# Patient Record
Sex: MALE | Race: WHITE | NOT HISPANIC OR LATINO | Employment: FULL TIME | URBAN - METROPOLITAN AREA
[De-identification: names, ages, dates, MRNs, and addresses within clinical notes are randomized per-mention and may not be internally consistent; named-entity substitution may affect disease eponyms.]

---

## 2017-01-09 ENCOUNTER — HOSPITAL ENCOUNTER (EMERGENCY)
Facility: HOSPITAL | Age: 62
Discharge: HOME | End: 2017-01-09
Attending: EMERGENCY MEDICINE | Admitting: EMERGENCY MEDICINE

## 2017-01-09 VITALS
HEIGHT: 78 IN | RESPIRATION RATE: 16 BRPM | HEART RATE: 64 BPM | TEMPERATURE: 97.8 F | SYSTOLIC BLOOD PRESSURE: 136 MMHG | BODY MASS INDEX: 28.93 KG/M2 | OXYGEN SATURATION: 97 % | WEIGHT: 250 LBS | DIASTOLIC BLOOD PRESSURE: 68 MMHG

## 2017-01-09 DIAGNOSIS — K05.219 ABSCESS OF UPPER GUM: Primary | ICD-10-CM

## 2017-01-09 LAB
BASOPHILS # BLD AUTO: 0.04 10*3/MM3 (ref 0–0.2)
BASOPHILS NFR BLD AUTO: 0.6 % (ref 0–1.5)
DEPRECATED RDW RBC AUTO: 53 FL (ref 37–54)
EOSINOPHIL # BLD AUTO: 0.19 10*3/MM3 (ref 0–0.7)
EOSINOPHIL NFR BLD AUTO: 2.7 % (ref 0.3–6.2)
ERYTHROCYTE [DISTWIDTH] IN BLOOD BY AUTOMATED COUNT: 14.4 % (ref 11.5–14.5)
HCT VFR BLD AUTO: 38.4 % (ref 40.4–52.2)
HGB BLD-MCNC: 12.5 G/DL (ref 13.7–17.6)
IMM GRANULOCYTES # BLD: 0.02 10*3/MM3 (ref 0–0.03)
IMM GRANULOCYTES NFR BLD: 0.3 % (ref 0–0.5)
LYMPHOCYTES # BLD AUTO: 1.63 10*3/MM3 (ref 0.9–4.8)
LYMPHOCYTES NFR BLD AUTO: 23.3 % (ref 19.6–45.3)
MCH RBC QN AUTO: 32.6 PG (ref 27–32.7)
MCHC RBC AUTO-ENTMCNC: 32.6 G/DL (ref 32.6–36.4)
MCV RBC AUTO: 100.3 FL (ref 79.8–96.2)
MONOCYTES # BLD AUTO: 0.78 10*3/MM3 (ref 0.2–1.2)
MONOCYTES NFR BLD AUTO: 11.1 % (ref 5–12)
NEUTROPHILS # BLD AUTO: 4.34 10*3/MM3 (ref 1.9–8.1)
NEUTROPHILS NFR BLD AUTO: 62 % (ref 42.7–76)
PLATELET # BLD AUTO: 218 10*3/MM3 (ref 140–500)
PMV BLD AUTO: 9.8 FL (ref 6–12)
RBC # BLD AUTO: 3.83 10*6/MM3 (ref 4.6–6)
WBC NRBC COR # BLD: 7 10*3/MM3 (ref 4.5–10.7)

## 2017-01-09 PROCEDURE — 96365 THER/PROPH/DIAG IV INF INIT: CPT

## 2017-01-09 PROCEDURE — 99283 EMERGENCY DEPT VISIT LOW MDM: CPT

## 2017-01-09 PROCEDURE — 85025 COMPLETE CBC W/AUTO DIFF WBC: CPT | Performed by: EMERGENCY MEDICINE

## 2017-01-09 RX ORDER — CLINDAMYCIN HYDROCHLORIDE 150 MG/1
450 CAPSULE ORAL 3 TIMES DAILY
Qty: 63 CAPSULE | Refills: 0 | Status: SHIPPED | OUTPATIENT
Start: 2017-01-09 | End: 2017-01-16

## 2017-01-09 RX ORDER — CLINDAMYCIN PHOSPHATE 600 MG/50ML
600 INJECTION INTRAVENOUS ONCE
Status: COMPLETED | OUTPATIENT
Start: 2017-01-09 | End: 2017-01-09

## 2017-01-09 RX ADMIN — CLINDAMYCIN PHOSPHATE 600 MG: 12 INJECTION, SOLUTION INTRAMUSCULAR; INTRAVENOUS at 12:17

## 2017-01-09 NOTE — ED PROVIDER NOTES
EMERGENCY DEPARTMENT ENCOUNTER    CHIEF COMPLAINT  Chief Complaint: Oral Cyst  History given by: Patient  History limited by: Nothing  Room Number: 29/29  PMD: Mick Aquino MD      HPI:  Pt is a 61 y.o. male who presents complaining of tenderness and swelling to his upper lip for the past 5 days with no known cause. He states that the pain is worsened with palpation and relieved with nothing. The patient explains that he came to the ED for further evaluation per his PCP. He denies dental pain, trouble swallowing, SOA, fever or chills since onset and has no other complaints.     Duration:  5 days  Onset: Gradual  Timing: Constant  Location: Left upper gum  Radiation: None  Quality: Sharp  Intensity/Severity: Moderate  Progression: Worsening  Associated Symptoms: Oral swelling/tenderness  Aggravating Factors: Palpation  Alleviating Factors: Rest  Previous Episodes: None  Treatment before arrival: None mentioned    PAST MEDICAL HISTORY  Active Ambulatory Problems     Diagnosis Date Noted   • Arthritis 04/25/2016   • Hypertension 06/07/2016   • Tobacco abuse 06/07/2016     Resolved Ambulatory Problems     Diagnosis Date Noted   • No Resolved Ambulatory Problems     Past Medical History   Diagnosis Date   • Anxiety    • Bronchitis with chronic airway obstruction    • Hiatal hernia    • Low back pain    • Neck pain    • Sleep apnea    • Sleep apnea with use of continuous positive airway pressure (CPAP)        PAST SURGICAL HISTORY  Past Surgical History   Procedure Laterality Date   • Replacement total knee     • Joint replacement Right      Dr Morrow   • Tonsillectomy     • Total hip arthroplasty Right        FAMILY HISTORY  Family History   Problem Relation Age of Onset   • Cancer Mother    • Heart disease Father    • Alcohol abuse Father    • Cancer Brother        SOCIAL HISTORY  Social History     Social History   • Marital status:      Spouse name: N/A   • Number of children: N/A   • Years of education:  N/A     Occupational History   • network technition       Social History Main Topics   • Smoking status: Current Every Day Smoker     Packs/day: 1.50     Years: 30.00     Types: Cigarettes   • Smokeless tobacco: Never Used   • Alcohol use 2.4 oz/week     4 Cans of beer per week      Comment: daily   • Drug use: No   • Sexual activity: Defer     Other Topics Concern   • Not on file     Social History Narrative   • No narrative on file       ALLERGIES  Penicillins    REVIEW OF SYSTEMS  Review of Systems   Constitutional: Negative for chills and fatigue.   HENT: Positive for dental problem. Negative for congestion, rhinorrhea and sore throat.    Eyes: Negative for pain.   Respiratory: Negative for cough, shortness of breath and wheezing.    Cardiovascular: Negative for chest pain, palpitations and leg swelling.   Gastrointestinal: Negative for abdominal pain, diarrhea, nausea and vomiting.   Genitourinary: Negative for difficulty urinating, dysuria, flank pain and frequency.   Musculoskeletal: Negative for arthralgias, myalgias, neck pain and neck stiffness.   Skin: Negative for rash.   Neurological: Negative for dizziness, speech difficulty, weakness, light-headedness, numbness and headaches.   Psychiatric/Behavioral: Negative.    All other systems reviewed and are negative.      PHYSICAL EXAM  ED Triage Vitals   Temp Heart Rate Resp BP SpO2   01/09/17 1139 01/09/17 1139 01/09/17 1139 01/09/17 1144 01/09/17 1139   95.7 °F (35.4 °C) 118 18 175/86 95 %      Temp src Heart Rate Source Patient Position BP Location FiO2 (%)   01/09/17 1139 -- 01/09/17 1144 01/09/17 1144 --   Tympanic  Lying Left arm        Physical Exam   Constitutional: He is oriented to person, place, and time and well-developed, well-nourished, and in no distress.   HENT:   Head: Normocephalic and atraumatic.   Focal swelling of left upper gingiva with fluctuance. There is no focal dental tenderness.    Eyes: EOM are normal. Pupils are equal, round,  and reactive to light.   Neck: Normal range of motion. Neck supple.   Cardiovascular: Normal rate, regular rhythm and normal heart sounds.    Pulmonary/Chest: Effort normal and breath sounds normal. No respiratory distress.   Abdominal: Soft. There is no tenderness. There is no rebound and no guarding.   Musculoskeletal: Normal range of motion. He exhibits no edema.   Neurological: He is alert and oriented to person, place, and time. He has normal sensation and normal strength.   Skin: Skin is warm and dry.   Psychiatric: Mood and affect normal.   Nursing note and vitals reviewed.      LAB RESULTS  Lab Results (last 24 hours)     Procedure Component Value Units Date/Time    CBC & Differential [[id number]] Collected:  01/09/17 1212    Specimen:  Blood Updated:  01/09/17 1228    Narrative:       The following orders were created for panel order CBC & Differential.  Procedure                               Abnormality         Status                     ---------                               -----------         ------                     CBC Auto Differential[[id number]]         Abnormal            Final result                 Please view results for these tests on the individual orders.    CBC Auto Differential [[id number]]  (Abnormal) Collected:  01/09/17 1212    Specimen:  Blood Updated:  01/09/17 1228     WBC 7.00 10*3/mm3      RBC 3.83 (L) 10*6/mm3      Hemoglobin 12.5 (L) g/dL      Hematocrit 38.4 (L) %      .3 (H) fL      MCH 32.6 pg      MCHC 32.6 g/dL      RDW 14.4 %      RDW-SD 53.0 fl      MPV 9.8 fL      Platelets 218 10*3/mm3      Neutrophil % 62.0 %      Lymphocyte % 23.3 %      Monocyte % 11.1 %      Eosinophil % 2.7 %      Basophil % 0.6 %      Immature Grans % 0.3 %      Neutrophils, Absolute 4.34 10*3/mm3      Lymphocytes, Absolute 1.63 10*3/mm3      Monocytes, Absolute 0.78 10*3/mm3      Eosinophils, Absolute 0.19 10*3/mm3      Basophils, Absolute 0.04 10*3/mm3      Immature Grans, Absolute 0.02  10*3/mm3           I ordered the above labs and reviewed the results    PROCEDURES  Procedures      PROGRESS AND CONSULTS  ED Course     1209  Ordered Labs for further evaluation. Also ordered Clindamycin to treat the patient's oral infection.     1320  Jairon Marquis NP, drained the cyst which contained moderate amount of serous fluid.     1358  Rechecked patient and they are resting comfortably after the I&D. Discussed the patient's pertinent labs and imaging results, including his labs show nothing acute. Discussed plan to discharge the patient home with Clindamycin for the infection and recommended follow up with his Dentist. I also explained that he should return to the ED if the swelling or pain increases. Patient agrees with the plan and all questions were addressed.     Latest vital signs   BP- 175/86 HR- 118 Temp- 95.7 °F (35.4 °C) (Tympanic) O2 sat- 95%    MEDICAL DECISION MAKING  Results were reviewed/discussed with the patient and they were also made aware of online access. Pt also made aware that some labs, such as cultures, will not be resulted during ER visit and follow up with PMD is necessary.     MDM  Number of Diagnoses or Management Options  Abscess of upper gum:   Diagnosis management comments: Patient had an abscess on his upper gum.  There was no evidence of a dental infection.  The patient does wear partial dentures and I suspect this abscess could be due to irritation from his partial plate.  He was afebrile with a normal white blood cell count.  The abscess was drained by the NP.  Patient was given IV clindamycin in the ER and will be discharged with a prescription for clindamycin.  Patient was advised follow-up with his dentist.       Amount and/or Complexity of Data Reviewed  Clinical lab tests: ordered and reviewed  Decide to obtain previous medical records or to obtain history from someone other than the patient: yes  Review and summarize past medical records: yes (Last seen in ER April  2016 for neck pain and cervical facet arthropathy. )    Patient Progress  Patient progress: stable         DIAGNOSIS  Final diagnoses:   Abscess of upper gum       DISPOSITION  DISCHARGE    Patient discharged in stable condition.    Reviewed implications of results, diagnosis, meds, responsibility to follow up, warning signs and symptoms of possible worsening, potential complications and reasons to return to ER, including worsening inflammation.    Patient/Family voiced understanding of above instructions.    Discussed plan for discharge, as there is no emergent indication for admission.  Pt/family is agreeable and understands need for follow up and repeat testing.  Pt is aware that discharge does not mean that nothing is wrong but it indicates no emergency is present that requires admission and they must continue care with follow-up as given below or physician of their choice.     FOLLOW-UP  Mick Aquino MD  [address] [place], [place] [id number] [phone]    In 1 week           Medication List      New Prescriptions          clindamycin 150 MG capsule   Commonly known as:  CLEOCIN   Take 3 capsules by mouth 3 (Three) Times a Day for 7 days.         Stop          albuterol 108 (90 BASE) MCG/ACT inhaler   Commonly known as:  PROAIR RESPICLICK       cyclobenzaprine 10 MG tablet   Commonly known as:  FLEXERIL       Fluticasone Furoate-Vilanterol 100-25 MCG/INH aerosol powder    Commonly known as:  BREO ELLIPTA       HYDROcodone-acetaminophen 5-325 MG per tablet   Commonly known as:  NORCO       HYDROcodone-homatropine 5-1.5 MG/5ML syrup   Commonly known as:  HYCODAN       levoFLOXacin 500 MG tablet   Commonly known as:  LEVAQUIN       naproxen sodium 220 MG tablet   Commonly known as:  ALEVE       traMADol 50 MG tablet   Commonly known as:  ULTRAM        Latest Documented Vital Signs:  As of 1:59 PM  BP- 175/86 HR- 118 Temp- 95.7 °F (35.4 °C) (Tympanic) O2 sat- 95%  --  Documentation assistance provided by  ramirez Byrne for .  Information recorded by the scribe was done at my direction and has been verified and validated by me.         Osman Byrne  01/09/17 1400       Carmelo Macias MD  01/09/17 3565

## 2017-01-09 NOTE — DISCHARGE INSTRUCTIONS
Follow-up with your dentist.  Take antibiotics as prescribed.  Rinse your mouth with warm salt water several times daily.  Return to the emergency department for fever, increased swelling, or other concern.

## 2017-01-09 NOTE — ED PROCEDURE NOTE
Place of Service:[facility] EMERGENCY DEPARTMENT  Patient Name:Watson Lisa  :1955    Procedure  Incision & Drainage  Date/Time: 2017 1:37 PM  Performed by: ALYSSIA SON  Authorized by: MANNIE SHAH     Consent:     Consent obtained:  Verbal    Consent given by:  Patient  Location:     Type:  Abscess    Size:  5 mm visualized    Location:  Mouth    Mouth location: Upper left canine.  Anesthesia (see MAR for exact dosages):     Anesthesia method:  Local infiltration    Local anesthetic:  Lidocaine 1% WITH epi  Procedure details:     Complexity:  Simple    Needle aspiration: no      Incision types:  Stab incision    Scalpel blade:  11    Drainage:  Bloody    Drainage amount:  Moderate    Wound treatment:  Wound left open    Packing materials:  None  Post-procedure details:     Patient tolerance of procedure:  Tolerated well, no immediate complications              Pb Delgado  17 1341       ALISON George  17 1447

## 2017-01-12 ENCOUNTER — DOCUMENTATION (OUTPATIENT)
Dept: SOCIAL WORK | Facility: HOSPITAL | Age: 62
End: 2017-01-12

## 2017-01-20 RX ORDER — AMLODIPINE BESYLATE 10 MG/1
TABLET ORAL
Qty: 90 TABLET | Refills: 1 | Status: SHIPPED | OUTPATIENT
Start: 2017-01-20 | End: 2017-07-19 | Stop reason: SDUPTHER

## 2017-02-05 NOTE — ED PROVIDER NOTES
Subjective   History of Present Illness    Review of Systems    Past Medical History   Diagnosis Date   • Anxiety    • Arthritis    • Bronchitis with chronic airway obstruction      LAST FEB   • Hiatal hernia    • Hypertension    • Hypertension    • Low back pain    • Neck pain    • Sleep apnea    • Sleep apnea with use of continuous positive airway pressure (CPAP)      AT NIGHT       Allergies   Allergen Reactions   • Penicillins        Past Surgical History   Procedure Laterality Date   • Replacement total knee     • Joint replacement Right      Dr Morrow   • Tonsillectomy     • Total hip arthroplasty Right        Family History   Problem Relation Age of Onset   • Cancer Mother    • Heart disease Father    • Alcohol abuse Father    • Cancer Brother        Social History     Social History   • Marital status:      Spouse name: N/A   • Number of children: N/A   • Years of education: N/A     Occupational History   • network technition       Social History Main Topics   • Smoking status: Current Every Day Smoker     Packs/day: 1.50     Years: 30.00     Types: Cigarettes   • Smokeless tobacco: Never Used   • Alcohol use 2.4 oz/week     4 Cans of beer per week      Comment: daily   • Drug use: No   • Sexual activity: Defer     Other Topics Concern   • None     Social History Narrative           Objective   Physical Exam    Procedures         ED Course  ED Course                  MDM    Final diagnoses:   Abscess of upper gum            Santhosh Marquis, ALISON  02/05/17 0824       Santhosh Marquis, ALISON  02/25/17 1836       Santhosh Marquis, APRN  02/25/17 1836

## 2017-02-06 ENCOUNTER — OFFICE VISIT (OUTPATIENT)
Dept: FAMILY MEDICINE CLINIC | Facility: CLINIC | Age: 62
End: 2017-02-06

## 2017-02-06 VITALS
DIASTOLIC BLOOD PRESSURE: 82 MMHG | HEART RATE: 71 BPM | WEIGHT: 244 LBS | OXYGEN SATURATION: 99 % | BODY MASS INDEX: 28.23 KG/M2 | HEIGHT: 78 IN | SYSTOLIC BLOOD PRESSURE: 178 MMHG | TEMPERATURE: 98.1 F

## 2017-02-06 DIAGNOSIS — I10 ESSENTIAL HYPERTENSION: Primary | ICD-10-CM

## 2017-02-06 DIAGNOSIS — F41.9 ANXIETY: ICD-10-CM

## 2017-02-06 DIAGNOSIS — G47.00 INSOMNIA, UNSPECIFIED TYPE: ICD-10-CM

## 2017-02-06 DIAGNOSIS — R63.4 WEIGHT LOSS: ICD-10-CM

## 2017-02-06 DIAGNOSIS — M19.90 ARTHRITIS: ICD-10-CM

## 2017-02-06 DIAGNOSIS — Z72.0 TOBACCO ABUSE: ICD-10-CM

## 2017-02-06 LAB
ALBUMIN SERPL-MCNC: 4.9 G/DL (ref 3.5–5.2)
ALBUMIN/GLOB SERPL: 1.5 G/DL
ALP SERPL-CCNC: 81 U/L (ref 39–117)
ALT SERPL W P-5'-P-CCNC: 24 U/L (ref 1–41)
AMYLASE SERPL-CCNC: 52 U/L (ref 28–100)
ANION GAP SERPL CALCULATED.3IONS-SCNC: 14.3 MMOL/L
AST SERPL-CCNC: 25 U/L (ref 1–40)
BACTERIA UR QL AUTO: NORMAL /HPF
BILIRUB SERPL-MCNC: 0.7 MG/DL (ref 0.1–1.2)
BILIRUB UR QL STRIP: NEGATIVE
BUN BLD-MCNC: 10 MG/DL (ref 8–23)
BUN/CREAT SERPL: 13.7 (ref 7–25)
CALCIUM SPEC-SCNC: 10.1 MG/DL (ref 8.6–10.5)
CHLORIDE SERPL-SCNC: 94 MMOL/L (ref 98–107)
CLARITY UR: CLEAR
CO2 SERPL-SCNC: 25.7 MMOL/L (ref 22–29)
COLOR UR: YELLOW
CREAT BLD-MCNC: 0.73 MG/DL (ref 0.76–1.27)
ERYTHROCYTE [DISTWIDTH] IN BLOOD BY AUTOMATED COUNT: 13.6 % (ref 4.5–15)
GFR SERPL CREATININE-BSD FRML MDRD: 109 ML/MIN/1.73
GLOBULIN UR ELPH-MCNC: 3.2 GM/DL
GLUCOSE BLD-MCNC: 101 MG/DL (ref 65–99)
GLUCOSE UR STRIP-MCNC: NEGATIVE MG/DL
HCT VFR BLD AUTO: 41.2 % (ref 35–60)
HGB BLD-MCNC: 13.8 G/DL (ref 13.5–18)
HGB UR QL STRIP.AUTO: NEGATIVE
KETONES UR QL STRIP: ABNORMAL
LEUKOCYTE ESTERASE UR QL STRIP.AUTO: NEGATIVE
LIPASE SERPL-CCNC: 23 U/L (ref 13–60)
LYMPHOCYTES # BLD AUTO: 1.7 10*3/MM3 (ref 1.2–3.4)
LYMPHOCYTES NFR BLD AUTO: 22.3 % (ref 21–51)
MCH RBC QN AUTO: 32.4 PG (ref 26.1–33.1)
MCHC RBC AUTO-ENTMCNC: 33.6 G/DL (ref 33–37)
MCV RBC AUTO: 96.3 FL (ref 80–99)
MONOCYTES # BLD AUTO: 0.6 10*3/MM3 (ref 0.1–0.6)
MONOCYTES NFR BLD AUTO: 7.3 % (ref 2–9)
NEUTROPHILS # BLD AUTO: 5.4 10*3/MM3 (ref 1.4–6.5)
NEUTROPHILS NFR BLD AUTO: 70.4 % (ref 42–75)
NITRITE UR QL STRIP: NEGATIVE
PH UR STRIP.AUTO: 6.5 [PH] (ref 4.6–8)
PLATELET # BLD AUTO: 292 10*3/MM3 (ref 150–450)
PMV BLD AUTO: 6.7 FL (ref 7.1–10.5)
POTASSIUM BLD-SCNC: 4.5 MMOL/L (ref 3.5–5.2)
PROT SERPL-MCNC: 8.1 G/DL (ref 6–8.5)
PROT UR QL STRIP: NEGATIVE
PSA SERPL-MCNC: 1.59 NG/ML (ref 0–4)
RBC # BLD AUTO: 4.27 10*6/MM3 (ref 4–6)
RBC # UR: NORMAL /HPF
REF LAB TEST METHOD: NORMAL
SODIUM BLD-SCNC: 134 MMOL/L (ref 136–145)
SP GR UR STRIP: 1.01 (ref 1–1.03)
SQUAMOUS #/AREA URNS HPF: NORMAL /HPF
T-UPTAKE NFR SERPL: 1.01 TBI (ref 0.8–1.3)
T4 SERPL-MCNC: 7.22 MCG/DL (ref 4.5–11.7)
TSH SERPL DL<=0.05 MIU/L-ACNC: 0.94 MIU/ML (ref 0.27–4.2)
UROBILINOGEN UR QL STRIP: ABNORMAL
WBC NRBC COR # BLD: 7.7 10*3/MM3 (ref 4.5–10)
WBC UR QL AUTO: NORMAL /HPF

## 2017-02-06 PROCEDURE — 80053 COMPREHEN METABOLIC PANEL: CPT | Performed by: FAMILY MEDICINE

## 2017-02-06 PROCEDURE — 83690 ASSAY OF LIPASE: CPT | Performed by: FAMILY MEDICINE

## 2017-02-06 PROCEDURE — 84443 ASSAY THYROID STIM HORMONE: CPT | Performed by: FAMILY MEDICINE

## 2017-02-06 PROCEDURE — 82150 ASSAY OF AMYLASE: CPT | Performed by: FAMILY MEDICINE

## 2017-02-06 PROCEDURE — 84153 ASSAY OF PSA TOTAL: CPT | Performed by: FAMILY MEDICINE

## 2017-02-06 PROCEDURE — 99214 OFFICE O/P EST MOD 30 MIN: CPT | Performed by: FAMILY MEDICINE

## 2017-02-06 PROCEDURE — 84479 ASSAY OF THYROID (T3 OR T4): CPT | Performed by: FAMILY MEDICINE

## 2017-02-06 PROCEDURE — 84436 ASSAY OF TOTAL THYROXINE: CPT | Performed by: FAMILY MEDICINE

## 2017-02-06 PROCEDURE — 81001 URINALYSIS AUTO W/SCOPE: CPT | Performed by: FAMILY MEDICINE

## 2017-02-06 PROCEDURE — 85025 COMPLETE CBC W/AUTO DIFF WBC: CPT | Performed by: FAMILY MEDICINE

## 2017-02-06 RX ORDER — TRAZODONE HYDROCHLORIDE 50 MG/1
50 TABLET ORAL NIGHTLY
Qty: 30 TABLET | Refills: 3 | Status: SHIPPED | OUTPATIENT
Start: 2017-02-06 | End: 2017-07-05 | Stop reason: SDUPTHER

## 2017-02-06 NOTE — PROGRESS NOTES
SUBJECTIVE:  The patient is a 61-year-old white male comes in for several issues.  Center a lot of stress at work.  He's afraid his job may be outsourced.  He's lost some weight over the last month or so.  He denies change in bowel habits.  He had a normal colonoscopy within the last 2 years.  Does have a history history that his mother had colon cancer.  He has had no hematochezia or melena.  He does smoke cigarettes but has had a normal chest x-ray within the last year.  No cough or hemoptysis.  He is unable sleep very well.    PAST MEDICAL HISTORY:  Reviewed.    REVIEW OF SYSTEMS:  Please see above; 14 point ROS otherwise negative.      OBJECTIVE: Vitals signs are reviewed and are stable.    HEENT: PERRLA.   Normal pharynx clear  Neck:  Supple.    Lungs:  Clear.    Heart:  Regular rate and rhythm.    Abdomen:   Soft, nontender.   L sounds present  Extremities:  No cyanosis, clubbing or edema.      ASSESSMENT:     Weight loss  Anxiety  Insomnia  Tobacco abuse  Hypertension  Sleep apnea    PLAN:   CBC CMP TSH thyroid profile amylase lipase Hemoccult PSA are ordered.  Trazodone 50 mg daily at bedtime. will follow up on his labs.  He's advised to stop smoking.  He should continue his CPAP therapy from which he benefits.    Much of this encounter note is an electronic transcription/translation of spoken language to printed text.  The electronic translation of spoken language may permit erroneous, or at times, nonsensical words or phrases to be inadvertently transcribed.  Although I have reviewed the note for such errors, some may still exist.

## 2017-02-07 DIAGNOSIS — R63.4 WEIGHT LOSS: Primary | ICD-10-CM

## 2017-02-14 ENCOUNTER — HOSPITAL ENCOUNTER (OUTPATIENT)
Dept: CT IMAGING | Facility: HOSPITAL | Age: 62
Discharge: HOME | End: 2017-02-14
Admitting: FAMILY MEDICINE

## 2017-02-14 LAB — CREAT BLDA-MCNC: 0.8 MG/DL (ref 0.6–1.3)

## 2017-02-14 PROCEDURE — 74177 CT ABD & PELVIS W/CONTRAST: CPT

## 2017-02-14 PROCEDURE — 82565 ASSAY OF CREATININE: CPT

## 2017-02-14 PROCEDURE — 0 IOPAMIDOL 61 % SOLUTION: Performed by: FAMILY MEDICINE

## 2017-02-14 RX ADMIN — IOPAMIDOL 85 ML: 612 INJECTION, SOLUTION INTRAVENOUS at 08:01

## 2017-04-11 ENCOUNTER — OFFICE VISIT (OUTPATIENT)
Dept: FAMILY MEDICINE CLINIC | Facility: CLINIC | Age: 62
End: 2017-04-11

## 2017-04-11 VITALS
SYSTOLIC BLOOD PRESSURE: 128 MMHG | WEIGHT: 246 LBS | BODY MASS INDEX: 28.46 KG/M2 | HEART RATE: 92 BPM | DIASTOLIC BLOOD PRESSURE: 80 MMHG | HEIGHT: 78 IN | TEMPERATURE: 97.9 F | OXYGEN SATURATION: 95 %

## 2017-04-11 DIAGNOSIS — J20.9 ACUTE BRONCHITIS, UNSPECIFIED ORGANISM: Primary | ICD-10-CM

## 2017-04-11 DIAGNOSIS — Z72.0 TOBACCO ABUSE: ICD-10-CM

## 2017-04-11 PROCEDURE — 99213 OFFICE O/P EST LOW 20 MIN: CPT | Performed by: NURSE PRACTITIONER

## 2017-04-11 RX ORDER — CLOBETASOL PROPIONATE 0.05 G/100ML
SHAMPOO TOPICAL
Refills: 0 | COMMUNITY
Start: 2017-03-05 | End: 2017-09-13 | Stop reason: SDUPTHER

## 2017-04-11 RX ORDER — LEVOFLOXACIN 500 MG/1
500 TABLET, FILM COATED ORAL DAILY
Qty: 7 TABLET | Refills: 0 | Status: SHIPPED | OUTPATIENT
Start: 2017-04-11 | End: 2017-09-05

## 2017-04-11 NOTE — PATIENT INSTRUCTIONS
erx levaquin 500 mg x 1 wk, hydrocodone cough syrup, increase H20 and rest, gave note for work, call or RTC if sx persist or worsen, erx chantix and gave coupon, enc smoking cessation

## 2017-04-11 NOTE — PROGRESS NOTES
Subjective   Watson Lisa is a 61 y.o. male.     History of Present Illness   C/o chest congestion and prod cough x 3 days, no SOA wheezing, no fevers, no sore throat, no ear pain, no sinus tenderness or congestion, no OTC, no one sick at home or work, no flu shot this year, allergic PCN, last tx bronchitis 10/16 levaquin and hydromet cough syrup, prev tx bronchitis 06/16 with same, still has albuterol inhaler at home not currently using, Smoker apprpox 1.5 ppd x 30 years, states prev quit few month for a few times, prev used chantix and worked , Sa02 97    The following portions of the patient's history were reviewed and updated as appropriate: allergies, current medications, past family history, past medical history, past social history, past surgical history and problem list.    Review of Systems   Constitutional: Negative for fever.   HENT: Positive for congestion. Negative for ear discharge, ear pain, facial swelling, hearing loss, postnasal drip, rhinorrhea, sinus pressure, sore throat, trouble swallowing and voice change.    Respiratory: Positive for cough and chest tightness. Negative for shortness of breath and wheezing.    Cardiovascular: Negative for chest pain, palpitations and leg swelling.   Neurological: Negative for dizziness and headaches.   All other systems reviewed and are negative.      Objective   Physical Exam   Constitutional: He is oriented to person, place, and time. He appears well-developed and well-nourished.   HENT:   Head: Normocephalic and atraumatic.   Right Ear: Hearing and tympanic membrane normal.   Left Ear: Hearing and tympanic membrane normal.   Nose: Mucosal edema present. Right sinus exhibits no maxillary sinus tenderness and no frontal sinus tenderness. Left sinus exhibits no maxillary sinus tenderness and no frontal sinus tenderness.   Mouth/Throat: No oropharyngeal exudate, posterior oropharyngeal edema or posterior oropharyngeal erythema.   Eyes: Conjunctivae and EOM are  normal. Pupils are equal, round, and reactive to light.   Neck: Normal range of motion. Neck supple. No thyromegaly present.   Cardiovascular: Normal rate, regular rhythm and normal heart sounds.    Pulmonary/Chest: Effort normal and breath sounds normal.   diminished lung sound B lower lobes   Musculoskeletal: Normal range of motion.   Lymphadenopathy:     He has cervical adenopathy.   Neurological: He is alert and oriented to person, place, and time.   Skin: Skin is warm and dry.   Psychiatric: He has a normal mood and affect. His behavior is normal. Judgment and thought content normal.   Vitals reviewed.      Assessment/Plan   Watson was seen today for bronchitis.    Diagnoses and all orders for this visit:    Acute bronchitis, unspecified organism    Tobacco abuse    Other orders  -     varenicline (CHANTIX STARTING MONTH PAK) 0.5 MG X 11 & 1 MG X 42 tablet; Take 0.5 mg one daily on days 1-3 and 0.5 mg twice daily on days 4-7. Then 1 mg twice daily for a total of 12 weeks.  -     levoFLOXacin (LEVAQUIN) 500 MG tablet; Take 1 tablet by mouth Daily.  -     HYDROcodone-homatropine (HYCODAN) 5-1.5 MG/5ML syrup; Take 5 mL by mouth Every 6 (Six) Hours As Needed for Cough. 1 tsp Q 6 Hr prn    erx levaquin 500 mg x 1 wk, hydrocodone cough syrup, increase H20 and rest, gave note for work, call or RTC if sx persist or worsen, erx chantix and gave coupon, enc smoking cessation

## 2017-07-05 RX ORDER — TRAZODONE HYDROCHLORIDE 50 MG/1
TABLET ORAL
Qty: 30 TABLET | Refills: 3 | Status: SHIPPED | OUTPATIENT
Start: 2017-07-05 | End: 2017-09-13 | Stop reason: SDUPTHER

## 2017-07-14 RX ORDER — LOSARTAN POTASSIUM 100 MG/1
TABLET ORAL
Qty: 90 TABLET | Refills: 1 | Status: SHIPPED | OUTPATIENT
Start: 2017-07-14 | End: 2017-09-13 | Stop reason: SDUPTHER

## 2017-07-19 RX ORDER — AMLODIPINE BESYLATE 10 MG/1
TABLET ORAL
Qty: 90 TABLET | Refills: 1 | Status: SHIPPED | OUTPATIENT
Start: 2017-07-19 | End: 2017-09-13 | Stop reason: SDUPTHER

## 2017-09-05 ENCOUNTER — OFFICE VISIT (OUTPATIENT)
Dept: FAMILY MEDICINE CLINIC | Facility: CLINIC | Age: 62
End: 2017-09-05

## 2017-09-05 ENCOUNTER — TELEPHONE (OUTPATIENT)
Dept: FAMILY MEDICINE CLINIC | Facility: CLINIC | Age: 62
End: 2017-09-05

## 2017-09-05 VITALS
WEIGHT: 236 LBS | DIASTOLIC BLOOD PRESSURE: 70 MMHG | OXYGEN SATURATION: 95 % | HEART RATE: 87 BPM | TEMPERATURE: 98.4 F | HEIGHT: 78 IN | SYSTOLIC BLOOD PRESSURE: 140 MMHG | RESPIRATION RATE: 18 BRPM | BODY MASS INDEX: 27.31 KG/M2

## 2017-09-05 DIAGNOSIS — J20.9 ACUTE BRONCHITIS, UNSPECIFIED ORGANISM: Primary | ICD-10-CM

## 2017-09-05 PROCEDURE — 99213 OFFICE O/P EST LOW 20 MIN: CPT | Performed by: FAMILY MEDICINE

## 2017-09-05 RX ORDER — LEVOFLOXACIN 500 MG/1
500 TABLET, FILM COATED ORAL DAILY
Qty: 10 TABLET | Refills: 0 | Status: SHIPPED | OUTPATIENT
Start: 2017-09-05 | End: 2017-09-13 | Stop reason: SDUPTHER

## 2017-09-05 NOTE — PROGRESS NOTES
SUBJECTIVE:  The patient is a 62-year-old white male who comes in with a three-day history of productive cough.  He's had congestion drainage.  He still smokes cigarettes.  He has a history of bronchitis.     PAST MEDICAL HISTORY:  Reviewed.    REVIEW OF SYSTEMS:  Please see above; 14 point ROS otherwise negative.      OBJECTIVE: Vitals signs are reviewed and are stable.    HEENT: PERRLA.    Neck:  Supple.    Lungs:  Clear.    Heart:  Regular rate and rhythm.    Abdomen:   Soft, nontender.    Extremities:  No cyanosis, clubbing or edema.      ASSESSMENT:    Acute bronchitis  Tobacco abuse  Hypertension    PLAN:  Levaquin 500 mg daily.  Hydromet 1 teaspoon every 6 hours when necessary cough.  Advised to quit smoking.  Advised to come in for lab work in schedule complete physical.    Much of this encounter note is an electronic transcription/translation of spoken language to printed text.  The electronic translation of spoken language may permit erroneous, or at times, nonsensical words or phrases to be inadvertently transcribed.  Although I have reviewed the note for such errors, some may still exist.

## 2017-09-13 ENCOUNTER — APPOINTMENT (OUTPATIENT)
Dept: GENERAL RADIOLOGY | Facility: HOSPITAL | Age: 62
End: 2017-09-13

## 2017-09-13 ENCOUNTER — HOSPITAL ENCOUNTER (EMERGENCY)
Facility: HOSPITAL | Age: 62
Discharge: HOME | End: 2017-09-13
Attending: EMERGENCY MEDICINE | Admitting: EMERGENCY MEDICINE

## 2017-09-13 VITALS
DIASTOLIC BLOOD PRESSURE: 96 MMHG | BODY MASS INDEX: 28.35 KG/M2 | RESPIRATION RATE: 18 BRPM | SYSTOLIC BLOOD PRESSURE: 162 MMHG | TEMPERATURE: 98.2 F | OXYGEN SATURATION: 97 % | WEIGHT: 245 LBS | HEART RATE: 82 BPM | HEIGHT: 78 IN

## 2017-09-13 DIAGNOSIS — J44.1 COPD EXACERBATION (HCC): Primary | ICD-10-CM

## 2017-09-13 LAB
ALBUMIN SERPL-MCNC: 4.6 G/DL (ref 3.5–5.2)
ALBUMIN/GLOB SERPL: 1.4 G/DL
ALP SERPL-CCNC: 77 U/L (ref 39–117)
ALT SERPL W P-5'-P-CCNC: 31 U/L (ref 1–41)
ANION GAP SERPL CALCULATED.3IONS-SCNC: 16.9 MMOL/L
AST SERPL-CCNC: 31 U/L (ref 1–40)
BASOPHILS # BLD AUTO: 0.02 10*3/MM3 (ref 0–0.2)
BASOPHILS NFR BLD AUTO: 0.2 % (ref 0–1.5)
BILIRUB SERPL-MCNC: 0.8 MG/DL (ref 0.1–1.2)
BUN BLD-MCNC: 11 MG/DL (ref 8–23)
BUN/CREAT SERPL: 12 (ref 7–25)
CALCIUM SPEC-SCNC: 9.4 MG/DL (ref 8.6–10.5)
CHLORIDE SERPL-SCNC: 87 MMOL/L (ref 98–107)
CO2 SERPL-SCNC: 25.1 MMOL/L (ref 22–29)
CREAT BLD-MCNC: 0.92 MG/DL (ref 0.76–1.27)
DEPRECATED RDW RBC AUTO: 43.5 FL (ref 37–54)
EOSINOPHIL # BLD AUTO: 0 10*3/MM3 (ref 0–0.7)
EOSINOPHIL NFR BLD AUTO: 0 % (ref 0.3–6.2)
ERYTHROCYTE [DISTWIDTH] IN BLOOD BY AUTOMATED COUNT: 12.7 % (ref 11.5–14.5)
GFR SERPL CREATININE-BSD FRML MDRD: 83 ML/MIN/1.73
GLOBULIN UR ELPH-MCNC: 3.2 GM/DL
GLUCOSE BLD-MCNC: 115 MG/DL (ref 65–99)
HCT VFR BLD AUTO: 41.2 % (ref 40.4–52.2)
HGB BLD-MCNC: 14.7 G/DL (ref 13.7–17.6)
HOLD SPECIMEN: NORMAL
HOLD SPECIMEN: NORMAL
IMM GRANULOCYTES # BLD: 0.05 10*3/MM3 (ref 0–0.03)
IMM GRANULOCYTES NFR BLD: 0.4 % (ref 0–0.5)
LYMPHOCYTES # BLD AUTO: 0.97 10*3/MM3 (ref 0.9–4.8)
LYMPHOCYTES NFR BLD AUTO: 8 % (ref 19.6–45.3)
MCH RBC QN AUTO: 33.3 PG (ref 27–32.7)
MCHC RBC AUTO-ENTMCNC: 35.7 G/DL (ref 32.6–36.4)
MCV RBC AUTO: 93.2 FL (ref 79.8–96.2)
MONOCYTES # BLD AUTO: 0.95 10*3/MM3 (ref 0.2–1.2)
MONOCYTES NFR BLD AUTO: 7.9 % (ref 5–12)
NEUTROPHILS # BLD AUTO: 10.07 10*3/MM3 (ref 1.9–8.1)
NEUTROPHILS NFR BLD AUTO: 83.5 % (ref 42.7–76)
NT-PROBNP SERPL-MCNC: 169.3 PG/ML (ref 5–900)
PLATELET # BLD AUTO: 260 10*3/MM3 (ref 140–500)
PMV BLD AUTO: 9.2 FL (ref 6–12)
POTASSIUM BLD-SCNC: 4 MMOL/L (ref 3.5–5.2)
PROT SERPL-MCNC: 7.8 G/DL (ref 6–8.5)
RBC # BLD AUTO: 4.42 10*6/MM3 (ref 4.6–6)
SODIUM BLD-SCNC: 129 MMOL/L (ref 136–145)
TROPONIN T SERPL-MCNC: <0.01 NG/ML (ref 0–0.03)
WBC NRBC COR # BLD: 12.06 10*3/MM3 (ref 4.5–10.7)
WHOLE BLOOD HOLD SPECIMEN: NORMAL
WHOLE BLOOD HOLD SPECIMEN: NORMAL

## 2017-09-13 PROCEDURE — 84484 ASSAY OF TROPONIN QUANT: CPT | Performed by: EMERGENCY MEDICINE

## 2017-09-13 PROCEDURE — 85025 COMPLETE CBC W/AUTO DIFF WBC: CPT | Performed by: EMERGENCY MEDICINE

## 2017-09-13 PROCEDURE — 93005 ELECTROCARDIOGRAM TRACING: CPT

## 2017-09-13 PROCEDURE — 99284 EMERGENCY DEPT VISIT MOD MDM: CPT

## 2017-09-13 PROCEDURE — 36415 COLL VENOUS BLD VENIPUNCTURE: CPT | Performed by: EMERGENCY MEDICINE

## 2017-09-13 PROCEDURE — 80053 COMPREHEN METABOLIC PANEL: CPT | Performed by: EMERGENCY MEDICINE

## 2017-09-13 PROCEDURE — 71020 HC CHEST PA AND LATERAL: CPT

## 2017-09-13 PROCEDURE — 83880 ASSAY OF NATRIURETIC PEPTIDE: CPT | Performed by: PHYSICIAN ASSISTANT

## 2017-09-13 PROCEDURE — 93010 ELECTROCARDIOGRAM REPORT: CPT | Performed by: INTERNAL MEDICINE

## 2017-09-13 RX ORDER — ASPIRIN 325 MG
325 TABLET ORAL ONCE
Status: DISCONTINUED | OUTPATIENT
Start: 2017-09-13 | End: 2017-09-13 | Stop reason: HOSPADM

## 2017-09-13 RX ORDER — LOSARTAN POTASSIUM 100 MG/1
100 TABLET ORAL DAILY
COMMUNITY
End: 2018-01-10 | Stop reason: SDUPTHER

## 2017-09-13 RX ORDER — SODIUM CHLORIDE 0.9 % (FLUSH) 0.9 %
10 SYRINGE (ML) INJECTION AS NEEDED
Status: DISCONTINUED | OUTPATIENT
Start: 2017-09-13 | End: 2017-09-13 | Stop reason: HOSPADM

## 2017-09-13 RX ORDER — NAPROXEN SODIUM 220 MG
220 TABLET ORAL 2 TIMES DAILY PRN
COMMUNITY
End: 2017-11-14

## 2017-09-13 RX ORDER — AMLODIPINE BESYLATE 10 MG/1
10 TABLET ORAL DAILY
COMMUNITY
End: 2019-01-29 | Stop reason: SDUPTHER

## 2017-09-13 RX ORDER — LEVOFLOXACIN 500 MG/1
500 TABLET, FILM COATED ORAL DAILY
COMMUNITY
Start: 2017-09-05 | End: 2017-09-14

## 2017-09-13 RX ORDER — CLOBETASOL PROPIONATE 0.05 G/100ML
1 SHAMPOO TOPICAL 2 TIMES WEEKLY
COMMUNITY
End: 2020-07-02 | Stop reason: HOSPADM

## 2017-09-13 RX ORDER — TRAZODONE HYDROCHLORIDE 50 MG/1
50 TABLET ORAL NIGHTLY PRN
COMMUNITY
End: 2018-05-14 | Stop reason: SDUPTHER

## 2017-09-13 RX ORDER — PREDNISONE 50 MG/1
50 TABLET ORAL DAILY
Qty: 7 TABLET | Refills: 0 | Status: SHIPPED | OUTPATIENT
Start: 2017-09-13 | End: 2017-11-14

## 2017-09-13 RX ORDER — DICLOFENAC SODIUM 75 MG/1
75 TABLET, DELAYED RELEASE ORAL DAILY PRN
COMMUNITY
End: 2017-11-14

## 2017-09-13 NOTE — PROGRESS NOTES
Clinical Pharmacy Services: Medication History/Medication Reconciliation    Agree with medication history and documentation as performed by Chino ArmendarizD Candidate.    Miesha Sesay PharmD, [facility]  Clinical Pharmacy Specialist, Emergency Medicine  Work  Phone: [phone]  _______________________________________________________________________________________________________________________    Clinical Pharmacy Services: Medication History    Watson Lisa is a 62 y.o. male presenting to [facility] Emergency Department with chief complaint of: chest pain and shortness of breath.     Past Medical History:  Past Medical History:   Diagnosis Date   • Anxiety    • Arthritis    • Bronchitis with chronic airway obstruction     LAST FEB   • DVT (deep venous thrombosis)    • Hiatal hernia    • Hypertension    • Hypertension    • Low back pain    • Neck pain    • Pulmonary embolism    • Sleep apnea    • Sleep apnea with use of continuous positive airway pressure (CPAP)     AT NIGHT       Allergies   Allergen Reactions   • Penicillins      Pt does not recall the reaction.        Medication information was obtained from: [facility]: [phone]  Pharmacy and Phone Number: Patients reported medication list as well as patients pharmacy       Medication notes: Patient reports adherence to listed medications.     Current Outpatient Medications:    Prior to Admission medications    Medication Sig Start Date End Date Taking? Authorizing Provider   acetaminophen (TYLENOL) 500 MG tablet Take 500 mg by mouth daily. 4/20/15  Yes Historical Provider, MD   amLODIPine (NORVASC) 10 MG tablet Take 10 mg by mouth Daily.   Yes Historical Provider, MD   aspirin 81 MG tablet Take 81 mg by mouth Daily. Pt reports taking every 1-2 days. 4/20/15  Yes Historical Provider, MD   clobetasol propionate (CLOBEX) 0.05 % shampoo Apply 1 application topically 2 (Two) Times a Week.   Yes Historical Provider, MD   diclofenac (VOLTAREN) 75 MG  EC tablet Take 75 mg by mouth Daily As Needed.   Yes Historical Provider, MD   levoFLOXacin (LEVAQUIN) 500 MG tablet Take 500 mg by mouth Daily. 9/5/17 9/14/17 Yes Historical Provider, MD   losartan (COZAAR) 100 MG tablet Take 100 mg by mouth Daily.   Yes Historical Provider, MD   naproxen sodium (ALEVE) 220 MG tablet Take 220 mg by mouth 2 (Two) Times a Day As Needed.   Yes Historical Provider, MD   traZODone (DESYREL) 50 MG tablet Take 50 mg by mouth At Night As Needed for Sleep.   Yes Historical Provider, MD       This medication list is complete to the best of my knowledge as of 9/13/2017    Please call if questions.     Tanya Perez, Student Pharmacist

## 2017-09-13 NOTE — ED PROVIDER NOTES
EMERGENCY DEPARTMENT ENCOUNTER    CHIEF COMPLAINT  Chief Complaint: cough  History given by: patient  History limited by: N/A  Room Number: 08/08  PMD: Mick Aquino MD      HPI:  Pt c/o productive cough that started about 1.5 weeks ago. He has also had mild dyspnea and chest congestion. He denies chest pain, abd pain, N/V/D, fevers, chills, sore throat, and pain and difficulty with urination. He reports that he saw PMD on 9/5/17 for current sx, was diagnosed with bronchitis, and was prescribed levaquin. However, despite taking the levaquin, sx have not improved. He reports that he has hx of COPD and has inhaler at home. Pt has no other complaints at this time.     Location: respiratory  Radiation: N/A  Quality: N/A  Intensity/Severity: moderate  Duration: started about 1.5 weeks ago  Onset quality: gradual  Timing: intermittent  Progression: unchanged  Aggravating Factors: none  Alleviating Factors: none  Previous Episodes: none mentioned  Treatment before arrival: Pt reports that he saw PMD on 9/5/17 for current sx, was diagnosed with bronchitis, and was prescribed levaquin and cough suppressant. However, despite taking these medications, sx have not improved.  Associated Symptoms: mild dyspnea, chest congestion      PAST MEDICAL HISTORY  Active Ambulatory Problems     Diagnosis Date Noted   • Arthritis 04/25/2016   • Hypertension 06/07/2016   • Tobacco abuse 06/07/2016     Resolved Ambulatory Problems     Diagnosis Date Noted   • No Resolved Ambulatory Problems     Past Medical History:   Diagnosis Date   • Anxiety    • Arthritis    • Bronchitis with chronic airway obstruction    • DVT (deep venous thrombosis)    • Hiatal hernia    • Hypertension    • Hypertension    • Low back pain    • Neck pain    • Pulmonary embolism    • Sleep apnea    • Sleep apnea with use of continuous positive airway pressure (CPAP)          PAST SURGICAL HISTORY  Past Surgical History:   Procedure Laterality Date   • JOINT  REPLACEMENT Right     hip/knee   • REPLACEMENT TOTAL KNEE     • TONSILLECTOMY     • TOTAL HIP ARTHROPLASTY Right          FAMILY HISTORY  Family History   Problem Relation Age of Onset   • Cancer Mother    • Heart disease Father    • Alcohol abuse Father    • Cancer Brother          SOCIAL HISTORY  Social History     Social History   • Marital status:      Spouse name: N/A   • Number of children: N/A   • Years of education: N/A     Occupational History   • network technition       Social History Main Topics   • Smoking status: Current Every Day Smoker     Packs/day: 1.50     Years: 30.00     Types: Cigarettes   • Smokeless tobacco: Never Used   • Alcohol use 2.4 oz/week     4 Cans of beer per week      Comment: daily   • Drug use: No   • Sexual activity: Defer     Other Topics Concern   • Not on file     Social History Narrative   • No narrative on file         ALLERGIES  Penicillins        REVIEW OF SYSTEMS  Review of Systems   Constitutional: Negative for chills and fever.   HENT: Positive for congestion (chest congestion). Negative for rhinorrhea and sore throat.    Eyes: Negative for pain.   Respiratory: Positive for cough (productive cough) and shortness of breath (mild).    Cardiovascular: Negative for chest pain and palpitations.   Gastrointestinal: Negative for abdominal pain, diarrhea, nausea and vomiting.   Endocrine: Negative.    Genitourinary: Negative for difficulty urinating.   Musculoskeletal: Negative for myalgias.   Skin: Negative.    Neurological: Negative for speech difficulty, weakness, numbness and headaches.   Psychiatric/Behavioral: Negative.    All other systems reviewed and are negative.           PHYSICAL EXAM  ED Triage Vitals   Temp Heart Rate Resp BP SpO2   09/13/17 1221 09/13/17 1221 09/13/17 1221 09/13/17 1221 09/13/17 1221   98.2 °F (36.8 °C) 100 18 161/88 99 % WNL      Temp src Heart Rate Source Patient Position BP Location FiO2 (%)   09/13/17 1221 09/13/17 1221 09/13/17  1221 09/13/17 1221 --   Oral Monitor Lying Left arm        Physical Exam   Constitutional: He is oriented to person, place, and time. No distress.   HENT:   Head: Normocephalic.   Mouth/Throat: Mucous membranes are normal.   Eyes: EOM are normal. Pupils are equal, round, and reactive to light.   Neck: Normal range of motion. Neck supple.   Cardiovascular: Normal rate, regular rhythm and normal heart sounds.    Pulmonary/Chest: Effort normal. No respiratory distress. He has no decreased breath sounds. He has wheezes (mild expiratory wheezes). He has no rhonchi. He has no rales.   Abdominal: Soft. There is no tenderness. There is no rebound and no guarding.   Musculoskeletal: Normal range of motion. He exhibits no edema (no pedal edema).   Neurological: He is alert and oriented to person, place, and time. He has normal motor skills and normal sensation.   Skin: Skin is warm and dry.   Psychiatric: Mood and affect normal.   Nursing note and vitals reviewed.          LAB RESULTS  Recent Results (from the past 24 hour(s))   Comprehensive Metabolic Panel    Collection Time: 09/13/17 12:31 PM   Result Value Ref Range    Glucose 115 (H) 65 - 99 mg/dL    BUN 11 8 - 23 mg/dL    Creatinine 0.92 0.76 - 1.27 mg/dL    Sodium 129 (L) 136 - 145 mmol/L    Potassium 4.0 3.5 - 5.2 mmol/L    Chloride 87 (L) 98 - 107 mmol/L    CO2 25.1 22.0 - 29.0 mmol/L    Calcium 9.4 8.6 - 10.5 mg/dL    Total Protein 7.8 6.0 - 8.5 g/dL    Albumin 4.60 3.50 - 5.20 g/dL    ALT (SGPT) 31 1 - 41 U/L    AST (SGOT) 31 1 - 40 U/L    Alkaline Phosphatase 77 39 - 117 U/L    Total Bilirubin 0.8 0.1 - 1.2 mg/dL    eGFR Non African Amer 83 >60 mL/min/1.73    Globulin 3.2 gm/dL    A/G Ratio 1.4 g/dL    BUN/Creatinine Ratio 12.0 7.0 - 25.0    Anion Gap 16.9 mmol/L   Troponin    Collection Time: 09/13/17 12:31 PM   Result Value Ref Range    Troponin T <0.010 0.000 - 0.030 ng/mL   Light Blue Top    Collection Time: 09/13/17 12:31 PM   Result Value Ref Range    Extra  Tube hold for add-on    Green Top (Gel)    Collection Time: 09/13/17 12:31 PM   Result Value Ref Range    Extra Tube Hold for add-ons.    Lavender Top    Collection Time: 09/13/17 12:31 PM   Result Value Ref Range    Extra Tube hold for add-on    Gold Top - SST    Collection Time: 09/13/17 12:31 PM   Result Value Ref Range    Extra Tube Hold for add-ons.    CBC Auto Differential    Collection Time: 09/13/17 12:31 PM   Result Value Ref Range    WBC 12.06 (H) 4.50 - 10.70 10*3/mm3    RBC 4.42 (L) 4.60 - 6.00 10*6/mm3    Hemoglobin 14.7 13.7 - 17.6 g/dL    Hematocrit 41.2 40.4 - 52.2 %    MCV 93.2 79.8 - 96.2 fL    MCH 33.3 (H) 27.0 - 32.7 pg    MCHC 35.7 32.6 - 36.4 g/dL    RDW 12.7 11.5 - 14.5 %    RDW-SD 43.5 37.0 - 54.0 fl    MPV 9.2 6.0 - 12.0 fL    Platelets 260 140 - 500 10*3/mm3    Neutrophil % 83.5 (H) 42.7 - 76.0 %    Lymphocyte % 8.0 (L) 19.6 - 45.3 %    Monocyte % 7.9 5.0 - 12.0 %    Eosinophil % 0.0 (L) 0.3 - 6.2 %    Basophil % 0.2 0.0 - 1.5 %    Immature Grans % 0.4 0.0 - 0.5 %    Neutrophils, Absolute 10.07 (H) 1.90 - 8.10 10*3/mm3    Lymphocytes, Absolute 0.97 0.90 - 4.80 10*3/mm3    Monocytes, Absolute 0.95 0.20 - 1.20 10*3/mm3    Eosinophils, Absolute 0.00 0.00 - 0.70 10*3/mm3    Basophils, Absolute 0.02 0.00 - 0.20 10*3/mm3    Immature Grans, Absolute 0.05 (H) 0.00 - 0.03 10*3/mm3   BNP    Collection Time: 09/13/17 12:31 PM   Result Value Ref Range    proBNP 169.3 5.0 - 900.0 pg/mL       Ordered the above labs and reviewed the results.        RADIOLOGY  XR Chest 2 View   Final Result   No focal pulmonary consolidation. COPD change. Follow-up as   clinical indications persist.       This report was finalized on 9/13/2017 1:07 PM by Dr. Donte Hernandez MD.             CXR (independently viewed by me, interpreted by radiologist)- no focal pulmonary consolidation, COPD change      Ordered the above noted radiological studies. Reviewed by me in PACS.       PROCEDURES  Procedures    EKG- interpreted  by Dr Acevedo, see Dr Acevedo's note for EKG interpretation       PROGRESS AND CONSULTS  ED Course     2:16 PM- Discussed case with Dr Acevedo who agrees with the plan of care.     2:17 PM- Added BNP to orders for further evaluation.     3:08 PM- Rechecked pt. He is resting comfortably and is in no acute distress. Discussed with pt about all pertinent results including CXR findings (no focal pulmonary consolidation, COPD change), mildly elevated WBC count, and otherwise stable labs. Informed pt that sx could possibly be due to COPD exacerbation for which he will be prescribe steroid. Advised pt to use inhaler. Instructed to f/u with PMD for recheck. RTER warnings given. Pt understands and agrees with plan. Addressed all questions.          MEDICAL DECISION MAKING  Results were reviewed/discussed with the patient.     MDM  Number of Diagnoses or Management Options     Amount and/or Complexity of Data Reviewed  Clinical lab tests: reviewed and ordered (Troponin <0.010, WBC= 12.06, BNP= 169.3)  Tests in the radiology section of CPT®: reviewed and ordered (CXR- no focal pulmonary consolidation, COPD change)  Tests in the medicine section of CPT®: ordered and reviewed (EKG-  interpreted by Dr Acevedo, see Dr Acevedo's note for EKG interpretation )  Decide to obtain previous medical records or to obtain history from someone other than the patient: yes  Review and summarize past medical records: yes (Pt was seen by Dr Dunham on 9/5/17, was diagnosed with bronchitis, and was started on levaquin.)  Independent visualization of images, tracings, or specimens: yes    Patient Progress  Patient progress: stable             DIAGNOSIS  Final diagnoses:   COPD exacerbation         DISPOSITION  Discharged    DISCHARGE    Patient discharged in stable condition.    Reviewed implications of results, diagnosis, meds, responsibility to follow up, warning signs and symptoms of possible worsening, potential complications and reasons to  return to ER.    Patient voiced understanding of above instructions.    Discussed plan for discharge, as there is no emergent indication for admission.  Pt is agreeable and understands need for follow up and repeat testing.  Pt is aware that discharge does not mean that nothing is wrong but it indicates no emergency is present and they must continue care with follow-up as given below or physician of their choice.     FOLLOW-UP  Mick Aquino MD  [address] [id number] [phone]    In 1 week  if no improvement      DISCHARGE MEDICATIONS     Medication List      New Prescriptions          predniSONE 50 MG tablet   Commonly known as:  DELTASONE   Take 1 tablet by mouth Daily.               Latest Documented Vital Signs:  As of 3:11 PM  BP- 156/92 HR- 79 Temp- 98.2 °F (36.8 °C) (Oral) O2 sat- 96%      --  Documentation assistance provided by ramirez Ocampo for VISHNU Kamara.  Information recorded by the scribe was done at my direction and has been verified and validated by me.       Karla Ocampo  09/13/17 1521       VISHNU Goldman  09/13/17 1857

## 2017-09-13 NOTE — ED NOTES
"Pt discharged with discharge instructions and follow up appointment suggested. Pt alert and oriented x4, ambulatory, pt had no questions for this RN. Pt denied chest pain. Pt stated \"I am ready to go\".     Macarena Rodrigues RN  09/13/17 2400    "

## 2017-09-13 NOTE — ED PROVIDER NOTES
Pt presents to ED wanting to make sure his bronchitis had not caused development of pneumonia. This has happened in past. Pt states he saw his PCP approximately 1 week ago and was diagnosed with bronchitis. He has symptoms that are typical of his past bronchitis.  PT c/o productive cough with clear sputum, generalized weakness, and nasal drainage. Pt denies having relief from the cough medication or levaquin he was placed on by his PCP and denies any current chest pain. Pt denies having/ using nebulizer or inhalers. He has inhalers but doesn't want to use them. Hehas no swelling or wieght gain.   On exam, pt has mild rhonchi and mild expiratory wheezes with some mild cough, no respiratory distress, RRR, no fever.  Reviewed EKG, lab work,  and Chest XR. Plan to place pt on steroids and instructs pt to use at home inhalers. He agrees to use inhalers. He doesn't want or feels he needs a breathing treatment now.  Discussed plan not to give pt secondary antibiotic at this time. Informed patient he needs to try to stop smoking. Pt has COPD exacerbation, but no signs of pneumonia. Agree with Asad WASHINGTON's plan to discharge pt.     EKG           EKG time: 1218  Rhythm/Rate: rate 106  P waves and NC:normal  QRS, axis: narrow QRS, normal axis   ST and T waves: Nonspecific   No acute process     Interpreted Contemporaneously by me, independently viewed  Unchanged compared to prior 7/2016      I supervised care provided by the midlevel provider.    We have discussed this patient's history, physical exam, and treatment plan.   I have reviewed the note and personally saw and examined the patient and agree with the plan of care.    Documentation assistance provided by ramirez Miller for Dr. Acevedo.  Information recorded by the ramirez was done at my direction and has been verified and validated by me.       Kurt Miller  09/13/17 9782       Watson Acevedo MD  09/13/17 8946

## 2017-09-15 ENCOUNTER — TELEPHONE (OUTPATIENT)
Dept: FAMILY MEDICINE CLINIC | Facility: CLINIC | Age: 62
End: 2017-09-15

## 2017-09-15 NOTE — TELEPHONE ENCOUNTER
PT IS HAVING ALCOHOL WITH DRAWS AGAIN AND WANTS TO KNOW IF [facility] CAN SEND HIM TO SOME KIND OF REHAB OR CAN HE CALL THE [facility] HIMSELF    [phone]

## 2017-09-15 NOTE — TELEPHONE ENCOUNTER
09/15 Called pt, he called the [facility] himself, and they are going to get him in today around 2 pm.

## 2017-10-13 ENCOUNTER — HOSPITAL ENCOUNTER (EMERGENCY)
Facility: HOSPITAL | Age: 62
Discharge: HOME | End: 2017-10-13
Attending: EMERGENCY MEDICINE | Admitting: EMERGENCY MEDICINE

## 2017-10-13 ENCOUNTER — APPOINTMENT (OUTPATIENT)
Dept: GENERAL RADIOLOGY | Facility: HOSPITAL | Age: 62
End: 2017-10-13

## 2017-10-13 ENCOUNTER — APPOINTMENT (OUTPATIENT)
Dept: CT IMAGING | Facility: HOSPITAL | Age: 62
End: 2017-10-13

## 2017-10-13 VITALS
OXYGEN SATURATION: 100 % | SYSTOLIC BLOOD PRESSURE: 122 MMHG | TEMPERATURE: 96.8 F | WEIGHT: 245 LBS | DIASTOLIC BLOOD PRESSURE: 71 MMHG | RESPIRATION RATE: 16 BRPM | BODY MASS INDEX: 28.35 KG/M2 | HEIGHT: 78 IN | HEART RATE: 78 BPM

## 2017-10-13 DIAGNOSIS — S80.02XA CONTUSION OF LEFT KNEE, INITIAL ENCOUNTER: Primary | ICD-10-CM

## 2017-10-13 DIAGNOSIS — M25.062 KNEE HEMARTHROSIS, LEFT: ICD-10-CM

## 2017-10-13 PROCEDURE — 73700 CT LOWER EXTREMITY W/O DYE: CPT

## 2017-10-13 PROCEDURE — 99284 EMERGENCY DEPT VISIT MOD MDM: CPT

## 2017-10-13 PROCEDURE — 73562 X-RAY EXAM OF KNEE 3: CPT

## 2017-10-13 RX ORDER — OXYCODONE HYDROCHLORIDE AND ACETAMINOPHEN 5; 325 MG/1; MG/1
1 TABLET ORAL ONCE
Status: COMPLETED | OUTPATIENT
Start: 2017-10-13 | End: 2017-10-13

## 2017-10-13 RX ORDER — HYDROCODONE BITARTRATE AND ACETAMINOPHEN 10; 325 MG/1; MG/1
1 TABLET ORAL EVERY 4 HOURS PRN
Qty: 20 TABLET | Refills: 0 | Status: SHIPPED | OUTPATIENT
Start: 2017-10-13 | End: 2017-11-14

## 2017-10-13 RX ORDER — LIDOCAINE HYDROCHLORIDE AND EPINEPHRINE 10; 10 MG/ML; UG/ML
10 INJECTION, SOLUTION INFILTRATION; PERINEURAL ONCE
Status: COMPLETED | OUTPATIENT
Start: 2017-10-13 | End: 2017-10-13

## 2017-10-13 RX ORDER — OXYCODONE HYDROCHLORIDE AND ACETAMINOPHEN 5; 325 MG/1; MG/1
1 TABLET ORAL ONCE
Status: DISCONTINUED | OUTPATIENT
Start: 2017-10-13 | End: 2017-10-13

## 2017-10-13 RX ADMIN — LIDOCAINE HYDROCHLORIDE AND EPINEPHRINE 10 ML: 10; 10 INJECTION, SOLUTION INFILTRATION; PERINEURAL at 07:40

## 2017-10-13 RX ADMIN — OXYCODONE HYDROCHLORIDE AND ACETAMINOPHEN 1 TABLET: 5; 325 TABLET ORAL at 07:06

## 2017-10-13 NOTE — ED PROVIDER NOTES
EMERGENCY DEPARTMENT ENCOUNTER    CHIEF COMPLAINT  Chief Complaint: L knee pain  History given by: Pt  History limited by: Nothing  Room Number: 11/11  PMD: Mick Aquino MD      HPI:  Pt is a 62 y.o. male who presents complaining of L knee pain onset at 1700 yesterday. Pt states he tripped going up steps and landed directly on his L knee cap. He also c/o swelling around his L knee, but denies head injury or LOC. He states that he has a hx of arthritis in both knees and a knee replacement in his R knee. Pt denies being on any blood thinners.     Duration:  14 hours  Onset: sudden  Timing: constant  Location: L knee  Radiation: none  Quality: pain  Intensity/Severity: moderate  Progression: unchanged  Associated Symptoms: swelling around knee  Aggravating Factors: movement  Alleviating Factors: None stated  Previous Episodes: Pt states he has a hx of arthritis in both knees  Treatment before arrival: Pt did not state any treatment PTA.     PAST MEDICAL HISTORY  Active Ambulatory Problems     Diagnosis Date Noted   • Arthritis 04/25/2016   • Hypertension 06/07/2016   • Tobacco abuse 06/07/2016     Resolved Ambulatory Problems     Diagnosis Date Noted   • No Resolved Ambulatory Problems     Past Medical History:   Diagnosis Date   • Anxiety    • Arthritis    • Bronchitis with chronic airway obstruction    • DVT (deep venous thrombosis)    • Hiatal hernia    • Hypertension    • Hypertension    • Low back pain    • Neck pain    • Pulmonary embolism    • Sleep apnea    • Sleep apnea with use of continuous positive airway pressure (CPAP)        PAST SURGICAL HISTORY  Past Surgical History:   Procedure Laterality Date   • JOINT REPLACEMENT Right     hip/knee   • REPLACEMENT TOTAL KNEE     • TONSILLECTOMY     • TOTAL HIP ARTHROPLASTY Right        FAMILY HISTORY  Family History   Problem Relation Age of Onset   • Cancer Mother    • Heart disease Father    • Alcohol abuse Father    • Cancer Brother        SOCIAL  HISTORY  Social History     Social History   • Marital status:      Spouse name: N/A   • Number of children: N/A   • Years of education: N/A     Occupational History   • network technition       Social History Main Topics   • Smoking status: Current Every Day Smoker     Packs/day: 1.50     Years: 30.00     Types: Cigarettes   • Smokeless tobacco: Never Used   • Alcohol use 2.4 oz/week     4 Cans of beer per week      Comment: daily   • Drug use: No   • Sexual activity: Defer     Other Topics Concern   • Not on file     Social History Narrative       ALLERGIES  Penicillins    REVIEW OF SYSTEMS  Review of Systems   Constitutional: Negative for fever.   Respiratory: Negative for shortness of breath.    Cardiovascular: Negative for chest pain.   Musculoskeletal: Positive for arthralgias (L knee) and joint swelling (L knee).       PHYSICAL EXAM  ED Triage Vitals   Temp Heart Rate Resp BP SpO2   10/13/17 0628 10/13/17 0628 10/13/17 0628 10/13/17 0634 10/13/17 0628   96.8 °F (36 °C) 90 16 124/73 99 %      Temp src Heart Rate Source Patient Position BP Location FiO2 (%)   10/13/17 0628 10/13/17 0628 10/13/17 0634 10/13/17 0634 --   Tympanic Monitor Lying Right arm        Physical Exam   Constitutional: He appears distressed (mild).   HENT:   Head: Normocephalic and atraumatic.   Eyes: EOM are normal.   Neck: Normal range of motion.   Cardiovascular: Normal heart sounds.    Pulmonary/Chest: No respiratory distress.   Abdominal: There is no tenderness.   Musculoskeletal: He exhibits no edema.        Left knee: He exhibits effusion (large joint effusion in L knee). Tenderness (mild, patella) found.   Patellar and quadricept tendons intact.   Neurological: He is alert.   Neurovascularly intact in L foot   Skin: Skin is warm and dry.   No warmth or erythema around L knee   Nursing note and vitals reviewed.      RADIOLOGY  XR Knee 3 View Left   Final Result       Potential fracture involving the medial tibial plateau  with  sizable joint effusion     CT Lower Extremity Left Without Contrast     Severe arthritis and hemarthrosis, but no signs of acute fracture.            I ordered the above noted radiological studies. Interpreted by radiologist. Discussed with radiologist (Dr. Timmons). Reviewed by me in PACS.       PROCEDURES  Arthrocentesis  Date/Time: 10/13/2017 7:36 AM  Performed by: FUAD DEGROOT  Authorized by: FUAD DEGROOT   Consent: Verbal consent obtained.  Consent given by: patient  Patient identity confirmed: arm band  Indications: joint swelling and pain   Body area: knee  Joint: left knee  Local anesthesia used: yes  Anesthesia: local infiltration    Anesthesia:  Local anesthesia used: yes  Local Anesthetic: lidocaine 1% with epinephrine  Anesthetic total: 10 mL    Sedation:  Patient sedated: no  Preparation: Patient was prepped and draped in the usual sterile fashion.  Needle size: 18 G  Ultrasound guidance: no  Approach: lateral  Aspirate: bloody  Aspirate amount: 92 mL  Patient tolerance: Patient tolerated the procedure well with no immediate complications          PROGRESS AND CONSULTS  ED Course     0702 - Percocet ordered for pain.    0705 - XR knee L ordered.     0730 - Rechecked pt. Pt is resting comfortably. US the pt's L knee to examine/confirm presence of effusion.     0754 - CT LLE ordered for further evaluation. Rechecked pt. Pt is resting comfortably. Informed pt of his L knee XR results and plan to perform a CT of his knee for further evaluation.     0922 - Rechecked pt. Pt is resting comfortably. Informed pt of his imaging results which shows no signs of acute fracture. Informed pt of the plan to discharge home with a knee immobilizer and crutches and to follow up with his orthopedist with a possible MRI if his sx do not improve within the next week. Work note provided. Pt understands and agrees with plan. All questions answered.       MEDICAL DECISION MAKING  Results were reviewed/discussed  with the patient and they were also made aware of online access. Pt also made aware that some labs, such as cultures, will not be resulted during ER visit and follow up with PMD is necessary.     MDM  Number of Diagnoses or Management Options     Amount and/or Complexity of Data Reviewed  Tests in the radiology section of CPT®: ordered and reviewed (XR knee L - Potential fracture involving the medial tibial plateau with sizable joint effusion  CT knee L - severe arthritis and hemarthrosis, but no signs of acute fracture.    )  Discussion of test results with the performing providers: yes (Dr. Timmons)           DIAGNOSIS  Final diagnoses:   Contusion of left knee, initial encounter   Knee hemarthrosis, left       DISPOSITION  DISCHARGE    Patient discharged in stable condition.    Reviewed implications of results, diagnosis, meds, responsibility to follow up, warning signs and symptoms of possible worsening, potential complications and reasons to return to ER.    Patient/Family voiced understanding of above instructions.    Discussed plan for discharge, as there is no emergent indication for admission.  Pt/family is agreeable and understands need for follow up and repeat testing.  Pt is aware that discharge does not mean that nothing is wrong but it indicates no emergency is present that requires admission and they must continue care with follow-up as given below or physician of their choice.     FOLLOW-UP  Meño Tan MD  [address]  [facility] [address] [phone]    In 3 days           Medication List      New Prescriptions          HYDROcodone-acetaminophen  MG per tablet   Commonly known as:  NORCO   Take 1 tablet by mouth Every 4 (Four) Hours As Needed for Moderate Pain .         Stop          diclofenac 75 MG EC tablet   Commonly known as:  VOLTAREN       predniSONE 50 MG tablet   Commonly known as:  DELTASONE               Latest Documented Vital Signs:  As of 9:38  AM  BP- 124/73 HR- 79 Temp- 96.8 °F (36 °C) (Tympanic) O2 sat- 100%    --  Documentation assistance provided by ramirez Rubio for Dr. Cerna.  Information recorded by the scribe was done at my direction and has been verified and validated by me.       Soyn Rubio  10/13/17 2993       Leobardo Cerna MD  10/13/17 7437

## 2017-10-16 ENCOUNTER — TELEPHONE (OUTPATIENT)
Dept: SOCIAL WORK | Facility: HOSPITAL | Age: 62
End: 2017-10-16

## 2017-10-16 NOTE — TELEPHONE ENCOUNTER
ED f/u phone call: states pain is controlled w/ prescribed pain med, he is following d/c instructions, is feeling better and has upcoming f/u zelalem't w/ orthopedist. No questions/concerns

## 2017-11-14 ENCOUNTER — OFFICE VISIT (OUTPATIENT)
Dept: FAMILY MEDICINE CLINIC | Facility: CLINIC | Age: 62
End: 2017-11-14

## 2017-11-14 VITALS
WEIGHT: 243 LBS | OXYGEN SATURATION: 94 % | HEIGHT: 78 IN | SYSTOLIC BLOOD PRESSURE: 128 MMHG | BODY MASS INDEX: 28.11 KG/M2 | TEMPERATURE: 98.1 F | HEART RATE: 76 BPM | DIASTOLIC BLOOD PRESSURE: 72 MMHG

## 2017-11-14 DIAGNOSIS — J44.1 CHRONIC OBSTRUCTIVE PULMONARY DISEASE WITH ACUTE EXACERBATION (HCC): ICD-10-CM

## 2017-11-14 DIAGNOSIS — J20.9 ACUTE BRONCHITIS, UNSPECIFIED ORGANISM: Primary | ICD-10-CM

## 2017-11-14 DIAGNOSIS — Z72.0 TOBACCO ABUSE: ICD-10-CM

## 2017-11-14 PROCEDURE — 99213 OFFICE O/P EST LOW 20 MIN: CPT | Performed by: NURSE PRACTITIONER

## 2017-11-14 RX ORDER — MIRTAZAPINE 30 MG/1
TABLET, FILM COATED ORAL
Refills: 0 | COMMUNITY
Start: 2017-10-24 | End: 2019-11-19 | Stop reason: SDUPTHER

## 2017-11-14 RX ORDER — LEVOFLOXACIN 500 MG/1
500 TABLET, FILM COATED ORAL DAILY
Qty: 7 TABLET | Refills: 0 | Status: SHIPPED | OUTPATIENT
Start: 2017-11-14 | End: 2018-03-23 | Stop reason: SDUPTHER

## 2017-11-14 RX ORDER — ESCITALOPRAM OXALATE 20 MG/1
TABLET ORAL
Refills: 0 | COMMUNITY
Start: 2017-10-24 | End: 2019-11-19 | Stop reason: SDUPTHER

## 2017-11-14 NOTE — PROGRESS NOTES
Subjective   Watson Lisa is a 62 y.o. male.     History of Present Illness   C/o coughing, wheezing, SOA x 3 days, no sore throat, no sinus tenderness or congestion, no ear pain, no fevers, staying with GF she is not sick, no OTC  Was last seen 09/17 acute bronchitis Dr Dunham tx levaquin and hydrocodone cough syrup, then went to [facility] ER 1 wk later CXR showed COPD, no acute cardiac and added prednisone to abx, last tx 04/17 acute bronchitis by me levaquin and hydromet cough syrup, Still smoking < 1 ppd tried chantix 04/17 but didn't like the way it made him feel, 2 mo sober    The following portions of the patient's history were reviewed and updated as appropriate: allergies, current medications, past family history, past medical history, past social history, past surgical history and problem list.    Review of Systems   Constitutional: Negative for fatigue and fever.   HENT: Positive for congestion, postnasal drip and voice change. Negative for dental problem, drooling, ear discharge, ear pain, facial swelling, hearing loss, mouth sores, nosebleeds, rhinorrhea, sinus pain, sinus pressure, sneezing, sore throat, tinnitus and trouble swallowing.    Respiratory: Positive for cough, shortness of breath and wheezing.    Cardiovascular: Negative for chest pain, palpitations and leg swelling.   Neurological: Negative for dizziness and headaches.   All other systems reviewed and are negative.      Objective   Physical Exam   Constitutional: He is oriented to person, place, and time. He appears well-developed and well-nourished.   HENT:   Head: Normocephalic and atraumatic.   Eyes: Conjunctivae and EOM are normal. Pupils are equal, round, and reactive to light.   Neck: Normal range of motion. Neck supple. No thyromegaly present.   Cardiovascular: Normal rate, regular rhythm and normal heart sounds.    Pulmonary/Chest: Effort normal and breath sounds normal.   Musculoskeletal: Normal range of motion.   Lymphadenopathy:      He has no cervical adenopathy.   Neurological: He is alert and oriented to person, place, and time.   Skin: Skin is warm and dry.   Psychiatric: He has a normal mood and affect. His behavior is normal. Judgment and thought content normal.   Vitals reviewed.      Assessment/Plan   Watson was seen today for bronchitis.    Diagnoses and all orders for this visit:    Acute bronchitis, unspecified organism    Chronic obstructive pulmonary disease with acute exacerbation    Tobacco abuse    Other orders  -     levoFLOXacin (LEVAQUIN) 500 MG tablet; Take 1 tablet by mouth Daily. For Respiratory infection  -     HYDROcodone-homatropine (HYCODAN) 5-1.5 MG/5ML syrup; Take 5 mL by mouth Every 6 (Six) Hours As Needed for Cough. 1 tsp Q 6 Hr prn  -     Glycopyrrolate-Formoterol (BEVESPI AEROSPHERE) 9-4.8 MCG/ACT aerosol; Inhale 2 sprays 2 (Two) Times a Day.    erx levaquin 500 mg 1 PO daily x 1 wk, Rx hydromet cough syrup, enc smoking cessation, Increase H20 and rest, call or RTC if sx persist or worsen, enc smoking cessation, trial bevespi 2 puffs BID and monitor gave sample and coupon

## 2017-11-14 NOTE — PATIENT INSTRUCTIONS
erx levaquin 500 mg 1 PO daily x 1 wk, Rx hydromet cough syrup, enc smoking cessation, Increase H20 and rest, call or RTC if sx persist or worsen, enc smoking cessation, trial bevespi 2 puffs BID and monitor gave sample and coupon

## 2018-01-10 RX ORDER — LOSARTAN POTASSIUM 100 MG/1
TABLET ORAL
Qty: 90 TABLET | Refills: 1 | Status: SHIPPED | OUTPATIENT
Start: 2018-01-10 | End: 2018-07-09 | Stop reason: SDUPTHER

## 2018-01-18 RX ORDER — TRAZODONE HYDROCHLORIDE 50 MG/1
TABLET ORAL
Qty: 30 TABLET | Refills: 3 | Status: SHIPPED | OUTPATIENT
Start: 2018-01-18 | End: 2018-04-25 | Stop reason: SDUPTHER

## 2018-01-18 RX ORDER — AMLODIPINE BESYLATE 10 MG/1
TABLET ORAL
Qty: 90 TABLET | Refills: 1 | Status: SHIPPED | OUTPATIENT
Start: 2018-01-18 | End: 2018-03-23 | Stop reason: SDUPTHER

## 2018-03-23 ENCOUNTER — OFFICE VISIT (OUTPATIENT)
Dept: FAMILY MEDICINE CLINIC | Facility: CLINIC | Age: 63
End: 2018-03-23

## 2018-03-23 VITALS
WEIGHT: 253 LBS | SYSTOLIC BLOOD PRESSURE: 120 MMHG | RESPIRATION RATE: 20 BRPM | BODY MASS INDEX: 29.27 KG/M2 | TEMPERATURE: 98.3 F | OXYGEN SATURATION: 97 % | DIASTOLIC BLOOD PRESSURE: 70 MMHG | HEIGHT: 78 IN | HEART RATE: 74 BPM

## 2018-03-23 DIAGNOSIS — J20.9 ACUTE BRONCHITIS, UNSPECIFIED ORGANISM: Primary | ICD-10-CM

## 2018-03-23 DIAGNOSIS — J44.1 CHRONIC OBSTRUCTIVE PULMONARY DISEASE WITH ACUTE EXACERBATION (HCC): ICD-10-CM

## 2018-03-23 DIAGNOSIS — Z72.0 TOBACCO ABUSE: ICD-10-CM

## 2018-03-23 PROCEDURE — 99213 OFFICE O/P EST LOW 20 MIN: CPT | Performed by: NURSE PRACTITIONER

## 2018-03-23 RX ORDER — ALBUTEROL SULFATE 90 UG/1
2 AEROSOL, METERED RESPIRATORY (INHALATION) EVERY 6 HOURS PRN
Qty: 1 INHALER | Refills: 0 | Status: SHIPPED | OUTPATIENT
Start: 2018-03-23 | End: 2021-10-07

## 2018-03-23 RX ORDER — LEVOFLOXACIN 500 MG/1
500 TABLET, FILM COATED ORAL DAILY
Qty: 7 TABLET | Refills: 0 | Status: SHIPPED | OUTPATIENT
Start: 2018-03-23 | End: 2018-10-29 | Stop reason: SDUPTHER

## 2018-03-23 NOTE — PATIENT INSTRUCTIONS
erx levaquin 500 mg daily x 1 wk, increase H20 and rest, call or RTC if sx persist or worsen, Rx hydromet cough syrup may cause sedation, erx albuterol inhaler prn SOA or wheezing, refill bevespi and gave sample and coupon, enc smoking cessation

## 2018-03-23 NOTE — PROGRESS NOTES
Subjective   Watson Lisa is a 62 y.o. male.     History of Present Illness   C/o prod cough x 2 days, with PND and sinus congestion and tenderness, with SOA and wheezing, no fevers, no ear pain, no sore throat, no one sick at home, Was last tx bronchitis 11/17 levaquin and hydrocodone cough syrup and tolerated, allergic PCN, was prev smoking > 2 ppd now down to < 1 ppd and working on slowly cutting down and quitting, doesn't want prescription chantix, wellbutrin or patches, last CXR 09/17 COPD    The following portions of the patient's history were reviewed and updated as appropriate: allergies, current medications, past family history, past medical history, past social history, past surgical history and problem list.    Review of Systems   Constitutional: Negative for fever.   HENT: Positive for congestion, postnasal drip, rhinorrhea, sinus pain, sinus pressure and voice change. Negative for dental problem, drooling, ear discharge, ear pain, facial swelling, hearing loss, mouth sores, nosebleeds, sneezing, sore throat, tinnitus and trouble swallowing.    Respiratory: Positive for cough, chest tightness, shortness of breath and wheezing. Negative for apnea, choking and stridor.    Cardiovascular: Negative for chest pain, palpitations and leg swelling.   Neurological: Negative for dizziness and headaches.   All other systems reviewed and are negative.      Objective   Physical Exam   Constitutional: He is oriented to person, place, and time. He appears well-developed and well-nourished.   HENT:   Head: Normocephalic and atraumatic.   Right Ear: Hearing and tympanic membrane normal.   Left Ear: Hearing and tympanic membrane normal.   Nose: Mucosal edema present. Right sinus exhibits maxillary sinus tenderness and frontal sinus tenderness. Left sinus exhibits maxillary sinus tenderness and frontal sinus tenderness.   Mouth/Throat: No oropharyngeal exudate, posterior oropharyngeal edema or posterior oropharyngeal  erythema.   Eyes: Conjunctivae and EOM are normal. Pupils are equal, round, and reactive to light.   Neck: Normal range of motion. Neck supple. No thyromegaly present.   Cardiovascular: Normal rate, regular rhythm and normal heart sounds.    Pulmonary/Chest: Effort normal. He has no wheezes. He has rales (coarse right upper lobe).   Deep cough   Musculoskeletal: Normal range of motion.   Lymphadenopathy:     He has no cervical adenopathy.   Neurological: He is alert and oriented to person, place, and time.   Skin: Skin is warm and dry.   Psychiatric: He has a normal mood and affect. His behavior is normal. Judgment and thought content normal.   Vitals reviewed.      Assessment/Plan   Watson was seen today for cough and nasal congestion.    Diagnoses and all orders for this visit:    Acute bronchitis, unspecified organism    Tobacco abuse    Chronic obstructive pulmonary disease with acute exacerbation    Other orders  -     albuterol (PROVENTIL HFA;VENTOLIN HFA) 108 (90 Base) MCG/ACT inhaler; Inhale 2 puffs Every 6 (Six) Hours As Needed for Wheezing or Shortness of Air.  -     levoFLOXacin (LEVAQUIN) 500 MG tablet; Take 1 tablet by mouth Daily. For Respiratory infection  -     HYDROcodone-homatropine (HYCODAN) 5-1.5 MG/5ML syrup; Take 5 mL by mouth Every 6 (Six) Hours As Needed for Cough. 1 tsp Q 6 Hr prn  -     Glycopyrrolate-Formoterol (BEVESPI AEROSPHERE) 9-4.8 MCG/ACT aerosol; Inhale 2 sprays 2 (Two) Times a Day.    erx levaquin 500 mg daily x 1 wk, increase H20 and rest, call or RTC if sx persist or worsen, Rx hydromet cough syrup may cause sedation, erx albuterol inhaler prn SOA or wheezing, refill bevespi and gave sample and coupon, enc smoking cessation

## 2018-04-25 ENCOUNTER — TELEPHONE (OUTPATIENT)
Dept: FAMILY MEDICINE CLINIC | Facility: CLINIC | Age: 63
End: 2018-04-25

## 2018-04-25 ENCOUNTER — OFFICE VISIT (OUTPATIENT)
Dept: FAMILY MEDICINE CLINIC | Facility: CLINIC | Age: 63
End: 2018-04-25

## 2018-04-25 VITALS
TEMPERATURE: 98 F | WEIGHT: 255 LBS | SYSTOLIC BLOOD PRESSURE: 120 MMHG | DIASTOLIC BLOOD PRESSURE: 68 MMHG | BODY MASS INDEX: 29.5 KG/M2 | HEART RATE: 87 BPM | HEIGHT: 78 IN | OXYGEN SATURATION: 96 %

## 2018-04-25 DIAGNOSIS — J01.90 ACUTE SINUSITIS, RECURRENCE NOT SPECIFIED, UNSPECIFIED LOCATION: Primary | ICD-10-CM

## 2018-04-25 PROCEDURE — 99213 OFFICE O/P EST LOW 20 MIN: CPT | Performed by: FAMILY MEDICINE

## 2018-04-25 RX ORDER — LEVOFLOXACIN 500 MG/1
500 TABLET, FILM COATED ORAL DAILY
Qty: 10 TABLET | Refills: 0 | Status: SHIPPED | OUTPATIENT
Start: 2018-04-25 | End: 2018-10-29

## 2018-04-25 NOTE — PROGRESS NOTES
SUBJECTIVE:  The patient is a 63-year-old white male comes in with a week of not feeling well and developed a productive cough and sinus drainage.  Sputum is yellow.  No fever or chills.  He has a history of sinus infections.    PAST MEDICAL HISTORY:  Reviewed.    REVIEW OF SYSTEMS:  Please see above; 14 point ROS negative.      OBJECTIVE: Vitals signs are reviewed and are stable.    HEENT: PERRLA.  Throat and TMs clear  Neck:  Supple.   Lungs:  Rare rhonchi.  No rales or wheezes.   Heart:  Regular rate and rhythm.   Abdomen:   Soft, nontender.       ASSESSMENT:    Acute bronchitis/sinusitis    PLAN: Levaquin 500 milligrams daily.  Hydromet 1 teaspoon every 6 hours when necessary cough.  Follow-up in a couple days if no better.  Notify sooner if worse or any problems.    Dictated utilizing Dragon dictation.

## 2018-05-14 RX ORDER — TRAZODONE HYDROCHLORIDE 50 MG/1
50 TABLET ORAL NIGHTLY PRN
Qty: 90 TABLET | Refills: 3 | Status: SHIPPED | OUTPATIENT
Start: 2018-05-14 | End: 2019-01-11 | Stop reason: SDUPTHER

## 2018-05-14 RX ORDER — TRAZODONE HYDROCHLORIDE 50 MG/1
TABLET ORAL
Qty: 30 TABLET | Refills: 3 | Status: SHIPPED | OUTPATIENT
Start: 2018-05-14 | End: 2018-09-17 | Stop reason: SDUPTHER

## 2018-07-09 RX ORDER — LOSARTAN POTASSIUM 100 MG/1
TABLET ORAL
Qty: 90 TABLET | Refills: 1 | Status: SHIPPED | OUTPATIENT
Start: 2018-07-09 | End: 2019-01-05 | Stop reason: SDUPTHER

## 2018-07-30 RX ORDER — AMLODIPINE BESYLATE 10 MG/1
TABLET ORAL
Qty: 90 TABLET | Refills: 1 | Status: SHIPPED | OUTPATIENT
Start: 2018-07-30 | End: 2019-01-29 | Stop reason: SDUPTHER

## 2018-09-17 RX ORDER — TRAZODONE HYDROCHLORIDE 50 MG/1
TABLET ORAL
Qty: 30 TABLET | Refills: 3 | Status: SHIPPED | OUTPATIENT
Start: 2018-09-17 | End: 2019-01-29 | Stop reason: SDUPTHER

## 2018-10-29 ENCOUNTER — OFFICE VISIT (OUTPATIENT)
Dept: FAMILY MEDICINE CLINIC | Facility: CLINIC | Age: 63
End: 2018-10-29

## 2018-10-29 VITALS
BODY MASS INDEX: 29.62 KG/M2 | OXYGEN SATURATION: 98 % | HEIGHT: 78 IN | TEMPERATURE: 98.2 F | SYSTOLIC BLOOD PRESSURE: 128 MMHG | WEIGHT: 256 LBS | HEART RATE: 91 BPM | DIASTOLIC BLOOD PRESSURE: 82 MMHG

## 2018-10-29 DIAGNOSIS — J20.9 ACUTE BRONCHITIS, UNSPECIFIED ORGANISM: Primary | ICD-10-CM

## 2018-10-29 DIAGNOSIS — J44.1 CHRONIC OBSTRUCTIVE PULMONARY DISEASE WITH ACUTE EXACERBATION (HCC): ICD-10-CM

## 2018-10-29 DIAGNOSIS — Z72.0 TOBACCO ABUSE: ICD-10-CM

## 2018-10-29 DIAGNOSIS — J01.00 ACUTE MAXILLARY SINUSITIS, RECURRENCE NOT SPECIFIED: ICD-10-CM

## 2018-10-29 PROCEDURE — 99214 OFFICE O/P EST MOD 30 MIN: CPT | Performed by: NURSE PRACTITIONER

## 2018-10-29 RX ORDER — LEVOFLOXACIN 500 MG/1
500 TABLET, FILM COATED ORAL DAILY
Qty: 7 TABLET | Refills: 0 | Status: SHIPPED | OUTPATIENT
Start: 2018-10-29 | End: 2020-07-02 | Stop reason: HOSPADM

## 2018-10-29 NOTE — PROGRESS NOTES
Subjective   Watson Lisa is a 63 y.o. male.     History of Present Illness   C/o chest congestion, SOA, prod cough x 3 days, with ear congestion, sinus congestion, nasal congestion, HA, and PND, no fevers, sore throat, or wheezing, no one sick at home, no OTC, Last tx sinusitis/bronchitis 04/18 levaquin 500 mg and tolerated, allergic PCN, still smoking and states has tried chantix and wellbutrin and patches without help, doesn't want to try again, was prev seen 03/18 and has albuterol inhaler using prn increased usage since been ill, also had bevespi inhaler and thought helped request samples, last CXR 09/17 COPD    The following portions of the patient's history were reviewed and updated as appropriate: allergies, current medications, past family history, past medical history, past social history, past surgical history and problem list.    Review of Systems   Constitutional: Positive for fatigue. Negative for fever.   HENT: Positive for congestion, ear pain, facial swelling, postnasal drip, rhinorrhea, sinus pain and sinus pressure. Negative for dental problem, drooling, ear discharge, hearing loss, mouth sores, nosebleeds, sneezing, sore throat, tinnitus, trouble swallowing and voice change.    Respiratory: Positive for cough, chest tightness and shortness of breath. Negative for apnea, choking, wheezing and stridor.    Cardiovascular: Negative for chest pain, palpitations and leg swelling.   Neurological: Negative for dizziness and headaches.   All other systems reviewed and are negative.      Objective   Physical Exam   Constitutional: He is oriented to person, place, and time. He appears well-developed and well-nourished.   HENT:   Head: Normocephalic and atraumatic.   Right Ear: Hearing normal. A middle ear effusion is present.   Left Ear: Hearing normal. A middle ear effusion is present.   Nose: Mucosal edema present. Right sinus exhibits maxillary sinus tenderness and frontal sinus tenderness. Left sinus  exhibits maxillary sinus tenderness and frontal sinus tenderness.   Mouth/Throat: Posterior oropharyngeal edema present. No oropharyngeal exudate or posterior oropharyngeal erythema.   Eyes: Pupils are equal, round, and reactive to light. Conjunctivae and EOM are normal.   Neck: Normal range of motion. Neck supple. No thyromegaly present.   Cardiovascular: Normal rate, regular rhythm and normal heart sounds.    Pulmonary/Chest: Effort normal. He has wheezes (left lower lobe).   Musculoskeletal: Normal range of motion.   Lymphadenopathy:     He has cervical adenopathy.   Neurological: He is alert and oriented to person, place, and time.   Skin: Skin is warm and dry.   Psychiatric: He has a normal mood and affect. His behavior is normal. Judgment and thought content normal.   Vitals reviewed.      Assessment/Plan   Watson was seen today for uri.    Diagnoses and all orders for this visit:    Acute bronchitis, unspecified organism    Tobacco abuse    Chronic obstructive pulmonary disease with acute exacerbation (CMS/HCC)    Acute maxillary sinusitis, recurrence not specified    Other orders  -     HYDROcodone-homatropine (HYCODAN) 5-1.5 MG/5ML syrup; Take 5 mL by mouth Every 6 (Six) Hours As Needed for Cough. 1 tsp Q 6 Hr prn  -     levoFLOXacin (LEVAQUIN) 500 MG tablet; Take 1 tablet by mouth Daily. For Respiratory infection  -     Glycopyrrolate-Formoterol (BEVESPI AEROSPHERE) 9-4.8 MCG/ACT aerosol; Inhale 2 sprays 2 (Two) Times a Day.    erx levaquin 500 mg 1 PO daily x 1 wk, erx hydrocodone cough syrup may cause sedation, increase H20 and rest, gave sample bevespi and coupons, cont albuterol inhaler prn, enc smoking cessation, increase H20 and rest, call or RTC if sx persist or worsen, enc RTC overdue annual physical and labs

## 2018-10-29 NOTE — PATIENT INSTRUCTIONS
erx levaquin 500 mg 1 PO daily x 1 wk, erx hydrocodone cough syrup may cause sedation, increase H20 and rest, gave sample bevespi and coupons, cont albuterol inhaler prn, enc smoking cessation, increase H20 and rest, call or RTC if sx persist or worsen, enc RTC overdue annual physical and labs

## 2019-01-05 RX ORDER — LOSARTAN POTASSIUM 100 MG/1
TABLET ORAL
Qty: 90 TABLET | Refills: 1 | Status: SHIPPED | OUTPATIENT
Start: 2019-01-05 | End: 2019-07-04 | Stop reason: SDUPTHER

## 2019-01-11 RX ORDER — TRAZODONE HYDROCHLORIDE 50 MG/1
50 TABLET ORAL NIGHTLY PRN
Qty: 90 TABLET | Refills: 3 | Status: SHIPPED | OUTPATIENT
Start: 2019-01-11 | End: 2019-06-24 | Stop reason: SDUPTHER

## 2019-01-29 ENCOUNTER — OFFICE VISIT (OUTPATIENT)
Dept: FAMILY MEDICINE CLINIC | Facility: CLINIC | Age: 64
End: 2019-01-29

## 2019-01-29 ENCOUNTER — TELEPHONE (OUTPATIENT)
Dept: FAMILY MEDICINE CLINIC | Facility: CLINIC | Age: 64
End: 2019-01-29

## 2019-01-29 VITALS
DIASTOLIC BLOOD PRESSURE: 80 MMHG | BODY MASS INDEX: 29.5 KG/M2 | WEIGHT: 255 LBS | RESPIRATION RATE: 20 BRPM | HEART RATE: 75 BPM | SYSTOLIC BLOOD PRESSURE: 150 MMHG | TEMPERATURE: 98 F | HEIGHT: 78 IN | OXYGEN SATURATION: 98 %

## 2019-01-29 DIAGNOSIS — J01.10 ACUTE FRONTAL SINUSITIS, RECURRENCE NOT SPECIFIED: Primary | ICD-10-CM

## 2019-01-29 PROCEDURE — 99213 OFFICE O/P EST LOW 20 MIN: CPT | Performed by: FAMILY MEDICINE

## 2019-01-29 RX ORDER — AMLODIPINE BESYLATE 10 MG/1
10 TABLET ORAL DAILY
Qty: 90 TABLET | Refills: 2 | Status: SHIPPED | OUTPATIENT
Start: 2019-01-29 | End: 2019-06-24 | Stop reason: SDUPTHER

## 2019-01-29 RX ORDER — LEVOFLOXACIN 500 MG/1
500 TABLET, FILM COATED ORAL DAILY
Qty: 10 TABLET | Refills: 0 | Status: SHIPPED | OUTPATIENT
Start: 2019-01-29 | End: 2020-07-02 | Stop reason: HOSPADM

## 2019-01-29 NOTE — PROGRESS NOTES
SUBJECTIVE:  The patient is a 63-year-old white male comes in with a one-week history of sinus congestion and productive cough.  Unaware of fever.  He has a history of getting bronchitis.    PAST MEDICAL HISTORY:  Reviewed.    REVIEW OF SYSTEMS:  Please see above; 14 point ROS negative.      OBJECTIVE: Vitals signs are reviewed and are stable.    HEENT: PERRLA.  Throat and TMs clear   Neck:  Supple.   Lungs:  Clear.    Heart:  Regular rate and rhythm.   Abdomen:   Soft, nontender.   Extremities:  No cyanosis, clubbing or edema.     ASSESSMENT:    Acute bronchitis  PLAN: Levaquin 500 milligrams daily.  Hydromet 1 teaspoon every 6 when necessary cough.  Follow-up in a couple days if not better.  Call if problems.    Dictated utilizing Dragon dictation.

## 2019-01-29 NOTE — TELEPHONE ENCOUNTER
[facility] CANNOT FILL PT'S HYDROcodone-homatropine (HYCODAN) 5-1.5 MG/5ML syrup, PT ASKED IF [facility] CAN PLEASE SEND IT TO [facility] [place]

## 2019-04-03 ENCOUNTER — APPOINTMENT (OUTPATIENT)
Dept: GENERAL RADIOLOGY | Facility: HOSPITAL | Age: 64
End: 2019-04-03

## 2019-04-03 ENCOUNTER — HOSPITAL ENCOUNTER (EMERGENCY)
Facility: HOSPITAL | Age: 64
Discharge: HOME | End: 2019-04-03
Attending: EMERGENCY MEDICINE | Admitting: EMERGENCY MEDICINE

## 2019-04-03 VITALS
SYSTOLIC BLOOD PRESSURE: 145 MMHG | DIASTOLIC BLOOD PRESSURE: 77 MMHG | HEIGHT: 78 IN | WEIGHT: 262 LBS | TEMPERATURE: 97.5 F | BODY MASS INDEX: 30.31 KG/M2 | OXYGEN SATURATION: 98 % | RESPIRATION RATE: 14 BRPM | HEART RATE: 65 BPM

## 2019-04-03 DIAGNOSIS — S22.31XA CLOSED FRACTURE OF ONE RIB OF RIGHT SIDE, INITIAL ENCOUNTER: Primary | ICD-10-CM
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
## 2019-04-03 PROCEDURE — 99284 EMERGENCY DEPT VISIT MOD MDM: CPT

## 2019-04-03 PROCEDURE — 71101 X-RAY EXAM UNILAT RIBS/CHEST: CPT

## 2019-04-03 RX ORDER — HYDROCODONE BITARTRATE AND ACETAMINOPHEN 5; 325 MG/1; MG/1
1 TABLET ORAL EVERY 4 HOURS PRN
Qty: 12 TABLET | Refills: 0 | Status: SHIPPED | OUTPATIENT
Start: 2019-04-03 | End: 2020-07-02 | Stop reason: HOSPADM

## 2019-04-03 RX ORDER — LIDOCAINE 50 MG/G
1 PATCH TOPICAL EVERY 24 HOURS
Qty: 30 PATCH | Refills: 0 | Status: SHIPPED | OUTPATIENT
Start: 2019-04-03 | End: 2020-07-02 | Stop reason: HOSPADM

## 2019-04-03 RX ORDER — HYDROCODONE BITARTRATE AND ACETAMINOPHEN 7.5; 325 MG/1; MG/1
1 TABLET ORAL ONCE
Status: COMPLETED | OUTPATIENT
Start: 2019-04-03 | End: 2019-04-03

## 2019-04-03 RX ADMIN — HYDROCODONE BITARTRATE AND ACETAMINOPHEN 1 TABLET: 7.5; 325 TABLET ORAL at 08:18

## 2019-04-03 NOTE — ED NOTES
Pt reports fall on Sunday. Pt c/o right rib pain from fall.      Brigitte Henson, RN  04/03/19 0649

## 2019-04-03 NOTE — ED PROVIDER NOTES
Pt presents to the ED c/o fall that happened 3 days ago when he slipped while getting out of bed. Pt now c/o right-sided, lower chest wall pain. Pt has no other complaints at this time.     PHYSICAL EXAM  GENERAL: not distressed, well appearing  CV: regular rhythm, regular rate  RESPIRATORY: normal effort, breath sounds clear bilaterally  ABDOMEN: soft, non-tender  MUSCULOSKELETAL: R-sided, anterior, lateral chest wall pain in the area of the 8th rib  NEURO: alert, oriented X 3    Vital signs and nursing notes reviewed.    RADIOLOGY  I have reviewed the patient's imaging studies.    PROGRESS NOTES    2171 Spoke to midlevel provider ALISON Marin, about the pt. After performing my own physical exam, I agree with the plan of care.     Attestation:    The STEFANIE and I have discussed this patient's history, physical exam, and treatment plan.  I have reviewed the documentation and personally had a face to face interaction with the patient. I affirm the documentation and agree with the treatment and plan.  The attached note describes my personal findings.    Documentation assistance provided by ramirez Borja for Dr. Salas. Information recorded by the scribe was done at my direction and has been verified and validated by me.       Isma Borja  04/03/19 [id number]       Oleg Salas MD  04/03/19 [id number]

## 2019-04-03 NOTE — ED PROVIDER NOTES
EMERGENCY DEPARTMENT ENCOUNTER    CHIEF COMPLAINT  Chief Complaint: pain post fall  History given by: patient  History limited by: N/A  Time Seen: 0719  Room Number: 30/30  PMD: Checo Dunham MD      HPI:  Pt is a 63 y.o. male who presents s/p fall that occurred about 3 days ago. He states that as he was getting out of bed, he slipped, fell, and landed on a footboard. He notes that he did not have any prodrome before the fall (denies dizziness, syncope, chest pain, trouble breathing, abd pain, or any other sx). Since the incident, he has had right lower chest wall pain (at site of right ribs) that is exacerbated by movement, taking deep breaths, and coughing. It has not been significantly alleviated with ibuprofen. He denies blow to head, loss of consciousness, headache, neck pain, back pain, abd pain, dyspnea, acute change in chronic cough, fever, chills, and sustaining any other injury. He is on 81mg ASA and no other blood thinner. He is a smoker. Past Medical History of sleep apnea, HTN, arthritis, and pulmonary embolism.     Duration: fall occurred about 3 days ago  Timing: intermittent  Location: right lower chest wall (at site of right ribs)  Radiation: none  Quality: pain  Intensity/Severity: moderate  Progression: not improved  Associated Symptoms: right lower chest wall pain (at site of right ribs)  Aggravating Factors: movement, taking deep breaths, coughing  Alleviating Factors: remaining still  Previous Episodes: none mentioned  Treatment before arrival: Pt states that he has taken ibuprofen for pain without significant relief.     PAST MEDICAL HISTORY  Active Ambulatory Problems     Diagnosis Date Noted   • Arthritis 04/25/2016   • Hypertension 06/07/2016   • Tobacco abuse 06/07/2016     Resolved Ambulatory Problems     Diagnosis Date Noted   • No Resolved Ambulatory Problems     Past Medical History:   Diagnosis Date   • Anxiety    • Arthritis    • Bronchitis with chronic airway obstruction  (CMS/[facility], located within [facility])    • DVT (deep venous thrombosis) (CMS/[facility], located within [facility])    • Hiatal hernia    • Hypertension    • Hypertension    • Low back pain    • Neck pain    • Pulmonary embolism (CMS/[facility], located within [facility])    • Sleep apnea    • Sleep apnea with use of continuous positive airway pressure (CPAP)        PAST SURGICAL HISTORY  Past Surgical History:   Procedure Laterality Date   • COLONOSCOPY     • JOINT REPLACEMENT Right     hip/knee   • REPLACEMENT TOTAL KNEE     • TONSILLECTOMY     • TOTAL HIP ARTHROPLASTY Right        FAMILY HISTORY  Family History   Problem Relation Age of Onset   • Cancer Mother    • Heart disease Father    • Alcohol abuse Father    • Cancer Brother        SOCIAL HISTORY  Social History     Socioeconomic History   • Marital status:      Spouse name: Not on file   • Number of children: Not on file   • Years of education: Not on file   • Highest education level: Not on file   Occupational History   • Occupation: [employer]   Tobacco Use   • Smoking status: Current Every Day Smoker     Packs/day: 1.50     Years: 30.00     Pack years: 45.00     Types: Cigarettes   • Smokeless tobacco: Never Used   Substance and Sexual Activity   • Alcohol use: Yes     Alcohol/week: 2.4 oz     Types: 4 Cans of beer per week     Comment: daily   • Drug use: No   • Sexual activity: Defer         ALLERGIES  Penicillins    REVIEW OF SYSTEMS  Review of Systems   Constitutional: Negative for chills and fever.   HENT: Negative for sore throat.    Respiratory: Negative for shortness of breath. Cough: chronic, no acute change.    Cardiovascular: Positive for chest pain (right lower chest wall pain (at site of right ribs)).   Gastrointestinal: Negative for abdominal pain, diarrhea, nausea and vomiting.   Genitourinary: Negative for dysuria.   Musculoskeletal: Negative for back pain.   Skin: Negative for rash.   Neurological: Negative for dizziness.   Psychiatric/Behavioral: The patient is not nervous/anxious.        PHYSICAL EXAM  ED  Triage Vitals   Temp Heart Rate Resp BP SpO2   04/03/19 0649 04/03/19 0649 04/03/19 0649 04/03/19 0701 04/03/19 0649   97.5 °F (36.4 °C) 106 18 138/89 97 % WNL       Physical Exam   Constitutional: He is oriented to person, place, and time. No distress.   HENT:   Head: Normocephalic and atraumatic.   Eyes: EOM are normal.   Neck: Normal range of motion. Neck supple.   Cardiovascular: Normal rate, regular rhythm and normal heart sounds.   Pulmonary/Chest: Effort normal and breath sounds normal. No respiratory distress. He has no wheezes. He has no rhonchi. He has no rales. He exhibits tenderness (right anterior lower chest wall tenderness).   Abdominal: Soft. There is no tenderness. There is no rebound and no guarding.   Musculoskeletal: Normal range of motion.   Neurological: He is alert and oriented to person, place, and time. He has normal motor skills and normal sensation.   Skin: Skin is warm and dry.   Psychiatric: Affect normal.   Nursing note and vitals reviewed.        RADIOLOGY      XR Ribs Right With PA Chest (Final result)   Result time 04/03/19 08:55:25   Final result by Oswaldo Cook MD (04/03/19 08:55:25)                Narrative:    CHEST AND RIGHT RIB DETAIL X-RAYS     CLINICAL HISTORY: Patient fell. Right rib pain.     PA chest and multiple oblique views of the ribs of the right hemithorax  were obtained. There is an acute nondisplaced fracture of the inferior  tip of the right eighth rib. No other fractures are identified. Moderate  osteoarthritis is evident within the shoulder joint. The lungs are  well-expanded and clear. There is no pneumothorax.     This report was finalized on 4/3/2019 8:55 AM by Dr. Oswaldo Cook M.D.                   I ordered the above noted radiological studies and reviewed the images on the PACS system.        PROGRESS AND CONSULTS    0756- Ordered norco for pain and right ribs and chest xray for further evaluation.     0945- Reviewed pt's history and workup with  Dr. Salas.  At bedside evaluation, they agree with the plan of care.    1044- Rechecked pt. He is resting comfortably and is in no acute distress. Discussed with pt about all pertinent results including right ribs and chest xray findings (nondisplaced right 8th rib fracture, no pneumothorax). Informed pt about plan to prescribe lidoderm patches and short course of pain medication for sx and to provide incentive spirometry in order to help him take deep breaths to prevent development of pneumonia. Instructed pt to use incentive spirometer every 2 hours while awake. Provided discharge instructions for rib fracture.   Reviewed implications of results, diagnosis, meds, responsibility to follow up, warning signs and symptoms of possible worsening, potential complications and reasons to return to ER with patient.  Discussed all results and noted any abnormalities with patient.  Discussed absolute need to recheck abnormalities and condition closely with PMD.  Discussed plan for discharge, as there is no emergent indication for admission.  Pt is agreeable and understands need for follow up and repeat testing.  Pt is aware that discharge does not mean that nothing is wrong but it indicates no emergency is present.  Pt is discharged with instructions to follow up with primary care doctor to have their blood pressure rechecked.       DIAGNOSIS  Final diagnoses:   Closed fracture of one rib of right side (right 8th rib), initial encounter   Fall, initial encounter       FOLLOW UP   Checo Dunham MD  [address] [place], [place] [id number] [phone]    Schedule an appointment as soon as possible for a visit   If symptoms worsen      RX       Medication List      HYDROcodone-acetaminophen 5-325 MG per tablet  Commonly known as:  NORCO  Take 1 tablet by mouth Every 4 (Four) Hours As Needed for Moderate Pain .     lidocaine 5 %  Commonly known as:  LIDODERM  Place 1 patch on the skin as directed by provider Daily. Remove &  "Discard   patch within 12 hours or as directed by MD Cage report [id number] reviewed.  Risks, benefits, alternatives discussed with patient.  Pt consents to treatment and agrees to follow up with PMD tomorrow for further care and any other prescriptions.         COURSE & MEDICAL DECISION MAKING  Pertinent Imaging studies that were ordered and reviewed are noted above.  Results were reviewed/discussed with the patient and they were also made aware of online assess.   Pt also made aware that some labs, such as cultures, will not be resulted during ER visit and follow up with PMD is necessary.     MEDICATIONS GIVEN IN ER  Medications   HYDROcodone-acetaminophen (NORCO) 7.5-325 MG per tablet 1 tablet (1 tablet Oral Given 4/3/19 0818)       /69   Pulse 65   Temp 97.5 °F (36.4 °C) (Tympanic)   Resp 14   Ht 200.7 cm (79\")   Wt 119 kg (262 lb)   SpO2 99%   BMI 29.52 kg/m²       I personally reviewed the past medical history, past surgical history, social history, family history, current medications and allergies as they appear in this chart.  The scribe's note accurately reflects the work and decisions made by me.     Documentation assistance provided by ramirez Ocampo for INDIA Marin on 4/3/2019 at 10:46 AM. Information recorded by the scribe was done at my direction and has been verified and validated by me.       Karla Ocampo  04/03/19 8418       Kelsie Carter APRN  04/03/19 1438    "

## 2019-04-03 NOTE — DISCHARGE INSTRUCTIONS
Medications as ordered  Use Incentive Spirometer every 2 hours while awake  Follow up with PMD in 5-7 days for recheck if symptoms not improving  Return to er for fever, chills, chest pain, shortness of air, increased pain or any new or worsening symptoms

## 2019-06-24 ENCOUNTER — OFFICE VISIT (OUTPATIENT)
Dept: FAMILY MEDICINE CLINIC | Facility: CLINIC | Age: 64
End: 2019-06-24

## 2019-06-24 VITALS
OXYGEN SATURATION: 96 % | HEART RATE: 65 BPM | HEIGHT: 78 IN | RESPIRATION RATE: 20 BRPM | DIASTOLIC BLOOD PRESSURE: 70 MMHG | WEIGHT: 254 LBS | BODY MASS INDEX: 29.39 KG/M2 | TEMPERATURE: 97 F | SYSTOLIC BLOOD PRESSURE: 150 MMHG

## 2019-06-24 DIAGNOSIS — S62.607A CLOSED NONDISPLACED FRACTURE OF PHALANX OF LEFT LITTLE FINGER, UNSPECIFIED PHALANX, INITIAL ENCOUNTER: ICD-10-CM

## 2019-06-24 DIAGNOSIS — M79.641 RIGHT HAND PAIN: Primary | ICD-10-CM

## 2019-06-24 PROCEDURE — 73130 X-RAY EXAM OF HAND: CPT | Performed by: FAMILY MEDICINE

## 2019-06-24 PROCEDURE — 99213 OFFICE O/P EST LOW 20 MIN: CPT | Performed by: FAMILY MEDICINE

## 2019-06-24 RX ORDER — TRAZODONE HYDROCHLORIDE 50 MG/1
50 TABLET ORAL NIGHTLY PRN
Qty: 90 TABLET | Refills: 3 | Status: SHIPPED | OUTPATIENT
Start: 2019-06-24 | End: 2020-02-25

## 2019-06-24 RX ORDER — HYDROCODONE BITARTRATE AND ACETAMINOPHEN 5; 325 MG/1; MG/1
1 TABLET ORAL EVERY 8 HOURS PRN
Qty: 20 TABLET | Refills: 0 | Status: SHIPPED | OUTPATIENT
Start: 2019-06-24 | End: 2020-07-02 | Stop reason: HOSPADM

## 2019-06-24 RX ORDER — AMLODIPINE BESYLATE 10 MG/1
10 TABLET ORAL DAILY
Qty: 90 TABLET | Refills: 2 | Status: SHIPPED | OUTPATIENT
Start: 2019-06-24 | End: 2019-06-24 | Stop reason: SDUPTHER

## 2019-06-24 RX ORDER — AMLODIPINE BESYLATE 10 MG/1
10 TABLET ORAL DAILY
Qty: 90 TABLET | Refills: 2 | Status: SHIPPED | OUTPATIENT
Start: 2019-06-24 | End: 2020-08-27

## 2019-06-24 NOTE — PROGRESS NOTES
SUBJECTIVE:  The patient is a 64-year-old white male.  He fell 3 nights ago and injured his right hand.    PAST MEDICAL HISTORY:  Reviewed.    REVIEW OF SYSTEMS:  Please see above; all others reviewed and are negative.      OBJECTIVE:   Vitals signs are reviewed and are stable.    General:  Well-nourished.  Alert and oriented x3 in no acute distress.    Extremities: The right hand is swollen..  Tenderness is present over the dorsal surface in the fourth and fifth metatarsal MP region.  No warmth or erythema.  No open wounds.  Three-view x-ray of the right hand is done here and interpreted by me.  None for comparison.  Indication right hand pain from fall.  X-ray shows fracture of the base of the fifth proximal phalanx.  There is a possible fracture of the distal third metacarpal    ASSESSMENT:    Fracture right fifth proximal phalanx  PLAN: Splint.  Ice.  Elevate.  Call your orthopedist today for an appointment soon.  Lortab 5 mg every 6 hours as needed pain.    Dictated utilizing Dragon dictation.

## 2019-07-05 RX ORDER — LOSARTAN POTASSIUM 100 MG/1
TABLET ORAL
Qty: 90 TABLET | Refills: 1 | Status: SHIPPED | OUTPATIENT
Start: 2019-07-05 | End: 2019-12-31

## 2019-09-10 ENCOUNTER — TELEPHONE (OUTPATIENT)
Dept: FAMILY MEDICINE CLINIC | Facility: CLINIC | Age: 64
End: 2019-09-10

## 2019-09-10 NOTE — TELEPHONE ENCOUNTER
WANTS TO SPEAK WITH DR BARRERA ABOUT GETTING ADMITTED TO THE [facility].

## 2019-11-19 ENCOUNTER — TELEPHONE (OUTPATIENT)
Dept: FAMILY MEDICINE CLINIC | Facility: CLINIC | Age: 64
End: 2019-11-19

## 2019-11-19 RX ORDER — ESCITALOPRAM OXALATE 20 MG/1
20 TABLET ORAL DAILY
Qty: 90 TABLET | Refills: 1 | Status: SHIPPED | OUTPATIENT
Start: 2019-11-19 | End: 2020-08-04 | Stop reason: SDUPTHER

## 2019-11-19 RX ORDER — MIRTAZAPINE 30 MG/1
30 TABLET, FILM COATED ORAL
Qty: 30 TABLET | Refills: 0 | Status: SHIPPED | OUTPATIENT
Start: 2019-11-19 | End: 2020-07-02 | Stop reason: HOSPADM

## 2019-11-19 NOTE — TELEPHONE ENCOUNTER
He was d/c on Lexapro 10mg daily and Remeron 30mg hs as needed please refill, he will schedule a follow up apt.

## 2019-11-19 NOTE — TELEPHONE ENCOUNTER
Wants to know if he can be worked in before Thursday. He is out of lexapro, he was getting this from another .

## 2019-12-31 RX ORDER — LOSARTAN POTASSIUM 100 MG/1
TABLET ORAL
Qty: 90 TABLET | Refills: 4 | Status: SHIPPED | OUTPATIENT
Start: 2019-12-31 | End: 2021-01-19 | Stop reason: HOSPADM

## 2020-02-25 RX ORDER — TRAZODONE HYDROCHLORIDE 50 MG/1
TABLET ORAL
Qty: 90 TABLET | Refills: 4 | Status: SHIPPED | OUTPATIENT
Start: 2020-02-25 | End: 2020-07-02 | Stop reason: HOSPADM

## 2020-03-26 ENCOUNTER — APPOINTMENT (OUTPATIENT)
Dept: GENERAL RADIOLOGY | Facility: HOSPITAL | Age: 65
End: 2020-03-26

## 2020-03-26 ENCOUNTER — HOSPITAL ENCOUNTER (EMERGENCY)
Facility: HOSPITAL | Age: 65
Discharge: HOME | End: 2020-03-26
Attending: EMERGENCY MEDICINE | Admitting: EMERGENCY MEDICINE

## 2020-03-26 VITALS
OXYGEN SATURATION: 96 % | BODY MASS INDEX: 29.33 KG/M2 | DIASTOLIC BLOOD PRESSURE: 79 MMHG | HEART RATE: 111 BPM | WEIGHT: 253.53 LBS | TEMPERATURE: 98.6 F | SYSTOLIC BLOOD PRESSURE: 164 MMHG | RESPIRATION RATE: 16 BRPM | HEIGHT: 78 IN

## 2020-03-26 DIAGNOSIS — S41.131A PUNCTURE WOUND OF RIGHT UPPER ARM, INITIAL ENCOUNTER: Primary | ICD-10-CM

## 2020-03-26 PROCEDURE — 99282 EMERGENCY DEPT VISIT SF MDM: CPT

## 2020-03-26 PROCEDURE — 25010000002 TDAP 5-2.5-18.5 LF-MCG/0.5 SUSPENSION: Performed by: EMERGENCY MEDICINE

## 2020-03-26 PROCEDURE — 90715 TDAP VACCINE 7 YRS/> IM: CPT | Performed by: EMERGENCY MEDICINE

## 2020-03-26 PROCEDURE — 73060 X-RAY EXAM OF HUMERUS: CPT

## 2020-03-26 PROCEDURE — 90471 IMMUNIZATION ADMIN: CPT | Performed by: EMERGENCY MEDICINE

## 2020-03-26 RX ORDER — CEPHALEXIN 500 MG/1
500 CAPSULE ORAL 3 TIMES DAILY
Qty: 15 CAPSULE | Refills: 0 | Status: SHIPPED | OUTPATIENT
Start: 2020-03-26 | End: 2020-03-31

## 2020-03-26 RX ORDER — LIDOCAINE HYDROCHLORIDE AND EPINEPHRINE 10; 10 MG/ML; UG/ML
10 INJECTION, SOLUTION INFILTRATION; PERINEURAL ONCE
Status: COMPLETED | OUTPATIENT
Start: 2020-03-26 | End: 2020-03-26

## 2020-03-26 RX ORDER — HYDROCODONE BITARTRATE AND ACETAMINOPHEN 5; 325 MG/1; MG/1
1 TABLET ORAL EVERY 6 HOURS PRN
Qty: 8 TABLET | Refills: 0 | Status: ON HOLD | OUTPATIENT
Start: 2020-03-26 | End: 2020-07-02 | Stop reason: SDUPTHER

## 2020-03-26 RX ADMIN — TETANUS TOXOID, REDUCED DIPHTHERIA TOXOID AND ACELLULAR PERTUSSIS VACCINE, ADSORBED 0.5 ML: 5; 2.5; 8; 8; 2.5 SUSPENSION INTRAMUSCULAR at 11:42

## 2020-03-26 RX ADMIN — LIDOCAINE HYDROCHLORIDE,EPINEPHRINE BITARTRATE 10 ML: 10; .01 INJECTION, SOLUTION INFILTRATION; PERINEURAL at 12:45

## 2020-06-24 ENCOUNTER — APPOINTMENT (OUTPATIENT)
Dept: GENERAL RADIOLOGY | Facility: HOSPITAL | Age: 65
End: 2020-06-24

## 2020-06-24 ENCOUNTER — HOSPITAL ENCOUNTER (INPATIENT)
Facility: HOSPITAL | Age: 65
LOS: 8 days | Discharge: NURSING, REHAB OR LONG-TERM CARE | End: 2020-07-02
Attending: EMERGENCY MEDICINE | Admitting: HOSPITALIST

## 2020-06-24 ENCOUNTER — APPOINTMENT (OUTPATIENT)
Dept: CT IMAGING | Facility: HOSPITAL | Age: 65
End: 2020-06-24

## 2020-06-24 DIAGNOSIS — F10.930 ALCOHOL WITHDRAWAL SYNDROME WITHOUT COMPLICATION (HCC): Primary | ICD-10-CM

## 2020-06-24 DIAGNOSIS — S41.131A PUNCTURE WOUND OF RIGHT UPPER ARM, INITIAL ENCOUNTER: ICD-10-CM

## 2020-06-24 DIAGNOSIS — R07.9 CHEST PAIN IN ADULT: ICD-10-CM

## 2020-06-24 LAB
ALBUMIN SERPL-MCNC: 4.1 G/DL (ref 3.5–5.2)
ALBUMIN/GLOB SERPL: 1.2 G/DL
ALP SERPL-CCNC: 92 U/L (ref 39–117)
ALT SERPL W P-5'-P-CCNC: 42 U/L (ref 1–41)
ANION GAP SERPL CALCULATED.3IONS-SCNC: 15.3 MMOL/L (ref 5–15)
AST SERPL-CCNC: 58 U/L (ref 1–40)
BASOPHILS # BLD AUTO: 0.04 10*3/MM3 (ref 0–0.2)
BASOPHILS NFR BLD AUTO: 0.6 % (ref 0–1.5)
BILIRUB SERPL-MCNC: 0.5 MG/DL (ref 0.2–1.2)
BUN BLD-MCNC: 16 MG/DL (ref 8–23)
BUN/CREAT SERPL: 12.7 (ref 7–25)
CALCIUM SPEC-SCNC: 7.8 MG/DL (ref 8.6–10.5)
CHLORIDE SERPL-SCNC: 101 MMOL/L (ref 98–107)
CO2 SERPL-SCNC: 22.7 MMOL/L (ref 22–29)
CREAT BLD-MCNC: 1.26 MG/DL (ref 0.76–1.27)
D DIMER PPP FEU-MCNC: 7.36 MCGFEU/ML (ref 0–0.49)
DEPRECATED RDW RBC AUTO: 44.5 FL (ref 37–54)
EOSINOPHIL # BLD AUTO: 0.05 10*3/MM3 (ref 0–0.4)
EOSINOPHIL NFR BLD AUTO: 0.7 % (ref 0.3–6.2)
ERYTHROCYTE [DISTWIDTH] IN BLOOD BY AUTOMATED COUNT: 13 % (ref 12.3–15.4)
GFR SERPL CREATININE-BSD FRML MDRD: 57 ML/MIN/1.73
GLOBULIN UR ELPH-MCNC: 3.4 GM/DL
GLUCOSE BLD-MCNC: 94 MG/DL (ref 65–99)
HCT VFR BLD AUTO: 33.6 % (ref 37.5–51)
HGB BLD-MCNC: 11.7 G/DL (ref 13–17.7)
HOLD SPECIMEN: NORMAL
HOLD SPECIMEN: NORMAL
IMM GRANULOCYTES # BLD AUTO: 0.04 10*3/MM3 (ref 0–0.05)
IMM GRANULOCYTES NFR BLD AUTO: 0.6 % (ref 0–0.5)
INR PPP: 0.92 (ref 0.9–1.1)
LIPASE SERPL-CCNC: 84 U/L (ref 13–60)
LYMPHOCYTES # BLD AUTO: 1.07 10*3/MM3 (ref 0.7–3.1)
LYMPHOCYTES NFR BLD AUTO: 14.9 % (ref 19.6–45.3)
MAGNESIUM SERPL-MCNC: 1 MG/DL (ref 1.6–2.4)
MCH RBC QN AUTO: 32.7 PG (ref 26.6–33)
MCHC RBC AUTO-ENTMCNC: 34.8 G/DL (ref 31.5–35.7)
MCV RBC AUTO: 93.9 FL (ref 79–97)
MONOCYTES # BLD AUTO: 0.73 10*3/MM3 (ref 0.1–0.9)
MONOCYTES NFR BLD AUTO: 10.2 % (ref 5–12)
NEUTROPHILS # BLD AUTO: 5.23 10*3/MM3 (ref 1.7–7)
NEUTROPHILS NFR BLD AUTO: 73 % (ref 42.7–76)
NRBC BLD AUTO-RTO: 0 /100 WBC (ref 0–0.2)
PLATELET # BLD AUTO: 267 10*3/MM3 (ref 140–450)
PMV BLD AUTO: 9.3 FL (ref 6–12)
POTASSIUM BLD-SCNC: 3.9 MMOL/L (ref 3.5–5.2)
PROT SERPL-MCNC: 7.5 G/DL (ref 6–8.5)
PROTHROMBIN TIME: 12.1 SECONDS (ref 11.7–14.2)
RBC # BLD AUTO: 3.58 10*6/MM3 (ref 4.14–5.8)
SODIUM BLD-SCNC: 139 MMOL/L (ref 136–145)
TROPONIN T SERPL-MCNC: <0.01 NG/ML (ref 0–0.03)
TROPONIN T SERPL-MCNC: <0.01 NG/ML (ref 0–0.03)
WBC NRBC COR # BLD: 7.16 10*3/MM3 (ref 3.4–10.8)
WHOLE BLOOD HOLD SPECIMEN: NORMAL
WHOLE BLOOD HOLD SPECIMEN: NORMAL

## 2020-06-24 PROCEDURE — G0378 HOSPITAL OBSERVATION PER HR: HCPCS

## 2020-06-24 PROCEDURE — 93005 ELECTROCARDIOGRAM TRACING: CPT | Performed by: EMERGENCY MEDICINE

## 2020-06-24 PROCEDURE — 83735 ASSAY OF MAGNESIUM: CPT | Performed by: NURSE PRACTITIONER

## 2020-06-24 PROCEDURE — 25010000002 MAGNESIUM SULFATE 2 GM/50ML SOLUTION: Performed by: NURSE PRACTITIONER

## 2020-06-24 PROCEDURE — 71275 CT ANGIOGRAPHY CHEST: CPT

## 2020-06-24 PROCEDURE — HZ2ZZZZ DETOXIFICATION SERVICES FOR SUBSTANCE ABUSE TREATMENT: ICD-10-PCS | Performed by: HOSPITALIST

## 2020-06-24 PROCEDURE — 85610 PROTHROMBIN TIME: CPT | Performed by: NURSE PRACTITIONER

## 2020-06-24 PROCEDURE — 25010000002 THIAMINE PER 100 MG: Performed by: NURSE PRACTITIONER

## 2020-06-24 PROCEDURE — 71045 X-RAY EXAM CHEST 1 VIEW: CPT

## 2020-06-24 PROCEDURE — 99284 EMERGENCY DEPT VISIT MOD MDM: CPT

## 2020-06-24 PROCEDURE — 80053 COMPREHEN METABOLIC PANEL: CPT | Performed by: NURSE PRACTITIONER

## 2020-06-24 PROCEDURE — 83690 ASSAY OF LIPASE: CPT | Performed by: NURSE PRACTITIONER

## 2020-06-24 PROCEDURE — 25010000002 LORAZEPAM PER 2 MG: Performed by: INTERNAL MEDICINE

## 2020-06-24 PROCEDURE — 85025 COMPLETE CBC W/AUTO DIFF WBC: CPT | Performed by: NURSE PRACTITIONER

## 2020-06-24 PROCEDURE — 84484 ASSAY OF TROPONIN QUANT: CPT | Performed by: INTERNAL MEDICINE

## 2020-06-24 PROCEDURE — 0 IOPAMIDOL PER 1 ML: Performed by: EMERGENCY MEDICINE

## 2020-06-24 PROCEDURE — 25010000002 MAGNESIUM SULFATE PER 500 MG OF MAGNESIUM: Performed by: NURSE PRACTITIONER

## 2020-06-24 PROCEDURE — 93005 ELECTROCARDIOGRAM TRACING: CPT

## 2020-06-24 PROCEDURE — 25010000002 LORAZEPAM PER 2 MG: Performed by: NURSE PRACTITIONER

## 2020-06-24 PROCEDURE — 93010 ELECTROCARDIOGRAM REPORT: CPT | Performed by: INTERNAL MEDICINE

## 2020-06-24 PROCEDURE — 84484 ASSAY OF TROPONIN QUANT: CPT | Performed by: NURSE PRACTITIONER

## 2020-06-24 PROCEDURE — 85379 FIBRIN DEGRADATION QUANT: CPT | Performed by: NURSE PRACTITIONER

## 2020-06-24 RX ORDER — ALBUTEROL SULFATE 2.5 MG/3ML
2.5 SOLUTION RESPIRATORY (INHALATION) EVERY 6 HOURS PRN
Status: DISCONTINUED | OUTPATIENT
Start: 2020-06-24 | End: 2020-07-02 | Stop reason: HOSPADM

## 2020-06-24 RX ORDER — LORAZEPAM 2 MG/ML
0.5 INJECTION INTRAMUSCULAR
Status: DISCONTINUED | OUTPATIENT
Start: 2020-06-24 | End: 2020-06-26

## 2020-06-24 RX ORDER — ONDANSETRON 2 MG/ML
4 INJECTION INTRAMUSCULAR; INTRAVENOUS EVERY 6 HOURS PRN
Status: DISCONTINUED | OUTPATIENT
Start: 2020-06-24 | End: 2020-07-02 | Stop reason: HOSPADM

## 2020-06-24 RX ORDER — THIAMINE MONONITRATE (VIT B1) 100 MG
100 TABLET ORAL DAILY
Status: COMPLETED | OUTPATIENT
Start: 2020-06-25 | End: 2020-06-29

## 2020-06-24 RX ORDER — FOLIC ACID 1 MG/1
1 TABLET ORAL DAILY
Status: COMPLETED | OUTPATIENT
Start: 2020-06-25 | End: 2020-06-29

## 2020-06-24 RX ORDER — ESCITALOPRAM OXALATE 20 MG/1
20 TABLET ORAL DAILY
Status: DISCONTINUED | OUTPATIENT
Start: 2020-06-25 | End: 2020-07-02 | Stop reason: HOSPADM

## 2020-06-24 RX ORDER — FAMOTIDINE 10 MG/ML
20 INJECTION, SOLUTION INTRAVENOUS ONCE
Status: COMPLETED | OUTPATIENT
Start: 2020-06-24 | End: 2020-06-24

## 2020-06-24 RX ORDER — LORAZEPAM 2 MG/ML
1 INJECTION INTRAMUSCULAR
Status: DISCONTINUED | OUTPATIENT
Start: 2020-06-24 | End: 2020-06-26

## 2020-06-24 RX ORDER — LORAZEPAM 2 MG/ML
1 INJECTION INTRAMUSCULAR ONCE
Status: COMPLETED | OUTPATIENT
Start: 2020-06-24 | End: 2020-06-24

## 2020-06-24 RX ORDER — DIPHENOXYLATE HYDROCHLORIDE AND ATROPINE SULFATE 2.5; .025 MG/1; MG/1
1 TABLET ORAL DAILY
Status: COMPLETED | OUTPATIENT
Start: 2020-06-25 | End: 2020-06-29

## 2020-06-24 RX ORDER — ONDANSETRON 4 MG/1
4 TABLET, FILM COATED ORAL EVERY 6 HOURS PRN
Status: DISCONTINUED | OUTPATIENT
Start: 2020-06-24 | End: 2020-07-02 | Stop reason: HOSPADM

## 2020-06-24 RX ORDER — SODIUM CHLORIDE 9 MG/ML
100 INJECTION, SOLUTION INTRAVENOUS CONTINUOUS
Status: DISCONTINUED | OUTPATIENT
Start: 2020-06-24 | End: 2020-06-26

## 2020-06-24 RX ORDER — ASPIRIN 81 MG/1
81 TABLET ORAL DAILY
Status: DISCONTINUED | OUTPATIENT
Start: 2020-06-25 | End: 2020-07-02 | Stop reason: HOSPADM

## 2020-06-24 RX ORDER — MAGNESIUM SULFATE HEPTAHYDRATE 40 MG/ML
2 INJECTION, SOLUTION INTRAVENOUS ONCE
Status: COMPLETED | OUTPATIENT
Start: 2020-06-24 | End: 2020-06-24

## 2020-06-24 RX ORDER — SODIUM CHLORIDE 0.9 % (FLUSH) 0.9 %
10 SYRINGE (ML) INJECTION AS NEEDED
Status: DISCONTINUED | OUTPATIENT
Start: 2020-06-24 | End: 2020-07-02 | Stop reason: HOSPADM

## 2020-06-24 RX ORDER — CHLORDIAZEPOXIDE HYDROCHLORIDE 25 MG/1
50 CAPSULE, GELATIN COATED ORAL ONCE
Status: COMPLETED | OUTPATIENT
Start: 2020-06-24 | End: 2020-06-24

## 2020-06-24 RX ORDER — LORAZEPAM 1 MG/1
1 TABLET ORAL
Status: DISCONTINUED | OUTPATIENT
Start: 2020-06-24 | End: 2020-06-26

## 2020-06-24 RX ORDER — AMLODIPINE BESYLATE 10 MG/1
10 TABLET ORAL DAILY
Status: DISCONTINUED | OUTPATIENT
Start: 2020-06-25 | End: 2020-07-02 | Stop reason: HOSPADM

## 2020-06-24 RX ORDER — LORAZEPAM 0.5 MG/1
0.5 TABLET ORAL
Status: DISCONTINUED | OUTPATIENT
Start: 2020-06-24 | End: 2020-06-26

## 2020-06-24 RX ADMIN — SODIUM CHLORIDE 100 ML/HR: 9 INJECTION, SOLUTION INTRAVENOUS at 21:27

## 2020-06-24 RX ADMIN — THIAMINE HYDROCHLORIDE 1000 ML/HR: 100 INJECTION, SOLUTION INTRAMUSCULAR; INTRAVENOUS at 13:05

## 2020-06-24 RX ADMIN — IOPAMIDOL 95 ML: 755 INJECTION, SOLUTION INTRAVENOUS at 13:41

## 2020-06-24 RX ADMIN — LORAZEPAM 1 MG: 2 INJECTION INTRAMUSCULAR; INTRAVENOUS at 12:04

## 2020-06-24 RX ADMIN — MAGNESIUM SULFATE HEPTAHYDRATE 2 G: 40 INJECTION, SOLUTION INTRAVENOUS at 14:19

## 2020-06-24 RX ADMIN — LORAZEPAM 0.5 MG: 2 INJECTION INTRAMUSCULAR; INTRAVENOUS at 20:33

## 2020-06-24 RX ADMIN — CHLORDIAZEPOXIDE HYDROCHLORIDE 50 MG: 25 CAPSULE ORAL at 16:13

## 2020-06-24 RX ADMIN — FAMOTIDINE 20 MG: 10 INJECTION, SOLUTION INTRAVENOUS at 18:26

## 2020-06-24 RX ADMIN — LORAZEPAM 0.5 MG: 0.5 TABLET ORAL at 23:22

## 2020-06-25 ENCOUNTER — APPOINTMENT (OUTPATIENT)
Dept: CARDIOLOGY | Facility: HOSPITAL | Age: 65
End: 2020-06-25

## 2020-06-25 PROBLEM — F10.10 ALCOHOL ABUSE: Status: ACTIVE | Noted: 2020-06-25

## 2020-06-25 PROBLEM — K44.9 HIATAL HERNIA: Status: ACTIVE | Noted: 2020-06-25

## 2020-06-25 PROBLEM — G47.30 SLEEP APNEA: Status: ACTIVE | Noted: 2020-06-25

## 2020-06-25 PROBLEM — R07.9 CHEST PAIN IN ADULT: Status: ACTIVE | Noted: 2020-06-25

## 2020-06-25 LAB
ANION GAP SERPL CALCULATED.3IONS-SCNC: 15.7 MMOL/L (ref 5–15)
BH CV ECHO MEAS - ACS: 2.5 CM
BH CV ECHO MEAS - AO MAX PG (FULL): 5.1 MMHG
BH CV ECHO MEAS - AO MAX PG: 9.6 MMHG
BH CV ECHO MEAS - AO MEAN PG (FULL): 2 MMHG
BH CV ECHO MEAS - AO MEAN PG: 5 MMHG
BH CV ECHO MEAS - AO ROOT AREA (BSA CORRECTED): 1.1
BH CV ECHO MEAS - AO ROOT AREA: 5.3 CM^2
BH CV ECHO MEAS - AO ROOT DIAM: 2.6 CM
BH CV ECHO MEAS - AO V2 MAX: 155 CM/SEC
BH CV ECHO MEAS - AO V2 MEAN: 106 CM/SEC
BH CV ECHO MEAS - AO V2 VTI: 29.8 CM
BH CV ECHO MEAS - ASC AORTA: 3.1 CM
BH CV ECHO MEAS - AVA(I,A): 2.3 CM^2
BH CV ECHO MEAS - AVA(I,D): 2.3 CM^2
BH CV ECHO MEAS - AVA(V,A): 2.4 CM^2
BH CV ECHO MEAS - AVA(V,D): 2.4 CM^2
BH CV ECHO MEAS - BSA(HAYCOCK): 2.4 M^2
BH CV ECHO MEAS - BSA: 2.4 M^2
BH CV ECHO MEAS - BZI_BMI: 25.9 KILOGRAMS/M^2
BH CV ECHO MEAS - BZI_METRIC_HEIGHT: 200.7 CM
BH CV ECHO MEAS - BZI_METRIC_WEIGHT: 104.3 KG
BH CV ECHO MEAS - EDV(CUBED): 227 ML
BH CV ECHO MEAS - EDV(MOD-SP2): 173 ML
BH CV ECHO MEAS - EDV(MOD-SP4): 141 ML
BH CV ECHO MEAS - EDV(TEICH): 186.9 ML
BH CV ECHO MEAS - EF(CUBED): 75.8 %
BH CV ECHO MEAS - EF(MOD-BP): 55.2 %
BH CV ECHO MEAS - EF(MOD-SP2): 53.2 %
BH CV ECHO MEAS - EF(MOD-SP4): 60.3 %
BH CV ECHO MEAS - EF(TEICH): 66.9 %
BH CV ECHO MEAS - ESV(CUBED): 54.9 ML
BH CV ECHO MEAS - ESV(MOD-SP2): 81 ML
BH CV ECHO MEAS - ESV(MOD-SP4): 56 ML
BH CV ECHO MEAS - ESV(TEICH): 62 ML
BH CV ECHO MEAS - FS: 37.7 %
BH CV ECHO MEAS - IVS/LVPW: 0.92
BH CV ECHO MEAS - IVSD: 1.2 CM
BH CV ECHO MEAS - LAT PEAK E' VEL: 9.9 CM/SEC
BH CV ECHO MEAS - LV DIASTOLIC VOL/BSA (35-75): 58.3 ML/M^2
BH CV ECHO MEAS - LV MASS(C)D: 341 GRAMS
BH CV ECHO MEAS - LV MASS(C)DI: 141 GRAMS/M^2
BH CV ECHO MEAS - LV MAX PG: 4.5 MMHG
BH CV ECHO MEAS - LV MEAN PG: 3 MMHG
BH CV ECHO MEAS - LV SYSTOLIC VOL/BSA (12-30): 23.2 ML/M^2
BH CV ECHO MEAS - LV V1 MAX: 106 CM/SEC
BH CV ECHO MEAS - LV V1 MEAN: 77.4 CM/SEC
BH CV ECHO MEAS - LV V1 VTI: 20 CM
BH CV ECHO MEAS - LVIDD: 6.1 CM
BH CV ECHO MEAS - LVIDS: 3.8 CM
BH CV ECHO MEAS - LVLD AP2: 8.4 CM
BH CV ECHO MEAS - LVLD AP4: 8.4 CM
BH CV ECHO MEAS - LVLS AP2: 6.9 CM
BH CV ECHO MEAS - LVLS AP4: 7.8 CM
BH CV ECHO MEAS - LVOT AREA (M): 3.5 CM^2
BH CV ECHO MEAS - LVOT AREA: 3.5 CM^2
BH CV ECHO MEAS - LVOT DIAM: 2.1 CM
BH CV ECHO MEAS - LVPWD: 1.3 CM
BH CV ECHO MEAS - MED PEAK E' VEL: 8.7 CM/SEC
BH CV ECHO MEAS - MV A DUR: 0.13 SEC
BH CV ECHO MEAS - MV A MAX VEL: 82.9 CM/SEC
BH CV ECHO MEAS - MV DEC SLOPE: 295 CM/SEC^2
BH CV ECHO MEAS - MV DEC TIME: 176 SEC
BH CV ECHO MEAS - MV E MAX VEL: 66.9 CM/SEC
BH CV ECHO MEAS - MV E/A: 0.81
BH CV ECHO MEAS - MV MAX PG: 1.7 MMHG
BH CV ECHO MEAS - MV MEAN PG: 1 MMHG
BH CV ECHO MEAS - MV P1/2T MAX VEL: 67.9 CM/SEC
BH CV ECHO MEAS - MV P1/2T: 67.4 MSEC
BH CV ECHO MEAS - MV V2 MAX: 64.6 CM/SEC
BH CV ECHO MEAS - MV V2 MEAN: 42.5 CM/SEC
BH CV ECHO MEAS - MV V2 VTI: 20.9 CM
BH CV ECHO MEAS - MVA P1/2T LCG: 3.2 CM^2
BH CV ECHO MEAS - MVA(P1/2T): 3.3 CM^2
BH CV ECHO MEAS - MVA(VTI): 3.3 CM^2
BH CV ECHO MEAS - PA ACC TIME: 0.1 SEC
BH CV ECHO MEAS - PA MAX PG (FULL): 2.8 MMHG
BH CV ECHO MEAS - PA MAX PG: 6.2 MMHG
BH CV ECHO MEAS - PA PR(ACCEL): 32.7 MMHG
BH CV ECHO MEAS - PA V2 MAX: 124 CM/SEC
BH CV ECHO MEAS - RAP SYSTOLE: 3 MMHG
BH CV ECHO MEAS - RV BASE (<4.1) - OBSOLETE: 3.9 CM
BH CV ECHO MEAS - RV LENGTH (<8.5) - OBSOLETE: 7.4 CM
BH CV ECHO MEAS - RV MAX PG: 3.3 MMHG
BH CV ECHO MEAS - RV MEAN PG: 2 MMHG
BH CV ECHO MEAS - RV V1 MAX: 91.2 CM/SEC
BH CV ECHO MEAS - RV V1 MEAN: 56.8 CM/SEC
BH CV ECHO MEAS - RV V1 VTI: 15.7 CM
BH CV ECHO MEAS - SI(AO): 65.4 ML/M^2
BH CV ECHO MEAS - SI(CUBED): 71.2 ML/M^2
BH CV ECHO MEAS - SI(LVOT): 28.6 ML/M^2
BH CV ECHO MEAS - SI(MOD-SP2): 38 ML/M^2
BH CV ECHO MEAS - SI(MOD-SP4): 35.2 ML/M^2
BH CV ECHO MEAS - SI(TEICH): 51.7 ML/M^2
BH CV ECHO MEAS - SV(AO): 158.2 ML
BH CV ECHO MEAS - SV(CUBED): 172.1 ML
BH CV ECHO MEAS - SV(LVOT): 69.3 ML
BH CV ECHO MEAS - SV(MOD-SP2): 92 ML
BH CV ECHO MEAS - SV(MOD-SP4): 85 ML
BH CV ECHO MEAS - SV(TEICH): 125 ML
BH CV ECHO MEAS - TAPSE (>1.6): 3.1 CM
BH CV ECHO MEASUREMENTS AVERAGE E/E' RATIO: 7.19
BH CV LOWER VASCULAR LEFT COMMON FEMORAL AUGMENT: NORMAL
BH CV LOWER VASCULAR LEFT COMMON FEMORAL COMPETENT: NORMAL
BH CV LOWER VASCULAR LEFT COMMON FEMORAL COMPRESS: NORMAL
BH CV LOWER VASCULAR LEFT COMMON FEMORAL PHASIC: NORMAL
BH CV LOWER VASCULAR LEFT COMMON FEMORAL SPONT: NORMAL
BH CV LOWER VASCULAR LEFT DISTAL FEMORAL COMPRESS: NORMAL
BH CV LOWER VASCULAR LEFT GREATER SAPH AK COMPRESS: NORMAL
BH CV LOWER VASCULAR LEFT GREATER SAPH BK COMPRESS: NORMAL
BH CV LOWER VASCULAR LEFT MID FEMORAL AUGMENT: NORMAL
BH CV LOWER VASCULAR LEFT MID FEMORAL COMPETENT: NORMAL
BH CV LOWER VASCULAR LEFT MID FEMORAL COMPRESS: NORMAL
BH CV LOWER VASCULAR LEFT MID FEMORAL PHASIC: NORMAL
BH CV LOWER VASCULAR LEFT MID FEMORAL SPONT: NORMAL
BH CV LOWER VASCULAR LEFT PERONEAL COMPRESS: NORMAL
BH CV LOWER VASCULAR LEFT POPLITEAL AUGMENT: NORMAL
BH CV LOWER VASCULAR LEFT POPLITEAL COMPETENT: NORMAL
BH CV LOWER VASCULAR LEFT POPLITEAL COMPRESS: NORMAL
BH CV LOWER VASCULAR LEFT POPLITEAL PHASIC: NORMAL
BH CV LOWER VASCULAR LEFT POPLITEAL SPONT: NORMAL
BH CV LOWER VASCULAR LEFT POSTERIOR TIBIAL COMPRESS: NORMAL
BH CV LOWER VASCULAR LEFT PROFUNDA FEMORAL COMPRESS: NORMAL
BH CV LOWER VASCULAR LEFT PROXIMAL FEMORAL COMPRESS: NORMAL
BH CV LOWER VASCULAR LEFT SAPHENOFEMORAL JUNCTION COMPRESS: NORMAL
BH CV LOWER VASCULAR RIGHT COMMON FEMORAL AUGMENT: NORMAL
BH CV LOWER VASCULAR RIGHT COMMON FEMORAL COMPETENT: NORMAL
BH CV LOWER VASCULAR RIGHT COMMON FEMORAL COMPRESS: NORMAL
BH CV LOWER VASCULAR RIGHT COMMON FEMORAL PHASIC: NORMAL
BH CV LOWER VASCULAR RIGHT COMMON FEMORAL SPONT: NORMAL
BH CV LOWER VASCULAR RIGHT DISTAL FEMORAL COMPRESS: NORMAL
BH CV LOWER VASCULAR RIGHT GREATER SAPH AK COMPRESS: NORMAL
BH CV LOWER VASCULAR RIGHT GREATER SAPH BK COMPRESS: NORMAL
BH CV LOWER VASCULAR RIGHT MID FEMORAL AUGMENT: NORMAL
BH CV LOWER VASCULAR RIGHT MID FEMORAL COMPETENT: NORMAL
BH CV LOWER VASCULAR RIGHT MID FEMORAL COMPRESS: NORMAL
BH CV LOWER VASCULAR RIGHT MID FEMORAL PHASIC: NORMAL
BH CV LOWER VASCULAR RIGHT MID FEMORAL SPONT: NORMAL
BH CV LOWER VASCULAR RIGHT PERONEAL COMPRESS: NORMAL
BH CV LOWER VASCULAR RIGHT POPLITEAL AUGMENT: NORMAL
BH CV LOWER VASCULAR RIGHT POPLITEAL COMPETENT: NORMAL
BH CV LOWER VASCULAR RIGHT POPLITEAL COMPRESS: NORMAL
BH CV LOWER VASCULAR RIGHT POPLITEAL PHASIC: NORMAL
BH CV LOWER VASCULAR RIGHT POPLITEAL SPONT: NORMAL
BH CV LOWER VASCULAR RIGHT POSTERIOR TIBIAL COMPRESS: NORMAL
BH CV LOWER VASCULAR RIGHT PROFUNDA FEMORAL COMPRESS: NORMAL
BH CV LOWER VASCULAR RIGHT PROXIMAL FEMORAL COMPRESS: NORMAL
BH CV LOWER VASCULAR RIGHT SAPHENOFEMORAL JUNCTION COMPRESS: NORMAL
BH CV VAS BP RIGHT ARM: NORMAL MMHG
BH CV XLRA - RV BASE: 3.9 CM
BH CV XLRA - RV LENGTH: 7.4 CM
BH CV XLRA - RV MID: 3.1 CM
BH CV XLRA - TDI S': 18 CM/SEC
BUN BLD-MCNC: 10 MG/DL (ref 8–23)
BUN/CREAT SERPL: 11.8 (ref 7–25)
CALCIUM SPEC-SCNC: 7.5 MG/DL (ref 8.6–10.5)
CHLORIDE SERPL-SCNC: 98 MMOL/L (ref 98–107)
CO2 SERPL-SCNC: 21.3 MMOL/L (ref 22–29)
CREAT BLD-MCNC: 0.85 MG/DL (ref 0.76–1.27)
DEPRECATED RDW RBC AUTO: 43 FL (ref 37–54)
ERYTHROCYTE [DISTWIDTH] IN BLOOD BY AUTOMATED COUNT: 12.7 % (ref 12.3–15.4)
GFR SERPL CREATININE-BSD FRML MDRD: 90 ML/MIN/1.73
GLUCOSE BLD-MCNC: 85 MG/DL (ref 65–99)
HCT VFR BLD AUTO: 32.4 % (ref 37.5–51)
HGB BLD-MCNC: 11.3 G/DL (ref 13–17.7)
LEFT ATRIUM VOLUME INDEX: 33 ML/M2
MAXIMAL PREDICTED HEART RATE: 154 BPM
MCH RBC QN AUTO: 32.5 PG (ref 26.6–33)
MCHC RBC AUTO-ENTMCNC: 34.9 G/DL (ref 31.5–35.7)
MCV RBC AUTO: 93.1 FL (ref 79–97)
PHOSPHATE SERPL-MCNC: 2.6 MG/DL (ref 2.5–4.5)
PLATELET # BLD AUTO: 224 10*3/MM3 (ref 140–450)
PMV BLD AUTO: 9.6 FL (ref 6–12)
POTASSIUM BLD-SCNC: 3.6 MMOL/L (ref 3.5–5.2)
RBC # BLD AUTO: 3.48 10*6/MM3 (ref 4.14–5.8)
SODIUM BLD-SCNC: 135 MMOL/L (ref 136–145)
STRESS TARGET HR: 131 BPM
WBC NRBC COR # BLD: 7.49 10*3/MM3 (ref 3.4–10.8)

## 2020-06-25 PROCEDURE — 93306 TTE W/DOPPLER COMPLETE: CPT

## 2020-06-25 PROCEDURE — 93306 TTE W/DOPPLER COMPLETE: CPT | Performed by: INTERNAL MEDICINE

## 2020-06-25 PROCEDURE — 85027 COMPLETE CBC AUTOMATED: CPT | Performed by: INTERNAL MEDICINE

## 2020-06-25 PROCEDURE — 99254 IP/OBS CNSLTJ NEW/EST MOD 60: CPT | Performed by: INTERNAL MEDICINE

## 2020-06-25 PROCEDURE — 93970 EXTREMITY STUDY: CPT

## 2020-06-25 PROCEDURE — 84100 ASSAY OF PHOSPHORUS: CPT | Performed by: INTERNAL MEDICINE

## 2020-06-25 PROCEDURE — 80048 BASIC METABOLIC PNL TOTAL CA: CPT | Performed by: INTERNAL MEDICINE

## 2020-06-25 PROCEDURE — 90791 PSYCH DIAGNOSTIC EVALUATION: CPT | Performed by: SOCIAL WORKER

## 2020-06-25 RX ADMIN — ESCITALOPRAM 20 MG: 20 TABLET, FILM COATED ORAL at 08:34

## 2020-06-25 RX ADMIN — LORAZEPAM 0.5 MG: 0.5 TABLET ORAL at 08:48

## 2020-06-25 RX ADMIN — LORAZEPAM 0.5 MG: 0.5 TABLET ORAL at 18:57

## 2020-06-25 RX ADMIN — Medication 1 TABLET: at 08:34

## 2020-06-25 RX ADMIN — ASPIRIN 81 MG: 81 TABLET, COATED ORAL at 08:34

## 2020-06-25 RX ADMIN — LORAZEPAM 0.5 MG: 0.5 TABLET ORAL at 16:08

## 2020-06-25 RX ADMIN — LORAZEPAM 0.5 MG: 0.5 TABLET ORAL at 06:46

## 2020-06-25 RX ADMIN — FOLIC ACID 1 MG: 1 TABLET ORAL at 08:34

## 2020-06-25 RX ADMIN — LORAZEPAM 0.5 MG: 0.5 TABLET ORAL at 11:45

## 2020-06-25 RX ADMIN — AMLODIPINE BESYLATE 10 MG: 10 TABLET ORAL at 08:34

## 2020-06-25 RX ADMIN — LORAZEPAM 1 MG: 1 TABLET ORAL at 21:02

## 2020-06-25 RX ADMIN — SODIUM CHLORIDE 100 ML/HR: 9 INJECTION, SOLUTION INTRAVENOUS at 17:19

## 2020-06-25 RX ADMIN — Medication 100 MG: at 08:34

## 2020-06-25 RX ADMIN — LORAZEPAM 0.5 MG: 0.5 TABLET ORAL at 22:34

## 2020-06-26 ENCOUNTER — APPOINTMENT (OUTPATIENT)
Dept: GENERAL RADIOLOGY | Facility: HOSPITAL | Age: 65
End: 2020-06-26

## 2020-06-26 LAB
ALBUMIN SERPL-MCNC: 3.5 G/DL (ref 3.5–5.2)
ALBUMIN/GLOB SERPL: 1.2 G/DL
ALP SERPL-CCNC: 73 U/L (ref 39–117)
ALT SERPL W P-5'-P-CCNC: 25 U/L (ref 1–41)
ANION GAP SERPL CALCULATED.3IONS-SCNC: 15.2 MMOL/L (ref 5–15)
APPEARANCE FLD: ABNORMAL
AST SERPL-CCNC: 26 U/L (ref 1–40)
BASOPHILS # BLD AUTO: 0.02 10*3/MM3 (ref 0–0.2)
BASOPHILS NFR BLD AUTO: 0.2 % (ref 0–1.5)
BILIRUB SERPL-MCNC: 0.9 MG/DL (ref 0.2–1.2)
BUN BLD-MCNC: 10 MG/DL (ref 8–23)
BUN/CREAT SERPL: 11.6 (ref 7–25)
CALCIUM SPEC-SCNC: 7.1 MG/DL (ref 8.6–10.5)
CHLORIDE SERPL-SCNC: 95 MMOL/L (ref 98–107)
CO2 SERPL-SCNC: 19.8 MMOL/L (ref 22–29)
COLOR FLD: YELLOW
CREAT BLD-MCNC: 0.86 MG/DL (ref 0.76–1.27)
CRYSTALS FLD MICRO: NORMAL
DEPRECATED RDW RBC AUTO: 41.9 FL (ref 37–54)
EOSINOPHIL # BLD AUTO: 0.04 10*3/MM3 (ref 0–0.4)
EOSINOPHIL NFR BLD AUTO: 0.4 % (ref 0.3–6.2)
ERYTHROCYTE [DISTWIDTH] IN BLOOD BY AUTOMATED COUNT: 12.4 % (ref 12.3–15.4)
FERRITIN SERPL-MCNC: 611 NG/ML (ref 30–400)
FOLATE SERPL-MCNC: 10.8 NG/ML (ref 4.78–24.2)
GFR SERPL CREATININE-BSD FRML MDRD: 89 ML/MIN/1.73
GLOBULIN UR ELPH-MCNC: 3 GM/DL
GLUCOSE BLD-MCNC: 101 MG/DL (ref 65–99)
GLUCOSE BLDC GLUCOMTR-MCNC: 145 MG/DL (ref 70–130)
HCT VFR BLD AUTO: 27.2 % (ref 37.5–51)
HGB BLD-MCNC: 9.8 G/DL (ref 13–17.7)
IMM GRANULOCYTES # BLD AUTO: 0.06 10*3/MM3 (ref 0–0.05)
IMM GRANULOCYTES NFR BLD AUTO: 0.7 % (ref 0–0.5)
INR PPP: 1.02 (ref 0.9–1.1)
IRON 24H UR-MRATE: 21 MCG/DL (ref 59–158)
IRON SATN MFR SERPL: 10 % (ref 20–50)
LYMPHOCYTES # BLD AUTO: 0.94 10*3/MM3 (ref 0.7–3.1)
LYMPHOCYTES NFR BLD AUTO: 10.5 % (ref 19.6–45.3)
LYMPHOCYTES NFR FLD MANUAL: 19 %
MAGNESIUM SERPL-MCNC: 0.7 MG/DL (ref 1.6–2.4)
MCH RBC QN AUTO: 33.2 PG (ref 26.6–33)
MCHC RBC AUTO-ENTMCNC: 36 G/DL (ref 31.5–35.7)
MCV RBC AUTO: 92.2 FL (ref 79–97)
METHOD: ABNORMAL
MONOCYTES # BLD AUTO: 1.07 10*3/MM3 (ref 0.1–0.9)
MONOCYTES NFR BLD AUTO: 11.9 % (ref 5–12)
MONOCYTES NFR FLD: 10 %
NEUTROPHILS # BLD AUTO: 6.83 10*3/MM3 (ref 1.7–7)
NEUTROPHILS NFR BLD AUTO: 76.3 % (ref 42.7–76)
NEUTROPHILS NFR FLD MANUAL: 71 %
NRBC BLD AUTO-RTO: 0 /100 WBC (ref 0–0.2)
NUC CELL # FLD: 50 /MM3
PLATELET # BLD AUTO: 183 10*3/MM3 (ref 140–450)
PMV BLD AUTO: 9.9 FL (ref 6–12)
POTASSIUM BLD-SCNC: 3.4 MMOL/L (ref 3.5–5.2)
PROT SERPL-MCNC: 6.5 G/DL (ref 6–8.5)
PROTHROMBIN TIME: 13.1 SECONDS (ref 11.7–14.2)
RBC # BLD AUTO: 2.95 10*6/MM3 (ref 4.14–5.8)
RBC # FLD AUTO: 83 /MM3
SODIUM BLD-SCNC: 130 MMOL/L (ref 136–145)
TIBC SERPL-MCNC: 221 MCG/DL (ref 298–536)
TRANSFERRIN SERPL-MCNC: 148 MG/DL (ref 200–360)
TSH SERPL DL<=0.05 MIU/L-ACNC: 3.25 UIU/ML (ref 0.27–4.2)
VIT B12 BLD-MCNC: 447 PG/ML (ref 211–946)
WBC NRBC COR # BLD: 8.96 10*3/MM3 (ref 3.4–10.8)

## 2020-06-26 PROCEDURE — 87205 SMEAR GRAM STAIN: CPT | Performed by: ORTHOPAEDIC SURGERY

## 2020-06-26 PROCEDURE — 25010000002 MAGNESIUM SULFATE IN D5W 1G/100ML (PREMIX) 1-5 GM/100ML-% SOLUTION: Performed by: INTERNAL MEDICINE

## 2020-06-26 PROCEDURE — 82962 GLUCOSE BLOOD TEST: CPT

## 2020-06-26 PROCEDURE — 83540 ASSAY OF IRON: CPT | Performed by: HOSPITALIST

## 2020-06-26 PROCEDURE — 25010000003 LIDOCAINE 1 % SOLUTION

## 2020-06-26 PROCEDURE — 83735 ASSAY OF MAGNESIUM: CPT | Performed by: NURSE PRACTITIONER

## 2020-06-26 PROCEDURE — 89060 EXAM SYNOVIAL FLUID CRYSTALS: CPT | Performed by: ORTHOPAEDIC SURGERY

## 2020-06-26 PROCEDURE — 87040 BLOOD CULTURE FOR BACTERIA: CPT | Performed by: NURSE PRACTITIONER

## 2020-06-26 PROCEDURE — 82746 ASSAY OF FOLIC ACID SERUM: CPT | Performed by: HOSPITALIST

## 2020-06-26 PROCEDURE — 0S9D3ZZ DRAINAGE OF LEFT KNEE JOINT, PERCUTANEOUS APPROACH: ICD-10-PCS | Performed by: ORTHOPAEDIC SURGERY

## 2020-06-26 PROCEDURE — 82607 VITAMIN B-12: CPT | Performed by: HOSPITALIST

## 2020-06-26 PROCEDURE — 25010000002 LORAZEPAM PER 2 MG: Performed by: INTERNAL MEDICINE

## 2020-06-26 PROCEDURE — 89051 BODY FLUID CELL COUNT: CPT | Performed by: ORTHOPAEDIC SURGERY

## 2020-06-26 PROCEDURE — 82728 ASSAY OF FERRITIN: CPT | Performed by: HOSPITALIST

## 2020-06-26 PROCEDURE — 85025 COMPLETE CBC W/AUTO DIFF WBC: CPT | Performed by: HOSPITALIST

## 2020-06-26 PROCEDURE — 25810000003 SODIUM CHLORIDE 0.9 % WITH KCL 20 MEQ 20-0.9 MEQ/L-% SOLUTION: Performed by: NURSE PRACTITIONER

## 2020-06-26 PROCEDURE — 87070 CULTURE OTHR SPECIMN AEROBIC: CPT | Performed by: ORTHOPAEDIC SURGERY

## 2020-06-26 PROCEDURE — 84466 ASSAY OF TRANSFERRIN: CPT | Performed by: HOSPITALIST

## 2020-06-26 PROCEDURE — 80053 COMPREHEN METABOLIC PANEL: CPT | Performed by: HOSPITALIST

## 2020-06-26 PROCEDURE — 73560 X-RAY EXAM OF KNEE 1 OR 2: CPT

## 2020-06-26 PROCEDURE — 84443 ASSAY THYROID STIM HORMONE: CPT | Performed by: HOSPITALIST

## 2020-06-26 PROCEDURE — 87075 CULTR BACTERIA EXCEPT BLOOD: CPT | Performed by: ORTHOPAEDIC SURGERY

## 2020-06-26 PROCEDURE — 85610 PROTHROMBIN TIME: CPT | Performed by: HOSPITALIST

## 2020-06-26 RX ORDER — POTASSIUM CHLORIDE 7.45 MG/ML
10 INJECTION INTRAVENOUS
Status: DISCONTINUED | OUTPATIENT
Start: 2020-06-26 | End: 2020-07-02 | Stop reason: HOSPADM

## 2020-06-26 RX ORDER — LORAZEPAM 2 MG/ML
2 INJECTION INTRAMUSCULAR ONCE
Status: COMPLETED | OUTPATIENT
Start: 2020-06-26 | End: 2020-06-26

## 2020-06-26 RX ORDER — SODIUM CHLORIDE AND POTASSIUM CHLORIDE 150; 900 MG/100ML; MG/100ML
100 INJECTION, SOLUTION INTRAVENOUS CONTINUOUS
Status: DISCONTINUED | OUTPATIENT
Start: 2020-06-26 | End: 2020-06-30

## 2020-06-26 RX ORDER — LORAZEPAM 2 MG/ML
4 INJECTION INTRAMUSCULAR ONCE
Status: COMPLETED | OUTPATIENT
Start: 2020-06-26 | End: 2020-06-26

## 2020-06-26 RX ORDER — ACETAMINOPHEN 325 MG/1
650 TABLET ORAL EVERY 6 HOURS PRN
Status: DISCONTINUED | OUTPATIENT
Start: 2020-06-26 | End: 2020-07-02 | Stop reason: HOSPADM

## 2020-06-26 RX ORDER — LORAZEPAM 2 MG/ML
1 INJECTION INTRAMUSCULAR
Status: DISCONTINUED | OUTPATIENT
Start: 2020-06-26 | End: 2020-06-28

## 2020-06-26 RX ORDER — LORAZEPAM 1 MG/1
1 TABLET ORAL
Status: DISCONTINUED | OUTPATIENT
Start: 2020-06-26 | End: 2020-06-28

## 2020-06-26 RX ORDER — LIDOCAINE HYDROCHLORIDE 10 MG/ML
INJECTION, SOLUTION INFILTRATION; PERINEURAL
Status: COMPLETED
Start: 2020-06-26 | End: 2020-06-26

## 2020-06-26 RX ORDER — MAGNESIUM SULFATE 1 G/100ML
3 INJECTION INTRAVENOUS ONCE
Status: COMPLETED | OUTPATIENT
Start: 2020-06-26 | End: 2020-06-26

## 2020-06-26 RX ORDER — HYDRALAZINE HYDROCHLORIDE 20 MG/ML
10 INJECTION INTRAMUSCULAR; INTRAVENOUS EVERY 6 HOURS PRN
Status: DISCONTINUED | OUTPATIENT
Start: 2020-06-26 | End: 2020-07-02 | Stop reason: HOSPADM

## 2020-06-26 RX ORDER — LABETALOL HYDROCHLORIDE 5 MG/ML
10 INJECTION, SOLUTION INTRAVENOUS
Status: DISCONTINUED | OUTPATIENT
Start: 2020-06-26 | End: 2020-06-28

## 2020-06-26 RX ORDER — TRAMADOL HYDROCHLORIDE 50 MG/1
50 TABLET ORAL EVERY 6 HOURS PRN
Status: DISCONTINUED | OUTPATIENT
Start: 2020-06-26 | End: 2020-06-26

## 2020-06-26 RX ORDER — LORAZEPAM 1 MG/1
2 TABLET ORAL
Status: DISCONTINUED | OUTPATIENT
Start: 2020-06-26 | End: 2020-06-28

## 2020-06-26 RX ORDER — LORAZEPAM 2 MG/ML
2 INJECTION INTRAMUSCULAR
Status: DISCONTINUED | OUTPATIENT
Start: 2020-06-26 | End: 2020-06-28

## 2020-06-26 RX ORDER — LORAZEPAM 2 MG/ML
0.5 INJECTION INTRAMUSCULAR
Status: DISCONTINUED | OUTPATIENT
Start: 2020-06-26 | End: 2020-06-28

## 2020-06-26 RX ORDER — POTASSIUM CHLORIDE 1.5 G/1.77G
40 POWDER, FOR SOLUTION ORAL AS NEEDED
Status: DISCONTINUED | OUTPATIENT
Start: 2020-06-26 | End: 2020-07-02 | Stop reason: HOSPADM

## 2020-06-26 RX ORDER — ONDANSETRON 4 MG/1
4 TABLET, FILM COATED ORAL EVERY 6 HOURS PRN
Status: DISCONTINUED | OUTPATIENT
Start: 2020-06-26 | End: 2020-06-28

## 2020-06-26 RX ORDER — POTASSIUM CHLORIDE 750 MG/1
40 CAPSULE, EXTENDED RELEASE ORAL AS NEEDED
Status: DISCONTINUED | OUTPATIENT
Start: 2020-06-26 | End: 2020-07-02 | Stop reason: HOSPADM

## 2020-06-26 RX ORDER — LORAZEPAM 0.5 MG/1
0.5 TABLET ORAL
Status: DISCONTINUED | OUTPATIENT
Start: 2020-06-26 | End: 2020-06-28

## 2020-06-26 RX ORDER — ONDANSETRON 2 MG/ML
4 INJECTION INTRAMUSCULAR; INTRAVENOUS EVERY 6 HOURS PRN
Status: DISCONTINUED | OUTPATIENT
Start: 2020-06-26 | End: 2020-06-28

## 2020-06-26 RX ORDER — LORAZEPAM 2 MG/ML
4 INJECTION INTRAMUSCULAR
Status: DISCONTINUED | OUTPATIENT
Start: 2020-06-26 | End: 2020-06-29

## 2020-06-26 RX ORDER — LIDOCAINE HYDROCHLORIDE 10 MG/ML
20 INJECTION, SOLUTION INFILTRATION; PERINEURAL ONCE
Status: COMPLETED | OUTPATIENT
Start: 2020-06-26 | End: 2020-06-26

## 2020-06-26 RX ORDER — FERROUS SULFATE 325(65) MG
325 TABLET ORAL
Status: DISCONTINUED | OUTPATIENT
Start: 2020-06-26 | End: 2020-07-02 | Stop reason: HOSPADM

## 2020-06-26 RX ORDER — DEXMEDETOMIDINE HYDROCHLORIDE 4 UG/ML
.2-1.5 INJECTION INTRAVENOUS
Status: DISCONTINUED | OUTPATIENT
Start: 2020-06-26 | End: 2020-06-26

## 2020-06-26 RX ADMIN — LORAZEPAM 2 MG: 2 INJECTION INTRAMUSCULAR; INTRAVENOUS at 10:52

## 2020-06-26 RX ADMIN — ACETAMINOPHEN 650 MG: 325 TABLET, FILM COATED ORAL at 10:22

## 2020-06-26 RX ADMIN — AMLODIPINE BESYLATE 10 MG: 10 TABLET ORAL at 08:54

## 2020-06-26 RX ADMIN — LORAZEPAM 1 MG: 2 INJECTION INTRAMUSCULAR; INTRAVENOUS at 09:05

## 2020-06-26 RX ADMIN — LORAZEPAM 2 MG: 2 INJECTION INTRAMUSCULAR; INTRAVENOUS at 20:15

## 2020-06-26 RX ADMIN — LORAZEPAM 1 MG: 2 INJECTION INTRAMUSCULAR; INTRAVENOUS at 14:24

## 2020-06-26 RX ADMIN — MAGNESIUM SULFATE 3 G: 1 INJECTION INTRAVENOUS at 13:11

## 2020-06-26 RX ADMIN — LORAZEPAM 2 MG: 2 INJECTION INTRAMUSCULAR; INTRAVENOUS at 16:30

## 2020-06-26 RX ADMIN — SODIUM CHLORIDE 100 ML/HR: 9 INJECTION, SOLUTION INTRAVENOUS at 03:19

## 2020-06-26 RX ADMIN — LORAZEPAM 2 MG: 2 INJECTION INTRAMUSCULAR; INTRAVENOUS at 17:50

## 2020-06-26 RX ADMIN — LORAZEPAM 4 MG: 2 INJECTION INTRAMUSCULAR; INTRAVENOUS at 13:04

## 2020-06-26 RX ADMIN — DEXMEDETOMIDINE HYDROCHLORIDE 0.2 MCG/KG/HR: 4 INJECTION INTRAVENOUS at 13:51

## 2020-06-26 RX ADMIN — LORAZEPAM 4 MG: 2 INJECTION INTRAMUSCULAR; INTRAVENOUS at 23:08

## 2020-06-26 RX ADMIN — LORAZEPAM 1 MG: 2 INJECTION INTRAMUSCULAR; INTRAVENOUS at 14:06

## 2020-06-26 RX ADMIN — LORAZEPAM 1 MG: 2 INJECTION INTRAMUSCULAR; INTRAVENOUS at 13:40

## 2020-06-26 RX ADMIN — POTASSIUM CHLORIDE AND SODIUM CHLORIDE 100 ML/HR: 900; 150 INJECTION, SOLUTION INTRAVENOUS at 11:48

## 2020-06-26 RX ADMIN — TRAMADOL HYDROCHLORIDE 50 MG: 50 TABLET, FILM COATED ORAL at 07:44

## 2020-06-26 RX ADMIN — POTASSIUM CHLORIDE AND SODIUM CHLORIDE 100 ML/HR: 900; 150 INJECTION, SOLUTION INTRAVENOUS at 14:29

## 2020-06-26 RX ADMIN — ESCITALOPRAM 20 MG: 20 TABLET, FILM COATED ORAL at 08:54

## 2020-06-26 RX ADMIN — ASPIRIN 81 MG: 81 TABLET, COATED ORAL at 08:54

## 2020-06-26 RX ADMIN — LORAZEPAM 1 MG: 1 TABLET ORAL at 06:41

## 2020-06-26 RX ADMIN — LORAZEPAM 2 MG: 2 INJECTION INTRAMUSCULAR; INTRAVENOUS at 19:10

## 2020-06-26 RX ADMIN — Medication 100 MG: at 08:54

## 2020-06-26 RX ADMIN — LORAZEPAM 4 MG: 2 INJECTION INTRAMUSCULAR; INTRAVENOUS at 21:00

## 2020-06-26 RX ADMIN — LORAZEPAM 1 MG: 2 INJECTION INTRAMUSCULAR; INTRAVENOUS at 13:21

## 2020-06-26 RX ADMIN — LORAZEPAM 4 MG: 2 INJECTION INTRAMUSCULAR; INTRAVENOUS at 22:02

## 2020-06-26 RX ADMIN — LORAZEPAM 1 MG: 1 TABLET ORAL at 00:32

## 2020-06-26 RX ADMIN — LORAZEPAM 2 MG: 2 INJECTION INTRAMUSCULAR; INTRAVENOUS at 13:47

## 2020-06-26 RX ADMIN — LIDOCAINE HYDROCHLORIDE 20 ML: 10 INJECTION, SOLUTION INFILTRATION; PERINEURAL at 20:08

## 2020-06-26 RX ADMIN — LORAZEPAM 1 MG: 2 INJECTION INTRAMUSCULAR; INTRAVENOUS at 15:15

## 2020-06-26 RX ADMIN — LORAZEPAM 1 MG: 2 INJECTION INTRAMUSCULAR; INTRAVENOUS at 10:23

## 2020-06-26 RX ADMIN — LORAZEPAM 1 MG: 1 TABLET ORAL at 04:52

## 2020-06-26 RX ADMIN — LORAZEPAM 1 MG: 2 INJECTION INTRAMUSCULAR; INTRAVENOUS at 14:52

## 2020-06-26 RX ADMIN — LORAZEPAM 0.5 MG: 0.5 TABLET ORAL at 03:19

## 2020-06-26 RX ADMIN — FOLIC ACID 1 MG: 1 TABLET ORAL at 08:54

## 2020-06-26 RX ADMIN — POTASSIUM CHLORIDE 40 MEQ: 10 CAPSULE, COATED, EXTENDED RELEASE ORAL at 10:22

## 2020-06-26 RX ADMIN — Medication 1 TABLET: at 08:54

## 2020-06-26 RX ADMIN — LORAZEPAM 1 MG: 1 TABLET ORAL at 07:44

## 2020-06-26 RX ADMIN — LORAZEPAM 4 MG: 2 INJECTION INTRAMUSCULAR; INTRAVENOUS at 11:49

## 2020-06-27 LAB
ANION GAP SERPL CALCULATED.3IONS-SCNC: 18.9 MMOL/L (ref 5–15)
BASOPHILS # BLD AUTO: 0.02 10*3/MM3 (ref 0–0.2)
BASOPHILS NFR BLD AUTO: 0.2 % (ref 0–1.5)
BUN BLD-MCNC: 17 MG/DL (ref 8–23)
BUN/CREAT SERPL: 15.2 (ref 7–25)
CALCIUM SPEC-SCNC: 7.9 MG/DL (ref 8.6–10.5)
CHLORIDE SERPL-SCNC: 99 MMOL/L (ref 98–107)
CO2 SERPL-SCNC: 15.1 MMOL/L (ref 22–29)
CREAT BLD-MCNC: 1.12 MG/DL (ref 0.76–1.27)
D-LACTATE SERPL-SCNC: 0.7 MMOL/L (ref 0.5–2)
DEPRECATED RDW RBC AUTO: 43.9 FL (ref 37–54)
EOSINOPHIL # BLD AUTO: 0 10*3/MM3 (ref 0–0.4)
EOSINOPHIL NFR BLD AUTO: 0 % (ref 0.3–6.2)
ERYTHROCYTE [DISTWIDTH] IN BLOOD BY AUTOMATED COUNT: 12.7 % (ref 12.3–15.4)
GFR SERPL CREATININE-BSD FRML MDRD: 66 ML/MIN/1.73
GLUCOSE BLD-MCNC: 69 MG/DL (ref 65–99)
GLUCOSE BLDC GLUCOMTR-MCNC: 84 MG/DL (ref 70–130)
HCT VFR BLD AUTO: 28 % (ref 37.5–51)
HGB BLD-MCNC: 9.7 G/DL (ref 13–17.7)
IMM GRANULOCYTES # BLD AUTO: 0.07 10*3/MM3 (ref 0–0.05)
IMM GRANULOCYTES NFR BLD AUTO: 0.6 % (ref 0–0.5)
LYMPHOCYTES # BLD AUTO: 0.69 10*3/MM3 (ref 0.7–3.1)
LYMPHOCYTES NFR BLD AUTO: 5.7 % (ref 19.6–45.3)
MAGNESIUM SERPL-MCNC: 1.5 MG/DL (ref 1.6–2.4)
MCH RBC QN AUTO: 32.9 PG (ref 26.6–33)
MCHC RBC AUTO-ENTMCNC: 34.6 G/DL (ref 31.5–35.7)
MCV RBC AUTO: 94.9 FL (ref 79–97)
MONOCYTES # BLD AUTO: 1.58 10*3/MM3 (ref 0.1–0.9)
MONOCYTES NFR BLD AUTO: 13.1 % (ref 5–12)
NEUTROPHILS # BLD AUTO: 9.7 10*3/MM3 (ref 1.7–7)
NEUTROPHILS NFR BLD AUTO: 80.4 % (ref 42.7–76)
NRBC BLD AUTO-RTO: 0 /100 WBC (ref 0–0.2)
PHOSPHATE SERPL-MCNC: 3.3 MG/DL (ref 2.5–4.5)
PLATELET # BLD AUTO: 168 10*3/MM3 (ref 140–450)
PMV BLD AUTO: 10 FL (ref 6–12)
POTASSIUM BLD-SCNC: 3.7 MMOL/L (ref 3.5–5.2)
RBC # BLD AUTO: 2.95 10*6/MM3 (ref 4.14–5.8)
SODIUM BLD-SCNC: 133 MMOL/L (ref 136–145)
WBC NRBC COR # BLD: 12.06 10*3/MM3 (ref 3.4–10.8)

## 2020-06-27 PROCEDURE — 84100 ASSAY OF PHOSPHORUS: CPT | Performed by: INTERNAL MEDICINE

## 2020-06-27 PROCEDURE — 97110 THERAPEUTIC EXERCISES: CPT

## 2020-06-27 PROCEDURE — 25810000003 SODIUM CHLORIDE 0.9 % WITH KCL 20 MEQ 20-0.9 MEQ/L-% SOLUTION: Performed by: NURSE PRACTITIONER

## 2020-06-27 PROCEDURE — 25010000002 LORAZEPAM PER 2 MG: Performed by: INTERNAL MEDICINE

## 2020-06-27 PROCEDURE — 25010000002 ENOXAPARIN PER 10 MG: Performed by: INTERNAL MEDICINE

## 2020-06-27 PROCEDURE — 94660 CPAP INITIATION&MGMT: CPT

## 2020-06-27 PROCEDURE — 85025 COMPLETE CBC W/AUTO DIFF WBC: CPT | Performed by: NURSE PRACTITIONER

## 2020-06-27 PROCEDURE — 94799 UNLISTED PULMONARY SVC/PX: CPT

## 2020-06-27 PROCEDURE — 83735 ASSAY OF MAGNESIUM: CPT | Performed by: INTERNAL MEDICINE

## 2020-06-27 PROCEDURE — 83605 ASSAY OF LACTIC ACID: CPT | Performed by: INTERNAL MEDICINE

## 2020-06-27 PROCEDURE — 25010000002 MAGNESIUM SULFATE 2 GM/50ML SOLUTION: Performed by: INTERNAL MEDICINE

## 2020-06-27 PROCEDURE — 25010000002 HYDRALAZINE PER 20 MG: Performed by: NURSE PRACTITIONER

## 2020-06-27 PROCEDURE — 80048 BASIC METABOLIC PNL TOTAL CA: CPT | Performed by: NURSE PRACTITIONER

## 2020-06-27 PROCEDURE — 82962 GLUCOSE BLOOD TEST: CPT

## 2020-06-27 PROCEDURE — 97163 PT EVAL HIGH COMPLEX 45 MIN: CPT

## 2020-06-27 PROCEDURE — 94640 AIRWAY INHALATION TREATMENT: CPT

## 2020-06-27 RX ORDER — ARFORMOTEROL TARTRATE 15 UG/2ML
15 SOLUTION RESPIRATORY (INHALATION)
Status: DISCONTINUED | OUTPATIENT
Start: 2020-06-27 | End: 2020-07-02 | Stop reason: HOSPADM

## 2020-06-27 RX ORDER — LORAZEPAM 2 MG/ML
2 INJECTION INTRAMUSCULAR ONCE
Status: COMPLETED | OUTPATIENT
Start: 2020-06-27 | End: 2020-06-27

## 2020-06-27 RX ORDER — PANTOPRAZOLE SODIUM 40 MG/1
40 TABLET, DELAYED RELEASE ORAL
Status: DISCONTINUED | OUTPATIENT
Start: 2020-06-28 | End: 2020-07-02 | Stop reason: HOSPADM

## 2020-06-27 RX ORDER — MAGNESIUM SULFATE HEPTAHYDRATE 40 MG/ML
2 INJECTION, SOLUTION INTRAVENOUS AS NEEDED
Status: DISCONTINUED | OUTPATIENT
Start: 2020-06-27 | End: 2020-07-02 | Stop reason: HOSPADM

## 2020-06-27 RX ORDER — CHLORDIAZEPOXIDE HYDROCHLORIDE 25 MG/1
25 CAPSULE, GELATIN COATED ORAL EVERY 6 HOURS SCHEDULED
Status: DISCONTINUED | OUTPATIENT
Start: 2020-06-27 | End: 2020-06-29

## 2020-06-27 RX ORDER — MAGNESIUM SULFATE 1 G/100ML
1 INJECTION INTRAVENOUS AS NEEDED
Status: DISCONTINUED | OUTPATIENT
Start: 2020-06-27 | End: 2020-07-02 | Stop reason: HOSPADM

## 2020-06-27 RX ORDER — HYDROCODONE BITARTRATE AND ACETAMINOPHEN 5; 325 MG/1; MG/1
1 TABLET ORAL EVERY 4 HOURS PRN
Status: DISCONTINUED | OUTPATIENT
Start: 2020-06-27 | End: 2020-07-02 | Stop reason: HOSPADM

## 2020-06-27 RX ORDER — NICOTINE 21 MG/24HR
1 PATCH, TRANSDERMAL 24 HOURS TRANSDERMAL
Status: DISCONTINUED | OUTPATIENT
Start: 2020-06-27 | End: 2020-07-02 | Stop reason: HOSPADM

## 2020-06-27 RX ADMIN — FERROUS SULFATE TAB 325 MG (65 MG ELEMENTAL FE) 325 MG: 325 (65 FE) TAB at 11:41

## 2020-06-27 RX ADMIN — POTASSIUM CHLORIDE AND SODIUM CHLORIDE 100 ML/HR: 900; 150 INJECTION, SOLUTION INTRAVENOUS at 15:43

## 2020-06-27 RX ADMIN — AMLODIPINE BESYLATE 10 MG: 10 TABLET ORAL at 11:41

## 2020-06-27 RX ADMIN — ARFORMOTEROL TARTRATE 15 MCG: 15 SOLUTION RESPIRATORY (INHALATION) at 20:25

## 2020-06-27 RX ADMIN — POTASSIUM CHLORIDE AND SODIUM CHLORIDE 100 ML/HR: 900; 150 INJECTION, SOLUTION INTRAVENOUS at 02:54

## 2020-06-27 RX ADMIN — LORAZEPAM 2 MG: 2 INJECTION INTRAMUSCULAR; INTRAVENOUS at 06:13

## 2020-06-27 RX ADMIN — ASPIRIN 81 MG: 81 TABLET, COATED ORAL at 11:41

## 2020-06-27 RX ADMIN — CHLORDIAZEPOXIDE HYDROCHLORIDE 25 MG: 25 CAPSULE ORAL at 13:00

## 2020-06-27 RX ADMIN — CHLORDIAZEPOXIDE HYDROCHLORIDE 25 MG: 25 CAPSULE ORAL at 17:56

## 2020-06-27 RX ADMIN — LORAZEPAM 4 MG: 2 INJECTION INTRAMUSCULAR; INTRAVENOUS at 02:12

## 2020-06-27 RX ADMIN — LORAZEPAM 1 MG: 2 INJECTION INTRAMUSCULAR; INTRAVENOUS at 16:26

## 2020-06-27 RX ADMIN — LORAZEPAM 4 MG: 2 INJECTION INTRAMUSCULAR; INTRAVENOUS at 00:14

## 2020-06-27 RX ADMIN — HYDROCODONE BITARTRATE AND ACETAMINOPHEN 1 TABLET: 5; 325 TABLET ORAL at 19:59

## 2020-06-27 RX ADMIN — LORAZEPAM 0.5 MG: 2 INJECTION INTRAMUSCULAR; INTRAVENOUS at 19:59

## 2020-06-27 RX ADMIN — HYDRALAZINE HYDROCHLORIDE 10 MG: 20 INJECTION INTRAMUSCULAR; INTRAVENOUS at 02:12

## 2020-06-27 RX ADMIN — Medication 100 MG: at 11:41

## 2020-06-27 RX ADMIN — LORAZEPAM 2 MG: 2 INJECTION INTRAMUSCULAR; INTRAVENOUS at 02:12

## 2020-06-27 RX ADMIN — LORAZEPAM 2 MG: 2 INJECTION INTRAMUSCULAR; INTRAVENOUS at 08:10

## 2020-06-27 RX ADMIN — ENOXAPARIN SODIUM 40 MG: 40 INJECTION SUBCUTANEOUS at 20:00

## 2020-06-27 RX ADMIN — Medication 1 TABLET: at 11:40

## 2020-06-27 RX ADMIN — LORAZEPAM 4 MG: 2 INJECTION INTRAMUSCULAR; INTRAVENOUS at 01:18

## 2020-06-27 RX ADMIN — LORAZEPAM 1 MG: 2 INJECTION INTRAMUSCULAR; INTRAVENOUS at 13:07

## 2020-06-27 RX ADMIN — ACETAMINOPHEN 650 MG: 325 TABLET, FILM COATED ORAL at 16:21

## 2020-06-27 RX ADMIN — LORAZEPAM 1 MG: 2 INJECTION INTRAMUSCULAR; INTRAVENOUS at 04:43

## 2020-06-27 RX ADMIN — LORAZEPAM 4 MG: 2 INJECTION INTRAMUSCULAR; INTRAVENOUS at 03:19

## 2020-06-27 RX ADMIN — ESCITALOPRAM 20 MG: 20 TABLET, FILM COATED ORAL at 11:40

## 2020-06-27 RX ADMIN — FOLIC ACID 1 MG: 1 TABLET ORAL at 11:41

## 2020-06-27 RX ADMIN — NICOTINE 1 PATCH: 21 PATCH, EXTENDED RELEASE TRANSDERMAL at 14:53

## 2020-06-27 RX ADMIN — MAGNESIUM SULFATE HEPTAHYDRATE 2 G: 40 INJECTION, SOLUTION INTRAVENOUS at 12:54

## 2020-06-28 LAB
ALBUMIN SERPL-MCNC: 3.1 G/DL (ref 3.5–5.2)
ALBUMIN/GLOB SERPL: 0.9 G/DL
ALP SERPL-CCNC: 70 U/L (ref 39–117)
ALT SERPL W P-5'-P-CCNC: 39 U/L (ref 1–41)
ANION GAP SERPL CALCULATED.3IONS-SCNC: 15.2 MMOL/L (ref 5–15)
ARTERIAL PATENCY WRIST A: POSITIVE
AST SERPL-CCNC: 89 U/L (ref 1–40)
ATMOSPHERIC PRESS: 747.4 MMHG
BASE EXCESS BLDA CALC-SCNC: -3.8 MMOL/L (ref 0–2)
BDY SITE: ABNORMAL
BILIRUB SERPL-MCNC: 0.7 MG/DL (ref 0.2–1.2)
BUN BLD-MCNC: 18 MG/DL (ref 8–23)
BUN/CREAT SERPL: 22.5 (ref 7–25)
CALCIUM SPEC-SCNC: 8.6 MG/DL (ref 8.6–10.5)
CHLORIDE SERPL-SCNC: 106 MMOL/L (ref 98–107)
CO2 SERPL-SCNC: 16.8 MMOL/L (ref 22–29)
CREAT BLD-MCNC: 0.8 MG/DL (ref 0.76–1.27)
DEPRECATED RDW RBC AUTO: 43.8 FL (ref 37–54)
ERYTHROCYTE [DISTWIDTH] IN BLOOD BY AUTOMATED COUNT: 12.7 % (ref 12.3–15.4)
GFR SERPL CREATININE-BSD FRML MDRD: 97 ML/MIN/1.73
GLOBULIN UR ELPH-MCNC: 3.6 GM/DL
GLUCOSE BLD-MCNC: 88 MG/DL (ref 65–99)
GLUCOSE BLDC GLUCOMTR-MCNC: 118 MG/DL (ref 70–130)
HCO3 BLDA-SCNC: 20.1 MMOL/L (ref 22–28)
HCT VFR BLD AUTO: 27.1 % (ref 37.5–51)
HGB BLD-MCNC: 9.3 G/DL (ref 13–17.7)
MAGNESIUM SERPL-MCNC: 1.9 MG/DL (ref 1.6–2.4)
MCH RBC QN AUTO: 32.7 PG (ref 26.6–33)
MCHC RBC AUTO-ENTMCNC: 34.3 G/DL (ref 31.5–35.7)
MCV RBC AUTO: 95.4 FL (ref 79–97)
MODALITY: ABNORMAL
PCO2 BLDA: 31 MM HG (ref 35–45)
PH BLDA: 7.42 PH UNITS (ref 7.35–7.45)
PHOSPHATE SERPL-MCNC: 2.9 MG/DL (ref 2.5–4.5)
PLATELET # BLD AUTO: 197 10*3/MM3 (ref 140–450)
PMV BLD AUTO: 9.9 FL (ref 6–12)
PO2 BLDA: 89 MM HG (ref 80–100)
POTASSIUM BLD-SCNC: 3.8 MMOL/L (ref 3.5–5.2)
PROT SERPL-MCNC: 6.7 G/DL (ref 6–8.5)
RBC # BLD AUTO: 2.84 10*6/MM3 (ref 4.14–5.8)
SAO2 % BLDCOA: 97.2 % (ref 92–99)
SODIUM BLD-SCNC: 138 MMOL/L (ref 136–145)
TOTAL RATE: 20 BREATHS/MINUTE
WBC NRBC COR # BLD: 8.49 10*3/MM3 (ref 3.4–10.8)

## 2020-06-28 PROCEDURE — 80053 COMPREHEN METABOLIC PANEL: CPT | Performed by: INTERNAL MEDICINE

## 2020-06-28 PROCEDURE — 82962 GLUCOSE BLOOD TEST: CPT

## 2020-06-28 PROCEDURE — 25810000003 SODIUM CHLORIDE 0.9 % WITH KCL 20 MEQ 20-0.9 MEQ/L-% SOLUTION: Performed by: NURSE PRACTITIONER

## 2020-06-28 PROCEDURE — 82803 BLOOD GASES ANY COMBINATION: CPT

## 2020-06-28 PROCEDURE — 84100 ASSAY OF PHOSPHORUS: CPT | Performed by: INTERNAL MEDICINE

## 2020-06-28 PROCEDURE — 85027 COMPLETE CBC AUTOMATED: CPT | Performed by: INTERNAL MEDICINE

## 2020-06-28 PROCEDURE — 94799 UNLISTED PULMONARY SVC/PX: CPT

## 2020-06-28 PROCEDURE — 36600 WITHDRAWAL OF ARTERIAL BLOOD: CPT

## 2020-06-28 PROCEDURE — 25010000002 ENOXAPARIN PER 10 MG: Performed by: INTERNAL MEDICINE

## 2020-06-28 PROCEDURE — 83735 ASSAY OF MAGNESIUM: CPT | Performed by: INTERNAL MEDICINE

## 2020-06-28 PROCEDURE — 97110 THERAPEUTIC EXERCISES: CPT

## 2020-06-28 RX ADMIN — FOLIC ACID 1 MG: 1 TABLET ORAL at 08:06

## 2020-06-28 RX ADMIN — POTASSIUM CHLORIDE AND SODIUM CHLORIDE 100 ML/HR: 900; 150 INJECTION, SOLUTION INTRAVENOUS at 22:05

## 2020-06-28 RX ADMIN — CHLORDIAZEPOXIDE HYDROCHLORIDE 25 MG: 25 CAPSULE ORAL at 05:25

## 2020-06-28 RX ADMIN — PANTOPRAZOLE SODIUM 40 MG: 40 TABLET, DELAYED RELEASE ORAL at 05:25

## 2020-06-28 RX ADMIN — HYDROCODONE BITARTRATE AND ACETAMINOPHEN 1 TABLET: 5; 325 TABLET ORAL at 20:02

## 2020-06-28 RX ADMIN — CHLORDIAZEPOXIDE HYDROCHLORIDE 25 MG: 25 CAPSULE ORAL at 13:01

## 2020-06-28 RX ADMIN — CHLORDIAZEPOXIDE HYDROCHLORIDE 25 MG: 25 CAPSULE ORAL at 00:01

## 2020-06-28 RX ADMIN — AMLODIPINE BESYLATE 10 MG: 10 TABLET ORAL at 08:06

## 2020-06-28 RX ADMIN — Medication 100 MG: at 08:06

## 2020-06-28 RX ADMIN — POTASSIUM CHLORIDE AND SODIUM CHLORIDE 100 ML/HR: 900; 150 INJECTION, SOLUTION INTRAVENOUS at 01:20

## 2020-06-28 RX ADMIN — Medication 1 TABLET: at 08:06

## 2020-06-28 RX ADMIN — ARFORMOTEROL TARTRATE 15 MCG: 15 SOLUTION RESPIRATORY (INHALATION) at 20:23

## 2020-06-28 RX ADMIN — HYDROCODONE BITARTRATE AND ACETAMINOPHEN 1 TABLET: 5; 325 TABLET ORAL at 00:01

## 2020-06-28 RX ADMIN — ESCITALOPRAM 20 MG: 20 TABLET, FILM COATED ORAL at 08:06

## 2020-06-28 RX ADMIN — LORAZEPAM 1 MG: 1 TABLET ORAL at 00:01

## 2020-06-28 RX ADMIN — CHLORDIAZEPOXIDE HYDROCHLORIDE 25 MG: 25 CAPSULE ORAL at 18:57

## 2020-06-28 RX ADMIN — POTASSIUM CHLORIDE AND SODIUM CHLORIDE 100 ML/HR: 900; 150 INJECTION, SOLUTION INTRAVENOUS at 11:31

## 2020-06-28 RX ADMIN — HYDROCODONE BITARTRATE AND ACETAMINOPHEN 1 TABLET: 5; 325 TABLET ORAL at 04:05

## 2020-06-28 RX ADMIN — ASPIRIN 81 MG: 81 TABLET, COATED ORAL at 08:06

## 2020-06-28 RX ADMIN — FERROUS SULFATE TAB 325 MG (65 MG ELEMENTAL FE) 325 MG: 325 (65 FE) TAB at 08:06

## 2020-06-28 RX ADMIN — CHLORDIAZEPOXIDE HYDROCHLORIDE 25 MG: 25 CAPSULE ORAL at 23:58

## 2020-06-28 RX ADMIN — ARFORMOTEROL TARTRATE 15 MCG: 15 SOLUTION RESPIRATORY (INHALATION) at 08:29

## 2020-06-28 RX ADMIN — NICOTINE 1 PATCH: 21 PATCH, EXTENDED RELEASE TRANSDERMAL at 08:09

## 2020-06-28 RX ADMIN — ENOXAPARIN SODIUM 40 MG: 40 INJECTION SUBCUTANEOUS at 20:02

## 2020-06-29 PROBLEM — E87.6 HYPOKALEMIA: Status: ACTIVE | Noted: 2020-06-29

## 2020-06-29 PROBLEM — M17.12 OSTEOARTHRITIS OF LEFT KNEE: Chronic | Status: ACTIVE | Noted: 2020-06-29

## 2020-06-29 PROBLEM — E83.42 HYPOMAGNESEMIA: Status: ACTIVE | Noted: 2020-06-29

## 2020-06-29 PROBLEM — M25.462 EFFUSION OF LEFT KNEE: Status: ACTIVE | Noted: 2020-06-29

## 2020-06-29 LAB
ANION GAP SERPL CALCULATED.3IONS-SCNC: 9.1 MMOL/L (ref 5–15)
BUN BLD-MCNC: 10 MG/DL (ref 8–23)
BUN/CREAT SERPL: 17.9 (ref 7–25)
CALCIUM SPEC-SCNC: 8.5 MG/DL (ref 8.6–10.5)
CHLORIDE SERPL-SCNC: 103 MMOL/L (ref 98–107)
CO2 SERPL-SCNC: 20.9 MMOL/L (ref 22–29)
CREAT BLD-MCNC: 0.56 MG/DL (ref 0.76–1.27)
DEPRECATED RDW RBC AUTO: 43.2 FL (ref 37–54)
ERYTHROCYTE [DISTWIDTH] IN BLOOD BY AUTOMATED COUNT: 12.4 % (ref 12.3–15.4)
GFR SERPL CREATININE-BSD FRML MDRD: 146 ML/MIN/1.73
GLUCOSE BLD-MCNC: 103 MG/DL (ref 65–99)
HCT VFR BLD AUTO: 26.2 % (ref 37.5–51)
HGB BLD-MCNC: 9 G/DL (ref 13–17.7)
MAGNESIUM SERPL-MCNC: 1.5 MG/DL (ref 1.6–2.4)
MCH RBC QN AUTO: 32.8 PG (ref 26.6–33)
MCHC RBC AUTO-ENTMCNC: 34.4 G/DL (ref 31.5–35.7)
MCV RBC AUTO: 95.6 FL (ref 79–97)
PHOSPHATE SERPL-MCNC: 3.3 MG/DL (ref 2.5–4.5)
PLATELET # BLD AUTO: 202 10*3/MM3 (ref 140–450)
PMV BLD AUTO: 9.9 FL (ref 6–12)
POTASSIUM BLD-SCNC: 3.5 MMOL/L (ref 3.5–5.2)
RBC # BLD AUTO: 2.74 10*6/MM3 (ref 4.14–5.8)
SODIUM BLD-SCNC: 133 MMOL/L (ref 136–145)
WBC NRBC COR # BLD: 6.37 10*3/MM3 (ref 3.4–10.8)

## 2020-06-29 PROCEDURE — 94799 UNLISTED PULMONARY SVC/PX: CPT

## 2020-06-29 PROCEDURE — 80048 BASIC METABOLIC PNL TOTAL CA: CPT | Performed by: INTERNAL MEDICINE

## 2020-06-29 PROCEDURE — 25010000002 ENOXAPARIN PER 10 MG: Performed by: INTERNAL MEDICINE

## 2020-06-29 PROCEDURE — 83735 ASSAY OF MAGNESIUM: CPT | Performed by: INTERNAL MEDICINE

## 2020-06-29 PROCEDURE — 97110 THERAPEUTIC EXERCISES: CPT

## 2020-06-29 PROCEDURE — 85027 COMPLETE CBC AUTOMATED: CPT | Performed by: INTERNAL MEDICINE

## 2020-06-29 PROCEDURE — 25810000003 SODIUM CHLORIDE 0.9 % WITH KCL 20 MEQ 20-0.9 MEQ/L-% SOLUTION: Performed by: INTERNAL MEDICINE

## 2020-06-29 PROCEDURE — 84100 ASSAY OF PHOSPHORUS: CPT | Performed by: INTERNAL MEDICINE

## 2020-06-29 PROCEDURE — 25010000002 MAGNESIUM SULFATE 2 GM/50ML SOLUTION: Performed by: INTERNAL MEDICINE

## 2020-06-29 RX ORDER — CHLORDIAZEPOXIDE HYDROCHLORIDE 25 MG/1
25 CAPSULE, GELATIN COATED ORAL EVERY 8 HOURS SCHEDULED
Status: DISCONTINUED | OUTPATIENT
Start: 2020-06-29 | End: 2020-06-30

## 2020-06-29 RX ORDER — LORAZEPAM 2 MG/ML
1 INJECTION INTRAMUSCULAR
Status: DISCONTINUED | OUTPATIENT
Start: 2020-06-29 | End: 2020-07-02 | Stop reason: HOSPADM

## 2020-06-29 RX ADMIN — FOLIC ACID 1 MG: 1 TABLET ORAL at 08:08

## 2020-06-29 RX ADMIN — ESCITALOPRAM 20 MG: 20 TABLET, FILM COATED ORAL at 08:05

## 2020-06-29 RX ADMIN — HYDROCODONE BITARTRATE AND ACETAMINOPHEN 1 TABLET: 5; 325 TABLET ORAL at 00:08

## 2020-06-29 RX ADMIN — CHLORDIAZEPOXIDE HYDROCHLORIDE 25 MG: 25 CAPSULE ORAL at 06:49

## 2020-06-29 RX ADMIN — FERROUS SULFATE TAB 325 MG (65 MG ELEMENTAL FE) 325 MG: 325 (65 FE) TAB at 08:08

## 2020-06-29 RX ADMIN — PANTOPRAZOLE SODIUM 40 MG: 40 TABLET, DELAYED RELEASE ORAL at 06:49

## 2020-06-29 RX ADMIN — HYDROCODONE BITARTRATE AND ACETAMINOPHEN 1 TABLET: 5; 325 TABLET ORAL at 21:19

## 2020-06-29 RX ADMIN — CHLORDIAZEPOXIDE HYDROCHLORIDE 25 MG: 25 CAPSULE ORAL at 11:15

## 2020-06-29 RX ADMIN — AMLODIPINE BESYLATE 10 MG: 10 TABLET ORAL at 08:05

## 2020-06-29 RX ADMIN — Medication 100 MG: at 08:08

## 2020-06-29 RX ADMIN — POTASSIUM CHLORIDE AND SODIUM CHLORIDE 100 ML/HR: 900; 150 INJECTION, SOLUTION INTRAVENOUS at 19:43

## 2020-06-29 RX ADMIN — POTASSIUM CHLORIDE AND SODIUM CHLORIDE 100 ML/HR: 900; 150 INJECTION, SOLUTION INTRAVENOUS at 10:01

## 2020-06-29 RX ADMIN — ARFORMOTEROL TARTRATE 15 MCG: 15 SOLUTION RESPIRATORY (INHALATION) at 08:25

## 2020-06-29 RX ADMIN — HYDROCODONE BITARTRATE AND ACETAMINOPHEN 1 TABLET: 5; 325 TABLET ORAL at 14:50

## 2020-06-29 RX ADMIN — CHLORDIAZEPOXIDE HYDROCHLORIDE 25 MG: 25 CAPSULE ORAL at 21:19

## 2020-06-29 RX ADMIN — ASPIRIN 81 MG: 81 TABLET, COATED ORAL at 08:05

## 2020-06-29 RX ADMIN — Medication 1 TABLET: at 08:05

## 2020-06-29 RX ADMIN — NICOTINE 1 PATCH: 21 PATCH, EXTENDED RELEASE TRANSDERMAL at 08:09

## 2020-06-29 RX ADMIN — POTASSIUM CHLORIDE 40 MEQ: 10 CAPSULE, COATED, EXTENDED RELEASE ORAL at 15:45

## 2020-06-29 RX ADMIN — ENOXAPARIN SODIUM 40 MG: 40 INJECTION SUBCUTANEOUS at 21:18

## 2020-06-29 RX ADMIN — HYDROCODONE BITARTRATE AND ACETAMINOPHEN 1 TABLET: 5; 325 TABLET ORAL at 08:06

## 2020-06-29 RX ADMIN — MAGNESIUM SULFATE HEPTAHYDRATE 2 G: 40 INJECTION, SOLUTION INTRAVENOUS at 15:35

## 2020-06-30 PROBLEM — D64.9 ANEMIA: Status: ACTIVE | Noted: 2020-06-30

## 2020-06-30 LAB
ANION GAP SERPL CALCULATED.3IONS-SCNC: 7.6 MMOL/L (ref 5–15)
BUN SERPL-MCNC: 10 MG/DL (ref 8–23)
BUN/CREAT SERPL: 14.9 (ref 7–25)
CALCIUM SPEC-SCNC: 8.3 MG/DL (ref 8.6–10.5)
CHLORIDE SERPL-SCNC: 107 MMOL/L (ref 98–107)
CO2 SERPL-SCNC: 21.4 MMOL/L (ref 22–29)
CREAT SERPL-MCNC: 0.67 MG/DL (ref 0.76–1.27)
DEPRECATED RDW RBC AUTO: 42.7 FL (ref 37–54)
ERYTHROCYTE [DISTWIDTH] IN BLOOD BY AUTOMATED COUNT: 12.3 % (ref 12.3–15.4)
GFR SERPL CREATININE-BSD FRML MDRD: 119 ML/MIN/1.73
GLUCOSE SERPL-MCNC: 103 MG/DL (ref 65–99)
HCT VFR BLD AUTO: 25.5 % (ref 37.5–51)
HGB BLD-MCNC: 8.7 G/DL (ref 13–17.7)
MAGNESIUM SERPL-MCNC: 1.7 MG/DL (ref 1.6–2.4)
MCH RBC QN AUTO: 32.5 PG (ref 26.6–33)
MCHC RBC AUTO-ENTMCNC: 34.1 G/DL (ref 31.5–35.7)
MCV RBC AUTO: 95.1 FL (ref 79–97)
PHOSPHATE SERPL-MCNC: 3.1 MG/DL (ref 2.5–4.5)
PLATELET # BLD AUTO: 227 10*3/MM3 (ref 140–450)
PMV BLD AUTO: 9.8 FL (ref 6–12)
POTASSIUM SERPL-SCNC: 3.9 MMOL/L (ref 3.5–5.2)
RBC # BLD AUTO: 2.68 10*6/MM3 (ref 4.14–5.8)
SODIUM SERPL-SCNC: 136 MMOL/L (ref 136–145)
WBC # BLD AUTO: 4.81 10*3/MM3 (ref 3.4–10.8)

## 2020-06-30 PROCEDURE — 97110 THERAPEUTIC EXERCISES: CPT

## 2020-06-30 PROCEDURE — 80048 BASIC METABOLIC PNL TOTAL CA: CPT | Performed by: INTERNAL MEDICINE

## 2020-06-30 PROCEDURE — 94640 AIRWAY INHALATION TREATMENT: CPT

## 2020-06-30 PROCEDURE — 84100 ASSAY OF PHOSPHORUS: CPT | Performed by: INTERNAL MEDICINE

## 2020-06-30 PROCEDURE — 25810000003 SODIUM CHLORIDE 0.9 % WITH KCL 20 MEQ 20-0.9 MEQ/L-% SOLUTION: Performed by: INTERNAL MEDICINE

## 2020-06-30 PROCEDURE — 94799 UNLISTED PULMONARY SVC/PX: CPT

## 2020-06-30 PROCEDURE — 83735 ASSAY OF MAGNESIUM: CPT | Performed by: INTERNAL MEDICINE

## 2020-06-30 PROCEDURE — 85027 COMPLETE CBC AUTOMATED: CPT | Performed by: INTERNAL MEDICINE

## 2020-06-30 RX ORDER — CHLORDIAZEPOXIDE HYDROCHLORIDE 10 MG/1
10 CAPSULE, GELATIN COATED ORAL EVERY 8 HOURS SCHEDULED
Status: DISCONTINUED | OUTPATIENT
Start: 2020-06-30 | End: 2020-07-01

## 2020-06-30 RX ADMIN — ESCITALOPRAM 20 MG: 20 TABLET, FILM COATED ORAL at 14:01

## 2020-06-30 RX ADMIN — CHLORDIAZEPOXIDE HYDROCHLORIDE 25 MG: 25 CAPSULE ORAL at 14:06

## 2020-06-30 RX ADMIN — POTASSIUM CHLORIDE AND SODIUM CHLORIDE 100 ML/HR: 900; 150 INJECTION, SOLUTION INTRAVENOUS at 14:08

## 2020-06-30 RX ADMIN — PANTOPRAZOLE SODIUM 40 MG: 40 TABLET, DELAYED RELEASE ORAL at 05:47

## 2020-06-30 RX ADMIN — HYDROCODONE BITARTRATE AND ACETAMINOPHEN 1 TABLET: 5; 325 TABLET ORAL at 14:06

## 2020-06-30 RX ADMIN — CHLORDIAZEPOXIDE HYDROCHLORIDE 10 MG: 10 CAPSULE ORAL at 21:00

## 2020-06-30 RX ADMIN — FERROUS SULFATE TAB 325 MG (65 MG ELEMENTAL FE) 325 MG: 325 (65 FE) TAB at 14:01

## 2020-06-30 RX ADMIN — HYDROCODONE BITARTRATE AND ACETAMINOPHEN 1 TABLET: 5; 325 TABLET ORAL at 20:59

## 2020-06-30 RX ADMIN — AMLODIPINE BESYLATE 10 MG: 10 TABLET ORAL at 14:01

## 2020-06-30 RX ADMIN — HYDROCODONE BITARTRATE AND ACETAMINOPHEN 1 TABLET: 5; 325 TABLET ORAL at 05:50

## 2020-06-30 RX ADMIN — ARFORMOTEROL TARTRATE 15 MCG: 15 SOLUTION RESPIRATORY (INHALATION) at 08:44

## 2020-06-30 RX ADMIN — ARFORMOTEROL TARTRATE 15 MCG: 15 SOLUTION RESPIRATORY (INHALATION) at 20:54

## 2020-06-30 RX ADMIN — ASPIRIN 81 MG: 81 TABLET, COATED ORAL at 09:42

## 2020-06-30 RX ADMIN — CHLORDIAZEPOXIDE HYDROCHLORIDE 25 MG: 25 CAPSULE ORAL at 05:49

## 2020-07-01 PROBLEM — D63.8 ANEMIA, CHRONIC DISEASE: Status: ACTIVE | Noted: 2020-07-01

## 2020-07-01 PROBLEM — E87.1 HYPONATREMIA: Status: ACTIVE | Noted: 2020-07-01

## 2020-07-01 PROBLEM — E55.9 VITAMIN D DEFICIENCY: Status: ACTIVE | Noted: 2020-07-01

## 2020-07-01 LAB
25(OH)D3 SERPL-MCNC: 16 NG/ML (ref 30–100)
ALBUMIN SERPL-MCNC: 3.1 G/DL (ref 3.5–5.2)
ALBUMIN/GLOB SERPL: 1 G/DL
ALP SERPL-CCNC: 63 U/L (ref 39–117)
ALT SERPL W P-5'-P-CCNC: 39 U/L (ref 1–41)
ANION GAP SERPL CALCULATED.3IONS-SCNC: 4.2 MMOL/L (ref 5–15)
AST SERPL-CCNC: 54 U/L (ref 1–40)
BACTERIA FLD CULT: NORMAL
BACTERIA SPEC AEROBE CULT: NORMAL
BACTERIA SPEC AEROBE CULT: NORMAL
BACTERIA SPEC ANAEROBE CULT: NORMAL
BASOPHILS # BLD AUTO: 0.03 10*3/MM3 (ref 0–0.2)
BASOPHILS NFR BLD AUTO: 0.7 % (ref 0–1.5)
BILIRUB SERPL-MCNC: 0.2 MG/DL (ref 0.2–1.2)
BUN SERPL-MCNC: 8 MG/DL (ref 8–23)
BUN/CREAT SERPL: 12.3 (ref 7–25)
CALCIUM SPEC-SCNC: 8.4 MG/DL (ref 8.6–10.5)
CHLORIDE SERPL-SCNC: 102 MMOL/L (ref 98–107)
CO2 SERPL-SCNC: 25.8 MMOL/L (ref 22–29)
CREAT SERPL-MCNC: 0.65 MG/DL (ref 0.76–1.27)
DEPRECATED RDW RBC AUTO: 42.2 FL (ref 37–54)
EOSINOPHIL # BLD AUTO: 0.16 10*3/MM3 (ref 0–0.4)
EOSINOPHIL NFR BLD AUTO: 3.7 % (ref 0.3–6.2)
ERYTHROCYTE [DISTWIDTH] IN BLOOD BY AUTOMATED COUNT: 12.4 % (ref 12.3–15.4)
FERRITIN SERPL-MCNC: 467 NG/ML (ref 30–400)
FOLATE SERPL-MCNC: 7.43 NG/ML (ref 4.78–24.2)
GFR SERPL CREATININE-BSD FRML MDRD: 123 ML/MIN/1.73
GLOBULIN UR ELPH-MCNC: 3 GM/DL
GLUCOSE SERPL-MCNC: 108 MG/DL (ref 65–99)
GRAM STN SPEC: NORMAL
GRAM STN SPEC: NORMAL
HCT VFR BLD AUTO: 25.5 % (ref 37.5–51)
HEMOCCULT STL QL: NEGATIVE
HGB BLD-MCNC: 8.7 G/DL (ref 13–17.7)
IMM GRANULOCYTES # BLD AUTO: 0.05 10*3/MM3 (ref 0–0.05)
IMM GRANULOCYTES NFR BLD AUTO: 1.2 % (ref 0–0.5)
IRON 24H UR-MRATE: 36 MCG/DL (ref 59–158)
IRON SATN MFR SERPL: 17 % (ref 20–50)
LYMPHOCYTES # BLD AUTO: 0.92 10*3/MM3 (ref 0.7–3.1)
LYMPHOCYTES NFR BLD AUTO: 21.3 % (ref 19.6–45.3)
MAGNESIUM SERPL-MCNC: 1.5 MG/DL (ref 1.6–2.4)
MCH RBC QN AUTO: 32.3 PG (ref 26.6–33)
MCHC RBC AUTO-ENTMCNC: 34.1 G/DL (ref 31.5–35.7)
MCV RBC AUTO: 94.8 FL (ref 79–97)
MONOCYTES # BLD AUTO: 0.8 10*3/MM3 (ref 0.1–0.9)
MONOCYTES NFR BLD AUTO: 18.6 % (ref 5–12)
NEUTROPHILS NFR BLD AUTO: 2.35 10*3/MM3 (ref 1.7–7)
NEUTROPHILS NFR BLD AUTO: 54.5 % (ref 42.7–76)
NRBC BLD AUTO-RTO: 0 /100 WBC (ref 0–0.2)
PLATELET # BLD AUTO: 293 10*3/MM3 (ref 140–450)
PMV BLD AUTO: 9.4 FL (ref 6–12)
POTASSIUM SERPL-SCNC: 3.6 MMOL/L (ref 3.5–5.2)
PROT SERPL-MCNC: 6.1 G/DL (ref 6–8.5)
RBC # BLD AUTO: 2.69 10*6/MM3 (ref 4.14–5.8)
SODIUM SERPL-SCNC: 132 MMOL/L (ref 136–145)
TIBC SERPL-MCNC: 209 MCG/DL (ref 298–536)
TRANSFERRIN SERPL-MCNC: 140 MG/DL (ref 200–360)
VIT B12 BLD-MCNC: 721 PG/ML (ref 211–946)
WBC # BLD AUTO: 4.31 10*3/MM3 (ref 3.4–10.8)

## 2020-07-01 PROCEDURE — 80053 COMPREHEN METABOLIC PANEL: CPT | Performed by: HOSPITALIST

## 2020-07-01 PROCEDURE — 97110 THERAPEUTIC EXERCISES: CPT

## 2020-07-01 PROCEDURE — 94799 UNLISTED PULMONARY SVC/PX: CPT

## 2020-07-01 PROCEDURE — 82728 ASSAY OF FERRITIN: CPT | Performed by: HOSPITALIST

## 2020-07-01 PROCEDURE — 82306 VITAMIN D 25 HYDROXY: CPT | Performed by: HOSPITALIST

## 2020-07-01 PROCEDURE — 83540 ASSAY OF IRON: CPT | Performed by: HOSPITALIST

## 2020-07-01 PROCEDURE — 85025 COMPLETE CBC W/AUTO DIFF WBC: CPT | Performed by: HOSPITALIST

## 2020-07-01 PROCEDURE — 82607 VITAMIN B-12: CPT | Performed by: HOSPITALIST

## 2020-07-01 PROCEDURE — 82272 OCCULT BLD FECES 1-3 TESTS: CPT | Performed by: HOSPITALIST

## 2020-07-01 PROCEDURE — 82746 ASSAY OF FOLIC ACID SERUM: CPT | Performed by: HOSPITALIST

## 2020-07-01 PROCEDURE — 94660 CPAP INITIATION&MGMT: CPT

## 2020-07-01 PROCEDURE — 25010000002 MAGNESIUM SULFATE 2 GM/50ML SOLUTION: Performed by: INTERNAL MEDICINE

## 2020-07-01 PROCEDURE — 83735 ASSAY OF MAGNESIUM: CPT | Performed by: HOSPITALIST

## 2020-07-01 PROCEDURE — 84466 ASSAY OF TRANSFERRIN: CPT | Performed by: HOSPITALIST

## 2020-07-01 RX ORDER — CHLORDIAZEPOXIDE HYDROCHLORIDE 5 MG/1
5 CAPSULE, GELATIN COATED ORAL EVERY 8 HOURS SCHEDULED
Status: DISCONTINUED | OUTPATIENT
Start: 2020-07-01 | End: 2020-07-02

## 2020-07-01 RX ORDER — LOSARTAN POTASSIUM 100 MG/1
100 TABLET ORAL
Status: DISCONTINUED | OUTPATIENT
Start: 2020-07-01 | End: 2020-07-02 | Stop reason: HOSPADM

## 2020-07-01 RX ORDER — ERGOCALCIFEROL 1.25 MG/1
50000 CAPSULE ORAL
Status: DISCONTINUED | OUTPATIENT
Start: 2020-07-01 | End: 2020-07-02 | Stop reason: HOSPADM

## 2020-07-01 RX ADMIN — PANTOPRAZOLE SODIUM 40 MG: 40 TABLET, DELAYED RELEASE ORAL at 05:58

## 2020-07-01 RX ADMIN — FERROUS SULFATE TAB 325 MG (65 MG ELEMENTAL FE) 325 MG: 325 (65 FE) TAB at 08:11

## 2020-07-01 RX ADMIN — ASPIRIN 81 MG: 81 TABLET, COATED ORAL at 08:11

## 2020-07-01 RX ADMIN — ESCITALOPRAM 20 MG: 20 TABLET, FILM COATED ORAL at 08:11

## 2020-07-01 RX ADMIN — HYDROCODONE BITARTRATE AND ACETAMINOPHEN 1 TABLET: 5; 325 TABLET ORAL at 10:52

## 2020-07-01 RX ADMIN — CHLORDIAZEPOXIDE HYDROCHLORIDE 5 MG: 5 CAPSULE ORAL at 21:54

## 2020-07-01 RX ADMIN — CHLORDIAZEPOXIDE HYDROCHLORIDE 5 MG: 5 CAPSULE ORAL at 14:33

## 2020-07-01 RX ADMIN — HYDROCODONE BITARTRATE AND ACETAMINOPHEN 1 TABLET: 5; 325 TABLET ORAL at 05:58

## 2020-07-01 RX ADMIN — AMLODIPINE BESYLATE 10 MG: 10 TABLET ORAL at 08:11

## 2020-07-01 RX ADMIN — POLYETHYLENE GLYCOL 3350 17 G: 17 POWDER, FOR SOLUTION ORAL at 14:33

## 2020-07-01 RX ADMIN — ARFORMOTEROL TARTRATE 15 MCG: 15 SOLUTION RESPIRATORY (INHALATION) at 07:52

## 2020-07-01 RX ADMIN — LOSARTAN POTASSIUM 100 MG: 100 TABLET, FILM COATED ORAL at 14:33

## 2020-07-01 RX ADMIN — ERGOCALCIFEROL 50000 UNITS: 1.25 CAPSULE ORAL at 14:33

## 2020-07-01 RX ADMIN — ARFORMOTEROL TARTRATE 15 MCG: 15 SOLUTION RESPIRATORY (INHALATION) at 21:24

## 2020-07-01 RX ADMIN — HYDROCODONE BITARTRATE AND ACETAMINOPHEN 1 TABLET: 5; 325 TABLET ORAL at 15:22

## 2020-07-01 RX ADMIN — POTASSIUM CHLORIDE 40 MEQ: 10 CAPSULE, COATED, EXTENDED RELEASE ORAL at 10:52

## 2020-07-01 RX ADMIN — MAGNESIUM SULFATE HEPTAHYDRATE 2 G: 40 INJECTION, SOLUTION INTRAVENOUS at 10:59

## 2020-07-01 RX ADMIN — HYDROCODONE BITARTRATE AND ACETAMINOPHEN 1 TABLET: 5; 325 TABLET ORAL at 19:31

## 2020-07-01 RX ADMIN — CHLORDIAZEPOXIDE HYDROCHLORIDE 10 MG: 10 CAPSULE ORAL at 05:58

## 2020-07-01 NOTE — THERAPY TREATMENT NOTE
Acute Care - Physical Therapy Treatment Note  [facility]     Patient Name: Watson Lisa  : 1955  MRN: [id number]  Today's Date: 2020             Admit Date: 2020    Visit Dx:    ICD-10-CM ICD-9-CM   1. Alcohol withdrawal syndrome without complication (CMS/HCC) F10.230 291.81   2. Chest pain in adult R07.9 786.50     Patient Active Problem List   Diagnosis   • Arthritis   • Hypertension   • Tobacco abuse   • Alcohol withdrawal syndrome without complication (CMS/HCC)   • Chest pain in adult   • Sleep apnea   • Hiatal hernia   • Alcohol abuse   • Hypokalemia   • Hypomagnesemia   • Effusion of left knee   • Osteoarthritis of left knee   • Vitamin D deficiency   • Anemia, chronic disease   • Hyponatremia       Therapy Treatment    Rehabilitation Treatment Summary     Row Name 20             Treatment Time/Intention    Discipline  physical therapy assistant  -      Document Type  therapy note (daily note)  -      Subjective Information  complains of;weakness;fatigue;pain  -      Mode of Treatment  individual therapy;physical therapy  -      Care Plan Review  patient/other agree to care plan  -      Comment  very motivated today  -      Existing Precautions/Restrictions  fall  -      Recorded by [] Blanca Bojorquez PTA 20 172      Row Name 20             Bed Mobility Assessment/Treatment    Supine-Sit [place] (Bed Mobility)  contact guard  -      Sit-Supine [place] (Bed Mobility)  supervision;verbal cues  -      Assistive Device (Bed Mobility)  bed rails  -      Recorded by [] Blanca Bojorquez PTA 20 172      Row Name 20             Sit-Stand Transfer    Sit-Stand [place] (Transfers)  2 person assist;moderate assist (50% patient effort);verbal cues;nonverbal cues (demo/gesture)  -      Assistive Device (Sit-Stand Transfers)  walker, front-wheeled  -      Recorded by [] Blanca Bojorquez PTA 20 [id number]       Row Name 07/01/20 1717             Gait/Stairs Assessment/Training    [place] Level (Gait)  2 person assist;minimum assist (75% patient effort);verbal cues  -      Assistive Device (Gait)  walker, front-wheeled  -      Distance in Feet (Gait)  70  -JM      Bilateral Gait Deviations  forward flexed posture;weight shift ability decreased  -      Comment (Gait/Stairs)  slightly impulsive-educ on safety bryan on turning  -      Recorded by [] Blanca Bojorquez, [facility] 07/01/20 1720      Row Name 07/01/20 1717             Therapeutic Exercise    Lower Extremity (Therapeutic Exercise)  LAQ (long arc quad), bilateral;marching while seated;quad sets, bilateral  -      Lower Extremity Range of Motion (Therapeutic Exercise)  hip abduction/adduction, bilateral;ankle dorsiflexion/plantar flexion, bilateral  -      Sets/Reps (Therapeutic Exercise)  10 reps  -      Recorded by [] Blanca Bojorquez PTA 07/01/20 1720      Row Name 07/01/20 1717             Positioning and Restraints    Pre-Treatment Position  in bed  -      In Chair  reclined;call light within reach;encouraged to call for assist;exit alarm on;notified nsg  -      Recorded by [] Blanca Bojorquez PTA 07/01/20 1720      Row Name 07/01/20 1717             Pain Scale: Numbers Pre/Post-Treatment    Pain Scale: Numbers, Pretreatment  5/10  -      Pain Scale: Numbers, Post-Treatment  6/10  -      Pain Location - Side  Left  -      Pain Location  knee  -      Pre/Post Treatment Pain Comment  improved today-had pre-meds  -      Pain Intervention(s)  Medication (See MAR);Repositioned;Elevated;Ambulation/increased activity  -      Recorded by [] Blanca Bojorquez, [facility] 07/01/20 1720        User Key  (r) = Recorded By, (t) = Taken By, (c) = Cosigned By    Initials Name Effective Dates Discipline    Blanca Hill, [facility] 03/07/18 -  PT                   Physical Therapy Education                 Title: PT OT SLP Therapies (Done)     Topic:  Physical Therapy (Done)     Point: Mobility training (Done)     Description:   Instruct learner(s) on safety and technique for assisting patient out of bed, chair or wheelchair.  Instruct in the proper use of assistive devices, such as walker, crutches, cane or brace.              Patient Friendly Description:   It's important to get you on your feet again, but we need to do so in a way that is safe for you. Falling has serious consequences, and your personal safety is the most important thing of all.        When it's time to get out of bed, one of us or a family member will sit next to you on the bed to give you support.     If your doctor or nurse tells you to use a walker, crutches, a cane, or a brace, be sure you use it every time you get out of bed, even if you think you don't need it.    Learning Progress Summary           Patient Eager, E,TB,D, VU,NR by EUGENIA at 7/1/2020 1723    Eager, E,TB,D, VU,NR by EUGENIA at 6/30/2020 1744    Acceptance, E,D, NR by KIRT at 6/29/2020 0952    Acceptance, E,TB,D, VU,NR by EUGENIA at 6/28/2020 1819    Acceptance, E,TB, VU by ZACHARY at 6/27/2020 1636   Family Eager, E,TB,D, VU,NR by EUGENIA at 6/30/2020 1744                   Point: Home exercise program (Done)     Description:   Instruct learner(s) on appropriate technique for monitoring, assisting and/or progressing patient with therapeutic exercises and activities.              Learning Progress Summary           Patient Eager, E,TB,D, VU,NR by EUGENIA at 7/1/2020 1723    Eager, E,TB,D, VU,NR by EUGENIA at 6/30/2020 1744    Acceptance, E,D, NR by KIRT at 6/29/2020 0952    Acceptance, E,TB,D, VU,NR by EUGENIA at 6/28/2020 1819    Acceptance, E,TB, VU by  at 6/27/2020 1636   Family Eager, E,TB,D, VU,NR by EUGENIA at 6/30/2020 1744                   Point: Body mechanics (Done)     Description:   Instruct learner(s) on proper positioning and spine alignment for patient and/or caregiver during mobility tasks and/or exercises.              Learning Progress Summary            Patient Eager, E,TB,D, VU,NR by  at 7/1/2020 1723    Eager, E,TB,D, VU,NR by  at 6/30/2020 1744    Acceptance, E,D, NR by  at 6/29/2020 0952    Acceptance, E,TB,D, VU,NR by  at 6/28/2020 1819    Acceptance, E,TB, VU by  at 6/27/2020 1636   Family Eager, E,TB,D, VU,NR by  at 6/30/2020 1744                   Point: Precautions (Done)     Description:   Instruct learner(s) on prescribed precautions during mobility and gait tasks              Learning Progress Summary           Patient Eager, E,TB,D, VU,NR by  at 7/1/2020 1723    Eager, E,TB,D, VU,NR by  at 6/30/2020 1744    Acceptance, E,D, NR by  at 6/29/2020 0952    Acceptance, E,TB,D, VU,NR by  at 6/28/2020 1819    Acceptance, E,TB, VU by  at 6/27/2020 1636   Family Eager, E,TB,D, VU,NR by  at 6/30/2020 1744                               User Key     Initials Effective Dates Name Provider Type Discipline    PC 04/03/18 -  Domitila Renee, PT Physical Therapist PT     03/07/18 -  Blanca Bojorquez, MYNOR Physical Therapy Assistant PT     08/23/19 -  Beatrice Rashid, STANLEY Physical Therapist PT                PT Recommendation and Plan     Plan of Care Reviewed With: patient  Progress: improving  Outcome Summary: incr amb dist, still req assist of 2 for safety; feeling some better -req cues to slow pace for safety-L knee still pnful and weak-educ on pt being cautious w/ L knee possible buckle; plans SNU at [place]-awaits approval  Outcome Measures     Row Name 07/01/20 1700 06/30/20 1600          How much help from another person do you currently need...    Turning from your back to your side while in flat bed without using bedrails?  3  -JM  3  -JM     Moving from lying on back to sitting on the side of a flat bed without bedrails?  3  -JM  3  -JM     Moving to and from a bed to a chair (including a wheelchair)?  3  -JM  3  -JM     Standing up from a chair using your arms (e.g., wheelchair, bedside chair)?  2  -JM  2  -JM     Climbing 3-5 steps with  a railing?  1  -  1  -     To walk in hospital room?  3  -JM  3  -JM     AM-PAC 6 Clicks Score (PT)  15  -  15  -       User Key  (r) = Recorded By, (t) = Taken By, (c) = Cosigned By    Initials Name Provider Type    Blnaca Hill PTA Physical Therapy Assistant         Time Calculation:   PT Charges     Row Name 07/01/20 1219             Time Calculation    Start Time  1100  -      Stop Time  1125  -      Time Calculation (min)  25 min  -      PT Received On  07/01/20  -      PT - Next Appointment  07/02/20  -EUGENIA        User Key  (r) = Recorded By, (t) = Taken By, (c) = Cosigned By    Initials Name Provider Type    Blanca Hill PTA Physical Therapy Assistant        Therapy Charges for Today     Code Description Service Date Service Provider Modifiers Qty    07986567400 HC PT THER PROC EA 15 MIN 6/30/2020 Blanca Bojorquez, MYNOR GP 3    89033649159 HC PT THER SUPP EA 15 MIN 6/30/2020 Blanca Bojorquez, MYNOR GP 1    88804137279 HC PT THER PROC EA 15 MIN 7/1/2020 Blanca Bojorquez, MYNOR GP 2    09459601333 HC PT THER SUPP EA 15 MIN 7/1/2020 Blanca Bojorquez, MYNOR GP 2          PT G-Codes  Outcome Measure Options: AM-PAC 6 Clicks Basic Mobility (PT)  AM-PAC 6 Clicks Score (PT): 15    Blanca Bojorquez PTA  7/1/2020

## 2020-07-01 NOTE — PLAN OF CARE
Problem: Patient Care Overview  Goal: Plan of Care Review  Outcome: Ongoing (interventions implemented as appropriate)  Flowsheets (Taken 7/1/2020 1720)  Progress: improving  Plan of Care Reviewed With: patient  Outcome Summary: incr amb dist, still req assist of 2 for safety; feeling some better -req cues to slow pace for safety-L knee still pnful and weak-educ on pt being cautious w/ L knee possible buckle; plans SNU at [place]-awaits approval

## 2020-07-01 NOTE — PROGRESS NOTES
"   LOS: 6 days   Patient Care Team:  Checo Dunham MD as PCP - General (Family Medicine)  Checo Dunham MD as PCP - Family Medicine    Chief Complaint: left knee pain    Subjective     Feeling better today. Only c/o pain in left knee. Working with PT. No AH/VH. No tremor. No HA or visual changes. No BM since yesterday. He last had c-scope in 2013 or 2014. No famhx of colon CA.      Subjective:  Symptoms:  Improved.  He reports weakness.  No shortness of breath, malaise, cough, chest pain, headache, chest pressure, anorexia, diarrhea or anxiety.    Diet:  Adequate intake.  No nausea or vomiting.    Activity level: Impaired due to pain.    Pain:  He complains of pain that is mild.  He reports pain is improving.  Pain is well controlled and requiring pain medication.        History taken from: patient chart RN    Objective     Vital Signs  Temp:  [98 °F (36.7 °C)-98.6 °F (37 °C)] 98.6 °F (37 °C)  Heart Rate:  [60-70] 68  Resp:  [18] 18  BP: (150-178)/(82-85) 150/82    Objective:  General Appearance:  Comfortable and in no acute distress.    Vital signs: (most recent): Blood pressure 150/82, pulse 68, temperature 98.6 °F (37 °C), temperature source Oral, resp. rate 18, height 200.7 cm (79\"), weight 109 kg (240 lb 4.8 oz), SpO2 98 %.  Vital signs are normal.  No fever.    Output: Producing urine and no stool output.    HEENT: Normal HEENT exam.    Lungs:  Normal effort and normal respiratory rate.  Breath sounds clear to auscultation.  He is not in respiratory distress.    Heart: Normal rate.  Regular rhythm.    Abdomen: Abdomen is soft.  Bowel sounds are normal.   There is no abdominal tenderness.     Extremities: There is no dependent edema.    Pulses: Distal pulses are intact.    Neurological: Patient is alert and oriented to person, place and time.  Normal strength.  Patient has normal muscle tone.  (Very mild tremor in hands, no asterixis).    Pupils:  Pupils are equal, round, and reactive to light.    Skin:  Warm " and dry.  No rash.             Results Review:     I reviewed the patient's new clinical results.  I reviewed the patient's other test results and agree with the interpretation  I personally viewed and interpreted the patient's EKG/Telemetry data  Discussed with pt and CCP    Results from last 7 days   Lab Units 07/01/20  0552 06/30/20  0323 06/29/20  0652 06/28/20  0534 06/27/20  0547 06/26/20  0533 06/25/20  0629   WBC 10*3/mm3 4.31 4.81 6.37 8.49 12.06* 8.96 7.49   HEMOGLOBIN g/dL 8.7* 8.7* 9.0* 9.3* 9.7* 9.8* 11.3*   PLATELETS 10*3/mm3 293 227 202 197 168 183 224     Results from last 7 days   Lab Units 07/01/20  0552 06/30/20  0323 06/29/20  0652 06/28/20  0534 06/27/20  0547 06/26/20  1152 06/26/20  0533 06/25/20  0629   SODIUM mmol/L 132* 136 133* 138 133*  --  130* 135*   POTASSIUM mmol/L 3.6 3.9 3.5 3.8 3.7  --  3.4* 3.6   CHLORIDE mmol/L 102 107 103 106 99  --  95* 98   CO2 mmol/L 25.8 21.4* 20.9* 16.8* 15.1*  --  19.8* 21.3*   BUN mg/dL 8 10 10 18 17  --  10 10   CREATININE mg/dL 0.65* 0.67* 0.56* 0.80 1.12  --  0.86 0.85   CALCIUM mg/dL 8.4* 8.3* 8.5* 8.6 7.9*  --  7.1* 7.5*   MAGNESIUM mg/dL 1.5* 1.7 1.5* 1.9 1.5* 0.7*  --   --    PHOSPHORUS mg/dL  --  3.1 3.3 2.9 3.3  --   --  2.6   Estimated Creatinine Clearance: 140 mL/min (A) (by C-G formula based on SCr of 0.65 mg/dL (L)).    Medication Review: reviewed and adjusted    Assessment/Plan       Alcohol withdrawal syndrome without complication (CMS/HCC)    Arthritis    Hypertension    Tobacco abuse    Chest pain in adult    Sleep apnea    Hiatal hernia    Alcohol abuse    Hypokalemia    Hypomagnesemia    Effusion of left knee    Osteoarthritis of left knee    Vitamin D deficiency    Anemia, chronic disease    Hyponatremia          Plan:   (1. Delirium tremens/alcohol withdrawal: not requiring any Ativan per CIWA protocol, but he is also on scheduled p.o. Librium.  Doing much better when compared to yesterday. Out of ICU now. He is less restless and less  agitated.  Continue folate and thiamine supplements.  Seizure precautions in place. Decrease Librium dose today.  2. Hypertension: on amlodipine. Losartan on hold. BPs a little high, will restart Losartan.  3. Left knee effusion: underwent arthrocentesis, appreciate Ortho attention to pt, they feel due to OA, no infection suspected, cultures neg. Will recommend f/u as outpt. Knee feeling a little better today.  4. Suspected COPD: on Brovana. Currently not in any exacerbation.  5. Tobacco abuse: discussed smoking cessation.  6. GERD: on acid suppressive therapy. ?UGIB  7. Hypokalemia/hypomagnesemia: s/p replacement of both, Mg++ low again today, replacing IV  9. Hyponatremia: 132 today  10. Anemia NOS: iron studies c/w chronic disease, and hemoccult still pending, reported black stool 6/30. Hgb stable at 8.7.  11. Chest Pain: non-cardiac, appreciate Dr. Oleary's attention to pt, ?esophagitis or gastritis from EtOH?  12. ?Constipation: will start Miralax  13. Vitamin D Deficiency: start ergocalciferol weekly    Full code  Changed to SCDs for DVT ppx given potential GIB  Dispo: PT jack noted, CCP working on rehab placement   ).       Channing Gaming MD  07/01/20  13:04    Time: 30min

## 2020-07-02 VITALS
HEART RATE: 88 BPM | DIASTOLIC BLOOD PRESSURE: 88 MMHG | HEIGHT: 78 IN | OXYGEN SATURATION: 100 % | BODY MASS INDEX: 27.8 KG/M2 | WEIGHT: 240.3 LBS | SYSTOLIC BLOOD PRESSURE: 160 MMHG | TEMPERATURE: 97.9 F | RESPIRATION RATE: 20 BRPM

## 2020-07-02 LAB
ALBUMIN SERPL-MCNC: 3.4 G/DL (ref 3.5–5.2)
ALBUMIN/GLOB SERPL: 1.1 G/DL
ALP SERPL-CCNC: 65 U/L (ref 39–117)
ALT SERPL W P-5'-P-CCNC: 39 U/L (ref 1–41)
ANION GAP SERPL CALCULATED.3IONS-SCNC: 6.7 MMOL/L (ref 5–15)
AST SERPL-CCNC: 47 U/L (ref 1–40)
BASOPHILS # BLD AUTO: 0.04 10*3/MM3 (ref 0–0.2)
BASOPHILS NFR BLD AUTO: 0.8 % (ref 0–1.5)
BILIRUB SERPL-MCNC: 0.3 MG/DL (ref 0.2–1.2)
BUN SERPL-MCNC: 10 MG/DL (ref 8–23)
BUN/CREAT SERPL: 13.9 (ref 7–25)
CALCIUM SPEC-SCNC: 8.9 MG/DL (ref 8.6–10.5)
CHLORIDE SERPL-SCNC: 104 MMOL/L (ref 98–107)
CO2 SERPL-SCNC: 26.3 MMOL/L (ref 22–29)
CREAT SERPL-MCNC: 0.72 MG/DL (ref 0.76–1.27)
DEPRECATED RDW RBC AUTO: 41.9 FL (ref 37–54)
EOSINOPHIL # BLD AUTO: 0.17 10*3/MM3 (ref 0–0.4)
EOSINOPHIL NFR BLD AUTO: 3.2 % (ref 0.3–6.2)
ERYTHROCYTE [DISTWIDTH] IN BLOOD BY AUTOMATED COUNT: 12 % (ref 12.3–15.4)
GFR SERPL CREATININE-BSD FRML MDRD: 109 ML/MIN/1.73
GLOBULIN UR ELPH-MCNC: 3.2 GM/DL
GLUCOSE SERPL-MCNC: 105 MG/DL (ref 65–99)
HCT VFR BLD AUTO: 27.9 % (ref 37.5–51)
HGB BLD-MCNC: 9.7 G/DL (ref 13–17.7)
IMM GRANULOCYTES # BLD AUTO: 0.06 10*3/MM3 (ref 0–0.05)
IMM GRANULOCYTES NFR BLD AUTO: 1.1 % (ref 0–0.5)
LYMPHOCYTES # BLD AUTO: 1.07 10*3/MM3 (ref 0.7–3.1)
LYMPHOCYTES NFR BLD AUTO: 20.2 % (ref 19.6–45.3)
MAGNESIUM SERPL-MCNC: 1.8 MG/DL (ref 1.6–2.4)
MCH RBC QN AUTO: 32.9 PG (ref 26.6–33)
MCHC RBC AUTO-ENTMCNC: 34.8 G/DL (ref 31.5–35.7)
MCV RBC AUTO: 94.6 FL (ref 79–97)
MONOCYTES # BLD AUTO: 0.87 10*3/MM3 (ref 0.1–0.9)
MONOCYTES NFR BLD AUTO: 16.4 % (ref 5–12)
NEUTROPHILS NFR BLD AUTO: 3.09 10*3/MM3 (ref 1.7–7)
NEUTROPHILS NFR BLD AUTO: 58.3 % (ref 42.7–76)
NRBC BLD AUTO-RTO: 0 /100 WBC (ref 0–0.2)
PLATELET # BLD AUTO: 357 10*3/MM3 (ref 140–450)
PMV BLD AUTO: 9.2 FL (ref 6–12)
POTASSIUM SERPL-SCNC: 4.3 MMOL/L (ref 3.5–5.2)
PROT SERPL-MCNC: 6.6 G/DL (ref 6–8.5)
RBC # BLD AUTO: 2.95 10*6/MM3 (ref 4.14–5.8)
SARS-COV-2 N GENE NPH QL NAA+PROBE: NOT DETECTED
SODIUM SERPL-SCNC: 137 MMOL/L (ref 136–145)
WBC # BLD AUTO: 5.3 10*3/MM3 (ref 3.4–10.8)

## 2020-07-02 PROCEDURE — 97110 THERAPEUTIC EXERCISES: CPT

## 2020-07-02 PROCEDURE — 94799 UNLISTED PULMONARY SVC/PX: CPT

## 2020-07-02 PROCEDURE — 87635 SARS-COV-2 COVID-19 AMP PRB: CPT | Performed by: HOSPITALIST

## 2020-07-02 PROCEDURE — 97116 GAIT TRAINING THERAPY: CPT

## 2020-07-02 PROCEDURE — C9803 HOPD COVID-19 SPEC COLLECT: HCPCS | Performed by: HOSPITALIST

## 2020-07-02 PROCEDURE — 85025 COMPLETE CBC W/AUTO DIFF WBC: CPT | Performed by: HOSPITALIST

## 2020-07-02 PROCEDURE — 80053 COMPREHEN METABOLIC PANEL: CPT | Performed by: HOSPITALIST

## 2020-07-02 PROCEDURE — 83735 ASSAY OF MAGNESIUM: CPT | Performed by: HOSPITALIST

## 2020-07-02 RX ORDER — CHLORDIAZEPOXIDE HYDROCHLORIDE 5 MG/1
5 CAPSULE, GELATIN COATED ORAL 2 TIMES DAILY
Qty: 2 CAPSULE | Refills: 0 | Status: SHIPPED | OUTPATIENT
Start: 2020-07-02 | End: 2020-07-03

## 2020-07-02 RX ORDER — CHLORDIAZEPOXIDE HYDROCHLORIDE 5 MG/1
5 CAPSULE, GELATIN COATED ORAL 2 TIMES DAILY
Status: DISCONTINUED | OUTPATIENT
Start: 2020-07-02 | End: 2020-07-02 | Stop reason: HOSPADM

## 2020-07-02 RX ORDER — ERGOCALCIFEROL 1.25 MG/1
50000 CAPSULE ORAL
Qty: 5 CAPSULE
Start: 2020-07-08 | End: 2020-08-04 | Stop reason: SDUPTHER

## 2020-07-02 RX ORDER — POLYETHYLENE GLYCOL 3350 17 G/17G
17 POWDER, FOR SOLUTION ORAL DAILY
Start: 2020-07-03 | End: 2021-10-07

## 2020-07-02 RX ORDER — ACETAMINOPHEN 325 MG/1
650 TABLET ORAL EVERY 6 HOURS PRN
Start: 2020-07-02 | End: 2021-10-07

## 2020-07-02 RX ORDER — FERROUS SULFATE 325(65) MG
325 TABLET ORAL
Start: 2020-07-03 | End: 2020-08-04 | Stop reason: SDUPTHER

## 2020-07-02 RX ORDER — PANTOPRAZOLE SODIUM 40 MG/1
40 TABLET, DELAYED RELEASE ORAL DAILY
Start: 2020-07-02 | End: 2021-10-07

## 2020-07-02 RX ORDER — HYDROCODONE BITARTRATE AND ACETAMINOPHEN 5; 325 MG/1; MG/1
1 TABLET ORAL EVERY 6 HOURS PRN
Qty: 8 TABLET | Refills: 0 | Status: SHIPPED | OUTPATIENT
Start: 2020-07-02 | End: 2021-10-07

## 2020-07-02 RX ADMIN — ESCITALOPRAM 20 MG: 20 TABLET, FILM COATED ORAL at 09:00

## 2020-07-02 RX ADMIN — HYDROCODONE BITARTRATE AND ACETAMINOPHEN 1 TABLET: 5; 325 TABLET ORAL at 09:00

## 2020-07-02 RX ADMIN — PANTOPRAZOLE SODIUM 40 MG: 40 TABLET, DELAYED RELEASE ORAL at 05:17

## 2020-07-02 RX ADMIN — ASPIRIN 81 MG: 81 TABLET, COATED ORAL at 09:00

## 2020-07-02 RX ADMIN — CHLORDIAZEPOXIDE HYDROCHLORIDE 5 MG: 5 CAPSULE ORAL at 20:00

## 2020-07-02 RX ADMIN — HYDROCODONE BITARTRATE AND ACETAMINOPHEN 1 TABLET: 5; 325 TABLET ORAL at 13:59

## 2020-07-02 RX ADMIN — LOSARTAN POTASSIUM 100 MG: 100 TABLET, FILM COATED ORAL at 09:00

## 2020-07-02 RX ADMIN — CHLORDIAZEPOXIDE HYDROCHLORIDE 5 MG: 5 CAPSULE ORAL at 05:17

## 2020-07-02 RX ADMIN — NICOTINE 1 PATCH: 21 PATCH, EXTENDED RELEASE TRANSDERMAL at 09:00

## 2020-07-02 RX ADMIN — AMLODIPINE BESYLATE 10 MG: 10 TABLET ORAL at 09:00

## 2020-07-02 RX ADMIN — HYDROCODONE BITARTRATE AND ACETAMINOPHEN 1 TABLET: 5; 325 TABLET ORAL at 18:49

## 2020-07-02 RX ADMIN — FERROUS SULFATE TAB 325 MG (65 MG ELEMENTAL FE) 325 MG: 325 (65 FE) TAB at 09:00

## 2020-07-02 RX ADMIN — ARFORMOTEROL TARTRATE 15 MCG: 15 SOLUTION RESPIRATORY (INHALATION) at 10:22

## 2020-07-02 RX ADMIN — HYDROCODONE BITARTRATE AND ACETAMINOPHEN 1 TABLET: 5; 325 TABLET ORAL at 04:13

## 2020-07-02 NOTE — PROGRESS NOTES
Discharge Planning Assessment  [facility]     Patient Name: Watson Lisa  MRN: [id number]  Today's Date: 7/2/2020    Admit Date: 6/24/2020    Discharge Needs Assessment    No documentation.       Discharge Plan     Row Name 07/02/20 1213       Plan    Plan  [facility] [place] precert started today 7/2/2020    Plan Comments  Spoke with patient at bedside regarding discharge plans. Patient is agreeable with [facility] and stated his son will transport. [facility] stated patient does not need a Covid test.  30 day obtained from Dr Gaming and faxed to [facility] and placed in packet. Chela Daniels RN    Row Name 07/02/20 1123       Plan    Plan  [facility] pending [place] precert     Plan Comments  Call received from [facility] has accepted pending [place] precert started today. Patient will require a 30 day signed by Dr Gaming. Chela Daniels RN        Destination      Service Provider Request Status Selected Services Address Phone Number Fax Number    [facility] Accepted N/A [address] [facility] [id number] [phone]      Durable Medical Equipment      Coordination has not been started for this encounter.      Dialysis/Infusion      Coordination has not been started for this encounter.      Home Medical Care      Coordination has not been started for this encounter.      Therapy      Coordination has not been started for this encounter.      Community Resources      Coordination has not been started for this encounter.          Demographic Summary    No documentation.       Functional Status    No documentation.       Psychosocial    No documentation.       Abuse/Neglect    No documentation.       Legal    No documentation.       Substance Abuse    No documentation.       Patient Forms    No documentation.           Chela Daniels RN

## 2020-07-02 NOTE — THERAPY TREATMENT NOTE
Patient Name: Watson Lisa  : 1955    MRN: [id number]                              Today's Date: 2020       Admit Date: 2020    Visit Dx:     ICD-10-CM ICD-9-CM   1. Alcohol withdrawal syndrome without complication (CMS/HCC) F10.230 291.81   2. Chest pain in adult R07.9 786.50     Patient Active Problem List   Diagnosis   • Arthritis   • Hypertension   • Tobacco abuse   • Alcohol withdrawal syndrome without complication (CMS/HCC)   • Chest pain in adult   • Sleep apnea   • Hiatal hernia   • Alcohol abuse   • Hypokalemia   • Hypomagnesemia   • Effusion of left knee   • Osteoarthritis of left knee   • Vitamin D deficiency   • Anemia, chronic disease   • Hyponatremia     Past Medical History:   Diagnosis Date   • Alcohol abuse    • Anxiety    • Arthritis    • Bronchitis with chronic airway obstruction (CMS/HCC)     LAST FEB   • DVT (deep venous thrombosis) (CMS/HCC)    • Hiatal hernia    • Hypertension    • Hypertension    • Low back pain    • Neck pain    • Pulmonary embolism (CMS/HCC)    • Sleep apnea with use of continuous positive airway pressure (CPAP)     AT NIGHT     Past Surgical History:   Procedure Laterality Date   • COLONOSCOPY     • JOINT REPLACEMENT Right     hip/knee   • REPLACEMENT TOTAL KNEE     • TONSILLECTOMY     • TOTAL HIP ARTHROPLASTY Right      General Information     Row Name 20 1532          PT Evaluation Time/Intention    Document Type  therapy note (daily note)  -     Mode of Treatment  individual therapy;physical therapy  -     Row Name 20 1532          General Information    Patient Profile Reviewed?  yes  -     Existing Precautions/Restrictions  fall  -     Row Name 20 1532          Cognitive Assessment/Intervention- PT/OT    Orientation Status (Cognition)  oriented x 3  -     Cognitive Assessment/Intervention Comment  Pt pleasant and cooperative. States he is leaving for rehab tomorrow.  -       User Key  (r) = Recorded By, (t) = Taken By,  (c) = Cosigned By    Initials Name Provider Type     Beatrice Rashid, PT Physical Therapist        Mobility     Row Name 07/02/20 1532          Bed Mobility Assessment/Treatment    Bed Mobility Assessment/Treatment  supine-sit;sit-supine;scooting/bridging  -     Scooting/Bridging [place] (Bed Mobility)  supervision;verbal cues;nonverbal cues (demo/gesture)  -     Supine-Sit [place] (Bed Mobility)  supervision  -     Sit-Supine [place] (Bed Mobility)  supervision;verbal cues  -     Row Name 07/02/20 1532          Transfer Assessment/Treatment    Comment (Transfers)  Improved safety with transfers  -     Row Name 07/02/20 1532          Sit-Stand Transfer    Sit-Stand [place] (Transfers)  contact guard;2 person assist  -     Assistive Device (Sit-Stand Transfers)  walker, front-wheeled  -     Row Name 07/02/20 1532          Gait/Stairs Assessment/Training    Gait/Stairs Assessment/Training  gait/ambulation independence;gait/ambulation assistive device  -     [place] Level (Gait)  contact guard;2 person assist  -     Assistive Device (Gait)  walker, front-wheeled  -     Distance in Feet (Gait)  75' x 1, 20' x 1  -     Pattern (Gait)  step-through  -     Deviations/Abnormal Patterns (Gait)  gait speed decreased;stride length decreased;antalgic  -     Bilateral Gait Deviations  forward flexed posture;weight shift ability decreased  -     Left Sided Gait Deviations  weight shift ability decreased;heel strike decreased  -     Comment (Gait/Stairs)  first walk without knee brace, second walk with knee brace (improved per pt)  -       User Key  (r) = Recorded By, (t) = Taken By, (c) = Cosigned By    Initials Name Provider Type     Beatrice Rashid PT Physical Therapist        Obj/Interventions     Row Name 07/02/20 1534          Therapeutic Exercise    Lower Extremity (Therapeutic Exercise)  LAQ (long arc quad), bilateral;marching while seated;gluteal sets  -     Lower  Extremity Range of Motion (Therapeutic Exercise)  hip abduction/adduction, bilateral;ankle dorsiflexion/plantar flexion, bilateral  -     Position (Therapeutic Exercise)  seated  -     Sets/Reps (Therapeutic Exercise)  10 reps  -       User Key  (r) = Recorded By, (t) = Taken By, (c) = Cosigned By    Initials Name Provider Type    Beatrice Galvan, PT Physical Therapist        Goals/Plan    No documentation.       Clinical Impression     Row Name 07/02/20 1534          Pain Scale: Numbers Pre/Post-Treatment    Pain Scale: Numbers, Pretreatment  6/10  -LC     Pain Scale: Numbers, Post-Treatment  7/10  -LC     Pain Location - Side  Left  -LC     Pain Location  knee  -LC     Pain Intervention(s)  Ambulation/increased activity;Rest;Repositioned  -     Row Name 07/02/20 1534          Plan of Care Review    Plan of Care Reviewed With  patient  -LC     Progress  improving  -     Outcome Summary  Pt continues to improved with his functional mobility. He is still imited by L knee and ankle pain. He did better with his knee brace on, ambulating 75' and then 20' with the RWx after a seated rest break.  -     Row Name 07/02/20 1534          Physical Therapy Clinical Impression    Criteria for Skilled Interventions Met (PT Clinical Impression)  yes;treatment indicated  -     Rehab Potential (PT Clinical Summary)  good, to achieve stated therapy goals  -     Row Name 07/02/20 1534          Positioning and Restraints    Pre-Treatment Position  in bed  -     Post Treatment Position  bed  -LC     In Bed  notified nsg;supine;call light within reach;encouraged to call for assist  -       User Key  (r) = Recorded By, (t) = Taken By, (c) = Cosigned By    Initials Name Provider Type    Beatrice Galvan, STANLEY Physical Therapist        Outcome Measures     Row Name 07/02/20 1619          How much help from another person do you currently need...    Turning from your back to your side while in flat bed without using bedrails?   3  -LC     Moving from lying on back to sitting on the side of a flat bed without bedrails?  3  -LC     Moving to and from a bed to a chair (including a wheelchair)?  3  -LC     Standing up from a chair using your arms (e.g., wheelchair, bedside chair)?  2  -LC     Climbing 3-5 steps with a railing?  2  -LC     To walk in hospital room?  3  -LC     AM-PAC 6 Clicks Score (PT)  16  -LC       User Key  (r) = Recorded By, (t) = Taken By, (c) = Cosigned By    Initials Name Provider Type    Beatrice Galvan PT Physical Therapist        Physical Therapy Education                 Title: PT OT SLP Therapies (Done)     Topic: Physical Therapy (Done)     Point: Mobility training (Done)     Description:   Instruct learner(s) on safety and technique for assisting patient out of bed, chair or wheelchair.  Instruct in the proper use of assistive devices, such as walker, crutches, cane or brace.              Patient Friendly Description:   It's important to get you on your feet again, but we need to do so in a way that is safe for you. Falling has serious consequences, and your personal safety is the most important thing of all.        When it's time to get out of bed, one of us or a family member will sit next to you on the bed to give you support.     If your doctor or nurse tells you to use a walker, crutches, a cane, or a brace, be sure you use it every time you get out of bed, even if you think you don't need it.    Learning Progress Summary           Patient Acceptance, E,TB, VU by ZACHARY at 7/2/2020 1537    Eager, E,TB,D, VU,NR by EUGENIA at 7/1/2020 1723    Eager, E,TB,D, VU,NR by EUGENIA at 6/30/2020 1744    Acceptance, E,D, NR by KIRT at 6/29/2020 0952    Acceptance, E,TB,D, VU,NR by EUGENIA at 6/28/2020 1819    Acceptance, E,TB, VU by ZACHARY at 6/27/2020 1636   Family Eager, E,TB,D, VU,NR by EUGENIA at 6/30/2020 1744                   Point: Home exercise program (Done)     Description:   Instruct learner(s) on appropriate technique for monitoring,  assisting and/or progressing patient with therapeutic exercises and activities.              Learning Progress Summary           Patient Acceptance, E,TB, VU by ZACHARY at 7/2/2020 1537    Eager, E,TB,D, VU,NR by EUGENIA at 7/1/2020 1723    Eager, E,TB,D, VU,NR by  at 6/30/2020 1744    Acceptance, E,D, NR by  at 6/29/2020 0952    Acceptance, E,TB,D, VU,NR by EUGENIA at 6/28/2020 1819    Acceptance, E,TB, VU by  at 6/27/2020 1636   Family Eager, E,TB,D, VU,NR by EUGENIA at 6/30/2020 1744                   Point: Body mechanics (Done)     Description:   Instruct learner(s) on proper positioning and spine alignment for patient and/or caregiver during mobility tasks and/or exercises.              Learning Progress Summary           Patient Acceptance, E,TB, VU by ZACHARY at 7/2/2020 1537    Eager, E,TB,D, VU,NR by EUGENIA at 7/1/2020 1723    Eager, E,TB,D, VU,NR by EUGENIA at 6/30/2020 1744    Acceptance, E,D, NR by  at 6/29/2020 0952    Acceptance, E,TB,D, VU,NR by EUGENIA at 6/28/2020 1819    Acceptance, E,TB, VU by ZACHARY at 6/27/2020 1636   Family Eager, E,TB,D, VU,NR by EUGENIA at 6/30/2020 1744                   Point: Precautions (Done)     Description:   Instruct learner(s) on prescribed precautions during mobility and gait tasks              Learning Progress Summary           Patient Acceptance, E,TB, VU by ZACHARY at 7/2/2020 1537    Eager, E,TB,D, VU,NR by EUGENIA at 7/1/2020 1723    Eager, E,TB,D, VU,NR by EUGENIA at 6/30/2020 1744    Acceptance, E,D, NR by  at 6/29/2020 0952    Acceptance, E,TB,D, VU,NR by EUGENIA at 6/28/2020 1819    Acceptance, E,TB, VU by ZACHARY at 6/27/2020 1636   Family Eager, E,TB,D, VU,NR by EUGENIA at 6/30/2020 1744                               User Key     Initials Effective Dates Name Provider Type Discipline     04/03/18 -  Domitila Renee, PT Physical Therapist PT    JM 03/07/18 -  Blanca Bojorquez PTA Physical Therapy Assistant PT    LC 08/23/19 -  Beatrice Rashid PT Physical Therapist PT              PT Recommendation and Plan  Planned  Therapy Interventions (PT Eval): balance training, bed mobility training, gait training, home exercise program, strengthening, patient/family education, neuromuscular re-education, transfer training  Outcome Summary/Treatment Plan (PT)  Anticipated Equipment Needs at Discharge (PT): front wheeled walker  Anticipated Discharge Disposition (PT): skilled nursing facility  Plan of Care Reviewed With: patient  Progress: improving  Outcome Summary: Pt continues to improved with his functional mobility. He is still imited by L knee and ankle pain. He did better with his knee brace on, ambulating 75' and then 20' with the RWx after a seated rest break.     Time Calculation:   PT Charges     Row Name 07/02/20 1537             Time Calculation    Start Time  1340  -      Stop Time  1404  -      Time Calculation (min)  24 min  -      PT Received On  07/02/20  -      PT - Next Appointment  07/03/20  -        User Key  (r) = Recorded By, (t) = Taken By, (c) = Cosigned By    Initials Name Provider Type     Beatrice Rashid, PT Physical Therapist        Therapy Charges for Today     Code Description Service Date Service Provider Modifiers Qty    22746150074  PT THER PROC EA 15 MIN 7/2/2020 Beatrice Rashid, PT GP 1    41360919563 HC GAIT TRAINING EA 15 MIN 7/2/2020 Beatrice Rashid, PT GP 1    [id number]  PT THER SUPP EA 15 MIN 7/2/2020 Beatrice Rashid, PT GP 1          PT G-Codes  Outcome Measure Options: AM-PAC 6 Clicks Basic Mobility (PT)  AM-PAC 6 Clicks Score (PT): 16    Beatrice Rashid PT  7/2/2020

## 2020-07-02 NOTE — PROGRESS NOTES
Discharge Planning Assessment  [facility]     Patient Name: Watson Lisa  MRN: [id number]  Today's Date: 7/2/2020    Admit Date: 6/24/2020    Discharge Needs Assessment    No documentation.       Discharge Plan     Row Name 07/02/20 1123       Plan    Plan  [facility] pending [place] precert     Plan Comments  Call received from [facility] pending [place] precert started today. Patient will require a 30 day signed by Dr Gaming. Chela Daniels RN        Destination      Service Provider Request Status Selected Services Address Phone Number Fax Number    [facility] [address] [facility] [id number] [phone]      Durable Medical Equipment      Coordination has not been started for this encounter.      Dialysis/Infusion      Coordination has not been started for this encounter.      Home Medical Care      Coordination has not been started for this encounter.      Therapy      Coordination has not been started for this encounter.      Community Resources      Coordination has not been started for this encounter.          Demographic Summary    No documentation.       Functional Status    No documentation.       Psychosocial    No documentation.       Abuse/Neglect    No documentation.       Legal    No documentation.       Substance Abuse    No documentation.       Patient Forms    No documentation.           Chela Daniels, RN

## 2020-07-02 NOTE — PROGRESS NOTES
"   LOS: 7 days   Patient Care Team:  Checo Dunham MD as PCP - General (Family Medicine)  Checo Dunham MD as PCP - Family Medicine    Chief Complaint: pain in left knee    Subjective     Feeling better today. Only c/o pain in left knee. Working with PT. No AH/VH. No tremor. No HA or visual changes. He last had c-scope in 2013 or 2014. No famhx of colon CA.      Subjective:  Symptoms:  Improved.  He reports weakness.  No shortness of breath, malaise, cough, chest pain, headache, chest pressure, anorexia, diarrhea or anxiety.    Diet:  Adequate intake.  No nausea or vomiting.    Activity level: Impaired due to pain.    Pain:  He complains of pain that is mild.  He reports pain is improving.  Pain is well controlled and requiring pain medication.        History taken from: patient chart    Objective     Vital Signs  Temp:  [97.5 °F (36.4 °C)-98.6 °F (37 °C)] 98.3 °F (36.8 °C)  Heart Rate:  [59-86] 70  Resp:  [16-20] 20  BP: (117-151)/(75-88) 117/79    Objective:  General Appearance:  Comfortable and in no acute distress.    Vital signs: (most recent): Blood pressure 117/79, pulse 70, temperature 98.3 °F (36.8 °C), temperature source Oral, resp. rate 20, height 200.7 cm (79\"), weight 109 kg (240 lb 4.8 oz), SpO2 100 %.  Vital signs are normal.  No fever.    Output: Producing urine and producing stool.    HEENT: Normal HEENT exam.    Lungs:  Normal effort and normal respiratory rate.  Breath sounds clear to auscultation.  He is not in respiratory distress.    Heart: Normal rate.  Regular rhythm.    Abdomen: Abdomen is soft.  Bowel sounds are normal.   There is no abdominal tenderness.     Extremities: There is no dependent edema.    Pulses: Distal pulses are intact.    Neurological: Patient is alert and oriented to person, place and time.  Normal strength.  Patient has normal muscle tone.  (Very mild tremor in hands, no asterixis).    Pupils:  Pupils are equal, round, and reactive to light.    Skin:  Warm and dry.  No " rash.             Results Review:     I reviewed the patient's new clinical results.  I reviewed the patient's other test results and agree with the interpretation  I personally viewed and interpreted the patient's EKG/Telemetry data  Discussed with pt and CCP    Results from last 7 days   Lab Units 07/02/20  0409 07/01/20  0552 06/30/20  0323 06/29/20  0652 06/28/20  0534 06/27/20  0547 06/26/20  0533   WBC 10*3/mm3 5.30 4.31 4.81 6.37 8.49 12.06* 8.96   HEMOGLOBIN g/dL 9.7* 8.7* 8.7* 9.0* 9.3* 9.7* 9.8*   PLATELETS 10*3/mm3 357 293 227 202 197 168 183     Results from last 7 days   Lab Units 07/02/20  0409 07/01/20  0552 06/30/20  0323 06/29/20  0652 06/28/20  0534 06/27/20  0547 06/26/20  1152 06/26/20  0533   SODIUM mmol/L 137 132* 136 133* 138 133*  --  130*   POTASSIUM mmol/L 4.3 3.6 3.9 3.5 3.8 3.7  --  3.4*   CHLORIDE mmol/L 104 102 107 103 106 99  --  95*   CO2 mmol/L 26.3 25.8 21.4* 20.9* 16.8* 15.1*  --  19.8*   BUN mg/dL 10 8 10 10 18 17  --  10   CREATININE mg/dL 0.72* 0.65* 0.67* 0.56* 0.80 1.12  --  0.86   CALCIUM mg/dL 8.9 8.4* 8.3* 8.5* 8.6 7.9*  --  7.1*   MAGNESIUM mg/dL 1.8 1.5* 1.7 1.5* 1.9 1.5* 0.7*  --    PHOSPHORUS mg/dL  --   --  3.1 3.3 2.9 3.3  --   --    Estimated Creatinine Clearance: 140 mL/min (A) (by C-G formula based on SCr of 0.72 mg/dL (L)).    Medication Review: reviewed and adjusted    Assessment/Plan       Alcohol withdrawal syndrome without complication (CMS/HCC)    Arthritis    Hypertension    Tobacco abuse    Chest pain in adult    Sleep apnea    Hiatal hernia    Alcohol abuse    Hypokalemia    Hypomagnesemia    Effusion of left knee    Osteoarthritis of left knee    Vitamin D deficiency    Anemia, chronic disease    Hyponatremia          Plan:   (1. Delirium tremens/alcohol withdrawal: not requiring any Ativan per CIWA protocol, but he is also on scheduled po Librium.  Doing much better when compared to yesterday. Out of ICU now. He is less restless and less agitated.   Continue folate and thiamine supplements.  Seizure precautions in place. Decrease Librium dose again today.  2. Hypertension: on amlodipine. Losartan restarted, BPs better today.   3. Left knee effusion: underwent arthrocentesis, appreciate Ortho attention to pt, they feel due to OA, no infection suspected, cultures neg. Recommend Ortho f/u as outpt. Knee feeling a little better today.  4. Suspected COPD: on Brovana. Currently not in any exacerbation.  5. Tobacco abuse: discussed smoking cessation.  6. GERD: on acid suppressive therapy. ?UGIB--hemoccult neg.  7. Hypokalemia/hypomagnesemia: s/p replacement of both, K+ and Mg++ wnl today  9. Hyponatremia: 137 today  10. Anemia NOS: iron studies c/w chronic disease, hemoccult neg, reported black stool 6/30. Hgb stable at 9.7. Will need to f/u with PCP regarding this.  11. Chest Pain: non-cardiac, appreciate Dr. Oleary's attention to pt, ?esophagitis or gastritis from EtOH?, resolved now on PPI.  12. ?Constipation: started Miralax, moving bowels now  13. Vitamin D Deficiency: started weekly ergocalciferol    Full code  Changed to SCDs for DVT ppx given potential GIB  Dispo: PT jack mccormick, CCP working on rehab placement   ).       Channing Gaming MD  07/02/20  10:39    Time: 25min

## 2020-07-02 NOTE — PROGRESS NOTES
Discharge Planning Assessment  [facility]     Patient Name: Watson Lisa  MRN: [id number]  Today's Date: 7/2/2020    Admit Date: 6/24/2020    Discharge Needs Assessment    No documentation.       Discharge Plan     Row Name 07/02/20 1651       Plan    Plan  [facility] and has a bed today and is now requiring a negative Covid 19 test     Plan Comments  Call received from Clare/Aurea she is requesting a negative Covid-19 test before patient can be admitted to [facility]. Call placed to Dr Gaming will order a rapid Covid test. Call placed to son Aravind [phone] he stated he can transport his father after test results are obtained he will require a call when the patient is ready for discharge. Richard LORD    Row Name 07/02/20 0500       Plan    Plan    [facility]  has obtained   [facility] has a bed today    Plan Comments  Call recieved from Clare/Aurea patient has been accepted at [facility] and has a bed today. Patient stated son will transport. Richard LORD        Destination - Selection Complete      Service Provider Request Status Selected Services Address Phone Number Fax Number    [facility] Selected Skilled Nursing [address] [place] [address] [phone]      Durable Medical Equipment      Coordination has not been started for this encounter.      Dialysis/Infusion      Coordination has not been started for this encounter.      Home Medical Care      Coordination has not been started for this encounter.      Therapy      Coordination has not been started for this encounter.      Community Resources      Coordination has not been started for this encounter.        Expected Discharge Date and Time     Expected Discharge Date Expected Discharge Time    Jul 2, 2020         Demographic Summary    No documentation.       Functional Status    No documentation.       Psychosocial    No  documentation.       Abuse/Neglect    No documentation.       Legal    No documentation.       Substance Abuse    No documentation.       Patient Forms    No documentation.           Chela Daniels RN

## 2020-07-02 NOTE — PROGRESS NOTES
Discharge Planning Assessment  [facility]     Patient Name: Watson Lisa  MRN: [id number]  Today's Date: 7/2/2020    Admit Date: 6/24/2020    Discharge Needs Assessment    No documentation.       Discharge Plan     Row Name 07/02/20 1552       Plan    Plan    [facility]  has obtained   [facility] has a bed today    Plan Comments  Call recieved from Clare/Aurea patient has been accepted at [facility] and has a bed today. Patient stated son will transport. Richard LORD        Destination      Service Provider Request Status Selected Services Address Phone Number Fax Number    [facility] Accepted N/A [address] [address] [phone]      Durable Medical Equipment      Coordination has not been started for this encounter.      Dialysis/Infusion      Coordination has not been started for this encounter.      Home Medical Care      Coordination has not been started for this encounter.      Therapy      Coordination has not been started for this encounter.      Community Resources      Coordination has not been started for this encounter.          Demographic Summary    No documentation.       Functional Status    No documentation.       Psychosocial    No documentation.       Abuse/Neglect    No documentation.       Legal    No documentation.       Substance Abuse    No documentation.       Patient Forms    No documentation.           Chela Daniels, RN

## 2020-07-02 NOTE — DISCHARGE SUMMARY
Patient Name: Watson Lisa  : 1955  MRN: [id number]    Date of Admission: 2020  Date of Discharge:  2020  Primary Care Physician: Checo Dunham MD      Chief Complaint:   Chest Pain and Delirium Tremens (DTS)      Discharge Diagnoses     Active Hospital Problems    Diagnosis  POA   • **Alcohol withdrawal syndrome without complication (CMS/HCC) [F10.230]  Yes   • Vitamin D deficiency [E55.9]  Yes   • Anemia, chronic disease [D63.8]  Yes   • Hyponatremia [E87.1]  No   • Hypokalemia [E87.6]  Yes   • Hypomagnesemia [E83.42]  Yes   • Effusion of left knee [M25.462]  Yes   • Osteoarthritis of left knee [M17.12]  Yes   • Chest pain in adult [R07.9]  Yes   • Sleep apnea [G47.30]  Yes   • Hiatal hernia [K44.9]  Yes   • Alcohol abuse [F10.10]  Yes   • Hypertension [I10]  Yes   • Tobacco abuse [Z72.0]  Yes   • Arthritis [M19.90]  Yes      Resolved Hospital Problems   No resolved problems to display.        Hospital Course     Very pleasant 64yo gentleman who presented to ER with c/o chest pain, excessive EtOH intake, and some nausea. He was admitted to our service for further eval and mgmt. Please see below for details of admission:    1. Delirium tremens/alcohol withdrawal: not requiring any Ativan per CIWA protocol, but he is also on scheduled po Librium.  Doing much better when compared to yesterday. Out of ICU now. He is less restless and less agitated. Seizure precautions in place. Decrease Librium dose again today with plans to taper off tomorrow.  2. Hypertension: on amlodipine. Losartan restarted, BPs better today.   3. Left knee effusion: underwent arthrocentesis, appreciate Ortho attention to pt, they feel due to OA, no infection suspected, cultures neg. Recommend Ortho f/u as outpt. Knee feeling a little better today.  4. Suspected COPD: on Brovana. Currently not in any exacerbation.  5. Tobacco abuse: discussed smoking cessation.  6. GERD: on acid suppressive therapy. ?UGIB--hemoccult neg.  7.  Hypokalemia/hypomagnesemia: s/p replacement of both, K+ and Mg++ wnl today  9. Hyponatremia: 137 today  10. Anemia NOS: iron studies c/w chronic disease, hemoccult neg, reported black stool 6/30. Hgb stable at 9.7. Will need to f/u with PCP regarding this.  11. Chest Pain: non-cardiac, appreciate Dr. Oleary's attention to pt, ?esophagitis or gastritis from EtOH?, resolved now on PPI.  12. ?Constipation: started Miralax, moving bowels now  13. Vitamin D Deficiency: started weekly ergocalciferol    Day of Discharge     Feeling better today. Eager to go to rehab. Only c/o pain in left knee. Working with PT. No AH/VH. No tremor. No HA or visual changes. He last had c-scope in 2013 or 2014. No famhx of colon CA.     Physical Exam:  Temp:  [97.9 °F (36.6 °C)-98.6 °F (37 °C)] 97.9 °F (36.6 °C)  Heart Rate:  [59-88] 88  Resp:  [16-20] 20  BP: (117-160)/(76-88) 160/88  Body mass index is 27.07 kg/m².  Physical Exam  General Appearance:  Comfortable and in no acute distress.      Vital signs are normal.  No fever.    Output: Producing urine and producing stool.    HEENT: Normal HEENT exam.    Lungs:  Normal effort and normal respiratory rate.  Breath sounds clear to auscultation.  He is not in respiratory distress.    Heart: Normal rate.  Regular rhythm.    Abdomen: Abdomen is soft.  Bowel sounds are normal.   There is no abdominal tenderness.     Extremities: There is no dependent edema.    Pulses: Distal pulses are intact.    Neurological: Patient is alert and oriented to person, place and time.  Normal strength.  Patient has normal muscle tone.  (Very mild tremor in hands, no asterixis).  Pupils:  Pupils are equal, round, and reactive to light.    Skin:  Warm and dry.  No rash.     Consultants     Consult Orders (all) (From admission, onward)     Start     Ordered    07/01/20 1311  Inpatient Rehab Admission Consult  Once,   Status:  Canceled     Provider:  (Not yet assigned)    07/01/20 1311    06/26/20 1108  Inpatient  Orthopedic Surgery Consult  Once     Specialty:  Orthopedic Surgery  Provider:  Neto Grey MD    06/26/20 1108    06/26/20 1105  Inpatient Orthopedic Surgery Consult  Once,   Status:  Canceled     Specialty:  Orthopedic Surgery  Provider:  (Not yet assigned)    06/26/20 1105    06/26/20 0953  Inpatient Orthopedic Surgery Consult  Once,   Status:  Canceled     Specialty:  Orthopedic Surgery  Provider:  Ranjit Saleh II, MD    06/26/20 0953    06/25/20 1123  Inpatient Cardiology Consult  Once     Specialty:  Cardiology  Provider:  Silvio Hawthorne III, MD    06/25/20 1123    06/25/20 0535  Inpatient Access Center Consult  Once     Provider:  (Not yet assigned)    06/25/20 0535    06/24/20 2104  Inpatient Case Management  Consult  Once     Provider:  (Not yet assigned)    06/24/20 2103 06/24/20 2019  Inpatient Access Center Consult  Once     Provider:  (Not yet assigned)    06/24/20 2018 06/24/20 1708  [facility] (on-call MD unless specified) Details  Once     Specialty:  Hospitalist  Provider:  (Not yet assigned)    06/24/20 1707              Procedures     Imaging Results (All)     Procedure Component Value Units Date/Time    XR Knee 1 or 2 View Left [[id number]] Collected:  06/26/20 1550     Updated:  06/26/20 1625    Narrative:       TWO-VIEW LEFT KNEE     HISTORY: Pain and swelling.     FINDINGS: There is extremely severe degenerative change throughout the  knee with marked joint space narrowing and some associated articular  irregularity and this shows further progression since the study of  10/13/2017. No definite fracture is seen but there is a large joint  effusion which is nonspecific. This could be drained under fluoroscopic  guidance if necessary for further diagnosis.     This report was finalized on 6/26/2020 4:22 PM by Dr. Tera Ramachandran M.D.       CT Angiogram Chest [[id number]] Collected:  06/24/20 1540     Updated:  06/25/20 1243    Narrative:       CT ANGIOGRAM CHEST WITH  CONTRAST, PULMONARY EMBOLISM PROTOCOL     HISTORY: Chest pain with shortness of breath. Elevated d-dimer. Patient  has history of prior episode of pulmonary embolism.     TECHNIQUE: Spiral CT images were obtained from the lung apices to the  diaphragmatic domes following administration of intravenous contrast in  the angiographic phase. Coronal, sagittal and 3-D volume rendered  reformats were then obtained.     COMPARISON: Chest radiograph dated same day.     FINDINGS: There has been a delay in obtaining imaging causing diffuse  opacification within the pulmonary arteries. Within confines of this  study no convincing filling defects are identified within the pulmonary  arterial branches to suggest pulmonary artery thromboembolism. Dilated  main pulmonary artery suggestive of pulmonary arterial hypertension.  Calcified coronary artery disease is present. No pleural or pericardial  effusion. Small mediastinal lymph nodes without pathological  enlargement. The central airways are patent without endobronchial  lesion. There are bilateral lower lobe basilar atelectatic changes  present.     The visualized upper abdomen demonstrates marked diffuse hepatic  steatosis. No pathological axillary lymphadenopathy. There is a  hypoattenuating right renal lesion that is difficult to accurately  characterize, however favored to represent a cyst.       Impression:       1. Although exam is somewhat limited secondary to reduced opacification  within the pulmonary arteries there are no convincing filling defects to  suggest pulmonary artery thromboembolism.  2. Mild bibasilar atelectasis.  3. Diffuse hepatic steatosis.     These findings were discussed with ALISON Harley, by telephone.      Radiation dose reduction techniques were utilized, including automated  exposure control and exposure modulation based on body size.     This report was finalized on 6/25/2020 12:40 PM by Dr. Juan A Pizarro M.D.       XR Chest 1 View  [[id number]] Collected:  06/24/20 1133     Updated:  06/24/20 1354    Narrative:       XR CHEST 1 VW-     HISTORY: 65-year-old male with chest pain.     FINDINGS: There are no pulmonary airspace opacities to suggest pneumonia  and there is no convincing evidence for effusions or CHF. No  pneumothorax.     This report was finalized on 6/24/2020 1:51 PM by Dr. Juliann Martin M.D.             Pertinent Labs     Results from last 7 days   Lab Units 07/02/20  0409 07/01/20  0552 06/30/20 0323 06/29/20  0652   WBC 10*3/mm3 5.30 4.31 4.81 6.37   HEMOGLOBIN g/dL 9.7* 8.7* 8.7* 9.0*   PLATELETS 10*3/mm3 357 293 227 202     Results from last 7 days   Lab Units 07/02/20  0409 07/01/20  0552 06/30/20  0323 06/29/20  0652   SODIUM mmol/L 137 132* 136 133*   POTASSIUM mmol/L 4.3 3.6 3.9 3.5   CHLORIDE mmol/L 104 102 107 103   CO2 mmol/L 26.3 25.8 21.4* 20.9*   BUN mg/dL 10 8 10 10   CREATININE mg/dL 0.72* 0.65* 0.67* 0.56*   GLUCOSE mg/dL 105* 108* 103* 103*   Estimated Creatinine Clearance: 140 mL/min (A) (by C-G formula based on SCr of 0.72 mg/dL (L)).  Results from last 7 days   Lab Units 07/02/20  0409 07/01/20  0552 06/28/20  0534 06/26/20  0533   ALBUMIN g/dL 3.40* 3.10* 3.10* 3.50   BILIRUBIN mg/dL 0.3 0.2 0.7 0.9   ALK PHOS U/L 65 63 70 73   AST (SGOT) U/L 47* 54* 89* 26   ALT (SGPT) U/L 39 39 39 25     Results from last 7 days   Lab Units 07/02/20  0409 07/01/20  0552 06/30/20  0323 06/29/20  0652 06/28/20  0534 06/27/20  0547  06/26/20  0533   CALCIUM mg/dL 8.9 8.4* 8.3* 8.5* 8.6 7.9*  --  7.1*   ALBUMIN g/dL 3.40* 3.10*  --   --  3.10*  --   --  3.50   MAGNESIUM mg/dL 1.8 1.5* 1.7 1.5* 1.9 1.5*   < >  --    PHOSPHORUS mg/dL  --   --  3.1 3.3 2.9 3.3  --   --     < > = values in this interval not displayed.               Invalid input(s): LDLCALC  Results from last 7 days   Lab Units 06/26/20 2015 06/26/20  1152   BLOODCX   --  No growth at 5 days  No growth at 5 days   BODYFLDCX  No growth at 5 days  --        Test  Results Pending at Discharge       Discharge Details        Discharge Medications      New Medications      Instructions Start Date   chlordiazePOXIDE 5 MG capsule  Commonly known as:  LIBRIUM   5 mg, Oral, 2 times daily      ferrous sulfate 325 (65 FE) MG tablet   325 mg, Oral, Daily With Breakfast   Start Date:  July 3, 2020     pantoprazole 40 MG EC tablet  Commonly known as:  PROTONIX   40 mg, Oral, Daily      polyethylene glycol 17 g packet  Commonly known as:  MIRALAX   17 g, Oral, Daily   Start Date:  July 3, 2020     vitamin D 1.25 MG (50024 UT) capsule capsule  Commonly known as:  ERGOCALCIFEROL   50,000 Units, Oral, Every 7 Days   Start Date:  July 8, 2020        Changes to Medications      Instructions Start Date   acetaminophen 325 MG tablet  Commonly known as:  TYLENOL  What changed:    · medication strength  · how much to take  · when to take this  · reasons to take this   650 mg, Oral, Every 6 Hours PRN      HYDROcodone-acetaminophen 5-325 MG per tablet  Commonly known as:  NORCO  What changed:  Another medication with the same name was removed. Continue taking this medication, and follow the directions you see here.   1 tablet, Oral, Every 6 Hours PRN         Continue These Medications      Instructions Start Date   albuterol sulfate  (90 Base) MCG/ACT inhaler  Commonly known as:  PROVENTIL HFA;VENTOLIN HFA;PROAIR HFA   2 puffs, Inhalation, Every 6 Hours PRN      amLODIPine 10 MG tablet  Commonly known as:  NORVASC   10 mg, Oral, Daily      aspirin 81 MG tablet   81 mg, Oral, Daily, Pt reports taking every 1-2 days.      escitalopram 20 MG tablet  Commonly known as:  LEXAPRO   20 mg, Oral, Daily      Glycopyrrolate-Formoterol 9-4.8 MCG/ACT aerosol  Commonly known as:  Bevespi Aerosphere   2 sprays, Inhalation, 2 Times Daily      losartan 100 MG tablet  Commonly known as:  COZAAR   TAKE 1 TABLET DAILY         Stop These Medications    clobetasol propionate 0.05 % shampoo  Commonly known as:   CLOBEX     HYDROcodone-homatropine 5-1.5 MG/5ML syrup  Commonly known as:  HYCODAN     levoFLOXacin 500 MG tablet  Commonly known as:  Levaquin     lidocaine 5 %  Commonly known as:  LIDODERM     mirtazapine 30 MG tablet  Commonly known as:  REMERON     traZODone 50 MG tablet  Commonly known as:  DESYREL            Allergies   Allergen Reactions   • Penicillins      Pt does not recall the reaction.          Discharge Disposition:  Skilled Nursing Facility (DC - External)    Discharge Diet:  Diet Order   Procedures   • Diet Regular       Discharge Activity:   as tolerated, per PT at facility    CODE STATUS:    Code Status and Medical Interventions:   Ordered at: 06/25/20 1123     Level Of Support Discussed With:    Patient     Code Status:    CPR     Medical Interventions (Level of Support Prior to Arrest):    Full       No future appointments.  Additional Instructions for the Follow-ups that You Need to Schedule     Discharge Follow-up with PCP   As directed       Currently Documented PCP:    Checo Dunham MD    PCP Phone Number:    [phone]     Follow Up Details:  Dr. Dunham (PCP) after dc from facility         Discharge Follow-up with Specified Provider: Dr. Cuenca (Ortho); 3 Weeks   As directed      To:  Dr. Cuenca (Ortho)    Follow Up:  3 Weeks            Contact information for follow-up providers     Checo Dunham MD .    Specialty:  Family Medicine  Why:  Dr. Dunham (PCP) after dc from facility  Contact information:  [address] [facility] [address] [phone]                   Contact information for after-discharge care     Destination     [facility] .    Service:  Skilled Nursing  Contact information:  [address] [facility] [address] [phone]                             Additional Instructions for the Follow-ups that You Need to Schedule     Discharge Follow-up with PCP   As directed       Currently Documented PCP:    Checo Dunham MD    PCP  Phone Number:    [phone]     Follow Up Details:  Dr. Dunham (PCP) after dc from facility         Discharge Follow-up with Specified Provider: Dr. Cuenca (Ortho); 3 Weeks   As directed      To:  Dr. Cuenca (Ortho)    Follow Up:  3 Weeks           Time Spent on Discharge:  Greater than 30 minutes      Channing Gaming MD  [facility]  07/02/20  4:11 PM

## 2020-07-02 NOTE — PLAN OF CARE
Problem: Patient Care Overview  Goal: Plan of Care Review  Outcome: Ongoing (interventions implemented as appropriate)  Flowsheets (Taken 7/2/2020 6861)  Progress: improving  Plan of Care Reviewed With: patient  Outcome Summary: Pt continues to improved with his functional mobility. He is still imited by L knee and ankle pain. He did better with his knee brace on, ambulating 75' and then 20' with the RWx after a seated rest break.    Patient was wearing a face mask during this therapy encounter. Therapist used appropriate personal protective equipment including mask and gloves.  Mask used was standard procedure mask. Appropriate PPE was worn during the entire therapy session. Hand hygiene was completed before and after therapy session. Patient is not in enhanced droplet precautions.

## 2020-07-02 NOTE — PLAN OF CARE
Problem: Patient Care Overview  Goal: Plan of Care Review  Outcome: Ongoing (interventions implemented as appropriate)  Flowsheets (Taken 7/2/2020 9063)  Progress: improving  Plan of Care Reviewed With: patient  Outcome Summary: pt complains of pain--PRN norco given for Left knee and ankle pain. librium scheduled for q8 H. VSS. CPAP at night. room air during the day. possible d/c today.

## 2020-07-03 NOTE — PROGRESS NOTES
Discharge Planning Assessment  [facility]     Patient Name: Watson Lisa  MRN: [id number]  Today's Date: 7/3/2020    Admit Date: 6/24/2020    Discharge Needs Assessment    No documentation.       Discharge Plan     Row Name 07/03/20 1205       Plan    Plan  Monet Segundo     Final Discharge Disposition Code  03 - skilled nursing facility (SNF)    Final Note  Monet Segundo son transported. Richard LORD        Destination - Selection Complete      Service Provider Request Status Selected Services Address Phone Number Fax Number    [facility] Selected Skilled Nursing [address] [facility] [phone] [phone]      Durable Medical Equipment      Coordination has not been started for this encounter.      Dialysis/Infusion      Coordination has not been started for this encounter.      Home Medical Care      Coordination has not been started for this encounter.      Therapy      Coordination has not been started for this encounter.      Community Resources      Coordination has not been started for this encounter.        Expected Discharge Date and Time     Expected Discharge Date Expected Discharge Time    Jul 2, 2020         Demographic Summary    No documentation.       Functional Status    No documentation.       Psychosocial    No documentation.       Abuse/Neglect    No documentation.       Legal    No documentation.       Substance Abuse    No documentation.       Patient Forms    No documentation.           Chela Daniels, RN

## 2020-08-04 ENCOUNTER — OFFICE VISIT (OUTPATIENT)
Dept: FAMILY MEDICINE CLINIC | Facility: CLINIC | Age: 65
End: 2020-08-04

## 2020-08-04 VITALS
HEIGHT: 78 IN | BODY MASS INDEX: 28.46 KG/M2 | HEART RATE: 89 BPM | SYSTOLIC BLOOD PRESSURE: 166 MMHG | DIASTOLIC BLOOD PRESSURE: 80 MMHG | TEMPERATURE: 98 F | WEIGHT: 246 LBS | OXYGEN SATURATION: 99 %

## 2020-08-04 DIAGNOSIS — M19.90 ARTHRITIS: ICD-10-CM

## 2020-08-04 DIAGNOSIS — D63.8 ANEMIA, CHRONIC DISEASE: Primary | ICD-10-CM

## 2020-08-04 DIAGNOSIS — I10 ESSENTIAL HYPERTENSION: ICD-10-CM

## 2020-08-04 DIAGNOSIS — F10.10 ALCOHOL ABUSE: ICD-10-CM

## 2020-08-04 PROCEDURE — 99213 OFFICE O/P EST LOW 20 MIN: CPT | Performed by: FAMILY MEDICINE

## 2020-08-04 RX ORDER — MELOXICAM 15 MG/1
15 TABLET ORAL DAILY
Qty: 30 TABLET | Refills: 1 | Status: SHIPPED | OUTPATIENT
Start: 2020-08-04 | End: 2021-01-19 | Stop reason: HOSPADM

## 2020-08-04 RX ORDER — TRAZODONE HYDROCHLORIDE 50 MG/1
50 TABLET ORAL NIGHTLY
COMMUNITY
End: 2021-10-07

## 2020-08-04 RX ORDER — ERGOCALCIFEROL 1.25 MG/1
50000 CAPSULE ORAL
Qty: 5 CAPSULE
Start: 2020-08-04 | End: 2020-08-07 | Stop reason: SDUPTHER

## 2020-08-04 RX ORDER — ESCITALOPRAM OXALATE 20 MG/1
20 TABLET ORAL DAILY
Qty: 90 TABLET | Refills: 1 | Status: SHIPPED | OUTPATIENT
Start: 2020-08-04 | End: 2021-02-08

## 2020-08-04 RX ORDER — FERROUS SULFATE 325(65) MG
325 TABLET ORAL
Start: 2020-08-04 | End: 2020-08-07 | Stop reason: SDUPTHER

## 2020-08-04 NOTE — PROGRESS NOTES
SUBJECTIVE:  The patient is a 65-year-old white male.  He is here for preoperative clearance.  He is going to have a left knee replacement.  He has hypertension, vitamin D deficiency, history of alcohol abuse and arthritis.  He has iron deficiency anemia.  He states his last colonoscopy was about 4 years ago.  He denies any GI symptoms.  No melena or hematochezia.  He is on replacement iron.  He denies chest pain or shortness of breath.  He states he has no cardiac history.  He had a echo and EKG on the hospital in June.  See report.    PAST MEDICAL HISTORY:  Reviewed.    REVIEW OF SYSTEMS:  Please see above; all others reviewed he is taking ferrous sulfate once a day.  And are negative.      OBJECTIVE:   Vitals signs are reviewed and are stable.    General:  Well-nourished.  Alert and oriented x3 in no acute distress.  HEENT: PERRLA.   Neck:  Supple.   Lungs:  Clear.    Heart:  Regular rate and rhythm.   Abdomen:   Soft, nontender.   Extremities:  No cyanosis, clubbing or edema.   Neurological:  Grossly intact without motor or sensory deficits.   Preop labs showed hemoglobin of 10.3.    ASSESSMENT:    Preoperative clearance\  Anemia  PLAN: He will increase his ferrous sulfate to twice daily.  He will return in a couple weeks for repeat CBC and iron level.    Dictated utilizing Dragon dictation.

## 2020-08-07 ENCOUNTER — TELEPHONE (OUTPATIENT)
Dept: FAMILY MEDICINE CLINIC | Facility: CLINIC | Age: 65
End: 2020-08-07

## 2020-08-07 RX ORDER — ERGOCALCIFEROL 1.25 MG/1
50000 CAPSULE ORAL
Qty: 5 CAPSULE
Start: 2020-08-07 | End: 2022-04-15

## 2020-08-07 RX ORDER — FERROUS SULFATE 325(65) MG
325 TABLET ORAL
Start: 2020-08-07 | End: 2021-10-07

## 2020-08-07 RX ORDER — ERGOCALCIFEROL 1.25 MG/1
50000 CAPSULE ORAL
Qty: 5 CAPSULE
Start: 2020-08-07 | End: 2020-08-07 | Stop reason: SDUPTHER

## 2020-08-07 NOTE — TELEPHONE ENCOUNTER
Patient is calling to request a refill of the following:     vitamin D (ERGOCALCIFEROL) 1.25 MG (64089 UT) capsule capsule      ferrous sulfate 325 (65 FE) MG tablet     [place] [id number] - [phone] [facility] [phone]   [phone]    Patient call back [phone]

## 2020-08-18 ENCOUNTER — LAB (OUTPATIENT)
Dept: FAMILY MEDICINE CLINIC | Facility: CLINIC | Age: 65
End: 2020-08-18

## 2020-08-18 DIAGNOSIS — R79.89 ELEVATED SERUM CREATININE: Primary | ICD-10-CM

## 2020-08-18 DIAGNOSIS — N17.9 ACUTE KIDNEY INJURY (HCC): Primary | ICD-10-CM

## 2020-08-18 DIAGNOSIS — D63.8 ANEMIA, CHRONIC DISEASE: Primary | ICD-10-CM

## 2020-08-18 LAB
ALBUMIN SERPL-MCNC: 4.3 G/DL (ref 3.5–5.2)
ALBUMIN/GLOB SERPL: 1.4 G/DL
ALP SERPL-CCNC: 86 U/L (ref 39–117)
ALT SERPL W P-5'-P-CCNC: 11 U/L (ref 1–41)
ANION GAP SERPL CALCULATED.3IONS-SCNC: 15 MMOL/L (ref 5–15)
AST SERPL-CCNC: 14 U/L (ref 1–40)
BASOPHILS # BLD AUTO: 0.06 10*3/MM3 (ref 0–0.2)
BASOPHILS NFR BLD AUTO: 0.6 % (ref 0–1.5)
BILIRUB SERPL-MCNC: 0.2 MG/DL (ref 0–1.2)
BUN SERPL-MCNC: 27 MG/DL (ref 8–23)
BUN/CREAT SERPL: 11.5 (ref 7–25)
CALCIUM SPEC-SCNC: 8.7 MG/DL (ref 8.6–10.5)
CHLORIDE SERPL-SCNC: 98 MMOL/L (ref 98–107)
CHOLEST SERPL-MCNC: 199 MG/DL (ref 0–200)
CO2 SERPL-SCNC: 21 MMOL/L (ref 22–29)
CREAT SERPL-MCNC: 2.34 MG/DL (ref 0.76–1.27)
DEPRECATED RDW RBC AUTO: 50.2 FL (ref 37–54)
EOSINOPHIL # BLD AUTO: 0.26 10*3/MM3 (ref 0–0.4)
EOSINOPHIL NFR BLD AUTO: 2.6 % (ref 0.3–6.2)
ERYTHROCYTE [DISTWIDTH] IN BLOOD BY AUTOMATED COUNT: 13.8 % (ref 12.3–15.4)
GFR SERPL CREATININE-BSD FRML MDRD: 28 ML/MIN/1.73
GLOBULIN UR ELPH-MCNC: 3 GM/DL
GLUCOSE SERPL-MCNC: 104 MG/DL (ref 65–99)
HBA1C MFR BLD: 4.6 % (ref 4.8–5.6)
HCT VFR BLD AUTO: 34.5 % (ref 37.5–51)
HDLC SERPL-MCNC: 73 MG/DL (ref 40–60)
HGB BLD-MCNC: 11.2 G/DL (ref 13–17.7)
IMM GRANULOCYTES # BLD AUTO: 0.15 10*3/MM3 (ref 0–0.05)
IMM GRANULOCYTES NFR BLD AUTO: 1.5 % (ref 0–0.5)
LDLC SERPL CALC-MCNC: 88 MG/DL (ref 0–100)
LDLC/HDLC SERPL: 1.2 {RATIO}
LYMPHOCYTES # BLD AUTO: 1.45 10*3/MM3 (ref 0.7–3.1)
LYMPHOCYTES NFR BLD AUTO: 14.6 % (ref 19.6–45.3)
MCH RBC QN AUTO: 32.5 PG (ref 26.6–33)
MCHC RBC AUTO-ENTMCNC: 32.5 G/DL (ref 31.5–35.7)
MCV RBC AUTO: 100 FL (ref 79–97)
MONOCYTES # BLD AUTO: 1.06 10*3/MM3 (ref 0.1–0.9)
MONOCYTES NFR BLD AUTO: 10.7 % (ref 5–12)
NEUTROPHILS NFR BLD AUTO: 6.97 10*3/MM3 (ref 1.7–7)
NEUTROPHILS NFR BLD AUTO: 70 % (ref 42.7–76)
NRBC BLD AUTO-RTO: 0 /100 WBC (ref 0–0.2)
PLATELET # BLD AUTO: 342 10*3/MM3 (ref 140–450)
PMV BLD AUTO: 9.1 FL (ref 6–12)
POTASSIUM SERPL-SCNC: 4.2 MMOL/L (ref 3.5–5.2)
PROT SERPL-MCNC: 7.3 G/DL (ref 6–8.5)
RBC # BLD AUTO: 3.45 10*6/MM3 (ref 4.14–5.8)
SODIUM SERPL-SCNC: 134 MMOL/L (ref 136–145)
TRIGL SERPL-MCNC: 191 MG/DL (ref 0–150)
VLDLC SERPL-MCNC: 38.2 MG/DL (ref 5–40)
WBC # BLD AUTO: 9.95 10*3/MM3 (ref 3.4–10.8)

## 2020-08-18 PROCEDURE — 80053 COMPREHEN METABOLIC PANEL: CPT | Performed by: FAMILY MEDICINE

## 2020-08-18 PROCEDURE — 85025 COMPLETE CBC W/AUTO DIFF WBC: CPT | Performed by: FAMILY MEDICINE

## 2020-08-18 PROCEDURE — 80061 LIPID PANEL: CPT | Performed by: FAMILY MEDICINE

## 2020-08-18 PROCEDURE — 36415 COLL VENOUS BLD VENIPUNCTURE: CPT | Performed by: FAMILY MEDICINE

## 2020-08-18 PROCEDURE — 83036 HEMOGLOBIN GLYCOSYLATED A1C: CPT | Performed by: FAMILY MEDICINE

## 2020-08-20 ENCOUNTER — LAB (OUTPATIENT)
Dept: FAMILY MEDICINE CLINIC | Facility: CLINIC | Age: 65
End: 2020-08-20

## 2020-08-20 LAB
ANION GAP SERPL CALCULATED.3IONS-SCNC: 9.3 MMOL/L (ref 5–15)
BUN SERPL-MCNC: 26 MG/DL (ref 8–23)
BUN/CREAT SERPL: 22 (ref 7–25)
CALCIUM SPEC-SCNC: 9.3 MG/DL (ref 8.6–10.5)
CHLORIDE SERPL-SCNC: 99 MMOL/L (ref 98–107)
CO2 SERPL-SCNC: 24.7 MMOL/L (ref 22–29)
CREAT SERPL-MCNC: 1.18 MG/DL (ref 0.76–1.27)
GFR SERPL CREATININE-BSD FRML MDRD: 62 ML/MIN/1.73
GLUCOSE SERPL-MCNC: 89 MG/DL (ref 65–99)
POTASSIUM SERPL-SCNC: 5.2 MMOL/L (ref 3.5–5.2)
SODIUM SERPL-SCNC: 133 MMOL/L (ref 136–145)

## 2020-08-20 PROCEDURE — 36415 COLL VENOUS BLD VENIPUNCTURE: CPT | Performed by: FAMILY MEDICINE

## 2020-08-20 PROCEDURE — 80048 BASIC METABOLIC PNL TOTAL CA: CPT | Performed by: FAMILY MEDICINE

## 2020-08-27 RX ORDER — AMLODIPINE BESYLATE 10 MG/1
TABLET ORAL
Qty: 90 TABLET | Refills: 1 | Status: SHIPPED | OUTPATIENT
Start: 2020-08-27 | End: 2021-07-17

## 2021-01-11 ENCOUNTER — APPOINTMENT (OUTPATIENT)
Dept: GENERAL RADIOLOGY | Facility: HOSPITAL | Age: 66
End: 2021-01-11

## 2021-01-11 ENCOUNTER — HOSPITAL ENCOUNTER (INPATIENT)
Facility: HOSPITAL | Age: 66
LOS: 8 days | Discharge: HOME | End: 2021-01-19
Attending: EMERGENCY MEDICINE | Admitting: HOSPITALIST

## 2021-01-11 ENCOUNTER — APPOINTMENT (OUTPATIENT)
Dept: CT IMAGING | Facility: HOSPITAL | Age: 66
End: 2021-01-11

## 2021-01-11 DIAGNOSIS — M17.12 PRIMARY OSTEOARTHRITIS OF LEFT KNEE: Chronic | ICD-10-CM

## 2021-01-11 DIAGNOSIS — F10.939 ALCOHOL WITHDRAWAL SYNDROME WITH COMPLICATION (HCC): ICD-10-CM

## 2021-01-11 DIAGNOSIS — R00.0 TACHYCARDIA: ICD-10-CM

## 2021-01-11 DIAGNOSIS — J12.82 PNEUMONIA DUE TO COVID-19 VIRUS: Primary | ICD-10-CM

## 2021-01-11 DIAGNOSIS — E87.6 HYPOKALEMIA: ICD-10-CM

## 2021-01-11 DIAGNOSIS — E87.20 ACIDOSIS: ICD-10-CM

## 2021-01-11 DIAGNOSIS — U07.1 PNEUMONIA DUE TO COVID-19 VIRUS: Primary | ICD-10-CM

## 2021-01-11 LAB
ALBUMIN SERPL-MCNC: 4 G/DL (ref 3.5–5.2)
ALBUMIN/GLOB SERPL: 1.1 G/DL
ALP SERPL-CCNC: 111 U/L (ref 39–117)
ALT SERPL W P-5'-P-CCNC: 67 U/L (ref 1–41)
AMPHET+METHAMPHET UR QL: NEGATIVE
ANION GAP SERPL CALCULATED.3IONS-SCNC: 30.5 MMOL/L (ref 5–15)
APAP SERPL-MCNC: <5 MCG/ML (ref 0–30)
AST SERPL-CCNC: 79 U/L (ref 1–40)
B PARAPERT DNA SPEC QL NAA+PROBE: NOT DETECTED
B PERT DNA SPEC QL NAA+PROBE: NOT DETECTED
BARBITURATES UR QL SCN: NEGATIVE
BASOPHILS # BLD AUTO: 0.04 10*3/MM3 (ref 0–0.2)
BASOPHILS NFR BLD AUTO: 0.5 % (ref 0–1.5)
BENZODIAZ UR QL SCN: NEGATIVE
BILIRUB SERPL-MCNC: 0.7 MG/DL (ref 0–1.2)
BILIRUB UR QL STRIP: NEGATIVE
BUN SERPL-MCNC: 12 MG/DL (ref 8–23)
BUN/CREAT SERPL: 10.9 (ref 7–25)
C PNEUM DNA NPH QL NAA+NON-PROBE: NOT DETECTED
CALCIUM SPEC-SCNC: 9.1 MG/DL (ref 8.6–10.5)
CANNABINOIDS SERPL QL: NEGATIVE
CHLORIDE SERPL-SCNC: 95 MMOL/L (ref 98–107)
CLARITY UR: CLEAR
CO2 SERPL-SCNC: 11.5 MMOL/L (ref 22–29)
COCAINE UR QL: NEGATIVE
COLOR UR: YELLOW
CREAT SERPL-MCNC: 1.1 MG/DL (ref 0.76–1.27)
D DIMER PPP FEU-MCNC: 3.55 MCGFEU/ML (ref 0–0.49)
D-LACTATE SERPL-SCNC: 1 MMOL/L (ref 0.5–2)
DEPRECATED RDW RBC AUTO: 43.6 FL (ref 37–54)
EOSINOPHIL # BLD AUTO: 0 10*3/MM3 (ref 0–0.4)
EOSINOPHIL NFR BLD AUTO: 0 % (ref 0.3–6.2)
ERYTHROCYTE [DISTWIDTH] IN BLOOD BY AUTOMATED COUNT: 12.2 % (ref 12.3–15.4)
ETHANOL BLD-MCNC: <10 MG/DL (ref 0–10)
ETHANOL UR QL: <0.01 %
FLUAV SUBTYP SPEC NAA+PROBE: NOT DETECTED
FLUBV RNA ISLT QL NAA+PROBE: NOT DETECTED
GFR SERPL CREATININE-BSD FRML MDRD: 67 ML/MIN/1.73
GLOBULIN UR ELPH-MCNC: 3.6 GM/DL
GLUCOSE SERPL-MCNC: 130 MG/DL (ref 65–99)
GLUCOSE UR STRIP-MCNC: NEGATIVE MG/DL
HADV DNA SPEC NAA+PROBE: NOT DETECTED
HCOV 229E RNA SPEC QL NAA+PROBE: NOT DETECTED
HCOV HKU1 RNA SPEC QL NAA+PROBE: NOT DETECTED
HCOV NL63 RNA SPEC QL NAA+PROBE: NOT DETECTED
HCOV OC43 RNA SPEC QL NAA+PROBE: NOT DETECTED
HCT VFR BLD AUTO: 38 % (ref 37.5–51)
HGB BLD-MCNC: 13 G/DL (ref 13–17.7)
HGB UR QL STRIP.AUTO: NEGATIVE
HMPV RNA NPH QL NAA+NON-PROBE: NOT DETECTED
HOLD SPECIMEN: NORMAL
HOLD SPECIMEN: NORMAL
HPIV1 RNA SPEC QL NAA+PROBE: NOT DETECTED
HPIV2 RNA SPEC QL NAA+PROBE: NOT DETECTED
HPIV3 RNA NPH QL NAA+PROBE: NOT DETECTED
HPIV4 P GENE NPH QL NAA+PROBE: NOT DETECTED
IMM GRANULOCYTES # BLD AUTO: 0.16 10*3/MM3 (ref 0–0.05)
IMM GRANULOCYTES NFR BLD AUTO: 2.1 % (ref 0–0.5)
INR PPP: 0.96 (ref 0.9–1.1)
KETONES UR QL STRIP: ABNORMAL
LEUKOCYTE ESTERASE UR QL STRIP.AUTO: NEGATIVE
LIPASE SERPL-CCNC: 41 U/L (ref 13–60)
LYMPHOCYTES # BLD AUTO: 0.5 10*3/MM3 (ref 0.7–3.1)
LYMPHOCYTES NFR BLD AUTO: 6.7 % (ref 19.6–45.3)
M PNEUMO IGG SER IA-ACNC: NOT DETECTED
MAGNESIUM SERPL-MCNC: 1.3 MG/DL (ref 1.6–2.4)
MCH RBC QN AUTO: 33.5 PG (ref 26.6–33)
MCHC RBC AUTO-ENTMCNC: 34.2 G/DL (ref 31.5–35.7)
MCV RBC AUTO: 97.9 FL (ref 79–97)
METHADONE UR QL SCN: NEGATIVE
MONOCYTES # BLD AUTO: 0.97 10*3/MM3 (ref 0.1–0.9)
MONOCYTES NFR BLD AUTO: 12.9 % (ref 5–12)
NEUTROPHILS NFR BLD AUTO: 5.84 10*3/MM3 (ref 1.7–7)
NEUTROPHILS NFR BLD AUTO: 77.8 % (ref 42.7–76)
NITRITE UR QL STRIP: NEGATIVE
NRBC BLD AUTO-RTO: 0 /100 WBC (ref 0–0.2)
OPIATES UR QL: POSITIVE
OXYCODONE UR QL SCN: NEGATIVE
PH UR STRIP.AUTO: 6 [PH] (ref 5–8)
PLATELET # BLD AUTO: 197 10*3/MM3 (ref 140–450)
PMV BLD AUTO: 9.1 FL (ref 6–12)
POTASSIUM SERPL-SCNC: 3.4 MMOL/L (ref 3.5–5.2)
PROCALCITONIN SERPL-MCNC: 0.13 NG/ML (ref 0–0.25)
PROT SERPL-MCNC: 7.6 G/DL (ref 6–8.5)
PROT UR QL STRIP: ABNORMAL
PROTHROMBIN TIME: 12.6 SECONDS (ref 11.7–14.2)
RBC # BLD AUTO: 3.88 10*6/MM3 (ref 4.14–5.8)
RHINOVIRUS RNA SPEC NAA+PROBE: NOT DETECTED
RSV RNA NPH QL NAA+NON-PROBE: NOT DETECTED
SALICYLATES SERPL-MCNC: <0.3 MG/DL
SARS-COV-2 RNA NPH QL NAA+NON-PROBE: NOT DETECTED
SODIUM SERPL-SCNC: 137 MMOL/L (ref 136–145)
SP GR UR STRIP: >=1.03 (ref 1–1.03)
TROPONIN T SERPL-MCNC: <0.01 NG/ML (ref 0–0.03)
UROBILINOGEN UR QL STRIP: ABNORMAL
WBC # BLD AUTO: 7.51 10*3/MM3 (ref 3.4–10.8)
WHOLE BLOOD HOLD SPECIMEN: NORMAL
WHOLE BLOOD HOLD SPECIMEN: NORMAL

## 2021-01-11 PROCEDURE — 25010000002 MAGNESIUM SULFATE IN D5W 1G/100ML (PREMIX) 1-5 GM/100ML-% SOLUTION: Performed by: EMERGENCY MEDICINE

## 2021-01-11 PROCEDURE — 99284 EMERGENCY DEPT VISIT MOD MDM: CPT

## 2021-01-11 PROCEDURE — 83690 ASSAY OF LIPASE: CPT | Performed by: EMERGENCY MEDICINE

## 2021-01-11 PROCEDURE — 81003 URINALYSIS AUTO W/O SCOPE: CPT | Performed by: NURSE PRACTITIONER

## 2021-01-11 PROCEDURE — 93005 ELECTROCARDIOGRAM TRACING: CPT | Performed by: EMERGENCY MEDICINE

## 2021-01-11 PROCEDURE — 84145 PROCALCITONIN (PCT): CPT | Performed by: EMERGENCY MEDICINE

## 2021-01-11 PROCEDURE — 0202U NFCT DS 22 TRGT SARS-COV-2: CPT | Performed by: EMERGENCY MEDICINE

## 2021-01-11 PROCEDURE — 80143 DRUG ASSAY ACETAMINOPHEN: CPT | Performed by: EMERGENCY MEDICINE

## 2021-01-11 PROCEDURE — 25010000002 THIAMINE PER 100 MG: Performed by: NURSE PRACTITIONER

## 2021-01-11 PROCEDURE — 80307 DRUG TEST PRSMV CHEM ANLYZR: CPT | Performed by: EMERGENCY MEDICINE

## 2021-01-11 PROCEDURE — 80179 DRUG ASSAY SALICYLATE: CPT | Performed by: EMERGENCY MEDICINE

## 2021-01-11 PROCEDURE — 71275 CT ANGIOGRAPHY CHEST: CPT

## 2021-01-11 PROCEDURE — 80053 COMPREHEN METABOLIC PANEL: CPT | Performed by: NURSE PRACTITIONER

## 2021-01-11 PROCEDURE — 80307 DRUG TEST PRSMV CHEM ANLYZR: CPT | Performed by: NURSE PRACTITIONER

## 2021-01-11 PROCEDURE — 83605 ASSAY OF LACTIC ACID: CPT | Performed by: EMERGENCY MEDICINE

## 2021-01-11 PROCEDURE — 74177 CT ABD & PELVIS W/CONTRAST: CPT

## 2021-01-11 PROCEDURE — 71045 X-RAY EXAM CHEST 1 VIEW: CPT

## 2021-01-11 PROCEDURE — 83735 ASSAY OF MAGNESIUM: CPT | Performed by: NURSE PRACTITIONER

## 2021-01-11 PROCEDURE — 25010000002 LORAZEPAM PER 2 MG: Performed by: NURSE PRACTITIONER

## 2021-01-11 PROCEDURE — 82077 ASSAY SPEC XCP UR&BREATH IA: CPT | Performed by: NURSE PRACTITIONER

## 2021-01-11 PROCEDURE — 84484 ASSAY OF TROPONIN QUANT: CPT | Performed by: NURSE PRACTITIONER

## 2021-01-11 PROCEDURE — 85379 FIBRIN DEGRADATION QUANT: CPT | Performed by: EMERGENCY MEDICINE

## 2021-01-11 PROCEDURE — 25010000002 CEFTRIAXONE PER 250 MG: Performed by: EMERGENCY MEDICINE

## 2021-01-11 PROCEDURE — 93005 ELECTROCARDIOGRAM TRACING: CPT | Performed by: INTERNAL MEDICINE

## 2021-01-11 PROCEDURE — 85025 COMPLETE CBC W/AUTO DIFF WBC: CPT | Performed by: NURSE PRACTITIONER

## 2021-01-11 PROCEDURE — 0 IOPAMIDOL PER 1 ML: Performed by: EMERGENCY MEDICINE

## 2021-01-11 PROCEDURE — 87040 BLOOD CULTURE FOR BACTERIA: CPT | Performed by: EMERGENCY MEDICINE

## 2021-01-11 PROCEDURE — 25010000003 POTASSIUM CHLORIDE 10 MEQ/100ML SOLUTION: Performed by: EMERGENCY MEDICINE

## 2021-01-11 PROCEDURE — 85610 PROTHROMBIN TIME: CPT | Performed by: NURSE PRACTITIONER

## 2021-01-11 PROCEDURE — 93010 ELECTROCARDIOGRAM REPORT: CPT | Performed by: INTERNAL MEDICINE

## 2021-01-11 PROCEDURE — 25810000003 SODIUM CHLORIDE 0.9 % WITH KCL 20 MEQ 20-0.9 MEQ/L-% SOLUTION: Performed by: INTERNAL MEDICINE

## 2021-01-11 PROCEDURE — 25010000002 AZITHROMYCIN PER 500 MG: Performed by: INTERNAL MEDICINE

## 2021-01-11 RX ORDER — POTASSIUM CHLORIDE 7.45 MG/ML
10 INJECTION INTRAVENOUS ONCE
Status: COMPLETED | OUTPATIENT
Start: 2021-01-11 | End: 2021-01-11

## 2021-01-11 RX ORDER — AMLODIPINE BESYLATE 10 MG/1
10 TABLET ORAL DAILY
Status: DISCONTINUED | OUTPATIENT
Start: 2021-01-11 | End: 2021-01-19 | Stop reason: HOSPADM

## 2021-01-11 RX ORDER — CEFTRIAXONE SODIUM 1 G/50ML
1 INJECTION, SOLUTION INTRAVENOUS EVERY 24 HOURS
Status: DISCONTINUED | OUTPATIENT
Start: 2021-01-12 | End: 2021-01-13

## 2021-01-11 RX ORDER — LORAZEPAM 2 MG/ML
1 INJECTION INTRAMUSCULAR
Status: DISCONTINUED | OUTPATIENT
Start: 2021-01-11 | End: 2021-01-12

## 2021-01-11 RX ORDER — FERROUS SULFATE 325(65) MG
325 TABLET ORAL
Status: DISCONTINUED | OUTPATIENT
Start: 2021-01-12 | End: 2021-01-15

## 2021-01-11 RX ORDER — LORAZEPAM 2 MG/ML
1 INJECTION INTRAMUSCULAR
Status: DISCONTINUED | OUTPATIENT
Start: 2021-01-11 | End: 2021-01-11

## 2021-01-11 RX ORDER — SODIUM CHLORIDE 0.9 % (FLUSH) 0.9 %
10 SYRINGE (ML) INJECTION AS NEEDED
Status: DISCONTINUED | OUTPATIENT
Start: 2021-01-11 | End: 2021-01-19 | Stop reason: HOSPADM

## 2021-01-11 RX ORDER — CEFTRIAXONE SODIUM 1 G/50ML
1 INJECTION, SOLUTION INTRAVENOUS ONCE
Status: COMPLETED | OUTPATIENT
Start: 2021-01-11 | End: 2021-01-11

## 2021-01-11 RX ORDER — HYDROCODONE BITARTRATE AND ACETAMINOPHEN 5; 325 MG/1; MG/1
1 TABLET ORAL EVERY 6 HOURS PRN
Status: DISCONTINUED | OUTPATIENT
Start: 2021-01-11 | End: 2021-01-19 | Stop reason: HOSPADM

## 2021-01-11 RX ORDER — ACETAMINOPHEN 325 MG/1
650 TABLET ORAL EVERY 6 HOURS PRN
Status: DISCONTINUED | OUTPATIENT
Start: 2021-01-11 | End: 2021-01-19 | Stop reason: HOSPADM

## 2021-01-11 RX ORDER — PANTOPRAZOLE SODIUM 40 MG/1
40 TABLET, DELAYED RELEASE ORAL DAILY
Status: DISCONTINUED | OUTPATIENT
Start: 2021-01-11 | End: 2021-01-19 | Stop reason: HOSPADM

## 2021-01-11 RX ORDER — ASPIRIN 81 MG/1
81 TABLET ORAL DAILY
Status: DISCONTINUED | OUTPATIENT
Start: 2021-01-11 | End: 2021-01-19 | Stop reason: HOSPADM

## 2021-01-11 RX ORDER — LORAZEPAM 0.5 MG/1
0.5 TABLET ORAL EVERY 8 HOURS
Status: DISCONTINUED | OUTPATIENT
Start: 2021-01-12 | End: 2021-01-12

## 2021-01-11 RX ORDER — LORAZEPAM 0.5 MG/1
0.5 TABLET ORAL EVERY 6 HOURS
Status: DISCONTINUED | OUTPATIENT
Start: 2021-01-11 | End: 2021-01-12

## 2021-01-11 RX ORDER — BUDESONIDE AND FORMOTEROL FUMARATE DIHYDRATE 160; 4.5 UG/1; UG/1
2 AEROSOL RESPIRATORY (INHALATION)
Status: DISCONTINUED | OUTPATIENT
Start: 2021-01-11 | End: 2021-01-19 | Stop reason: HOSPADM

## 2021-01-11 RX ORDER — TRAZODONE HYDROCHLORIDE 50 MG/1
50 TABLET ORAL NIGHTLY
Status: DISCONTINUED | OUTPATIENT
Start: 2021-01-11 | End: 2021-01-19 | Stop reason: HOSPADM

## 2021-01-11 RX ORDER — MULTIPLE VITAMINS W/ MINERALS TAB 9MG-400MCG
1 TAB ORAL DAILY
Status: DISCONTINUED | OUTPATIENT
Start: 2021-01-11 | End: 2021-01-19 | Stop reason: HOSPADM

## 2021-01-11 RX ORDER — MULTIPLE VITAMINS W/ MINERALS TAB 9MG-400MCG
1 TAB ORAL DAILY
Status: DISCONTINUED | OUTPATIENT
Start: 2021-01-11 | End: 2021-01-11 | Stop reason: SDUPTHER

## 2021-01-11 RX ORDER — LORAZEPAM 1 MG/1
1 TABLET ORAL
Status: DISCONTINUED | OUTPATIENT
Start: 2021-01-11 | End: 2021-01-11

## 2021-01-11 RX ORDER — POLYETHYLENE GLYCOL 3350 17 G/17G
17 POWDER, FOR SOLUTION ORAL DAILY
Status: DISCONTINUED | OUTPATIENT
Start: 2021-01-11 | End: 2021-01-14

## 2021-01-11 RX ORDER — NITROGLYCERIN 0.4 MG/1
0.4 TABLET SUBLINGUAL
Status: DISCONTINUED | OUTPATIENT
Start: 2021-01-11 | End: 2021-01-19 | Stop reason: HOSPADM

## 2021-01-11 RX ORDER — DEXTROSE, SODIUM CHLORIDE, SODIUM LACTATE, POTASSIUM CHLORIDE, AND CALCIUM CHLORIDE 5; .6; .31; .03; .02 G/100ML; G/100ML; G/100ML; G/100ML; G/100ML
100 INJECTION, SOLUTION INTRAVENOUS CONTINUOUS
Status: DISCONTINUED | OUTPATIENT
Start: 2021-01-11 | End: 2021-01-11

## 2021-01-11 RX ORDER — DEXTROSE, SODIUM CHLORIDE, AND POTASSIUM CHLORIDE 5; .45; .15 G/100ML; G/100ML; G/100ML
150 INJECTION INTRAVENOUS CONTINUOUS
Status: DISCONTINUED | OUTPATIENT
Start: 2021-01-11 | End: 2021-01-12

## 2021-01-11 RX ORDER — LORAZEPAM 2 MG/ML
0.5 INJECTION INTRAMUSCULAR
Status: DISCONTINUED | OUTPATIENT
Start: 2021-01-11 | End: 2021-01-12

## 2021-01-11 RX ORDER — ALBUTEROL SULFATE 2.5 MG/3ML
2.5 SOLUTION RESPIRATORY (INHALATION) EVERY 6 HOURS PRN
Status: DISCONTINUED | OUTPATIENT
Start: 2021-01-11 | End: 2021-01-19 | Stop reason: HOSPADM

## 2021-01-11 RX ORDER — ONDANSETRON 2 MG/ML
4 INJECTION INTRAMUSCULAR; INTRAVENOUS EVERY 6 HOURS PRN
Status: DISCONTINUED | OUTPATIENT
Start: 2021-01-11 | End: 2021-01-19 | Stop reason: HOSPADM

## 2021-01-11 RX ORDER — SODIUM CHLORIDE AND POTASSIUM CHLORIDE 150; 900 MG/100ML; MG/100ML
150 INJECTION, SOLUTION INTRAVENOUS CONTINUOUS
Status: DISCONTINUED | OUTPATIENT
Start: 2021-01-11 | End: 2021-01-11

## 2021-01-11 RX ORDER — ESCITALOPRAM OXALATE 20 MG/1
20 TABLET ORAL DAILY
Status: DISCONTINUED | OUTPATIENT
Start: 2021-01-11 | End: 2021-01-11

## 2021-01-11 RX ORDER — LORAZEPAM 0.5 MG/1
0.5 TABLET ORAL
Status: DISCONTINUED | OUTPATIENT
Start: 2021-01-11 | End: 2021-01-12

## 2021-01-11 RX ORDER — SODIUM CHLORIDE 0.9 % (FLUSH) 0.9 %
10 SYRINGE (ML) INJECTION EVERY 12 HOURS SCHEDULED
Status: DISCONTINUED | OUTPATIENT
Start: 2021-01-11 | End: 2021-01-19 | Stop reason: HOSPADM

## 2021-01-11 RX ORDER — LORAZEPAM 2 MG/ML
2 INJECTION INTRAMUSCULAR ONCE
Status: COMPLETED | OUTPATIENT
Start: 2021-01-11 | End: 2021-01-11

## 2021-01-11 RX ORDER — ERGOCALCIFEROL 1.25 MG/1
50000 CAPSULE ORAL
Status: DISCONTINUED | OUTPATIENT
Start: 2021-01-12 | End: 2021-01-19 | Stop reason: HOSPADM

## 2021-01-11 RX ORDER — CLONIDINE HYDROCHLORIDE 0.1 MG/1
0.1 TABLET ORAL EVERY 12 HOURS SCHEDULED
Status: DISCONTINUED | OUTPATIENT
Start: 2021-01-11 | End: 2021-01-12

## 2021-01-11 RX ORDER — MAGNESIUM SULFATE 1 G/100ML
1 INJECTION INTRAVENOUS ONCE
Status: COMPLETED | OUTPATIENT
Start: 2021-01-11 | End: 2021-01-11

## 2021-01-11 RX ORDER — LOSARTAN POTASSIUM 100 MG/1
100 TABLET ORAL DAILY
Status: DISCONTINUED | OUTPATIENT
Start: 2021-01-11 | End: 2021-01-16

## 2021-01-11 RX ORDER — LORAZEPAM 1 MG/1
1 TABLET ORAL
Status: DISCONTINUED | OUTPATIENT
Start: 2021-01-11 | End: 2021-01-12

## 2021-01-11 RX ORDER — LORAZEPAM 0.5 MG/1
0.5 TABLET ORAL
Status: DISCONTINUED | OUTPATIENT
Start: 2021-01-11 | End: 2021-01-11

## 2021-01-11 RX ORDER — LORAZEPAM 2 MG/ML
0.5 INJECTION INTRAMUSCULAR
Status: DISCONTINUED | OUTPATIENT
Start: 2021-01-11 | End: 2021-01-11

## 2021-01-11 RX ADMIN — THIAMINE HYDROCHLORIDE 100 MG: 100 INJECTION, SOLUTION INTRAMUSCULAR; INTRAVENOUS at 14:29

## 2021-01-11 RX ADMIN — MAGNESIUM SULFATE HEPTAHYDRATE 1 G: 1 INJECTION, SOLUTION INTRAVENOUS at 16:20

## 2021-01-11 RX ADMIN — SODIUM CHLORIDE, SODIUM LACTATE, POTASSIUM CHLORIDE, CALCIUM CHLORIDE AND DEXTROSE MONOHYDRATE 100 ML/HR: 5; 600; 310; 30; 20 INJECTION, SOLUTION INTRAVENOUS at 17:40

## 2021-01-11 RX ADMIN — MULTIPLE VITAMINS W/ MINERALS TAB 1 TABLET: TAB at 15:05

## 2021-01-11 RX ADMIN — SODIUM CHLORIDE 1000 ML: 9 INJECTION, SOLUTION INTRAVENOUS at 13:57

## 2021-01-11 RX ADMIN — IOPAMIDOL 95 ML: 755 INJECTION, SOLUTION INTRAVENOUS at 15:19

## 2021-01-11 RX ADMIN — LORAZEPAM 1 MG: 1 TABLET ORAL at 19:08

## 2021-01-11 RX ADMIN — POTASSIUM CHLORIDE, DEXTROSE MONOHYDRATE AND SODIUM CHLORIDE 150 ML/HR: 150; 5; 450 INJECTION, SOLUTION INTRAVENOUS at 22:45

## 2021-01-11 RX ADMIN — SODIUM CHLORIDE, PRESERVATIVE FREE 10 ML: 5 INJECTION INTRAVENOUS at 20:54

## 2021-01-11 RX ADMIN — LORAZEPAM 1 MG: 1 TABLET ORAL at 22:45

## 2021-01-11 RX ADMIN — LORAZEPAM 1 MG: 2 INJECTION INTRAMUSCULAR; INTRAVENOUS at 15:08

## 2021-01-11 RX ADMIN — ASPIRIN 81 MG: 81 TABLET, COATED ORAL at 20:53

## 2021-01-11 RX ADMIN — TRAZODONE HYDROCHLORIDE 50 MG: 50 TABLET ORAL at 20:53

## 2021-01-11 RX ADMIN — LORAZEPAM 0.5 MG: 0.5 TABLET ORAL at 20:54

## 2021-01-11 RX ADMIN — POTASSIUM CHLORIDE 10 MEQ: 7.46 INJECTION, SOLUTION INTRAVENOUS at 17:03

## 2021-01-11 RX ADMIN — AZITHROMYCIN 500 MG: 500 INJECTION, POWDER, LYOPHILIZED, FOR SOLUTION INTRAVENOUS at 20:53

## 2021-01-11 RX ADMIN — LORAZEPAM 2 MG: 2 INJECTION INTRAMUSCULAR; INTRAVENOUS at 13:55

## 2021-01-11 RX ADMIN — PANTOPRAZOLE SODIUM 40 MG: 40 TABLET, DELAYED RELEASE ORAL at 20:53

## 2021-01-11 RX ADMIN — CEFTRIAXONE SODIUM 1 G: 1 INJECTION, SOLUTION INTRAVENOUS at 17:56

## 2021-01-11 RX ADMIN — CLONIDINE HYDROCHLORIDE 0.1 MG: 0.1 TABLET ORAL at 21:26

## 2021-01-11 RX ADMIN — LORAZEPAM 0.5 MG: 0.5 TABLET ORAL at 17:58

## 2021-01-11 RX ADMIN — LORAZEPAM 1 MG: 2 INJECTION INTRAMUSCULAR; INTRAVENOUS at 16:18

## 2021-01-11 RX ADMIN — SODIUM CHLORIDE 1000 ML: 9 INJECTION, SOLUTION INTRAVENOUS at 16:20

## 2021-01-11 RX ADMIN — POTASSIUM CHLORIDE AND SODIUM CHLORIDE 150 ML/HR: 900; 150 INJECTION, SOLUTION INTRAVENOUS at 20:53

## 2021-01-12 PROBLEM — F10.239 ALCOHOL DEPENDENCE WITH WITHDRAWAL: Status: ACTIVE | Noted: 2021-01-12

## 2021-01-12 PROBLEM — I47.10 SVT (SUPRAVENTRICULAR TACHYCARDIA): Status: ACTIVE | Noted: 2021-01-12

## 2021-01-12 PROBLEM — I47.1 SVT (SUPRAVENTRICULAR TACHYCARDIA) (HCC): Status: ACTIVE | Noted: 2021-01-12

## 2021-01-12 LAB
ALBUMIN SERPL-MCNC: 3.1 G/DL (ref 3.5–5.2)
ALBUMIN/GLOB SERPL: 1 G/DL
ALP SERPL-CCNC: 87 U/L (ref 39–117)
ALT SERPL W P-5'-P-CCNC: 46 U/L (ref 1–41)
ANION GAP SERPL CALCULATED.3IONS-SCNC: 15.7 MMOL/L (ref 5–15)
AST SERPL-CCNC: 43 U/L (ref 1–40)
BASOPHILS # BLD AUTO: 0.03 10*3/MM3 (ref 0–0.2)
BASOPHILS NFR BLD AUTO: 0.5 % (ref 0–1.5)
BILIRUB SERPL-MCNC: 0.4 MG/DL (ref 0–1.2)
BUN SERPL-MCNC: 12 MG/DL (ref 8–23)
BUN/CREAT SERPL: 13.5 (ref 7–25)
CALCIUM SPEC-SCNC: 8.4 MG/DL (ref 8.6–10.5)
CHLORIDE SERPL-SCNC: 101 MMOL/L (ref 98–107)
CO2 SERPL-SCNC: 20.3 MMOL/L (ref 22–29)
CREAT SERPL-MCNC: 0.89 MG/DL (ref 0.76–1.27)
DEPRECATED RDW RBC AUTO: 41.4 FL (ref 37–54)
EOSINOPHIL # BLD AUTO: 0.05 10*3/MM3 (ref 0–0.4)
EOSINOPHIL NFR BLD AUTO: 0.9 % (ref 0.3–6.2)
ERYTHROCYTE [DISTWIDTH] IN BLOOD BY AUTOMATED COUNT: 12 % (ref 12.3–15.4)
GFR SERPL CREATININE-BSD FRML MDRD: 86 ML/MIN/1.73
GLOBULIN UR ELPH-MCNC: 3.2 GM/DL
GLUCOSE BLDC GLUCOMTR-MCNC: 135 MG/DL (ref 70–130)
GLUCOSE BLDC GLUCOMTR-MCNC: 183 MG/DL (ref 70–130)
GLUCOSE SERPL-MCNC: 164 MG/DL (ref 65–99)
HCT VFR BLD AUTO: 31.2 % (ref 37.5–51)
HGB BLD-MCNC: 11 G/DL (ref 13–17.7)
IMM GRANULOCYTES # BLD AUTO: 0.11 10*3/MM3 (ref 0–0.05)
IMM GRANULOCYTES NFR BLD AUTO: 1.9 % (ref 0–0.5)
LYMPHOCYTES # BLD AUTO: 0.76 10*3/MM3 (ref 0.7–3.1)
LYMPHOCYTES NFR BLD AUTO: 13 % (ref 19.6–45.3)
MAGNESIUM SERPL-MCNC: 1.3 MG/DL (ref 1.6–2.4)
MAGNESIUM SERPL-MCNC: 2.2 MG/DL (ref 1.6–2.4)
MCH RBC QN AUTO: 33.8 PG (ref 26.6–33)
MCHC RBC AUTO-ENTMCNC: 35.3 G/DL (ref 31.5–35.7)
MCV RBC AUTO: 96 FL (ref 79–97)
MONOCYTES # BLD AUTO: 0.96 10*3/MM3 (ref 0.1–0.9)
MONOCYTES NFR BLD AUTO: 16.5 % (ref 5–12)
NEUTROPHILS NFR BLD AUTO: 3.92 10*3/MM3 (ref 1.7–7)
NEUTROPHILS NFR BLD AUTO: 67.2 % (ref 42.7–76)
NRBC BLD AUTO-RTO: 0 /100 WBC (ref 0–0.2)
PLATELET # BLD AUTO: 192 10*3/MM3 (ref 140–450)
PMV BLD AUTO: 9.4 FL (ref 6–12)
POTASSIUM SERPL-SCNC: 3.4 MMOL/L (ref 3.5–5.2)
POTASSIUM SERPL-SCNC: 3.9 MMOL/L (ref 3.5–5.2)
PROT SERPL-MCNC: 6.3 G/DL (ref 6–8.5)
QT INTERVAL: 310 MS
QT INTERVAL: 319 MS
RBC # BLD AUTO: 3.25 10*6/MM3 (ref 4.14–5.8)
SODIUM SERPL-SCNC: 137 MMOL/L (ref 136–145)
WBC # BLD AUTO: 5.83 10*3/MM3 (ref 3.4–10.8)

## 2021-01-12 PROCEDURE — 93010 ELECTROCARDIOGRAM REPORT: CPT | Performed by: INTERNAL MEDICINE

## 2021-01-12 PROCEDURE — 94640 AIRWAY INHALATION TREATMENT: CPT

## 2021-01-12 PROCEDURE — 25010000002 LORAZEPAM PER 2 MG: Performed by: INTERNAL MEDICINE

## 2021-01-12 PROCEDURE — 99254 IP/OBS CNSLTJ NEW/EST MOD 60: CPT | Performed by: INTERNAL MEDICINE

## 2021-01-12 PROCEDURE — 82962 GLUCOSE BLOOD TEST: CPT

## 2021-01-12 PROCEDURE — 93005 ELECTROCARDIOGRAM TRACING: CPT | Performed by: INTERNAL MEDICINE

## 2021-01-12 PROCEDURE — 83735 ASSAY OF MAGNESIUM: CPT | Performed by: INTERNAL MEDICINE

## 2021-01-12 PROCEDURE — U0004 COV-19 TEST NON-CDC HGH THRU: HCPCS | Performed by: INTERNAL MEDICINE

## 2021-01-12 PROCEDURE — 94799 UNLISTED PULMONARY SVC/PX: CPT

## 2021-01-12 PROCEDURE — 84132 ASSAY OF SERUM POTASSIUM: CPT | Performed by: INTERNAL MEDICINE

## 2021-01-12 PROCEDURE — 25010000002 MAGNESIUM SULFATE 2 GM/50ML SOLUTION: Performed by: INTERNAL MEDICINE

## 2021-01-12 PROCEDURE — 25010000002 THIAMINE PER 100 MG: Performed by: INTERNAL MEDICINE

## 2021-01-12 PROCEDURE — 25010000002 CEFTRIAXONE PER 250 MG: Performed by: INTERNAL MEDICINE

## 2021-01-12 PROCEDURE — 85025 COMPLETE CBC W/AUTO DIFF WBC: CPT | Performed by: INTERNAL MEDICINE

## 2021-01-12 PROCEDURE — 80053 COMPREHEN METABOLIC PANEL: CPT | Performed by: INTERNAL MEDICINE

## 2021-01-12 RX ORDER — LORAZEPAM 1 MG/1
1 TABLET ORAL
Status: DISCONTINUED | OUTPATIENT
Start: 2021-01-12 | End: 2021-01-15

## 2021-01-12 RX ORDER — LORAZEPAM 2 MG/ML
2 INJECTION INTRAMUSCULAR
Status: DISCONTINUED | OUTPATIENT
Start: 2021-01-12 | End: 2021-01-15

## 2021-01-12 RX ORDER — OLANZAPINE 10 MG/1
5 INJECTION, POWDER, LYOPHILIZED, FOR SOLUTION INTRAMUSCULAR ONCE
Status: COMPLETED | OUTPATIENT
Start: 2021-01-12 | End: 2021-01-12

## 2021-01-12 RX ORDER — LORAZEPAM 1 MG/1
2 TABLET ORAL
Status: DISCONTINUED | OUTPATIENT
Start: 2021-01-12 | End: 2021-01-15

## 2021-01-12 RX ORDER — LORAZEPAM 0.5 MG/1
0.5 TABLET ORAL
Status: DISCONTINUED | OUTPATIENT
Start: 2021-01-12 | End: 2021-01-12

## 2021-01-12 RX ORDER — LORAZEPAM 2 MG/ML
1 INJECTION INTRAMUSCULAR
Status: DISCONTINUED | OUTPATIENT
Start: 2021-01-12 | End: 2021-01-15

## 2021-01-12 RX ORDER — LORAZEPAM 1 MG/1
1 TABLET ORAL
Status: DISCONTINUED | OUTPATIENT
Start: 2021-01-12 | End: 2021-01-12

## 2021-01-12 RX ORDER — DIAZEPAM 5 MG/1
5 TABLET ORAL 3 TIMES DAILY
Status: DISCONTINUED | OUTPATIENT
Start: 2021-01-12 | End: 2021-01-12

## 2021-01-12 RX ORDER — DIAZEPAM 5 MG/1
10 TABLET ORAL ONCE
Status: COMPLETED | OUTPATIENT
Start: 2021-01-12 | End: 2021-01-12

## 2021-01-12 RX ORDER — LORAZEPAM 2 MG/ML
0.5 INJECTION INTRAMUSCULAR
Status: DISCONTINUED | OUTPATIENT
Start: 2021-01-12 | End: 2021-01-15

## 2021-01-12 RX ORDER — OLANZAPINE 5 MG/1
5 TABLET ORAL ONCE
Status: COMPLETED | OUTPATIENT
Start: 2021-01-12 | End: 2021-01-12

## 2021-01-12 RX ORDER — DIAZEPAM 5 MG/1
10 TABLET ORAL ONCE
Status: DISCONTINUED | OUTPATIENT
Start: 2021-01-12 | End: 2021-01-19 | Stop reason: HOSPADM

## 2021-01-12 RX ORDER — MAGNESIUM SULFATE HEPTAHYDRATE 40 MG/ML
4 INJECTION, SOLUTION INTRAVENOUS AS NEEDED
Status: DISCONTINUED | OUTPATIENT
Start: 2021-01-12 | End: 2021-01-19 | Stop reason: HOSPADM

## 2021-01-12 RX ORDER — LORAZEPAM 2 MG/ML
1 INJECTION INTRAMUSCULAR
Status: DISCONTINUED | OUTPATIENT
Start: 2021-01-12 | End: 2021-01-12

## 2021-01-12 RX ORDER — MAGNESIUM SULFATE HEPTAHYDRATE 40 MG/ML
2 INJECTION, SOLUTION INTRAVENOUS AS NEEDED
Status: DISCONTINUED | OUTPATIENT
Start: 2021-01-12 | End: 2021-01-19 | Stop reason: HOSPADM

## 2021-01-12 RX ORDER — LORAZEPAM 0.5 MG/1
0.5 TABLET ORAL
Status: DISCONTINUED | OUTPATIENT
Start: 2021-01-12 | End: 2021-01-15

## 2021-01-12 RX ORDER — LORAZEPAM 2 MG/ML
2 INJECTION INTRAMUSCULAR
Status: DISCONTINUED | OUTPATIENT
Start: 2021-01-12 | End: 2021-01-12

## 2021-01-12 RX ORDER — POTASSIUM CHLORIDE 7.45 MG/ML
10 INJECTION INTRAVENOUS
Status: DISCONTINUED | OUTPATIENT
Start: 2021-01-12 | End: 2021-01-19 | Stop reason: HOSPADM

## 2021-01-12 RX ORDER — DIAZEPAM 5 MG/1
10 TABLET ORAL 3 TIMES DAILY
Status: DISCONTINUED | OUTPATIENT
Start: 2021-01-12 | End: 2021-01-15

## 2021-01-12 RX ORDER — LORAZEPAM 2 MG/ML
0.5 INJECTION INTRAMUSCULAR
Status: DISCONTINUED | OUTPATIENT
Start: 2021-01-12 | End: 2021-01-12

## 2021-01-12 RX ORDER — POTASSIUM CHLORIDE 1.5 G/1.77G
40 POWDER, FOR SOLUTION ORAL AS NEEDED
Status: DISCONTINUED | OUTPATIENT
Start: 2021-01-12 | End: 2021-01-19 | Stop reason: HOSPADM

## 2021-01-12 RX ORDER — POTASSIUM CHLORIDE 750 MG/1
40 TABLET, FILM COATED, EXTENDED RELEASE ORAL AS NEEDED
Status: DISCONTINUED | OUTPATIENT
Start: 2021-01-12 | End: 2021-01-19 | Stop reason: HOSPADM

## 2021-01-12 RX ORDER — NICOTINE 21 MG/24HR
1 PATCH, TRANSDERMAL 24 HOURS TRANSDERMAL
Status: DISCONTINUED | OUTPATIENT
Start: 2021-01-12 | End: 2021-01-19 | Stop reason: HOSPADM

## 2021-01-12 RX ADMIN — METOROPROLOL TARTRATE 5 MG: 5 INJECTION, SOLUTION INTRAVENOUS at 10:44

## 2021-01-12 RX ADMIN — LORAZEPAM 1 MG: 1 TABLET ORAL at 04:33

## 2021-01-12 RX ADMIN — LORAZEPAM 2 MG: 2 INJECTION INTRAMUSCULAR; INTRAVENOUS at 20:41

## 2021-01-12 RX ADMIN — LORAZEPAM 2 MG: 2 INJECTION INTRAMUSCULAR; INTRAVENOUS at 20:56

## 2021-01-12 RX ADMIN — METOPROLOL TARTRATE 25 MG: 25 TABLET, FILM COATED ORAL at 14:55

## 2021-01-12 RX ADMIN — LORAZEPAM 1 MG: 1 TABLET ORAL at 02:28

## 2021-01-12 RX ADMIN — METOPROLOL TARTRATE 25 MG: 25 TABLET, FILM COATED ORAL at 20:19

## 2021-01-12 RX ADMIN — METOROPROLOL TARTRATE 5 MG: 5 INJECTION, SOLUTION INTRAVENOUS at 10:54

## 2021-01-12 RX ADMIN — DIAZEPAM 10 MG: 5 TABLET ORAL at 20:19

## 2021-01-12 RX ADMIN — POTASSIUM CHLORIDE, DEXTROSE MONOHYDRATE AND SODIUM CHLORIDE 150 ML/HR: 150; 5; 450 INJECTION, SOLUTION INTRAVENOUS at 04:34

## 2021-01-12 RX ADMIN — LORAZEPAM 2 MG: 2 INJECTION INTRAMUSCULAR; INTRAVENOUS at 20:25

## 2021-01-12 RX ADMIN — THIAMINE HYDROCHLORIDE 100 MG: 100 INJECTION, SOLUTION INTRAMUSCULAR; INTRAVENOUS at 14:22

## 2021-01-12 RX ADMIN — LORAZEPAM 1 MG: 2 INJECTION INTRAMUSCULAR; INTRAVENOUS at 14:25

## 2021-01-12 RX ADMIN — LORAZEPAM 2 MG: 2 INJECTION INTRAMUSCULAR; INTRAVENOUS at 22:50

## 2021-01-12 RX ADMIN — ERGOCALCIFEROL 50000 UNITS: 1.25 CAPSULE ORAL at 08:40

## 2021-01-12 RX ADMIN — MAGNESIUM SULFATE HEPTAHYDRATE 2 G: 40 INJECTION, SOLUTION INTRAVENOUS at 12:07

## 2021-01-12 RX ADMIN — LORAZEPAM 1 MG: 2 INJECTION INTRAMUSCULAR; INTRAVENOUS at 13:04

## 2021-01-12 RX ADMIN — POTASSIUM CHLORIDE 40 MEQ: 750 TABLET, EXTENDED RELEASE ORAL at 13:02

## 2021-01-12 RX ADMIN — ASPIRIN 81 MG: 81 TABLET, COATED ORAL at 08:40

## 2021-01-12 RX ADMIN — LORAZEPAM 2 MG: 2 INJECTION INTRAMUSCULAR; INTRAVENOUS at 21:50

## 2021-01-12 RX ADMIN — LORAZEPAM 1 MG: 2 INJECTION INTRAMUSCULAR; INTRAVENOUS at 17:45

## 2021-01-12 RX ADMIN — LORAZEPAM 2 MG: 2 INJECTION INTRAMUSCULAR; INTRAVENOUS at 18:54

## 2021-01-12 RX ADMIN — LORAZEPAM 1 MG: 1 TABLET ORAL at 11:06

## 2021-01-12 RX ADMIN — TRAZODONE HYDROCHLORIDE 50 MG: 50 TABLET ORAL at 20:19

## 2021-01-12 RX ADMIN — LOSARTAN POTASSIUM 100 MG: 100 TABLET, FILM COATED ORAL at 08:40

## 2021-01-12 RX ADMIN — LORAZEPAM 2 MG: 2 INJECTION INTRAMUSCULAR; INTRAVENOUS at 19:39

## 2021-01-12 RX ADMIN — MAGNESIUM SULFATE HEPTAHYDRATE 2 G: 40 INJECTION, SOLUTION INTRAVENOUS at 08:39

## 2021-01-12 RX ADMIN — PANTOPRAZOLE SODIUM 40 MG: 40 TABLET, DELAYED RELEASE ORAL at 08:40

## 2021-01-12 RX ADMIN — METOROPROLOL TARTRATE 5 MG: 5 INJECTION, SOLUTION INTRAVENOUS at 11:06

## 2021-01-12 RX ADMIN — BUDESONIDE AND FORMOTEROL FUMARATE DIHYDRATE 2 PUFF: 160; 4.5 AEROSOL RESPIRATORY (INHALATION) at 09:20

## 2021-01-12 RX ADMIN — LORAZEPAM 1 MG: 1 TABLET ORAL at 08:39

## 2021-01-12 RX ADMIN — METOROPROLOL TARTRATE 5 MG: 5 INJECTION, SOLUTION INTRAVENOUS at 20:08

## 2021-01-12 RX ADMIN — MULTIPLE VITAMINS W/ MINERALS TAB 1 TABLET: TAB at 08:40

## 2021-01-12 RX ADMIN — OLANZAPINE 5 MG: 10 INJECTION, POWDER, FOR SOLUTION INTRAMUSCULAR at 19:20

## 2021-01-12 RX ADMIN — DIAZEPAM 10 MG: 5 TABLET ORAL at 16:00

## 2021-01-12 RX ADMIN — CEFTRIAXONE SODIUM 1 G: 1 INJECTION, SOLUTION INTRAVENOUS at 17:50

## 2021-01-12 RX ADMIN — MAGNESIUM SULFATE HEPTAHYDRATE 2 G: 40 INJECTION, SOLUTION INTRAVENOUS at 10:05

## 2021-01-12 RX ADMIN — AMLODIPINE BESYLATE 10 MG: 10 TABLET ORAL at 08:40

## 2021-01-12 RX ADMIN — LORAZEPAM 1 MG: 1 TABLET ORAL at 00:22

## 2021-01-12 RX ADMIN — LORAZEPAM 2 MG: 2 INJECTION INTRAMUSCULAR; INTRAVENOUS at 21:30

## 2021-01-12 RX ADMIN — LORAZEPAM 2 MG: 2 INJECTION INTRAMUSCULAR; INTRAVENOUS at 19:55

## 2021-01-12 RX ADMIN — LORAZEPAM 2 MG: 2 INJECTION INTRAMUSCULAR; INTRAVENOUS at 20:10

## 2021-01-12 RX ADMIN — FERROUS SULFATE TAB 325 MG (65 MG ELEMENTAL FE) 325 MG: 325 (65 FE) TAB at 08:40

## 2021-01-12 RX ADMIN — LORAZEPAM 2 MG: 2 INJECTION INTRAMUSCULAR; INTRAVENOUS at 18:30

## 2021-01-12 RX ADMIN — Medication 1 PATCH: at 20:19

## 2021-01-12 RX ADMIN — OLANZAPINE 5 MG: 5 TABLET ORAL at 17:40

## 2021-01-12 RX ADMIN — LORAZEPAM 1 MG: 1 TABLET ORAL at 06:57

## 2021-01-12 RX ADMIN — SODIUM CHLORIDE, PRESERVATIVE FREE 10 ML: 5 INJECTION INTRAVENOUS at 21:50

## 2021-01-12 RX ADMIN — POTASSIUM CHLORIDE 40 MEQ: 750 TABLET, EXTENDED RELEASE ORAL at 08:40

## 2021-01-13 LAB
ANION GAP SERPL CALCULATED.3IONS-SCNC: 11 MMOL/L (ref 5–15)
BUN SERPL-MCNC: 8 MG/DL (ref 8–23)
BUN/CREAT SERPL: 9.1 (ref 7–25)
CALCIUM SPEC-SCNC: 8.9 MG/DL (ref 8.6–10.5)
CHLORIDE SERPL-SCNC: 109 MMOL/L (ref 98–107)
CO2 SERPL-SCNC: 22 MMOL/L (ref 22–29)
CREAT SERPL-MCNC: 0.88 MG/DL (ref 0.76–1.27)
DEPRECATED RDW RBC AUTO: 43.1 FL (ref 37–54)
ERYTHROCYTE [DISTWIDTH] IN BLOOD BY AUTOMATED COUNT: 12.1 % (ref 12.3–15.4)
GFR SERPL CREATININE-BSD FRML MDRD: 87 ML/MIN/1.73
GLUCOSE SERPL-MCNC: 123 MG/DL (ref 65–99)
HCT VFR BLD AUTO: 34.5 % (ref 37.5–51)
HGB BLD-MCNC: 12 G/DL (ref 13–17.7)
MAGNESIUM SERPL-MCNC: 2.1 MG/DL (ref 1.6–2.4)
MCH RBC QN AUTO: 33.9 PG (ref 26.6–33)
MCHC RBC AUTO-ENTMCNC: 34.8 G/DL (ref 31.5–35.7)
MCV RBC AUTO: 97.5 FL (ref 79–97)
PLATELET # BLD AUTO: 220 10*3/MM3 (ref 140–450)
PMV BLD AUTO: 9.3 FL (ref 6–12)
POTASSIUM SERPL-SCNC: 3.4 MMOL/L (ref 3.5–5.2)
RBC # BLD AUTO: 3.54 10*6/MM3 (ref 4.14–5.8)
SARS-COV-2 RNA RESP QL NAA+PROBE: NOT DETECTED
SODIUM SERPL-SCNC: 142 MMOL/L (ref 136–145)
WBC # BLD AUTO: 7.42 10*3/MM3 (ref 3.4–10.8)

## 2021-01-13 PROCEDURE — 80048 BASIC METABOLIC PNL TOTAL CA: CPT | Performed by: INTERNAL MEDICINE

## 2021-01-13 PROCEDURE — 25010000002 DIGOXIN PER 500 MCG: Performed by: INTERNAL MEDICINE

## 2021-01-13 PROCEDURE — 25010000002 LORAZEPAM PER 2 MG: Performed by: INTERNAL MEDICINE

## 2021-01-13 PROCEDURE — 25010000002 ENOXAPARIN PER 10 MG: Performed by: INTERNAL MEDICINE

## 2021-01-13 PROCEDURE — 85027 COMPLETE CBC AUTOMATED: CPT | Performed by: INTERNAL MEDICINE

## 2021-01-13 PROCEDURE — 25010000003 POTASSIUM CHLORIDE 10 MEQ/100ML SOLUTION: Performed by: INTERNAL MEDICINE

## 2021-01-13 PROCEDURE — 83735 ASSAY OF MAGNESIUM: CPT | Performed by: INTERNAL MEDICINE

## 2021-01-13 RX ORDER — DILTIAZEM HCL IN NACL,ISO-OSM 125 MG/125
5-15 PLASTIC BAG, INJECTION (ML) INTRAVENOUS
Status: DISCONTINUED | OUTPATIENT
Start: 2021-01-13 | End: 2021-01-14

## 2021-01-13 RX ORDER — DIGOXIN 0.25 MG/ML
250 INJECTION INTRAMUSCULAR; INTRAVENOUS EVERY 6 HOURS
Status: COMPLETED | OUTPATIENT
Start: 2021-01-13 | End: 2021-01-14

## 2021-01-13 RX ORDER — METOPROLOL TARTRATE 50 MG/1
50 TABLET, FILM COATED ORAL EVERY 8 HOURS
Status: DISCONTINUED | OUTPATIENT
Start: 2021-01-13 | End: 2021-01-19 | Stop reason: HOSPADM

## 2021-01-13 RX ORDER — FOLIC ACID 1 MG/1
1 TABLET ORAL DAILY
Status: DISCONTINUED | OUTPATIENT
Start: 2021-01-13 | End: 2021-01-19 | Stop reason: HOSPADM

## 2021-01-13 RX ORDER — DIGOXIN 0.25 MG/ML
500 INJECTION INTRAMUSCULAR; INTRAVENOUS ONCE
Status: COMPLETED | OUTPATIENT
Start: 2021-01-13 | End: 2021-01-13

## 2021-01-13 RX ADMIN — METOPROLOL TARTRATE 50 MG: 50 TABLET, FILM COATED ORAL at 16:13

## 2021-01-13 RX ADMIN — FOLIC ACID 1 MG: 1 TABLET ORAL at 13:25

## 2021-01-13 RX ADMIN — DIAZEPAM 10 MG: 5 TABLET ORAL at 16:13

## 2021-01-13 RX ADMIN — LORAZEPAM 2 MG: 2 INJECTION INTRAMUSCULAR; INTRAVENOUS at 03:28

## 2021-01-13 RX ADMIN — LORAZEPAM 2 MG: 2 INJECTION INTRAMUSCULAR; INTRAVENOUS at 16:20

## 2021-01-13 RX ADMIN — POTASSIUM CHLORIDE 10 MEQ: 7.46 INJECTION, SOLUTION INTRAVENOUS at 09:20

## 2021-01-13 RX ADMIN — SODIUM CHLORIDE, PRESERVATIVE FREE 10 ML: 5 INJECTION INTRAVENOUS at 12:34

## 2021-01-13 RX ADMIN — LOSARTAN POTASSIUM 100 MG: 100 TABLET, FILM COATED ORAL at 08:11

## 2021-01-13 RX ADMIN — METOPROLOL TARTRATE 25 MG: 25 TABLET, FILM COATED ORAL at 08:11

## 2021-01-13 RX ADMIN — DIGOXIN 250 MCG: 0.25 INJECTION INTRAMUSCULAR; INTRAVENOUS at 20:31

## 2021-01-13 RX ADMIN — MULTIPLE VITAMINS W/ MINERALS TAB 1 TABLET: TAB at 08:11

## 2021-01-13 RX ADMIN — LORAZEPAM 2 MG: 2 INJECTION INTRAMUSCULAR; INTRAVENOUS at 18:32

## 2021-01-13 RX ADMIN — ASPIRIN 81 MG: 81 TABLET, COATED ORAL at 08:11

## 2021-01-13 RX ADMIN — ENOXAPARIN SODIUM 40 MG: 40 INJECTION SUBCUTANEOUS at 13:25

## 2021-01-13 RX ADMIN — POTASSIUM CHLORIDE 40 MEQ: 750 TABLET, EXTENDED RELEASE ORAL at 13:25

## 2021-01-13 RX ADMIN — LORAZEPAM 2 MG: 2 INJECTION INTRAMUSCULAR; INTRAVENOUS at 05:49

## 2021-01-13 RX ADMIN — SODIUM CHLORIDE, PRESERVATIVE FREE 10 ML: 5 INJECTION INTRAVENOUS at 20:40

## 2021-01-13 RX ADMIN — HYDROCODONE BITARTRATE AND ACETAMINOPHEN 1 TABLET: 5; 325 TABLET ORAL at 01:26

## 2021-01-13 RX ADMIN — LORAZEPAM 2 MG: 2 INJECTION INTRAMUSCULAR; INTRAVENOUS at 23:43

## 2021-01-13 RX ADMIN — Medication 100 MG: at 17:22

## 2021-01-13 RX ADMIN — DIAZEPAM 10 MG: 5 TABLET ORAL at 08:11

## 2021-01-13 RX ADMIN — FERROUS SULFATE TAB 325 MG (65 MG ELEMENTAL FE) 325 MG: 325 (65 FE) TAB at 08:11

## 2021-01-13 RX ADMIN — LORAZEPAM 2 MG: 2 INJECTION INTRAMUSCULAR; INTRAVENOUS at 21:26

## 2021-01-13 RX ADMIN — LORAZEPAM 2 MG: 2 INJECTION INTRAMUSCULAR; INTRAVENOUS at 01:26

## 2021-01-13 RX ADMIN — LORAZEPAM 2 MG: 2 INJECTION INTRAMUSCULAR; INTRAVENOUS at 13:26

## 2021-01-13 RX ADMIN — DIGOXIN 500 MCG: 0.25 INJECTION INTRAMUSCULAR; INTRAVENOUS at 16:13

## 2021-01-13 RX ADMIN — LORAZEPAM 2 MG: 2 INJECTION INTRAMUSCULAR; INTRAVENOUS at 00:16

## 2021-01-13 RX ADMIN — DIAZEPAM 10 MG: 5 TABLET ORAL at 20:33

## 2021-01-13 RX ADMIN — PANTOPRAZOLE SODIUM 40 MG: 40 TABLET, DELAYED RELEASE ORAL at 08:11

## 2021-01-13 RX ADMIN — AMLODIPINE BESYLATE 10 MG: 10 TABLET ORAL at 08:11

## 2021-01-13 RX ADMIN — LORAZEPAM 2 MG: 2 INJECTION INTRAMUSCULAR; INTRAVENOUS at 09:19

## 2021-01-13 RX ADMIN — Medication 15 MG/HR: at 08:12

## 2021-01-13 RX ADMIN — LORAZEPAM 2 MG: 2 INJECTION INTRAMUSCULAR; INTRAVENOUS at 07:33

## 2021-01-13 RX ADMIN — TRAZODONE HYDROCHLORIDE 50 MG: 50 TABLET ORAL at 20:17

## 2021-01-13 RX ADMIN — Medication 5 MG/HR: at 00:49

## 2021-01-14 LAB
ANION GAP SERPL CALCULATED.3IONS-SCNC: 12.6 MMOL/L (ref 5–15)
BUN SERPL-MCNC: 8 MG/DL (ref 8–23)
BUN/CREAT SERPL: 6.8 (ref 7–25)
CALCIUM SPEC-SCNC: 9.6 MG/DL (ref 8.6–10.5)
CHLORIDE SERPL-SCNC: 105 MMOL/L (ref 98–107)
CO2 SERPL-SCNC: 24.4 MMOL/L (ref 22–29)
CREAT SERPL-MCNC: 1.18 MG/DL (ref 0.76–1.27)
DEPRECATED RDW RBC AUTO: 43.9 FL (ref 37–54)
ERYTHROCYTE [DISTWIDTH] IN BLOOD BY AUTOMATED COUNT: 12.1 % (ref 12.3–15.4)
GFR SERPL CREATININE-BSD FRML MDRD: 62 ML/MIN/1.73
GLUCOSE SERPL-MCNC: 110 MG/DL (ref 65–99)
HCT VFR BLD AUTO: 35.1 % (ref 37.5–51)
HGB BLD-MCNC: 12.1 G/DL (ref 13–17.7)
MAGNESIUM SERPL-MCNC: 1.6 MG/DL (ref 1.6–2.4)
MCH RBC QN AUTO: 33.9 PG (ref 26.6–33)
MCHC RBC AUTO-ENTMCNC: 34.5 G/DL (ref 31.5–35.7)
MCV RBC AUTO: 98.3 FL (ref 79–97)
PLATELET # BLD AUTO: 272 10*3/MM3 (ref 140–450)
PMV BLD AUTO: 9.4 FL (ref 6–12)
POTASSIUM SERPL-SCNC: 3.9 MMOL/L (ref 3.5–5.2)
RBC # BLD AUTO: 3.57 10*6/MM3 (ref 4.14–5.8)
SODIUM SERPL-SCNC: 142 MMOL/L (ref 136–145)
WBC # BLD AUTO: 7.03 10*3/MM3 (ref 3.4–10.8)

## 2021-01-14 PROCEDURE — 25010000003 MAGNESIUM SULFATE 4 GM/100ML SOLUTION: Performed by: INTERNAL MEDICINE

## 2021-01-14 PROCEDURE — 80048 BASIC METABOLIC PNL TOTAL CA: CPT | Performed by: INTERNAL MEDICINE

## 2021-01-14 PROCEDURE — 83735 ASSAY OF MAGNESIUM: CPT | Performed by: INTERNAL MEDICINE

## 2021-01-14 PROCEDURE — 25010000002 ENOXAPARIN PER 10 MG: Performed by: INTERNAL MEDICINE

## 2021-01-14 PROCEDURE — 25010000002 LORAZEPAM PER 2 MG: Performed by: INTERNAL MEDICINE

## 2021-01-14 PROCEDURE — 25010000002 DIGOXIN PER 500 MCG: Performed by: INTERNAL MEDICINE

## 2021-01-14 PROCEDURE — 85027 COMPLETE CBC AUTOMATED: CPT | Performed by: INTERNAL MEDICINE

## 2021-01-14 RX ORDER — POLYETHYLENE GLYCOL 3350 17 G/17G
17 POWDER, FOR SOLUTION ORAL DAILY PRN
Status: DISCONTINUED | OUTPATIENT
Start: 2021-01-14 | End: 2021-01-19 | Stop reason: HOSPADM

## 2021-01-14 RX ADMIN — LORAZEPAM 1 MG: 2 INJECTION INTRAMUSCULAR; INTRAVENOUS at 19:33

## 2021-01-14 RX ADMIN — LORAZEPAM 1 MG: 2 INJECTION INTRAMUSCULAR; INTRAVENOUS at 12:33

## 2021-01-14 RX ADMIN — DIAZEPAM 10 MG: 5 TABLET ORAL at 10:21

## 2021-01-14 RX ADMIN — Medication 100 MG: at 08:00

## 2021-01-14 RX ADMIN — LORAZEPAM 1 MG: 2 INJECTION INTRAMUSCULAR; INTRAVENOUS at 23:19

## 2021-01-14 RX ADMIN — LORAZEPAM 1 MG: 2 INJECTION INTRAMUSCULAR; INTRAVENOUS at 20:07

## 2021-01-14 RX ADMIN — POLYETHYLENE GLYCOL 3350 17 G: 17 POWDER, FOR SOLUTION ORAL at 10:34

## 2021-01-14 RX ADMIN — LORAZEPAM 1 MG: 2 INJECTION INTRAMUSCULAR; INTRAVENOUS at 05:37

## 2021-01-14 RX ADMIN — LORAZEPAM 1 MG: 2 INJECTION INTRAMUSCULAR; INTRAVENOUS at 21:16

## 2021-01-14 RX ADMIN — LOSARTAN POTASSIUM 100 MG: 100 TABLET, FILM COATED ORAL at 08:00

## 2021-01-14 RX ADMIN — AMLODIPINE BESYLATE 10 MG: 10 TABLET ORAL at 08:00

## 2021-01-14 RX ADMIN — LORAZEPAM 1 MG: 2 INJECTION INTRAMUSCULAR; INTRAVENOUS at 00:07

## 2021-01-14 RX ADMIN — METOPROLOL TARTRATE 50 MG: 50 TABLET, FILM COATED ORAL at 00:18

## 2021-01-14 RX ADMIN — HYDROCODONE BITARTRATE AND ACETAMINOPHEN 1 TABLET: 5; 325 TABLET ORAL at 20:07

## 2021-01-14 RX ADMIN — METOPROLOL TARTRATE 50 MG: 50 TABLET, FILM COATED ORAL at 23:20

## 2021-01-14 RX ADMIN — LORAZEPAM 2 MG: 2 INJECTION INTRAMUSCULAR; INTRAVENOUS at 04:04

## 2021-01-14 RX ADMIN — LORAZEPAM 2 MG: 2 INJECTION INTRAMUSCULAR; INTRAVENOUS at 23:49

## 2021-01-14 RX ADMIN — LORAZEPAM 2 MG: 2 INJECTION INTRAMUSCULAR; INTRAVENOUS at 06:47

## 2021-01-14 RX ADMIN — FOLIC ACID 1 MG: 1 TABLET ORAL at 08:00

## 2021-01-14 RX ADMIN — LORAZEPAM 1 MG: 2 INJECTION INTRAMUSCULAR; INTRAVENOUS at 12:11

## 2021-01-14 RX ADMIN — TRAZODONE HYDROCHLORIDE 50 MG: 50 TABLET ORAL at 20:07

## 2021-01-14 RX ADMIN — METOPROLOL TARTRATE 50 MG: 50 TABLET, FILM COATED ORAL at 08:00

## 2021-01-14 RX ADMIN — LORAZEPAM 1 MG: 2 INJECTION INTRAMUSCULAR; INTRAVENOUS at 14:37

## 2021-01-14 RX ADMIN — LORAZEPAM 1 MG: 2 INJECTION INTRAMUSCULAR; INTRAVENOUS at 19:15

## 2021-01-14 RX ADMIN — LORAZEPAM 1 MG: 2 INJECTION INTRAMUSCULAR; INTRAVENOUS at 17:27

## 2021-01-14 RX ADMIN — LORAZEPAM 1 MG: 2 INJECTION INTRAMUSCULAR; INTRAVENOUS at 02:19

## 2021-01-14 RX ADMIN — FERROUS SULFATE TAB 325 MG (65 MG ELEMENTAL FE) 325 MG: 325 (65 FE) TAB at 08:00

## 2021-01-14 RX ADMIN — PANTOPRAZOLE SODIUM 40 MG: 40 TABLET, DELAYED RELEASE ORAL at 10:21

## 2021-01-14 RX ADMIN — DIAZEPAM 10 MG: 5 TABLET ORAL at 20:07

## 2021-01-14 RX ADMIN — LORAZEPAM 1 MG: 2 INJECTION INTRAMUSCULAR; INTRAVENOUS at 22:52

## 2021-01-14 RX ADMIN — MAGNESIUM SULFATE 4 G: 4 INJECTION INTRAVENOUS at 05:38

## 2021-01-14 RX ADMIN — LORAZEPAM 0.5 MG: 0.5 TABLET ORAL at 15:37

## 2021-01-14 RX ADMIN — DIGOXIN 250 MCG: 0.25 INJECTION INTRAMUSCULAR; INTRAVENOUS at 02:17

## 2021-01-14 RX ADMIN — Medication 1 PATCH: at 08:00

## 2021-01-14 RX ADMIN — LORAZEPAM 1 MG: 2 INJECTION INTRAMUSCULAR; INTRAVENOUS at 23:38

## 2021-01-14 RX ADMIN — METOPROLOL TARTRATE 50 MG: 50 TABLET, FILM COATED ORAL at 15:36

## 2021-01-14 RX ADMIN — LORAZEPAM 1 MG: 2 INJECTION INTRAMUSCULAR; INTRAVENOUS at 21:01

## 2021-01-14 RX ADMIN — MULTIPLE VITAMINS W/ MINERALS TAB 1 TABLET: TAB at 08:00

## 2021-01-14 RX ADMIN — DEXMEDETOMIDINE HYDROCHLORIDE 1 MCG/KG/HR: 100 INJECTION, SOLUTION, CONCENTRATE INTRAVENOUS at 23:55

## 2021-01-14 RX ADMIN — ASPIRIN 81 MG: 81 TABLET, COATED ORAL at 08:00

## 2021-01-14 RX ADMIN — LORAZEPAM 1 MG: 2 INJECTION INTRAMUSCULAR; INTRAVENOUS at 08:00

## 2021-01-14 RX ADMIN — LORAZEPAM 1 MG: 2 INJECTION INTRAMUSCULAR; INTRAVENOUS at 22:36

## 2021-01-14 RX ADMIN — LORAZEPAM 1 MG: 2 INJECTION INTRAMUSCULAR; INTRAVENOUS at 03:24

## 2021-01-14 RX ADMIN — LORAZEPAM 1 MG: 2 INJECTION INTRAMUSCULAR; INTRAVENOUS at 00:53

## 2021-01-14 RX ADMIN — LORAZEPAM 2 MG: 2 INJECTION INTRAMUSCULAR; INTRAVENOUS at 01:50

## 2021-01-14 RX ADMIN — ENOXAPARIN SODIUM 40 MG: 40 INJECTION SUBCUTANEOUS at 11:52

## 2021-01-14 RX ADMIN — LORAZEPAM 0.5 MG: 2 INJECTION INTRAMUSCULAR; INTRAVENOUS at 10:23

## 2021-01-14 RX ADMIN — HYDROCODONE BITARTRATE AND ACETAMINOPHEN 1 TABLET: 5; 325 TABLET ORAL at 02:16

## 2021-01-14 RX ADMIN — LORAZEPAM 1 MG: 2 INJECTION INTRAMUSCULAR; INTRAVENOUS at 01:23

## 2021-01-14 RX ADMIN — LORAZEPAM 2 MG: 2 INJECTION INTRAMUSCULAR; INTRAVENOUS at 05:01

## 2021-01-14 RX ADMIN — SODIUM CHLORIDE, PRESERVATIVE FREE 10 ML: 5 INJECTION INTRAVENOUS at 10:24

## 2021-01-14 RX ADMIN — LORAZEPAM 1 MG: 2 INJECTION INTRAMUSCULAR; INTRAVENOUS at 08:24

## 2021-01-14 RX ADMIN — LORAZEPAM 1 MG: 2 INJECTION INTRAMUSCULAR; INTRAVENOUS at 20:47

## 2021-01-14 RX ADMIN — LORAZEPAM 1 MG: 2 INJECTION INTRAMUSCULAR; INTRAVENOUS at 18:32

## 2021-01-14 RX ADMIN — DIAZEPAM 10 MG: 5 TABLET ORAL at 15:36

## 2021-01-15 LAB
ANION GAP SERPL CALCULATED.3IONS-SCNC: 11.6 MMOL/L (ref 5–15)
BUN SERPL-MCNC: 13 MG/DL (ref 8–23)
BUN/CREAT SERPL: 9.9 (ref 7–25)
CALCIUM SPEC-SCNC: 9.1 MG/DL (ref 8.6–10.5)
CHLORIDE SERPL-SCNC: 107 MMOL/L (ref 98–107)
CO2 SERPL-SCNC: 26.4 MMOL/L (ref 22–29)
CREAT SERPL-MCNC: 1.31 MG/DL (ref 0.76–1.27)
DEPRECATED RDW RBC AUTO: 45.2 FL (ref 37–54)
ERYTHROCYTE [DISTWIDTH] IN BLOOD BY AUTOMATED COUNT: 12.3 % (ref 12.3–15.4)
GFR SERPL CREATININE-BSD FRML MDRD: 55 ML/MIN/1.73
GLUCOSE SERPL-MCNC: 115 MG/DL (ref 65–99)
HCT VFR BLD AUTO: 34.1 % (ref 37.5–51)
HGB BLD-MCNC: 11.7 G/DL (ref 13–17.7)
MAGNESIUM SERPL-MCNC: 2 MG/DL (ref 1.6–2.4)
MCH RBC QN AUTO: 34.5 PG (ref 26.6–33)
MCHC RBC AUTO-ENTMCNC: 34.3 G/DL (ref 31.5–35.7)
MCV RBC AUTO: 100.6 FL (ref 79–97)
PLATELET # BLD AUTO: 251 10*3/MM3 (ref 140–450)
PMV BLD AUTO: 9.4 FL (ref 6–12)
POTASSIUM SERPL-SCNC: 3.2 MMOL/L (ref 3.5–5.2)
RBC # BLD AUTO: 3.39 10*6/MM3 (ref 4.14–5.8)
SODIUM SERPL-SCNC: 145 MMOL/L (ref 136–145)
WBC # BLD AUTO: 6.54 10*3/MM3 (ref 3.4–10.8)

## 2021-01-15 PROCEDURE — 83735 ASSAY OF MAGNESIUM: CPT | Performed by: INTERNAL MEDICINE

## 2021-01-15 PROCEDURE — 94799 UNLISTED PULMONARY SVC/PX: CPT

## 2021-01-15 PROCEDURE — 85027 COMPLETE CBC AUTOMATED: CPT | Performed by: INTERNAL MEDICINE

## 2021-01-15 PROCEDURE — 25010000002 LORAZEPAM PER 2 MG: Performed by: INTERNAL MEDICINE

## 2021-01-15 PROCEDURE — 25010000002 ENOXAPARIN PER 10 MG: Performed by: INTERNAL MEDICINE

## 2021-01-15 PROCEDURE — 80048 BASIC METABOLIC PNL TOTAL CA: CPT | Performed by: INTERNAL MEDICINE

## 2021-01-15 RX ORDER — LORAZEPAM 2 MG/ML
2 INJECTION INTRAMUSCULAR
Status: DISCONTINUED | OUTPATIENT
Start: 2021-01-15 | End: 2021-01-19 | Stop reason: HOSPADM

## 2021-01-15 RX ORDER — LORAZEPAM 1 MG/1
1 TABLET ORAL
Status: DISCONTINUED | OUTPATIENT
Start: 2021-01-15 | End: 2021-01-19 | Stop reason: HOSPADM

## 2021-01-15 RX ORDER — LORAZEPAM 1 MG/1
4 TABLET ORAL
Status: DISCONTINUED | OUTPATIENT
Start: 2021-01-15 | End: 2021-01-19 | Stop reason: HOSPADM

## 2021-01-15 RX ORDER — DIAZEPAM 5 MG/1
10 TABLET ORAL EVERY 12 HOURS
Status: COMPLETED | OUTPATIENT
Start: 2021-01-15 | End: 2021-01-18

## 2021-01-15 RX ORDER — LORAZEPAM 2 MG/ML
0.5 INJECTION INTRAMUSCULAR
Status: DISCONTINUED | OUTPATIENT
Start: 2021-01-15 | End: 2021-01-19 | Stop reason: HOSPADM

## 2021-01-15 RX ORDER — LORAZEPAM 0.5 MG/1
0.5 TABLET ORAL
Status: DISCONTINUED | OUTPATIENT
Start: 2021-01-15 | End: 2021-01-19 | Stop reason: HOSPADM

## 2021-01-15 RX ORDER — LORAZEPAM 2 MG/ML
1 INJECTION INTRAMUSCULAR
Status: DISCONTINUED | OUTPATIENT
Start: 2021-01-15 | End: 2021-01-19 | Stop reason: HOSPADM

## 2021-01-15 RX ADMIN — LORAZEPAM 1 MG: 2 INJECTION INTRAMUSCULAR; INTRAVENOUS at 00:01

## 2021-01-15 RX ADMIN — PANTOPRAZOLE SODIUM 40 MG: 40 TABLET, DELAYED RELEASE ORAL at 13:24

## 2021-01-15 RX ADMIN — ENOXAPARIN SODIUM 40 MG: 40 INJECTION SUBCUTANEOUS at 13:23

## 2021-01-15 RX ADMIN — LORAZEPAM 1 MG: 2 INJECTION INTRAMUSCULAR; INTRAVENOUS at 20:29

## 2021-01-15 RX ADMIN — HYDROCODONE BITARTRATE AND ACETAMINOPHEN 1 TABLET: 5; 325 TABLET ORAL at 20:24

## 2021-01-15 RX ADMIN — SODIUM CHLORIDE 2000 ML: 9 INJECTION, SOLUTION INTRAVENOUS at 10:28

## 2021-01-15 RX ADMIN — MULTIPLE VITAMINS W/ MINERALS TAB 1 TABLET: TAB at 10:28

## 2021-01-15 RX ADMIN — LORAZEPAM 2 MG: 2 INJECTION INTRAMUSCULAR; INTRAVENOUS at 03:45

## 2021-01-15 RX ADMIN — SODIUM CHLORIDE, PRESERVATIVE FREE 10 ML: 5 INJECTION INTRAVENOUS at 13:03

## 2021-01-15 RX ADMIN — AMLODIPINE BESYLATE 10 MG: 10 TABLET ORAL at 08:55

## 2021-01-15 RX ADMIN — LORAZEPAM 2 MG: 2 INJECTION INTRAMUSCULAR; INTRAVENOUS at 01:17

## 2021-01-15 RX ADMIN — ASPIRIN 81 MG: 81 TABLET, COATED ORAL at 08:55

## 2021-01-15 RX ADMIN — POTASSIUM CHLORIDE 40 MEQ: 750 TABLET, EXTENDED RELEASE ORAL at 06:38

## 2021-01-15 RX ADMIN — METOPROLOL TARTRATE 50 MG: 50 TABLET, FILM COATED ORAL at 15:37

## 2021-01-15 RX ADMIN — HYDROCODONE BITARTRATE AND ACETAMINOPHEN 1 TABLET: 5; 325 TABLET ORAL at 08:54

## 2021-01-15 RX ADMIN — LORAZEPAM 2 MG: 2 INJECTION INTRAMUSCULAR; INTRAVENOUS at 13:24

## 2021-01-15 RX ADMIN — LORAZEPAM 2 MG: 2 INJECTION INTRAMUSCULAR; INTRAVENOUS at 15:38

## 2021-01-15 RX ADMIN — Medication 1 PATCH: at 09:00

## 2021-01-15 RX ADMIN — METOPROLOL TARTRATE 50 MG: 50 TABLET, FILM COATED ORAL at 08:54

## 2021-01-15 RX ADMIN — LORAZEPAM 2 MG: 2 INJECTION INTRAMUSCULAR; INTRAVENOUS at 00:21

## 2021-01-15 RX ADMIN — DIAZEPAM 10 MG: 5 TABLET ORAL at 08:54

## 2021-01-15 RX ADMIN — POTASSIUM CHLORIDE 40 MEQ: 750 TABLET, EXTENDED RELEASE ORAL at 13:40

## 2021-01-15 RX ADMIN — FOLIC ACID 1 MG: 1 TABLET ORAL at 08:55

## 2021-01-15 RX ADMIN — TRAZODONE HYDROCHLORIDE 50 MG: 50 TABLET ORAL at 20:22

## 2021-01-15 RX ADMIN — METOPROLOL TARTRATE 50 MG: 50 TABLET, FILM COATED ORAL at 23:15

## 2021-01-15 RX ADMIN — DIAZEPAM 10 MG: 5 TABLET ORAL at 20:30

## 2021-01-15 RX ADMIN — Medication 100 MG: at 08:54

## 2021-01-15 RX ADMIN — DEXMEDETOMIDINE HYDROCHLORIDE 0.2 MCG/KG/HR: 100 INJECTION, SOLUTION, CONCENTRATE INTRAVENOUS at 03:47

## 2021-01-15 RX ADMIN — SODIUM CHLORIDE, PRESERVATIVE FREE 10 ML: 5 INJECTION INTRAVENOUS at 23:29

## 2021-01-15 RX ADMIN — LORAZEPAM 1 MG: 2 INJECTION INTRAMUSCULAR; INTRAVENOUS at 06:38

## 2021-01-15 RX ADMIN — LOSARTAN POTASSIUM 100 MG: 100 TABLET, FILM COATED ORAL at 08:55

## 2021-01-15 RX ADMIN — LORAZEPAM 2 MG: 2 INJECTION INTRAMUSCULAR; INTRAVENOUS at 02:35

## 2021-01-16 LAB
ANION GAP SERPL CALCULATED.3IONS-SCNC: 10.6 MMOL/L (ref 5–15)
BACTERIA SPEC AEROBE CULT: NORMAL
BACTERIA SPEC AEROBE CULT: NORMAL
BUN SERPL-MCNC: 18 MG/DL (ref 8–23)
BUN/CREAT SERPL: 11.2 (ref 7–25)
CALCIUM SPEC-SCNC: 8.6 MG/DL (ref 8.6–10.5)
CHLORIDE SERPL-SCNC: 107 MMOL/L (ref 98–107)
CO2 SERPL-SCNC: 25.4 MMOL/L (ref 22–29)
CREAT SERPL-MCNC: 1.61 MG/DL (ref 0.76–1.27)
DEPRECATED RDW RBC AUTO: 42.2 FL (ref 37–54)
ERYTHROCYTE [DISTWIDTH] IN BLOOD BY AUTOMATED COUNT: 12 % (ref 12.3–15.4)
GFR SERPL CREATININE-BSD FRML MDRD: 43 ML/MIN/1.73
GLUCOSE SERPL-MCNC: 96 MG/DL (ref 65–99)
HCT VFR BLD AUTO: 30.9 % (ref 37.5–51)
HGB BLD-MCNC: 10.8 G/DL (ref 13–17.7)
MAGNESIUM SERPL-MCNC: 1.8 MG/DL (ref 1.6–2.4)
MCH RBC QN AUTO: 34 PG (ref 26.6–33)
MCHC RBC AUTO-ENTMCNC: 35 G/DL (ref 31.5–35.7)
MCV RBC AUTO: 97.2 FL (ref 79–97)
PLATELET # BLD AUTO: 284 10*3/MM3 (ref 140–450)
PMV BLD AUTO: 9.5 FL (ref 6–12)
POTASSIUM SERPL-SCNC: 3.7 MMOL/L (ref 3.5–5.2)
RBC # BLD AUTO: 3.18 10*6/MM3 (ref 4.14–5.8)
SODIUM SERPL-SCNC: 143 MMOL/L (ref 136–145)
WBC # BLD AUTO: 7.26 10*3/MM3 (ref 3.4–10.8)

## 2021-01-16 PROCEDURE — 83735 ASSAY OF MAGNESIUM: CPT | Performed by: INTERNAL MEDICINE

## 2021-01-16 PROCEDURE — 97116 GAIT TRAINING THERAPY: CPT | Performed by: PHYSICAL THERAPIST

## 2021-01-16 PROCEDURE — 90791 PSYCH DIAGNOSTIC EVALUATION: CPT

## 2021-01-16 PROCEDURE — 97162 PT EVAL MOD COMPLEX 30 MIN: CPT | Performed by: PHYSICAL THERAPIST

## 2021-01-16 PROCEDURE — 80048 BASIC METABOLIC PNL TOTAL CA: CPT | Performed by: INTERNAL MEDICINE

## 2021-01-16 PROCEDURE — 85027 COMPLETE CBC AUTOMATED: CPT | Performed by: INTERNAL MEDICINE

## 2021-01-16 PROCEDURE — 25010000002 ENOXAPARIN PER 10 MG: Performed by: INTERNAL MEDICINE

## 2021-01-16 RX ORDER — SODIUM CHLORIDE 9 MG/ML
75 INJECTION, SOLUTION INTRAVENOUS CONTINUOUS
Status: DISCONTINUED | OUTPATIENT
Start: 2021-01-16 | End: 2021-01-18

## 2021-01-16 RX ADMIN — FOLIC ACID 1 MG: 1 TABLET ORAL at 08:31

## 2021-01-16 RX ADMIN — MULTIPLE VITAMINS W/ MINERALS TAB 1 TABLET: TAB at 08:31

## 2021-01-16 RX ADMIN — SODIUM CHLORIDE, PRESERVATIVE FREE 10 ML: 5 INJECTION INTRAVENOUS at 20:39

## 2021-01-16 RX ADMIN — METOPROLOL TARTRATE 50 MG: 50 TABLET, FILM COATED ORAL at 08:31

## 2021-01-16 RX ADMIN — PANTOPRAZOLE SODIUM 40 MG: 40 TABLET, DELAYED RELEASE ORAL at 08:31

## 2021-01-16 RX ADMIN — LORAZEPAM 1 MG: 1 TABLET ORAL at 17:49

## 2021-01-16 RX ADMIN — HYDROCODONE BITARTRATE AND ACETAMINOPHEN 1 TABLET: 5; 325 TABLET ORAL at 03:34

## 2021-01-16 RX ADMIN — ENOXAPARIN SODIUM 40 MG: 40 INJECTION SUBCUTANEOUS at 14:02

## 2021-01-16 RX ADMIN — SODIUM CHLORIDE 75 ML/HR: 9 INJECTION, SOLUTION INTRAVENOUS at 17:49

## 2021-01-16 RX ADMIN — HYDROCODONE BITARTRATE AND ACETAMINOPHEN 1 TABLET: 5; 325 TABLET ORAL at 20:49

## 2021-01-16 RX ADMIN — DIAZEPAM 10 MG: 5 TABLET ORAL at 08:32

## 2021-01-16 RX ADMIN — ASPIRIN 81 MG: 81 TABLET, COATED ORAL at 08:31

## 2021-01-16 RX ADMIN — TRAZODONE HYDROCHLORIDE 50 MG: 50 TABLET ORAL at 20:40

## 2021-01-16 RX ADMIN — LORAZEPAM 1 MG: 1 TABLET ORAL at 23:07

## 2021-01-16 RX ADMIN — HYDROCODONE BITARTRATE AND ACETAMINOPHEN 1 TABLET: 5; 325 TABLET ORAL at 09:15

## 2021-01-16 RX ADMIN — Medication 100 MG: at 08:31

## 2021-01-16 RX ADMIN — LORAZEPAM 1 MG: 1 TABLET ORAL at 05:00

## 2021-01-16 RX ADMIN — DIAZEPAM 10 MG: 5 TABLET ORAL at 20:40

## 2021-01-16 RX ADMIN — AMLODIPINE BESYLATE 10 MG: 10 TABLET ORAL at 08:31

## 2021-01-16 RX ADMIN — LORAZEPAM 1 MG: 1 TABLET ORAL at 14:03

## 2021-01-16 RX ADMIN — LOSARTAN POTASSIUM 100 MG: 100 TABLET, FILM COATED ORAL at 08:31

## 2021-01-16 RX ADMIN — LORAZEPAM 1 MG: 1 TABLET ORAL at 01:56

## 2021-01-16 RX ADMIN — METOPROLOL TARTRATE 50 MG: 50 TABLET, FILM COATED ORAL at 17:48

## 2021-01-16 RX ADMIN — LORAZEPAM 1 MG: 1 TABLET ORAL at 11:28

## 2021-01-16 RX ADMIN — SODIUM CHLORIDE, PRESERVATIVE FREE 10 ML: 5 INJECTION INTRAVENOUS at 11:18

## 2021-01-16 RX ADMIN — LORAZEPAM 1 MG: 1 TABLET ORAL at 08:38

## 2021-01-17 LAB
ANION GAP SERPL CALCULATED.3IONS-SCNC: 10.8 MMOL/L (ref 5–15)
BUN SERPL-MCNC: 19 MG/DL (ref 8–23)
BUN/CREAT SERPL: 12.9 (ref 7–25)
CALCIUM SPEC-SCNC: 8.5 MG/DL (ref 8.6–10.5)
CHLORIDE SERPL-SCNC: 106 MMOL/L (ref 98–107)
CO2 SERPL-SCNC: 25.2 MMOL/L (ref 22–29)
CREAT SERPL-MCNC: 1.47 MG/DL (ref 0.76–1.27)
DEPRECATED RDW RBC AUTO: 44 FL (ref 37–54)
ERYTHROCYTE [DISTWIDTH] IN BLOOD BY AUTOMATED COUNT: 12.3 % (ref 12.3–15.4)
GFR SERPL CREATININE-BSD FRML MDRD: 48 ML/MIN/1.73
GLUCOSE SERPL-MCNC: 113 MG/DL (ref 65–99)
HCT VFR BLD AUTO: 31 % (ref 37.5–51)
HGB BLD-MCNC: 10.2 G/DL (ref 13–17.7)
MAGNESIUM SERPL-MCNC: 1.7 MG/DL (ref 1.6–2.4)
MCH RBC QN AUTO: 32.5 PG (ref 26.6–33)
MCHC RBC AUTO-ENTMCNC: 32.9 G/DL (ref 31.5–35.7)
MCV RBC AUTO: 98.7 FL (ref 79–97)
PLATELET # BLD AUTO: 314 10*3/MM3 (ref 140–450)
PMV BLD AUTO: 9.3 FL (ref 6–12)
POTASSIUM SERPL-SCNC: 3.8 MMOL/L (ref 3.5–5.2)
RBC # BLD AUTO: 3.14 10*6/MM3 (ref 4.14–5.8)
SODIUM SERPL-SCNC: 142 MMOL/L (ref 136–145)
WBC # BLD AUTO: 5.13 10*3/MM3 (ref 3.4–10.8)

## 2021-01-17 PROCEDURE — 85027 COMPLETE CBC AUTOMATED: CPT | Performed by: INTERNAL MEDICINE

## 2021-01-17 PROCEDURE — 83735 ASSAY OF MAGNESIUM: CPT | Performed by: INTERNAL MEDICINE

## 2021-01-17 PROCEDURE — 97530 THERAPEUTIC ACTIVITIES: CPT

## 2021-01-17 PROCEDURE — 25010000002 ENOXAPARIN PER 10 MG: Performed by: INTERNAL MEDICINE

## 2021-01-17 PROCEDURE — 80048 BASIC METABOLIC PNL TOTAL CA: CPT | Performed by: INTERNAL MEDICINE

## 2021-01-17 RX ORDER — MAGNESIUM SULFATE HEPTAHYDRATE 40 MG/ML
4 INJECTION, SOLUTION INTRAVENOUS AS NEEDED
Status: DISCONTINUED | OUTPATIENT
Start: 2021-01-17 | End: 2021-01-19 | Stop reason: HOSPADM

## 2021-01-17 RX ORDER — MAGNESIUM SULFATE HEPTAHYDRATE 40 MG/ML
2 INJECTION, SOLUTION INTRAVENOUS AS NEEDED
Status: DISCONTINUED | OUTPATIENT
Start: 2021-01-17 | End: 2021-01-19 | Stop reason: HOSPADM

## 2021-01-17 RX ORDER — POTASSIUM CHLORIDE 1.5 G/1.77G
40 POWDER, FOR SOLUTION ORAL AS NEEDED
Status: DISCONTINUED | OUTPATIENT
Start: 2021-01-17 | End: 2021-01-19 | Stop reason: HOSPADM

## 2021-01-17 RX ORDER — POTASSIUM CHLORIDE 750 MG/1
40 TABLET, FILM COATED, EXTENDED RELEASE ORAL AS NEEDED
Status: DISCONTINUED | OUTPATIENT
Start: 2021-01-17 | End: 2021-01-19 | Stop reason: HOSPADM

## 2021-01-17 RX ADMIN — HYDROCODONE BITARTRATE AND ACETAMINOPHEN 1 TABLET: 5; 325 TABLET ORAL at 18:24

## 2021-01-17 RX ADMIN — PANTOPRAZOLE SODIUM 40 MG: 40 TABLET, DELAYED RELEASE ORAL at 09:54

## 2021-01-17 RX ADMIN — ENOXAPARIN SODIUM 40 MG: 40 INJECTION SUBCUTANEOUS at 12:11

## 2021-01-17 RX ADMIN — Medication 1 PATCH: at 09:53

## 2021-01-17 RX ADMIN — TRAZODONE HYDROCHLORIDE 50 MG: 50 TABLET ORAL at 20:44

## 2021-01-17 RX ADMIN — MULTIPLE VITAMINS W/ MINERALS TAB 1 TABLET: TAB at 09:55

## 2021-01-17 RX ADMIN — METOPROLOL TARTRATE 50 MG: 50 TABLET, FILM COATED ORAL at 01:08

## 2021-01-17 RX ADMIN — METOPROLOL TARTRATE 50 MG: 50 TABLET, FILM COATED ORAL at 09:55

## 2021-01-17 RX ADMIN — LORAZEPAM 0.5 MG: 0.5 TABLET ORAL at 18:20

## 2021-01-17 RX ADMIN — HYDROCODONE BITARTRATE AND ACETAMINOPHEN 1 TABLET: 5; 325 TABLET ORAL at 04:10

## 2021-01-17 RX ADMIN — ASPIRIN 81 MG: 81 TABLET, COATED ORAL at 09:55

## 2021-01-17 RX ADMIN — DIAZEPAM 10 MG: 5 TABLET ORAL at 09:55

## 2021-01-17 RX ADMIN — SODIUM CHLORIDE, PRESERVATIVE FREE 10 ML: 5 INJECTION INTRAVENOUS at 20:45

## 2021-01-17 RX ADMIN — LORAZEPAM 0.5 MG: 0.5 TABLET ORAL at 14:05

## 2021-01-17 RX ADMIN — DIAZEPAM 10 MG: 5 TABLET ORAL at 20:44

## 2021-01-17 RX ADMIN — METOPROLOL TARTRATE 50 MG: 50 TABLET, FILM COATED ORAL at 18:20

## 2021-01-17 RX ADMIN — AMLODIPINE BESYLATE 10 MG: 10 TABLET ORAL at 09:55

## 2021-01-17 RX ADMIN — FOLIC ACID 1 MG: 1 TABLET ORAL at 09:55

## 2021-01-17 RX ADMIN — Medication 100 MG: at 09:55

## 2021-01-17 RX ADMIN — LORAZEPAM 1 MG: 1 TABLET ORAL at 06:22

## 2021-01-17 RX ADMIN — LORAZEPAM 0.5 MG: 0.5 TABLET ORAL at 12:11

## 2021-01-18 LAB
ANION GAP SERPL CALCULATED.3IONS-SCNC: 8.1 MMOL/L (ref 5–15)
BUN SERPL-MCNC: 16 MG/DL (ref 8–23)
BUN/CREAT SERPL: 12.9 (ref 7–25)
CALCIUM SPEC-SCNC: 8.3 MG/DL (ref 8.6–10.5)
CHLORIDE SERPL-SCNC: 107 MMOL/L (ref 98–107)
CO2 SERPL-SCNC: 25.9 MMOL/L (ref 22–29)
CREAT SERPL-MCNC: 1.24 MG/DL (ref 0.76–1.27)
DEPRECATED RDW RBC AUTO: 42.6 FL (ref 37–54)
ERYTHROCYTE [DISTWIDTH] IN BLOOD BY AUTOMATED COUNT: 11.9 % (ref 12.3–15.4)
GFR SERPL CREATININE-BSD FRML MDRD: 58 ML/MIN/1.73
GLUCOSE SERPL-MCNC: 98 MG/DL (ref 65–99)
HBA1C MFR BLD: 4.9 % (ref 4.8–5.6)
HCT VFR BLD AUTO: 30.3 % (ref 37.5–51)
HGB BLD-MCNC: 10.2 G/DL (ref 13–17.7)
MAGNESIUM SERPL-MCNC: 1.3 MG/DL (ref 1.6–2.4)
MCH RBC QN AUTO: 33.3 PG (ref 26.6–33)
MCHC RBC AUTO-ENTMCNC: 33.7 G/DL (ref 31.5–35.7)
MCV RBC AUTO: 99 FL (ref 79–97)
PLATELET # BLD AUTO: 360 10*3/MM3 (ref 140–450)
PMV BLD AUTO: 9.8 FL (ref 6–12)
POTASSIUM SERPL-SCNC: 3.9 MMOL/L (ref 3.5–5.2)
RBC # BLD AUTO: 3.06 10*6/MM3 (ref 4.14–5.8)
SODIUM SERPL-SCNC: 141 MMOL/L (ref 136–145)
WBC # BLD AUTO: 5.07 10*3/MM3 (ref 3.4–10.8)

## 2021-01-18 PROCEDURE — 83735 ASSAY OF MAGNESIUM: CPT | Performed by: INTERNAL MEDICINE

## 2021-01-18 PROCEDURE — 80048 BASIC METABOLIC PNL TOTAL CA: CPT | Performed by: INTERNAL MEDICINE

## 2021-01-18 PROCEDURE — 25010000002 LORAZEPAM PER 2 MG: Performed by: INTERNAL MEDICINE

## 2021-01-18 PROCEDURE — 97110 THERAPEUTIC EXERCISES: CPT

## 2021-01-18 PROCEDURE — 94640 AIRWAY INHALATION TREATMENT: CPT

## 2021-01-18 PROCEDURE — 25010000002 ENOXAPARIN PER 10 MG: Performed by: INTERNAL MEDICINE

## 2021-01-18 PROCEDURE — 94799 UNLISTED PULMONARY SVC/PX: CPT

## 2021-01-18 PROCEDURE — 83036 HEMOGLOBIN GLYCOSYLATED A1C: CPT | Performed by: INTERNAL MEDICINE

## 2021-01-18 PROCEDURE — 85027 COMPLETE CBC AUTOMATED: CPT | Performed by: INTERNAL MEDICINE

## 2021-01-18 RX ADMIN — SODIUM CHLORIDE, PRESERVATIVE FREE 10 ML: 5 INJECTION INTRAVENOUS at 20:29

## 2021-01-18 RX ADMIN — TRAZODONE HYDROCHLORIDE 50 MG: 50 TABLET ORAL at 20:29

## 2021-01-18 RX ADMIN — ENOXAPARIN SODIUM 40 MG: 40 INJECTION SUBCUTANEOUS at 12:55

## 2021-01-18 RX ADMIN — METOPROLOL TARTRATE 50 MG: 50 TABLET, FILM COATED ORAL at 00:09

## 2021-01-18 RX ADMIN — DIAZEPAM 10 MG: 5 TABLET ORAL at 08:56

## 2021-01-18 RX ADMIN — LORAZEPAM 0.5 MG: 0.5 TABLET ORAL at 12:55

## 2021-01-18 RX ADMIN — LORAZEPAM 0.5 MG: 0.5 TABLET ORAL at 08:56

## 2021-01-18 RX ADMIN — LORAZEPAM 0.5 MG: 0.5 TABLET ORAL at 22:18

## 2021-01-18 RX ADMIN — Medication 1 PATCH: at 08:55

## 2021-01-18 RX ADMIN — LORAZEPAM 0.5 MG: 2 INJECTION INTRAMUSCULAR; INTRAVENOUS at 04:00

## 2021-01-18 RX ADMIN — BUDESONIDE AND FORMOTEROL FUMARATE DIHYDRATE 2 PUFF: 160; 4.5 AEROSOL RESPIRATORY (INHALATION) at 19:36

## 2021-01-18 RX ADMIN — MULTIPLE VITAMINS W/ MINERALS TAB 1 TABLET: TAB at 08:56

## 2021-01-18 RX ADMIN — HYDROCODONE BITARTRATE AND ACETAMINOPHEN 1 TABLET: 5; 325 TABLET ORAL at 08:56

## 2021-01-18 RX ADMIN — PANTOPRAZOLE SODIUM 40 MG: 40 TABLET, DELAYED RELEASE ORAL at 08:56

## 2021-01-18 RX ADMIN — HYDROCODONE BITARTRATE AND ACETAMINOPHEN 1 TABLET: 5; 325 TABLET ORAL at 00:12

## 2021-01-18 RX ADMIN — LORAZEPAM 0.5 MG: 0.5 TABLET ORAL at 17:42

## 2021-01-18 RX ADMIN — ASPIRIN 81 MG: 81 TABLET, COATED ORAL at 08:56

## 2021-01-18 RX ADMIN — METOPROLOL TARTRATE 50 MG: 50 TABLET, FILM COATED ORAL at 08:56

## 2021-01-18 RX ADMIN — Medication 100 MG: at 08:56

## 2021-01-18 RX ADMIN — AMLODIPINE BESYLATE 10 MG: 10 TABLET ORAL at 08:56

## 2021-01-18 RX ADMIN — SODIUM CHLORIDE 75 ML/HR: 9 INJECTION, SOLUTION INTRAVENOUS at 02:11

## 2021-01-18 RX ADMIN — FOLIC ACID 1 MG: 1 TABLET ORAL at 08:56

## 2021-01-18 RX ADMIN — METOPROLOL TARTRATE 50 MG: 50 TABLET, FILM COATED ORAL at 17:42

## 2021-01-18 RX ADMIN — HYDROCODONE BITARTRATE AND ACETAMINOPHEN 1 TABLET: 5; 325 TABLET ORAL at 20:29

## 2021-01-19 ENCOUNTER — READMISSION MANAGEMENT (OUTPATIENT)
Dept: CALL CENTER | Facility: HOSPITAL | Age: 66
End: 2021-01-19

## 2021-01-19 VITALS
DIASTOLIC BLOOD PRESSURE: 82 MMHG | WEIGHT: 233.25 LBS | HEIGHT: 78 IN | HEART RATE: 59 BPM | TEMPERATURE: 97.7 F | BODY MASS INDEX: 26.99 KG/M2 | SYSTOLIC BLOOD PRESSURE: 135 MMHG | OXYGEN SATURATION: 98 % | RESPIRATION RATE: 18 BRPM

## 2021-01-19 PROBLEM — E87.6 HYPOKALEMIA: Status: RESOLVED | Noted: 2020-06-29 | Resolved: 2021-01-19

## 2021-01-19 PROBLEM — E83.42 HYPOMAGNESEMIA: Status: RESOLVED | Noted: 2020-06-29 | Resolved: 2021-01-19

## 2021-01-19 PROBLEM — I47.1 SVT (SUPRAVENTRICULAR TACHYCARDIA) (HCC): Status: RESOLVED | Noted: 2021-01-12 | Resolved: 2021-01-19

## 2021-01-19 PROBLEM — J12.82 PNEUMONIA DUE TO COVID-19 VIRUS: Status: RESOLVED | Noted: 2021-01-11 | Resolved: 2021-01-19

## 2021-01-19 PROBLEM — I47.10 SVT (SUPRAVENTRICULAR TACHYCARDIA): Status: RESOLVED | Noted: 2021-01-12 | Resolved: 2021-01-19

## 2021-01-19 PROBLEM — U07.1 PNEUMONIA DUE TO COVID-19 VIRUS: Status: RESOLVED | Noted: 2021-01-11 | Resolved: 2021-01-19

## 2021-01-19 LAB
ANION GAP SERPL CALCULATED.3IONS-SCNC: 11 MMOL/L (ref 5–15)
BUN SERPL-MCNC: 13 MG/DL (ref 8–23)
BUN/CREAT SERPL: 19.7 (ref 7–25)
CALCIUM SPEC-SCNC: 8 MG/DL (ref 8.6–10.5)
CHLORIDE SERPL-SCNC: 106 MMOL/L (ref 98–107)
CO2 SERPL-SCNC: 25 MMOL/L (ref 22–29)
CREAT SERPL-MCNC: 0.66 MG/DL (ref 0.76–1.27)
DEPRECATED RDW RBC AUTO: 41.8 FL (ref 37–54)
ERYTHROCYTE [DISTWIDTH] IN BLOOD BY AUTOMATED COUNT: 12 % (ref 12.3–15.4)
GFR SERPL CREATININE-BSD FRML MDRD: 121 ML/MIN/1.73
GLUCOSE SERPL-MCNC: 98 MG/DL (ref 65–99)
HCT VFR BLD AUTO: 30.2 % (ref 37.5–51)
HGB BLD-MCNC: 10.6 G/DL (ref 13–17.7)
MAGNESIUM SERPL-MCNC: 1.2 MG/DL (ref 1.6–2.4)
MCH RBC QN AUTO: 34.2 PG (ref 26.6–33)
MCHC RBC AUTO-ENTMCNC: 35.1 G/DL (ref 31.5–35.7)
MCV RBC AUTO: 97.4 FL (ref 79–97)
PLATELET # BLD AUTO: 410 10*3/MM3 (ref 140–450)
PMV BLD AUTO: 9.7 FL (ref 6–12)
POTASSIUM SERPL-SCNC: 3.7 MMOL/L (ref 3.5–5.2)
RBC # BLD AUTO: 3.1 10*6/MM3 (ref 4.14–5.8)
SODIUM SERPL-SCNC: 142 MMOL/L (ref 136–145)
WBC # BLD AUTO: 5.93 10*3/MM3 (ref 3.4–10.8)

## 2021-01-19 PROCEDURE — 80048 BASIC METABOLIC PNL TOTAL CA: CPT | Performed by: INTERNAL MEDICINE

## 2021-01-19 PROCEDURE — 94799 UNLISTED PULMONARY SVC/PX: CPT

## 2021-01-19 PROCEDURE — 83735 ASSAY OF MAGNESIUM: CPT | Performed by: INTERNAL MEDICINE

## 2021-01-19 PROCEDURE — 85027 COMPLETE CBC AUTOMATED: CPT | Performed by: INTERNAL MEDICINE

## 2021-01-19 RX ORDER — METOPROLOL TARTRATE 50 MG/1
50 TABLET, FILM COATED ORAL EVERY 8 HOURS
Qty: 90 TABLET | Refills: 0 | Status: SHIPPED | OUTPATIENT
Start: 2021-01-19 | End: 2022-04-15

## 2021-01-19 RX ORDER — FOLIC ACID 1 MG/1
1 TABLET ORAL DAILY
Qty: 30 TABLET | Refills: 0 | Status: SHIPPED | OUTPATIENT
Start: 2021-01-20 | End: 2021-10-07

## 2021-01-19 RX ORDER — MAGNESIUM OXIDE 400 MG/1
400 TABLET ORAL DAILY
Qty: 7 TABLET | Refills: 0 | Status: SHIPPED | OUTPATIENT
Start: 2021-01-19 | End: 2021-01-26

## 2021-01-19 RX ADMIN — PANTOPRAZOLE SODIUM 40 MG: 40 TABLET, DELAYED RELEASE ORAL at 09:05

## 2021-01-19 RX ADMIN — LORAZEPAM 0.5 MG: 0.5 TABLET ORAL at 09:05

## 2021-01-19 RX ADMIN — HYDROCODONE BITARTRATE AND ACETAMINOPHEN 1 TABLET: 5; 325 TABLET ORAL at 02:46

## 2021-01-19 RX ADMIN — ASPIRIN 81 MG: 81 TABLET, COATED ORAL at 09:01

## 2021-01-19 RX ADMIN — LORAZEPAM 0.5 MG: 0.5 TABLET ORAL at 06:06

## 2021-01-19 RX ADMIN — MULTIPLE VITAMINS W/ MINERALS TAB 1 TABLET: TAB at 09:01

## 2021-01-19 RX ADMIN — SODIUM CHLORIDE, PRESERVATIVE FREE 10 ML: 5 INJECTION INTRAVENOUS at 09:06

## 2021-01-19 RX ADMIN — METOPROLOL TARTRATE 50 MG: 50 TABLET, FILM COATED ORAL at 08:00

## 2021-01-19 RX ADMIN — FOLIC ACID 1 MG: 1 TABLET ORAL at 09:01

## 2021-01-19 RX ADMIN — METOPROLOL TARTRATE 50 MG: 50 TABLET, FILM COATED ORAL at 00:40

## 2021-01-19 RX ADMIN — ERGOCALCIFEROL 50000 UNITS: 1.25 CAPSULE ORAL at 09:01

## 2021-01-19 RX ADMIN — Medication 1 PATCH: at 09:05

## 2021-01-19 RX ADMIN — BUDESONIDE AND FORMOTEROL FUMARATE DIHYDRATE 2 PUFF: 160; 4.5 AEROSOL RESPIRATORY (INHALATION) at 09:30

## 2021-01-19 RX ADMIN — HYDROCODONE BITARTRATE AND ACETAMINOPHEN 1 TABLET: 5; 325 TABLET ORAL at 09:05

## 2021-01-19 RX ADMIN — Medication 100 MG: at 09:01

## 2021-01-19 RX ADMIN — AMLODIPINE BESYLATE 10 MG: 10 TABLET ORAL at 09:01

## 2021-01-19 NOTE — OUTREACH NOTE
Prep Survey      Responses   [facility] patient discharged from?  [place]   Is LACE score < 7 ?  No   Emergency Room discharge w/ pulse ox?  No   Eligibility  [facility]   Date of Admission  01/11/21   Date of Discharge  01/19/21   Discharge Disposition  Home or Self Care   Discharge diagnosis  Alcohol withdrawal syndrome without complication    Does the patient have one of the following disease processes/diagnoses(primary or secondary)?  Other   Does the patient have Home health ordered?  Yes   What is the Home health agency?   [facility]   Is there a DME ordered?  No   Prep survey completed?  Yes          Joyce Marie RN

## 2021-01-20 ENCOUNTER — TRANSITIONAL CARE MANAGEMENT TELEPHONE ENCOUNTER (OUTPATIENT)
Dept: CALL CENTER | Facility: HOSPITAL | Age: 66
End: 2021-01-20

## 2021-01-20 NOTE — OUTREACH NOTE
Call Center TCM Note      Responses   [facility] patient discharged from?  [place]   Does the patient have one of the following disease processes/diagnoses(primary or secondary)?  Other   TCM attempt successful?  No   Unsuccessful attempts  Attempt 2          Didi Pacheco LPN    1/20/2021, 16:48 EST

## 2021-01-20 NOTE — OUTREACH NOTE
Call Center TCM Note      Responses   [facility] patient discharged from?  [place]   Does the patient have one of the following disease processes/diagnoses(primary or secondary)?  Other   TCM attempt successful?  No   Unsuccessful attempts  Attempt 1          Didi Pacheco LPN    1/20/2021, 15:24 EST

## 2021-01-21 ENCOUNTER — TRANSITIONAL CARE MANAGEMENT TELEPHONE ENCOUNTER (OUTPATIENT)
Dept: CALL CENTER | Facility: HOSPITAL | Age: 66
End: 2021-01-21

## 2021-01-21 NOTE — OUTREACH NOTE
Call Center TCM Note      Responses   [facility] patient discharged from?  [place]   Does the patient have one of the following disease processes/diagnoses(primary or secondary)?  Other   TCM attempt successful?  No   Unsuccessful attempts  Attempt 3   Wrap up additional comments  Unable to reach pt x 3 attempts for TCM call. Pt is not yet sched for TCM FWP with PCP          Keturah Barahona MA    1/21/2021, 16:30 EST

## 2021-01-27 ENCOUNTER — READMISSION MANAGEMENT (OUTPATIENT)
Dept: CALL CENTER | Facility: HOSPITAL | Age: 66
End: 2021-01-27

## 2021-01-27 NOTE — OUTREACH NOTE
Medical Week 2 Survey      Responses   [facility] patient discharged from?  [place]   Does the patient have one of the following disease processes/diagnoses(primary or secondary)?  Other   Week 2 attempt successful?  No   Unsuccessful attempts  Attempt 1   Revoke  Decline to participate          Junie Ramirez LPN

## 2021-02-08 RX ORDER — ESCITALOPRAM OXALATE 20 MG/1
TABLET ORAL
Qty: 90 TABLET | Refills: 0 | Status: SHIPPED | OUTPATIENT
Start: 2021-02-08 | End: 2021-08-05

## 2021-03-22 ENCOUNTER — BULK ORDERING (OUTPATIENT)
Dept: CASE MANAGEMENT | Facility: OTHER | Age: 66
End: 2021-03-22

## 2021-03-22 DIAGNOSIS — Z23 IMMUNIZATION DUE: ICD-10-CM

## 2021-03-25 ENCOUNTER — HOSPITAL ENCOUNTER (INPATIENT)
Facility: HOSPITAL | Age: 66
LOS: 4 days | Discharge: HOME | End: 2021-03-30
Attending: EMERGENCY MEDICINE | Admitting: INTERNAL MEDICINE

## 2021-03-25 DIAGNOSIS — F10.932 ALCOHOL WITHDRAWAL SYNDROME WITH PERCEPTUAL DISTURBANCE (HCC): Primary | ICD-10-CM

## 2021-03-25 DIAGNOSIS — F10.929 ALCOHOLIC INTOXICATION WITH COMPLICATION (HCC): ICD-10-CM

## 2021-03-25 LAB
ALBUMIN SERPL-MCNC: 4 G/DL (ref 3.5–5.2)
ALBUMIN/GLOB SERPL: 1.3 G/DL
ALP SERPL-CCNC: 91 U/L (ref 39–117)
ALT SERPL W P-5'-P-CCNC: 67 U/L (ref 1–41)
ANION GAP SERPL CALCULATED.3IONS-SCNC: 19.5 MMOL/L (ref 5–15)
AST SERPL-CCNC: 129 U/L (ref 1–40)
BASOPHILS # BLD AUTO: 0.09 10*3/MM3 (ref 0–0.2)
BASOPHILS NFR BLD AUTO: 1.7 % (ref 0–1.5)
BILIRUB SERPL-MCNC: 0.4 MG/DL (ref 0–1.2)
BUN SERPL-MCNC: 16 MG/DL (ref 8–23)
BUN/CREAT SERPL: 11.3 (ref 7–25)
CALCIUM SPEC-SCNC: 8.3 MG/DL (ref 8.6–10.5)
CHLORIDE SERPL-SCNC: 104 MMOL/L (ref 98–107)
CO2 SERPL-SCNC: 20.5 MMOL/L (ref 22–29)
CREAT SERPL-MCNC: 1.42 MG/DL (ref 0.76–1.27)
DEPRECATED RDW RBC AUTO: 47.5 FL (ref 37–54)
EOSINOPHIL # BLD AUTO: 0.04 10*3/MM3 (ref 0–0.4)
EOSINOPHIL NFR BLD AUTO: 0.8 % (ref 0.3–6.2)
ERYTHROCYTE [DISTWIDTH] IN BLOOD BY AUTOMATED COUNT: 13.6 % (ref 12.3–15.4)
ETHANOL BLD-MCNC: 464 MG/DL (ref 0–10)
ETHANOL UR QL: 0.46 %
GFR SERPL CREATININE-BSD FRML MDRD: 50 ML/MIN/1.73
GLOBULIN UR ELPH-MCNC: 3.2 GM/DL
GLUCOSE SERPL-MCNC: 156 MG/DL (ref 65–99)
HCT VFR BLD AUTO: 40.5 % (ref 37.5–51)
HGB BLD-MCNC: 14.8 G/DL (ref 13–17.7)
HOLD SPECIMEN: NORMAL
HOLD SPECIMEN: NORMAL
IMM GRANULOCYTES # BLD AUTO: 0.01 10*3/MM3 (ref 0–0.05)
IMM GRANULOCYTES NFR BLD AUTO: 0.2 % (ref 0–0.5)
LIPASE SERPL-CCNC: 83 U/L (ref 13–60)
LYMPHOCYTES # BLD AUTO: 1.77 10*3/MM3 (ref 0.7–3.1)
LYMPHOCYTES NFR BLD AUTO: 34.4 % (ref 19.6–45.3)
MAGNESIUM SERPL-MCNC: 1 MG/DL (ref 1.6–2.4)
MCH RBC QN AUTO: 35.2 PG (ref 26.6–33)
MCHC RBC AUTO-ENTMCNC: 36.5 G/DL (ref 31.5–35.7)
MCV RBC AUTO: 96.2 FL (ref 79–97)
MONOCYTES # BLD AUTO: 0.41 10*3/MM3 (ref 0.1–0.9)
MONOCYTES NFR BLD AUTO: 8 % (ref 5–12)
NEUTROPHILS NFR BLD AUTO: 2.83 10*3/MM3 (ref 1.7–7)
NEUTROPHILS NFR BLD AUTO: 54.9 % (ref 42.7–76)
NRBC BLD AUTO-RTO: 0 /100 WBC (ref 0–0.2)
PLATELET # BLD AUTO: 297 10*3/MM3 (ref 140–450)
PMV BLD AUTO: 9.1 FL (ref 6–12)
POTASSIUM SERPL-SCNC: 3.7 MMOL/L (ref 3.5–5.2)
PROT SERPL-MCNC: 7.2 G/DL (ref 6–8.5)
RBC # BLD AUTO: 4.21 10*6/MM3 (ref 4.14–5.8)
SODIUM SERPL-SCNC: 144 MMOL/L (ref 136–145)
WBC # BLD AUTO: 5.15 10*3/MM3 (ref 3.4–10.8)
WHOLE BLOOD HOLD SPECIMEN: NORMAL
WHOLE BLOOD HOLD SPECIMEN: NORMAL

## 2021-03-25 PROCEDURE — 99284 EMERGENCY DEPT VISIT MOD MDM: CPT

## 2021-03-25 PROCEDURE — 25010000002 THIAMINE PER 100 MG: Performed by: PHYSICIAN ASSISTANT

## 2021-03-25 PROCEDURE — 83735 ASSAY OF MAGNESIUM: CPT | Performed by: PHYSICIAN ASSISTANT

## 2021-03-25 PROCEDURE — 25010000002 MAGNESIUM SULFATE 2 GM/50ML SOLUTION: Performed by: PHYSICIAN ASSISTANT

## 2021-03-25 PROCEDURE — 25010000002 LORAZEPAM PER 2 MG: Performed by: EMERGENCY MEDICINE

## 2021-03-25 PROCEDURE — 82105 ALPHA-FETOPROTEIN SERUM: CPT | Performed by: NURSE PRACTITIONER

## 2021-03-25 PROCEDURE — 82607 VITAMIN B-12: CPT | Performed by: NURSE PRACTITIONER

## 2021-03-25 PROCEDURE — 25010000002 ONDANSETRON PER 1 MG: Performed by: PHYSICIAN ASSISTANT

## 2021-03-25 PROCEDURE — 80053 COMPREHEN METABOLIC PANEL: CPT | Performed by: PHYSICIAN ASSISTANT

## 2021-03-25 PROCEDURE — 82077 ASSAY SPEC XCP UR&BREATH IA: CPT | Performed by: EMERGENCY MEDICINE

## 2021-03-25 PROCEDURE — 85025 COMPLETE CBC W/AUTO DIFF WBC: CPT | Performed by: PHYSICIAN ASSISTANT

## 2021-03-25 PROCEDURE — 83690 ASSAY OF LIPASE: CPT | Performed by: PHYSICIAN ASSISTANT

## 2021-03-25 PROCEDURE — U0004 COV-19 TEST NON-CDC HGH THRU: HCPCS | Performed by: PHYSICIAN ASSISTANT

## 2021-03-25 RX ORDER — ONDANSETRON 2 MG/ML
4 INJECTION INTRAMUSCULAR; INTRAVENOUS ONCE
Status: COMPLETED | OUTPATIENT
Start: 2021-03-25 | End: 2021-03-25

## 2021-03-25 RX ORDER — MAGNESIUM SULFATE HEPTAHYDRATE 40 MG/ML
2 INJECTION, SOLUTION INTRAVENOUS ONCE
Status: COMPLETED | OUTPATIENT
Start: 2021-03-25 | End: 2021-03-25

## 2021-03-25 RX ORDER — SODIUM CHLORIDE 0.9 % (FLUSH) 0.9 %
10 SYRINGE (ML) INJECTION AS NEEDED
Status: DISCONTINUED | OUTPATIENT
Start: 2021-03-25 | End: 2021-03-30 | Stop reason: HOSPADM

## 2021-03-25 RX ORDER — LORAZEPAM 2 MG/ML
1 INJECTION INTRAMUSCULAR ONCE
Status: COMPLETED | OUTPATIENT
Start: 2021-03-25 | End: 2021-03-25

## 2021-03-25 RX ADMIN — ONDANSETRON 4 MG: 2 INJECTION INTRAMUSCULAR; INTRAVENOUS at 21:31

## 2021-03-25 RX ADMIN — MAGNESIUM SULFATE HEPTAHYDRATE 2 G: 2 INJECTION, SOLUTION INTRAVENOUS at 21:34

## 2021-03-25 RX ADMIN — FOLIC ACID 1000 ML/HR: 5 INJECTION, SOLUTION INTRAMUSCULAR; INTRAVENOUS; SUBCUTANEOUS at 23:22

## 2021-03-25 RX ADMIN — LORAZEPAM 1 MG: 2 INJECTION INTRAMUSCULAR; INTRAVENOUS at 23:33

## 2021-03-26 ENCOUNTER — APPOINTMENT (OUTPATIENT)
Dept: ULTRASOUND IMAGING | Facility: HOSPITAL | Age: 66
End: 2021-03-26

## 2021-03-26 PROBLEM — Z72.0 TOBACCO ABUSE: Status: ACTIVE | Noted: 2021-03-26

## 2021-03-26 PROBLEM — D63.8 ANEMIA, CHRONIC DISEASE: Status: ACTIVE | Noted: 2021-03-26

## 2021-03-26 PROBLEM — E83.42 HYPOMAGNESEMIA: Status: ACTIVE | Noted: 2021-03-26

## 2021-03-26 PROBLEM — N17.9 AKI (ACUTE KIDNEY INJURY) (HCC): Status: ACTIVE | Noted: 2021-03-26

## 2021-03-26 PROBLEM — J44.9 COPD (CHRONIC OBSTRUCTIVE PULMONARY DISEASE) (HCC): Status: ACTIVE | Noted: 2021-03-26

## 2021-03-26 PROBLEM — F10.10 ETOH ABUSE: Status: ACTIVE | Noted: 2021-03-26

## 2021-03-26 PROBLEM — E66.9 OBESE: Status: ACTIVE | Noted: 2021-03-26

## 2021-03-26 PROBLEM — R45.851 SUICIDAL IDEATIONS: Status: ACTIVE | Noted: 2021-03-26

## 2021-03-26 PROBLEM — G47.33 OSA (OBSTRUCTIVE SLEEP APNEA): Status: ACTIVE | Noted: 2021-03-26

## 2021-03-26 PROBLEM — E87.29 METABOLIC ACIDOSIS, INCREASED ANION GAP: Status: ACTIVE | Noted: 2021-03-26

## 2021-03-26 PROBLEM — F10.932 ALCOHOL WITHDRAWAL SYNDROME WITH PERCEPTUAL DISTURBANCE (HCC): Status: ACTIVE | Noted: 2021-03-26

## 2021-03-26 PROBLEM — R79.89 ELEVATED LFTS: Status: ACTIVE | Noted: 2021-03-26

## 2021-03-26 LAB
ALBUMIN SERPL-MCNC: 3.6 G/DL (ref 3.5–5.2)
ALP SERPL-CCNC: 74 U/L (ref 39–117)
ALPHA-FETOPROTEIN: 4.54 NG/ML (ref 0–8.3)
ALT SERPL W P-5'-P-CCNC: 59 U/L (ref 1–41)
ANION GAP SERPL CALCULATED.3IONS-SCNC: 13.4 MMOL/L (ref 5–15)
AST SERPL-CCNC: 99 U/L (ref 1–40)
BASOPHILS # BLD AUTO: 0.03 10*3/MM3 (ref 0–0.2)
BASOPHILS NFR BLD AUTO: 1 % (ref 0–1.5)
BILIRUB CONJ SERPL-MCNC: <0.2 MG/DL (ref 0–0.3)
BILIRUB INDIRECT SERPL-MCNC: ABNORMAL MG/DL
BILIRUB SERPL-MCNC: 0.5 MG/DL (ref 0–1.2)
BUN SERPL-MCNC: 19 MG/DL (ref 8–23)
BUN/CREAT SERPL: 15.1 (ref 7–25)
CALCIUM SPEC-SCNC: 7.8 MG/DL (ref 8.6–10.5)
CHLORIDE SERPL-SCNC: 109 MMOL/L (ref 98–107)
CHOLEST SERPL-MCNC: 196 MG/DL (ref 0–200)
CO2 SERPL-SCNC: 23.6 MMOL/L (ref 22–29)
CREAT SERPL-MCNC: 1.26 MG/DL (ref 0.76–1.27)
DEPRECATED RDW RBC AUTO: 50.3 FL (ref 37–54)
EOSINOPHIL # BLD AUTO: 0.06 10*3/MM3 (ref 0–0.4)
EOSINOPHIL NFR BLD AUTO: 1.9 % (ref 0.3–6.2)
ERYTHROCYTE [DISTWIDTH] IN BLOOD BY AUTOMATED COUNT: 14 % (ref 12.3–15.4)
ETHANOL BLD-MCNC: 260 MG/DL (ref 0–10)
ETHANOL UR QL: 0.26 %
FERRITIN SERPL-MCNC: 365 NG/ML (ref 30–400)
GFR SERPL CREATININE-BSD FRML MDRD: 57 ML/MIN/1.73
GLUCOSE SERPL-MCNC: 78 MG/DL (ref 65–99)
HCT VFR BLD AUTO: 34.9 % (ref 37.5–51)
HDLC SERPL-MCNC: 115 MG/DL (ref 40–60)
HGB BLD-MCNC: 12.1 G/DL (ref 13–17.7)
IMM GRANULOCYTES # BLD AUTO: 0.02 10*3/MM3 (ref 0–0.05)
IMM GRANULOCYTES NFR BLD AUTO: 0.6 % (ref 0–0.5)
INR PPP: 0.93 (ref 0.9–1.1)
IRON 24H UR-MRATE: 115 MCG/DL (ref 59–158)
IRON SATN MFR SERPL: 48 % (ref 20–50)
LDLC SERPL CALC-MCNC: 69 MG/DL (ref 0–100)
LDLC/HDLC SERPL: 0.59 {RATIO}
LYMPHOCYTES # BLD AUTO: 0.98 10*3/MM3 (ref 0.7–3.1)
LYMPHOCYTES NFR BLD AUTO: 31.5 % (ref 19.6–45.3)
MAGNESIUM SERPL-MCNC: 1.3 MG/DL (ref 1.6–2.4)
MCH RBC QN AUTO: 34.4 PG (ref 26.6–33)
MCHC RBC AUTO-ENTMCNC: 34.7 G/DL (ref 31.5–35.7)
MCV RBC AUTO: 99.1 FL (ref 79–97)
MONOCYTES # BLD AUTO: 0.49 10*3/MM3 (ref 0.1–0.9)
MONOCYTES NFR BLD AUTO: 15.8 % (ref 5–12)
NEUTROPHILS NFR BLD AUTO: 1.53 10*3/MM3 (ref 1.7–7)
NEUTROPHILS NFR BLD AUTO: 49.2 % (ref 42.7–76)
NRBC BLD AUTO-RTO: 0 /100 WBC (ref 0–0.2)
PLATELET # BLD AUTO: 145 10*3/MM3 (ref 140–450)
PMV BLD AUTO: 8.9 FL (ref 6–12)
POTASSIUM SERPL-SCNC: 3.8 MMOL/L (ref 3.5–5.2)
PROT SERPL-MCNC: 6.1 G/DL (ref 6–8.5)
PROTHROMBIN TIME: 12.3 SECONDS (ref 11.7–14.2)
QT INTERVAL: 374 MS
RBC # BLD AUTO: 3.52 10*6/MM3 (ref 4.14–5.8)
SARS-COV-2 ORF1AB RESP QL NAA+PROBE: NOT DETECTED
SODIUM SERPL-SCNC: 146 MMOL/L (ref 136–145)
TIBC SERPL-MCNC: 238 MCG/DL (ref 298–536)
TRANSFERRIN SERPL-MCNC: 160 MG/DL (ref 200–360)
TRIGL SERPL-MCNC: 67 MG/DL (ref 0–150)
TSH SERPL DL<=0.05 MIU/L-ACNC: 2.1 UIU/ML (ref 0.27–4.2)
VIT B12 BLD-MCNC: 690 PG/ML (ref 211–946)
VLDLC SERPL-MCNC: 12 MG/DL (ref 5–40)
WBC # BLD AUTO: 3.11 10*3/MM3 (ref 3.4–10.8)

## 2021-03-26 PROCEDURE — 85610 PROTHROMBIN TIME: CPT | Performed by: NURSE PRACTITIONER

## 2021-03-26 PROCEDURE — 82077 ASSAY SPEC XCP UR&BREATH IA: CPT | Performed by: PHYSICIAN ASSISTANT

## 2021-03-26 PROCEDURE — 76705 ECHO EXAM OF ABDOMEN: CPT

## 2021-03-26 PROCEDURE — 80048 BASIC METABOLIC PNL TOTAL CA: CPT | Performed by: NURSE PRACTITIONER

## 2021-03-26 PROCEDURE — 93010 ELECTROCARDIOGRAM REPORT: CPT | Performed by: INTERNAL MEDICINE

## 2021-03-26 PROCEDURE — 36415 COLL VENOUS BLD VENIPUNCTURE: CPT | Performed by: NURSE PRACTITIONER

## 2021-03-26 PROCEDURE — 82728 ASSAY OF FERRITIN: CPT | Performed by: NURSE PRACTITIONER

## 2021-03-26 PROCEDURE — 25010000002 LORAZEPAM PER 2 MG: Performed by: NURSE PRACTITIONER

## 2021-03-26 PROCEDURE — 25010000002 MAGNESIUM SULFATE 2 GM/50ML SOLUTION: Performed by: NURSE PRACTITIONER

## 2021-03-26 PROCEDURE — 80061 LIPID PANEL: CPT | Performed by: NURSE PRACTITIONER

## 2021-03-26 PROCEDURE — 83735 ASSAY OF MAGNESIUM: CPT | Performed by: NURSE PRACTITIONER

## 2021-03-26 PROCEDURE — 25010000002 THIAMINE PER 100 MG: Performed by: NURSE PRACTITIONER

## 2021-03-26 PROCEDURE — 80076 HEPATIC FUNCTION PANEL: CPT | Performed by: NURSE PRACTITIONER

## 2021-03-26 PROCEDURE — 85025 COMPLETE CBC W/AUTO DIFF WBC: CPT | Performed by: NURSE PRACTITIONER

## 2021-03-26 PROCEDURE — 93005 ELECTROCARDIOGRAM TRACING: CPT | Performed by: NURSE PRACTITIONER

## 2021-03-26 PROCEDURE — 25010000002 LORAZEPAM PER 2 MG: Performed by: EMERGENCY MEDICINE

## 2021-03-26 PROCEDURE — 90791 PSYCH DIAGNOSTIC EVALUATION: CPT | Performed by: SOCIAL WORKER

## 2021-03-26 PROCEDURE — 84443 ASSAY THYROID STIM HORMONE: CPT | Performed by: NURSE PRACTITIONER

## 2021-03-26 PROCEDURE — 83540 ASSAY OF IRON: CPT | Performed by: NURSE PRACTITIONER

## 2021-03-26 PROCEDURE — 84466 ASSAY OF TRANSFERRIN: CPT | Performed by: NURSE PRACTITIONER

## 2021-03-26 RX ORDER — LORAZEPAM 0.5 MG/1
0.5 TABLET ORAL
Status: DISCONTINUED | OUTPATIENT
Start: 2021-03-26 | End: 2021-03-27

## 2021-03-26 RX ORDER — CHLORDIAZEPOXIDE HYDROCHLORIDE 25 MG/1
25 CAPSULE, GELATIN COATED ORAL EVERY 6 HOURS SCHEDULED
Status: DISCONTINUED | OUTPATIENT
Start: 2021-03-26 | End: 2021-03-30

## 2021-03-26 RX ORDER — ASPIRIN 81 MG/1
81 TABLET, CHEWABLE ORAL DAILY
COMMUNITY
End: 2022-04-15

## 2021-03-26 RX ORDER — LORAZEPAM 2 MG/ML
1 INJECTION INTRAMUSCULAR
Status: DISCONTINUED | OUTPATIENT
Start: 2021-03-26 | End: 2021-03-27

## 2021-03-26 RX ORDER — LORAZEPAM 1 MG/1
1 TABLET ORAL
Status: DISCONTINUED | OUTPATIENT
Start: 2021-03-26 | End: 2021-03-27

## 2021-03-26 RX ORDER — PANTOPRAZOLE SODIUM 40 MG/1
40 TABLET, DELAYED RELEASE ORAL DAILY
COMMUNITY
End: 2021-10-07

## 2021-03-26 RX ORDER — ACETAMINOPHEN 160 MG/5ML
650 SOLUTION ORAL EVERY 4 HOURS PRN
Status: DISCONTINUED | OUTPATIENT
Start: 2021-03-26 | End: 2021-03-30 | Stop reason: HOSPADM

## 2021-03-26 RX ORDER — FOLIC ACID 1 MG/1
1 TABLET ORAL DAILY
Status: ON HOLD | COMMUNITY
End: 2021-10-07

## 2021-03-26 RX ORDER — MAGNESIUM SULFATE HEPTAHYDRATE 40 MG/ML
4 INJECTION, SOLUTION INTRAVENOUS AS NEEDED
Status: DISCONTINUED | OUTPATIENT
Start: 2021-03-26 | End: 2021-03-30 | Stop reason: HOSPADM

## 2021-03-26 RX ORDER — FERROUS SULFATE 325(65) MG
325 TABLET ORAL
Status: DISCONTINUED | OUTPATIENT
Start: 2021-03-27 | End: 2021-03-30 | Stop reason: HOSPADM

## 2021-03-26 RX ORDER — PANTOPRAZOLE SODIUM 40 MG/10ML
40 INJECTION, POWDER, LYOPHILIZED, FOR SOLUTION INTRAVENOUS
Status: DISCONTINUED | OUTPATIENT
Start: 2021-03-26 | End: 2021-03-27

## 2021-03-26 RX ORDER — ESCITALOPRAM OXALATE 20 MG/1
20 TABLET ORAL DAILY
COMMUNITY
End: 2021-08-11

## 2021-03-26 RX ORDER — NICOTINE 21 MG/24HR
1 PATCH, TRANSDERMAL 24 HOURS TRANSDERMAL EVERY 24 HOURS
Status: DISCONTINUED | OUTPATIENT
Start: 2021-03-26 | End: 2021-03-30 | Stop reason: HOSPADM

## 2021-03-26 RX ORDER — SODIUM CHLORIDE 0.9 % (FLUSH) 0.9 %
10 SYRINGE (ML) INJECTION AS NEEDED
Status: DISCONTINUED | OUTPATIENT
Start: 2021-03-26 | End: 2021-03-30 | Stop reason: HOSPADM

## 2021-03-26 RX ORDER — ERGOCALCIFEROL 1.25 MG/1
50000 CAPSULE ORAL WEEKLY
COMMUNITY
End: 2021-10-07

## 2021-03-26 RX ORDER — ASPIRIN 81 MG/1
81 TABLET, CHEWABLE ORAL DAILY
Status: DISCONTINUED | OUTPATIENT
Start: 2021-03-26 | End: 2021-03-30 | Stop reason: HOSPADM

## 2021-03-26 RX ORDER — ACETAMINOPHEN 325 MG/1
650 TABLET ORAL EVERY 6 HOURS PRN
COMMUNITY
End: 2022-04-15

## 2021-03-26 RX ORDER — NITROGLYCERIN 0.4 MG/1
0.4 TABLET SUBLINGUAL
Status: DISCONTINUED | OUTPATIENT
Start: 2021-03-26 | End: 2021-03-30 | Stop reason: HOSPADM

## 2021-03-26 RX ORDER — LORAZEPAM 2 MG/ML
1 INJECTION INTRAMUSCULAR ONCE
Status: COMPLETED | OUTPATIENT
Start: 2021-03-26 | End: 2021-03-26

## 2021-03-26 RX ORDER — DIPHENOXYLATE HYDROCHLORIDE AND ATROPINE SULFATE 2.5; .025 MG/1; MG/1
1 TABLET ORAL DAILY
Status: DISCONTINUED | OUTPATIENT
Start: 2021-03-27 | End: 2021-03-28 | Stop reason: DRUGHIGH

## 2021-03-26 RX ORDER — LABETALOL HYDROCHLORIDE 5 MG/ML
20 INJECTION, SOLUTION INTRAVENOUS ONCE
Status: COMPLETED | OUTPATIENT
Start: 2021-03-26 | End: 2021-03-26

## 2021-03-26 RX ORDER — LORAZEPAM 2 MG/ML
2 INJECTION INTRAMUSCULAR ONCE
Status: COMPLETED | OUTPATIENT
Start: 2021-03-26 | End: 2021-03-26

## 2021-03-26 RX ORDER — CLONIDINE HYDROCHLORIDE 0.1 MG/1
0.1 TABLET ORAL EVERY 4 HOURS PRN
Status: DISCONTINUED | OUTPATIENT
Start: 2021-03-26 | End: 2021-03-30

## 2021-03-26 RX ORDER — ACETAMINOPHEN 325 MG/1
650 TABLET ORAL EVERY 4 HOURS PRN
Status: DISCONTINUED | OUTPATIENT
Start: 2021-03-26 | End: 2021-03-30 | Stop reason: HOSPADM

## 2021-03-26 RX ORDER — ESCITALOPRAM OXALATE 20 MG/1
20 TABLET ORAL DAILY
Status: DISCONTINUED | OUTPATIENT
Start: 2021-03-26 | End: 2021-03-30 | Stop reason: HOSPADM

## 2021-03-26 RX ORDER — TRAZODONE HYDROCHLORIDE 50 MG/1
50 TABLET ORAL NIGHTLY
COMMUNITY
End: 2021-10-07

## 2021-03-26 RX ORDER — ERGOCALCIFEROL 1.25 MG/1
50000 CAPSULE ORAL WEEKLY
Status: DISCONTINUED | OUTPATIENT
Start: 2021-03-26 | End: 2021-03-26

## 2021-03-26 RX ORDER — ONDANSETRON 2 MG/ML
4 INJECTION INTRAMUSCULAR; INTRAVENOUS EVERY 6 HOURS PRN
Status: DISCONTINUED | OUTPATIENT
Start: 2021-03-26 | End: 2021-03-30 | Stop reason: HOSPADM

## 2021-03-26 RX ORDER — HYDROCODONE BITARTRATE AND ACETAMINOPHEN 5; 325 MG/1; MG/1
1 TABLET ORAL EVERY 6 HOURS PRN
COMMUNITY
End: 2021-03-30 | Stop reason: HOSPADM

## 2021-03-26 RX ORDER — ACETAMINOPHEN 650 MG/1
650 SUPPOSITORY RECTAL EVERY 4 HOURS PRN
Status: DISCONTINUED | OUTPATIENT
Start: 2021-03-26 | End: 2021-03-30 | Stop reason: HOSPADM

## 2021-03-26 RX ORDER — IPRATROPIUM BROMIDE AND ALBUTEROL SULFATE 2.5; .5 MG/3ML; MG/3ML
3 SOLUTION RESPIRATORY (INHALATION) EVERY 4 HOURS PRN
Status: DISCONTINUED | OUTPATIENT
Start: 2021-03-26 | End: 2021-03-30 | Stop reason: HOSPADM

## 2021-03-26 RX ORDER — FERROUS SULFATE 325(65) MG
325 TABLET ORAL
Status: ON HOLD | COMMUNITY
End: 2021-10-07

## 2021-03-26 RX ORDER — LORAZEPAM 2 MG/ML
1 INJECTION INTRAMUSCULAR ONCE
Status: DISCONTINUED | OUTPATIENT
Start: 2021-03-26 | End: 2021-03-26

## 2021-03-26 RX ORDER — AMLODIPINE BESYLATE 10 MG/1
10 TABLET ORAL DAILY
COMMUNITY
End: 2021-10-20

## 2021-03-26 RX ORDER — FOLIC ACID 1 MG/1
1 TABLET ORAL DAILY
Status: DISCONTINUED | OUTPATIENT
Start: 2021-03-27 | End: 2021-03-26

## 2021-03-26 RX ORDER — MAGNESIUM SULFATE HEPTAHYDRATE 40 MG/ML
2 INJECTION, SOLUTION INTRAVENOUS AS NEEDED
Status: DISCONTINUED | OUTPATIENT
Start: 2021-03-26 | End: 2021-03-30 | Stop reason: HOSPADM

## 2021-03-26 RX ORDER — FOLIC ACID 1 MG/1
1 TABLET ORAL DAILY
Status: DISCONTINUED | OUTPATIENT
Start: 2021-03-26 | End: 2021-03-30 | Stop reason: HOSPADM

## 2021-03-26 RX ORDER — ACETAMINOPHEN 325 MG/1
650 TABLET ORAL EVERY 6 HOURS PRN
Status: DISCONTINUED | OUTPATIENT
Start: 2021-03-26 | End: 2021-03-28 | Stop reason: SDUPTHER

## 2021-03-26 RX ORDER — AMLODIPINE BESYLATE 10 MG/1
10 TABLET ORAL DAILY
Status: DISCONTINUED | OUTPATIENT
Start: 2021-03-26 | End: 2021-03-30 | Stop reason: HOSPADM

## 2021-03-26 RX ORDER — FOLIC ACID 1 MG/1
1 TABLET ORAL DAILY
Status: DISCONTINUED | OUTPATIENT
Start: 2021-03-26 | End: 2021-03-26

## 2021-03-26 RX ORDER — SODIUM CHLORIDE 0.9 % (FLUSH) 0.9 %
10 SYRINGE (ML) INJECTION EVERY 12 HOURS SCHEDULED
Status: DISCONTINUED | OUTPATIENT
Start: 2021-03-26 | End: 2021-03-30 | Stop reason: HOSPADM

## 2021-03-26 RX ORDER — MULTIPLE VITAMINS W/ MINERALS TAB 9MG-400MCG
1 TAB ORAL DAILY
COMMUNITY
End: 2021-10-07

## 2021-03-26 RX ORDER — ALBUTEROL SULFATE 90 UG/1
2 AEROSOL, METERED RESPIRATORY (INHALATION) EVERY 6 HOURS PRN
Status: ON HOLD | COMMUNITY
End: 2021-10-07

## 2021-03-26 RX ORDER — LOPERAMIDE HYDROCHLORIDE 2 MG/1
2 CAPSULE ORAL 4 TIMES DAILY PRN
Status: DISCONTINUED | OUTPATIENT
Start: 2021-03-26 | End: 2021-03-30 | Stop reason: HOSPADM

## 2021-03-26 RX ORDER — SODIUM CHLORIDE 9 MG/ML
75 INJECTION, SOLUTION INTRAVENOUS CONTINUOUS
Status: DISCONTINUED | OUTPATIENT
Start: 2021-03-26 | End: 2021-03-29

## 2021-03-26 RX ORDER — POLYETHYLENE GLYCOL 3350 17 G/17G
17 POWDER, FOR SOLUTION ORAL DAILY
COMMUNITY
End: 2021-10-07

## 2021-03-26 RX ORDER — TRAZODONE HYDROCHLORIDE 50 MG/1
50 TABLET ORAL NIGHTLY
Status: DISCONTINUED | OUTPATIENT
Start: 2021-03-26 | End: 2021-03-30 | Stop reason: HOSPADM

## 2021-03-26 RX ORDER — LORAZEPAM 2 MG/ML
0.5 INJECTION INTRAMUSCULAR
Status: DISCONTINUED | OUTPATIENT
Start: 2021-03-26 | End: 2021-03-27

## 2021-03-26 RX ORDER — METOPROLOL TARTRATE 50 MG/1
50 TABLET, FILM COATED ORAL EVERY 8 HOURS SCHEDULED
Status: DISCONTINUED | OUTPATIENT
Start: 2021-03-26 | End: 2021-03-30 | Stop reason: HOSPADM

## 2021-03-26 RX ORDER — METOPROLOL TARTRATE 50 MG/1
50 TABLET, FILM COATED ORAL EVERY 8 HOURS
Status: ON HOLD | COMMUNITY
End: 2021-10-07

## 2021-03-26 RX ADMIN — THIAMINE HYDROCHLORIDE 100 MG: 100 INJECTION, SOLUTION INTRAMUSCULAR; INTRAVENOUS at 12:51

## 2021-03-26 RX ADMIN — SODIUM CHLORIDE, PRESERVATIVE FREE 10 ML: 5 INJECTION INTRAVENOUS at 11:40

## 2021-03-26 RX ADMIN — METOPROLOL TARTRATE 50 MG: 50 TABLET, FILM COATED ORAL at 14:38

## 2021-03-26 RX ADMIN — TRAZODONE HYDROCHLORIDE 50 MG: 50 TABLET ORAL at 20:21

## 2021-03-26 RX ADMIN — AMLODIPINE BESYLATE 10 MG: 10 TABLET ORAL at 17:58

## 2021-03-26 RX ADMIN — LORAZEPAM 1 MG: 2 INJECTION INTRAMUSCULAR; INTRAVENOUS at 04:49

## 2021-03-26 RX ADMIN — LORAZEPAM 2 MG: 2 INJECTION INTRAMUSCULAR; INTRAVENOUS at 01:55

## 2021-03-26 RX ADMIN — LORAZEPAM 1 MG: 2 INJECTION INTRAMUSCULAR; INTRAVENOUS at 13:09

## 2021-03-26 RX ADMIN — MAGNESIUM SULFATE HEPTAHYDRATE 2 G: 2 INJECTION, SOLUTION INTRAVENOUS at 19:39

## 2021-03-26 RX ADMIN — SODIUM CHLORIDE 1000 ML: 9 INJECTION, SOLUTION INTRAVENOUS at 03:24

## 2021-03-26 RX ADMIN — LABETALOL HYDROCHLORIDE 20 MG: 5 INJECTION, SOLUTION INTRAVENOUS at 11:37

## 2021-03-26 RX ADMIN — LORAZEPAM 1 MG: 2 INJECTION INTRAMUSCULAR; INTRAVENOUS at 09:49

## 2021-03-26 RX ADMIN — METOPROLOL TARTRATE 50 MG: 50 TABLET, FILM COATED ORAL at 22:15

## 2021-03-26 RX ADMIN — LOPERAMIDE HYDROCHLORIDE 2 MG: 2 CAPSULE ORAL at 17:59

## 2021-03-26 RX ADMIN — ESCITALOPRAM 20 MG: 20 TABLET, FILM COATED ORAL at 17:58

## 2021-03-26 RX ADMIN — LOPERAMIDE HYDROCHLORIDE 2 MG: 2 CAPSULE ORAL at 12:51

## 2021-03-26 RX ADMIN — PANTOPRAZOLE SODIUM 40 MG: 40 INJECTION, POWDER, FOR SOLUTION INTRAVENOUS at 17:57

## 2021-03-26 RX ADMIN — Medication 1 PATCH: at 12:51

## 2021-03-26 RX ADMIN — CHLORDIAZEPOXIDE HYDROCHLORIDE 25 MG: 25 CAPSULE ORAL at 12:51

## 2021-03-26 RX ADMIN — LORAZEPAM 1 MG: 2 INJECTION INTRAMUSCULAR; INTRAVENOUS at 17:58

## 2021-03-26 RX ADMIN — SODIUM CHLORIDE 100 ML/HR: 9 INJECTION, SOLUTION INTRAVENOUS at 08:42

## 2021-03-26 RX ADMIN — LORAZEPAM 2 MG: 2 INJECTION INTRAMUSCULAR; INTRAVENOUS at 05:00

## 2021-03-26 RX ADMIN — FOLIC ACID 1 MG: 1 TABLET ORAL at 17:58

## 2021-03-26 RX ADMIN — SODIUM CHLORIDE, PRESERVATIVE FREE 10 ML: 5 INJECTION INTRAVENOUS at 20:21

## 2021-03-26 RX ADMIN — CHLORDIAZEPOXIDE HYDROCHLORIDE 25 MG: 25 CAPSULE ORAL at 17:58

## 2021-03-26 RX ADMIN — ACETAMINOPHEN 650 MG: 325 TABLET, FILM COATED ORAL at 20:31

## 2021-03-26 RX ADMIN — LORAZEPAM 1 MG: 2 INJECTION INTRAMUSCULAR; INTRAVENOUS at 15:50

## 2021-03-26 RX ADMIN — MAGNESIUM SULFATE HEPTAHYDRATE 2 G: 2 INJECTION, SOLUTION INTRAVENOUS at 15:50

## 2021-03-26 RX ADMIN — LORAZEPAM 0.5 MG: 2 INJECTION INTRAMUSCULAR; INTRAVENOUS at 20:31

## 2021-03-26 RX ADMIN — LORAZEPAM 1 MG: 2 INJECTION INTRAMUSCULAR; INTRAVENOUS at 22:19

## 2021-03-26 RX ADMIN — LORAZEPAM 0.5 MG: 2 INJECTION INTRAMUSCULAR; INTRAVENOUS at 23:30

## 2021-03-26 RX ADMIN — MAGNESIUM SULFATE HEPTAHYDRATE 2 G: 2 INJECTION, SOLUTION INTRAVENOUS at 17:30

## 2021-03-26 RX ADMIN — ASPIRIN 81 MG: 81 TABLET, CHEWABLE ORAL at 17:57

## 2021-03-26 RX ADMIN — LORAZEPAM 1 MG: 2 INJECTION INTRAMUSCULAR; INTRAVENOUS at 08:42

## 2021-03-26 RX ADMIN — LORAZEPAM 1 MG: 2 INJECTION INTRAMUSCULAR; INTRAVENOUS at 14:22

## 2021-03-27 LAB
ALBUMIN SERPL-MCNC: 3.5 G/DL (ref 3.5–5.2)
ALBUMIN/GLOB SERPL: 1.4 G/DL
ALP SERPL-CCNC: 69 U/L (ref 39–117)
ALT SERPL W P-5'-P-CCNC: 54 U/L (ref 1–41)
ANION GAP SERPL CALCULATED.3IONS-SCNC: 10.4 MMOL/L (ref 5–15)
AST SERPL-CCNC: 73 U/L (ref 1–40)
BILIRUB SERPL-MCNC: 0.8 MG/DL (ref 0–1.2)
BUN SERPL-MCNC: 13 MG/DL (ref 8–23)
BUN/CREAT SERPL: 14.1 (ref 7–25)
CALCIUM SPEC-SCNC: 7.7 MG/DL (ref 8.6–10.5)
CHLORIDE SERPL-SCNC: 106 MMOL/L (ref 98–107)
CO2 SERPL-SCNC: 23.6 MMOL/L (ref 22–29)
CREAT SERPL-MCNC: 0.92 MG/DL (ref 0.76–1.27)
DEPRECATED RDW RBC AUTO: 46.6 FL (ref 37–54)
ERYTHROCYTE [DISTWIDTH] IN BLOOD BY AUTOMATED COUNT: 13.2 % (ref 12.3–15.4)
FOLATE SERPL-MCNC: >20 NG/ML (ref 4.78–24.2)
GFR SERPL CREATININE-BSD FRML MDRD: 82 ML/MIN/1.73
GLOBULIN UR ELPH-MCNC: 2.5 GM/DL
GLUCOSE SERPL-MCNC: 103 MG/DL (ref 65–99)
HCT VFR BLD AUTO: 31 % (ref 37.5–51)
HGB BLD-MCNC: 11.1 G/DL (ref 13–17.7)
LIPASE SERPL-CCNC: 54 U/L (ref 13–60)
MAGNESIUM SERPL-MCNC: 1.5 MG/DL (ref 1.6–2.4)
MCH RBC QN AUTO: 34.6 PG (ref 26.6–33)
MCHC RBC AUTO-ENTMCNC: 35.8 G/DL (ref 31.5–35.7)
MCV RBC AUTO: 96.6 FL (ref 79–97)
PLATELET # BLD AUTO: 117 10*3/MM3 (ref 140–450)
PMV BLD AUTO: 9.8 FL (ref 6–12)
POTASSIUM SERPL-SCNC: 3.5 MMOL/L (ref 3.5–5.2)
PROT SERPL-MCNC: 6 G/DL (ref 6–8.5)
RBC # BLD AUTO: 3.21 10*6/MM3 (ref 4.14–5.8)
RETICS # AUTO: 0.02 10*6/MM3 (ref 0.02–0.13)
RETICS/RBC NFR AUTO: 0.67 % (ref 0.7–1.9)
SODIUM SERPL-SCNC: 140 MMOL/L (ref 136–145)
WBC # BLD AUTO: 3.15 10*3/MM3 (ref 3.4–10.8)

## 2021-03-27 PROCEDURE — 25010000002 LORAZEPAM PER 2 MG: Performed by: NURSE PRACTITIONER

## 2021-03-27 PROCEDURE — 83735 ASSAY OF MAGNESIUM: CPT | Performed by: INTERNAL MEDICINE

## 2021-03-27 PROCEDURE — 80053 COMPREHEN METABOLIC PANEL: CPT | Performed by: NURSE PRACTITIONER

## 2021-03-27 PROCEDURE — 82746 ASSAY OF FOLIC ACID SERUM: CPT | Performed by: NURSE PRACTITIONER

## 2021-03-27 PROCEDURE — 83690 ASSAY OF LIPASE: CPT | Performed by: INTERNAL MEDICINE

## 2021-03-27 PROCEDURE — 85027 COMPLETE CBC AUTOMATED: CPT | Performed by: NURSE PRACTITIONER

## 2021-03-27 PROCEDURE — 25010000002 MAGNESIUM SULFATE 2 GM/50ML SOLUTION: Performed by: NURSE PRACTITIONER

## 2021-03-27 PROCEDURE — 85045 AUTOMATED RETICULOCYTE COUNT: CPT | Performed by: INTERNAL MEDICINE

## 2021-03-27 RX ORDER — LORAZEPAM 2 MG/ML
1 INJECTION INTRAMUSCULAR
Status: DISCONTINUED | OUTPATIENT
Start: 2021-03-27 | End: 2021-03-30

## 2021-03-27 RX ORDER — LORAZEPAM 1 MG/1
2 TABLET ORAL
Status: DISCONTINUED | OUTPATIENT
Start: 2021-03-27 | End: 2021-03-30

## 2021-03-27 RX ORDER — PANTOPRAZOLE SODIUM 40 MG/10ML
40 INJECTION, POWDER, LYOPHILIZED, FOR SOLUTION INTRAVENOUS
Status: DISCONTINUED | OUTPATIENT
Start: 2021-03-28 | End: 2021-03-29

## 2021-03-27 RX ORDER — LORAZEPAM 2 MG/ML
0.5 INJECTION INTRAMUSCULAR
Status: DISCONTINUED | OUTPATIENT
Start: 2021-03-27 | End: 2021-03-30

## 2021-03-27 RX ORDER — LORAZEPAM 2 MG/ML
2 INJECTION INTRAMUSCULAR
Status: DISCONTINUED | OUTPATIENT
Start: 2021-03-27 | End: 2021-03-30

## 2021-03-27 RX ORDER — LORAZEPAM 0.5 MG/1
0.5 TABLET ORAL
Status: DISCONTINUED | OUTPATIENT
Start: 2021-03-27 | End: 2021-03-30

## 2021-03-27 RX ORDER — LORAZEPAM 1 MG/1
1 TABLET ORAL
Status: DISCONTINUED | OUTPATIENT
Start: 2021-03-27 | End: 2021-03-30

## 2021-03-27 RX ADMIN — FOLIC ACID 1 MG: 1 TABLET ORAL at 08:25

## 2021-03-27 RX ADMIN — CHLORDIAZEPOXIDE HYDROCHLORIDE 25 MG: 25 CAPSULE ORAL at 17:06

## 2021-03-27 RX ADMIN — LORAZEPAM 1 MG: 2 INJECTION INTRAMUSCULAR; INTRAVENOUS at 06:58

## 2021-03-27 RX ADMIN — ASPIRIN 81 MG: 81 TABLET, CHEWABLE ORAL at 08:25

## 2021-03-27 RX ADMIN — Medication 1 TABLET: at 08:24

## 2021-03-27 RX ADMIN — SODIUM CHLORIDE, PRESERVATIVE FREE 10 ML: 5 INJECTION INTRAVENOUS at 08:26

## 2021-03-27 RX ADMIN — CHLORDIAZEPOXIDE HYDROCHLORIDE 25 MG: 25 CAPSULE ORAL at 06:00

## 2021-03-27 RX ADMIN — ACETAMINOPHEN 650 MG: 325 TABLET, FILM COATED ORAL at 02:22

## 2021-03-27 RX ADMIN — CHLORDIAZEPOXIDE HYDROCHLORIDE 25 MG: 25 CAPSULE ORAL at 12:12

## 2021-03-27 RX ADMIN — LORAZEPAM 1 MG: 2 INJECTION INTRAMUSCULAR; INTRAVENOUS at 05:56

## 2021-03-27 RX ADMIN — LORAZEPAM 0.5 MG: 2 INJECTION INTRAMUSCULAR; INTRAVENOUS at 18:48

## 2021-03-27 RX ADMIN — FERROUS SULFATE TAB 325 MG (65 MG ELEMENTAL FE) 325 MG: 325 (65 FE) TAB at 08:24

## 2021-03-27 RX ADMIN — Medication 1 PATCH: at 12:14

## 2021-03-27 RX ADMIN — MAGNESIUM SULFATE HEPTAHYDRATE 2 G: 2 INJECTION, SOLUTION INTRAVENOUS at 07:00

## 2021-03-27 RX ADMIN — LORAZEPAM 0.5 MG: 2 INJECTION INTRAMUSCULAR; INTRAVENOUS at 14:46

## 2021-03-27 RX ADMIN — LORAZEPAM 0.5 MG: 2 INJECTION INTRAMUSCULAR; INTRAVENOUS at 21:24

## 2021-03-27 RX ADMIN — METOPROLOL TARTRATE 50 MG: 50 TABLET, FILM COATED ORAL at 22:28

## 2021-03-27 RX ADMIN — METOPROLOL TARTRATE 50 MG: 50 TABLET, FILM COATED ORAL at 14:18

## 2021-03-27 RX ADMIN — ESCITALOPRAM 20 MG: 20 TABLET, FILM COATED ORAL at 08:25

## 2021-03-27 RX ADMIN — CHLORDIAZEPOXIDE HYDROCHLORIDE 25 MG: 25 CAPSULE ORAL at 23:38

## 2021-03-27 RX ADMIN — SODIUM CHLORIDE, PRESERVATIVE FREE 10 ML: 5 INJECTION INTRAVENOUS at 20:07

## 2021-03-27 RX ADMIN — Medication 100 MG: at 08:24

## 2021-03-27 RX ADMIN — LORAZEPAM 1 MG: 2 INJECTION INTRAMUSCULAR; INTRAVENOUS at 01:20

## 2021-03-27 RX ADMIN — LORAZEPAM 0.5 MG: 2 INJECTION INTRAMUSCULAR; INTRAVENOUS at 02:23

## 2021-03-27 RX ADMIN — METOPROLOL TARTRATE 50 MG: 50 TABLET, FILM COATED ORAL at 05:57

## 2021-03-27 RX ADMIN — LORAZEPAM 1 MG: 2 INJECTION INTRAMUSCULAR; INTRAVENOUS at 04:04

## 2021-03-27 RX ADMIN — TRAZODONE HYDROCHLORIDE 50 MG: 50 TABLET ORAL at 20:06

## 2021-03-27 RX ADMIN — CHLORDIAZEPOXIDE HYDROCHLORIDE 25 MG: 25 CAPSULE ORAL at 01:21

## 2021-03-27 RX ADMIN — LORAZEPAM 1 MG: 2 INJECTION INTRAMUSCULAR; INTRAVENOUS at 10:58

## 2021-03-27 RX ADMIN — SODIUM CHLORIDE 75 ML/HR: 9 INJECTION, SOLUTION INTRAVENOUS at 14:21

## 2021-03-27 RX ADMIN — MAGNESIUM SULFATE HEPTAHYDRATE 2 G: 2 INJECTION, SOLUTION INTRAVENOUS at 14:21

## 2021-03-27 RX ADMIN — MAGNESIUM SULFATE HEPTAHYDRATE 2 G: 2 INJECTION, SOLUTION INTRAVENOUS at 10:59

## 2021-03-27 RX ADMIN — AMLODIPINE BESYLATE 10 MG: 10 TABLET ORAL at 08:24

## 2021-03-27 RX ADMIN — PANTOPRAZOLE SODIUM 40 MG: 40 INJECTION, POWDER, FOR SOLUTION INTRAVENOUS at 08:23

## 2021-03-28 LAB
ALBUMIN SERPL-MCNC: 3.4 G/DL (ref 3.5–5.2)
ALBUMIN/GLOB SERPL: 1.2 G/DL
ALP SERPL-CCNC: 72 U/L (ref 39–117)
ALT SERPL W P-5'-P-CCNC: 40 U/L (ref 1–41)
ANION GAP SERPL CALCULATED.3IONS-SCNC: 10.1 MMOL/L (ref 5–15)
AST SERPL-CCNC: 42 U/L (ref 1–40)
BASOPHILS # BLD MANUAL: 0.04 10*3/MM3 (ref 0–0.2)
BASOPHILS NFR BLD AUTO: 1.1 % (ref 0–1.5)
BILIRUB SERPL-MCNC: 0.7 MG/DL (ref 0–1.2)
BUN SERPL-MCNC: 8 MG/DL (ref 8–23)
BUN/CREAT SERPL: 9.5 (ref 7–25)
CALCIUM SPEC-SCNC: 7.9 MG/DL (ref 8.6–10.5)
CHLORIDE SERPL-SCNC: 103 MMOL/L (ref 98–107)
CO2 SERPL-SCNC: 25.9 MMOL/L (ref 22–29)
CREAT SERPL-MCNC: 0.84 MG/DL (ref 0.76–1.27)
DEPRECATED RDW RBC AUTO: 46.5 FL (ref 37–54)
EOSINOPHIL # BLD MANUAL: 0.13 10*3/MM3 (ref 0–0.4)
EOSINOPHIL NFR BLD MANUAL: 3.2 % (ref 0.3–6.2)
ERYTHROCYTE [DISTWIDTH] IN BLOOD BY AUTOMATED COUNT: 13.1 % (ref 12.3–15.4)
GFR SERPL CREATININE-BSD FRML MDRD: 91 ML/MIN/1.73
GLOBULIN UR ELPH-MCNC: 2.8 GM/DL
GLUCOSE SERPL-MCNC: 90 MG/DL (ref 65–99)
HCT VFR BLD AUTO: 30.5 % (ref 37.5–51)
HGB BLD-MCNC: 10.6 G/DL (ref 13–17.7)
LYMPHOCYTES # BLD MANUAL: 0.96 10*3/MM3 (ref 0.7–3.1)
LYMPHOCYTES NFR BLD MANUAL: 23.7 % (ref 19.6–45.3)
LYMPHOCYTES NFR BLD MANUAL: 5.4 % (ref 5–12)
MAGNESIUM SERPL-MCNC: 1.6 MG/DL (ref 1.6–2.4)
MCH RBC QN AUTO: 33.7 PG (ref 26.6–33)
MCHC RBC AUTO-ENTMCNC: 34.8 G/DL (ref 31.5–35.7)
MCV RBC AUTO: 96.8 FL (ref 79–97)
MONOCYTES # BLD AUTO: 0.22 10*3/MM3 (ref 0.1–0.9)
NEUTROPHILS # BLD AUTO: 2.69 10*3/MM3 (ref 1.7–7)
NEUTROPHILS NFR BLD MANUAL: 66.7 % (ref 42.7–76)
PLAT MORPH BLD: NORMAL
PLATELET # BLD AUTO: 123 10*3/MM3 (ref 140–450)
PMV BLD AUTO: 10.4 FL (ref 6–12)
POTASSIUM SERPL-SCNC: 3.7 MMOL/L (ref 3.5–5.2)
PROT SERPL-MCNC: 6.2 G/DL (ref 6–8.5)
RBC # BLD AUTO: 3.15 10*6/MM3 (ref 4.14–5.8)
RBC MORPH BLD: NORMAL
SODIUM SERPL-SCNC: 139 MMOL/L (ref 136–145)
WBC # BLD AUTO: 4.04 10*3/MM3 (ref 3.4–10.8)
WBC MORPH BLD: NORMAL

## 2021-03-28 PROCEDURE — 85025 COMPLETE CBC W/AUTO DIFF WBC: CPT | Performed by: INTERNAL MEDICINE

## 2021-03-28 PROCEDURE — 25010000002 LORAZEPAM PER 2 MG: Performed by: NURSE PRACTITIONER

## 2021-03-28 PROCEDURE — 25010000003 MAGNESIUM SULFATE 4 GM/100ML SOLUTION: Performed by: NURSE PRACTITIONER

## 2021-03-28 PROCEDURE — 85007 BL SMEAR W/DIFF WBC COUNT: CPT | Performed by: INTERNAL MEDICINE

## 2021-03-28 PROCEDURE — 83735 ASSAY OF MAGNESIUM: CPT | Performed by: INTERNAL MEDICINE

## 2021-03-28 PROCEDURE — 80053 COMPREHEN METABOLIC PANEL: CPT | Performed by: INTERNAL MEDICINE

## 2021-03-28 RX ORDER — MULTIPLE VITAMINS W/ MINERALS TAB 9MG-400MCG
1 TAB ORAL DAILY
Status: DISCONTINUED | OUTPATIENT
Start: 2021-03-28 | End: 2021-03-30 | Stop reason: HOSPADM

## 2021-03-28 RX ADMIN — ASPIRIN 81 MG: 81 TABLET, CHEWABLE ORAL at 08:54

## 2021-03-28 RX ADMIN — SODIUM CHLORIDE, PRESERVATIVE FREE 10 ML: 5 INJECTION INTRAVENOUS at 08:55

## 2021-03-28 RX ADMIN — ESCITALOPRAM 20 MG: 20 TABLET, FILM COATED ORAL at 08:54

## 2021-03-28 RX ADMIN — Medication 100 MG: at 16:09

## 2021-03-28 RX ADMIN — SODIUM CHLORIDE 75 ML/HR: 9 INJECTION, SOLUTION INTRAVENOUS at 04:09

## 2021-03-28 RX ADMIN — Medication 1 TABLET: at 08:54

## 2021-03-28 RX ADMIN — ACETAMINOPHEN 650 MG: 325 TABLET, FILM COATED ORAL at 04:09

## 2021-03-28 RX ADMIN — SODIUM CHLORIDE, PRESERVATIVE FREE 10 ML: 5 INJECTION INTRAVENOUS at 21:08

## 2021-03-28 RX ADMIN — ACETAMINOPHEN 650 MG: 325 TABLET, FILM COATED ORAL at 00:12

## 2021-03-28 RX ADMIN — METOPROLOL TARTRATE 50 MG: 50 TABLET, FILM COATED ORAL at 16:10

## 2021-03-28 RX ADMIN — Medication 1 TABLET: at 16:09

## 2021-03-28 RX ADMIN — LORAZEPAM 0.5 MG: 2 INJECTION INTRAMUSCULAR; INTRAVENOUS at 02:03

## 2021-03-28 RX ADMIN — Medication 1 PATCH: at 12:02

## 2021-03-28 RX ADMIN — AMLODIPINE BESYLATE 10 MG: 10 TABLET ORAL at 08:54

## 2021-03-28 RX ADMIN — FOLIC ACID 1 MG: 1 TABLET ORAL at 08:54

## 2021-03-28 RX ADMIN — FERROUS SULFATE TAB 325 MG (65 MG ELEMENTAL FE) 325 MG: 325 (65 FE) TAB at 08:54

## 2021-03-28 RX ADMIN — LORAZEPAM 1 MG: 2 INJECTION INTRAMUSCULAR; INTRAVENOUS at 00:12

## 2021-03-28 RX ADMIN — TRAZODONE HYDROCHLORIDE 50 MG: 50 TABLET ORAL at 21:08

## 2021-03-28 RX ADMIN — CHLORDIAZEPOXIDE HYDROCHLORIDE 25 MG: 25 CAPSULE ORAL at 17:44

## 2021-03-28 RX ADMIN — Medication 100 MG: at 08:54

## 2021-03-28 RX ADMIN — METOPROLOL TARTRATE 50 MG: 50 TABLET, FILM COATED ORAL at 21:07

## 2021-03-28 RX ADMIN — LORAZEPAM 0.5 MG: 0.5 TABLET ORAL at 04:13

## 2021-03-28 RX ADMIN — MAGNESIUM SULFATE HEPTAHYDRATE 4 G: 40 INJECTION, SOLUTION INTRAVENOUS at 08:54

## 2021-03-28 RX ADMIN — PANTOPRAZOLE SODIUM 40 MG: 40 INJECTION, POWDER, FOR SOLUTION INTRAVENOUS at 06:03

## 2021-03-29 LAB
ALBUMIN SERPL-MCNC: 3.1 G/DL (ref 3.5–5.2)
ALBUMIN/GLOB SERPL: 1.1 G/DL
ALP SERPL-CCNC: 68 U/L (ref 39–117)
ALT SERPL W P-5'-P-CCNC: 31 U/L (ref 1–41)
ANION GAP SERPL CALCULATED.3IONS-SCNC: 8.7 MMOL/L (ref 5–15)
AST SERPL-CCNC: 27 U/L (ref 1–40)
BASOPHILS # BLD AUTO: 0.03 10*3/MM3 (ref 0–0.2)
BASOPHILS NFR BLD AUTO: 0.7 % (ref 0–1.5)
BILIRUB SERPL-MCNC: 0.6 MG/DL (ref 0–1.2)
BUN SERPL-MCNC: 11 MG/DL (ref 8–23)
BUN/CREAT SERPL: 13.8 (ref 7–25)
CALCIUM SPEC-SCNC: 8.2 MG/DL (ref 8.6–10.5)
CHLORIDE SERPL-SCNC: 105 MMOL/L (ref 98–107)
CO2 SERPL-SCNC: 23.3 MMOL/L (ref 22–29)
CREAT SERPL-MCNC: 0.8 MG/DL (ref 0.76–1.27)
DEPRECATED RDW RBC AUTO: 47.6 FL (ref 37–54)
EOSINOPHIL # BLD AUTO: 0.21 10*3/MM3 (ref 0–0.4)
EOSINOPHIL NFR BLD AUTO: 5 % (ref 0.3–6.2)
ERYTHROCYTE [DISTWIDTH] IN BLOOD BY AUTOMATED COUNT: 13 % (ref 12.3–15.4)
GFR SERPL CREATININE-BSD FRML MDRD: 97 ML/MIN/1.73
GLOBULIN UR ELPH-MCNC: 2.7 GM/DL
GLUCOSE SERPL-MCNC: 88 MG/DL (ref 65–99)
HCT VFR BLD AUTO: 31.3 % (ref 37.5–51)
HGB BLD-MCNC: 10.8 G/DL (ref 13–17.7)
LYMPHOCYTES # BLD AUTO: 1.05 10*3/MM3 (ref 0.7–3.1)
LYMPHOCYTES NFR BLD AUTO: 24.9 % (ref 19.6–45.3)
MAGNESIUM SERPL-MCNC: 1.6 MG/DL (ref 1.6–2.4)
MCH RBC QN AUTO: 34.4 PG (ref 26.6–33)
MCHC RBC AUTO-ENTMCNC: 34.5 G/DL (ref 31.5–35.7)
MCV RBC AUTO: 99.7 FL (ref 79–97)
MONOCYTES # BLD AUTO: 0.45 10*3/MM3 (ref 0.1–0.9)
MONOCYTES NFR BLD AUTO: 10.7 % (ref 5–12)
NEUTROPHILS NFR BLD AUTO: 2.47 10*3/MM3 (ref 1.7–7)
NEUTROPHILS NFR BLD AUTO: 58.5 % (ref 42.7–76)
PLATELET # BLD AUTO: 119 10*3/MM3 (ref 140–450)
PMV BLD AUTO: 10.5 FL (ref 6–12)
POTASSIUM SERPL-SCNC: 4.1 MMOL/L (ref 3.5–5.2)
PROT SERPL-MCNC: 5.8 G/DL (ref 6–8.5)
RBC # BLD AUTO: 3.14 10*6/MM3 (ref 4.14–5.8)
SODIUM SERPL-SCNC: 137 MMOL/L (ref 136–145)
WBC # BLD AUTO: 4.22 10*3/MM3 (ref 3.4–10.8)

## 2021-03-29 PROCEDURE — 80053 COMPREHEN METABOLIC PANEL: CPT | Performed by: INTERNAL MEDICINE

## 2021-03-29 PROCEDURE — 83735 ASSAY OF MAGNESIUM: CPT | Performed by: INTERNAL MEDICINE

## 2021-03-29 PROCEDURE — 85025 COMPLETE CBC W/AUTO DIFF WBC: CPT | Performed by: INTERNAL MEDICINE

## 2021-03-29 RX ORDER — PANTOPRAZOLE SODIUM 40 MG/1
40 TABLET, DELAYED RELEASE ORAL
Status: DISCONTINUED | OUTPATIENT
Start: 2021-03-30 | End: 2021-03-30 | Stop reason: HOSPADM

## 2021-03-29 RX ADMIN — CHLORDIAZEPOXIDE HYDROCHLORIDE 25 MG: 25 CAPSULE ORAL at 18:21

## 2021-03-29 RX ADMIN — ACETAMINOPHEN 650 MG: 325 TABLET, FILM COATED ORAL at 08:34

## 2021-03-29 RX ADMIN — ASPIRIN 81 MG: 81 TABLET, CHEWABLE ORAL at 08:32

## 2021-03-29 RX ADMIN — CHLORDIAZEPOXIDE HYDROCHLORIDE 25 MG: 25 CAPSULE ORAL at 23:18

## 2021-03-29 RX ADMIN — ESCITALOPRAM 20 MG: 20 TABLET, FILM COATED ORAL at 08:32

## 2021-03-29 RX ADMIN — AMLODIPINE BESYLATE 10 MG: 10 TABLET ORAL at 08:32

## 2021-03-29 RX ADMIN — PANTOPRAZOLE SODIUM 40 MG: 40 INJECTION, POWDER, FOR SOLUTION INTRAVENOUS at 09:36

## 2021-03-29 RX ADMIN — SODIUM CHLORIDE, PRESERVATIVE FREE 10 ML: 5 INJECTION INTRAVENOUS at 08:32

## 2021-03-29 RX ADMIN — FOLIC ACID 1 MG: 1 TABLET ORAL at 08:32

## 2021-03-29 RX ADMIN — CHLORDIAZEPOXIDE HYDROCHLORIDE 25 MG: 25 CAPSULE ORAL at 11:43

## 2021-03-29 RX ADMIN — SODIUM CHLORIDE, PRESERVATIVE FREE 10 ML: 5 INJECTION INTRAVENOUS at 21:25

## 2021-03-29 RX ADMIN — Medication 1 TABLET: at 08:32

## 2021-03-29 RX ADMIN — Medication 100 MG: at 08:32

## 2021-03-29 RX ADMIN — CHLORDIAZEPOXIDE HYDROCHLORIDE 25 MG: 25 CAPSULE ORAL at 02:46

## 2021-03-29 RX ADMIN — METOPROLOL TARTRATE 50 MG: 50 TABLET, FILM COATED ORAL at 21:25

## 2021-03-29 RX ADMIN — FERROUS SULFATE TAB 325 MG (65 MG ELEMENTAL FE) 325 MG: 325 (65 FE) TAB at 08:32

## 2021-03-29 RX ADMIN — CHLORDIAZEPOXIDE HYDROCHLORIDE 25 MG: 25 CAPSULE ORAL at 06:07

## 2021-03-29 RX ADMIN — Medication 1 PATCH: at 11:44

## 2021-03-29 RX ADMIN — METOPROLOL TARTRATE 50 MG: 50 TABLET, FILM COATED ORAL at 14:08

## 2021-03-29 RX ADMIN — METOPROLOL TARTRATE 50 MG: 50 TABLET, FILM COATED ORAL at 06:07

## 2021-03-29 RX ADMIN — TRAZODONE HYDROCHLORIDE 50 MG: 50 TABLET ORAL at 21:25

## 2021-03-29 RX ADMIN — SODIUM CHLORIDE 75 ML/HR: 9 INJECTION, SOLUTION INTRAVENOUS at 14:09

## 2021-03-30 ENCOUNTER — READMISSION MANAGEMENT (OUTPATIENT)
Dept: CALL CENTER | Facility: HOSPITAL | Age: 66
End: 2021-03-30

## 2021-03-30 VITALS
HEIGHT: 78 IN | OXYGEN SATURATION: 98 % | WEIGHT: 250 LBS | HEART RATE: 62 BPM | BODY MASS INDEX: 28.93 KG/M2 | TEMPERATURE: 97.7 F | SYSTOLIC BLOOD PRESSURE: 135 MMHG | DIASTOLIC BLOOD PRESSURE: 66 MMHG | RESPIRATION RATE: 18 BRPM

## 2021-03-30 PROBLEM — R45.851 SUICIDAL IDEATIONS: Status: RESOLVED | Noted: 2021-03-26 | Resolved: 2021-03-30

## 2021-03-30 PROBLEM — R79.89 ELEVATED LFTS: Status: RESOLVED | Noted: 2021-03-26 | Resolved: 2021-03-30

## 2021-03-30 PROBLEM — N17.9 AKI (ACUTE KIDNEY INJURY) (HCC): Status: RESOLVED | Noted: 2021-03-26 | Resolved: 2021-03-30

## 2021-03-30 PROBLEM — E83.42 HYPOMAGNESEMIA: Status: RESOLVED | Noted: 2021-03-26 | Resolved: 2021-03-30

## 2021-03-30 PROBLEM — E87.29 METABOLIC ACIDOSIS, INCREASED ANION GAP: Status: RESOLVED | Noted: 2021-03-26 | Resolved: 2021-03-30

## 2021-03-30 PROBLEM — F10.932 ALCOHOL WITHDRAWAL SYNDROME WITH PERCEPTUAL DISTURBANCE (HCC): Status: RESOLVED | Noted: 2021-03-26 | Resolved: 2021-03-30

## 2021-03-30 PROCEDURE — 97162 PT EVAL MOD COMPLEX 30 MIN: CPT

## 2021-03-30 PROCEDURE — 97165 OT EVAL LOW COMPLEX 30 MIN: CPT

## 2021-03-30 PROCEDURE — 97110 THERAPEUTIC EXERCISES: CPT

## 2021-03-30 RX ORDER — CHLORDIAZEPOXIDE HYDROCHLORIDE 25 MG/1
25 CAPSULE, GELATIN COATED ORAL EVERY 8 HOURS SCHEDULED
Status: DISCONTINUED | OUTPATIENT
Start: 2021-03-30 | End: 2021-03-30 | Stop reason: HOSPADM

## 2021-03-30 RX ORDER — CHLORDIAZEPOXIDE HYDROCHLORIDE 25 MG/1
25 CAPSULE, GELATIN COATED ORAL EVERY 8 HOURS SCHEDULED
Qty: 6 CAPSULE | Refills: 0 | Status: SHIPPED | OUTPATIENT
Start: 2021-03-30 | End: 2021-04-01

## 2021-03-30 RX ADMIN — Medication 100 MG: at 08:36

## 2021-03-30 RX ADMIN — FERROUS SULFATE TAB 325 MG (65 MG ELEMENTAL FE) 325 MG: 325 (65 FE) TAB at 08:37

## 2021-03-30 RX ADMIN — METOPROLOL TARTRATE 50 MG: 50 TABLET, FILM COATED ORAL at 07:33

## 2021-03-30 RX ADMIN — AMLODIPINE BESYLATE 10 MG: 10 TABLET ORAL at 08:36

## 2021-03-30 RX ADMIN — PANTOPRAZOLE SODIUM 40 MG: 40 TABLET, DELAYED RELEASE ORAL at 06:19

## 2021-03-30 RX ADMIN — CHLORDIAZEPOXIDE HYDROCHLORIDE 25 MG: 25 CAPSULE ORAL at 13:42

## 2021-03-30 RX ADMIN — CHLORDIAZEPOXIDE HYDROCHLORIDE 25 MG: 25 CAPSULE ORAL at 06:19

## 2021-03-30 RX ADMIN — FOLIC ACID 1 MG: 1 TABLET ORAL at 08:37

## 2021-03-30 RX ADMIN — ESCITALOPRAM 20 MG: 20 TABLET, FILM COATED ORAL at 08:36

## 2021-03-30 RX ADMIN — ASPIRIN 81 MG: 81 TABLET, CHEWABLE ORAL at 08:36

## 2021-03-30 RX ADMIN — METOPROLOL TARTRATE 50 MG: 50 TABLET, FILM COATED ORAL at 13:42

## 2021-03-30 RX ADMIN — SODIUM CHLORIDE, PRESERVATIVE FREE 10 ML: 5 INJECTION INTRAVENOUS at 08:37

## 2021-03-30 RX ADMIN — Medication 1 TABLET: at 08:37

## 2021-03-30 RX ADMIN — Medication 1 PATCH: at 13:42

## 2021-03-30 NOTE — OUTREACH NOTE
Prep Survey      Responses   [facility] facility patient discharged from?  [place]   Is LACE score < 7 ?  No   Emergency Room discharge w/ pulse ox?  No   Eligibility  Not Eligible   What are the reasons patient is not eligible?  [facility]   Does the patient have one of the following disease processes/diagnoses(primary or secondary)?  Other   Prep survey completed?  Yes          Candace Soto RN

## 2021-05-21 RX ORDER — TRAZODONE HYDROCHLORIDE 50 MG/1
TABLET ORAL
Qty: 90 TABLET | Refills: 3 | Status: SHIPPED | OUTPATIENT
Start: 2021-05-21 | End: 2022-04-18 | Stop reason: HOSPADM

## 2021-07-17 RX ORDER — AMLODIPINE BESYLATE 10 MG/1
TABLET ORAL
Qty: 90 TABLET | Refills: 0 | Status: SHIPPED | OUTPATIENT
Start: 2021-07-17 | End: 2021-10-07

## 2021-08-05 RX ORDER — ESCITALOPRAM OXALATE 20 MG/1
TABLET ORAL
Qty: 90 TABLET | Refills: 0 | Status: SHIPPED | OUTPATIENT
Start: 2021-08-05 | End: 2021-10-07

## 2021-08-07 RX ORDER — LOSARTAN POTASSIUM 100 MG/1
TABLET ORAL
Qty: 90 TABLET | Refills: 3 | Status: SHIPPED | OUTPATIENT
Start: 2021-08-07 | End: 2021-08-10 | Stop reason: SDUPTHER

## 2021-08-10 RX ORDER — LOSARTAN POTASSIUM 100 MG/1
100 TABLET ORAL DAILY
Qty: 10 TABLET | Refills: 0 | Status: SHIPPED | OUTPATIENT
Start: 2021-08-10 | End: 2022-08-02

## 2021-08-11 ENCOUNTER — TELEPHONE (OUTPATIENT)
Dept: FAMILY MEDICINE CLINIC | Facility: CLINIC | Age: 66
End: 2021-08-11

## 2021-08-11 RX ORDER — BUPROPION HYDROCHLORIDE 150 MG/1
150 TABLET, EXTENDED RELEASE ORAL 2 TIMES DAILY
Qty: 180 TABLET | Refills: 3 | Status: SHIPPED | OUTPATIENT
Start: 2021-08-11 | End: 2023-01-05 | Stop reason: HOSPADM

## 2021-08-11 NOTE — TELEPHONE ENCOUNTER
----- Message from Checo Dunham MD sent at 8/11/2021  6:28 AM EDT -----  I sent Wellbutrin to his pharmacy  ----- Message -----  From: Usha Joseph MA  Sent: 8/10/2021   3:00 PM EDT  To: Checo Dunham MD    Patient informed, he said he definitely needs something to replace the Lexapro  ----- Message -----  From: Bren Pacheco MA  Sent: 8/10/2021   1:11 PM EDT  To: Usha Joseph MA    Do you know which medication?  ----- Message -----  From: Usha Joseph MA  Sent: 8/10/2021   1:02 PM EDT  To: Bren Pacheco MA    There is a possible drug interaction with his Lexapro. Dr Dunham wants him to wean off Lexapro by taking 10mg daily for 1 week then 10mg qod x 1 week then 10mg q 3rd day and call when weaned off Lexapro so we can start something else if needed

## 2021-10-07 ENCOUNTER — HOSPITAL ENCOUNTER (INPATIENT)
Facility: HOSPITAL | Age: 66
LOS: 12 days | Discharge: HOME HEALTH | End: 2021-10-19
Attending: EMERGENCY MEDICINE | Admitting: INTERNAL MEDICINE

## 2021-10-07 ENCOUNTER — APPOINTMENT (OUTPATIENT)
Dept: GENERAL RADIOLOGY | Facility: HOSPITAL | Age: 66
End: 2021-10-07

## 2021-10-07 DIAGNOSIS — F10.939 ALCOHOL WITHDRAWAL SYNDROME WITH COMPLICATION (HCC): Primary | ICD-10-CM

## 2021-10-07 DIAGNOSIS — R53.1 GENERALIZED WEAKNESS: ICD-10-CM

## 2021-10-07 DIAGNOSIS — I48.91 NEW ONSET A-FIB (HCC): ICD-10-CM

## 2021-10-07 DIAGNOSIS — I48.91 ATRIAL FIBRILLATION WITH RAPID VENTRICULAR RESPONSE (HCC): ICD-10-CM

## 2021-10-07 DIAGNOSIS — N28.9 ACUTE RENAL INSUFFICIENCY: ICD-10-CM

## 2021-10-07 DIAGNOSIS — E83.42 HYPOMAGNESEMIA: ICD-10-CM

## 2021-10-07 PROBLEM — D63.8 ANEMIA, CHRONIC DISEASE: Status: ACTIVE | Noted: 2021-10-07

## 2021-10-07 PROBLEM — K70.9 ALCOHOLIC LIVER DISEASE (HCC): Status: ACTIVE | Noted: 2021-10-07

## 2021-10-07 PROBLEM — K44.9 HIATAL HERNIA: Status: ACTIVE | Noted: 2021-10-07

## 2021-10-07 PROBLEM — F10.10 ALCOHOL ABUSE: Status: ACTIVE | Noted: 2021-10-07

## 2021-10-07 PROBLEM — J44.9 COPD (CHRONIC OBSTRUCTIVE PULMONARY DISEASE) (HCC): Status: ACTIVE | Noted: 2021-10-07

## 2021-10-07 PROBLEM — Z72.0 TOBACCO ABUSE: Status: ACTIVE | Noted: 2021-10-07

## 2021-10-07 LAB
ALBUMIN SERPL-MCNC: 4.4 G/DL (ref 3.5–5.2)
ALBUMIN/GLOB SERPL: 1.4 G/DL
ALP SERPL-CCNC: 89 U/L (ref 39–117)
ALT SERPL W P-5'-P-CCNC: 34 U/L (ref 1–41)
ANION GAP SERPL CALCULATED.3IONS-SCNC: 18.6 MMOL/L (ref 5–15)
APTT PPP: 43 SECONDS (ref 22.7–35.4)
AST SERPL-CCNC: 51 U/L (ref 1–40)
BACTERIA UR QL AUTO: NORMAL /HPF
BASOPHILS # BLD AUTO: 0.03 10*3/MM3 (ref 0–0.2)
BASOPHILS NFR BLD AUTO: 0.4 % (ref 0–1.5)
BILIRUB SERPL-MCNC: 0.9 MG/DL (ref 0–1.2)
BILIRUB UR QL STRIP: NEGATIVE
BUN SERPL-MCNC: 24 MG/DL (ref 8–23)
BUN/CREAT SERPL: 12.1 (ref 7–25)
CALCIUM SPEC-SCNC: 8.6 MG/DL (ref 8.6–10.5)
CHLORIDE SERPL-SCNC: 103 MMOL/L (ref 98–107)
CLARITY UR: CLEAR
CO2 SERPL-SCNC: 20.4 MMOL/L (ref 22–29)
COLOR UR: YELLOW
CREAT SERPL-MCNC: 1.99 MG/DL (ref 0.76–1.27)
DEPRECATED RDW RBC AUTO: 49.2 FL (ref 37–54)
EOSINOPHIL # BLD AUTO: 0.03 10*3/MM3 (ref 0–0.4)
EOSINOPHIL NFR BLD AUTO: 0.4 % (ref 0.3–6.2)
ERYTHROCYTE [DISTWIDTH] IN BLOOD BY AUTOMATED COUNT: 13.9 % (ref 12.3–15.4)
ETHANOL BLD-MCNC: 67 MG/DL (ref 0–10)
ETHANOL UR QL: 0.07 %
GFR SERPL CREATININE-BSD FRML MDRD: 34 ML/MIN/1.73
GLOBULIN UR ELPH-MCNC: 3.2 GM/DL
GLUCOSE SERPL-MCNC: 98 MG/DL (ref 65–99)
GLUCOSE UR STRIP-MCNC: NEGATIVE MG/DL
HCT VFR BLD AUTO: 38.1 % (ref 37.5–51)
HGB BLD-MCNC: 12.9 G/DL (ref 13–17.7)
HGB UR QL STRIP.AUTO: ABNORMAL
HOLD SPECIMEN: NORMAL
HOLD SPECIMEN: NORMAL
HYALINE CASTS UR QL AUTO: NORMAL /LPF
IMM GRANULOCYTES # BLD AUTO: 0.05 10*3/MM3 (ref 0–0.05)
IMM GRANULOCYTES NFR BLD AUTO: 0.7 % (ref 0–0.5)
INR PPP: 1.02 (ref 0.9–1.1)
KETONES UR QL STRIP: ABNORMAL
LEUKOCYTE ESTERASE UR QL STRIP.AUTO: NEGATIVE
LIPASE SERPL-CCNC: 25 U/L (ref 13–60)
LYMPHOCYTES # BLD AUTO: 0.68 10*3/MM3 (ref 0.7–3.1)
LYMPHOCYTES NFR BLD AUTO: 9.9 % (ref 19.6–45.3)
MAGNESIUM SERPL-MCNC: 1.5 MG/DL (ref 1.6–2.4)
MCH RBC QN AUTO: 32.8 PG (ref 26.6–33)
MCHC RBC AUTO-ENTMCNC: 33.9 G/DL (ref 31.5–35.7)
MCV RBC AUTO: 96.9 FL (ref 79–97)
MONOCYTES # BLD AUTO: 0.78 10*3/MM3 (ref 0.1–0.9)
MONOCYTES NFR BLD AUTO: 11.4 % (ref 5–12)
NEUTROPHILS NFR BLD AUTO: 5.29 10*3/MM3 (ref 1.7–7)
NEUTROPHILS NFR BLD AUTO: 77.2 % (ref 42.7–76)
NITRITE UR QL STRIP: NEGATIVE
NRBC BLD AUTO-RTO: 0 /100 WBC (ref 0–0.2)
NT-PROBNP SERPL-MCNC: 1106 PG/ML (ref 0–900)
OSMOLALITY SERPL: 302 MOSM/KG (ref 280–301)
PH UR STRIP.AUTO: 5.5 [PH] (ref 5–8)
PLATELET # BLD AUTO: 199 10*3/MM3 (ref 140–450)
PMV BLD AUTO: 9.1 FL (ref 6–12)
POTASSIUM SERPL-SCNC: 4.5 MMOL/L (ref 3.5–5.2)
PROT SERPL-MCNC: 7.6 G/DL (ref 6–8.5)
PROT UR QL STRIP: ABNORMAL
PROTHROMBIN TIME: 13.2 SECONDS (ref 11.7–14.2)
QT INTERVAL: 317 MS
RBC # BLD AUTO: 3.93 10*6/MM3 (ref 4.14–5.8)
RBC # UR: NORMAL /HPF
REF LAB TEST METHOD: NORMAL
SARS-COV-2 RNA PNL SPEC NAA+PROBE: NOT DETECTED
SODIUM SERPL-SCNC: 142 MMOL/L (ref 136–145)
SP GR UR STRIP: 1.01 (ref 1–1.03)
SQUAMOUS #/AREA URNS HPF: NORMAL /HPF
TROPONIN T SERPL-MCNC: 0.02 NG/ML (ref 0–0.03)
UROBILINOGEN UR QL STRIP: ABNORMAL
WBC # BLD AUTO: 6.86 10*3/MM3 (ref 3.4–10.8)
WBC UR QL AUTO: NORMAL /HPF
WHOLE BLOOD HOLD SPECIMEN: NORMAL
WHOLE BLOOD HOLD SPECIMEN: NORMAL

## 2021-10-07 PROCEDURE — 25010000002 ENOXAPARIN PER 10 MG: Performed by: INTERNAL MEDICINE

## 2021-10-07 PROCEDURE — 93010 ELECTROCARDIOGRAM REPORT: CPT | Performed by: INTERNAL MEDICINE

## 2021-10-07 PROCEDURE — 94799 UNLISTED PULMONARY SVC/PX: CPT

## 2021-10-07 PROCEDURE — 25010000002 ENOXAPARIN PER 10 MG: Performed by: PHYSICIAN ASSISTANT

## 2021-10-07 PROCEDURE — 25010000002 THIAMINE PER 100 MG: Performed by: EMERGENCY MEDICINE

## 2021-10-07 PROCEDURE — 90791 PSYCH DIAGNOSTIC EVALUATION: CPT

## 2021-10-07 PROCEDURE — 80053 COMPREHEN METABOLIC PANEL: CPT | Performed by: EMERGENCY MEDICINE

## 2021-10-07 PROCEDURE — 83880 ASSAY OF NATRIURETIC PEPTIDE: CPT | Performed by: PHYSICIAN ASSISTANT

## 2021-10-07 PROCEDURE — 25010000002 LORAZEPAM PER 2 MG: Performed by: NURSE PRACTITIONER

## 2021-10-07 PROCEDURE — 83690 ASSAY OF LIPASE: CPT | Performed by: EMERGENCY MEDICINE

## 2021-10-07 PROCEDURE — 83930 ASSAY OF BLOOD OSMOLALITY: CPT | Performed by: INTERNAL MEDICINE

## 2021-10-07 PROCEDURE — 94640 AIRWAY INHALATION TREATMENT: CPT

## 2021-10-07 PROCEDURE — 85730 THROMBOPLASTIN TIME PARTIAL: CPT | Performed by: INTERNAL MEDICINE

## 2021-10-07 PROCEDURE — 25010000002 LORAZEPAM PER 2 MG: Performed by: INTERNAL MEDICINE

## 2021-10-07 PROCEDURE — 83735 ASSAY OF MAGNESIUM: CPT | Performed by: PHYSICIAN ASSISTANT

## 2021-10-07 PROCEDURE — 93005 ELECTROCARDIOGRAM TRACING: CPT | Performed by: EMERGENCY MEDICINE

## 2021-10-07 PROCEDURE — 99284 EMERGENCY DEPT VISIT MOD MDM: CPT

## 2021-10-07 PROCEDURE — 81001 URINALYSIS AUTO W/SCOPE: CPT | Performed by: EMERGENCY MEDICINE

## 2021-10-07 PROCEDURE — 71045 X-RAY EXAM CHEST 1 VIEW: CPT

## 2021-10-07 PROCEDURE — 84484 ASSAY OF TROPONIN QUANT: CPT | Performed by: EMERGENCY MEDICINE

## 2021-10-07 PROCEDURE — 85025 COMPLETE CBC W/AUTO DIFF WBC: CPT | Performed by: EMERGENCY MEDICINE

## 2021-10-07 PROCEDURE — 87635 SARS-COV-2 COVID-19 AMP PRB: CPT | Performed by: EMERGENCY MEDICINE

## 2021-10-07 PROCEDURE — 85610 PROTHROMBIN TIME: CPT | Performed by: INTERNAL MEDICINE

## 2021-10-07 PROCEDURE — 25010000002 LORAZEPAM PER 2 MG: Performed by: EMERGENCY MEDICINE

## 2021-10-07 PROCEDURE — 82077 ASSAY SPEC XCP UR&BREATH IA: CPT | Performed by: EMERGENCY MEDICINE

## 2021-10-07 RX ORDER — LORAZEPAM 2 MG/ML
2 INJECTION INTRAMUSCULAR ONCE
Status: COMPLETED | OUTPATIENT
Start: 2021-10-07 | End: 2021-10-07

## 2021-10-07 RX ORDER — LORAZEPAM 2 MG/ML
1 INJECTION INTRAMUSCULAR
Status: ACTIVE | OUTPATIENT
Start: 2021-10-07 | End: 2021-10-14

## 2021-10-07 RX ORDER — MAGNESIUM SULFATE HEPTAHYDRATE 40 MG/ML
2 INJECTION, SOLUTION INTRAVENOUS AS NEEDED
Status: DISCONTINUED | OUTPATIENT
Start: 2021-10-07 | End: 2021-10-19 | Stop reason: HOSPADM

## 2021-10-07 RX ORDER — POTASSIUM CHLORIDE 750 MG/1
40 TABLET, FILM COATED, EXTENDED RELEASE ORAL AS NEEDED
Status: DISCONTINUED | OUTPATIENT
Start: 2021-10-07 | End: 2021-10-19 | Stop reason: HOSPADM

## 2021-10-07 RX ORDER — LOSARTAN POTASSIUM 100 MG/1
100 TABLET ORAL DAILY
Status: DISCONTINUED | OUTPATIENT
Start: 2021-10-07 | End: 2021-10-07

## 2021-10-07 RX ORDER — LORAZEPAM 0.5 MG/1
0.5 TABLET ORAL
Status: DISPENSED | OUTPATIENT
Start: 2021-10-07 | End: 2021-10-14

## 2021-10-07 RX ORDER — SODIUM CHLORIDE 0.9 % (FLUSH) 0.9 %
10 SYRINGE (ML) INJECTION AS NEEDED
Status: DISCONTINUED | OUTPATIENT
Start: 2021-10-07 | End: 2021-10-19 | Stop reason: HOSPADM

## 2021-10-07 RX ORDER — ACETAMINOPHEN 650 MG/1
650 SUPPOSITORY RECTAL EVERY 4 HOURS PRN
Status: DISCONTINUED | OUTPATIENT
Start: 2021-10-07 | End: 2021-10-19 | Stop reason: HOSPADM

## 2021-10-07 RX ORDER — ACETAMINOPHEN 160 MG/5ML
650 SOLUTION ORAL EVERY 4 HOURS PRN
Status: DISCONTINUED | OUTPATIENT
Start: 2021-10-07 | End: 2021-10-19 | Stop reason: HOSPADM

## 2021-10-07 RX ORDER — SODIUM CHLORIDE 0.9 % (FLUSH) 0.9 %
10 SYRINGE (ML) INJECTION EVERY 12 HOURS SCHEDULED
Status: DISCONTINUED | OUTPATIENT
Start: 2021-10-07 | End: 2021-10-19 | Stop reason: HOSPADM

## 2021-10-07 RX ORDER — LORAZEPAM 1 MG/1
1 TABLET ORAL
Status: DISPENSED | OUTPATIENT
Start: 2021-10-07 | End: 2021-10-14

## 2021-10-07 RX ORDER — ONDANSETRON 4 MG/1
4 TABLET, FILM COATED ORAL EVERY 6 HOURS PRN
Status: DISCONTINUED | OUTPATIENT
Start: 2021-10-07 | End: 2021-10-19 | Stop reason: HOSPADM

## 2021-10-07 RX ORDER — LORAZEPAM 2 MG/ML
1 INJECTION INTRAMUSCULAR ONCE
Status: COMPLETED | OUTPATIENT
Start: 2021-10-07 | End: 2021-10-07

## 2021-10-07 RX ORDER — ERGOCALCIFEROL 1.25 MG/1
50000 CAPSULE ORAL
Status: DISCONTINUED | OUTPATIENT
Start: 2021-10-07 | End: 2021-10-19 | Stop reason: HOSPADM

## 2021-10-07 RX ORDER — ACETAMINOPHEN 325 MG/1
650 TABLET ORAL EVERY 4 HOURS PRN
Status: DISCONTINUED | OUTPATIENT
Start: 2021-10-07 | End: 2021-10-19 | Stop reason: HOSPADM

## 2021-10-07 RX ORDER — BUDESONIDE 0.5 MG/2ML
0.5 INHALANT ORAL
Status: DISCONTINUED | OUTPATIENT
Start: 2021-10-07 | End: 2021-10-08

## 2021-10-07 RX ORDER — LORAZEPAM 2 MG/ML
0.5 INJECTION INTRAMUSCULAR
Status: ACTIVE | OUTPATIENT
Start: 2021-10-07 | End: 2021-10-14

## 2021-10-07 RX ORDER — LORAZEPAM 2 MG/ML
1 INJECTION INTRAMUSCULAR EVERY 4 HOURS PRN
Status: DISCONTINUED | OUTPATIENT
Start: 2021-10-07 | End: 2021-10-07

## 2021-10-07 RX ORDER — METOPROLOL TARTRATE 50 MG/1
50 TABLET, FILM COATED ORAL EVERY 8 HOURS
Status: DISCONTINUED | OUTPATIENT
Start: 2021-10-07 | End: 2021-10-07 | Stop reason: SDUPTHER

## 2021-10-07 RX ORDER — AMOXICILLIN 250 MG
2 CAPSULE ORAL 2 TIMES DAILY
Status: DISCONTINUED | OUTPATIENT
Start: 2021-10-07 | End: 2021-10-19 | Stop reason: HOSPADM

## 2021-10-07 RX ORDER — POTASSIUM CHLORIDE 1.5 G/1.77G
40 POWDER, FOR SOLUTION ORAL AS NEEDED
Status: DISCONTINUED | OUTPATIENT
Start: 2021-10-07 | End: 2021-10-19 | Stop reason: HOSPADM

## 2021-10-07 RX ORDER — METOPROLOL TARTRATE 50 MG/1
50 TABLET, FILM COATED ORAL EVERY 8 HOURS
Status: DISCONTINUED | OUTPATIENT
Start: 2021-10-07 | End: 2021-10-19 | Stop reason: HOSPADM

## 2021-10-07 RX ORDER — PANTOPRAZOLE SODIUM 40 MG/10ML
40 INJECTION, POWDER, LYOPHILIZED, FOR SOLUTION INTRAVENOUS
Status: DISCONTINUED | OUTPATIENT
Start: 2021-10-07 | End: 2021-10-12

## 2021-10-07 RX ORDER — HYDROCODONE BITARTRATE AND ACETAMINOPHEN 5; 325 MG/1; MG/1
1 TABLET ORAL EVERY 4 HOURS PRN
Status: DISPENSED | OUTPATIENT
Start: 2021-10-07 | End: 2021-10-14

## 2021-10-07 RX ORDER — NITROGLYCERIN 0.4 MG/1
0.4 TABLET SUBLINGUAL
Status: DISCONTINUED | OUTPATIENT
Start: 2021-10-07 | End: 2021-10-19 | Stop reason: HOSPADM

## 2021-10-07 RX ORDER — ONDANSETRON 2 MG/ML
4 INJECTION INTRAMUSCULAR; INTRAVENOUS EVERY 6 HOURS PRN
Status: DISCONTINUED | OUTPATIENT
Start: 2021-10-07 | End: 2021-10-19 | Stop reason: HOSPADM

## 2021-10-07 RX ORDER — AMLODIPINE BESYLATE 10 MG/1
10 TABLET ORAL
Status: DISCONTINUED | OUTPATIENT
Start: 2021-10-07 | End: 2021-10-08

## 2021-10-07 RX ORDER — MAGNESIUM SULFATE HEPTAHYDRATE 40 MG/ML
4 INJECTION, SOLUTION INTRAVENOUS AS NEEDED
Status: DISCONTINUED | OUTPATIENT
Start: 2021-10-07 | End: 2021-10-19 | Stop reason: HOSPADM

## 2021-10-07 RX ORDER — MULTIPLE VITAMINS W/ MINERALS TAB 9MG-400MCG
1 TAB ORAL DAILY
Status: DISCONTINUED | OUTPATIENT
Start: 2021-10-07 | End: 2021-10-07 | Stop reason: SDUPTHER

## 2021-10-07 RX ORDER — POLYETHYLENE GLYCOL 3350 17 G/17G
17 POWDER, FOR SOLUTION ORAL DAILY PRN
Status: DISCONTINUED | OUTPATIENT
Start: 2021-10-07 | End: 2021-10-19 | Stop reason: HOSPADM

## 2021-10-07 RX ORDER — MULTIPLE VITAMINS W/ MINERALS TAB 9MG-400MCG
1 TAB ORAL DAILY
Status: DISCONTINUED | OUTPATIENT
Start: 2021-10-07 | End: 2021-10-19 | Stop reason: HOSPADM

## 2021-10-07 RX ORDER — TRAZODONE HYDROCHLORIDE 50 MG/1
50 TABLET ORAL NIGHTLY PRN
Status: DISCONTINUED | OUTPATIENT
Start: 2021-10-07 | End: 2021-10-08

## 2021-10-07 RX ORDER — BISACODYL 5 MG/1
5 TABLET, DELAYED RELEASE ORAL DAILY PRN
Status: DISCONTINUED | OUTPATIENT
Start: 2021-10-07 | End: 2021-10-19 | Stop reason: HOSPADM

## 2021-10-07 RX ORDER — IPRATROPIUM BROMIDE AND ALBUTEROL SULFATE 2.5; .5 MG/3ML; MG/3ML
3 SOLUTION RESPIRATORY (INHALATION)
Status: DISCONTINUED | OUTPATIENT
Start: 2021-10-07 | End: 2021-10-08

## 2021-10-07 RX ORDER — LORAZEPAM 2 MG/ML
1 INJECTION INTRAMUSCULAR
Status: DISPENSED | OUTPATIENT
Start: 2021-10-07 | End: 2021-10-14

## 2021-10-07 RX ORDER — NICOTINE 21 MG/24HR
1 PATCH, TRANSDERMAL 24 HOURS TRANSDERMAL EVERY 24 HOURS
Status: DISCONTINUED | OUTPATIENT
Start: 2021-10-07 | End: 2021-10-19 | Stop reason: HOSPADM

## 2021-10-07 RX ORDER — SODIUM CHLORIDE 9 MG/ML
50 INJECTION, SOLUTION INTRAVENOUS CONTINUOUS
Status: DISCONTINUED | OUTPATIENT
Start: 2021-10-07 | End: 2021-10-11

## 2021-10-07 RX ORDER — MAGNESIUM SULFATE 1 G/100ML
1 INJECTION INTRAVENOUS AS NEEDED
Status: DISCONTINUED | OUTPATIENT
Start: 2021-10-07 | End: 2021-10-19 | Stop reason: HOSPADM

## 2021-10-07 RX ORDER — BISACODYL 10 MG
10 SUPPOSITORY, RECTAL RECTAL DAILY PRN
Status: DISCONTINUED | OUTPATIENT
Start: 2021-10-07 | End: 2021-10-19 | Stop reason: HOSPADM

## 2021-10-07 RX ORDER — AMLODIPINE BESYLATE 10 MG/1
10 TABLET ORAL DAILY
Status: DISCONTINUED | OUTPATIENT
Start: 2021-10-07 | End: 2021-10-07

## 2021-10-07 RX ORDER — CHOLECALCIFEROL (VITAMIN D3) 125 MCG
5 CAPSULE ORAL NIGHTLY PRN
Status: DISCONTINUED | OUTPATIENT
Start: 2021-10-07 | End: 2021-10-19 | Stop reason: HOSPADM

## 2021-10-07 RX ADMIN — METOPROLOL TARTRATE 5 MG: 5 INJECTION, SOLUTION INTRAVENOUS at 14:06

## 2021-10-07 RX ADMIN — METOPROLOL TARTRATE 5 MG: 5 INJECTION, SOLUTION INTRAVENOUS at 18:12

## 2021-10-07 RX ADMIN — SODIUM CHLORIDE 100 ML/HR: 9 INJECTION, SOLUTION INTRAVENOUS at 22:21

## 2021-10-07 RX ADMIN — METOPROLOL TARTRATE 5 MG: 1 INJECTION, SOLUTION INTRAVENOUS at 15:43

## 2021-10-07 RX ADMIN — LORAZEPAM 1 MG: 1 TABLET ORAL at 21:39

## 2021-10-07 RX ADMIN — METOPROLOL TARTRATE 50 MG: 50 TABLET, FILM COATED ORAL at 18:48

## 2021-10-07 RX ADMIN — LORAZEPAM 1 MG: 2 INJECTION INTRAMUSCULAR; INTRAVENOUS at 11:02

## 2021-10-07 RX ADMIN — AMLODIPINE BESYLATE 10 MG: 5 TABLET ORAL at 18:46

## 2021-10-07 RX ADMIN — LORAZEPAM 1 MG: 2 INJECTION INTRAMUSCULAR; INTRAVENOUS at 18:12

## 2021-10-07 RX ADMIN — SODIUM CHLORIDE, PRESERVATIVE FREE 10 ML: 5 INJECTION INTRAVENOUS at 22:26

## 2021-10-07 RX ADMIN — MULTIPLE VITAMINS W/ MINERALS TAB 1 TABLET: TAB at 18:49

## 2021-10-07 RX ADMIN — PANTOPRAZOLE SODIUM 40 MG: 40 INJECTION, POWDER, FOR SOLUTION INTRAVENOUS at 18:46

## 2021-10-07 RX ADMIN — ENOXAPARIN SODIUM 30 MG: 30 INJECTION SUBCUTANEOUS at 18:46

## 2021-10-07 RX ADMIN — LORAZEPAM 2 MG: 2 INJECTION INTRAMUSCULAR; INTRAVENOUS at 13:04

## 2021-10-07 RX ADMIN — ENOXAPARIN SODIUM 120 MG: 60 INJECTION, SOLUTION INTRAVENOUS; SUBCUTANEOUS at 14:09

## 2021-10-07 RX ADMIN — THIAMINE HYDROCHLORIDE 1000 ML/HR: 100 INJECTION, SOLUTION INTRAMUSCULAR; INTRAVENOUS at 13:36

## 2021-10-07 RX ADMIN — METOPROLOL TARTRATE 25 MG: 25 TABLET, FILM COATED ORAL at 14:05

## 2021-10-07 RX ADMIN — IPRATROPIUM BROMIDE AND ALBUTEROL SULFATE 3 ML: 2.5; .5 SOLUTION RESPIRATORY (INHALATION) at 19:33

## 2021-10-07 RX ADMIN — BUDESONIDE 0.5 MG: 0.5 INHALANT ORAL at 19:34

## 2021-10-07 RX ADMIN — LORAZEPAM 1 MG: 2 INJECTION INTRAMUSCULAR; INTRAVENOUS at 16:55

## 2021-10-07 RX ADMIN — LORAZEPAM 1 MG: 2 INJECTION INTRAMUSCULAR; INTRAVENOUS at 23:07

## 2021-10-07 RX ADMIN — Medication 100 MG: at 18:46

## 2021-10-07 NOTE — ED TRIAGE NOTES
Pt states that he normally drinks a pint a day and has not had a drink since yesterday. Pt complains of tremors and nausea with no vomiting. Pt states that he has gone through withdrawal before but has not had seizures with it. Patient masked at arrival and triage staff wore all appropriate PPE during entire encounter with patient.

## 2021-10-07 NOTE — ED PROVIDER NOTES
" EMERGENCY DEPARTMENT ENCOUNTER    Room Number:  07/07  Date seen:  10/7/2021  Time seen: 10:39 EDT  PCP: Checo Dunham MD  Historian: patient      HPI:  Chief Complaint: \"I think I'm in alcohol withdrawal\"    A complete HPI/ROS/PMH/PSH/SH/FH are unobtainable due to: none    Context: Watson Lisa is a 66 y.o. male who presents to the ED for evaluation of concerns he is in alcohol withdrawal.  He reports multiple symptoms that started 4 days ago including generalized feeling ill, nausea without vomiting, tremulousness.  He states it is constant moderate to severe and got worse in the last 24 hours.  He states his last drink of alcohol was yesterday.  He did not otherwise been intentionally decreasing amount of alcohol that he was consuming.  States he normally drinks 1 pint to 1/5 of bourbon daily.  Had an 8-month period of sobriety in 2018.  He states it feels like previous withdrawal which he has been admitted to the hospital for but has not ever had a seizure from.  He denies any chest pain shortness of breath abdominal pain urinary symptoms.  Has a history of hypertension and he is on losartan for as well as a history of COPD and smoking.  He has chronic bilateral lower extremity edema which she states is stable and unchanged.        PAST MEDICAL HISTORY  Active Ambulatory Problems     Diagnosis Date Noted   • Arthritis 04/25/2016   • Hypertension 06/07/2016   • Tobacco abuse 06/07/2016   • Alcohol withdrawal syndrome without complication (HCC) 06/24/2020   • Chest pain in adult 06/25/2020   • Sleep apnea 06/25/2020   • Hiatal hernia 06/25/2020   • Alcohol abuse 06/25/2020   • Effusion of left knee 06/29/2020   • Osteoarthritis of left knee 06/29/2020   • Vitamin D deficiency 07/01/2020   • Anemia, chronic disease 07/01/2020   • Hyponatremia 07/01/2020   • Alcohol dependence with withdrawal (HCC) 01/12/2021   • COPD (chronic obstructive pulmonary disease) (HCC) 03/26/2021   • LIVAN (obstructive sleep apnea) " 03/26/2021   • ETOH abuse 03/26/2021   • Tobacco abuse 03/26/2021   • Obese 03/26/2021   • Anemia, chronic disease 03/26/2021     Resolved Ambulatory Problems     Diagnosis Date Noted   • Hypokalemia 06/29/2020   • Hypomagnesemia 06/29/2020   • Pneumonia due to COVID-19 virus 01/11/2021   • Possible SVT (supraventricular tachycardia) (CMS/[facility]) 01/12/2021   • Alcohol withdrawal syndrome with perceptual disturbance ([facility]) 03/26/2021   • DEEP (acute kidney injury) ([facility]) 03/26/2021   • Elevated LFTs 03/26/2021   • Metabolic acidosis, increased anion gap 03/26/2021   • Hypomagnesemia 03/26/2021   • Suicidal ideations 03/26/2021     Past Medical History:   Diagnosis Date   • Anxiety    • Bronchitis with chronic airway obstruction ([facility])    • DVT (deep venous thrombosis) ([facility])    • Low back pain    • Neck pain    • Pulmonary embolism ([facility])    • Sleep apnea with use of continuous positive airway pressure (CPAP)          PAST SURGICAL HISTORY  Past Surgical History:   Procedure Laterality Date   • COLONOSCOPY     • JOINT REPLACEMENT Right     hip/knee   • REPLACEMENT TOTAL KNEE     • TONSILLECTOMY     • TOTAL HIP ARTHROPLASTY Right          FAMILY HISTORY  Family History   Problem Relation Age of Onset   • Cancer Mother    • Heart disease Father    • Alcohol abuse Father    • Cancer Brother          SOCIAL HISTORY  Social History     Socioeconomic History   • Marital status:      Spouse name: Not on file   • Number of children: Not on file   • Years of education: Not on file   • Highest education level: Not on file   Tobacco Use   • Smoking status: Current Every Day Smoker     Packs/day: 2.00     Years: 40.00     Pack years: 80.00     Types: Cigarettes   • Smokeless tobacco: Never Used   Vaping Use   • Vaping Use: Never used   Substance and Sexual Activity   • Alcohol use: Yes     Comment: Pt reports last drink was 1400 on 10/6/21 (2 pints every day, a fifth on weekends)   • Drug use: Not Currently     Types:  Hydrocodone   • Sexual activity: Defer         ALLERGIES  Penicillins and Penicillins        REVIEW OF SYSTEMS  Review of Systems     All systems reviewed and negative except for those discussed in HPI.       PHYSICAL EXAM  ED Triage Vitals [10/07/21 0910]   Temp Heart Rate Resp BP SpO2   96.8 °F (36 °C) (!) 147 18 (!) 185/120 97 %      Temp src Heart Rate Source Patient Position BP Location FiO2 (%)   Tympanic Monitor Standing -- --         GENERAL: Mild distress, anxious appearing, tremulous  HENT: atraumatic  EYES: no scleral icterus  CV: regular rhythm, tachycardic in the 140s  RESPIRATORY: normal effort, ctab  ABDOMEN: soft, nontender nondistended normal bowel sounds no guarding or rigidity  MUSCULOSKELETAL: no deformity  NEURO: alert, moves all extremities, follows commands  SKIN: warm, dry    Vital signs and nursing notes reviewed.          LAB RESULTS  Recent Results (from the past 24 hour(s))   Comprehensive Metabolic Panel    Collection Time: 10/07/21 10:48 AM    Specimen: Blood   Result Value Ref Range    Glucose 98 65 - 99 mg/dL    BUN 24 (H) 8 - 23 mg/dL    Creatinine 1.99 (H) 0.76 - 1.27 mg/dL    Sodium 142 136 - 145 mmol/L    Potassium 4.5 3.5 - 5.2 mmol/L    Chloride 103 98 - 107 mmol/L    CO2 20.4 (L) 22.0 - 29.0 mmol/L    Calcium 8.6 8.6 - 10.5 mg/dL    Total Protein 7.6 6.0 - 8.5 g/dL    Albumin 4.40 3.50 - 5.20 g/dL    ALT (SGPT) 34 1 - 41 U/L    AST (SGOT) 51 (H) 1 - 40 U/L    Alkaline Phosphatase 89 39 - 117 U/L    Total Bilirubin 0.9 0.0 - 1.2 mg/dL    eGFR Non African Amer 34 (L) >60 mL/min/1.73    Globulin 3.2 gm/dL    A/G Ratio 1.4 g/dL    BUN/Creatinine Ratio 12.1 7.0 - 25.0    Anion Gap 18.6 (H) 5.0 - 15.0 mmol/L   Lipase    Collection Time: 10/07/21 10:48 AM    Specimen: Blood   Result Value Ref Range    Lipase 25 13 - 60 U/L   Ethanol    Collection Time: 10/07/21 10:48 AM    Specimen: Blood   Result Value Ref Range    Ethanol 67 (H) 0 - 10 mg/dL    Ethanol % 0.067 %   Green Top (Gel)     Collection Time: 10/07/21 10:48 AM   Result Value Ref Range    Extra Tube Hold for add-ons.    Lavender Top    Collection Time: 10/07/21 10:48 AM   Result Value Ref Range    Extra Tube hold for add-on    Gold Top - SST    Collection Time: 10/07/21 10:48 AM   Result Value Ref Range    Extra Tube Hold for add-ons.    Light Blue Top    Collection Time: 10/07/21 10:48 AM   Result Value Ref Range    Extra Tube hold for add-on    CBC Auto Differential    Collection Time: 10/07/21 10:48 AM    Specimen: Blood   Result Value Ref Range    WBC 6.86 3.40 - 10.80 10*3/mm3    RBC 3.93 (L) 4.14 - 5.80 10*6/mm3    Hemoglobin 12.9 (L) 13.0 - 17.7 g/dL    Hematocrit 38.1 37.5 - 51.0 %    MCV 96.9 79.0 - 97.0 fL    MCH 32.8 26.6 - 33.0 pg    MCHC 33.9 31.5 - 35.7 g/dL    RDW 13.9 12.3 - 15.4 %    RDW-SD 49.2 37.0 - 54.0 fl    MPV 9.1 6.0 - 12.0 fL    Platelets 199 140 - 450 10*3/mm3    Neutrophil % 77.2 (H) 42.7 - 76.0 %    Lymphocyte % 9.9 (L) 19.6 - 45.3 %    Monocyte % 11.4 5.0 - 12.0 %    Eosinophil % 0.4 0.3 - 6.2 %    Basophil % 0.4 0.0 - 1.5 %    Immature Grans % 0.7 (H) 0.0 - 0.5 %    Neutrophils, Absolute 5.29 1.70 - 7.00 10*3/mm3    Lymphocytes, Absolute 0.68 (L) 0.70 - 3.10 10*3/mm3    Monocytes, Absolute 0.78 0.10 - 0.90 10*3/mm3    Eosinophils, Absolute 0.03 0.00 - 0.40 10*3/mm3    Basophils, Absolute 0.03 0.00 - 0.20 10*3/mm3    Immature Grans, Absolute 0.05 0.00 - 0.05 10*3/mm3    nRBC 0.0 0.0 - 0.2 /100 WBC   Troponin    Collection Time: 10/07/21 10:48 AM    Specimen: Blood   Result Value Ref Range    Troponin T 0.020 0.000 - 0.030 ng/mL   ECG 12 Lead    Collection Time: 10/07/21 10:59 AM   Result Value Ref Range    QT Interval 317 ms   BNP    Collection Time: 10/07/21 11:02 AM    Specimen: Blood   Result Value Ref Range    proBNP 1,106.0 (H) 0.0 - 900.0 pg/mL   Magnesium    Collection Time: 10/07/21 11:02 AM    Specimen: Blood   Result Value Ref Range    Magnesium 1.5 (L) 1.6 - 2.4 mg/dL   Urinalysis With Microscopic  If Indicated (No Culture) - Urine, Clean Catch    Collection Time: 10/07/21 12:45 PM    Specimen: Urine, Clean Catch   Result Value Ref Range    Color, UA Yellow Yellow, Straw    Appearance, UA Clear Clear    pH, UA 5.5 5.0 - 8.0    Specific Gravity, UA 1.011 1.005 - 1.030    Glucose, UA Negative Negative    Ketones, UA Trace (A) Negative    Bilirubin, UA Negative Negative    Blood, UA Trace (A) Negative    Protein, UA 30 mg/dL (1+) (A) Negative    Leuk Esterase, UA Negative Negative    Nitrite, UA Negative Negative    Urobilinogen, UA 0.2 E.U./dL 0.2 - 1.0 E.U./dL   Urinalysis, Microscopic Only - Urine, Clean Catch    Collection Time: 10/07/21 12:45 PM    Specimen: Urine, Clean Catch   Result Value Ref Range    RBC, UA 0-2 None Seen, 0-2 /HPF    WBC, UA 0-2 None Seen, 0-2 /HPF    Bacteria, UA None Seen None Seen /HPF    Squamous Epithelial Cells, UA 0-2 None Seen, 0-2 /HPF    Hyaline Casts, UA 3-6 None Seen /LPF    Methodology Automated Microscopy    COVID-19,[facility] IN-HOUSE CEPHEID/MAILE NP SWAB IN TRANSPORT MEDIA 8-12 HR TAT - Swab, Nasopharynx    Collection Time: 10/07/21  1:03 PM    Specimen: Nasopharynx; Swab   Result Value Ref Range    COVID19 Not Detected Not Detected - Ref. Range       Ordered the above labs and independently reviewed the results.        RADIOLOGY  XR Chest 1 View    Result Date: 10/7/2021  Narrative: ONE VIEW PORTABLE CHEST  HISTORY: Tachycardia. Shortness of breath.  FINDINGS: The lungs are well-expanded and clear. There is mild cardiomegaly unchanged from 01/11/2021. No acute abnormality is seen.  This report was finalized on 10/7/2021 12:31 PM by Dr. Tera Ramachandran M.D.        I ordered the above noted radiological studies. Reviewed by me and discussed with radiologist.  See dictation for official radiology interpretation.    PROCEDURES  Critical Care  Performed by: Anita Ray PA  Authorized by: Carmelo Alvarez MD     Critical care provider statement:     Critical care time  (minutes):  32    Critical care time was exclusive of:  Separately billable procedures and treating other patients and teaching time    Critical care was necessary to treat or prevent imminent or life-threatening deterioration of the following conditions:  Renal failure and cardiac failure    Critical care was time spent personally by me on the following activities:  Development of treatment plan with patient or surrogate, discussions with consultants, evaluation of patient's response to treatment, examination of patient, obtaining history from patient or surrogate, ordering and performing treatments and interventions, ordering and review of laboratory studies, ordering and review of radiographic studies, pulse oximetry, re-evaluation of patient's condition and review of old charts            MEDICATIONS GIVEN IN ER  Medications   sodium chloride 0.9 % flush 10 mL (has no administration in time range)   metoprolol tartrate (LOPRESSOR) injection 5 mg (has no administration in time range)   LORazepam (ATIVAN) injection 1 mg (1 mg Intravenous Given 10/7/21 1102)   thiamine (B-1) 100 mg, folic acid 1 mg in sodium chloride 0.9 % 1,000 mL infusion (1,000 mL/hr Intravenous New Bag 10/7/21 1336)   LORazepam (ATIVAN) injection 2 mg (2 mg Intravenous Given 10/7/21 1304)   enoxaparin (LOVENOX) syringe 120 mg (120 mg Subcutaneous Given 10/7/21 1409)   metoprolol tartrate (LOPRESSOR) tablet 25 mg (25 mg Oral Given 10/7/21 1405)   metoprolol tartrate (LOPRESSOR) injection 5 mg (5 mg Intravenous Given 10/7/21 1406)             PROGRESS AND CONSULTS    DDX includes but not limited to alcohol withdrawal complications, arrhythmia, electrolyte abnormality, dehydration    ED Course as of Oct 07 1523   Thu Oct 07, 2021   1250 Ethanol(!): 67 [KA]   1250 Acute DEEP, 0.8 six months ago   Creatinine(!): 1.99 [KA]   1345 COVID19: Not Detected [KA]   1357 Medical chart reviewed.  Patient admitted 3/25/2021 until 3/30/2021 with alcohol  withdrawal, DEEP, hypomagnesemia and metabolic acidosis, elevated LFTs and suicidal ideation.    [KA]   1357 Interpretation of the EKG performed at 10:59 AM is atrial fibrillation, rate 131, normal axis narrow QRS and normal QTC.  Multiple PVCs, no acute ST elevation.  Change from prior on 1/12/2021 which showed SVT with a rate of 158.    [KA]   1414 I discussed the patient with Dr. Lezama, hospitalist.  We discussed the patient's history presentation labs and imaging and ER interventions.  He agrees to admit the patient to telemetry for further evaluation and treatment.    [KA]   1458 Patient's heart rate remains elevated any 120s 140s.  Will give additional dose of IV metoprolol    [KA]      ED Course User Index  [KA] Anita Ray PA             Patient was placed in face mask in first look. Patient was wearing facemask each time I entered the room and throughout our encounter. I wore protective equipment throughout this patient encounter including a face mask, eye shield and gloves. Hand hygiene was performed before donning protective equipment and after removal when leaving the room.        DIAGNOSIS  Final diagnoses:   Alcohol withdrawal syndrome with complication (HCC)   Acute renal insufficiency   Hypomagnesemia   New onset a-fib (HCC)   Atrial fibrillation with rapid ventricular response (HCC)         Latest Documented Vital Signs:  As of 15:23 EDT  BP- (!) 164/117 HR- (!) 143 Temp- 96.8 °F (36 °C) (Tympanic) O2 sat- 94%       Anita Ray PA  10/07/21 9778

## 2021-10-07 NOTE — ED NOTES
Patient was wearing a face mask throughout our encounter.  I wore a CAPR throughout the encounter.  Hand hygiene was performed before and after patient encounter.        Tracey Campos RN  10/07/21 1111

## 2021-10-07 NOTE — H&P
Patient Name:  Watson Lisa  YOB: 1955  MRN:  [id number]  Admit Date:  10/7/2021  Patient Care Team:  Checo Dunham MD as PCP - General (Family Medicine)  Checo Dunham MD as PCP - Family Medicine  Checo Dunham MD (Family Medicine)        Chief Complaint   Patient presents with   • Alcohol Problem   • Tremors   Last 24 hours to 48 hours.  History Present Illness     A 66 years old white gentleman with a past history of alcohol abuse with withdrawal/tobacco abuse/COPD/PE/DVT/obesity/hypertension/alcoholic liver disease/hiatal hernia/anemia chronic disease/Covid infection who presents to the emergency department complaining of increased anxiety/tremors/agitation over the last 48 hours.  Patient usually drinks 1 pint of bourbon per day his last drink was 24 hours ago.  There was no seizures/double vision/loss of the vision/slurring of the speech/unilateral numbness or weakness/dizziness.  Positive questionable loss of consciousness.  Positive decreased p.o. intake and generalized weakness.  Upon presentation to the emergency department troponin was negative CBC was normal alcohol level elevated at 67.  Lipase is normal.  CMP normal except a BUN of 24, creatinine 1.99, GFR of 34, AST of 51, magnesium 1.5.  proBNP was elevated at 1/1/2006.  UA was negative.  Chest x-ray was negative except cardiomegaly.  Covid was negative.  Patient was noted to have tachycardia and elevated blood pressure with tremor and diagnosed with alcohol withdrawal and subsequently admitted.        Review of Systems   Cardiovascular/respiratory.  Positive increasing shortness of breath from the baseline.  Positive old cough productive of yellowish sputum.  Positive old wheeze.  Positive palpitation.  No chest pain.  No PND or orthopnea.  No ankle edema.  GI.  Positive occasional constipation and diarrhea.  No abdominal pain.  No heartburn.  No dysphagia or odynophagia.  No bleeding per rectum or  melena.  Constitutional.  No fever or chills.  .  No dysuria or hematuria.  No urine incontinence.    Personal History     Past Medical History:   Diagnosis Date   • Alcohol abuse    • Anxiety    • Arthritis    • Bronchitis with chronic airway obstruction (HCC)     LAST FEB   • DVT (deep venous thrombosis) (HCC)    • Hiatal hernia    • Hypertension    • Hypertension    • Low back pain    • Neck pain    • Pulmonary embolism (HCC)    • Sleep apnea with use of continuous positive airway pressure (CPAP)     AT NIGHT     Past Surgical History:   Procedure Laterality Date   • COLONOSCOPY     • JOINT REPLACEMENT Right     hip/knee   • REPLACEMENT TOTAL KNEE     • TONSILLECTOMY     • TOTAL HIP ARTHROPLASTY Right      Family History   Problem Relation Age of Onset   • Cancer Mother    • Heart disease Father    • Alcohol abuse Father    • Cancer Brother      Social History     Tobacco Use   • Smoking status: Current Every Day Smoker     Packs/day: 2.00     Years: 40.00     Pack years: 80.00     Types: Cigarettes   • Smokeless tobacco: Never Used   Vaping Use   • Vaping Use: Never used   Substance Use Topics   • Alcohol use: Yes     Comment: Pt reports last drink was 1400 on 10/6/21 (2 pints every day, a fifth on weekends)   • Drug use: Not Currently     Types: Hydrocodone     No current facility-administered medications on file prior to encounter.     Current Outpatient Medications on File Prior to Encounter   Medication Sig Dispense Refill   • acetaminophen (TYLENOL) 325 MG tablet Take 650 mg by mouth Every 6 (Six) Hours As Needed for Mild Pain .     • amLODIPine (NORVASC) 10 MG tablet Take 10 mg by mouth Daily.     • buPROPion SR (Wellbutrin SR) 150 MG 12 hr tablet Take 1 tablet by mouth 2 (Two) Times a Day. 180 tablet 3   • losartan (COZAAR) 100 MG tablet Take 1 tablet by mouth Daily. 10 tablet 0   • Multiple Vitamins-Minerals (MULTIVITAMIN ADULT PO) Take 1 tablet by mouth Daily.     • traZODone (DESYREL) 50 MG tablet  TAKE 1 TABLET AT NIGHT AS NEEDED FOR SLEEP 90 tablet 3   • vitamin D (ERGOCALCIFEROL) 1.25 MG (42465 UT) capsule capsule Take 1 capsule by mouth Every 7 (Seven) Days. 5 capsule    • albuterol sulfate  (90 Base) MCG/ACT inhaler Inhale 2 puffs Every 6 (Six) Hours As Needed for Wheezing or Shortness of Air.     • aspirin 81 MG chewable tablet Chew 81 mg Daily.     • ferrous sulfate 325 (65 FE) MG tablet Take 325 mg by mouth Daily With Breakfast.     • folic acid (FOLVITE) 1 MG tablet Take 1 mg by mouth Daily.     • metoprolol tartrate (LOPRESSOR) 50 MG tablet Take 1 tablet by mouth Every 8 (Eight) Hours. 90 tablet 0   • metoprolol tartrate (LOPRESSOR) 50 MG tablet Take 50 mg by mouth Every 8 (Eight) Hours.     • thiamine (VITAMIN B-1) 100 MG tablet  tablet Take 100 mg by mouth Daily.     • [DISCONTINUED] acetaminophen (TYLENOL) 325 MG tablet Take 2 tablets by mouth Every 6 (Six) Hours As Needed for Mild Pain .     • [DISCONTINUED] albuterol (PROVENTIL HFA;VENTOLIN HFA) 108 (90 Base) MCG/ACT inhaler Inhale 2 puffs Every 6 (Six) Hours As Needed for Wheezing or Shortness of Air. 1 inhaler 0   • [DISCONTINUED] amLODIPine (NORVASC) 10 MG tablet TAKE ONE TABLET BY MOUTH DAILY 90 tablet 0   • [DISCONTINUED] aspirin 81 MG tablet Take 81 mg by mouth Daily. Pt reports taking every 1-2 days.     • [DISCONTINUED] escitalopram (LEXAPRO) 20 MG tablet TAKE ONE TABLET BY MOUTH DAILY 90 tablet 0   • [DISCONTINUED] ferrous sulfate 325 (65 FE) MG tablet Take 1 tablet by mouth Daily With Breakfast.     • [DISCONTINUED] folic acid (FOLVITE) 1 MG tablet Take 1 tablet by mouth Daily. 30 tablet 0   • [DISCONTINUED] Glycopyrrolate-Formoterol (BEVESPI AEROSPHERE) 9-4.8 MCG/ACT aerosol Inhale 2 sprays 2 (Two) Times a Day. 1 inhaler 11   • [DISCONTINUED] Glycopyrrolate-Formoterol 9-4.8 MCG/ACT aerosol Inhale 2 puffs 2 (Two) Times a Day.     • [DISCONTINUED] HYDROcodone-acetaminophen (NORCO) 5-325 MG per tablet Take 1 tablet by mouth Every  6 (Six) Hours As Needed for Moderate Pain  for up to 8 doses. 8 tablet 0   • [DISCONTINUED] multivitamin with minerals (multivitamin with minerals) tablet tablet Take 1 tablet by mouth Daily.     • [DISCONTINUED] pantoprazole (PROTONIX) 40 MG EC tablet Take 1 tablet by mouth Daily.     • [DISCONTINUED] pantoprazole (PROTONIX) 40 MG EC tablet Take 40 mg by mouth Daily.     • [DISCONTINUED] polyethylene glycol (MIRALAX) 17 g packet Take 17 g by mouth Daily.     • [DISCONTINUED] polyethylene glycol (MIRALAX) 17 g packet Take 17 g by mouth Daily.     • [DISCONTINUED] thiamine (VITAMIN B-1) 100 MG tablet  tablet Take 1 tablet by mouth Daily. 30 tablet 0   • [DISCONTINUED] traZODone (DESYREL) 50 MG tablet Take 50 mg by mouth Every Night.     • [DISCONTINUED] traZODone (DESYREL) 50 MG tablet Take 50 mg by mouth Every Night.     • [DISCONTINUED] vitamin D (ERGOCALCIFEROL) 1.25 MG (30754 UT) capsule capsule Take 50,000 Units by mouth 1 (One) Time Per Week.       Allergies   Allergen Reactions   • Penicillins Unknown - Low Severity     Pt does not recall the reaction. Patient tolerated cephalexin 3/2020   • Penicillins Other (See Comments)     Unknown        Objective    Objective     Vital Signs  Temp:  [96.8 °F (36 °C)] 96.8 °F (36 °C)  Heart Rate:  [139-150] 139  Resp:  [18] 18  BP: (150-185)/(109-129) 150/114  SpO2:  [93 %-97 %] 94 %  on   ;   Device (Oxygen Therapy): room air  Body mass index is 30.05 kg/m².    Physical Exam  General.  Middle-aged gentleman.  He is anxious and tremulous.  Alert and oriented x3.  No apparent pain or respiratory distress.  Eyes.  Pupils equal round and reactive.  Intact extraocular musculature.  No pallor or jaundice.  Normal conjunctive and lids.  Oral cavity.  Moist mucous membranes with no lesions.    Neck.  Supple.  No JVD.  No lymphadenopathy or thyromegaly.  Cardiovascular.  Tachycardia.  Regular rate and rhythm.  Grade 2 systolic murmur.   Chest.  Poor but clear to auscultation  bilaterally with no added sounds.  Abdomen.  Obese.  Soft lax.  No tenderness.  No organomegaly.  No guarding or rebound.  No ascites.  Positive bowel sounds.  Extremities.  +1 bilateral lower extremity edema.  No clubbing or cyanosis.  No calf tenderness.  CNS.  Positive for tremors otherwise no focal neurological deficits.      Results Review:  I reviewed the patient's new clinical results.  I reviewed the patient's new imaging results and agree with the interpretation.  I reviewed the patient's other test results and agree with the interpretation  I personally viewed and interpreted the patient's EKG/Telemetry data  Discussed with ED provider.    Lab Results (last 24 hours)     Procedure Component Value Units Date/Time    CBC & Differential [[id number]]  (Abnormal) Collected: 10/07/21 1048    Specimen: Blood Updated: 10/07/21 1103    Narrative:      The following orders were created for panel order CBC & Differential.  Procedure                               Abnormality         Status                     ---------                               -----------         ------                     CBC Auto Differential[[id number]]        Abnormal            Final result                 Please view results for these tests on the individual orders.    Comprehensive Metabolic Panel [[id number]]  (Abnormal) Collected: 10/07/21 1048    Specimen: Blood Updated: 10/07/21 1117     Glucose 98 mg/dL      BUN 24 mg/dL      Creatinine 1.99 mg/dL      Sodium 142 mmol/L      Potassium 4.5 mmol/L      Chloride 103 mmol/L      CO2 20.4 mmol/L      Calcium 8.6 mg/dL      Total Protein 7.6 g/dL      Albumin 4.40 g/dL      ALT (SGPT) 34 U/L      AST (SGOT) 51 U/L      Alkaline Phosphatase 89 U/L      Total Bilirubin 0.9 mg/dL      eGFR Non African Amer 34 mL/min/1.73      Globulin 3.2 gm/dL      A/G Ratio 1.4 g/dL      BUN/Creatinine Ratio 12.1     Anion Gap 18.6 mmol/L     Narrative:      GFR Normal >60  Chronic Kidney Disease <60  Kidney  Failure <15      Lipase [[id number]]  (Normal) Collected: 10/07/21 1048    Specimen: Blood Updated: 10/07/21 1117     Lipase 25 U/L     Ethanol [[id number]]  (Abnormal) Collected: 10/07/21 1048    Specimen: Blood Updated: 10/07/21 1117     Ethanol 67 mg/dL      Ethanol % 0.067 %     CBC Auto Differential [[id number]]  (Abnormal) Collected: 10/07/21 1048    Specimen: Blood Updated: 10/07/21 1103     WBC 6.86 10*3/mm3      RBC 3.93 10*6/mm3      Hemoglobin 12.9 g/dL      Hematocrit 38.1 %      MCV 96.9 fL      MCH 32.8 pg      MCHC 33.9 g/dL      RDW 13.9 %      RDW-SD 49.2 fl      MPV 9.1 fL      Platelets 199 10*3/mm3      Neutrophil % 77.2 %      Lymphocyte % 9.9 %      Monocyte % 11.4 %      Eosinophil % 0.4 %      Basophil % 0.4 %      Immature Grans % 0.7 %      Neutrophils, Absolute 5.29 10*3/mm3      Lymphocytes, Absolute 0.68 10*3/mm3      Monocytes, Absolute 0.78 10*3/mm3      Eosinophils, Absolute 0.03 10*3/mm3      Basophils, Absolute 0.03 10*3/mm3      Immature Grans, Absolute 0.05 10*3/mm3      nRBC 0.0 /100 WBC     Troponin [[id number]]  (Normal) Collected: 10/07/21 1048    Specimen: Blood Updated: 10/07/21 1115     Troponin T 0.020 ng/mL     Narrative:      Troponin T Reference Range:  <= 0.03 ng/mL-   Negative for AMI  >0.03 ng/mL-     Abnormal for myocardial necrosis.  Clinicians would have to utilize clinical acumen, EKG, Troponin and serial changes to determine if it is an Acute Myocardial Infarction or myocardial injury due to an underlying chronic condition.       Results may be falsely decreased if patient taking Biotin.      BNP [[id number]]  (Abnormal) Collected: 10/07/21 1102    Specimen: Blood Updated: 10/07/21 1143     proBNP 1,106.0 pg/mL     Narrative:      Among patients with dyspnea, NT-proBNP is highly sensitive for the detection of acute congestive heart failure. In addition NT-proBNP of <300 pg/ml effectively rules out acute congestive heart failure with 99% negative predictive  value.    Results may be falsely decreased if patient taking Biotin.      Magnesium [[id number]]  (Abnormal) Collected: 10/07/21 1102    Specimen: Blood Updated: 10/07/21 1133     Magnesium 1.5 mg/dL     Urinalysis With Microscopic If Indicated (No Culture) - Urine, Clean Catch [[id number]]  (Abnormal) Collected: 10/07/21 1245    Specimen: Urine, Clean Catch Updated: 10/07/21 1307     Color, UA Yellow     Appearance, UA Clear     pH, UA 5.5     Specific Gravity, UA 1.011     Glucose, UA Negative     Ketones, UA Trace     Bilirubin, UA Negative     Blood, UA Trace     Protein, UA 30 mg/dL (1+)     Leuk Esterase, UA Negative     Nitrite, UA Negative     Urobilinogen, UA 0.2 E.U./dL    Urinalysis, Microscopic Only - Urine, Clean Catch [[id number]] Collected: 10/07/21 1245    Specimen: Urine, Clean Catch Updated: 10/07/21 1307     RBC, UA 0-2 /HPF      WBC, UA 0-2 /HPF      Bacteria, UA None Seen /HPF      Squamous Epithelial Cells, UA 0-2 /HPF      Hyaline Casts, UA 3-6 /LPF      Methodology Automated Microscopy    COVID PRE-OP / PRE-PROCEDURE SCREENING ORDER (NO ISOLATION) - Swab, Nasopharynx [[id number]]  (Normal) Collected: 10/07/21 1303    Specimen: Swab from Nasopharynx Updated: 10/07/21 1344    Narrative:      The following orders were created for panel order COVID PRE-OP / PRE-PROCEDURE SCREENING ORDER (NO ISOLATION) - Swab, Nasopharynx.  Procedure                               Abnormality         Status                     ---------                               -----------         ------                     COVID-19,[facility]...[[id number]]  Normal              Final result                 Please view results for these tests on the individual orders.    COVID-19,[facility] CEPHEID/MAILE NP SWAB IN TRANSPORT MEDIA 8-12 HR TAT - Swab, Nasopharynx [[id number]]  (Normal) Collected: 10/07/21 1303    Specimen: Swab from Nasopharynx Updated: 10/07/21 1344     COVID19 Not Detected    Narrative:      Fact sheet for  providers: https://www.fda.gov/media/265875/download    Fact sheet for patients: https://www.fda.gov/media/735184/download    Test performed by PCR.          Imaging Results (Last 24 Hours)     Procedure Component Value Units Date/Time    XR Chest 1 View [[id number]] Collected: 10/07/21 1208     Updated: 10/07/21 1234    Narrative:      ONE VIEW PORTABLE CHEST     HISTORY: Tachycardia. Shortness of breath.     FINDINGS: The lungs are well-expanded and clear. There is mild  cardiomegaly unchanged from 01/11/2021. No acute abnormality is seen.     This report was finalized on 10/7/2021 12:31 PM by Dr. Tera Ramachandran M.D.             Results for orders placed during the hospital encounter of 06/24/20    Adult Transthoracic Echo Complete W/ Cont if Necessary Per Protocol    Interpretation Summary  · The left ventricular cavity is moderately dilated.  · Estimated EF appears to be in the range of 56 - 60%.  · Left ventricular wall thickness is consistent with mild concentric hypertrophy.  · Left ventricular diastolic dysfunction is noted (grade I) consistent with impaired relaxation.  · Normal right ventricular systolic function noted. Right ventricular cavity is mildly dilated.  · Left atrial cavity size is mild-to-moderately dilated.  · There is no evidence of pericardial effusion.      ECG 12 Lead   Final Result   HEART RATE= 131  bpm   RR Interval= 452  ms   NM Interval= 128  ms   P Horizontal Axis= 165  deg   P Front Axis= -89  deg   QRSD Interval= 94  ms   QT Interval= 317  ms   QRS Axis= 47  deg   T Wave Axis= -23  deg   - ABNORMAL ECG -   Multiform ventricular premature complexes   Aberrant conduction of SV complex(es)   Probable anteroseptal infarct, old   Repol abnrm suggests ischemia, inferior leads   When compared with ECG of 12-Jan-2021 9:00:17,   Significant change in rhythm   Atrial flutter with rapid V-rate   Electronically Signed By: Gonzales Abernathy) 07-Oct-2021 11:52:15   Date and Time of Study:  2021-10-07 10:59:53               Assessment/Plan     Active Hospital Problems    Diagnosis  POA   • **Alcohol withdrawal syndrome with complication (HCC) [F10.239]  Yes   • Alcohol abuse [F10.10]  Yes   • Alcoholic liver disease (HCC) [K70.9]  Yes   • Tobacco abuse [Z72.0]  Yes   • Hiatal hernia [K44.9]  Yes   • COPD (chronic obstructive pulmonary disease) (HCC) [J44.9]  Yes   • Anemia, chronic disease [D63.8]  Yes   • Acute kidney failure (HCC) [N17.9]  Yes   • Hypomagnesemia [E83.42]  Yes   • Essential hypertension [I10]  Yes      Resolved Hospital Problems   No resolved problems to display.           · Alcohol withdrawal syndrome.  Plan IV fluids/CIWA protocol/detoxification with Ativan, folate, multivitamin, thiamine.  Fall and seizure precaution.  Obtain access center consultations.  · Acute kidney failure.  Most likely secondary to dehydration leading to prerenal azotemia.  Patient appears is clinically euvolemic.  We will stop losartan and start IV fluid.  Monitor renal function and input and output.  Check urine electrolytes T.  · Alcoholic liver disease/elevated liver function test/hiatal hernia.  Benign GI examination.  Stable elevation of the liver function test.  Will check PT and PTT.  Will initiate Protonix.  Will monitor liver function test.  · Hypomagnesemia.  Will substitute and monitor.  · Hypertension.  Suboptimal control secondary to alcohol withdrawal.  We will continue p.o. metoprolol.  We will stop losartan secondary to acute renal failure.  Will initiate Norvasc.  Will give as needed Lopressor.  There is no evidence of angina or congestive heart failure.  · Tobacco abuse.  Counseled.  Will initiate nicotine patch.  · History of obstructive sleep apnea and COPD.  Will depend on as needed oxygen at night if needed.  Will initiate DuoNeb's and Pulmicort mini nebs and hold the patient metered-dose inhalers.  · Anemia of chronic disease.  Hemoglobin stable.  Will monitor.  · VTE prophylaxis.   Lovenox renal dose.    · I discussed the patient's findings and my recommendations with patient/ER physician.      Code Status -code.       Britney Triplett MD  [facility]  10/07/21  17:23 EDT

## 2021-10-07 NOTE — ED PROVIDER NOTES
Pt presents to the ED c/o  symptoms related to alcohol withdrawal including generalized malaise, tremulousness and nausea.  Last drink was yesterday-normally drinks 1 pint to 1/5 of bourbon per day.  He is not aware of any prior history of atrial fibrillation.     On exam,   Awake, alert, no acute distress.  He is tremulous.  CV-irregularly irregular tachycardic  Lungs-clear to auscultation bilaterally  Extremities-mild pitting edema.     Plan: Rate control with metoprolol.  Magnesium ordered due to mild hypomagnesemia.  He will be admitted to a telemetry bed for further treatment and evaluation.      Appropriate PPE was worn by myself and the patient throughout our entire interaction.       Attestation:  The STEFANIE and I have discussed this patient's history, physical exam, and treatment plan.  I have reviewed the documentation and personally had a face to face interaction with the patient. I affirm the documentation and agree with the treatment and plan.  The attached note describes my personal findings.            Carmelo Alvarez MD  10/07/21 [id number]

## 2021-10-08 ENCOUNTER — APPOINTMENT (OUTPATIENT)
Dept: GENERAL RADIOLOGY | Facility: HOSPITAL | Age: 66
End: 2021-10-08

## 2021-10-08 PROBLEM — F10.931 DELIRIUM TREMENS (HCC): Status: ACTIVE | Noted: 2021-10-07

## 2021-10-08 PROBLEM — J96.02 ACUTE RESPIRATORY FAILURE WITH HYPERCAPNIA: Status: ACTIVE | Noted: 2021-10-08

## 2021-10-08 PROBLEM — J69.0 ASPIRATION PNEUMONIA (HCC): Status: ACTIVE | Noted: 2021-10-08

## 2021-10-08 LAB
ALBUMIN SERPL-MCNC: 4.3 G/DL (ref 3.5–5.2)
ALBUMIN/GLOB SERPL: 1.3 G/DL
ALP SERPL-CCNC: 85 U/L (ref 39–117)
ALT SERPL W P-5'-P-CCNC: 30 U/L (ref 1–41)
ANION GAP SERPL CALCULATED.3IONS-SCNC: 14.5 MMOL/L (ref 5–15)
ARTERIAL PATENCY WRIST A: POSITIVE
AST SERPL-CCNC: 43 U/L (ref 1–40)
ATMOSPHERIC PRESS: 750.6 MMHG
BASE EXCESS BLDA CALC-SCNC: -1.9 MMOL/L (ref 0–2)
BASOPHILS # BLD AUTO: 0.05 10*3/MM3 (ref 0–0.2)
BASOPHILS NFR BLD AUTO: 0.7 % (ref 0–1.5)
BDY SITE: ABNORMAL
BILIRUB SERPL-MCNC: 1.2 MG/DL (ref 0–1.2)
BUN SERPL-MCNC: 25 MG/DL (ref 8–23)
BUN/CREAT SERPL: 11.3 (ref 7–25)
CALCIUM SPEC-SCNC: 8.3 MG/DL (ref 8.6–10.5)
CHLORIDE SERPL-SCNC: 102 MMOL/L (ref 98–107)
CHLORIDE UR-SCNC: 42 MMOL/L
CO2 SERPL-SCNC: 22.5 MMOL/L (ref 22–29)
CREAT SERPL-MCNC: 2.22 MG/DL (ref 0.76–1.27)
CREAT UR-MCNC: 197.8 MG/DL
DEPRECATED RDW RBC AUTO: 47.4 FL (ref 37–54)
EOSINOPHIL # BLD AUTO: 0.04 10*3/MM3 (ref 0–0.4)
EOSINOPHIL NFR BLD AUTO: 0.6 % (ref 0.3–6.2)
ERYTHROCYTE [DISTWIDTH] IN BLOOD BY AUTOMATED COUNT: 13.6 % (ref 12.3–15.4)
FOLATE SERPL-MCNC: >20 NG/ML (ref 4.78–24.2)
GFR SERPL CREATININE-BSD FRML MDRD: 30 ML/MIN/1.73
GLOBULIN UR ELPH-MCNC: 3.4 GM/DL
GLUCOSE BLDC GLUCOMTR-MCNC: 150 MG/DL (ref 70–130)
GLUCOSE SERPL-MCNC: 184 MG/DL (ref 65–99)
HCO3 BLDA-SCNC: 25.8 MMOL/L (ref 22–28)
HCT VFR BLD AUTO: 36.2 % (ref 37.5–51)
HGB BLD-MCNC: 12.7 G/DL (ref 13–17.7)
IMM GRANULOCYTES # BLD AUTO: 0.04 10*3/MM3 (ref 0–0.05)
IMM GRANULOCYTES NFR BLD AUTO: 0.6 % (ref 0–0.5)
INHALED O2 CONCENTRATION: 100 %
LYMPHOCYTES # BLD AUTO: 0.88 10*3/MM3 (ref 0.7–3.1)
LYMPHOCYTES NFR BLD AUTO: 12.5 % (ref 19.6–45.3)
MAGNESIUM SERPL-MCNC: 1.4 MG/DL (ref 1.6–2.4)
MCH RBC QN AUTO: 33.7 PG (ref 26.6–33)
MCHC RBC AUTO-ENTMCNC: 35.1 G/DL (ref 31.5–35.7)
MCV RBC AUTO: 96 FL (ref 79–97)
MODALITY: ABNORMAL
MONOCYTES # BLD AUTO: 0.79 10*3/MM3 (ref 0.1–0.9)
MONOCYTES NFR BLD AUTO: 11.2 % (ref 5–12)
NEUTROPHILS NFR BLD AUTO: 5.23 10*3/MM3 (ref 1.7–7)
NEUTROPHILS NFR BLD AUTO: 74.4 % (ref 42.7–76)
NRBC BLD AUTO-RTO: 0 /100 WBC (ref 0–0.2)
O2 A-A PPRESDIFF RESPIRATORY: 0.4 MMHG
PCO2 BLDA: 56.1 MM HG (ref 35–45)
PEEP RESPIRATORY: 7.5 CM[H2O]
PH BLDA: 7.27 PH UNITS (ref 7.35–7.45)
PHOSPHATE SERPL-MCNC: 4.4 MG/DL (ref 2.5–4.5)
PLATELET # BLD AUTO: 240 10*3/MM3 (ref 140–450)
PMV BLD AUTO: 9.5 FL (ref 6–12)
PO2 BLDA: 295 MM HG (ref 80–100)
POTASSIUM SERPL-SCNC: 4.2 MMOL/L (ref 3.5–5.2)
PROT SERPL-MCNC: 7.7 G/DL (ref 6–8.5)
PROT UR-MCNC: 39 MG/DL
PROT/CREAT UR: 197.2 MG/G CREA (ref 0–200)
RBC # BLD AUTO: 3.77 10*6/MM3 (ref 4.14–5.8)
SAO2 % BLDCOA: 99.9 % (ref 92–99)
SET MECH RESP RATE: 24
SODIUM SERPL-SCNC: 139 MMOL/L (ref 136–145)
SODIUM UR-SCNC: 49 MMOL/L
TOTAL RATE: 25 BREATHS/MINUTE
VENTILATOR MODE: ABNORMAL
VT ON VENT VENT: 500 ML
WBC # BLD AUTO: 7.03 10*3/MM3 (ref 3.4–10.8)

## 2021-10-08 PROCEDURE — 94799 UNLISTED PULMONARY SVC/PX: CPT

## 2021-10-08 PROCEDURE — 85025 COMPLETE CBC W/AUTO DIFF WBC: CPT | Performed by: INTERNAL MEDICINE

## 2021-10-08 PROCEDURE — 25010000002 LORAZEPAM PER 2 MG: Performed by: NURSE PRACTITIONER

## 2021-10-08 PROCEDURE — 25010000002 LORAZEPAM PER 2 MG: Performed by: INTERNAL MEDICINE

## 2021-10-08 PROCEDURE — 82436 ASSAY OF URINE CHLORIDE: CPT | Performed by: INTERNAL MEDICINE

## 2021-10-08 PROCEDURE — 94002 VENT MGMT INPAT INIT DAY: CPT

## 2021-10-08 PROCEDURE — 25010000002 LEVOFLOXACIN PER 250 MG: Performed by: INTERNAL MEDICINE

## 2021-10-08 PROCEDURE — 80053 COMPREHEN METABOLIC PANEL: CPT | Performed by: INTERNAL MEDICINE

## 2021-10-08 PROCEDURE — 94003 VENT MGMT INPAT SUBQ DAY: CPT

## 2021-10-08 PROCEDURE — 25010000002 FENTANYL CITRATE (PF) 50 MCG/ML SOLUTION: Performed by: INTERNAL MEDICINE

## 2021-10-08 PROCEDURE — 25010000002 ENOXAPARIN PER 10 MG: Performed by: INTERNAL MEDICINE

## 2021-10-08 PROCEDURE — 5A1945Z RESPIRATORY VENTILATION, 24-96 CONSECUTIVE HOURS: ICD-10-PCS | Performed by: INTERNAL MEDICINE

## 2021-10-08 PROCEDURE — 71045 X-RAY EXAM CHEST 1 VIEW: CPT

## 2021-10-08 PROCEDURE — 0BH17EZ INSERTION OF ENDOTRACHEAL AIRWAY INTO TRACHEA, VIA NATURAL OR ARTIFICIAL OPENING: ICD-10-PCS | Performed by: INTERNAL MEDICINE

## 2021-10-08 PROCEDURE — 84156 ASSAY OF PROTEIN URINE: CPT | Performed by: INTERNAL MEDICINE

## 2021-10-08 PROCEDURE — 25010000002 PROPOFOL 10 MG/ML EMULSION: Performed by: INTERNAL MEDICINE

## 2021-10-08 PROCEDURE — 82570 ASSAY OF URINE CREATININE: CPT | Performed by: INTERNAL MEDICINE

## 2021-10-08 PROCEDURE — 25010000002 ONDANSETRON PER 1 MG: Performed by: INTERNAL MEDICINE

## 2021-10-08 PROCEDURE — 83735 ASSAY OF MAGNESIUM: CPT | Performed by: INTERNAL MEDICINE

## 2021-10-08 PROCEDURE — 25010000002 THIAMINE PER 100 MG: Performed by: INTERNAL MEDICINE

## 2021-10-08 PROCEDURE — 25010000002 MAGNESIUM SULFATE IN D5W 1G/100ML (PREMIX) 1-5 GM/100ML-% SOLUTION: Performed by: INTERNAL MEDICINE

## 2021-10-08 PROCEDURE — 84100 ASSAY OF PHOSPHORUS: CPT | Performed by: INTERNAL MEDICINE

## 2021-10-08 PROCEDURE — 36600 WITHDRAWAL OF ARTERIAL BLOOD: CPT

## 2021-10-08 PROCEDURE — 84300 ASSAY OF URINE SODIUM: CPT | Performed by: INTERNAL MEDICINE

## 2021-10-08 PROCEDURE — 82746 ASSAY OF FOLIC ACID SERUM: CPT | Performed by: INTERNAL MEDICINE

## 2021-10-08 PROCEDURE — 25010000002 PROPOFOL 10 MG/ML EMULSION

## 2021-10-08 PROCEDURE — 94640 AIRWAY INHALATION TREATMENT: CPT

## 2021-10-08 PROCEDURE — 82803 BLOOD GASES ANY COMBINATION: CPT

## 2021-10-08 PROCEDURE — 82962 GLUCOSE BLOOD TEST: CPT

## 2021-10-08 RX ORDER — LORAZEPAM 2 MG/ML
2 INJECTION INTRAMUSCULAR EVERY 6 HOURS
Status: DISCONTINUED | OUTPATIENT
Start: 2021-10-08 | End: 2021-10-12

## 2021-10-08 RX ORDER — LORAZEPAM 2 MG/ML
4 INJECTION INTRAMUSCULAR ONCE
Status: DISCONTINUED | OUTPATIENT
Start: 2021-10-08 | End: 2021-10-08

## 2021-10-08 RX ORDER — LORAZEPAM 1 MG/1
1 TABLET ORAL
Status: DISPENSED | OUTPATIENT
Start: 2021-10-08 | End: 2021-10-15

## 2021-10-08 RX ORDER — LEVOFLOXACIN 5 MG/ML
750 INJECTION, SOLUTION INTRAVENOUS
Status: DISCONTINUED | OUTPATIENT
Start: 2021-10-08 | End: 2021-10-09

## 2021-10-08 RX ORDER — PROPOFOL 10 MG/ML
VIAL (ML) INTRAVENOUS
Status: COMPLETED
Start: 2021-10-08 | End: 2021-10-08

## 2021-10-08 RX ORDER — LORAZEPAM 2 MG/ML
1 INJECTION INTRAMUSCULAR
Status: ACTIVE | OUTPATIENT
Start: 2021-10-08 | End: 2021-10-15

## 2021-10-08 RX ORDER — ALBUTEROL SULFATE 90 UG/1
6 AEROSOL, METERED RESPIRATORY (INHALATION)
Status: DISCONTINUED | OUTPATIENT
Start: 2021-10-08 | End: 2021-10-13

## 2021-10-08 RX ORDER — LORAZEPAM 0.5 MG/1
0.5 TABLET ORAL
Status: ACTIVE | OUTPATIENT
Start: 2021-10-08 | End: 2021-10-15

## 2021-10-08 RX ORDER — LORAZEPAM 2 MG/ML
4 INJECTION INTRAMUSCULAR ONCE
Status: COMPLETED | OUTPATIENT
Start: 2021-10-08 | End: 2021-10-08

## 2021-10-08 RX ORDER — LORAZEPAM 2 MG/ML
0.5 INJECTION INTRAMUSCULAR EVERY 8 HOURS
Status: DISCONTINUED | OUTPATIENT
Start: 2021-10-09 | End: 2021-10-08

## 2021-10-08 RX ORDER — LORAZEPAM 2 MG/ML
2 INJECTION INTRAMUSCULAR
Status: DISPENSED | OUTPATIENT
Start: 2021-10-08 | End: 2021-10-15

## 2021-10-08 RX ORDER — LORAZEPAM 1 MG/1
2 TABLET ORAL
Status: DISPENSED | OUTPATIENT
Start: 2021-10-08 | End: 2021-10-15

## 2021-10-08 RX ORDER — FENTANYL CITRATE 50 UG/ML
50 INJECTION, SOLUTION INTRAMUSCULAR; INTRAVENOUS
Status: DISCONTINUED | OUTPATIENT
Start: 2021-10-08 | End: 2021-10-14

## 2021-10-08 RX ORDER — PROPOFOL 10 MG/ML
100 VIAL (ML) INTRAVENOUS ONCE
Status: COMPLETED | OUTPATIENT
Start: 2021-10-08 | End: 2021-10-08

## 2021-10-08 RX ORDER — LORAZEPAM 2 MG/ML
0.5 INJECTION INTRAMUSCULAR EVERY 6 HOURS
Status: DISCONTINUED | OUTPATIENT
Start: 2021-10-08 | End: 2021-10-08

## 2021-10-08 RX ORDER — LORAZEPAM 2 MG/ML
0.5 INJECTION INTRAMUSCULAR
Status: ACTIVE | OUTPATIENT
Start: 2021-10-08 | End: 2021-10-15

## 2021-10-08 RX ADMIN — METOPROLOL TARTRATE 50 MG: 50 TABLET, FILM COATED ORAL at 17:47

## 2021-10-08 RX ADMIN — DEXMEDETOMIDINE HYDROCHLORIDE 0.6 MCG/KG/HR: 100 INJECTION, SOLUTION, CONCENTRATE INTRAVENOUS at 12:07

## 2021-10-08 RX ADMIN — LORAZEPAM 4 MG: 2 INJECTION INTRAMUSCULAR; INTRAVENOUS at 05:09

## 2021-10-08 RX ADMIN — DEXMEDETOMIDINE HYDROCHLORIDE 0.2 MCG/KG/HR: 100 INJECTION, SOLUTION, CONCENTRATE INTRAVENOUS at 06:41

## 2021-10-08 RX ADMIN — LORAZEPAM 1 MG: 2 INJECTION INTRAMUSCULAR; INTRAVENOUS at 03:44

## 2021-10-08 RX ADMIN — LORAZEPAM 1 MG: 2 INJECTION INTRAMUSCULAR; INTRAVENOUS at 02:48

## 2021-10-08 RX ADMIN — METOPROLOL TARTRATE 50 MG: 50 TABLET, FILM COATED ORAL at 10:32

## 2021-10-08 RX ADMIN — ALBUTEROL SULFATE 6 PUFF: 90 AEROSOL, METERED RESPIRATORY (INHALATION) at 11:04

## 2021-10-08 RX ADMIN — HYDROCODONE BITARTRATE AND ACETAMINOPHEN 1 TABLET: 5; 325 TABLET ORAL at 22:13

## 2021-10-08 RX ADMIN — SODIUM CHLORIDE 1000 ML: 9 INJECTION, SOLUTION INTRAVENOUS at 06:40

## 2021-10-08 RX ADMIN — PROPOFOL 20 MCG/KG/MIN: 10 INJECTION, EMULSION INTRAVENOUS at 15:43

## 2021-10-08 RX ADMIN — PROPOFOL 100 MG: 10 INJECTION, EMULSION INTRAVENOUS at 04:20

## 2021-10-08 RX ADMIN — LORAZEPAM 2 MG: 2 INJECTION INTRAMUSCULAR; INTRAVENOUS at 21:24

## 2021-10-08 RX ADMIN — ONDANSETRON 4 MG: 2 INJECTION INTRAMUSCULAR; INTRAVENOUS at 20:38

## 2021-10-08 RX ADMIN — PROPOFOL 20 MCG/KG/MIN: 10 INJECTION, EMULSION INTRAVENOUS at 22:44

## 2021-10-08 RX ADMIN — FENTANYL CITRATE 50 MCG: 50 INJECTION, SOLUTION INTRAMUSCULAR; INTRAVENOUS at 04:35

## 2021-10-08 RX ADMIN — LORAZEPAM 2 MG: 2 INJECTION INTRAMUSCULAR; INTRAVENOUS at 17:34

## 2021-10-08 RX ADMIN — LORAZEPAM 2 MG: 2 INJECTION INTRAMUSCULAR; INTRAVENOUS at 04:19

## 2021-10-08 RX ADMIN — LORAZEPAM 2 MG: 2 INJECTION INTRAMUSCULAR; INTRAVENOUS at 19:46

## 2021-10-08 RX ADMIN — PANTOPRAZOLE SODIUM 40 MG: 40 INJECTION, POWDER, FOR SOLUTION INTRAVENOUS at 05:09

## 2021-10-08 RX ADMIN — LORAZEPAM 2 MG: 2 INJECTION INTRAMUSCULAR; INTRAVENOUS at 23:43

## 2021-10-08 RX ADMIN — ALBUTEROL SULFATE 6 PUFF: 90 AEROSOL, METERED RESPIRATORY (INHALATION) at 15:35

## 2021-10-08 RX ADMIN — METRONIDAZOLE 500 MG: 500 INJECTION, SOLUTION INTRAVENOUS at 22:03

## 2021-10-08 RX ADMIN — PROPOFOL 40 MCG/KG/MIN: 10 INJECTION, EMULSION INTRAVENOUS at 06:40

## 2021-10-08 RX ADMIN — Medication 100 MG: at 04:20

## 2021-10-08 RX ADMIN — DEXMEDETOMIDINE HYDROCHLORIDE 1.2 MCG/KG/HR: 100 INJECTION, SOLUTION, CONCENTRATE INTRAVENOUS at 21:58

## 2021-10-08 RX ADMIN — ERGOCALCIFEROL 50000 UNITS: 1.25 CAPSULE ORAL at 00:29

## 2021-10-08 RX ADMIN — THIAMINE HYDROCHLORIDE 100 MG: 100 INJECTION, SOLUTION INTRAMUSCULAR; INTRAVENOUS at 12:33

## 2021-10-08 RX ADMIN — LORAZEPAM 2 MG: 2 INJECTION INTRAMUSCULAR; INTRAVENOUS at 04:42

## 2021-10-08 RX ADMIN — MAGNESIUM SULFATE HEPTAHYDRATE 1 G: 1 INJECTION, SOLUTION INTRAVENOUS at 09:36

## 2021-10-08 RX ADMIN — SODIUM CHLORIDE, PRESERVATIVE FREE 10 ML: 5 INJECTION INTRAVENOUS at 09:40

## 2021-10-08 RX ADMIN — DEXMEDETOMIDINE HYDROCHLORIDE 0.8 MCG/KG/HR: 100 INJECTION, SOLUTION, CONCENTRATE INTRAVENOUS at 18:21

## 2021-10-08 RX ADMIN — THIAMINE HYDROCHLORIDE 100 MG: 100 INJECTION, SOLUTION INTRAMUSCULAR; INTRAVENOUS at 20:16

## 2021-10-08 RX ADMIN — MAGNESIUM SULFATE HEPTAHYDRATE 1 G: 1 INJECTION, SOLUTION INTRAVENOUS at 15:02

## 2021-10-08 RX ADMIN — FENTANYL CITRATE 50 MCG: 50 INJECTION, SOLUTION INTRAMUSCULAR; INTRAVENOUS at 20:36

## 2021-10-08 RX ADMIN — SODIUM CHLORIDE, PRESERVATIVE FREE 10 ML: 5 INJECTION INTRAVENOUS at 20:19

## 2021-10-08 RX ADMIN — LORAZEPAM 1 MG: 2 INJECTION INTRAMUSCULAR; INTRAVENOUS at 00:27

## 2021-10-08 RX ADMIN — ENOXAPARIN SODIUM 40 MG: 40 INJECTION SUBCUTANEOUS at 20:16

## 2021-10-08 RX ADMIN — LORAZEPAM 2 MG: 2 INJECTION INTRAMUSCULAR; INTRAVENOUS at 12:08

## 2021-10-08 RX ADMIN — LORAZEPAM 1 MG: 2 INJECTION INTRAMUSCULAR; INTRAVENOUS at 03:12

## 2021-10-08 RX ADMIN — LORAZEPAM 1 MG: 2 INJECTION INTRAMUSCULAR; INTRAVENOUS at 06:41

## 2021-10-08 RX ADMIN — LORAZEPAM 1 MG: 2 INJECTION INTRAMUSCULAR; INTRAVENOUS at 00:53

## 2021-10-08 RX ADMIN — METOPROLOL TARTRATE 50 MG: 50 TABLET, FILM COATED ORAL at 01:49

## 2021-10-08 RX ADMIN — ALBUTEROL SULFATE 6 PUFF: 90 AEROSOL, METERED RESPIRATORY (INHALATION) at 19:49

## 2021-10-08 RX ADMIN — DOCUSATE SODIUM 50MG AND SENNOSIDES 8.6MG 2 TABLET: 8.6; 5 TABLET, FILM COATED ORAL at 20:18

## 2021-10-08 RX ADMIN — LORAZEPAM 2 MG: 1 TABLET ORAL at 22:39

## 2021-10-08 RX ADMIN — LORAZEPAM 0.5 MG: 2 INJECTION INTRAMUSCULAR; INTRAVENOUS at 04:07

## 2021-10-08 RX ADMIN — PROPOFOL 30 MCG/KG/MIN: 10 INJECTION, EMULSION INTRAVENOUS at 10:31

## 2021-10-08 RX ADMIN — SODIUM CHLORIDE 100 ML/HR: 9 INJECTION, SOLUTION INTRAVENOUS at 09:34

## 2021-10-08 RX ADMIN — LORAZEPAM 1 MG: 2 INJECTION INTRAMUSCULAR; INTRAVENOUS at 01:49

## 2021-10-08 RX ADMIN — NICOTINE 1 PATCH: 21 PATCH, EXTENDED RELEASE TRANSDERMAL at 19:45

## 2021-10-08 RX ADMIN — PROPOFOL 20 MCG/KG/MIN: 10 INJECTION, EMULSION INTRAVENOUS at 04:23

## 2021-10-08 RX ADMIN — LORAZEPAM 4 MG: 2 INJECTION INTRAMUSCULAR; INTRAVENOUS at 04:39

## 2021-10-08 RX ADMIN — MAGNESIUM SULFATE HEPTAHYDRATE 1 G: 1 INJECTION, SOLUTION INTRAVENOUS at 11:35

## 2021-10-08 RX ADMIN — LEVOFLOXACIN 750 MG: 750 INJECTION, SOLUTION INTRAVENOUS at 20:18

## 2021-10-08 RX ADMIN — LORAZEPAM 1 MG: 2 INJECTION INTRAMUSCULAR; INTRAVENOUS at 01:31

## 2021-10-08 RX ADMIN — LORAZEPAM 1 MG: 2 INJECTION INTRAMUSCULAR; INTRAVENOUS at 02:21

## 2021-10-08 NOTE — CONSULTS
"Adult Nutrition  Assessment/PES    Patient Name:  Watson Lisa  YOB: 1955  MRN: [id number]  Admit Date:  10/7/2021    Assessment Date:  10/8/2021    Comments:  Nutrition consult for TF assessment. Pt with acute respiratory failure on the vent. BMI 30. Propofol 24.8. Recommend Peptamen Intense with goal at 50ml/hr and water flush 30cc q 4hr. Will continue to follow.     Reason for Assessment     Row Name 10/08/21 0925          Reason for Assessment    Reason For Assessment  physician consult;TF/PN     Diagnosis  -- ETOH abuse, HTN, AKF, COPD, respiratory failure on vent           Anthropometrics     Row Name 10/08/21 0926          Anthropometrics    Height  198.1 cm (77.99\")     Weight  118 kg (260 lb) not weighed by RD        Ideal Body Weight (IBW)    Ideal Body Weight (IBW) (kg)  98.81     % Ideal Body Weight  119.36        Body Mass Index (BMI)    BMI (kg/m2)  30.11     BMI Assessment  BMI 30-34.9: obesity grade I         Labs/Tests/Procedures/Meds     Row Name 10/08/21 0927          Labs/Procedures/Meds    Lab Results Reviewed  reviewed     Lab Results Comments  glu, bun, cr, mg, H/H        Diagnostic Tests/Procedures    Diagnostic Test/Procedure Reviewed  reviewed        Medications    Pertinent Medications Reviewed  reviewed     Pertinent Medications Comments  lovenox, mvi, protonix, pericolace, B1, Vit D, PPF         Physical Findings     Row Name 10/08/21 0928          Physical Findings    Overall Physical Appearance  obese;on ventilator support     Gastrointestinal  feeding tube     Tubes  nasogastric tube     Skin  pressure injury perineum         Estimated/Assessed Needs     Row Name 10/08/21 0928 10/08/21 0926       Calculation Measurements    Weight Used For Calculations  118 kg (260 lb 2.3 oz)  --    Height  --  198.1 cm (77.99\")       Estimated/Assessed Needs    Additional Documentation  KCAL/KG (Group);Fluid Requirements (Group);Protein Requirements (Group)  --       KCAL/KG    " KCAL/KG  15 Kcal/Kg (kcal);20 Kcal/Kg (kcal)  --    15 Kcal/Kg (kcal)  1770  --    20 Kcal/Kg (kcal)  2360  --       Protein Requirements    Weight Used For Protein Calculations  98.8 kg (217 lb 13.4 oz)  --    Est Protein Requirement Amount (gms/kg)  1.0 gm protein  --    Estimated Protein Requirements (gms/day)  98.81  --       Fluid Requirements    Fluid Requirements (mL/day)  2360  --    RDA Method (mL)  2360  --        Nutrition Prescription Ordered     Row Name 10/08/21 0930          Nutrition Prescription PO    Current PO Diet  NPO        Nutrition Prescription EN    Enteral Route  NG     Product  Peptamen Intense VHP (Vital HP)     TF Delivery Method  Continuous     Continuous TF Goal Rate (mL/hr)  50 mL/hr     Water flush (mL)   30 mL     Water Flush Frequency  Every 4 hours        Propofol Considerations    Propofol (mL/hr)  24.8 mL/hr     Propofol (Kcal/day)  654 Kcal/day         Evaluation of Received Nutrient/Fluid Intake     Row Name 10/08/21 0932          Calories Evaluation    Enteral Calories (kcal)  1200     Other Calories (kcal)  654     Total Calories (kcal)  1854     % of Kcal Needs  100        Protein Evaluation    Enteral Protein (gm)  110     % of Protein Needs  100        Fluid Intake Evaluation    Enteral (Free Water) Fluid (mL)  1008     Free Water Flush Fluid (mL)  180     Total Free Water Intake (mL)  1188 mL        EN Evaluation    HOB  Greater than or equal to 30 degress               Problem/Interventions:  Problem 1     Row Name 10/08/21 0901          Nutrition Diagnoses Problem 1    Problem 1  Needs Alternate Route     Etiology (related to)  Medical Diagnosis     Pulmonary/Critical Care  Acute respiratory failure;Ventilator               Intervention Goal     Row Name 10/08/21 0910          Intervention Goal    General  Maintain nutrition;Nutrition support treatment;Improved nutrition related lab(s);Reduce/improve symptoms;Meet nutritional needs for age/condition;Disease  management/therapy     TF/PN  Inititiate TF/PN;Tolerate TF at goal     Weight  No significant weight loss         Nutrition Intervention     Row Name 10/08/21 0941          Nutrition Intervention    RD/Tech Action  Follow Tx progress;Care plan reviewd;Recommend/ordered     Recommended/Ordered  EN         Nutrition Prescription     Row Name 10/08/21 0949          Nutrition Prescription EN    Enteral Prescription  Enteral begin/change     Product  Peptamen Intense VHP     TF Delivery Method  Continuous     Continuous TF Goal Rate (mL/hr)  50 mL/hr     Water flush (mL)   30 mL     Water Flush Frequency  Every 4 hours         Education/Evaluation     Row Name 10/08/21 0941          Education    Education  Education not appropriate at this time     Please explain  Patient intubated        Monitor/Evaluation    Monitor  Per protocol           Electronically signed by:  Tiny Sands RD  10/08/21 09:50 EDT

## 2021-10-08 NOTE — CODE DOCUMENTATION
RRT called d/t increased O2 needs and work of breathing.  Upon arrival pt diaphoretic, agitated, grunting with audible wheeze.  Dr. Penaloza called and patient emergently transported to CCU with RRT RNs (Charmaine Dejesus, Bethanie Thornton, Basia Baptiste) and RT (Mini Sauceda).  Monitor on and O2 via non rebreather.  Pt transferred to bed in 336 and Dr. Penaloza came to bedside upon arrival.

## 2021-10-08 NOTE — CONSULTS
[facility]    Physician requesting consult: Dr. Triplett    Reason: respiratory distress, DT's, critical care management    HPI;  Mr. Lisa is a 65yo WM with a history of alcohol abuse.  His last drink was about 24 hours prior to admission.  He came to the hospital as he does desire to quit drinking and thought he would need medical detox.  He was having increased anxiety and tremors over the past 48 hours, getting worse, moderate in nature. He was getting ativan here in the hospital and was having some hallucincations and confusion earlier tonight.  Just recently he began having incrased respiratory rate, increased confusion and agitation and a rapid response was called.  I ordered him transferred to intensive care unit.  We proceeded with intubation, see separte progress note. Patient unable to provide history, this was obtained from the chart    Past Medical History:   Diagnosis Date   • Alcohol abuse    • Anxiety    • Arthritis    • Bronchitis with chronic airway obstruction (HCC)     LAST FEB   • DVT (deep venous thrombosis) (HCC)    • Hiatal hernia    • Hypertension    • Hypertension    • Low back pain    • Neck pain    • Pulmonary embolism (HCC)    • Sleep apnea with use of continuous positive airway pressure (CPAP)     AT NIGHT     Social History     Socioeconomic History   • Marital status:      Spouse name: Not on file   • Number of children: Not on file   • Years of education: Not on file   • Highest education level: Not on file   Tobacco Use   • Smoking status: Current Every Day Smoker     Packs/day: 2.00     Years: 40.00     Pack years: 80.00     Types: Cigarettes   • Smokeless tobacco: Never Used   Vaping Use   • Vaping Use: Never used   Substance and Sexual Activity   • Alcohol use: Yes     Comment: Pt reports last drink was 1400 on 10/6/21 (2 pints every day, a fifth on weekends)   • Drug use: Not Currently     Types: Hydrocodone   • Sexual activity: Defer     Family History    Problem Relation Age of Onset   • Cancer Mother    • Heart disease Father    • Alcohol abuse Father    • Cancer Brother      MEDS: reviewed as per chart  ALL: pcn  Ros: UTO    Vital Sign Min/Max for last 24 hours  Temp  Min: 96.8 °F (36 °C)  Max: 98.8 °F (37.1 °C)   BP  Min: 149/120  Max: 185/120   Pulse  Min: 65  Max: 150   Resp  Min: 17  Max: 30   SpO2  Min: 93 %  Max: 97 %   Flow (L/min)  Min: 2  Max: 2   Weight  Min: 118 kg (260 lb)  Max: 118 kg (260 lb)     GEN:   appears ill, obese, confused, agitated  HEENT: PERRL, EOMI, no icterus, mmm, no jvd, trachea midline, neck supple  CHEST: rapid shallow air entry bilat, no wheezes, no crackles  ,+ use of accessory muscles  CV: tachy, regular, no m/g/r  ABD: soft, nt, nd +bs, no hepatosplenomegaly  EXT: no c/c/e  SKIN: no rashes, no xanthomas, nl turgor, clamy, damp  LYMPH: no palpable cervical or supraclavicular lymphadenopathy  NEURO: CN 2-12 grossly intact, limited exam given cooperation  PSYCH: deferred  MSK: no kyphoscoliosis,     Labs: 10/7: reviewed:  probnp 1106  Mg 1.5  Bun 25  Cr 1.99  bciarb 20.4  Wbc 6.8  hgb 12.9  plts 199  CXR: 10/7: reviewed unremarkable    A/P:  1. Delerium tremens -- will go ahead with intubation, sedation, will continue scheduled ativan, continue thiamine  2. Respiratory failure - will check gas post intubation   3. Alcohol abuse with dependence and withdrawal  4. DEEP vs. CKD -- avoid nephrotoxins, support bp, iv hydration  5. Decreased magnesium -- replace  6. Tobacco abuse -- nicotine patch  7. LIVAN    CC 40 mins excluding billable procedures.

## 2021-10-08 NOTE — CASE MANAGEMENT/SOCIAL WORK
Discharge Planning Assessment  [facility]     Patient Name: Watson Lisa  MRN: [id number]  Today's Date: 10/8/2021    Admit Date: 10/7/2021    Discharge Needs Assessment     Row Name 10/08/21 1646       Living Environment    Lives With  alone    Current Living Arrangements  home/apartment/condo    Primary Care Provided by  self    Provides Primary Care For  no one    Family Caregiver if Needed  child(anneliese), adult    Quality of Family Relationships  supportive    Able to Return to Prior Arrangements  yes       Resource/Environmental Concerns    Resource/Environmental Concerns  none       Transition Planning    Patient/Family Anticipates Transition to  home    Patient/Family Anticipated Services at Transition  outpatient care    Transportation Anticipated  family or friend will provide;car, drives self       Discharge Needs Assessment    Readmission Within the Last 30 Days  no previous admission in last 30 days    Equipment Currently Used at Home  cane, straight;walker, rolling;commode;cpap    Concerns to be Addressed  substance/tobacco abuse/use    Outpatient/Agency/Support Group Needs  outpatient substance abuse treatment    Provided Post Acute Provider List?  Yes    Post Acute Provider List  Outpatient Therapy    Provided Post Acute Provider Quality & Resource List?  Yes    Delivered To  Support Person    Method of Delivery  Telephone    Current Discharge Risk  substance use/abuse        Discharge Plan     Row Name 10/08/21 4258       Plan    Plan  Plan at present is home upon DC.  He is intersted in going to outpatient ETOH rehab.    Plan Comments  S/W pt's son Aravind by phone ([phone]).  Facesheet and pharmacy info confirmed.  Pt lives alone in an apartment, is IADLs and can drive. Home DME includes a cane, walker, CPAP and raised toilet seat.  He has used HH in the past and has been to [facility] for PT rehab (his twin brother lives at [facility]).  Pt has been to [facility] and  [facility] for ETOH rehab.  Prior to hospitalization, pt had expressed interest in going to an outpatient ETOH rehab program because he works full time.  [facility] did give him a list of resources.  Aravind is not sure where that info is, so CCP did leave another copy of Outpatient treatment resources at patient's bedside.  Plan at present is for pt to return home upon DC.  CCP will continue to follow and assist as needed. .............sk        Continued Care and Services - Admitted Since 10/7/2021    Coordination has not been started for this encounter.         Demographic Summary     Row Name 10/08/21 1639       General Information    Admission Type  inpatient    Arrived From  home    Referral Source  admission list    Reason for Consult  discharge planning    Preferred Language  English        Functional Status     Row Name 10/08/21 1637       Functional Status    Usual Activity Tolerance  moderate    Current Activity Tolerance  moderate       Functional Status, IADL    Medications  independent    Meal Preparation  independent    Housekeeping  independent    Laundry  independent    Shopping  independent       Employment/    Employment Status  employed full-time                Hiral Padilla RN

## 2021-10-08 NOTE — PROGRESS NOTES
Name: Watson Lisa ADMIT: 10/7/2021   : 1955  PCP: Checo Dunham MD    MRN: [id number] LOS: 1 days   AGE/SEX: 66 y.o. male  ROOM: [facility]     Subjective   Subjective   Last night events of increased agitation with mentation changes and respiratory distress were noted.  Patient currently in the ICU intubated.  Nurses reports no seizures/no nausea or vomiting.     Objective   Objective   Vital Signs  Temp:  [98.4 °F (36.9 °C)-98.8 °F (37.1 °C)] 98.4 °F (36.9 °C)  Heart Rate:  [] 79  Resp:  [17-30] 23  BP: ()/() 115/86  FiO2 (%):  [30 %-100 %] 30 %  SpO2:  [94 %-100 %] 95 %  on  Flow (L/min):  [2] 2;   Device (Oxygen Therapy): ventilator    Intake/Output Summary (Last 24 hours) at 10/8/2021 2002  Last data filed at 10/8/2021 1736  Gross per 24 hour   Intake 1538 ml   Output 1225 ml   Net 313 ml     Body mass index is 30.05 kg/m².      10/07/21  1102 10/08/21  0926   Weight: 118 kg (260 lb) 118 kg (260 lb) (not weighed by RD)     Physical Exam  General.  Intubated and sedated.  Eyes.  Pupils equal round and reactive no pallor or jaundice.  Oral cavity.  Endotracheal tube in place and nasogastric tube in place.  Mildly dry mucous membrane.  Neck.  Supple.  No JVD.  Chest.  Poor but clear to auscultation bilaterally with no added sounds.  Cardiovascular.  Regular rate and rhythm with no gallops or murmurs.  Abdomen.  The mildly distended.  Soft lax.  No tenderness.  No organomegaly.  No guarding or rebound.  Extremities.  Trace bilateral lower extremity edema.    Results Review:      Results from last 7 days   Lab Units 10/08/21  0443 10/07/21  1048   SODIUM mmol/L 139 142   POTASSIUM mmol/L 4.2 4.5   CHLORIDE mmol/L 102 103   CO2 mmol/L 22.5 20.4*   BUN mg/dL 25* 24*   CREATININE mg/dL 2.22* 1.99*   GLUCOSE mg/dL 184* 98   CALCIUM mg/dL 8.3* 8.6   AST (SGOT) U/L 43* 51*   ALT (SGPT) U/L 30 34     Estimated Creatinine Clearance: 46.6 mL/min (A) (by C-G formula based on SCr of 2.22 mg/dL  (H)).      Results from last 7 days   Lab Units 10/08/21  0759   GLUCOSE mg/dL 150*     Results from last 7 days   Lab Units 10/07/21  1048   TROPONIN T ng/mL 0.020     Results from last 7 days   Lab Units 10/07/21  1102   PROBNP pg/mL 1,106.0*         Results from last 7 days   Lab Units 10/08/21  0443 10/07/21  1102   MAGNESIUM mg/dL 1.4* 1.5*           Invalid input(s): LDLCALC  Results from last 7 days   Lab Units 10/08/21  0443 10/07/21  1048 10/07/21  1048   WBC 10*3/mm3 7.03  --  6.86   HEMOGLOBIN g/dL 12.7*  --  12.9*   HEMATOCRIT % 36.2*  --  38.1   PLATELETS 10*3/mm3 240  --  199   MCV fL 96.0   < > 96.9   MCH pg 33.7*   < > 32.8   MCHC g/dL 35.1   < > 33.9   RDW % 13.6   < > 13.9   RDW-SD fl 47.4   < > 49.2   MPV fL 9.5   < > 9.1   NEUTROPHIL % % 74.4   < > 77.2*   LYMPHOCYTE % % 12.5*   < > 9.9*   MONOCYTES % % 11.2   < > 11.4   EOSINOPHIL % % 0.6   < > 0.4   BASOPHIL % % 0.7   < > 0.4   IMM GRAN % % 0.6*   < > 0.7*   NEUTROS ABS 10*3/mm3 5.23   < > 5.29   LYMPHS ABS 10*3/mm3 0.88   < > 0.68*   MONOS ABS 10*3/mm3 0.79   < > 0.78   EOS ABS 10*3/mm3 0.04   < > 0.03   BASOS ABS 10*3/mm3 0.05   < > 0.03   IMMATURE GRANS (ABS) 10*3/mm3 0.04   < > 0.05   NRBC /100 WBC 0.0   < > 0.0    < > = values in this interval not displayed.     Results from last 7 days   Lab Units 10/07/21  1836   INR  1.02   APTT seconds 43.0*     Results from last 7 days   Lab Units 10/08/21  0506   PH, ARTERIAL pH units 7.270*   PO2 ART mm Hg 295.0*   PCO2, ARTERIAL mm Hg 56.1*   HCO3 ART mmol/L 25.8             Results from last 7 days   Lab Units 10/07/21  1048   LIPASE U/L 25             Results from last 7 days   Lab Units 10/07/21  1245   NITRITE UA  Negative   WBC UA /HPF 0-2   BACTERIA UA /HPF None Seen   SQUAM EPITHEL UA /HPF 0-2     Results from last 7 days   Lab Units 10/08/21  1215   SODIUM UR mmol/L 49   CREATININE UR mg/dL 197.8   CHLORIDE UR mmol/L 42   PROTEIN TOTAL URINE mg/dL 39.0       Imaging:  Imaging Results (Last  24 Hours)     Procedure Component Value Units Date/Time    XR Chest 1 View [[id number]] Collected: 10/08/21 0442     Updated: 10/08/21 0449    Narrative:      SINGLE VIEW OF THE CHEST     HISTORY: Wheezing     COMPARISON: None available.     FINDINGS:  Since prior study, endotracheal tube has been placed. Nasogastric tube  is also present. I'm unable to see the distal tip on this examination.  Cardiomegaly is present. There is vascular congestion. Increasing  consolidation is seen at the lung bases bilaterally. There is also a  small left pleural effusion.       Impression:         1. Endotracheal tube terminates in satisfactory position.  2. The distal tip of the patient's nasogastric tube cannot be seen. It  can be followed at least as far as the GE junction.  3. Interval development of bibasilar infiltrates. There is also new  vascular congestion.     This report was finalized on 10/8/2021 4:46 AM by Dr. Sabrina Shanks M.D.                I reviewed the patient's new clinical results / labs / tests / procedures      Assessment/Plan     Active Hospital Problems    Diagnosis  POA   • **Delirium tremens (HCC) [F10.231]  Yes   • Acute respiratory failure with hypercapnia (HCC) [J96.02]  Yes   • Aspiration pneumonia (HCC) [J69.0]  No   • Alcohol abuse [F10.10]  Yes   • Alcoholic liver disease (HCC) [K70.9]  Yes   • Tobacco abuse [Z72.0]  Yes   • Hiatal hernia [K44.9]  Yes   • COPD (chronic obstructive pulmonary disease) (HCC) [J44.9]  Yes   • Anemia, chronic disease [D63.8]  Yes   • Acute kidney failure (HCC) [N17.9]  Yes   • Hypomagnesemia [E83.42]  Yes   • Essential hypertension [I10]  Yes      Resolved Hospital Problems   No resolved problems to display.       · Alcohol withdrawal syndrome/delirium remains.  Condition has worsened in the last 24 hours.  Requiring intubation because of acute respiratory failure and to protect airways secondary to heavy sedation.  Currently on dipper Van/Precedex/Ativan.  Continue  with detoxification.  · Acute kidney failure.  Most likely secondary to dehydration leading to prerenal azotemia.  Appears dehydrated today.  Losartan stopped.  Nephrology following and has stopped Norvasc.  Continue to Monitor renal function and input and output.   · Alcoholic liver disease/elevated liver function test/hiatal hernia.  Benign GI examination.  Stable elevation of the liver function test.    Stable liver function test.  Continue Protonix.   · Hypomagnesemia.    Continue substitution.    · Hypertension.  Was suboptimally controlled secondary to alcohol withdrawal.    Good controlled now on Lopressor .  Losartan DC'd secondary to the renal failure.  Norvasc DC'd secondary to low blood pressure. There is no evidence of angina or congestive heart failure.  · Tobacco abuse.  Counseled.  On nicotine patch.  · History of obstructive sleep apnea and COPD/aspiration pneumonia.  Would initiate Levaquin and Flagyl.  Currently intubated.  Continue albuterol.  Pulmonary and intensivist following.  · Anemia of chronic disease.  Hemoglobin stable.  Will monitor.  · VTE prophylaxis.  Lovenox renal dose.       · Discussed with ICU nurse.      Britney Triplett MD  [facility]  10/08/21  20:02 EDT

## 2021-10-08 NOTE — PLAN OF CARE
Goal Outcome Evaluation:  Plan of Care Reviewed With: son        Progress: improving   Iris vent today.  FIO2 decreased to 30%.  Weaning propofol with small increase in dosage of precedex.  No PRN doses of ativan needed did give Q 6 hour dose.  Arouses with stimuli and becomes restless

## 2021-10-08 NOTE — CONSULTS
Educated about my role here.     Reports that he keeps forgetting he is in hospital (after making brief reference to his work office). States he was seeing the walls breathing earlier this afternoon (but not currently). AOX4, provides date of 10/6/21.     Pt's HR on monitor ranging from 120's to 130's.     I left interview and called A provider, Krystyna Gotti, who informed me that she would look over his chart and consider more orders (currently only has Ativan 1 mg IV, q 4 hours, most recently had this at 1812, about 3 hours prior to time of writing, 21:20).     Pt denies hx of seizures.     Reports last drink was about 24 hours ago. Reports that he realizes that he needs a medical w/d, and came to hospital to seek this out. Reports last sobriety was in June for 2 or 3 days, while he drove to [place] to brother's wedding.     Denies any hx of self harm or suicide attempts.     Reports last suicidal thoughts were many years ago, in 2010, when he was going through at divorce.     Denies any recent thoughts of harming self or others.     Discussed treatment options with me, including with [facility] for non-higher power model. Agrees that he needs help quitting drinking.     Future oriented, reports he is unsure if he can do inpatient now, because he has had a number of recent hospitalizations, and unsure if his work can accommodate another hospitalization.     I provided SUP and OP resources and will clear from Access perspective.     Discusseed with POP Crawley.

## 2021-10-08 NOTE — PROCEDURES
Endotracheal Intubation Procedure Note    Indication for endotracheal intubation: respiratory failure.  Sedation: propofol.  Equipment: A video GlideScope was used and West 4 laryngoscope blade.  Number of attempts: 1.  ETT location confirmed by ETCO2 monitor.    Neto Penaloza MD  10/8/2021

## 2021-10-08 NOTE — PAYOR COMM NOTE
"Brittany Lisa (66 y.o. Male)                              ATTENTION;    INITIAL CLINICAL FOR REVIEW, AUTH PENDING [id number]                           REPLY TO [facility] DEPT, RODRIGUEZ STEPHENS LPN  [phone] OR CALL          Date of Birth Social Security Number Address Home Phone MRN    1955  [address] [phone] [id number]    Holiness Marital Status          Baptism        Admission Date Admission Type Admitting Provider Attending Provider Department, Room/Bed    10/7/21 Emergency Britney Triplett MD Hussein, Samer H, MD [facility] CORONARY CARE, N336/1    Discharge Date Discharge Disposition Discharge Destination                       Attending Provider: Britney Triplett MD    Allergies: Penicillins, Penicillins    Isolation: None   Infection: None   Code Status: CPR    Ht: 198.1 cm (77.99\")   Wt: 118 kg (260 lb)    Admission Cmt: None   Principal Problem: Alcohol withdrawal syndrome with complication (HCC) [F10.239]                 Active Insurance as of 10/7/2021     Primary Coverage     Payor Plan Insurance Group Employer/Plan Group    MEDICARE MEDICARE A ONLY      Payor Plan Address Payor Plan Phone Number Payor Plan Fax Number Effective Dates    [address] [phone]  9/1/2019 - None Entered    [facility] [id number]       Subscriber Name Subscriber Birth Date Member ID       BRITTANY LISA 1955 [id number]           Secondary Coverage     Payor Plan Insurance Group Employer/Plan Group    ANTHEM BLUE CROSS ANTHEM BLUE CROSS BLUE SHIELD PPO [id number]     Payor Plan Address Payor Plan Phone Number Payor Plan Fax Number Effective Dates    [address] [phone]  4/1/2019 - None Entered    [facility] [id number]       Subscriber Name Subscriber Birth Date Member ID       BRITTANY LISA 1955 [id number]                 Emergency Contacts      (Rel.) Home Phone Work Phone Mobile Phone    " lawrence villalobos (Son) -- -- [phone]    Rosalba Angeles (Sister) [phone] -- --    ROSALBA ANGELES (Sister) [phone] -- [phone]               History & Physical      Britney Triplett MD at 10/07/21 1723              Patient Name:  Watson Villalobos  YOB: 1955  MRN:  [id number]  Admit Date:  10/7/2021  Patient Care Team:  Checo Dunham MD as PCP - General (Family Medicine)  Checo Dunham MD as PCP - Family Medicine  Checo Dunham MD (Family Medicine)        Chief Complaint   Patient presents with   • Alcohol Problem   • Tremors   Last 24 hours to 48 hours.  History Present Illness     A 66 years old white gentleman with a past history of alcohol abuse with withdrawal/tobacco abuse/COPD/PE/DVT/obesity/hypertension/alcoholic liver disease/hiatal hernia/anemia chronic disease/Covid infection who presents to the emergency department complaining of increased anxiety/tremors/agitation over the last 48 hours.  Patient usually drinks 1 pint of bourbon per day his last drink was 24 hours ago.  There was no seizures/double vision/loss of the vision/slurring of the speech/unilateral numbness or weakness/dizziness.  Positive questionable loss of consciousness.  Positive decreased p.o. intake and generalized weakness.  Upon presentation to the emergency department troponin was negative CBC was normal alcohol level elevated at 67.  Lipase is normal.  CMP normal except a BUN of 24, creatinine 1.99, GFR of 34, AST of 51, magnesium 1.5.  proBNP was elevated at 1/1/2006.  UA was negative.  Chest x-ray was negative except cardiomegaly.  Covid was negative.  Patient was noted to have tachycardia and elevated blood pressure with tremor and diagnosed with alcohol withdrawal and subsequently admitted.        Review of Systems   Cardiovascular/respiratory.  Positive increasing shortness of breath from the baseline.  Positive old cough productive of yellowish sputum.  Positive old wheeze.  Positive  palpitation.  No chest pain.  No PND or orthopnea.  No ankle edema.  GI.  Positive occasional constipation and diarrhea.  No abdominal pain.  No heartburn.  No dysphagia or odynophagia.  No bleeding per rectum or melena.  Constitutional.  No fever or chills.  .  No dysuria or hematuria.  No urine incontinence.    Personal History     Past Medical History:   Diagnosis Date   • Alcohol abuse    • Anxiety    • Arthritis    • Bronchitis with chronic airway obstruction (HCC)     LAST FEB   • DVT (deep venous thrombosis) (HCC)    • Hiatal hernia    • Hypertension    • Hypertension    • Low back pain    • Neck pain    • Pulmonary embolism (HCC)    • Sleep apnea with use of continuous positive airway pressure (CPAP)     AT NIGHT     Past Surgical History:   Procedure Laterality Date   • COLONOSCOPY     • JOINT REPLACEMENT Right     hip/knee   • REPLACEMENT TOTAL KNEE     • TONSILLECTOMY     • TOTAL HIP ARTHROPLASTY Right      Family History   Problem Relation Age of Onset   • Cancer Mother    • Heart disease Father    • Alcohol abuse Father    • Cancer Brother      Social History     Tobacco Use   • Smoking status: Current Every Day Smoker     Packs/day: 2.00     Years: 40.00     Pack years: 80.00     Types: Cigarettes   • Smokeless tobacco: Never Used   Vaping Use   • Vaping Use: Never used   Substance Use Topics   • Alcohol use: Yes     Comment: Pt reports last drink was 1400 on 10/6/21 (2 pints every day, a fifth on weekends)   • Drug use: Not Currently     Types: Hydrocodone     No current facility-administered medications on file prior to encounter.     Current Outpatient Medications on File Prior to Encounter   Medication Sig Dispense Refill   • acetaminophen (TYLENOL) 325 MG tablet Take 650 mg by mouth Every 6 (Six) Hours As Needed for Mild Pain .     • amLODIPine (NORVASC) 10 MG tablet Take 10 mg by mouth Daily.     • buPROPion SR (Wellbutrin SR) 150 MG 12 hr tablet Take 1 tablet by mouth 2 (Two) Times a Day. 180  tablet 3   • losartan (COZAAR) 100 MG tablet Take 1 tablet by mouth Daily. 10 tablet 0   • Multiple Vitamins-Minerals (MULTIVITAMIN ADULT PO) Take 1 tablet by mouth Daily.     • traZODone (DESYREL) 50 MG tablet TAKE 1 TABLET AT NIGHT AS NEEDED FOR SLEEP 90 tablet 3   • vitamin D (ERGOCALCIFEROL) 1.25 MG (61384 UT) capsule capsule Take 1 capsule by mouth Every 7 (Seven) Days. 5 capsule    • albuterol sulfate  (90 Base) MCG/ACT inhaler Inhale 2 puffs Every 6 (Six) Hours As Needed for Wheezing or Shortness of Air.     • aspirin 81 MG chewable tablet Chew 81 mg Daily.     • ferrous sulfate 325 (65 FE) MG tablet Take 325 mg by mouth Daily With Breakfast.     • folic acid (FOLVITE) 1 MG tablet Take 1 mg by mouth Daily.     • metoprolol tartrate (LOPRESSOR) 50 MG tablet Take 1 tablet by mouth Every 8 (Eight) Hours. 90 tablet 0   • metoprolol tartrate (LOPRESSOR) 50 MG tablet Take 50 mg by mouth Every 8 (Eight) Hours.     • thiamine (VITAMIN B-1) 100 MG tablet  tablet Take 100 mg by mouth Daily.     • [DISCONTINUED] acetaminophen (TYLENOL) 325 MG tablet Take 2 tablets by mouth Every 6 (Six) Hours As Needed for Mild Pain .     • [DISCONTINUED] albuterol (PROVENTIL HFA;VENTOLIN HFA) 108 (90 Base) MCG/ACT inhaler Inhale 2 puffs Every 6 (Six) Hours As Needed for Wheezing or Shortness of Air. 1 inhaler 0   • [DISCONTINUED] amLODIPine (NORVASC) 10 MG tablet TAKE ONE TABLET BY MOUTH DAILY 90 tablet 0   • [DISCONTINUED] aspirin 81 MG tablet Take 81 mg by mouth Daily. Pt reports taking every 1-2 days.     • [DISCONTINUED] escitalopram (LEXAPRO) 20 MG tablet TAKE ONE TABLET BY MOUTH DAILY 90 tablet 0   • [DISCONTINUED] ferrous sulfate 325 (65 FE) MG tablet Take 1 tablet by mouth Daily With Breakfast.     • [DISCONTINUED] folic acid (FOLVITE) 1 MG tablet Take 1 tablet by mouth Daily. 30 tablet 0   • [DISCONTINUED] Glycopyrrolate-Formoterol (BEVESPI AEROSPHERE) 9-4.8 MCG/ACT aerosol Inhale 2 sprays 2 (Two) Times a Day. 1 inhaler  11   • [DISCONTINUED] Glycopyrrolate-Formoterol 9-4.8 MCG/ACT aerosol Inhale 2 puffs 2 (Two) Times a Day.     • [DISCONTINUED] HYDROcodone-acetaminophen (NORCO) 5-325 MG per tablet Take 1 tablet by mouth Every 6 (Six) Hours As Needed for Moderate Pain  for up to 8 doses. 8 tablet 0   • [DISCONTINUED] multivitamin with minerals (multivitamin with minerals) tablet tablet Take 1 tablet by mouth Daily.     • [DISCONTINUED] pantoprazole (PROTONIX) 40 MG EC tablet Take 1 tablet by mouth Daily.     • [DISCONTINUED] pantoprazole (PROTONIX) 40 MG EC tablet Take 40 mg by mouth Daily.     • [DISCONTINUED] polyethylene glycol (MIRALAX) 17 g packet Take 17 g by mouth Daily.     • [DISCONTINUED] polyethylene glycol (MIRALAX) 17 g packet Take 17 g by mouth Daily.     • [DISCONTINUED] thiamine (VITAMIN B-1) 100 MG tablet  tablet Take 1 tablet by mouth Daily. 30 tablet 0   • [DISCONTINUED] traZODone (DESYREL) 50 MG tablet Take 50 mg by mouth Every Night.     • [DISCONTINUED] traZODone (DESYREL) 50 MG tablet Take 50 mg by mouth Every Night.     • [DISCONTINUED] vitamin D (ERGOCALCIFEROL) 1.25 MG (91630 UT) capsule capsule Take 50,000 Units by mouth 1 (One) Time Per Week.       Allergies   Allergen Reactions   • Penicillins Unknown - Low Severity     Pt does not recall the reaction. Patient tolerated cephalexin 3/2020   • Penicillins Other (See Comments)     Unknown        Objective    Objective     Vital Signs  Temp:  [96.8 °F (36 °C)] 96.8 °F (36 °C)  Heart Rate:  [139-150] 139  Resp:  [18] 18  BP: (150-185)/(109-129) 150/114  SpO2:  [93 %-97 %] 94 %  on   ;   Device (Oxygen Therapy): room air  Body mass index is 30.05 kg/m².    Physical Exam  General.  Middle-aged gentleman.  He is anxious and tremulous.  Alert and oriented x3.  No apparent pain or respiratory distress.  Eyes.  Pupils equal round and reactive.  Intact extraocular musculature.  No pallor or jaundice.  Normal conjunctive and lids.  Oral cavity.  Moist mucous  membranes with no lesions.    Neck.  Supple.  No JVD.  No lymphadenopathy or thyromegaly.  Cardiovascular.  Tachycardia.  Regular rate and rhythm.  Grade 2 systolic murmur.   Chest.  Poor but clear to auscultation bilaterally with no added sounds.  Abdomen.  Obese.  Soft lax.  No tenderness.  No organomegaly.  No guarding or rebound.  No ascites.  Positive bowel sounds.  Extremities.  +1 bilateral lower extremity edema.  No clubbing or cyanosis.  No calf tenderness.  CNS.  Positive for tremors otherwise no focal neurological deficits.      Results Review:  I reviewed the patient's new clinical results.  I reviewed the patient's new imaging results and agree with the interpretation.  I reviewed the patient's other test results and agree with the interpretation  I personally viewed and interpreted the patient's EKG/Telemetry data  Discussed with ED provider.    Lab Results (last 24 hours)     Procedure Component Value Units Date/Time    CBC & Differential [[id number]]  (Abnormal) Collected: 10/07/21 1048    Specimen: Blood Updated: 10/07/21 1103    Narrative:      The following orders were created for panel order CBC & Differential.  Procedure                               Abnormality         Status                     ---------                               -----------         ------                     CBC Auto Differential[[id number]]        Abnormal            Final result                 Please view results for these tests on the individual orders.    Comprehensive Metabolic Panel [[id number]]  (Abnormal) Collected: 10/07/21 1048    Specimen: Blood Updated: 10/07/21 1117     Glucose 98 mg/dL      BUN 24 mg/dL      Creatinine 1.99 mg/dL      Sodium 142 mmol/L      Potassium 4.5 mmol/L      Chloride 103 mmol/L      CO2 20.4 mmol/L      Calcium 8.6 mg/dL      Total Protein 7.6 g/dL      Albumin 4.40 g/dL      ALT (SGPT) 34 U/L      AST (SGOT) 51 U/L      Alkaline Phosphatase 89 U/L      Total Bilirubin 0.9 mg/dL       eGFR Non African Amer 34 mL/min/1.73      Globulin 3.2 gm/dL      A/G Ratio 1.4 g/dL      BUN/Creatinine Ratio 12.1     Anion Gap 18.6 mmol/L     Narrative:      GFR Normal >60  Chronic Kidney Disease <60  Kidney Failure <15      Lipase [[id number]]  (Normal) Collected: 10/07/21 1048    Specimen: Blood Updated: 10/07/21 1117     Lipase 25 U/L     Ethanol [[id number]]  (Abnormal) Collected: 10/07/21 1048    Specimen: Blood Updated: 10/07/21 1117     Ethanol 67 mg/dL      Ethanol % 0.067 %     CBC Auto Differential [[id number]]  (Abnormal) Collected: 10/07/21 1048    Specimen: Blood Updated: 10/07/21 1103     WBC 6.86 10*3/mm3      RBC 3.93 10*6/mm3      Hemoglobin 12.9 g/dL      Hematocrit 38.1 %      MCV 96.9 fL      MCH 32.8 pg      MCHC 33.9 g/dL      RDW 13.9 %      RDW-SD 49.2 fl      MPV 9.1 fL      Platelets 199 10*3/mm3      Neutrophil % 77.2 %      Lymphocyte % 9.9 %      Monocyte % 11.4 %      Eosinophil % 0.4 %      Basophil % 0.4 %      Immature Grans % 0.7 %      Neutrophils, Absolute 5.29 10*3/mm3      Lymphocytes, Absolute 0.68 10*3/mm3      Monocytes, Absolute 0.78 10*3/mm3      Eosinophils, Absolute 0.03 10*3/mm3      Basophils, Absolute 0.03 10*3/mm3      Immature Grans, Absolute 0.05 10*3/mm3      nRBC 0.0 /100 WBC     Troponin [[id number]]  (Normal) Collected: 10/07/21 1048    Specimen: Blood Updated: 10/07/21 1115     Troponin T 0.020 ng/mL     Narrative:      Troponin T Reference Range:  <= 0.03 ng/mL-   Negative for AMI  >0.03 ng/mL-     Abnormal for myocardial necrosis.  Clinicians would have to utilize clinical acumen, EKG, Troponin and serial changes to determine if it is an Acute Myocardial Infarction or myocardial injury due to an underlying chronic condition.       Results may be falsely decreased if patient taking Biotin.      BNP [[id number]]  (Abnormal) Collected: 10/07/21 1102    Specimen: Blood Updated: 10/07/21 1143     proBNP 1,106.0 pg/mL     Narrative:      Among patients with  dyspnea, NT-proBNP is highly sensitive for the detection of acute congestive heart failure. In addition NT-proBNP of <300 pg/ml effectively rules out acute congestive heart failure with 99% negative predictive value.    Results may be falsely decreased if patient taking Biotin.      Magnesium [[id number]]  (Abnormal) Collected: 10/07/21 1102    Specimen: Blood Updated: 10/07/21 1133     Magnesium 1.5 mg/dL     Urinalysis With Microscopic If Indicated (No Culture) - Urine, Clean Catch [[id number]]  (Abnormal) Collected: 10/07/21 1245    Specimen: Urine, Clean Catch Updated: 10/07/21 1307     Color, UA Yellow     Appearance, UA Clear     pH, UA 5.5     Specific Gravity, UA 1.011     Glucose, UA Negative     Ketones, UA Trace     Bilirubin, UA Negative     Blood, UA Trace     Protein, UA 30 mg/dL (1+)     Leuk Esterase, UA Negative     Nitrite, UA Negative     Urobilinogen, UA 0.2 E.U./dL    Urinalysis, Microscopic Only - Urine, Clean Catch [[id number]] Collected: 10/07/21 1245    Specimen: Urine, Clean Catch Updated: 10/07/21 1307     RBC, UA 0-2 /HPF      WBC, UA 0-2 /HPF      Bacteria, UA None Seen /HPF      Squamous Epithelial Cells, UA 0-2 /HPF      Hyaline Casts, UA 3-6 /LPF      Methodology Automated Microscopy    COVID PRE-OP / PRE-PROCEDURE SCREENING ORDER (NO ISOLATION) - Swab, Nasopharynx [[id number]]  (Normal) Collected: 10/07/21 1303    Specimen: Swab from Nasopharynx Updated: 10/07/21 1344    Narrative:      The following orders were created for panel order COVID PRE-OP / PRE-PROCEDURE SCREENING ORDER (NO ISOLATION) - Swab, Nasopharynx.  Procedure                               Abnormality         Status                     ---------                               -----------         ------                     COVID-19JF IN-HOUSE...[[id number]]  Normal              Final result                 Please view results for these tests on the individual orders.    COVID-19[facility] IN-HOUSE CEPHEID/MAILE NP SWAB IN  TRANSPORT MEDIA 8-12 HR TAT - Swab, Nasopharynx [[id number]]  (Normal) Collected: 10/07/21 1303    Specimen: Swab from Nasopharynx Updated: 10/07/21 1344     COVID19 Not Detected    Narrative:      Fact sheet for providers: https://www.fda.gov/media/850463/download    Fact sheet for patients: https://www.fda.gov/media/464688/download    Test performed by PCR.          Imaging Results (Last 24 Hours)     Procedure Component Value Units Date/Time    XR Chest 1 View [[id number]] Collected: 10/07/21 1208     Updated: 10/07/21 1234    Narrative:      ONE VIEW PORTABLE CHEST     HISTORY: Tachycardia. Shortness of breath.     FINDINGS: The lungs are well-expanded and clear. There is mild  cardiomegaly unchanged from 01/11/2021. No acute abnormality is seen.     This report was finalized on 10/7/2021 12:31 PM by Dr. Tera Ramachandran M.D.             Results for orders placed during the hospital encounter of 06/24/20    Adult Transthoracic Echo Complete W/ Cont if Necessary Per Protocol    Interpretation Summary  · The left ventricular cavity is moderately dilated.  · Estimated EF appears to be in the range of 56 - 60%.  · Left ventricular wall thickness is consistent with mild concentric hypertrophy.  · Left ventricular diastolic dysfunction is noted (grade I) consistent with impaired relaxation.  · Normal right ventricular systolic function noted. Right ventricular cavity is mildly dilated.  · Left atrial cavity size is mild-to-moderately dilated.  · There is no evidence of pericardial effusion.      ECG 12 Lead   Final Result   HEART RATE= 131  bpm   RR Interval= 452  ms   NM Interval= 128  ms   P Horizontal Axis= 165  deg   P Front Axis= -89  deg   QRSD Interval= 94  ms   QT Interval= 317  ms   QRS Axis= 47  deg   T Wave Axis= -23  deg   - ABNORMAL ECG -   Multiform ventricular premature complexes   Aberrant conduction of SV complex(es)   Probable anteroseptal infarct, old   Repol abnrm suggests ischemia, inferior leads    When compared with ECG of 12-Jan-2021 9:00:17,   Significant change in rhythm   Atrial flutter with rapid V-rate   Electronically Signed By: Gonzales Abernathy ([facility]) 07-Oct-2021 11:52:15   Date and Time of Study: 2021-10-07 10:59:53               Assessment/Plan     Active Hospital Problems    Diagnosis  POA   • **Alcohol withdrawal syndrome with complication (HCC) [F10.239]  Yes   • Alcohol abuse [F10.10]  Yes   • Alcoholic liver disease (HCC) [K70.9]  Yes   • Tobacco abuse [Z72.0]  Yes   • Hiatal hernia [K44.9]  Yes   • COPD (chronic obstructive pulmonary disease) (HCC) [J44.9]  Yes   • Anemia, chronic disease [D63.8]  Yes   • Acute kidney failure (HCC) [N17.9]  Yes   • Hypomagnesemia [E83.42]  Yes   • Essential hypertension [I10]  Yes      Resolved Hospital Problems   No resolved problems to display.           · Alcohol withdrawal syndrome.  Plan IV fluids/CIWA protocol/detoxification with Ativan, folate, multivitamin, thiamine.  Fall and seizure precaution.  Obtain access center consultations.  · Acute kidney failure.  Most likely secondary to dehydration leading to prerenal azotemia.  Patient appears is clinically euvolemic.  We will stop losartan and start IV fluid.  Monitor renal function and input and output.  Check urine electrolytes T.  · Alcoholic liver disease/elevated liver function test/hiatal hernia.  Benign GI examination.  Stable elevation of the liver function test.  Will check PT and PTT.  Will initiate Protonix.  Will monitor liver function test.  · Hypomagnesemia.  Will substitute and monitor.  · Hypertension.  Suboptimal control secondary to alcohol withdrawal.  We will continue p.o. metoprolol.  We will stop losartan secondary to acute renal failure.  Will initiate Norvasc.  Will give as needed Lopressor.  There is no evidence of angina or congestive heart failure.  · Tobacco abuse.  Counseled.  Will initiate nicotine patch.  · History of obstructive sleep apnea and COPD.  Will depend on as  "needed oxygen at night if needed.  Will initiate DuoNeb's and Pulmicort mini nebs and hold the patient metered-dose inhalers.  · Anemia of chronic disease.  Hemoglobin stable.  Will monitor.  · VTE prophylaxis.  Lovenox renal dose.    · I discussed the patient's findings and my recommendations with patient/ER physician.      Code Status -code.       Britney Triplett MD  [facility]  10/07/21  17:23 EDT    Electronically signed by Britney Triplett MD at 10/07/21 1735          Emergency Department Notes      Geraldine Mercer, RN at 10/07/21 0911        Pt states that he normally drinks a pint a day and has not had a drink since yesterday. Pt complains of tremors and nausea with no vomiting. Pt states that he has gone through withdrawal before but has not had seizures with it. Patient masked at arrival and triage staff wore all appropriate PPE during entire encounter with patient.       Electronically signed by Geraldine Mercer RN at 10/07/21 0912     Anita Ray PA at 10/07/21 1039      Procedure Orders    1. Critical Care [[id number]] ordered by Anita Ray PA          Attestation signed by Carmelo Alvarez MD at 10/08/21 0903    I agree with the midlevel provider's findings and plan.  I reviewed the midlevel providers note.        For this patient encounter, I reviewed the NP or PA documentation, treatment plan, and medical decision making. Carmelo Alvarez MD 10/8/2021 09:02 EDT                   EMERGENCY DEPARTMENT ENCOUNTER    Room Number:  07/07  Date seen:  10/7/2021  Time seen: 10:39 EDT  PCP: Checo Dunham MD  Historian: patient      HPI:  Chief Complaint: \"I think I'm in alcohol withdrawal\"    A complete HPI/ROS/PMH/PSH/SH/FH are unobtainable due to: none    Context: Watson Lisa is a 66 y.o. male who presents to the ED for evaluation of concerns he is in alcohol withdrawal.  He reports multiple symptoms that started 4 days ago including generalized feeling ill, " nausea without vomiting, tremulousness.  He states it is constant moderate to severe and got worse in the last 24 hours.  He states his last drink of alcohol was yesterday.  He did not otherwise been intentionally decreasing amount of alcohol that he was consuming.  States he normally drinks 1 pint to 1/5 of bourbon daily.  Had an 8-month period of sobriety in 2018.  He states it feels like previous withdrawal which he has been admitted to the hospital for but has not ever had a seizure from.  He denies any chest pain shortness of breath abdominal pain urinary symptoms.  Has a history of hypertension and he is on losartan for as well as a history of COPD and smoking.  He has chronic bilateral lower extremity edema which she states is stable and unchanged.        PAST MEDICAL HISTORY  Active Ambulatory Problems     Diagnosis Date Noted   • Arthritis 04/25/2016   • Hypertension 06/07/2016   • Tobacco abuse 06/07/2016   • Alcohol withdrawal syndrome without complication ([facility]) 06/24/2020   • Chest pain in adult 06/25/2020   • Sleep apnea 06/25/2020   • Hiatal hernia 06/25/2020   • Alcohol abuse 06/25/2020   • Effusion of left knee 06/29/2020   • Osteoarthritis of left knee 06/29/2020   • Vitamin D deficiency 07/01/2020   • Anemia, chronic disease 07/01/2020   • Hyponatremia 07/01/2020   • Alcohol dependence with withdrawal ([facility]) 01/12/2021   • COPD (chronic obstructive pulmonary disease) ([facility]) 03/26/2021   • LIVAN (obstructive sleep apnea) 03/26/2021   • ETOH abuse 03/26/2021   • Tobacco abuse 03/26/2021   • Obese 03/26/2021   • Anemia, chronic disease 03/26/2021     Resolved Ambulatory Problems     Diagnosis Date Noted   • Hypokalemia 06/29/2020   • Hypomagnesemia 06/29/2020   • Pneumonia due to COVID-19 virus 01/11/2021   • Possible SVT (supraventricular tachycardia) ([facility]) 01/12/2021   • Alcohol withdrawal syndrome with perceptual disturbance ([facility]) 03/26/2021   • DEEP (acute kidney injury) ([facility]) 03/26/2021   •  Elevated LFTs 03/26/2021   • Metabolic acidosis, increased anion gap 03/26/2021   • Hypomagnesemia 03/26/2021   • Suicidal ideations 03/26/2021     Past Medical History:   Diagnosis Date   • Anxiety    • Bronchitis with chronic airway obstruction (HCC)    • DVT (deep venous thrombosis) (HCC)    • Low back pain    • Neck pain    • Pulmonary embolism (HCC)    • Sleep apnea with use of continuous positive airway pressure (CPAP)          PAST SURGICAL HISTORY  Past Surgical History:   Procedure Laterality Date   • COLONOSCOPY     • JOINT REPLACEMENT Right     hip/knee   • REPLACEMENT TOTAL KNEE     • TONSILLECTOMY     • TOTAL HIP ARTHROPLASTY Right          FAMILY HISTORY  Family History   Problem Relation Age of Onset   • Cancer Mother    • Heart disease Father    • Alcohol abuse Father    • Cancer Brother          SOCIAL HISTORY  Social History     Socioeconomic History   • Marital status:      Spouse name: Not on file   • Number of children: Not on file   • Years of education: Not on file   • Highest education level: Not on file   Tobacco Use   • Smoking status: Current Every Day Smoker     Packs/day: 2.00     Years: 40.00     Pack years: 80.00     Types: Cigarettes   • Smokeless tobacco: Never Used   Vaping Use   • Vaping Use: Never used   Substance and Sexual Activity   • Alcohol use: Yes     Comment: Pt reports last drink was 1400 on 10/6/21 (2 pints every day, a fifth on weekends)   • Drug use: Not Currently     Types: Hydrocodone   • Sexual activity: Defer         ALLERGIES  Penicillins and Penicillins        REVIEW OF SYSTEMS  Review of Systems     All systems reviewed and negative except for those discussed in HPI.       PHYSICAL EXAM  ED Triage Vitals [10/07/21 0910]   Temp Heart Rate Resp BP SpO2   96.8 °F (36 °C) (!) 147 18 (!) 185/120 97 %      Temp src Heart Rate Source Patient Position BP Location FiO2 (%)   Tympanic Monitor Standing -- --         GENERAL: Mild distress, anxious appearing,  tremulous  HENT: atraumatic  EYES: no scleral icterus  CV: regular rhythm, tachycardic in the 140s  RESPIRATORY: normal effort, ctab  ABDOMEN: soft, nontender nondistended normal bowel sounds no guarding or rigidity  MUSCULOSKELETAL: no deformity  NEURO: alert, moves all extremities, follows commands  SKIN: warm, dry    Vital signs and nursing notes reviewed.          LAB RESULTS  Recent Results (from the past 24 hour(s))   Comprehensive Metabolic Panel    Collection Time: 10/07/21 10:48 AM    Specimen: Blood   Result Value Ref Range    Glucose 98 65 - 99 mg/dL    BUN 24 (H) 8 - 23 mg/dL    Creatinine 1.99 (H) 0.76 - 1.27 mg/dL    Sodium 142 136 - 145 mmol/L    Potassium 4.5 3.5 - 5.2 mmol/L    Chloride 103 98 - 107 mmol/L    CO2 20.4 (L) 22.0 - 29.0 mmol/L    Calcium 8.6 8.6 - 10.5 mg/dL    Total Protein 7.6 6.0 - 8.5 g/dL    Albumin 4.40 3.50 - 5.20 g/dL    ALT (SGPT) 34 1 - 41 U/L    AST (SGOT) 51 (H) 1 - 40 U/L    Alkaline Phosphatase 89 39 - 117 U/L    Total Bilirubin 0.9 0.0 - 1.2 mg/dL    eGFR Non African Amer 34 (L) >60 mL/min/1.73    Globulin 3.2 gm/dL    A/G Ratio 1.4 g/dL    BUN/Creatinine Ratio 12.1 7.0 - 25.0    Anion Gap 18.6 (H) 5.0 - 15.0 mmol/L   Lipase    Collection Time: 10/07/21 10:48 AM    Specimen: Blood   Result Value Ref Range    Lipase 25 13 - 60 U/L   Ethanol    Collection Time: 10/07/21 10:48 AM    Specimen: Blood   Result Value Ref Range    Ethanol 67 (H) 0 - 10 mg/dL    Ethanol % 0.067 %   Green Top (Gel)    Collection Time: 10/07/21 10:48 AM   Result Value Ref Range    Extra Tube Hold for add-ons.    Lavender Top    Collection Time: 10/07/21 10:48 AM   Result Value Ref Range    Extra Tube hold for add-on    Gold Top - SST    Collection Time: 10/07/21 10:48 AM   Result Value Ref Range    Extra Tube Hold for add-ons.    Light Blue Top    Collection Time: 10/07/21 10:48 AM   Result Value Ref Range    Extra Tube hold for add-on    CBC Auto Differential    Collection Time: 10/07/21 10:48 AM     Specimen: Blood   Result Value Ref Range    WBC 6.86 3.40 - 10.80 10*3/mm3    RBC 3.93 (L) 4.14 - 5.80 10*6/mm3    Hemoglobin 12.9 (L) 13.0 - 17.7 g/dL    Hematocrit 38.1 37.5 - 51.0 %    MCV 96.9 79.0 - 97.0 fL    MCH 32.8 26.6 - 33.0 pg    MCHC 33.9 31.5 - 35.7 g/dL    RDW 13.9 12.3 - 15.4 %    RDW-SD 49.2 37.0 - 54.0 fl    MPV 9.1 6.0 - 12.0 fL    Platelets 199 140 - 450 10*3/mm3    Neutrophil % 77.2 (H) 42.7 - 76.0 %    Lymphocyte % 9.9 (L) 19.6 - 45.3 %    Monocyte % 11.4 5.0 - 12.0 %    Eosinophil % 0.4 0.3 - 6.2 %    Basophil % 0.4 0.0 - 1.5 %    Immature Grans % 0.7 (H) 0.0 - 0.5 %    Neutrophils, Absolute 5.29 1.70 - 7.00 10*3/mm3    Lymphocytes, Absolute 0.68 (L) 0.70 - 3.10 10*3/mm3    Monocytes, Absolute 0.78 0.10 - 0.90 10*3/mm3    Eosinophils, Absolute 0.03 0.00 - 0.40 10*3/mm3    Basophils, Absolute 0.03 0.00 - 0.20 10*3/mm3    Immature Grans, Absolute 0.05 0.00 - 0.05 10*3/mm3    nRBC 0.0 0.0 - 0.2 /100 WBC   Troponin    Collection Time: 10/07/21 10:48 AM    Specimen: Blood   Result Value Ref Range    Troponin T 0.020 0.000 - 0.030 ng/mL   ECG 12 Lead    Collection Time: 10/07/21 10:59 AM   Result Value Ref Range    QT Interval 317 ms   BNP    Collection Time: 10/07/21 11:02 AM    Specimen: Blood   Result Value Ref Range    proBNP 1,106.0 (H) 0.0 - 900.0 pg/mL   Magnesium    Collection Time: 10/07/21 11:02 AM    Specimen: Blood   Result Value Ref Range    Magnesium 1.5 (L) 1.6 - 2.4 mg/dL   Urinalysis With Microscopic If Indicated (No Culture) - Urine, Clean Catch    Collection Time: 10/07/21 12:45 PM    Specimen: Urine, Clean Catch   Result Value Ref Range    Color, UA Yellow Yellow, Straw    Appearance, UA Clear Clear    pH, UA 5.5 5.0 - 8.0    Specific Gravity, UA 1.011 1.005 - 1.030    Glucose, UA Negative Negative    Ketones, UA Trace (A) Negative    Bilirubin, UA Negative Negative    Blood, UA Trace (A) Negative    Protein, UA 30 mg/dL (1+) (A) Negative    Leuk Esterase, UA Negative Negative     Nitrite, UA Negative Negative    Urobilinogen, UA 0.2 E.U./dL 0.2 - 1.0 E.U./dL   Urinalysis, Microscopic Only - Urine, Clean Catch    Collection Time: 10/07/21 12:45 PM    Specimen: Urine, Clean Catch   Result Value Ref Range    RBC, UA 0-2 None Seen, 0-2 /HPF    WBC, UA 0-2 None Seen, 0-2 /HPF    Bacteria, UA None Seen None Seen /HPF    Squamous Epithelial Cells, UA 0-2 None Seen, 0-2 /HPF    Hyaline Casts, UA 3-6 None Seen /LPF    Methodology Automated Microscopy    COVID-19,[facility] IN-HOUSE CEPHEID/MAILE NP SWAB IN TRANSPORT MEDIA 8-12 HR TAT - Swab, Nasopharynx    Collection Time: 10/07/21  1:03 PM    Specimen: Nasopharynx; Swab   Result Value Ref Range    COVID19 Not Detected Not Detected - Ref. Range       Ordered the above labs and independently reviewed the results.        RADIOLOGY  XR Chest 1 View    Result Date: 10/7/2021  Narrative: ONE VIEW PORTABLE CHEST  HISTORY: Tachycardia. Shortness of breath.  FINDINGS: The lungs are well-expanded and clear. There is mild cardiomegaly unchanged from 01/11/2021. No acute abnormality is seen.  This report was finalized on 10/7/2021 12:31 PM by Dr. Tera Ramachandran M.D.        I ordered the above noted radiological studies. Reviewed by me and discussed with radiologist.  See dictation for official radiology interpretation.    PROCEDURES  Critical Care  Performed by: Anita Ray PA  Authorized by: Carmelo Alvarez MD     Critical care provider statement:     Critical care time (minutes):  32    Critical care time was exclusive of:  Separately billable procedures and treating other patients and teaching time    Critical care was necessary to treat or prevent imminent or life-threatening deterioration of the following conditions:  Renal failure and cardiac failure    Critical care was time spent personally by me on the following activities:  Development of treatment plan with patient or surrogate, discussions with consultants, evaluation of patient's response to  treatment, examination of patient, obtaining history from patient or surrogate, ordering and performing treatments and interventions, ordering and review of laboratory studies, ordering and review of radiographic studies, pulse oximetry, re-evaluation of patient's condition and review of old charts            MEDICATIONS GIVEN IN ER  Medications   sodium chloride 0.9 % flush 10 mL (has no administration in time range)   metoprolol tartrate (LOPRESSOR) injection 5 mg (has no administration in time range)   LORazepam (ATIVAN) injection 1 mg (1 mg Intravenous Given 10/7/21 1102)   thiamine (B-1) 100 mg, folic acid 1 mg in sodium chloride 0.9 % 1,000 mL infusion (1,000 mL/hr Intravenous New Bag 10/7/21 1336)   LORazepam (ATIVAN) injection 2 mg (2 mg Intravenous Given 10/7/21 1304)   enoxaparin (LOVENOX) syringe 120 mg (120 mg Subcutaneous Given 10/7/21 1409)   metoprolol tartrate (LOPRESSOR) tablet 25 mg (25 mg Oral Given 10/7/21 1405)   metoprolol tartrate (LOPRESSOR) injection 5 mg (5 mg Intravenous Given 10/7/21 1406)             PROGRESS AND CONSULTS    DDX includes but not limited to alcohol withdrawal complications, arrhythmia, electrolyte abnormality, dehydration    ED Course as of Oct 07 1523   Thu Oct 07, 2021   1250 Ethanol(!): 67 [KA]   1250 Acute DEEP, 0.8 six months ago   Creatinine(!): 1.99 [KA]   1345 COVID19: Not Detected [KA]   1357 Medical chart reviewed.  Patient admitted 3/25/2021 until 3/30/2021 with alcohol withdrawal, DEEP, hypomagnesemia and metabolic acidosis, elevated LFTs and suicidal ideation.    [KA]   1357 Interpretation of the EKG performed at 10:59 AM is atrial fibrillation, rate 131, normal axis narrow QRS and normal QTC.  Multiple PVCs, no acute ST elevation.  Change from prior on 1/12/2021 which showed SVT with a rate of 158.    [KA]   1414 I discussed the patient with Dr. Lezama, hospitalist.  We discussed the patient's history presentation labs and imaging and ER interventions.  He  agrees to admit the patient to telemetry for further evaluation and treatment.    [KA]   1458 Patient's heart rate remains elevated any 120s 140s.  Will give additional dose of IV metoprolol    [KA]      ED Course User Index  [KA] Anita Ray PA             Patient was placed in face mask in first look. Patient was wearing facemask each time I entered the room and throughout our encounter. I wore protective equipment throughout this patient encounter including a face mask, eye shield and gloves. Hand hygiene was performed before donning protective equipment and after removal when leaving the room.        DIAGNOSIS  Final diagnoses:   Alcohol withdrawal syndrome with complication (HCC)   Acute renal insufficiency   Hypomagnesemia   New onset a-fib (HCC)   Atrial fibrillation with rapid ventricular response (HCC)         Latest Documented Vital Signs:  As of 15:23 EDT  BP- (!) 164/117 HR- (!) 143 Temp- 96.8 °F (36 °C) (Tympanic) O2 sat- 94%       Anita Ray PA  10/07/21 1524      Electronically signed by Carmelo Alvarez MD at 10/08/21 0903     Tracey Campos, RN at 10/07/21 1111        Patient was wearing a face mask throughout our encounter.  I wore a CAPR throughout the encounter.  Hand hygiene was performed before and after patient encounter.        Tracey Campos RN  10/07/21 1111      Electronically signed by Tracey Campos, RN at 10/07/21 1111     Carmelo Alvarez MD at 10/07/21 1353        Pt presents to the ED c/o  symptoms related to alcohol withdrawal including generalized malaise, tremulousness and nausea.  Last drink was yesterday-normally drinks 1 pint to 1/5 of bourbon per day.  He is not aware of any prior history of atrial fibrillation.     On exam,   Awake, alert, no acute distress.  He is tremulous.  CV-irregularly irregular tachycardic  Lungs-clear to auscultation bilaterally  Extremities-mild pitting edema.     Plan: Rate control with metoprolol.  Magnesium  ordered due to mild hypomagnesemia.  He will be admitted to a telemetry bed for further treatment and evaluation.      Appropriate PPE was worn by myself and the patient throughout our entire interaction.       Attestation:  The STEFANIE and I have discussed this patient's history, physical exam, and treatment plan.  I have reviewed the documentation and personally had a face to face interaction with the patient. I affirm the documentation and agree with the treatment and plan.  The attached note describes my personal findings.            Carmelo Alvarez MD  10/07/21 1354      Electronically signed by Carmelo Alvarez MD at 10/07/21 1354       Vital Signs (last day)     Date/Time   Temp   Temp src   Pulse   Resp   BP   Patient Position   SpO2    10/08/21 1130   --   --   69   --   122/83   --   98    10/08/21 1104   --   --   68   21   --   --   98    10/08/21 1100   --   --   65   --   113/85   --   98    10/08/21 1030   --   --   71   --   123/80   --   98    10/08/21 1000   --   --   77   --   110/83   --   97    10/08/21 0930   --   --   77   --   108/82   --   96    10/08/21 0900   --   --   75   --   107/82   --   99    10/08/21 0830   --   --   77   --   94/65   --   99    10/08/21 0800   --   --   73   --   96/68   --   98    10/08/21 0730   --   --   93   --   104/75   --   96    10/08/21 0716   --   --   (!) 133   (!) 30   --   --   --    10/08/21 0700   --   --   (!) 134   --   99/76   --   99    10/08/21 0645   --   --   (!) 134   --   102/79   --   99    10/08/21 0630   --   --   (!) 134   --   104/80   --   100    10/08/21 0615   --   --   (!) 135   --   94/69   --   99    10/08/21 0600   --   --   (!) 137   --   (!) 80/64   --   98    10/08/21 0545   --   --   (!) 135   --   (!) 81/60   --   98    10/08/21 0530   --   --   (!) 137   --   (!) 73/58   --   98    10/08/21 0515   --   --   (!) 137   --   (!) 82/60   --   98    10/08/21 0500   --   --   (!) 135   --   (!) 79/61   --   95    10/08/21 0448    --   --   (!) 134   --   94/63   --   97    10/08/21 0430   --   --   (!) 134   --   --   --   94    10/08/21 0425   --   --   (!) 133   --   (!) 154/115   --   97    10/08/21 0412   --   --   (!) 133   --   --   --   94    10/08/21 0319   98.8 (37.1)   Oral   (!) 134   (!) 30   (!) 168/130   Lying   94    10/07/21 2347   98.5 (36.9)   Oral   65   17   (!) 149/120   Lying   --    10/07/21 1938   98.3 (36.8)   Oral   115   18   (!) 155/111   Lying   --    10/07/21 1933   --   --   101   18   --   --   94    10/07/21 1739   98.7 (37.1)   Oral   (!) 141   18   (!) 149/113   Lying   95    10/07/21 1631   --   --   (!) 139   18   (!) 150/114   --   94    10/07/21 1532   --   --   (!) 142   --   --   --   96    10/07/21 1531   --   --   (!) 142   18   (!) 162/129   --   96    10/07/21 1501   --   --   (!) 144   --   (!) 159/122   --   94    10/07/21 1455   --   --   (!) 143   --   --   --   94    10/07/21 1454   --   --   --   18   (!) 164/117   --   --    10/07/21 1401   --   --   (!) 150   --   (!) 156/112   --   95    10/07/21 1301   --   --   (!) 142   --   (!) 169/116   --   96    10/07/21 1231   --   --   (!) 142   18   (!) 172/111   --   95    10/07/21 1227   --   --   (!) 145   --   --   --   95    10/07/21 1131   --   --   (!) 140   --   (!) 173/109   --   93    10/07/21 1041   --   --   (!) 142   --   (!) 174/119   --   96    10/07/21 0910   96.8 (36)   Tympanic   (!) 147   18   (!) 185/120   Standing   97              Oxygen Therapy (last day)     Date/Time   SpO2   Device (Oxygen Therapy)   Flow (L/min)   Oxygen Concentration (%)   ETCO2 (mmHg)    10/08/21 1130   98   --   --   --   --    10/08/21 1104   98   ventilator   --   30   --    10/08/21 1100   98   --   --   --   --    10/08/21 1030   98   --   --   --   --    10/08/21 1000   97   --   --   --   --    10/08/21 0930   96   --   --   --   --    10/08/21 0900   99   --   --   --   --    10/08/21 0830   99   --   --   --   --    10/08/21 0800   98    ventilator   --   50   --    10/08/21 0730   96   --   --   --   --    10/08/21 0719   --   --   --   50   --    10/08/21 0716   --   ventilator   --   75   --    10/08/21 0700   99   --   --   --   --    10/08/21 0645   99   --   --   --   --    10/08/21 0630   100   --   --   --   --    10/08/21 0615   99   --   --   --   --    10/08/21 0600   98   --   --   --   --    10/08/21 0545   98   --   --   --   --    10/08/21 0530   98   --   --   --   --    10/08/21 0515   98   --   --   --   --    10/08/21 0500   95   --   --   --   --    10/08/21 0449   --   ventilator   --   100   --    10/08/21 0445   97   --   --   --   --    10/08/21 0430   94   --   --   --   --    10/08/21 0425   97   --   --   --   --    10/08/21 0412   94   --   --   --   --    10/08/21 0319   94   nasal cannula   2   --   --    10/08/21 0027   --   room air   --   --   --    10/07/21 2347   --   room air   --   --   --    10/07/21 2224   --   room air   --   --   --    10/07/21 1938   --   room air   --   --   --    10/07/21 1933   94   room air   --   --   --    10/07/21 1739   95   room air   --   --   --    10/07/21 1631   94   --   --   --   --    10/07/21 1532   96   --   --   --   --    10/07/21 1531   96   --   --   --   --    10/07/21 1501   94   --   --   --   --    10/07/21 1455   94   --   --   --   --    10/07/21 1401   95   --   --   --   --    10/07/21 1301   96   --   --   --   --    10/07/21 1231   95   --   --   --   --    10/07/21 1227   95   --   --   --   --    10/07/21 1131   93   --   --   --   --    10/07/21 1041   96   --   --   --   --    10/07/21 0910   97   room air   --   --   --              Lines, Drains & Airways    Active LDAs     Name:   Placement date:   Placement time:   Site:   Days:    Peripheral IV 10/08/21 0421 Anterior;Proximal;Right;Upper Arm   10/08/21    0421    Arm   less than 1    Peripheral IV 10/08/21 0422 Posterior;Right Hand   10/08/21    0422    Hand   less than 1    Peripheral IV 10/08/21  0444 Left;Posterior Hand   10/08/21    0444    Hand   less than 1    NG/OG Tube Nasogastric Left nostril   10/08/21    0445    Left nostril   less than 1    Urethral Catheter Non-latex 16 Fr.   10/08/21    1144     less than 1    ETT    10/08/21    0412     less than 1         Inactive LDAs     Name:   Placement date:   Placement time:   Removal date:   Removal time:   Site:   Days:    [REMOVED] Peripheral IV 01/18/21 0358 Left Forearm   01/18/21    0358    10/07/21 not present on arrival    --    Forearm   261    [REMOVED] Peripheral IV 03/29/21 0844 Right Forearm   03/29/21    0844    10/07/21 not present on arrival    --    Forearm   191    [REMOVED] Peripheral IV 10/07/21 1054 Left Antecubital   10/07/21    1054    10/08/21    0420    Antecubital   less than 1                CIWA (last 3 days)     Date/Time CIWA-Ar Score    10/08/21 0800  4    10/08/21 0641  11    10/08/21 0509  21    10/08/21 0444  21    10/08/21 0419  32    10/08/21 0240  18    10/08/21 0218  17    10/08/21 0144  17    10/08/21 0125  15    10/08/21 0047  17    10/08/21 0020  12    10/07/21 2335  12    10/07/21 2301  15    10/07/21 2224  12    10/07/21 2136  14    10/07/21 1830  10    10/07/21 13:42:14  6    10/07/21 12:43:26  8    10/07/21 11:11:20  11          Facility-Administered Medications as of 10/8/2021   Medication Dose Route Frequency Provider Last Rate Last Admin   • acetaminophen (TYLENOL) tablet 650 mg  650 mg Oral Q4H PRN Britney Triplett MD        Or   • acetaminophen (TYLENOL) 160 MG/5ML solution 650 mg  650 mg Oral Q4H PRN Britney Triplett MD        Or   • acetaminophen (TYLENOL) suppository 650 mg  650 mg Rectal Q4H PRN Britney Triplett MD       • albuterol sulfate HFA (PROVENTIL HFA;VENTOLIN HFA;PROAIR HFA) inhaler 6 puff  6 puff Inhalation 4x Daily - RT Neto Penaloza MD   6 puff at 10/08/21 1104   • sennosides-docusate (PERICOLACE) 8.6-50 MG per tablet 2 tablet  2 tablet Oral BID Britney Triplett MD        And   •  polyethylene glycol (MIRALAX) packet 17 g  17 g Oral Daily PRN Britney Triplett MD        And   • bisacodyl (DULCOLAX) EC tablet 5 mg  5 mg Oral Daily PRN Britney Triplett MD        And   • bisacodyl (DULCOLAX) suppository 10 mg  10 mg Rectal Daily PRN Britney Triplett MD       • dexmedetomidine (PRECEDEX) 400 mcg in 100 mL NS infusion  0.2-1.5 mcg/kg/hr Intravenous Titrated Neto Penaloza MD 17.7 mL/hr at 10/08/21 1207 0.6 mcg/kg/hr at 10/08/21 1207   • [COMPLETED] enoxaparin (LOVENOX) syringe 120 mg  1 mg/kg Subcutaneous Once Anita Ray PA   120 mg at 10/07/21 1409   • enoxaparin (LOVENOX) syringe 30 mg  30 mg Subcutaneous Q24H Britney Triplett MD   30 mg at 10/07/21 1846   • fentaNYL citrate (PF) (SUBLIMAZE) injection 50 mcg  50 mcg Intravenous Q1H PRN Neto Penaloza MD   50 mcg at 10/08/21 0435   • HYDROcodone-acetaminophen (NORCO) 5-325 MG per tablet 1 tablet  1 tablet Oral Q4H PRN Britney Triplett MD       • LORazepam (ATIVAN) tablet 0.5 mg  0.5 mg Oral Q2H PRN Miki Gotti APRN        Or   • LORazepam (ATIVAN) injection 0.5 mg  0.5 mg Intravenous Q2H PRN Miki Gotti APRN        Or   • LORazepam (ATIVAN) tablet 1 mg  1 mg Oral Q1H PRN Miki Gotti APRN   1 mg at 10/07/21 2139    Or   • LORazepam (ATIVAN) injection 1 mg  1 mg Intravenous Q1H PRN Miki Gotti APRN   1 mg at 10/08/21 0641    Or   • LORazepam (ATIVAN) injection 1 mg  1 mg Intravenous Q15 Min PRN Miki Gotti APRN   1 mg at 10/08/21 0344    Or   • LORazepam (ATIVAN) injection 1 mg  1 mg Intramuscular Q15 Min PRN Miki Gotti APRN       • LORazepam (ATIVAN) tablet 0.5 mg  0.5 mg Oral Q2H PRN Blanca Thompson APRN        Or   • LORazepam (ATIVAN) injection 0.5 mg  0.5 mg Intravenous Q2H PRN Blanca Thompson APRN        Or   • LORazepam (ATIVAN) tablet 1 mg  1 mg Oral Q1H PRN Blanca Thompson APRN        Or   • LORazepam (ATIVAN) injection 1 mg  1 mg Intravenous Q1H PRN  Blanca Thompson APRN        Or   • LORazepam (ATIVAN) injection 1 mg  1 mg Intravenous Q15 Min PRN Blanca Thompson APRN        Or   • LORazepam (ATIVAN) injection 1 mg  1 mg Intramuscular Q15 Min PRN Blanca Thompson APRN        Or   • LORazepam (ATIVAN) injection 2 mg  2 mg Intravenous Q1H PRN Blanca Thompson APRN   2 mg at 10/08/21 0419    Or   • LORazepam (ATIVAN) tablet 2 mg  2 mg Oral Q1H PRN Blanca Thompson APRN       • LORazepam (ATIVAN) injection 0.5 mg  0.5 mg Intravenous Q6H Blanca Thompson APRN   0.5 mg at 10/08/21 0407    Followed by   • [START ON 10/9/2021] LORazepam (ATIVAN) injection 0.5 mg  0.5 mg Intravenous Q8H Blanca Thompson APRN       • [COMPLETED] LORazepam (ATIVAN) injection 1 mg  1 mg Intravenous Once Carmelo Alvarez MD   1 mg at 10/07/21 1102   • [COMPLETED] LORazepam (ATIVAN) injection 1 mg  1 mg Intravenous Once Britney Triplett MD   1 mg at 10/07/21 1655   • [COMPLETED] LORazepam (ATIVAN) injection 2 mg  2 mg Intravenous Once Carmelo Alvarez MD   2 mg at 10/07/21 1304   • LORazepam (ATIVAN) injection 2 mg  2 mg Intravenous Q6H Neto Penaloza MD   2 mg at 10/08/21 1208   • [COMPLETED] LORazepam (ATIVAN) injection 4 mg  4 mg Intravenous Once Neto Penaloza MD   4 mg at 10/08/21 0439   • [COMPLETED] LORazepam (ATIVAN) injection 4 mg  4 mg Intravenous Once Neto Penaloza MD   4 mg at 10/08/21 0509   • magnesium sulfate 4 gram infusion - Mg less than or equal to 1mg/dL  4 g Intravenous PRN Britney Triplett MD        Or   • magnesium sulfate 3 gram infusion (1gm x 3) - Mg 1.1 - 1.5 mg/dL  1 g Intravenous PRN Britney Triplett  mL/hr at 10/08/21 1135 1 g at 10/08/21 1135    Or   • Magnesium Sulfate 2 gram infusion- Mg 1.6 - 1.9 mg/dL  2 g Intravenous PRN Britney Triplett MD       • melatonin tablet 5 mg  5 mg Oral Nightly PRN Britney Triplett MD       • [COMPLETED] metoprolol tartrate (LOPRESSOR) injection 5 mg  5 mg Intravenous Once  Anita Ray PA   5 mg at 10/07/21 1406   • [COMPLETED] metoprolol tartrate (LOPRESSOR) injection 5 mg  5 mg Intravenous Once Anita Ray PA   5 mg at 10/07/21 1543   • metoprolol tartrate (LOPRESSOR) injection 5 mg  5 mg Intravenous Q6H PRN Britney Triplett MD   5 mg at 10/07/21 1812   • [COMPLETED] metoprolol tartrate (LOPRESSOR) tablet 25 mg  25 mg Oral Once Anita Ray PA   25 mg at 10/07/21 1405   • metoprolol tartrate (LOPRESSOR) tablet 50 mg  50 mg Oral Q8H Britney Triplett MD   50 mg at 10/08/21 1032   • multivitamin with minerals 1 tablet  1 tablet Oral Daily Britney Triplett MD   1 tablet at 10/07/21 1849   • nicotine (NICODERM CQ) 21 MG/24HR patch 1 patch  1 patch Transdermal Q24H Britney Triplett MD       • nitroglycerin (NITROSTAT) SL tablet 0.4 mg  0.4 mg Sublingual Q5 Min PRN Britney Triplett MD       • ondansetron (ZOFRAN) tablet 4 mg  4 mg Oral Q6H PRN Britney Triplett MD        Or   • ondansetron (ZOFRAN) injection 4 mg  4 mg Intravenous Q6H PRN Britney Triplett MD       • pantoprazole (PROTONIX) injection 40 mg  40 mg Intravenous Q AM Britney Triplett MD   40 mg at 10/08/21 0509   • potassium chloride (K-DUR,KLOR-CON) ER tablet 40 mEq  40 mEq Oral PRN Britney Triplett MD       • potassium chloride (KLOR-CON) packet 40 mEq  40 mEq Oral PRN Britney Triplett MD       • propofol (DIPRIVAN) infusion 10 mg/mL 100 mL  5-50 mcg/kg/min Intravenous Titrated Neto Penaloza MD 17.7 mL/hr at 10/08/21 1209 25 mcg/kg/min at 10/08/21 1209   • [COMPLETED] Propofol (DIPRIVAN) injection 100 mg  100 mg Intravenous Once Neto Penaloza MD   100 mg at 10/08/21 0420   • [COMPLETED] sodium chloride 0.9 % bolus 1,000 mL  1,000 mL Intravenous Once Neto Penaloza MD 1,000 mL/hr at 10/08/21 0640 1,000 mL at 10/08/21 0640   • sodium chloride 0.9 % flush 10 mL  10 mL Intravenous PRN Britney Triplett MD       • sodium chloride 0.9 % flush 10 mL  10 mL Intravenous Q12H Britney Triplett MD   10 mL  at 10/08/21 0940   • sodium chloride 0.9 % flush 10 mL  10 mL Intravenous PRN Britney Triplett MD       • sodium chloride 0.9 % infusion  100 mL/hr Intravenous Continuous Britney Triplett  mL/hr at 10/08/21 0934 100 mL/hr at 10/08/21 0934   • thiamine (B-1) 100 mg in sodium chloride 0.9 % 100 mL IVPB  100 mg Intravenous Q8H Humberto Meeks  mL/hr at 10/08/21 1233 100 mg at 10/08/21 1233   • [COMPLETED] thiamine (B-1) 100 mg, folic acid 1 mg in sodium chloride 0.9 % 1,000 mL infusion  1,000 mL/hr Intravenous Once Carmelo Alvarez MD   Stopped at 10/07/21 1543   • vitamin D (ERGOCALCIFEROL) capsule 50,000 Units  50,000 Units Oral Q7 Days Britney Triplett MD   50,000 Units at 10/08/21 0029     Orders (last 24 hrs)      Start     Ordered    10/09/21 0445  LORazepam (ATIVAN) injection 0.5 mg  Every 8 Hours      10/08/21 0355    10/09/21 0430  Magnesium  Morning Draw      10/08/21 1146    10/09/21 0430  Uric Acid  Morning Draw      10/08/21 1146    10/09/21 0430  Phosphorus  Morning Draw      10/08/21 1146    10/09/21 0430  Comprehensive Metabolic Panel  Morning Draw      10/08/21 1146    10/09/21 0430  CBC & Differential  Morning Draw      10/08/21 1146    10/08/21 1215  thiamine (B-1) 100 mg in sodium chloride 0.9 % 100 mL IVPB  Every 8 Hours      10/08/21 1126    10/08/21 1147  Sodium, Urine, Random -  Once      10/08/21 1146    10/08/21 1147  Protein / Creatinine Ratio, Urine -  Once      10/08/21 1146    10/08/21 1147  Chloride, Urine, Random -  Once      10/08/21 1146    10/08/21 1146  Insert Indwelling Urinary Catheter  Once      10/08/21 1146    10/08/21 1146  Assess Need for Indwelling Urinary Catheter - Follow Removal Protocol  Continuous     Comments: Indwelling Urinary Catheter Removal Criteria  Discontinue Indwelling Urinary Catheter Unless One of the Following is Present:  Urinary Retention or Obstruction  Chronic Urinary Catheter Use  End of Life  Critical Illness with Strict  I/O   Tract or Abdominal Surgery  Stage 3/4 Sacral / Perineal Wound  Required Activity Restriction: Trauma  Required Activity Restriction: Spine Surgery  If Patient is Being Followed by Urology Contact Them PRIOR to Removal  Do Not Remove Indwelling Urinary Catheter Order is Present with a CLINICAL REASON to Maintain the Catheter. Provider is Required to Include a Clinical Reason to Maintain a Urinary Catheter    Patient Admitted With Indwelling Urinary Catheter (Not Placed at [facility])  Assess for Continued Need & Document Medical Necessity  If Infection is Suspected, Contact the Provider        10/08/21 1146    10/08/21 1146  Urinary Catheter Care  Every Shift      10/08/21 1146    10/08/21 1142  Insert Indwelling Urinary Catheter  Once,   Status:  Canceled      10/08/21 1143    10/08/21 1142  Assess Need for Indwelling Urinary Catheter - Follow Removal Protocol  Continuous,   Status:  Canceled     Comments: Indwelling Urinary Catheter Removal Criteria  Discontinue Indwelling Urinary Catheter Unless One of the Following is Present:  Urinary Retention or Obstruction  Chronic Urinary Catheter Use  End of Life  Critical Illness with Strict I/O   Tract or Abdominal Surgery  Stage 3/4 Sacral / Perineal Wound  Required Activity Restriction: Trauma  Required Activity Restriction: Spine Surgery  If Patient is Being Followed by Urology Contact Them PRIOR to Removal  Do Not Remove Indwelling Urinary Catheter Order is Present with a CLINICAL REASON to Maintain the Catheter. Provider is Required to Include a Clinical Reason to Maintain a Urinary Catheter    Patient Admitted With Indwelling Urinary Catheter (Not Placed at [facility])  Assess for Continued Need & Document Medical Necessity  If Infection is Suspected, Contact the Provider        10/08/21 1143    10/08/21 1142  Urinary Catheter Care  Every Shift,   Status:  Canceled      10/08/21 1143    10/08/21 0844  Inpatient Nephrology Consult  Once      Specialty:  Nephrology  Provider:  Sloan Catalan MD    10/08/21 0843    10/08/21 0830  albuterol sulfate HFA (PROVENTIL HFA;VENTOLIN HFA;PROAIR HFA) inhaler 6 puff  4 Times Daily - RT      10/08/21 0419    10/08/21 0811  Phosphorus  Once      10/08/21 0810    10/08/21 0801  POC Glucose Once  Once      10/08/21 0759    10/08/21 0645  sodium chloride 0.9 % bolus 1,000 mL  Once      10/08/21 0546    10/08/21 0645  dexmedetomidine (PRECEDEX) 400 mcg in 100 mL NS infusion  Titrated      10/08/21 0546    10/08/21 0600  CBC Auto Differential  Morning Draw      10/07/21 1757    10/08/21 0600  Comprehensive Metabolic Panel  Morning Draw      10/07/21 1757    10/08/21 0600  Magnesium  Morning Draw      10/07/21 1757    10/08/21 0600  Folate  Morning Draw      10/07/21 1757    10/08/21 0600  LORazepam (ATIVAN) injection 4 mg  Once      10/08/21 0500    10/08/21 0553  Ventilator - AC/VC; (30); (75); 88%; 7.5; 500  Continuous      10/08/21 0553    10/08/21 0530  LORazepam (ATIVAN) injection 4 mg  Once      10/08/21 0433    10/08/21 0520  Blood Gas, Arterial -  Once      10/08/21 0418    10/08/21 0515  Propofol (DIPRIVAN) injection 100 mg  Once      10/08/21 0415    10/08/21 0515  propofol (DIPRIVAN) infusion 10 mg/mL 100 mL  Titrated      10/08/21 0416    10/08/21 0515  LORazepam (ATIVAN) injection 2 mg  Every 6 Hours      10/08/21 0420    10/08/21 0512  Blood Gas, Arterial -  Once      10/08/21 0506    10/08/21 0505  Opioid Administration - Capnography (EtCO2) Monitoring  Per Order Details      10/08/21 0505    10/08/21 0505  Opioid Administration - Document EtCO2 Value With Each Set of Vitals & Any Change in Patient Status  Per Order Details      10/08/21 0505    10/08/21 0505  Opioid Administration - Notify Provider Capnography (EtCO2)  Until Discontinued      10/08/21 0505    10/08/21 0505  Opioid Administration - Continuous Pulse Oximetry (SpO2) Monitoring  Per Order Details,   Status:  Canceled      10/08/21  0505    10/08/21 0505  Opioid Administration - Document SpO2 Value With Each Set of Vitals & Any Change in Patient Status  Per Order Details      10/08/21 0505    10/08/21 0505  Opioid Administration - Notify Provider Pulse Oximetry (SpO2)  Until Discontinued      10/08/21 0505    10/08/21 0505  Opioid Administration - Capnography (EtCO2) Monitoring  Per Order Details      10/08/21 0505    10/08/21 0505  Opioid Administration - Document EtCO2 Value With Each Set of Vitals & Any Change in Patient Status  Per Order Details      10/08/21 0505    10/08/21 0505  Opioid Administration - Notify Provider Capnography (EtCO2)  Until Discontinued      10/08/21 0505    10/08/21 0505  Opioid Administration - Continuous Pulse Oximetry (SpO2) Monitoring  Per Order Details      10/08/21 0505    10/08/21 0505  Opioid Administration - Document SpO2 Value With Each Set of Vitals & Any Change in Patient Status  Per Order Details      10/08/21 0505    10/08/21 0505  Opioid Administration - Notify Provider Pulse Oximetry (SpO2)  Until Discontinued      10/08/21 0505    10/08/21 0500  LORazepam (ATIVAN) injection 4 mg  Once,   Status:  Discontinued      10/08/21 0411    10/08/21 0445  LORazepam (ATIVAN) injection 0.5 mg  Every 6 Hours      10/08/21 0355    10/08/21 0421  fentaNYL citrate (PF) (SUBLIMAZE) injection 50 mcg  Every 1 Hour PRN      10/08/21 0421    10/08/21 0419  Feeding Tube Insertion  Once      10/08/21 0418    10/08/21 0419  Inpatient Nutrition Consult  Once     Provider:  (Not yet assigned)    10/08/21 0418    10/08/21 0351  LORazepam (ATIVAN) tablet 0.5 mg  Every 2 Hours PRN      10/08/21 0351    10/08/21 0351  LORazepam (ATIVAN) injection 0.5 mg  Every 2 Hours PRN      10/08/21 0351    10/08/21 0351  LORazepam (ATIVAN) tablet 1 mg  Every 1 Hour PRN      10/08/21 0351    10/08/21 0351  LORazepam (ATIVAN) injection 1 mg  Every 1 Hour PRN      10/08/21 0351    10/08/21 0351  LORazepam (ATIVAN) injection 1 mg  Every 15  Minutes PRN      10/08/21 0351    10/08/21 0351  LORazepam (ATIVAN) injection 1 mg  Every 15 Minutes PRN      10/08/21 0351    10/08/21 0351  LORazepam (ATIVAN) injection 2 mg  Every 1 Hour PRN      10/08/21 0351    10/08/21 0351  LORazepam (ATIVAN) tablet 2 mg  Every 1 Hour PRN      10/08/21 0351    10/08/21 0340  XR Chest 1 View  1 Time Imaging      10/08/21 0340    10/08/21 0316  Restraints Non-Violent or Non-Self Destructive  Calendar Day      10/08/21 0316    10/07/21 2130  budesonide (PULMICORT) nebulizer solution 0.5 mg  2 Times Daily - RT,   Status:  Discontinued      10/07/21 1757    10/07/21 2118  Initiate Alcohol Withdrawal Protocol  Until Discontinued      10/07/21 2117    10/07/21 2118  Safety Precautions  Continuous      10/07/21 2117    10/07/21 2117  LORazepam (ATIVAN) tablet 0.5 mg  Every 2 Hours PRN      10/07/21 2117    10/07/21 2117  LORazepam (ATIVAN) injection 0.5 mg  Every 2 Hours PRN      10/07/21 2117    10/07/21 2117  LORazepam (ATIVAN) tablet 1 mg  Every 1 Hour PRN      10/07/21 2117    10/07/21 2117  LORazepam (ATIVAN) injection 1 mg  Every 1 Hour PRN      10/07/21 2117    10/07/21 2117  LORazepam (ATIVAN) injection 1 mg  Every 15 Minutes PRN      10/07/21 2117    10/07/21 2117  LORazepam (ATIVAN) injection 1 mg  Every 15 Minutes PRN      10/07/21 2117    10/07/21 2100  sodium chloride 0.9 % flush 10 mL  Every 12 Hours Scheduled      10/07/21 1757    10/07/21 2100  sennosides-docusate (PERICOLACE) 8.6-50 MG per tablet 2 tablet  2 Times Daily      10/07/21 1757    10/07/21 2030  ipratropium-albuterol (DUO-NEB) nebulizer solution 3 mL  4 Times Daily - RT,   Status:  Discontinued      10/07/21 1757    10/07/21 2000  Vital Signs  Every 4 Hours      10/07/21 1757    10/07/21 [address] [place] [address] Withdrawal Assessment  Every 4 Hours      10/07/21 1757    10/07/21 1845  metoprolol tartrate (LOPRESSOR) tablet 50 mg  Every 8 Hours,   Status:  Discontinued      10/07/21 1757    10/07/21 184   amLODIPine (NORVASC) tablet 10 mg  Daily,   Status:  Discontinued      10/07/21 1757    10/07/21 1845  losartan (COZAAR) tablet 100 mg  Daily,   Status:  Discontinued      10/07/21 1757    10/07/21 1845  metoprolol tartrate (LOPRESSOR) tablet 50 mg  Every 8 Hours      10/07/21 1757    10/07/21 1845  multivitamin with minerals 1 tablet  Daily,   Status:  Discontinued      10/07/21 1757    10/07/21 1845  vitamin D (ERGOCALCIFEROL) capsule 50,000 Units  Every 7 Days      10/07/21 1757    10/07/21 1845  enoxaparin (LOVENOX) syringe 30 mg  Every 24 Hours      10/07/21 1757    10/07/21 1845  sodium chloride 0.9 % infusion  Continuous      10/07/21 1757    10/07/21 1845  nicotine (NICODERM CQ) 21 MG/24HR patch 1 patch  Every 24 Hours      10/07/21 1757    10/07/21 1845  thiamine (VITAMIN B-1) tablet 100 mg  Daily,   Status:  Discontinued      10/07/21 1757    10/07/21 1845  multivitamin with minerals 1 tablet  Daily      10/07/21 1757    10/07/21 1845  pantoprazole (PROTONIX) injection 40 mg  Every Early Morning      10/07/21 1757    10/07/21 1758  Intake & Output  Every Shift      10/07/21 1757    10/07/21 1758  Weigh Patient  Once      10/07/21 1757    10/07/21 1758  Oxygen Therapy- Nasal Cannula; Titrate for SPO2: 90% - 95%  Continuous,   Status:  Canceled      10/07/21 1757    10/07/21 1758  Insert Peripheral IV  Once      10/07/21 1757    10/07/21 1758  Saline Lock & Maintain IV Access  Continuous,   Status:  Canceled      10/07/21 1757    10/07/21 1758  Telemetry - Maintain IV Access  Continuous      10/07/21 1757    10/07/21 1758  Continuous Cardiac Monitoring  Continuous      10/07/21 1757    10/07/21 1758  May Be Off Telemetry for Tests  Continuous      10/07/21 1757    10/07/21 1758  ACLS Protocol For Life Threatening Dysrhythmias (Unless Code Status Indicates Otherwise)  Continuous      10/07/21 1757    10/07/21 1758  Notify Provider if ACLS Protocol Activated  Until Discontinued      10/07/21 1757    10/07/21  1758  Activity - Ad Hailee  Until Discontinued      10/07/21 1757    10/07/21 1758  Fall Precautions  Continuous      10/07/21 1757    10/07/21 1758  Seizure Precautions  Continuous      10/07/21 1757    10/07/21 1758  Tobacco Cessation Education  Once      10/07/21 1757    10/07/21 1758  Diet Regular  Diet Effective Now      10/07/21 1757    10/07/21 1758  Inpatient Case Management  Consult  Once     Provider:  (Not yet assigned)    10/07/21 1757    10/07/21 1758  OT Consult: Eval & Treat  Once      10/07/21 1757    10/07/21 1758  PT Consult: Eval & Treat Functional Mobility Below Baseline  Once      10/07/21 1757    10/07/21 1758  XR Chest 1 View  1 Time Imaging,   Status:  Canceled      10/07/21 1757    10/07/21 1758  Tobacco Cessation Education  Prior to Discharge      10/07/21 1757    10/07/21 1758  Initiate Electrolyte Replacement Protocol  Until Discontinued      10/07/21 1757    10/07/21 1758  Patient Currently On Electrolyte Replacement Protocol - Please Refer to MAR for Protocol Details  Misc Nursing Order (Specify)  Daily     Comments: Patient Currently On Electrolyte Replacement Protocol - Please Refer to MAR for Protocol Details    10/07/21 1757    10/07/21 1758  Osmolality, Urine - Urine, Clean Catch  Once      10/07/21 1757    10/07/21 1758  Sodium, Urine, Random - Urine, Clean Catch  Once      10/07/21 1757    10/07/21 1758  Creatinine, Urine, Random - Urine, Clean Catch  Once      10/07/21 1757    10/07/21 1758  Osmolality, Serum  Once      10/07/21 1757    10/07/21 1758  aPTT  Once      10/07/21 1757    10/07/21 1758  Protime-INR  Once      10/07/21 1757    10/07/21 1758  Inpatient Access Center Consult  Once     Provider:  (Not yet assigned)    10/07/21 1757    10/07/21 1757  acetaminophen (TYLENOL) tablet 650 mg  Every 4 Hours PRN      10/07/21 1757    10/07/21 1757  acetaminophen (TYLENOL) 160 MG/5ML solution 650 mg  Every 4 Hours PRN      10/07/21 1757    10/07/21 1757   acetaminophen (TYLENOL) suppository 650 mg  Every 4 Hours PRN      10/07/21 1757    10/07/21 1757  polyethylene glycol (MIRALAX) packet 17 g  Daily PRN      10/07/21 1757    10/07/21 1757  bisacodyl (DULCOLAX) EC tablet 5 mg  Daily PRN      10/07/21 1757    10/07/21 1757  bisacodyl (DULCOLAX) suppository 10 mg  Daily PRN      10/07/21 1757    10/07/21 1757  ondansetron (ZOFRAN) tablet 4 mg  Every 6 Hours PRN      10/07/21 1757    10/07/21 1757  ondansetron (ZOFRAN) injection 4 mg  Every 6 Hours PRN      10/07/21 1757    10/07/21 1757  magnesium sulfate 4 gram infusion - Mg less than or equal to 1mg/dL  As Needed      10/07/21 1757    10/07/21 1757  magnesium sulfate 3 gram infusion (1gm x 3) - Mg 1.1 - 1.5 mg/dL  As Needed      10/07/21 1757    10/07/21 1757  Magnesium Sulfate 2 gram infusion- Mg 1.6 - 1.9 mg/dL  As Needed      10/07/21 1757    10/07/21 1757  traZODone (DESYREL) tablet 50 mg  Nightly PRN,   Status:  Discontinued      10/07/21 1757    10/07/21 1757  sodium chloride 0.9 % flush 10 mL  As Needed      10/07/21 1757    10/07/21 1757  Telemetry - Pulse Oximetry  Continuous PRN,   Status:  Canceled     Comments: If Patient Develops Unresponsiveness, Acute Dyspnea, Cyanosis or Suspected Hypoxemia Start Continuous Pulse Ox Monitoring, Apply Oxygen & Notify Provider    10/07/21 1757    10/07/21 1757  nitroglycerin (NITROSTAT) SL tablet 0.4 mg  Every 5 Minutes PRN      10/07/21 1757    10/07/21 1757  melatonin tablet 5 mg  Nightly PRN      10/07/21 1757    10/07/21 1757  potassium chloride (K-DUR,KLOR-CON) ER tablet 40 mEq  As Needed      10/07/21 1757    10/07/21 1757  potassium chloride (KLOR-CON) packet 40 mEq  As Needed      10/07/21 1757    10/07/21 1757  metoprolol tartrate (LOPRESSOR) injection 5 mg  Every 6 Hours PRN      10/07/21 1757    10/07/21 1757  HYDROcodone-acetaminophen (NORCO) 5-325 MG per tablet 1 tablet  Every 4 Hours PRN      10/07/21 1757    10/07/21 1757  LORazepam (ATIVAN) injection 1  mg  Every 4 Hours PRN,   Status:  Discontinued      10/07/21 1757    10/07/21 1739  DC losartan started in the ER  Nursing Communication  Once     Comments: DC losartan started in the ER    10/07/21 1738    10/07/21 1738  amLODIPine (NORVASC) tablet 10 mg  Every 24 Hours Scheduled,   Status:  Discontinued      10/07/21 1736    10/07/21 1708  Code Status and Medical Interventions:  Continuous      10/07/21 1723    10/07/21 1654  LORazepam (ATIVAN) injection 1 mg  Once      10/07/21 1652    10/07/21 1523  Critical Care  Once     Comments: This order was created via procedure documentation    10/07/21 1522    10/07/21 1501  metoprolol tartrate (LOPRESSOR) injection 5 mg  Once      10/07/21 1459    10/07/21 1416  Inpatient Admission  Once      10/07/21 1415    10/07/21 1354  [facility] (on-call MD unless specified) Details  Once     Specialty:  Hospitalist  Provider:  (Not yet assigned)    10/07/21 1353    10/07/21 1353  enoxaparin (LOVENOX) syringe 120 mg  Once      10/07/21 1351    10/07/21 1353  metoprolol tartrate (LOPRESSOR) tablet 25 mg  Once      10/07/21 1351    10/07/21 1353  metoprolol tartrate (LOPRESSOR) injection 5 mg  Once      10/07/21 1351    10/07/21 1306  Urinalysis, Microscopic Only - Urine, Clean Catch  Once      10/07/21 1305    10/07/21 1256  thiamine (B-1) 100 mg, folic acid 1 mg in sodium chloride 0.9 % 1,000 mL infusion  Once      10/07/21 1254    10/07/21 1256  LORazepam (ATIVAN) injection 2 mg  Once      10/07/21 1254    10/07/21 1254  COVID PRE-OP / PRE-PROCEDURE SCREENING ORDER (NO ISOLATION) - Swab, Nasopharynx  Once      10/07/21 1254    10/07/21 1254  COVID-19,[facility] IN-HOUSE CEPHEID/MAILE NP SWAB IN TRANSPORT MEDIA 8-12 HR TAT - Swab, Nasopharynx  PROCEDURE ONCE      10/07/21 1254    10/07/21 0938  sodium chloride 0.9 % flush 10 mL  As Needed      10/07/21 0683    Unscheduled  Oxygen Therapy- Nasal Cannula; Titrate for SPO2: 90% - 95%  Continuous PRN     Comments: If Patient Develops  Unresponsiveness, Acute Dyspnea, Cyanosis or Suspected Hypoxemia Start Continuous Pulse Ox Monitoring, Apply Oxygen & Notify Provider    10/07/21 1757    Unscheduled  ECG 12 Lead  As Needed     Comments: Nurse to Release if Patient Expericences Acute Chest Pain or Dysrhythmias    10/07/21 1757    Unscheduled  Potassium  As Needed     Comments: For Ventricular Arrhythmias      10/07/21 1757    Unscheduled  Magnesium  As Needed     Comments: For Ventricular Arrhythmias      10/07/21 1757    Unscheduled  Troponin  As Needed     Comments: For Chest Pain      10/07/21 1757    Unscheduled  Digoxin Level  As Needed     Comments: For Atrial Arrhythmias      10/07/21 1757    Unscheduled  Blood Gas, Arterial -  As Needed     Comments: Per O2 PolicyNotify Physician      10/07/21 1757    Unscheduled  Up With Assistance  As Needed      10/07/21 1757    Unscheduled  Follow Acute Urinary Retention Protocol  As Needed      10/07/21 1757    Unscheduled  Magnesium  As Needed      10/07/21 1757    Unscheduled  Potassium  As Needed      10/07/21 1757                   Physician Progress Notes       Humberto Meeks MD at 10/08/21 1244        Progress note    Patient seen and examined this AM.  Remains on the mechanical ventilator and is sedated heavily.  Does have some pedal edema on exam.  Some urine output noted.  Imaging reviewed personally.  Work-up done so far as well as recommendations from prior intensivist noted.  Patient had to be placed on mechanical ventilator a few hours back due to progressive encephalopathy and respiratory failure.  ABG reviewed and ventilator settings are adjusted appropriately.  Continue current sedation and plan discussed with the nurse.  Continue with thiamine and folate supplementation for alcohol abuse.  Patient with DEEP on CKD and will consult nephrology.  No evidence of cirrhosis on prior work-up.  Agree with gentle hydration.  Electrolyte abnormalities per them.  Discussed with   Fritz.  Guarded prognosis    Diagnosis  Acute toxic encephalopathy  Acute respiratory failure s/p mechanical ventilator 10/8  Acute alcohol withdrawal syndrome  Alcohol abuse  DEEP on CKD  Hypomagnesemia  Tobacco abuse  LIVAN  Medical noncompliance    CC-37 mins    Electronically signed by Humberto Meeks MD at 10/08/21 1247     Blanca Thompson APRN at 10/08/21 0346        Paged several times tonight regarding patient's worsening alcohol withdrawal symptoms.  RN reports patient's heart rate and blood pressure are increasing.  RN reported patient appeared short of breath and was continuously attempting to get out of bed.  Bilateral soft wrist restraints were ordered and a stat chest x-ray.  Paged again before chest x-ray was completed due to RN calling a rapid response on the patient for increased respiratory rate, wheezing, confusion, and agitation.  Patient is on CIWA protocol and rapid response team recommended increasing patient's PRN Ativan dose.  Will increase PRN Ativan dosing and initiate scheduled Ativan per CIWA protocol as Librium is not available.  Will deescalate restraints as patient calms down. Chest x-ray is pending.    ALISON Collins  [facility]    Electronically signed by Blanca Thompson APRN at 10/08/21 0410          Consult Notes (last 72 hours) (Notes from 10/05/21 1250 through 10/08/21 1250)      Kimo Hu MD at 10/08/21 1133      Consult Orders    1. Inpatient Nephrology Consult [[id number]] ordered by Humberto Meeks MD at 10/08/21 0843                 [facility] Consult Note      Patient Name: Watson Lisa  : 1955  MRN: [id number]  Primary Care Physician:  Checo Dunham MD  Referring Physician: Britney Triplett MD  Date of admission: 10/7/2021    Subjective     Reason for Consult: Acute kidney injury    HPI:   Watson Lisa is a 66 y.o. male the patient was admitted on 10/7/2021  with alcohol withdrawal he developed acute respiratory failure and was intubated  Apparently the patient has history of alcohol abuse tobacco abuse, COPD, history of DVT, hypertension, alcoholic liver disease, GERD, recent Covid infection  Currently the patient is on the ventilator and he is sedated  He was having tremor and the confusion ended up being intubated yesterday also he had significant drop in his blood pressure in the 70s systolic range currently blood pressure is better.    Review of Systems:   Not obtainable    Personal History     Past Medical History:   Diagnosis Date   • Alcohol abuse    • Anxiety    • Arthritis    • Bronchitis with chronic airway obstruction (HCC)     LAST FEB   • DVT (deep venous thrombosis) (HCC)    • Hiatal hernia    • Hypertension    • Hypertension    • Low back pain    • Neck pain    • Pulmonary embolism (HCC)    • Sleep apnea with use of continuous positive airway pressure (CPAP)     AT NIGHT       Past Surgical History:   Procedure Laterality Date   • COLONOSCOPY     • JOINT REPLACEMENT Right     hip/knee   • REPLACEMENT TOTAL KNEE     • TONSILLECTOMY     • TOTAL HIP ARTHROPLASTY Right        Family History: family history includes Alcohol abuse in his father; Cancer in his brother and mother; Heart disease in his father.    Social History:  reports that he has been smoking cigarettes. He has a 80.00 pack-year smoking history. He has never used smokeless tobacco. He reports current alcohol use. He reports previous drug use. Drug: Hydrocodone.    Home Medications:  Prior to Admission medications    Medication Sig Start Date End Date Taking? Authorizing Provider   acetaminophen (TYLENOL) 325 MG tablet Take 650 mg by mouth Every 6 (Six) Hours As Needed for Mild Pain .   Yes ProviderIraida MD   amLODIPine (NORVASC) 10 MG tablet Take 10 mg by mouth Daily.   Yes ProviderIraida MD   buPROPion SR (Wellbutrin SR) 150 MG 12 hr tablet Take 1 tablet by mouth 2 (Two) Times  a Day. 8/11/21  Yes Checo Dunham MD   losartan (COZAAR) 100 MG tablet Take 1 tablet by mouth Daily. 8/10/21  Yes Checo Dunham MD   metoprolol tartrate (LOPRESSOR) 50 MG tablet Take 1 tablet by mouth Every 8 (Eight) Hours. 1/19/21  Yes Vincent Barnes MD   Multiple Vitamins-Minerals (MULTIVITAMIN ADULT PO) Take 1 tablet by mouth Daily.   Yes ProviderIraida MD   thiamine (VITAMIN B-1) 100 MG tablet  tablet Take 100 mg by mouth Daily.   Yes ProviderIraida MD   traZODone (DESYREL) 50 MG tablet TAKE 1 TABLET AT NIGHT AS NEEDED FOR SLEEP 5/21/21  Yes Checo Dunham MD   vitamin D (ERGOCALCIFEROL) 1.25 MG (56956 UT) capsule capsule Take 1 capsule by mouth Every 7 (Seven) Days. 8/7/20  Yes Checo Dunham MD   aspirin 81 MG chewable tablet Chew 81 mg Daily.    ProviderIraida MD       Allergies:  Allergies   Allergen Reactions   • Penicillins Unknown - Low Severity     Pt does not recall the reaction. Patient tolerated cephalexin 3/2020   • Penicillins Other (See Comments)     Unknown        Objective     Vitals:   Temp:  [98.3 °F (36.8 °C)-98.8 °F (37.1 °C)] 98.8 °F (37.1 °C)  Heart Rate:  [] 68  Resp:  [17-30] 21  BP: ()/() 113/85  Flow (L/min):  [2] 2  FiO2 (%):  [30 %-100 %] 30 %    Intake/Output Summary (Last 24 hours) at 10/8/2021 1134  Last data filed at 10/8/2021 0449  Gross per 24 hour   Intake 1260 ml   Output 425 ml   Net 835 ml       Physical Exam:   Constitutional: On the ventilator, unresponsive, no acute distress.  HEENT: Sclera anicteric, no conjunctival injection, orally intubated  Neck: Supple, no thyromegaly, no lymphadenopathy, trachea at midline, no JVD  Respiratory: Bilateral rhonchi, nonlabored respiration  Cardiovascular: RRR, no murmurs, no rubs or gallops, no carotid bruit  Gastrointestinal: Positive bowel sounds, abdomen is soft, no guarding, distended   : No palpable bladder  Musculoskeletal: 1-2+ pedal edema, no clubbing or cyanosis  Psychiatric:  Unable to assess  Neurologic: Unable to assess  Skin: Warm and dry       Scheduled Meds:     albuterol sulfate HFA, 6 puff, Inhalation, 4x Daily - RT  amLODIPine, 10 mg, Oral, Q24H  enoxaparin, 30 mg, Subcutaneous, Q24H  LORazepam, 0.5 mg, Intravenous, Q6H   Followed by  [START ON 10/9/2021] LORazepam, 0.5 mg, Intravenous, Q8H  LORazepam, 2 mg, Intravenous, Q6H  metoprolol tartrate, 50 mg, Oral, Q8H  multivitamin with minerals, 1 tablet, Oral, Daily  nicotine, 1 patch, Transdermal, Q24H  pantoprazole, 40 mg, Intravenous, Q AM  senna-docusate sodium, 2 tablet, Oral, BID  sodium chloride, 10 mL, Intravenous, Q12H  thiamine (VITAMIN B1) IVPB, 100 mg, Intravenous, Q8H  vitamin D, 50,000 Units, Oral, Q7 Days      IV Meds:   dexmedetomidine, 0.2-1.5 mcg/kg/hr, Last Rate: 0.6 mcg/kg/hr (10/08/21 0912)  propofol, 5-50 mcg/kg/min, Last Rate: 30 mcg/kg/min (10/08/21 1031)  sodium chloride, 100 mL/hr, Last Rate: 100 mL/hr (10/08/21 0934)        Results Reviewed:   I have personally reviewed the results from the time of this admission to 10/8/2021 11:34 EDT     Lab Results   Component Value Date    GLUCOSE 184 (H) 10/08/2021    CALCIUM 8.3 (L) 10/08/2021     10/08/2021    K 4.2 10/08/2021    CO2 22.5 10/08/2021     10/08/2021    BUN 25 (H) 10/08/2021    CREATININE 2.22 (H) 10/08/2021    EGFRIFNONA 30 (L) 10/08/2021    BCR 11.3 10/08/2021    ANIONGAP 14.5 10/08/2021      Lab Results   Component Value Date    MG 1.4 (L) 10/08/2021    PHOS 4.4 10/08/2021    ALBUMIN 4.30 10/08/2021           Assessment / Plan     ASSESSMENT:  1.  Acute kidney injury associated with hypotension most likely ATN, creatinine on admission 1.992.22 this morning, his baseline creatinine around 1.8 as noted on 3/29/2021  2.  Acute respiratory failure on the ventilator  3.  Alcohol abuse with alcohol withdrawal  4.  Alcoholic liver disease  5.  Hypotension, resolved, but earlier in the admission he was severely hypertensive probably related to  his alcohol withdrawal    PLAN:  1.  Place Wood catheter to monitor his I's and O's since he has acute kidney injury  2.  Check random urine for sodium, chloride and protein to creatinine ratio  3.  I agree with the present treatment, except I will discontinue amlodipine for now  4.  Surveillance labs        Thank you for involving us in the care of Watson Lisa.  Please feel free to call with any questions.    Kimo Hu MD  10/08/21  11:34 EDT    [facility]  [phone]      Much of this encounter note is an electronic transcription/translation of spoken language to printed text. The electronic translation of spoken language may permit erroneous, or at times, nonsensical words or phrases to be inadvertently transcribed; Although I have reviewed the note for such errors, some may still exist.    Electronically signed by Kimo Hu MD at 10/08/21 1151     Neto Penaloza MD at 10/08/21 0356          [facility]    Physician requesting consult: Dr. Triplett    Reason: respiratory distress, DT's, critical care management    HPI;  Mr. Lisa is a 65yo WM with a history of alcohol abuse.  His last drink was about 24 hours prior to admission.  He came to the hospital as he does desire to quit drinking and thought he would need medical detox.  He was having increased anxiety and tremors over the past 48 hours, getting worse, moderate in nature. He was getting ativan here in the hospital and was having some hallucincations and confusion earlier tonight.  Just recently he began having incrased respiratory rate, increased confusion and agitation and a rapid response was called.  I ordered him transferred to intensive care unit.  We proceeded with intubation, see separte progress note. Patient unable to provide history, this was obtained from the chart    Past Medical History:   Diagnosis Date   • Alcohol abuse    • Anxiety    • Arthritis    • Bronchitis  with chronic airway obstruction (HCC)     LAST FEB   • DVT (deep venous thrombosis) (HCC)    • Hiatal hernia    • Hypertension    • Hypertension    • Low back pain    • Neck pain    • Pulmonary embolism (HCC)    • Sleep apnea with use of continuous positive airway pressure (CPAP)     AT NIGHT     Social History     Socioeconomic History   • Marital status:      Spouse name: Not on file   • Number of children: Not on file   • Years of education: Not on file   • Highest education level: Not on file   Tobacco Use   • Smoking status: Current Every Day Smoker     Packs/day: 2.00     Years: 40.00     Pack years: 80.00     Types: Cigarettes   • Smokeless tobacco: Never Used   Vaping Use   • Vaping Use: Never used   Substance and Sexual Activity   • Alcohol use: Yes     Comment: Pt reports last drink was 1400 on 10/6/21 (2 pints every day, a fifth on weekends)   • Drug use: Not Currently     Types: Hydrocodone   • Sexual activity: Defer     Family History   Problem Relation Age of Onset   • Cancer Mother    • Heart disease Father    • Alcohol abuse Father    • Cancer Brother      MEDS: reviewed as per chart  ALL: pcn  Ros: UTO    Vital Sign Min/Max for last 24 hours  Temp  Min: 96.8 °F (36 °C)  Max: 98.8 °F (37.1 °C)   BP  Min: 149/120  Max: 185/120   Pulse  Min: 65  Max: 150   Resp  Min: 17  Max: 30   SpO2  Min: 93 %  Max: 97 %   Flow (L/min)  Min: 2  Max: 2   Weight  Min: 118 kg (260 lb)  Max: 118 kg (260 lb)     GEN:   appears ill, obese, confused, agitated  HEENT: PERRL, EOMI, no icterus, mmm, no jvd, trachea midline, neck supple  CHEST: rapid shallow air entry bilat, no wheezes, no crackles  ,+ use of accessory muscles  CV: tachy, regular, no m/g/r  ABD: soft, nt, nd +bs, no hepatosplenomegaly  EXT: no c/c/e  SKIN: no rashes, no xanthomas, nl turgor, clamy, damp  LYMPH: no palpable cervical or supraclavicular lymphadenopathy  NEURO: CN 2-12 grossly intact, limited exam given cooperation  PSYCH: deferred  MSK:  no kyphoscoliosis,     Labs: 10/7: reviewed:  probnp 1106  Mg 1.5  Bun 25  Cr 1.99  bciarb 20.4  Wbc 6.8  hgb 12.9  plts 199  CXR: 10/7: reviewed unremarkable    A/P:  1. Delerium tremens -- will go ahead with intubation, sedation, will continue scheduled ativan, continue thiamine  2. Respiratory failure - will check gas post intubation   3. Alcohol abuse with dependence and withdrawal  4. DEEP vs. CKD -- avoid nephrotoxins, support bp, iv hydration  5. Decreased magnesium -- replace  6. Tobacco abuse -- nicotine patch  7. LIVAN    CC 40 mins excluding billable procedures.        Electronically signed by Neto Penaloza MD at 10/08/21 8309

## 2021-10-08 NOTE — NURSING NOTE
Pt consistently attempting to get out of bed, very unstable and unsteady on feet. RN following CIWA protocol and giving Ativan accordingly. Patient getting more restless and security called to help deescalate the situation and keep patient in bed. Pt placed in 2 point soft restraints. Pt became increasingly restless and agitated. Breathing worsening and respirations increased to 30 w/ wheezing. Stat CXRAY ordered per ALISON Collins. RRT called d/t worsening SOA and HR sustaining 170's. RRT transferred pt to CCU.

## 2021-10-08 NOTE — PLAN OF CARE
Goal Outcome Evaluation:  Plan of Care Reviewed With: patient, son        Progress: no change       Pt in CCU for CIWA. VSS with pt requiring intubation on arrival due to respiratory failure. Propofol given for sedation and propofol drip started. Dr. Penaloza notified due to Critical ABG, hypotension, and pt fighting vent. See new orders. Son updated on plan of care on phone. Continue to monitor.

## 2021-10-08 NOTE — PROGRESS NOTES
Paged several times tonight regarding patient's worsening alcohol withdrawal symptoms.  RN reports patient's heart rate and blood pressure are increasing.  RN reported patient appeared short of breath and was continuously attempting to get out of bed.  Bilateral soft wrist restraints were ordered and a stat chest x-ray.  Paged again before chest x-ray was completed due to RN calling a rapid response on the patient for increased respiratory rate, wheezing, confusion, and agitation.  Patient is on CIWA protocol and rapid response team recommended increasing patient's PRN Ativan dose.  Will increase PRN Ativan dosing and initiate scheduled Ativan per CIWA protocol as Librium is not available.  Will deescalate restraints as patient calms down. Chest x-ray is pending.    ALISON Collins  [facility]

## 2021-10-08 NOTE — CONSULTS
[facility] Consult Note      Patient Name: Watson Lisa  : 1955  MRN: [id number]  Primary Care Physician:  Checo Dunham MD  Referring Physician: Britney Triplett MD  Date of admission: 10/7/2021    Subjective     Reason for Consult: Acute kidney injury    HPI:   Watson Lisa is a 66 y.o. male the patient was admitted on 10/7/2021 with alcohol withdrawal he developed acute respiratory failure and was intubated  Apparently the patient has history of alcohol abuse tobacco abuse, COPD, history of DVT, hypertension, alcoholic liver disease, GERD, recent Covid infection  Currently the patient is on the ventilator and he is sedated  He was having tremor and the confusion ended up being intubated yesterday also he had significant drop in his blood pressure in the 70s systolic range currently blood pressure is better.    Review of Systems:   Not obtainable    Personal History     Past Medical History:   Diagnosis Date   • Alcohol abuse    • Anxiety    • Arthritis    • Bronchitis with chronic airway obstruction (HCC)     LAST FE   • DVT (deep venous thrombosis) (HCC)    • Hiatal hernia    • Hypertension    • Hypertension    • Low back pain    • Neck pain    • Pulmonary embolism (HCC)    • Sleep apnea with use of continuous positive airway pressure (CPAP)     AT NIGHT       Past Surgical History:   Procedure Laterality Date   • COLONOSCOPY     • JOINT REPLACEMENT Right     hip/knee   • REPLACEMENT TOTAL KNEE     • TONSILLECTOMY     • TOTAL HIP ARTHROPLASTY Right        Family History: family history includes Alcohol abuse in his father; Cancer in his brother and mother; Heart disease in his father.    Social History:  reports that he has been smoking cigarettes. He has a 80.00 pack-year smoking history. He has never used smokeless tobacco. He reports current alcohol use. He reports previous drug use. Drug: Hydrocodone.    Home Medications:  Prior to Admission medications     Medication Sig Start Date End Date Taking? Authorizing Provider   acetaminophen (TYLENOL) 325 MG tablet Take 650 mg by mouth Every 6 (Six) Hours As Needed for Mild Pain .   Yes Iraida Dougherty MD   amLODIPine (NORVASC) 10 MG tablet Take 10 mg by mouth Daily.   Yes Iraida Dougherty MD   buPROPion SR (Wellbutrin SR) 150 MG 12 hr tablet Take 1 tablet by mouth 2 (Two) Times a Day. 8/11/21  Yes Checo Dunham MD   losartan (COZAAR) 100 MG tablet Take 1 tablet by mouth Daily. 8/10/21  Yes Checo Dunham MD   metoprolol tartrate (LOPRESSOR) 50 MG tablet Take 1 tablet by mouth Every 8 (Eight) Hours. 1/19/21  Yes Vincent Barnes MD   Multiple Vitamins-Minerals (MULTIVITAMIN ADULT PO) Take 1 tablet by mouth Daily.   Yes Iraida Dougherty MD   thiamine (VITAMIN B-1) 100 MG tablet  tablet Take 100 mg by mouth Daily.   Yes Iraida Dougherty MD   traZODone (DESYREL) 50 MG tablet TAKE 1 TABLET AT NIGHT AS NEEDED FOR SLEEP 5/21/21  Yes Checo Dunham MD   vitamin D (ERGOCALCIFEROL) 1.25 MG (51845 UT) capsule capsule Take 1 capsule by mouth Every 7 (Seven) Days. 8/7/20  Yes Checo Dunham MD   aspirin 81 MG chewable tablet Chew 81 mg Daily.    ProviderIraida MD       Allergies:  Allergies   Allergen Reactions   • Penicillins Unknown - Low Severity     Pt does not recall the reaction. Patient tolerated cephalexin 3/2020   • Penicillins Other (See Comments)     Unknown        Objective     Vitals:   Temp:  [98.3 °F (36.8 °C)-98.8 °F (37.1 °C)] 98.8 °F (37.1 °C)  Heart Rate:  [] 68  Resp:  [17-30] 21  BP: ()/() 113/85  Flow (L/min):  [2] 2  FiO2 (%):  [30 %-100 %] 30 %    Intake/Output Summary (Last 24 hours) at 10/8/2021 1134  Last data filed at 10/8/2021 0449  Gross per 24 hour   Intake 1260 ml   Output 425 ml   Net 835 ml       Physical Exam:   Constitutional: On the ventilator, unresponsive, no acute distress.  HEENT: Sclera anicteric, no conjunctival injection, orally  intubated  Neck: Supple, no thyromegaly, no lymphadenopathy, trachea at midline, no JVD  Respiratory: Bilateral rhonchi, nonlabored respiration  Cardiovascular: RRR, no murmurs, no rubs or gallops, no carotid bruit  Gastrointestinal: Positive bowel sounds, abdomen is soft, no guarding, distended   : No palpable bladder  Musculoskeletal: 1-2+ pedal edema, no clubbing or cyanosis  Psychiatric: Unable to assess  Neurologic: Unable to assess  Skin: Warm and dry       Scheduled Meds:     albuterol sulfate HFA, 6 puff, Inhalation, 4x Daily - RT  amLODIPine, 10 mg, Oral, Q24H  enoxaparin, 30 mg, Subcutaneous, Q24H  LORazepam, 0.5 mg, Intravenous, Q6H   Followed by  [START ON 10/9/2021] LORazepam, 0.5 mg, Intravenous, Q8H  LORazepam, 2 mg, Intravenous, Q6H  metoprolol tartrate, 50 mg, Oral, Q8H  multivitamin with minerals, 1 tablet, Oral, Daily  nicotine, 1 patch, Transdermal, Q24H  pantoprazole, 40 mg, Intravenous, Q AM  senna-docusate sodium, 2 tablet, Oral, BID  sodium chloride, 10 mL, Intravenous, Q12H  thiamine (VITAMIN B1) IVPB, 100 mg, Intravenous, Q8H  vitamin D, 50,000 Units, Oral, Q7 Days      IV Meds:   dexmedetomidine, 0.2-1.5 mcg/kg/hr, Last Rate: 0.6 mcg/kg/hr (10/08/21 0912)  propofol, 5-50 mcg/kg/min, Last Rate: 30 mcg/kg/min (10/08/21 1031)  sodium chloride, 100 mL/hr, Last Rate: 100 mL/hr (10/08/21 0934)        Results Reviewed:   I have personally reviewed the results from the time of this admission to 10/8/2021 11:34 EDT     Lab Results   Component Value Date    GLUCOSE 184 (H) 10/08/2021    CALCIUM 8.3 (L) 10/08/2021     10/08/2021    K 4.2 10/08/2021    CO2 22.5 10/08/2021     10/08/2021    BUN 25 (H) 10/08/2021    CREATININE 2.22 (H) 10/08/2021    EGFRIFNONA 30 (L) 10/08/2021    BCR 11.3 10/08/2021    ANIONGAP 14.5 10/08/2021      Lab Results   Component Value Date    MG 1.4 (L) 10/08/2021    PHOS 4.4 10/08/2021    ALBUMIN 4.30 10/08/2021           Assessment / Plan     ASSESSMENT:  1.   Acute kidney injury associated with hypotension most likely ATN, creatinine on admission 1.992.22 this morning, his baseline creatinine around 1.8 as noted on 3/29/2021  2.  Acute respiratory failure on the ventilator  3.  Alcohol abuse with alcohol withdrawal  4.  Alcoholic liver disease  5.  Hypotension, resolved, but earlier in the admission he was severely hypertensive probably related to his alcohol withdrawal    PLAN:  1.  Place Wood catheter to monitor his I's and O's since he has acute kidney injury  2.  Check random urine for sodium, chloride and protein to creatinine ratio  3.  I agree with the present treatment, except I will discontinue amlodipine for now  4.  Surveillance labs        Thank you for involving us in the care of Watson Lisa.  Please feel free to call with any questions.    Kimo Hu MD  10/08/21  11:34 EDT    [facility]  [phone]      Much of this encounter note is an electronic transcription/translation of spoken language to printed text. The electronic translation of spoken language may permit erroneous, or at times, nonsensical words or phrases to be inadvertently transcribed; Although I have reviewed the note for such errors, some may still exist.

## 2021-10-08 NOTE — SIGNIFICANT NOTE
10/08/21 1439   OTHER   Discipline occupational therapist   Rehab Time/Intention   Session Not Performed other (see comments)  (Pt in CCU, currently on vent. Not appropriate for OT today, will f/u tomorrow or monday pending medical stability.)   Recommendation   OT - Next Appointment 10/09/21

## 2021-10-08 NOTE — PROGRESS NOTES
Progress note    Patient seen and examined this AM.  Remains on the mechanical ventilator and is sedated heavily.  Does have some pedal edema on exam.  Some urine output noted.  Imaging reviewed personally.  Work-up done so far as well as recommendations from prior intensivist noted.  Patient had to be placed on mechanical ventilator a few hours back due to progressive encephalopathy and respiratory failure.  ABG reviewed and ventilator settings are adjusted appropriately.  Continue current sedation and plan discussed with the nurse.  Continue with thiamine and folate supplementation for alcohol abuse.  Patient with DEEP on CKD and will consult nephrology.  No evidence of cirrhosis on prior work-up.  Agree with gentle hydration.  Electrolyte abnormalities per them.  Discussed with Dr. Hu.  Guarded prognosis    Diagnosis  Acute toxic encephalopathy  Acute respiratory failure s/p mechanical ventilator 10/8  Acute alcohol withdrawal syndrome  Alcohol abuse  DEEP on CKD  Hypomagnesemia  Tobacco abuse  LIVAN  Medical noncompliance    CC-37 mins

## 2021-10-09 ENCOUNTER — APPOINTMENT (OUTPATIENT)
Dept: GENERAL RADIOLOGY | Facility: HOSPITAL | Age: 66
End: 2021-10-09

## 2021-10-09 LAB
ALBUMIN SERPL-MCNC: 3.4 G/DL (ref 3.5–5.2)
ALBUMIN/GLOB SERPL: 1.1 G/DL
ALP SERPL-CCNC: 79 U/L (ref 39–117)
ALT SERPL W P-5'-P-CCNC: 27 U/L (ref 1–41)
ANION GAP SERPL CALCULATED.3IONS-SCNC: 9.2 MMOL/L (ref 5–15)
AST SERPL-CCNC: 33 U/L (ref 1–40)
BASOPHILS # BLD AUTO: 0.02 10*3/MM3 (ref 0–0.2)
BASOPHILS NFR BLD AUTO: 0.4 % (ref 0–1.5)
BILIRUB SERPL-MCNC: 0.6 MG/DL (ref 0–1.2)
BUN SERPL-MCNC: 21 MG/DL (ref 8–23)
BUN/CREAT SERPL: 15.1 (ref 7–25)
CALCIUM SPEC-SCNC: 8.1 MG/DL (ref 8.6–10.5)
CHLORIDE SERPL-SCNC: 107 MMOL/L (ref 98–107)
CO2 SERPL-SCNC: 23.8 MMOL/L (ref 22–29)
CREAT SERPL-MCNC: 1.39 MG/DL (ref 0.76–1.27)
DEPRECATED RDW RBC AUTO: 49.9 FL (ref 37–54)
EOSINOPHIL # BLD AUTO: 0.08 10*3/MM3 (ref 0–0.4)
EOSINOPHIL NFR BLD AUTO: 1.4 % (ref 0.3–6.2)
ERYTHROCYTE [DISTWIDTH] IN BLOOD BY AUTOMATED COUNT: 13.5 % (ref 12.3–15.4)
GFR SERPL CREATININE-BSD FRML MDRD: 51 ML/MIN/1.73
GLOBULIN UR ELPH-MCNC: 3 GM/DL
GLUCOSE SERPL-MCNC: 130 MG/DL (ref 65–99)
HCT VFR BLD AUTO: 34.9 % (ref 37.5–51)
HGB BLD-MCNC: 11.4 G/DL (ref 13–17.7)
IMM GRANULOCYTES # BLD AUTO: 0.03 10*3/MM3 (ref 0–0.05)
IMM GRANULOCYTES NFR BLD AUTO: 0.5 % (ref 0–0.5)
LYMPHOCYTES # BLD AUTO: 0.88 10*3/MM3 (ref 0.7–3.1)
LYMPHOCYTES NFR BLD AUTO: 15.6 % (ref 19.6–45.3)
MAGNESIUM SERPL-MCNC: 1.7 MG/DL (ref 1.6–2.4)
MCH RBC QN AUTO: 32.8 PG (ref 26.6–33)
MCHC RBC AUTO-ENTMCNC: 32.7 G/DL (ref 31.5–35.7)
MCV RBC AUTO: 100.3 FL (ref 79–97)
MONOCYTES # BLD AUTO: 0.77 10*3/MM3 (ref 0.1–0.9)
MONOCYTES NFR BLD AUTO: 13.7 % (ref 5–12)
NEUTROPHILS NFR BLD AUTO: 3.85 10*3/MM3 (ref 1.7–7)
NEUTROPHILS NFR BLD AUTO: 68.4 % (ref 42.7–76)
NRBC BLD AUTO-RTO: 0 /100 WBC (ref 0–0.2)
PHOSPHATE SERPL-MCNC: 3.2 MG/DL (ref 2.5–4.5)
PLATELET # BLD AUTO: 124 10*3/MM3 (ref 140–450)
PMV BLD AUTO: 10 FL (ref 6–12)
POTASSIUM SERPL-SCNC: 4.3 MMOL/L (ref 3.5–5.2)
PROT SERPL-MCNC: 6.4 G/DL (ref 6–8.5)
RBC # BLD AUTO: 3.48 10*6/MM3 (ref 4.14–5.8)
SODIUM SERPL-SCNC: 140 MMOL/L (ref 136–145)
URATE SERPL-MCNC: 6 MG/DL (ref 3.4–7)
WBC # BLD AUTO: 5.63 10*3/MM3 (ref 3.4–10.8)

## 2021-10-09 PROCEDURE — 25010000002 PROPOFOL 10 MG/ML EMULSION: Performed by: INTERNAL MEDICINE

## 2021-10-09 PROCEDURE — 74018 RADEX ABDOMEN 1 VIEW: CPT

## 2021-10-09 PROCEDURE — 87040 BLOOD CULTURE FOR BACTERIA: CPT | Performed by: INTERNAL MEDICINE

## 2021-10-09 PROCEDURE — 25010000002 FENTANYL CITRATE (PF) 50 MCG/ML SOLUTION: Performed by: INTERNAL MEDICINE

## 2021-10-09 PROCEDURE — 84550 ASSAY OF BLOOD/URIC ACID: CPT | Performed by: INTERNAL MEDICINE

## 2021-10-09 PROCEDURE — 25010000002 LORAZEPAM PER 2 MG: Performed by: INTERNAL MEDICINE

## 2021-10-09 PROCEDURE — 94799 UNLISTED PULMONARY SVC/PX: CPT

## 2021-10-09 PROCEDURE — 25010000002 LORAZEPAM PER 2 MG: Performed by: NURSE PRACTITIONER

## 2021-10-09 PROCEDURE — 25010000002 ENOXAPARIN PER 10 MG: Performed by: INTERNAL MEDICINE

## 2021-10-09 PROCEDURE — 85025 COMPLETE CBC W/AUTO DIFF WBC: CPT | Performed by: INTERNAL MEDICINE

## 2021-10-09 PROCEDURE — 25010000002 THIAMINE PER 100 MG: Performed by: INTERNAL MEDICINE

## 2021-10-09 PROCEDURE — 94003 VENT MGMT INPAT SUBQ DAY: CPT

## 2021-10-09 PROCEDURE — 25010000002 MAGNESIUM SULFATE 2 GM/50ML SOLUTION: Performed by: INTERNAL MEDICINE

## 2021-10-09 PROCEDURE — 80053 COMPREHEN METABOLIC PANEL: CPT | Performed by: INTERNAL MEDICINE

## 2021-10-09 PROCEDURE — 83735 ASSAY OF MAGNESIUM: CPT | Performed by: INTERNAL MEDICINE

## 2021-10-09 PROCEDURE — 84100 ASSAY OF PHOSPHORUS: CPT | Performed by: INTERNAL MEDICINE

## 2021-10-09 RX ORDER — AMLODIPINE BESYLATE 10 MG/1
10 TABLET ORAL DAILY
Status: DISCONTINUED | OUTPATIENT
Start: 2021-10-09 | End: 2021-10-19 | Stop reason: HOSPADM

## 2021-10-09 RX ORDER — HYDRALAZINE HYDROCHLORIDE 20 MG/ML
10 INJECTION INTRAMUSCULAR; INTRAVENOUS EVERY 4 HOURS PRN
Status: DISCONTINUED | OUTPATIENT
Start: 2021-10-09 | End: 2021-10-19 | Stop reason: HOSPADM

## 2021-10-09 RX ORDER — LABETALOL HYDROCHLORIDE 5 MG/ML
20 INJECTION, SOLUTION INTRAVENOUS
Status: DISCONTINUED | OUTPATIENT
Start: 2021-10-09 | End: 2021-10-19 | Stop reason: HOSPADM

## 2021-10-09 RX ADMIN — DEXMEDETOMIDINE HYDROCHLORIDE 1.5 MCG/KG/HR: 100 INJECTION, SOLUTION, CONCENTRATE INTRAVENOUS at 20:21

## 2021-10-09 RX ADMIN — THIAMINE HYDROCHLORIDE 100 MG: 100 INJECTION, SOLUTION INTRAMUSCULAR; INTRAVENOUS at 20:23

## 2021-10-09 RX ADMIN — MAGNESIUM SULFATE 2 G: 2 INJECTION INTRAVENOUS at 07:48

## 2021-10-09 RX ADMIN — DEXMEDETOMIDINE HYDROCHLORIDE 1.3 MCG/KG/HR: 100 INJECTION, SOLUTION, CONCENTRATE INTRAVENOUS at 04:17

## 2021-10-09 RX ADMIN — PROPOFOL 25 MCG/KG/MIN: 10 INJECTION, EMULSION INTRAVENOUS at 13:42

## 2021-10-09 RX ADMIN — SODIUM CHLORIDE, PRESERVATIVE FREE 10 ML: 5 INJECTION INTRAVENOUS at 20:22

## 2021-10-09 RX ADMIN — ALBUTEROL SULFATE 6 PUFF: 90 AEROSOL, METERED RESPIRATORY (INHALATION) at 07:34

## 2021-10-09 RX ADMIN — DEXMEDETOMIDINE HYDROCHLORIDE 1.5 MCG/KG/HR: 100 INJECTION, SOLUTION, CONCENTRATE INTRAVENOUS at 07:00

## 2021-10-09 RX ADMIN — NICOTINE 1 PATCH: 21 PATCH, EXTENDED RELEASE TRANSDERMAL at 18:09

## 2021-10-09 RX ADMIN — ALBUTEROL SULFATE 6 PUFF: 90 AEROSOL, METERED RESPIRATORY (INHALATION) at 11:32

## 2021-10-09 RX ADMIN — FENTANYL CITRATE 50 MCG: 50 INJECTION, SOLUTION INTRAMUSCULAR; INTRAVENOUS at 06:23

## 2021-10-09 RX ADMIN — LORAZEPAM 2 MG: 2 INJECTION INTRAMUSCULAR; INTRAVENOUS at 08:13

## 2021-10-09 RX ADMIN — MULTIPLE VITAMINS W/ MINERALS TAB 1 TABLET: TAB at 08:13

## 2021-10-09 RX ADMIN — LORAZEPAM 2 MG: 2 INJECTION INTRAMUSCULAR; INTRAVENOUS at 06:13

## 2021-10-09 RX ADMIN — LORAZEPAM 2 MG: 2 INJECTION INTRAMUSCULAR; INTRAVENOUS at 05:22

## 2021-10-09 RX ADMIN — PROPOFOL 25 MCG/KG/MIN: 10 INJECTION, EMULSION INTRAVENOUS at 08:29

## 2021-10-09 RX ADMIN — DEXMEDETOMIDINE HYDROCHLORIDE 1.3 MCG/KG/HR: 100 INJECTION, SOLUTION, CONCENTRATE INTRAVENOUS at 01:10

## 2021-10-09 RX ADMIN — METOPROLOL TARTRATE 50 MG: 50 TABLET, FILM COATED ORAL at 04:05

## 2021-10-09 RX ADMIN — FENTANYL CITRATE 50 MCG: 50 INJECTION, SOLUTION INTRAMUSCULAR; INTRAVENOUS at 01:15

## 2021-10-09 RX ADMIN — METRONIDAZOLE 500 MG: 500 INJECTION, SOLUTION INTRAVENOUS at 04:29

## 2021-10-09 RX ADMIN — METOPROLOL TARTRATE 50 MG: 50 TABLET, FILM COATED ORAL at 18:10

## 2021-10-09 RX ADMIN — LORAZEPAM 2 MG: 2 INJECTION INTRAMUSCULAR; INTRAVENOUS at 17:42

## 2021-10-09 RX ADMIN — DOCUSATE SODIUM 50MG AND SENNOSIDES 8.6MG 2 TABLET: 8.6; 5 TABLET, FILM COATED ORAL at 08:13

## 2021-10-09 RX ADMIN — ACETAMINOPHEN 650 MG: 325 TABLET, FILM COATED ORAL at 13:11

## 2021-10-09 RX ADMIN — DEXMEDETOMIDINE HYDROCHLORIDE 1.5 MCG/KG/HR: 100 INJECTION, SOLUTION, CONCENTRATE INTRAVENOUS at 12:12

## 2021-10-09 RX ADMIN — ALBUTEROL SULFATE 6 PUFF: 90 AEROSOL, METERED RESPIRATORY (INHALATION) at 19:17

## 2021-10-09 RX ADMIN — DEXMEDETOMIDINE HYDROCHLORIDE 1.5 MCG/KG/HR: 100 INJECTION, SOLUTION, CONCENTRATE INTRAVENOUS at 17:41

## 2021-10-09 RX ADMIN — THIAMINE HYDROCHLORIDE 100 MG: 100 INJECTION, SOLUTION INTRAMUSCULAR; INTRAVENOUS at 12:23

## 2021-10-09 RX ADMIN — PANTOPRAZOLE SODIUM 40 MG: 40 INJECTION, POWDER, FOR SOLUTION INTRAVENOUS at 06:18

## 2021-10-09 RX ADMIN — DEXMEDETOMIDINE HYDROCHLORIDE 1.5 MCG/KG/HR: 100 INJECTION, SOLUTION, CONCENTRATE INTRAVENOUS at 09:41

## 2021-10-09 RX ADMIN — FENTANYL CITRATE 50 MCG: 50 INJECTION, SOLUTION INTRAMUSCULAR; INTRAVENOUS at 02:42

## 2021-10-09 RX ADMIN — SODIUM CHLORIDE 100 ML/HR: 9 INJECTION, SOLUTION INTRAVENOUS at 12:56

## 2021-10-09 RX ADMIN — PROPOFOL 25 MCG/KG/MIN: 10 INJECTION, EMULSION INTRAVENOUS at 04:21

## 2021-10-09 RX ADMIN — PROPOFOL 25 MCG/KG/MIN: 10 INJECTION, EMULSION INTRAVENOUS at 19:36

## 2021-10-09 RX ADMIN — LORAZEPAM 2 MG: 2 INJECTION INTRAMUSCULAR; INTRAVENOUS at 22:55

## 2021-10-09 RX ADMIN — LORAZEPAM 2 MG: 2 INJECTION INTRAMUSCULAR; INTRAVENOUS at 01:35

## 2021-10-09 RX ADMIN — THIAMINE HYDROCHLORIDE 100 MG: 100 INJECTION, SOLUTION INTRAMUSCULAR; INTRAVENOUS at 04:25

## 2021-10-09 RX ADMIN — LORAZEPAM 2 MG: 2 INJECTION INTRAMUSCULAR; INTRAVENOUS at 10:54

## 2021-10-09 RX ADMIN — SODIUM CHLORIDE, PRESERVATIVE FREE 10 ML: 5 INJECTION INTRAVENOUS at 08:14

## 2021-10-09 RX ADMIN — DEXMEDETOMIDINE HYDROCHLORIDE 1.5 MCG/KG/HR: 100 INJECTION, SOLUTION, CONCENTRATE INTRAVENOUS at 14:50

## 2021-10-09 RX ADMIN — ACETAMINOPHEN ORAL SOLUTION 650 MG: 325 SOLUTION ORAL at 18:09

## 2021-10-09 RX ADMIN — METOPROLOL TARTRATE 50 MG: 50 TABLET, FILM COATED ORAL at 09:52

## 2021-10-09 RX ADMIN — AMLODIPINE BESYLATE 10 MG: 10 TABLET ORAL at 10:54

## 2021-10-09 RX ADMIN — FENTANYL CITRATE 50 MCG: 50 INJECTION, SOLUTION INTRAMUSCULAR; INTRAVENOUS at 23:07

## 2021-10-09 RX ADMIN — ALBUTEROL SULFATE 6 PUFF: 90 AEROSOL, METERED RESPIRATORY (INHALATION) at 15:10

## 2021-10-09 RX ADMIN — DEXMEDETOMIDINE HYDROCHLORIDE 1.5 MCG/KG/HR: 100 INJECTION, SOLUTION, CONCENTRATE INTRAVENOUS at 22:48

## 2021-10-09 RX ADMIN — ENOXAPARIN SODIUM 40 MG: 40 INJECTION SUBCUTANEOUS at 20:22

## 2021-10-09 NOTE — PROGRESS NOTES
Name: Watson Lisa ADMIT: 10/7/2021   : 1955  PCP: Checo Dunham MD    MRN: [id number] LOS: 2 days   AGE/SEX: 66 y.o. male  ROOM: [facility]     Subjective   Subjective   Patient continues to be intubated.  Positive fever.  No seizures.  No nausea or vomiting.  Good urine output.  On tube feeds   Objective   Objective   Vital Signs  Temp:  [98.4 °F (36.9 °C)-103 °F (39.4 °C)] 103 °F (39.4 °C)  Heart Rate:  [] 108  Resp:  [23-30] 30  BP: (108-150)/() 114/95  FiO2 (%):  [30 %] 30 %  SpO2:  [93 %-99 %] 98 %  on   ;   Device (Oxygen Therapy): ventilator    Intake/Output Summary (Last 24 hours) at 10/9/2021 1908  Last data filed at 10/9/2021 1755  Gross per 24 hour   Intake 4731.5 ml   Output 1030 ml   Net 3701.5 ml     Body mass index is 31.85 kg/m².      10/07/21  1102 10/08/21  0926 10/09/21  0352   Weight: 118 kg (260 lb) 118 kg (260 lb) (not weighed by RD) 125 kg (275 lb 9.2 oz)     Physical Exam    General.  Intubated and sedated.  Appears diaphoretic.  Eyes.  Pupils equal round and reactive no pallor or jaundice.  Oral cavity.  Endotracheal tube in place and nasogastric tube in place.  Mildly dry mucous membrane.  Neck.  Supple.  No JVD.  Chest.  Poor but clear to auscultation bilaterally with no added sounds.  Cardiovascular.  Regular rate and rhythm with no gallops or murmurs.  Mild tachycardia  Abdomen.  The mildly distended.  Soft lax.  No tenderness.  No organomegaly.  No guarding or rebound.  Extremities.  Trace bilateral lower extremity edema.    Results Review:      Results from last 7 days   Lab Units 10/09/21  0443 10/08/21  0443 10/07/21  1048   SODIUM mmol/L 140 139 142   POTASSIUM mmol/L 4.3 4.2 4.5   CHLORIDE mmol/L 107 102 103   CO2 mmol/L 23.8 22.5 20.4*   BUN mg/dL 21 25* 24*   CREATININE mg/dL 1.39* 2.22* 1.99*   GLUCOSE mg/dL 130* 184* 98   CALCIUM mg/dL 8.1* 8.3* 8.6   AST (SGOT) U/L 33 43* 51*   ALT (SGPT) U/L 27 30 34     Estimated Creatinine Clearance: 76.6 mL/min  (A) (by C-G formula based on SCr of 1.39 mg/dL (H)).      Results from last 7 days   Lab Units 10/08/21  0759   GLUCOSE mg/dL 150*     Results from last 7 days   Lab Units 10/07/21  1048   TROPONIN T ng/mL 0.020     Results from last 7 days   Lab Units 10/07/21  1102   PROBNP pg/mL 1,106.0*         Results from last 7 days   Lab Units 10/09/21  0443 10/08/21  0443 10/07/21  1102   MAGNESIUM mg/dL 1.7 1.4* 1.5*           Invalid input(s): LDLCALC  Results from last 7 days   Lab Units 10/09/21  0443 10/08/21  0443 10/08/21  0443 10/07/21  1048 10/07/21  1048   WBC 10*3/mm3 5.63  --  7.03  --  6.86   HEMOGLOBIN g/dL 11.4*  --  12.7*  --  12.9*   HEMATOCRIT % 34.9*  --  36.2*  --  38.1   PLATELETS 10*3/mm3 124*  --  240  --  199   MCV fL 100.3*   < > 96.0   < > 96.9   MCH pg 32.8   < > 33.7*   < > 32.8   MCHC g/dL 32.7   < > 35.1   < > 33.9   RDW % 13.5   < > 13.6   < > 13.9   RDW-SD fl 49.9   < > 47.4   < > 49.2   MPV fL 10.0   < > 9.5   < > 9.1   NEUTROPHIL % % 68.4   < > 74.4   < > 77.2*   LYMPHOCYTE % % 15.6*   < > 12.5*   < > 9.9*   MONOCYTES % % 13.7*   < > 11.2   < > 11.4   EOSINOPHIL % % 1.4   < > 0.6   < > 0.4   BASOPHIL % % 0.4   < > 0.7   < > 0.4   IMM GRAN % % 0.5   < > 0.6*   < > 0.7*   NEUTROS ABS 10*3/mm3 3.85   < > 5.23   < > 5.29   LYMPHS ABS 10*3/mm3 0.88   < > 0.88   < > 0.68*   MONOS ABS 10*3/mm3 0.77   < > 0.79   < > 0.78   EOS ABS 10*3/mm3 0.08   < > 0.04   < > 0.03   BASOS ABS 10*3/mm3 0.02   < > 0.05   < > 0.03   IMMATURE GRANS (ABS) 10*3/mm3 0.03   < > 0.04   < > 0.05   NRBC /100 WBC 0.0   < > 0.0   < > 0.0    < > = values in this interval not displayed.     Results from last 7 days   Lab Units 10/07/21  1836   INR  1.02   APTT seconds 43.0*     Results from last 7 days   Lab Units 10/08/21  0506   PH, ARTERIAL pH units 7.270*   PO2 ART mm Hg 295.0*   PCO2, ARTERIAL mm Hg 56.1*   HCO3 ART mmol/L 25.8             Results from last 7 days   Lab Units 10/07/21  1048   LIPASE U/L 25              Results from last 7 days   Lab Units 10/07/21  1245   NITRITE UA  Negative   WBC UA /HPF 0-2   BACTERIA UA /HPF None Seen   SQUAM EPITHEL UA /HPF 0-2     Results from last 7 days   Lab Units 10/09/21  0443 10/08/21  1215   SODIUM UR mmol/L  --  49   CREATININE UR mg/dL  --  197.8   CHLORIDE UR mmol/L  --  42   PROTEIN TOTAL URINE mg/dL  --  39.0   URIC ACID mg/dL 6.0  --        Imaging:  Imaging Results (Last 24 Hours)     ** No results found for the last 24 hours. **             I reviewed the patient's new clinical results / labs / tests / procedures      Assessment/Plan     Active Hospital Problems    Diagnosis  POA   • **Delirium tremens (HCC) [F10.231]  Yes   • Acute respiratory failure with hypercapnia (HCC) [J96.02]  Yes   • Aspiration pneumonia (HCC) [J69.0]  No   • Alcohol abuse [F10.10]  Yes   • Alcoholic liver disease (HCC) [K70.9]  Yes   • Tobacco abuse [Z72.0]  Yes   • Hiatal hernia [K44.9]  Yes   • COPD (chronic obstructive pulmonary disease) (HCC) [J44.9]  Yes   • Anemia, chronic disease [D63.8]  Yes   • Acute kidney failure (HCC) [N17.9]  Yes   • Hypomagnesemia [E83.42]  Yes   • Essential hypertension [I10]  Yes      Resolved Hospital Problems   No resolved problems to display.       · Alcohol withdrawal syndrome/delirium remains.  Requiring intubation because of acute respiratory failure and to protect airways secondary to heavy sedation.  Currently on diprovan//Precedex/Ativan.  Continue with detoxification.  Intensivist adjusting ventilator setting.  Pulmonologist note wean off tomorrow.  · Acute kidney failure.  Most likely secondary to dehydration leading to prerenal azotemia and acute tubular necrosis.  Appears euvolemic to mildly volume overloaded today.  Losartan stopped.  Currently on normal saline at 100.  Improving renal function with good urine output.  Nephrology is following.    · Alcoholic liver disease/elevated liver function test/hiatal hernia/tube feeds.  Benign GI examination.   Stable elevation of the liver function test.    Stable liver function test.  Continue Protonix.   · Episodes of hypertension.  Norvasc resumed.  Currently on Lopressor.  Good blood pressure control.  · Hypomagnesemia.    Continue substitution.    · Hypertension.  Was suboptimally controlled secondary to alcohol withdrawal.    Good controlled now on Lopressor .  Losartan DC'd secondary to the renal failure.  Norvasc DC'd secondary to low blood pressure. There is no evidence of angina or congestive heart failure.  · Tobacco abuse.  · History of obstructive sleep apnea and COPD/aspiration pneumonia.  Pulmonologist has stopped antibiotics since there is no leukocytosis and procalcitonin was normal and a negative chest x-ray by pulmonologist reading.  Positive fever today.  We will continue to monitor.  Will check blood cultures.    Currently intubated.  Continue albuterol.  Pulmonary and intensivist following.  · Anemia of chronic disease.  Hemoglobin stable.  Will monitor.  · VTE prophylaxis.  Lovenox renal dose.       · Disposition.  To be determined based on clinical course.      Britney Triplett MD  [facility]  10/09/21  19:08 EDT        Review of Systems

## 2021-10-09 NOTE — PROGRESS NOTES
"  PROGRESS NOTE  Patient Name: Watson Lisa  Age/Sex: 66 y.o. male  : 1955  MRN: [id number]    Date of Admission: 10/7/2021  Date of Encounter Visit: 10/09/21   LOS: 2 days   Patient Care Team:  Checo Dunham MD as PCP - General (Family Medicine)  Checo Dunham MD as PCP - Family Medicine  Checo Dunham MD (Family Medicine)    Chief Complaint: Delirium tremens, respiratory failure    Hospital course: Admitted with alcohol withdrawal, was intubated on 10/8/2021 and was started on antibiotic, currently on mechanical ventilator, on IV sedation, is hypertensive specially diastolic was tachycardia despite the scheduled blood pressure medications.  Patient is sedated, unable to give me any information the case was discussed with the nursing staff at the bedside and events and notes from yesterday were reviewed       REVIEW OF SYSTEMS:   CONSTITUTIONAL: no fever or chills  Otherwise limited system     Ventilator/Non-Invasive Ventilation Settings (From admission, onward)             Start     Ordered    10/08/21 0553  Ventilator - AC/VC; (30); (75); 88%; 7.5; 500  Continuous        Question Answer Comment   Vent Mode AC/VC    Breath rate  30   FiO2  75   FiO2 titrate for Sp02% =/> 88%    PEEP 7.5    Tidal Volume 500        10/08/21 0553                  Vital Signs  Temp:  [98.4 °F (36.9 °C)-99.6 °F (37.6 °C)] 99.6 °F (37.6 °C)  Heart Rate:  [] 107  Resp:  [23-29] 29  BP: (108-148)/() 137/94  FiO2 (%):  [30 %] 30 %  SpO2:  [93 %-99 %] 96 %  on    Device (Oxygen Therapy): ventilator    Intake/Output Summary (Last 24 hours) at 10/9/2021 1209  Last data filed at 10/9/2021 0800  Gross per 24 hour   Intake 3530.5 ml   Output 800 ml   Net 2730.5 ml     Flowsheet Rows      First Filed Value   Admission Height 198.1 cm (78\") Documented at 10/07/2021 1102   Admission Weight 118 kg (260 lb) Documented at 10/07/2021 1102        Body mass index is 31.85 kg/m².      10/07/21  1102 10/08/21  0926 " 10/09/21  0352   Weight: 118 kg (260 lb) 118 kg (260 lb) (not weighed by RD) 125 kg (275 lb 9.2 oz)       Physical Exam:  GEN: Sickly looking, sedated, orally intubated, on the ventilator  EYES:   Sclerae clear. No icterus. PERRL. Normal EOM  ENT:   External ears/nose normal, no oral lesions, no thrush, mucous membranes moist  NECK:  Supple, midline trachea, no JVD  LUNGS: Normal chest on inspection, positive rhonchi but no wheezes or crackles. Respirations regular, even and breathing over the ventilator with evidence of air hunger.   CV:  Regular rhythm and rapid heart rate. Normal S1/S2. No murmurs, gallops, or rubs noted.  ABD:  Soft, nontender and nondistended. Normal bowel sounds. No guarding, truncal obesity   EXT:  Moves all extremities well. No cyanosis. No redness. No edema.   Skin: Dry, intact, no bleeding    Results Review:        Results from last 7 days   Lab Units 10/09/21  0443 10/08/21  0443 10/07/21  1048   SODIUM mmol/L 140 139 142   POTASSIUM mmol/L 4.3 4.2 4.5   CHLORIDE mmol/L 107 102 103   CO2 mmol/L 23.8 22.5 20.4*   BUN mg/dL 21 25* 24*   CREATININE mg/dL 1.39* 2.22* 1.99*   CALCIUM mg/dL 8.1* 8.3* 8.6   AST (SGOT) U/L 33 43* 51*   ALT (SGPT) U/L 27 30 34   ANION GAP mmol/L 9.2 14.5 18.6*   ALBUMIN g/dL 3.40* 4.30 4.40     Results from last 7 days   Lab Units 10/07/21  1048   TROPONIN T ng/mL 0.020         Results from last 7 days   Lab Units 10/07/21  1102   PROBNP pg/mL 1,106.0*     Results from last 7 days   Lab Units 10/09/21  0443 10/08/21  0443 10/07/21  1048   WBC 10*3/mm3 5.63 7.03 6.86   HEMOGLOBIN g/dL 11.4* 12.7* 12.9*   HEMATOCRIT % 34.9* 36.2* 38.1   PLATELETS 10*3/mm3 124* 240 199   MCV fL 100.3* 96.0 96.9   NEUTROPHIL % % 68.4 74.4 77.2*   LYMPHOCYTE % % 15.6* 12.5* 9.9*   MONOCYTES % % 13.7* 11.2 11.4   EOSINOPHIL % % 1.4 0.6 0.4   BASOPHIL % % 0.4 0.7 0.4   IMM GRAN % % 0.5 0.6* 0.7*     Results from last 7 days   Lab Units 10/07/21  1836   INR  1.02   APTT seconds 43.0*      Results from last 7 days   Lab Units 10/09/21  0443 10/08/21  0443 10/07/21  1102   MAGNESIUM mg/dL 1.7 1.4* 1.5*           Invalid input(s): LDLCALC  Results from last 7 days   Lab Units 10/08/21  0506   PH, ARTERIAL pH units 7.270*   PCO2, ARTERIAL mm Hg 56.1*   PO2 ART mm Hg 295.0*   HCO3 ART mmol/L 25.8         Glucose   Date/Time Value Ref Range Status   10/08/2021 0759 150 (H) 70 - 130 mg/dL Final     Comment:     Meter: [id number] : [id number] Casper CUEVA RN             Results from last 7 days   Lab Units 10/07/21  1245   NITRITE UA  Negative   WBC UA /HPF 0-2   BACTERIA UA /HPF None Seen   SQUAM EPITHEL UA /HPF 0-2     Results from last 7 days   Lab Units 10/07/21  1303   COVID19  Not Detected     Results from last 7 days   Lab Units 10/09/21  0443 10/08/21  1215   SODIUM UR mmol/L  --  49   CREATININE UR mg/dL  --  197.8   CHLORIDE UR mmol/L  --  42   PROTEIN TOTAL URINE mg/dL  --  39.0   URIC ACID mg/dL 6.0  --            Imaging:   Imaging Results (All)     Procedure Component Value Units Date/Time     I reviewed the patient's new clinical results.  I personally viewed and interpreted the patient's imaging results: ET tube above the [place], some bibasilar atelectasis but no obvious focal pneumonia      Medication Review:   albuterol sulfate HFA, 6 puff, Inhalation, 4x Daily - RT  amLODIPine, 10 mg, Oral, Daily  enoxaparin, 40 mg, Subcutaneous, Q24H  levoFLOXacin, 750 mg, Intravenous, Q48H  LORazepam, 2 mg, Intravenous, Q6H  metoprolol tartrate, 50 mg, Oral, Q8H  metroNIDAZOLE, 500 mg, Intravenous, Q8H  multivitamin with minerals, 1 tablet, Oral, Daily  nicotine, 1 patch, Transdermal, Q24H  pantoprazole, 40 mg, Intravenous, Q AM  senna-docusate sodium, 2 tablet, Oral, BID  sodium chloride, 10 mL, Intravenous, Q12H  thiamine (VITAMIN B1) IVPB, 100 mg, Intravenous, Q8H  vitamin D, 50,000 Units, Oral, Q7 Days        dexmedetomidine, 0.2-1.5 mcg/kg/hr, Last Rate: 1.5 mcg/kg/hr (10/09/21  0941)  propofol, 5-50 mcg/kg/min, Last Rate: 25 mcg/kg/min (10/09/21 0829)  sodium chloride, 100 mL/hr, Last Rate: 100 mL/hr (10/08/21 0934)        ASSESSMENT:   1. Acute toxic encephalopathy  2. Acute respiratory failure s/p mechanical ventilator 10/8  3. Acute alcohol withdrawal syndrome with delirium tremens  4. Alcohol abuse   5. DEEP on CKD  6. Hypomagnesemia  7. Tobacco abuse  8. LIVAN  9. Medical noncompliance    PLAN:  No leukocytosis or fever with negative procalcitonin, I will go ahead and discontinue the antibiotic, will repeat the chest x-ray in the a.m. and will be addressed if it becomes necessary  Ventricular adjusted, patient is requiring a larger tidal volume  Continue with the current sedation  We will attempt weaning tomorrow  Patient is to have scheduled and as needed blood pressure medication, amlodipine was ordered this morning and we will have hydralazine as well as as needed labetalol specially if the tachycardia persist  Feeding as tolerated  Readdress the need for the antibiotic tomorrow depending on the overall profile, will order a chest x-ray since I do not see any pneumonia on the initial film  Discussed with the nursing staff at the bedside      Disposition: Depending on hospital course    Gene Lujan MD  10/09/21  12:09 EDT      Time: Critical care 34 min      Dictated utilizing Dragon dictation

## 2021-10-09 NOTE — PLAN OF CARE
Goal Outcome Evaluation:      Pt remains vented in restraints in increasing ETOH withdrawal AEB 12 mg iv ativan given as well as 2 mg po ativan 50 mcg of fent given times 4 as well as loratab times one unable to wean actually now maxed on precedex at 1.5 and propofol at 25 grisel. Am labs creatine improving mag 1.7  restraints needed to protect lines and ett WCM

## 2021-10-09 NOTE — PLAN OF CARE
Goal Outcome Evaluation:  Plan of Care Reviewed With: son        Progress: no change  Remains sedated on vent in CCU.  Temp max 103  Dr Lujan notified.  Tylenol given and blood cultures sent

## 2021-10-10 ENCOUNTER — APPOINTMENT (OUTPATIENT)
Dept: GENERAL RADIOLOGY | Facility: HOSPITAL | Age: 66
End: 2021-10-10

## 2021-10-10 LAB
ALBUMIN SERPL-MCNC: 3.1 G/DL (ref 3.5–5.2)
ALBUMIN/GLOB SERPL: 0.9 G/DL
ALP SERPL-CCNC: 80 U/L (ref 39–117)
ALT SERPL W P-5'-P-CCNC: 23 U/L (ref 1–41)
ANION GAP SERPL CALCULATED.3IONS-SCNC: 12.4 MMOL/L (ref 5–15)
AST SERPL-CCNC: 26 U/L (ref 1–40)
BASOPHILS # BLD AUTO: 0.03 10*3/MM3 (ref 0–0.2)
BASOPHILS NFR BLD AUTO: 0.3 % (ref 0–1.5)
BILIRUB SERPL-MCNC: 0.5 MG/DL (ref 0–1.2)
BUN SERPL-MCNC: 24 MG/DL (ref 8–23)
BUN/CREAT SERPL: 16.1 (ref 7–25)
CALCIUM SPEC-SCNC: 7.9 MG/DL (ref 8.6–10.5)
CHLORIDE SERPL-SCNC: 108 MMOL/L (ref 98–107)
CO2 SERPL-SCNC: 19.6 MMOL/L (ref 22–29)
CREAT SERPL-MCNC: 1.49 MG/DL (ref 0.76–1.27)
DEPRECATED RDW RBC AUTO: 46.5 FL (ref 37–54)
EOSINOPHIL # BLD AUTO: 0.01 10*3/MM3 (ref 0–0.4)
EOSINOPHIL NFR BLD AUTO: 0.1 % (ref 0.3–6.2)
ERYTHROCYTE [DISTWIDTH] IN BLOOD BY AUTOMATED COUNT: 13.1 % (ref 12.3–15.4)
GFR SERPL CREATININE-BSD FRML MDRD: 47 ML/MIN/1.73
GLOBULIN UR ELPH-MCNC: 3.3 GM/DL
GLUCOSE SERPL-MCNC: 127 MG/DL (ref 65–99)
HCT VFR BLD AUTO: 32.8 % (ref 37.5–51)
HGB BLD-MCNC: 11.3 G/DL (ref 13–17.7)
IMM GRANULOCYTES # BLD AUTO: 0.05 10*3/MM3 (ref 0–0.05)
IMM GRANULOCYTES NFR BLD AUTO: 0.5 % (ref 0–0.5)
LYMPHOCYTES # BLD AUTO: 1.04 10*3/MM3 (ref 0.7–3.1)
LYMPHOCYTES NFR BLD AUTO: 10.7 % (ref 19.6–45.3)
MCH RBC QN AUTO: 33.2 PG (ref 26.6–33)
MCHC RBC AUTO-ENTMCNC: 34.5 G/DL (ref 31.5–35.7)
MCV RBC AUTO: 96.5 FL (ref 79–97)
MONOCYTES # BLD AUTO: 1.07 10*3/MM3 (ref 0.1–0.9)
MONOCYTES NFR BLD AUTO: 11 % (ref 5–12)
NEUTROPHILS NFR BLD AUTO: 7.54 10*3/MM3 (ref 1.7–7)
NEUTROPHILS NFR BLD AUTO: 77.4 % (ref 42.7–76)
NRBC BLD AUTO-RTO: 0 /100 WBC (ref 0–0.2)
PLATELET # BLD AUTO: 104 10*3/MM3 (ref 140–450)
PMV BLD AUTO: 11.2 FL (ref 6–12)
POTASSIUM SERPL-SCNC: 3.9 MMOL/L (ref 3.5–5.2)
PROCALCITONIN SERPL-MCNC: 0.74 NG/ML (ref 0–0.25)
PROT SERPL-MCNC: 6.4 G/DL (ref 6–8.5)
RBC # BLD AUTO: 3.4 10*6/MM3 (ref 4.14–5.8)
SODIUM SERPL-SCNC: 140 MMOL/L (ref 136–145)
WBC # BLD AUTO: 9.74 10*3/MM3 (ref 3.4–10.8)

## 2021-10-10 PROCEDURE — 25010000002 ENOXAPARIN PER 10 MG: Performed by: INTERNAL MEDICINE

## 2021-10-10 PROCEDURE — 85025 COMPLETE CBC W/AUTO DIFF WBC: CPT | Performed by: INTERNAL MEDICINE

## 2021-10-10 PROCEDURE — 94799 UNLISTED PULMONARY SVC/PX: CPT

## 2021-10-10 PROCEDURE — 25010000002 LORAZEPAM PER 2 MG: Performed by: NURSE PRACTITIONER

## 2021-10-10 PROCEDURE — 80053 COMPREHEN METABOLIC PANEL: CPT | Performed by: INTERNAL MEDICINE

## 2021-10-10 PROCEDURE — 25010000002 THIAMINE PER 100 MG: Performed by: INTERNAL MEDICINE

## 2021-10-10 PROCEDURE — 25010000002 LORAZEPAM PER 2 MG: Performed by: INTERNAL MEDICINE

## 2021-10-10 PROCEDURE — 25010000002 FENTANYL CITRATE (PF) 50 MCG/ML SOLUTION: Performed by: INTERNAL MEDICINE

## 2021-10-10 PROCEDURE — 84145 PROCALCITONIN (PCT): CPT | Performed by: INTERNAL MEDICINE

## 2021-10-10 PROCEDURE — 25010000002 LEVOFLOXACIN PER 250 MG: Performed by: INTERNAL MEDICINE

## 2021-10-10 PROCEDURE — 94003 VENT MGMT INPAT SUBQ DAY: CPT

## 2021-10-10 PROCEDURE — 25010000002 PROPOFOL 10 MG/ML EMULSION: Performed by: INTERNAL MEDICINE

## 2021-10-10 PROCEDURE — 71045 X-RAY EXAM CHEST 1 VIEW: CPT

## 2021-10-10 RX ORDER — LEVOFLOXACIN 5 MG/ML
750 INJECTION, SOLUTION INTRAVENOUS EVERY 24 HOURS
Status: DISCONTINUED | OUTPATIENT
Start: 2021-10-10 | End: 2021-10-14

## 2021-10-10 RX ADMIN — LORAZEPAM 2 MG: 2 INJECTION INTRAMUSCULAR; INTRAVENOUS at 04:43

## 2021-10-10 RX ADMIN — PROPOFOL 50 MCG/KG/MIN: 10 INJECTION, EMULSION INTRAVENOUS at 09:30

## 2021-10-10 RX ADMIN — DEXMEDETOMIDINE HYDROCHLORIDE 1.5 MCG/KG/HR: 100 INJECTION, SOLUTION, CONCENTRATE INTRAVENOUS at 21:44

## 2021-10-10 RX ADMIN — PROPOFOL 30 MCG/KG/MIN: 10 INJECTION, EMULSION INTRAVENOUS at 23:35

## 2021-10-10 RX ADMIN — THIAMINE HYDROCHLORIDE 100 MG: 100 INJECTION, SOLUTION INTRAMUSCULAR; INTRAVENOUS at 13:10

## 2021-10-10 RX ADMIN — PROPOFOL 30 MCG/KG/MIN: 10 INJECTION, EMULSION INTRAVENOUS at 01:19

## 2021-10-10 RX ADMIN — LORAZEPAM 2 MG: 2 INJECTION INTRAMUSCULAR; INTRAVENOUS at 21:56

## 2021-10-10 RX ADMIN — FENTANYL CITRATE 50 MCG: 50 INJECTION, SOLUTION INTRAMUSCULAR; INTRAVENOUS at 18:05

## 2021-10-10 RX ADMIN — DEXMEDETOMIDINE HYDROCHLORIDE 1.5 MCG/KG/HR: 100 INJECTION, SOLUTION, CONCENTRATE INTRAVENOUS at 05:56

## 2021-10-10 RX ADMIN — DEXMEDETOMIDINE HYDROCHLORIDE 1.5 MCG/KG/HR: 100 INJECTION, SOLUTION, CONCENTRATE INTRAVENOUS at 10:13

## 2021-10-10 RX ADMIN — LORAZEPAM 2 MG: 2 INJECTION INTRAMUSCULAR; INTRAVENOUS at 19:23

## 2021-10-10 RX ADMIN — LORAZEPAM 2 MG: 2 INJECTION INTRAMUSCULAR; INTRAVENOUS at 18:00

## 2021-10-10 RX ADMIN — PROPOFOL 25 MCG/KG/MIN: 10 INJECTION, EMULSION INTRAVENOUS at 15:17

## 2021-10-10 RX ADMIN — LEVOFLOXACIN 750 MG: 750 INJECTION, SOLUTION INTRAVENOUS at 11:28

## 2021-10-10 RX ADMIN — FENTANYL CITRATE 50 MCG: 50 INJECTION, SOLUTION INTRAMUSCULAR; INTRAVENOUS at 04:30

## 2021-10-10 RX ADMIN — PROPOFOL 30 MCG/KG/MIN: 10 INJECTION, EMULSION INTRAVENOUS at 04:31

## 2021-10-10 RX ADMIN — ALBUTEROL SULFATE 6 PUFF: 90 AEROSOL, METERED RESPIRATORY (INHALATION) at 15:21

## 2021-10-10 RX ADMIN — METRONIDAZOLE 500 MG: 500 INJECTION, SOLUTION INTRAVENOUS at 19:14

## 2021-10-10 RX ADMIN — METOPROLOL TARTRATE 50 MG: 50 TABLET, FILM COATED ORAL at 11:09

## 2021-10-10 RX ADMIN — DEXMEDETOMIDINE HYDROCHLORIDE 1.5 MCG/KG/HR: 100 INJECTION, SOLUTION, CONCENTRATE INTRAVENOUS at 23:36

## 2021-10-10 RX ADMIN — DEXMEDETOMIDINE HYDROCHLORIDE 1.5 MCG/KG/HR: 100 INJECTION, SOLUTION, CONCENTRATE INTRAVENOUS at 15:47

## 2021-10-10 RX ADMIN — THIAMINE HYDROCHLORIDE 100 MG: 100 INJECTION, SOLUTION INTRAMUSCULAR; INTRAVENOUS at 04:39

## 2021-10-10 RX ADMIN — ACETAMINOPHEN 650 MG: 325 TABLET, FILM COATED ORAL at 04:49

## 2021-10-10 RX ADMIN — SODIUM CHLORIDE, PRESERVATIVE FREE 10 ML: 5 INJECTION INTRAVENOUS at 21:44

## 2021-10-10 RX ADMIN — FENTANYL CITRATE 50 MCG: 50 INJECTION, SOLUTION INTRAMUSCULAR; INTRAVENOUS at 19:35

## 2021-10-10 RX ADMIN — METRONIDAZOLE 500 MG: 500 INJECTION, SOLUTION INTRAVENOUS at 11:29

## 2021-10-10 RX ADMIN — DEXMEDETOMIDINE HYDROCHLORIDE 1.5 MCG/KG/HR: 100 INJECTION, SOLUTION, CONCENTRATE INTRAVENOUS at 00:53

## 2021-10-10 RX ADMIN — LORAZEPAM 1 MG: 1 TABLET ORAL at 02:15

## 2021-10-10 RX ADMIN — FENTANYL CITRATE 50 MCG: 50 INJECTION, SOLUTION INTRAMUSCULAR; INTRAVENOUS at 01:26

## 2021-10-10 RX ADMIN — DEXMEDETOMIDINE HYDROCHLORIDE 1.5 MCG/KG/HR: 100 INJECTION, SOLUTION, CONCENTRATE INTRAVENOUS at 03:33

## 2021-10-10 RX ADMIN — ALBUTEROL SULFATE 6 PUFF: 90 AEROSOL, METERED RESPIRATORY (INHALATION) at 11:21

## 2021-10-10 RX ADMIN — PROPOFOL 25 MCG/KG/MIN: 10 INJECTION, EMULSION INTRAVENOUS at 19:08

## 2021-10-10 RX ADMIN — PANTOPRAZOLE SODIUM 40 MG: 40 INJECTION, POWDER, FOR SOLUTION INTRAVENOUS at 05:44

## 2021-10-10 RX ADMIN — ALBUTEROL SULFATE 6 PUFF: 90 AEROSOL, METERED RESPIRATORY (INHALATION) at 20:48

## 2021-10-10 RX ADMIN — METOPROLOL TARTRATE 50 MG: 50 TABLET, FILM COATED ORAL at 18:00

## 2021-10-10 RX ADMIN — LORAZEPAM 1 MG: 1 TABLET ORAL at 02:13

## 2021-10-10 RX ADMIN — DEXMEDETOMIDINE HYDROCHLORIDE 1.5 MCG/KG/HR: 100 INJECTION, SOLUTION, CONCENTRATE INTRAVENOUS at 19:08

## 2021-10-10 RX ADMIN — NICOTINE 1 PATCH: 21 PATCH, EXTENDED RELEASE TRANSDERMAL at 19:04

## 2021-10-10 RX ADMIN — AMLODIPINE BESYLATE 10 MG: 10 TABLET ORAL at 08:59

## 2021-10-10 RX ADMIN — METOPROLOL TARTRATE 50 MG: 50 TABLET, FILM COATED ORAL at 02:14

## 2021-10-10 RX ADMIN — THIAMINE HYDROCHLORIDE 100 MG: 100 INJECTION, SOLUTION INTRAMUSCULAR; INTRAVENOUS at 19:38

## 2021-10-10 RX ADMIN — LORAZEPAM 2 MG: 2 INJECTION INTRAMUSCULAR; INTRAVENOUS at 11:09

## 2021-10-10 RX ADMIN — ENOXAPARIN SODIUM 40 MG: 40 INJECTION SUBCUTANEOUS at 21:43

## 2021-10-10 RX ADMIN — DEXMEDETOMIDINE HYDROCHLORIDE 1.5 MCG/KG/HR: 100 INJECTION, SOLUTION, CONCENTRATE INTRAVENOUS at 12:51

## 2021-10-10 RX ADMIN — ALBUTEROL SULFATE 6 PUFF: 90 AEROSOL, METERED RESPIRATORY (INHALATION) at 08:05

## 2021-10-10 RX ADMIN — MULTIPLE VITAMINS W/ MINERALS TAB 1 TABLET: TAB at 08:59

## 2021-10-10 RX ADMIN — SODIUM CHLORIDE, PRESERVATIVE FREE 10 ML: 5 INJECTION INTRAVENOUS at 08:59

## 2021-10-10 NOTE — PLAN OF CARE
Goal Outcome Evaluation:  Plan of Care Reviewed With: patient, son, sibling  Progress: no change    Patient remains in CCU intubated on propofol and precedex. Sedation vacation completed pt follows commands briefly, he becomes agitated very quickly. VSS. Low grade fever broken. PRN Fent given x1 for pain. Good U.O, BM x1 XL liquid.  No significant changes.

## 2021-10-10 NOTE — PLAN OF CARE
Goal Outcome Evaluation:         Pt has been running fevers. As high as 103.1  blood cultures collected on day shift yesterday results pending. Procal this am was 0.74 dr morton request be evaluated by am physician . Chest xray showed RLL infiltrates worse . Increase secretions noted via oral and ett tube white/yellow in color . NG repositioned now at 70 cm. Unable to wean sedation could not get pt to follow commands . Currently propofol at 25 grisel/kg/min  and precedex is at 1.5 mcg/kg/hr did have four large liquid brown stools. Abd less firm. Skin ulcer to buttock worse. Restraints needed to protect ett tube ativan scheduled gotten 4 mg and 1 mg po Ativan also fentanyl given twice 50 grisel each . WCM

## 2021-10-10 NOTE — PROGRESS NOTES
"  PROGRESS NOTE  Patient Name: Watson Lisa  Age/Sex: 66 y.o. male  : 1955  MRN: [id number]    Date of Admission: 10/7/2021  Date of Encounter Visit: 10/10/21   LOS: 3 days   Patient Care Team:  Checo Dunham MD as PCP - General (Family Medicine)  Checo Dunham MD as PCP - Family Medicine  Checo Dunham MD (Family Medicine)    Chief Complaint: Delirium tremens, respiratory failure    Hospital course: Admitted with alcohol withdrawal, was intubated on 10/8/2021 and was started on antibiotic, currently on mechanical ventilator, on IV sedation, patient was able to wake up after he was on sedation vacation but it took him more than an hour.  Antibiotic was discontinued on 10/9/2021, patient spiked more fever and even though his white count is still normal but his assessment today is consistent with aspiration pneumonia.  Improved blood pressure control on antihypertensive      REVIEW OF SYSTEMS:   CONSTITUTIONAL: Positive for fever T-max 103.1  Otherwise limited system     Ventilator/Non-Invasive Ventilation Settings (From admission, onward)             Start     Ordered    10/08/21 0553  Ventilator - AC/VC; (30); (75); 88%; 7.5; 500  Continuous        Question Answer Comment   Vent Mode AC/VC    Breath rate  30   FiO2  75   FiO2 titrate for Sp02% =/> 88%    PEEP 7.5    Tidal Volume 500        10/08/21 0553                  Vital Signs  Temp:  [99.8 °F (37.7 °C)-103.1 °F (39.5 °C)] 100.7 °F (38.2 °C)  Heart Rate:  [] 96  Resp:  [23-30] 26  BP: (114-150)/() 138/105  FiO2 (%):  [25 %-30 %] 25 %  SpO2:  [94 %-99 %] 95 %  on    Device (Oxygen Therapy): ventilator    Intake/Output Summary (Last 24 hours) at 10/10/2021 0950  Last data filed at 10/10/2021 0559  Gross per 24 hour   Intake 5339 ml   Output 1180 ml   Net 4159 ml     Flowsheet Rows      First Filed Value   Admission Height 198.1 cm (78\") Documented at 10/07/2021 1102   Admission Weight 118 kg (260 lb) Documented at 10/07/2021 1102    "     Body mass index is 31.85 kg/m².      10/08/21  0926 10/09/21  0352 10/10/21  0050   Weight: 118 kg (260 lb) (not weighed by RD) 125 kg (275 lb 9.2 oz) 125 kg (275 lb 9.2 oz)       Physical Exam:  GEN: Sickly looking, sedated, orally intubated, on the ventilator  EYES:   Sclerae clear. No icterus. PERRL. Normal EOM  ENT:   External ears/nose normal, no oral lesions, no thrush, mucous membranes moist  NECK:  Supple, midline trachea, no JVD  LUNGS: Normal chest on inspection,  clear to auscultation, breathing in sync with the ventilator. Respirations regular, even and  nonlabored, slightly tachypneic  CV:  Regular rhythm and  less tachycardia. Normal S1/S2. No murmurs, gallops, or rubs noted.  ABD:  Soft, nontender and nondistended. Normal bowel sounds. No guarding, truncal obesity   EXT:  Moves all extremities well. No cyanosis. No redness. No edema.   Skin: Dry, intact, no bleeding    Results Review:        Results from last 7 days   Lab Units 10/10/21  0425 10/09/21  0443 10/08/21  0443 10/07/21  1048   SODIUM mmol/L 140 140 139 142   POTASSIUM mmol/L 3.9 4.3 4.2 4.5   CHLORIDE mmol/L 108* 107 102 103   CO2 mmol/L 19.6* 23.8 22.5 20.4*   BUN mg/dL 24* 21 25* 24*   CREATININE mg/dL 1.49* 1.39* 2.22* 1.99*   CALCIUM mg/dL 7.9* 8.1* 8.3* 8.6   AST (SGOT) U/L 26 33 43* 51*   ALT (SGPT) U/L 23 27 30 34   ANION GAP mmol/L 12.4 9.2 14.5 18.6*   ALBUMIN g/dL 3.10* 3.40* 4.30 4.40     Results from last 7 days   Lab Units 10/07/21  1048   TROPONIN T ng/mL 0.020         Results from last 7 days   Lab Units 10/07/21  1102   PROBNP pg/mL 1,106.0*     Results from last 7 days   Lab Units 10/10/21  0425 10/09/21  0443 10/08/21  0443 10/07/21  1048   WBC 10*3/mm3 9.74 5.63 7.03 6.86   HEMOGLOBIN g/dL 11.3* 11.4* 12.7* 12.9*   HEMATOCRIT % 32.8* 34.9* 36.2* 38.1   PLATELETS 10*3/mm3 104* 124* 240 199   MCV fL 96.5 100.3* 96.0 96.9   NEUTROPHIL % % 77.4* 68.4 74.4 77.2*   LYMPHOCYTE % % 10.7* 15.6* 12.5* 9.9*   MONOCYTES % % 11.0  13.7* 11.2 11.4   EOSINOPHIL % % 0.1* 1.4 0.6 0.4   BASOPHIL % % 0.3 0.4 0.7 0.4   IMM GRAN % % 0.5 0.5 0.6* 0.7*     Results from last 7 days   Lab Units 10/07/21  1836   INR  1.02   APTT seconds 43.0*     Results from last 7 days   Lab Units 10/09/21  0443 10/08/21  0443 10/07/21  1102   MAGNESIUM mg/dL 1.7 1.4* 1.5*           Invalid input(s): LDLCALC  Results from last 7 days   Lab Units 10/08/21  0506   PH, ARTERIAL pH units 7.270*   PCO2, ARTERIAL mm Hg 56.1*   PO2 ART mm Hg 295.0*   HCO3 ART mmol/L 25.8         Glucose   Date/Time Value Ref Range Status   10/08/2021 0759 150 (H) 70 - 130 mg/dL Final     Comment:     Meter: [id number] : [id number] Casper CUEVA RN     Results from last 7 days   Lab Units 10/10/21  0425   PROCALCITONIN ng/mL 0.74*         Results from last 7 days   Lab Units 10/07/21  1245   NITRITE UA  Negative   WBC UA /HPF 0-2   BACTERIA UA /HPF None Seen   SQUAM EPITHEL UA /HPF 0-2     Results from last 7 days   Lab Units 10/07/21  1303   COVID19  Not Detected     Results from last 7 days   Lab Units 10/09/21  0443 10/08/21  1215   SODIUM UR mmol/L  --  49   CREATININE UR mg/dL  --  197.8   CHLORIDE UR mmol/L  --  42   PROTEIN TOTAL URINE mg/dL  --  39.0   URIC ACID mg/dL 6.0  --            Imaging:   Imaging Results (All)     Procedure Component Value Units Date/Time     I reviewed the patient's new clinical results.  I personally viewed and interpreted the patient's imaging results: ET tube above the [place], some bibasilar atelectasis but no obvious focal pneumonia      Medication Review:   albuterol sulfate HFA, 6 puff, Inhalation, 4x Daily - RT  amLODIPine, 10 mg, Oral, Daily  enoxaparin, 40 mg, Subcutaneous, Q24H  LORazepam, 2 mg, Intravenous, Q6H  metoprolol tartrate, 50 mg, Oral, Q8H  multivitamin with minerals, 1 tablet, Oral, Daily  nicotine, 1 patch, Transdermal, Q24H  pantoprazole, 40 mg, Intravenous, Q AM  senna-docusate sodium, 2 tablet, Oral, BID  sodium chloride, 10 mL,  Intravenous, Q12H  thiamine (VITAMIN B1) IVPB, 100 mg, Intravenous, Q8H  vitamin D, 50,000 Units, Oral, Q7 Days        dexmedetomidine, 0.2-1.5 mcg/kg/hr, Last Rate: 1.5 mcg/kg/hr (10/10/21 0909)  propofol, 5-50 mcg/kg/min, Last Rate: 50 mcg/kg/min (10/10/21 0930)  sodium chloride, 100 mL/hr, Last Rate: 100 mL/hr (10/09/21 1256)        ASSESSMENT:   1. Acute toxic encephalopathy  2. Right sided aspiration pneumonia, severe  3. Acute respiratory failure s/p mechanical ventilator 10/8  4. Acute alcohol withdrawal syndrome with delirium tremens  5. Alcohol abuse   6. DEEP on CKD  7. Hypomagnesemia  8. Tobacco abuse  9. LIVAN  10. Medical noncompliance    PLAN:  The patient has severe aspiration pneumonia on the right side that was not evident on the first x-ray but it is obvious on today's film, that goes along with the high-grade fever even though the patient has no significant leukocytosis or elevated procalcitonin on initial presentation.  Patient was on Levaquin/Flagyl initially which was discontinued, we will resume both of them today.  Despite the finding on the x-ray the FiO2 requirements are not that elevated, we will repeat the chest x-ray again in the a.m. and we will decide on weaning depending on his neurological status from the delirium tremens standpoint.  Patient was having no spontaneous movement this morning however he did eventually wake up after he was on sedation vacation for more than an hour.    Summary of recommendations:  · Resume antibiotic with Levaquin and Flagyl (patient is allergic to penicillin)  · Repeat chest x-ray in a.m.  · Weaning trial in a.m.  · Continue with the CIWA protocol  · Continue with the daily labs    Discussed with the nursing    Disposition: Depending on hospital course    Gene Lujan MD  10/10/21  09:50 EDT      Time: Critical care 32 min      Dictated utilizing Dragon dictation

## 2021-10-10 NOTE — PROGRESS NOTES
NEPHROLOGY PROGRESS NOTE    PATIENT IDENTIFICATION:   Name:  Watson Lisa      MRN:  [id number]     66 y.o.  male             Reason for visit: ARF/Resp Failure    SUBJECTIVE:   FU ARF and Resp Failure  Intubated/sedated  Having watery large BM's  Tube feeds on hold    OBJECTIVE:  Vitals:    10/10/21 0332 10/10/21 0447 10/10/21 0748 10/10/21 0805   BP:       Pulse: 85   96   Resp:    26   Temp:  (!) 101.9 °F (38.8 °C) (!) 100.7 °F (38.2 °C)    TempSrc:  Oral Oral    SpO2: 96%   95%   Weight:       Height:         FiO2 (%): 25 %     Body mass index is 31.85 kg/m².    Intake/Output Summary (Last 24 hours) at 10/10/2021 1049  Last data filed at 10/10/2021 0559  Gross per 24 hour   Intake 5339 ml   Output 1180 ml   Net 4159 ml     Wt Readings from Last 1 Encounters:   10/10/21 0050 125 kg (275 lb 9.2 oz)   10/09/21 0352 125 kg (275 lb 9.2 oz)   10/08/21 0926 118 kg (260 lb)   10/07/21 1102 118 kg (260 lb)     Wt Readings from Last 3 Encounters:   10/10/21 125 kg (275 lb 9.2 oz)   03/26/21 113 kg (250 lb)   01/15/21 106 kg (233 lb 4 oz)         Exam:  GEN:  Intubated, appears stated age  EYES:   Anicteric sclera  ENT:    External ears/nose normal, MM are   NECK:  No adenopathy, JVP   LUNGS: Normal chest on inspection; not labored  CV:  Normal S1S2, without murmur  ABD:  Non-tender, non-distended, no hepatosplenomegaly, +BS  EXT:  No edema; no cyanosis; clubbing    Scheduled meds:  albuterol sulfate HFA, 6 puff, Inhalation, 4x Daily - RT  amLODIPine, 10 mg, Oral, Daily  enoxaparin, 40 mg, Subcutaneous, Q24H  levoFLOXacin, 750 mg, Intravenous, Q24H  LORazepam, 2 mg, Intravenous, Q6H  metoprolol tartrate, 50 mg, Oral, Q8H  metroNIDAZOLE, 500 mg, Intravenous, Q8H  multivitamin with minerals, 1 tablet, Oral, Daily  nicotine, 1 patch, Transdermal, Q24H  pantoprazole, 40 mg, Intravenous, Q AM  senna-docusate sodium, 2 tablet, Oral, BID  sodium chloride, 10 mL, Intravenous, Q12H  thiamine (VITAMIN B1) IVPB, 100 mg,  Intravenous, Q8H  vitamin D, 50,000 Units, Oral, Q7 Days      IV meds:                      dexmedetomidine, 0.2-1.5 mcg/kg/hr, Last Rate: 1.5 mcg/kg/hr (10/10/21 1013)  propofol, 5-50 mcg/kg/min, Last Rate: 50 mcg/kg/min (10/10/21 0930)  sodium chloride, 50 mL/hr, Last Rate: 100 mL/hr (10/09/21 1256)        Data Review:    Results from last 7 days   Lab Units 10/10/21  0425 10/09/21  0443 10/08/21  0443   SODIUM mmol/L 140 140 139   POTASSIUM mmol/L 3.9 4.3 4.2   CHLORIDE mmol/L 108* 107 102   CO2 mmol/L 19.6* 23.8 22.5   BUN mg/dL 24* 21 25*   CREATININE mg/dL 1.49* 1.39* 2.22*   CALCIUM mg/dL 7.9* 8.1* 8.3*   BILIRUBIN mg/dL 0.5 0.6 1.2   ALK PHOS U/L 80 79 85   ALT (SGPT) U/L 23 27 30   AST (SGOT) U/L 26 33 43*   GLUCOSE mg/dL 127* 130* 184*     Estimated Creatinine Clearance: 71.4 mL/min (A) (by C-G formula based on SCr of 1.49 mg/dL (H)).  Results from last 7 days   Lab Units 10/09/21  0443 10/08/21  1215   SODIUM UR mmol/L  --  49   CREATININE UR mg/dL  --  197.8   CHLORIDE UR mmol/L  --  42   PROTEIN TOTAL URINE mg/dL  --  39.0   URIC ACID mg/dL 6.0  --    PROT/CREAT RATIO UR mg/G Crea  --  197.2     Results from last 7 days   Lab Units 10/09/21  0443 10/08/21  0443 10/07/21  1102   MAGNESIUM mg/dL 1.7 1.4* 1.5*   PHOSPHORUS mg/dL 3.2 4.4  --        Results from last 7 days   Lab Units 10/10/21  0425 10/09/21  0443 10/08/21  0443 10/07/21  1048   WBC 10*3/mm3 9.74 5.63 7.03 6.86   HEMOGLOBIN g/dL 11.3* 11.4* 12.7* 12.9*   PLATELETS 10*3/mm3 104* 124* 240 199       Results from last 7 days   Lab Units 10/07/21  1836   INR  1.02             ASSESSMENT:     Delirium tremens (HCC)    Essential hypertension    Acute kidney failure (HCC)    Hypomagnesemia    Alcohol abuse    Alcoholic liver disease (HCC)    Tobacco abuse    Hiatal hernia    COPD (chronic obstructive pulmonary disease) (HCC)    Anemia, chronic disease    Acute respiratory failure with hypercapnia (HCC)    Aspiration pneumonia (HCC)    ARF  Resp  Failure  ETOH Withdrawl    PLAN:  -Renal function stable-> pre-renal azotemia in recovery  -Continue IVF w/ NS drip-> to keep up w/ diarrhea  -Tube feeds for nutrition as tolerated  -IV antibiotics  -Monitor renal recovery    Attila Archibald MD  10/10/2021    10:49 EDT

## 2021-10-11 ENCOUNTER — APPOINTMENT (OUTPATIENT)
Dept: GENERAL RADIOLOGY | Facility: HOSPITAL | Age: 66
End: 2021-10-11

## 2021-10-11 LAB
ANION GAP SERPL CALCULATED.3IONS-SCNC: 11 MMOL/L (ref 5–15)
BUN SERPL-MCNC: 18 MG/DL (ref 8–23)
BUN/CREAT SERPL: 17.1 (ref 7–25)
CALCIUM SPEC-SCNC: 8.1 MG/DL (ref 8.6–10.5)
CHLORIDE SERPL-SCNC: 109 MMOL/L (ref 98–107)
CO2 SERPL-SCNC: 20 MMOL/L (ref 22–29)
CREAT SERPL-MCNC: 1.05 MG/DL (ref 0.76–1.27)
DEPRECATED RDW RBC AUTO: 45.8 FL (ref 37–54)
ERYTHROCYTE [DISTWIDTH] IN BLOOD BY AUTOMATED COUNT: 13.1 % (ref 12.3–15.4)
GFR SERPL CREATININE-BSD FRML MDRD: 70 ML/MIN/1.73
GLUCOSE SERPL-MCNC: 130 MG/DL (ref 65–99)
HCT VFR BLD AUTO: 30.3 % (ref 37.5–51)
HGB BLD-MCNC: 10.5 G/DL (ref 13–17.7)
MCH RBC QN AUTO: 33.3 PG (ref 26.6–33)
MCHC RBC AUTO-ENTMCNC: 34.7 G/DL (ref 31.5–35.7)
MCV RBC AUTO: 96.2 FL (ref 79–97)
PLATELET # BLD AUTO: 124 10*3/MM3 (ref 140–450)
PMV BLD AUTO: 11.5 FL (ref 6–12)
POTASSIUM SERPL-SCNC: 3.5 MMOL/L (ref 3.5–5.2)
QT INTERVAL: 311 MS
RBC # BLD AUTO: 3.15 10*6/MM3 (ref 4.14–5.8)
SODIUM SERPL-SCNC: 140 MMOL/L (ref 136–145)
WBC # BLD AUTO: 9.64 10*3/MM3 (ref 3.4–10.8)

## 2021-10-11 PROCEDURE — 25010000002 LORAZEPAM PER 2 MG: Performed by: NURSE PRACTITIONER

## 2021-10-11 PROCEDURE — 94003 VENT MGMT INPAT SUBQ DAY: CPT

## 2021-10-11 PROCEDURE — 93010 ELECTROCARDIOGRAM REPORT: CPT | Performed by: INTERNAL MEDICINE

## 2021-10-11 PROCEDURE — 93005 ELECTROCARDIOGRAM TRACING: CPT | Performed by: INTERNAL MEDICINE

## 2021-10-11 PROCEDURE — 94799 UNLISTED PULMONARY SVC/PX: CPT

## 2021-10-11 PROCEDURE — 80048 BASIC METABOLIC PNL TOTAL CA: CPT | Performed by: INTERNAL MEDICINE

## 2021-10-11 PROCEDURE — 25010000002 FENTANYL CITRATE (PF) 50 MCG/ML SOLUTION: Performed by: INTERNAL MEDICINE

## 2021-10-11 PROCEDURE — 25010000002 PROPOFOL 10 MG/ML EMULSION: Performed by: INTERNAL MEDICINE

## 2021-10-11 PROCEDURE — 25010000002 HYDRALAZINE PER 20 MG: Performed by: INTERNAL MEDICINE

## 2021-10-11 PROCEDURE — 85027 COMPLETE CBC AUTOMATED: CPT | Performed by: INTERNAL MEDICINE

## 2021-10-11 PROCEDURE — 71045 X-RAY EXAM CHEST 1 VIEW: CPT

## 2021-10-11 PROCEDURE — 25010000002 ENOXAPARIN PER 10 MG: Performed by: INTERNAL MEDICINE

## 2021-10-11 PROCEDURE — 25010000002 LEVOFLOXACIN PER 250 MG: Performed by: INTERNAL MEDICINE

## 2021-10-11 PROCEDURE — 25010000002 LORAZEPAM PER 2 MG: Performed by: INTERNAL MEDICINE

## 2021-10-11 PROCEDURE — 25010000002 THIAMINE PER 100 MG: Performed by: INTERNAL MEDICINE

## 2021-10-11 RX ORDER — BUMETANIDE 0.25 MG/ML
2 INJECTION INTRAMUSCULAR; INTRAVENOUS ONCE
Status: COMPLETED | OUTPATIENT
Start: 2021-10-11 | End: 2021-10-11

## 2021-10-11 RX ORDER — DIAZEPAM 5 MG/1
10 TABLET ORAL EVERY 8 HOURS PRN
Status: DISCONTINUED | OUTPATIENT
Start: 2021-10-11 | End: 2021-10-18

## 2021-10-11 RX ADMIN — METOPROLOL TARTRATE 50 MG: 50 TABLET, FILM COATED ORAL at 18:49

## 2021-10-11 RX ADMIN — MULTIPLE VITAMINS W/ MINERALS TAB 1 TABLET: TAB at 08:02

## 2021-10-11 RX ADMIN — LORAZEPAM 2 MG: 2 INJECTION INTRAMUSCULAR; INTRAVENOUS at 22:37

## 2021-10-11 RX ADMIN — BUMETANIDE 2 MG: 0.25 INJECTION, SOLUTION INTRAMUSCULAR; INTRAVENOUS at 10:20

## 2021-10-11 RX ADMIN — METOPROLOL TARTRATE 50 MG: 50 TABLET, FILM COATED ORAL at 09:51

## 2021-10-11 RX ADMIN — DIAZEPAM 10 MG: 5 TABLET ORAL at 22:37

## 2021-10-11 RX ADMIN — ENOXAPARIN SODIUM 40 MG: 40 INJECTION SUBCUTANEOUS at 20:05

## 2021-10-11 RX ADMIN — LORAZEPAM 2 MG: 2 INJECTION INTRAMUSCULAR; INTRAVENOUS at 23:56

## 2021-10-11 RX ADMIN — DEXMEDETOMIDINE HYDROCHLORIDE 1.2 MCG/KG/HR: 100 INJECTION, SOLUTION, CONCENTRATE INTRAVENOUS at 12:41

## 2021-10-11 RX ADMIN — DOCUSATE SODIUM 50MG AND SENNOSIDES 8.6MG 2 TABLET: 8.6; 5 TABLET, FILM COATED ORAL at 20:06

## 2021-10-11 RX ADMIN — DEXMEDETOMIDINE HYDROCHLORIDE 1.5 MCG/KG/HR: 100 INJECTION, SOLUTION, CONCENTRATE INTRAVENOUS at 04:50

## 2021-10-11 RX ADMIN — LABETALOL HYDROCHLORIDE 20 MG: 5 INJECTION, SOLUTION INTRAVENOUS at 05:23

## 2021-10-11 RX ADMIN — DEXMEDETOMIDINE HYDROCHLORIDE 1.3 MCG/KG/HR: 100 INJECTION, SOLUTION, CONCENTRATE INTRAVENOUS at 15:08

## 2021-10-11 RX ADMIN — LORAZEPAM 1 MG: 1 TABLET ORAL at 02:26

## 2021-10-11 RX ADMIN — HYDRALAZINE HYDROCHLORIDE 10 MG: 20 INJECTION INTRAMUSCULAR; INTRAVENOUS at 01:10

## 2021-10-11 RX ADMIN — LORAZEPAM 2 MG: 2 INJECTION INTRAMUSCULAR; INTRAVENOUS at 16:39

## 2021-10-11 RX ADMIN — PROPOFOL 30 MCG/KG/MIN: 10 INJECTION, EMULSION INTRAVENOUS at 04:10

## 2021-10-11 RX ADMIN — PROPOFOL 30 MCG/KG/MIN: 10 INJECTION, EMULSION INTRAVENOUS at 19:25

## 2021-10-11 RX ADMIN — AMLODIPINE BESYLATE 10 MG: 10 TABLET ORAL at 08:02

## 2021-10-11 RX ADMIN — THIAMINE HYDROCHLORIDE 100 MG: 100 INJECTION, SOLUTION INTRAMUSCULAR; INTRAVENOUS at 04:42

## 2021-10-11 RX ADMIN — DIAZEPAM 10 MG: 5 TABLET ORAL at 14:34

## 2021-10-11 RX ADMIN — LORAZEPAM 2 MG: 2 INJECTION INTRAMUSCULAR; INTRAVENOUS at 04:53

## 2021-10-11 RX ADMIN — DEXMEDETOMIDINE HYDROCHLORIDE 1.4 MCG/KG/HR: 100 INJECTION, SOLUTION, CONCENTRATE INTRAVENOUS at 18:36

## 2021-10-11 RX ADMIN — HYDROCODONE BITARTRATE AND ACETAMINOPHEN 1 TABLET: 5; 325 TABLET ORAL at 02:26

## 2021-10-11 RX ADMIN — DEXMEDETOMIDINE HYDROCHLORIDE 1.5 MCG/KG/HR: 100 INJECTION, SOLUTION, CONCENTRATE INTRAVENOUS at 02:25

## 2021-10-11 RX ADMIN — PROPOFOL 25 MCG/KG/MIN: 10 INJECTION, EMULSION INTRAVENOUS at 08:02

## 2021-10-11 RX ADMIN — METRONIDAZOLE 500 MG: 500 INJECTION, SOLUTION INTRAVENOUS at 18:37

## 2021-10-11 RX ADMIN — SODIUM CHLORIDE, PRESERVATIVE FREE 10 ML: 5 INJECTION INTRAVENOUS at 08:08

## 2021-10-11 RX ADMIN — HYDRALAZINE HYDROCHLORIDE 10 MG: 20 INJECTION INTRAMUSCULAR; INTRAVENOUS at 06:30

## 2021-10-11 RX ADMIN — LORAZEPAM 2 MG: 2 INJECTION INTRAMUSCULAR; INTRAVENOUS at 03:00

## 2021-10-11 RX ADMIN — LORAZEPAM 2 MG: 2 INJECTION INTRAMUSCULAR; INTRAVENOUS at 13:35

## 2021-10-11 RX ADMIN — HYDRALAZINE HYDROCHLORIDE 10 MG: 20 INJECTION INTRAMUSCULAR; INTRAVENOUS at 21:42

## 2021-10-11 RX ADMIN — SODIUM CHLORIDE, PRESERVATIVE FREE 10 ML: 5 INJECTION INTRAVENOUS at 20:05

## 2021-10-11 RX ADMIN — METOPROLOL TARTRATE 50 MG: 50 TABLET, FILM COATED ORAL at 02:26

## 2021-10-11 RX ADMIN — LEVOFLOXACIN 750 MG: 750 INJECTION, SOLUTION INTRAVENOUS at 10:25

## 2021-10-11 RX ADMIN — ALBUTEROL SULFATE 6 PUFF: 90 AEROSOL, METERED RESPIRATORY (INHALATION) at 07:38

## 2021-10-11 RX ADMIN — FENTANYL CITRATE 50 MCG: 50 INJECTION, SOLUTION INTRAMUSCULAR; INTRAVENOUS at 03:02

## 2021-10-11 RX ADMIN — DOCUSATE SODIUM 50MG AND SENNOSIDES 8.6MG 2 TABLET: 8.6; 5 TABLET, FILM COATED ORAL at 08:08

## 2021-10-11 RX ADMIN — THIAMINE HYDROCHLORIDE 100 MG: 100 INJECTION, SOLUTION INTRAMUSCULAR; INTRAVENOUS at 20:09

## 2021-10-11 RX ADMIN — NICOTINE 1 PATCH: 21 PATCH, EXTENDED RELEASE TRANSDERMAL at 18:36

## 2021-10-11 RX ADMIN — PROPOFOL 25 MCG/KG/MIN: 10 INJECTION, EMULSION INTRAVENOUS at 15:07

## 2021-10-11 RX ADMIN — DEXMEDETOMIDINE HYDROCHLORIDE 1.4 MCG/KG/HR: 100 INJECTION, SOLUTION, CONCENTRATE INTRAVENOUS at 21:41

## 2021-10-11 RX ADMIN — DEXMEDETOMIDINE HYDROCHLORIDE 1.5 MCG/KG/HR: 100 INJECTION, SOLUTION, CONCENTRATE INTRAVENOUS at 07:20

## 2021-10-11 RX ADMIN — LORAZEPAM 2 MG: 2 INJECTION INTRAMUSCULAR; INTRAVENOUS at 15:04

## 2021-10-11 RX ADMIN — PANTOPRAZOLE SODIUM 40 MG: 40 INJECTION, POWDER, FOR SOLUTION INTRAVENOUS at 05:17

## 2021-10-11 RX ADMIN — THIAMINE HYDROCHLORIDE 100 MG: 100 INJECTION, SOLUTION INTRAMUSCULAR; INTRAVENOUS at 11:30

## 2021-10-11 RX ADMIN — LORAZEPAM 2 MG: 2 INJECTION INTRAMUSCULAR; INTRAVENOUS at 10:25

## 2021-10-11 RX ADMIN — LORAZEPAM 2 MG: 2 INJECTION INTRAMUSCULAR; INTRAVENOUS at 11:49

## 2021-10-11 RX ADMIN — LORAZEPAM 2 MG: 2 INJECTION INTRAMUSCULAR; INTRAVENOUS at 00:41

## 2021-10-11 RX ADMIN — ALBUTEROL SULFATE 6 PUFF: 90 AEROSOL, METERED RESPIRATORY (INHALATION) at 19:20

## 2021-10-11 RX ADMIN — PROPOFOL 30 MCG/KG/MIN: 10 INJECTION, EMULSION INTRAVENOUS at 23:14

## 2021-10-11 RX ADMIN — DEXMEDETOMIDINE HYDROCHLORIDE 1.3 MCG/KG/HR: 100 INJECTION, SOLUTION, CONCENTRATE INTRAVENOUS at 09:51

## 2021-10-11 RX ADMIN — METRONIDAZOLE 500 MG: 500 INJECTION, SOLUTION INTRAVENOUS at 02:48

## 2021-10-11 RX ADMIN — LABETALOL HYDROCHLORIDE 20 MG: 5 INJECTION, SOLUTION INTRAVENOUS at 23:14

## 2021-10-11 RX ADMIN — METRONIDAZOLE 500 MG: 500 INJECTION, SOLUTION INTRAVENOUS at 10:25

## 2021-10-11 RX ADMIN — DEXMEDETOMIDINE HYDROCHLORIDE 1.4 MCG/KG/HR: 100 INJECTION, SOLUTION, CONCENTRATE INTRAVENOUS at 23:51

## 2021-10-11 RX ADMIN — POTASSIUM CHLORIDE 40 MEQ: 1.5 POWDER, FOR SOLUTION ORAL at 08:27

## 2021-10-11 NOTE — PROGRESS NOTES
[facility] Progress Note      Patient Name: Watson Lisa  : 1955  MRN: [id number]  Primary Care Physician:  Checo Dunham MD  Date of admission: 10/7/2021    Subjective     Interval History:   Follow-up acute kidney injury  Patient is on the ventilator, not responsive    Review of Systems:   Not obtainable    Objective     Vitals:   Temp:  [97.8 °F (36.6 °C)-99.1 °F (37.3 °C)] 97.8 °F (36.6 °C)  Heart Rate:  [68-97] 85  Resp:  [21-29] 21  BP: (132-173)/() 136/93  FiO2 (%):  [25 %] 25 %    Intake/Output Summary (Last 24 hours) at 10/11/2021 0934  Last data filed at 10/11/2021 0800  Gross per 24 hour   Intake 3150.53 ml   Output 2080 ml   Net 1070.53 ml       Physical Exam:    General Appearance: On the ventilator, sedated, not responsive  Skin: warm and dry  HEENT: Orally intubated  Neck: No JVD  Lungs: Bilateral rhonchi crackles, breathing effort not labored  Heart: RRR, normal S1 and S2, no S3, no rub  Abdomen: soft, no guarding, distended, normoactive bowels  : no palpable bladder, Wood catheter anchored in place  Extremities: 2+ pedal edema in the upper extremity edema, no cyanosis or clubbing  Neuro: Unable to assess    Scheduled Meds:     albuterol sulfate HFA, 6 puff, Inhalation, 4x Daily - RT  amLODIPine, 10 mg, Oral, Daily  enoxaparin, 40 mg, Subcutaneous, Q24H  levoFLOXacin, 750 mg, Intravenous, Q24H  LORazepam, 2 mg, Intravenous, Q6H  metoprolol tartrate, 50 mg, Oral, Q8H  metroNIDAZOLE, 500 mg, Intravenous, Q8H  multivitamin with minerals, 1 tablet, Oral, Daily  nicotine, 1 patch, Transdermal, Q24H  pantoprazole, 40 mg, Intravenous, Q AM  senna-docusate sodium, 2 tablet, Oral, BID  sodium chloride, 10 mL, Intravenous, Q12H  thiamine (VITAMIN B1) IVPB, 100 mg, Intravenous, Q8H  vitamin D, 50,000 Units, Oral, Q7 Days      IV Meds:   dexmedetomidine, 0.2-1.5 mcg/kg/hr, Last Rate: 1.5 mcg/kg/hr (10/11/21 6120)  propofol, 5-50 mcg/kg/min, Last Rate: 25  mcg/kg/min (10/11/21 0802)  sodium chloride, 50 mL/hr, Last Rate: 50 mL/hr (10/10/21 1015)        Results Reviewed:   I have personally reviewed the results from the time of this admission to 10/11/2021 09:34 EDT     Results from last 7 days   Lab Units 10/11/21  0459 10/10/21  0425 10/09/21  0443 10/08/21  0443 10/08/21  0443   SODIUM mmol/L 140 140 140   < > 139   POTASSIUM mmol/L 3.5 3.9 4.3   < > 4.2   CHLORIDE mmol/L 109* 108* 107   < > 102   CO2 mmol/L 20.0* 19.6* 23.8   < > 22.5   BUN mg/dL 18 24* 21   < > 25*   CREATININE mg/dL 1.05 1.49* 1.39*   < > 2.22*   CALCIUM mg/dL 8.1* 7.9* 8.1*   < > 8.3*   BILIRUBIN mg/dL  --  0.5 0.6  --  1.2   ALK PHOS U/L  --  80 79  --  85   ALT (SGPT) U/L  --  23 27  --  30   AST (SGOT) U/L  --  26 33  --  43*   GLUCOSE mg/dL 130* 127* 130*   < > 184*    < > = values in this interval not displayed.       Estimated Creatinine Clearance: 101.4 mL/min (by C-G formula based on SCr of 1.05 mg/dL).    Results from last 7 days   Lab Units 10/09/21  0443 10/08/21  0443 10/07/21  1102   MAGNESIUM mg/dL 1.7 1.4* 1.5*   PHOSPHORUS mg/dL 3.2 4.4  --        Results from last 7 days   Lab Units 10/09/21  0443   URIC ACID mg/dL 6.0       Results from last 7 days   Lab Units 10/11/21  0459 10/10/21  0425 10/09/21  0443 10/08/21  0443 10/07/21  1048   WBC 10*3/mm3 9.64 9.74 5.63 7.03 6.86   HEMOGLOBIN g/dL 10.5* 11.3* 11.4* 12.7* 12.9*   PLATELETS 10*3/mm3 124* 104* 124* 240 199       Results from last 7 days   Lab Units 10/07/21  1836   INR  1.02       Assessment / Plan     ASSESSMENT:  1.  Acute kidney injury associated with hypotension most likely ATN, creatinine on admission 1.99, this morning down to 1.05, potassium 3.5  2.  Acute respiratory failure on the ventilator  3.  Alcohol abuse with alcohol withdrawal  4.  Alcoholic liver disease  5.  Hypotension, resolved, blood pressure slightly on the high side    PLAN:  1.  Potassium replacement per protocol  2.  Discontinue IV fluid and give  1 dose of IV Bumex  3.  Surveillance labs    Discussed the case with the patient's nurse    Thank you for involving us in the care of Watson Lisa.  Please feel free to call with any questions.    Kimo Hu MD  10/11/21  09:34 EDT    [facility]  [phone]      Much of this encounter note is an electronic transcription/translation of spoken language to printed text. The electronic translation of spoken language may permit erroneous, or at times, nonsensical words or phrases to be inadvertently transcribed; Although I have reviewed the note for such errors, some may still exist.

## 2021-10-11 NOTE — PLAN OF CARE
Goal Outcome Evaluation:  Plan of Care Reviewed With: son        Progress: no change  Remains sedated on vent in CCU.  Attempted to wean sedation for weaning trial but pt becomes to agitated. Dr Lujan notified.  Valium ordered PRN.

## 2021-10-11 NOTE — PLAN OF CARE
Goal Outcome Evaluation:      BP elevated given  hydralazine  10 mg twice and labetalol once . As well as scheduled    Lopressor . Last bp 140/100 afib aflutter last ekg showed 3:1 AV block ? Secondary to precedex drip  at 1.5mcg/kg/hr  propofol at 30 mcg/kg/min at start of shift pt had ciwa of 22 total of 10 mg iv ativan and 2 mg po ativan fentanyl given twice as well as lortab once. Able to follow commands ie squeeze hands at mn and 0400  would not wiggle toes. Fevers much improved T max 98.9 this shift. Small bm at start of shift liquid brown . Residual 200 cc on tube feeds at midnight. 140 cc residual at 2000. Continues to have thick yellow/creamy secretions . Restraints needed to protect ett . Am labs noted creatine improving 1.05 WCM

## 2021-10-11 NOTE — SIGNIFICANT NOTE
10/11/21 1401   OTHER   Discipline physical therapist   Rehab Time/Intention   Session Not Performed other (see comments)  (OT orders recieved 10/8, remains in CCU on vent, per MD attempted weaning but unsuccessful. Spoke with RN, DHRUV Sweeney for therapy to sign off at this time. Please reconsult OT when pt is appropriate.)   Recommendation   OT - Next Appointment 10/11/21

## 2021-10-11 NOTE — PROGRESS NOTES
PROGRESS NOTE  Patient Name: Watson Lisa  Age/Sex: 66 y.o. male  : 1955  MRN: [id number]    Date of Admission: 10/7/2021  Date of Encounter Visit: 10/11/21   LOS: 4 days   Patient Care Team:  Checo Dunham MD as PCP - General (Family Medicine)  Checo Dunham MD as PCP - Family Medicine  Checo Dunham MD (Family Medicine)    Chief Complaint: Delirium tremens, respiratory failure    Hospital course: Admitted with alcohol withdrawal, was intubated on 10/8/2021 and was started on antibiotic, currently on mechanical ventilator, on IV sedation, patient is still been very hard to wean off the sedation because he goes into significant agitation and is at a risk of inflicting self injury of self extubation along with significant respiratory distress when off the sedation.  Antibiotic was discontinued on 10/9/2021, patient spiked more fever and even though his white count is still normal but his assessment today is consistent with aspiration pneumonia and antibiotic were resumed on 10/10/2021 with aspiration pneumonia coverage.  Improved blood pressure control on antihypertensive      REVIEW OF SYSTEMS:   CONSTITUTIONAL: No more fever  Otherwise limited system, positive for agitation and respiratory distress while on light sedation    Ventilator/Non-Invasive Ventilation Settings (From admission, onward)             Start     Ordered    10/08/21 0553  Ventilator - AC/VC; (30); (75); 88%; 7.5; 500  Continuous        Question Answer Comment   Vent Mode AC/VC    Breath rate  30   FiO2  75   FiO2 titrate for Sp02% =/> 88%    PEEP 7.5    Tidal Volume 500        10/08/21 0553                  Vital Signs  Temp:  [97.8 °F (36.6 °C)-99.1 °F (37.3 °C)] 97.8 °F (36.6 °C)  Heart Rate:  [] 128  Resp:  [21-38] 38  BP: (126-173)/() 126/92  FiO2 (%):  [25 %] 25 %  SpO2:  [93 %-97 %] 96 %  on    Device (Oxygen Therapy): ventilator    Intake/Output Summary (Last 24 hours) at 10/11/2021 1309  Last data filed at  "10/11/2021 1241  Gross per 24 hour   Intake 3210.53 ml   Output 3680 ml   Net -469.47 ml     Flowsheet Rows      First Filed Value   Admission Height 198.1 cm (78\") Documented at 10/07/2021 1102   Admission Weight 118 kg (260 lb) Documented at 10/07/2021 1102        Body mass index is 31.85 kg/m².      10/09/21  0352 10/10/21  0050 10/10/21  2000   Weight: 125 kg (275 lb 9.2 oz) 125 kg (275 lb 9.2 oz) (bed screen locked up  will not weigh pt)       Physical Exam:  GEN: Sickly looking, sedated, orally intubated, on the ventilator, patient was very agitated when on lighter sedation  EYES:   Sclerae clear. No icterus. PERRL. Normal EOM  ENT:   External ears/nose normal, no oral lesions, no thrush, mucous membranes moist  NECK:  Supple, midline trachea, no JVD  LUNGS: Normal chest on inspection,  clear to auscultation, breathing in sync with the ventilator. Respirations regular, even and  nonlabored, slightly tachypneic  CV:  Irregular rhythm and  mild tachycardia. Normal S1/S2. No murmurs, gallops, or rubs noted.  ABD:  Soft, nontender and nondistended. Normal bowel sounds. No guarding, truncal obesity   EXT:  Moves all extremities well. No cyanosis. No redness. No edema.   Skin: Dry, intact, no bleeding    Results Review:        Results from last 7 days   Lab Units 10/11/21  0459 10/10/21  0425 10/09/21  0443 10/08/21  0443 10/07/21  1048   SODIUM mmol/L 140 140 140 139 142   POTASSIUM mmol/L 3.5 3.9 4.3 4.2 4.5   CHLORIDE mmol/L 109* 108* 107 102 103   CO2 mmol/L 20.0* 19.6* 23.8 22.5 20.4*   BUN mg/dL 18 24* 21 25* 24*   CREATININE mg/dL 1.05 1.49* 1.39* 2.22* 1.99*   CALCIUM mg/dL 8.1* 7.9* 8.1* 8.3* 8.6   AST (SGOT) U/L  --  26 33 43* 51*   ALT (SGPT) U/L  --  23 27 30 34   ANION GAP mmol/L 11.0 12.4 9.2 14.5 18.6*   ALBUMIN g/dL  --  3.10* 3.40* 4.30 4.40     Results from last 7 days   Lab Units 10/07/21  1048   TROPONIN T ng/mL 0.020         Results from last 7 days   Lab Units 10/07/21  1102   PROBNP pg/mL " 1,106.0*     Results from last 7 days   Lab Units 10/11/21  0459 10/10/21  0425 10/09/21  0443 10/08/21  0443 10/07/21  1048   WBC 10*3/mm3 9.64 9.74 5.63 7.03 6.86   HEMOGLOBIN g/dL 10.5* 11.3* 11.4* 12.7* 12.9*   HEMATOCRIT % 30.3* 32.8* 34.9* 36.2* 38.1   PLATELETS 10*3/mm3 124* 104* 124* 240 199   MCV fL 96.2 96.5 100.3* 96.0 96.9   NEUTROPHIL % %  --  77.4* 68.4 74.4 77.2*   LYMPHOCYTE % %  --  10.7* 15.6* 12.5* 9.9*   MONOCYTES % %  --  11.0 13.7* 11.2 11.4   EOSINOPHIL % %  --  0.1* 1.4 0.6 0.4   BASOPHIL % %  --  0.3 0.4 0.7 0.4   IMM GRAN % %  --  0.5 0.5 0.6* 0.7*     Results from last 7 days   Lab Units 10/07/21  1836   INR  1.02   APTT seconds 43.0*     Results from last 7 days   Lab Units 10/09/21  0443 10/08/21  0443 10/07/21  1102   MAGNESIUM mg/dL 1.7 1.4* 1.5*           Invalid input(s): LDLCALC  Results from last 7 days   Lab Units 10/08/21  0506   PH, ARTERIAL pH units 7.270*   PCO2, ARTERIAL mm Hg 56.1*   PO2 ART mm Hg 295.0*   HCO3 ART mmol/L 25.8         No results found for: POCGLU  Results from last 7 days   Lab Units 10/10/21  0425   PROCALCITONIN ng/mL 0.74*     Results from last 7 days   Lab Units 10/09/21  1526   BLOODCX  No growth at 24 hours     Results from last 7 days   Lab Units 10/07/21  1245   NITRITE UA  Negative   WBC UA /HPF 0-2   BACTERIA UA /HPF None Seen   SQUAM EPITHEL UA /HPF 0-2     Results from last 7 days   Lab Units 10/07/21  1303   COVID19  Not Detected     Results from last 7 days   Lab Units 10/09/21  0443 10/08/21  1215   SODIUM UR mmol/L  --  49   CREATININE UR mg/dL  --  197.8   CHLORIDE UR mmol/L  --  42   PROTEIN TOTAL URINE mg/dL  --  39.0   URIC ACID mg/dL 6.0  --            Imaging:   Imaging Results (All)     Procedure Component Value Units Date/Time       I reviewed the patient's new clinical results.  I personally viewed and interpreted the patient's imaging results: ET tube above the [place], of the infiltrate in the right upper lobe with more infiltrate   in the right lower lobe with possible bilateral pleural effusion   Medication Review:   albuterol sulfate HFA, 6 puff, Inhalation, 4x Daily - RT  amLODIPine, 10 mg, Oral, Daily  enoxaparin, 40 mg, Subcutaneous, Q24H  levoFLOXacin, 750 mg, Intravenous, Q24H  LORazepam, 2 mg, Intravenous, Q6H  metoprolol tartrate, 50 mg, Oral, Q8H  metroNIDAZOLE, 500 mg, Intravenous, Q8H  multivitamin with minerals, 1 tablet, Oral, Daily  nicotine, 1 patch, Transdermal, Q24H  pantoprazole, 40 mg, Intravenous, Q AM  senna-docusate sodium, 2 tablet, Oral, BID  sodium chloride, 10 mL, Intravenous, Q12H  thiamine (VITAMIN B1) IVPB, 100 mg, Intravenous, Q8H  vitamin D, 50,000 Units, Oral, Q7 Days        dexmedetomidine, 0.2-1.5 mcg/kg/hr, Last Rate: 1.2 mcg/kg/hr (10/11/21 1241)  propofol, 5-50 mcg/kg/min, Last Rate: 15 mcg/kg/min (10/11/21 1145)        ASSESSMENT:   1. Acute toxic encephalopathy  2. Right sided aspiration pneumonia, severe  3. Acute respiratory failure s/p mechanical ventilator 10/8  4. Acute alcohol withdrawal syndrome with delirium tremens  5. Alcohol abuse   6. DEEP on CKD  7. Hypomagnesemia  8. Tobacco abuse  9. LIVAN  10. Medical noncompliance    PLAN:  The patient did develop aspiration pneumonia and is back on the antibiotic with good clinical response  Currently on not much FiO2 however to come off the ventilator yet given his respiratory effort and level of agitation when on spontaneous breathing trial  Still not ready to come off the ventilator  Patient still having issue with agitation and we will consider further adjustment on his sedation schedule  Diuretics per nephrology, that should help given the pleural effusion  Electrolyte management per nephrology  We will continue with the Levaquin and the Flagyl given his penicillin allergy  Is already on stress ulcer and on DVT prophylaxis which will be continued      Summary of recommendations:  · Continue Levaquin and Flagyl (patient is allergic to  penicillin)  · Not ready to come off the ventilator  · Start benzodiazepine scheduled p.o. in addition to the Ativan and to the CIWA protocol  · Repeat chest x-ray in a couple of days  · Follow-up on his surveillance lab    Discussed with the CCU rounding team    Disposition: Depending on hospital course    Gene Lujan MD  10/11/21  13:09 EDT      Time: Critical care 35 min      Dictated utilizing Dragon dictation

## 2021-10-12 LAB
ALBUMIN SERPL-MCNC: 2.9 G/DL (ref 3.5–5.2)
ALBUMIN/GLOB SERPL: 0.9 G/DL
ALP SERPL-CCNC: 70 U/L (ref 39–117)
ALT SERPL W P-5'-P-CCNC: 16 U/L (ref 1–41)
ANION GAP SERPL CALCULATED.3IONS-SCNC: 12.4 MMOL/L (ref 5–15)
AST SERPL-CCNC: 11 U/L (ref 1–40)
BASOPHILS # BLD AUTO: 0.02 10*3/MM3 (ref 0–0.2)
BASOPHILS NFR BLD AUTO: 0.2 % (ref 0–1.5)
BILIRUB SERPL-MCNC: 0.4 MG/DL (ref 0–1.2)
BUN SERPL-MCNC: 20 MG/DL (ref 8–23)
BUN/CREAT SERPL: 22 (ref 7–25)
CALCIUM SPEC-SCNC: 8.7 MG/DL (ref 8.6–10.5)
CHLORIDE SERPL-SCNC: 106 MMOL/L (ref 98–107)
CO2 SERPL-SCNC: 20.6 MMOL/L (ref 22–29)
CREAT SERPL-MCNC: 0.91 MG/DL (ref 0.76–1.27)
DEPRECATED RDW RBC AUTO: 52.7 FL (ref 37–54)
EOSINOPHIL # BLD AUTO: 0.23 10*3/MM3 (ref 0–0.4)
EOSINOPHIL NFR BLD AUTO: 2.5 % (ref 0.3–6.2)
ERYTHROCYTE [DISTWIDTH] IN BLOOD BY AUTOMATED COUNT: 13.6 % (ref 12.3–15.4)
GFR SERPL CREATININE-BSD FRML MDRD: 83 ML/MIN/1.73
GLOBULIN UR ELPH-MCNC: 3.3 GM/DL
GLUCOSE SERPL-MCNC: 122 MG/DL (ref 65–99)
HCT VFR BLD AUTO: 31.6 % (ref 37.5–51)
HGB BLD-MCNC: 9.9 G/DL (ref 13–17.7)
IMM GRANULOCYTES # BLD AUTO: 0.05 10*3/MM3 (ref 0–0.05)
IMM GRANULOCYTES NFR BLD AUTO: 0.6 % (ref 0–0.5)
LYMPHOCYTES # BLD AUTO: 0.75 10*3/MM3 (ref 0.7–3.1)
LYMPHOCYTES NFR BLD AUTO: 8.3 % (ref 19.6–45.3)
MCH RBC QN AUTO: 32.5 PG (ref 26.6–33)
MCHC RBC AUTO-ENTMCNC: 31.3 G/DL (ref 31.5–35.7)
MCV RBC AUTO: 103.6 FL (ref 79–97)
MONOCYTES # BLD AUTO: 0.83 10*3/MM3 (ref 0.1–0.9)
MONOCYTES NFR BLD AUTO: 9.2 % (ref 5–12)
NEUTROPHILS NFR BLD AUTO: 7.15 10*3/MM3 (ref 1.7–7)
NEUTROPHILS NFR BLD AUTO: 79.2 % (ref 42.7–76)
NRBC BLD AUTO-RTO: 0 /100 WBC (ref 0–0.2)
PHOSPHATE SERPL-MCNC: 3.5 MG/DL (ref 2.5–4.5)
PLATELET # BLD AUTO: 136 10*3/MM3 (ref 140–450)
PMV BLD AUTO: 11.3 FL (ref 6–12)
POTASSIUM SERPL-SCNC: 3.5 MMOL/L (ref 3.5–5.2)
PROT SERPL-MCNC: 6.2 G/DL (ref 6–8.5)
RBC # BLD AUTO: 3.05 10*6/MM3 (ref 4.14–5.8)
SODIUM SERPL-SCNC: 139 MMOL/L (ref 136–145)
TRIGL SERPL-MCNC: 161 MG/DL (ref 0–150)
URATE SERPL-MCNC: 3.3 MG/DL (ref 3.4–7)
WBC # BLD AUTO: 9.03 10*3/MM3 (ref 3.4–10.8)

## 2021-10-12 PROCEDURE — 25010000002 LEVOFLOXACIN PER 250 MG: Performed by: INTERNAL MEDICINE

## 2021-10-12 PROCEDURE — 94003 VENT MGMT INPAT SUBQ DAY: CPT

## 2021-10-12 PROCEDURE — 25010000002 THIAMINE PER 100 MG: Performed by: INTERNAL MEDICINE

## 2021-10-12 PROCEDURE — 94799 UNLISTED PULMONARY SVC/PX: CPT

## 2021-10-12 PROCEDURE — 85025 COMPLETE CBC W/AUTO DIFF WBC: CPT | Performed by: INTERNAL MEDICINE

## 2021-10-12 PROCEDURE — 25010000002 HYDRALAZINE PER 20 MG: Performed by: INTERNAL MEDICINE

## 2021-10-12 PROCEDURE — 84478 ASSAY OF TRIGLYCERIDES: CPT | Performed by: INTERNAL MEDICINE

## 2021-10-12 PROCEDURE — 25010000002 FENTANYL CITRATE (PF) 50 MCG/ML SOLUTION: Performed by: INTERNAL MEDICINE

## 2021-10-12 PROCEDURE — 25010000002 ENOXAPARIN PER 10 MG: Performed by: INTERNAL MEDICINE

## 2021-10-12 PROCEDURE — 25010000002 PROPOFOL 10 MG/ML EMULSION: Performed by: INTERNAL MEDICINE

## 2021-10-12 PROCEDURE — 84550 ASSAY OF BLOOD/URIC ACID: CPT | Performed by: INTERNAL MEDICINE

## 2021-10-12 PROCEDURE — 84100 ASSAY OF PHOSPHORUS: CPT | Performed by: INTERNAL MEDICINE

## 2021-10-12 PROCEDURE — 25010000002 LORAZEPAM PER 2 MG: Performed by: INTERNAL MEDICINE

## 2021-10-12 PROCEDURE — 25010000002 LORAZEPAM PER 2 MG: Performed by: NURSE PRACTITIONER

## 2021-10-12 PROCEDURE — 80053 COMPREHEN METABOLIC PANEL: CPT | Performed by: INTERNAL MEDICINE

## 2021-10-12 RX ORDER — POTASSIUM CHLORIDE 1.5 G/1.77G
40 POWDER, FOR SOLUTION ORAL ONCE
Status: COMPLETED | OUTPATIENT
Start: 2021-10-12 | End: 2021-10-12

## 2021-10-12 RX ORDER — LANSOPRAZOLE
30 KIT EVERY MORNING
Status: DISCONTINUED | OUTPATIENT
Start: 2021-10-13 | End: 2021-10-19 | Stop reason: HOSPADM

## 2021-10-12 RX ORDER — BUMETANIDE 0.25 MG/ML
2 INJECTION INTRAMUSCULAR; INTRAVENOUS EVERY 12 HOURS
Status: COMPLETED | OUTPATIENT
Start: 2021-10-12 | End: 2021-10-12

## 2021-10-12 RX ADMIN — LORAZEPAM 2 MG: 2 INJECTION INTRAMUSCULAR; INTRAVENOUS at 22:09

## 2021-10-12 RX ADMIN — NICOTINE 1 PATCH: 21 PATCH, EXTENDED RELEASE TRANSDERMAL at 17:56

## 2021-10-12 RX ADMIN — DIAZEPAM 10 MG: 5 TABLET ORAL at 07:54

## 2021-10-12 RX ADMIN — FENTANYL CITRATE 50 MCG: 50 INJECTION, SOLUTION INTRAMUSCULAR; INTRAVENOUS at 06:16

## 2021-10-12 RX ADMIN — METOPROLOL TARTRATE 50 MG: 50 TABLET, FILM COATED ORAL at 09:46

## 2021-10-12 RX ADMIN — FENTANYL CITRATE 50 MCG: 50 INJECTION, SOLUTION INTRAMUSCULAR; INTRAVENOUS at 10:32

## 2021-10-12 RX ADMIN — LABETALOL HYDROCHLORIDE 20 MG: 5 INJECTION, SOLUTION INTRAVENOUS at 03:34

## 2021-10-12 RX ADMIN — POTASSIUM CHLORIDE 40 MEQ: 1.5 POWDER, FOR SOLUTION ORAL at 10:32

## 2021-10-12 RX ADMIN — AMLODIPINE BESYLATE 10 MG: 10 TABLET ORAL at 08:06

## 2021-10-12 RX ADMIN — PROPOFOL 35 MCG/KG/MIN: 10 INJECTION, EMULSION INTRAVENOUS at 02:00

## 2021-10-12 RX ADMIN — LORAZEPAM 2 MG: 2 INJECTION INTRAMUSCULAR; INTRAVENOUS at 04:34

## 2021-10-12 RX ADMIN — SODIUM CHLORIDE, PRESERVATIVE FREE 10 ML: 5 INJECTION INTRAVENOUS at 08:07

## 2021-10-12 RX ADMIN — DEXMEDETOMIDINE HYDROCHLORIDE 1.2 MCG/KG/HR: 100 INJECTION, SOLUTION, CONCENTRATE INTRAVENOUS at 23:59

## 2021-10-12 RX ADMIN — METRONIDAZOLE 500 MG: 500 INJECTION, SOLUTION INTRAVENOUS at 02:01

## 2021-10-12 RX ADMIN — DOCUSATE SODIUM 50MG AND SENNOSIDES 8.6MG 2 TABLET: 8.6; 5 TABLET, FILM COATED ORAL at 08:06

## 2021-10-12 RX ADMIN — FENTANYL CITRATE 50 MCG: 50 INJECTION, SOLUTION INTRAMUSCULAR; INTRAVENOUS at 12:36

## 2021-10-12 RX ADMIN — DIAZEPAM 10 MG: 5 TABLET ORAL at 17:55

## 2021-10-12 RX ADMIN — LEVOFLOXACIN 750 MG: 750 INJECTION, SOLUTION INTRAVENOUS at 10:38

## 2021-10-12 RX ADMIN — PANTOPRAZOLE SODIUM 40 MG: 40 INJECTION, POWDER, FOR SOLUTION INTRAVENOUS at 06:08

## 2021-10-12 RX ADMIN — FENTANYL CITRATE 50 MCG: 50 INJECTION, SOLUTION INTRAMUSCULAR; INTRAVENOUS at 02:46

## 2021-10-12 RX ADMIN — LABETALOL HYDROCHLORIDE 20 MG: 5 INJECTION, SOLUTION INTRAVENOUS at 01:14

## 2021-10-12 RX ADMIN — MULTIPLE VITAMINS W/ MINERALS TAB 1 TABLET: TAB at 08:07

## 2021-10-12 RX ADMIN — METOPROLOL TARTRATE 50 MG: 50 TABLET, FILM COATED ORAL at 18:03

## 2021-10-12 RX ADMIN — Medication 100 MG: at 15:05

## 2021-10-12 RX ADMIN — BUMETANIDE 2 MG: 0.25 INJECTION, SOLUTION INTRAMUSCULAR; INTRAVENOUS at 10:32

## 2021-10-12 RX ADMIN — FENTANYL CITRATE 50 MCG: 50 INJECTION, SOLUTION INTRAMUSCULAR; INTRAVENOUS at 00:41

## 2021-10-12 RX ADMIN — BUMETANIDE 2 MG: 0.25 INJECTION, SOLUTION INTRAMUSCULAR; INTRAVENOUS at 22:09

## 2021-10-12 RX ADMIN — PROPOFOL 35 MCG/KG/MIN: 10 INJECTION, EMULSION INTRAVENOUS at 04:30

## 2021-10-12 RX ADMIN — DEXMEDETOMIDINE HYDROCHLORIDE 1 MCG/KG/HR: 100 INJECTION, SOLUTION, CONCENTRATE INTRAVENOUS at 15:57

## 2021-10-12 RX ADMIN — SODIUM CHLORIDE, PRESERVATIVE FREE 10 ML: 5 INJECTION INTRAVENOUS at 22:13

## 2021-10-12 RX ADMIN — METRONIDAZOLE 500 MG: 500 INJECTION, SOLUTION INTRAVENOUS at 18:03

## 2021-10-12 RX ADMIN — HYDROCODONE BITARTRATE AND ACETAMINOPHEN 1 TABLET: 5; 325 TABLET ORAL at 15:02

## 2021-10-12 RX ADMIN — LORAZEPAM 2 MG: 2 INJECTION INTRAMUSCULAR; INTRAVENOUS at 23:59

## 2021-10-12 RX ADMIN — LORAZEPAM 2 MG: 2 INJECTION INTRAMUSCULAR; INTRAVENOUS at 02:46

## 2021-10-12 RX ADMIN — POTASSIUM CHLORIDE 40 MEQ: 1.5 POWDER, FOR SOLUTION ORAL at 07:59

## 2021-10-12 RX ADMIN — HYDRALAZINE HYDROCHLORIDE 10 MG: 20 INJECTION INTRAMUSCULAR; INTRAVENOUS at 04:30

## 2021-10-12 RX ADMIN — THIAMINE HYDROCHLORIDE 100 MG: 100 INJECTION, SOLUTION INTRAMUSCULAR; INTRAVENOUS at 03:34

## 2021-10-12 RX ADMIN — DEXMEDETOMIDINE HYDROCHLORIDE 1.4 MCG/KG/HR: 100 INJECTION, SOLUTION, CONCENTRATE INTRAVENOUS at 10:08

## 2021-10-12 RX ADMIN — ALBUTEROL SULFATE 6 PUFF: 90 AEROSOL, METERED RESPIRATORY (INHALATION) at 08:08

## 2021-10-12 RX ADMIN — Medication 5 MG: at 20:18

## 2021-10-12 RX ADMIN — LORAZEPAM 2 MG: 2 INJECTION INTRAMUSCULAR; INTRAVENOUS at 17:55

## 2021-10-12 RX ADMIN — METRONIDAZOLE 500 MG: 500 INJECTION, SOLUTION INTRAVENOUS at 10:38

## 2021-10-12 RX ADMIN — DEXMEDETOMIDINE HYDROCHLORIDE 1.4 MCG/KG/HR: 100 INJECTION, SOLUTION, CONCENTRATE INTRAVENOUS at 02:16

## 2021-10-12 RX ADMIN — DEXMEDETOMIDINE HYDROCHLORIDE 1.4 MCG/KG/HR: 100 INJECTION, SOLUTION, CONCENTRATE INTRAVENOUS at 06:36

## 2021-10-12 RX ADMIN — FENTANYL CITRATE 50 MCG: 50 INJECTION, SOLUTION INTRAMUSCULAR; INTRAVENOUS at 16:17

## 2021-10-12 RX ADMIN — DEXMEDETOMIDINE HYDROCHLORIDE 0.8 MCG/KG/HR: 100 INJECTION, SOLUTION, CONCENTRATE INTRAVENOUS at 20:27

## 2021-10-12 RX ADMIN — DEXMEDETOMIDINE HYDROCHLORIDE 1.5 MCG/KG/HR: 100 INJECTION, SOLUTION, CONCENTRATE INTRAVENOUS at 04:29

## 2021-10-12 RX ADMIN — ENOXAPARIN SODIUM 40 MG: 40 INJECTION SUBCUTANEOUS at 20:24

## 2021-10-12 RX ADMIN — METOPROLOL TARTRATE 50 MG: 50 TABLET, FILM COATED ORAL at 02:01

## 2021-10-12 RX ADMIN — DEXMEDETOMIDINE HYDROCHLORIDE 1.4 MCG/KG/HR: 100 INJECTION, SOLUTION, CONCENTRATE INTRAVENOUS at 12:37

## 2021-10-12 RX ADMIN — POLYETHYLENE GLYCOL 3350 17 G: 17 POWDER, FOR SOLUTION ORAL at 08:06

## 2021-10-12 RX ADMIN — PROPOFOL 35 MCG/KG/MIN: 10 INJECTION, EMULSION INTRAVENOUS at 06:36

## 2021-10-12 NOTE — PROGRESS NOTES
Adult Nutrition  Assessment/PES    Patient Name:  Watson Lisa  YOB: 1955  MRN: [id number]  Admit Date:  10/7/2021    Assessment Date:  10/12/2021    Comments:  TF follow up. Pt now off propofol and extubated. TF's to continue. Will change formula to Diabetisouce at 60ml/hr and water [id number]. Last BM 10/10. Will continue to follow for po diet when appropriate.     Reason for Assessment     Row Name 10/12/21 1437          Reason for Assessment    Reason For Assessment follow-up protocol; TF/PN     Diagnosis --  extubated                Nutrition/Diet History     Row Name 10/12/21 1430          Nutrition/Diet History    Typical Food/Fluid Intake continue TF's, change formula                  Labs/Tests/Procedures/Meds     Row Name 10/12/21 1432          Labs/Procedures/Meds    Lab Results Reviewed reviewed     Lab Results Comments glu,cr, trig, alb            Diagnostic Tests/Procedures    Diagnostic Test/Procedure Reviewed reviewed            Medications    Pertinent Medications Reviewed reviewed     Pertinent Medications Comments bumex, lovenox, lansoprazole, levaquin, percolace, ppf                Physical Findings     Row Name 10/12/21 1436          Physical Findings    Overall Physical Appearance obese; on oxygen therapy     Gastrointestinal feeding tube     Tubes nasogastric tube     Skin pressure injury  perineum                  Nutrition Prescription Ordered     Row Name 10/12/21 7941          Nutrition Prescription PO    Current PO Diet NPO            Nutrition Prescription EN    Enteral Route NG     Product Peptamen Intense VHP (Vital HP)     TF Delivery Method Continuous     Continuous TF Goal Rate (mL/hr) 50 mL/hr     Continuous TF Current Rate (mL/hr) 50 mL/hr     Water flush (mL)  30 mL     Water Flush Frequency Every 4 hours            Propofol Considerations    Propofol (mL/hr) 0 mL/hr                Evaluation of Received Nutrient/Fluid Intake     Row Name 10/12/21 1434           Calories Evaluation    Enteral Calories (kcal) 1200     Total Calories (kcal) 1200     % of Kcal Needs 67            Protein Evaluation    Enteral Protein (gm) 110     % of Protein Needs 100            Fluid Intake Evaluation    Enteral (Free Water) Fluid (mL) 1008     Free Water Flush Fluid (mL) 180     Total Free Water Intake (mL) 1188 mL                     Problem/Interventions:           Intervention Goal     Row Name 10/12/21 1436          Intervention Goal    General Maintain nutrition; Nutrition support treatment; Improved nutrition related lab(s); Reduce/improve symptoms; Meet nutritional needs for age/condition; Disease management/therapy     TF/PN Tolerate TF at goal; Adjust TF/PN     Weight No significant weight loss                Nutrition Intervention     Row Name 10/12/21 1436          Nutrition Intervention    RD/Tech Action Follow Tx progress; Care plan reviewd                Nutrition Prescription     Row Name 10/12/21 1436          Nutrition Prescription EN    Product Diabetisource     TF Delivery Method Continuous     Continuous TF Goal Rate (mL/hr) 60 mL/hr     Water flush (mL)  30 mL     Water Flush Frequency Every 4 hours                Education/Evaluation     Row Name 10/12/21 1436          Monitor/Evaluation    Monitor Per protocol; I&O; TF delivery/tolerance; Pertinent labs; Weight; Skin status                 Electronically signed by:  Tiny Sands RD  10/12/21 14:36 EDT

## 2021-10-12 NOTE — PROGRESS NOTES
[facility] Progress Note      Patient Name: Watson Lisa  : 1955  MRN: [id number]  Primary Care Physician:  Checo Dunham MD  Date of admission: 10/7/2021    Subjective     Interval History:   Follow-up acute kidney injury  Patient is on the ventilator, sedated, received the Bumex yesterday he had 4380 cc urine output but he remains with significant edema    Review of Systems:   Not obtainable    Objective     Vitals:   Temp:  [98 °F (36.7 °C)-98.4 °F (36.9 °C)] 98 °F (36.7 °C)  Heart Rate:  [] 80  Resp:  [29-38] 29  BP: (125-178)/(62-95) 138/92  FiO2 (%):  [25 %-99 %] 99 %    Intake/Output Summary (Last 24 hours) at 10/12/2021 0934  Last data filed at 10/12/2021 0754  Gross per 24 hour   Intake 4092.28 ml   Output 4380 ml   Net -287.72 ml       Physical Exam:    General Appearance: On the ventilator, sedated, not responsive  Skin: warm and dry  HEENT: Orally intubated  Neck: No JVD  Lungs: Bilateral rhonchi crackles, breathing effort not labored  Heart: RRR, normal S1 and S2, no S3, no rub  Abdomen: soft, no guarding, distended, normoactive bowels  : no palpable bladder, Wood catheter anchored in place  Extremities: 3-4+ lower and upper extremity edema, no cyanosis or clubbing  Neuro: Unable to assess    Scheduled Meds:     albuterol sulfate HFA, 6 puff, Inhalation, 4x Daily - RT  amLODIPine, 10 mg, Oral, Daily  enoxaparin, 40 mg, Subcutaneous, Q24H  levoFLOXacin, 750 mg, Intravenous, Q24H  LORazepam, 2 mg, Intravenous, Q6H  metoprolol tartrate, 50 mg, Oral, Q8H  metroNIDAZOLE, 500 mg, Intravenous, Q8H  multivitamin with minerals, 1 tablet, Oral, Daily  nicotine, 1 patch, Transdermal, Q24H  pantoprazole, 40 mg, Intravenous, Q AM  senna-docusate sodium, 2 tablet, Oral, BID  sodium chloride, 10 mL, Intravenous, Q12H  thiamine (VITAMIN B1) IVPB, 100 mg, Intravenous, Q8H  vitamin D, 50,000 Units, Oral, Q7 Days      IV Meds:   dexmedetomidine, 0.2-1.5 mcg/kg/hr, Last  Rate: 1.4 mcg/kg/hr (10/12/21 0636)  propofol, 5-50 mcg/kg/min, Last Rate: 25 mcg/kg/min (10/12/21 0852)        Results Reviewed:   I have personally reviewed the results from the time of this admission to 10/12/2021 09:34 EDT     Results from last 7 days   Lab Units 10/12/21  0350 10/11/21  0459 10/10/21  0425 10/09/21  0443 10/09/21  0443   SODIUM mmol/L 139 140 140   < > 140   POTASSIUM mmol/L 3.5 3.5 3.9   < > 4.3   CHLORIDE mmol/L 106 109* 108*   < > 107   CO2 mmol/L 20.6* 20.0* 19.6*   < > 23.8   BUN mg/dL 20 18 24*   < > 21   CREATININE mg/dL 0.91 1.05 1.49*   < > 1.39*   CALCIUM mg/dL 8.7 8.1* 7.9*   < > 8.1*   BILIRUBIN mg/dL 0.4  --  0.5  --  0.6   ALK PHOS U/L 70  --  80  --  79   ALT (SGPT) U/L 16  --  23  --  27   AST (SGOT) U/L 11  --  26  --  33   GLUCOSE mg/dL 122* 130* 127*   < > 130*    < > = values in this interval not displayed.       Estimated Creatinine Clearance: 117 mL/min (by C-G formula based on SCr of 0.91 mg/dL).    Results from last 7 days   Lab Units 10/12/21  0350 10/09/21  0443 10/08/21  0443 10/07/21  1102   MAGNESIUM mg/dL  --  1.7 1.4* 1.5*   PHOSPHORUS mg/dL 3.5 3.2 4.4  --        Results from last 7 days   Lab Units 10/12/21  0350 10/09/21  0443   URIC ACID mg/dL 3.3* 6.0       Results from last 7 days   Lab Units 10/12/21  0350 10/11/21  0459 10/10/21  0425 10/09/21  0443 10/08/21  0443   WBC 10*3/mm3 9.03 9.64 9.74 5.63 7.03   HEMOGLOBIN g/dL 9.9* 10.5* 11.3* 11.4* 12.7*   PLATELETS 10*3/mm3 136* 124* 104* 124* 240       Results from last 7 days   Lab Units 10/07/21  1836   INR  1.02       Assessment / Plan     ASSESSMENT:  1.  Acute kidney injury associated with hypotension most likely ATN, creatinine on admission 1.99, this morning down to 0.91 potassium 3.5  2.  Acute respiratory failure on the ventilator  3.  Alcohol abuse with alcohol withdrawal  4.  Alcoholic liver disease  5.  Hypotension, resolved, blood pressure now very stable    PLAN:  1.  Diurese with IV Bumex, and  add 1 dose of potassium  2.  Surveillance labs    Discussed the case with the patient's nurse    Thank you for involving us in the care of Watson Lisa.  Please feel free to call with any questions.    Kimo Hu MD  10/12/21  09:34 EDT    [facility]  [phone]      Much of this encounter note is an electronic transcription/translation of spoken language to printed text. The electronic translation of spoken language may permit erroneous, or at times, nonsensical words or phrases to be inadvertently transcribed; Although I have reviewed the note for such errors, some may still exist.

## 2021-10-12 NOTE — PROGRESS NOTES
Name: Watson Lisa ADMIT: 10/7/2021   : 1955  PCP: Checo Dunham MD    MRN: [id number] LOS: 5 days   AGE/SEX: 66 y.o. male  ROOM: [facility]     Subjective   Subjective   CC: alcohol withdrawal  No acute events. Patient extubated today. Only complaint is dry mouth. No fevers. Tolerating tube feeds.    Objective   Objective   Vital Signs  Temp:  [98 °F (36.7 °C)-98.4 °F (36.9 °C)] 98 °F (36.7 °C)  Heart Rate:  [] 79  Resp:  [29-33] 29  BP: (125-178)/(62-92) 153/73  FiO2 (%):  [25 %-99 %] 99 %  SpO2:  [92 %-99 %] 94 %  on  Flow (L/min):  [2] 2;   Device (Oxygen Therapy): nasal cannula  Body mass index is 31.85 kg/m².  Physical Exam  Vitals and nursing note reviewed.   Constitutional:       General: He is not in acute distress.     Appearance: He is not toxic-appearing or diaphoretic.   HENT:      Head: Normocephalic and atraumatic.      Nose: Nose normal.      Comments: dobhoff tube in place     Mouth/Throat:      Mouth: Mucous membranes are moist.      Pharynx: Oropharynx is clear.   Eyes:      Extraocular Movements: Extraocular movements intact.      Conjunctiva/sclera: Conjunctivae normal.      Pupils: Pupils are equal, round, and reactive to light.   Cardiovascular:      Rate and Rhythm: Normal rate. Rhythm irregular.      Pulses: Normal pulses.   Pulmonary:      Effort: Pulmonary effort is normal.      Breath sounds: Examination of the right-lower field reveals decreased breath sounds. Examination of the left-lower field reveals decreased breath sounds. Decreased breath sounds present.   Abdominal:      General: Bowel sounds are normal.      Palpations: Abdomen is soft.   Genitourinary:     Comments: Wood catheter in place draining clear yellow  Musculoskeletal:         General: Swelling (2-3+ BLE) present. No tenderness.      Cervical back: Normal range of motion and neck supple.   Skin:     General: Skin is warm and dry.      Capillary Refill: Capillary refill takes less than 2 seconds.    Neurological:      General: No focal deficit present.      Mental Status: He is alert.      Motor: No tremor.   Psychiatric:         Mood and Affect: Mood normal.         Behavior: Behavior normal.     Results Review     I reviewed the patient's new clinical results.  I reviewed the patient's telemetry  Results from last 7 days   Lab Units 10/12/21  0350 10/11/21  0459 10/10/21  0425 10/09/21  0443   WBC 10*3/mm3 9.03 9.64 9.74 5.63   HEMOGLOBIN g/dL 9.9* 10.5* 11.3* 11.4*   PLATELETS 10*3/mm3 136* 124* 104* 124*     Results from last 7 days   Lab Units 10/12/21  0350 10/11/21  0459 10/10/21  0425 10/09/21  0443   SODIUM mmol/L 139 140 140 140   POTASSIUM mmol/L 3.5 3.5 3.9 4.3   CHLORIDE mmol/L 106 109* 108* 107   CO2 mmol/L 20.6* 20.0* 19.6* 23.8   BUN mg/dL 20 18 24* 21   CREATININE mg/dL 0.91 1.05 1.49* 1.39*   GLUCOSE mg/dL 122* 130* 127* 130*   Estimated Creatinine Clearance: 117 mL/min (by C-G formula based on SCr of 0.91 mg/dL).  Results from last 7 days   Lab Units 10/12/21  0350 10/10/21  0425 10/09/21  0443 10/08/21  0443   ALBUMIN g/dL 2.90* 3.10* 3.40* 4.30   BILIRUBIN mg/dL 0.4 0.5 0.6 1.2   ALK PHOS U/L 70 80 79 85   AST (SGOT) U/L 11 26 33 43*   ALT (SGPT) U/L 16 23 27 30     Results from last 7 days   Lab Units 10/12/21  0350 10/11/21  0459 10/10/21  0425 10/09/21  0443 10/08/21  0443 10/08/21  0443 10/07/21  1102 10/07/21  1048   CALCIUM mg/dL 8.7 8.1* 7.9* 8.1*   < > 8.3*  --    < >   ALBUMIN g/dL 2.90*  --  3.10* 3.40*  --  4.30  --    < >   MAGNESIUM mg/dL  --   --   --  1.7  --  1.4* 1.5*  --    PHOSPHORUS mg/dL 3.5  --   --  3.2  --  4.4  --   --     < > = values in this interval not displayed.     Results from last 7 days   Lab Units 10/10/21  0425   PROCALCITONIN ng/mL 0.74*     COVID19   Date Value Ref Range Status   10/07/2021 Not Detected Not Detected - Ref. Range Final   03/25/2021 Not Detected Not Detected - Ref. Range Final     No results found for: HGBA1C, POCGLU    XR Abdomen  KUB  KUB     HISTORY: Nasogastric tube repositioning     COMPARISON: 10/09/2021 at 1959 hours.     FINDINGS: A single view of the upper abdomen demonstrates a nasogastric  tube in place. The nasogastric tube has been advanced. The tip is within  the proximal body of the stomach.     Air is identified within loops of small bowel which are mildly  prominent, similar to the prior examination. No obvious pneumatosis or  pneumoperitoneum is identified. Evaluation could be performed with a CT  examination of the abdomen and pelvis as indicated.     The above information was called to the patient's nurse at the time of  the dictation. The patient's nurse is to relay the information to the  clinical service.     This report was finalized on 10/11/2021 7:20 AM by Dr. Vincent Beck M.D.     XR Chest 1 View  Narrative: Patient: BRITTANY ZULETA  Time Out: 07:22  Exam(s): FILM CXR 1 VIEW     EXAM:    XR Chest, 1 View    CLINICAL HISTORY:     Reason for exam: Right-sided aspiration pneumonia.    TECHNIQUE:    Frontal view of the chest.    COMPARISON:    10 10 2021    FINDINGS:    Lungs: Hazy bibasilar opacities, unchanged from prior study.    Pleural space:  Unremarkable.  No pneumothorax.    Heart: Unchanged cardiomegaly.    Mediastinum: Endotracheal tube terminates 5.6 cm above the level of the   [place].  Nasogastric tube terminates below field-of-view in the left   upper quadrant..    Bones joints:  Unremarkable.    IMPRESSION:       Unchanged hazy bibasilar opacities which may represent consolidations,   and or layering bilateral pleural effusions.  Impression: Electronically signed by Christiano Escobar M.D. on 10-11-21 at 0722    Scheduled Medications  albuterol sulfate HFA, 6 puff, Inhalation, 4x Daily - RT  amLODIPine, 10 mg, Oral, Daily  bumetanide, 2 mg, Intravenous, Q12H  enoxaparin, 40 mg, Subcutaneous, Q24H  [START ON 10/13/2021] lansoprazole, 30 mg, Nasogastric, QAM  levoFLOXacin, 750 mg, Intravenous, Q24H  metoprolol  tartrate, 50 mg, Oral, Q8H  metroNIDAZOLE, 500 mg, Intravenous, Q8H  multivitamin with minerals, 1 tablet, Oral, Daily  nicotine, 1 patch, Transdermal, Q24H  senna-docusate sodium, 2 tablet, Oral, BID  sodium chloride, 10 mL, Intravenous, Q12H  thiamine, 100 mg, Nasogastric, TID  vitamin D, 50,000 Units, Oral, Q7 Days    Infusions  dexmedetomidine, 0.2-1.5 mcg/kg/hr, Last Rate: 1.1 mcg/kg/hr (10/12/21 1510)  propofol, 5-50 mcg/kg/min, Last Rate: Stopped (10/12/21 0955)    Diet  Diet Regular       Assessment/Plan     Active Hospital Problems    Diagnosis  POA   • **Delirium tremens (HCC) [F10.231]  Yes   • Acute respiratory failure with hypercapnia (HCC) [J96.02]  Yes   • Aspiration pneumonia (HCC) [J69.0]  No   • Alcohol abuse [F10.10]  Yes   • Alcoholic liver disease (HCC) [K70.9]  Yes   • Tobacco abuse [Z72.0]  Yes   • Hiatal hernia [K44.9]  Yes   • COPD (chronic obstructive pulmonary disease) (HCC) [J44.9]  Yes   • Anemia, chronic disease [D63.8]  Yes   • Acute kidney failure (HCC) [N17.9]  Yes   • Hypomagnesemia [E83.42]  Yes   • Essential hypertension [I10]  Yes      Resolved Hospital Problems   No resolved problems to display.   Alcohol Dependence in Withdrawal with Delirium Tremens  - required intubation to protect airway, extubated 10/12/21  - continue prn ativan and vitamin replacement  - appreciate critical care management from [facility]    Aspiration Pneumonia  - continue levofloxacin and flagyl  - encourage pulmonary toilet and wean oxygen as tolerated    COPD/LIVAN  - no exacerbation  - O2 PRN    DEEP  - probably from hypotension with progression to ATN  - much improved with IVF, now appears volume overloaded and is getting diuresis per nephrology, appreciate recs    Anemia/TCP  - stable, will monitor    Lovenox 40 mg SC daily for DVT prophylaxis.  Full code.  Discussed with patient and nursing staff.  Anticipate discharge TBD timing yet to be determined.      Meño Liriano MD  [facility]  10/12/21  15:12 EDT

## 2021-10-12 NOTE — PLAN OF CARE
Goal Outcome Evaluation:    Pt remains in the CCU tonight on the vent. Pt still get very agitated/anxious when stimulated, but was able to get to follow commands at 0000 and 0400 assessments. PRN fent and ativan given per protocol for agitation and vent synchronous. Remains on prop and precedex. PRN hydralazine and labetalol given several times tonight to keep BP <170 systolic. BP remains in the 160s-170s. Good UOP. Awaiting AM lab results. Will continue to monitor.

## 2021-10-13 LAB — GLUCOSE BLDC GLUCOMTR-MCNC: 122 MG/DL (ref 70–130)

## 2021-10-13 PROCEDURE — 25010000002 LEVOFLOXACIN PER 250 MG: Performed by: INTERNAL MEDICINE

## 2021-10-13 PROCEDURE — 94799 UNLISTED PULMONARY SVC/PX: CPT

## 2021-10-13 PROCEDURE — 82962 GLUCOSE BLOOD TEST: CPT

## 2021-10-13 PROCEDURE — 25010000002 ENOXAPARIN PER 10 MG: Performed by: INTERNAL MEDICINE

## 2021-10-13 PROCEDURE — 25010000002 FENTANYL CITRATE (PF) 50 MCG/ML SOLUTION: Performed by: INTERNAL MEDICINE

## 2021-10-13 PROCEDURE — 25010000002 HYDRALAZINE PER 20 MG: Performed by: INTERNAL MEDICINE

## 2021-10-13 PROCEDURE — 25010000002 LORAZEPAM PER 2 MG: Performed by: NURSE PRACTITIONER

## 2021-10-13 RX ORDER — BUMETANIDE 0.25 MG/ML
2 INJECTION INTRAMUSCULAR; INTRAVENOUS EVERY 12 HOURS
Status: COMPLETED | OUTPATIENT
Start: 2021-10-13 | End: 2021-10-13

## 2021-10-13 RX ORDER — ALBUTEROL SULFATE 90 UG/1
6 AEROSOL, METERED RESPIRATORY (INHALATION) EVERY 4 HOURS PRN
Status: DISCONTINUED | OUTPATIENT
Start: 2021-10-13 | End: 2021-10-15

## 2021-10-13 RX ORDER — CLONAZEPAM 1 MG/1
2 TABLET ORAL EVERY 8 HOURS SCHEDULED
Status: DISCONTINUED | OUTPATIENT
Start: 2021-10-13 | End: 2021-10-17

## 2021-10-13 RX ADMIN — METRONIDAZOLE 500 MG: 500 INJECTION, SOLUTION INTRAVENOUS at 03:17

## 2021-10-13 RX ADMIN — DEXMEDETOMIDINE HYDROCHLORIDE 0.8 MCG/KG/HR: 100 INJECTION, SOLUTION, CONCENTRATE INTRAVENOUS at 18:04

## 2021-10-13 RX ADMIN — DEXMEDETOMIDINE HYDROCHLORIDE 1.4 MCG/KG/HR: 100 INJECTION, SOLUTION, CONCENTRATE INTRAVENOUS at 07:44

## 2021-10-13 RX ADMIN — Medication 100 MG: at 20:57

## 2021-10-13 RX ADMIN — DOCUSATE SODIUM 50MG AND SENNOSIDES 8.6MG 2 TABLET: 8.6; 5 TABLET, FILM COATED ORAL at 10:00

## 2021-10-13 RX ADMIN — METRONIDAZOLE 500 MG: 500 INJECTION, SOLUTION INTRAVENOUS at 11:55

## 2021-10-13 RX ADMIN — CLONAZEPAM 2 MG: 1 TABLET ORAL at 22:05

## 2021-10-13 RX ADMIN — LANSOPRAZOLE 30 MG: KIT at 05:50

## 2021-10-13 RX ADMIN — DEXMEDETOMIDINE HYDROCHLORIDE 1.4 MCG/KG/HR: 100 INJECTION, SOLUTION, CONCENTRATE INTRAVENOUS at 05:30

## 2021-10-13 RX ADMIN — FENTANYL CITRATE 50 MCG: 50 INJECTION, SOLUTION INTRAMUSCULAR; INTRAVENOUS at 01:03

## 2021-10-13 RX ADMIN — DOCUSATE SODIUM 50MG AND SENNOSIDES 8.6MG 2 TABLET: 8.6; 5 TABLET, FILM COATED ORAL at 20:57

## 2021-10-13 RX ADMIN — LORAZEPAM 2 MG: 2 INJECTION INTRAMUSCULAR; INTRAVENOUS at 02:04

## 2021-10-13 RX ADMIN — HYDRALAZINE HYDROCHLORIDE 10 MG: 20 INJECTION INTRAMUSCULAR; INTRAVENOUS at 00:00

## 2021-10-13 RX ADMIN — METOPROLOL TARTRATE 50 MG: 50 TABLET, FILM COATED ORAL at 17:51

## 2021-10-13 RX ADMIN — LEVOFLOXACIN 750 MG: 750 INJECTION, SOLUTION INTRAVENOUS at 11:55

## 2021-10-13 RX ADMIN — FENTANYL CITRATE 50 MCG: 50 INJECTION, SOLUTION INTRAMUSCULAR; INTRAVENOUS at 23:57

## 2021-10-13 RX ADMIN — DIAZEPAM 10 MG: 5 TABLET ORAL at 01:03

## 2021-10-13 RX ADMIN — DEXMEDETOMIDINE HYDROCHLORIDE 1.4 MCG/KG/HR: 100 INJECTION, SOLUTION, CONCENTRATE INTRAVENOUS at 02:45

## 2021-10-13 RX ADMIN — NICOTINE 1 PATCH: 21 PATCH, EXTENDED RELEASE TRANSDERMAL at 17:52

## 2021-10-13 RX ADMIN — HYDRALAZINE HYDROCHLORIDE 10 MG: 20 INJECTION INTRAMUSCULAR; INTRAVENOUS at 06:52

## 2021-10-13 RX ADMIN — BUMETANIDE 2 MG: 0.25 INJECTION, SOLUTION INTRAMUSCULAR; INTRAVENOUS at 22:05

## 2021-10-13 RX ADMIN — DEXMEDETOMIDINE HYDROCHLORIDE 1.4 MCG/KG/HR: 100 INJECTION, SOLUTION, CONCENTRATE INTRAVENOUS at 14:29

## 2021-10-13 RX ADMIN — Medication 100 MG: at 10:01

## 2021-10-13 RX ADMIN — LORAZEPAM 2 MG: 2 INJECTION INTRAMUSCULAR; INTRAVENOUS at 05:50

## 2021-10-13 RX ADMIN — DEXMEDETOMIDINE HYDROCHLORIDE 1.4 MCG/KG/HR: 100 INJECTION, SOLUTION, CONCENTRATE INTRAVENOUS at 11:38

## 2021-10-13 RX ADMIN — SODIUM CHLORIDE, PRESERVATIVE FREE 10 ML: 5 INJECTION INTRAVENOUS at 09:00

## 2021-10-13 RX ADMIN — MULTIPLE VITAMINS W/ MINERALS TAB 1 TABLET: TAB at 10:01

## 2021-10-13 RX ADMIN — CLONAZEPAM 2 MG: 1 TABLET ORAL at 16:37

## 2021-10-13 RX ADMIN — HYDROCODONE BITARTRATE AND ACETAMINOPHEN 1 TABLET: 5; 325 TABLET ORAL at 06:51

## 2021-10-13 RX ADMIN — HYDROCODONE BITARTRATE AND ACETAMINOPHEN 1 TABLET: 5; 325 TABLET ORAL at 20:59

## 2021-10-13 RX ADMIN — METOPROLOL TARTRATE 5 MG: 5 INJECTION, SOLUTION INTRAVENOUS at 21:15

## 2021-10-13 RX ADMIN — SODIUM CHLORIDE, PRESERVATIVE FREE 10 ML: 5 INJECTION INTRAVENOUS at 21:16

## 2021-10-13 RX ADMIN — LORAZEPAM 0.5 MG: 0.5 TABLET ORAL at 20:58

## 2021-10-13 RX ADMIN — Medication 5 MG: at 21:15

## 2021-10-13 RX ADMIN — BUMETANIDE 2 MG: 0.25 INJECTION, SOLUTION INTRAMUSCULAR; INTRAVENOUS at 10:00

## 2021-10-13 RX ADMIN — METRONIDAZOLE 500 MG: 500 INJECTION, SOLUTION INTRAVENOUS at 19:51

## 2021-10-13 RX ADMIN — METOPROLOL TARTRATE 50 MG: 50 TABLET, FILM COATED ORAL at 10:00

## 2021-10-13 RX ADMIN — FENTANYL CITRATE 50 MCG: 50 INJECTION, SOLUTION INTRAMUSCULAR; INTRAVENOUS at 07:54

## 2021-10-13 RX ADMIN — Medication 100 MG: at 16:37

## 2021-10-13 RX ADMIN — ENOXAPARIN SODIUM 40 MG: 40 INJECTION SUBCUTANEOUS at 20:58

## 2021-10-13 RX ADMIN — Medication 100 MG: at 20:58

## 2021-10-13 RX ADMIN — DEXMEDETOMIDINE HYDROCHLORIDE 0.8 MCG/KG/HR: 100 INJECTION, SOLUTION, CONCENTRATE INTRAVENOUS at 22:05

## 2021-10-13 RX ADMIN — METOPROLOL TARTRATE 50 MG: 50 TABLET, FILM COATED ORAL at 02:04

## 2021-10-13 RX ADMIN — AMLODIPINE BESYLATE 10 MG: 10 TABLET ORAL at 10:01

## 2021-10-13 RX ADMIN — LORAZEPAM 2 MG: 2 INJECTION INTRAMUSCULAR; INTRAVENOUS at 23:57

## 2021-10-13 NOTE — PROGRESS NOTES
[facility] Progress Note      Patient Name: Watson Lisa  : 1955  MRN: [id number]  Primary Care Physician:  Checo Dunham MD  Date of admission: 10/7/2021    Subjective     Interval History:   Follow-up acute kidney injury  Patient was extubated, he was diuresed yesterday with a 6100 cc of urine output.  Continue to have significant edema he is awake but confused    Review of Systems:   Not obtainable    Objective     Vitals:   Temp:  [97.6 °F (36.4 °C)-98.6 °F (37 °C)] 98.3 °F (36.8 °C)  Heart Rate:  [74-84] 75  Resp:  [16-22] 18  BP: (143-172)/(73-94) 143/82  Flow (L/min):  [2] 2    Intake/Output Summary (Last 24 hours) at 10/13/2021 0902  Last data filed at 10/13/2021 0600  Gross per 24 hour   Intake 60 ml   Output 6100 ml   Net -6040 ml       Physical Exam:    General Appearance: Awake but confused, chronically ill, not responsive  Skin: warm and dry  HEENT: Orally intubated  Neck: No JVD  Lungs: Bilateral rhonchi crackles, breathing effort not labored  Heart: RRR, normal S1 and S2, no S3, no rub  Abdomen: soft, no guarding, distended, normoactive bowels  : no palpable bladder, Wood catheter anchored in place  Extremities: 3+ lower and upper extremity edema, no cyanosis or clubbing  Neuro: Unable to assess    Scheduled Meds:     amLODIPine, 10 mg, Oral, Daily  enoxaparin, 40 mg, Subcutaneous, Q24H  lansoprazole, 30 mg, Nasogastric, QAM  levoFLOXacin, 750 mg, Intravenous, Q24H  metoprolol tartrate, 50 mg, Oral, Q8H  metroNIDAZOLE, 500 mg, Intravenous, Q8H  multivitamin with minerals, 1 tablet, Oral, Daily  nicotine, 1 patch, Transdermal, Q24H  senna-docusate sodium, 2 tablet, Oral, BID  sodium chloride, 10 mL, Intravenous, Q12H  thiamine, 100 mg, Nasogastric, TID  vitamin D, 50,000 Units, Oral, Q7 Days      IV Meds:   dexmedetomidine, 0.2-1.5 mcg/kg/hr, Last Rate: 1.4 mcg/kg/hr (10/13/21 8038)  propofol, 5-50 mcg/kg/min, Last Rate: Stopped (10/12/21  0955)        Results Reviewed:   I have personally reviewed the results from the time of this admission to 10/13/2021 09:02 EDT     Results from last 7 days   Lab Units 10/12/21  0350 10/11/21  0459 10/10/21  0425 10/09/21  0443 10/09/21  0443   SODIUM mmol/L 139 140 140   < > 140   POTASSIUM mmol/L 3.5 3.5 3.9   < > 4.3   CHLORIDE mmol/L 106 109* 108*   < > 107   CO2 mmol/L 20.6* 20.0* 19.6*   < > 23.8   BUN mg/dL 20 18 24*   < > 21   CREATININE mg/dL 0.91 1.05 1.49*   < > 1.39*   CALCIUM mg/dL 8.7 8.1* 7.9*   < > 8.1*   BILIRUBIN mg/dL 0.4  --  0.5  --  0.6   ALK PHOS U/L 70  --  80  --  79   ALT (SGPT) U/L 16  --  23  --  27   AST (SGOT) U/L 11  --  26  --  33   GLUCOSE mg/dL 122* 130* 127*   < > 130*    < > = values in this interval not displayed.       Estimated Creatinine Clearance: 117 mL/min (by C-G formula based on SCr of 0.91 mg/dL).    Results from last 7 days   Lab Units 10/12/21  0350 10/09/21  0443 10/08/21  0443 10/07/21  1102   MAGNESIUM mg/dL  --  1.7 1.4* 1.5*   PHOSPHORUS mg/dL 3.5 3.2 4.4  --        Results from last 7 days   Lab Units 10/12/21  0350 10/09/21  0443   URIC ACID mg/dL 3.3* 6.0       Results from last 7 days   Lab Units 10/12/21  0350 10/11/21  0459 10/10/21  0425 10/09/21  0443 10/08/21  0443   WBC 10*3/mm3 9.03 9.64 9.74 5.63 7.03   HEMOGLOBIN g/dL 9.9* 10.5* 11.3* 11.4* 12.7*   PLATELETS 10*3/mm3 136* 124* 104* 124* 240       Results from last 7 days   Lab Units 10/07/21  1836   INR  1.02       Assessment / Plan     ASSESSMENT:  1.  Acute kidney injury associated with hypotension most likely ATN, creatinine on admission 1.99, his creatinine yesterday was 0.91 and potassium 3.5 today's results still not available  2.  Acute respiratory failure, resolved  3.  Alcohol abuse with alcohol withdrawal  4.  Alcoholic liver disease  5.  Hypotension, resolved, blood pressure now very stable  6.  Confusion    PLAN:  1.  Review labs and adjust treatment as needed  2.  Diurese again with IV  Bumex  3.  Surveillance labs    Discussed the case with the patient's nurse and with Dr. Lujan    Thank you for involving us in the care of Watson Lisa.  Please feel free to call with any questions.    Kimo Hu MD  10/13/21  09:02 EDT    [facility]  [phone]      Much of this encounter note is an electronic transcription/translation of spoken language to printed text. The electronic translation of spoken language may permit erroneous, or at times, nonsensical words or phrases to be inadvertently transcribed; Although I have reviewed the note for such errors, some may still exist.

## 2021-10-13 NOTE — PLAN OF CARE
Goal Outcome Evaluation:      Pt. Is anxious, restless all night, precedex drip on max range, ativan IV administered more . Often, frequent hallucinated from midnight. Need frequent reorientation.

## 2021-10-13 NOTE — PROGRESS NOTES
"  PROGRESS NOTE  Patient Name: Watson Lisa  Age/Sex: 66 y.o. male  : 1955  MRN: [id number]    Date of Admission: 10/7/2021  Date of Encounter Visit: 10/13/21   LOS: 6 days   Patient Care Team:  Checo Dunham MD as PCP - General (Family Medicine)  Checo Dunham MD as PCP - Family Medicine  Checo Dunham MD (Family Medicine)    Chief Complaint: Delirium tremens, respiratory failure    Hospital course: Admitted with alcohol withdrawal, was intubated on 10/8/2021 and was started on antibiotic, patient was successfully extubated on 10/12/2021 and has been doing well off the ventilator, he is still requiring Precedex however because of agitation due to alcohol withdrawal.  He is afebrile, he is denying any shortness of breath.  He had significant third spacing and has been on diuretic with very good diuretic response with improvement in the edema.  No fever or chills, no suspected ongoing infection, he is on antibiotic for aspiration pneumonia coverage with Levaquin and Flagyl.      REVIEW OF SYSTEMS:   CONSTITUTIONAL: No fever or chills  Denies any abdominal pain, no nausea or vomiting  Denies any palpitation or chest pain, denies any shortness of breath,   patient is on Precedex because of alcohol withdrawal symptoms  Ventilator/Non-Invasive Ventilation Settings (From admission, onward)            On room air         Vital Signs  Temp:  [97.6 °F (36.4 °C)-98.6 °F (37 °C)] 98.3 °F (36.8 °C)  Heart Rate:  [70-84] 71  Resp:  [16-22] 18  BP: (121-172)/(62-94) 155/80  SpO2:  [93 %-96 %] 95 %  on    Device (Oxygen Therapy): room air    Intake/Output Summary (Last 24 hours) at 10/13/2021 1511  Last data filed at 10/13/2021 1155  Gross per 24 hour   Intake 310 ml   Output 6400 ml   Net -6090 ml     Flowsheet Rows      First Filed Value   Admission Height 198.1 cm (78\") Documented at 10/07/2021 1102   Admission Weight 118 kg (260 lb) Documented at 10/07/2021 1102        Body mass index is 31.85 kg/m².      " 10/09/21  0352 10/10/21  0050 10/10/21  2000   Weight: 125 kg (275 lb 9.2 oz) 125 kg (275 lb 9.2 oz) (bed screen locked up  will not weigh pt)       Physical Exam:  GEN: Patient is looking better, still on Precedex but is awake and responsive, able to follow commands and carry on with the communication, on room air  EYES:   Sclerae clear. No icterus. PERRL. Normal EOM  ENT:   External ears/nose normal, no oral lesions, no thrush, mucous membranes moist  NECK:  Supple, midline trachea, no JVD  LUNGS: Normal chest on inspection,   clear to auscultation with no wheezes or crackles on room air. Respirations regular, even and  nonlabored,  CV:   the rhythm is more regular and normal heart rate.  Normal S1/S2. No murmurs, gallops, or rubs noted.  ABD:  Soft, nontender and nondistended. Normal bowel sounds. No guarding, truncal obesity   EXT:  Moves all extremities well. No cyanosis. No redness. No edema.   Skin: Dry, intact, no bleeding    Results Review:    Results From Last 14 Days   Lab Units 10/12/21  0350   TRIGLYCERIDES mg/dL 161*     Results from last 7 days   Lab Units 10/12/21  0350 10/11/21  0459 10/10/21  0425 10/09/21  0443 10/08/21  0443 10/07/21  1048   SODIUM mmol/L 139 140 140 140 139 142   POTASSIUM mmol/L 3.5 3.5 3.9 4.3 4.2 4.5   CHLORIDE mmol/L 106 109* 108* 107 102 103   CO2 mmol/L 20.6* 20.0* 19.6* 23.8 22.5 20.4*   BUN mg/dL 20 18 24* 21 25* 24*   CREATININE mg/dL 0.91 1.05 1.49* 1.39* 2.22* 1.99*   CALCIUM mg/dL 8.7 8.1* 7.9* 8.1* 8.3* 8.6   AST (SGOT) U/L 11  --  26 33 43* 51*   ALT (SGPT) U/L 16  --  23 27 30 34   ANION GAP mmol/L 12.4 11.0 12.4 9.2 14.5 18.6*   ALBUMIN g/dL 2.90*  --  3.10* 3.40* 4.30 4.40     Results from last 7 days   Lab Units 10/07/21  1048   TROPONIN T ng/mL 0.020         Results from last 7 days   Lab Units 10/07/21  1102   PROBNP pg/mL 1,106.0*     Results from last 7 days   Lab Units 10/12/21  0350 10/11/21  0459 10/10/21  0425 10/09/21  0443 10/08/21  0443  10/07/21  1048   WBC 10*3/mm3 9.03 9.64 9.74 5.63 7.03 6.86   HEMOGLOBIN g/dL 9.9* 10.5* 11.3* 11.4* 12.7* 12.9*   HEMATOCRIT % 31.6* 30.3* 32.8* 34.9* 36.2* 38.1   PLATELETS 10*3/mm3 136* 124* 104* 124* 240 199   MCV fL 103.6* 96.2 96.5 100.3* 96.0 96.9   NEUTROPHIL % % 79.2*  --  77.4* 68.4 74.4 77.2*   LYMPHOCYTE % % 8.3*  --  10.7* 15.6* 12.5* 9.9*   MONOCYTES % % 9.2  --  11.0 13.7* 11.2 11.4   EOSINOPHIL % % 2.5  --  0.1* 1.4 0.6 0.4   BASOPHIL % % 0.2  --  0.3 0.4 0.7 0.4   IMM GRAN % % 0.6*  --  0.5 0.5 0.6* 0.7*     Results from last 7 days   Lab Units 10/07/21  1836   INR  1.02   APTT seconds 43.0*     Results from last 7 days   Lab Units 10/09/21  0443 10/08/21  0443 10/07/21  1102   MAGNESIUM mg/dL 1.7 1.4* 1.5*     Results from last 7 days   Lab Units 10/12/21  0350   TRIGLYCERIDES mg/dL 161*     Results from last 7 days   Lab Units 10/08/21  0506   PH, ARTERIAL pH units 7.270*   PCO2, ARTERIAL mm Hg 56.1*   PO2 ART mm Hg 295.0*   HCO3 ART mmol/L 25.8         Glucose   Date/Time Value Ref Range Status   10/13/2021 0642 122 70 - 130 mg/dL Final     Comment:     Meter: [id number] : SAM Swan RN     Results from last 7 days   Lab Units 10/10/21  0425   PROCALCITONIN ng/mL 0.74*     Results from last 7 days   Lab Units 10/09/21  1526   BLOODCX  No growth at 3 days     Results from last 7 days   Lab Units 10/07/21  1245   NITRITE UA  Negative   WBC UA /HPF 0-2   BACTERIA UA /HPF None Seen   SQUAM EPITHEL UA /HPF 0-2     Results from last 7 days   Lab Units 10/07/21  1303   COVID19  Not Detected     Results from last 7 days   Lab Units 10/12/21  0350 10/09/21  0443 10/08/21  1215   SODIUM UR mmol/L  --   --  49   CREATININE UR mg/dL  --   --  197.8   CHLORIDE UR mmol/L  --   --  42   PROTEIN TOTAL URINE mg/dL  --   --  39.0   URIC ACID mg/dL 3.3*   < >  --     < > = values in this interval not displayed.           Imaging:   Imaging Results (All)     Procedure Component Value Units Date/Time        I reviewed the patient's new clinical results.  I personally viewed and interpreted the patient's imaging results: ET tube above the [place], of the infiltrate in the right upper lobe with more infiltrate  in the right lower lobe with possible bilateral pleural effusion   Medication Review:   amLODIPine, 10 mg, Oral, Daily  bumetanide, 2 mg, Intravenous, Q12H  enoxaparin, 40 mg, Subcutaneous, Q24H  lansoprazole, 30 mg, Nasogastric, QAM  levoFLOXacin, 750 mg, Intravenous, Q24H  metoprolol tartrate, 50 mg, Oral, Q8H  metroNIDAZOLE, 500 mg, Intravenous, Q8H  multivitamin with minerals, 1 tablet, Oral, Daily  nicotine, 1 patch, Transdermal, Q24H  senna-docusate sodium, 2 tablet, Oral, BID  sodium chloride, 10 mL, Intravenous, Q12H  thiamine, 100 mg, Nasogastric, TID  vitamin D, 50,000 Units, Oral, Q7 Days        dexmedetomidine, 0.2-1.5 mcg/kg/hr, Last Rate: 1.4 mcg/kg/hr (10/13/21 2309)  propofol, 5-50 mcg/kg/min, Last Rate: Stopped (10/12/21 9473)        ASSESSMENT:   1. Acute toxic encephalopathy  2. Right sided aspiration pneumonia, severe  3. Acute respiratory failure s/p mechanical ventilator 10/8, liberated on 10/12/2021  4. Acute alcohol withdrawal syndrome with delirium tremens  5. Alcohol abuse   6. DEEP on CKD  7. Hypomagnesemia  8. Tobacco abuse  9. LIVAN  10. Medical noncompliance    PLAN:  The patient is on room air with normal respiratory issues  Continue with the course of the antibiotic for 2 more days for aspiration pneumonia  He is still having withdrawal symptoms and is requiring maximum dose of Precedex to stay in bed, no Librium on the formulary due to national shortage, we will start the patient on clonazepam 2 mg 3 times a day and we will adjust the dose accordingly with the plan to start tapering the Librium.  Diuretics per nephrology, we will try to discontinue the Wood catheter    Summary of recommendation:  · Start clonazepam, no Librium on formulary  · Wean Precedex  · Finish the course  of antibiotics  · Discontinue Wood catheter      Discussed with the nursing staff at the bedside, will be discussing with the CCU rounding team, discussed with nephrology  Disposition: Depending on hospital course    Gene Lujan MD  10/13/21  15:11 EDT        Dictated utilizing Dragon dictation

## 2021-10-13 NOTE — PLAN OF CARE
Goal Outcome Evaluation:              Outcome Summary: Discussed POC with RN. RN reports passed swallow screen, patient tolerating PO, denies need for clinical swallow eval at this time. SLP to s/o, please re-consult as warranted.   Yes

## 2021-10-13 NOTE — CASE MANAGEMENT/SOCIAL WORK
Continued Stay Note  [facility]     Patient Name: Watson Lisa  MRN: [id number]  Today's Date: 10/13/2021    Admit Date: 10/7/2021     Discharge Plan     Row Name 10/13/21 1411       Plan    Plan Follow for P.T. and O.T. eval and recommendations.    Plan Comments Noted that the patient was extubated but confused and is on tube feeds. CCP will follow to obtain P.T. and O.T. evals and will discuss recommendations with the patient and his family (son Aravind Lisa [phone] and sister Jorge A Nielson [phone]).  HILDA Estrada               Discharge Codes    No documentation.               Expected Discharge Date and Time     Expected Discharge Date Expected Discharge Time    Oct 15, 2021             HILDA Maldonado

## 2021-10-13 NOTE — PROGRESS NOTES
Name: Watson Lisa ADMIT: 10/7/2021   : 1955  PCP: Checo Dunham MD    MRN: [id number] LOS: 6 days   AGE/SEX: 66 y.o. male  ROOM: [facility]     Subjective   Subjective   CC: alcohol withdrawal  No acute events. No fevers. Still agitated periodically.    Objective   Objective   Vital Signs  Temp:  [97.6 °F (36.4 °C)-98.6 °F (37 °C)] 98.3 °F (36.8 °C)  Heart Rate:  [70-84] 71  Resp:  [16-22] 18  BP: (121-172)/(62-94) 155/80  SpO2:  [93 %-96 %] 95 %  on   ;   Device (Oxygen Therapy): room air  Body mass index is 31.85 kg/m².  Physical Exam  Vitals and nursing note reviewed.   Constitutional:       General: He is not in acute distress.     Appearance: He is not toxic-appearing or diaphoretic.   HENT:      Head: Normocephalic and atraumatic.      Nose: Nose normal.      Mouth/Throat:      Mouth: Mucous membranes are moist.      Pharynx: Oropharynx is clear.   Eyes:      Extraocular Movements: Extraocular movements intact.      Conjunctiva/sclera: Conjunctivae normal.      Pupils: Pupils are equal, round, and reactive to light.   Cardiovascular:      Rate and Rhythm: Normal rate. Rhythm irregular.      Pulses: Normal pulses.   Pulmonary:      Effort: Pulmonary effort is normal.      Breath sounds: Examination of the right-lower field reveals decreased breath sounds. Examination of the left-lower field reveals decreased breath sounds. Decreased breath sounds present.   Abdominal:      General: Bowel sounds are normal.      Palpations: Abdomen is soft.   Musculoskeletal:         General: Swelling (2-3+ BLE) present. No tenderness.      Cervical back: Normal range of motion and neck supple.   Skin:     General: Skin is warm and dry.      Capillary Refill: Capillary refill takes less than 2 seconds.   Neurological:      General: No focal deficit present.      Mental Status: He is alert.      Motor: No tremor.   Psychiatric:         Mood and Affect: Mood normal.         Behavior: Behavior normal.     Results  Review     I reviewed the patient's new clinical results.  I reviewed the patient's telemetry  Results from last 7 days   Lab Units 10/12/21  0350 10/11/21  0459 10/10/21  0425 10/09/21  0443   WBC 10*3/mm3 9.03 9.64 9.74 5.63   HEMOGLOBIN g/dL 9.9* 10.5* 11.3* 11.4*   PLATELETS 10*3/mm3 136* 124* 104* 124*     Results from last 7 days   Lab Units 10/12/21  0350 10/11/21  0459 10/10/21  0425 10/09/21  0443   SODIUM mmol/L 139 140 140 140   POTASSIUM mmol/L 3.5 3.5 3.9 4.3   CHLORIDE mmol/L 106 109* 108* 107   CO2 mmol/L 20.6* 20.0* 19.6* 23.8   BUN mg/dL 20 18 24* 21   CREATININE mg/dL 0.91 1.05 1.49* 1.39*   GLUCOSE mg/dL 122* 130* 127* 130*   Estimated Creatinine Clearance: 117 mL/min (by C-G formula based on SCr of 0.91 mg/dL).  Results from last 7 days   Lab Units 10/12/21  0350 10/10/21  0425 10/09/21  0443 10/08/21  0443   ALBUMIN g/dL 2.90* 3.10* 3.40* 4.30   BILIRUBIN mg/dL 0.4 0.5 0.6 1.2   ALK PHOS U/L 70 80 79 85   AST (SGOT) U/L 11 26 33 43*   ALT (SGPT) U/L 16 23 27 30     Results from last 7 days   Lab Units 10/12/21  0350 10/11/21  0459 10/10/21  0425 10/09/21  0443 10/08/21  0443 10/08/21  0443 10/07/21  1102 10/07/21  1048   CALCIUM mg/dL 8.7 8.1* 7.9* 8.1*   < > 8.3*  --    < >   ALBUMIN g/dL 2.90*  --  3.10* 3.40*  --  4.30  --    < >   MAGNESIUM mg/dL  --   --   --  1.7  --  1.4* 1.5*  --    PHOSPHORUS mg/dL 3.5  --   --  3.2  --  4.4  --   --     < > = values in this interval not displayed.     Results from last 7 days   Lab Units 10/10/21  0425   PROCALCITONIN ng/mL 0.74*     COVID19   Date Value Ref Range Status   10/07/2021 Not Detected Not Detected - Ref. Range Final   03/25/2021 Not Detected Not Detected - Ref. Range Final     Glucose   Date/Time Value Ref Range Status   10/13/2021 0642 122 70 - 130 mg/dL Final     Comment:     Meter: [id number] : SAM Swan RN       XR Abdomen KUB  KUB     HISTORY: Nasogastric tube repositioning     COMPARISON: 10/09/2021 at 1959 hours.      FINDINGS: A single view of the upper abdomen demonstrates a nasogastric  tube in place. The nasogastric tube has been advanced. The tip is within  the proximal body of the stomach.     Air is identified within loops of small bowel which are mildly  prominent, similar to the prior examination. No obvious pneumatosis or  pneumoperitoneum is identified. Evaluation could be performed with a CT  examination of the abdomen and pelvis as indicated.     The above information was called to the patient's nurse at the time of  the dictation. The patient's nurse is to relay the information to the  clinical service.     This report was finalized on 10/11/2021 7:20 AM by Dr. Vincent Beck M.D.     XR Chest 1 View  Narrative: Patient: BRITTANY ZULETA  Time Out: 07:22  Exam(s): FILM CXR 1 VIEW     EXAM:    XR Chest, 1 View    CLINICAL HISTORY:     Reason for exam: Right-sided aspiration pneumonia.    TECHNIQUE:    Frontal view of the chest.    COMPARISON:    10 10 2021    FINDINGS:    Lungs: Hazy bibasilar opacities, unchanged from prior study.    Pleural space:  Unremarkable.  No pneumothorax.    Heart: Unchanged cardiomegaly.    Mediastinum: Endotracheal tube terminates 5.6 cm above the level of the   orladno.  Nasogastric tube terminates below field-of-view in the left   upper quadrant..    Bones joints:  Unremarkable.    IMPRESSION:       Unchanged hazy bibasilar opacities which may represent consolidations,   and or layering bilateral pleural effusions.  Impression: Electronically signed by Christiano Escobar M.D. on 10-11-21 at 0722    Scheduled Medications  amLODIPine, 10 mg, Oral, Daily  bumetanide, 2 mg, Intravenous, Q12H  clonazePAM, 2 mg, Oral, Q8H  enoxaparin, 40 mg, Subcutaneous, Q24H  lansoprazole, 30 mg, Nasogastric, QAM  levoFLOXacin, 750 mg, Intravenous, Q24H  metoprolol tartrate, 50 mg, Oral, Q8H  metroNIDAZOLE, 500 mg, Intravenous, Q8H  multivitamin with minerals, 1 tablet, Oral, Daily  nicotine, 1 patch,  Transdermal, Q24H  senna-docusate sodium, 2 tablet, Oral, BID  sodium chloride, 10 mL, Intravenous, Q12H  thiamine, 100 mg, Nasogastric, TID  vitamin D, 50,000 Units, Oral, Q7 Days    Infusions  dexmedetomidine, 0.2-1.5 mcg/kg/hr, Last Rate: 1.4 mcg/kg/hr (10/13/21 1429)  propofol, 5-50 mcg/kg/min, Last Rate: Stopped (10/12/21 0955)    Diet  Diet Regular; Cardiac       Assessment/Plan     Active Hospital Problems    Diagnosis  POA   • **Delirium tremens ([facility]) [F10.231]  Yes   • Acute respiratory failure with hypercapnia (HCC) [J96.02]  Yes   • Aspiration pneumonia (HCC) [J69.0]  No   • Alcohol abuse [F10.10]  Yes   • Alcoholic liver disease (HCC) [K70.9]  Yes   • Tobacco abuse [Z72.0]  Yes   • Hiatal hernia [K44.9]  Yes   • COPD (chronic obstructive pulmonary disease) (HCC) [J44.9]  Yes   • Anemia, chronic disease [D63.8]  Yes   • Acute kidney failure (HCC) [N17.9]  Yes   • Hypomagnesemia [E83.42]  Yes   • Essential hypertension [I10]  Yes      Resolved Hospital Problems   No resolved problems to display.   Alcohol Dependence in Withdrawal with Delirium Tremens  - required intubation to protect airway, extubated 10/12/21  - continue prn ativan and vitamin replacement  - on precedex drip  - clonazepam stated-there is shortage of librium  - appreciate critical care management from LPC    Aspiration Pneumonia  - continue levofloxacin and flagyl  - encourage pulmonary toilet and wean oxygen as tolerated    COPD/LIVAN  - no exacerbation  - O2 PRN    DEEP  - probably from hypotension with progression to ATN  - much improved with IVF, now appears volume overloaded and is getting diuresis per nephrology, appreciate recs    Anemia/TCP  - stable, will monitor    Lovenox 40 mg SC daily for DVT prophylaxis.  Full code.  Discussed with patient and nursing staff.  Anticipate discharge TBD timing yet to be determined.      Meño Liriano MD  [facility]  10/13/21  16:54 EDT

## 2021-10-14 LAB
ALBUMIN SERPL-MCNC: 3.6 G/DL (ref 3.5–5.2)
ANION GAP SERPL CALCULATED.3IONS-SCNC: 13.4 MMOL/L (ref 5–15)
BACTERIA SPEC AEROBE CULT: NORMAL
BUN SERPL-MCNC: 19 MG/DL (ref 8–23)
BUN/CREAT SERPL: 19.2 (ref 7–25)
CALCIUM SPEC-SCNC: 8.4 MG/DL (ref 8.6–10.5)
CHLORIDE SERPL-SCNC: 99 MMOL/L (ref 98–107)
CO2 SERPL-SCNC: 27.6 MMOL/L (ref 22–29)
CREAT SERPL-MCNC: 0.99 MG/DL (ref 0.76–1.27)
GFR SERPL CREATININE-BSD FRML MDRD: 75 ML/MIN/1.73
GLUCOSE SERPL-MCNC: 97 MG/DL (ref 65–99)
MAGNESIUM SERPL-MCNC: 1.3 MG/DL (ref 1.6–2.4)
PHOSPHATE SERPL-MCNC: 4 MG/DL (ref 2.5–4.5)
POTASSIUM SERPL-SCNC: 3.1 MMOL/L (ref 3.5–5.2)
SODIUM SERPL-SCNC: 140 MMOL/L (ref 136–145)
URATE SERPL-MCNC: 5.7 MG/DL (ref 3.4–7)

## 2021-10-14 PROCEDURE — 25010000002 FENTANYL CITRATE (PF) 50 MCG/ML SOLUTION: Performed by: INTERNAL MEDICINE

## 2021-10-14 PROCEDURE — 25010000002 MAGNESIUM SULFATE IN D5W 1G/100ML (PREMIX) 1-5 GM/100ML-% SOLUTION: Performed by: INTERNAL MEDICINE

## 2021-10-14 PROCEDURE — 80069 RENAL FUNCTION PANEL: CPT | Performed by: INTERNAL MEDICINE

## 2021-10-14 PROCEDURE — 25010000002 ENOXAPARIN PER 10 MG: Performed by: INTERNAL MEDICINE

## 2021-10-14 PROCEDURE — 83735 ASSAY OF MAGNESIUM: CPT | Performed by: INTERNAL MEDICINE

## 2021-10-14 PROCEDURE — 25010000002 LORAZEPAM PER 2 MG: Performed by: NURSE PRACTITIONER

## 2021-10-14 PROCEDURE — 84132 ASSAY OF SERUM POTASSIUM: CPT | Performed by: INTERNAL MEDICINE

## 2021-10-14 PROCEDURE — 25010000002 HYDRALAZINE PER 20 MG: Performed by: INTERNAL MEDICINE

## 2021-10-14 PROCEDURE — 84550 ASSAY OF BLOOD/URIC ACID: CPT | Performed by: INTERNAL MEDICINE

## 2021-10-14 RX ORDER — CLONIDINE HYDROCHLORIDE 0.1 MG/1
0.1 TABLET ORAL EVERY 8 HOURS SCHEDULED
Status: DISCONTINUED | OUTPATIENT
Start: 2021-10-14 | End: 2021-10-15

## 2021-10-14 RX ADMIN — HYDROCODONE BITARTRATE AND ACETAMINOPHEN 1 TABLET: 5; 325 TABLET ORAL at 05:12

## 2021-10-14 RX ADMIN — LANSOPRAZOLE 30 MG: KIT at 06:27

## 2021-10-14 RX ADMIN — HYDRALAZINE HYDROCHLORIDE 10 MG: 20 INJECTION INTRAMUSCULAR; INTRAVENOUS at 03:44

## 2021-10-14 RX ADMIN — AMLODIPINE BESYLATE 10 MG: 10 TABLET ORAL at 08:52

## 2021-10-14 RX ADMIN — MULTIPLE VITAMINS W/ MINERALS TAB 1 TABLET: TAB at 08:51

## 2021-10-14 RX ADMIN — METOPROLOL TARTRATE 50 MG: 50 TABLET, FILM COATED ORAL at 18:39

## 2021-10-14 RX ADMIN — DOCUSATE SODIUM 50MG AND SENNOSIDES 8.6MG 2 TABLET: 8.6; 5 TABLET, FILM COATED ORAL at 08:52

## 2021-10-14 RX ADMIN — LORAZEPAM 2 MG: 2 INJECTION INTRAMUSCULAR; INTRAVENOUS at 02:22

## 2021-10-14 RX ADMIN — ERGOCALCIFEROL 50000 UNITS: 1.25 CAPSULE ORAL at 18:39

## 2021-10-14 RX ADMIN — CLONAZEPAM 2 MG: 1 TABLET ORAL at 05:12

## 2021-10-14 RX ADMIN — DEXMEDETOMIDINE HYDROCHLORIDE 1 MCG/KG/HR: 100 INJECTION, SOLUTION, CONCENTRATE INTRAVENOUS at 10:13

## 2021-10-14 RX ADMIN — DOCUSATE SODIUM 50MG AND SENNOSIDES 8.6MG 2 TABLET: 8.6; 5 TABLET, FILM COATED ORAL at 21:34

## 2021-10-14 RX ADMIN — Medication 100 MG: at 21:34

## 2021-10-14 RX ADMIN — METOPROLOL TARTRATE 50 MG: 50 TABLET, FILM COATED ORAL at 10:12

## 2021-10-14 RX ADMIN — POTASSIUM CHLORIDE 40 MEQ: 750 TABLET, EXTENDED RELEASE ORAL at 16:59

## 2021-10-14 RX ADMIN — POTASSIUM CHLORIDE 40 MEQ: 750 TABLET, EXTENDED RELEASE ORAL at 13:03

## 2021-10-14 RX ADMIN — MAGNESIUM SULFATE HEPTAHYDRATE 1 G: 1 INJECTION, SOLUTION INTRAVENOUS at 09:55

## 2021-10-14 RX ADMIN — POTASSIUM CHLORIDE 40 MEQ: 750 TABLET, EXTENDED RELEASE ORAL at 08:51

## 2021-10-14 RX ADMIN — SODIUM CHLORIDE, PRESERVATIVE FREE 10 ML: 5 INJECTION INTRAVENOUS at 21:27

## 2021-10-14 RX ADMIN — MAGNESIUM SULFATE HEPTAHYDRATE 1 G: 1 INJECTION, SOLUTION INTRAVENOUS at 08:52

## 2021-10-14 RX ADMIN — CLONAZEPAM 2 MG: 1 TABLET ORAL at 14:24

## 2021-10-14 RX ADMIN — ENOXAPARIN SODIUM 40 MG: 40 INJECTION SUBCUTANEOUS at 21:34

## 2021-10-14 RX ADMIN — HYDROCODONE BITARTRATE AND ACETAMINOPHEN 1 TABLET: 5; 325 TABLET ORAL at 17:09

## 2021-10-14 RX ADMIN — DEXMEDETOMIDINE HYDROCHLORIDE 0.4 MCG/KG/HR: 100 INJECTION, SOLUTION, CONCENTRATE INTRAVENOUS at 21:33

## 2021-10-14 RX ADMIN — DEXMEDETOMIDINE HYDROCHLORIDE 1.2 MCG/KG/HR: 100 INJECTION, SOLUTION, CONCENTRATE INTRAVENOUS at 05:44

## 2021-10-14 RX ADMIN — METRONIDAZOLE 500 MG: 500 INJECTION, SOLUTION INTRAVENOUS at 02:46

## 2021-10-14 RX ADMIN — NICOTINE 1 PATCH: 21 PATCH, EXTENDED RELEASE TRANSDERMAL at 18:39

## 2021-10-14 RX ADMIN — MAGNESIUM SULFATE HEPTAHYDRATE 1 G: 1 INJECTION, SOLUTION INTRAVENOUS at 11:25

## 2021-10-14 RX ADMIN — CLONIDINE HYDROCHLORIDE 0.1 MG: 0.1 TABLET ORAL at 14:24

## 2021-10-14 RX ADMIN — HYDROCODONE BITARTRATE AND ACETAMINOPHEN 1 TABLET: 5; 325 TABLET ORAL at 13:03

## 2021-10-14 RX ADMIN — DIAZEPAM 10 MG: 5 TABLET ORAL at 18:41

## 2021-10-14 RX ADMIN — SODIUM CHLORIDE, PRESERVATIVE FREE 10 ML: 5 INJECTION INTRAVENOUS at 08:53

## 2021-10-14 RX ADMIN — LANSOPRAZOLE 30 MG: KIT at 05:13

## 2021-10-14 RX ADMIN — Medication 100 MG: at 16:59

## 2021-10-14 RX ADMIN — METOPROLOL TARTRATE 50 MG: 50 TABLET, FILM COATED ORAL at 02:45

## 2021-10-14 RX ADMIN — CLONIDINE HYDROCHLORIDE 0.1 MG: 0.1 TABLET ORAL at 21:34

## 2021-10-14 RX ADMIN — LORAZEPAM 1 MG: 1 TABLET ORAL at 03:43

## 2021-10-14 RX ADMIN — Medication 100 MG: at 08:52

## 2021-10-14 RX ADMIN — CLONAZEPAM 2 MG: 1 TABLET ORAL at 21:34

## 2021-10-14 RX ADMIN — FENTANYL CITRATE 50 MCG: 50 INJECTION, SOLUTION INTRAMUSCULAR; INTRAVENOUS at 02:23

## 2021-10-14 RX ADMIN — DEXMEDETOMIDINE HYDROCHLORIDE 0.6 MCG/KG/HR: 100 INJECTION, SOLUTION, CONCENTRATE INTRAVENOUS at 15:05

## 2021-10-14 RX ADMIN — DEXMEDETOMIDINE HYDROCHLORIDE 1 MCG/KG/HR: 100 INJECTION, SOLUTION, CONCENTRATE INTRAVENOUS at 02:38

## 2021-10-14 NOTE — PROGRESS NOTES
"  PROGRESS NOTE  Patient Name: Watson Lisa  Age/Sex: 66 y.o. male  : 1955  MRN: [id number]    Date of Admission: 10/7/2021  Date of Encounter Visit: 10/14/21   LOS: 7 days   Patient Care Team:  Checo Dunham MD as PCP - General (Family Medicine)  Checo Dunham MD as PCP - Family Medicine  Checo Dunham MD (Family Medicine)    Chief Complaint: Delirium tremens, respiratory failure    Hospital course: Admitted with alcohol withdrawal, was intubated on 10/8/2021 and was started on antibiotic, patient was successfully extubated on 10/12/2021 and has been doing well off the ventilator, he is still requiring Precedex and we are weaning    No fever or chills, , he is on antibiotic for aspiration pneumonia coverage with Levaquin and Flagyl.  He did diurese very well and most of his volume overload has been taking care of.  He was on 1.2 mcg/kg/min of Precedex this morning and we are gradually weaning it down.  His blood pressure is doing better and may be able to tolerate some clonidine      REVIEW OF SYSTEMS:   CONSTITUTIONAL: No fever or chills  Denies any abdominal pain, no nausea or vomiting  Denies any palpitation or chest pain, denies any shortness of breath,   patient is on Precedex because of alcohol withdrawal symptoms  Ventilator/Non-Invasive Ventilation Settings (From admission, onward)            On room air         Vital Signs  Temp:  [97.7 °F (36.5 °C)-98.5 °F (36.9 °C)] 98.1 °F (36.7 °C)  Heart Rate:  [62-93] 92  Resp:  [15-24] 20  BP: (127-179)/(64-96) 165/95  SpO2:  [91 %-96 %] 95 %  on    Device (Oxygen Therapy): room air    Intake/Output Summary (Last 24 hours) at 10/14/2021 1024  Last data filed at 10/14/2021 0500  Gross per 24 hour   Intake 1084.3 ml   Output 5650 ml   Net -4565.7 ml     Flowsheet Rows      First Filed Value   Admission Height 198.1 cm (78\") Documented at 10/07/2021 1102   Admission Weight 118 kg (260 lb) Documented at 10/07/2021 1102        Body mass index is 31.85 " kg/m².      10/09/21  0352 10/10/21  0050 10/10/21  2000   Weight: 125 kg (275 lb 9.2 oz) 125 kg (275 lb 9.2 oz) (bed screen locked up  will not weigh pt)       Physical Exam:  GEN: Patient is looking better, still calm awake and alert, able to follow commands and carry on with the communication, on room air  EYES:   Sclerae clear. No icterus. PERRL. Normal EOM  ENT:   External ears/nose normal, no oral lesions, no thrush, mucous membranes moist  NECK:  Supple, midline trachea, no JVD  LUNGS: Normal chest on inspection,   clear to auscultation with no wheezes or crackles on room air. Respirations regular, even and  nonlabored,  CV:   the rhythm is more regular and normal heart rate.  Normal S1/S2. No murmurs, gallops, or rubs noted.  ABD:  Soft, nontender and nondistended. Normal bowel sounds. No guarding, truncal obesity   EXT:  Moves all extremities well. No cyanosis. No redness. No edema.   Skin: Dry, intact, no bleeding    Results Review:    Results From Last 14 Days   Lab Units 10/12/21  0350   TRIGLYCERIDES mg/dL 161*     Results from last 7 days   Lab Units 10/14/21  0530 10/12/21  0350 10/11/21  0459 10/10/21  0425 10/09/21  0443 10/08/21  0443 10/07/21  1048   SODIUM mmol/L 140 139 140 140 140 139 142   POTASSIUM mmol/L 3.1* 3.5 3.5 3.9 4.3 4.2 4.5   CHLORIDE mmol/L 99 106 109* 108* 107 102 103   CO2 mmol/L 27.6 20.6* 20.0* 19.6* 23.8 22.5 20.4*   BUN mg/dL 19 20 18 24* 21 25* 24*   CREATININE mg/dL 0.99 0.91 1.05 1.49* 1.39* 2.22* 1.99*   CALCIUM mg/dL 8.4* 8.7 8.1* 7.9* 8.1* 8.3* 8.6   AST (SGOT) U/L  --  11  --  26 33 43* 51*   ALT (SGPT) U/L  --  16  --  23 27 30 34   ANION GAP mmol/L 13.4 12.4 11.0 12.4 9.2 14.5 18.6*   ALBUMIN g/dL 3.60 2.90*  --  3.10* 3.40* 4.30 4.40     Results from last 7 days   Lab Units 10/07/21  1048   TROPONIN T ng/mL 0.020         Results from last 7 days   Lab Units 10/07/21  1102   PROBNP pg/mL 1,106.0*     Results from last 7 days   Lab Units 10/12/21  4170 10/11/21  0462  10/10/21  0425 10/09/21  0443 10/08/21  0443 10/07/21  1048   WBC 10*3/mm3 9.03 9.64 9.74 5.63 7.03 6.86   HEMOGLOBIN g/dL 9.9* 10.5* 11.3* 11.4* 12.7* 12.9*   HEMATOCRIT % 31.6* 30.3* 32.8* 34.9* 36.2* 38.1   PLATELETS 10*3/mm3 136* 124* 104* 124* 240 199   MCV fL 103.6* 96.2 96.5 100.3* 96.0 96.9   NEUTROPHIL % % 79.2*  --  77.4* 68.4 74.4 77.2*   LYMPHOCYTE % % 8.3*  --  10.7* 15.6* 12.5* 9.9*   MONOCYTES % % 9.2  --  11.0 13.7* 11.2 11.4   EOSINOPHIL % % 2.5  --  0.1* 1.4 0.6 0.4   BASOPHIL % % 0.2  --  0.3 0.4 0.7 0.4   IMM GRAN % % 0.6*  --  0.5 0.5 0.6* 0.7*     Results from last 7 days   Lab Units 10/07/21  1836   INR  1.02   APTT seconds 43.0*     Results from last 7 days   Lab Units 10/14/21  0530 10/09/21  0443 10/08/21  0443 10/07/21  1102   MAGNESIUM mg/dL 1.3* 1.7 1.4* 1.5*     Results from last 7 days   Lab Units 10/12/21  0350   TRIGLYCERIDES mg/dL 161*     Results from last 7 days   Lab Units 10/08/21  0506   PH, ARTERIAL pH units 7.270*   PCO2, ARTERIAL mm Hg 56.1*   PO2 ART mm Hg 295.0*   HCO3 ART mmol/L 25.8         Glucose   Date/Time Value Ref Range Status   10/13/2021 0642 122 70 - 130 mg/dL Final     Comment:     Meter: [id number] : SAM Swan RN     Results from last 7 days   Lab Units 10/10/21  0425   PROCALCITONIN ng/mL 0.74*     Results from last 7 days   Lab Units 10/09/21  1526   BLOODCX  No growth at 4 days     Results from last 7 days   Lab Units 10/07/21  1245   NITRITE UA  Negative   WBC UA /HPF 0-2   BACTERIA UA /HPF None Seen   SQUAM EPITHEL UA /HPF 0-2     Results from last 7 days   Lab Units 10/07/21  1303   COVID19  Not Detected     Results from last 7 days   Lab Units 10/14/21  0530 10/09/21  0443 10/08/21  1215   SODIUM UR mmol/L  --   --  49   CREATININE UR mg/dL  --   --  197.8   CHLORIDE UR mmol/L  --   --  42   PROTEIN TOTAL URINE mg/dL  --   --  39.0   URIC ACID mg/dL 5.7   < >  --     < > = values in this interval not displayed.           Imaging:    Imaging Results (All)     Procedure Component Value Units Date/Time       I reviewed the patient's new clinical results.  I personally viewed and interpreted the patient's imaging results: ET tube above the orlando, of the infiltrate in the right upper lobe with more infiltrate  in the right lower lobe with possible bilateral pleural effusion   Medication Review:   amLODIPine, 10 mg, Oral, Daily  clonazePAM, 2 mg, Oral, Q8H  enoxaparin, 40 mg, Subcutaneous, Q24H  lansoprazole, 30 mg, Nasogastric, QAM  levoFLOXacin, 750 mg, Intravenous, Q24H  metoprolol tartrate, 50 mg, Oral, Q8H  metroNIDAZOLE, 500 mg, Intravenous, Q8H  multivitamin with minerals, 1 tablet, Oral, Daily  nicotine, 1 patch, Transdermal, Q24H  senna-docusate sodium, 2 tablet, Oral, BID  sodium chloride, 10 mL, Intravenous, Q12H  thiamine, 100 mg, Nasogastric, TID  vitamin D, 50,000 Units, Oral, Q7 Days        dexmedetomidine, 0.2-1.5 mcg/kg/hr, Last Rate: 1 mcg/kg/hr (10/14/21 1013)  propofol, 5-50 mcg/kg/min, Last Rate: Stopped (10/12/21 0921)        ASSESSMENT:   1. Acute toxic encephalopathy  2. Right sided aspiration pneumonia, severe  3. Acute respiratory failure s/p mechanical ventilator 10/8, liberated on 10/12/2021  4. Acute alcohol withdrawal syndrome with delirium tremens  5. Alcohol abuse   6. DEEP on CKD  7. Hypomagnesemia  8. Tobacco abuse  9. LIVAN  10. Medical noncompliance    PLAN:  The patient is on room air with normal respiratory issues  Electrolyte replacement already being addressed by nephrology  The only reason he is still in the unit is because he is requiring Precedex.  We will discontinue the antibiotic, continue with the clonazepam for the alcohol withdrawal, the pharmacy is out of Librium  We will start clonidine now that his blood pressure is able to tolerate and see if we can continue to wean off the Precedex      · Discussed with the patient, discussed with nephrology,      Disposition: Depending on hospital  course    Gene Lujan MD  10/14/21  10:24 EDT        Dictated utilizing Dragon dictation

## 2021-10-14 NOTE — PROGRESS NOTES
Name: Watson Lisa ADMIT: 10/7/2021   : 1955  PCP: Checo Dunham MD    MRN: [id number] LOS: 7 days   AGE/SEX: 66 y.o. male  ROOM: [facility]     Subjective   Subjective   CC: alcohol withdrawal  No acute events. Patient has no new complaints. He is still becoming agitated. Taking PO. No CP/dyspnea/f/c/n/v/d.    Objective   Objective   Vital Signs  Temp:  [98.1 °F (36.7 °C)-98.5 °F (36.9 °C)] 98.1 °F (36.7 °C)  Heart Rate:  [] 77  Resp:  [15-24] 20  BP: (123-179)/(64-96) 123/69  SpO2:  [91 %-96 %] 95 %  on   ;   Device (Oxygen Therapy): room air  Body mass index is 31.85 kg/m².  Physical Exam  Vitals and nursing note reviewed.   Constitutional:       General: He is not in acute distress.     Appearance: He is not toxic-appearing or diaphoretic.   HENT:      Head: Normocephalic and atraumatic.      Nose: Nose normal.      Mouth/Throat:      Mouth: Mucous membranes are moist.      Pharynx: Oropharynx is clear.   Eyes:      Extraocular Movements: Extraocular movements intact.      Conjunctiva/sclera: Conjunctivae normal.      Pupils: Pupils are equal, round, and reactive to light.   Cardiovascular:      Rate and Rhythm: Normal rate. Rhythm irregular.      Pulses: Normal pulses.   Pulmonary:      Effort: Pulmonary effort is normal.      Breath sounds: Examination of the right-lower field reveals decreased breath sounds. Examination of the left-lower field reveals decreased breath sounds. Decreased breath sounds present.   Abdominal:      General: Bowel sounds are normal.      Palpations: Abdomen is soft.   Musculoskeletal:         General: Swelling (1+ BLE) present. No tenderness.      Cervical back: Normal range of motion and neck supple.   Skin:     General: Skin is warm and dry.      Capillary Refill: Capillary refill takes less than 2 seconds.   Neurological:      General: No focal deficit present.      Mental Status: He is alert.      Motor: No tremor.   Psychiatric:         Mood and Affect:  Mood normal.         Behavior: Behavior normal.     Results Review     I reviewed the patient's new clinical results.  I reviewed the patient's telemetry  Results from last 7 days   Lab Units 10/12/21  0350 10/11/21  0459 10/10/21  0425 10/09/21  0443   WBC 10*3/mm3 9.03 9.64 9.74 5.63   HEMOGLOBIN g/dL 9.9* 10.5* 11.3* 11.4*   PLATELETS 10*3/mm3 136* 124* 104* 124*     Results from last 7 days   Lab Units 10/14/21  0530 10/12/21  0350 10/11/21  0459 10/10/21  0425   SODIUM mmol/L 140 139 140 140   POTASSIUM mmol/L 3.1* 3.5 3.5 3.9   CHLORIDE mmol/L 99 106 109* 108*   CO2 mmol/L 27.6 20.6* 20.0* 19.6*   BUN mg/dL 19 20 18 24*   CREATININE mg/dL 0.99 0.91 1.05 1.49*   GLUCOSE mg/dL 97 122* 130* 127*   Estimated Creatinine Clearance: 107.5 mL/min (by C-G formula based on SCr of 0.99 mg/dL).  Results from last 7 days   Lab Units 10/14/21  0530 10/12/21  0350 10/10/21  0425 10/09/21  0443 10/08/21  0443 10/08/21  0443   ALBUMIN g/dL 3.60 2.90* 3.10* 3.40*   < > 4.30   BILIRUBIN mg/dL  --  0.4 0.5 0.6  --  1.2   ALK PHOS U/L  --  70 80 79  --  85   AST (SGOT) U/L  --  11 26 33  --  43*   ALT (SGPT) U/L  --  16 23 27  --  30    < > = values in this interval not displayed.     Results from last 7 days   Lab Units 10/14/21  0530 10/12/21  0350 10/11/21  0459 10/10/21  0425 10/09/21  0443 10/09/21  0443 10/08/21  0443 10/08/21  0443   CALCIUM mg/dL 8.4* 8.7 8.1* 7.9*   < > 8.1*   < > 8.3*   ALBUMIN g/dL 3.60 2.90*  --  3.10*  --  3.40*   < > 4.30   MAGNESIUM mg/dL 1.3*  --   --   --   --  1.7  --  1.4*   PHOSPHORUS mg/dL 4.0 3.5  --   --   --  3.2  --  4.4    < > = values in this interval not displayed.     Results from last 7 days   Lab Units 10/10/21  0425   PROCALCITONIN ng/mL 0.74*     COVID19   Date Value Ref Range Status   10/07/2021 Not Detected Not Detected - Ref. Range Final   03/25/2021 Not Detected Not Detected - Ref. Range Final     Glucose   Date/Time Value Ref Range Status   10/13/2021 0642 122 70 - 130 mg/dL  Final     Comment:     Meter: [id number] : SAM Swan RN       XR Abdomen KUB  KUB     HISTORY: Nasogastric tube repositioning     COMPARISON: 10/09/2021 at 1959 hours.     FINDINGS: A single view of the upper abdomen demonstrates a nasogastric  tube in place. The nasogastric tube has been advanced. The tip is within  the proximal body of the stomach.     Air is identified within loops of small bowel which are mildly  prominent, similar to the prior examination. No obvious pneumatosis or  pneumoperitoneum is identified. Evaluation could be performed with a CT  examination of the abdomen and pelvis as indicated.     The above information was called to the patient's nurse at the time of  the dictation. The patient's nurse is to relay the information to the  clinical service.     This report was finalized on 10/11/2021 7:20 AM by Dr. Vincent Beck M.D.     XR Chest 1 View  Narrative: Patient: BRITTANY ZULETA  Time Out: 07:22  Exam(s): FILM CXR 1 VIEW     EXAM:    XR Chest, 1 View    CLINICAL HISTORY:     Reason for exam: Right-sided aspiration pneumonia.    TECHNIQUE:    Frontal view of the chest.    COMPARISON:    10 10 2021    FINDINGS:    Lungs: Hazy bibasilar opacities, unchanged from prior study.    Pleural space:  Unremarkable.  No pneumothorax.    Heart: Unchanged cardiomegaly.    Mediastinum: Endotracheal tube terminates 5.6 cm above the level of the   orlando.  Nasogastric tube terminates below field-of-view in the left   upper quadrant..    Bones joints:  Unremarkable.    IMPRESSION:       Unchanged hazy bibasilar opacities which may represent consolidations,   and or layering bilateral pleural effusions.  Impression: Electronically signed by Christiano Escobar M.D. on 10-11-21 at 0722    Scheduled Medications  amLODIPine, 10 mg, Oral, Daily  clonazePAM, 2 mg, Oral, Q8H  cloNIDine, 0.1 mg, Oral, Q8H  enoxaparin, 40 mg, Subcutaneous, Q24H  lansoprazole, 30 mg, Nasogastric, QAM  metoprolol  tartrate, 50 mg, Oral, Q8H  multivitamin with minerals, 1 tablet, Oral, Daily  nicotine, 1 patch, Transdermal, Q24H  senna-docusate sodium, 2 tablet, Oral, BID  sodium chloride, 10 mL, Intravenous, Q12H  thiamine, 100 mg, Nasogastric, TID  vitamin D, 50,000 Units, Oral, Q7 Days    Infusions  dexmedetomidine, 0.2-1.5 mcg/kg/hr, Last Rate: 0.6 mcg/kg/hr (10/14/21 1505)    Diet  Diet Regular; Cardiac       Assessment/Plan     Active Hospital Problems    Diagnosis  POA   • **Delirium tremens (HCC) [F10.231]  Yes   • Acute respiratory failure with hypercapnia (HCC) [J96.02]  Yes   • Aspiration pneumonia (HCC) [J69.0]  No   • Alcohol abuse [F10.10]  Yes   • Alcoholic liver disease (HCC) [K70.9]  Yes   • Tobacco abuse [Z72.0]  Yes   • Hiatal hernia [K44.9]  Yes   • COPD (chronic obstructive pulmonary disease) (HCC) [J44.9]  Yes   • Anemia, chronic disease [D63.8]  Yes   • Acute kidney failure (HCC) [N17.9]  Yes   • Hypomagnesemia [E83.42]  Yes   • Essential hypertension [I10]  Yes      Resolved Hospital Problems   No resolved problems to display.   Alcohol Dependence in Withdrawal with Delirium Tremens  - required intubation to protect airway, extubated 10/12/21  - continue prn ativan and vitamin replacement  - on precedex drip  - clonazepam stated-there is shortage of librium  - clonidine started  - appreciate critical care management from [facility]    Aspiration Pneumonia  - finished levofloxacin and flagyl  - encourage pulmonary toilet and wean oxygen as tolerated    COPD/LIVAN  - no exacerbation  - O2 PRN    DEEP  - probably from hypotension with progression to ATN  - much improved with IVF, now appears volume overloaded and has been diuresed per nephrology, appreciate recs    Anemia/TCP  - stable, will monitor    Hypomagnesemia/Hypokalemia  - replace    Lovenox 40 mg SC daily for DVT prophylaxis.  Full code.  Discussed with patient and nursing staff.  Anticipate discharge TBD timing yet to be determined.      Meño Liriano,  MD [facility]  10/14/21  17:52 EDT

## 2021-10-14 NOTE — PROGRESS NOTES
[facility] Progress Note      Patient Name: Watson Lisa  : 1955  MRN: [id number]  Primary Care Physician:  Checo Dunham MD  Date of admission: 10/7/2021    Subjective     Interval History:   Follow-up acute kidney injury  The patient stated that he is feeling better, denies any chest pain or shortness of air, no orthopnea or PND, no nausea or vomiting, no abdominal pain.  Urine output in the past 24 hours was 5950 cc    Review of Systems:   As noted above    Objective     Vitals:   Temp:  [97.7 °F (36.5 °C)-98.5 °F (36.9 °C)] 98.1 °F (36.7 °C)  Heart Rate:  [66-93] 70  Resp:  [15-24] 20  BP: (127-179)/(64-96) 130/64    Intake/Output Summary (Last 24 hours) at 10/14/2021 0800  Last data filed at 10/14/2021 0500  Gross per 24 hour   Intake 1084.3 ml   Output 5950 ml   Net -4865.7 ml       Physical Exam:    General Appearance: Awake, alert, chronically ill, no acute distress  Skin: warm and dry  HEENT: Orally intubated  Neck: No JVD  Lungs: Bilateral rhonchi crackles, breathing effort not labored  Heart: RRR, normal S1 and S2, no S3, no rub  Abdomen: soft, no guarding, distended, normoactive bowels  : no palpable bladder, Wood catheter anchored in place  Extremities: Trace lower and upper extremity edema, no cyanosis or clubbing  Neuro: Unable to assess    Scheduled Meds:     amLODIPine, 10 mg, Oral, Daily  clonazePAM, 2 mg, Oral, Q8H  enoxaparin, 40 mg, Subcutaneous, Q24H  lansoprazole, 30 mg, Nasogastric, QAM  levoFLOXacin, 750 mg, Intravenous, Q24H  metoprolol tartrate, 50 mg, Oral, Q8H  metroNIDAZOLE, 500 mg, Intravenous, Q8H  multivitamin with minerals, 1 tablet, Oral, Daily  nicotine, 1 patch, Transdermal, Q24H  senna-docusate sodium, 2 tablet, Oral, BID  sodium chloride, 10 mL, Intravenous, Q12H  thiamine, 100 mg, Nasogastric, TID  vitamin D, 50,000 Units, Oral, Q7 Days      IV Meds:   dexmedetomidine, 0.2-1.5 mcg/kg/hr, Last Rate: 1.2 mcg/kg/hr (10/14/21  0544)  propofol, 5-50 mcg/kg/min, Last Rate: Stopped (10/12/21 0955)        Results Reviewed:   I have personally reviewed the results from the time of this admission to 10/14/2021 08:00 EDT     Results from last 7 days   Lab Units 10/14/21  0530 10/12/21  0350 10/11/21  0459 10/10/21  0425 10/10/21  0425 10/09/21  0443 10/09/21  0443   SODIUM mmol/L 140 139 140   < > 140   < > 140   POTASSIUM mmol/L 3.1* 3.5 3.5   < > 3.9   < > 4.3   CHLORIDE mmol/L 99 106 109*   < > 108*   < > 107   CO2 mmol/L 27.6 20.6* 20.0*   < > 19.6*   < > 23.8   BUN mg/dL 19 20 18   < > 24*   < > 21   CREATININE mg/dL 0.99 0.91 1.05   < > 1.49*   < > 1.39*   CALCIUM mg/dL 8.4* 8.7 8.1*   < > 7.9*   < > 8.1*   BILIRUBIN mg/dL  --  0.4  --   --  0.5  --  0.6   ALK PHOS U/L  --  70  --   --  80  --  79   ALT (SGPT) U/L  --  16  --   --  23  --  27   AST (SGOT) U/L  --  11  --   --  26  --  33   GLUCOSE mg/dL 97 122* 130*   < > 127*   < > 130*    < > = values in this interval not displayed.       Estimated Creatinine Clearance: 107.5 mL/min (by C-G formula based on SCr of 0.99 mg/dL).    Results from last 7 days   Lab Units 10/14/21  0530 10/12/21  0350 10/09/21  0443 10/08/21  0443 10/08/21  0443   MAGNESIUM mg/dL 1.3*  --  1.7  --  1.4*   PHOSPHORUS mg/dL 4.0 3.5 3.2   < > 4.4    < > = values in this interval not displayed.       Results from last 7 days   Lab Units 10/14/21  0530 10/12/21  0350 10/09/21  0443   URIC ACID mg/dL 5.7 3.3* 6.0       Results from last 7 days   Lab Units 10/12/21  0350 10/11/21  0459 10/10/21  0425 10/09/21  0443 10/08/21  0443   WBC 10*3/mm3 9.03 9.64 9.74 5.63 7.03   HEMOGLOBIN g/dL 9.9* 10.5* 11.3* 11.4* 12.7*   PLATELETS 10*3/mm3 136* 124* 104* 124* 240       Results from last 7 days   Lab Units 10/07/21  1836   INR  1.02       Assessment / Plan     ASSESSMENT:  1.  Acute kidney injury associated with hypotension most likely ATN, creatinine on admission 1.99, creatinine today 0.99, uric acid 5.7, albumin 3.6,  phosphorus 4, magnesium 1.3 and potassium 3.1  2.  Acute respiratory failure, resolved  3.  Alcohol abuse with alcohol withdrawal  4.  Alcoholic liver disease  5.  Hypotension, resolved, blood pressure now very stable  6.  Hypokalemia and hypomagnesemia    PLAN:  1.  No diuretics today  2.  Replete potassium and magnesium per protocol  3.  Surveillance labs    Discussed the case  with Dr. Lujan    Thank you for involving us in the care of Watson Lisa.  Please feel free to call with any questions.    Kimo Hu MD  10/14/21  08:00 EDT    [facility]  [phone]      Much of this encounter note is an electronic transcription/translation of spoken language to printed text. The electronic translation of spoken language may permit erroneous, or at times, nonsensical words or phrases to be inadvertently transcribed; Although I have reviewed the note for such errors, some may still exist.

## 2021-10-15 LAB
ALBUMIN SERPL-MCNC: 3.3 G/DL (ref 3.5–5.2)
ANION GAP SERPL CALCULATED.3IONS-SCNC: 13.2 MMOL/L (ref 5–15)
BUN SERPL-MCNC: 20 MG/DL (ref 8–23)
BUN/CREAT SERPL: 21.7 (ref 7–25)
CALCIUM SPEC-SCNC: 9 MG/DL (ref 8.6–10.5)
CHLORIDE SERPL-SCNC: 100 MMOL/L (ref 98–107)
CO2 SERPL-SCNC: 23.8 MMOL/L (ref 22–29)
CREAT SERPL-MCNC: 0.92 MG/DL (ref 0.76–1.27)
DEPRECATED RDW RBC AUTO: 47.4 FL (ref 37–54)
ERYTHROCYTE [DISTWIDTH] IN BLOOD BY AUTOMATED COUNT: 13.2 % (ref 12.3–15.4)
GFR SERPL CREATININE-BSD FRML MDRD: 82 ML/MIN/1.73
GLUCOSE SERPL-MCNC: 99 MG/DL (ref 65–99)
HCT VFR BLD AUTO: 33.3 % (ref 37.5–51)
HGB BLD-MCNC: 11 G/DL (ref 13–17.7)
MAGNESIUM SERPL-MCNC: 1.7 MG/DL (ref 1.6–2.4)
MCH RBC QN AUTO: 32.5 PG (ref 26.6–33)
MCHC RBC AUTO-ENTMCNC: 33 G/DL (ref 31.5–35.7)
MCV RBC AUTO: 98.5 FL (ref 79–97)
PHOSPHATE SERPL-MCNC: 3.1 MG/DL (ref 2.5–4.5)
PLATELET # BLD AUTO: 327 10*3/MM3 (ref 140–450)
PMV BLD AUTO: 10.4 FL (ref 6–12)
POTASSIUM SERPL-SCNC: 3.7 MMOL/L (ref 3.5–5.2)
POTASSIUM SERPL-SCNC: 3.8 MMOL/L (ref 3.5–5.2)
RBC # BLD AUTO: 3.38 10*6/MM3 (ref 4.14–5.8)
SODIUM SERPL-SCNC: 137 MMOL/L (ref 136–145)
URATE SERPL-MCNC: 5.8 MG/DL (ref 3.4–7)
WBC # BLD AUTO: 8.65 10*3/MM3 (ref 3.4–10.8)

## 2021-10-15 PROCEDURE — 97110 THERAPEUTIC EXERCISES: CPT

## 2021-10-15 PROCEDURE — 94799 UNLISTED PULMONARY SVC/PX: CPT

## 2021-10-15 PROCEDURE — 25010000002 ENOXAPARIN PER 10 MG: Performed by: INTERNAL MEDICINE

## 2021-10-15 PROCEDURE — 97162 PT EVAL MOD COMPLEX 30 MIN: CPT

## 2021-10-15 PROCEDURE — 83735 ASSAY OF MAGNESIUM: CPT | Performed by: INTERNAL MEDICINE

## 2021-10-15 PROCEDURE — 85027 COMPLETE CBC AUTOMATED: CPT | Performed by: INTERNAL MEDICINE

## 2021-10-15 PROCEDURE — 84550 ASSAY OF BLOOD/URIC ACID: CPT | Performed by: INTERNAL MEDICINE

## 2021-10-15 PROCEDURE — 25010000002 MAGNESIUM SULFATE 2 GM/50ML SOLUTION: Performed by: INTERNAL MEDICINE

## 2021-10-15 PROCEDURE — 80069 RENAL FUNCTION PANEL: CPT | Performed by: INTERNAL MEDICINE

## 2021-10-15 RX ORDER — ALBUTEROL SULFATE 90 UG/1
2 AEROSOL, METERED RESPIRATORY (INHALATION) EVERY 4 HOURS PRN
Status: DISCONTINUED | OUTPATIENT
Start: 2021-10-15 | End: 2021-10-19 | Stop reason: HOSPADM

## 2021-10-15 RX ORDER — CLONIDINE HYDROCHLORIDE 0.1 MG/1
0.2 TABLET ORAL EVERY 8 HOURS SCHEDULED
Status: DISCONTINUED | OUTPATIENT
Start: 2021-10-15 | End: 2021-10-16

## 2021-10-15 RX ADMIN — DIAZEPAM 10 MG: 5 TABLET ORAL at 11:14

## 2021-10-15 RX ADMIN — LANSOPRAZOLE 30 MG: KIT at 06:20

## 2021-10-15 RX ADMIN — DOCUSATE SODIUM 50MG AND SENNOSIDES 8.6MG 2 TABLET: 8.6; 5 TABLET, FILM COATED ORAL at 08:52

## 2021-10-15 RX ADMIN — METOPROLOL TARTRATE 50 MG: 50 TABLET, FILM COATED ORAL at 10:59

## 2021-10-15 RX ADMIN — DIAZEPAM 10 MG: 5 TABLET ORAL at 04:04

## 2021-10-15 RX ADMIN — CLONAZEPAM 2 MG: 1 TABLET ORAL at 21:35

## 2021-10-15 RX ADMIN — SODIUM CHLORIDE, PRESERVATIVE FREE 10 ML: 5 INJECTION INTRAVENOUS at 09:39

## 2021-10-15 RX ADMIN — METOPROLOL TARTRATE 50 MG: 50 TABLET, FILM COATED ORAL at 02:52

## 2021-10-15 RX ADMIN — DEXMEDETOMIDINE HYDROCHLORIDE 0.6 MCG/KG/HR: 100 INJECTION, SOLUTION, CONCENTRATE INTRAVENOUS at 04:00

## 2021-10-15 RX ADMIN — SODIUM CHLORIDE, PRESERVATIVE FREE 10 ML: 5 INJECTION INTRAVENOUS at 21:38

## 2021-10-15 RX ADMIN — CLONIDINE HYDROCHLORIDE 0.2 MG: 0.1 TABLET ORAL at 15:20

## 2021-10-15 RX ADMIN — Medication 100 MG: at 15:22

## 2021-10-15 RX ADMIN — Medication 100 MG: at 21:35

## 2021-10-15 RX ADMIN — CLONAZEPAM 2 MG: 1 TABLET ORAL at 06:20

## 2021-10-15 RX ADMIN — DIAZEPAM 10 MG: 5 TABLET ORAL at 21:35

## 2021-10-15 RX ADMIN — Medication 5 MG: at 21:35

## 2021-10-15 RX ADMIN — Medication 100 MG: at 08:52

## 2021-10-15 RX ADMIN — AMLODIPINE BESYLATE 10 MG: 10 TABLET ORAL at 08:52

## 2021-10-15 RX ADMIN — ENOXAPARIN SODIUM 40 MG: 40 INJECTION SUBCUTANEOUS at 21:35

## 2021-10-15 RX ADMIN — METOPROLOL TARTRATE 50 MG: 50 TABLET, FILM COATED ORAL at 18:01

## 2021-10-15 RX ADMIN — ACETAMINOPHEN 650 MG: 325 TABLET, FILM COATED ORAL at 21:34

## 2021-10-15 RX ADMIN — CLONIDINE HYDROCHLORIDE 0.2 MG: 0.1 TABLET ORAL at 21:35

## 2021-10-15 RX ADMIN — DOCUSATE SODIUM 50MG AND SENNOSIDES 8.6MG 2 TABLET: 8.6; 5 TABLET, FILM COATED ORAL at 21:35

## 2021-10-15 RX ADMIN — CLONAZEPAM 2 MG: 1 TABLET ORAL at 14:17

## 2021-10-15 RX ADMIN — NICOTINE 1 PATCH: 21 PATCH, EXTENDED RELEASE TRANSDERMAL at 18:02

## 2021-10-15 RX ADMIN — CLONIDINE HYDROCHLORIDE 0.1 MG: 0.1 TABLET ORAL at 06:20

## 2021-10-15 RX ADMIN — MAGNESIUM SULFATE 2 G: 2 INJECTION INTRAVENOUS at 08:53

## 2021-10-15 RX ADMIN — MULTIPLE VITAMINS W/ MINERALS TAB 1 TABLET: TAB at 08:52

## 2021-10-15 NOTE — PROGRESS NOTES
[facility] Progress Note      Patient Name: Watson Lisa  : 1955  MRN: [id number]  Primary Care Physician:  Checo Dunham MD  Date of admission: 10/7/2021    Subjective     Interval History:   Follow-up acute kidney injury  The patient is confused and disoriented, he denies chest pain or shortness of air, no orthopnea or PND, no nausea or vomiting, urine output not quantitated only 850 in the first shift yesterday    Review of Systems:   As noted above    Objective     Vitals:   Temp:  [98.1 °F (36.7 °C)-98.5 °F (36.9 °C)] 98.5 °F (36.9 °C)  Heart Rate:  [] 75  Resp:  [16-24] 18  BP: (120-165)/(60-95) 125/63    Intake/Output Summary (Last 24 hours) at 10/15/2021 0629  Last data filed at 10/14/2021 1700  Gross per 24 hour   Intake 308.03 ml   Output 850 ml   Net -541.97 ml       Physical Exam:    General Appearance: Awake, alert, chronically ill, no acute distress  Skin: warm and dry  HEENT: Orally intubated  Neck: No JVD  Lungs: Bilateral rhonchi crackles, breathing effort not labored  Heart: RRR, normal S1 and S2, no S3, no rub  Abdomen: soft, no guarding, distended, normoactive bowels  : no palpable bladder, Wood catheter anchored in place  Extremities: Trace lower and upper extremity edema, no cyanosis or clubbing  Neuro: Unable to assess    Scheduled Meds:     amLODIPine, 10 mg, Oral, Daily  clonazePAM, 2 mg, Oral, Q8H  cloNIDine, 0.1 mg, Oral, Q8H  enoxaparin, 40 mg, Subcutaneous, Q24H  lansoprazole, 30 mg, Nasogastric, QAM  metoprolol tartrate, 50 mg, Oral, Q8H  multivitamin with minerals, 1 tablet, Oral, Daily  nicotine, 1 patch, Transdermal, Q24H  senna-docusate sodium, 2 tablet, Oral, BID  sodium chloride, 10 mL, Intravenous, Q12H  thiamine, 100 mg, Nasogastric, TID  vitamin D, 50,000 Units, Oral, Q7 Days      IV Meds:   dexmedetomidine, 0.2-1.5 mcg/kg/hr, Last Rate: 0.6 mcg/kg/hr (10/15/21 2316)        Results Reviewed:   I have personally reviewed the  results from the time of this admission to 10/15/2021 06:29 EDT     Results from last 7 days   Lab Units 10/15/21  0408 10/14/21  2215 10/14/21  0530 10/12/21  0350 10/12/21  0350 10/11/21  0459 10/10/21  0425 10/09/21  0443 10/09/21  0443   SODIUM mmol/L 137  --  140  --  139   < > 140   < > 140   POTASSIUM mmol/L 3.8 3.7 3.1*   < > 3.5   < > 3.9   < > 4.3   CHLORIDE mmol/L 100  --  99  --  106   < > 108*   < > 107   CO2 mmol/L 23.8  --  27.6  --  20.6*   < > 19.6*   < > 23.8   BUN mg/dL 20  --  19  --  20   < > 24*   < > 21   CREATININE mg/dL 0.92  --  0.99  --  0.91   < > 1.49*   < > 1.39*   CALCIUM mg/dL 9.0  --  8.4*  --  8.7   < > 7.9*   < > 8.1*   BILIRUBIN mg/dL  --   --   --   --  0.4  --  0.5  --  0.6   ALK PHOS U/L  --   --   --   --  70  --  80  --  79   ALT (SGPT) U/L  --   --   --   --  16  --  23  --  27   AST (SGOT) U/L  --   --   --   --  11  --  26  --  33   GLUCOSE mg/dL 99  --  97  --  122*   < > 127*   < > 130*    < > = values in this interval not displayed.       Estimated Creatinine Clearance: 115.7 mL/min (by C-G formula based on SCr of 0.92 mg/dL).    Results from last 7 days   Lab Units 10/15/21  0408 10/14/21  0530 10/12/21  0350 10/09/21  0443 10/09/21  0443   MAGNESIUM mg/dL 1.7 1.3*  --   --  1.7   PHOSPHORUS mg/dL 3.1 4.0 3.5   < > 3.2    < > = values in this interval not displayed.       Results from last 7 days   Lab Units 10/15/21  0408 10/14/21  0530 10/12/21  0350 10/09/21  0443   URIC ACID mg/dL 5.8 5.7 3.3* 6.0       Results from last 7 days   Lab Units 10/15/21  0408 10/12/21  0350 10/11/21  0459 10/10/21  0425 10/09/21  0443   WBC 10*3/mm3 8.65 9.03 9.64 9.74 5.63   HEMOGLOBIN g/dL 11.0* 9.9* 10.5* 11.3* 11.4*   PLATELETS 10*3/mm3 327 136* 124* 104* 124*             Assessment / Plan     ASSESSMENT:  1.  Acute kidney injury associated with hypotension most likely ATN, creatinine on admission 1.99, creatinine today 0.92, uric acid 5.8, albumin 3.3, phosphorus 3.1, magnesium 1.7  and potassium up to 3.8  2.  Acute respiratory failure, resolved  3.  Alcohol abuse with alcohol withdrawal  4.  Alcoholic liver disease  5.  Hypotension, resolved, blood pressure now very stable  6.  Hypokalemia and hypomagnesemia, treated and improved    PLAN:  There is nothing else to add to the current treatment from the renal standpoint I will sign off and I will be happy to see him again if needed.        Thank you for involving us in the care of Watson Lisa.  Please feel free to call with any questions.    Kimo Hu MD  10/15/21  06:29 EDT    [facility]  [phone]      Much of this encounter note is an electronic transcription/translation of spoken language to printed text. The electronic translation of spoken language may permit erroneous, or at times, nonsensical words or phrases to be inadvertently transcribed; Although I have reviewed the note for such errors, some may still exist.

## 2021-10-15 NOTE — PROGRESS NOTES
Continued Stay Note  [facility]     Patient Name: Watson Lisa  MRN: [id number]  Today's Date: 10/15/2021    Admit Date: 10/7/2021     Discharge Plan     Row Name 10/15/21 1411       Plan    Plan Pending    Patient/Family in Agreement with Plan yes    Plan Comments From home, lives alone. Pt extubated 10/12. PT eval pending. CCP to follow for recommendations to assist with post hospital arrangements. Christina Pierre LCSW               Discharge Codes    No documentation.               Expected Discharge Date and Time     Expected Discharge Date Expected Discharge Time    Oct 15, 2021             Charmaine Pierre LCSW

## 2021-10-15 NOTE — PROGRESS NOTES
Adult Nutrition  Assessment/PES    Patient Name:  Watson Lisa  YOB: 1955  MRN: [id number]  Admit Date:  10/7/2021    Assessment Date:  10/15/2021    Comments:  Follow up note. TF's d/lakshmi and diet advanced to Regular Cardiac diet po 50%.  Will continue positive trend.     Reason for Assessment     Row Name 10/15/21 1300          Reason for Assessment    Reason For Assessment follow-up protocol                Nutrition/Diet History     Row Name 10/15/21 1300          Nutrition/Diet History    Typical Food/Fluid Intake diet adv and pt tolerating 50%                  Labs/Tests/Procedures/Meds     Row Name 10/15/21 1300          Labs/Procedures/Meds    Lab Results Reviewed reviewed     Lab Results Comments alb            Diagnostic Tests/Procedures    Diagnostic Test/Procedure Reviewed reviewed            Medications    Pertinent Medications Reviewed reviewed     Pertinent Medications Comments lovenox, mvi, percolace, B1, vit D                Physical Findings     Row Name 10/15/21 1301          Physical Findings    Overall Physical Appearance obese     Skin pressure injury  perineum                  Nutrition Prescription Ordered     Row Name 10/15/21 1301          Nutrition Prescription PO    Current PO Diet Regular     Common Modifiers Cardiac                       Problem/Interventions:   Problem 1     Row Name 10/15/21 1302          Nutrition Diagnoses Problem 1    Resolved? Yes                      Intervention Goal     Row Name 10/15/21 1302          Intervention Goal    General Maintain nutrition     PO Tolerate PO; PO intake (%)     PO Intake % 75 %     Weight No significant weight loss                Nutrition Intervention     Row Name 10/15/21 1302          Nutrition Intervention    RD/Tech Action Follow Tx progress; Care plan reviewd                  Education/Evaluation     Row Name 10/15/21 1302          Monitor/Evaluation    Monitor Per protocol; PO intake                  Electronically signed by:  Tiny Sands RD  10/15/21 13:03 EDT

## 2021-10-15 NOTE — PROGRESS NOTES
[facility]     Mar/chart reviewed  Follow up delerium tremens, respiratory failure,   No chest pain, no shortness of breath    Vital Sign Min/Max for last 24 hours  Temp  Min: 98 °F (36.7 °C)  Max: 98.5 °F (36.9 °C)   BP  Min: 120/68  Max: 153/80   Pulse  Min: 70  Max: 85   Resp  Min: 16  Max: 24   SpO2  Min: 90 %  Max: 96 %   No data recorded   No data recorded     Appears ill, axox3,   perrl, eomi, normal sclera  mmm, no jvd, trachea midline, neck supple,  chest cta bilaterally, no crackles, no wheezes,   rrr,   soft, nt, nd +bs,  no c/c/ e  Skin warm, dry no rashes    Labs: 10/15: reviewed:  Glucose 99  Bun 20  Cr 0.92  Na 137  Bicarb 23.8  Wbc 8.6  hgb 11  plts 327    ASSESSMENT:   1. Acute toxic encephalopathy  2. Right sided aspiration pneumonia, severe  3. Acute respiratory failure s/p mechanical ventilator 10/8, liberated on 10/12/2021  4. Acute alcohol withdrawal syndrome with delirium tremens  5. Alcohol abuse   6. DEEP on CKD  7. Hypomagnesemia  8. Tobacco abuse  9. LIVAN  10. Medical noncompliance     PLAN:  The patient is on room air with normal respiratory issues  Electrolyte replacement already being addressed by nephrology  Will be off precedex later today  Can move out of ICU when precedex is off,   Continue other care as before,   Will see prn out of unit.    Patient is new to me today

## 2021-10-15 NOTE — PROGRESS NOTES
Name: Watson Lisa ADMIT: 10/7/2021   : 1955  PCP: Checo Dunham MD    MRN: [id number] LOS: 8 days   AGE/SEX: 66 y.o. male  ROOM: [facility]     Subjective   Subjective   CC: alcohol withdrawal  No acute events. Patient has no new complaints. Doing okay off of precedex thus far.Taking PO. No CP/dyspnea/f/c/n/v/d.    Objective   Objective   Vital Signs  Temp:  [98 °F (36.7 °C)-98.5 °F (36.9 °C)] 98 °F (36.7 °C)  Heart Rate:  [70-80] 70  Resp:  [16-24] 16  BP: (120-139)/(60-85) 135/75  SpO2:  [90 %-96 %] 93 %  on   ;   Device (Oxygen Therapy): room air  Body mass index is 31.85 kg/m².  Physical Exam  Vitals and nursing note reviewed.   Constitutional:       General: He is not in acute distress.     Appearance: He is not toxic-appearing or diaphoretic.   HENT:      Head: Normocephalic and atraumatic.      Nose: Nose normal.      Mouth/Throat:      Mouth: Mucous membranes are moist.      Pharynx: Oropharynx is clear.   Eyes:      Extraocular Movements: Extraocular movements intact.      Conjunctiva/sclera: Conjunctivae normal.      Pupils: Pupils are equal, round, and reactive to light.   Cardiovascular:      Rate and Rhythm: Normal rate and regular rhythm.      Pulses: Normal pulses.   Pulmonary:      Effort: Pulmonary effort is normal.      Breath sounds: Examination of the right-lower field reveals decreased breath sounds. Examination of the left-lower field reveals decreased breath sounds. Decreased breath sounds present.   Abdominal:      General: Bowel sounds are normal.      Palpations: Abdomen is soft.   Musculoskeletal:         General: Swelling (1+ BLE) present. No tenderness.      Cervical back: Normal range of motion and neck supple.   Skin:     General: Skin is warm and dry.      Capillary Refill: Capillary refill takes less than 2 seconds.   Neurological:      General: No focal deficit present.      Mental Status: He is alert.      Motor: No tremor.   Psychiatric:         Mood and Affect:  Mood normal.         Behavior: Behavior normal.     Results Review     I reviewed the patient's new clinical results.  I reviewed the patient's telemetry  Results from last 7 days   Lab Units 10/15/21  0408 10/12/21  0350 10/11/21  0459 10/10/21  0425   WBC 10*3/mm3 8.65 9.03 9.64 9.74   HEMOGLOBIN g/dL 11.0* 9.9* 10.5* 11.3*   PLATELETS 10*3/mm3 327 136* 124* 104*     Results from last 7 days   Lab Units 10/15/21  0408 10/14/21  2215 10/14/21  0530 10/12/21  0350 10/11/21  0459 10/11/21  0459   SODIUM mmol/L 137  --  140 139  --  140   POTASSIUM mmol/L 3.8 3.7 3.1* 3.5   < > 3.5   CHLORIDE mmol/L 100  --  99 106  --  109*   CO2 mmol/L 23.8  --  27.6 20.6*  --  20.0*   BUN mg/dL 20  --  19 20  --  18   CREATININE mg/dL 0.92  --  0.99 0.91  --  1.05   GLUCOSE mg/dL 99  --  97 122*  --  130*    < > = values in this interval not displayed.   Estimated Creatinine Clearance: 115.7 mL/min (by C-G formula based on SCr of 0.92 mg/dL).  Results from last 7 days   Lab Units 10/15/21  0408 10/14/21  0530 10/12/21  0350 10/10/21  0425 10/09/21  0443 10/09/21  0443   ALBUMIN g/dL 3.30* 3.60 2.90* 3.10*   < > 3.40*   BILIRUBIN mg/dL  --   --  0.4 0.5  --  0.6   ALK PHOS U/L  --   --  70 80  --  79   AST (SGOT) U/L  --   --  11 26  --  33   ALT (SGPT) U/L  --   --  16 23  --  27    < > = values in this interval not displayed.     Results from last 7 days   Lab Units 10/15/21  0408 10/14/21  0530 10/12/21  0350 10/11/21  0459 10/10/21  0425 10/10/21  0425 10/09/21  0443 10/09/21  0443   CALCIUM mg/dL 9.0 8.4* 8.7 8.1*   < > 7.9*   < > 8.1*   ALBUMIN g/dL 3.30* 3.60 2.90*  --   --  3.10*   < > 3.40*   MAGNESIUM mg/dL 1.7 1.3*  --   --   --   --   --  1.7   PHOSPHORUS mg/dL 3.1 4.0 3.5  --   --   --   --  3.2    < > = values in this interval not displayed.     Results from last 7 days   Lab Units 10/10/21  0425   PROCALCITONIN ng/mL 0.74*     COVID19   Date Value Ref Range Status   10/07/2021 Not Detected Not Detected - Ref. Range  Final   03/25/2021 Not Detected Not Detected - Ref. Range Final     Glucose   Date/Time Value Ref Range Status   10/13/2021 0642 122 70 - 130 mg/dL Final     Comment:     Meter: [id number] : SAM Swan RN       XR Abdomen KUB  KUB     HISTORY: Nasogastric tube repositioning     COMPARISON: 10/09/2021 at 1959 hours.     FINDINGS: A single view of the upper abdomen demonstrates a nasogastric  tube in place. The nasogastric tube has been advanced. The tip is within  the proximal body of the stomach.     Air is identified within loops of small bowel which are mildly  prominent, similar to the prior examination. No obvious pneumatosis or  pneumoperitoneum is identified. Evaluation could be performed with a CT  examination of the abdomen and pelvis as indicated.     The above information was called to the patient's nurse at the time of  the dictation. The patient's nurse is to relay the information to the  clinical service.     This report was finalized on 10/11/2021 7:20 AM by Dr. Vincent Beck M.D.     XR Chest 1 View  Narrative: Patient: BRITTANY ZULETA  Time Out: 07:22  Exam(s): FILM CXR 1 VIEW     EXAM:    XR Chest, 1 View    CLINICAL HISTORY:     Reason for exam: Right-sided aspiration pneumonia.    TECHNIQUE:    Frontal view of the chest.    COMPARISON:    10 10 2021    FINDINGS:    Lungs: Hazy bibasilar opacities, unchanged from prior study.    Pleural space:  Unremarkable.  No pneumothorax.    Heart: Unchanged cardiomegaly.    Mediastinum: Endotracheal tube terminates 5.6 cm above the level of the   [place].  Nasogastric tube terminates below field-of-view in the left   upper quadrant..    Bones joints:  Unremarkable.    IMPRESSION:       Unchanged hazy bibasilar opacities which may represent consolidations,   and or layering bilateral pleural effusions.  Impression: Electronically signed by Christiano Escobar M.D. on 10-11-21 at 0722    Scheduled Medications  amLODIPine, 10 mg, Oral,  Daily  clonazePAM, 2 mg, Oral, Q8H  cloNIDine, 0.2 mg, Oral, Q8H  enoxaparin, 40 mg, Subcutaneous, Q24H  lansoprazole, 30 mg, Nasogastric, QAM  metoprolol tartrate, 50 mg, Oral, Q8H  multivitamin with minerals, 1 tablet, Oral, Daily  nicotine, 1 patch, Transdermal, Q24H  senna-docusate sodium, 2 tablet, Oral, BID  sodium chloride, 10 mL, Intravenous, Q12H  thiamine, 100 mg, Nasogastric, TID  vitamin D, 50,000 Units, Oral, Q7 Days    Infusions  dexmedetomidine, 0.2-1.5 mcg/kg/hr, Last Rate: 0.3 mcg/kg/hr (10/15/21 1101)    Diet  Diet Regular; Cardiac       Assessment/Plan     Active Hospital Problems    Diagnosis  POA   • **Delirium tremens (HCC) [F10.231]  Yes   • Acute respiratory failure with hypercapnia (HCC) [J96.02]  Yes   • Aspiration pneumonia (HCC) [J69.0]  No   • Alcohol abuse [F10.10]  Yes   • Alcoholic liver disease (HCC) [K70.9]  Yes   • Tobacco abuse [Z72.0]  Yes   • Hiatal hernia [K44.9]  Yes   • COPD (chronic obstructive pulmonary disease) (HCC) [J44.9]  Yes   • Anemia, chronic disease [D63.8]  Yes   • Acute kidney failure (HCC) [N17.9]  Yes   • Hypomagnesemia [E83.42]  Yes   • Essential hypertension [I10]  Yes      Resolved Hospital Problems   No resolved problems to display.   Alcohol Dependence in Withdrawal with Delirium Tremens  - required intubation to protect airway, extubated 10/12/21  - continue prn ativan and vitamin replacement  - clonazepam stated-there is shortage of librium  - clonidine started  - precedex drip off this afternoon will see how he does  - appreciate critical care management from LPC    Aspiration Pneumonia  - finished levofloxacin and flagyl  - encourage pulmonary toilet  - weaned to room air    COPD/LIVAN  - no exacerbation  - O2 PRN    DEEP  - probably from hypotension with progression to ATN  - much improved with IVF, then became volume overloaded and has been diuresed per nephrology with much improvement , appreciate recs    Anemia/TCP  - improved, will  monitor    Hypomagnesemia/Hypokalemia  - levels improved  - monitor and replace as needed    Lovenox 40 mg SC daily for DVT prophylaxis.  Full code.  Discussed with patient and nursing staff.  Anticipate discharge TBD timing yet to be determined.      Meño Liriano MD  [facility]  10/15/21  16:34 EDT

## 2021-10-15 NOTE — THERAPY EVALUATION
Patient Name: Watson Lisa  : 1955    MRN: [id number]                              Today's Date: 10/15/2021       Admit Date: 10/7/2021    Visit Dx:     ICD-10-CM ICD-9-CM   1. Alcohol withdrawal syndrome with complication (HCC)  F10.239 291.81   2. Acute renal insufficiency  N28.9 593.9   3. Hypomagnesemia  E83.42 275.2   4. New onset a-fib (HCC)  I48.91 427.31   5. Atrial fibrillation with rapid ventricular response (HCC)  I48.91 427.31     Patient Active Problem List   Diagnosis   • Arthritis   • Essential hypertension   • Tobacco abuse   • Alcohol withdrawal syndrome without complication (HCC)   • Chest pain in adult   • Sleep apnea   • Hiatal hernia   • Alcohol abuse   • Effusion of left knee   • Osteoarthritis of left knee   • Vitamin D deficiency   • Anemia, chronic disease   • Hyponatremia   • Alcohol dependence with withdrawal (HCC)   • Acute kidney failure (HCC)   • COPD (chronic obstructive pulmonary disease) (HCC)   • LIVAN (obstructive sleep apnea)   • ETOH abuse   • Tobacco abuse   • Obese   • Anemia, chronic disease   • Hypomagnesemia   • Delirium tremens (HCC)   • Alcohol abuse   • Alcoholic liver disease (HCC)   • Tobacco abuse   • Hiatal hernia   • COPD (chronic obstructive pulmonary disease) (HCC)   • Anemia, chronic disease   • Acute respiratory failure with hypercapnia (HCC)   • Aspiration pneumonia (HCC)     Past Medical History:   Diagnosis Date   • Alcohol abuse    • Anxiety    • Arthritis    • Bronchitis with chronic airway obstruction (HCC)     LAST FEB   • DVT (deep venous thrombosis) (HCC)    • Hiatal hernia    • Hypertension    • Hypertension    • Low back pain    • Neck pain    • Pulmonary embolism (HCC)    • Sleep apnea with use of continuous positive airway pressure (CPAP)     AT NIGHT     Past Surgical History:   Procedure Laterality Date   • COLONOSCOPY     • JOINT REPLACEMENT Right     hip/knee   • REPLACEMENT TOTAL KNEE     • TONSILLECTOMY     • TOTAL HIP ARTHROPLASTY  Right       General Information     Row Name 10/15/21 1506          Physical Therapy Time and Intention    Document Type evaluation  Pt. admitted with Tremors, nausea, Acute kidney injury; Pt. also with ETOH withdrawal  -MS     Mode of Treatment physical therapy; individual therapy  -MS     Row Name 10/15/21 1506          General Information    Patient Profile Reviewed yes  -MS     Prior Level of Function independent:  Use of cane per pt. report  -MS     Existing Precautions/Restrictions fall   Exit alarm  -MS     Barriers to Rehab cognitive status  -MS     Row Name 10/15/21 1506          Cognition    Orientation Status (Cognition) oriented to; person  -MS           User Key  (r) = Recorded By, (t) = Taken By, (c) = Cosigned By    Initials Name Provider Type    Meño Souza, PT Physical Therapist               Mobility     Row Name 10/15/21 1507          Bed Mobility    Comment (Bed Mobility) Pt. adamantly refusing to get OOB this date despite encouragement and education. Pt. only agreeable to bed ther. ex. program.  -MS           User Key  (r) = Recorded By, (t) = Taken By, (c) = Cosigned By    Initials Name Provider Type    Meño Souza, PT Physical Therapist               Obj/Interventions     Row Name 10/15/21 1508          Range of Motion Comprehensive    Comment, General Range of Motion BUE/LE (Imp. 25%)  -MS     Row Name 10/15/21 1508          Strength Comprehensive (MMT)    Comment, General Manual Muscle Testing (MMT) Assessment BUE/LE (3-/5)  -MS     Row Name 10/15/21 1508          Motor Skills    Therapeutic Exercise --  BUE/LE ther. ex. program x 10 reps completed (AAROM):   Ankle pumps, Heel Slides, Hip Abduction, Shld Flexion, Elbow flex/ext.  -MS           User Key  (r) = Recorded By, (t) = Taken By, (c) = Cosigned By    Initials Name Provider Type    Meño Souza, PT Physical Therapist               Goals/Plan     Row Name 10/15/21 1509          Bed Mobility Goal 1 (PT)     "Activity/Assistive Device (Bed Mobility Goal 1, PT) bed mobility activities, all  -MS     [place] Level/Cues Needed (Bed Mobility Goal 1, PT) contact guard assist  -MS     Time Frame (Bed Mobility Goal 1, PT) long term goal (LTG); 1 week  -MS     Row Name 10/15/21 1505          Transfer Goal 1 (PT)    Activity/Assistive Device (Transfer Goal 1, PT) transfers, all  -MS     [place] Level/Cues Needed (Transfer Goal 1, PT) contact guard assist  -MS     Time Frame (Transfer Goal 1, PT) long term goal (LTG); 1 week  -MS     Row Name 10/15/21 1508          Gait Training Goal 1 (PT)    Activity/Assistive Device (Gait Training Goal 1, PT) gait (walking locomotion)  With AAD  -MS     [place] Level (Gait Training Goal 1, PT) contact guard assist  -MS     Distance (Gait Training Goal 1, PT) 50 feet  -MS     Time Frame (Gait Training Goal 1, PT) long term goal (LTG); 1 week  -MS           User Key  (r) = Recorded By, (t) = Taken By, (c) = Cosigned By    Initials Name Provider Type    MS Meño Gil, PT Physical Therapist               Clinical Impression     Row Name 10/15/21 1509          Pain    Additional Documentation Pain Scale: Numbers Pre/Post-Treatment (Group)  -MS     Row Name 10/15/21 1501          Pain Scale: Numbers Pre/Post-Treatment    Pretreatment Pain Rating 3/10  -MS     Posttreatment Pain Rating 3/10  -MS     Pain Location - Orientation generalized  -MS     Pre/Posttreatment Pain Comment Pt. reports pain/soreness \"all over\" his body.  -MS     Row Name 10/15/21 1502          Plan of Care Review    Plan of Care Reviewed With patient  -MS     Row Name 10/15/21 1503          Therapy Assessment/Plan (PT)    Rehab Potential (PT) fair, will monitor progress closely  -MS     Criteria for Skilled Interventions Met (PT) skilled treatment is necessary  -MS     Row Name 10/15/21 9447          Positioning and Restraints    Pre-Treatment Position in bed  -MS     Post Treatment Position bed  -MS     In " Bed notified nsg; supine; call light within reach; encouraged to call for assist; exit alarm on  All lines intact.  -MS           User Key  (r) = Recorded By, (t) = Taken By, (c) = Cosigned By    Initials Name Provider Type    MS Meño Gil, PT Physical Therapist               Outcome Measures     Row Name 10/15/21 1510          How much help from another person do you currently need...    Turning from your back to your side while in flat bed without using bedrails? 2  -MS     Moving from lying on back to sitting on the side of a flat bed without bedrails? 2  -MS     Moving to and from a bed to a chair (including a wheelchair)? 2  -MS     Standing up from a chair using your arms (e.g., wheelchair, bedside chair)? 2  -MS     Climbing 3-5 steps with a railing? 2  -MS     To walk in hospital room? 2  -MS     AM-PAC 6 Clicks Score (PT) 12  -MS     Row Name 10/15/21 1510          Functional Assessment    Outcome Measure Options AM-PAC 6 Clicks Basic Mobility (PT)  -MS           User Key  (r) = Recorded By, (t) = Taken By, (c) = Cosigned By    Initials Name Provider Type    MS Meño Gil PT Physical Therapist                             Physical Therapy Education                 Title: PT OT SLP Therapies (In Progress)     Topic: Physical Therapy (In Progress)     Point: Mobility training (Not Started)     Learner Progress:  Not documented in this visit.          Point: Home exercise program (In Progress)     Learning Progress Summary           Patient Acceptance, E,D, NR by MS at 10/15/2021 1510                   Point: Body mechanics (Not Started)     Learner Progress:  Not documented in this visit.          Point: Precautions (Not Started)     Learner Progress:  Not documented in this visit.                      User Key     Initials Effective Dates Name Provider Type Discipline    MS 06/16/21 -  Meño Gil PT Physical Therapist PT              PT Recommendation and Plan  Planned Therapy  Interventions (PT): balance training, bed mobility training, gait training, home exercise program, patient/family education, postural re-education, transfer training, strengthening  Plan of Care Reviewed With: patient  Outcome Summary: Pt. is a 66 year old Male admitted to the hospital with an Acute kidney injury and ETOH withdrawal.  Pt. reports that he was independent with functional mobility just prior to admission and that he used a cane during ambulation. Pt. currently presents with decreased strength, decreased balance, and decreased tolerance to functional activity. Pt. will benefit from skilled inpt. P.T. to address his functional deficits and to assist pt. in regaining his maximum level of independence with functional mobility.     Time Calculation:    PT Charges     Row Name 10/15/21 1513             Time Calculation    Start Time 1445  -MS      Stop Time 1457  -MS      Time Calculation (min) 12 min  -MS      PT Received On 10/15/21  -MS      PT - Next Appointment 10/16/21  -MS      PT Goal Re-Cert Due Date 10/22/21  -MS              Time Calculation- PT    Total Timed Code Minutes- PT 11 minute(s)  -MS            User Key  (r) = Recorded By, (t) = Taken By, (c) = Cosigned By    Initials Name Provider Type    Meño Souza, PT Physical Therapist              Therapy Charges for Today     Code Description Service Date Service Provider Modifiers Qty    65900560225  PT EVAL MOD COMPLEXITY 1 10/15/2021 Meño Gil, PT GP 1    [id number]  PT THER PROC EA 15 MIN 10/15/2021 Meño Gil, PT GP 1          PT G-Codes  Outcome Measure Options: AM-PAC 6 Clicks Basic Mobility (PT)  AM-PAC 6 Clicks Score (PT): 12    Meño Gil PT  10/15/2021

## 2021-10-15 NOTE — PLAN OF CARE
Goal Outcome Evaluation:  Plan of Care Reviewed With: son        Progress: no change   Remains in CCU.  Precedex weaned off.  Became confused and climbing out of bed.  Moved to room closer to unit.  Continued to climb out of bed.  Order received for posey

## 2021-10-16 LAB
ANION GAP SERPL CALCULATED.3IONS-SCNC: 13.4 MMOL/L (ref 5–15)
BUN SERPL-MCNC: 18 MG/DL (ref 8–23)
BUN/CREAT SERPL: 20.2 (ref 7–25)
CALCIUM SPEC-SCNC: 9 MG/DL (ref 8.6–10.5)
CHLORIDE SERPL-SCNC: 102 MMOL/L (ref 98–107)
CO2 SERPL-SCNC: 22.6 MMOL/L (ref 22–29)
CREAT SERPL-MCNC: 0.89 MG/DL (ref 0.76–1.27)
DEPRECATED RDW RBC AUTO: 44.4 FL (ref 37–54)
ERYTHROCYTE [DISTWIDTH] IN BLOOD BY AUTOMATED COUNT: 12.9 % (ref 12.3–15.4)
GFR SERPL CREATININE-BSD FRML MDRD: 85 ML/MIN/1.73
GLUCOSE SERPL-MCNC: 113 MG/DL (ref 65–99)
HCT VFR BLD AUTO: 30.7 % (ref 37.5–51)
HGB BLD-MCNC: 10.7 G/DL (ref 13–17.7)
MCH RBC QN AUTO: 33.2 PG (ref 26.6–33)
MCHC RBC AUTO-ENTMCNC: 34.9 G/DL (ref 31.5–35.7)
MCV RBC AUTO: 95.3 FL (ref 79–97)
PLATELET # BLD AUTO: 422 10*3/MM3 (ref 140–450)
PMV BLD AUTO: 10 FL (ref 6–12)
POTASSIUM SERPL-SCNC: 3.4 MMOL/L (ref 3.5–5.2)
RBC # BLD AUTO: 3.22 10*6/MM3 (ref 4.14–5.8)
SODIUM SERPL-SCNC: 138 MMOL/L (ref 136–145)
WBC # BLD AUTO: 8.4 10*3/MM3 (ref 3.4–10.8)

## 2021-10-16 PROCEDURE — 85027 COMPLETE CBC AUTOMATED: CPT | Performed by: INTERNAL MEDICINE

## 2021-10-16 PROCEDURE — 25010000002 ENOXAPARIN PER 10 MG: Performed by: INTERNAL MEDICINE

## 2021-10-16 PROCEDURE — 80048 BASIC METABOLIC PNL TOTAL CA: CPT | Performed by: INTERNAL MEDICINE

## 2021-10-16 PROCEDURE — 92610 EVALUATE SWALLOWING FUNCTION: CPT

## 2021-10-16 RX ORDER — QUETIAPINE FUMARATE 25 MG/1
25 TABLET, FILM COATED ORAL NIGHTLY
Status: DISCONTINUED | OUTPATIENT
Start: 2021-10-16 | End: 2021-10-19 | Stop reason: HOSPADM

## 2021-10-16 RX ORDER — CLONIDINE HYDROCHLORIDE 0.1 MG/1
0.3 TABLET ORAL EVERY 8 HOURS SCHEDULED
Status: DISCONTINUED | OUTPATIENT
Start: 2021-10-16 | End: 2021-10-17

## 2021-10-16 RX ADMIN — DEXMEDETOMIDINE HYDROCHLORIDE 1 MCG/KG/HR: 100 INJECTION, SOLUTION, CONCENTRATE INTRAVENOUS at 04:38

## 2021-10-16 RX ADMIN — METOPROLOL TARTRATE 50 MG: 50 TABLET, FILM COATED ORAL at 18:38

## 2021-10-16 RX ADMIN — CLONAZEPAM 2 MG: 1 TABLET ORAL at 14:29

## 2021-10-16 RX ADMIN — LANSOPRAZOLE 30 MG: KIT at 06:56

## 2021-10-16 RX ADMIN — Medication 100 MG: at 21:09

## 2021-10-16 RX ADMIN — CLONIDINE HYDROCHLORIDE 0.3 MG: 0.1 TABLET ORAL at 21:09

## 2021-10-16 RX ADMIN — Medication 100 MG: at 16:00

## 2021-10-16 RX ADMIN — NICOTINE 1 PATCH: 21 PATCH, EXTENDED RELEASE TRANSDERMAL at 18:38

## 2021-10-16 RX ADMIN — ENOXAPARIN SODIUM 40 MG: 40 INJECTION SUBCUTANEOUS at 21:09

## 2021-10-16 RX ADMIN — CLONIDINE HYDROCHLORIDE 0.2 MG: 0.1 TABLET ORAL at 14:29

## 2021-10-16 RX ADMIN — DEXMEDETOMIDINE HYDROCHLORIDE 0.2 MCG/KG/HR: 100 INJECTION, SOLUTION, CONCENTRATE INTRAVENOUS at 02:15

## 2021-10-16 RX ADMIN — DEXMEDETOMIDINE HYDROCHLORIDE 0.9 MCG/KG/HR: 100 INJECTION, SOLUTION, CONCENTRATE INTRAVENOUS at 09:26

## 2021-10-16 RX ADMIN — CLONAZEPAM 2 MG: 1 TABLET ORAL at 06:56

## 2021-10-16 RX ADMIN — POTASSIUM CHLORIDE 40 MEQ: 750 TABLET, EXTENDED RELEASE ORAL at 14:28

## 2021-10-16 RX ADMIN — CLONAZEPAM 2 MG: 1 TABLET ORAL at 21:09

## 2021-10-16 RX ADMIN — Medication 100 MG: at 09:45

## 2021-10-16 RX ADMIN — SODIUM CHLORIDE, PRESERVATIVE FREE 10 ML: 5 INJECTION INTRAVENOUS at 21:10

## 2021-10-16 RX ADMIN — MULTIPLE VITAMINS W/ MINERALS TAB 1 TABLET: TAB at 09:45

## 2021-10-16 RX ADMIN — CLONIDINE HYDROCHLORIDE 0.2 MG: 0.1 TABLET ORAL at 06:56

## 2021-10-16 RX ADMIN — AMLODIPINE BESYLATE 10 MG: 10 TABLET ORAL at 09:45

## 2021-10-16 RX ADMIN — QUETIAPINE FUMARATE 25 MG: 25 TABLET ORAL at 21:09

## 2021-10-16 RX ADMIN — POTASSIUM CHLORIDE 40 MEQ: 750 TABLET, EXTENDED RELEASE ORAL at 09:45

## 2021-10-16 RX ADMIN — SODIUM CHLORIDE, PRESERVATIVE FREE 10 ML: 5 INJECTION INTRAVENOUS at 09:45

## 2021-10-16 NOTE — PLAN OF CARE
Goal Outcome Evaluation:      Patient remains in CCU r/t agitation overnight and requiring precedex gtt to be restarted. Precedex weaned off this afternoon and patient tolerating. Continues to have hallucinations but impulsive behavior improving. Seroquel to be started tonight. Speech evaluation completed d/t persistent coughing (unclear association with eating/drinking?). Speech cleared for PO diet. VSS. Probably overflow tomorrow.

## 2021-10-16 NOTE — PROGRESS NOTES
[facility] INPATIENT PROGRESS NOTE         [facility] CORONARY CARE    10/16/2021      PATIENT IDENTIFICATION:  Name: Watson Lisa ADMIT: 10/7/2021   : 1955  PCP: Checo Dunham MD    MRN: [id number] LOS: 9 days   AGE/SEX: 66 y.o. male  ROOM: [facility]                     LOS 9    Reason for visit: Alcohol withdrawal with DTs      SUBJECTIVE:      Still a bit confused but cooperative.  On Precedex drip.  Can transfer out of intensive care when off Precedex.  I am seeing the patient for the first time today.  All patient problems are new to me.      Objective   OBJECTIVE:    Vital Sign Min/Max for last 24 hours  Temp  Min: 97.5 °F (36.4 °C)  Max: 98.5 °F (36.9 °C)   BP  Min: 116/74  Max: 151/81   Pulse  Min: 56  Max: 101   Resp  Min: 16  Max: 22   SpO2  Min: 94 %  Max: 100 %   No data recorded   No data recorded                   FiO2 (%): 99 %     Body mass index is 31.85 kg/m².    Intake/Output Summary (Last 24 hours) at 10/16/2021 1106  Last data filed at 10/15/2021 1755  Gross per 24 hour   Intake 830 ml   Output 260 ml   Net 570 ml         Exam:  GEN:  No distress, appears stated age  EYES:   PERRL, anicteric sclerae  ENT:    External ears/nose normal, OP clear  NECK:  No adenopathy, midline trachea  LUNGS: Normal chest on inspection, palpation and auscultation  CV:  Normal S1S2, without murmur  ABD:  Nontender, nondistended, no hepatosplenomegaly, +BS  EXT:  No edema.  No cyanosis or clubbing.  No mottling and normal cap refill.    Assessment     Scheduled meds:  amLODIPine, 10 mg, Oral, Daily  clonazePAM, 2 mg, Oral, Q8H  cloNIDine, 0.2 mg, Oral, Q8H  enoxaparin, 40 mg, Subcutaneous, Q24H  lansoprazole, 30 mg, Nasogastric, QAM  metoprolol tartrate, 50 mg, Oral, Q8H  multivitamin with minerals, 1 tablet, Oral, Daily  nicotine, 1 patch, Transdermal, Q24H  senna-docusate sodium, 2 tablet, Oral, BID  sodium chloride, 10 mL, Intravenous, Q12H  thiamine, 100 mg, Nasogastric, TID  vitamin D,  50,000 Units, Oral, Q7 Days      IV meds:                      dexmedetomidine, 0.2-1.5 mcg/kg/hr, Last Rate: 0.6 mcg/kg/hr (10/16/21 1056)      Data Review:  Results from last 7 days   Lab Units 10/16/21  0443 10/15/21  0408 10/15/21  0408 10/14/21  2215 10/14/21  0530 10/14/21  0530 10/12/21  0350 10/12/21  0350 10/11/21  0459 10/11/21  0459   SODIUM mmol/L 138  --  137  --   --  140  --  139  --  140   POTASSIUM mmol/L 3.4*  --  3.8 3.7  --  3.1*  --  3.5   < > 3.5   CHLORIDE mmol/L 102  --  100  --   --  99  --  106  --  109*   CO2 mmol/L 22.6  --  23.8  --   --  27.6  --  20.6*  --  20.0*   BUN mg/dL 18  --  20  --   --  19  --  20  --  18   CREATININE mg/dL 0.89  --  0.92  --   --  0.99  --  0.91  --  1.05   GLUCOSE mg/dL 113*   < > 99  --    < > 97   < > 122*   < > 130*   CALCIUM mg/dL 9.0  --  9.0  --   --  8.4*  --  8.7  --  8.1*    < > = values in this interval not displayed.         Estimated Creatinine Clearance: 119.6 mL/min (by C-G formula based on SCr of 0.89 mg/dL).  Results from last 7 days   Lab Units 10/16/21  0443 10/15/21  0408 10/12/21  0350 10/11/21  0459 10/10/21  0425   WBC 10*3/mm3 8.40 8.65 9.03 9.64 9.74   HEMOGLOBIN g/dL 10.7* 11.0* 9.9* 10.5* 11.3*   PLATELETS 10*3/mm3 422 327 136* 124* 104*         Results from last 7 days   Lab Units 10/12/21  0350 10/10/21  0425   ALT (SGPT) U/L 16 23   AST (SGOT) U/L 11 26         Results from last 7 days   Lab Units 10/10/21  0425   PROCALCITONIN ng/mL 0.74*         No results found for: HGBA1C, POCGLU    Chest x-ray 10/11 reviewed      Microbiology reviewed              Active Hospital Problems    Diagnosis  POA   • **Delirium tremens (HCC) [F10.231]  Yes   • Acute respiratory failure with hypercapnia (HCC) [J96.02]  Yes   • Aspiration pneumonia (HCC) [J69.0]  No   • Alcohol abuse [F10.10]  Yes   • Alcoholic liver disease (HCC) [K70.9]  Yes   • Tobacco abuse [Z72.0]  Yes   • Hiatal hernia [K44.9]  Yes   • COPD (chronic obstructive pulmonary  disease) (HCC) [J44.9]  Yes   • Anemia, chronic disease [D63.8]  Yes   • Acute kidney failure (HCC) [N17.9]  Yes   • Hypomagnesemia [E83.42]  Yes   • Essential hypertension [I10]  Yes      Resolved Hospital Problems   No resolved problems to display.         ASSESSMENT:    1. Acute toxic encephalopathy  2. Right sided aspiration pneumonia, severe  3. Acute respiratory failure s/p mechanical ventilator 10/8, liberated on 10/12/2021  4. Acute alcohol withdrawal syndrome with delirium tremens  5. Alcohol abuse   6. DEEP on CKD  7. Hypomagnesemia  8. Tobacco abuse  9. LIVAN  10. Medical noncompliance          PLAN:    Once able to wean off Precedex drip can transfer out of intensive care.  Control blood pressure.  Control glucose.  DVT prophylaxis.    Discussed with nursing staff.        Watson Zuleta MD  Pulmonary and Critical Care Medicine  [facility], [facility]  10/16/2021    11:06 EDT

## 2021-10-16 NOTE — THERAPY EVALUATION
Acute Care - Speech Language Pathology   Swallow Initial Evaluation [facility]     Patient Name: Watson Lisa  : 1955  MRN: [id number]  Today's Date: 10/16/2021               Admit Date: 10/7/2021    Visit Dx:     ICD-10-CM ICD-9-CM   1. Alcohol withdrawal syndrome with complication (HCC)  F10.239 291.81   2. Acute renal insufficiency  N28.9 593.9   3. Hypomagnesemia  E83.42 275.2   4. New onset a-fib (HCC)  I48.91 427.31   5. Atrial fibrillation with rapid ventricular response (HCC)  I48.91 427.31     Patient Active Problem List   Diagnosis   • Arthritis   • Essential hypertension   • Tobacco abuse   • Alcohol withdrawal syndrome without complication (HCC)   • Chest pain in adult   • Sleep apnea   • Hiatal hernia   • Alcohol abuse   • Effusion of left knee   • Osteoarthritis of left knee   • Vitamin D deficiency   • Anemia, chronic disease   • Hyponatremia   • Alcohol dependence with withdrawal (HCC)   • Acute kidney failure (HCC)   • COPD (chronic obstructive pulmonary disease) (HCC)   • LIVAN (obstructive sleep apnea)   • ETOH abuse   • Tobacco abuse   • Obese   • Anemia, chronic disease   • Hypomagnesemia   • Delirium tremens (HCC)   • Alcohol abuse   • Alcoholic liver disease (HCC)   • Tobacco abuse   • Hiatal hernia   • COPD (chronic obstructive pulmonary disease) (HCC)   • Anemia, chronic disease   • Acute respiratory failure with hypercapnia (HCC)   • Aspiration pneumonia (HCC)     Past Medical History:   Diagnosis Date   • Alcohol abuse    • Anxiety    • Arthritis    • Bronchitis with chronic airway obstruction (HCC)     LAST FEB   • DVT (deep venous thrombosis) (HCC)    • Hiatal hernia    • Hypertension    • Hypertension    • Low back pain    • Neck pain    • Pulmonary embolism (HCC)    • Sleep apnea with use of continuous positive airway pressure (CPAP)     AT NIGHT     Past Surgical History:   Procedure Laterality Date   • COLONOSCOPY     • JOINT REPLACEMENT Right     hip/knee   •  REPLACEMENT TOTAL KNEE     • TONSILLECTOMY     • TOTAL HIP ARTHROPLASTY Right        SLP Recommendation and Plan  SLP Swallowing Diagnosis: functional pharyngeal phase (10/16/21 1500)  SLP Diet Recommendation: regular textures, thin liquids (10/16/21 1500)  Recommended Precautions and Strategies: upright posture during/after eating, small bites of food and sips of liquid (10/16/21 1500)  SLP Rec. for Method of Medication Administration: meds whole, with thin liquids, with pudding or applesauce, as tolerated (10/16/21 1500)     Monitor for Signs of Aspiration: yes, notify SLP if any concerns (10/16/21 1500)     Swallow Criteria for Skilled Therapeutic Interventions Met: demonstrates skilled criteria (10/16/21 1500)     Rehab Potential/Prognosis, Swallowing: good, to achieve stated therapy goals (10/16/21 1500)  Therapy Frequency (Swallow): PRN (10/16/21 1500)  Predicted Duration Therapy Intervention (Days): until discharge (10/16/21 1500)                         Plan of Care Reviewed With: patient  Outcome Summary: bedside swallow eval completed. pt w/baseline cough noted before, during and after eval-did not seem associated with any particular PO consistency. trials of thin, NTL, puree, mech soft and regular solids given. REC: continue current diet, meds with thin or puree as tolerated, up at 90', small bites/drinks.      SWALLOW EVALUATION (last 72 hours)     SLP Adult Swallow Evaluation     Row Name 10/16/21 1500                   Rehab Evaluation    Document Type evaluation  -LT        Subjective Information no complaints  -LT        Patient Observations alert; cooperative  -LT        Care Plan Review evaluation/treatment results reviewed; care plan/treatment goals reviewed; risks/benefits reviewed; current/potential barriers reviewed; patient/other agree to care plan  -LT        Patient Effort good  -LT        Symptoms Noted During/After Treatment none  -LT                  General Information    Patient Profile  Reviewed yes  -LT        Pertinent History Of Current Problem 67 yo M w/DEEP, alcohol abuse w/DT.  -LT        Current Method of Nutrition regular textures; thin liquids  -LT        Prior Level of Function-Swallowing no diet consistency restrictions  -LT        Plans/Goals Discussed with patient; agreed upon  -LT        Barriers to Rehab none identified  -LT                  Oral Motor Structure and Function    Dentition Assessment natural, present and adequate; teeth are in poor condition  -LT        Volitional Swallow WFL  -LT        Volitional Cough WFL  -LT                  Oral Musculature and Cranial Nerve Assessment    Oral Motor General Assessment WFL  -LT                  Clinical Swallow Eval    Clinical Swallow Evaluation Summary bedside swallow eval completed. pt w/baseline cough noted before, during and after eval-did not seem associated with any particular PO consistency. trials of thin, NTL, puree, mech soft and regular solids given. REC: continue current diet, meds with thin or puree as tolerated, up at 90', small bites/drinks.  -LT                  Clinical Impression    SLP Swallowing Diagnosis functional pharyngeal phase  -LT        Functional Impact risk of aspiration/pneumonia  -LT        Rehab Potential/Prognosis, Swallowing good, to achieve stated therapy goals  -LT        Swallow Criteria for Skilled Therapeutic Interventions Met demonstrates skilled criteria  -LT                  Recommendations    Therapy Frequency (Swallow) PRN  -LT        Predicted Duration Therapy Intervention (Days) until discharge  -LT        SLP Diet Recommendation regular textures; thin liquids  -LT        Recommended Precautions and Strategies upright posture during/after eating; small bites of food and sips of liquid  -LT        Oral Care Recommendations Oral Care BID/PRN  -LT        SLP Rec. for Method of Medication Administration meds whole; with thin liquids; with pudding or applesauce; as tolerated  -LT         Monitor for Signs of Aspiration yes; notify SLP if any concerns  -LT                  Swallow Goals (SLP)    Oral Nutrition/Hydration Goal Selection (SLP) oral nutrition/hydration, SLP goal 1  -LT                  Oral Nutrition/Hydration Goal 1 (SLP)    Oral Nutrition/Hydration Goal 1, SLP tolerate least restrictive diet  -LT        Time Frame (Oral Nutrition/Hydration Goal 1, SLP) by discharge  -LT              User Key  (r) = Recorded By, (t) = Taken By, (c) = Cosigned By    Initials Name Effective Dates    Sarah Briscoe MS CCC-SLP 06/16/21 -                 EDUCATION  The patient has been educated in the following areas:   Dysphagia (Swallowing Impairment).        SLP GOALS     Row Name 10/16/21 1500             Oral Nutrition/Hydration Goal 1 (SLP)    Oral Nutrition/Hydration Goal 1, SLP tolerate least restrictive diet  -LT      Time Frame (Oral Nutrition/Hydration Goal 1, SLP) by discharge  -LT            User Key  (r) = Recorded By, (t) = Taken By, (c) = Cosigned By    Initials Name Provider Type    Sarah Briscoe MS CCC-SLP Speech and Language Pathologist              SLP Outcome Measures (last 72 hours)     SLP Outcome Measures     Row Name 10/16/21 1500             SLP Outcome Measures    Outcome Measure Used? Adult NOMS  -LT              Adult FCM Scores    FCM Chosen Swallowing  -LT      Swallowing FCM Score 7  -LT            User Key  (r) = Recorded By, (t) = Taken By, (c) = Cosigned By    Initials Name Effective Dates    Sarah Briscoe MS CCC-SLP 06/16/21 -                  Time Calculation:    Time Calculation- SLP     Row Name 10/16/21 1510             Time Calculation- SLP    SLP Received On 10/16/21  -LT            User Key  (r) = Recorded By, (t) = Taken By, (c) = Cosigned By    Initials Name Provider Type    Sarah Briscoe MS CCC-SLP Speech and Language Pathologist                Therapy Charges for Today     Code Description Service Date Service Provider Modifiers Qty     [id number] [facility] ORAL PHARYNG SWALLOW 3 10/16/2021 Sarah Foley, MS CCC-SLP GN 1               Sarah Foley MS CCC-SLP  10/16/2021

## 2021-10-16 NOTE — PLAN OF CARE
Goal Outcome Evaluation:  Plan of Care Reviewed With: patient           Outcome Summary: bedside swallow eval completed. pt w/baseline cough noted before, during and after eval-did not seem associated with any particular PO consistency. trials of thin, NTL, puree, mech soft and regular solids given. REC: continue current diet, meds with thin or puree as tolerated, up at 90', small bites/drinks.

## 2021-10-16 NOTE — PROGRESS NOTES
Name: Watson Lisa ADMIT: 10/7/2021   : 1955  PCP: Checo Dunham MD    MRN: [id number] LOS: 9 days   AGE/SEX: 66 y.o. male  ROOM: [facility]     Subjective   Subjective   CC: alcohol withdrawal  No acute events. Patient has no new complaints. Went back on precedex now off of this.Taking PO. No CP/dyspnea/f/c/n/v/d.    Objective   Objective   Vital Signs  Temp:  [97.5 °F (36.4 °C)-98.5 °F (36.9 °C)] 97.7 °F (36.5 °C)  Heart Rate:  [] 57  Resp:  [16-22] 22  BP: (117-151)/(58-98) 145/78  SpO2:  [94 %-100 %] 97 %  on  Flow (L/min):  [2] 2;   Device (Oxygen Therapy): room air  Body mass index is 31.85 kg/m².  Physical Exam  Vitals and nursing note reviewed.   Constitutional:       General: He is not in acute distress.     Appearance: He is not toxic-appearing or diaphoretic.   HENT:      Head: Normocephalic and atraumatic.      Nose: Nose normal.      Mouth/Throat:      Mouth: Mucous membranes are moist.      Pharynx: Oropharynx is clear.   Eyes:      Extraocular Movements: Extraocular movements intact.      Conjunctiva/sclera: Conjunctivae normal.      Pupils: Pupils are equal, round, and reactive to light.   Cardiovascular:      Rate and Rhythm: Normal rate and regular rhythm.      Pulses: Normal pulses.   Pulmonary:      Effort: Pulmonary effort is normal.      Breath sounds: Examination of the right-lower field reveals decreased breath sounds. Examination of the left-lower field reveals decreased breath sounds. Decreased breath sounds present.   Abdominal:      General: Bowel sounds are normal.      Palpations: Abdomen is soft.   Musculoskeletal:         General: Swelling (1+ BLE) present. No tenderness.      Cervical back: Normal range of motion and neck supple.   Skin:     General: Skin is warm and dry.      Capillary Refill: Capillary refill takes less than 2 seconds.   Neurological:      General: No focal deficit present.      Mental Status: He is alert.      Motor: No tremor.   Psychiatric:          Mood and Affect: Mood normal.         Behavior: Behavior normal.     Results Review     I reviewed the patient's new clinical results.  I reviewed the patient's telemetry  Results from last 7 days   Lab Units 10/16/21  0443 10/15/21  0408 10/12/21  0350 10/11/21  0459   WBC 10*3/mm3 8.40 8.65 9.03 9.64   HEMOGLOBIN g/dL 10.7* 11.0* 9.9* 10.5*   PLATELETS 10*3/mm3 422 327 136* 124*     Results from last 7 days   Lab Units 10/16/21  0443 10/15/21  0408 10/14/21  2215 10/14/21  0530 10/12/21  0350 10/12/21  0350   SODIUM mmol/L 138 137  --  140  --  139   POTASSIUM mmol/L 3.4* 3.8 3.7 3.1*   < > 3.5   CHLORIDE mmol/L 102 100  --  99  --  106   CO2 mmol/L 22.6 23.8  --  27.6  --  20.6*   BUN mg/dL 18 20  --  19  --  20   CREATININE mg/dL 0.89 0.92  --  0.99  --  0.91   GLUCOSE mg/dL 113* 99  --  97  --  122*    < > = values in this interval not displayed.   Estimated Creatinine Clearance: 119.6 mL/min (by C-G formula based on SCr of 0.89 mg/dL).  Results from last 7 days   Lab Units 10/15/21  0408 10/14/21  0530 10/12/21  0350 10/10/21  0425   ALBUMIN g/dL 3.30* 3.60 2.90* 3.10*   BILIRUBIN mg/dL  --   --  0.4 0.5   ALK PHOS U/L  --   --  70 80   AST (SGOT) U/L  --   --  11 26   ALT (SGPT) U/L  --   --  16 23     Results from last 7 days   Lab Units 10/16/21  0443 10/15/21  0408 10/14/21  0530 10/12/21  0350 10/11/21  0459 10/10/21  0425   CALCIUM mg/dL 9.0 9.0 8.4* 8.7   < > 7.9*   ALBUMIN g/dL  --  3.30* 3.60 2.90*  --  3.10*   MAGNESIUM mg/dL  --  1.7 1.3*  --   --   --    PHOSPHORUS mg/dL  --  3.1 4.0 3.5  --   --     < > = values in this interval not displayed.     Results from last 7 days   Lab Units 10/10/21  0425   PROCALCITONIN ng/mL 0.74*     COVID19   Date Value Ref Range Status   10/07/2021 Not Detected Not Detected - Ref. Range Final   03/25/2021 Not Detected Not Detected - Ref. Range Final     No results found for: HGBA1C, POCGLU    XR Abdomen KUB  KUB     HISTORY: Nasogastric tube repositioning      COMPARISON: 10/09/2021 at 1959 hours.     FINDINGS: A single view of the upper abdomen demonstrates a nasogastric  tube in place. The nasogastric tube has been advanced. The tip is within  the proximal body of the stomach.     Air is identified within loops of small bowel which are mildly  prominent, similar to the prior examination. No obvious pneumatosis or  pneumoperitoneum is identified. Evaluation could be performed with a CT  examination of the abdomen and pelvis as indicated.     The above information was called to the patient's nurse at the time of  the dictation. The patient's nurse is to relay the information to the  clinical service.     This report was finalized on 10/11/2021 7:20 AM by Dr. Vincent Beck M.D.     XR Chest 1 View  Narrative: Patient: BRITTANY ZULETA  Time Out: 07:22  Exam(s): FILM CXR 1 VIEW     EXAM:    XR Chest, 1 View    CLINICAL HISTORY:     Reason for exam: Right-sided aspiration pneumonia.    TECHNIQUE:    Frontal view of the chest.    COMPARISON:    10 10 2021    FINDINGS:    Lungs: Hazy bibasilar opacities, unchanged from prior study.    Pleural space:  Unremarkable.  No pneumothorax.    Heart: Unchanged cardiomegaly.    Mediastinum: Endotracheal tube terminates 5.6 cm above the level of the   [place].  Nasogastric tube terminates below field-of-view in the left   upper quadrant..    Bones joints:  Unremarkable.    IMPRESSION:       Unchanged hazy bibasilar opacities which may represent consolidations,   and or layering bilateral pleural effusions.  Impression: Electronically signed by Christiano Escobar M.D. on 10-11-21 at 0722    Scheduled Medications  amLODIPine, 10 mg, Oral, Daily  clonazePAM, 2 mg, Oral, Q8H  cloNIDine, 0.3 mg, Oral, Q8H  enoxaparin, 40 mg, Subcutaneous, Q24H  lansoprazole, 30 mg, Nasogastric, QAM  metoprolol tartrate, 50 mg, Oral, Q8H  multivitamin with minerals, 1 tablet, Oral, Daily  nicotine, 1 patch, Transdermal, Q24H  QUEtiapine, 25 mg, Oral,  Nightly  senna-docusate sodium, 2 tablet, Oral, BID  sodium chloride, 10 mL, Intravenous, Q12H  thiamine, 100 mg, Nasogastric, TID  vitamin D, 50,000 Units, Oral, Q7 Days    Infusions   Diet  Diet Regular; Cardiac       Assessment/Plan     Active Hospital Problems    Diagnosis  POA   • **Delirium tremens (HCC) [F10.231]  Yes   • Acute respiratory failure with hypercapnia (HCC) [J96.02]  Yes   • Aspiration pneumonia (HCC) [J69.0]  No   • Alcohol abuse [F10.10]  Yes   • Alcoholic liver disease (HCC) [K70.9]  Yes   • Tobacco abuse [Z72.0]  Yes   • Hiatal hernia [K44.9]  Yes   • COPD (chronic obstructive pulmonary disease) (HCC) [J44.9]  Yes   • Anemia, chronic disease [D63.8]  Yes   • Acute kidney failure (HCC) [N17.9]  Yes   • Hypomagnesemia [E83.42]  Yes   • Essential hypertension [I10]  Yes      Resolved Hospital Problems   No resolved problems to display.   Alcohol Dependence in Withdrawal with Delirium Tremens  - required intubation to protect airway, extubated 10/12/21  - continue prn ativan and vitamin replacement  - clonazepam stated-there is shortage of librium  - clonidine started  - precedex drip off again-starting to get out of the window for alcohol withdrawal probably more ICU delirium at this point  - appreciate critical care management from LPC    Aspiration Pneumonia  - finished levofloxacin and flagyl  - encourage pulmonary toilet  - weaned to room air    COPD/LIVAN  - no exacerbation  - O2 PRN    DEEP  - probably from hypotension with progression to ATN  - much improved with IVF, then became volume overloaded and has been diuresed per nephrology with much improvement , appreciate recs    Anemia/TCP  - improved, will monitor    Hypomagnesemia/Hypokalemia  - replace as needed    Lovenox 40 mg SC daily for DVT prophylaxis.  Full code.  Discussed with patient and nursing staff.  Anticipate discharge TBD timing yet to be determined.      Meño Liriano MD  [facility]  10/16/21  16:38 EDT

## 2021-10-17 PROCEDURE — 97530 THERAPEUTIC ACTIVITIES: CPT

## 2021-10-17 PROCEDURE — 25010000002 ENOXAPARIN PER 10 MG: Performed by: INTERNAL MEDICINE

## 2021-10-17 RX ORDER — HALOPERIDOL 5 MG/ML
2 INJECTION INTRAMUSCULAR EVERY 6 HOURS PRN
Status: DISCONTINUED | OUTPATIENT
Start: 2021-10-17 | End: 2021-10-19 | Stop reason: HOSPADM

## 2021-10-17 RX ORDER — CLONAZEPAM 1 MG/1
2 TABLET ORAL EVERY 12 HOURS SCHEDULED
Status: DISCONTINUED | OUTPATIENT
Start: 2021-10-17 | End: 2021-10-18

## 2021-10-17 RX ADMIN — QUETIAPINE FUMARATE 25 MG: 25 TABLET ORAL at 20:31

## 2021-10-17 RX ADMIN — LANSOPRAZOLE 30 MG: KIT at 06:21

## 2021-10-17 RX ADMIN — METOPROLOL TARTRATE 50 MG: 50 TABLET, FILM COATED ORAL at 17:58

## 2021-10-17 RX ADMIN — SODIUM CHLORIDE, PRESERVATIVE FREE 10 ML: 5 INJECTION INTRAVENOUS at 20:31

## 2021-10-17 RX ADMIN — Medication 100 MG: at 17:54

## 2021-10-17 RX ADMIN — METOPROLOL TARTRATE 50 MG: 50 TABLET, FILM COATED ORAL at 03:19

## 2021-10-17 RX ADMIN — Medication 100 MG: at 20:31

## 2021-10-17 RX ADMIN — CLONAZEPAM 2 MG: 1 TABLET ORAL at 20:31

## 2021-10-17 RX ADMIN — METOPROLOL TARTRATE 50 MG: 50 TABLET, FILM COATED ORAL at 10:39

## 2021-10-17 RX ADMIN — CLONIDINE HYDROCHLORIDE 0.3 MG: 0.1 TABLET ORAL at 06:20

## 2021-10-17 RX ADMIN — CLONAZEPAM 2 MG: 1 TABLET ORAL at 06:20

## 2021-10-17 RX ADMIN — MULTIPLE VITAMINS W/ MINERALS TAB 1 TABLET: TAB at 10:32

## 2021-10-17 RX ADMIN — POTASSIUM CHLORIDE 40 MEQ: 750 TABLET, EXTENDED RELEASE ORAL at 20:31

## 2021-10-17 RX ADMIN — AMLODIPINE BESYLATE 10 MG: 10 TABLET ORAL at 10:40

## 2021-10-17 RX ADMIN — SODIUM CHLORIDE, PRESERVATIVE FREE 10 ML: 5 INJECTION INTRAVENOUS at 10:41

## 2021-10-17 RX ADMIN — ENOXAPARIN SODIUM 40 MG: 40 INJECTION SUBCUTANEOUS at 20:31

## 2021-10-17 RX ADMIN — DIAZEPAM 10 MG: 5 TABLET ORAL at 13:57

## 2021-10-17 RX ADMIN — NICOTINE 1 PATCH: 21 PATCH, EXTENDED RELEASE TRANSDERMAL at 17:54

## 2021-10-17 RX ADMIN — DOCUSATE SODIUM 50MG AND SENNOSIDES 8.6MG 2 TABLET: 8.6; 5 TABLET, FILM COATED ORAL at 20:31

## 2021-10-17 RX ADMIN — Medication 100 MG: at 10:32

## 2021-10-17 NOTE — PROGRESS NOTES
Name: Watson Lisa ADMIT: 10/7/2021   : 1955  PCP: Checo Dunahm MD    MRN: [id number] LOS: 10 days   AGE/SEX: 66 y.o. male  ROOM: [facility]     Subjective   Subjective   CC: alcohol withdrawal  No acute events. Patient has no new complaints. Has remained off of precedex and is out of ICU. Taking PO. No CP/dyspnea/f/c/n/v/d.    Objective   Objective   Vital Signs  Temp:  [96.9 °F (36.1 °C)-98 °F (36.7 °C)] 98 °F (36.7 °C)  Heart Rate:  [57-89] 62  Resp:  [18-24] 18  BP: (126-154)/(63-99) 133/73  SpO2:  [95 %-100 %] 97 %  on   ;   Device (Oxygen Therapy): room air  Body mass index is 28.69 kg/m².  Physical Exam  Vitals and nursing note reviewed.   Constitutional:       General: He is not in acute distress.     Appearance: He is not toxic-appearing or diaphoretic.   HENT:      Head: Normocephalic and atraumatic.      Nose: Nose normal.      Mouth/Throat:      Mouth: Mucous membranes are moist.      Pharynx: Oropharynx is clear.   Eyes:      Extraocular Movements: Extraocular movements intact.      Conjunctiva/sclera: Conjunctivae normal.      Pupils: Pupils are equal, round, and reactive to light.   Cardiovascular:      Rate and Rhythm: Normal rate and regular rhythm.      Pulses: Normal pulses.   Pulmonary:      Effort: Pulmonary effort is normal.      Breath sounds: Examination of the right-lower field reveals decreased breath sounds. Examination of the left-lower field reveals decreased breath sounds. Decreased breath sounds present.   Abdominal:      General: Bowel sounds are normal.      Palpations: Abdomen is soft.   Musculoskeletal:         General: Swelling (trace BLE) present. No tenderness.      Cervical back: Normal range of motion and neck supple.   Skin:     General: Skin is warm and dry.      Capillary Refill: Capillary refill takes less than 2 seconds.   Neurological:      General: No focal deficit present.      Mental Status: He is alert.      Motor: No tremor.   Psychiatric:          Mood and Affect: Mood normal.         Behavior: Behavior normal.     Results Review     I reviewed the patient's new clinical results.  I reviewed the patient's telemetry  Results from last 7 days   Lab Units 10/16/21  0443 10/15/21  0408 10/12/21  0350 10/11/21  0459   WBC 10*3/mm3 8.40 8.65 9.03 9.64   HEMOGLOBIN g/dL 10.7* 11.0* 9.9* 10.5*   PLATELETS 10*3/mm3 422 327 136* 124*     Results from last 7 days   Lab Units 10/16/21  0443 10/15/21  0408 10/14/21  2215 10/14/21  0530 10/12/21  0350 10/12/21  0350   SODIUM mmol/L 138 137  --  140  --  139   POTASSIUM mmol/L 3.4* 3.8 3.7 3.1*   < > 3.5   CHLORIDE mmol/L 102 100  --  99  --  106   CO2 mmol/L 22.6 23.8  --  27.6  --  20.6*   BUN mg/dL 18 20  --  19  --  20   CREATININE mg/dL 0.89 0.92  --  0.99  --  0.91   GLUCOSE mg/dL 113* 99  --  97  --  122*    < > = values in this interval not displayed.   Estimated Creatinine Clearance: 117.3 mL/min (by C-G formula based on SCr of 0.89 mg/dL).  Results from last 7 days   Lab Units 10/15/21  0408 10/14/21  0530 10/12/21  0350   ALBUMIN g/dL 3.30* 3.60 2.90*   BILIRUBIN mg/dL  --   --  0.4   ALK PHOS U/L  --   --  70   AST (SGOT) U/L  --   --  11   ALT (SGPT) U/L  --   --  16     Results from last 7 days   Lab Units 10/16/21  0443 10/15/21  0408 10/14/21  0530 10/12/21  0350   CALCIUM mg/dL 9.0 9.0 8.4* 8.7   ALBUMIN g/dL  --  3.30* 3.60 2.90*   MAGNESIUM mg/dL  --  1.7 1.3*  --    PHOSPHORUS mg/dL  --  3.1 4.0 3.5         COVID19   Date Value Ref Range Status   10/07/2021 Not Detected Not Detected - Ref. Range Final   03/25/2021 Not Detected Not Detected - Ref. Range Final     No results found for: HGBA1C, POCGLU    XR Abdomen KUB  KUB     HISTORY: Nasogastric tube repositioning     COMPARISON: 10/09/2021 at 1959 hours.     FINDINGS: A single view of the upper abdomen demonstrates a nasogastric  tube in place. The nasogastric tube has been advanced. The tip is within  the proximal body of the stomach.     Air is  identified within loops of small bowel which are mildly  prominent, similar to the prior examination. No obvious pneumatosis or  pneumoperitoneum is identified. Evaluation could be performed with a CT  examination of the abdomen and pelvis as indicated.     The above information was called to the patient's nurse at the time of  the dictation. The patient's nurse is to relay the information to the  clinical service.     This report was finalized on 10/11/2021 7:20 AM by Dr. Vincent Beck M.D.     XR Chest 1 View  Narrative: Patient: BRITTANY ZULETA  Time Out: 07:22  Exam(s): FILM CXR 1 VIEW     EXAM:    XR Chest, 1 View    CLINICAL HISTORY:     Reason for exam: Right-sided aspiration pneumonia.    TECHNIQUE:    Frontal view of the chest.    COMPARISON:    10 10 2021    FINDINGS:    Lungs: Hazy bibasilar opacities, unchanged from prior study.    Pleural space:  Unremarkable.  No pneumothorax.    Heart: Unchanged cardiomegaly.    Mediastinum: Endotracheal tube terminates 5.6 cm above the level of the   [place].  Nasogastric tube terminates below field-of-view in the left   upper quadrant..    Bones joints:  Unremarkable.    IMPRESSION:       Unchanged hazy bibasilar opacities which may represent consolidations,   and or layering bilateral pleural effusions.  Impression: Electronically signed by Christiano Escobar M.D. on 10-11-21 at 0722    Scheduled Medications  amLODIPine, 10 mg, Oral, Daily  clonazePAM, 2 mg, Oral, Q8H  cloNIDine, 0.3 mg, Oral, Q8H  enoxaparin, 40 mg, Subcutaneous, Q24H  lansoprazole, 30 mg, Nasogastric, QAM  metoprolol tartrate, 50 mg, Oral, Q8H  multivitamin with minerals, 1 tablet, Oral, Daily  nicotine, 1 patch, Transdermal, Q24H  QUEtiapine, 25 mg, Oral, Nightly  senna-docusate sodium, 2 tablet, Oral, BID  sodium chloride, 10 mL, Intravenous, Q12H  thiamine, 100 mg, Nasogastric, TID  vitamin D, 50,000 Units, Oral, Q7 Days    Infusions   Diet  Diet Regular; Cardiac       Assessment/Plan     Active  Hospital Problems    Diagnosis  POA   • **Delirium tremens (HCC) [F10.231]  Yes   • Acute respiratory failure with hypercapnia (HCC) [J96.02]  Yes   • Aspiration pneumonia (HCC) [J69.0]  No   • Alcohol abuse [F10.10]  Yes   • Alcoholic liver disease (HCC) [K70.9]  Yes   • Tobacco abuse [Z72.0]  Yes   • Hiatal hernia [K44.9]  Yes   • COPD (chronic obstructive pulmonary disease) (HCC) [J44.9]  Yes   • Anemia, chronic disease [D63.8]  Yes   • Acute kidney failure (HCC) [N17.9]  Yes   • Hypomagnesemia [E83.42]  Yes   • Essential hypertension [I10]  Yes      Resolved Hospital Problems   No resolved problems to display.   Alcohol Dependence in Withdrawal with Delirium Tremens  - required intubation to protect airway, extubated 10/12/21  - appears to be out of window for EtOH withdrawal, will start tapering benzos and clonidine  - current issues with agitation are mor consistent with hospital delirium-continue seroquel and add PRN haldol  - appreciate critical care management from LPC    Aspiration Pneumonia  - finished levofloxacin and flagyl  - encourage pulmonary toilet  - weaned to room air    COPD/LIVAN  - no exacerbation  - O2 PRN    DEEP  - probably from hypotension with progression to ATN  - much improved with IVF, then became volume overloaded and has been diuresed per nephrology with much improvement , appreciate recs    Hypomagnesemia/Hypokalemia  - replace as needed    Lovenox 40 mg SC daily for DVT prophylaxis.  Full code.  Discussed with patient and nursing staff.  Anticipate discharge home with HH vs SNU facility in 1-2 days.      Meño Liriano MD  [facility]  10/17/21  12:58 EDT

## 2021-10-17 NOTE — THERAPY TREATMENT NOTE
Patient Name: Watson Lisa  : 1955    MRN: [id number]                              Today's Date: 10/17/2021       Admit Date: 10/7/2021    Visit Dx:     ICD-10-CM ICD-9-CM   1. Alcohol withdrawal syndrome with complication (HCC)  F10.239 291.81   2. Acute renal insufficiency  N28.9 593.9   3. Hypomagnesemia  E83.42 275.2   4. New onset a-fib (HCC)  I48.91 427.31   5. Atrial fibrillation with rapid ventricular response (HCC)  I48.91 427.31     Patient Active Problem List   Diagnosis   • Arthritis   • Essential hypertension   • Tobacco abuse   • Alcohol withdrawal syndrome without complication (HCC)   • Chest pain in adult   • Sleep apnea   • Hiatal hernia   • Alcohol abuse   • Effusion of left knee   • Osteoarthritis of left knee   • Vitamin D deficiency   • Anemia, chronic disease   • Hyponatremia   • Alcohol dependence with withdrawal (HCC)   • Acute kidney failure (HCC)   • COPD (chronic obstructive pulmonary disease) (HCC)   • LIVAN (obstructive sleep apnea)   • ETOH abuse   • Tobacco abuse   • Obese   • Anemia, chronic disease   • Hypomagnesemia   • Delirium tremens (HCC)   • Alcohol abuse   • Alcoholic liver disease (HCC)   • Tobacco abuse   • Hiatal hernia   • COPD (chronic obstructive pulmonary disease) (HCC)   • Anemia, chronic disease   • Acute respiratory failure with hypercapnia (HCC)   • Aspiration pneumonia (HCC)     Past Medical History:   Diagnosis Date   • Alcohol abuse    • Anxiety    • Arthritis    • Bronchitis with chronic airway obstruction (HCC)     LAST FEB   • DVT (deep venous thrombosis) (HCC)    • Hiatal hernia    • Hypertension    • Hypertension    • Low back pain    • Neck pain    • Pulmonary embolism (HCC)    • Sleep apnea with use of continuous positive airway pressure (CPAP)     AT NIGHT     Past Surgical History:   Procedure Laterality Date   • COLONOSCOPY     • JOINT REPLACEMENT Right     hip/knee   • REPLACEMENT TOTAL KNEE     • TONSILLECTOMY     • TOTAL HIP ARTHROPLASTY  Right       General Information     Row Name 10/17/21 1607          Physical Therapy Time and Intention    Document Type therapy note (daily note)  -CB     Mode of Treatment physical therapy; individual therapy  -CB     Row Name 10/17/21 1607          General Information    Patient Profile Reviewed yes  -CB     Existing Precautions/Restrictions fall  -CB     Row Name 10/17/21 1607          Cognition    Orientation Status (Cognition) oriented to; person  -CB     Row Name 10/17/21 1607          Safety Issues, Functional Mobility    Impairments Affecting Function (Mobility) balance; endurance/activity tolerance  -CB     Comment, Safety Issues/Impairments (Mobility) gait belt and non skid socks  -CB           User Key  (r) = Recorded By, (t) = Taken By, (c) = Cosigned By    Initials Name Provider Type    Sanjana Mckeon Physical Therapist               Mobility     Row Name 10/17/21 1607          Bed Mobility    Bed Mobility supine-sit  -CB     Supine-Sit [place] (Bed Mobility) minimum assist (75% patient effort)  -CB     Row Name 10/17/21 1607          Sit-Stand Transfer    Sit-Stand [place] (Transfers) minimum assist (75% patient effort); moderate assist (50% patient effort); 2 person assist; verbal cues  -CB     Row Name 10/17/21 1607          Gait/Stairs (Locomotion)    [place] Level (Gait) minimum assist (75% patient effort); 2 person assist; verbal cues  -CB     Distance in Feet (Gait) 6ft  -CB     Deviations/Abnormal Patterns (Gait) gait speed decreased  -CB     Comment (Gait/Stairs) decreased safety awareness and assist with moving rwx.  -CB           User Key  (r) = Recorded By, (t) = Taken By, (c) = Cosigned By    Initials Name Provider Type    Sanjana Mckeon Physical Therapist               Obj/Interventions     Row Name 10/17/21 1608          Balance    Balance Assessment sitting static balance; standing static balance; standing dynamic balance; sitting dynamic balance  -CB     Static  Sitting Balance WFL; unsupported; sitting, edge of bed  -CB     Dynamic Sitting Balance unsupported; sitting, edge of bed  -CB     Static Standing Balance mild impairment; standing  -CB     Dynamic Standing Balance mild impairment; moderate impairment; supported  -CB           User Key  (r) = Recorded By, (t) = Taken By, (c) = Cosigned By    Initials Name Provider Type    Sanjana Mckeon Physical Therapist               Goals/Plan    No documentation.                Clinical Impression     Row Name 10/17/21 1605          Pain Scale: Numbers Pre/Post-Treatment    Pretreatment Pain Rating 0/10 - no pain  -CB     Posttreatment Pain Rating 0/10 - no pain  -CB     Row Name 10/17/21 1603          Plan of Care Review    Progress improving  -CB     Outcome Summary Patient is a agreeable to PT this PM. Pt with improved participation and overall mobility. The patient completed supine to sitting EOB requiring Betsy and STS to rwx requiring min-modAx2. Pt was able to ambulate 6ft using rwx requiring minAx2. He demonstrated decreased safety awarness using rwx and required assist for moving rwx. Pt will continue to benefit from skilled PT to increase level of independence. Continue to anticipate SNF at discharge.  -CB     Row Name 10/17/21 1608          Positioning and Restraints    Post Treatment Position bathroom  -CB     Bathroom call light within reach; notified nsg; sitting; encouraged to call for assist; with nsg  -CB           User Key  (r) = Recorded By, (t) = Taken By, (c) = Cosigned By    Initials Name Provider Type    Sanjana Mckeon Physical Therapist               Outcome Measures     Row Name 10/17/21 8129          How much help from another person do you currently need...    Turning from your back to your side while in flat bed without using bedrails? 3  -CB     Moving from lying on back to sitting on the side of a flat bed without bedrails? 3  -CB     Moving to and from a bed to a chair (including a  wheelchair)? 3  -CB     Standing up from a chair using your arms (e.g., wheelchair, bedside chair)? 2  -CB     Climbing 3-5 steps with a railing? 2  -CB     To walk in hospital room? 3  -CB     AM-PAC 6 Clicks Score (PT) 16  -CB     Row Name 10/17/21 1614          Functional Assessment    Outcome Measure Options AM-PAC 6 Clicks Basic Mobility (PT)  -CB           User Key  (r) = Recorded By, (t) = Taken By, (c) = Cosigned By    Initials Name Provider Type    CB Sanjana Lozano Physical Therapist                             Physical Therapy Education                 Title: PT OT SLP Therapies (In Progress)     Topic: Physical Therapy (In Progress)     Point: Mobility training (Done)     Learning Progress Summary           Patient Acceptance, E,TB, VU by  at 10/17/2021 1615                   Point: Home exercise program (In Progress)     Learning Progress Summary           Patient Acceptance, E,D, NR by MS at 10/15/2021 1510                   Point: Body mechanics (Not Started)     Learner Progress:  Not documented in this visit.          Point: Precautions (Done)     Learning Progress Summary           Patient Acceptance, E,TB, VU by  at 10/17/2021 1615                               User Key     Initials Effective Dates Name Provider Type Discipline    MS 06/16/21 -  Meño Gil, PT Physical Therapist PT     12/30/20 -  Sanjana Lozano Physical Therapist PT              PT Recommendation and Plan     Progress: improving  Outcome Summary: Patient is a agreeable to PT this PM. Pt with improved participation and overall mobility. The patient completed supine to sitting EOB requiring Betsy and STS to rwx requiring min-modAx2. Pt was able to ambulate 6ft using rwx requiring minAx2. He demonstrated decreased safety awarness using rwx and required assist for moving rwx. Pt will continue to benefit from skilled PT to increase level of independence. Continue to anticipate SNF at discharge.     Time Calculation:    PT  Charges     Row Name 10/17/21 1615             Time Calculation    Start Time 1554  -CB      Stop Time 1603  -CB      Time Calculation (min) 9 min  -CB              Time Calculation- PT    Total Timed Code Minutes- PT 9 minute(s)  -CB              Timed Charges    18047 - PT Therapeutic Activity Minutes 9  -CB              Total Minutes    Timed Charges Total Minutes 9  -CB       Total Minutes 9  -CB            User Key  (r) = Recorded By, (t) = Taken By, (c) = Cosigned By    Initials Name Provider Type    CB Sanjana Lozano Physical Therapist              Therapy Charges for Today     Code Description Service Date Service Provider Modifiers Qty    12663411464 HC PT THERAPEUTIC ACT EA 15 MIN 10/17/2021 Sanjana Lozano GP 1    14230757596 HC PT THER SUPP EA 15 MIN 10/17/2021 Sanjana Lozano GP 1          PT G-Codes  Outcome Measure Options: AM-PAC 6 Clicks Basic Mobility (PT)  AM-PAC 6 Clicks Score (PT): 16    Sanjana Lozano  10/17/2021

## 2021-10-17 NOTE — PLAN OF CARE
Goal Outcome Evaluation:           Progress: improving  Outcome Summary: Patient is a agreeable to PT this PM. Pt with improved participation and overall mobility. The patient completed supine to sitting EOB requiring Betsy and STS to rwx requiring min-modAx2. Pt was able to ambulate 6ft using rwx requiring minAx2. He demonstrated decreased safety awarness using rwx and required assist for moving rwx. Pt will continue to benefit from skilled PT to increase level of independence. Continue to anticipate SNF at discharge.    Patient was not wearing a face mask during this therapy encounter. Therapist used appropriate personal protective equipment including eye protection, mask, and gloves.  Mask used was standard procedure mask. Appropriate PPE was worn during the entire therapy session. Hand hygiene was completed before and after therapy session. Patient is not in enhanced droplet precautions.  Thomas campbell.

## 2021-10-17 NOTE — NURSING NOTE
Received patient from ICU without restraints in place. Wrist restraints were discontinued in the charting on 10/16 but there is no documentation that posey vest was discontinued and restraints are charted on in the 0800 assessment this morning. In report, I was told that patient has tolerated being out of restraints. So far, he has tolerated it, but has attempted to get up to the bathroom once. Will continue to monitor tolerance of being out of restraints.

## 2021-10-17 NOTE — NURSING NOTE
Transfer from CCU. Kassi porter with acute resp failure. Previously intubated on precedex, had been restrained when conscious. Out of wrists since 10/16 and out of [place] since this AM. Tolerating well. AOx4, VSS. Assist x 1 with walker. Hopefully home with HH soon.

## 2021-10-17 NOTE — PROGRESS NOTES
[facility] INPATIENT PROGRESS NOTE         [address]    10/17/2021      PATIENT IDENTIFICATION:  Name: Watson Lisa ADMIT: 10/7/2021   : 1955  PCP: Checo Dunham MD    MRN: [id number] LOS: 10 days   AGE/SEX: 66 y.o. male  ROOM: [facility]                     LOS 10    Reason for visit: Alcohol withdrawal with DTs      SUBJECTIVE:      Off Precedex drip.  Will transfer out of intensive care.      Objective   OBJECTIVE:    Vital Sign Min/Max for last 24 hours  Temp  Min: 96.9 °F (36.1 °C)  Max: 98 °F (36.7 °C)   BP  Min: 126/63  Max: 154/77   Pulse  Min: 57  Max: 89   Resp  Min: 18  Max: 24   SpO2  Min: 95 %  Max: 100 %   No data recorded   Weight  Min: 115 kg (254 lb 10.1 oz)  Max: 115 kg (254 lb 10.1 oz)                   FiO2 (%): 99 %     Body mass index is 28.69 kg/m².    Intake/Output Summary (Last 24 hours) at 10/17/2021 1203  Last data filed at 10/16/2021 1907  Gross per 24 hour   Intake 591.3 ml   Output 425 ml   Net 166.3 ml         Exam:  GEN:  No distress, appears stated age  EYES:   PERRL, anicteric sclerae  ENT:    External ears/nose normal, OP clear  NECK:  No adenopathy, midline trachea  LUNGS: Normal chest on inspection, palpation and auscultation  CV:  Normal S1S2, without murmur  ABD:  Nontender, nondistended, no hepatosplenomegaly, +BS  EXT:  No edema.  No cyanosis or clubbing.  No mottling and normal cap refill.    Assessment     Scheduled meds:  amLODIPine, 10 mg, Oral, Daily  clonazePAM, 2 mg, Oral, Q8H  cloNIDine, 0.3 mg, Oral, Q8H  enoxaparin, 40 mg, Subcutaneous, Q24H  lansoprazole, 30 mg, Nasogastric, QAM  metoprolol tartrate, 50 mg, Oral, Q8H  multivitamin with minerals, 1 tablet, Oral, Daily  nicotine, 1 patch, Transdermal, Q24H  QUEtiapine, 25 mg, Oral, Nightly  senna-docusate sodium, 2 tablet, Oral, BID  sodium chloride, 10 mL, Intravenous, Q12H  thiamine, 100 mg, Nasogastric, TID  vitamin D, 50,000 Units, Oral, Q7 Days      IV meds:                          Data Review:  Results from last 7 days   Lab Units 10/16/21  0443 10/15/21  0408 10/15/21  0408 10/14/21  2215 10/14/21  0530 10/14/21  0530 10/12/21  0350 10/12/21  0350 10/11/21  0459 10/11/21  0459   SODIUM mmol/L 138  --  137  --   --  140  --  139  --  140   POTASSIUM mmol/L 3.4*  --  3.8 3.7  --  3.1*  --  3.5   < > 3.5   CHLORIDE mmol/L 102  --  100  --   --  99  --  106  --  109*   CO2 mmol/L 22.6  --  23.8  --   --  27.6  --  20.6*  --  20.0*   BUN mg/dL 18  --  20  --   --  19  --  20  --  18   CREATININE mg/dL 0.89  --  0.92  --   --  0.99  --  0.91  --  1.05   GLUCOSE mg/dL 113*   < > 99  --    < > 97   < > 122*   < > 130*   CALCIUM mg/dL 9.0  --  9.0  --   --  8.4*  --  8.7  --  8.1*    < > = values in this interval not displayed.         Estimated Creatinine Clearance: 117.3 mL/min (by C-G formula based on SCr of 0.89 mg/dL).  Results from last 7 days   Lab Units 10/16/21  0443 10/15/21  0408 10/12/21  0350 10/11/21  0459   WBC 10*3/mm3 8.40 8.65 9.03 9.64   HEMOGLOBIN g/dL 10.7* 11.0* 9.9* 10.5*   PLATELETS 10*3/mm3 422 327 136* 124*         Results from last 7 days   Lab Units 10/12/21  0350   ALT (SGPT) U/L 16   AST (SGOT) U/L 11                 No results found for: HGBA1C, POCGLU    Chest x-ray 10/11 reviewed      Microbiology reviewed              Active Hospital Problems    Diagnosis  POA   • **Delirium tremens (HCC) [F10.231]  Yes   • Acute respiratory failure with hypercapnia ([facility]) [J96.02]  Yes   • Aspiration pneumonia (HCC) [J69.0]  No   • Alcohol abuse [F10.10]  Yes   • Alcoholic liver disease (HCC) [K70.9]  Yes   • Tobacco abuse [Z72.0]  Yes   • Hiatal hernia [K44.9]  Yes   • COPD (chronic obstructive pulmonary disease) (HCC) [J44.9]  Yes   • Anemia, chronic disease [D63.8]  Yes   • Acute kidney failure (HCC) [N17.9]  Yes   • Hypomagnesemia [E83.42]  Yes   • Essential hypertension [I10]  Yes      Resolved Hospital Problems   No resolved problems to display.          ASSESSMENT:    1. Acute toxic encephalopathy  2. Right sided aspiration pneumonia, severe  3. Acute respiratory failure s/p mechanical ventilator 10/8, liberated on 10/12/2021  4. Acute alcohol withdrawal syndrome with delirium tremens  5. Alcohol abuse   6. DEEP on CKD  7. Hypomagnesemia  8. Tobacco abuse  9. LIVAN  10. Medical noncompliance          PLAN:    Transfer outside of ICU.  Control blood pressure.  Control glucose.  DVT prophylaxis.            Watson Zuleta MD  Pulmonary and Critical Care Medicine  [facility], [facility]  10/17/2021    12:03 EDT

## 2021-10-18 PROCEDURE — 25010000002 ENOXAPARIN PER 10 MG: Performed by: INTERNAL MEDICINE

## 2021-10-18 RX ADMIN — SODIUM CHLORIDE, PRESERVATIVE FREE 10 ML: 5 INJECTION INTRAVENOUS at 09:37

## 2021-10-18 RX ADMIN — Medication 100 MG: at 21:31

## 2021-10-18 RX ADMIN — METOPROLOL TARTRATE 50 MG: 50 TABLET, FILM COATED ORAL at 17:28

## 2021-10-18 RX ADMIN — NICOTINE 1 PATCH: 21 PATCH, EXTENDED RELEASE TRANSDERMAL at 17:28

## 2021-10-18 RX ADMIN — ENOXAPARIN SODIUM 40 MG: 40 INJECTION SUBCUTANEOUS at 21:29

## 2021-10-18 RX ADMIN — MULTIPLE VITAMINS W/ MINERALS TAB 1 TABLET: TAB at 09:32

## 2021-10-18 RX ADMIN — Medication 100 MG: at 17:27

## 2021-10-18 RX ADMIN — AMLODIPINE BESYLATE 10 MG: 10 TABLET ORAL at 09:32

## 2021-10-18 RX ADMIN — CLONAZEPAM 2 MG: 1 TABLET ORAL at 09:32

## 2021-10-18 RX ADMIN — LANSOPRAZOLE 30 MG: KIT at 06:10

## 2021-10-18 RX ADMIN — METOPROLOL TARTRATE 50 MG: 50 TABLET, FILM COATED ORAL at 02:59

## 2021-10-18 RX ADMIN — Medication 5 MG: at 21:29

## 2021-10-18 RX ADMIN — Medication 5 MG: at 00:54

## 2021-10-18 RX ADMIN — Medication 100 MG: at 09:37

## 2021-10-18 RX ADMIN — SODIUM CHLORIDE, PRESERVATIVE FREE 10 ML: 5 INJECTION INTRAVENOUS at 21:31

## 2021-10-18 RX ADMIN — POTASSIUM CHLORIDE 40 MEQ: 750 TABLET, EXTENDED RELEASE ORAL at 02:59

## 2021-10-18 RX ADMIN — QUETIAPINE FUMARATE 25 MG: 25 TABLET ORAL at 21:29

## 2021-10-18 RX ADMIN — METOPROLOL TARTRATE 50 MG: 50 TABLET, FILM COATED ORAL at 09:57

## 2021-10-18 RX ADMIN — DOCUSATE SODIUM 50MG AND SENNOSIDES 8.6MG 2 TABLET: 8.6; 5 TABLET, FILM COATED ORAL at 21:29

## 2021-10-18 RX ADMIN — DOCUSATE SODIUM 50MG AND SENNOSIDES 8.6MG 2 TABLET: 8.6; 5 TABLET, FILM COATED ORAL at 09:32

## 2021-10-18 NOTE — THERAPY TREATMENT NOTE
Patient Name: Watson Lisa  : 1955    MRN: [id number]                              Today's Date: 10/18/2021       Admit Date: 10/7/2021    Visit Dx:     ICD-10-CM ICD-9-CM   1. Alcohol withdrawal syndrome with complication (HCC)  F10.239 291.81   2. Acute renal insufficiency  N28.9 593.9   3. Hypomagnesemia  E83.42 275.2   4. New onset a-fib (HCC)  I48.91 427.31   5. Atrial fibrillation with rapid ventricular response (HCC)  I48.91 427.31     Patient Active Problem List   Diagnosis   • Arthritis   • Essential hypertension   • Tobacco abuse   • Alcohol withdrawal syndrome without complication (HCC)   • Chest pain in adult   • Sleep apnea   • Hiatal hernia   • Alcohol abuse   • Effusion of left knee   • Osteoarthritis of left knee   • Vitamin D deficiency   • Anemia, chronic disease   • Hyponatremia   • Alcohol dependence with withdrawal (HCC)   • Acute kidney failure (HCC)   • COPD (chronic obstructive pulmonary disease) (HCC)   • LIVAN (obstructive sleep apnea)   • ETOH abuse   • Tobacco abuse   • Obese   • Anemia, chronic disease   • Hypomagnesemia   • Delirium tremens (HCC)   • Alcohol abuse   • Alcoholic liver disease (HCC)   • Tobacco abuse   • Hiatal hernia   • COPD (chronic obstructive pulmonary disease) (HCC)   • Anemia, chronic disease   • Acute respiratory failure with hypercapnia (HCC)   • Aspiration pneumonia (HCC)     Past Medical History:   Diagnosis Date   • Alcohol abuse    • Anxiety    • Arthritis    • Bronchitis with chronic airway obstruction (HCC)     LAST FEB   • DVT (deep venous thrombosis) (HCC)    • Hiatal hernia    • Hypertension    • Hypertension    • Low back pain    • Neck pain    • Pulmonary embolism (HCC)    • Sleep apnea with use of continuous positive airway pressure (CPAP)     AT NIGHT     Past Surgical History:   Procedure Laterality Date   • COLONOSCOPY     • JOINT REPLACEMENT Right     hip/knee   • REPLACEMENT TOTAL KNEE     • TONSILLECTOMY     • TOTAL HIP ARTHROPLASTY  Right       General Information     Row Name 10/18/21 0920          Physical Therapy Time and Intention    Document Type therapy note (daily note)  -JAEL     Mode of Treatment physical therapy; individual therapy  -JAEL           User Key  (r) = Recorded By, (t) = Taken By, (c) = Cosigned By    Initials Name Provider Type    Ivet Abreu, STANLEY Physical Therapist               Mobility     Row Name 10/18/21 0920          Bed Mobility    Bed Mobility supine-sit; sit-supine  -KH     Supine-Sit [place] (Bed Mobility) supervision  -KH     Sit-Supine [place] (Bed Mobility) supervision  -     Assistive Device (Bed Mobility) bed rails  -     Row Name 10/18/21 0920          Sit-Stand Transfer    Sit-Stand [place] (Transfers) minimum assist (75% patient effort); moderate assist (50% patient effort); 2 person assist; verbal cues  -     Assistive Device (Sit-Stand Transfers) walker, front-wheeled  -JAEL     Row Name 10/18/21 0920          Gait/Stairs (Locomotion)    [place] Level (Gait) minimum assist (75% patient effort)  -JAEL     Assistive Device (Gait) walker, front-wheeled  -JAEL     Distance in Feet (Gait) 150  -KH     Deviations/Abnormal Patterns (Gait) gait speed decreased  -JAEL     Bilateral Gait Deviations forward flexed posture  -JAEL     Comment (Gait/Stairs) unsteady, very tremulous  -JAEL           User Key  (r) = Recorded By, (t) = Taken By, (c) = Cosigned By    Initials Name Provider Type    Ivet Abreu, STANLEY Physical Therapist               Obj/Interventions     Row Name 10/18/21 0923          Balance    Static Sitting Balance WFL  -JAEL     Dynamic Sitting Balance WFL  -KH     Static Standing Balance mild impairment  -JAEL           User Key  (r) = Recorded By, (t) = Taken By, (c) = Cosigned By    Initials Name Provider Type    Ivet Abreu, STANLEY Physical Therapist               Goals/Plan    No documentation.                Clinical Impression     Row Name 10/18/21  0924          Pain    Additional Documentation Pain Scale: Numbers Pre/Post-Treatment (Group)  -JAEL     Row Name 10/18/21 0924          Pain Scale: Numbers Pre/Post-Treatment    Pretreatment Pain Rating 0/10 - no pain  -     Posttreatment Pain Rating 0/10 - no pain  -JAEL     Row Name 10/18/21 0924          Plan of Care Review    Plan of Care Reviewed With patient  -     Outcome Summary Pt was agreeable to therapy with encouragement. He ambulated 150 ft with min assist using Rwx. Pt is impulsive and very tremulous while walking. He frequently veers to the R and requires frequent verbal cueing to avoid obstacles.  -     Row Name 10/18/21 0924          Therapy Assessment/Plan (PT)    Patient/Family Therapy Goals Statement (PT) wants to go home  -     Row Name 10/18/21 0924          Positioning and Restraints    Pre-Treatment Position in bed  -     Post Treatment Position bed  -KH     In Bed supine; call light within reach; encouraged to call for assist; exit alarm on  -           User Key  (r) = Recorded By, (t) = Taken By, (c) = Cosigned By    Initials Name Provider Type    Ivet Abreu, PT Physical Therapist               Outcome Measures     Row Name 10/18/21 0927          How much help from another person do you currently need...    Turning from your back to your side while in flat bed without using bedrails? 4  -KH     Moving from lying on back to sitting on the side of a flat bed without bedrails? 4  -KH     Moving to and from a bed to a chair (including a wheelchair)? 3  -KH     Standing up from a chair using your arms (e.g., wheelchair, bedside chair)? 3  -KH     Climbing 3-5 steps with a railing? 2  -KH     To walk in hospital room? 3  -KH     AM-PAC 6 Clicks Score (PT) 19  -     Row Name 10/18/21 0927          Functional Assessment    Outcome Measure Options AM-PAC 6 Clicks Basic Mobility (PT)  -           User Key  (r) = Recorded By, (t) = Taken By, (c) = Cosigned By    Initials  Name Provider Type    JAEL Ivet Cohen, PT Physical Therapist                             Physical Therapy Education                 Title: PT OT SLP Therapies (Done)     Topic: Physical Therapy (Done)     Point: Mobility training (Done)     Learning Progress Summary           Patient Acceptance, E, VU,NR by  at 10/18/2021 0927    Acceptance, E,TB, VU by CB at 10/17/2021 1615                   Point: Home exercise program (Done)     Learning Progress Summary           Patient Acceptance, E, VU,NR by  at 10/18/2021 0927    Acceptance, E,D, NR by MS at 10/15/2021 1510                   Point: Body mechanics (Done)     Learning Progress Summary           Patient Acceptance, E, VU,NR by  at 10/18/2021 0927                   Point: Precautions (Done)     Learning Progress Summary           Patient Acceptance, E, VU,NR by  at 10/18/2021 0927    Acceptance, E,TB, VU by CB at 10/17/2021 1615                               User Key     Initials Effective Dates Name Provider Type Discipline     06/16/21 -  Ivet Cohen, PT Physical Therapist PT    MS 06/16/21 -  Meño Gil PT Physical Therapist PT    CB 12/30/20 -  Sanjana Lozano Physical Therapist PT              PT Recommendation and Plan     Plan of Care Reviewed With: patient  Outcome Summary: Pt was agreeable to therapy with encouragement. He ambulated 150 ft with min assist using Rwx. Pt is impulsive and very tremulous while walking. He frequently veers to the R and requires frequent verbal cueing to avoid obstacles.     Time Calculation:    PT Charges     Row Name 10/18/21 0929             Time Calculation    Start Time 0850  -      Stop Time 0903  -      Time Calculation (min) 13 min  -      PT Received On 10/18/21  -      PT - Next Appointment 10/19/21  -              Time Calculation- PT    Total Timed Code Minutes- PT 13 minute(s)  -            User Key  (r) = Recorded By, (t) = Taken By, (c) = Cosigned By     Initials Name Provider Type    Ivet Abreu, PT Physical Therapist                  PT G-Codes  Outcome Measure Options: AM-PAC 6 Clicks Basic Mobility (PT)  AM-PAC 6 Clicks Score (PT): 19    Ivet Cohen, PT  10/18/2021

## 2021-10-18 NOTE — PAYOR COMM NOTE
"Brittany Villalobos (66 y.o. Male)     PLEASE SEE ATTACHED FOR CONTINUED STAY REVIEW.     REF#[id number]      PLEASE CALL  OR  [phone]    THANK YOU    ELZA ARCE LPN CCP              Date of Birth Social Security Number Address Home Phone MRN    1955  [address] [phone] [id number]    Confucianist Marital Status             Lutheran        Admission Date Admission Type Admitting Provider Attending Provider Department, Room/Bed    10/7/21 Emergency Britney Triplett MD Lykins, Matthew D, MD [address], [address]    Discharge Date Discharge Disposition Discharge Destination                         Attending Provider: Meño Liriano MD    Allergies: Penicillins, Penicillins    Isolation: None   Infection: None   Code Status: CPR   Advance Care Planning Activity    Ht: 200.7 cm (79\")   Wt: 115 kg (254 lb 10.1 oz)    Admission Cmt: None   Principal Problem: Delirium tremens (HCC) [F10.231]                 Active Insurance as of 10/7/2021     Primary Coverage     Payor Plan Insurance Group Employer/Plan Group    ANTHEM BLUE CROSS ANTHEM BLUE CROSS BLUE SHIELD PPO [id number]     Payor Plan Address Payor Plan Phone Number Payor Plan Fax Number Effective Dates    [address] [phone]  4/1/2019 - None Entered    [facility] [id number]       Subscriber Name Subscriber Birth Date Member ID       BRITTANY VILLALOBOS 1955 [id number]           Secondary Coverage     Payor Plan Insurance Group Employer/Plan Group    MEDICARE MEDICARE A ONLY      Payor Plan Address Payor Plan Phone Number Payor Plan Fax Number Effective Dates    [address] [phone]  9/1/2019 - None Entered    [facility] [id number]       Subscriber Name Subscriber Birth Date Member ID       BRITTANY VILLALOBOS 1955 [id number]                 Emergency Contacts      (Rel.) Home Phone Work Phone Mobile Phone    lawrence villalobos " (Son) -- -- [phone]    Rosalba Nielson (Sister) [phone] -- --    ROSALBA NIELSON (Sister) [phone] -- [phone]            Oxygen Therapy (last day)     Date/Time SpO2 Device (Oxygen Therapy) Flow (L/min) Oxygen Concentration (%) ETCO2 (mmHg)    10/18/21 0042 -- room air -- -- --    10/17/21 2320 93 room air -- -- --    10/17/21 2029 -- room air -- -- --    10/17/21 1920 93 room air -- -- --    10/17/21 1401 97 room air -- -- --    10/17/21 1200 -- room air -- -- --    10/17/21 1150 -- room air -- -- --    10/17/21 1000 97 -- -- -- --    10/17/21 0800 96 -- -- -- --    10/17/21 0700 96 room air -- -- --    10/17/21 0600 98 room air -- -- --    10/17/21 0500 98 room air -- -- --    10/17/21 0400 100 room air -- -- --    10/17/21 0345 96 -- -- -- --    10/17/21 0300 97 -- -- -- --    10/17/21 0200 97 -- -- -- --    10/17/21 0100 95 room air -- -- --          Intake & Output (last day)       10/17 0701  10/18 0700 10/18 0701  10/19 0700    P.O. 480     I.V. (mL/kg)      Total Intake(mL/kg) 480 (4.2)     Urine (mL/kg/hr) 550 (0.2)     Stool 0     Total Output 550     Net -70           Urine Unmeasured Occurrence 3 x     Stool Unmeasured Occurrence 1 x         Lines, Drains & Airways     Active LDAs     Name Placement date Placement time Site Days    Peripheral IV 10/11/21 0327 Left; Upper Arm 10/11/21  0327  Arm  7    Peripheral IV 10/16/21 0215 Anterior; Right Forearm 10/16/21  0215  Forearm  2                CIWA (last day)     Date/Time CIWA-Ar Score    10/18/21 0042 1    10/17/21 2029 1    10/17/21 1200 3          Medication Administration Report for Watson Lisa as of 10/18/21 1114   Legend:    Given Hold Not Given Due Canceled Entry Other Actions    Time Time (Time) Time  Time-Action       Discontinued     Completed     Future     MAR Hold     Linked           Medications 10/16/21 10/17/21 10/18/21    acetaminophen (TYLENOL) tablet 650 mg  Dose: 650 mg  Freq: Every 4 Hours PRN Route: PO  PRN  Reason: Mild Pain   Start: 10/07/21 1757   Admin Instructions:   Or temp greater than 101.5  Do not exceed 4 grams of acetaminophen in a 24 hr period. Max dose of 2gm for AST/ALT greater than 120 units/L      If given for pain, use the following pain scale:   Mild Pain = Pain Score of 1-3, CPOT 1-2  Moderate Pain = Pain Score of 4-6, CPOT 3-4  Severe Pain = Pain Score of 7-10, CPOT 5-8         Or  acetaminophen (TYLENOL) 160 MG/5ML solution 650 mg  Dose: 650 mg  Freq: Every 4 Hours PRN Route: PO  PRN Reason: Mild Pain   Start: 10/07/21 1757   Admin Instructions:   Do not exceed 4 grams of acetaminophen in a 24 hr period. Max dose of 2gm for AST/ALT greater than 120 units/L      If given for pain, use the following pain scale:   Mild Pain = Pain Score of 1-3, CPOT 1-2  Moderate Pain = Pain Score of 4-6, CPOT 3-4  Severe Pain = Pain Score of 7-10, CPOT 5-8         Or  acetaminophen (TYLENOL) suppository 650 mg  Dose: 650 mg  Freq: Every 4 Hours PRN Route: RE  PRN Reason: Mild Pain   Start: 10/07/21 1757   Admin Instructions:   Do not exceed 4 grams of acetaminophen in a 24 hr period. Max dose of 2gm for AST/ALT greater than 120 units/L      If given for pain, use the following pain scale:   Mild Pain = Pain Score of 1-3, CPOT 1-2  Moderate Pain = Pain Score of 4-6, CPOT 3-4  Severe Pain = Pain Score of 7-10, CPOT 5-8          albuterol sulfate HFA (PROVENTIL HFA;VENTOLIN HFA;PROAIR HFA) inhaler 2 puff  Dose: 2 puff  Freq: Every 4 Hours PRN Route: IN  PRN Reason: Wheezing  Start: 10/15/21 1210   Admin Instructions:    Shake well.          amLODIPine (NORVASC) tablet 10 mg  Dose: 10 mg  Freq: Daily Route: PO  Start: 10/09/21 1030   Admin Instructions:   Caution: Look alike/sound alike drug alert. Avoid grapefruit juice.       0945-Given             1040-Given             0932-Given             sennosides-docusate (PERICOLACE) 8.6-50 MG per tablet 2 tablet  Dose: 2 tablet  Freq: 2 Times Daily Route: PO  Start: 10/07/21  2100   Admin Instructions:   HOLD MEDICATION IF PATIENT HAS HAD BOWEL MOVEMENT. Start bowel management regimen if patient has not had a bowel movement after 12 hours.       (0921)-Not Given     (2022)-Not Given [C]            (1041)-Not Given     2031-Given            0932-Given     2100           And  polyethylene glycol (MIRALAX) packet 17 g  Dose: 17 g  Freq: Daily PRN Route: PO  PRN Reason: Constipation  PRN Comment: Use if senna-docusate is ineffective  Start: 10/07/21 1757   Admin Instructions:   Use if no bowel movement after 12 hours. Mix in 6-8 ounces of water.  Use 4-8 ounces of water, tea, or juice for each 17 gram dose.         And  bisacodyl (DULCOLAX) EC tablet 5 mg  Dose: 5 mg  Freq: Daily PRN Route: PO  PRN Reason: Constipation  PRN Comment: Use if polyethylene glycol is ineffective  Start: 10/07/21 1757   Admin Instructions:   Use if no bowel movement after 12 hours.  Swallow whole. Do not crush, split, or chew tablet.         And  bisacodyl (DULCOLAX) suppository 10 mg  Dose: 10 mg  Freq: Daily PRN Route: RE  PRN Reason: Constipation  PRN Comment: Use if bisacodyl oral is ineffective  Start: 10/07/21 1757   Admin Instructions:   Use if no bowel movement after 12 hours.          clonazePAM (KlonoPIN) tablet 2 mg  Dose: 2 mg  Freq: Every 12 Hours Scheduled Route: PO  Start: 10/17/21 2100   Admin Instructions:   Caution: Look alike/sound alike drug alert. Please read the label.   Caution: Look alike/sound alike drug alert.        2031-Given             0932-Given     2100            diazePAM (VALIUM) tablet 10 mg  Dose: 10 mg  Freq: Every 8 Hours PRN Route: PO  PRN Reason: Anxiety  Start: 10/11/21 1314   End: 10/18/21 1313   Admin Instructions:    Caution: Look alike/sound alike drug alert.  Avoid use with Carloz's Wort.  Avoid grapefruit juice.        1357-Given              enoxaparin (LOVENOX) syringe 40 mg  Dose: 40 mg  Freq: Every 24 Hours Route: SC  Indications of Use: PROPHYLAXIS OF VENOUS  THROMBOEMBOLISM  Start: 10/08/21 2100   Admin Instructions:   Give subcutaneous in abdomen only. Do not massage site after injection.       2109-Given             2031-Given             2100             haloperidol lactate (HALDOL) injection 2 mg  Dose: 2 mg  Freq: Every 6 Hours PRN Route: IV  PRN Reason: Agitation  Start: 10/17/21 1259   Admin Instructions:   For IV Push, administer no faster than 5 mg / minute.          hydrALAZINE (APRESOLINE) injection 10 mg  Dose: 10 mg  Freq: Every 4 Hours PRN Route: IV  PRN Reason: High Blood Pressure  Start: 10/09/21 1222   Admin Instructions:   As needed for SBP greater than 170  Caution: Look alike/sound alike drug alert          labetalol (NORMODYNE,TRANDATE) injection 20 mg  Dose: 20 mg  Freq: Every 2 Hours PRN Route: IV  PRN Reason: High Blood Pressure  Start: 10/09/21 1221   Admin Instructions:   As needed for SBP greater than 170, hold if heart rate less than 70  Give by slow IV Push each 20mg (or less) over 2 minutes          lansoprazole (FIRST) oral suspension 30 mg  Dose: 30 mg  Freq: Every Morning Route: NG  Start: 10/13/21 0700   Admin Instructions:   Shake well. Refrigerate.       0656-Given             0621-Given             0610-Given             magnesium sulfate 4 gram infusion - Mg less than or equal to 1mg/dL  Dose: 4 g  Freq: As Needed Route: IV  PRN Comment: Mg less than or equal to 1mg/dL  Start: 10/07/21 1757   Admin Instructions:   Recheck Mg level in the AM.         Or  magnesium sulfate 3 gram infusion (1gm x 3) - Mg 1.1 - 1.5 mg/dL  Dose: 1 g  Freq: As Needed Route: IV  PRN Comment: Mg 1.1 - 1.5 mg/dL  Start: 10/07/21 1757   Admin Instructions:   Infuse 1 gram over 1 hour for 3 doses (total Mg dose of 3 grams). Recheck Mg level in the AM.         Or  Magnesium Sulfate 2 gram infusion- Mg 1.6 - 1.9 mg/dL  Dose: 2 g  Freq: As Needed Route: IV  PRN Comment: Mg 1.6 - 1.9 mg/dL  Start: 10/07/21 1757   Admin Instructions:   Recheck Mg level in the AM.           melatonin tablet 5 mg  Dose: 5 mg  Freq: Nightly PRN Route: PO  PRN Reason: Sleep  Start: 10/07/21 1757         0054-Given             metoprolol tartrate (LOPRESSOR) injection 5 mg  Dose: 5 mg  Freq: Every 6 Hours PRN Route: IV  PRN Reason: High Blood Pressure  PRN Comment: Systolic blood pressure more than 160 and diastolic more than 90  Start: 10/07/21 1757          metoprolol tartrate (LOPRESSOR) tablet 50 mg  Dose: 50 mg  Freq: Every 8 Hours Route: PO  Start: 10/07/21 1845       (0325)-Not Given     (1040)-Not Given [C]     1838-Given           0319-Given     1039-Given     1758-Given           0259-Given     0957-Given     1845           multivitamin with minerals 1 tablet  Dose: 1 tablet  Freq: Daily Route: PO  Start: 10/07/21 1845   Admin Instructions:          0945-Given             1032-Given [C]             0932-Given             nicotine (NICODERM CQ) 21 MG/24HR patch 1 patch  Dose: 1 patch  Freq: Every 24 Hours Route: TD  Start: 10/07/21 1845   Admin Instructions:   Apply Patch to Clean, Dry, Hairless Area Daily - Rotating Sites.  REMOVE Old Patch Prior to Applying New Patch.  May Remove Patch at Bedtime If Needed to Prevent Insomnia.  Do Not Use Other Nicotine Products.  At Discharge, Follow Package Instructions.   Acutely Hazardous. Waste BOTH Residual Medication and/or Empty Package.       1730-Medication Removed     1838-Medication Applied            1754-Medication Applied     1838-Medication Removed            1754 (Medication Removed)     1845            nitroglycerin (NITROSTAT) SL tablet 0.4 mg  Dose: 0.4 mg  Freq: Every 5 Minutes PRN Route: SL  PRN Reason: Chest Pain  PRN Comment: Only if SBP Greater Than 100  Start: 10/07/21 1757   Admin Instructions:   If Pain Unrelieved After 3 Doses Notify MD          ondansetron (ZOFRAN) tablet 4 mg  Dose: 4 mg  Freq: Every 6 Hours PRN Route: PO  PRN Reasons: Nausea,Vomiting  Start: 10/07/21 1757   Admin Instructions:   If BOTH ondansetron  (ZOFRAN) and promethazine (PHENERGAN) are ordered use ondansetron first and THEN promethazine IF ondansetron is ineffective.         Or  ondansetron (ZOFRAN) injection 4 mg  Dose: 4 mg  Freq: Every 6 Hours PRN Route: IV  PRN Reasons: Nausea,Vomiting  Start: 10/07/21 1757   Admin Instructions:   If BOTH ondansetron (ZOFRAN) and promethazine (PHENERGAN) are ordered use ondansetron first and THEN promethazine IF ondansetron is ineffective.          potassium chloride (K-DUR,KLOR-CON) ER tablet 40 mEq  Dose: 40 mEq  Freq: As Needed Route: PO  PRN Comment: Potassium Replacement.  See Admin Instructions  Start: 10/07/21 1757   Admin Instructions:   Potassium 3.1 or Less Give KCl 40 mEq q4h x3 Doses   Potassium 3.2 - 3.6 Give KCl 40 mEq q4h x2 Doses     Check Potassium 4 Hours After Last Dose Given   Check Magnesium if Potassium Level Remains Low After Replacement   DO NOT GIVE if CrCl is Less Than 30 mL/minute or Urine Output Less Than 30 mL/hr  Swallow whole; do not crush, split, or chew.       0945-Given     1428-Given            2031-Given             0259-Given             potassium chloride (KLOR-CON) packet 40 mEq  Dose: 40 mEq  Freq: As Needed Route: PO  PRN Comment: Potassium Replacement, See Admin Instructions  Start: 10/07/21 1757   Admin Instructions:   Potassium 3.1 or Less Give KCl 40 mEq q4h x3 Doses   Potassium 3.2 - 3.6 Give KCl 40 mEq q4h x2 Doses     Check Potassium 4 Hours After Last Dose Given   Check Magnesium if Potassium Level Remains Low After Replacement   DO NOT GIVE if CrCl is Less Than 30 mL/minute or Urine Output Less Than 30 mL/hr          QUEtiapine (SEROquel) tablet 25 mg  Dose: 25 mg  Freq: Nightly Route: PO  Start: 10/16/21 2100   Admin Instructions:   Caution: Look alike/sound alike drug alert       2109-Given             2031-Given 2100             sodium chloride 0.9 % flush 10 mL  Dose: 10 mL  Freq: As Needed Route: IV  PRN Reason: Line Care  Start: 10/07/21 1757           sodium chloride 0.9 % flush 10 mL  Dose: 10 mL  Freq: Every 12 Hours Scheduled Route: IV  Start: 10/07/21 2100       0945-Given     2110-Given            1041-Given [C]     2031-Given            0937-Given     2100            sodium chloride 0.9 % flush 10 mL  Dose: 10 mL  Freq: As Needed Route: IV  PRN Reason: Line Care  Start: 10/07/21 0938          thiamine (VITAMIN B-1) tablet 100 mg  Dose: 100 mg  Freq: 3 Times Daily Route: NG  Start: 10/12/21 1600       0945-Given     1600-Given     2109-Given           1032-Given [C]     1754-Given     1900-Canceled Entry       2031-Given             0937-Given     1600     2100           vitamin D (ERGOCALCIFEROL) capsule 50,000 Units  Dose: 50,000 Units  Freq: Every 7 Days Route: PO  Start: 10/07/21 1845         Completed Medications  Medications 10/16/21 10/17/21 10/18/21       bumetanide (BUMEX) injection 2 mg  Dose: 2 mg  Freq: Every 12 Hours Route: IV  Start: 10/13/21 1000   End: 10/13/21 2205   Admin Instructions:   Give slow IV push over 1-2 minutes.          bumetanide (BUMEX) injection 2 mg  Dose: 2 mg  Freq: Every 12 Hours Route: IV  Start: 10/12/21 1030   End: 10/12/21 2209   Admin Instructions:   Give slow IV push over 1-2 minutes.          bumetanide (BUMEX) injection 2 mg  Dose: 2 mg  Freq: Once Route: IV  Start: 10/11/21 1030   End: 10/11/21 1020   Admin Instructions:   Give slow IV push over 1-2 minutes.          enoxaparin (LOVENOX) syringe 120 mg  Dose: 1 mg/kg  Weight Dosing Info: 118 kg  Freq: Once Route: SC  Indications of Use: ATRIAL FIBRILLATION  Start: 10/07/21 1353   End: 10/07/21 1409   Admin Instructions:   Give subcutaneous in abdomen only. Do not massage site after injection.          LORazepam (ATIVAN) injection 1 mg  Dose: 1 mg  Freq: Once Route: IV  Start: 10/07/21 1654   End: 10/07/21 1655   Admin Instructions:    Caution: Look alike/sound alike drug alert. Dilute 1:1 with normal saline.          LORazepam (ATIVAN) injection 1 mg  Dose: 1  mg  Freq: Once Route: IV  Start: 10/07/21 1043   End: 10/07/21 1102   Admin Instructions:    Caution: Look alike/sound alike drug alert. Dilute 1:1 with normal saline.          LORazepam (ATIVAN) injection 2 mg  Dose: 2 mg  Freq: Once Route: IV  Start: 10/07/21 1256   End: 10/07/21 1304   Admin Instructions:    Caution: Look alike/sound alike drug alert. Dilute 1:1 with normal saline.          LORazepam (ATIVAN) injection 4 mg  Dose: 4 mg  Freq: Once Route: IV  Start: 10/08/21 0600   End: 10/08/21 0509   Admin Instructions:    Caution: Look alike/sound alike drug alert. Dilute 1:1 with normal saline.          LORazepam (ATIVAN) injection 4 mg  Dose: 4 mg  Freq: Once Route: IV  Start: 10/08/21 0530   End: 10/08/21 0439   Admin Instructions:    Caution: Look alike/sound alike drug alert. Dilute 1:1 with normal saline.          metoprolol tartrate (LOPRESSOR) injection 5 mg  Dose: 5 mg  Freq: Once Route: IV  Start: 10/07/21 1501   End: 10/07/21 1543          metoprolol tartrate (LOPRESSOR) injection 5 mg  Dose: 5 mg  Freq: Once Route: IV  Start: 10/07/21 1353   End: 10/07/21 1406          metoprolol tartrate (LOPRESSOR) tablet 25 mg  Dose: 25 mg  Freq: Once Route: PO  Start: 10/07/21 1353   End: 10/07/21 1405          potassium chloride (KLOR-CON) packet 40 mEq  Dose: 40 mEq  Freq: Once Route: NG  Start: 10/12/21 1030   End: 10/12/21 1032   Admin Instructions:   For use with a feeding tube. Mix in at least 4 oz. of liquid.          Propofol (DIPRIVAN) injection 100 mg  Dose: 100 mg  Freq: Once Route: IV  Start: 10/08/21 0515   End: 10/08/21 0420          sodium chloride 0.9 % bolus 1,000 mL  Dose: 1,000 mL  Freq: Once Route: IV  Start: 10/08/21 0645   End: 10/08/21 0740          thiamine (B-1) 100 mg, folic acid 1 mg in sodium chloride 0.9 % 1,000 mL infusion  Dose: 1,000 mL/hr  Freq: Once Route: IV  Start: 10/07/21 1256   End: 10/07/21 1543         Discontinued Medications  Medications 10/16/21 10/17/21 10/18/21        albuterol sulfate HFA (PROVENTIL HFA;VENTOLIN HFA;PROAIR HFA) inhaler 6 puff  Dose: 6 puff  Freq: Every 4 Hours PRN Route: IN  PRN Reason: Wheezing  Start: 10/13/21 0845   End: 10/15/21 1210   Admin Instructions:    Shake well.          albuterol sulfate HFA (PROVENTIL HFA;VENTOLIN HFA;PROAIR HFA) inhaler 6 puff  Dose: 6 puff  Freq: 4 Times Daily - RT Route: IN  Start: 10/08/21 0830   End: 10/13/21 0837   Admin Instructions:    Shake well.          amLODIPine (NORVASC) tablet 10 mg  Dose: 10 mg  Freq: Every 24 Hours Scheduled Route: PO  Start: 10/07/21 1738   End: 10/08/21 1146   Admin Instructions:   Caution: Look alike/sound alike drug alert. Avoid grapefruit juice.          amLODIPine (NORVASC) tablet 10 mg  Dose: 10 mg  Freq: Daily Route: PO  Start: 10/07/21 1845   End: 10/07/21 1800   Admin Instructions:   Caution: Look alike/sound alike drug alert. Avoid grapefruit juice.          budesonide (PULMICORT) nebulizer solution 0.5 mg  Dose: 0.5 mg  Freq: 2 Times Daily - RT Route: NEBULIZATION  Start: 10/07/21 2130   End: 10/08/21 0419   Admin Instructions:   Do not shake.  Protect from light.          clonazePAM (KlonoPIN) tablet 2 mg  Dose: 2 mg  Freq: Every 8 Hours Scheduled Route: PO  Start: 10/13/21 1600   End: 10/17/21 1259   Admin Instructions:   Caution: Look alike/sound alike drug alert. Please read the label.   Caution: Look alike/sound alike drug alert.       0656-Given     1429-Given     2109-Given           0620-Given              cloNIDine (CATAPRES) tablet 0.1 mg  Dose: 0.1 mg  Freq: Every 8 Hours Scheduled Route: PO  Start: 10/14/21 1400   End: 10/15/21 1359   Admin Instructions:   Caution: Look alike/sound alike drug alert. Please read the label.  Caution: Look alike/sound alike drug alert.          cloNIDine (CATAPRES) tablet 0.2 mg  Dose: 0.2 mg  Freq: Every 8 Hours Scheduled Route: PO  Start: 10/15/21 1445   End: 10/16/21 1425   Admin Instructions:   Caution: Look alike/sound alike drug alert.  Please read the label.  Caution: Look alike/sound alike drug alert.       0656-Given     1429-Given              cloNIDine (CATAPRES) tablet 0.3 mg  Dose: 0.3 mg  Freq: Every 8 Hours Scheduled Route: PO  Start: 10/16/21 2200   End: 10/17/21 1259   Admin Instructions:   Caution: Look alike/sound alike drug alert. Please read the label.  Caution: Look alike/sound alike drug alert.       2109-Given             0620-Given              dexmedetomidine (PRECEDEX) 400 mcg in 100 mL NS infusion  Rate: 5.9-44.3 mL/hr Dose: 0.2-1.5 mcg/kg/hr  Weight Dosing Info: 118 kg  Freq: Titrated Route: IV  Start: 10/08/21 0645   End: 10/16/21 1425   Admin Instructions:   Begin infusion at 0.2 mcg/kg/hr and titrate by 0.1 mcg/kg/hr every 15 minutes to maintain RASS goal.  Maximum rate 1.5 mcg/kg/hr.  Notify MD if not at RASS goal and requiring 1.5 mcg/kg/hr or heart rate is less than 50 beats per minute or MAP is less than 60 mmHg.   Order specific questions:   RASS Goal: 0 TO -1         0215-New Bag     0230-Rate/Dose Change     0245-Rate/Dose Change       0300-Rate/Dose Change     0315-Rate/Dose Change     0438-New Bag       0926-New Bag     0953-Rate/Dose Change     1012-Rate/Dose Change       1056-Rate/Dose Change     1147-Rate/Dose Change     1310-Rate/Dose Change       1330-Rate/Dose Change     1431-Stopped              enoxaparin (LOVENOX) syringe 30 mg  Dose: 30 mg  Freq: Every 24 Hours Route: SC  Indications of Use: PROPHYLAXIS OF VENOUS THROMBOEMBOLISM  Start: 10/07/21 1845   End: 10/08/21 1503   Admin Instructions:   Give subcutaneous in abdomen only. Do not massage site after injection.          fentaNYL citrate (PF) (SUBLIMAZE) injection 50 mcg  Dose: 50 mcg  Freq: Every 1 Hour PRN Route: IV  PRN Reason: Severe Pain   Start: 10/08/21 0421   End: 10/14/21 1031   Admin Instructions:   Use filter needle to withdraw dose from ampule.  If given for pain, use the following pain scale:  Mild Pain = Pain Score of 1-3, CPOT  1-2  Moderate Pain = Pain Score of 4-6, CPOT 3-4  Severe Pain = Pain Score of 7-10, CPOT 5-8          HYDROcodone-acetaminophen (NORCO) 5-325 MG per tablet 1 tablet  Dose: 1 tablet  Freq: Every 4 Hours PRN Route: PO  PRN Reason: Moderate Pain   Start: 10/07/21 1757   End: 10/14/21 1756   Admin Instructions:   [SHAJI]    Do not exceed 4 grams of acetaminophen in a 24 hr period. Max dose of 2gm for AST/ALT greater than 120 units/L        If given for pain, use the following pain scale:   Mild Pain = Pain Score of 1-3, CPOT 1-2  Moderate Pain = Pain Score of 4-6, CPOT 3-4  Severe Pain = Pain Score of 7-10, CPOT 5-8          ipratropium-albuterol (DUO-NEB) nebulizer solution 3 mL  Dose: 3 mL  Freq: 4 Times Daily - RT Route: NEBULIZATION  Start: 10/07/21 2030   End: 10/08/21 0419          levoFLOXacin (LEVAQUIN) 750 mg/150 mL D5W (premix) (LEVAQUIN) 750 mg  Dose: 750 mg  Freq: Every 24 Hours Route: IV  Indications of Use: PNEUMONIA  Start: 10/10/21 1100   End: 10/14/21 1031   Admin Instructions:   Caution: Look alike/sound alike drug alert. Protect from light. Do NOT refrigerate.          levoFLOXacin (LEVAQUIN) 750 mg/150 mL D5W (premix) (LEVAQUIN) 750 mg  Dose: 750 mg  Freq: Every 48 Hours Route: IV  Indications of Use: PNEUMONIA  Start: 10/08/21 2100   End: 10/09/21 1222   Admin Instructions:   **Renal Adjustment based on CrCl 20-49 mL/min per Medical Executive and P&T committees approval**  Caution: Look alike/sound alike drug alert. Protect from light. Do NOT refrigerate.          LORazepam (ATIVAN) injection 0.5 mg  Dose: 0.5 mg  Freq: Every 6 Hours Route: IV  Start: 10/08/21 0445   End: 10/08/21 1500   Admin Instructions:    Caution: Look alike/sound alike drug alert. Dilute 1:1 with normal saline.         Followed by  LORazepam (ATIVAN) injection 0.5 mg  Dose: 0.5 mg  Freq: Every 8 Hours Route: IV  Start: 10/09/21 0445   End: 10/08/21 1500   Admin Instructions:    Caution: Look alike/sound alike drug alert. Dilute  1:1 with normal saline.          LORazepam (ATIVAN) tablet 0.5 mg  Dose: 0.5 mg  Freq: Every 2 Hours PRN Route: PO  PRN Reason: Withdrawal  PRN Comment: For CIWA-Ar 8-10  Start: 10/08/21 0351   End: 10/15/21 0350   Admin Instructions:   Reassess 2 Hours After Administration   Caution: Look alike/sound alike drug alert         Or  LORazepam (ATIVAN) injection 0.5 mg  Dose: 0.5 mg  Freq: Every 2 Hours PRN Route: IV  PRN Reason: Withdrawal  PRN Comment: For CIWA-Ar 8-10  Start: 10/08/21 0351   End: 10/15/21 0350   Admin Instructions:   Reassess 2 Hours After Administration   Caution: Look alike/sound alike drug alert. Dilute 1:1 with normal saline.         Or  LORazepam (ATIVAN) tablet 1 mg  Dose: 1 mg  Freq: Every 1 Hour PRN Route: PO  PRN Reason: Withdrawal  PRN Comment: For CIWA-Ar 11-15  Start: 10/08/21 0351   End: 10/15/21 0350   Admin Instructions:   Reassess 1 Hour After Administration   Caution: Look alike/sound alike drug alert         Or  LORazepam (ATIVAN) injection 1 mg  Dose: 1 mg  Freq: Every 1 Hour PRN Route: IV  PRN Reason: Withdrawal  PRN Comment: For CIWA-Ar 11-15  Start: 10/08/21 0351   End: 10/15/21 0350   Admin Instructions:   Reassess 1 Hour After Administration   Caution: Look alike/sound alike drug alert. Dilute 1:1 with normal saline.         Or  LORazepam (ATIVAN) injection 1 mg  Dose: 1 mg  Freq: Every 15 Minutes PRN Route: IV  PRN Reason: Withdrawal  PRN Comment: For CIWA-Ar Greater Than 15.  Repeat Dose in 15 Minutes if CIWA-Ar Does Not Decrease  Start: 10/08/21 0351   End: 10/15/21 0350   Admin Instructions:   Reassess 15 Minutes After Each Administration.  If CIWA-Ar Remains Greater Than 15 1 Hour After Administration of 2 IV Doses, Administer LORazepam (ATIVAN) 2 mg oral or IV & Reassess Every Hour   Caution: Look alike/sound alike drug alert. Dilute 1:1 with normal saline.         Or  LORazepam (ATIVAN) injection 1 mg  Dose: 1 mg  Freq: Every 15 Minutes PRN Route: IM  PRN Reason:  Withdrawal  PRN Comment: If Unable to Administer IV - For CIWA-Ar Greater Than 15.  Repeat Dose in 15 Minutes if CIWA-Ar Does Not Decrease  Start: 10/08/21 0351   End: 10/15/21 0350   Admin Instructions:   Reassess 15 Minutes After Each Administration.  If CIWA-Ar Remains Greater Than 15 1 Hour After Administration of 2 IM Doses, Administer LORazepam (ATIVAN) 2 mg oral or IV & Reassess Every Hour   Caution: Look alike/sound alike drug alert. Dilute 1:1 with normal saline.         Or  LORazepam (ATIVAN) injection 2 mg  Dose: 2 mg  Freq: Every 1 Hour PRN Route: IV  PRN Reason: Withdrawal  PRN Comment: For CIWA-Ar Greater Than 15 After 2 Doses of 1 mg IV/IM.  Repeat Dose in 1 Hour if CIWA-Ar Does Not Decrease  Start: 10/08/21 0351   End: 10/15/21 0350   Admin Instructions:   Reassess 1 Hour After Administration   Caution: Look alike/sound alike drug alert. Dilute 1:1 with normal saline.         Or  LORazepam (ATIVAN) tablet 2 mg  Dose: 2 mg  Freq: Every 1 Hour PRN Route: PO  PRN Reason: Withdrawal  PRN Comment: If Unable to Administer IV - For CIWA-Ar Greater Than 15 After 2 Doses of 1 mg IV/IM.  Repeat Dose in 1 Hour if CIWA-Ar Does Not Decrease  Start: 10/08/21 0351   End: 10/15/21 0350   Admin Instructions:   Reassess 1 Hour After Administration   Caution: Look alike/sound alike drug alert          LORazepam (ATIVAN) tablet 0.5 mg  Dose: 0.5 mg  Freq: Every 2 Hours PRN Route: PO  PRN Reason: Withdrawal  PRN Comment: For CIWA-Ar 8-10  Start: 10/07/21 2117   End: 10/14/21 2116   Admin Instructions:   Reassess 2 Hours After Administration  Caution: Look alike/sound alike drug alert         Or  LORazepam (ATIVAN) injection 0.5 mg  Dose: 0.5 mg  Freq: Every 2 Hours PRN Route: IV  PRN Reason: Withdrawal  PRN Comment: For CIWA-Ar 8-10  Start: 10/07/21 2117   End: 10/14/21 2116   Admin Instructions:   Reassess 2 Hours After Administration   Caution: Look alike/sound alike drug alert. Dilute 1:1 with normal saline.          Or  LORazepam (ATIVAN) tablet 1 mg  Dose: 1 mg  Freq: Every 1 Hour PRN Route: PO  PRN Reason: Withdrawal  PRN Comment: For CIWA-Ar 11-15  Start: 10/07/21 2117   End: 10/14/21 2116   Admin Instructions:   Reassess 1 Hour After Administration   Caution: Look alike/sound alike drug alert         Or  LORazepam (ATIVAN) injection 1 mg  Dose: 1 mg  Freq: Every 1 Hour PRN Route: IV  PRN Reason: Withdrawal  PRN Comment: For CIWA-Ar 11-15  Start: 10/07/21 2117   End: 10/14/21 2116   Admin Instructions:   Reassess 1 Hour After Administration   Caution: Look alike/sound alike drug alert. Dilute 1:1 with normal saline.         Or  LORazepam (ATIVAN) injection 1 mg  Dose: 1 mg  Freq: Every 15 Minutes PRN Route: IV  PRN Reason: Anxiety  PRN Comment: For CIWA-Ar Greater Than 15.  Repeat Dose in 15 Minutes if CIWA-Ar Does Not Decrease  Start: 10/07/21 2117   End: 10/14/21 2116   Admin Instructions:   Reassess 15 Minutes After Each Administration.  If CIWA-Ar Does Not Decrease Contact Provider To Discuss Transfer to Higher Level of Care.   Caution: Look alike/sound alike drug alert. Dilute 1:1 with normal saline.         Or  LORazepam (ATIVAN) injection 1 mg  Dose: 1 mg  Freq: Every 15 Minutes PRN Route: IM  PRN Reason: Withdrawal  PRN Comment: If Unable to Administer IV - For CIWA-Ar Greater Than 15.  Repeat Dose in 15 Minutes if CIWA-Ar Does Not Decrease  Start: 10/07/21 2117   End: 10/14/21 2116   Admin Instructions:   Reassess 15 Minutes After Each Administration.  If CIWA-Ar Does Not Decrease Contact Provider To Discuss Transfer to Higher Level of Care.   Caution: Look alike/sound alike drug alert. Dilute 1:1 with normal saline.          LORazepam (ATIVAN) injection 1 mg  Dose: 1 mg  Freq: Every 4 Hours PRN Route: IV  PRN Reasons: Anxiety,Agitation,Withdrawal  Start: 10/07/21 1757   End: 10/07/21 2117   Admin Instructions:    Caution: Look alike/sound alike drug alert. Dilute 1:1 with normal saline.          LORazepam  (ATIVAN) injection 2 mg  Dose: 2 mg  Freq: Every 6 Hours Route: IV  Start: 10/08/21 0515   End: 10/12/21 1110   Admin Instructions:    Caution: Look alike/sound alike drug alert. Dilute 1:1 with normal saline.          LORazepam (ATIVAN) injection 4 mg  Dose: 4 mg  Freq: Once Route: IV  Start: 10/08/21 0500   End: 10/08/21 0420   Admin Instructions:    Caution: Look alike/sound alike drug alert. Dilute 1:1 with normal saline.          losartan (COZAAR) tablet 100 mg  Dose: 100 mg  Freq: Daily Route: PO  Start: 10/07/21 1845   End: 10/07/21 1800          metoprolol tartrate (LOPRESSOR) tablet 50 mg  Dose: 50 mg  Freq: Every 8 Hours Route: PO  Start: 10/07/21 1845   End: 10/07/21 1801          metroNIDAZOLE (FLAGYL) 500 mg/100mL IVPB  Dose: 500 mg  Freq: Every 8 Hours Route: IV  Indications of Use: PNEUMONIA  Start: 10/10/21 1100   End: 10/14/21 1031   Admin Instructions:   Caution: Look alike/sound alike drug alert.  Do not refrigerate.          metroNIDAZOLE (FLAGYL) 500 mg/100mL IVPB  Dose: 500 mg  Freq: Every 8 Hours Route: IV  Indications of Use: PNEUMONIA  Start: 10/08/21 2100   End: 10/09/21 1222   Admin Instructions:   Caution: Look alike/sound alike drug alert.  Do not refrigerate.          multivitamin with minerals 1 tablet  Dose: 1 tablet  Freq: Daily Route: PO  Start: 10/07/21 1845   End: 10/07/21 1805   Admin Instructions:             pantoprazole (PROTONIX) injection 40 mg  Dose: 40 mg  Freq: Every Early Morning Route: IV  Indications of Use: STRESS ULCER PROPHYLAXIS  Start: 10/07/21 1845   End: 10/12/21 0946   Admin Instructions:   Dilute with 10 mL of 0.9% NaCl and give IV push over 2 minutes.          propofol (DIPRIVAN) infusion 10 mg/mL 100 mL  Rate: 3.54-35.4 mL/hr Dose: 5-50 mcg/kg/min  Weight Dosing Info: 118 kg  Freq: Titrated Route: IV  Start: 10/08/21 0515   End: 10/14/21 1032   Admin Instructions:   Do not administer through the same IV catheter with blood or plasma.  Tubing and any unused  portions of propofol vials should be discarded after 12 hours.  Begin infusion at 5 mcg/kg/min and titrate by by 5 mcg/kg/min every 5 minutes to maintain RASS goal. Maximum rate 50 mcg/kg/min.  Notify MD if not at goal and requiring more than 50 mcg/kg/min. Infuse into dedicated line, change vial and tube every 12 hours. Patient must be intubated.   Order specific questions:   RASS Goal: 0 TO -1            sodium chloride 0.9 % infusion  Rate: 50 mL/hr Dose: 50 mL/hr  Freq: Continuous Route: IV  Start: 10/07/21 1845   End: 10/11/21 0938          thiamine (B-1) 100 mg in sodium chloride 0.9 % 100 mL IVPB  Dose: 100 mg  Freq: Every 8 Hours Route: IV  Start: 10/08/21 1215   End: 10/12/21 0949   Admin Instructions:   Protect from light.          thiamine (VITAMIN B-1) tablet 100 mg  Dose: 100 mg  Freq: Daily Route: PO  Start: 10/07/21 1845   End: 10/08/21 112          traZODone (DESYREL) tablet 50 mg  Dose: 50 mg  Freq: Nightly PRN Route: PO  PRN Reason: Sleep  Start: 10/07/21 1757   End: 10/08/21 042   Admin Instructions:   Take with food.  Caution: Look alike/sound alike drug alert                      Physician Progress Notes (last 24 hours)      Meño Liriano MD at 10/17/21 1258              Name: Watson Lisa ADMIT: 10/7/2021   : 1955  PCP: Checo Dunham MD    MRN: [id number] LOS: 10 days   AGE/SEX: 66 y.o. male  ROOM: [facility]     Subjective   Subjective   CC: alcohol withdrawal  No acute events. Patient has no new complaints. Has remained off of precedex and is out of ICU. Taking PO. No CP/dyspnea/f/c/n/v/d.    Objective   Objective   Vital Signs  Temp:  [96.9 °F (36.1 °C)-98 °F (36.7 °C)] 98 °F (36.7 °C)  Heart Rate:  [57-89] 62  Resp:  [18-24] 18  BP: (126-154)/(63-99) 133/73  SpO2:  [95 %-100 %] 97 %  on   ;   Device (Oxygen Therapy): room air  Body mass index is 28.69 kg/m².  Physical Exam  Vitals and nursing note reviewed.   Constitutional:       General: He is not in acute distress.      Appearance: He is not toxic-appearing or diaphoretic.   HENT:      Head: Normocephalic and atraumatic.      Nose: Nose normal.      Mouth/Throat:      Mouth: Mucous membranes are moist.      Pharynx: Oropharynx is clear.   Eyes:      Extraocular Movements: Extraocular movements intact.      Conjunctiva/sclera: Conjunctivae normal.      Pupils: Pupils are equal, round, and reactive to light.   Cardiovascular:      Rate and Rhythm: Normal rate and regular rhythm.      Pulses: Normal pulses.   Pulmonary:      Effort: Pulmonary effort is normal.      Breath sounds: Examination of the right-lower field reveals decreased breath sounds. Examination of the left-lower field reveals decreased breath sounds. Decreased breath sounds present.   Abdominal:      General: Bowel sounds are normal.      Palpations: Abdomen is soft.   Musculoskeletal:         General: Swelling (trace BLE) present. No tenderness.      Cervical back: Normal range of motion and neck supple.   Skin:     General: Skin is warm and dry.      Capillary Refill: Capillary refill takes less than 2 seconds.   Neurological:      General: No focal deficit present.      Mental Status: He is alert.      Motor: No tremor.   Psychiatric:         Mood and Affect: Mood normal.         Behavior: Behavior normal.     Results Review     I reviewed the patient's new clinical results.  I reviewed the patient's telemetry  Results from last 7 days   Lab Units 10/16/21  0443 10/15/21  0408 10/12/21  0350 10/11/21  0459   WBC 10*3/mm3 8.40 8.65 9.03 9.64   HEMOGLOBIN g/dL 10.7* 11.0* 9.9* 10.5*   PLATELETS 10*3/mm3 422 327 136* 124*     Results from last 7 days   Lab Units 10/16/21  0443 10/15/21  0408 10/14/21  2215 10/14/21  0530 10/12/21  0350 10/12/21  0350   SODIUM mmol/L 138 137  --  140  --  139   POTASSIUM mmol/L 3.4* 3.8 3.7 3.1*   < > 3.5   CHLORIDE mmol/L 102 100  --  99  --  106   CO2 mmol/L 22.6 23.8  --  27.6  --  20.6*   BUN mg/dL 18 20  --  19  --  20   CREATININE  mg/dL 0.89 0.92  --  0.99  --  0.91   GLUCOSE mg/dL 113* 99  --  97  --  122*    < > = values in this interval not displayed.   Estimated Creatinine Clearance: 117.3 mL/min (by C-G formula based on SCr of 0.89 mg/dL).  Results from last 7 days   Lab Units 10/15/21  0408 10/14/21  0530 10/12/21  0350   ALBUMIN g/dL 3.30* 3.60 2.90*   BILIRUBIN mg/dL  --   --  0.4   ALK PHOS U/L  --   --  70   AST (SGOT) U/L  --   --  11   ALT (SGPT) U/L  --   --  16     Results from last 7 days   Lab Units 10/16/21  0443 10/15/21  0408 10/14/21  0530 10/12/21  0350   CALCIUM mg/dL 9.0 9.0 8.4* 8.7   ALBUMIN g/dL  --  3.30* 3.60 2.90*   MAGNESIUM mg/dL  --  1.7 1.3*  --    PHOSPHORUS mg/dL  --  3.1 4.0 3.5         COVID19   Date Value Ref Range Status   10/07/2021 Not Detected Not Detected - Ref. Range Final   03/25/2021 Not Detected Not Detected - Ref. Range Final     No results found for: HGBA1C, POCGLU    XR Abdomen KUB  KUB     HISTORY: Nasogastric tube repositioning     COMPARISON: 10/09/2021 at 1959 hours.     FINDINGS: A single view of the upper abdomen demonstrates a nasogastric  tube in place. The nasogastric tube has been advanced. The tip is within  the proximal body of the stomach.     Air is identified within loops of small bowel which are mildly  prominent, similar to the prior examination. No obvious pneumatosis or  pneumoperitoneum is identified. Evaluation could be performed with a CT  examination of the abdomen and pelvis as indicated.     The above information was called to the patient's nurse at the time of  the dictation. The patient's nurse is to relay the information to the  clinical service.     This report was finalized on 10/11/2021 7:20 AM by Dr. Vincent Beck M.D.     XR Chest 1 View  Narrative: Patient: BRITTANY ZULETA  Time Out: 07:22  Exam(s): FILM CXR 1 VIEW     EXAM:    XR Chest, 1 View    CLINICAL HISTORY:     Reason for exam: Right-sided aspiration pneumonia.    TECHNIQUE:    Frontal view of the  chest.    COMPARISON:    10 10 2021    FINDINGS:    Lungs: Hazy bibasilar opacities, unchanged from prior study.    Pleural space:  Unremarkable.  No pneumothorax.    Heart: Unchanged cardiomegaly.    Mediastinum: Endotracheal tube terminates 5.6 cm above the level of the   [place].  Nasogastric tube terminates below field-of-view in the left   upper quadrant..    Bones joints:  Unremarkable.    IMPRESSION:       Unchanged hazy bibasilar opacities which may represent consolidations,   and or layering bilateral pleural effusions.  Impression: Electronically signed by Christiano Escobar M.D. on 10-11-21 at 0722    Scheduled Medications  amLODIPine, 10 mg, Oral, Daily  clonazePAM, 2 mg, Oral, Q8H  cloNIDine, 0.3 mg, Oral, Q8H  enoxaparin, 40 mg, Subcutaneous, Q24H  lansoprazole, 30 mg, Nasogastric, QAM  metoprolol tartrate, 50 mg, Oral, Q8H  multivitamin with minerals, 1 tablet, Oral, Daily  nicotine, 1 patch, Transdermal, Q24H  QUEtiapine, 25 mg, Oral, Nightly  senna-docusate sodium, 2 tablet, Oral, BID  sodium chloride, 10 mL, Intravenous, Q12H  thiamine, 100 mg, Nasogastric, TID  vitamin D, 50,000 Units, Oral, Q7 Days    Infusions   Diet  Diet Regular; Cardiac      Assessment/Plan     Active Hospital Problems    Diagnosis  POA   • **Delirium tremens (HCC) [F10.231]  Yes   • Acute respiratory failure with hypercapnia (HCC) [J96.02]  Yes   • Aspiration pneumonia (HCC) [J69.0]  No   • Alcohol abuse [F10.10]  Yes   • Alcoholic liver disease (HCC) [K70.9]  Yes   • Tobacco abuse [Z72.0]  Yes   • Hiatal hernia [K44.9]  Yes   • COPD (chronic obstructive pulmonary disease) (HCC) [J44.9]  Yes   • Anemia, chronic disease [D63.8]  Yes   • Acute kidney failure (HCC) [N17.9]  Yes   • Hypomagnesemia [E83.42]  Yes   • Essential hypertension [I10]  Yes      Resolved Hospital Problems   No resolved problems to display.   Alcohol Dependence in Withdrawal with Delirium Tremens  - required intubation to protect airway, extubated 10/12/21  -  appears to be out of window for EtOH withdrawal, will start tapering benzos and clonidine  - current issues with agitation are mor consistent with hospital delirium-continue seroquel and add PRN haldol  - appreciate critical care management from LPC    Aspiration Pneumonia  - finished levofloxacin and flagyl  - encourage pulmonary toilet  - weaned to room air    COPD/LIVAN  - no exacerbation  - O2 PRN    DEEP  - probably from hypotension with progression to ATN  - much improved with IVF, then became volume overloaded and has been diuresed per nephrology with much improvement , appreciate recs    Hypomagnesemia/Hypokalemia  - replace as needed    Lovenox 40 mg SC daily for DVT prophylaxis.  Full code.  Discussed with patient and nursing staff.  Anticipate discharge home with HH vs SNU facility in 1-2 days.      Meño Liriano MD  [facility]  10/17/21  12:58 EDT      Electronically signed by Meño Liriaon MD at 10/17/21 1302       Consult Notes (last 24 hours)  Notes from 10/17/21 1114 through 10/18/21 1114   No notes of this type exist for this encounter.

## 2021-10-18 NOTE — CONSULTS
*Duplicate order.    Pt seen for full consult by [facility] RN, Oswaldo Irwin on 10/7/2021. See note dated the same. Pt given JANELL tx resources at that time.

## 2021-10-18 NOTE — DISCHARGE PLACEMENT REQUEST
"Brittany Villalobos (66 y.o. Male)             Date of Birth Social Security Number Address Home Phone MRN    1955  [address] [address] [phone] [id number]    Zoroastrianism Marital Status             Mandaeism        Admission Date Admission Type Admitting Provider Attending Provider Department, Room/Bed    10/7/21 Emergency Britney Triplett MD Lykins, Matthew D, MD [address], [address]    Discharge Date Discharge Disposition Discharge Destination                         Attending Provider: Meño Liriano MD    Allergies: Penicillins, Penicillins    Isolation: None   Infection: None   Code Status: CPR   Advance Care Planning Activity    Ht: 200.7 cm (79\")   Wt: 115 kg (254 lb 10.1 oz)    Admission Cmt: None   Principal Problem: Delirium tremens (HCC) [F10.231]                 Active Insurance as of 10/7/2021     Primary Coverage     Payor Plan Insurance Group Employer/Plan Group    [facility] [employer] [facility] [employer] BL[employer] [facility] [id number]     Payor Plan Address Payor Plan Phone Number Payor Plan Fax Number Effective Dates    [address] [phone]  4/1/2019 - None Entered    [facility] [id number]       Subscriber Name Subscriber Birth Date Member ID       BRITTANY VILLALOBOS 1955 [id number]           Secondary Coverage     Payor Plan Insurance Group Employer/Plan Group    MEDICARE MEDICARE A ONLY      Payor Plan Address Payor Plan Phone Number Payor Plan Fax Number Effective Dates    [address] [phone]  9/1/2019 - None Entered    [facility] [id number]       Subscriber Name Subscriber Birth Date Member ID       BRITTANY VILLALOBOS 1955 [id number]                 Emergency Contacts      (Rel.) Home Phone Work Phone Mobile Phone    lawrence villalobos (Son) -- -- [phone]    Rosalba Angeles (Sister) [phone] -- --    ROSALBA ANGELES (Sister) [phone] -- [phone]              "

## 2021-10-18 NOTE — PROGRESS NOTES
Name: Watson Lisa ADMIT: 10/7/2021   : 1955  PCP: Checo Dunham MD    MRN: [id number] LOS: 11 days   AGE/SEX: 66 y.o. male  ROOM: [facility]     Subjective   Subjective   CC: alcohol withdrawal  No acute events. Patient has no new complaints. Worked with PT. Taking PO. No CP/dyspnea/f/c/n/v/d.    Objective   Objective   Vital Signs  Temp:  [97.7 °F (36.5 °C)-98.3 °F (36.8 °C)] 98 °F (36.7 °C)  Heart Rate:  [] 110  Resp:  [18] 18  BP: (137-164)/(81-94) 144/82  SpO2:  [93 %] 93 %  on   ;   Device (Oxygen Therapy): room air  Body mass index is 28.69 kg/m².  Physical Exam  Vitals and nursing note reviewed.   Constitutional:       General: He is not in acute distress.     Appearance: He is not toxic-appearing or diaphoretic.   HENT:      Head: Normocephalic and atraumatic.      Nose: Nose normal.      Mouth/Throat:      Mouth: Mucous membranes are moist.      Pharynx: Oropharynx is clear.   Eyes:      Extraocular Movements: Extraocular movements intact.      Conjunctiva/sclera: Conjunctivae normal.      Pupils: Pupils are equal, round, and reactive to light.   Cardiovascular:      Rate and Rhythm: Normal rate and regular rhythm.      Pulses: Normal pulses.   Pulmonary:      Effort: Pulmonary effort is normal.      Breath sounds: Examination of the right-lower field reveals decreased breath sounds. Examination of the left-lower field reveals decreased breath sounds. Decreased breath sounds present.   Abdominal:      General: Bowel sounds are normal.      Palpations: Abdomen is soft.   Musculoskeletal:         General: Swelling (trace BLE) present. No tenderness.      Cervical back: Normal range of motion and neck supple.   Skin:     General: Skin is warm and dry.      Capillary Refill: Capillary refill takes less than 2 seconds.   Neurological:      General: No focal deficit present.      Mental Status: He is alert.      Motor: No tremor.   Psychiatric:         Mood and Affect: Mood normal.          Behavior: Behavior normal.     Results Review     I reviewed the patient's new clinical results.  I reviewed the patient's telemetry  Results from last 7 days   Lab Units 10/16/21  0443 10/15/21  0408 10/12/21  0350   WBC 10*3/mm3 8.40 8.65 9.03   HEMOGLOBIN g/dL 10.7* 11.0* 9.9*   PLATELETS 10*3/mm3 422 327 136*     Results from last 7 days   Lab Units 10/16/21  0443 10/15/21  0408 10/14/21  2215 10/14/21  0530 10/12/21  0350 10/12/21  0350   SODIUM mmol/L 138 137  --  140  --  139   POTASSIUM mmol/L 3.4* 3.8 3.7 3.1*   < > 3.5   CHLORIDE mmol/L 102 100  --  99  --  106   CO2 mmol/L 22.6 23.8  --  27.6  --  20.6*   BUN mg/dL 18 20  --  19  --  20   CREATININE mg/dL 0.89 0.92  --  0.99  --  0.91   GLUCOSE mg/dL 113* 99  --  97  --  122*    < > = values in this interval not displayed.   Estimated Creatinine Clearance: 117.3 mL/min (by C-G formula based on SCr of 0.89 mg/dL).  Results from last 7 days   Lab Units 10/15/21  0408 10/14/21  0530 10/12/21  0350   ALBUMIN g/dL 3.30* 3.60 2.90*   BILIRUBIN mg/dL  --   --  0.4   ALK PHOS U/L  --   --  70   AST (SGOT) U/L  --   --  11   ALT (SGPT) U/L  --   --  16     Results from last 7 days   Lab Units 10/16/21  0443 10/15/21  0408 10/14/21  0530 10/12/21  0350   CALCIUM mg/dL 9.0 9.0 8.4* 8.7   ALBUMIN g/dL  --  3.30* 3.60 2.90*   MAGNESIUM mg/dL  --  1.7 1.3*  --    PHOSPHORUS mg/dL  --  3.1 4.0 3.5         COVID19   Date Value Ref Range Status   10/07/2021 Not Detected Not Detected - Ref. Range Final   03/25/2021 Not Detected Not Detected - Ref. Range Final     No results found for: HGBA1C, POCGLU    XR Abdomen KUB  KUB     HISTORY: Nasogastric tube repositioning     COMPARISON: 10/09/2021 at 1959 hours.     FINDINGS: A single view of the upper abdomen demonstrates a nasogastric  tube in place. The nasogastric tube has been advanced. The tip is within  the proximal body of the stomach.     Air is identified within loops of small bowel which are mildly  prominent, similar  to the prior examination. No obvious pneumatosis or  pneumoperitoneum is identified. Evaluation could be performed with a CT  examination of the abdomen and pelvis as indicated.     The above information was called to the patient's nurse at the time of  the dictation. The patient's nurse is to relay the information to the  clinical service.     This report was finalized on 10/11/2021 7:20 AM by Dr. Vincent Beck M.D.     XR Chest 1 View  Narrative: Patient: BRITTANY ZULETA  Time Out: 07:22  Exam(s): FILM CXR 1 VIEW     EXAM:    XR Chest, 1 View    CLINICAL HISTORY:     Reason for exam: Right-sided aspiration pneumonia.    TECHNIQUE:    Frontal view of the chest.    COMPARISON:    10 10 2021    FINDINGS:    Lungs: Hazy bibasilar opacities, unchanged from prior study.    Pleural space:  Unremarkable.  No pneumothorax.    Heart: Unchanged cardiomegaly.    Mediastinum: Endotracheal tube terminates 5.6 cm above the level of the   [place].  Nasogastric tube terminates below field-of-view in the left   upper quadrant..    Bones joints:  Unremarkable.    IMPRESSION:       Unchanged hazy bibasilar opacities which may represent consolidations,   and or layering bilateral pleural effusions.  Impression: Electronically signed by Christiano Escobar M.D. on 10-11-21 at 0722    Scheduled Medications  amLODIPine, 10 mg, Oral, Daily  clonazePAM, 2 mg, Oral, Q12H  enoxaparin, 40 mg, Subcutaneous, Q24H  lansoprazole, 30 mg, Nasogastric, QAM  metoprolol tartrate, 50 mg, Oral, Q8H  multivitamin with minerals, 1 tablet, Oral, Daily  nicotine, 1 patch, Transdermal, Q24H  QUEtiapine, 25 mg, Oral, Nightly  senna-docusate sodium, 2 tablet, Oral, BID  sodium chloride, 10 mL, Intravenous, Q12H  thiamine, 100 mg, Nasogastric, TID  vitamin D, 50,000 Units, Oral, Q7 Days    Infusions   Diet  Diet Regular; Cardiac       Assessment/Plan     Active Hospital Problems    Diagnosis  POA   • **Delirium tremens (HCC) [F10.231]  Yes   • Acute respiratory  failure with hypercapnia (HCC) [J96.02]  Yes   • Aspiration pneumonia (HCC) [J69.0]  No   • Alcohol abuse [F10.10]  Yes   • Alcoholic liver disease (HCC) [K70.9]  Yes   • Tobacco abuse [Z72.0]  Yes   • Hiatal hernia [K44.9]  Yes   • COPD (chronic obstructive pulmonary disease) (HCC) [J44.9]  Yes   • Anemia, chronic disease [D63.8]  Yes   • Acute kidney failure (HCC) [N17.9]  Yes   • Hypomagnesemia [E83.42]  Yes   • Essential hypertension [I10]  Yes      Resolved Hospital Problems   No resolved problems to display.   Alcohol Dependence in Withdrawal with Delirium Tremens  - required intubation to protect airway, extubated 10/12/21  - appears to be out of window for EtOH withdrawal, will start tapering benzos and clonidine  - continue seroquel and add PRN haldol for hospital delirium which is resolving  - appreciate critical care management from [facility]    Aspiration Pneumonia  - finished levofloxacin and flagyl  - encourage pulmonary toilet  - weaned to room air    COPD/LIVAN  - no exacerbation  - O2 PRN    DEEP  - probably from hypotension with progression to ATN  - much improved with IVF, then became volume overloaded and has been diuresed per nephrology with much improvement , appreciate recs    Hypomagnesemia/Hypokalemia  - replace as needed    Lovenox 40 mg SC daily for DVT prophylaxis.  Full code.  Discussed with patient and nursing staff.  Anticipate discharge to SNU facility tomorrow.    I d/w patient regarding his unsteadiness and he is agreeable to SNF for rehab if he qualifies.    Meño Liriano MD  [facility]  10/18/21  16:49 EDT

## 2021-10-18 NOTE — PLAN OF CARE
Goal Outcome Evaluation:  Plan of Care Reviewed With: patient           Outcome Summary: Pt was agreeable to therapy with encouragement. He ambulated 150 ft with min assist using Rwx. Pt is impulsive and very tremulous while walking. He frequently veers to the R and requires frequent verbal cueing to avoid obstacles.

## 2021-10-18 NOTE — PLAN OF CARE
Goal Outcome Evaluation:  Plan of Care Reviewed With: patient        Progress: improving  Outcome Summary: Patient alert and oriented x 4.  Up to the bathroom with assistance and walker.  VSS.  Will CTM.

## 2021-10-18 NOTE — NURSING NOTE
Uneventful day. AOx4. To SNF if able unless significantly improved tomorrow, in which case he will dc home with HH. VSS.

## 2021-10-19 ENCOUNTER — READMISSION MANAGEMENT (OUTPATIENT)
Dept: CALL CENTER | Facility: HOSPITAL | Age: 66
End: 2021-10-19

## 2021-10-19 ENCOUNTER — TELEPHONE (OUTPATIENT)
Dept: FAMILY MEDICINE CLINIC | Facility: CLINIC | Age: 66
End: 2021-10-19

## 2021-10-19 ENCOUNTER — TRANSCRIBE ORDERS (OUTPATIENT)
Dept: HOME HEALTH SERVICES | Facility: HOME HEALTHCARE | Age: 66
End: 2021-10-19

## 2021-10-19 ENCOUNTER — HOME HEALTH ADMISSION (OUTPATIENT)
Dept: HOME HEALTH SERVICES | Facility: HOME HEALTHCARE | Age: 66
End: 2021-10-19

## 2021-10-19 VITALS
SYSTOLIC BLOOD PRESSURE: 145 MMHG | BODY MASS INDEX: 29.46 KG/M2 | OXYGEN SATURATION: 96 % | HEIGHT: 78 IN | RESPIRATION RATE: 18 BRPM | WEIGHT: 254.63 LBS | TEMPERATURE: 97.4 F | DIASTOLIC BLOOD PRESSURE: 83 MMHG | HEART RATE: 87 BPM

## 2021-10-19 DIAGNOSIS — J69.0 ASPIRATION PNEUMONIA, UNSPECIFIED ASPIRATION PNEUMONIA TYPE, UNSPECIFIED LATERALITY, UNSPECIFIED PART OF LUNG (HCC): ICD-10-CM

## 2021-10-19 DIAGNOSIS — F10.231 ALCOHOL WITHDRAWAL DELIRIUM, ACUTE, MIXED LEVEL OF ACTIVITY (HCC): Primary | ICD-10-CM

## 2021-10-19 PROBLEM — N17.9 ACUTE KIDNEY FAILURE (HCC): Status: RESOLVED | Noted: 2021-03-26 | Resolved: 2021-10-19

## 2021-10-19 PROBLEM — E83.42 HYPOMAGNESEMIA: Status: RESOLVED | Noted: 2021-03-26 | Resolved: 2021-10-19

## 2021-10-19 PROBLEM — J96.02 ACUTE RESPIRATORY FAILURE WITH HYPERCAPNIA (HCC): Status: RESOLVED | Noted: 2021-10-08 | Resolved: 2021-10-19

## 2021-10-19 PROBLEM — F10.931 DELIRIUM TREMENS (HCC): Status: RESOLVED | Noted: 2021-10-07 | Resolved: 2021-10-19

## 2021-10-19 PROBLEM — F10.10 ALCOHOL ABUSE: Status: RESOLVED | Noted: 2021-10-07 | Resolved: 2021-10-19

## 2021-10-19 PROCEDURE — 92526 ORAL FUNCTION THERAPY: CPT

## 2021-10-19 PROCEDURE — 97116 GAIT TRAINING THERAPY: CPT

## 2021-10-19 RX ADMIN — METOPROLOL TARTRATE 50 MG: 50 TABLET, FILM COATED ORAL at 03:26

## 2021-10-19 RX ADMIN — DOCUSATE SODIUM 50MG AND SENNOSIDES 8.6MG 2 TABLET: 8.6; 5 TABLET, FILM COATED ORAL at 08:40

## 2021-10-19 RX ADMIN — METOPROLOL TARTRATE 50 MG: 50 TABLET, FILM COATED ORAL at 08:39

## 2021-10-19 RX ADMIN — MULTIPLE VITAMINS W/ MINERALS TAB 1 TABLET: TAB at 08:40

## 2021-10-19 RX ADMIN — ACETAMINOPHEN 650 MG: 325 TABLET, FILM COATED ORAL at 08:40

## 2021-10-19 RX ADMIN — LANSOPRAZOLE 30 MG: KIT at 08:38

## 2021-10-19 RX ADMIN — Medication 100 MG: at 08:40

## 2021-10-19 RX ADMIN — AMLODIPINE BESYLATE 10 MG: 10 TABLET ORAL at 08:40

## 2021-10-19 NOTE — TELEPHONE ENCOUNTER
Caller: EVELYNE    Relationship: Home Health    Best call back number: [phone]    What orders are you requesting (i.e. lab or imaging): VERBAL ORDERS TO EVALUATE FOR PHYSICAL THERAPY    In what timeframe would the patient need to come in: ASAP    Where will you receive your lab/imaging services: IN HOME    Additional notes:

## 2021-10-19 NOTE — OUTREACH NOTE
Prep Survey      Responses   [facility] patient discharged from? [facility]   Is LACE score < 7 ? No   Emergency Room discharge w/ pulse ox? No   Eligibility [facility]   Date of Admission 10/07/21   Date of Discharge 10/19/21   Discharge Disposition Home or Self Care   Discharge diagnosis Alcohol abuse,   Delirium tremens,   Aspiration pneumonia    Does the patient have one of the following disease processes/diagnoses(primary or secondary)? COPD/Pneumonia   Does the patient have Home health ordered? Yes   What is the Home health agency?  [facility]    Is there a DME ordered? No   Prep survey completed? Yes          Joyce Marie RN

## 2021-10-19 NOTE — THERAPY TREATMENT NOTE
Patient Name: Watson Lisa  : 1955    MRN: [id number]                              Today's Date: 10/19/2021       Admit Date: 10/7/2021    Visit Dx:     ICD-10-CM ICD-9-CM   1. Alcohol withdrawal syndrome with complication (HCC)  F10.239 291.81   2. Acute renal insufficiency  N28.9 593.9   3. Hypomagnesemia  E83.42 275.2   4. New onset a-fib (HCC)  I48.91 427.31   5. Atrial fibrillation with rapid ventricular response (HCC)  I48.91 427.31   6. Generalized weakness  R53.1 780.79     Patient Active Problem List   Diagnosis   • Arthritis   • Essential hypertension   • Tobacco abuse   • Alcohol withdrawal syndrome without complication (HCC)   • Chest pain in adult   • Sleep apnea   • Hiatal hernia   • Alcohol abuse   • Effusion of left knee   • Osteoarthritis of left knee   • Vitamin D deficiency   • Anemia, chronic disease   • Hyponatremia   • Alcohol dependence with withdrawal (HCC)   • COPD (chronic obstructive pulmonary disease) (HCC)   • LIVAN (obstructive sleep apnea)   • ETOH abuse   • Tobacco abuse   • Obese   • Anemia, chronic disease   • Alcoholic liver disease (HCC)   • Tobacco abuse   • Hiatal hernia   • COPD (chronic obstructive pulmonary disease) (HCC)   • Anemia, chronic disease     Past Medical History:   Diagnosis Date   • Alcohol abuse    • Anxiety    • Arthritis    • Bronchitis with chronic airway obstruction (HCC)     LAST FE   • DVT (deep venous thrombosis) (HCC)    • Hiatal hernia    • Hypertension    • Hypertension    • Low back pain    • Neck pain    • Pulmonary embolism (HCC)    • Sleep apnea with use of continuous positive airway pressure (CPAP)     AT NIGHT     Past Surgical History:   Procedure Laterality Date   • COLONOSCOPY     • JOINT REPLACEMENT Right     hip/knee   • REPLACEMENT TOTAL KNEE     • TONSILLECTOMY     • TOTAL HIP ARTHROPLASTY Right       General Information     Row Name 10/19/21 1627          Physical Therapy Time and Intention    Document Type therapy note  (daily note)  -EB     Mode of Treatment physical therapy; individual therapy  -EB     Row Name 10/19/21 1627          General Information    Existing Precautions/Restrictions fall  -EB     Row Name 10/19/21 1627          Cognition    Orientation Status (Cognition) oriented x 3  -EB     Row Name 10/19/21 1627          Safety Issues, Functional Mobility    Impairments Affecting Function (Mobility) strength  -EB           User Key  (r) = Recorded By, (t) = Taken By, (c) = Cosigned By    Initials Name Provider Type    Shayy Pierre PTA Physical Therapy Assistant               Mobility     Row Name 10/19/21 1627          Bed Mobility    Supine-Sit Knott (Bed Mobility) modified independence  -EB     Sit-Supine Knott (Bed Mobility) modified independence  -EB     Assistive Device (Bed Mobility) bed rails; head of bed elevated  -EB     Row Name 10/19/21 1627          Sit-Stand Transfer    Sit-Stand Knott (Transfers) standby assist  -EB     Assistive Device (Sit-Stand Transfers) walker, front-wheeled  -EB     Row Name 10/19/21 1627          Gait/Stairs (Locomotion)    Knott Level (Gait) standby assist  -EB     Assistive Device (Gait) walker, front-wheeled  -EB     Distance in Feet (Gait) 200ft  -EB     Deviations/Abnormal Patterns (Gait) gait speed decreased  -EB     Bilateral Gait Deviations forward flexed posture  -EB           User Key  (r) = Recorded By, (t) = Taken By, (c) = Cosigned By    Initials Name Provider Type    Shayy Pierre PTA Physical Therapy Assistant               Obj/Interventions    No documentation.                Goals/Plan    No documentation.                Clinical Impression     Row Name 10/19/21 1628          Plan of Care Review    Plan of Care Reviewed With patient  -EB     Progress improving  -EB     Outcome Summary Pt tolerated treatment with no complaints. Pt is progressing well with mobility. Pt is modif. indep. with bed mobility and SBA with sit<->stand  transfers. Pt ambulated 200ft with rwx, SBA. No unsteadiness or LOB noted. Will continue to progress pt as tolerated.  -EB     Row Name 10/19/21 1628          Positioning and Restraints    Pre-Treatment Position in bed  no bed alarm upon entry  -EB     Post Treatment Position bed  -EB     In Bed supine; call light within reach; encouraged to call for assist  -EB           User Key  (r) = Recorded By, (t) = Taken By, (c) = Cosigned By    Initials Name Provider Type    Shayy Pierre PTA Physical Therapy Assistant               Outcome Measures     Row Name 10/19/21 1629          How much help from another person do you currently need...    Turning from your back to your side while in flat bed without using bedrails? 4  -EB     Moving from lying on back to sitting on the side of a flat bed without bedrails? 4  -EB     Moving to and from a bed to a chair (including a wheelchair)? 3  -EB     Standing up from a chair using your arms (e.g., wheelchair, bedside chair)? 4  -EB     Climbing 3-5 steps with a railing? 3  -EB     To walk in hospital room? 3  -EB     AM-PAC 6 Clicks Score (PT) 21  -EB           User Key  (r) = Recorded By, (t) = Taken By, (c) = Cosigned By    Initials Name Provider Type    Shayy Pierre PTA Physical Therapy Assistant                             Physical Therapy Education                 Title: PT OT SLP Therapies (Done)     Topic: Physical Therapy (Done)     Point: Mobility training (Done)     Learning Progress Summary           Patient Acceptance, E, VU by EB at 10/19/2021 1629    Acceptance, E, VU,NR by JAEL at 10/18/2021 0927    Acceptance, E,TB, VU by FELISHA at 10/17/2021 1615                   Point: Home exercise program (Done)     Learning Progress Summary           Patient Acceptance, E, VU by EB at 10/19/2021 1629    Acceptance, E, VU,NR by JAEL at 10/18/2021 0927    Acceptance, E,D, NR by MS at 10/15/2021 1510                   Point: Body mechanics (Done)     Learning Progress Summary            Patient Acceptance, E, VU by EB at 10/19/2021 1629    Acceptance, E, VU,NR by  at 10/18/2021 0927                   Point: Precautions (Done)     Learning Progress Summary           Patient Acceptance, E, VU by EB at 10/19/2021 1629    Acceptance, E, VU,NR by KH at 10/18/2021 0927    Acceptance, E,TB, VU by CB at 10/17/2021 1615                               User Key     Initials Effective Dates Name Provider Type Discipline     06/16/21 -  Ivet Cohen, PT Physical Therapist PT    MS 06/16/21 -  Meño iGl, PT Physical Therapist PT    EB 06/16/21 -  Shayy Aldridge PTA Physical Therapy Assistant PT    CB 12/30/20 -  Sanjana Lozano Physical Therapist PT              PT Recommendation and Plan     Plan of Care Reviewed With: patient  Progress: improving  Outcome Summary: Pt tolerated treatment with no complaints. Pt is progressing well with mobility. Pt is modif. indep. with bed mobility and SBA with sit<->stand transfers. Pt ambulated 200ft with rwx, SBA. No unsteadiness or LOB noted. Will continue to progress pt as tolerated.     Time Calculation:    PT Charges     Row Name 10/19/21 1627             Time Calculation    Start Time 1120  -EB      Stop Time 1130  -EB      Time Calculation (min) 10 min  -EB      PT Received On 10/19/21  -EB      PT - Next Appointment 10/20/21  -              Time Calculation- PT    Total Timed Code Minutes- PT 10 minute(s)  -EB            User Key  (r) = Recorded By, (t) = Taken By, (c) = Cosigned By    Initials Name Provider Type     Shayy Aldridge PTA Physical Therapy Assistant              Therapy Charges for Today     Code Description Service Date Service Provider Modifiers Qty    65802600729 HC GAIT TRAINING EA 15 MIN 10/19/2021 Shayy Aldridge PTA GP 1          PT G-Codes  Outcome Measure Options: AM-PAC 6 Clicks Basic Mobility (PT)  AM-PAC 6 Clicks Score (PT): 21    Shayy Aldridge PTA  10/19/2021

## 2021-10-19 NOTE — THERAPY TREATMENT NOTE
Acute Care - Speech Language Pathology   Swallow Treatment Note [facility]     Patient Name: Watson Lisa  : 1955  MRN: [id number]  Today's Date: 10/19/2021               Admit Date: 10/7/2021    Visit Dx:     ICD-10-CM ICD-9-CM   1. Alcohol withdrawal syndrome with complication (HCC)  F10.239 291.81   2. Acute renal insufficiency  N28.9 593.9   3. Hypomagnesemia  E83.42 275.2   4. New onset a-fib (HCC)  I48.91 427.31   5. Atrial fibrillation with rapid ventricular response (HCC)  I48.91 427.31     Patient Active Problem List   Diagnosis   • Arthritis   • Essential hypertension   • Tobacco abuse   • Alcohol withdrawal syndrome without complication (HCC)   • Chest pain in adult   • Sleep apnea   • Hiatal hernia   • Alcohol abuse   • Effusion of left knee   • Osteoarthritis of left knee   • Vitamin D deficiency   • Anemia, chronic disease   • Hyponatremia   • Alcohol dependence with withdrawal (HCC)   • Acute kidney failure (HCC)   • COPD (chronic obstructive pulmonary disease) (HCC)   • LIVAN (obstructive sleep apnea)   • ETOH abuse   • Tobacco abuse   • Obese   • Anemia, chronic disease   • Hypomagnesemia   • Delirium tremens (HCC)   • Alcohol abuse   • Alcoholic liver disease (HCC)   • Tobacco abuse   • Hiatal hernia   • COPD (chronic obstructive pulmonary disease) (HCC)   • Anemia, chronic disease   • Acute respiratory failure with hypercapnia (HCC)   • Aspiration pneumonia (HCC)     Past Medical History:   Diagnosis Date   • Alcohol abuse    • Anxiety    • Arthritis    • Bronchitis with chronic airway obstruction (HCC)     LAST FEB   • DVT (deep venous thrombosis) (HCC)    • Hiatal hernia    • Hypertension    • Hypertension    • Low back pain    • Neck pain    • Pulmonary embolism (HCC)    • Sleep apnea with use of continuous positive airway pressure (CPAP)     AT NIGHT     Past Surgical History:   Procedure Laterality Date   • COLONOSCOPY     • JOINT REPLACEMENT Right     hip/knee   • REPLACEMENT  "TOTAL KNEE     • TONSILLECTOMY     • TOTAL HIP ARTHROPLASTY Right        SLP Recommendation and Plan                                                             Plan of Care Reviewed With: patient  Outcome Summary: Progress: Pt seen for re-evaluation of swallowing/diet tolerance during trials of thin liquids by cup, nectar thick liquids by cup, honey thick liquids by cup, puree, mechanical soft/mixed, and regular solids. Pt initially exhibiting no coughing prior to p.o. trials or during trials of thin by cup, puree, mechanical soft/mixed, or regular solids. Pt then noted to throat clear/cough after trial of consecutive drinks of thin via cup and nectar thick liquids. Pt exhibited no overt s/s of penetration/aspiration during trials of honey thick liquids by cup or single drinks of thin by cup. Pt reports cough is his \"smokers cough\", however, appeared more related to swallowing function this date. Recommend pt to continue with regular solid diet and thin liquids via cup only, 1 single small drink at a time, slow rate and fully upright posture. Per RN, pt to discharge today. Completed education with pt regarding risk and s/s of penetration/aspiration and use of swallowing strategies. Pt expressed understanding and agreement. Recommended to pt to follow up with SLP as outpatient if discharging today.      SWALLOW EVALUATION (last 72 hours)     SLP Adult Swallow Evaluation     Row Name 10/19/21 1000 10/16/21 1500          Document Type therapy note (daily note)  -ML evaluation  -LT    Subjective Information no complaints  -ML no complaints  -LT    Patient Observations alert; cooperative  -ML alert; cooperative  -LT    Care Plan Review -- evaluation/treatment results reviewed; care plan/treatment goals reviewed; risks/benefits reviewed; current/potential barriers reviewed; patient/other agree to care plan  -LT    Patient Effort good  -ML good  -LT    Symptoms Noted During/After Treatment none  -ML none  -LT             "    Patient Profile Reviewed yes  -ML yes  -LT    Pertinent History Of Current Problem -- 67 yo M w/DEEP, alcohol abuse w/DT.  -LT    Current Method of Nutrition -- regular textures; thin liquids  -LT    Prior Level of Function-Swallowing -- no diet consistency restrictions  -LT    Plans/Goals Discussed with -- patient; agreed upon  -LT    Barriers to Rehab -- none identified  -LT                Dentition Assessment -- natural, present and adequate; teeth are in poor condition  -LT    Volitional Swallow -- WFL  -LT    Volitional Cough -- WFL  -LT                Oral Motor General Assessment -- WFL  -LT                Clinical Swallow Evaluation Summary -- bedside swallow eval completed. pt w/baseline cough noted before, during and after eval-did not seem associated with any particular PO consistency. trials of thin, NTL, puree, mech soft and regular solids given. REC: continue current diet, meds with thin or puree as tolerated, up at 90', small bites/drinks.  -LT                SLP Swallowing Diagnosis -- functional pharyngeal phase  -LT    Functional Impact -- risk of aspiration/pneumonia  -LT    Rehab Potential/Prognosis, Swallowing -- good, to achieve stated therapy goals  -LT    Swallow Criteria for Skilled Therapeutic Interventions Met -- demonstrates skilled criteria  -LT                Therapy Frequency (Swallow) -- PRN  -LT    Predicted Duration Therapy Intervention (Days) -- until discharge  -LT    SLP Diet Recommendation -- regular textures; thin liquids  -LT    Recommended Precautions and Strategies -- upright posture during/after eating; small bites of food and sips of liquid  -LT    Oral Care Recommendations -- Oral Care BID/PRN  -LT    SLP Rec. for Method of Medication Administration -- meds whole; with thin liquids; with pudding or applesauce; as tolerated  -LT    Monitor for Signs of Aspiration -- yes; notify SLP if any concerns  -LT                Oral Nutrition/Hydration Goal Selection (SLP) oral  "nutrition/hydration, SLP goal 1  -ML oral nutrition/hydration, SLP goal 1  -LT                Oral Nutrition/Hydration Goal 1, SLP tolerate least restrictive diet  -ML tolerate least restrictive diet  -LT    Time Frame (Oral Nutrition/Hydration Goal 1, SLP) by discharge  -ML by discharge  -LT    Barriers (Oral Nutrition/Hydration Goal 1, SLP) Progress: Pt seen for re-evaluation of swallowing/diet tolerance during trials of thin liquids by cup, nectar thick liquids by cup, honey thick liquids by cup, puree, mechanical soft/mixed, and regular solids. Pt initially exhibiting no coughing prior to p.o. trials or during trials of thin by cup, puree, mechanical soft/mixed, or regular solids. Pt then noted to throat clear/cough after trial of consecutive drinks of thin via cup and nectar thick liquids. Pt exhibited no overt s/s of penetration/aspiration during trials of honey thick liquids by cup or single drinks of thin by cup. Pt reports cough is his \"smokers cough\", however, appeared more related to swallowing function this date. Recommend pt to continue with regular solid diet and thin liquids via cup only, 1 single small drink at a time, slow rate and fully upright posture. Per RN, pt to discharge today. Completed education with pt regarding risk and s/s of penetration/aspiration and use of swallowing strategies. Pt expressed understanding and agreement. Recommended to pt to follow up with SLP as outpatient if discharging today.  -ML --    Progress/Outcomes (Oral Nutrition/Hydration Goal 1, SLP) goal ongoing  -ML --          User Key  (r) = Recorded By, (t) = Taken By, (c) = Cosigned By    Initials Name Effective Dates    LT Sarah Foley MS CCC-SLP 06/16/21 -     ML Maeve Bermudez MS CCC-SLP 06/16/21 -                 EDUCATION  The patient has been educated in the following areas:   Dysphagia (Swallowing Impairment).        SLP GOALS     Row Name 10/19/21 1000 10/16/21 1500          Oral Nutrition/Hydration " "Goal 1 (SLP)    Oral Nutrition/Hydration Goal 1, SLP tolerate least restrictive diet  -ML tolerate least restrictive diet  -LT     Time Frame (Oral Nutrition/Hydration Goal 1, SLP) by discharge  -ML by discharge  -LT     Barriers (Oral Nutrition/Hydration Goal 1, SLP) Progress: Pt seen for re-evaluation of swallowing/diet tolerance during trials of thin liquids by cup, nectar thick liquids by cup, honey thick liquids by cup, puree, mechanical soft/mixed, and regular solids. Pt initially exhibiting no coughing prior to p.o. trials or during trials of thin by cup, puree, mechanical soft/mixed, or regular solids. Pt then noted to throat clear/cough after trial of consecutive drinks of thin via cup and nectar thick liquids. Pt exhibited no overt s/s of penetration/aspiration during trials of honey thick liquids by cup or single drinks of thin by cup. Pt reports cough is his \"smokers cough\", however, appeared more related to swallowing function this date. Recommend pt to continue with regular solid diet and thin liquids via cup only, 1 single small drink at a time, slow rate and fully upright posture. Per RN, pt to discharge today. Completed education with pt regarding risk and s/s of penetration/aspiration and use of swallowing strategies. Pt expressed understanding and agreement. Recommended to pt to follow up with SLP as outpatient if discharging today.  -ML --     Progress/Outcomes (Oral Nutrition/Hydration Goal 1, SLP) goal ongoing  -ML --           User Key  (r) = Recorded By, (t) = Taken By, (c) = Cosigned By    Initials Name Provider Type    LT Sarah Foley MS CCC-SLP Speech and Language Pathologist    Maeve Rojo MS CCC-SLP Speech and Language Pathologist              SLP Outcome Measures (last 72 hours)     SLP Outcome Measures     Row Name 10/19/21 1100 10/16/21 1500          SLP Outcome Measures    Outcome Measure Used? Adult NOMS  -ML Adult NOMS  -LT            Adult FCM Scores    FCM Chosen " Swallowing  -ML Swallowing  -LT     Swallowing FCM Score 6  -ML 7  -LT           User Key  (r) = Recorded By, (t) = Taken By, (c) = Cosigned By    Initials Name Effective Dates    LT AlainaSarahMS MANZANARES-SLP 06/16/21 -     ML Maeve Bermudez MS CCC-SLP 06/16/21 -                  Time Calculation:    Time Calculation- SLP     Row Name 10/19/21 1103             Time Calculation- SLP    SLP Start Time 0950  -ML      SLP Received On 10/19/21  -ML              Untimed Charges    51718-[id number] Treatment Swallow Minutes 60  -ML              Total Minutes    Untimed Charges Total Minutes 60  -ML       Total Minutes 60  -ML            User Key  (r) = Recorded By, (t) = Taken By, (c) = Cosigned By    Initials Name Provider Type    Maeve Rojo MS CCC-SLP Speech and Language Pathologist                Therapy Charges for Today     Code Description Service Date Service Provider Modifiers Qty    98232824359 HC ST TREATMENT SWALLOW 4 10/19/2021 Maeve Bermudez MS CCC-SLP GN 1               Maeve Bermudez MS CCC-BRYANT  10/19/2021

## 2021-10-19 NOTE — CASE MANAGEMENT/SOCIAL WORK
Continued Stay Note  [facility]     Patient Name: Watson Lisa  MRN: [id number]  Today's Date: 10/19/2021    Admit Date: 10/7/2021     Discharge Plan     Row Name 10/19/21 1139       Plan    Plan Home with PT- son to transport home    Patient/Family in Agreement with Plan yes    Plan Comments CCP followed up with pt at bedside, introduced self and explained CCP role. Discussed dc planning and pt states plan is home. Offered referral to SNF with [facility], at [facility]. Pt declines wants to go home but agreeable for home PT. Pt states he has a walker and cane both at home. He feels safe to go home and his son Deni will drive him. Pt denies HH preference. CCP explained would make HH referrals to determine availablity and insurance acceptance. Referrals made to [facility], [facility] and [facility]. Pt states once he is stronger he plans to do outpt [facility]. Updated RN Sepideh Ratliff RN/CCP               Discharge Codes    No documentation.               Expected Discharge Date and Time     Expected Discharge Date Expected Discharge Time    Oct 21, 2021             Ivet Abbasi RN

## 2021-10-19 NOTE — PLAN OF CARE
"Goal Outcome Evaluation:  Plan of Care Reviewed With: patient           Outcome Summary: Progress: Pt seen for re-evaluation of swallowing/diet tolerance during trials of thin liquids by cup, nectar thick liquids by cup, honey thick liquids by cup, puree, mechanical soft/mixed, and regular solids. Pt initially exhibiting no coughing prior to p.o. trials or during trials of thin by cup, puree, mechanical soft/mixed, or regular solids. Pt then noted to throat clear/cough after trial of consecutive drinks of thin via cup and nectar thick liquids. Pt exhibited no overt s/s of penetration/aspiration during trials of honey thick liquids by cup or single drinks of thin by cup. Pt reports cough is his \"smokers cough\", however, appeared more related to swallowing function this date. Recommend pt to continue with regular solid diet and thin liquids via cup only, 1 single small drink at a time, slow rate and fully upright posture. Per RN, pt to discharge today. Completed education with pt regarding risk and s/s of penetration/aspiration and use of swallowing strategies. Pt expressed understanding and agreement. Recommended to pt to follow up with SLP if discharging today.  "

## 2021-10-19 NOTE — PAYOR COMM NOTE
"Brittany Villalobos (66 y.o. Male)     PLEASE SEE ATTACHED CONTINUED STAY REVIEW.     REF#[id number]    PLEASE CALL  OR  [phone]    THANK YOU    ELZA ARCE LPN CCP            Date of Birth Social Security Number Address Home Phone MRN    1955  [address] [phone] [id number]    Anabaptist Marital Status             Sikh        Admission Date Admission Type Admitting Provider Attending Provider Department, Room/Bed    10/7/21 Emergency Britney Triplett MD Lykins, Matthew D, MD [address], [address]/1    Discharge Date Discharge Disposition Discharge Destination                         Attending Provider: Meño Liriano MD    Allergies: Penicillins, Penicillins    Isolation: None   Infection: None   Code Status: CPR   Advance Care Planning Activity    Ht: 200.7 cm (79\")   Wt: 115 kg (254 lb 10.1 oz)    Admission Cmt: None   Principal Problem: Delirium tremens (HCC) [F10.231]                 Active Insurance as of 10/7/2021     Primary Coverage     Payor Plan Insurance Group Employer/Plan Group    ANTHEM BLUE CROSS ANTHEM BLUE CROSS BLUE SHIELD PPO [id number]     Payor Plan Address Payor Plan Phone Number Payor Plan Fax Number Effective Dates    [address] [phone]  4/1/2019 - None Entered    [facility] [id number]       Subscriber Name Subscriber Birth Date Member ID       BRITTANY VILLALOBOS 1955 [id number]           Secondary Coverage     Payor Plan Insurance Group Employer/Plan Group    MEDICARE MEDICARE A ONLY      Payor Plan Address Payor Plan Phone Number Payor Plan Fax Number Effective Dates    [address] [phone]  9/1/2019 - None Entered    [facility] [id number]       Subscriber Name Subscriber Birth Date Member ID       BRITTANY VILLALOBOS 1955 [id number]                 Emergency Contacts      (Rel.) Home Phone Work Phone Mobile Phone    lawrence villalobos (Son) -- -- " [phone]    Rosalba Angeles (Sister) [phone] -- --    ROSALBA ANGELES (Sister) [phone] -- [phone]            Oxygen Therapy (last day)     Date/Time SpO2 Device (Oxygen Therapy) Flow (L/min) Oxygen Concentration (%) ETCO2 (mmHg)    10/19/21 0200 -- room air -- -- --    10/18/21 2356 96 room air -- -- --    10/18/21 2000 -- room air -- -- --    10/18/21 193 96 room air -- -- --    10/18/21 0800 -- room air -- -- --    10/18/21 0042 -- room air -- -- --          Intake & Output (last day)       10/18 0701  10/19 0700 10/19 0701  10/20 0700    P.O.      Total Intake(mL/kg)      Urine (mL/kg/hr) 360 (0.1)     Stool 0     Total Output 360     Net -360           Urine Unmeasured Occurrence 2 x     Stool Unmeasured Occurrence 1 x         Lines, Drains & Airways     Active LDAs     Name Placement date Placement time Site Days    Peripheral IV 10/11/21 032 Left; Upper Arm 10/11/21  0327  Arm  8    Peripheral IV 10/16/21 0215 Anterior; Right Forearm 10/16/21  0215  Forearm  3                  Operative/Procedure Notes (last 24 hours)  Notes from 10/18/21 1002 through 10/19/21 1002   No notes of this type exist for this encounter.            Physician Progress Notes (last 24 hours)      Meño Liriano MD at 10/18/21 9945              Name: Watson Lisa ADMIT: 10/7/2021   : 1955  PCP: Checo Dunham MD    MRN: [id number] LOS: 11 days   AGE/SEX: 66 y.o. male  ROOM[facility]     Subjective   Subjective   CC: alcohol withdrawal  No acute events. Patient has no new complaints. Worked with PT. Taking PO. No CP/dyspnea/f/c/n/v/d.    Objective   Objective   Vital Signs  Temp:  [97.7 °F (36.5 °C)-98.3 °F (36.8 °C)] 98 °F (36.7 °C)  Heart Rate:  [] 110  Resp:  [18] 18  BP: (137-164)/(81-94) 144/82  SpO2:  [93 %] 93 %  on   ;   Device (Oxygen Therapy): room air  Body mass index is 28.69 kg/m².  Physical Exam  Vitals and nursing note reviewed.   Constitutional:       General: He is not in acute  distress.     Appearance: He is not toxic-appearing or diaphoretic.   HENT:      Head: Normocephalic and atraumatic.      Nose: Nose normal.      Mouth/Throat:      Mouth: Mucous membranes are moist.      Pharynx: Oropharynx is clear.   Eyes:      Extraocular Movements: Extraocular movements intact.      Conjunctiva/sclera: Conjunctivae normal.      Pupils: Pupils are equal, round, and reactive to light.   Cardiovascular:      Rate and Rhythm: Normal rate and regular rhythm.      Pulses: Normal pulses.   Pulmonary:      Effort: Pulmonary effort is normal.      Breath sounds: Examination of the right-lower field reveals decreased breath sounds. Examination of the left-lower field reveals decreased breath sounds. Decreased breath sounds present.   Abdominal:      General: Bowel sounds are normal.      Palpations: Abdomen is soft.   Musculoskeletal:         General: Swelling (trace BLE) present. No tenderness.      Cervical back: Normal range of motion and neck supple.   Skin:     General: Skin is warm and dry.      Capillary Refill: Capillary refill takes less than 2 seconds.   Neurological:      General: No focal deficit present.      Mental Status: He is alert.      Motor: No tremor.   Psychiatric:         Mood and Affect: Mood normal.         Behavior: Behavior normal.     Results Review     I reviewed the patient's new clinical results.  I reviewed the patient's telemetry  Results from last 7 days   Lab Units 10/16/21  0443 10/15/21  0408 10/12/21  0350   WBC 10*3/mm3 8.40 8.65 9.03   HEMOGLOBIN g/dL 10.7* 11.0* 9.9*   PLATELETS 10*3/mm3 422 327 136*     Results from last 7 days   Lab Units 10/16/21  0443 10/15/21  0408 10/14/21  2215 10/14/21  0530 10/12/21  0350 10/12/21  0350   SODIUM mmol/L 138 137  --  140  --  139   POTASSIUM mmol/L 3.4* 3.8 3.7 3.1*   < > 3.5   CHLORIDE mmol/L 102 100  --  99  --  106   CO2 mmol/L 22.6 23.8  --  27.6  --  20.6*   BUN mg/dL 18 20  --  19  --  20   CREATININE mg/dL 0.89 0.92   --  0.99  --  0.91   GLUCOSE mg/dL 113* 99  --  97  --  122*    < > = values in this interval not displayed.   Estimated Creatinine Clearance: 117.3 mL/min (by C-G formula based on SCr of 0.89 mg/dL).  Results from last 7 days   Lab Units 10/15/21  0408 10/14/21  0530 10/12/21  0350   ALBUMIN g/dL 3.30* 3.60 2.90*   BILIRUBIN mg/dL  --   --  0.4   ALK PHOS U/L  --   --  70   AST (SGOT) U/L  --   --  11   ALT (SGPT) U/L  --   --  16     Results from last 7 days   Lab Units 10/16/21  0443 10/15/21  0408 10/14/21  0530 10/12/21  0350   CALCIUM mg/dL 9.0 9.0 8.4* 8.7   ALBUMIN g/dL  --  3.30* 3.60 2.90*   MAGNESIUM mg/dL  --  1.7 1.3*  --    PHOSPHORUS mg/dL  --  3.1 4.0 3.5         COVID19   Date Value Ref Range Status   10/07/2021 Not Detected Not Detected - Ref. Range Final   03/25/2021 Not Detected Not Detected - Ref. Range Final     No results found for: HGBA1C, POCGLU    XR Abdomen KUB  KUB     HISTORY: Nasogastric tube repositioning     COMPARISON: 10/09/2021 at 1959 hours.     FINDINGS: A single view of the upper abdomen demonstrates a nasogastric  tube in place. The nasogastric tube has been advanced. The tip is within  the proximal body of the stomach.     Air is identified within loops of small bowel which are mildly  prominent, similar to the prior examination. No obvious pneumatosis or  pneumoperitoneum is identified. Evaluation could be performed with a CT  examination of the abdomen and pelvis as indicated.     The above information was called to the patient's nurse at the time of  the dictation. The patient's nurse is to relay the information to the  clinical service.     This report was finalized on 10/11/2021 7:20 AM by Dr. Vincent Beck M.D.     XR Chest 1 View  Narrative: Patient: BRITTANY ZULETA  Time Out: 07:22  Exam(s): FILM CXR 1 VIEW     EXAM:    XR Chest, 1 View    CLINICAL HISTORY:     Reason for exam: Right-sided aspiration pneumonia.    TECHNIQUE:    Frontal view of the  chest.    COMPARISON:    10 10 2021    FINDINGS:    Lungs: Hazy bibasilar opacities, unchanged from prior study.    Pleural space:  Unremarkable.  No pneumothorax.    Heart: Unchanged cardiomegaly.    Mediastinum: Endotracheal tube terminates 5.6 cm above the level of the   [place].  Nasogastric tube terminates below field-of-view in the left   upper quadrant..    Bones joints:  Unremarkable.    IMPRESSION:       Unchanged hazy bibasilar opacities which may represent consolidations,   and or layering bilateral pleural effusions.  Impression: Electronically signed by Christiano Escobar M.D. on 10-11-21 at 0722    Scheduled Medications  amLODIPine, 10 mg, Oral, Daily  clonazePAM, 2 mg, Oral, Q12H  enoxaparin, 40 mg, Subcutaneous, Q24H  lansoprazole, 30 mg, Nasogastric, QAM  metoprolol tartrate, 50 mg, Oral, Q8H  multivitamin with minerals, 1 tablet, Oral, Daily  nicotine, 1 patch, Transdermal, Q24H  QUEtiapine, 25 mg, Oral, Nightly  senna-docusate sodium, 2 tablet, Oral, BID  sodium chloride, 10 mL, Intravenous, Q12H  thiamine, 100 mg, Nasogastric, TID  vitamin D, 50,000 Units, Oral, Q7 Days    Infusions   Diet  Diet Regular; Cardiac      Assessment/Plan     Active Hospital Problems    Diagnosis  POA   • **Delirium tremens (HCC) [F10.231]  Yes   • Acute respiratory failure with hypercapnia (HCC) [J96.02]  Yes   • Aspiration pneumonia (HCC) [J69.0]  No   • Alcohol abuse [F10.10]  Yes   • Alcoholic liver disease (HCC) [K70.9]  Yes   • Tobacco abuse [Z72.0]  Yes   • Hiatal hernia [K44.9]  Yes   • COPD (chronic obstructive pulmonary disease) (HCC) [J44.9]  Yes   • Anemia, chronic disease [D63.8]  Yes   • Acute kidney failure (HCC) [N17.9]  Yes   • Hypomagnesemia [E83.42]  Yes   • Essential hypertension [I10]  Yes      Resolved Hospital Problems   No resolved problems to display.   Alcohol Dependence in Withdrawal with Delirium Tremens  - required intubation to protect airway, extubated 10/12/21  - appears to be out of window  for EtOH withdrawal, will start tapering benzos and clonidine  - continue seroquel and add PRN haldol for hospital delirium which is resolving  - appreciate critical care management from LPC    Aspiration Pneumonia  - finished levofloxacin and flagyl  - encourage pulmonary toilet  - weaned to room air    COPD/LIVAN  - no exacerbation  - O2 PRN    DEEP  - probably from hypotension with progression to ATN  - much improved with IVF, then became volume overloaded and has been diuresed per nephrology with much improvement , appreciate recs    Hypomagnesemia/Hypokalemia  - replace as needed    Lovenox 40 mg SC daily for DVT prophylaxis.  Full code.  Discussed with patient and nursing staff.  Anticipate discharge to SNU facility tomorrow.    I d/w patient regarding his unsteadiness and he is agreeable to SNF for rehab if he qualifies.    Meño Liriano MD  [facility]  10/18/21  16:49 EDT      Electronically signed by Meño Liriano MD at 10/18/21 1651          Consult Notes (last 24 hours)      Candace Rider LMFT at 10/18/21 1758      Consult Orders    1. Inpatient Access Center Consult [[id number]] ordered by Meño Liriano MD at 10/18/21 1142             *Duplicate order.    Pt seen for full consult by [facility] RN, Oswaldo Irwin on 10/7/2021. See note dated the same. Pt given [facility] tx resources at that time.      Electronically signed by Candace Rider LMFT at 10/18/21 3941

## 2021-10-19 NOTE — DISCHARGE SUMMARY
Date of Admission: 10/7/2021  Date of Discharge:  10/19/2021  Primary Care Physician: Checo Dunham MD     Discharge Diagnosis:  Active Hospital Problems    Diagnosis  POA   • Alcoholic liver disease (HCC) [K70.9]  Yes   • Tobacco abuse [Z72.0]  Yes   • Hiatal hernia [K44.9]  Yes   • COPD (chronic obstructive pulmonary disease) (HCC) [J44.9]  Yes   • Anemia, chronic disease [D63.8]  Yes   • Essential hypertension [I10]  Yes      Resolved Hospital Problems    Diagnosis Date Resolved POA   • **Delirium tremens (HCC) [F10.231] 10/19/2021 Yes   • Acute respiratory failure with hypercapnia (HCC) [J96.02] 10/19/2021 Yes   • Aspiration pneumonia (HCC) [J69.0] 10/19/2021 No   • Alcohol abuse [F10.10] 10/19/2021 Yes   • Acute kidney failure (HCC) [N17.9] 10/19/2021 Yes   • Hypomagnesemia [E83.42] 10/19/2021 Yes       Presenting Problem/History of Present Illness:  Hypomagnesemia [E83.42]  Acute renal insufficiency [N28.9]  New onset a-fib (HCC) [I48.91]  Atrial fibrillation with rapid ventricular response (HCC) [I48.91]  Alcohol withdrawal syndrome with complication (HCC) [F10.239]     Hospital Course:  The patient is a 66 y.o. male with a history of HTN, COPD, LIVAN, DVT/PE, PAF not on anticoagulation, and severe alcohol abuse who presented with symptoms of alcohol withdrawal about 24 hours after his last drink. Please see admission for further details. He was started on PRN ativan per CIWA protocol and had some electrolyte replacement. Unfortunately his withdrawal symptoms rapidly progressed to delirium tremens and he was transferred to the intensive care unit for a precedex.  His course was complicated by aspiration pneumonia for which he completed a course of antibiotics and the need for intubation for airway protection given the severity of his withdrawal. He was extubated on 10/12/21 and was weaned off of precedex and benodiazepines without further issues. He did have some delirium treated with seroquel but this  "resolved once he was out of the ICU.   He was given IV fluids for his DEEP with development of volume overload for which he was later diuresed. His renal function and volume status did normalize.   He has some ongoing generalized weakness but he has been doing better with physical therapy. He was offered SNF evaluation but he has elected to go home with his son instead and participate in outpatient physical therapy. He should keep follow up with his PCP and I advised him of the vital importance of avoiding further alcohol use.    Exam Today:  Blood pressure 145/83, pulse 87, temperature 97.4 °F (36.3 °C), temperature source Oral, resp. rate 18, height 200.7 cm (79\"), weight 115 kg (254 lb 10.1 oz), SpO2 96 %.  Vitals and nursing note reviewed.   Constitutional:       General: He is not in acute distress.     Appearance: He is not toxic-appearing or diaphoretic.   HENT:      Head: Normocephalic and atraumatic.      Nose: Nose normal.      Mouth/Throat:      Mouth: Mucous membranes are moist.      Pharynx: Oropharynx is clear.   Eyes:      Extraocular Movements: Extraocular movements intact.      Conjunctiva/sclera: Conjunctivae normal.      Pupils: Pupils are equal, round, and reactive to light.   Cardiovascular:      Rate and Rhythm: Normal rate and regular rhythm.      Pulses: Normal pulses.   Pulmonary:      Effort: Pulmonary effort is normal.      Breath sounds: Breath sounds clear to ascultation  Abdominal:      General: Bowel sounds are normal.      Palpations: Abdomen is soft.   Musculoskeletal:         General:No edema present. No tenderness.      Cervical back: Normal range of motion and neck supple.   Skin:     General: Skin is warm and dry.      Capillary Refill: Capillary refill takes less than 2 seconds.   Neurological:      General: No focal deficit present.      Mental Status: He is alert.      Motor: No tremor.   Psychiatric:         Mood and Affect: Mood normal.         Behavior: Behavior normal. "     Consults:   Consults     Date and Time Order Name Status Description    10/8/2021  8:43 AM Inpatient Nephrology Consult Completed     10/7/2021  1:53 PM [facility] (on-call MD unless specified) Details Completed            Discharge Disposition:  Home or Self Care    Discharge Medications:     Discharge Medications      Continue These Medications      Instructions Start Date   acetaminophen 325 MG tablet  Commonly known as: TYLENOL   650 mg, Oral, Every 6 Hours PRN      amLODIPine 10 MG tablet  Commonly known as: NORVASC   10 mg, Oral, Daily      aspirin 81 MG chewable tablet   81 mg, Oral, Daily      buPROPion  MG 12 hr tablet  Commonly known as: Wellbutrin SR   150 mg, Oral, 2 Times Daily      losartan 100 MG tablet  Commonly known as: COZAAR   100 mg, Oral, Daily      metoprolol tartrate 50 MG tablet  Commonly known as: LOPRESSOR   50 mg, Oral, Every 8 Hours      multivitamin with minerals tablet tablet   1 tablet, Oral, Daily      thiamine 100 MG tablet  tablet  Commonly known as: VITAMIN B-1   100 mg, Oral, Daily      traZODone 50 MG tablet  Commonly known as: DESYREL   TAKE 1 TABLET AT NIGHT AS NEEDED FOR SLEEP      vitamin D 1.25 MG (45006 UT) capsule capsule  Commonly known as: ERGOCALCIFEROL   50,000 Units, Oral, Every 7 Days             Discharge Diet:   Diet Instructions     Diet: Regular, Cardiac; Thin      Discharge Diet:  Regular  Cardiac       Fluid Consistency: Thin          Activity at Discharge:   Activity Instructions     Activity as Tolerated            Follow-up Appointments:  No future appointments.  Additional Instructions for the Follow-ups that You Need to Schedule     Ambulatory Referral to Physical Therapy Evaluate and treat; Strengthening; Full weight bearing   As directed      Specialty needed: Evaluate and treat    Exercises: Strengthening    Weight Bearing Status: Full weight bearing         Discharge Follow-up with PCP   As directed       Currently Documented PCP:    Checo Dunham,  MD    PCP Phone Number:    [phone]     Follow Up Details: 1 week               Meño Liriano MD  10/19/21  12:21 EDT    Time Spent on Discharge Activities: Greater than 30 minutes.

## 2021-10-19 NOTE — PLAN OF CARE
Goal Outcome Evaluation:  Plan of Care Reviewed With: patient        Progress: improving  Outcome Summary: No change on my shift. PT to eval today for poss placememt. CTM

## 2021-10-19 NOTE — PLAN OF CARE
Goal Outcome Evaluation:  Plan of Care Reviewed With: patient        Progress: improving  Outcome Summary: Pt tolerated treatment with no complaints. Pt is progressing well with mobility. Pt is modif. indep. with bed mobility and SBA with sit<->stand transfers. Pt ambulated 200ft with rwx, SBA. No unsteadiness or LOB noted. Will continue to progress pt as tolerated.    ..Patient was wearing a face mask during this therapy encounter. Therapist used appropriate personal protective equipment including eye protection, mask, and gloves.  Mask used was standard procedure mask. Appropriate PPE was worn during the entire therapy session. Hand hygiene was completed before and after therapy session. Patient is not in enhanced droplet precautions.

## 2021-10-20 ENCOUNTER — TRANSITIONAL CARE MANAGEMENT TELEPHONE ENCOUNTER (OUTPATIENT)
Dept: CALL CENTER | Facility: HOSPITAL | Age: 66
End: 2021-10-20

## 2021-10-20 RX ORDER — AMLODIPINE BESYLATE 10 MG/1
TABLET ORAL
Qty: 90 TABLET | Refills: 1 | Status: SHIPPED | OUTPATIENT
Start: 2021-10-20 | End: 2022-04-21

## 2021-10-20 NOTE — CASE MANAGEMENT/SOCIAL WORK
Case Management Discharge Note      Final Note: home with [facility] stephan londono rn/ccp    Provided Post Acute Provider List?: Yes  Post Acute Provider List: Outpatient Therapy  Provided Post Acute Provider Quality & Resource List?: Yes  Delivered To: Support Person  Method of Delivery: Telephone    Selected Continued Care - Discharged on 10/19/2021 Admission date: 10/7/2021 - Discharge disposition: Home or Self Care    Destination    No services have been selected for the patient.              Durable Medical Equipment    No services have been selected for the patient.              Dialysis/Infusion    No services have been selected for the patient.              Home Medical Care     Service Provider Selected Services Address Phone Fax Patient Preferred    [facility]  Home Health Services [address] [address] [phone] --          Therapy    No services have been selected for the patient.              Community Resources    No services have been selected for the patient.              Community & DME    No services have been selected for the patient.                  Transportation Services  Private: Car    Final Discharge Disposition Code: 06 - home with home health care

## 2021-10-20 NOTE — OUTREACH NOTE
Call Center TCM Note      Responses   [facility] patient discharged from? [facility]   Does the patient have one of the following disease processes/diagnoses(primary or secondary)? COPD/Pneumonia   Was the primary reason for admission: Pneumonia   TCM attempt successful? Yes   Call start time 1448   Call end time 1450   Discharge diagnosis Alcohol abuse,   Delirium tremens,   Aspiration pneumonia    Meds reviewed with patient/caregiver? Yes   Is the patient having any side effects they believe may be caused by any medication additions or changes? No   Does the patient have all medications ordered at discharge? Yes   Is the patient taking all medications as directed (includes completed medication regime)? Yes   Does the patient have a primary care provider?  Yes   Does the patient have an appointment with their PCP or specialist within 7 days of discharge? No   Comments regarding PCP needs f/u appt with PCP within 1 week but no available appts noted   Nursing Interventions Advised patient to make appointment   Has the patient kept scheduled appointments due by today? N/A   What is the Home health agency?  [facility]    Has home health visited the patient within 72 hours of discharge? Call prior to 72 hours   Psychosocial issues? No   Did the patient receive a copy of their discharge instructions? Yes   Nursing interventions Reviewed instructions with patient   What is the patient's perception of their health status since discharge? Improving   Nursing Interventions Nurse provided patient education   Is the patient/caregiver able to teach back the hierarchy of who to call/visit for symptoms/problems? PCP, Specialist, Home health nurse, Urgent Care, ED, 911 Yes   Is the patient/caregiver able to teach back signs and symptoms of worsening condition: Fever/chills,  Shortness of breath,  Chest pain   Is the patient/caregiver able to teach back importance of completing antibiotic course of treatment? Yes   TCM  call completed? Yes   Wrap up additional comments Says he is doing well, no questions or concerns at this time, says home health will be coming tomorrow.          Hanh Garcia RN    10/20/2021, 14:50 EDT

## 2021-10-21 ENCOUNTER — HOME CARE VISIT (OUTPATIENT)
Dept: HOME HEALTH SERVICES | Facility: HOME HEALTHCARE | Age: 66
End: 2021-10-21

## 2021-10-21 VITALS
TEMPERATURE: 96 F | DIASTOLIC BLOOD PRESSURE: 70 MMHG | OXYGEN SATURATION: 96 % | HEART RATE: 90 BPM | SYSTOLIC BLOOD PRESSURE: 126 MMHG | RESPIRATION RATE: 18 BRPM

## 2021-10-21 PROCEDURE — G0151 HHCP-SERV OF PT,EA 15 MIN: HCPCS

## 2021-10-21 NOTE — HOME HEALTH
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

## 2021-10-22 ENCOUNTER — HOME CARE VISIT (OUTPATIENT)
Dept: HOME HEALTH SERVICES | Facility: HOME HEALTHCARE | Age: 66
End: 2021-10-22

## 2021-10-22 NOTE — CASE COMMUNICATION
Home PT start of care visit completed [id number]. Notification of medication issues at the home: please notify patient if should be taking or not as list from hospital these are listed but he reported he is not takin) 81 mg aspirin 1x/day; 2) metoprolol tartrate 50mg, 3x/day- new at hospital but said he was not given any script when he left hospital; 3) Thiamine 100mg 1x/day.     Also has on hospital list Vit D3 53629 units 1x/wk, but ned maravilla has been taking 5000 unit (1.25 mg) capsule daily instead.

## 2021-10-23 NOTE — CASE COMMUNICATION
SOC huddle note: patient is 65 y/o male living by hinself in first floor apartment with dx of hospitalization with alcohol abuse withdrawal and recovering from acute respirtory failure with intubation due to apsiration pneumonia. Patient is weak and having balance issues needing rolling walker at this time so only needs PT at this time for gait training progrssion to cane (prior level), HEP training for strength and balance activities w ith upgrades as tolerated, and patient education for pain edemacontrol, home safety, and psychosocial monitoring. 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

## 2021-10-26 ENCOUNTER — HOME CARE VISIT (OUTPATIENT)
Dept: HOME HEALTH SERVICES | Facility: HOME HEALTHCARE | Age: 66
End: 2021-10-26

## 2021-10-26 VITALS — HEART RATE: 66 BPM | DIASTOLIC BLOOD PRESSURE: 80 MMHG | SYSTOLIC BLOOD PRESSURE: 148 MMHG | OXYGEN SATURATION: 98 %

## 2021-10-26 PROCEDURE — G0151 HHCP-SERV OF PT,EA 15 MIN: HCPCS

## 2021-10-26 NOTE — HOME HEALTH
"Subjective: \"I went back to work    Falls reported: none    Medication changes: none      Plan for next visit:"

## 2021-10-27 ENCOUNTER — READMISSION MANAGEMENT (OUTPATIENT)
Dept: CALL CENTER | Facility: HOSPITAL | Age: 66
End: 2021-10-27

## 2021-10-27 NOTE — OUTREACH NOTE
COPD/PN Week 2 Survey      Responses   [facility] [place] [facility] from? [facility]   Does the patient have one of the following disease processes/diagnoses(primary or secondary)? COPD/Pneumonia   Was the primary reason for admission: Pneumonia   Week 2 attempt successful? No   Unsuccessful attempts Attempt 1          Keturah Gomez RN

## 2021-11-01 ENCOUNTER — READMISSION MANAGEMENT (OUTPATIENT)
Dept: CALL CENTER | Facility: HOSPITAL | Age: 66
End: 2021-11-01

## 2021-11-01 NOTE — OUTREACH NOTE
COPD/PN Week 2 Survey      Responses   [facility] patient discharged from? [place]   Does the patient have one of the following disease processes/diagnoses(primary or secondary)? COPD/Pneumonia   Was the primary reason for admission: Pneumonia   Week 2 attempt successful? No   Unsuccessful attempts Attempt 2          Didi Pacheco LPN

## 2021-11-03 PROCEDURE — G0180 MD CERTIFICATION HHA PATIENT: HCPCS | Performed by: FAMILY MEDICINE

## 2021-11-09 ENCOUNTER — READMISSION MANAGEMENT (OUTPATIENT)
Dept: CALL CENTER | Facility: HOSPITAL | Age: 66
End: 2021-11-09

## 2021-11-09 NOTE — OUTREACH NOTE
COPD/PN Week 3 Survey      Responses   [facility] patient discharged from? [place]   Does the patient have one of the following disease processes/diagnoses(primary or secondary)? COPD/Pneumonia   Was the primary reason for admission: Pneumonia   Week 3 attempt successful? No   Unsuccessful attempts Attempt 1   Discharge diagnosis Alcohol abuse,   Delirium tremens,   Aspiration pneumonia           Sherine Abraham RN

## 2021-11-11 ENCOUNTER — READMISSION MANAGEMENT (OUTPATIENT)
Dept: CALL CENTER | Facility: HOSPITAL | Age: 66
End: 2021-11-11

## 2021-11-11 NOTE — OUTREACH NOTE
COPD/PN Week 3 Survey      Responses   [facility] patient discharged from? [place]   Does the patient have one of the following disease processes/diagnoses(primary or secondary)? COPD/Pneumonia   Was the primary reason for admission: Pneumonia   Week 3 attempt successful? No   Unsuccessful attempts Attempt 2          Eliza Guillaume RN

## 2022-04-15 ENCOUNTER — HOSPITAL ENCOUNTER (INPATIENT)
Facility: HOSPITAL | Age: 67
LOS: 3 days | Discharge: HOME | End: 2022-04-18
Attending: EMERGENCY MEDICINE | Admitting: INTERNAL MEDICINE

## 2022-04-15 DIAGNOSIS — F10.930 ALCOHOL WITHDRAWAL SYNDROME WITHOUT COMPLICATION: Primary | ICD-10-CM

## 2022-04-15 LAB
ALBUMIN SERPL-MCNC: 4.2 G/DL (ref 3.5–5.2)
ALBUMIN/GLOB SERPL: 1.5 G/DL
ALP SERPL-CCNC: 98 U/L (ref 39–117)
ALT SERPL W P-5'-P-CCNC: 13 U/L (ref 1–41)
AMPHET+METHAMPHET UR QL: NEGATIVE
ANION GAP SERPL CALCULATED.3IONS-SCNC: 11 MMOL/L (ref 5–15)
AST SERPL-CCNC: 15 U/L (ref 1–40)
BARBITURATES UR QL SCN: NEGATIVE
BASOPHILS # BLD AUTO: 0.04 10*3/MM3 (ref 0–0.2)
BASOPHILS NFR BLD AUTO: 0.4 % (ref 0–1.5)
BENZODIAZ UR QL SCN: NEGATIVE
BILIRUB SERPL-MCNC: 0.4 MG/DL (ref 0–1.2)
BUN SERPL-MCNC: 14 MG/DL (ref 8–23)
BUN/CREAT SERPL: 14.6 (ref 7–25)
CALCIUM SPEC-SCNC: 8.6 MG/DL (ref 8.6–10.5)
CANNABINOIDS SERPL QL: NEGATIVE
CHLORIDE SERPL-SCNC: 100 MMOL/L (ref 98–107)
CO2 SERPL-SCNC: 24 MMOL/L (ref 22–29)
COCAINE UR QL: NEGATIVE
CREAT SERPL-MCNC: 0.96 MG/DL (ref 0.76–1.27)
DEPRECATED RDW RBC AUTO: 44.2 FL (ref 37–54)
EGFRCR SERPLBLD CKD-EPI 2021: 86.6 ML/MIN/1.73
EOSINOPHIL # BLD AUTO: 0.31 10*3/MM3 (ref 0–0.4)
EOSINOPHIL NFR BLD AUTO: 3.4 % (ref 0.3–6.2)
ERYTHROCYTE [DISTWIDTH] IN BLOOD BY AUTOMATED COUNT: 12.6 % (ref 12.3–15.4)
ETHANOL BLD-MCNC: <10 MG/DL (ref 0–10)
ETHANOL UR QL: <0.01 %
GLOBULIN UR ELPH-MCNC: 2.8 GM/DL
GLUCOSE SERPL-MCNC: 97 MG/DL (ref 65–99)
HCT VFR BLD AUTO: 36.6 % (ref 37.5–51)
HGB BLD-MCNC: 12.7 G/DL (ref 13–17.7)
IMM GRANULOCYTES # BLD AUTO: 0.05 10*3/MM3 (ref 0–0.05)
IMM GRANULOCYTES NFR BLD AUTO: 0.6 % (ref 0–0.5)
LYMPHOCYTES # BLD AUTO: 1.22 10*3/MM3 (ref 0.7–3.1)
LYMPHOCYTES NFR BLD AUTO: 13.5 % (ref 19.6–45.3)
MCH RBC QN AUTO: 33.4 PG (ref 26.6–33)
MCHC RBC AUTO-ENTMCNC: 34.7 G/DL (ref 31.5–35.7)
MCV RBC AUTO: 96.3 FL (ref 79–97)
METHADONE UR QL SCN: NEGATIVE
MONOCYTES # BLD AUTO: 0.8 10*3/MM3 (ref 0.1–0.9)
MONOCYTES NFR BLD AUTO: 8.9 % (ref 5–12)
NEUTROPHILS NFR BLD AUTO: 6.6 10*3/MM3 (ref 1.7–7)
NEUTROPHILS NFR BLD AUTO: 73.2 % (ref 42.7–76)
NRBC BLD AUTO-RTO: 0 /100 WBC (ref 0–0.2)
OPIATES UR QL: POSITIVE
OXYCODONE UR QL SCN: NEGATIVE
PLATELET # BLD AUTO: 256 10*3/MM3 (ref 140–450)
PMV BLD AUTO: 9.7 FL (ref 6–12)
POTASSIUM SERPL-SCNC: 3.6 MMOL/L (ref 3.5–5.2)
PROT SERPL-MCNC: 7 G/DL (ref 6–8.5)
RBC # BLD AUTO: 3.8 10*6/MM3 (ref 4.14–5.8)
SARS-COV-2 RNA RESP QL NAA+PROBE: NOT DETECTED
SODIUM SERPL-SCNC: 135 MMOL/L (ref 136–145)
WBC NRBC COR # BLD: 9.02 10*3/MM3 (ref 3.4–10.8)

## 2022-04-15 PROCEDURE — 80307 DRUG TEST PRSMV CHEM ANLYZR: CPT | Performed by: EMERGENCY MEDICINE

## 2022-04-15 PROCEDURE — 85025 COMPLETE CBC W/AUTO DIFF WBC: CPT | Performed by: EMERGENCY MEDICINE

## 2022-04-15 PROCEDURE — 80053 COMPREHEN METABOLIC PANEL: CPT | Performed by: EMERGENCY MEDICINE

## 2022-04-15 PROCEDURE — 93010 ELECTROCARDIOGRAM REPORT: CPT | Performed by: INTERNAL MEDICINE

## 2022-04-15 PROCEDURE — 25010000002 LORAZEPAM PER 2 MG: Performed by: EMERGENCY MEDICINE

## 2022-04-15 PROCEDURE — 25010000002 LORAZEPAM PER 2 MG: Performed by: INTERNAL MEDICINE

## 2022-04-15 PROCEDURE — 99284 EMERGENCY DEPT VISIT MOD MDM: CPT

## 2022-04-15 PROCEDURE — 94761 N-INVAS EAR/PLS OXIMETRY MLT: CPT

## 2022-04-15 PROCEDURE — 94660 CPAP INITIATION&MGMT: CPT

## 2022-04-15 PROCEDURE — 82077 ASSAY SPEC XCP UR&BREATH IA: CPT | Performed by: EMERGENCY MEDICINE

## 2022-04-15 PROCEDURE — U0003 INFECTIOUS AGENT DETECTION BY NUCLEIC ACID (DNA OR RNA); SEVERE ACUTE RESPIRATORY SYNDROME CORONAVIRUS 2 (SARS-COV-2) (CORONAVIRUS DISEASE [COVID-19]), AMPLIFIED PROBE TECHNIQUE, MAKING USE OF HIGH THROUGHPUT TECHNOLOGIES AS DESCRIBED BY CMS-2020-01-R: HCPCS | Performed by: EMERGENCY MEDICINE

## 2022-04-15 PROCEDURE — 93005 ELECTROCARDIOGRAM TRACING: CPT | Performed by: EMERGENCY MEDICINE

## 2022-04-15 PROCEDURE — 94799 UNLISTED PULMONARY SVC/PX: CPT

## 2022-04-15 RX ORDER — LORAZEPAM 2 MG/ML
1 INJECTION INTRAMUSCULAR ONCE
Status: COMPLETED | OUTPATIENT
Start: 2022-04-15 | End: 2022-04-15

## 2022-04-15 RX ORDER — LORAZEPAM 1 MG/1
1 TABLET ORAL
Status: DISCONTINUED | OUTPATIENT
Start: 2022-04-15 | End: 2022-04-18 | Stop reason: HOSPADM

## 2022-04-15 RX ORDER — FOLIC ACID 1 MG/1
1 TABLET ORAL DAILY
Status: COMPLETED | OUTPATIENT
Start: 2022-04-16 | End: 2022-04-18

## 2022-04-15 RX ORDER — ONDANSETRON 2 MG/ML
4 INJECTION INTRAMUSCULAR; INTRAVENOUS EVERY 6 HOURS PRN
Status: DISCONTINUED | OUTPATIENT
Start: 2022-04-15 | End: 2022-04-18 | Stop reason: HOSPADM

## 2022-04-15 RX ORDER — LORAZEPAM 2 MG/ML
0.5 INJECTION INTRAMUSCULAR
Status: DISCONTINUED | OUTPATIENT
Start: 2022-04-15 | End: 2022-04-18 | Stop reason: HOSPADM

## 2022-04-15 RX ORDER — UREA 10 %
3 LOTION (ML) TOPICAL NIGHTLY PRN
Status: DISCONTINUED | OUTPATIENT
Start: 2022-04-15 | End: 2022-04-17

## 2022-04-15 RX ORDER — LORAZEPAM 2 MG/ML
1 INJECTION INTRAMUSCULAR
Status: DISCONTINUED | OUTPATIENT
Start: 2022-04-15 | End: 2022-04-18 | Stop reason: HOSPADM

## 2022-04-15 RX ORDER — BUPROPION HYDROCHLORIDE 150 MG/1
150 TABLET, EXTENDED RELEASE ORAL 2 TIMES DAILY
Status: DISCONTINUED | OUTPATIENT
Start: 2022-04-15 | End: 2022-04-18 | Stop reason: HOSPADM

## 2022-04-15 RX ORDER — MULTIPLE VITAMINS W/ MINERALS TAB 9MG-400MCG
1 TAB ORAL DAILY
Status: DISCONTINUED | OUTPATIENT
Start: 2022-04-16 | End: 2022-04-18 | Stop reason: HOSPADM

## 2022-04-15 RX ORDER — NITROGLYCERIN 0.4 MG/1
0.4 TABLET SUBLINGUAL
Status: DISCONTINUED | OUTPATIENT
Start: 2022-04-15 | End: 2022-04-18 | Stop reason: HOSPADM

## 2022-04-15 RX ORDER — AMLODIPINE BESYLATE 10 MG/1
10 TABLET ORAL DAILY
Status: DISCONTINUED | OUTPATIENT
Start: 2022-04-16 | End: 2022-04-18 | Stop reason: HOSPADM

## 2022-04-15 RX ORDER — CLONIDINE HYDROCHLORIDE 0.1 MG/1
0.1 TABLET ORAL EVERY 4 HOURS PRN
Status: DISCONTINUED | OUTPATIENT
Start: 2022-04-15 | End: 2022-04-18 | Stop reason: HOSPADM

## 2022-04-15 RX ORDER — TRAZODONE HYDROCHLORIDE 50 MG/1
50 TABLET ORAL NIGHTLY PRN
Status: DISCONTINUED | OUTPATIENT
Start: 2022-04-15 | End: 2022-04-17

## 2022-04-15 RX ORDER — ONDANSETRON 4 MG/1
4 TABLET, FILM COATED ORAL EVERY 6 HOURS PRN
Status: DISCONTINUED | OUTPATIENT
Start: 2022-04-15 | End: 2022-04-18 | Stop reason: HOSPADM

## 2022-04-15 RX ORDER — ACETAMINOPHEN 325 MG/1
650 TABLET ORAL EVERY 4 HOURS PRN
Status: DISCONTINUED | OUTPATIENT
Start: 2022-04-15 | End: 2022-04-18 | Stop reason: HOSPADM

## 2022-04-15 RX ORDER — LORAZEPAM 0.5 MG/1
0.5 TABLET ORAL
Status: DISCONTINUED | OUTPATIENT
Start: 2022-04-15 | End: 2022-04-18 | Stop reason: HOSPADM

## 2022-04-15 RX ORDER — DIPHENOXYLATE HYDROCHLORIDE AND ATROPINE SULFATE 2.5; .025 MG/1; MG/1
1 TABLET ORAL DAILY
Status: COMPLETED | OUTPATIENT
Start: 2022-04-16 | End: 2022-04-18

## 2022-04-15 RX ORDER — LOSARTAN POTASSIUM 100 MG/1
100 TABLET ORAL DAILY
Status: DISCONTINUED | OUTPATIENT
Start: 2022-04-16 | End: 2022-04-18 | Stop reason: HOSPADM

## 2022-04-15 RX ADMIN — LORAZEPAM 1 MG: 2 INJECTION INTRAMUSCULAR; INTRAVENOUS at 16:04

## 2022-04-15 RX ADMIN — LORAZEPAM 1 MG: 2 INJECTION INTRAMUSCULAR; INTRAVENOUS at 19:25

## 2022-04-15 RX ADMIN — SODIUM CHLORIDE 1000 ML: 9 INJECTION, SOLUTION INTRAVENOUS at 13:42

## 2022-04-15 RX ADMIN — LORAZEPAM 1 MG: 2 INJECTION INTRAMUSCULAR; INTRAVENOUS at 13:39

## 2022-04-15 RX ADMIN — Medication 100 MG: at 20:23

## 2022-04-15 RX ADMIN — LORAZEPAM 1 MG: 2 INJECTION INTRAMUSCULAR; INTRAVENOUS at 20:29

## 2022-04-15 RX ADMIN — LORAZEPAM 0.5 MG: 2 INJECTION INTRAMUSCULAR; INTRAVENOUS at 22:03

## 2022-04-15 NOTE — ED NOTES
Pt presents to ED with complaints of alcohol withdraw. Pt reports his last drink was last night at 1800. Pt reports he typically drinks a pint a day, and has been doing that since February. Pt wants help and detox. PT denies seizures with withdrawal. Pt is A&OX4, able to ambulate with cane, and in a mask at this time.     Patient was placed in face mask during first look triage.  Patient was wearing a face mask throughout encounter.  I wore personal protective equipment throughout the encounter.  Hand hygiene was performed before and after patient encounter.

## 2022-04-15 NOTE — ED PROVIDER NOTES
EMERGENCY DEPARTMENT ENCOUNTER    Room Number:  14/14  PCP: Checo Dunham MD  Historian: Patient  History Limited By: Nothing      HPI  Chief Complaint: Alcohol withdrawal  Context: Watson Lisa is a 66 y.o. male who presents to the ED c/o alcohol withdrawal.  Patient states he has a history of alcohol abuse.  Last admission here was for alcohol withdrawal with DTs.  Patient drinks a pint a day.  Patient last drink was last night.  Denies other drug use.  Has had no prior seizures.  States he feels tremulous and nauseated.  Feels slightly short of breath.  No vomiting or diarrhea.  No abdominal pain.      Location: Tremulous  Radiation: None  Character: Diffuse  Duration: A few hours  Severity: Moderate  Progression: Worsening  Aggravating Factors: Nothing  Alleviating Factors: Nothing        MEDICAL RECORD REVIEW    Patient was last here in October 2021 with DTs          PAST MEDICAL HISTORY  Active Ambulatory Problems     Diagnosis Date Noted   • Arthritis 04/25/2016   • Essential hypertension 06/07/2016   • Tobacco abuse 06/07/2016   • Alcohol withdrawal syndrome without complication (HCC) 06/24/2020   • Chest pain in adult 06/25/2020   • Sleep apnea 06/25/2020   • Hiatal hernia 06/25/2020   • Alcohol abuse 06/25/2020   • Effusion of left knee 06/29/2020   • Osteoarthritis of left knee 06/29/2020   • Vitamin D deficiency 07/01/2020   • Anemia, chronic disease 07/01/2020   • Hyponatremia 07/01/2020   • Alcohol dependence with withdrawal (HCC) 01/12/2021   • COPD (chronic obstructive pulmonary disease) ([facility]) 03/26/2021   • LIVAN (obstructive sleep apnea) 03/26/2021   • ETOH abuse 03/26/2021   • Tobacco abuse 03/26/2021   • Obese 03/26/2021   • Anemia, chronic disease 03/26/2021   • Alcoholic liver disease (HCC) 10/07/2021   • Tobacco abuse 10/07/2021   • Hiatal hernia 10/07/2021   • COPD (chronic obstructive pulmonary disease) (HCC) 10/07/2021   • Anemia, chronic disease 10/07/2021     Resolved Ambulatory  Problems     Diagnosis Date Noted   • Hypokalemia 06/29/2020   • Hypomagnesemia 06/29/2020   • Pneumonia due to COVID-19 virus 01/11/2021   • Possible SVT (supraventricular tachycardia) ([facility]) 01/12/2021   • Alcohol withdrawal syndrome with perceptual disturbance ([facility]) 03/26/2021   • Acute kidney failure ([facility]) 03/26/2021   • Elevated LFTs 03/26/2021   • Metabolic acidosis, increased anion gap 03/26/2021   • Hypomagnesemia 03/26/2021   • Suicidal ideations 03/26/2021   • Delirium tremens ([facility]) 10/07/2021   • Alcohol abuse 10/07/2021   • Acute respiratory failure with hypercapnia ([facility]) 10/08/2021   • Aspiration pneumonia ([facility]) 10/08/2021     Past Medical History:   Diagnosis Date   • Anxiety    • Bronchitis with chronic airway obstruction ([facility])    • DVT (deep venous thrombosis) ([facility])    • Hypertension    • Hypertension    • Low back pain    • Neck pain    • Pulmonary embolism ([facility])    • Sleep apnea with use of continuous positive airway pressure (CPAP)          PAST SURGICAL HISTORY  Past Surgical History:   Procedure Laterality Date   • COLONOSCOPY     • JOINT REPLACEMENT Right     hip/knee   • REPLACEMENT TOTAL KNEE     • TONSILLECTOMY     • TOTAL HIP ARTHROPLASTY Right          FAMILY HISTORY  Family History   Problem Relation Age of Onset   • Cancer Mother    • Heart disease Father    • Alcohol abuse Father    • Cancer Brother          SOCIAL HISTORY  Social History     Socioeconomic History   • Marital status:    Tobacco Use   • Smoking status: Current Every Day Smoker     Packs/day: 2.00     Years: 40.00     Pack years: 80.00     Types: Cigarettes   • Smokeless tobacco: Never Used   Vaping Use   • Vaping Use: Never used   Substance and Sexual Activity   • Alcohol use: Yes     Comment: Pt reports last drink was 1400 on 10/6/21 (2 pints every day, a fifth on weekends)   • Drug use: Not Currently     Types: Hydrocodone   • Sexual activity: Defer         ALLERGIES  Penicillins and  Penicillins        REVIEW OF SYSTEMS  Review of Systems   Constitutional: Negative for activity change, appetite change and fever.   HENT: Negative for congestion and sore throat.    Eyes: Negative.    Respiratory: Negative for cough and shortness of breath.    Cardiovascular: Negative for chest pain and leg swelling.   Gastrointestinal: Positive for nausea. Negative for abdominal pain, diarrhea and vomiting.   Endocrine: Negative.    Genitourinary: Negative for decreased urine volume and dysuria.   Musculoskeletal: Negative for neck pain.   Skin: Negative for rash and wound.   Allergic/Immunologic: Negative.    Neurological: Positive for tremors. Negative for weakness, numbness and headaches.   Hematological: Negative.    Psychiatric/Behavioral: Negative.    All other systems reviewed and are negative.           PHYSICAL EXAM  ED Triage Vitals [04/15/22 1308]   Temp Heart Rate Resp BP SpO2   97.2 °F (36.2 °C) 105 20 -- 97 %      Temp src Heart Rate Source Patient Position BP Location FiO2 (%)   Tympanic Monitor -- -- --       Physical Exam  Vitals and nursing note reviewed.   Constitutional:       General: He is not in acute distress.  HENT:      Head: Normocephalic and atraumatic.   Eyes:      Pupils: Pupils are equal, round, and reactive to light.   Cardiovascular:      Rate and Rhythm: Regular rhythm. Tachycardia present.      Heart sounds: Normal heart sounds.   Pulmonary:      Effort: Pulmonary effort is normal. No respiratory distress.      Breath sounds: Normal breath sounds.   Abdominal:      Palpations: Abdomen is soft.      Tenderness: There is no abdominal tenderness. There is no guarding or rebound.   Musculoskeletal:         General: Normal range of motion.      Cervical back: Normal range of motion and neck supple.   Skin:     General: Skin is warm and dry.   Neurological:      Mental Status: He is alert and oriented to person, place, and time.      Sensory: Sensation is intact. No sensory deficit.       Motor: No weakness.      Comments: Patient is tremulous   Psychiatric:         Mood and Affect: Mood and affect normal.       Patient was wearing a face mask when I entered the room and they continued to wear a mask throughout their stay in the ED.  I wore PPE, including  gloves, face mask with shield or face mask with goggles whenever I was in the room with patient.       LAB RESULTS  Recent Results (from the past 24 hour(s))   ECG 12 Lead    Collection Time: 04/15/22  1:34 PM   Result Value Ref Range    QT Interval 385 ms   Comprehensive Metabolic Panel    Collection Time: 04/15/22  1:37 PM    Specimen: Blood   Result Value Ref Range    Glucose 97 65 - 99 mg/dL    BUN 14 8 - 23 mg/dL    Creatinine 0.96 0.76 - 1.27 mg/dL    Sodium 135 (L) 136 - 145 mmol/L    Potassium 3.6 3.5 - 5.2 mmol/L    Chloride 100 98 - 107 mmol/L    CO2 24.0 22.0 - 29.0 mmol/L    Calcium 8.6 8.6 - 10.5 mg/dL    Total Protein 7.0 6.0 - 8.5 g/dL    Albumin 4.20 3.50 - 5.20 g/dL    ALT (SGPT) 13 1 - 41 U/L    AST (SGOT) 15 1 - 40 U/L    Alkaline Phosphatase 98 39 - 117 U/L    Total Bilirubin 0.4 0.0 - 1.2 mg/dL    Globulin 2.8 gm/dL    A/G Ratio 1.5 g/dL    BUN/Creatinine Ratio 14.6 7.0 - 25.0    Anion Gap 11.0 5.0 - 15.0 mmol/L    eGFR 86.6 >60.0 mL/min/1.73   Ethanol    Collection Time: 04/15/22  1:37 PM    Specimen: Blood   Result Value Ref Range    Ethanol <10 0 - 10 mg/dL    Ethanol % <0.010 %   CBC Auto Differential    Collection Time: 04/15/22  1:37 PM    Specimen: Blood   Result Value Ref Range    WBC 9.02 3.40 - 10.80 10*3/mm3    RBC 3.80 (L) 4.14 - 5.80 10*6/mm3    Hemoglobin 12.7 (L) 13.0 - 17.7 g/dL    Hematocrit 36.6 (L) 37.5 - 51.0 %    MCV 96.3 79.0 - 97.0 fL    MCH 33.4 (H) 26.6 - 33.0 pg    MCHC 34.7 31.5 - 35.7 g/dL    RDW 12.6 12.3 - 15.4 %    RDW-SD 44.2 37.0 - 54.0 fl    MPV 9.7 6.0 - 12.0 fL    Platelets 256 140 - 450 10*3/mm3    Neutrophil % 73.2 42.7 - 76.0 %    Lymphocyte % 13.5 (L) 19.6 - 45.3 %    Monocyte % 8.9 5.0  - 12.0 %    Eosinophil % 3.4 0.3 - 6.2 %    Basophil % 0.4 0.0 - 1.5 %    Immature Grans % 0.6 (H) 0.0 - 0.5 %    Neutrophils, Absolute 6.60 1.70 - 7.00 10*3/mm3    Lymphocytes, Absolute 1.22 0.70 - 3.10 10*3/mm3    Monocytes, Absolute 0.80 0.10 - 0.90 10*3/mm3    Eosinophils, Absolute 0.31 0.00 - 0.40 10*3/mm3    Basophils, Absolute 0.04 0.00 - 0.20 10*3/mm3    Immature Grans, Absolute 0.05 0.00 - 0.05 10*3/mm3    nRBC 0.0 0.0 - 0.2 /100 WBC       Ordered the above labs and reviewed the results.        RADIOLOGY  No orders to display              PROCEDURES  Procedures      EKG:          EKG time: 1334  Rhythm/Rate: Normal sinus rhythm 84  P waves and NE: First-degree AV block  QRS, axis: Normal QRS  ST and T waves: Normal ST-T wave    Interpreted Contemporaneously by me, independently viewed  Unchanged compared to prior 10/11/2021        MEDICATIONS GIVEN IN ER  Medications   sodium chloride 0.9 % bolus 1,000 mL (1,000 mL Intravenous New Bag 4/15/22 1342)   LORazepam (ATIVAN) injection 1 mg (1 mg Intravenous Given 4/15/22 1339)             PROGRESS AND CONSULTS  ED Course as of 04/15/22 1501   Fri Apr 15, 2022   1445 14:46 EDT  Patient presents for alcohol withdrawal.  Patient was tremulous and tachycardic has been given Ativan here with improvement.  Patient has been discussed with Dr. Russell who will admit. [SL]      ED Course User Index  [SL] Srinivasan Gillespie MD           MEDICAL DECISION MAKING      MDM  Number of Diagnoses or Management Options     Amount and/or Complexity of Data Reviewed  Clinical lab tests: reviewed and ordered (Alcohol level 0)  Discuss the patient with other providers: yes (Discussed with Dr. Russell who will admit)               DIAGNOSIS  Final diagnoses:   Alcohol withdrawal syndrome without complication (HCC)           DISPOSITION  admit        Latest Documented Vital Signs:  As of 15:01 EDT  BP- 141/86 HR- 64 Temp- 97.2 °F (36.2 °C) (Tympanic) O2 sat- 97%                        Srinivasan Gillespie MD  04/15/22 8786

## 2022-04-15 NOTE — PROGRESS NOTES
Clinical Pharmacy Services: Medication History    Watson Lisa is a 66 y.o. male presenting to [facility] for   Chief Complaint   Patient presents with   • Withdrawal       He  has a past medical history of Alcohol abuse, Anxiety, Arthritis, Bronchitis with chronic airway obstruction (HCC), DVT (deep venous thrombosis) (HCC), Hiatal hernia, Hypertension, Hypertension, Low back pain, Neck pain, Pulmonary embolism (HCC), and Sleep apnea with use of continuous positive airway pressure (CPAP).    Allergies as of 04/15/2022 - Reviewed 04/15/2022   Allergen Reaction Noted   • Penicillins Unknown - Low Severity 04/12/2016   • Penicillins Other (See Comments) 03/25/2021       Medication information was obtained from: Patient   Pharmacy and Phone Number:    Prior to Admission Medications     Prescriptions Last Dose Informant Patient Reported? Taking?    amLODIPine (NORVASC) 10 MG tablet 4/15/2022 Self No Yes    TAKE ONE TABLET BY MOUTH DAILY    Patient taking differently:  Take 10 mg by mouth Daily.    buPROPion SR (Wellbutrin SR) 150 MG 12 hr tablet 4/15/2022 Self No Yes    Take 1 tablet by mouth 2 (Two) Times a Day.    losartan (COZAAR) 100 MG tablet 4/15/2022 Self No Yes    Take 1 tablet by mouth Daily.    Multiple Vitamins-Minerals (MULTIVITAMIN ADULT PO) 4/15/2022 Self Yes Yes    Take 1 tablet by mouth Daily.    traZODone (DESYREL) 50 MG tablet Past Week Self No Yes    TAKE 1 TABLET AT NIGHT AS NEEDED FOR SLEEP    Patient taking differently:  Take 50 mg by mouth At Night As Needed.            Medication notes:     This medication list is complete to the best of my knowledge as of 4/15/2022    Please call if questions.    Ryan Brizuela  Medication History Technician  [phone]    4/15/2022 15:40 EDT

## 2022-04-15 NOTE — ED NOTES
Nursing report ED to floor  Watson Lisa  66 y.o.  male    HPI :   Chief Complaint   Patient presents with   • Withdrawal       Admitting doctor:   Keily Russell MD    Admitting diagnosis:   The encounter diagnosis was Alcohol withdrawal syndrome without complication (HCC).    Code status:   Current Code Status     Date Active Code Status Order ID Comments User Context       Prior    Advance Care Planning Activity          Allergies:   Penicillins and Penicillins    Intake and Output    Intake/Output Summary (Last 24 hours) at 4/15/2022 1628  Last data filed at 4/15/2022 1628  Gross per 24 hour   Intake 1000 ml   Output --   Net 1000 ml       Weight:   There were no vitals filed for this visit.    Most recent vitals:   Vitals:    04/15/22 1308 04/15/22 1345 04/15/22 1431 04/15/22 1433   BP:  135/77 141/86    Pulse: 105   64   Resp: 20      Temp: 97.2 °F (36.2 °C)      TempSrc: Tympanic      SpO2: 97%  98% 97%       Active LDAs/IV Access:   Lines, Drains & Airways     Active LDAs     Name Placement date Placement time Site Days    Peripheral IV 10/11/21 0327 Left; Upper Arm 10/11/21  0327  Arm  186    Peripheral IV 10/16/21 0215 Anterior; Right Forearm 10/16/21  0215  Forearm  181    Peripheral IV 04/15/22 1339 Anterior;Distal;Right;Upper Arm 04/15/22  1339  Arm  less than 1                Labs (abnormal labs have a star):   Labs Reviewed   COMPREHENSIVE METABOLIC PANEL - Abnormal; Notable for the following components:       Result Value    Sodium 135 (*)     All other components within normal limits    Narrative:     GFR Normal >60  Chronic Kidney Disease <60  Kidney Failure <15     URINE DRUG SCREEN - Abnormal; Notable for the following components:    Opiate Screen Positive (*)     All other components within normal limits    Narrative:     Negative Thresholds Per Drugs Screened:    Amphetamines                 500 ng/ml  Barbiturates                 200 ng/ml  Benzodiazepines              100  ng/ml  Cocaine                      300 ng/ml  Methadone                    300 ng/ml  Opiates                      300 ng/ml  Oxycodone                    100 ng/ml  THC                           50 ng/ml    The Normal Value for all drugs tested is negative. This report includes final unconfirmed screening results to be used for medical treatment purposes only. Unconfirmed results must not be used for non-medical purposes such as employment or legal testing. Clinical consideration should be applied to any drug of abuse test, particularly when unconfirmed results are used.           CBC WITH AUTO DIFFERENTIAL - Abnormal; Notable for the following components:    RBC 3.80 (*)     Hemoglobin 12.7 (*)     Hematocrit 36.6 (*)     MCH 33.4 (*)     Lymphocyte % 13.5 (*)     Immature Grans % 0.6 (*)     All other components within normal limits   COVID-19,[facility] IN-HOUSE CEPHEID/MAILE, NP SWAB IN TRANSPORT MEDIA 8-12 HR TAT - Normal    Narrative:     Fact sheet for providers: https://www.fda.gov/media/114121/download     Fact sheet for patients: https://www.fda.gov/media/021067/download   COVID PRE-OP / PRE-PROCEDURE SCREENING ORDER (NO ISOLATION)    Narrative:     The following orders were created for panel order COVID PRE-OP / PRE-PROCEDURE SCREENING ORDER (NO ISOLATION) - Swab, Nasopharynx.  Procedure                               Abnormality         Status                     ---------                               -----------         ------                     COVID-19,[facility] IN-HOUSE...[[id number]]  Normal              Final result                 Please view results for these tests on the individual orders.   ETHANOL   CBC AND DIFFERENTIAL    Narrative:     The following orders were created for panel order CBC & Differential.  Procedure                               Abnormality         Status                     ---------                               -----------         ------                     CBC Auto  Differential[[id number]]        Abnormal            Final result                 Please view results for these tests on the individual orders.       EKG:   ECG 12 Lead   Preliminary Result   HEART RATE= 84  bpm   RR Interval= 716  ms   MT Interval= 218  ms   P Horizontal Axis= -33  deg   P Front Axis= 71  deg   QRSD Interval= 96  ms   QT Interval= 385  ms   QRS Axis= 3  deg   T Wave Axis= 29  deg   - ABNORMAL ECG -   Sinus rhythm   Borderline prolonged MT interval   Anteroseptal infarct, old   Electronically Signed By:    Date and Time of Study: 2022-04-15 13:34:15          Meds given in ED:   Medications   sodium chloride 0.9 % bolus 1,000 mL (0 mL Intravenous Stopped 4/15/22 1628)   LORazepam (ATIVAN) injection 1 mg (1 mg Intravenous Given 4/15/22 1339)   LORazepam (ATIVAN) injection 1 mg (1 mg Intravenous Given 4/15/22 1604)       Imaging results:  No radiology results for the last day    Ambulatory status:   - ad chanel      Social issues:   Social History     Socioeconomic History   • Marital status:    Tobacco Use   • Smoking status: Current Every Day Smoker     Packs/day: 2.00     Years: 40.00     Pack years: 80.00     Types: Cigarettes   • Smokeless tobacco: Never Used   Vaping Use   • Vaping Use: Never used   Substance and Sexual Activity   • Alcohol use: Yes     Comment: Pt reports last drink was 1400 on 10/6/21 (2 pints every day, a fifth on weekends)   • Drug use: Not Currently     Types: Hydrocodone   • Sexual activity: Defer       NIH Stroke Scale:        Nursing report ED to floor:

## 2022-04-16 LAB
ANION GAP SERPL CALCULATED.3IONS-SCNC: 11.7 MMOL/L (ref 5–15)
BUN SERPL-MCNC: 8 MG/DL (ref 8–23)
BUN/CREAT SERPL: 11.8 (ref 7–25)
CALCIUM SPEC-SCNC: 9.1 MG/DL (ref 8.6–10.5)
CHLORIDE SERPL-SCNC: 104 MMOL/L (ref 98–107)
CO2 SERPL-SCNC: 23.3 MMOL/L (ref 22–29)
CREAT SERPL-MCNC: 0.68 MG/DL (ref 0.76–1.27)
DEPRECATED RDW RBC AUTO: 44.4 FL (ref 37–54)
EGFRCR SERPLBLD CKD-EPI 2021: 101.9 ML/MIN/1.73
ERYTHROCYTE [DISTWIDTH] IN BLOOD BY AUTOMATED COUNT: 12.6 % (ref 12.3–15.4)
GLUCOSE SERPL-MCNC: 100 MG/DL (ref 65–99)
HCT VFR BLD AUTO: 37.9 % (ref 37.5–51)
HGB BLD-MCNC: 13 G/DL (ref 13–17.7)
MCH RBC QN AUTO: 32.7 PG (ref 26.6–33)
MCHC RBC AUTO-ENTMCNC: 34.3 G/DL (ref 31.5–35.7)
MCV RBC AUTO: 95.5 FL (ref 79–97)
PLATELET # BLD AUTO: 253 10*3/MM3 (ref 140–450)
PMV BLD AUTO: 9.7 FL (ref 6–12)
POTASSIUM SERPL-SCNC: 3.6 MMOL/L (ref 3.5–5.2)
RBC # BLD AUTO: 3.97 10*6/MM3 (ref 4.14–5.8)
SODIUM SERPL-SCNC: 139 MMOL/L (ref 136–145)
WBC NRBC COR # BLD: 5.78 10*3/MM3 (ref 3.4–10.8)

## 2022-04-16 PROCEDURE — 85027 COMPLETE CBC AUTOMATED: CPT | Performed by: INTERNAL MEDICINE

## 2022-04-16 PROCEDURE — 90791 PSYCH DIAGNOSTIC EVALUATION: CPT

## 2022-04-16 PROCEDURE — 25010000002 LORAZEPAM PER 2 MG: Performed by: INTERNAL MEDICINE

## 2022-04-16 PROCEDURE — 36415 COLL VENOUS BLD VENIPUNCTURE: CPT | Performed by: INTERNAL MEDICINE

## 2022-04-16 PROCEDURE — 80048 BASIC METABOLIC PNL TOTAL CA: CPT | Performed by: INTERNAL MEDICINE

## 2022-04-16 RX ORDER — POTASSIUM CHLORIDE 750 MG/1
40 TABLET, FILM COATED, EXTENDED RELEASE ORAL ONCE
Status: COMPLETED | OUTPATIENT
Start: 2022-04-16 | End: 2022-04-16

## 2022-04-16 RX ADMIN — Medication 1 TABLET: at 08:40

## 2022-04-16 RX ADMIN — ACETAMINOPHEN 650 MG: 325 TABLET ORAL at 08:52

## 2022-04-16 RX ADMIN — BUPROPION HYDROCHLORIDE 150 MG: 150 TABLET, EXTENDED RELEASE ORAL at 20:18

## 2022-04-16 RX ADMIN — LOSARTAN POTASSIUM 100 MG: 100 TABLET, FILM COATED ORAL at 08:40

## 2022-04-16 RX ADMIN — AMLODIPINE BESYLATE 10 MG: 10 TABLET ORAL at 08:40

## 2022-04-16 RX ADMIN — FOLIC ACID 1 MG: 1 TABLET ORAL at 08:40

## 2022-04-16 RX ADMIN — LORAZEPAM 1 MG: 2 INJECTION INTRAMUSCULAR; INTRAVENOUS at 20:18

## 2022-04-16 RX ADMIN — Medication 100 MG: at 08:40

## 2022-04-16 RX ADMIN — BUPROPION HYDROCHLORIDE 150 MG: 150 TABLET, EXTENDED RELEASE ORAL at 08:40

## 2022-04-16 RX ADMIN — LORAZEPAM 0.5 MG: 2 INJECTION INTRAMUSCULAR; INTRAVENOUS at 04:23

## 2022-04-16 RX ADMIN — LORAZEPAM 1 MG: 2 INJECTION INTRAMUSCULAR; INTRAVENOUS at 23:53

## 2022-04-16 RX ADMIN — LORAZEPAM 0.5 MG: 2 INJECTION INTRAMUSCULAR; INTRAVENOUS at 02:03

## 2022-04-16 RX ADMIN — MULTIPLE VITAMINS W/ MINERALS TAB 1 TABLET: TAB at 08:40

## 2022-04-16 RX ADMIN — LORAZEPAM 1 MG: 2 INJECTION INTRAMUSCULAR; INTRAVENOUS at 17:55

## 2022-04-16 RX ADMIN — LORAZEPAM 1 MG: 2 INJECTION INTRAMUSCULAR; INTRAVENOUS at 14:19

## 2022-04-16 RX ADMIN — POTASSIUM CHLORIDE 40 MEQ: 10 TABLET, EXTENDED RELEASE ORAL at 17:56

## 2022-04-16 RX ADMIN — LORAZEPAM 1 MG: 2 INJECTION INTRAMUSCULAR; INTRAVENOUS at 08:52

## 2022-04-16 NOTE — PROGRESS NOTES
Name: Watson Lisa ADMIT: 4/15/2022   : 1955  PCP: Checo Dunham MD    MRN: [id number] LOS: 1 days   AGE/SEX: 66 y.o. male  ROOM: [facility]     Subjective   Subjective   CC: generalized weakness, tremors, EtOH detox  No acute events. Patient states he feels a bit better today. Still having some tremors but has this to some degree at baseline. Taking PO. No CP/dyspnea/f/c/n/v/d.    Objective   Objective   Vital Signs  Temp:  [97.5 °F (36.4 °C)-98.2 °F (36.8 °C)] 98 °F (36.7 °C)  Heart Rate:  [64-95] 79  Resp:  [16-20] 20  BP: (141-162)/() 156/86  SpO2:  [93 %-98 %] 97 %  on   ;   Device (Oxygen Therapy): room air  Body mass index is 30.3 kg/m².  Physical Exam  Vitals and nursing note reviewed.   Constitutional:       General: He is not in acute distress.     Appearance: He is not toxic-appearing or diaphoretic.   HENT:      Head: Normocephalic and atraumatic.      Nose: Nose normal.      Mouth/Throat:      Mouth: Mucous membranes are moist.      Pharynx: Oropharynx is clear.   Eyes:      Extraocular Movements: Extraocular movements intact.      Conjunctiva/sclera: Conjunctivae normal.      Pupils: Pupils are equal, round, and reactive to light.   Cardiovascular:      Rate and Rhythm: Normal rate and regular rhythm.      Pulses: Normal pulses.   Pulmonary:      Effort: Pulmonary effort is normal.      Breath sounds: Normal breath sounds.   Abdominal:      General: Bowel sounds are normal.      Palpations: Abdomen is soft.      Tenderness: There is no abdominal tenderness.   Musculoskeletal:         General: No swelling or tenderness.      Cervical back: Normal range of motion and neck supple.   Skin:     General: Skin is warm and dry.      Capillary Refill: Capillary refill takes less than 2 seconds.   Neurological:      General: No focal deficit present.      Mental Status: He is alert and oriented to person, place, and time.      Motor: Tremor (mild) present.   Psychiatric:         Mood and  Affect: Mood normal.         Behavior: Behavior normal.       Results Review     I reviewed the patient's new clinical results.  I reviewed the patient's telemetry.  Results from last 7 days   Lab Units 04/16/22  0528 04/15/22  1337   WBC 10*3/mm3 5.78 9.02   HEMOGLOBIN g/dL 13.0 12.7*   PLATELETS 10*3/mm3 253 256     Results from last 7 days   Lab Units 04/16/22  0528 04/15/22  1337   SODIUM mmol/L 139 135*   POTASSIUM mmol/L 3.6 3.6   CHLORIDE mmol/L 104 100   CO2 mmol/L 23.3 24.0   BUN mg/dL 8 14   CREATININE mg/dL 0.68* 0.96   GLUCOSE mg/dL 100* 97   EGFR mL/min/1.73 101.9 86.6     Results from last 7 days   Lab Units 04/15/22  1337   ALBUMIN g/dL 4.20   BILIRUBIN mg/dL 0.4   ALK PHOS U/L 98   AST (SGOT) U/L 15   ALT (SGPT) U/L 13     Results from last 7 days   Lab Units 04/16/22  0528 04/15/22  1337   CALCIUM mg/dL 9.1 8.6   ALBUMIN g/dL  --  4.20       No results found for: HGBA1C, POCGLU    No radiology results for the last day  Scheduled Medications  amLODIPine, 10 mg, Oral, Daily  buPROPion SR, 150 mg, Oral, BID  thiamine, 100 mg, Oral, Daily   And  multivitamin, 1 tablet, Oral, Daily   And  folic acid, 1 mg, Oral, Daily  losartan, 100 mg, Oral, Daily  multivitamin with minerals, 1 tablet, Oral, Daily    Infusions   Diet  Diet Regular; Cardiac       Assessment/Plan     Active Hospital Problems    Diagnosis  POA   • **Alcohol withdrawal syndrome without complication (HCC) [F10.230]  Yes   • LIVAN (obstructive sleep apnea) [G47.33]  Yes   • COPD (chronic obstructive pulmonary disease) (HCC) [J44.9]  Yes   • Essential hypertension [I10]  Yes      Resolved Hospital Problems   No resolved problems to display.   EtOH Dependence in Withdrawal  - continue PRN ativan per [facility] protocol  - continue vitamin replacement  - access center following  - monitor labs and replace electrolytes as needed-I am going to give him a dose of KCl today    LIVAN  - has CPAP    COPD  - no exacerbation, not on maintenance medications  - PRN  duo-nebs ordered    HTN  - BP is acceptable  - continue on norvasc and losartan    Depression/Anxiety  - continue on wellbutrin    SCDs for DVT prophylaxis.  Full code.  Discussed with patient and nursing staff.  Anticipate discharge home in 1-2 days.      Meño Liriano MD  [facility]  04/16/22  14:23 EDT

## 2022-04-16 NOTE — PLAN OF CARE
Goal Outcome Evaluation:  Plan of Care Reviewed With: patient        Progress: no change  Outcome Evaluation: VSS. No new complaints this shift. CIWA scores between 8-10 - tx'd with PRN ativan. Home CPAP worn while sleeping. Bed extender ordered. Seizure precautions maintained. No other changes this shift. WCM
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
## 2022-04-16 NOTE — CONSULTS
"Adult Nutrition  Assessment/PES    Patient Name:  Watson Lisa  YOB: 1955  MRN: [id number]  Admit Date:  4/15/2022    Assessment Date:  4/16/2022    Comments:  Diet education. Pt admit with alcohol withdrawal syndrome without complication, hx: anemia due to chronic illness, chronic airway obstruction, diaphragmatic hernia, tobacco abuse, alcoholic liver damage, ETOH abuse, sleep apnea, high bp. BMI: 30.3. Pt on regular; cardiac diet. No documented meal intake yet in EMR. Pt sleeping and on CPAP when RD attempted to consult. Diet education not appropriate at this time. Pt is currently receiving folic acid, thiamine, and multivitamin with minerals. If pt meal intake is poor recommend adding Boost Breeze daily for pt to try. Continue to follow per protocol.     Reason for Assessment     Row Name 04/16/22 0924          Reason for Assessment    Reason For Assessment physician consult     Diagnosis other (see comments)  Pt admit with alcohol withdrawal syndrome without complication, hx: anemia due to chronic illness, chronic airway obstruction, diaphragmatic hernia, tobacco abuse, alcoholic liver damage, ETOH abuse, sleep apnea, high bp     Identified At Risk by Screening Criteria need for education                Nutrition/Diet History     Row Name 04/16/22 0926          Nutrition/Diet History    Typical Intake (Food/Fluid/EN/PN) Pt on regular; cardiac diet.                Anthropometrics     Row Name 04/16/22 0929          Anthropometrics    Height for Calculation 2.007 m (6' 7.02\")     Weight for Calculation 122 kg (268 lb 15.4 oz)     Additional Documentation --  BMI: 30.3                Labs/Tests/Procedures/Meds     Row Name 04/16/22 0983          Labs/Procedures/Meds    Lab Results Reviewed reviewed     Lab Results Comments Glucose (high), creatinine (low)            Diagnostic Tests/Procedures    Diagnostic Test/Procedure Reviewed reviewed            Medications    Pertinent Medications " "Reviewed reviewed     Pertinent Medications Comments norvasc, wellbutrin, folic acid, cozaar, multivitamin, multivitamin with minerals, thiamine, prn: ativan                Physical Findings     Row Name 04/16/22 0928          Physical Findings    Overall Physical Appearance on CPAP                Estimated/Assessed Needs - Anthropometrics     Row Name 04/16/22 0929          Anthropometrics    Height for Calculation 2.007 m (6' 7.02\")     Weight for Calculation 122 kg (268 lb 15.4 oz)     Additional Documentation --  BMI: 30.3                Nutrition Prescription Ordered     Row Name 04/16/22 0930          Nutrition Prescription PO    Current PO Diet Regular     Common Modifiers Cardiac                       Problem/Interventions:   Problem 1     Row Name 04/16/22 0930          Nutrition Diagnoses Problem 1    Problem 1 Predicted Suboptimal Intake     Etiology (related to) Medical Diagnosis     Neurological Other (comment)  alcohol withdrawal syndrome without complication     Signs/Symptoms (evidenced by) Other (comment)  may cause decreased intake                      Intervention Goal     Row Name 04/16/22 0930          Intervention Goal    General Maintain nutrition;Improved nutrition related lab(s);Meet nutritional needs for age/condition;Disease management/therapy     PO Tolerate PO;Meet estimated needs;Establish PO     Weight Maintain weight                Nutrition Intervention     Row Name 04/16/22 0931          Nutrition Intervention    RD/Tech Action Care plan reviewd;Follow Tx progress     Recommended/Ordered --                Nutrition Prescription     Row Name 04/16/22 0931          Nutrition Prescription PO    PO Prescription --     Supplement --     Supplement Frequency --     New PO Prescription Ordered? --                Education/Evaluation     Row Name 04/16/22 0932          Education    Education Will Instruct as appropriate;Education not appropriate at this time            Monitor/Evaluation    " Monitor Per protocol;I&O;PO intake;Pertinent labs;Weight;Skin status;Symptoms                 Electronically signed by:  Katerin Majano RD  04/16/22 09:32 EDT

## 2022-04-16 NOTE — H&P
HISTORY AND PHYSICAL   [facility]        Date of Admission: 4/15/2022  Patient Identification:  Name: Watson Lisa  Age: 66 y.o.  Sex: male  :  1955  MRN: [id number]                     Primary Care Physician: Checo Dunham MD    Chief Complaint:  66 year old gentleman who presented to the emergency room because he is concerned about alcohol withdrawal; he is requesting help with alcohol dependence; he has been in the icu in the past with delirium trements; he last drank alcohol last night; he has been nauseated and feels shaky;     History of Present Illness:   As above    Past Medical History:  Past Medical History:   Diagnosis Date   • Alcohol abuse    • Anxiety    • Arthritis    • Bronchitis with chronic airway obstruction (HCC)     LAST FEB   • DVT (deep venous thrombosis) (HCC)    • Hiatal hernia    • Hypertension    • Hypertension    • Low back pain    • Neck pain    • Pulmonary embolism (HCC)    • Sleep apnea with use of continuous positive airway pressure (CPAP)     AT NIGHT     Past Surgical History:  Past Surgical History:   Procedure Laterality Date   • COLONOSCOPY     • JOINT REPLACEMENT Right     hip/knee   • REPLACEMENT TOTAL KNEE     • TONSILLECTOMY     • TOTAL HIP ARTHROPLASTY Right       Home Meds:  Medications Prior to Admission   Medication Sig Dispense Refill Last Dose   • amLODIPine (NORVASC) 10 MG tablet TAKE ONE TABLET BY MOUTH DAILY (Patient taking differently: Take 10 mg by mouth Daily.) 90 tablet 1 4/15/2022 at Unknown time   • buPROPion SR (Wellbutrin SR) 150 MG 12 hr tablet Take 1 tablet by mouth 2 (Two) Times a Day. 180 tablet 3 4/15/2022 at Unknown time   • losartan (COZAAR) 100 MG tablet Take 1 tablet by mouth Daily. 10 tablet 0 4/15/2022 at Unknown time   • Multiple Vitamins-Minerals (MULTIVITAMIN ADULT PO) Take 1 tablet by mouth Daily.   4/15/2022 at Unknown time   • traZODone (DESYREL) 50 MG tablet TAKE 1 TABLET AT NIGHT AS NEEDED FOR SLEEP (Patient  taking differently: Take 50 mg by mouth At Night As Needed.) 90 tablet 3 Past Week at Unknown time       Allergies:  Allergies   Allergen Reactions   • Penicillins Unknown - Low Severity     Pt does not recall the reaction. Patient tolerated cephalexin 3/2020   • Penicillins Other (See Comments)     Unknown      Immunizations:  Immunization History   Administered Date(s) Administered   • COVID-19 (PFIZER) PURPLE CAP 03/17/2021, 04/07/2021, 11/03/2021   • Tdap 03/26/2020     Social History:   Social History     Social History Narrative    ** Merged History Encounter **          Social History     Socioeconomic History   • Marital status:    Tobacco Use   • Smoking status: Current Every Day Smoker     Packs/day: 2.00     Years: 40.00     Pack years: 80.00     Types: Cigarettes   • Smokeless tobacco: Never Used   Vaping Use   • Vaping Use: Never used   Substance and Sexual Activity   • Alcohol use: Yes     Alcohol/week: 10.0 standard drinks     Types: 10 Shots of liquor per week     Comment: 1 pint vodka/ day   • Drug use: Not Currently     Types: Hydrocodone   • Sexual activity: Defer       Family History:  Family History   Problem Relation Age of Onset   • Cancer Mother    • Heart disease Father    • Alcohol abuse Father    • Cancer Brother         Review of Systems  See history of present illness and past medical history.  Patient denies headache, dizziness, syncope, falls, trauma, change in vision, change in hearing, change in taste, changes in weight, changes in appetite, focal weakness, numbness, or paresthesia.  Patient denies chest pain, palpitations, dyspnea, orthopnea, PND, cough, sinus pressure, rhinorrhea, epistaxis, hemoptysis, nausea, vomiting,hematemesis, diarrhea, constipation or hematchezia.  Denies cold or heat intolerance, polydipsia, polyuria, polyphagia. Denies hematuria, pyuria, dysuria, hesitancy, frequency or urgency. Denies consumption of raw and under cooked meats foods or change in  "water source.  Denies fever, chills, sweats, night sweats.  Denies missing any routine medications. Remainder of ROS is negative.    Objective:  T Max 24 hrs: Temp (24hrs), Av.4 °F (36.3 °C), Min:97.2 °F (36.2 °C), Max:97.5 °F (36.4 °C)    Vitals Ranges:   Temp:  [97.2 °F (36.2 °C)-97.5 °F (36.4 °C)] 97.5 °F (36.4 °C)  Heart Rate:  [] 82  Resp:  [16-20] 16  BP: (135-160)/(77-89) 158/83      Exam:  /83 (BP Location: Left arm, Patient Position: Lying)   Pulse 82   Temp 97.5 °F (36.4 °C) (Oral)   Resp 16   Ht 200.7 cm (79\")   Wt 122 kg (268 lb 15.4 oz)   SpO2 93%   BMI 30.30 kg/m²     General Appearance:    Alert, cooperative, no distress, appears stated age   Head:    Normocephalic, without obvious abnormality, atraumatic   Eyes:    PERRL, conjunctivae/corneas clear, EOM's intact, both eyes   Ears:    Normal external ear canals, both ears   Nose:   Nares normal, septum midline, mucosa normal, no drainage    or sinus tenderness   Throat:   Lips, mucosa, and tongue normal   Neck:   Supple, symmetrical, trachea midline, no adenopathy;     thyroid:  no enlargement/tenderness/nodules; no carotid    bruit or JVD   Back:     Symmetric, no curvature, ROM normal, no CVA tenderness   Lungs:     Clear to auscultation bilaterally, respirations unlabored   Chest Wall:    No tenderness or deformity    Heart:    Regular rate and rhythm, S1 and S2 normal, no murmur, rub   or gallop   Abdomen:     Soft, nontender, bowel sounds active all four quadrants,     no masses, no hepatomegaly, no splenomegaly   Extremities:   Extremities normal, atraumatic, no cyanosis or edema   Pulses:   2+ and symmetric all extremities   Skin:   Skin color, texture, turgor normal, no rashes or lesions   Lymph nodes:   Cervical, supraclavicular, and axillary nodes normal   Neurologic:   CNII-XII intact, normal strength, sensation intact throughout      .    Data Review:  Labs in chart were reviewed.  WBC   Date Value Ref Range Status "   04/15/2022 9.02 3.40 - 10.80 10*3/mm3 Final     Hemoglobin   Date Value Ref Range Status   04/15/2022 12.7 (L) 13.0 - 17.7 g/dL Final     Hematocrit   Date Value Ref Range Status   04/15/2022 36.6 (L) 37.5 - 51.0 % Final     Platelets   Date Value Ref Range Status   04/15/2022 256 140 - 450 10*3/mm3 Final     Sodium   Date Value Ref Range Status   04/15/2022 135 (L) 136 - 145 mmol/L Final     Potassium   Date Value Ref Range Status   04/15/2022 3.6 3.5 - 5.2 mmol/L Final     Chloride   Date Value Ref Range Status   04/15/2022 100 98 - 107 mmol/L Final     CO2   Date Value Ref Range Status   04/15/2022 24.0 22.0 - 29.0 mmol/L Final     BUN   Date Value Ref Range Status   04/15/2022 14 8 - 23 mg/dL Final     Creatinine   Date Value Ref Range Status   04/15/2022 0.96 0.76 - 1.27 mg/dL Final     Glucose   Date Value Ref Range Status   04/15/2022 97 65 - 99 mg/dL Final     Calcium   Date Value Ref Range Status   04/15/2022 8.6 8.6 - 10.5 mg/dL Final     AST (SGOT)   Date Value Ref Range Status   04/15/2022 15 1 - 40 U/L Final     ALT (SGPT)   Date Value Ref Range Status   04/15/2022 13 1 - 41 U/L Final     Alkaline Phosphatase   Date Value Ref Range Status   04/15/2022 98 39 - 117 U/L Final                Imaging Results (All)     None            Assessment:  Active Hospital Problems    Diagnosis  POA   • Alcohol withdrawal syndrome without complication (HCC) [F10.230]  Yes      Resolved Hospital Problems   No resolved problems to display.   alcohol dependence  Hypertension  Copd  Obesity  Anemia  hyponatremia    Plan:  Monitor on telemetry  ciwa protocol  Access to see  Copd is at baseline and without exacerbation  Monitor hgb  D.w patient and ED Provider    Keily Russell MD  4/15/2022  21:08 EDT

## 2022-04-17 PROBLEM — F17.210 CIGARETTE NICOTINE DEPENDENCE WITHOUT COMPLICATION: Status: ACTIVE | Noted: 2022-04-17

## 2022-04-17 LAB
ANION GAP SERPL CALCULATED.3IONS-SCNC: 12 MMOL/L (ref 5–15)
BASOPHILS # BLD AUTO: 0.03 10*3/MM3 (ref 0–0.2)
BASOPHILS NFR BLD AUTO: 0.4 % (ref 0–1.5)
BUN SERPL-MCNC: 8 MG/DL (ref 8–23)
BUN/CREAT SERPL: 10.5 (ref 7–25)
CALCIUM SPEC-SCNC: 8.8 MG/DL (ref 8.6–10.5)
CHLORIDE SERPL-SCNC: 103 MMOL/L (ref 98–107)
CO2 SERPL-SCNC: 23 MMOL/L (ref 22–29)
CREAT SERPL-MCNC: 0.76 MG/DL (ref 0.76–1.27)
DEPRECATED RDW RBC AUTO: 42.2 FL (ref 37–54)
EGFRCR SERPLBLD CKD-EPI 2021: 98.5 ML/MIN/1.73
EOSINOPHIL # BLD AUTO: 0.15 10*3/MM3 (ref 0–0.4)
EOSINOPHIL NFR BLD AUTO: 1.9 % (ref 0.3–6.2)
ERYTHROCYTE [DISTWIDTH] IN BLOOD BY AUTOMATED COUNT: 12.2 % (ref 12.3–15.4)
GLUCOSE SERPL-MCNC: 115 MG/DL (ref 65–99)
HCT VFR BLD AUTO: 38.2 % (ref 37.5–51)
HGB BLD-MCNC: 13.1 G/DL (ref 13–17.7)
IMM GRANULOCYTES # BLD AUTO: 0.04 10*3/MM3 (ref 0–0.05)
IMM GRANULOCYTES NFR BLD AUTO: 0.5 % (ref 0–0.5)
LYMPHOCYTES # BLD AUTO: 1.27 10*3/MM3 (ref 0.7–3.1)
LYMPHOCYTES NFR BLD AUTO: 15.7 % (ref 19.6–45.3)
MAGNESIUM SERPL-MCNC: 2 MG/DL (ref 1.6–2.4)
MCH RBC QN AUTO: 32.7 PG (ref 26.6–33)
MCHC RBC AUTO-ENTMCNC: 34.3 G/DL (ref 31.5–35.7)
MCV RBC AUTO: 95.3 FL (ref 79–97)
MONOCYTES # BLD AUTO: 0.64 10*3/MM3 (ref 0.1–0.9)
MONOCYTES NFR BLD AUTO: 7.9 % (ref 5–12)
NEUTROPHILS NFR BLD AUTO: 5.94 10*3/MM3 (ref 1.7–7)
NEUTROPHILS NFR BLD AUTO: 73.6 % (ref 42.7–76)
NRBC BLD AUTO-RTO: 0 /100 WBC (ref 0–0.2)
PLATELET # BLD AUTO: 272 10*3/MM3 (ref 140–450)
PMV BLD AUTO: 10 FL (ref 6–12)
POTASSIUM SERPL-SCNC: 3.3 MMOL/L (ref 3.5–5.2)
POTASSIUM SERPL-SCNC: 4.1 MMOL/L (ref 3.5–5.2)
QT INTERVAL: 385 MS
RBC # BLD AUTO: 4.01 10*6/MM3 (ref 4.14–5.8)
SODIUM SERPL-SCNC: 138 MMOL/L (ref 136–145)
WBC NRBC COR # BLD: 8.07 10*3/MM3 (ref 3.4–10.8)

## 2022-04-17 PROCEDURE — 25010000002 LORAZEPAM PER 2 MG: Performed by: INTERNAL MEDICINE

## 2022-04-17 PROCEDURE — 85025 COMPLETE CBC W/AUTO DIFF WBC: CPT | Performed by: INTERNAL MEDICINE

## 2022-04-17 PROCEDURE — 84132 ASSAY OF SERUM POTASSIUM: CPT | Performed by: INTERNAL MEDICINE

## 2022-04-17 PROCEDURE — 80048 BASIC METABOLIC PNL TOTAL CA: CPT | Performed by: INTERNAL MEDICINE

## 2022-04-17 PROCEDURE — 83735 ASSAY OF MAGNESIUM: CPT | Performed by: INTERNAL MEDICINE

## 2022-04-17 PROCEDURE — 63710000001 DIPHENHYDRAMINE PER 50 MG: Performed by: INTERNAL MEDICINE

## 2022-04-17 RX ORDER — POTASSIUM CHLORIDE 750 MG/1
40 TABLET, FILM COATED, EXTENDED RELEASE ORAL AS NEEDED
Status: DISCONTINUED | OUTPATIENT
Start: 2022-04-17 | End: 2022-04-18 | Stop reason: HOSPADM

## 2022-04-17 RX ORDER — NICOTINE 21 MG/24HR
1 PATCH, TRANSDERMAL 24 HOURS TRANSDERMAL
Status: DISCONTINUED | OUTPATIENT
Start: 2022-04-17 | End: 2022-04-18 | Stop reason: HOSPADM

## 2022-04-17 RX ORDER — CHOLECALCIFEROL (VITAMIN D3) 125 MCG
10 CAPSULE ORAL NIGHTLY
Status: DISCONTINUED | OUTPATIENT
Start: 2022-04-17 | End: 2022-04-18 | Stop reason: HOSPADM

## 2022-04-17 RX ORDER — POTASSIUM CHLORIDE 1.5 G/1.77G
40 POWDER, FOR SOLUTION ORAL AS NEEDED
Status: DISCONTINUED | OUTPATIENT
Start: 2022-04-17 | End: 2022-04-18 | Stop reason: HOSPADM

## 2022-04-17 RX ORDER — TRAZODONE HYDROCHLORIDE 100 MG/1
100 TABLET ORAL NIGHTLY
Status: DISCONTINUED | OUTPATIENT
Start: 2022-04-17 | End: 2022-04-18 | Stop reason: HOSPADM

## 2022-04-17 RX ORDER — DIPHENHYDRAMINE HCL 25 MG
25 CAPSULE ORAL NIGHTLY PRN
Status: DISCONTINUED | OUTPATIENT
Start: 2022-04-17 | End: 2022-04-18 | Stop reason: HOSPADM

## 2022-04-17 RX ORDER — POTASSIUM CHLORIDE 7.45 MG/ML
10 INJECTION INTRAVENOUS
Status: DISCONTINUED | OUTPATIENT
Start: 2022-04-17 | End: 2022-04-18 | Stop reason: HOSPADM

## 2022-04-17 RX ADMIN — LORAZEPAM 1 MG: 2 INJECTION INTRAMUSCULAR; INTRAVENOUS at 09:14

## 2022-04-17 RX ADMIN — TRAZODONE HYDROCHLORIDE 100 MG: 100 TABLET ORAL at 20:42

## 2022-04-17 RX ADMIN — BUPROPION HYDROCHLORIDE 150 MG: 150 TABLET, EXTENDED RELEASE ORAL at 09:09

## 2022-04-17 RX ADMIN — POTASSIUM CHLORIDE 40 MEQ: 10 TABLET, EXTENDED RELEASE ORAL at 15:15

## 2022-04-17 RX ADMIN — MULTIPLE VITAMINS W/ MINERALS TAB 1 TABLET: TAB at 09:08

## 2022-04-17 RX ADMIN — LORAZEPAM 1 MG: 2 INJECTION INTRAMUSCULAR; INTRAVENOUS at 23:00

## 2022-04-17 RX ADMIN — Medication 100 MG: at 09:09

## 2022-04-17 RX ADMIN — TRAZODONE HYDROCHLORIDE 50 MG: 50 TABLET ORAL at 00:51

## 2022-04-17 RX ADMIN — FOLIC ACID 1 MG: 1 TABLET ORAL at 09:08

## 2022-04-17 RX ADMIN — NICOTINE 1 PATCH: 21 PATCH, EXTENDED RELEASE TRANSDERMAL at 14:29

## 2022-04-17 RX ADMIN — POTASSIUM CHLORIDE 40 MEQ: 10 TABLET, EXTENDED RELEASE ORAL at 11:00

## 2022-04-17 RX ADMIN — AMLODIPINE BESYLATE 10 MG: 10 TABLET ORAL at 09:09

## 2022-04-17 RX ADMIN — LORAZEPAM 1 MG: 2 INJECTION INTRAMUSCULAR; INTRAVENOUS at 04:12

## 2022-04-17 RX ADMIN — LOSARTAN POTASSIUM 100 MG: 100 TABLET, FILM COATED ORAL at 09:09

## 2022-04-17 RX ADMIN — LORAZEPAM 1 MG: 2 INJECTION INTRAMUSCULAR; INTRAVENOUS at 20:42

## 2022-04-17 RX ADMIN — Medication 10 MG: at 20:41

## 2022-04-17 RX ADMIN — DIPHENHYDRAMINE HYDROCHLORIDE 25 MG: 25 CAPSULE ORAL at 20:42

## 2022-04-17 RX ADMIN — Medication 1 TABLET: at 09:09

## 2022-04-17 RX ADMIN — BUPROPION HYDROCHLORIDE 150 MG: 150 TABLET, EXTENDED RELEASE ORAL at 20:41

## 2022-04-17 NOTE — PROGRESS NOTES
Name: Watson Lisa ADMIT: 4/15/2022   : 1955  PCP: Checo Dunham MD    MRN: [id number] LOS: 2 days   AGE/SEX: 66 y.o. male  ROOM: [facility]     Subjective   Subjective   CC: generalized weakness, tremors, EtOH detox  No acute events. Patient feels about the same. Had CIWA scores in 8-13 range overnight and has received 2 doses of ativan this AM. Taking PO. No CP/dyspnea/f/c/n/v/d.    Objective   Objective   Vital Signs  Temp:  [97.7 °F (36.5 °C)-98 °F (36.7 °C)] 97.7 °F (36.5 °C)  Heart Rate:  [71-85] 85  Resp:  [16-20] 20  BP: (149-168)/(86-98) 168/93  SpO2:  [95 %-97 %] 95 %  on   ;   Device (Oxygen Therapy): CPAP  Body mass index is 30.3 kg/m².  Physical Exam  Vitals and nursing note reviewed.   Constitutional:       General: He is not in acute distress.     Appearance: He is not toxic-appearing or diaphoretic.   HENT:      Head: Normocephalic and atraumatic.      Nose: Nose normal.      Mouth/Throat:      Mouth: Mucous membranes are moist.      Pharynx: Oropharynx is clear.   Eyes:      Extraocular Movements: Extraocular movements intact.      Conjunctiva/sclera: Conjunctivae normal.      Pupils: Pupils are equal, round, and reactive to light.   Cardiovascular:      Rate and Rhythm: Normal rate and regular rhythm.      Pulses: Normal pulses.   Pulmonary:      Effort: Pulmonary effort is normal.      Breath sounds: Normal breath sounds.   Abdominal:      General: Bowel sounds are normal.      Palpations: Abdomen is soft.      Tenderness: There is no abdominal tenderness.   Musculoskeletal:         General: No swelling or tenderness.      Cervical back: Normal range of motion and neck supple.   Skin:     General: Skin is warm and dry.      Capillary Refill: Capillary refill takes less than 2 seconds.   Neurological:      General: No focal deficit present.      Mental Status: He is alert and oriented to person, place, and time.      Motor: Tremor (mild) present.   Psychiatric:         Mood and  Affect: Mood normal.         Behavior: Behavior normal.       Results Review     I reviewed the patient's new clinical results.  I reviewed the patient's telemetry.  Results from last 7 days   Lab Units 04/17/22  0445 04/16/22  0528 04/15/22  1337   WBC 10*3/mm3 8.07 5.78 9.02   HEMOGLOBIN g/dL 13.1 13.0 12.7*   PLATELETS 10*3/mm3 272 253 256     Results from last 7 days   Lab Units 04/17/22 0445 04/16/22  0528 04/15/22  1337   SODIUM mmol/L 138 139 135*   POTASSIUM mmol/L 3.3* 3.6 3.6   CHLORIDE mmol/L 103 104 100   CO2 mmol/L 23.0 23.3 24.0   BUN mg/dL 8 8 14   CREATININE mg/dL 0.76 0.68* 0.96   GLUCOSE mg/dL 115* 100* 97   EGFR mL/min/1.73 98.5 101.9 86.6     Results from last 7 days   Lab Units 04/15/22  1337   ALBUMIN g/dL 4.20   BILIRUBIN mg/dL 0.4   ALK PHOS U/L 98   AST (SGOT) U/L 15   ALT (SGPT) U/L 13     Results from last 7 days   Lab Units 04/17/22  0445 04/16/22  0528 04/15/22  1337   CALCIUM mg/dL 8.8 9.1 8.6   ALBUMIN g/dL  --   --  4.20   MAGNESIUM mg/dL 2.0  --   --        No results found for: HGBA1C, POCGLU    No radiology results for the last day  Scheduled Medications  amLODIPine, 10 mg, Oral, Daily  buPROPion SR, 150 mg, Oral, BID  thiamine, 100 mg, Oral, Daily   And  multivitamin, 1 tablet, Oral, Daily   And  folic acid, 1 mg, Oral, Daily  losartan, 100 mg, Oral, Daily  multivitamin with minerals, 1 tablet, Oral, Daily  nicotine, 1 patch, Transdermal, Q24H    Infusions   Diet  Diet Regular; Cardiac       Assessment/Plan     Active Hospital Problems    Diagnosis  POA   • **Alcohol withdrawal syndrome without complication (HCC) [F10.230]  Yes   • Cigarette nicotine dependence without complication [F17.210]  Yes   • LIVAN (obstructive sleep apnea) [G47.33]  Yes   • COPD (chronic obstructive pulmonary disease) (HCC) [J44.9]  Yes   • Hypokalemia [E87.6]  Yes   • Essential hypertension [I10]  Yes      Resolved Hospital Problems   No resolved problems to display.   EtOH Dependence in Withdrawal  -  last drink was on 4/14  - continue PRN ativan per CIWA protocol  - continue vitamin replacement  - access center following  - monitor labs and replace electrolytes as needed-protocol ordered for potassium  - he is complaining of insomnia and I explained that this is to be expected probably for the next month or so-I will increase his nightly trazodone and schedule melatonin as well, add PRN benadryl    Nicotine Dependence   - nicotine patch ordered    LIVAN  - has CPAP    COPD  - no exacerbation, not on maintenance medications  - PRN duo-nebs ordered    HTN  - BP is acceptable  - continue on norvasc and losartan    Depression/Anxiety  - continue on wellbutrin    SCDs for DVT prophylaxis.  Full code.  Discussed with patient and nursing staff.  Anticipate discharge home tomorrow. Today is day 3 since last drink and if he does okay today he should be able to go home tomorrow.      Meño Liriano MD  [facility]  04/17/22  13:39 EDT

## 2022-04-17 NOTE — PLAN OF CARE
Goal Outcome Evaluation:  Plan of Care Reviewed With: patient        Progress: no change  Outcome Evaluation: VSS. CIWA scores between 8 and 13 this shift - PRN ativan given per protocol. Seizure precautions in place. Pt up to bathroom with cane and using urinal at bedside. CPAP worn at night. Pt complaining of being unable to sleep this shift - PRN trazadone given with no relief. No other changes. Will continue to monitor

## 2022-04-17 NOTE — PLAN OF CARE
Goal Outcome Evaluation:           Progress: improving  Outcome Evaluation: VSS, no c/o pain, CIWA improved - PRN required x1 today, seizure precautions in place, + ambulation in room, PRN sleeping meds adjusted per MD, will continue to monitor

## 2022-04-18 ENCOUNTER — READMISSION MANAGEMENT (OUTPATIENT)
Dept: CALL CENTER | Facility: HOSPITAL | Age: 67
End: 2022-04-18

## 2022-04-18 VITALS
RESPIRATION RATE: 16 BRPM | BODY MASS INDEX: 29.88 KG/M2 | WEIGHT: 258.3 LBS | HEIGHT: 78 IN | DIASTOLIC BLOOD PRESSURE: 90 MMHG | SYSTOLIC BLOOD PRESSURE: 146 MMHG | HEART RATE: 72 BPM | OXYGEN SATURATION: 99 % | TEMPERATURE: 97.6 F

## 2022-04-18 LAB
ANION GAP SERPL CALCULATED.3IONS-SCNC: 11.9 MMOL/L (ref 5–15)
BASOPHILS # BLD AUTO: 0.03 10*3/MM3 (ref 0–0.2)
BASOPHILS NFR BLD AUTO: 0.4 % (ref 0–1.5)
BUN SERPL-MCNC: 8 MG/DL (ref 8–23)
BUN/CREAT SERPL: 9.5 (ref 7–25)
CALCIUM SPEC-SCNC: 9.2 MG/DL (ref 8.6–10.5)
CHLORIDE SERPL-SCNC: 104 MMOL/L (ref 98–107)
CO2 SERPL-SCNC: 21.1 MMOL/L (ref 22–29)
CREAT SERPL-MCNC: 0.84 MG/DL (ref 0.76–1.27)
DEPRECATED RDW RBC AUTO: 45.3 FL (ref 37–54)
EGFRCR SERPLBLD CKD-EPI 2021: 95.6 ML/MIN/1.73
EOSINOPHIL # BLD AUTO: 0.18 10*3/MM3 (ref 0–0.4)
EOSINOPHIL NFR BLD AUTO: 2.2 % (ref 0.3–6.2)
ERYTHROCYTE [DISTWIDTH] IN BLOOD BY AUTOMATED COUNT: 12.7 % (ref 12.3–15.4)
GLUCOSE SERPL-MCNC: 105 MG/DL (ref 65–99)
HCT VFR BLD AUTO: 41 % (ref 37.5–51)
HGB BLD-MCNC: 13.7 G/DL (ref 13–17.7)
IMM GRANULOCYTES # BLD AUTO: 0.05 10*3/MM3 (ref 0–0.05)
IMM GRANULOCYTES NFR BLD AUTO: 0.6 % (ref 0–0.5)
LYMPHOCYTES # BLD AUTO: 1.49 10*3/MM3 (ref 0.7–3.1)
LYMPHOCYTES NFR BLD AUTO: 17.8 % (ref 19.6–45.3)
MCH RBC QN AUTO: 32.4 PG (ref 26.6–33)
MCHC RBC AUTO-ENTMCNC: 33.4 G/DL (ref 31.5–35.7)
MCV RBC AUTO: 96.9 FL (ref 79–97)
MONOCYTES # BLD AUTO: 0.67 10*3/MM3 (ref 0.1–0.9)
MONOCYTES NFR BLD AUTO: 8 % (ref 5–12)
NEUTROPHILS NFR BLD AUTO: 5.94 10*3/MM3 (ref 1.7–7)
NEUTROPHILS NFR BLD AUTO: 71 % (ref 42.7–76)
NRBC BLD AUTO-RTO: 0 /100 WBC (ref 0–0.2)
PLATELET # BLD AUTO: 279 10*3/MM3 (ref 140–450)
PMV BLD AUTO: 9.7 FL (ref 6–12)
POTASSIUM SERPL-SCNC: 4 MMOL/L (ref 3.5–5.2)
RBC # BLD AUTO: 4.23 10*6/MM3 (ref 4.14–5.8)
SODIUM SERPL-SCNC: 137 MMOL/L (ref 136–145)
WBC NRBC COR # BLD: 8.36 10*3/MM3 (ref 3.4–10.8)

## 2022-04-18 PROCEDURE — 80048 BASIC METABOLIC PNL TOTAL CA: CPT | Performed by: INTERNAL MEDICINE

## 2022-04-18 PROCEDURE — 85025 COMPLETE CBC W/AUTO DIFF WBC: CPT | Performed by: INTERNAL MEDICINE

## 2022-04-18 RX ORDER — TRAZODONE HYDROCHLORIDE 100 MG/1
100 TABLET ORAL NIGHTLY
Qty: 30 TABLET | Refills: 0 | Status: SHIPPED | OUTPATIENT
Start: 2022-04-18 | End: 2023-01-05 | Stop reason: HOSPADM

## 2022-04-18 RX ADMIN — LOSARTAN POTASSIUM 100 MG: 100 TABLET, FILM COATED ORAL at 08:45

## 2022-04-18 RX ADMIN — AMLODIPINE BESYLATE 10 MG: 10 TABLET ORAL at 08:45

## 2022-04-18 RX ADMIN — Medication 100 MG: at 08:45

## 2022-04-18 RX ADMIN — FOLIC ACID 1 MG: 1 TABLET ORAL at 08:45

## 2022-04-18 RX ADMIN — MULTIPLE VITAMINS W/ MINERALS TAB 1 TABLET: TAB at 08:45

## 2022-04-18 RX ADMIN — Medication 1 TABLET: at 08:45

## 2022-04-18 RX ADMIN — NICOTINE 1 PATCH: 21 PATCH, EXTENDED RELEASE TRANSDERMAL at 08:48

## 2022-04-18 RX ADMIN — BUPROPION HYDROCHLORIDE 150 MG: 150 TABLET, EXTENDED RELEASE ORAL at 08:45

## 2022-04-18 NOTE — PLAN OF CARE
Problem: Adult Inpatient Plan of Care  Goal: Plan of Care Review  Outcome: Met  Flowsheets (Taken 4/18/2022 1107)  Progress: improving  Plan of Care Reviewed With: patient  Outcome Evaluation: Vitals stable. patient discharged to home with Rx, instructions, and follow ups.

## 2022-04-18 NOTE — DISCHARGE SUMMARY
[facility]  [facility]      Name:  Watson Lisa   Age:  66 y.o.  Sex:  male  :  1955  MRN:  [id number]   Visit Number:  [id number]  Primary Care Physician:  Checo Dunham MD  Date of Discharge:  2022  Admission Date:  4/15/2022      Discharge Diagnosis:     1.  Alcohol dependence with withdrawal.  2.  LIVAN on CPAP  3.  COPD not in exacerbation  4.  Essential hypertension controlled  5.  Depression/anxiety  6.  Nicotine dependence    Active Hospital Problems    Diagnosis  POA   • **Alcohol withdrawal syndrome without complication (HCC) [F10.230]  Yes   • Cigarette nicotine dependence without complication [F17.210]  Yes   • LIVAN (obstructive sleep apnea) [G47.33]  Yes   • COPD (chronic obstructive pulmonary disease) (HCC) [J44.9]  Yes   • Hypokalemia [E87.6]  Yes   • Essential hypertension [I10]  Yes      Resolved Hospital Problems   No resolved problems to display.         Presenting Problem/History of Present Illness:    Alcohol withdrawal syndrome without complication (HCC) [F10.230]         Hospital Course:    66-year-old male who came in requesting help with alcohol dependence.  He has been in the ICU in the past with delirium tremens.  He last drank alcohol on 2022.  He was admitted and placed on CIWA protocol with vitamin replacement and fluids.  He has done well during the course of the hospital stay.  He has not required Ativan today.  His PMH includes LIVAN/COPD/hypertension.    He currently denies any chest pressure, shortness of breath, nausea, vomiting, pain.  He will be discharged, follow-up with PCP.  He was given resources for substance abuse and encouraged to follow-up.  He has been told to abstain from alcohol.  He will be discharged home today.    Procedures Performed:           Consults:     Consults     Date and Time Order Name Status Description    4/15/2022  2:15 PM [facility] (on-call MD unless specified) Details            Pertinent Test  Results:         Results from last 7 days   Lab Units 04/18/22  0444 04/17/22  1757 04/17/22  0445 04/16/22  0528 04/15/22  1337   WBC 10*3/mm3 8.36  --  8.07 5.78 9.02   HEMOGLOBIN g/dL 13.7  --  13.1 13.0 12.7*   HEMATOCRIT % 41.0  --  38.2 37.9 36.6*   PLATELETS 10*3/mm3 279  --  272 253 256   MCV fL 96.9  --  95.3 95.5 96.3   SODIUM mmol/L 137  --  138 139 135*   POTASSIUM mmol/L 4.0 4.1 3.3* 3.6 3.6   CHLORIDE mmol/L 104  --  103 104 100   CO2 mmol/L 21.1*  --  23.0 23.3 24.0   BUN mg/dL 8  --  8 8 14   CREATININE mg/dL 0.84  --  0.76 0.68* 0.96   GLUCOSE mg/dL 105*  --  115* 100* 97   CALCIUM mg/dL 9.2  --  8.8 9.1 8.6          Results from last 7 days   Lab Units 04/18/22  0444 04/17/22  0445 04/16/22  0528 04/15/22  1337   WBC 10*3/mm3 8.36 8.07 5.78 9.02     Results from last 7 days   Lab Units 04/15/22  1337   BILIRUBIN mg/dL 0.4   ALK PHOS U/L 98   ALT (SGPT) U/L 13   AST (SGOT) U/L 15           Invalid input(s): TG, LDLCALC, LDLREALC      Brief Urine Lab Results  (Last result in the past 365 days)      Color   Clarity   Blood   Leuk Est   Nitrite   Protein   CREAT   Urine HCG        10/08/21 1215             197.8                 Results for orders placed during the hospital encounter of 06/24/20    Duplex Venous Lower Extremity - Bilateral CAR    Interpretation Summary  · Normal bilateral lower extremity venous duplex scan.  · Fluid collection seen above the left knee measuring about 5 x 2 x 8 cm. Follow-up with additional images and clinically indicated.      Results for orders placed during the hospital encounter of 06/24/20    Adult Transthoracic Echo Complete W/ Cont if Necessary Per Protocol    Interpretation Summary  · The left ventricular cavity is moderately dilated.  · Estimated EF appears to be in the range of 56 - 60%.  · Left ventricular wall thickness is consistent with mild concentric hypertrophy.  · Left ventricular diastolic dysfunction is noted (grade I) consistent with impaired  "relaxation.  · Normal right ventricular systolic function noted. Right ventricular cavity is mildly dilated.  · Left atrial cavity size is mild-to-moderately dilated.  · There is no evidence of pericardial effusion.        Condition on Discharge:      Stable    Vital Signs:    /90 (BP Location: Left arm, Patient Position: Lying)   Pulse 72   Temp 97.6 °F (36.4 °C) (Oral)   Resp 16   Ht 200.7 cm (79\")   Wt 117 kg (258 lb 4.8 oz)   SpO2 99%   BMI 29.10 kg/m²     Physical Exam:    General: Alert and oriented x4, well-developed well-nourished.  Heart: Regular rate and rhythm without murmur rub or thrill.  Lungs: Clear to auscultation bilaterally without use of accessory muscles or respiration.  Abdomen: Soft/nontender/nondistended.  No HSM noted.  Skin: 5/5 strength in upper/lower extremities bilaterally.    Discharge Disposition:    Home    Discharge Medication:       Discharge Medications      Changes to Medications      Instructions Start Date   traZODone 100 MG tablet  Commonly known as: DESYREL  What changed:   · medication strength  · how much to take  · when to take this  · reasons to take this   100 mg, Oral, Nightly         Continue These Medications      Instructions Start Date   amLODIPine 10 MG tablet  Commonly known as: NORVASC   TAKE ONE TABLET BY MOUTH DAILY      buPROPion  MG 12 hr tablet  Commonly known as: Wellbutrin SR   150 mg, Oral, 2 Times Daily      losartan 100 MG tablet  Commonly known as: COZAAR   100 mg, Oral, Daily      multivitamin with minerals tablet tablet   1 tablet, Oral, Daily             Discharge Diet:     Diet Instructions     Diet: Regular, Cardiac      Discharge Diet:  Regular  Cardiac             Activity at Discharge:     Activity Instructions     Activity as Tolerated            Follow-up Appointments:    No future appointments.  Additional Instructions for the Follow-ups that You Need to Schedule     Discharge Follow-up with PCP   As directed       Currently " Documented PCP:    Checo Dunham MD    PCP Phone Number:    [phone]     Follow Up Details: 1 week               Test Results Pending at Discharge:    Discharge took 35 minutes including review of chart, charting, medication reconciliation, discussion with patient, examination of patient, arrangement of follow-up, discussion with case management and nursing, with greater than 50% of time spent in coordination of care.       Marques Santiago DO  04/18/22  08:02 EDT

## 2022-04-18 NOTE — CASE MANAGEMENT/SOCIAL WORK
Discharge Planning Assessment  [facility]     Patient Name: Watson Lisa  MRN: [id number]  Today's Date: 4/18/2022    Admit Date: 4/15/2022     Discharge Needs Assessment     Row Name 04/18/22 1112       Living Environment    People in Home alone    Current Living Arrangements home    Primary Care Provided by self    Provides Primary Care For no one    Family Caregiver if Needed child(anneliese), adult    Family Caregiver Names Son Aravind Lisa is emergency contact [phone]    Quality of Family Relationships helpful    Able to Return to Prior Arrangements yes       Resource/Environmental Concerns    Resource/Environmental Concerns none       Transition Planning    Patient/Family Anticipates Transition to home    Patient/Family Anticipated Services at Transition none    Transportation Anticipated family or friend will provide       Discharge Needs Assessment    Readmission Within the Last 30 Days no previous admission in last 30 days    Equipment Currently Used at Home cane, straight;cpap    Concerns to be Addressed denies needs/concerns at this time    Anticipated Changes Related to Illness none    Equipment Needed After Discharge none               Discharge Plan     Row Name 04/18/22 0079       Plan    Plan Return home    Patient/Family in Agreement with Plan yes    Plan Comments Spoke with patient at bedside.  He lives alone, is IADL, uses a cane and a Cpap from [facility], has used HH in the past and has been to [facility] for rehab.  PCP is Dr. Checo Dunham and pharmacy is [facility] [address]  Emergency contact is i\his son Aravind Lisa [phone].  Access is also following and patient may consider substance abuse treatment.  He plans to return home at [place].  CCP will follow.  BHumeniuk RN              Continued Care and Services - Admitted Since 4/15/2022    Coordination has not been started for this encounter.       Expected Discharge Date and Time     Expected Discharge Date Expected  Discharge Time    Apr 18, 2022          Demographic Summary     Row Name 04/18/22 1111       General Information    Admission Type inpatient    Arrived From home    Referral Source admission list    Reason for Consult discharge planning    Preferred Language English               Functional Status     Row Name 04/18/22 1111       Functional Status    Usual Activity Tolerance good    Current Activity Tolerance moderate       Functional Status, IADL    Medications independent    Meal Preparation independent    Housekeeping independent    Laundry independent    Shopping independent       Mental Status    General Appearance WDL WDL       Mental Status Summary    Recent Changes in Mental Status/Cognitive Functioning no changes                             Becky S. Humeniuk, RN

## 2022-04-18 NOTE — PLAN OF CARE
Goal Outcome Evaluation:  Plan of Care Reviewed With: patient        Progress: improving  Outcome Evaluation: VSS. No new complaints of pain. CIWA scores ranging between 3-13 this shift. PRN ativan given x1 this shift. Scheduled trazadone, melatonin, and PRN benadryl given to aid in sleep. Pt has appeard to sleep majority of the night tonight. CPAP worn while sleeping. Potassium recheck = 4.1. No other changes this shift. Possible D/C home today. Will continue to monitor

## 2022-04-18 NOTE — PAYOR COMM NOTE
"Brittany Lisa (66 y.o. Male)       Inpatient request for [id number]    Contact sandra Stovall  P# [phone]  F# [phone]              Date of Birth   1955    Social Security Number       Address   [address] [place] [address]    Home Phone   [phone]    MRN   [id number]       Oriental orthodox   Pentecostal    Marital Status                               Admission Date   4/15/22    Admission Type   Emergency    Admitting Provider   Keily Russell MD    Attending Provider   Marques Santiago DO    Department, Room/Bed   [address], E456/1       Discharge Date       Discharge Disposition       Discharge Destination                               Attending Provider: Marques Santiago DO    Allergies: Penicillins, Penicillins    Isolation: None   Infection: None   Code Status: CPR   Advance Care Planning Activity    Ht: 200.7 cm (79\")   Wt: 117 kg (258 lb 4.8 oz)    Admission Cmt: None   Principal Problem: Alcohol withdrawal syndrome without complication (HCC) [F10.230]                 Active Insurance as of 4/15/2022     Primary Coverage     Payor Plan Insurance Group Employer/Plan Group    ANTHEM BLUE CROSS ANTHEM BLUE CROSS BLUE SHIELD PPO [id number]     Payor Plan Address Payor Plan Phone Number Payor Plan Fax Number Effective Dates    [address] [phone]  1/1/2022 - None Entered    [facility] [id number]       Subscriber Name Subscriber Birth Date Member ID       BRITTANY LISA 1955 [id number]           Secondary Coverage     Payor Plan Insurance Group Employer/Plan Group    MEDICARE MEDICARE A ONLY      Payor Plan Address Payor Plan Phone Number Payor Plan Fax Number Effective Dates    [address] [phone]  9/1/2019 - None Entered    [facility] [id number]       Subscriber Name Subscriber Birth Date Member ID       BRITTANY LISA 1955 [id number]                 Emergency Contacts      (Rel.) Home Phone " Work Phone Mobile Phone    lawrence villalobos (Son) -- -- [phone]    Jorge A Nielson (Sister) [phone] -- --               History & Physical      StinglKeily MD at 04/15/22 2108          HISTORY AND PHYSICAL   [facility]        Date of Admission: 4/15/2022  Patient Identification:  Name: Watson Villalobos  Age: 66 y.o.  Sex: male  :  1955  MRN: [id number]                     Primary Care Physician: Checo Dunham MD    Chief Complaint:  66 year old gentleman who presented to the emergency room because he is concerned about alcohol withdrawal; he is requesting help with alcohol dependence; he has been in the icu in the past with delirium trements; he last drank alcohol last night; he has been nauseated and feels shaky;     History of Present Illness:   As above    Past Medical History:  Past Medical History:   Diagnosis Date   • Alcohol abuse    • Anxiety    • Arthritis    • Bronchitis with chronic airway obstruction (HCC)     LAST FEB   • DVT (deep venous thrombosis) (HCC)    • Hiatal hernia    • Hypertension    • Hypertension    • Low back pain    • Neck pain    • Pulmonary embolism (HCC)    • Sleep apnea with use of continuous positive airway pressure (CPAP)     AT NIGHT     Past Surgical History:  Past Surgical History:   Procedure Laterality Date   • COLONOSCOPY     • JOINT REPLACEMENT Right     hip/knee   • REPLACEMENT TOTAL KNEE     • TONSILLECTOMY     • TOTAL HIP ARTHROPLASTY Right       Home Meds:  Medications Prior to Admission   Medication Sig Dispense Refill Last Dose   • amLODIPine (NORVASC) 10 MG tablet TAKE ONE TABLET BY MOUTH DAILY (Patient taking differently: Take 10 mg by mouth Daily.) 90 tablet 1 4/15/2022 at Unknown time   • buPROPion SR (Wellbutrin SR) 150 MG 12 hr tablet Take 1 tablet by mouth 2 (Two) Times a Day. 180 tablet 3 4/15/2022 at Unknown time   • losartan (COZAAR) 100 MG tablet Take 1 tablet by mouth Daily. 10 tablet 0 4/15/2022 at Unknown time    • Multiple Vitamins-Minerals (MULTIVITAMIN ADULT PO) Take 1 tablet by mouth Daily.   4/15/2022 at Unknown time   • traZODone (DESYREL) 50 MG tablet TAKE 1 TABLET AT NIGHT AS NEEDED FOR SLEEP (Patient taking differently: Take 50 mg by mouth At Night As Needed.) 90 tablet 3 Past Week at Unknown time       Allergies:  Allergies   Allergen Reactions   • Penicillins Unknown - Low Severity     Pt does not recall the reaction. Patient tolerated cephalexin 3/2020   • Penicillins Other (See Comments)     Unknown      Immunizations:  Immunization History   Administered Date(s) Administered   • COVID-19 (PFIZER) PURPLE CAP 03/17/2021, 04/07/2021, 11/03/2021   • Tdap 03/26/2020     Social History:   Social History     Social History Narrative    ** Merged History Encounter **          Social History     Socioeconomic History   • Marital status:    Tobacco Use   • Smoking status: Current Every Day Smoker     Packs/day: 2.00     Years: 40.00     Pack years: 80.00     Types: Cigarettes   • Smokeless tobacco: Never Used   Vaping Use   • Vaping Use: Never used   Substance and Sexual Activity   • Alcohol use: Yes     Alcohol/week: 10.0 standard drinks     Types: 10 Shots of liquor per week     Comment: 1 pint vodka/ day   • Drug use: Not Currently     Types: Hydrocodone   • Sexual activity: Defer       Family History:  Family History   Problem Relation Age of Onset   • Cancer Mother    • Heart disease Father    • Alcohol abuse Father    • Cancer Brother         Review of Systems  See history of present illness and past medical history.  Patient denies headache, dizziness, syncope, falls, trauma, change in vision, change in hearing, change in taste, changes in weight, changes in appetite, focal weakness, numbness, or paresthesia.  Patient denies chest pain, palpitations, dyspnea, orthopnea, PND, cough, sinus pressure, rhinorrhea, epistaxis, hemoptysis, nausea, vomiting,hematemesis, diarrhea, constipation or hematchezia.   "Denies cold or heat intolerance, polydipsia, polyuria, polyphagia. Denies hematuria, pyuria, dysuria, hesitancy, frequency or urgency. Denies consumption of raw and under cooked meats foods or change in water source.  Denies fever, chills, sweats, night sweats.  Denies missing any routine medications. Remainder of ROS is negative.    Objective:  T Max 24 hrs: Temp (24hrs), Av.4 °F (36.3 °C), Min:97.2 °F (36.2 °C), Max:97.5 °F (36.4 °C)    Vitals Ranges:   Temp:  [97.2 °F (36.2 °C)-97.5 °F (36.4 °C)] 97.5 °F (36.4 °C)  Heart Rate:  [] 82  Resp:  [16-20] 16  BP: (135-160)/(77-89) 158/83      Exam:  /83 (BP Location: Left arm, Patient Position: Lying)   Pulse 82   Temp 97.5 °F (36.4 °C) (Oral)   Resp 16   Ht 200.7 cm (79\")   Wt 122 kg (268 lb 15.4 oz)   SpO2 93%   BMI 30.30 kg/m²     General Appearance:    Alert, cooperative, no distress, appears stated age   Head:    Normocephalic, without obvious abnormality, atraumatic   Eyes:    PERRL, conjunctivae/corneas clear, EOM's intact, both eyes   Ears:    Normal external ear canals, both ears   Nose:   Nares normal, septum midline, mucosa normal, no drainage    or sinus tenderness   Throat:   Lips, mucosa, and tongue normal   Neck:   Supple, symmetrical, trachea midline, no adenopathy;     thyroid:  no enlargement/tenderness/nodules; no carotid    bruit or JVD   Back:     Symmetric, no curvature, ROM normal, no CVA tenderness   Lungs:     Clear to auscultation bilaterally, respirations unlabored   Chest Wall:    No tenderness or deformity    Heart:    Regular rate and rhythm, S1 and S2 normal, no murmur, rub   or gallop   Abdomen:     Soft, nontender, bowel sounds active all four quadrants,     no masses, no hepatomegaly, no splenomegaly   Extremities:   Extremities normal, atraumatic, no cyanosis or edema   Pulses:   2+ and symmetric all extremities   Skin:   Skin color, texture, turgor normal, no rashes or lesions   Lymph nodes:   Cervical, " supraclavicular, and axillary nodes normal   Neurologic:   CNII-XII intact, normal strength, sensation intact throughout      .    Data Review:  Labs in chart were reviewed.  WBC   Date Value Ref Range Status   04/15/2022 9.02 3.40 - 10.80 10*3/mm3 Final     Hemoglobin   Date Value Ref Range Status   04/15/2022 12.7 (L) 13.0 - 17.7 g/dL Final     Hematocrit   Date Value Ref Range Status   04/15/2022 36.6 (L) 37.5 - 51.0 % Final     Platelets   Date Value Ref Range Status   04/15/2022 256 140 - 450 10*3/mm3 Final     Sodium   Date Value Ref Range Status   04/15/2022 135 (L) 136 - 145 mmol/L Final     Potassium   Date Value Ref Range Status   04/15/2022 3.6 3.5 - 5.2 mmol/L Final     Chloride   Date Value Ref Range Status   04/15/2022 100 98 - 107 mmol/L Final     CO2   Date Value Ref Range Status   04/15/2022 24.0 22.0 - 29.0 mmol/L Final     BUN   Date Value Ref Range Status   04/15/2022 14 8 - 23 mg/dL Final     Creatinine   Date Value Ref Range Status   04/15/2022 0.96 0.76 - 1.27 mg/dL Final     Glucose   Date Value Ref Range Status   04/15/2022 97 65 - 99 mg/dL Final     Calcium   Date Value Ref Range Status   04/15/2022 8.6 8.6 - 10.5 mg/dL Final     AST (SGOT)   Date Value Ref Range Status   04/15/2022 15 1 - 40 U/L Final     ALT (SGPT)   Date Value Ref Range Status   04/15/2022 13 1 - 41 U/L Final     Alkaline Phosphatase   Date Value Ref Range Status   04/15/2022 98 39 - 117 U/L Final                Imaging Results (All)     None            Assessment:  Active Hospital Problems    Diagnosis  POA   • Alcohol withdrawal syndrome without complication (HCC) [F10.230]  Yes      Resolved Hospital Problems   No resolved problems to display.   alcohol dependence  Hypertension  Copd  Obesity  Anemia  hyponatremia    Plan:  Monitor on telemetry  ciwa protocol  Access to see  Copd is at baseline and without exacerbation  Monitor hgb  D.w patient and ED Provider    Keily Russell MD  4/15/2022  21:08  EDT      Electronically signed by Keily Russell MD at 04/15/22 2112          Emergency Department Notes      Genaro Dunham RN at 04/15/22 1305        Pt presents to ED with complaints of alcohol withdraw. Pt reports his last drink was last night at 1800. Pt reports he typically drinks a pint a day, and has been doing that since February. Pt wants help and detox. PT denies seizures with withdrawal. Pt is A&OX4, able to ambulate with cane, and in a mask at this time.     Patient was placed in face mask during first look triage.  Patient was wearing a face mask throughout encounter.  I wore personal protective equipment throughout the encounter.  Hand hygiene was performed before and after patient encounter.      Electronically signed by Genaro Dunham RN at 04/15/22 1307     Srinivasan Gillespie MD at 04/15/22 1328          EMERGENCY DEPARTMENT ENCOUNTER    Room Number:  14/14  PCP: Checo Dunham MD  Historian: Patient  History Limited By: Nothing      HPI  Chief Complaint: Alcohol withdrawal  Context: Watson Lisa is a 66 y.o. male who presents to the ED c/o alcohol withdrawal.  Patient states he has a history of alcohol abuse.  Last admission here was for alcohol withdrawal with DTs.  Patient drinks a pint a day.  Patient last drink was last night.  Denies other drug use.  Has had no prior seizures.  States he feels tremulous and nauseated.  Feels slightly short of breath.  No vomiting or diarrhea.  No abdominal pain.      Location: Tremulous  Radiation: None  Character: Diffuse  Duration: A few hours  Severity: Moderate  Progression: Worsening  Aggravating Factors: Nothing  Alleviating Factors: Nothing        MEDICAL RECORD REVIEW    Patient was last here in October 2021 with DTs          PAST MEDICAL HISTORY  Active Ambulatory Problems     Diagnosis Date Noted   • Arthritis 04/25/2016   • Essential hypertension 06/07/2016   • Tobacco abuse 06/07/2016   • Alcohol withdrawal syndrome without  complication ([facility]) 06/24/2020   • Chest pain in adult 06/25/2020   • Sleep apnea 06/25/2020   • Hiatal hernia 06/25/2020   • Alcohol abuse 06/25/2020   • Effusion of left knee 06/29/2020   • Osteoarthritis of left knee 06/29/2020   • Vitamin D deficiency 07/01/2020   • Anemia, chronic disease 07/01/2020   • Hyponatremia 07/01/2020   • Alcohol dependence with withdrawal ([facility]) 01/12/2021   • COPD (chronic obstructive pulmonary disease) ([facility]) 03/26/2021   • LIVAN (obstructive sleep apnea) 03/26/2021   • ETOH abuse 03/26/2021   • Tobacco abuse 03/26/2021   • Obese 03/26/2021   • Anemia, chronic disease 03/26/2021   • Alcoholic liver disease ([facility]) 10/07/2021   • Tobacco abuse 10/07/2021   • Hiatal hernia 10/07/2021   • COPD (chronic obstructive pulmonary disease) ([facility]) 10/07/2021   • Anemia, chronic disease 10/07/2021     Resolved Ambulatory Problems     Diagnosis Date Noted   • Hypokalemia 06/29/2020   • Hypomagnesemia 06/29/2020   • Pneumonia due to COVID-19 virus 01/11/2021   • Possible SVT (supraventricular tachycardia) ([facility]) 01/12/2021   • Alcohol withdrawal syndrome with perceptual disturbance ([facility]) 03/26/2021   • Acute kidney failure ([facility]) 03/26/2021   • Elevated LFTs 03/26/2021   • Metabolic acidosis, increased anion gap 03/26/2021   • Hypomagnesemia 03/26/2021   • Suicidal ideations 03/26/2021   • Delirium tremens ([facility]) 10/07/2021   • Alcohol abuse 10/07/2021   • Acute respiratory failure with hypercapnia ([facility]) 10/08/2021   • Aspiration pneumonia ([facility]) 10/08/2021     Past Medical History:   Diagnosis Date   • Anxiety    • Bronchitis with chronic airway obstruction ([facility])    • DVT (deep venous thrombosis) ([facility])    • Hypertension    • Hypertension    • Low back pain    • Neck pain    • Pulmonary embolism ([facility])    • Sleep apnea with use of continuous positive airway pressure (CPAP)          PAST SURGICAL HISTORY  Past Surgical History:   Procedure Laterality Date   • COLONOSCOPY     • JOINT REPLACEMENT Right      hip/knee   • REPLACEMENT TOTAL KNEE     • TONSILLECTOMY     • TOTAL HIP ARTHROPLASTY Right          FAMILY HISTORY  Family History   Problem Relation Age of Onset   • Cancer Mother    • Heart disease Father    • Alcohol abuse Father    • Cancer Brother          SOCIAL HISTORY  Social History     Socioeconomic History   • Marital status:    Tobacco Use   • Smoking status: Current Every Day Smoker     Packs/day: 2.00     Years: 40.00     Pack years: 80.00     Types: Cigarettes   • Smokeless tobacco: Never Used   Vaping Use   • Vaping Use: Never used   Substance and Sexual Activity   • Alcohol use: Yes     Comment: Pt reports last drink was 1400 on 10/6/21 (2 pints every day, a fifth on weekends)   • Drug use: Not Currently     Types: Hydrocodone   • Sexual activity: Defer         ALLERGIES  Penicillins and Penicillins        REVIEW OF SYSTEMS  Review of Systems   Constitutional: Negative for activity change, appetite change and fever.   HENT: Negative for congestion and sore throat.    Eyes: Negative.    Respiratory: Negative for cough and shortness of breath.    Cardiovascular: Negative for chest pain and leg swelling.   Gastrointestinal: Positive for nausea. Negative for abdominal pain, diarrhea and vomiting.   Endocrine: Negative.    Genitourinary: Negative for decreased urine volume and dysuria.   Musculoskeletal: Negative for neck pain.   Skin: Negative for rash and wound.   Allergic/Immunologic: Negative.    Neurological: Positive for tremors. Negative for weakness, numbness and headaches.   Hematological: Negative.    Psychiatric/Behavioral: Negative.    All other systems reviewed and are negative.           PHYSICAL EXAM  ED Triage Vitals [04/15/22 1308]   Temp Heart Rate Resp BP SpO2   97.2 °F (36.2 °C) 105 20 -- 97 %      Temp src Heart Rate Source Patient Position BP Location FiO2 (%)   Tympanic Monitor -- -- --       Physical Exam  Vitals and nursing note reviewed.   Constitutional:       General:  He is not in acute distress.  HENT:      Head: Normocephalic and atraumatic.   Eyes:      Pupils: Pupils are equal, round, and reactive to light.   Cardiovascular:      Rate and Rhythm: Regular rhythm. Tachycardia present.      Heart sounds: Normal heart sounds.   Pulmonary:      Effort: Pulmonary effort is normal. No respiratory distress.      Breath sounds: Normal breath sounds.   Abdominal:      Palpations: Abdomen is soft.      Tenderness: There is no abdominal tenderness. There is no guarding or rebound.   Musculoskeletal:         General: Normal range of motion.      Cervical back: Normal range of motion and neck supple.   Skin:     General: Skin is warm and dry.   Neurological:      Mental Status: He is alert and oriented to person, place, and time.      Sensory: Sensation is intact. No sensory deficit.      Motor: No weakness.      Comments: Patient is tremulous   Psychiatric:         Mood and Affect: Mood and affect normal.       Patient was wearing a face mask when I entered the room and they continued to wear a mask throughout their stay in the ED.  I wore PPE, including  gloves, face mask with shield or face mask with goggles whenever I was in the room with patient.       LAB RESULTS  Recent Results (from the past 24 hour(s))   ECG 12 Lead    Collection Time: 04/15/22  1:34 PM   Result Value Ref Range    QT Interval 385 ms   Comprehensive Metabolic Panel    Collection Time: 04/15/22  1:37 PM    Specimen: Blood   Result Value Ref Range    Glucose 97 65 - 99 mg/dL    BUN 14 8 - 23 mg/dL    Creatinine 0.96 0.76 - 1.27 mg/dL    Sodium 135 (L) 136 - 145 mmol/L    Potassium 3.6 3.5 - 5.2 mmol/L    Chloride 100 98 - 107 mmol/L    CO2 24.0 22.0 - 29.0 mmol/L    Calcium 8.6 8.6 - 10.5 mg/dL    Total Protein 7.0 6.0 - 8.5 g/dL    Albumin 4.20 3.50 - 5.20 g/dL    ALT (SGPT) 13 1 - 41 U/L    AST (SGOT) 15 1 - 40 U/L    Alkaline Phosphatase 98 39 - 117 U/L    Total Bilirubin 0.4 0.0 - 1.2 mg/dL    Globulin 2.8 gm/dL     A/G Ratio 1.5 g/dL    BUN/Creatinine Ratio 14.6 7.0 - 25.0    Anion Gap 11.0 5.0 - 15.0 mmol/L    eGFR 86.6 >60.0 mL/min/1.73   Ethanol    Collection Time: 04/15/22  1:37 PM    Specimen: Blood   Result Value Ref Range    Ethanol <10 0 - 10 mg/dL    Ethanol % <0.010 %   CBC Auto Differential    Collection Time: 04/15/22  1:37 PM    Specimen: Blood   Result Value Ref Range    WBC 9.02 3.40 - 10.80 10*3/mm3    RBC 3.80 (L) 4.14 - 5.80 10*6/mm3    Hemoglobin 12.7 (L) 13.0 - 17.7 g/dL    Hematocrit 36.6 (L) 37.5 - 51.0 %    MCV 96.3 79.0 - 97.0 fL    MCH 33.4 (H) 26.6 - 33.0 pg    MCHC 34.7 31.5 - 35.7 g/dL    RDW 12.6 12.3 - 15.4 %    RDW-SD 44.2 37.0 - 54.0 fl    MPV 9.7 6.0 - 12.0 fL    Platelets 256 140 - 450 10*3/mm3    Neutrophil % 73.2 42.7 - 76.0 %    Lymphocyte % 13.5 (L) 19.6 - 45.3 %    Monocyte % 8.9 5.0 - 12.0 %    Eosinophil % 3.4 0.3 - 6.2 %    Basophil % 0.4 0.0 - 1.5 %    Immature Grans % 0.6 (H) 0.0 - 0.5 %    Neutrophils, Absolute 6.60 1.70 - 7.00 10*3/mm3    Lymphocytes, Absolute 1.22 0.70 - 3.10 10*3/mm3    Monocytes, Absolute 0.80 0.10 - 0.90 10*3/mm3    Eosinophils, Absolute 0.31 0.00 - 0.40 10*3/mm3    Basophils, Absolute 0.04 0.00 - 0.20 10*3/mm3    Immature Grans, Absolute 0.05 0.00 - 0.05 10*3/mm3    nRBC 0.0 0.0 - 0.2 /100 WBC       Ordered the above labs and reviewed the results.        RADIOLOGY  No orders to display              PROCEDURES  Procedures      EKG:          EKG time: 1334  Rhythm/Rate: Normal sinus rhythm 84  P waves and OH: First-degree AV block  QRS, axis: Normal QRS  ST and T waves: Normal ST-T wave    Interpreted Contemporaneously by me, independently viewed  Unchanged compared to prior 10/11/2021        MEDICATIONS GIVEN IN ER  Medications   sodium chloride 0.9 % bolus 1,000 mL (1,000 mL Intravenous New Bag 4/15/22 1342)   LORazepam (ATIVAN) injection 1 mg (1 mg Intravenous Given 4/15/22 1339)             PROGRESS AND CONSULTS  ED Course as of 04/15/22 1501   Fri Apr  15, 2022   1445 14:46 EDT  Patient presents for alcohol withdrawal.  Patient was tremulous and tachycardic has been given Ativan here with improvement.  Patient has been discussed with Dr. Russell who will admit. [SL]      ED Course User Index  [SL] Srinivasan Gillespie MD           MEDICAL DECISION MAKING      MDM  Number of Diagnoses or Management Options     Amount and/or Complexity of Data Reviewed  Clinical lab tests: reviewed and ordered (Alcohol level 0)  Discuss the patient with other providers: yes (Discussed with Dr. Russell who will admit)               DIAGNOSIS  Final diagnoses:   Alcohol withdrawal syndrome without complication (HCC)           DISPOSITION  admit        Latest Documented Vital Signs:  As of 15:01 EDT  BP- 141/86 HR- 64 Temp- 97.2 °F (36.2 °C) (Tympanic) O2 sat- 97%                       Srinivasan Gillespie MD  04/15/22 1502      Electronically signed by Srinivasan Gillespie MD at 04/15/22 1502     Rigo Hodgson RN at 04/15/22 1628          Nursing report ED to floor  Watson Lisa  66 y.o.  male    HPI :   Chief Complaint   Patient presents with   • Withdrawal       Admitting doctor:   Keily Russell MD    Admitting diagnosis:   The encounter diagnosis was Alcohol withdrawal syndrome without complication (HCC).    Code status:   Current Code Status     Date Active Code Status Order ID Comments User Context       Prior    Advance Care Planning Activity          Allergies:   Penicillins and Penicillins    Intake and Output    Intake/Output Summary (Last 24 hours) at 4/15/2022 1628  Last data filed at 4/15/2022 1628  Gross per 24 hour   Intake 1000 ml   Output --   Net 1000 ml       Weight:   There were no vitals filed for this visit.    Most recent vitals:   Vitals:    04/15/22 1308 04/15/22 1345 04/15/22 1431 04/15/22 1433   BP:  135/77 141/86    Pulse: 105   64   Resp: 20      Temp: 97.2 °F (36.2 °C)      TempSrc: Tympanic      SpO2: 97%  98% 97%       Active LDAs/IV Access:    Lines, Drains & Airways     Active LDAs     Name Placement date Placement time Site Days    Peripheral IV 10/11/21 0327 Left; Upper Arm 10/11/21  0327  Arm  186    Peripheral IV 10/16/21 0215 Anterior; Right Forearm 10/16/21  0215  Forearm  181    Peripheral IV 04/15/22 1339 Anterior;Distal;Right;Upper Arm 04/15/22  1339  Arm  less than 1                Labs (abnormal labs have a star):   Labs Reviewed   COMPREHENSIVE METABOLIC PANEL - Abnormal; Notable for the following components:       Result Value    Sodium 135 (*)     All other components within normal limits    Narrative:     GFR Normal >60  Chronic Kidney Disease <60  Kidney Failure <15     URINE DRUG SCREEN - Abnormal; Notable for the following components:    Opiate Screen Positive (*)     All other components within normal limits    Narrative:     Negative Thresholds Per Drugs Screened:    Amphetamines                 500 ng/ml  Barbiturates                 200 ng/ml  Benzodiazepines              100 ng/ml  Cocaine                      300 ng/ml  Methadone                    300 ng/ml  Opiates                      300 ng/ml  Oxycodone                    100 ng/ml  THC                           50 ng/ml    The Normal Value for all drugs tested is negative. This report includes final unconfirmed screening results to be used for medical treatment purposes only. Unconfirmed results must not be used for non-medical purposes such as employment or legal testing. Clinical consideration should be applied to any drug of abuse test, particularly when unconfirmed results are used.           CBC WITH AUTO DIFFERENTIAL - Abnormal; Notable for the following components:    RBC 3.80 (*)     Hemoglobin 12.7 (*)     Hematocrit 36.6 (*)     MCH 33.4 (*)     Lymphocyte % 13.5 (*)     Immature Grans % 0.6 (*)     All other components within normal limits   COVID-19,[facility] CAR[facility] IN-HOUSE CEPHEID/MAILE, NP SWAB IN TRANSPORT MEDIA 8-12 HR TAT - Normal    Narrative:     Fact sheet for  providers: https://www.fda.gov/media/931747/download     Fact sheet for patients: https://www.fda.gov/media/958906/download   COVID PRE-OP / PRE-PROCEDURE SCREENING ORDER (NO ISOLATION)    Narrative:     The following orders were created for panel order COVID PRE-OP / PRE-PROCEDURE SCREENING ORDER (NO ISOLATION) - Swab, Nasopharynx.  Procedure                               Abnormality         Status                     ---------                               -----------         ------                     COVID-19,[facility]-HOU[facility]...[[id number]]  Normal              Final result                 Please view results for these tests on the individual orders.   ETHANOL   CBC AND DIFFERENTIAL    Narrative:     The following orders were created for panel order CBC & Differential.  Procedure                               Abnormality         Status                     ---------                               -----------         ------                     CBC Auto Differential[[id number]]        Abnormal            Final result                 Please view results for these tests on the individual orders.       EKG:   ECG 12 Lead   Preliminary Result   HEART RATE= 84  bpm   RR Interval= 716  ms   IA Interval= 218  ms   P Horizontal Axis= -33  deg   P Front Axis= 71  deg   QRSD Interval= 96  ms   QT Interval= 385  ms   QRS Axis= 3  deg   T Wave Axis= 29  deg   - ABNORMAL ECG -   Sinus rhythm   Borderline prolonged IA interval   Anteroseptal infarct, old   Electronically Signed By:    Date and Time of Study: 2022-04-15 13:34:15          Meds given in ED:   Medications   sodium chloride 0.9 % bolus 1,000 mL (0 mL Intravenous Stopped 4/15/22 1628)   LORazepam (ATIVAN) injection 1 mg (1 mg Intravenous Given 4/15/22 1339)   LORazepam (ATIVAN) injection 1 mg (1 mg Intravenous Given 4/15/22 1604)       Imaging results:  No radiology results for the last day    Ambulatory status:   - ad chanel      Social issues:   Social History      Socioeconomic History   • Marital status:    Tobacco Use   • Smoking status: Current Every Day Smoker     Packs/day: 2.00     Years: 40.00     Pack years: 80.00     Types: Cigarettes   • Smokeless tobacco: Never Used   Vaping Use   • Vaping Use: Never used   Substance and Sexual Activity   • Alcohol use: Yes     Comment: Pt reports last drink was 1400 on 10/6/21 (2 pints every day, a fifth on weekends)   • Drug use: Not Currently     Types: Hydrocodone   • Sexual activity: Defer       NIH Stroke Scale:        Nursing report ED to floor:      Electronically signed by Rigo Hodgson RN at 04/15/22 1628          Physician Progress Notes (last 72 hours)      Meño Liriano MD at 22 1334              Name: Watson Lisa ADMIT: 4/15/2022   : 1955  PCP: Checo Dunham MD    MRN: [id number] LOS: 2 days   AGE/SEX: 66 y.o. male  ROOM: [facility]     Subjective   Subjective   CC: generalized weakness, tremors, EtOH detox  No acute events. Patient feels about the same. Had CIWA scores in 8-13 range overnight and has received 2 doses of ativan this AM. Taking PO. No CP/dyspnea/f/c/n/v/d.    Objective   Objective   Vital Signs  Temp:  [97.7 °F (36.5 °C)-98 °F (36.7 °C)] 97.7 °F (36.5 °C)  Heart Rate:  [71-85] 85  Resp:  [16-20] 20  BP: (149-168)/(86-98) 168/93  SpO2:  [95 %-97 %] 95 %  on   ;   Device (Oxygen Therapy): CPAP  Body mass index is 30.3 kg/m².  Physical Exam  Vitals and nursing note reviewed.   Constitutional:       General: He is not in acute distress.     Appearance: He is not toxic-appearing or diaphoretic.   HENT:      Head: Normocephalic and atraumatic.      Nose: Nose normal.      Mouth/Throat:      Mouth: Mucous membranes are moist.      Pharynx: Oropharynx is clear.   Eyes:      Extraocular Movements: Extraocular movements intact.      Conjunctiva/sclera: Conjunctivae normal.      Pupils: Pupils are equal, round, and reactive to light.   Cardiovascular:      Rate and Rhythm:  Normal rate and regular rhythm.      Pulses: Normal pulses.   Pulmonary:      Effort: Pulmonary effort is normal.      Breath sounds: Normal breath sounds.   Abdominal:      General: Bowel sounds are normal.      Palpations: Abdomen is soft.      Tenderness: There is no abdominal tenderness.   Musculoskeletal:         General: No swelling or tenderness.      Cervical back: Normal range of motion and neck supple.   Skin:     General: Skin is warm and dry.      Capillary Refill: Capillary refill takes less than 2 seconds.   Neurological:      General: No focal deficit present.      Mental Status: He is alert and oriented to person, place, and time.      Motor: Tremor (mild) present.   Psychiatric:         Mood and Affect: Mood normal.         Behavior: Behavior normal.       Results Review     I reviewed the patient's new clinical results.  I reviewed the patient's telemetry.  Results from last 7 days   Lab Units 04/17/22  0445 04/16/22  0528 04/15/22  1337   WBC 10*3/mm3 8.07 5.78 9.02   HEMOGLOBIN g/dL 13.1 13.0 12.7*   PLATELETS 10*3/mm3 272 253 256     Results from last 7 days   Lab Units 04/17/22  0445 04/16/22  0528 04/15/22  1337   SODIUM mmol/L 138 139 135*   POTASSIUM mmol/L 3.3* 3.6 3.6   CHLORIDE mmol/L 103 104 100   CO2 mmol/L 23.0 23.3 24.0   BUN mg/dL 8 8 14   CREATININE mg/dL 0.76 0.68* 0.96   GLUCOSE mg/dL 115* 100* 97   EGFR mL/min/1.73 98.5 101.9 86.6     Results from last 7 days   Lab Units 04/15/22  1337   ALBUMIN g/dL 4.20   BILIRUBIN mg/dL 0.4   ALK PHOS U/L 98   AST (SGOT) U/L 15   ALT (SGPT) U/L 13     Results from last 7 days   Lab Units 04/17/22  0445 04/16/22  0528 04/15/22  1337   CALCIUM mg/dL 8.8 9.1 8.6   ALBUMIN g/dL  --   --  4.20   MAGNESIUM mg/dL 2.0  --   --        No results found for: HGBA1C, POCGLU    No radiology results for the last day  Scheduled Medications  amLODIPine, 10 mg, Oral, Daily  buPROPion SR, 150 mg, Oral, BID  thiamine, 100 mg, Oral, Daily   And  multivitamin, 1  tablet, Oral, Daily   And  folic acid, 1 mg, Oral, Daily  losartan, 100 mg, Oral, Daily  multivitamin with minerals, 1 tablet, Oral, Daily  nicotine, 1 patch, Transdermal, Q24H    Infusions   Diet  Diet Regular; Cardiac      Assessment/Plan     Active Hospital Problems    Diagnosis  POA   • **Alcohol withdrawal syndrome without complication (HCC) [F10.230]  Yes   • Cigarette nicotine dependence without complication [F17.210]  Yes   • LIVAN (obstructive sleep apnea) [G47.33]  Yes   • COPD (chronic obstructive pulmonary disease) (HCC) [J44.9]  Yes   • Hypokalemia [E87.6]  Yes   • Essential hypertension [I10]  Yes      Resolved Hospital Problems   No resolved problems to display.   EtOH Dependence in Withdrawal  - last drink was on   - continue PRN ativan per [facility] protocol  - continue vitamin replacement  - access center following  - monitor labs and replace electrolytes as needed-protocol ordered for potassium  - he is complaining of insomnia and I explained that this is to be expected probably for the next month or so-I will increase his nightly trazodone and schedule melatonin as well, add PRN benadryl    Nicotine Dependence   - nicotine patch ordered    LIVAN  - has CPAP    COPD  - no exacerbation, not on maintenance medications  - PRN duo-nebs ordered    HTN  - BP is acceptable  - continue on norvasc and losartan    Depression/Anxiety  - continue on wellbutrin    SCDs for DVT prophylaxis.  Full code.  Discussed with patient and nursing staff.  Anticipate discharge home tomorrow. Today is day 3 since last drink and if he does okay today he should be able to go home tomorrow.      Meño Liriano MD  [facility]  22  13:39 EDT      Electronically signed by Meño Liriano MD at 22 1342     Meño Liriano MD at 22 1421              Name: Watson Lisa ADMIT: 4/15/2022   : 1955  PCP: Checo Dunham MD    MRN: [id number] LOS: 1 days   AGE/SEX: 66 y.o. male   ROOM: [facility]     Subjective   Subjective   CC: generalized weakness, tremors, EtOH detox  No acute events. Patient states he feels a bit better today. Still having some tremors but has this to some degree at baseline. Taking PO. No CP/dyspnea/f/c/n/v/d.    Objective   Objective   Vital Signs  Temp:  [97.5 °F (36.4 °C)-98.2 °F (36.8 °C)] 98 °F (36.7 °C)  Heart Rate:  [64-95] 79  Resp:  [16-20] 20  BP: (141-162)/() 156/86  SpO2:  [93 %-98 %] 97 %  on   ;   Device (Oxygen Therapy): room air  Body mass index is 30.3 kg/m².  Physical Exam  Vitals and nursing note reviewed.   Constitutional:       General: He is not in acute distress.     Appearance: He is not toxic-appearing or diaphoretic.   HENT:      Head: Normocephalic and atraumatic.      Nose: Nose normal.      Mouth/Throat:      Mouth: Mucous membranes are moist.      Pharynx: Oropharynx is clear.   Eyes:      Extraocular Movements: Extraocular movements intact.      Conjunctiva/sclera: Conjunctivae normal.      Pupils: Pupils are equal, round, and reactive to light.   Cardiovascular:      Rate and Rhythm: Normal rate and regular rhythm.      Pulses: Normal pulses.   Pulmonary:      Effort: Pulmonary effort is normal.      Breath sounds: Normal breath sounds.   Abdominal:      General: Bowel sounds are normal.      Palpations: Abdomen is soft.      Tenderness: There is no abdominal tenderness.   Musculoskeletal:         General: No swelling or tenderness.      Cervical back: Normal range of motion and neck supple.   Skin:     General: Skin is warm and dry.      Capillary Refill: Capillary refill takes less than 2 seconds.   Neurological:      General: No focal deficit present.      Mental Status: He is alert and oriented to person, place, and time.      Motor: Tremor (mild) present.   Psychiatric:         Mood and Affect: Mood normal.         Behavior: Behavior normal.       Results Review     I reviewed the patient's new clinical results.  I reviewed the  patient's telemetry.  Results from last 7 days   Lab Units 04/16/22  0528 04/15/22  1337   WBC 10*3/mm3 5.78 9.02   HEMOGLOBIN g/dL 13.0 12.7*   PLATELETS 10*3/mm3 253 256     Results from last 7 days   Lab Units 04/16/22  0528 04/15/22  1337   SODIUM mmol/L 139 135*   POTASSIUM mmol/L 3.6 3.6   CHLORIDE mmol/L 104 100   CO2 mmol/L 23.3 24.0   BUN mg/dL 8 14   CREATININE mg/dL 0.68* 0.96   GLUCOSE mg/dL 100* 97   EGFR mL/min/1.73 101.9 86.6     Results from last 7 days   Lab Units 04/15/22  1337   ALBUMIN g/dL 4.20   BILIRUBIN mg/dL 0.4   ALK PHOS U/L 98   AST (SGOT) U/L 15   ALT (SGPT) U/L 13     Results from last 7 days   Lab Units 04/16/22  0528 04/15/22  1337   CALCIUM mg/dL 9.1 8.6   ALBUMIN g/dL  --  4.20       No results found for: HGBA1C, POCGLU    No radiology results for the last day  Scheduled Medications  amLODIPine, 10 mg, Oral, Daily  buPROPion SR, 150 mg, Oral, BID  thiamine, 100 mg, Oral, Daily   And  multivitamin, 1 tablet, Oral, Daily   And  folic acid, 1 mg, Oral, Daily  losartan, 100 mg, Oral, Daily  multivitamin with minerals, 1 tablet, Oral, Daily    Infusions   Diet  Diet Regular; Cardiac      Assessment/Plan     Active Hospital Problems    Diagnosis  POA   • **Alcohol withdrawal syndrome without complication (HCC) [F10.230]  Yes   • LIVAN (obstructive sleep apnea) [G47.33]  Yes   • COPD (chronic obstructive pulmonary disease) (HCC) [J44.9]  Yes   • Essential hypertension [I10]  Yes      Resolved Hospital Problems   No resolved problems to display.   EtOH Dependence in Withdrawal  - continue PRN ativan per CIWA protocol  - continue vitamin replacement  - access center following  - monitor labs and replace electrolytes as needed-I am going to give him a dose of KCl today    LIVAN  - has CPAP    COPD  - no exacerbation, not on maintenance medications  - PRN duo-nebs ordered    HTN  - BP is acceptable  - continue on norvasc and losartan    Depression/Anxiety  - continue on wellbutrin    SCDs for DVT  prophylaxis.  Full code.  Discussed with patient and nursing staff.  Anticipate discharge home in 1-2 days.      Meño Liriano MD  [facility]  04/16/22  14:23 EDT      Electronically signed by Meño Liriano MD at 04/16/22 1426       Consult Notes (last 72 hours)  Notes from 04/15/22 0859 through 04/18/22 0859   No notes of this type exist for this encounter.         All medication doses during the admission are shown, including meds that are no longer on order.    Scheduled Meds Sorted by Name  for Watson Lisa as of 4/16/22 through 4/18/22    1 Day 3 Days 7 Days 10 Days < Today >   Legend:                          Inactive     Active     Other Encounter     Linked                 Medications 04/16/22 04/17/22 04/18/22   amLODIPine (NORVASC) tablet 10 mg  Dose: 10 mg  Freq: Daily Route: PO  Start: 04/16/22 0900   Admin Instructions:   Caution: Look alike/sound alike drug alert. Avoid grapefruit juice.    0840 0909 0845          amLODIPine (NORVASC) tablet 10 mg  Dose: 10 mg  Freq: Daily Route: PO  Start: 10/09/21 1030 End: 10/19/21 1722   Admin Instructions:   Caution: Look alike/sound alike drug alert. Avoid grapefruit juice.         buPROPion SR (WELLBUTRIN SR) 12 hr tablet 150 mg  Dose: 150 mg  Freq: 2 Times Daily Route: PO  Start: 04/15/22 2200   Admin Instructions:   Caution: Look alike/sound alike drug alert. Swallow whole.  Do not crush, chew, or split tablet.    0840 2018 0909 2041 0845 2100         enoxaparin (LOVENOX) syringe 40 mg  Dose: 40 mg  Freq: Every 24 Hours Route: SC  Indications of Use: PROPHYLAXIS OF VENOUS THROMBOEMBOLISM  Start: 10/08/21 2100 End: 10/19/21 1722   Admin Instructions:   Give subcutaneous in abdomen only. Do not massage site after injection.         thiamine (VITAMIN B-1) tablet 100 mg  Dose: 100 mg  Freq: Daily Route: PO  Start: 04/16/22 0900 End: 04/18/22 0845    0840 0909 0845           And  multivitamin (THERAGRAN) tablet 1 tablet  Dose: 1 tablet  Freq: Daily Route: PO  Start: 04/16/22 0900 End: 04/18/22 0845   Admin Instructions:       0840        0909        0845          And  folic acid (FOLVITE) tablet 1 mg  Dose: 1 mg  Freq: Daily Route: PO  Start: 04/16/22 0900 End: 04/18/22 0845    0840        0908        0845          lansoprazole (FIRST) oral suspension 30 mg  Dose: 30 mg  Freq: Every Morning Route: NG  Start: 10/13/21 0700 End: 10/19/21 1722   Admin Instructions:   Shake well. Refrigerate.         LORazepam (ATIVAN) injection 1 mg  Dose: 1 mg  Freq: Once Route: IV  Start: 04/15/22 1524 End: 04/15/22 1604   Admin Instructions:    Caution: Look alike/sound alike drug alert. Dilute 1:1 with normal saline.         LORazepam (ATIVAN) injection 1 mg  Dose: 1 mg  Freq: Once Route: IV  Start: 04/15/22 1330 End: 04/15/22 1339   Admin Instructions:    Caution: Look alike/sound alike drug alert. Dilute 1:1 with normal saline.         losartan (COZAAR) tablet 100 mg  Dose: 100 mg  Freq: Daily Route: PO  Start: 04/16/22 0900    0840        0909        0845          melatonin tablet 10 mg  Dose: 10 mg  Freq: Nightly Route: PO  Start: 04/17/22 2100     2041        2100          metoprolol tartrate (LOPRESSOR) tablet 50 mg  Dose: 50 mg  Freq: Every 8 Hours Route: PO  Start: 10/07/21 1845 End: 10/19/21 1722         multivitamin with minerals 1 tablet  Dose: 1 tablet  Freq: Daily Route: PO  Start: 04/16/22 0900   Admin Instructions:       0840        0908        0845          multivitamin with minerals 1 tablet  Dose: 1 tablet  Freq: Daily Route: PO  Start: 10/07/21 1845 End: 10/19/21 1722   Admin Instructions:            nicotine (NICODERM CQ) 21 MG/24HR patch 1 patch  Dose: 1 patch  Freq: Every 24 Hours Scheduled Route: TD  Start: 04/17/22 1145   Admin Instructions:   Apply to clean, dry, nonhairy area of skin (typically upper arm or shoulder)   Acutely Hazardous. Waste BOTH Residual Medication and/or  Empty Package.     1429 [C]        0846   0848         nicotine (NICODERM CQ) 21 MG/24HR patch 1 patch  Dose: 1 patch  Freq: Every 24 Hours Route: TD  Start: 10/07/21 1845 End: 10/19/21 1722   Admin Instructions:   Apply Patch to Clean, Dry, Hairless Area Daily - Rotating Sites.  REMOVE Old Patch Prior to Applying New Patch.  May Remove Patch at Bedtime If Needed to Prevent Insomnia.  Do Not Use Other Nicotine Products.  At Discharge, Follow Package Instructions.   Acutely Hazardous. Waste BOTH Residual Medication and/or Empty Package.         potassium chloride (K-DUR,KLOR-CON) ER tablet 40 mEq  Dose: 40 mEq  Freq: Once Route: PO  Start: 04/16/22 1515 End: 04/16/22 1756   Admin Instructions:   Swallow whole; do not crush, split, or chew.    1756            QUEtiapine (SEROquel) tablet 25 mg  Dose: 25 mg  Freq: Nightly Route: PO  Start: 10/16/21 2100 End: 10/19/21 1722   Admin Instructions:   Caution: Look alike/sound alike drug alert         sennosides-docusate (PERICOLACE) 8.6-50 MG per tablet 2 tablet  Dose: 2 tablet  Freq: 2 Times Daily Route: PO  Start: 10/07/21 2100 End: 10/19/21 1722   Admin Instructions:   HOLD MEDICATION IF PATIENT HAS HAD BOWEL MOVEMENT. Start bowel management regimen if patient has not had a bowel movement after 12 hours.         And  polyethylene glycol (MIRALAX) packet 17 g  Dose: 17 g  Freq: Daily PRN Route: PO  PRN Reason: Constipation  PRN Comment: Use if senna-docusate is ineffective  Start: 10/07/21 1757 End: 10/19/21 1722   Admin Instructions:   Use if no bowel movement after 12 hours. Mix in 6-8 ounces of water.  Use 4-8 ounces of water, tea, or juice for each 17 gram dose.         And  bisacodyl (DULCOLAX) EC tablet 5 mg  Dose: 5 mg  Freq: Daily PRN Route: PO  PRN Reason: Constipation  PRN Comment: Use if polyethylene glycol is ineffective  Start: 10/07/21 1757 End: 10/19/21 1722   Admin Instructions:   Use if no bowel movement after 12 hours.  Swallow whole. Do not crush,  split, or chew tablet.         And  bisacodyl (DULCOLAX) suppository 10 mg  Dose: 10 mg  Freq: Daily PRN Route: RE  PRN Reason: Constipation  PRN Comment: Use if bisacodyl oral is ineffective  Start: 10/07/21 1757 End: 10/19/21 1722   Admin Instructions:   Use if no bowel movement after 12 hours.         sodium chloride 0.9 % bolus 1,000 mL  Dose: 1,000 mL  Freq: Once Route: IV  Last Dose: Stopped (04/15/22 1628)  Start: 04/15/22 1330 End: 04/15/22 1628         sodium chloride 0.9 % flush 10 mL  Dose: 10 mL  Freq: Every 12 Hours Scheduled Route: IV  Start: 10/07/21 2100 End: 10/19/21 1722         thiamine (B-1) 100 mg in sodium chloride 0.9 % 100 mL IVPB  Dose: 100 mg  Freq: Once Route: IV  Start: 04/15/22 1834 End: 04/15/22 2023   Admin Instructions:   Protect from light.         Or  thiamine (VITAMIN B-1) tablet 100 mg  Dose: 100 mg  Freq: Once Route: PO  Start: 04/15/22 1834 End: 04/15/22 2023   Admin Instructions:   Give if no IV access.         thiamine (VITAMIN B-1) tablet 100 mg  Dose: 100 mg  Freq: 3 Times Daily Route: NG  Start: 10/12/21 1600 End: 10/19/21 1722         traZODone (DESYREL) tablet 100 mg  Dose: 100 mg  Freq: Nightly Route: PO  Start: 04/17/22 2100   Admin Instructions:   Take with food.  Caution: Look alike/sound alike drug alert     2042 2100          vitamin D (ERGOCALCIFEROL) capsule 50,000 Units  Dose: 50,000 Units  Freq: Every 7 Days Route: PO  Start: 10/07/21 1845 End: 10/19/21 1722         Medications 04/16/22 04/17/22 04/18/22           Continuous Meds Sorted by Name  for Watson Lisa as of 4/16/22 through 4/18/22  Legend:                          Inactive     Active     Other Encounter     Linked                 Medications 04/16/22 04/17/22 04/18/22           PRN Meds Sorted by Name  for Watson Lisa as of 4/16/22 through 4/18/22  Legend:                          Inactive     Active     Other Encounter     Linked                 Medications 04/16/22 04/17/22  04/18/22   acetaminophen (TYLENOL) tablet 650 mg  Dose: 650 mg  Freq: Every 4 Hours PRN Route: PO  PRN Reason: Mild Pain   Start: 10/07/21 1757 End: 10/19/21 1722   Admin Instructions:   Or temp greater than 101.5  Do not exceed 4 grams of acetaminophen in a 24 hr period. Max dose of 2gm for AST/ALT greater than 120 units/L      If given for pain, use the following pain scale:   Mild Pain = Pain Score of 1-3, CPOT 1-2  Moderate Pain = Pain Score of 4-6, CPOT 3-4  Severe Pain = Pain Score of 7-10, CPOT 5-8         Or  acetaminophen (TYLENOL) 160 MG/5ML solution 650 mg  Dose: 650 mg  Freq: Every 4 Hours PRN Route: PO  PRN Reason: Mild Pain   Start: 10/07/21 1757 End: 10/19/21 1722   Admin Instructions:   Do not exceed 4 grams of acetaminophen in a 24 hr period. Max dose of 2gm for AST/ALT greater than 120 units/L      If given for pain, use the following pain scale:   Mild Pain = Pain Score of 1-3, CPOT 1-2  Moderate Pain = Pain Score of 4-6, CPOT 3-4  Severe Pain = Pain Score of 7-10, CPOT 5-8         Or  acetaminophen (TYLENOL) suppository 650 mg  Dose: 650 mg  Freq: Every 4 Hours PRN Route: RE  PRN Reason: Mild Pain   Start: 10/07/21 1757 End: 10/19/21 1722   Admin Instructions:   Do not exceed 4 grams of acetaminophen in a 24 hr period. Max dose of 2gm for AST/ALT greater than 120 units/L      If given for pain, use the following pain scale:   Mild Pain = Pain Score of 1-3, CPOT 1-2  Moderate Pain = Pain Score of 4-6, CPOT 3-4  Severe Pain = Pain Score of 7-10, CPOT 5-8         acetaminophen (TYLENOL) tablet 650 mg  Dose: 650 mg  Freq: Every 4 Hours PRN Route: PO  PRN Reason: Mild Pain   Start: 04/15/22 1832   Admin Instructions:   Do not exceed 4 grams of acetaminophen in a 24 hr period.    If given for pain, use the following pain scale:   Mild Pain = Pain Score of 1-3, CPOT 1-2  Moderate Pain = Pain Score of 4-6, CPOT 3-4  Severe Pain = Pain Score of 7-10, CPOT 5-8  Do not exceed 4 grams of acetaminophen in a  24 hr period. Max dose of 2gm for AST/ALT greater than 120 units/L    If given for fever, use fever parameter: fever greater than 100.4 °F.    If given for pain, use the following pain scale:   Mild Pain = Pain Score of 1-3, CPOT 1-2  Moderate Pain = Pain Score of 4-6, CPOT 3-4  Severe Pain = Pain Score of 7-10, CPOT 5-8    0852            albuterol sulfate HFA (PROVENTIL HFA;VENTOLIN HFA;PROAIR HFA) inhaler 2 puff  Dose: 2 puff  Freq: Every 4 Hours PRN Route: IN  PRN Reason: Wheezing  Start: 10/15/21 1210 End: 10/19/21 1722   Admin Instructions:    Shake well.         sennosides-docusate (PERICOLACE) 8.6-50 MG per tablet 2 tablet  Dose: 2 tablet  Freq: 2 Times Daily Route: PO  Start: 10/07/21 2100 End: 10/19/21 1722   Admin Instructions:   HOLD MEDICATION IF PATIENT HAS HAD BOWEL MOVEMENT. Start bowel management regimen if patient has not had a bowel movement after 12 hours.         And  polyethylene glycol (MIRALAX) packet 17 g  Dose: 17 g  Freq: Daily PRN Route: PO  PRN Reason: Constipation  PRN Comment: Use if senna-docusate is ineffective  Start: 10/07/21 1757 End: 10/19/21 1722   Admin Instructions:   Use if no bowel movement after 12 hours. Mix in 6-8 ounces of water.  Use 4-8 ounces of water, tea, or juice for each 17 gram dose.         And  bisacodyl (DULCOLAX) EC tablet 5 mg  Dose: 5 mg  Freq: Daily PRN Route: PO  PRN Reason: Constipation  PRN Comment: Use if polyethylene glycol is ineffective  Start: 10/07/21 1757 End: 10/19/21 1722   Admin Instructions:   Use if no bowel movement after 12 hours.  Swallow whole. Do not crush, split, or chew tablet.         And  bisacodyl (DULCOLAX) suppository 10 mg  Dose: 10 mg  Freq: Daily PRN Route: RE  PRN Reason: Constipation  PRN Comment: Use if bisacodyl oral is ineffective  Start: 10/07/21 1757 End: 10/19/21 1722   Admin Instructions:   Use if no bowel movement after 12 hours.         cloNIDine (CATAPRES) tablet 0.1 mg  Dose: 0.1 mg  Freq: Every 4 Hours PRN Route:  PO  PRN Reason: High Blood Pressure  Start: 04/15/22 1832   Admin Instructions:   For systolic blood pressure greater than 160 mmHg  Caution: Look alike/sound alike drug alert.         diphenhydrAMINE (BENADRYL) capsule 25 mg  Dose: 25 mg  Freq: Nightly PRN Route: PO  PRN Reasons: Sleep,Allergies,Itching  Start: 04/17/22 1341   Admin Instructions:   Caution: Look alike/sound alike drug alert. This med may be ordered in other forms and routes. Before giving verify the last time the drug was given by any route/form.     2042           haloperidol lactate (HALDOL) injection 2 mg  Dose: 2 mg  Freq: Every 6 Hours PRN Route: IV  PRN Reason: Agitation  Start: 10/17/21 1259 End: 10/19/21 1722   Admin Instructions:   For IV Push, administer no faster than 5 mg / minute.         hydrALAZINE (APRESOLINE) injection 10 mg  Dose: 10 mg  Freq: Every 4 Hours PRN Route: IV  PRN Reason: High Blood Pressure  Start: 10/09/21 1222 End: 10/19/21 1722   Admin Instructions:   As needed for SBP greater than 170  Caution: Look alike/sound alike drug alert         labetalol (NORMODYNE,TRANDATE) injection 20 mg  Dose: 20 mg  Freq: Every 2 Hours PRN Route: IV  PRN Reason: High Blood Pressure  Start: 10/09/21 1221 End: 10/19/21 1722   Admin Instructions:   As needed for SBP greater than 170, hold if heart rate less than 70  Give by slow IV Push each 20mg (or less) over 2 minutes         LORazepam (ATIVAN) tablet 0.5 mg  Dose: 0.5 mg  Freq: Every 2 Hours PRN Route: PO  PRN Reason: Withdrawal  PRN Comment: For CIWA-Ar 8-10  Start: 04/15/22 1910 End: 04/22/22 1909   Admin Instructions:   Reassess 2 Hours After Administration   Caution: Look alike/sound alike drug alert                                                        Or  LORazepam (ATIVAN) injection 0.5 mg  Dose: 0.5 mg  Freq: Every 2 Hours PRN Route: IV  PRN Reason: Withdrawal  PRN Comment: For CIWA-Ar 8-10  Start: 04/15/22 1910 End: 04/22/22 1909   Admin Instructions:   Reassess 2 Hours  After Administration   Caution: Look alike/sound alike drug alert. Dilute 1:1 with normal saline.    0203 [C]   0423                                                 Or  LORazepam (ATIVAN) tablet 1 mg  Dose: 1 mg  Freq: Every 1 Hour PRN Route: PO  PRN Reason: Withdrawal  PRN Comment: For CIWA-Ar 11-15  Start: 04/15/22 1910 End: 04/22/22 1909   Admin Instructions:   Reassess 1 Hour After Administration   Caution: Look alike/sound alike drug alert                                                        Or  LORazepam (ATIVAN) injection 1 mg  Dose: 1 mg  Freq: Every 1 Hour PRN Route: IV  PRN Reason: Withdrawal  PRN Comment: For CIWA-Ar 11-15  Start: 04/15/22 1910 End: 04/22/22 1909   Admin Instructions:   Reassess 1 Hour After Administration   Caution: Look alike/sound alike drug alert. Dilute 1:1 with normal saline.               1419   1755   2018     2353        0412 [C]   0914   2042 [C]     2300           Or  LORazepam (ATIVAN) injection 1 mg  Dose: 1 mg  Freq: Every 15 Minutes PRN Route: IV  PRN Reason: Anxiety  PRN Comment: For CIWA-Ar Greater Than 15.  Repeat Dose in 15 Minutes if CIWA-Ar Does Not Decrease  Start: 04/15/22 1910 End: 04/22/22 1909   Admin Instructions:   Reassess 15 Minutes After Each Administration.  If CIWA-Ar Does Not Decrease Contact Provider To Discuss Transfer to Higher Level of Care.   Caution: Look alike/sound alike drug alert. Dilute 1:1 with normal saline.          0852                                              Or  LORazepam (ATIVAN) injection 1 mg  Dose: 1 mg  Freq: Every 15 Minutes PRN Route: IM  PRN Reason: Withdrawal  PRN Comment: If Unable to Administer IV - For CIWA-Ar Greater Than 15.  Repeat Dose in 15 Minutes if CIWA-Ar Does Not Decrease  Start: 04/15/22 1910 End: 04/22/22 1909   Admin Instructions:   Reassess 15 Minutes After Each Administration.  If CIWA-Ar Does Not Decrease Contact Provider To Discuss Transfer to Higher Level of Care.  Caution: Look alike/sound alike drug  alert. Dilute 1:1 with normal saline.                                                        magnesium sulfate 4 gram infusion - Mg less than or equal to 1mg/dL  Dose: 4 g  Freq: As Needed Route: IV  PRN Comment: Mg less than or equal to 1mg/dL  Start: 10/07/21 1757 End: 10/19/21 1722   Admin Instructions:   Recheck Mg level in the AM.         Or  magnesium sulfate 3 gram infusion (1gm x 3) - Mg 1.1 - 1.5 mg/dL  Dose: 1 g  Freq: As Needed Route: IV  PRN Comment: Mg 1.1 - 1.5 mg/dL  Start: 10/07/21 1757 End: 10/19/21 1722   Admin Instructions:   Infuse 1 gram over 1 hour for 3 doses (total Mg dose of 3 grams). Recheck Mg level in the AM.         Or  Magnesium Sulfate 2 gram infusion- Mg 1.6 - 1.9 mg/dL  Dose: 2 g  Freq: As Needed Route: IV  PRN Comment: Mg 1.6 - 1.9 mg/dL  Start: 10/07/21 1757 End: 10/19/21 1722   Admin Instructions:   Recheck Mg level in the AM.         melatonin tablet 3 mg  Dose: 3 mg  Freq: Nightly PRN Route: PO  PRN Reason: Sleep  Start: 04/15/22 1832 End: 04/17/22 1340     1340-D/C'd         melatonin tablet 5 mg  Dose: 5 mg  Freq: Nightly PRN Route: PO  PRN Reason: Sleep  Start: 10/07/21 1757 End: 10/19/21 1722         metoprolol tartrate (LOPRESSOR) injection 5 mg  Dose: 5 mg  Freq: Every 6 Hours PRN Route: IV  PRN Reason: High Blood Pressure  PRN Comment: Systolic blood pressure more than 160 and diastolic more than 90  Start: 10/07/21 1757 End: 10/19/21 1722         nitroglycerin (NITROSTAT) SL tablet 0.4 mg  Dose: 0.4 mg  Freq: Every 5 Minutes PRN Route: SL  PRN Reason: Chest Pain  PRN Comment: Only if SBP Greater Than 100  Start: 04/15/22 1832   Admin Instructions:   If Pain Unrelieved After 3 Doses Notify MD         nitroglycerin (NITROSTAT) SL tablet 0.4 mg  Dose: 0.4 mg  Freq: Every 5 Minutes PRN Route: SL  PRN Reason: Chest Pain  PRN Comment: Only if SBP Greater Than 100  Start: 10/07/21 1757 End: 10/19/21 1722   Admin Instructions:   If Pain Unrelieved After 3 Doses Notify MD          ondansetron (ZOFRAN) tablet 4 mg  Dose: 4 mg  Freq: Every 6 Hours PRN Route: PO  PRN Reasons: Nausea,Vomiting  Start: 04/15/22 1832         Or  ondansetron (ZOFRAN) injection 4 mg  Dose: 4 mg  Freq: Every 6 Hours PRN Route: IV  PRN Reasons: Nausea,Vomiting  Start: 04/15/22 1832         ondansetron (ZOFRAN) tablet 4 mg  Dose: 4 mg  Freq: Every 6 Hours PRN Route: PO  PRN Reasons: Nausea,Vomiting  Start: 10/07/21 1757 End: 10/19/21 1722   Admin Instructions:   If BOTH ondansetron (ZOFRAN) and promethazine (PHENERGAN) are ordered use ondansetron first and THEN promethazine IF ondansetron is ineffective.         Or  ondansetron (ZOFRAN) injection 4 mg  Dose: 4 mg  Freq: Every 6 Hours PRN Route: IV  PRN Reasons: Nausea,Vomiting  Start: 10/07/21 1757 End: 10/19/21 1722   Admin Instructions:   If BOTH ondansetron (ZOFRAN) and promethazine (PHENERGAN) are ordered use ondansetron first and THEN promethazine IF ondansetron is ineffective.         potassium chloride (K-DUR,KLOR-CON) ER tablet 40 mEq  Dose: 40 mEq  Freq: As Needed Route: PO  PRN Comment: Potassium Replacement.  See Admin Instructions  Start: 04/17/22 1030   Admin Instructions:   Potassium 3.1 or Less Give KCl 40 mEq q4h x3 Doses   Potassium 3.2 - 3.6 Give KCl 40 mEq q4h x2 Doses     Check Potassium 4 Hours After Last Dose Given   Check Magnesium if Potassium Level Remains Low After Replacement   DO NOT GIVE if CrCl is Less Than 30 mL/minute or Urine Output Less Than 30 mL/hr  Swallow whole; do not crush, split, or chew.     1100   1515          Or  potassium chloride (KLOR-CON) packet 40 mEq  Dose: 40 mEq  Freq: As Needed Route: PO  PRN Comment: potassium replacement, see admin instructions  Start: 04/17/22 1030   Admin Instructions:   Potassium 3.1 or Less Give KCl 40 mEq q4h x3 Doses   Potassium 3.2 - 3.6 Give KCl 40 mEq q4h x2 Doses     Check Potassium 4 Hours After Last Dose Given   Check Magnesium if Potassium Level Remains Low After Replacement   DO NOT  GIVE if CrCl is Less Than 30 mL/minute or Urine Output Less Than 30 mL/hr                  Or  potassium chloride 10 mEq in 100 mL IVPB  Dose: 10 mEq  Freq: Every 1 Hour PRN Route: IV  PRN Comment: Potassium Replacement - See Admin Instructions  Start: 04/17/22 1030   Admin Instructions:   Peripheral or Central IV  Potassium 3.1 or Less Give KCl 10 mEq/100 mL NS IV q1h x6 Doses  Potassium 3.2 - 3.6 Give KCl 10 mEq/100 mL NS q1h x4 Doses    Check Potassium 4 Hours After Last Dose Given  Check Magnesium if Potassium Remains Low After Replacement  DO NOT GIVE if CrCl is Less Than 30 mL/minute or Urine Output Less Than 30 mL/hr.     Rates Greater Than 10 mEq/hr Require ECG Monitoring.  OUTPATIENT/NON-MONITORED UNITS: Potassium Chloride standard bolus infusion rate is a maximum of 10 mEq/hr on unmonitored patients    MONITORED UNITS: Potassium Chloride standard bolus infusion rate is a maximum of 20 mEq/hr on ECG monitored patients ONLY                  potassium chloride (K-DUR,KLOR-CON) ER tablet 40 mEq  Dose: 40 mEq  Freq: As Needed Route: PO  PRN Comment: Potassium Replacement.  See Admin Instructions  Start: 10/07/21 1757 End: 10/19/21 1722   Admin Instructions:   Potassium 3.1 or Less Give KCl 40 mEq q4h x3 Doses   Potassium 3.2 - 3.6 Give KCl 40 mEq q4h x2 Doses     Check Potassium 4 Hours After Last Dose Given   Check Magnesium if Potassium Level Remains Low After Replacement   DO NOT GIVE if CrCl is Less Than 30 mL/minute or Urine Output Less Than 30 mL/hr  Swallow whole; do not crush, split, or chew.         potassium chloride (KLOR-CON) packet 40 mEq  Dose: 40 mEq  Freq: As Needed Route: PO  PRN Comment: Potassium Replacement, See Admin Instructions  Start: 10/07/21 1757 End: 10/19/21 1722   Admin Instructions:   Potassium 3.1 or Less Give KCl 40 mEq q4h x3 Doses   Potassium 3.2 - 3.6 Give KCl 40 mEq q4h x2 Doses     Check Potassium 4 Hours After Last Dose Given   Check Magnesium if Potassium Level Remains Low  After Replacement   DO NOT GIVE if CrCl is Less Than 30 mL/minute or Urine Output Less Than 30 mL/hr         sodium chloride 0.9 % flush 10 mL  Dose: 10 mL  Freq: As Needed Route: IV  PRN Reason: Line Care  Start: 10/07/21 1757 End: 10/19/21 1722         sodium chloride 0.9 % flush 10 mL  Dose: 10 mL  Freq: As Needed Route: IV  PRN Reason: Line Care  Start: 10/07/21 0938 End: 10/19/21 1722         traZODone (DESYREL) tablet 50 mg  Dose: 50 mg  Freq: Nightly PRN Route: PO  PRN Reason: Sleep  Start: 04/15/22 2113 End: 04/17/22 1340   Admin Instructions:   Take with food.  Caution: Look alike/sound alike drug alert     0051   1340-D/C'd        Medications 04/16/22 04/17/22 04/18/22

## 2022-04-18 NOTE — NURSING NOTE
Patient alert and oriented x 3 sitting up in bed. Discussed with patient resources for substance abuse that was given to him yesterday. Patient said that he was considering IOP CD if he felt his job schedule would work with it. Patient said he was  today going to look over the resources provided to him and ask questions if he has any next follow up. Spoke with patient's nurse, patient rested for at least 5 hours during the night, CIWA was 3. Access will continue to follow up.

## 2022-04-18 NOTE — PAYOR COMM NOTE
"Brittany Villalobos (66 y.o. Male)     DC SUMMARY FOR [id number]    CONTACT F # [phone]              Date of Birth   1955    Social Security Number       Address   [address] [place] [address]    Home Phone   [phone]    MRN   [id number]       Worship   [employer]    Marital Status                               Admission Date   4/15/22    Admission Type   Emergency    Admitting Provider   Keily Russell MD    Attending Provider       Department, Room/Bed   [address], E456/1       Discharge Date   4/18/2022    Discharge Disposition   Home or Self Care    Discharge Destination                               Attending Provider: (none)   Allergies: Penicillins, Penicillins    Isolation: None   Infection: None   Code Status: CPR   Advance Care Planning Activity    Ht: 200.7 cm (79\")   Wt: 117 kg (258 lb 4.8 oz)    Admission Cmt: None   Principal Problem: Alcohol withdrawal syndrome without complication (HCC) [F10.230]                 Active Insurance as of 4/15/2022     Primary Coverage     Payor Plan Insurance Group Employer/Plan Group    ANTH BLUE CROSS ANTH BLUE CROSS BLUE SHIELD PPO [id number]     Payor Plan Address Payor Plan Phone Number Payor Plan Fax Number Effective Dates    [address] [phone]  1/1/2022 - None Entered    [facility] [id number]       Subscriber Name Subscriber Birth Date Member ID       BRITTANY VILLALOBOS 1955 [id number]           Secondary Coverage     Payor Plan Insurance Group Employer/Plan Group    MEDICARE MEDICARE A ONLY      Payor Plan Address Payor Plan Phone Number Payor Plan Fax Number Effective Dates    [address] [phone]  9/1/2019 - None Entered    [facility] [id number]       Subscriber Name Subscriber Birth Date Member ID       BRITTANY VILLALOBOS 1955 [id number]                 Emergency Contacts      (Rel.) Home Phone Work Phone Mobile Phone    lawrence villalobos (Son) -- " -- [phone]    Jorge A Nielson (Sister) [phone] -- --               Discharge Summary      Marques Santiago DO at 22 0802              [facility]  DISCHARGE SUMMARY      Name:  Watson Lisa   Age:  66 y.o.  Sex:  male  :  1955  MRN:  [id number]   Visit Number:  [id number]  Primary Care Physician:  Checo Dunham MD  Date of Discharge:  2022  Admission Date:  4/15/2022      Discharge Diagnosis:     1.  Alcohol dependence with withdrawal.  2.  LIVAN on CPAP  3.  COPD not in exacerbation  4.  Essential hypertension controlled  5.  Depression/anxiety  6.  Nicotine dependence    Active Hospital Problems    Diagnosis  POA   • **Alcohol withdrawal syndrome without complication (HCC) [F10.230]  Yes   • Cigarette nicotine dependence without complication [F17.210]  Yes   • LIVAN (obstructive sleep apnea) [G47.33]  Yes   • COPD (chronic obstructive pulmonary disease) (HCC) [J44.9]  Yes   • Hypokalemia [E87.6]  Yes   • Essential hypertension [I10]  Yes      Resolved Hospital Problems   No resolved problems to display.         Presenting Problem/History of Present Illness:    Alcohol withdrawal syndrome without complication (HCC) [F10.230]         Hospital Course:    66-year-old male who came in requesting help with alcohol dependence.  He has been in the ICU in the past with delirium tremens.  He last drank alcohol on 2022.  He was admitted and placed on CIWA protocol with vitamin replacement and fluids.  He has done well during the course of the hospital stay.  He has not required Ativan today.  His PMH includes LIVAN/COPD/hypertension.    He currently denies any chest pressure, shortness of breath, nausea, vomiting, pain.  He will be discharged, follow-up with PCP.  He was given resources for substance abuse and encouraged to follow-up.  He has been told to abstain from alcohol.  He will be discharged home today.    Procedures Performed:           Consults:      Consults     Date and Time Order Name Status Description    4/15/2022  2:15 PM [facility] (on-call MD unless specified) Details            Pertinent Test Results:         Results from last 7 days   Lab Units 04/18/22  0444 04/17/22  1757 04/17/22  0445 04/16/22  0528 04/15/22  1337   WBC 10*3/mm3 8.36  --  8.07 5.78 9.02   HEMOGLOBIN g/dL 13.7  --  13.1 13.0 12.7*   HEMATOCRIT % 41.0  --  38.2 37.9 36.6*   PLATELETS 10*3/mm3 279  --  272 253 256   MCV fL 96.9  --  95.3 95.5 96.3   SODIUM mmol/L 137  --  138 139 135*   POTASSIUM mmol/L 4.0 4.1 3.3* 3.6 3.6   CHLORIDE mmol/L 104  --  103 104 100   CO2 mmol/L 21.1*  --  23.0 23.3 24.0   BUN mg/dL 8  --  8 8 14   CREATININE mg/dL 0.84  --  0.76 0.68* 0.96   GLUCOSE mg/dL 105*  --  115* 100* 97   CALCIUM mg/dL 9.2  --  8.8 9.1 8.6          Results from last 7 days   Lab Units 04/18/22  0444 04/17/22  0445 04/16/22  0528 04/15/22  1337   WBC 10*3/mm3 8.36 8.07 5.78 9.02     Results from last 7 days   Lab Units 04/15/22  1337   BILIRUBIN mg/dL 0.4   ALK PHOS U/L 98   ALT (SGPT) U/L 13   AST (SGOT) U/L 15           Invalid input(s): TG, LDLCALC, LDLREALC      Brief Urine Lab Results  (Last result in the past 365 days)      Color   Clarity   Blood   Leuk Est   Nitrite   Protein   CREAT   Urine HCG        10/08/21 1215             197.8                 Results for orders placed during the hospital encounter of 06/24/20    Duplex Venous Lower Extremity - Bilateral CAR    Interpretation Summary  · Normal bilateral lower extremity venous duplex scan.  · Fluid collection seen above the left knee measuring about 5 x 2 x 8 cm. Follow-up with additional images and clinically indicated.      Results for orders placed during the hospital encounter of 06/24/20    Adult Transthoracic Echo Complete W/ Cont if Necessary Per Protocol    Interpretation Summary  · The left ventricular cavity is moderately dilated.  · Estimated EF appears to be in the range of 56 - 60%.  · Left ventricular wall  "thickness is consistent with mild concentric hypertrophy.  · Left ventricular diastolic dysfunction is noted (grade I) consistent with impaired relaxation.  · Normal right ventricular systolic function noted. Right ventricular cavity is mildly dilated.  · Left atrial cavity size is mild-to-moderately dilated.  · There is no evidence of pericardial effusion.        Condition on Discharge:      Stable    Vital Signs:    /90 (BP Location: Left arm, Patient Position: Lying)   Pulse 72   Temp 97.6 °F (36.4 °C) (Oral)   Resp 16   Ht 200.7 cm (79\")   Wt 117 kg (258 lb 4.8 oz)   SpO2 99%   BMI 29.10 kg/m²     Physical Exam:    General: Alert and oriented x4, well-developed well-nourished.  Heart: Regular rate and rhythm without murmur rub or thrill.  Lungs: Clear to auscultation bilaterally without use of accessory muscles or respiration.  Abdomen: Soft/nontender/nondistended.  No HSM noted.  Skin: 5/5 strength in upper/lower extremities bilaterally.    Discharge Disposition:    Home    Discharge Medication:       Discharge Medications      Changes to Medications      Instructions Start Date   traZODone 100 MG tablet  Commonly known as: DESYREL  What changed:   · medication strength  · how much to take  · when to take this  · reasons to take this   100 mg, Oral, Nightly         Continue These Medications      Instructions Start Date   amLODIPine 10 MG tablet  Commonly known as: NORVASC   TAKE ONE TABLET BY MOUTH DAILY      buPROPion  MG 12 hr tablet  Commonly known as: Wellbutrin SR   150 mg, Oral, 2 Times Daily      losartan 100 MG tablet  Commonly known as: COZAAR   100 mg, Oral, Daily      multivitamin with minerals tablet tablet   1 tablet, Oral, Daily             Discharge Diet:     Diet Instructions     Diet: Regular, Cardiac      Discharge Diet:  Regular  Cardiac             Activity at Discharge:     Activity Instructions     Activity as Tolerated            Follow-up Appointments:    No future " appointments.  Additional Instructions for the Follow-ups that You Need to Schedule     Discharge Follow-up with PCP   As directed       Currently Documented PCP:    Checo Dunham MD    PCP Phone Number:    [phone]     Follow Up Details: 1 week               Test Results Pending at Discharge:    Discharge took 35 minutes including review of chart, charting, medication reconciliation, discussion with patient, examination of patient, arrangement of follow-up, discussion with case management and nursing, with greater than 50% of time spent in coordination of care.       Marques Santiago DO  04/18/22  08:02 EDT    Electronically signed by Marques Santiago DO at 04/18/22 1040

## 2022-04-18 NOTE — OUTREACH NOTE
Prep Survey    Flowsheet Row Responses   [facility] patient discharged from? [place]   Is LACE score < 7 ? No   Emergency Room discharge w/ pulse ox? No   Eligibility [facility]   Date of Admission 04/15/22   Date of Discharge 04/18/22   Discharge Disposition Home or Self Care   Discharge diagnosis Alcohol dependence with withdrawal   Does the patient have one of the following disease processes/diagnoses(primary or secondary)? Other   Does the patient have Home health ordered? No   Is there a DME ordered? No   Prep survey completed? Yes          BLANCA ADDISON - Registered Nurse

## 2022-04-19 ENCOUNTER — TRANSITIONAL CARE MANAGEMENT TELEPHONE ENCOUNTER (OUTPATIENT)
Dept: CALL CENTER | Facility: HOSPITAL | Age: 67
End: 2022-04-19

## 2022-04-19 NOTE — OUTREACH NOTE
Call Center TCM Note    Flowsheet Row Responses   [facility] patient discharged from? [place]   Does the patient have one of the following disease processes/diagnoses(primary or secondary)? Other   TCM attempt successful? No   Unsuccessful attempts Attempt 1  [Verbal releases are out of date ]          Nafisa Felton RN    4/19/2022, 13:37 EDT

## 2022-04-19 NOTE — CASE MANAGEMENT/SOCIAL WORK
Case Management Discharge Note      Final Note: Discharged home         Selected Continued Care - Discharged on 4/18/2022 Admission date: 4/15/2022 - Discharge disposition: Home or Self Care    Destination    No services have been selected for the patient.              Durable Medical Equipment    No services have been selected for the patient.              Dialysis/Infusion    No services have been selected for the patient.              Home Medical Care    No services have been selected for the patient.              Therapy    No services have been selected for the patient.              Community Resources    No services have been selected for the patient.              Community & DME    No services have been selected for the patient.                       Final Discharge Disposition Code: 01 - home or self-care

## 2022-04-19 NOTE — OUTREACH NOTE
Call Center TCM Note    Flowsheet Row Responses   [facility] patient discharged from? [place]   Does the patient have one of the following disease processes/diagnoses(primary or secondary)? Other   TCM attempt successful? No   Unsuccessful attempts Attempt 2          Nafisa Felton RN    4/19/2022, 15:14 EDT

## 2022-04-20 ENCOUNTER — TRANSITIONAL CARE MANAGEMENT TELEPHONE ENCOUNTER (OUTPATIENT)
Dept: CALL CENTER | Facility: HOSPITAL | Age: 67
End: 2022-04-20

## 2022-04-20 NOTE — OUTREACH NOTE
Call Center TCM Note    Flowsheet Row Responses   [facility] [place] [facility] discharged from? [place]   Does the patient have one of the following disease processes/diagnoses(primary or secondary)? Other   TCM attempt successful? Yes   Call start time 0953   Call end time 0958   Discharge diagnosis Alcohol dependence with withdrawal   Meds reviewed with patient/caregiver? Yes   Is the patient having any side effects they believe may be caused by any medication additions or changes? No   Does the patient have all medications ordered at discharge? Yes   Is the patient taking all medications as directed (includes completed medication regime)? Yes   Does the patient have a primary care provider?  Yes   Does the patient have an appointment with their PCP within 7 days of discharge? Greater than 7 days   Comments regarding PCP HOSP DC FU appt 4/26/22 @ 11 am with Estelle.    What is preventing the patient from scheduling follow up appointments within 7 days of discharge? Earlier appointment not available   Nursing Interventions Verified appointment date/time/provider   Has the patient kept scheduled appointments due by today? N/A   Has home health visited the patient within 72 hours of discharge? N/A   Psychosocial issues? No   Did the patient receive a copy of their discharge instructions? Yes   Nursing interventions Reviewed instructions with patient   What is the patient's perception of their health status since discharge? Improving   Is the patient/caregiver able to teach back signs and symptoms related to disease process for when to call PCP? Yes   Is the patient/caregiver able to teach back signs and symptoms related to disease process for when to call 911? Yes   Is the patient/caregiver able to teach back the hierarchy of who to call/visit for symptoms/problems? PCP, Specialist, Home health nurse, Urgent Care, ED, 911 Yes   TCM call completed? Yes   Wrap up additional comments Will start outpt at [facility]           [place] RN    4/20/2022, 09:58 EDT

## 2022-04-21 RX ORDER — AMLODIPINE BESYLATE 10 MG/1
TABLET ORAL
Qty: 90 TABLET | Refills: 1 | Status: SHIPPED | OUTPATIENT
Start: 2022-04-21 | End: 2023-01-05 | Stop reason: HOSPADM

## 2022-04-27 ENCOUNTER — READMISSION MANAGEMENT (OUTPATIENT)
Dept: CALL CENTER | Facility: HOSPITAL | Age: 67
End: 2022-04-27

## 2022-04-27 NOTE — OUTREACH NOTE
Medical Week 2 Survey    Flowsheet Row Responses   [facility] patient discharged from? [place]   Does the patient have one of the following disease processes/diagnoses(primary or secondary)? Other   Week 2 attempt successful? No   Unsuccessful attempts Attempt 1          ALEC PICHARDO - Registered Nurse

## 2022-05-03 ENCOUNTER — READMISSION MANAGEMENT (OUTPATIENT)
Dept: CALL CENTER | Facility: HOSPITAL | Age: 67
End: 2022-05-03

## 2022-05-03 NOTE — OUTREACH NOTE
Medical Week 2 Survey    Flowsheet Row Responses   [facility] patient discharged from? [place]   Does the patient have one of the following disease processes/diagnoses(primary or secondary)? Other   Week 2 attempt successful? No   Revoke Decline to participate  [ETOH withdrawal--will disenroll]          CHANDLER KIRK - Registered Nurse

## 2022-06-15 ENCOUNTER — OFFICE VISIT (OUTPATIENT)
Dept: FAMILY MEDICINE CLINIC | Facility: CLINIC | Age: 67
End: 2022-06-15

## 2022-06-15 VITALS
DIASTOLIC BLOOD PRESSURE: 70 MMHG | OXYGEN SATURATION: 96 % | HEART RATE: 81 BPM | SYSTOLIC BLOOD PRESSURE: 122 MMHG | HEIGHT: 78 IN | TEMPERATURE: 97.1 F | BODY MASS INDEX: 30.04 KG/M2 | WEIGHT: 259.6 LBS

## 2022-06-15 DIAGNOSIS — Z12.11 COLON CANCER SCREENING: ICD-10-CM

## 2022-06-15 DIAGNOSIS — J40 BRONCHITIS: Primary | ICD-10-CM

## 2022-06-15 DIAGNOSIS — Z12.5 PROSTATE CANCER SCREENING: ICD-10-CM

## 2022-06-15 DIAGNOSIS — I10 ESSENTIAL HYPERTENSION: ICD-10-CM

## 2022-06-15 DIAGNOSIS — E55.9 VITAMIN D DEFICIENCY: ICD-10-CM

## 2022-06-15 PROBLEM — J44.9 COPD (CHRONIC OBSTRUCTIVE PULMONARY DISEASE) (HCC): Status: RESOLVED | Noted: 2021-10-07 | Resolved: 2022-06-15

## 2022-06-15 PROBLEM — Z72.0 TOBACCO ABUSE: Status: RESOLVED | Noted: 2021-10-07 | Resolved: 2022-06-15

## 2022-06-15 PROBLEM — F10.10 ALCOHOL ABUSE: Status: RESOLVED | Noted: 2020-06-25 | Resolved: 2022-06-15

## 2022-06-15 PROBLEM — D63.8 ANEMIA, CHRONIC DISEASE: Status: RESOLVED | Noted: 2021-03-26 | Resolved: 2022-06-15

## 2022-06-15 PROBLEM — R07.9 CHEST PAIN IN ADULT: Status: RESOLVED | Noted: 2020-06-25 | Resolved: 2022-06-15

## 2022-06-15 PROBLEM — G47.30 SLEEP APNEA: Status: RESOLVED | Noted: 2020-06-25 | Resolved: 2022-06-15

## 2022-06-15 PROBLEM — F10.239 ALCOHOL DEPENDENCE WITH WITHDRAWAL: Status: RESOLVED | Noted: 2021-01-12 | Resolved: 2022-06-15

## 2022-06-15 PROBLEM — Z72.0 TOBACCO ABUSE: Status: RESOLVED | Noted: 2021-03-26 | Resolved: 2022-06-15

## 2022-06-15 PROBLEM — D63.8 ANEMIA, CHRONIC DISEASE: Status: RESOLVED | Noted: 2021-10-07 | Resolved: 2022-06-15

## 2022-06-15 PROBLEM — K44.9 HIATAL HERNIA: Status: RESOLVED | Noted: 2021-10-07 | Resolved: 2022-06-15

## 2022-06-15 PROCEDURE — 99213 OFFICE O/P EST LOW 20 MIN: CPT | Performed by: INTERNAL MEDICINE

## 2022-06-15 RX ORDER — HYDROCODONE BITARTRATE AND HOMATROPINE METHYLBROMIDE ORAL SOLUTION 5; 1.5 MG/5ML; MG/5ML
5 LIQUID ORAL EVERY 8 HOURS PRN
Qty: 120 ML | Refills: 0 | Status: SHIPPED | OUTPATIENT
Start: 2022-06-15 | End: 2022-06-16 | Stop reason: ALTCHOICE

## 2022-06-15 RX ORDER — LEVOFLOXACIN 500 MG/1
500 TABLET, FILM COATED ORAL DAILY
Qty: 10 TABLET | Refills: 0 | Status: ON HOLD | OUTPATIENT
Start: 2022-06-15 | End: 2023-01-03

## 2022-06-15 NOTE — PROGRESS NOTES
Subjective   Watson Lisa is a 67 y.o. male.     Vitals:    06/15/22 1110   BP: 122/70   Pulse: 81   Temp: 97.1 °F (36.2 °C)   SpO2: 96%      Body mass index is 29.23 kg/m².     History of Present Illness   Patient was seen for bronchitis.  She does yearly bronchitis where he takes Levaquin and codeine cough syrup.  Patient has had a productive cough but does not wheeze to the point where he needs an inhaler.  Patient was not wheezing today.  Patient does have vitamin D3 deficiency and is supplement with over-the-counter D3.  Patient's blood pressures been running 120/70.  Patient will continue losartan 100 mg daily.    Dictated utilizing Dragon dictation. If there are questions or for further clarification, please contact me.  The following portions of the patient's history were reviewed and updated as appropriate: allergies, current medications, past family history, past medical history, past social history, past surgical history and problem list.    Review of Systems   Constitutional: Negative for fatigue and fever.   HENT: Positive for congestion, rhinorrhea and sinus pressure. Negative for trouble swallowing.    Eyes: Negative for discharge and visual disturbance.   Respiratory: Positive for wheezing. Negative for choking and shortness of breath.    Cardiovascular: Negative for chest pain and palpitations.   Gastrointestinal: Negative for abdominal pain and blood in stool.   Endocrine: Negative.    Genitourinary: Negative for genital sores and hematuria.   Musculoskeletal: Negative for gait problem and joint swelling.   Skin: Negative for color change, pallor, rash and wound.   Allergic/Immunologic: Positive for environmental allergies. Negative for immunocompromised state.   Neurological: Negative for facial asymmetry and speech difficulty.   Psychiatric/Behavioral: Negative for hallucinations and suicidal ideas.       Objective   Physical Exam  Vitals and nursing note reviewed.   Constitutional:        Appearance: Normal appearance. He is well-developed.   HENT:      Head: Normocephalic and atraumatic.      Nose: Nose normal.      Mouth/Throat:      Mouth: Mucous membranes are moist.      Pharynx: Oropharynx is clear.   Eyes:      Extraocular Movements: Extraocular movements intact.      Conjunctiva/sclera: Conjunctivae normal.      Pupils: Pupils are equal, round, and reactive to light.   Cardiovascular:      Rate and Rhythm: Normal rate and regular rhythm.      Heart sounds: Normal heart sounds. No murmur heard.    No friction rub. No gallop.   Pulmonary:      Effort: Pulmonary effort is normal. No respiratory distress.      Breath sounds: Normal breath sounds. No stridor. No wheezing, rhonchi or rales.   Chest:      Chest wall: No tenderness.   Abdominal:      General: Bowel sounds are normal.      Palpations: Abdomen is soft.   Musculoskeletal:         General: Normal range of motion.      Cervical back: Normal range of motion and neck supple.   Skin:     General: Skin is warm and dry.   Neurological:      General: No focal deficit present.      Mental Status: He is alert and oriented to person, place, and time. Mental status is at baseline.   Psychiatric:         Mood and Affect: Mood normal.         Behavior: Behavior normal.         Thought Content: Thought content normal.         Judgment: Judgment normal.         Assessment & Plan #1 Levaquin 500 mg p.o. daily x10-day, codeine cough syrup as needed.  #2 lab #3 continue blood pressure treatment  Problems Addressed this Visit        Cardiac and Vasculature    Essential hypertension       Endocrine and Metabolic    Vitamin D deficiency    Relevant Orders    CBC (No Diff)    Comprehensive Metabolic Panel    Lipid Panel    Vitamin D 25 Hydroxy    PSA Screen      Other Visit Diagnoses     Bronchitis    -  Primary    Relevant Medications    HYDROcodone Bit-Homatrop MBr (HYCODAN) 5-1.5 MG/5ML solution    Prostate cancer screening        Relevant Orders    CBC (No  Diff)    Comprehensive Metabolic Panel    Lipid Panel    Vitamin D 25 Hydroxy    PSA Screen    Colon cancer screening        Relevant Orders    Cologuard - Stool, Per Rectum      Diagnoses     Diagnosis Codes Comments    Bronchitis    -  Primary ICD-10-CM: J40  ICD-9-CM: 490     Vitamin D deficiency     ICD-10-CM: E55.9  ICD-9-CM: 268.9     Prostate cancer screening     ICD-10-CM: Z12.5  ICD-9-CM: V76.44     Colon cancer screening     ICD-10-CM: Z12.11  ICD-9-CM: V76.51     Essential hypertension     ICD-10-CM: I10  ICD-9-CM: 401.9

## 2022-06-16 ENCOUNTER — TELEPHONE (OUTPATIENT)
Dept: FAMILY MEDICINE CLINIC | Facility: CLINIC | Age: 67
End: 2022-06-16

## 2022-06-16 RX ORDER — GUAIFENESIN AND CODEINE PHOSPHATE 100; 10 MG/5ML; MG/5ML
5 SOLUTION ORAL 3 TIMES DAILY PRN
Qty: 118 ML | Refills: 0 | Status: SHIPPED | OUTPATIENT
Start: 2022-06-16 | End: 2023-01-05 | Stop reason: HOSPADM

## 2022-06-16 NOTE — TELEPHONE ENCOUNTER
Caller: Watson Lisa    Relationship: Self    Best call back number: [phone]    What medication are you requesting: COUGH MEDICINE     What are your current symptoms:     How long have you been experiencing symptoms:     Have you had these symptoms before:    [] Yes  [] No    Have you been treated for these symptoms before:   [] Yes  [] No    If a prescription is needed, what is your preferred [facility] and phone [facility] [place] [id number] [facility] [place] [facility] [place] [phone] [facility] [phone]      Additional notes:SAW DR GÓMEZ 6-15-22. DR GÓMEZ PRESCRIBED HYDROcodone Bit-Homatrop MBr (HYCODAN) 5-1.5 MG/5ML solution  PATIENT ASKING IF DR GÓMEZ WOULD PRESCRIBE SOMETHING DIFFERENT. PHARMACY DOES NOT HAVE THE MEDICATION PRESCRIBED AND IT'S ON BACKORDER.

## 2022-08-02 RX ORDER — LOSARTAN POTASSIUM 100 MG/1
TABLET ORAL
Qty: 90 TABLET | Refills: 3 | Status: SHIPPED | OUTPATIENT
Start: 2022-08-02 | End: 2023-01-05 | Stop reason: HOSPADM

## 2022-12-14 PROBLEM — Z87.898 HISTORY OF ALCOHOL USE DISORDER: Status: ACTIVE | Noted: 2022-12-14

## 2022-12-28 ENCOUNTER — APPOINTMENT (OUTPATIENT)
Dept: GENERAL RADIOLOGY | Facility: HOSPITAL | Age: 67
DRG: 897 | End: 2022-12-28
Payer: COMMERCIAL

## 2022-12-28 ENCOUNTER — HOSPITAL ENCOUNTER (INPATIENT)
Facility: HOSPITAL | Age: 67
LOS: 8 days | Discharge: NURSING, REHAB OR LONG-TERM CARE | DRG: 897 | End: 2023-01-05
Attending: EMERGENCY MEDICINE | Admitting: INTERNAL MEDICINE
Payer: COMMERCIAL

## 2022-12-28 DIAGNOSIS — S82.025A CLOSED NONDISPLACED LONGITUDINAL FRACTURE OF LEFT PATELLA, INITIAL ENCOUNTER: ICD-10-CM

## 2022-12-28 DIAGNOSIS — N17.9 AKI (ACUTE KIDNEY INJURY): Primary | ICD-10-CM

## 2022-12-28 DIAGNOSIS — T79.6XXA TRAUMATIC RHABDOMYOLYSIS, INITIAL ENCOUNTER: ICD-10-CM

## 2022-12-28 DIAGNOSIS — F10.939 ALCOHOL WITHDRAWAL SYNDROME WITH COMPLICATION: ICD-10-CM

## 2022-12-28 LAB
ALBUMIN SERPL-MCNC: 3.8 G/DL (ref 3.5–5.2)
ALBUMIN/GLOB SERPL: 1.2 G/DL
ALP SERPL-CCNC: 77 U/L (ref 39–117)
ALT SERPL W P-5'-P-CCNC: 107 U/L (ref 1–41)
ANION GAP SERPL CALCULATED.3IONS-SCNC: 22 MMOL/L (ref 5–15)
AST SERPL-CCNC: 385 U/L (ref 1–40)
BASOPHILS # BLD AUTO: 0.02 10*3/MM3 (ref 0–0.2)
BASOPHILS NFR BLD AUTO: 0.1 % (ref 0–1.5)
BILIRUB SERPL-MCNC: 1.6 MG/DL (ref 0–1.2)
BUN SERPL-MCNC: 37 MG/DL (ref 8–23)
BUN/CREAT SERPL: 17.3 (ref 7–25)
CALCIUM SPEC-SCNC: 8.2 MG/DL (ref 8.6–10.5)
CHLORIDE SERPL-SCNC: 98 MMOL/L (ref 98–107)
CK SERPL-CCNC: ABNORMAL U/L (ref 20–200)
CO2 SERPL-SCNC: 20 MMOL/L (ref 22–29)
CREAT SERPL-MCNC: 2.14 MG/DL (ref 0.76–1.27)
DEPRECATED RDW RBC AUTO: 42.1 FL (ref 37–54)
EGFRCR SERPLBLD CKD-EPI 2021: 32.9 ML/MIN/1.73
EOSINOPHIL # BLD AUTO: 0 10*3/MM3 (ref 0–0.4)
EOSINOPHIL NFR BLD AUTO: 0 % (ref 0.3–6.2)
ERYTHROCYTE [DISTWIDTH] IN BLOOD BY AUTOMATED COUNT: 12.8 % (ref 12.3–15.4)
ETHANOL BLD-MCNC: <10 MG/DL (ref 0–10)
ETHANOL UR QL: <0.01 %
GLOBULIN UR ELPH-MCNC: 3.1 GM/DL
GLUCOSE SERPL-MCNC: 89 MG/DL (ref 65–99)
HCT VFR BLD AUTO: 30.5 % (ref 37.5–51)
HGB BLD-MCNC: 10.8 G/DL (ref 13–17.7)
IMM GRANULOCYTES # BLD AUTO: 0.09 10*3/MM3 (ref 0–0.05)
IMM GRANULOCYTES NFR BLD AUTO: 0.7 % (ref 0–0.5)
LYMPHOCYTES # BLD AUTO: 0.4 10*3/MM3 (ref 0.7–3.1)
LYMPHOCYTES NFR BLD AUTO: 3 % (ref 19.6–45.3)
MAGNESIUM SERPL-MCNC: 1.5 MG/DL (ref 1.6–2.4)
MCH RBC QN AUTO: 32.4 PG (ref 26.6–33)
MCHC RBC AUTO-ENTMCNC: 35.4 G/DL (ref 31.5–35.7)
MCV RBC AUTO: 91.6 FL (ref 79–97)
MONOCYTES # BLD AUTO: 0.7 10*3/MM3 (ref 0.1–0.9)
MONOCYTES NFR BLD AUTO: 5.2 % (ref 5–12)
NEUTROPHILS NFR BLD AUTO: 12.32 10*3/MM3 (ref 1.7–7)
NEUTROPHILS NFR BLD AUTO: 91 % (ref 42.7–76)
NRBC BLD AUTO-RTO: 0 /100 WBC (ref 0–0.2)
PLAT MORPH BLD: NORMAL
PLATELET # BLD AUTO: 82 10*3/MM3 (ref 140–450)
PMV BLD AUTO: 10.7 FL (ref 6–12)
POTASSIUM SERPL-SCNC: 4.3 MMOL/L (ref 3.5–5.2)
PROT SERPL-MCNC: 6.9 G/DL (ref 6–8.5)
QT INTERVAL: 394 MS
RBC # BLD AUTO: 3.33 10*6/MM3 (ref 4.14–5.8)
RBC MORPH BLD: NORMAL
SODIUM SERPL-SCNC: 140 MMOL/L (ref 136–145)
TROPONIN T SERPL-MCNC: 0.08 NG/ML (ref 0–0.03)
WBC MORPH BLD: NORMAL
WBC NRBC COR # BLD: 13.53 10*3/MM3 (ref 3.4–10.8)

## 2022-12-28 PROCEDURE — 82550 ASSAY OF CK (CPK): CPT | Performed by: EMERGENCY MEDICINE

## 2022-12-28 PROCEDURE — 85007 BL SMEAR W/DIFF WBC COUNT: CPT | Performed by: EMERGENCY MEDICINE

## 2022-12-28 PROCEDURE — 93010 ELECTROCARDIOGRAM REPORT: CPT | Performed by: INTERNAL MEDICINE

## 2022-12-28 PROCEDURE — 80053 COMPREHEN METABOLIC PANEL: CPT | Performed by: EMERGENCY MEDICINE

## 2022-12-28 PROCEDURE — 82077 ASSAY SPEC XCP UR&BREATH IA: CPT | Performed by: EMERGENCY MEDICINE

## 2022-12-28 PROCEDURE — 99285 EMERGENCY DEPT VISIT HI MDM: CPT

## 2022-12-28 PROCEDURE — 93005 ELECTROCARDIOGRAM TRACING: CPT | Performed by: EMERGENCY MEDICINE

## 2022-12-28 PROCEDURE — 25010000002 LORAZEPAM PER 2 MG: Performed by: EMERGENCY MEDICINE

## 2022-12-28 PROCEDURE — 83735 ASSAY OF MAGNESIUM: CPT | Performed by: EMERGENCY MEDICINE

## 2022-12-28 PROCEDURE — 73560 X-RAY EXAM OF KNEE 1 OR 2: CPT

## 2022-12-28 PROCEDURE — 25010000002 THIAMINE PER 100 MG: Performed by: INTERNAL MEDICINE

## 2022-12-28 PROCEDURE — 85025 COMPLETE CBC W/AUTO DIFF WBC: CPT | Performed by: EMERGENCY MEDICINE

## 2022-12-28 PROCEDURE — 25010000002 THIAMINE PER 100 MG: Performed by: EMERGENCY MEDICINE

## 2022-12-28 PROCEDURE — 84484 ASSAY OF TROPONIN QUANT: CPT | Performed by: EMERGENCY MEDICINE

## 2022-12-28 PROCEDURE — 36415 COLL VENOUS BLD VENIPUNCTURE: CPT

## 2022-12-28 RX ORDER — ONDANSETRON 4 MG/1
4 TABLET, FILM COATED ORAL EVERY 6 HOURS PRN
Status: DISCONTINUED | OUTPATIENT
Start: 2022-12-28 | End: 2023-01-05 | Stop reason: HOSPADM

## 2022-12-28 RX ORDER — THIAMINE HYDROCHLORIDE 100 MG/ML
100 INJECTION, SOLUTION INTRAMUSCULAR; INTRAVENOUS ONCE
Status: COMPLETED | OUTPATIENT
Start: 2022-12-28 | End: 2022-12-28

## 2022-12-28 RX ORDER — LORAZEPAM 0.5 MG/1
0.5 TABLET ORAL
Status: DISCONTINUED | OUTPATIENT
Start: 2022-12-28 | End: 2023-01-03

## 2022-12-28 RX ORDER — FOLIC ACID 1 MG/1
1 TABLET ORAL DAILY
Status: DISCONTINUED | OUTPATIENT
Start: 2022-12-29 | End: 2022-12-29

## 2022-12-28 RX ORDER — LORAZEPAM 2 MG/ML
1 INJECTION INTRAMUSCULAR
Status: DISCONTINUED | OUTPATIENT
Start: 2022-12-28 | End: 2023-01-02

## 2022-12-28 RX ORDER — ONDANSETRON 2 MG/ML
4 INJECTION INTRAMUSCULAR; INTRAVENOUS EVERY 6 HOURS PRN
Status: DISCONTINUED | OUTPATIENT
Start: 2022-12-28 | End: 2023-01-05 | Stop reason: HOSPADM

## 2022-12-28 RX ORDER — DIPHENOXYLATE HYDROCHLORIDE AND ATROPINE SULFATE 2.5; .025 MG/1; MG/1
1 TABLET ORAL DAILY
Status: COMPLETED | OUTPATIENT
Start: 2022-12-29 | End: 2022-12-31

## 2022-12-28 RX ORDER — LORAZEPAM 2 MG/ML
0.5 INJECTION INTRAMUSCULAR EVERY 8 HOURS
Status: DISCONTINUED | OUTPATIENT
Start: 2022-12-29 | End: 2022-12-30

## 2022-12-28 RX ORDER — SODIUM CHLORIDE 0.9 % (FLUSH) 0.9 %
10 SYRINGE (ML) INJECTION AS NEEDED
Status: DISCONTINUED | OUTPATIENT
Start: 2022-12-28 | End: 2023-01-05 | Stop reason: HOSPADM

## 2022-12-28 RX ORDER — ACETAMINOPHEN 325 MG/1
650 TABLET ORAL EVERY 4 HOURS PRN
Status: DISCONTINUED | OUTPATIENT
Start: 2022-12-28 | End: 2023-01-05 | Stop reason: HOSPADM

## 2022-12-28 RX ORDER — UREA 10 %
3 LOTION (ML) TOPICAL NIGHTLY PRN
Status: DISCONTINUED | OUTPATIENT
Start: 2022-12-28 | End: 2023-01-05 | Stop reason: HOSPADM

## 2022-12-28 RX ORDER — LORAZEPAM 2 MG/ML
1 INJECTION INTRAMUSCULAR ONCE
Status: COMPLETED | OUTPATIENT
Start: 2022-12-28 | End: 2022-12-28

## 2022-12-28 RX ORDER — SODIUM BICARBONATE IN D5W 150/1000ML
150 PLASTIC BAG, INJECTION (ML) INTRAVENOUS CONTINUOUS
Status: DISCONTINUED | OUTPATIENT
Start: 2022-12-28 | End: 2022-12-29

## 2022-12-28 RX ORDER — LORAZEPAM 2 MG/ML
0.5 INJECTION INTRAMUSCULAR
Status: DISCONTINUED | OUTPATIENT
Start: 2022-12-28 | End: 2023-01-02

## 2022-12-28 RX ORDER — SODIUM CHLORIDE 9 MG/ML
75 INJECTION, SOLUTION INTRAVENOUS CONTINUOUS
Status: DISCONTINUED | OUTPATIENT
Start: 2022-12-28 | End: 2022-12-30

## 2022-12-28 RX ORDER — LORAZEPAM 1 MG/1
1 TABLET ORAL
Status: DISCONTINUED | OUTPATIENT
Start: 2022-12-28 | End: 2023-01-03

## 2022-12-28 RX ORDER — NITROGLYCERIN 0.4 MG/1
0.4 TABLET SUBLINGUAL
Status: DISCONTINUED | OUTPATIENT
Start: 2022-12-28 | End: 2023-01-03

## 2022-12-28 RX ORDER — LORAZEPAM 2 MG/ML
0.5 INJECTION INTRAMUSCULAR EVERY 6 HOURS
Status: COMPLETED | OUTPATIENT
Start: 2022-12-28 | End: 2022-12-29

## 2022-12-28 RX ADMIN — THIAMINE HYDROCHLORIDE 100 MG: 100 INJECTION, SOLUTION INTRAMUSCULAR; INTRAVENOUS at 22:08

## 2022-12-28 RX ADMIN — SODIUM CHLORIDE 1000 ML: 9 INJECTION, SOLUTION INTRAVENOUS at 18:32

## 2022-12-28 RX ADMIN — LORAZEPAM 1 MG: 2 INJECTION INTRAMUSCULAR; INTRAVENOUS at 17:07

## 2022-12-28 RX ADMIN — THIAMINE HYDROCHLORIDE 100 MG: 100 INJECTION, SOLUTION INTRAMUSCULAR; INTRAVENOUS at 17:31

## 2022-12-28 RX ADMIN — LORAZEPAM 1 MG: 2 INJECTION INTRAMUSCULAR; INTRAVENOUS at 20:05

## 2022-12-28 RX ADMIN — SODIUM CHLORIDE 75 ML/HR: 9 INJECTION, SOLUTION INTRAVENOUS at 22:11

## 2022-12-28 RX ADMIN — LORAZEPAM 1 MG: 2 INJECTION INTRAMUSCULAR; INTRAVENOUS at 21:11

## 2022-12-28 RX ADMIN — LORAZEPAM 0.5 MG: 2 INJECTION INTRAMUSCULAR; INTRAVENOUS at 18:45

## 2022-12-28 RX ADMIN — Medication 150 MEQ: at 20:26

## 2022-12-28 RX ADMIN — FOLIC ACID 1 MG: 5 INJECTION, SOLUTION INTRAMUSCULAR; INTRAVENOUS; SUBCUTANEOUS at 17:36

## 2022-12-28 NOTE — ED PROVIDER NOTES
EMERGENCY DEPARTMENT ENCOUNTER    Room Number:  S519/1  Date of encounter:  12/28/2022  PCP: Checo Dunham MD  Historian: Patient  Chronic or social conditions impacting care: Patient is chronic alcoholic and lives by himself      I used full protective equipment while examining this patient.  This includes face mask, gloves and protective eyewear.  I washed my hands before entering the room and immediately upon leaving the room      HPI:  Chief Complaint: Fall, alcoholism  A complete HPI/ROS/PMH/PSH/SH/FH are unobtainable due to: Patient is a poor historian    Context: Watson Lisa is a 67 y.o. male who presents to the ED c/o falling on the ground early this morning.  Patient is a daily drinker.  He states over [place] he drank very heavily.  His last drink was on 12/26/2022.  Since that time he states he has had bad diarrhea.  He fell this morning while trying to ambulate to the bathroom.  He states his legs have been weak and he simply fell.  He denies any syncope.  He did injure his left knee.  He denies any head, neck, trunk injuries.  Patient states he has been on the ground since 5 AM in the morning.  He does admit to severe tremor.  He has had some visual hallucinations yesterday.  He does have a history of alcohol withdrawal.  He denies any history of seizures.    Review of Medical Records  Reviewed admission from 4/15/2022.  Patient admitted for alcohol withdrawal.    PAST MEDICAL HISTORY  Active Ambulatory Problems     Diagnosis Date Noted   • Essential hypertension 06/07/2016   • Tobacco abuse 06/07/2016   • Alcohol withdrawal syndrome without complication (HCC) 06/24/2020   • Hiatal hernia 06/25/2020   • Hypokalemia 06/29/2020   • Effusion of left knee 06/29/2020   • Osteoarthritis of left knee 06/29/2020   • Vitamin D deficiency 07/01/2020   • Anemia, chronic disease 07/01/2020   • Hyponatremia 07/01/2020   • COPD (chronic obstructive pulmonary disease) ([facility]) 03/26/2021   • LIVAN (obstructive sleep  apnea) 03/26/2021   • ETOH abuse 03/26/2021   • Obese 03/26/2021   • Alcoholic liver disease (HCC) 10/07/2021   • Cigarette nicotine dependence without complication 04/17/2022   • History of alcohol use disorder 12/14/2022     Resolved Ambulatory Problems     Diagnosis Date Noted   • Arthritis 04/25/2016   • Chest pain in adult 06/25/2020   • Sleep apnea 06/25/2020   • Alcohol abuse 06/25/2020   • Hypomagnesemia 06/29/2020   • Pneumonia due to COVID-19 virus 01/11/2021   • Alcohol dependence with withdrawal ([facility]) 01/12/2021   • Possible SVT (supraventricular tachycardia) (CMS/HCC) 01/12/2021   • Alcohol withdrawal syndrome with perceptual disturbance ([facility]) 03/26/2021   • Acute kidney failure ([facility]) 03/26/2021   • Tobacco abuse 03/26/2021   • Anemia, chronic disease 03/26/2021   • Elevated LFTs 03/26/2021   • Metabolic acidosis, increased anion gap 03/26/2021   • Hypomagnesemia 03/26/2021   • Suicidal ideations 03/26/2021   • Delirium tremens ([facility]) 10/07/2021   • Alcohol abuse 10/07/2021   • Tobacco abuse 10/07/2021   • Hiatal hernia 10/07/2021   • COPD (chronic obstructive pulmonary disease) ([facility]) 10/07/2021   • Anemia, chronic disease 10/07/2021   • Acute respiratory failure with hypercapnia ([facility]) 10/08/2021   • Aspiration pneumonia ([facility]) 10/08/2021     Past Medical History:   Diagnosis Date   • Anxiety    • Bronchitis with chronic airway obstruction ([facility])    • DVT (deep venous thrombosis) ([facility])    • Hypertension    • Hypertension    • Low back pain    • Neck pain    • Pulmonary embolism ([facility])    • Sleep apnea with use of continuous positive airway pressure (CPAP)          PAST SURGICAL HISTORY  Past Surgical History:   Procedure Laterality Date   • COLONOSCOPY     • JOINT REPLACEMENT Right     hip/knee   • REPLACEMENT TOTAL KNEE     • TONSILLECTOMY     • TOTAL HIP ARTHROPLASTY Right          FAMILY HISTORY  Family History   Problem Relation Age of Onset   • Cancer Mother    • Heart disease Father    • Alcohol abuse  Father    • Cancer Brother          SOCIAL HISTORY  Social History     Socioeconomic History   • Marital status:    Tobacco Use   • Smoking status: Every Day     Packs/day: 2.00     Years: 40.00     Pack years: 80.00     Types: Cigarettes   • Smokeless tobacco: Never   Vaping Use   • Vaping Use: Never used   Substance and Sexual Activity   • Alcohol use: Yes     Alcohol/week: 10.0 standard drinks     Types: 10 Shots of liquor per week     Comment: 1 pint vodka/ day   • Drug use: Not Currently     Types: Hydrocodone   • Sexual activity: Defer         ALLERGIES  Penicillins and Penicillins        REVIEW OF SYSTEMS  All systems reviewed and negative except for those discussed in HPI.       PHYSICAL EXAM    I have reviewed the triage vital signs and nursing notes.    ED Triage Vitals [12/28/22 1651]   Temp Heart Rate Resp BP SpO2   99.1 °F (37.3 °C) 87 22 178/91 100 %      Temp src Heart Rate Source Patient Position BP Location FiO2 (%)   Tympanic -- -- -- --       Physical Exam  GENERAL: Alert, oriented to self, disheveled, mild distress  HENT: head atraumatic, no nuchal rigidity  EYES: no scleral icterus, EOMI  CV: regular rhythm, regular rate, no murmur  RESPIRATORY: normal effort, CTA  ABDOMEN: soft, nontender  MUSCULOSKELETAL: no deformity, FROM, no calf swelling or tenderness  NEURO: alert, moderate tremor at rest, no unilateral focal weakness  SKIN: warm, dry        LAB RESULTS  Recent Results (from the past 24 hour(s))   Comprehensive Metabolic Panel    Collection Time: 12/28/22  5:09 PM    Specimen: Blood   Result Value Ref Range    Glucose 89 65 - 99 mg/dL    BUN 37 (H) 8 - 23 mg/dL    Creatinine 2.14 (H) 0.76 - 1.27 mg/dL    Sodium 140 136 - 145 mmol/L    Potassium 4.3 3.5 - 5.2 mmol/L    Chloride 98 98 - 107 mmol/L    CO2 20.0 (L) 22.0 - 29.0 mmol/L    Calcium 8.2 (L) 8.6 - 10.5 mg/dL    Total Protein 6.9 6.0 - 8.5 g/dL    Albumin 3.8 3.5 - 5.2 g/dL    ALT (SGPT) 107 (H) 1 - 41 U/L    AST (SGOT) 385  (H) 1 - 40 U/L    Alkaline Phosphatase 77 39 - 117 U/L    Total Bilirubin 1.6 (H) 0.0 - 1.2 mg/dL    Globulin 3.1 gm/dL    A/G Ratio 1.2 g/dL    BUN/Creatinine Ratio 17.3 7.0 - 25.0    Anion Gap 22.0 (H) 5.0 - 15.0 mmol/L    eGFR 32.9 (L) >60.0 mL/min/1.73   Troponin    Collection Time: 12/28/22  5:09 PM    Specimen: Blood   Result Value Ref Range    Troponin T 0.080 (C) 0.000 - 0.030 ng/mL   Ethanol    Collection Time: 12/28/22  5:09 PM    Specimen: Blood   Result Value Ref Range    Ethanol <10 0 - 10 mg/dL    Ethanol % <0.010 %   Magnesium    Collection Time: 12/28/22  5:09 PM    Specimen: Blood   Result Value Ref Range    Magnesium 1.5 (L) 1.6 - 2.4 mg/dL   CK    Collection Time: 12/28/22  5:09 PM    Specimen: Blood   Result Value Ref Range    Creatine Kinase 20,763 (H) 20 - 200 U/L   ECG 12 Lead Syncope    Collection Time: 12/28/22  5:14 PM   Result Value Ref Range    QT Interval 394 ms   CBC Auto Differential    Collection Time: 12/28/22  5:46 PM    Specimen: Blood   Result Value Ref Range    WBC 13.53 (H) 3.40 - 10.80 10*3/mm3    RBC 3.33 (L) 4.14 - 5.80 10*6/mm3    Hemoglobin 10.8 (L) 13.0 - 17.7 g/dL    Hematocrit 30.5 (L) 37.5 - 51.0 %    MCV 91.6 79.0 - 97.0 fL    MCH 32.4 26.6 - 33.0 pg    MCHC 35.4 31.5 - 35.7 g/dL    RDW 12.8 12.3 - 15.4 %    RDW-SD 42.1 37.0 - 54.0 fl    MPV 10.7 6.0 - 12.0 fL    Platelets 82 (L) 140 - 450 10*3/mm3    Neutrophil % 91.0 (H) 42.7 - 76.0 %    Lymphocyte % 3.0 (L) 19.6 - 45.3 %    Monocyte % 5.2 5.0 - 12.0 %    Eosinophil % 0.0 (L) 0.3 - 6.2 %    Basophil % 0.1 0.0 - 1.5 %    Immature Grans % 0.7 (H) 0.0 - 0.5 %    Neutrophils, Absolute 12.32 (H) 1.70 - 7.00 10*3/mm3    Lymphocytes, Absolute 0.40 (L) 0.70 - 3.10 10*3/mm3    Monocytes, Absolute 0.70 0.10 - 0.90 10*3/mm3    Eosinophils, Absolute 0.00 0.00 - 0.40 10*3/mm3    Basophils, Absolute 0.02 0.00 - 0.20 10*3/mm3    Immature Grans, Absolute 0.09 (H) 0.00 - 0.05 10*3/mm3    nRBC 0.0 0.0 - 0.2 /100 WBC   Scan Slide     Collection Time: 12/28/22  5:46 PM    Specimen: Blood   Result Value Ref Range    RBC Morphology Normal Normal    WBC Morphology Normal Normal    Platelet Morphology Normal Normal       Ordered the above labs and independently reviewed the results.        RADIOLOGY  XR Knee 1 or 2 View Left    Result Date: 12/28/2022  XR KNEE 1 OR 2 VW LEFT-  INDICATIONS: Pain and swelling  TECHNIQUE: 2 views of the left knee  COMPARISON: 06/26/2020  FINDINGS:  Appearance of small vertically oriented lucency at the upper aspect of the patella, potentially evidence of nondisplaced fracture, correlate with location of pain. Mild knee effusion is present. No other evidence of fracture is noted. No erosion, or dislocation is identified. Prominent tibiofemoral joint space narrowing is noted. Moderate to prominent degenerative spurring is seen.  Follow-up/further evaluation can be obtained as indicated.       As described.  This report was finalized on 12/28/2022 6:48 PM by Dr. Donte Hernandez M.D.        I ordered the above noted radiological studies. Reviewed by me and discussed with radiologist.  See dictation for official radiology interpretation.      MEDICATIONS GIVEN IN ER    Medications   sodium chloride 0.9 % flush 10 mL (has no administration in time range)   thiamine (B-1) 100 mg in sodium chloride 0.9 % 100 mL IVPB (0 mg Intravenous Stopped 12/28/22 1830)   folic acid 1 mg in sodium chloride 0.9 % 50 mL IVPB (0 mg Intravenous Stopped 12/28/22 1830)   LORazepam (ATIVAN) injection 0.5 mg (0.5 mg Intravenous Given 12/28/22 1845)     Followed by   LORazepam (ATIVAN) injection 0.5 mg (has no administration in time range)   LORazepam (ATIVAN) tablet 0.5 mg ( Oral Not Given:  See Alt 12/28/22 2111)     Or   LORazepam (ATIVAN) injection 0.5 mg ( Intravenous Not Given:  See Alt 12/28/22 2111)     Or   LORazepam (ATIVAN) tablet 1 mg ( Oral Not Given:  See Alt 12/28/22 2111)     Or   LORazepam (ATIVAN) injection 1 mg ( Intravenous Not  Given:  See Alt 12/28/22 2111)     Or   LORazepam (ATIVAN) injection 1 mg (1 mg Intravenous Given 12/28/22 2111)     Or   LORazepam (ATIVAN) injection 1 mg ( Intramuscular Not Given:  See Alt 12/28/22 2111)   sodium bicarbonate 150 mEq/1000 mL dextrose infusion ( Intravenous Currently Infusing 12/28/22 2247)   nitroglycerin (NITROSTAT) SL tablet 0.4 mg (has no administration in time range)   acetaminophen (TYLENOL) tablet 650 mg (has no administration in time range)   ondansetron (ZOFRAN) tablet 4 mg (has no administration in time range)     Or   ondansetron (ZOFRAN) injection 4 mg (has no administration in time range)   melatonin tablet 3 mg (has no administration in time range)   thiamine (VITAMIN B-1) tablet 100 mg (has no administration in time range)     And   multivitamin (THERAGRAN) tablet 1 tablet (has no administration in time range)     And   folic acid (FOLVITE) tablet 1 mg (has no administration in time range)   sodium chloride 0.9 % infusion (75 mL/hr Intravenous New Bag 12/28/22 2211)   LORazepam (ATIVAN) injection 1 mg (1 mg Intravenous Given 12/28/22 1707)   sodium chloride 0.9 % bolus 1,000 mL (0 mL Intravenous Stopped 12/28/22 2007)   thiamine (B-1) injection 100 mg (100 mg Intravenous Given 12/28/22 2208)     Or   thiamine (VITAMIN B-1) tablet 100 mg ( Oral Not Given:  See Alt 12/28/22 2208)         ADDITIONAL ORDERS CONSIDERED BUT NOT ORDERED:  Nothing    Critical Care  Performed by: Evaristo Kamara PA  Authorized by: Thomas Pruitt MD     Critical care provider statement:     Critical care time (minutes):  34    Critical care time was exclusive of:  Separately billable procedures and treating other patients    Critical care was necessary to treat or prevent imminent or life-threatening deterioration of the following conditions:  CNS failure or compromise, metabolic crisis and renal failure    Critical care was time spent personally by me on the following activities:  Blood draw for specimens,  development of treatment plan with patient or surrogate, evaluation of patient's response to treatment, interpretation of cardiac output measurements, obtaining history from patient or surrogate, ordering and performing treatments and interventions, ordering and review of laboratory studies, ordering and review of radiographic studies, pulse oximetry and re-evaluation of patient's condition        PROGRESS, DATA ANALYSIS, CONSULTS, AND MEDICAL DECISION MAKING    All labs have been independently reviewed by me.  All radiology studies have been reviewed by me and discussed with radiologist dictating the report.   EKG's independently viewed and interpreted by me.  Discussion below represents my analysis of pertinent findings related to patient's condition, differential diagnosis, treatment plan and final disposition.    I have discussed case with Dr. Pruitt, emergency room physician.  He has performed his own bedside examination and agrees with treatment plan.    ED Course as of 12/28/22 2329   Wed Dec 28, 2022   1740 Patient presents after falling this morning.  Patient is currently withdrawing from alcohol and weak.  She has been on the ground since 5 AM.  Differential diagnoses include but not limited to rhabdomyolysis, alcohol withdrawal syndrome, electrolyte abnormality, renal failure. [EE]   1819 Creatine Kinase(!): 20,763 [EE]   1819 Creatinine(!): 2.14  This was 0.84, 3 months ago. [EE]   1857 Patient with rhabdomyolysis, DEEP and at risk of alcohol withdrawal.  Alcohol withdrawal protocol ordered.  Sodium bicarb ordered.  We will admit. [EE]   1928 Left knee films interpreted myself shows a patellar fracture. [EE]   2004 I discussed the case with Dr. Russell, [facility].  She agrees to admit. [EE]   2230 Recheck of patient.  He is resting quietly.  Stable vitals. [EE]   2327 EKG interpreted myself.  Time 1714.  Sinus rhythm, 88 bpm.  Normal P/BETSEY.  QRS normal normal axis.  Nonspecific T wave changes.  EKG similar  comparison to prior EKG from 4/15/2022. [EE]      ED Course User Index  [EE] Evaristo Kamara PA       AS OF 23:27 EST VITALS:    BP - 180/97  HR - 89  TEMP - 99.1 °F (37.3 °C) (Tympanic)  O2 SATS - 100%        DIAGNOSIS  Final diagnoses:   DEEP (acute kidney injury) (HCC)   Traumatic rhabdomyolysis, initial encounter (HCC)   Alcohol withdrawal syndrome with complication (HCC)   Closed nondisplaced longitudinal fracture of left patella, initial encounter         DISPOSITION  Admitted      Dictated utilizing Dragon dictation        Evaristo Kamara PA  12/28/22 4267

## 2022-12-28 NOTE — ED TRIAGE NOTES
"Patient to er from home per ems with c/o patient fell on ice on Monday because of heavy drinking over christmas and Monday. Patient was found on the floor by  His brother this afternoon. Reported patient fell in floor at home and has been in the floor since 5 am. Patient has stool all over him. Patient has mask on in triage along with staff. Patient reported as heavy drinker and would like to get help for this. Patient stated he needs help for his shaking.\"   "

## 2022-12-28 NOTE — LETTER
MRN: [id number]  [facility]: [id number]      [facility]  [phone]    ACCESS CENTER STATEMENT OF DISPOSITION        I, Watson Owenrick, [facility] in [facility] on 12/30/2022.  I understand the recommendations below and what follow-up action is expected of me.    1. [facility]  [address] [place], [place] [address] [phone]    2. [facility]  [address] [id number] [phone]    3. [address]  [phone]      [phone]  [email or web address]        ________________________________  Clinician Signature    12/30/2022  08:13 EST

## 2022-12-29 PROBLEM — F10.939 ALCOHOL WITHDRAWAL SYNDROME (HCC): Status: ACTIVE | Noted: 2020-06-24

## 2022-12-29 PROBLEM — S82.002A LEFT PATELLA FRACTURE: Status: ACTIVE | Noted: 2022-12-29

## 2022-12-29 PROBLEM — R77.8 ELEVATED TROPONIN: Status: ACTIVE | Noted: 2022-12-29

## 2022-12-29 PROBLEM — M62.82 RHABDOMYOLYSIS: Status: ACTIVE | Noted: 2022-12-29

## 2022-12-29 PROBLEM — R79.89 ELEVATED TROPONIN: Status: ACTIVE | Noted: 2022-12-29

## 2022-12-29 LAB
ALBUMIN SERPL-MCNC: 3.4 G/DL (ref 3.5–5.2)
ALBUMIN/GLOB SERPL: 1.1 G/DL
ALP SERPL-CCNC: 74 U/L (ref 39–117)
ALT SERPL W P-5'-P-CCNC: 113 U/L (ref 1–41)
ANION GAP SERPL CALCULATED.3IONS-SCNC: 16.8 MMOL/L (ref 5–15)
ANION GAP SERPL CALCULATED.3IONS-SCNC: 20 MMOL/L (ref 5–15)
AST SERPL-CCNC: 350 U/L (ref 1–40)
BACTERIA UR QL AUTO: ABNORMAL /HPF
BILIRUB SERPL-MCNC: 0.6 MG/DL (ref 0–1.2)
BILIRUB UR QL STRIP: NEGATIVE
BUN SERPL-MCNC: 32 MG/DL (ref 8–23)
BUN SERPL-MCNC: 37 MG/DL (ref 8–23)
BUN/CREAT SERPL: 23.9 (ref 7–25)
BUN/CREAT SERPL: 27.4 (ref 7–25)
CALCIUM SPEC-SCNC: 8.1 MG/DL (ref 8.6–10.5)
CALCIUM SPEC-SCNC: 8.2 MG/DL (ref 8.6–10.5)
CHLORIDE SERPL-SCNC: 101 MMOL/L (ref 98–107)
CHLORIDE SERPL-SCNC: 99 MMOL/L (ref 98–107)
CK SERPL-CCNC: ABNORMAL U/L (ref 20–200)
CK SERPL-CCNC: ABNORMAL U/L (ref 20–200)
CLARITY UR: CLEAR
CO2 SERPL-SCNC: 20.2 MMOL/L (ref 22–29)
CO2 SERPL-SCNC: 22 MMOL/L (ref 22–29)
COLOR UR: YELLOW
CREAT SERPL-MCNC: 1.17 MG/DL (ref 0.76–1.27)
CREAT SERPL-MCNC: 1.55 MG/DL (ref 0.76–1.27)
DEPRECATED RDW RBC AUTO: 44.2 FL (ref 37–54)
EGFRCR SERPLBLD CKD-EPI 2021: 48.5 ML/MIN/1.73
EGFRCR SERPLBLD CKD-EPI 2021: 67.9 ML/MIN/1.73
ERYTHROCYTE [DISTWIDTH] IN BLOOD BY AUTOMATED COUNT: 13.1 % (ref 12.3–15.4)
GLOBULIN UR ELPH-MCNC: 3.1 GM/DL
GLUCOSE SERPL-MCNC: 110 MG/DL (ref 65–99)
GLUCOSE SERPL-MCNC: 92 MG/DL (ref 65–99)
GLUCOSE UR STRIP-MCNC: NEGATIVE MG/DL
HCT VFR BLD AUTO: 29.7 % (ref 37.5–51)
HGB BLD-MCNC: 9.9 G/DL (ref 13–17.7)
HGB UR QL STRIP.AUTO: ABNORMAL
HYALINE CASTS UR QL AUTO: ABNORMAL /LPF
KETONES UR QL STRIP: ABNORMAL
LEUKOCYTE ESTERASE UR QL STRIP.AUTO: ABNORMAL
MAGNESIUM SERPL-MCNC: 1.7 MG/DL (ref 1.6–2.4)
MCH RBC QN AUTO: 31.6 PG (ref 26.6–33)
MCHC RBC AUTO-ENTMCNC: 33.3 G/DL (ref 31.5–35.7)
MCV RBC AUTO: 94.9 FL (ref 79–97)
NITRITE UR QL STRIP: POSITIVE
PH UR STRIP.AUTO: 6 [PH] (ref 5–8)
PHOSPHATE SERPL-MCNC: 2.4 MG/DL (ref 2.5–4.5)
PLATELET # BLD AUTO: 75 10*3/MM3 (ref 140–450)
PMV BLD AUTO: 11.1 FL (ref 6–12)
POTASSIUM SERPL-SCNC: 3.3 MMOL/L (ref 3.5–5.2)
POTASSIUM SERPL-SCNC: 3.6 MMOL/L (ref 3.5–5.2)
PROT SERPL-MCNC: 6.5 G/DL (ref 6–8.5)
PROT UR QL STRIP: ABNORMAL
RBC # BLD AUTO: 3.13 10*6/MM3 (ref 4.14–5.8)
RBC # UR STRIP: ABNORMAL /HPF
REF LAB TEST METHOD: ABNORMAL
SODIUM SERPL-SCNC: 138 MMOL/L (ref 136–145)
SODIUM SERPL-SCNC: 141 MMOL/L (ref 136–145)
SP GR UR STRIP: 1.02 (ref 1–1.03)
SQUAMOUS #/AREA URNS HPF: ABNORMAL /HPF
UROBILINOGEN UR QL STRIP: ABNORMAL
WBC # UR STRIP: ABNORMAL /HPF
WBC NRBC COR # BLD: 9.9 10*3/MM3 (ref 3.4–10.8)

## 2022-12-29 PROCEDURE — 80053 COMPREHEN METABOLIC PANEL: CPT | Performed by: INTERNAL MEDICINE

## 2022-12-29 PROCEDURE — 84100 ASSAY OF PHOSPHORUS: CPT | Performed by: HOSPITALIST

## 2022-12-29 PROCEDURE — 81001 URINALYSIS AUTO W/SCOPE: CPT | Performed by: INTERNAL MEDICINE

## 2022-12-29 PROCEDURE — 94761 N-INVAS EAR/PLS OXIMETRY MLT: CPT

## 2022-12-29 PROCEDURE — 90791 PSYCH DIAGNOSTIC EVALUATION: CPT

## 2022-12-29 PROCEDURE — 25010000002 LORAZEPAM PER 2 MG: Performed by: EMERGENCY MEDICINE

## 2022-12-29 PROCEDURE — 25010000002 MAGNESIUM SULFATE 2 GM/50ML SOLUTION: Performed by: INTERNAL MEDICINE

## 2022-12-29 PROCEDURE — 94799 UNLISTED PULMONARY SVC/PX: CPT

## 2022-12-29 PROCEDURE — 85027 COMPLETE CBC AUTOMATED: CPT | Performed by: INTERNAL MEDICINE

## 2022-12-29 PROCEDURE — 94640 AIRWAY INHALATION TREATMENT: CPT

## 2022-12-29 PROCEDURE — 82550 ASSAY OF CK (CPK): CPT | Performed by: INTERNAL MEDICINE

## 2022-12-29 PROCEDURE — 94664 DEMO&/EVAL PT USE INHALER: CPT

## 2022-12-29 PROCEDURE — 82550 ASSAY OF CK (CPK): CPT | Performed by: HOSPITALIST

## 2022-12-29 PROCEDURE — 83735 ASSAY OF MAGNESIUM: CPT | Performed by: HOSPITALIST

## 2022-12-29 PROCEDURE — 94760 N-INVAS EAR/PLS OXIMETRY 1: CPT

## 2022-12-29 PROCEDURE — 36415 COLL VENOUS BLD VENIPUNCTURE: CPT | Performed by: INTERNAL MEDICINE

## 2022-12-29 RX ORDER — METOPROLOL SUCCINATE 25 MG/1
25 TABLET, EXTENDED RELEASE ORAL
Status: DISCONTINUED | OUTPATIENT
Start: 2022-12-29 | End: 2022-12-30

## 2022-12-29 RX ORDER — MAGNESIUM SULFATE HEPTAHYDRATE 40 MG/ML
2 INJECTION, SOLUTION INTRAVENOUS ONCE
Status: COMPLETED | OUTPATIENT
Start: 2022-12-29 | End: 2022-12-29

## 2022-12-29 RX ORDER — IPRATROPIUM BROMIDE AND ALBUTEROL SULFATE 2.5; .5 MG/3ML; MG/3ML
3 SOLUTION RESPIRATORY (INHALATION)
Status: DISCONTINUED | OUTPATIENT
Start: 2022-12-29 | End: 2023-01-05 | Stop reason: HOSPADM

## 2022-12-29 RX ORDER — FOLIC ACID 1 MG/1
1 TABLET ORAL DAILY
Status: DISCONTINUED | OUTPATIENT
Start: 2022-12-30 | End: 2023-01-05 | Stop reason: HOSPADM

## 2022-12-29 RX ADMIN — IPRATROPIUM BROMIDE AND ALBUTEROL SULFATE 3 ML: .5; 3 SOLUTION RESPIRATORY (INHALATION) at 20:29

## 2022-12-29 RX ADMIN — LORAZEPAM 1 MG: 2 INJECTION INTRAMUSCULAR; INTRAVENOUS at 05:44

## 2022-12-29 RX ADMIN — LORAZEPAM 1 MG: 2 INJECTION INTRAMUSCULAR; INTRAVENOUS at 02:10

## 2022-12-29 RX ADMIN — ZINC OXIDE 1 APPLICATION: 200 OINTMENT TOPICAL at 16:42

## 2022-12-29 RX ADMIN — Medication 100 MG: at 12:31

## 2022-12-29 RX ADMIN — MUPIROCIN 1 APPLICATION: 20 OINTMENT TOPICAL at 21:09

## 2022-12-29 RX ADMIN — FOLIC ACID 1 MG: 1 TABLET ORAL at 12:31

## 2022-12-29 RX ADMIN — MUPIROCIN 1 APPLICATION: 20 OINTMENT TOPICAL at 15:29

## 2022-12-29 RX ADMIN — MAGNESIUM SULFATE HEPTAHYDRATE 2 G: 2 INJECTION, SOLUTION INTRAVENOUS at 08:35

## 2022-12-29 RX ADMIN — LORAZEPAM 1 MG: 2 INJECTION INTRAMUSCULAR; INTRAVENOUS at 15:26

## 2022-12-29 RX ADMIN — IPRATROPIUM BROMIDE AND ALBUTEROL SULFATE 3 ML: .5; 3 SOLUTION RESPIRATORY (INHALATION) at 16:12

## 2022-12-29 RX ADMIN — LORAZEPAM 1 MG: 2 INJECTION INTRAMUSCULAR; INTRAVENOUS at 10:23

## 2022-12-29 RX ADMIN — LORAZEPAM 0.5 MG: 2 INJECTION INTRAMUSCULAR; INTRAVENOUS at 00:10

## 2022-12-29 RX ADMIN — METOPROLOL SUCCINATE 25 MG: 25 TABLET, EXTENDED RELEASE ORAL at 12:31

## 2022-12-29 RX ADMIN — LORAZEPAM 0.5 MG: 2 INJECTION INTRAMUSCULAR; INTRAVENOUS at 06:37

## 2022-12-29 RX ADMIN — LORAZEPAM 0.5 MG: 2 INJECTION INTRAMUSCULAR; INTRAVENOUS at 21:19

## 2022-12-29 RX ADMIN — ZINC OXIDE 1 APPLICATION: 200 OINTMENT TOPICAL at 21:08

## 2022-12-29 RX ADMIN — LORAZEPAM 0.5 MG: 2 INJECTION INTRAMUSCULAR; INTRAVENOUS at 12:31

## 2022-12-29 RX ADMIN — LORAZEPAM 0.5 MG: 2 INJECTION INTRAMUSCULAR; INTRAVENOUS at 18:15

## 2022-12-29 RX ADMIN — Medication 1 TABLET: at 12:31

## 2022-12-29 RX ADMIN — Medication 3 MG: at 00:10

## 2022-12-29 RX ADMIN — LORAZEPAM 1 MG: 1 TABLET ORAL at 08:36

## 2022-12-29 NOTE — ED NOTES
Nursing report ED to floor  Watson Lisa  67 y.o.  male    HPI :   Chief Complaint   Patient presents with    Alcohol Problem    Fall    Knee Injury       Admitting doctor:   Keily Russell MD    Admitting diagnosis:   The primary encounter diagnosis was DEEP (acute kidney injury) (HCC). Diagnoses of Traumatic rhabdomyolysis, initial encounter ([facility]), Alcohol withdrawal syndrome with complication (HCC), and Closed nondisplaced longitudinal fracture of left patella, initial encounter were also pertinent to this visit.    Code status:   Current Code Status       Date Active Code Status Order ID Comments User Context       12/28/2022 2026 CPR (Attempt to Resuscitate) [id number]  Keily Russell MD ED        Question Answer    Code Status (Patient has no pulse and is not breathing) CPR (Attempt to Resuscitate)    Medical Interventions (Patient has pulse or is breathing) Full                    Allergies:   Penicillins and Penicillins    Isolation:   No active isolations    Intake and Output    Intake/Output Summary (Last 24 hours) at 12/28/2022 2126  Last data filed at 12/28/2022 2007  Gross per 24 hour   Intake 1650 ml   Output --   Net 1650 ml       Weight:       12/28/22  1906   Weight: 117 kg (259 lb)       Most recent vitals:   Vitals:    12/28/22 1936 12/28/22 2006 12/28/22 2036 12/28/22 2059   BP: 169/88 177/78 180/97    Pulse: 86  88 89   Resp:       Temp:       TempSrc:       SpO2: 97%  98% 100%   Weight:       Height:           Active LDAs/IV Access:   Lines, Drains & Airways       Active LDAs       Name Placement date Placement time Site Days    Peripheral IV 10/16/21 0215 Anterior; Right Forearm 10/16/21  0215  Forearm  438    Peripheral IV 12/28/22 Left;Upper Arm 12/28/22  --  Arm  less than 1                    Labs (abnormal labs have a star):   Labs Reviewed   COMPREHENSIVE METABOLIC PANEL - Abnormal; Notable for the following components:       Result Value    BUN 37 (*)     Creatinine 2.14  (*)     CO2 20.0 (*)     Calcium 8.2 (*)     ALT (SGPT) 107 (*)     AST (SGOT) 385 (*)     Total Bilirubin 1.6 (*)     Anion Gap 22.0 (*)     eGFR 32.9 (*)     All other components within normal limits    Narrative:     GFR Normal >60  Chronic Kidney Disease <60  Kidney Failure <15     TROPONIN (IN-HOUSE) - Abnormal; Notable for the following components:    Troponin T 0.080 (*)     All other components within normal limits    Narrative:     Troponin T Reference Range:  <= 0.03 ng/mL-   Negative for AMI  >0.03 ng/mL-     Abnormal for myocardial necrosis.  Clinicians would have to utilize clinical acumen, EKG, Troponin and serial changes to determine if it is an Acute Myocardial Infarction or myocardial injury due to an underlying chronic condition.       Results may be falsely decreased if patient taking Biotin.     MAGNESIUM - Abnormal; Notable for the following components:    Magnesium 1.5 (*)     All other components within normal limits   CK - Abnormal; Notable for the following components:    Creatine Kinase 20,763 (*)     All other components within normal limits   CBC WITH AUTO DIFFERENTIAL - Abnormal; Notable for the following components:    WBC 13.53 (*)     RBC 3.33 (*)     Hemoglobin 10.8 (*)     Hematocrit 30.5 (*)     Platelets 82 (*)     Neutrophil % 91.0 (*)     Lymphocyte % 3.0 (*)     Eosinophil % 0.0 (*)     Immature Grans % 0.7 (*)     Neutrophils, Absolute 12.32 (*)     Lymphocytes, Absolute 0.40 (*)     Immature Grans, Absolute 0.09 (*)     All other components within normal limits   SCAN SLIDE - Normal    Narrative:     No clot in tube. No Platelet clumping observed on slide Platelet count was repeated with new specimen   ETHANOL   CBC AND DIFFERENTIAL    Narrative:     The following orders were created for panel order CBC & Differential.  Procedure                               Abnormality         Status                     ---------                               -----------         ------                      CBC Auto Differential[[id number]]        Abnormal            Final result               Scan Slide[[id number]]                   Normal              Final result                 Please view results for these tests on the individual orders.       EKG:   ECG 12 Lead Syncope   Final Result   HEART RATE= 88  bpm   RR Interval= 682  ms   ME Interval= 147  ms   P Horizontal Axis= 39  deg   P Front Axis= 22  deg   QRSD Interval= 92  ms   QT Interval= 394  ms   QRS Axis= 33  deg   T Wave Axis= -16  deg   - ABNORMAL ECG -   Sinus rhythm   Borderline low voltage, extremity leads   Probable anteroseptal infarct, old   Non-Specific STT wave changes   No change from previous tracing   Electronically Signed By: Silvio Hawthorne ([facility]) 28-Dec-2022 19:19:43   Date and Time of Study: 2022-12-28 17:14:05          Meds given in ED:   Medications   sodium chloride 0.9 % flush 10 mL (has no administration in time range)   thiamine (B-1) 100 mg in sodium chloride 0.9 % 100 mL IVPB (0 mg Intravenous Stopped 12/28/22 1830)   folic acid 1 mg in sodium chloride 0.9 % 50 mL IVPB (0 mg Intravenous Stopped 12/28/22 1830)   LORazepam (ATIVAN) injection 0.5 mg (0.5 mg Intravenous Given 12/28/22 1845)     Followed by   LORazepam (ATIVAN) injection 0.5 mg (has no administration in time range)   LORazepam (ATIVAN) tablet 0.5 mg ( Oral Not Given:  See Alt 12/28/22 2111)     Or   LORazepam (ATIVAN) injection 0.5 mg ( Intravenous Not Given:  See Alt 12/28/22 2111)     Or   LORazepam (ATIVAN) tablet 1 mg ( Oral Not Given:  See Alt 12/28/22 2111)     Or   LORazepam (ATIVAN) injection 1 mg ( Intravenous Not Given:  See Alt 12/28/22 2111)     Or   LORazepam (ATIVAN) injection 1 mg (1 mg Intravenous Given 12/28/22 2111)     Or   LORazepam (ATIVAN) injection 1 mg ( Intramuscular Not Given:  See Alt 12/28/22 2111)   sodium bicarbonate 150 mEq/1000 mL dextrose infusion (150 mEq Intravenous New Bag 12/28/22 2026)   nitroglycerin (NITROSTAT) SL tablet  0.4 mg (has no administration in time range)   acetaminophen (TYLENOL) tablet 650 mg (has no administration in time range)   ondansetron (ZOFRAN) tablet 4 mg (has no administration in time range)     Or   ondansetron (ZOFRAN) injection 4 mg (has no administration in time range)   melatonin tablet 3 mg (has no administration in time range)   thiamine (B-1) injection 100 mg (has no administration in time range)     Or   thiamine (VITAMIN B-1) tablet 100 mg (has no administration in time range)   thiamine (VITAMIN B-1) tablet 100 mg (has no administration in time range)     And   multivitamin (THERAGRAN) tablet 1 tablet (has no administration in time range)     And   folic acid (FOLVITE) tablet 1 mg (has no administration in time range)   sodium chloride 0.9 % infusion (has no administration in time range)   LORazepam (ATIVAN) injection 1 mg (1 mg Intravenous Given 12/28/22 1707)   sodium chloride 0.9 % bolus 1,000 mL (0 mL Intravenous Stopped 12/28/22 2007)       Imaging results:  XR Knee 1 or 2 View Left    Result Date: 12/28/2022   As described.  This report was finalized on 12/28/2022 6:48 PM by Dr. Donte Hernandez M.D.       Ambulatory status:   - bedrest    Social issues:   Social History     Socioeconomic History    Marital status:    Tobacco Use    Smoking status: Every Day     Packs/day: 2.00     Years: 40.00     Pack years: 80.00     Types: Cigarettes    Smokeless tobacco: Never   Vaping Use    Vaping Use: Never used   Substance and Sexual Activity    Alcohol use: Yes     Alcohol/week: 10.0 standard drinks     Types: 10 Shots of liquor per week     Comment: 1 pint vodka/ day    Drug use: Not Currently     Types: Hydrocodone    Sexual activity: Defer       NIH Stroke Scale:         Heidi Argueta RN  12/28/22 21:26 EST

## 2022-12-29 NOTE — PROGRESS NOTES
Name: Watson Lisa ADMIT: 2022   : 1955  PCP: Checo Dunham MD    MRN: [id number] LOS: 1 days   AGE/SEX: 67 y.o. male  ROOM: [facility]     Subjective   Subjective   Confused. Delirious.    Review of Systems   Unable to perform ROS: Mental status change      Objective   Objective   Vital Signs  Temp:  [97.8 °F (36.6 °C)-99.1 °F (37.3 °C)] 98.3 °F (36.8 °C)  Heart Rate:  [] 91  Resp:  [18-22] 20  BP: (142-180)/(76-97) 148/87  SpO2:  [96 %-100 %] 96 %  on   ;   Device (Oxygen Therapy): room air  Body mass index is 29.06 kg/m².    Physical Exam  Constitutional:       Appearance: He is ill-appearing. He is not toxic-appearing.   HENT:      Head: Normocephalic.   Cardiovascular:      Rate and Rhythm: Normal rate and regular rhythm.   Pulmonary:      Effort: Pulmonary effort is normal. No respiratory distress.      Breath sounds: Normal breath sounds. No wheezing, rhonchi or rales.   Abdominal:      General: Bowel sounds are normal.      Palpations: Abdomen is soft.      Tenderness: There is no abdominal tenderness. There is no guarding or rebound.   Musculoskeletal:         General: No swelling.      Right lower leg: No edema.      Left lower leg: No edema.   Skin:     General: Skin is warm and dry.   Neurological:      Mental Status: He is disoriented.   Psychiatric:         Attention and Perception: He is inattentive.         Behavior: Behavior is not agitated or aggressive.     Results Review  I reviewed the patient's new clinical results.  Results from last 7 days   Lab Units 22  0539 22  1746   WBC 10*3/mm3 9.90 13.53*   HEMOGLOBIN g/dL 9.9* 10.8*   PLATELETS 10*3/mm3 75* 82*     Results from last 7 days   Lab Units 22  0539 22  1709   SODIUM mmol/L 141 140   POTASSIUM mmol/L 3.6 4.3   CHLORIDE mmol/L 99 98   CO2 mmol/L 22.0 20.0*   BUN mg/dL 37* 37*   CREATININE mg/dL 1.55* 2.14*   GLUCOSE mg/dL 92 89     Lab Results   Component Value Date    ANIONGAP 20.0 (H)  12/29/2022     Estimated Creatinine Clearance: 66.5 mL/min (A) (by C-G formula based on SCr of 1.55 mg/dL (H)).    Results from last 7 days   Lab Units 12/28/22  1709   ALBUMIN g/dL 3.8   BILIRUBIN mg/dL 1.6*   ALK PHOS U/L 77   AST (SGOT) U/L 385*   ALT (SGPT) U/L 107*     Results from last 7 days   Lab Units 12/29/22  0539 12/28/22  1709   CALCIUM mg/dL 8.1* 8.2*   ALBUMIN g/dL  --  3.8   MAGNESIUM mg/dL  --  1.5*       No results found for: HGBA1C, POCGLU    XR Knee 1 or 2 View Left    Result Date: 12/28/2022   As described.  This report was finalized on 12/28/2022 6:48 PM by Dr. Donte Hernandez M.D.        Scheduled Meds  [START ON 12/30/2022] folic acid, 1 mg, Oral, Daily  hydrocortisone-bacitracin-zinc oxide-nystatin, 1 application, Topical, TID  LORazepam, 0.5 mg, Intravenous, Q8H  metoprolol succinate XL, 25 mg, Oral, Q24H  multivitamin, 1 tablet, Oral, Daily  mupirocin, 1 application, Topical, Q12H  [START ON 12/30/2022] thiamine, 100 mg, Oral, Daily    Continuous Infusions  sodium chloride, 150 mL/hr, Last Rate: 150 mL/hr (12/29/22 0810)    PRN Meds  •  acetaminophen  •  LORazepam **OR** LORazepam **OR** LORazepam **OR** LORazepam **OR** LORazepam **OR** LORazepam  •  melatonin  •  nitroglycerin  •  ondansetron **OR** ondansetron  •  [COMPLETED] Insert Peripheral IV **AND** sodium chloride    sodium chloride, 150 mL/hr, Last Rate: 150 mL/hr (12/29/22 0810)    Diet  Diet: Cardiac Diets, Diabetic Diets; Healthy Heart (2-3 Na+); Consistent Carbohydrate; Texture: Regular Texture (IDDSI 7); Fluid Consistency: Thin (IDDSI 0)    I have personally reviewed:  [x]  Laboratory   []  Microbiology   [x]  Radiology   [x]  EKG/Telemetry  SR on tele  []  Cardiology/Vascular   []  Pathology    [x]  Records      CIWA (last day)     Date/Time CIWA-Ar Score    12/29/22 1443 4    12/29/22 1213 4    12/29/22 1020 12    12/29/22 0835 12    12/29/22 0641 9    12/29/22 0532 12    12/29/22 0255 6    12/29/22 0155 14    12/28/22  2345 6    12/28/22 22:07:24 5    12/28/22 21:06:13 10    12/28/22 20:34:22 9    12/28/22 19:52:41 15    12/28/22 18:44:18 19    12/28/22 17:10:04 17         Assessment/Plan     Active Hospital Problems    Diagnosis  POA   • **DEEP (acute kidney injury) ([facility]) [N17.9]  Yes   • Rhabdomyolysis [M62.82]  Unknown   • Left patella fracture [S82.002A]  Unknown   • Elevated troponin [R77.8]  Unknown   • COPD (chronic obstructive pulmonary disease) ([facility]) [J44.9]  Yes   • LIVAN (obstructive sleep apnea) [G47.33]  Yes   • ETOH abuse [F10.10]  Yes   • Alcohol withdrawal syndrome ([facility]) [F10.939]  Unknown   • Essential hypertension [I10]  Yes      Resolved Hospital Problems   No resolved problems to display.     67 y.o. male with a history of alcohol abuse, LIVAN, COPD, hypertension presented to the ED after a fall. Poor historian and currently in withdrawals. Per ED had a fall and was on the ground several hours. Daily alcohol use and drank heavily over [place]    Alcohol abuse and withdrawal  -CIWA 4-19, most recent 4  -Multiple doses of Ativan so far  -vitamins  -access    Rhabdomyolysis resulting in acute kidney injury  -Continue IVF per nephrology  -Creatinine improved  -Rhabdomyolysis due to immobility  -Monitoring CPK  -AST>ALT elevation from muscle    Troponin elevation  -likely DEEP  -EKG no change  -not able to tell me if he is having any chest pain  -monitor    Left patella fracture  -Ortho consulted  -Weightbearing as tolerated in knee immobilizer  -Follow-up 2 to 3 weeks with Dr. Saleh will likely require knee replacement in the future    LIVAN, COPD  -nebs    SCDs for DVT prophylaxis    Discussed with nursing staff, CCP and care team on multidisciplinary rounds    Discharge: MARTA Hair MD  [facility]  12/29/22

## 2022-12-29 NOTE — NURSING NOTE
Unable to complete med rec and most of the admission. Family not at bedside and patient confused, no answer from POA when called.    Patient home medication of losartan in home medication drawer, waiting for medication to be reordered by physician.

## 2022-12-29 NOTE — PLAN OF CARE
Problem: Adult Inpatient Plan of Care  Goal: Absence of Hospital-Acquired Illness or Injury  Intervention: Prevent Infection  Description: Maintain skin and mucous membrane integrity; promote hand, oral and pulmonary hygiene.  Optimize fluid balance, nutrition, sleep and glycemic control to maximize infection resistance.  Identify potential sources of infection early to prevent or mitigate progression of infection (e.g., wound, lines, devices).  Evaluate ongoing need for invasive devices; remove promptly when no longer indicated.  Recent Flowsheet Documentation  Taken 12/29/2022 0417 by Tee Daniels, RN  Infection Prevention:   environmental surveillance performed   rest/sleep promoted   single patient room provided  Taken 12/29/2022 0213 by Tee Daniels, RN  Infection Prevention:   environmental surveillance performed   rest/sleep promoted   single patient room provided  Taken 12/28/2022 2350 by Tee Daniels, RN  Infection Prevention:   environmental surveillance performed   rest/sleep promoted   single patient room provided   Goal Outcome Evaluation:   Patient arriving to the floor today from ED for fall/ alcohol withdrawal. Patient last ciwa 6, ativan given twice this shift. VSS,bed alarm st, and call light in reach.

## 2022-12-29 NOTE — CONSULTS
"[facility]   Consult Note    Patient Name: Watson Lisa  : 1955  MRN: [id number]  Primary Care Physician:  Checo Dunham MD  Referring Physician: Keily Russell MD  Date of admission: 2022    Inpatient Orthopedic Surgery Consult  Consult performed by: Channing Delong Jr., MD  Consult ordered by: Keily Russell MD        Subjective   Subjective     Reason for Consult/ Chief Complaint: Acute on chronic left knee pain    History of Present Illness  Watson Lisa is a 67 y.o. male with history of DVT, ethanol abuse, CPAP, prior right knee replacement who came to the emergency department yesterday after a fall at home.  He reports he fell on his knee on Monday.  Radiographs in the emergency department showed a possible nondisplaced patellar fracture.  He was admitted to the hospitalist service for ethanol withdrawal/CIWA protocol.  Orthopedics was consulted for evaluation and management of the left knee.    The patient reports he has been able to ambulate on the left knee.  He reports he \"gets along okay with it\".  He reports he had his right knee replaced by Dr. Morrow.        Review of Systems    Review of Systems:  Constitutional: Negative  HENT: Negative  Eyes: Negative  Respiratory: Negative; no shortness of breath  Cardiovascular: Negative; no current chest pain  Gastrointestinal: Negative  : Negative  Skin: Negative except as listed in HPI  Neurological: Negative, no numbness or deficits  Hematological: Negative  Muscoloskeletal: Per HPI                   Personal History     Past Medical History:   Diagnosis Date   • Alcohol abuse    • Anxiety    • Arthritis    • Bronchitis with chronic airway obstruction (HCC)     LAST FEB   • DVT (deep venous thrombosis) (HCC)    • Hiatal hernia    • Hypertension    • Hypertension    • Low back pain    • Neck pain    • Pulmonary embolism (HCC)    • Sleep apnea with use of continuous positive airway pressure (CPAP)     AT NIGHT       Past " Surgical History:   Procedure Laterality Date   • COLONOSCOPY     • JOINT REPLACEMENT Right     hip/knee   • REPLACEMENT TOTAL KNEE     • TONSILLECTOMY     • TOTAL HIP ARTHROPLASTY Right        Family History: family history includes Alcohol abuse in his father; Cancer in his brother and mother; Heart disease in his father. Otherwise pertinent FHx was reviewed and not pertinent to current issue.    Social History:  reports that he has been smoking cigarettes. He has a 80.00 pack-year smoking history. He has never used smokeless tobacco. He reports current alcohol use of about 10.0 standard drinks per week. He reports that he does not currently use drugs after having used the following drugs: Hydrocodone.    Home Medications:   amLODIPine, buPROPion SR, guaiFENesin-codeine, levoFLOXacin, losartan, multivitamin with minerals, and traZODone    Allergies:  Allergies   Allergen Reactions   • Penicillins Unknown - Low Severity     Pt does not recall the reaction. Patient tolerated cephalexin 3/2020   • Penicillins Other (See Comments)     Unknown        Objective    Objective     Vitals:  Temp:  [97.8 °F (36.6 °C)-99.1 °F (37.3 °C)] 97.8 °F (36.6 °C)  Heart Rate:  [] 89  Resp:  [18-22] 18  BP: (142-180)/(78-97) 142/79    Physical Exam  Physical Exam:  Vital signs reviewed.  Constitutional:  Appears well-developed, well nourished.  Neurological: No gross deficits, oriented to person, place, and situation  Skin: Warm and dry  Psychiatric: Normal mood and affect  Musculoskeletal:    Examination of his left knee shows perhaps trace effusion.  No ecchymosis.  He is mildly tender diffusely around the entire knee joint.  He is mildly tender over the patella.  His quadriceps and patellar tendons appear to be intact.  He can perform straight leg raise.  He can demonstrate range of motion 0 to 90 degrees with mild discomfort.       Active ankle dorsiflexion and plantarflexion.  Sensation is intact to light touch in sural,  saphenous, deep and superficial peroneal, tibial nerve distribution.  Toes are warm and well perfused with brisk capillary refill.             Result Review      Radiographs of the left knee are independently reviewed.  These show fairly severe chronic arthritic change at the knee joint with significant tricompartmental joint space narrowing.  He has perhaps an oblique lucency at the superior aspect of the patella with perhaps mild sclerosis.    No other acute fracture identified.  Study Result    Narrative & Impression   XR KNEE 1 OR 2 VW LEFT-     INDICATIONS: Pain and swelling     TECHNIQUE: 2 views of the left knee     COMPARISON: 06/26/2020     FINDINGS:     Appearance of small vertically oriented lucency at the upper aspect of  the patella, potentially evidence of nondisplaced fracture, correlate  with location of pain. Mild knee effusion is present. No other evidence  of fracture is noted. No erosion, or dislocation is identified.  Prominent tibiofemoral joint space narrowing is noted. Moderate to  prominent degenerative spurring is seen.  Follow-up/further evaluation  can be obtained as indicated.     IMPRESSION:     As described.           Assessment & Plan   Assessment / Plan     Brief Patient Summary:  Watson Lisa is a 67 y.o. male with history of ethanol abuse, withdrawal presenting with acute on chronic left knee pain in the setting of severe tricompartmental knee arthritis and a possible acute/subacute nondisplaced fracture at the left superior patella.    Active Hospital Problems:  Active Hospital Problems    Diagnosis    • **DEEP (acute kidney injury) ([facility])      Plan:   I had a long discussion with the patient this morning.  He has a fair amount of arthritic change in the knee.  He may have an acute nondisplaced fracture of the patella although there is some potential sclerosis at the suspected fracture line.  In any case, his extensor mechanism is intact, and barring any further displacement or  complication I would not think this will require surgery.      -Recommend patient weight-bear as tolerated in a knee immobilizer.  -Patient can follow-up in 2 to 3 weeks at the [place] [facility] with Dr. Pb Saleh.  He will likely require a knee replacement at some point in the future once she has recovered from this acute injury.  -Pain control  -DVT: Tera/SCDs, chemoprophylaxis per primary  -Disposition: Okay for discharge from orthopedic standpoint    Thank you for this consultation, please call with questions.    Channing Delong Jr, MD

## 2022-12-29 NOTE — CONSULTS
[facility] Consult Note      Patient Name: Watson Lisa  : 1955  MRN: [id number]  Primary Care Physician:  Checo Dunham MD  Referring Physician: Keily Russell MD  Date of admission: 2022    Subjective     Reason for Consult:  DEEP     HPI:   Watson Lisa is a 67 y.o. male with hx HTN, ETOH abuse, OA s/p right TKR who presented after fall on MON, found to have left patellar fracture.  He disputes report of immobilization after fall, but he's also confused and showing some signs of ETOH withdrawal already.  Found to have DEEP & rhabdomyolysis, with Cr 2.1, CK 20,763.  Renal fcn normal at baseline.  With IVF, Cr improved 1.5 though CK ~ same.  Getting both NS and bicarb drip currently.  Denies n/v but has had some loose stool.  Reports some nsaid use.  No dyspnea or dysuria.  Home meds notable for ARB.      Review of Systems:   14 point review of systems is otherwise negative except for mentioned above on HPI    Personal History     Past Medical History:   Diagnosis Date   • Alcohol abuse    • Anxiety    • Arthritis    • Bronchitis with chronic airway obstruction (HCC)     LAST FEB   • DVT (deep venous thrombosis) (HCC)    • Hiatal hernia    • Hypertension    • Hypertension    • Low back pain    • Neck pain    • Pulmonary embolism (HCC)    • Sleep apnea with use of continuous positive airway pressure (CPAP)     AT NIGHT       Past Surgical History:   Procedure Laterality Date   • COLONOSCOPY     • JOINT REPLACEMENT Right     hip/knee   • REPLACEMENT TOTAL KNEE     • TONSILLECTOMY     • TOTAL HIP ARTHROPLASTY Right        Family History: family history includes Alcohol abuse in his father; Cancer in his brother and mother; Heart disease in his father.    Social History:  reports that he has been smoking cigarettes. He has a 80.00 pack-year smoking history. He has never used smokeless tobacco. He reports current alcohol use of about 10.0 standard drinks per week. He  reports that he does not currently use drugs after having used the following drugs: Hydrocodone.    Home Medications:  Prior to Admission medications    Medication Sig Start Date End Date Taking? Authorizing Provider   losartan (COZAAR) 100 MG tablet TAKE 1 TABLET DAILY 8/2/22  Yes Checo Dunham MD   amLODIPine (NORVASC) 10 MG tablet TAKE ONE TABLET BY MOUTH DAILY 4/21/22   Checo Dnuham MD   buPROPion SR (Wellbutrin SR) 150 MG 12 hr tablet Take 1 tablet by mouth 2 (Two) Times a Day. 8/11/21   Checo Dunham MD   guaiFENesin-codeine (GUAIFENESIN AC) 100-10 MG/5ML liquid Take 5 mL by mouth 3 (Three) Times a Day As Needed for Cough. 6/16/22   Mick Aquino MD   levoFLOXacin (Levaquin) 500 MG tablet Take 1 tablet by mouth Daily. 6/15/22   Mick Aquino MD   Multiple Vitamins-Minerals (MULTIVITAMIN ADULT PO) Take 1 tablet by mouth Daily.    Provider, MD Iraida   traZODone (DESYREL) 100 MG tablet Take 1 tablet by mouth Every Night. 4/18/22   Marques Santiago, DO       Allergies:  Allergies   Allergen Reactions   • Penicillins Unknown - Low Severity     Pt does not recall the reaction. Patient tolerated cephalexin 3/2020   • Penicillins Other (See Comments)     Unknown        Objective     Vitals:   Temp:  [97.8 °F (36.6 °C)-99.1 °F (37.3 °C)] 98.2 °F (36.8 °C)  Heart Rate:  [] 84  Resp:  [18-22] 20  BP: (142-180)/(76-97) 151/76    Intake/Output Summary (Last 24 hours) at 12/29/2022 0737  Last data filed at 12/28/2022 2007  Gross per 24 hour   Intake 1650 ml   Output --   Net 1650 ml       Physical Exam:    General Appearance: tremulous confused WF in no distress, converses appropriately    Skin: warm and dry  HEENT: oral mucosa normal, nonicteric sclera  Neck: supple, no JVD  Lungs: CTA bilat no rales  Heart: RRR, normal S1 and S2  Abdomen: soft, nontender, nondistended  : no palpable bladder  Extremities: trace BLE edema, left knee in brace   Neuro: tremulous but speech intact & follows  commands     Scheduled Meds:     thiamine, 100 mg, Oral, Daily   And  multivitamin, 1 tablet, Oral, Daily   And  folic acid, 1 mg, Oral, Daily  folic acid (FOLVITE) IVPB, 1 mg, Intravenous, Daily  LORazepam, 0.5 mg, Intravenous, Q6H   Followed by  LORazepam, 0.5 mg, Intravenous, Q8H  thiamine (VITAMIN B1) IVPB, 100 mg, Intravenous, Daily      IV Meds:   sodium bicarbonate, 150 mEq, Last Rate: 125 mL/hr at 12/28/22 2247  sodium chloride, 75 mL/hr, Last Rate: 75 mL/hr (12/28/22 2211)        Results Reviewed:   I have personally reviewed the results from the time of this admission to 12/29/2022 07:37 EST     Lab Results   Component Value Date    GLUCOSE 92 12/29/2022    CALCIUM 8.1 (L) 12/29/2022     12/29/2022    K 3.6 12/29/2022    CO2 22.0 12/29/2022    CL 99 12/29/2022    BUN 37 (H) 12/29/2022    CREATININE 1.55 (H) 12/29/2022    EGFRIFNONA 85 10/16/2021    BCR 23.9 12/29/2022    ANIONGAP 20.0 (H) 12/29/2022      Lab Results   Component Value Date    MG 1.5 (L) 12/28/2022    PHOS 3.1 10/15/2021    ALBUMIN 3.8 12/28/2022           Assessment / Plan     ASSESSMENT:  1. Non olig DEEP - due to rhabdo, Cr improved 2.1 to 1.5 with IVF.  Will simplify IVF as getting both bicarb & NS, will continue latter alone at 150 cc/hr.  K/HCo3 normal.  Check UA  2. Rhabdomyolysis - suspect was immobilized some period after fall; CK ~ same 20K; elevated LFTs too   3. Left patella fracture - ortho eval noted, planning non-op mgmt   4. HypoMg   5. HTN - BP bit high with ARB on hold for DEEP; HR 80s   6. Hx ETOH abuse + e/o withdrawal - on CIWA protocol  7. Anemia, TCP - hgb 10.8, PLT 75K    PLAN:  Stop bicarb drip, give  cc/hr  Trend CK & LFTs  Send urinalysis  Subs b blocker for ARB   Check iron stores  Replace Mg  D/W RN    Thank you Dr Russell for involving us in the care of Watson Lisa.  Please feel free to call with any questions.    Charlie Addison MD  12/29/22  07:37 [facility]    [facility]  [phone]

## 2022-12-29 NOTE — NURSING NOTE
Wound/Ostomy: Consult received regarding skin issue on Feet and Coccyx. Patient is alert, upon assessment is observed intact skin, red discoloration, persistent blanchable, with  fungal appearance, relate to moisture,  not pressure injury to Coccyx/ buttock/perineum, lower abdomen and rt lateral thigh,  Magic cream is ordered.   In addition superficial partial-thickens skin loss on bilateral toes noted, Bactroban is ordered too. Please re-consult for any additional needs.

## 2022-12-29 NOTE — H&P
HISTORY AND PHYSICAL   [facility]        Date of Admission: 2022  Patient Identification:  Name: Watson Lisa  Age: 67 y.o.  Sex: male  :  1955  MRN: [id number]                     Primary Care Physician: Checo Dunham MD    Chief Complaint:  67 year old gentleman who presented to the emergency room after a fall at home; he was on the floor for several hours; he is a heavy daily drinker; he injured his knee when he fell; he has been tremulous and anxious; he has a history of complicated alcohol withdrawal;     History of Present Illness:   As above    Past Medical History:  Past Medical History:   Diagnosis Date   • Alcohol abuse    • Anxiety    • Arthritis    • Bronchitis with chronic airway obstruction (HCC)     LAST FEB   • DVT (deep venous thrombosis) ([facility])    • Hiatal hernia    • Hypertension    • Hypertension    • Low back pain    • Neck pain    • Pulmonary embolism (HCC)    • Sleep apnea with use of continuous positive airway pressure (CPAP)     AT NIGHT     Past Surgical History:  Past Surgical History:   Procedure Laterality Date   • COLONOSCOPY     • JOINT REPLACEMENT Right     hip/knee   • REPLACEMENT TOTAL KNEE     • TONSILLECTOMY     • TOTAL HIP ARTHROPLASTY Right       Home Meds:  (Not in a hospital admission)      Allergies:  Allergies   Allergen Reactions   • Penicillins Unknown - Low Severity     Pt does not recall the reaction. Patient tolerated cephalexin 3/2020   • Penicillins Other (See Comments)     Unknown      Immunizations:  Immunization History   Administered Date(s) Administered   • COVID-19 (PFIZER) PURPLE CAP 2021, 2021, 2021   • Tdap 2020     Social History:   Social History     Social History Narrative    ** Merged History Encounter **          Social History     Socioeconomic History   • Marital status:    Tobacco Use   • Smoking status: Every Day     Packs/day: 2.00     Years: 40.00     Pack years: 80.00     Types:  "Cigarettes   • Smokeless tobacco: Never   Vaping Use   • Vaping Use: Never used   Substance and Sexual Activity   • Alcohol use: Yes     Alcohol/week: 10.0 standard drinks     Types: 10 Shots of liquor per week     Comment: 1 pint vodka/ day   • Drug use: Not Currently     Types: Hydrocodone   • Sexual activity: Defer       Family History:  Family History   Problem Relation Age of Onset   • Cancer Mother    • Heart disease Father    • Alcohol abuse Father    • Cancer Brother         Review of Systems  See history of present illness and past medical history.  Patient denies headache, dizziness, syncope, falls, trauma, change in vision, change in hearing, change in taste, changes in weight, changes in appetite, focal weakness, numbness, or paresthesia.  Patient denies chest pain, palpitations, dyspnea, orthopnea, PND, cough, sinus pressure, rhinorrhea, epistaxis, hemoptysis, nausea, vomiting,hematemesis, diarrhea, constipation or hematchezia.  Denies cold or heat intolerance, polydipsia, polyuria, polyphagia. Denies hematuria, pyuria, dysuria, hesitancy, frequency or urgency. Denies consumption of raw and under cooked meats foods or change in water source.  Denies fever, chills, sweats, night sweats.  Denies missing any routine medications. Remainder of ROS is negative.    Objective:  T Max 24 hrs: Temp (24hrs), Av.1 °F (37.3 °C), Min:99.1 °F (37.3 °C), Max:99.1 °F (37.3 °C)    Vitals Ranges:   Temp:  [99.1 °F (37.3 °C)] 99.1 °F (37.3 °C)  Heart Rate:  [] 89  Resp:  [22] 22  BP: (147-180)/(78-97) 180/97      Exam:  /97   Pulse 89   Temp 99.1 °F (37.3 °C) (Tympanic)   Resp 22   Ht 200.7 cm (79\")   Wt 117 kg (259 lb)   SpO2 100%   BMI 29.18 kg/m²     General Appearance:    Alert, cooperative, no distress, appears stated age; tremulous   Head:    Normocephalic, without obvious abnormality, atraumatic   Eyes:    PERRL, conjunctivae/corneas clear, EOM's intact, both eyes   Ears:    Normal external " ear canals, both ears   Nose:   Nares normal, septum midline, mucosa normal, no drainage    or sinus tenderness   Throat:   Lips, mucosa, and tongue normal   Neck:   Supple, symmetrical, trachea midline, no adenopathy;     thyroid:  no enlargement/tenderness/nodules; no carotid    bruit or JVD   Back:     Symmetric, no curvature, ROM normal, no CVA tenderness   Lungs:     Decreased breath sounds bilaterally, respirations unlabored   Chest Wall:    No tenderness or deformity    Heart:    Regular rate and rhythm, S1 and S2 normal, no murmur, rub   or gallop   Abdomen:     Soft, nontender, bowel sounds active all four quadrants,     no masses, no hepatomegaly, no splenomegaly   Extremities:   Extremities normal, atraumatic, no cyanosis or edema   Pulses:   2+ and symmetric all extremities   Skin:   Skin color, texture, turgor normal, no rashes or lesions               .    Data Review:  Labs in chart were reviewed.  WBC   Date Value Ref Range Status   12/28/2022 13.53 (H) 3.40 - 10.80 10*3/mm3 Final     Hemoglobin   Date Value Ref Range Status   12/28/2022 10.8 (L) 13.0 - 17.7 g/dL Final     Hematocrit   Date Value Ref Range Status   12/28/2022 30.5 (L) 37.5 - 51.0 % Final     Platelets   Date Value Ref Range Status   12/28/2022 82 (L) 140 - 450 10*3/mm3 Final     Sodium   Date Value Ref Range Status   12/28/2022 140 136 - 145 mmol/L Final     Potassium   Date Value Ref Range Status   12/28/2022 4.3 3.5 - 5.2 mmol/L Final     Comment:     Slight hemolysis detected by analyzer. Results may be affected.     Chloride   Date Value Ref Range Status   12/28/2022 98 98 - 107 mmol/L Final     CO2   Date Value Ref Range Status   12/28/2022 20.0 (L) 22.0 - 29.0 mmol/L Final     BUN   Date Value Ref Range Status   12/28/2022 37 (H) 8 - 23 mg/dL Final     Creatinine   Date Value Ref Range Status   12/28/2022 2.14 (H) 0.76 - 1.27 mg/dL Final     Glucose   Date Value Ref Range Status   12/28/2022 89 65 - 99 mg/dL Final     Calcium    Date Value Ref Range Status   12/28/2022 8.2 (L) 8.6 - 10.5 mg/dL Final     Magnesium   Date Value Ref Range Status   12/28/2022 1.5 (L) 1.6 - 2.4 mg/dL Final     AST (SGOT)   Date Value Ref Range Status   12/28/2022 385 (H) 1 - 40 U/L Final     ALT (SGPT)   Date Value Ref Range Status   12/28/2022 107 (H) 1 - 41 U/L Final     Alkaline Phosphatase   Date Value Ref Range Status   12/28/2022 77 39 - 117 U/L Final                Imaging Results (All)     Procedure Component Value Units Date/Time    XR Knee 1 or 2 View Left [[id number]] Collected: 12/28/22 1844     Updated: 12/28/22 1851    Narrative:      XR KNEE 1 OR 2 VW LEFT-     INDICATIONS: Pain and swelling     TECHNIQUE: 2 views of the left knee     COMPARISON: 06/26/2020     FINDINGS:     Appearance of small vertically oriented lucency at the upper aspect of  the patella, potentially evidence of nondisplaced fracture, correlate  with location of pain. Mild knee effusion is present. No other evidence  of fracture is noted. No erosion, or dislocation is identified.  Prominent tibiofemoral joint space narrowing is noted. Moderate to  prominent degenerative spurring is seen.  Follow-up/further evaluation  can be obtained as indicated.       Impression:         As described.     This report was finalized on 12/28/2022 6:48 PM by Dr. Donte Hernandez M.D.               Assessment:  Active Hospital Problems    Diagnosis  POA   • **DEEP (acute kidney injury) (HCC) [N17.9]  Yes      Resolved Hospital Problems   No resolved problems to display.   rhabdomyolysis  Alcohol withdrawal  Alcohol dependence  Sleep apnea  Hypertension  Left patellar fracture  Thrombocytopenia  anemia    Plan:  Will start ciwa protocol  Ortho to see  Nephrology to see   Iv fluids  D.w patent and ED Provider  Monitor on telemetry  Trend ck    Keily Russell MD  12/28/2022  22:03 EST

## 2022-12-29 NOTE — CONSULTS
"  Access center consult.    PPE including mask and eyewear worn throughout encounter. Appropriate hand hygiene performed before and after patient contact.    Met with patient in room #519. Introduced self and role. Patient agreed to be evaluated.    Patient is a 67 y.o. D/W/M. He is alert and oriented x3. Per staff patient has had some intermittent confusion. Patient lives at home alone. Yazidi: Yazdanism. Children: 1 son. 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 Hobbies: golf. Education: college. Legal: DUI 1988 \"charges dropped.\" : Aravind Lisa/son [phone]. : denies. Support system: family/friends. History of violence/trauma/abuse: denies. Patient feels his home is a safe environment.     Access consulted for ETOH. Patient presented to the ED 12/27/22 via EMS with c/o a fall with knee injury, heavy drinking, tremors and anxiety. Last CIWA 9. Patient unable to rate substance craving. Patient admitted with DEEP. Past medical history anxiety, LIVAN, PE, DVT, COPD, ETOH abuse, tobacco abuse, alcoholic liver disease, arthritis, low back pain.    Patient reports past ETOH treatment with [facility] and [facility] but unsure of dates. Last ETOH intake 12/26/22. Patient has been seen multiple times by Access for ETOH with most recent date 4/2022. Patient denies any past seizures but admits to blackouts r/t ETOH. He reports drinking a half pint to a pint of whiskey nightly since his mid 40s. He also admits to drinking beer at times but has escalated to whiskey. Patient reports initial use of ETOH began \"in college.\" He admits to having an problem with alcohol. Patient admits history of THC but denies any current illicit drug use. Patient uses tobacco.     Patient denies SI/HI/AH. Denies wish to be dead. He reports V/H stating \"earlier in the week I was seeing two bunnies chasing each other in my closet.\"  In addition he reports having vivid dreams. Patient denies any past mental health admissions. " "Sleep/appetite: poor. Depression: \"everybody has a little.\" Anxiety: yes, unable to rate. PTA medications wellbutrin, trazodone. Patient discussed will talk to his doctor about sleep medication.     Pt. reports he is not interested in substance abuse treatment or resources. He reports he may pursue family therapy through [employer] and  \"to get control\" but undecided at this time. Access will follow.   "

## 2022-12-29 NOTE — ED NOTES
Pt incontinent of stool, very small amount noted. Buttocks very red and excoriated. Pt also noted to have patches of dried skin similar in appearance to eczema on pt's lower abd and both hips.

## 2022-12-29 NOTE — CONSULTS
Nutrition Services    Patient Name:  Watson Lisa  YOB: 1955  MRN: [id number]  Admit Date:  12/28/2022    Consulted per ETOH w/d protocol for diet education.   Pt currently going through withdrawals, confused and delirious. Pt not appropriate for diet education at this time. Will follow for appropriateness per nutrition protocol.    Electronically signed by:  Domi Cortez RD  12/29/22 16:21 EST

## 2022-12-30 LAB
ALBUMIN SERPL-MCNC: 3.7 G/DL (ref 3.5–5.2)
ALBUMIN/GLOB SERPL: 1.2 G/DL
ALP SERPL-CCNC: 77 U/L (ref 39–117)
ALT SERPL W P-5'-P-CCNC: 115 U/L (ref 1–41)
ANION GAP SERPL CALCULATED.3IONS-SCNC: 15.2 MMOL/L (ref 5–15)
AST SERPL-CCNC: 292 U/L (ref 1–40)
BASOPHILS # BLD AUTO: 0.01 10*3/MM3 (ref 0–0.2)
BASOPHILS NFR BLD AUTO: 0.1 % (ref 0–1.5)
BILIRUB SERPL-MCNC: 0.7 MG/DL (ref 0–1.2)
BUN SERPL-MCNC: 22 MG/DL (ref 8–23)
BUN/CREAT SERPL: 24.2 (ref 7–25)
CALCIUM SPEC-SCNC: 8.2 MG/DL (ref 8.6–10.5)
CHLORIDE SERPL-SCNC: 97 MMOL/L (ref 98–107)
CK SERPL-CCNC: ABNORMAL U/L (ref 20–200)
CO2 SERPL-SCNC: 24.8 MMOL/L (ref 22–29)
CREAT SERPL-MCNC: 0.91 MG/DL (ref 0.76–1.27)
DEPRECATED RDW RBC AUTO: 45.6 FL (ref 37–54)
EGFRCR SERPLBLD CKD-EPI 2021: 91.8 ML/MIN/1.73
EOSINOPHIL # BLD AUTO: 0.09 10*3/MM3 (ref 0–0.4)
EOSINOPHIL NFR BLD AUTO: 1.1 % (ref 0.3–6.2)
ERYTHROCYTE [DISTWIDTH] IN BLOOD BY AUTOMATED COUNT: 12.9 % (ref 12.3–15.4)
FERRITIN SERPL-MCNC: 777 NG/ML (ref 30–400)
GLOBULIN UR ELPH-MCNC: 3.1 GM/DL
GLUCOSE SERPL-MCNC: 93 MG/DL (ref 65–99)
HCT VFR BLD AUTO: 30.7 % (ref 37.5–51)
HGB BLD-MCNC: 10.2 G/DL (ref 13–17.7)
IRON 24H UR-MRATE: 33 MCG/DL (ref 59–158)
IRON SATN MFR SERPL: 16 % (ref 20–50)
LYMPHOCYTES # BLD AUTO: 0.82 10*3/MM3 (ref 0.7–3.1)
LYMPHOCYTES NFR BLD AUTO: 9.6 % (ref 19.6–45.3)
MAGNESIUM SERPL-MCNC: 1.4 MG/DL (ref 1.6–2.4)
MCH RBC QN AUTO: 32.1 PG (ref 26.6–33)
MCHC RBC AUTO-ENTMCNC: 33.2 G/DL (ref 31.5–35.7)
MCV RBC AUTO: 96.5 FL (ref 79–97)
MONOCYTES # BLD AUTO: 0.6 10*3/MM3 (ref 0.1–0.9)
MONOCYTES NFR BLD AUTO: 7 % (ref 5–12)
NEUTROPHILS NFR BLD AUTO: 6.96 10*3/MM3 (ref 1.7–7)
NEUTROPHILS NFR BLD AUTO: 81.4 % (ref 42.7–76)
PHOSPHATE SERPL-MCNC: 2.2 MG/DL (ref 2.5–4.5)
PLATELET # BLD AUTO: 96 10*3/MM3 (ref 140–450)
PMV BLD AUTO: 11.6 FL (ref 6–12)
POTASSIUM SERPL-SCNC: 3.4 MMOL/L (ref 3.5–5.2)
PROT SERPL-MCNC: 6.8 G/DL (ref 6–8.5)
RBC # BLD AUTO: 3.18 10*6/MM3 (ref 4.14–5.8)
SODIUM SERPL-SCNC: 137 MMOL/L (ref 136–145)
TIBC SERPL-MCNC: 201 MCG/DL (ref 298–536)
TRANSFERRIN SERPL-MCNC: 135 MG/DL (ref 200–360)
WBC NRBC COR # BLD: 8.55 10*3/MM3 (ref 3.4–10.8)
WHOLE BLOOD HOLD SPECIMEN: NORMAL

## 2022-12-30 PROCEDURE — 85025 COMPLETE CBC W/AUTO DIFF WBC: CPT | Performed by: INTERNAL MEDICINE

## 2022-12-30 PROCEDURE — 94799 UNLISTED PULMONARY SVC/PX: CPT

## 2022-12-30 PROCEDURE — 83540 ASSAY OF IRON: CPT | Performed by: INTERNAL MEDICINE

## 2022-12-30 PROCEDURE — 82728 ASSAY OF FERRITIN: CPT | Performed by: INTERNAL MEDICINE

## 2022-12-30 PROCEDURE — 25010000002 MAGNESIUM SULFATE 2 GM/50ML SOLUTION: Performed by: INTERNAL MEDICINE

## 2022-12-30 PROCEDURE — 83735 ASSAY OF MAGNESIUM: CPT | Performed by: HOSPITALIST

## 2022-12-30 PROCEDURE — 82550 ASSAY OF CK (CPK): CPT | Performed by: HOSPITALIST

## 2022-12-30 PROCEDURE — 25010000002 LORAZEPAM PER 2 MG: Performed by: EMERGENCY MEDICINE

## 2022-12-30 PROCEDURE — 84466 ASSAY OF TRANSFERRIN: CPT | Performed by: INTERNAL MEDICINE

## 2022-12-30 PROCEDURE — 94761 N-INVAS EAR/PLS OXIMETRY MLT: CPT

## 2022-12-30 PROCEDURE — 84100 ASSAY OF PHOSPHORUS: CPT | Performed by: HOSPITALIST

## 2022-12-30 PROCEDURE — 80053 COMPREHEN METABOLIC PANEL: CPT | Performed by: HOSPITALIST

## 2022-12-30 RX ORDER — MORPHINE SULFATE 2 MG/ML
2 INJECTION, SOLUTION INTRAMUSCULAR; INTRAVENOUS EVERY 4 HOURS PRN
Status: DISCONTINUED | OUTPATIENT
Start: 2022-12-30 | End: 2023-01-01

## 2022-12-30 RX ORDER — CHLORDIAZEPOXIDE HYDROCHLORIDE 25 MG/1
25 CAPSULE, GELATIN COATED ORAL EVERY 8 HOURS SCHEDULED
Status: DISCONTINUED | OUTPATIENT
Start: 2022-12-30 | End: 2022-12-31

## 2022-12-30 RX ORDER — AMLODIPINE BESYLATE 10 MG/1
10 TABLET ORAL DAILY
Status: DISCONTINUED | OUTPATIENT
Start: 2022-12-30 | End: 2022-12-30

## 2022-12-30 RX ORDER — SPIRONOLACTONE 25 MG/1
25 TABLET ORAL DAILY
Status: DISCONTINUED | OUTPATIENT
Start: 2022-12-30 | End: 2023-01-01

## 2022-12-30 RX ORDER — POTASSIUM CHLORIDE 750 MG/1
40 TABLET, FILM COATED, EXTENDED RELEASE ORAL ONCE
Status: COMPLETED | OUTPATIENT
Start: 2022-12-30 | End: 2022-12-30

## 2022-12-30 RX ORDER — MAGNESIUM SULFATE HEPTAHYDRATE 40 MG/ML
2 INJECTION, SOLUTION INTRAVENOUS ONCE
Status: COMPLETED | OUTPATIENT
Start: 2022-12-30 | End: 2022-12-30

## 2022-12-30 RX ORDER — OXYCODONE HYDROCHLORIDE 5 MG/1
5 TABLET ORAL EVERY 4 HOURS PRN
Status: DISCONTINUED | OUTPATIENT
Start: 2022-12-30 | End: 2023-01-01

## 2022-12-30 RX ADMIN — IPRATROPIUM BROMIDE AND ALBUTEROL SULFATE 3 ML: .5; 3 SOLUTION RESPIRATORY (INHALATION) at 21:12

## 2022-12-30 RX ADMIN — ZINC OXIDE 1 APPLICATION: 200 OINTMENT TOPICAL at 17:28

## 2022-12-30 RX ADMIN — METOPROLOL TARTRATE 2.5 MG: 1 INJECTION, SOLUTION INTRAVENOUS at 23:17

## 2022-12-30 RX ADMIN — METOPROLOL TARTRATE 2.5 MG: 1 INJECTION, SOLUTION INTRAVENOUS at 10:08

## 2022-12-30 RX ADMIN — MUPIROCIN 1 APPLICATION: 20 OINTMENT TOPICAL at 20:40

## 2022-12-30 RX ADMIN — Medication 100 MG: at 08:44

## 2022-12-30 RX ADMIN — LORAZEPAM 0.5 MG: 2 INJECTION INTRAMUSCULAR; INTRAVENOUS at 20:59

## 2022-12-30 RX ADMIN — ZINC OXIDE 1 APPLICATION: 200 OINTMENT TOPICAL at 20:40

## 2022-12-30 RX ADMIN — CHLORDIAZEPOXIDE HYDROCHLORIDE 25 MG: 25 CAPSULE ORAL at 13:39

## 2022-12-30 RX ADMIN — SPIRONOLACTONE 25 MG: 25 TABLET ORAL at 08:48

## 2022-12-30 RX ADMIN — LORAZEPAM 0.5 MG: 2 INJECTION INTRAMUSCULAR; INTRAVENOUS at 08:48

## 2022-12-30 RX ADMIN — CHLORDIAZEPOXIDE HYDROCHLORIDE 25 MG: 25 CAPSULE ORAL at 21:00

## 2022-12-30 RX ADMIN — LORAZEPAM 1 MG: 2 INJECTION INTRAMUSCULAR; INTRAVENOUS at 17:27

## 2022-12-30 RX ADMIN — MUPIROCIN 1 APPLICATION: 20 OINTMENT TOPICAL at 08:44

## 2022-12-30 RX ADMIN — LORAZEPAM 1 MG: 2 INJECTION INTRAMUSCULAR; INTRAVENOUS at 13:39

## 2022-12-30 RX ADMIN — FOLIC ACID 1 MG: 1 TABLET ORAL at 08:44

## 2022-12-30 RX ADMIN — Medication 1 TABLET: at 08:44

## 2022-12-30 RX ADMIN — METOPROLOL TARTRATE 2.5 MG: 1 INJECTION, SOLUTION INTRAVENOUS at 17:27

## 2022-12-30 RX ADMIN — IPRATROPIUM BROMIDE AND ALBUTEROL SULFATE 3 ML: .5; 3 SOLUTION RESPIRATORY (INHALATION) at 13:30

## 2022-12-30 RX ADMIN — POTASSIUM CHLORIDE 40 MEQ: 750 TABLET, EXTENDED RELEASE ORAL at 08:48

## 2022-12-30 RX ADMIN — METOPROLOL SUCCINATE 25 MG: 25 TABLET, EXTENDED RELEASE ORAL at 08:44

## 2022-12-30 RX ADMIN — MAGNESIUM SULFATE HEPTAHYDRATE 2 G: 2 INJECTION, SOLUTION INTRAVENOUS at 08:44

## 2022-12-30 RX ADMIN — LORAZEPAM 0.5 MG: 2 INJECTION INTRAMUSCULAR; INTRAVENOUS at 03:26

## 2022-12-30 RX ADMIN — ZINC OXIDE 1 APPLICATION: 200 OINTMENT TOPICAL at 08:44

## 2022-12-30 NOTE — NURSING NOTE
Access center follow up.    Pt. is awake and alert. He was experiencing withdrawals yesterday, confusion and delirium per note. He reports feeling better today. Per nursing progress is improving. Patient discussed being seen by MD this am. CIWAs this am 8-10. CIWA protocol as directed. Pt. received melatonin at bedtime. Appetite improved this am at breakfast. [employer] resources provided as requested. Access following.

## 2022-12-30 NOTE — PROGRESS NOTES
"    [facility] Progress Note      Patient Name: Watson Lisa  : 1955  MRN: [id number]  Primary Care Physician:  Checo Dunham MD  Date of admission: 2022    Subjective     Interval History:   F/u DEEP    Review of Systems:   \"rough night\" ie cough, couldn't sleep; asking for ativan  Denies dyspnea    Objective     Vitals:   Temp:  [97.9 °F (36.6 °C)-98.3 °F (36.8 °C)] 98.3 °F (36.8 °C)  Heart Rate:  [80-91] 80  Resp:  [20] 20  BP: (148-156)/(85-87) 156/85    Intake/Output Summary (Last 24 hours) at 2022 0745  Last data filed at 2022 0500  Gross per 24 hour   Intake 240 ml   Output 2230 ml   Net -1990 ml       Physical Exam:    General Appearance: tremulous but no acute distress  Neck: supple, no JVD  Lungs: CTA bilat no rales  Heart: RRR, normal S1 and S2  Abdomen: soft, nontender, nondistended  : no palpable bladder  Extremities: 1+ BLE edema, no cyanosis or clubbing      Scheduled Meds:     folic acid, 1 mg, Oral, Daily  hydrocortisone-bacitracin-zinc oxide-nystatin, 1 application, Topical, TID  ipratropium-albuterol, 3 mL, Nebulization, 4x Daily - RT  LORazepam, 0.5 mg, Intravenous, Q8H  metoprolol succinate XL, 25 mg, Oral, Q24H  multivitamin, 1 tablet, Oral, Daily  mupirocin, 1 application, Topical, Q12H  thiamine, 100 mg, Oral, Daily      IV Meds:   sodium chloride, 150 mL/hr, Last Rate: 150 mL/hr (22 0810)        Results Reviewed:   I have personally reviewed the results from the time of this admission to 2022 07:45 EST     Results from last 7 days   Lab Units 22  0607 22  1758 22  0539 22  1709   SODIUM mmol/L 137 138 141 140   POTASSIUM mmol/L 3.4* 3.3* 3.6 4.3   CHLORIDE mmol/L 97* 101 99 98   CO2 mmol/L 24.8 20.2* 22.0 20.0*   BUN mg/dL 22 32* 37* 37*   CREATININE mg/dL 0.91 1.17 1.55* 2.14*   CALCIUM mg/dL 8.2* 8.2* 8.1* 8.2*   BILIRUBIN mg/dL 0.7 0.6  --  1.6*   ALK PHOS U/L 77 74  --  77   ALT (SGPT) U/L 115* " 113*  --  107*   AST (SGOT) U/L 292* 350*  --  385*   GLUCOSE mg/dL 93 110* 92 89     Estimated Creatinine Clearance: 113.2 mL/min (by C-G formula based on SCr of 0.91 mg/dL).  Results from last 7 days   Lab Units 12/30/22  0607 12/29/22  1758 12/28/22  1709   MAGNESIUM mg/dL 1.4* 1.7 1.5*   PHOSPHORUS mg/dL 2.2* 2.4*  --          Results from last 7 days   Lab Units 12/30/22  0607 12/29/22  0539 12/28/22  1746   WBC 10*3/mm3 8.55 9.90 13.53*   HEMOGLOBIN g/dL 10.2* 9.9* 10.8*   PLATELETS 10*3/mm3 96* 75* 82*           Assessment / Plan     ASSESSMENT:  1. Non olig DEEP - due to rhabdo, resolved w IVF, Cr down to 0.9 (peak 2.1).  UA with discrepant large blood but no RBCs due to myoglobinuria.  Periph vol excess present, no dyspnea.    2. Rhabdomyolysis - suspect was immobilized some period after fall; CK down 20 to 11K  3. Left patella fracture - ortho eval noted, planning non-op mgmt   4. HypoMg , hypokalemia   5. HTN - BP bit high with ARB on hold for DEEP; HR 80s -- added toprol XL yest  6. Hx ETOH abuse + e/o withdrawal - on CIWA protocol  7. Anemia, TCP - hgb 10.2, PLT up 96K  8. Hypophosphatemia - too mild (2.2) to replace     PLAN:  Replace Mg/K  DC IVF  Start aldactone but no loop diuretic due to hypokalemia      Charlie Addison MD  12/30/22  07:45 [facility]    [facility]  [phone]

## 2022-12-30 NOTE — PLAN OF CARE
Goal Outcome Evaluation:            Patient resting in bed, hallucinating with tremors, but seems alert to the fact that he is hallucinating. HR was in the 160s but new orders for IV metoprolol seem to have controlled the HR.    Vital signs remain stable and pain denies any pain, denies chest pain.     Patient remains cooperative in care and all questions were answered. RN will continue to round on patient per hospital protocol.

## 2022-12-30 NOTE — PROGRESS NOTES
Name: Watson Lisa ADMIT: 2022   : 1955  PCP: Checo Dunham MD    MRN: [id number] LOS: 2 days   AGE/SEX: 67 y.o. male  ROOM: [email or web address]     Subjective   Subjective   Seems less confused today. Complaining of left knee pain with movement    Review of Systems   Unable to perform ROS: Mental status change      Objective   Objective   Vital Signs  Temp:  [97.6 °F (36.4 °C)-98.3 °F (36.8 °C)] 97.6 °F (36.4 °C)  Heart Rate:  [] 96  Resp:  [18-20] 18  BP: (150-168)/(85-92) 152/92  SpO2:  [95 %-100 %] 99 %  on   ;   Device (Oxygen Therapy): room air  Body mass index is 28.98 kg/m².    Physical Exam  Constitutional:       Appearance: He is ill-appearing. He is not toxic-appearing.   HENT:      Head: Normocephalic.   Cardiovascular:      Rate and Rhythm: Normal rate and regular rhythm.   Pulmonary:      Effort: Pulmonary effort is normal. No respiratory distress.      Breath sounds: Normal breath sounds. No wheezing, rhonchi or rales.   Abdominal:      General: Bowel sounds are normal.      Palpations: Abdomen is soft.      Tenderness: There is no abdominal tenderness. There is no guarding or rebound.   Musculoskeletal:         General: No swelling.      Right lower leg: No edema.      Left lower leg: No edema.   Skin:     General: Skin is warm and dry.   Neurological:      Comments: More appropriate. Oriented to [facility], year . He knows he has problems with alcohol but does not know specifics of why he is in the hospital   Psychiatric:         Attention and Perception: He is attentive.         Behavior: Behavior is not aggressive.      Comments: A little restless     Results Review  I reviewed the patient's new clinical results.  Results from last 7 days   Lab Units 22  0607 22  0539 22  1746   WBC 10*3/mm3 8.55 9.90 13.53*   HEMOGLOBIN g/dL 10.2* 9.9* 10.8*   PLATELETS 10*3/mm3 96* 75* 82*     Results from last 7 days   Lab Units 22  0607 22  1758 22  0539  12/28/22  1709   SODIUM mmol/L 137 138 141 140   POTASSIUM mmol/L 3.4* 3.3* 3.6 4.3   CHLORIDE mmol/L 97* 101 99 98   CO2 mmol/L 24.8 20.2* 22.0 20.0*   BUN mg/dL 22 32* 37* 37*   CREATININE mg/dL 0.91 1.17 1.55* 2.14*   GLUCOSE mg/dL 93 110* 92 89     Lab Results   Component Value Date    ANIONGAP 15.2 (H) 12/30/2022     Estimated Creatinine Clearance: 113.2 mL/min (by C-G formula based on SCr of 0.91 mg/dL).    Results from last 7 days   Lab Units 12/30/22  0607 12/29/22  1758 12/28/22  1709   ALBUMIN g/dL 3.7 3.4* 3.8   BILIRUBIN mg/dL 0.7 0.6 1.6*   ALK PHOS U/L 77 74 77   AST (SGOT) U/L 292* 350* 385*   ALT (SGPT) U/L 115* 113* 107*     Results from last 7 days   Lab Units 12/30/22  0607 12/29/22  1758 12/29/22  0539 12/28/22  1709   CALCIUM mg/dL 8.2* 8.2* 8.1* 8.2*   ALBUMIN g/dL 3.7 3.4*  --  3.8   MAGNESIUM mg/dL 1.4* 1.7  --  1.5*   PHOSPHORUS mg/dL 2.2* 2.4*  --   --        No results found for: HGBA1C, POCGLU    XR Knee 1 or 2 View Left    Result Date: 12/28/2022   As described.  This report was finalized on 12/28/2022 6:48 PM by Dr. Donte Hernandez M.D.        Scheduled Meds  chlordiazePOXIDE, 25 mg, Oral, Q8H  folic acid, 1 mg, Oral, Daily  hydrocortisone-bacitracin-zinc oxide-nystatin, 1 application, Topical, TID  ipratropium-albuterol, 3 mL, Nebulization, 4x Daily - RT  LORazepam, 0.5 mg, Intravenous, Q8H  metoprolol tartrate, 2.5 mg, Intravenous, Q6H  multivitamin, 1 tablet, Oral, Daily  mupirocin, 1 application, Topical, Q12H  spironolactone, 25 mg, Oral, Daily  thiamine, 100 mg, Oral, Daily    Continuous Infusions   PRN Meds  •  acetaminophen  •  LORazepam **OR** LORazepam **OR** LORazepam **OR** LORazepam **OR** LORazepam **OR** LORazepam  •  melatonin  •  nitroglycerin  •  ondansetron **OR** ondansetron  •  [COMPLETED] Insert Peripheral IV **AND** sodium chloride     Diet  Diet: Cardiac Diets, Diabetic Diets; Healthy Heart (2-3 Na+); Consistent Carbohydrate; Texture: Regular Texture (IDDSI  7); Fluid Consistency: Thin (IDDSI 0)    I have personally reviewed:  [x]  Laboratory   []  Microbiology   []  Radiology   [x]  EKG/Telemetry  SR on tele  []  Cardiology/Vascular   []  Pathology    []  Records    CIWA (last day)     Date/Time CIWA-Ar Score    12/30/22 1300 14    12/30/22 0800 8    12/30/22 0326 8    12/29/22 2300 7    12/29/22 2105 8         Assessment/Plan     Active Hospital Problems    Diagnosis  POA   • **DEEP (acute kidney injury) (HCC) [N17.9]  Yes   • Rhabdomyolysis [M62.82]  Unknown   • Left patella fracture [S82.002A]  Unknown   • Elevated troponin [R77.8]  Unknown   • COPD (chronic obstructive pulmonary disease) (HCC) [J44.9]  Yes   • LIVAN (obstructive sleep apnea) [G47.33]  Yes   • ETOH abuse [F10.10]  Yes   • Alcohol withdrawal syndrome (HCC) [F10.939]  Unknown   • Essential hypertension [I10]  Yes      Resolved Hospital Problems   No resolved problems to display.     67 y.o. male with a history of alcohol abuse, LIVAN, COPD, hypertension presented to the ED after a fall. Poor historian and currently in withdrawals. Per ED had a fall and was on the ground several hours. Daily alcohol use and drank heavily over [place]    Alcohol abuse and withdrawal  -CIWA most recent 14  -Multiple doses of Ativan   -adding scheduled librium  -vitamins  -access  -Thrombocytopenia likely due to acute alcohol toxicity monitor for improvement    Rhabdomyolysis resulting in acute kidney injury  -Continue IVF per nephrology  -Creatinine normal and CPK improved  -Rhabdomyolysis due to immobility  -AST>ALT elevation from muscle also improved    Troponin elevation  -likely DEEP  -EKG no change    Left patella fracture  -Ortho consulted  -Weightbearing as tolerated in knee immobilizer  -Follow-up 2 to 3 weeks with Dr. Saleh will likely require knee replacement in the future  -Add as needed pain medication    LIVAN, COPD  -nebs    SCDs for DVT prophylaxis    Discussed with patient, nursing staff, CCP and care team on  multidisciplinary rounds    Discharge: Probably a few days    Emmanuel Hair MD  [facility]  12/30/22

## 2022-12-31 LAB
ALBUMIN SERPL-MCNC: 3.4 G/DL (ref 3.5–5.2)
ALBUMIN/GLOB SERPL: 1.1 G/DL
ALP SERPL-CCNC: 74 U/L (ref 39–117)
ALT SERPL W P-5'-P-CCNC: 95 U/L (ref 1–41)
ANION GAP SERPL CALCULATED.3IONS-SCNC: 13.6 MMOL/L (ref 5–15)
AST SERPL-CCNC: 168 U/L (ref 1–40)
BILIRUB SERPL-MCNC: 0.6 MG/DL (ref 0–1.2)
BUN SERPL-MCNC: 20 MG/DL (ref 8–23)
BUN/CREAT SERPL: 21.7 (ref 7–25)
CALCIUM SPEC-SCNC: 8.3 MG/DL (ref 8.6–10.5)
CHLORIDE SERPL-SCNC: 99 MMOL/L (ref 98–107)
CK SERPL-CCNC: 5568 U/L (ref 20–200)
CO2 SERPL-SCNC: 25.4 MMOL/L (ref 22–29)
CREAT SERPL-MCNC: 0.92 MG/DL (ref 0.76–1.27)
EGFRCR SERPLBLD CKD-EPI 2021: 90.6 ML/MIN/1.73
GLOBULIN UR ELPH-MCNC: 3.2 GM/DL
GLUCOSE SERPL-MCNC: 140 MG/DL (ref 65–99)
MAGNESIUM SERPL-MCNC: 1.3 MG/DL (ref 1.6–2.4)
PHOSPHATE SERPL-MCNC: 3.3 MG/DL (ref 2.5–4.5)
POTASSIUM SERPL-SCNC: 3.3 MMOL/L (ref 3.5–5.2)
PROT SERPL-MCNC: 6.6 G/DL (ref 6–8.5)
SODIUM SERPL-SCNC: 138 MMOL/L (ref 136–145)

## 2022-12-31 PROCEDURE — 84100 ASSAY OF PHOSPHORUS: CPT | Performed by: HOSPITALIST

## 2022-12-31 PROCEDURE — 80053 COMPREHEN METABOLIC PANEL: CPT | Performed by: HOSPITALIST

## 2022-12-31 PROCEDURE — 83735 ASSAY OF MAGNESIUM: CPT | Performed by: HOSPITALIST

## 2022-12-31 PROCEDURE — 82550 ASSAY OF CK (CPK): CPT | Performed by: HOSPITALIST

## 2022-12-31 PROCEDURE — 25010000002 LORAZEPAM PER 2 MG: Performed by: EMERGENCY MEDICINE

## 2022-12-31 PROCEDURE — 94664 DEMO&/EVAL PT USE INHALER: CPT

## 2022-12-31 PROCEDURE — 94761 N-INVAS EAR/PLS OXIMETRY MLT: CPT

## 2022-12-31 PROCEDURE — 94799 UNLISTED PULMONARY SVC/PX: CPT

## 2022-12-31 PROCEDURE — 0 MAGNESIUM SULFATE 4 GM/100ML SOLUTION: Performed by: INTERNAL MEDICINE

## 2022-12-31 RX ORDER — POTASSIUM CHLORIDE 7.45 MG/ML
10 INJECTION INTRAVENOUS
Status: DISCONTINUED | OUTPATIENT
Start: 2022-12-31 | End: 2023-01-01

## 2022-12-31 RX ORDER — POTASSIUM CHLORIDE 750 MG/1
40 TABLET, FILM COATED, EXTENDED RELEASE ORAL AS NEEDED
Status: DISCONTINUED | OUTPATIENT
Start: 2022-12-31 | End: 2023-01-01

## 2022-12-31 RX ORDER — POTASSIUM CHLORIDE 1.5 G/1.77G
40 POWDER, FOR SOLUTION ORAL AS NEEDED
Status: DISCONTINUED | OUTPATIENT
Start: 2022-12-31 | End: 2023-01-01

## 2022-12-31 RX ORDER — MAGNESIUM SULFATE HEPTAHYDRATE 40 MG/ML
4 INJECTION, SOLUTION INTRAVENOUS ONCE
Status: COMPLETED | OUTPATIENT
Start: 2022-12-31 | End: 2022-12-31

## 2022-12-31 RX ADMIN — LORAZEPAM 0.5 MG: 0.5 TABLET ORAL at 16:46

## 2022-12-31 RX ADMIN — METOPROLOL TARTRATE 2.5 MG: 1 INJECTION, SOLUTION INTRAVENOUS at 16:46

## 2022-12-31 RX ADMIN — METOPROLOL TARTRATE 2.5 MG: 1 INJECTION, SOLUTION INTRAVENOUS at 04:01

## 2022-12-31 RX ADMIN — ZINC OXIDE 1 APPLICATION: 200 OINTMENT TOPICAL at 09:18

## 2022-12-31 RX ADMIN — METOPROLOL TARTRATE 2.5 MG: 1 INJECTION, SOLUTION INTRAVENOUS at 23:09

## 2022-12-31 RX ADMIN — SPIRONOLACTONE 25 MG: 25 TABLET ORAL at 09:17

## 2022-12-31 RX ADMIN — ZINC OXIDE 1 APPLICATION: 200 OINTMENT TOPICAL at 17:03

## 2022-12-31 RX ADMIN — METOPROLOL TARTRATE 2.5 MG: 1 INJECTION, SOLUTION INTRAVENOUS at 13:02

## 2022-12-31 RX ADMIN — MUPIROCIN 1 APPLICATION: 20 OINTMENT TOPICAL at 20:52

## 2022-12-31 RX ADMIN — ZINC OXIDE 1 APPLICATION: 200 OINTMENT TOPICAL at 20:52

## 2022-12-31 RX ADMIN — IPRATROPIUM BROMIDE AND ALBUTEROL SULFATE 3 ML: .5; 3 SOLUTION RESPIRATORY (INHALATION) at 20:22

## 2022-12-31 RX ADMIN — MAGNESIUM SULFATE 4 G: 4 INJECTION INTRAVENOUS at 16:46

## 2022-12-31 RX ADMIN — Medication 100 MG: at 09:17

## 2022-12-31 RX ADMIN — LORAZEPAM 0.5 MG: 2 INJECTION INTRAMUSCULAR; INTRAVENOUS at 03:49

## 2022-12-31 RX ADMIN — POTASSIUM CHLORIDE 40 MEQ: 750 TABLET, EXTENDED RELEASE ORAL at 13:02

## 2022-12-31 RX ADMIN — IPRATROPIUM BROMIDE AND ALBUTEROL SULFATE 3 ML: .5; 3 SOLUTION RESPIRATORY (INHALATION) at 15:51

## 2022-12-31 RX ADMIN — MUPIROCIN 1 APPLICATION: 20 OINTMENT TOPICAL at 09:18

## 2022-12-31 RX ADMIN — IPRATROPIUM BROMIDE AND ALBUTEROL SULFATE 3 ML: .5; 3 SOLUTION RESPIRATORY (INHALATION) at 11:34

## 2022-12-31 RX ADMIN — Medication 10 ML: at 09:17

## 2022-12-31 RX ADMIN — POTASSIUM CHLORIDE 40 MEQ: 750 TABLET, EXTENDED RELEASE ORAL at 09:17

## 2022-12-31 RX ADMIN — Medication 1 TABLET: at 09:17

## 2022-12-31 RX ADMIN — LORAZEPAM 0.5 MG: 2 INJECTION INTRAMUSCULAR; INTRAVENOUS at 21:01

## 2022-12-31 RX ADMIN — CHLORDIAZEPOXIDE HYDROCHLORIDE 25 MG: 25 CAPSULE ORAL at 06:18

## 2022-12-31 RX ADMIN — IPRATROPIUM BROMIDE AND ALBUTEROL SULFATE 3 ML: .5; 3 SOLUTION RESPIRATORY (INHALATION) at 08:05

## 2022-12-31 RX ADMIN — FOLIC ACID 1 MG: 1 TABLET ORAL at 09:17

## 2022-12-31 RX ADMIN — CHLORDIAZEPOXIDE HYDROCHLORIDE 25 MG: 25 CAPSULE ORAL at 13:02

## 2022-12-31 NOTE — PROGRESS NOTES
Name: Watson Lisa ADMIT: 2022   : 1955  PCP: Checo Dunham MD    MRN: [id number] LOS: 3 days   AGE/SEX: 67 y.o. male  ROOM: [facility]     Subjective   Subjective    Appropriate. He states his left knee is feeling better today    Review of Systems   Respiratory: Negative for cough and shortness of breath.    Cardiovascular: Negative for chest pain.   Gastrointestinal: Negative for abdominal pain.      Objective   Objective   Vital Signs  Temp:  [97.5 °F (36.4 °C)-98 °F (36.7 °C)] 98 °F (36.7 °C)  Heart Rate:  [83-99] 94  Resp:  [16-18] 18  BP: (143-182)/() 143/79  SpO2:  [96 %-99 %] 98 %  on   ;   Device (Oxygen Therapy): CPAP;room air  Body mass index is 28.95 kg/m².    Physical Exam  Constitutional:       Appearance: He is ill-appearing. He is not toxic-appearing.   HENT:      Head: Normocephalic.   Cardiovascular:      Rate and Rhythm: Normal rate and regular rhythm.   Pulmonary:      Effort: Pulmonary effort is normal. No respiratory distress.      Breath sounds: Normal breath sounds. No wheezing, rhonchi or rales.   Abdominal:      General: Bowel sounds are normal.      Palpations: Abdomen is soft.      Tenderness: There is no abdominal tenderness. There is no guarding or rebound.   Musculoskeletal:         General: No swelling.      Right lower leg: No edema.      Left lower leg: No edema.   Skin:     General: Skin is warm and dry.   Neurological:      General: No focal deficit present.   Psychiatric:         Attention and Perception: He is attentive.         Behavior: Behavior is not aggressive.     Results Review  I reviewed the patient's new clinical results.  Results from last 7 days   Lab Units 22  0607 22  0539 22  1746   WBC 10*3/mm3 8.55 9.90 13.53*   HEMOGLOBIN g/dL 10.2* 9.9* 10.8*   PLATELETS 10*3/mm3 96* 75* 82*     Results from last 7 days   Lab Units 22  0717 22  0607 22  1758 22  0539   SODIUM mmol/L 138 137 138 141   POTASSIUM  mmol/L 3.3* 3.4* 3.3* 3.6   CHLORIDE mmol/L 99 97* 101 99   CO2 mmol/L 25.4 24.8 20.2* 22.0   BUN mg/dL 20 22 32* 37*   CREATININE mg/dL 0.92 0.91 1.17 1.55*   GLUCOSE mg/dL 140* 93 110* 92     Lab Results   Component Value Date    ANIONGAP 13.6 12/31/2022     Estimated Creatinine Clearance: 112 mL/min (by C-G formula based on SCr of 0.92 mg/dL).    Results from last 7 days   Lab Units 12/31/22  0717 12/30/22  0607 12/29/22  1758 12/28/22  1709   ALBUMIN g/dL 3.4* 3.7 3.4* 3.8   BILIRUBIN mg/dL 0.6 0.7 0.6 1.6*   ALK PHOS U/L 74 77 74 77   AST (SGOT) U/L 168* 292* 350* 385*   ALT (SGPT) U/L 95* 115* 113* 107*     Results from last 7 days   Lab Units 12/31/22  0717 12/30/22  0607 12/29/22  1758 12/29/22  0539 12/28/22  1709   CALCIUM mg/dL 8.3* 8.2* 8.2* 8.1* 8.2*   ALBUMIN g/dL 3.4* 3.7 3.4*  --  3.8   MAGNESIUM mg/dL 1.3* 1.4* 1.7  --  1.5*   PHOSPHORUS mg/dL 3.3 2.2* 2.4*  --   --         Results from last 7 days   Lab Units 12/31/22  0717   CK TOTAL U/L 5,568*      No results found for: HGBA1C, POCGLU    No radiology results for the last day    Scheduled Meds  chlordiazePOXIDE, 25 mg, Oral, Q8H  folic acid, 1 mg, Oral, Daily  hydrocortisone-bacitracin-zinc oxide-nystatin, 1 application, Topical, TID  ipratropium-albuterol, 3 mL, Nebulization, 4x Daily - RT  magnesium sulfate, 4 g, Intravenous, Once  metoprolol tartrate, 2.5 mg, Intravenous, Q6H  mupirocin, 1 application, Topical, Q12H  spironolactone, 25 mg, Oral, Daily  thiamine, 100 mg, Oral, Daily    Continuous Infusions   PRN Meds  •  acetaminophen  •  LORazepam **OR** LORazepam **OR** LORazepam **OR** LORazepam **OR** LORazepam **OR** LORazepam  •  melatonin  •  Morphine  •  nitroglycerin  •  ondansetron **OR** ondansetron  •  oxyCODONE  •  potassium chloride **OR** potassium chloride **OR** potassium chloride  •  [COMPLETED] Insert Peripheral IV **AND** sodium chloride     Diet  Diet: Cardiac Diets, Diabetic Diets; Healthy Heart (2-3 Na+); Consistent  Carbohydrate; Texture: Regular Texture (IDDSI 7); Fluid Consistency: Thin (IDDSI 0)    I have personally reviewed:  [x]  Laboratory   []  Microbiology   []  Radiology   [x]  EKG/Telemetry  SR on tele   []  Cardiology/Vascular   []  Pathology    []  Records    CIWA (last day)     Date/Time CIWA-Ar Score    12/31/22 1200 4    12/31/22 0800 5    12/31/22 0347 9    12/31/22 0200 7    12/31/22 0000 7    12/30/22 2200 7    12/30/22 2000 8    12/30/22 1648 14         Assessment/Plan     Active Hospital Problems    Diagnosis  POA   • **DEEP (acute kidney injury) ([facility]) [N17.9]  Yes   • Rhabdomyolysis [M62.82]  Unknown   • Left patella fracture [S82.002A]  Unknown   • Elevated troponin [R77.8]  Unknown   • COPD (chronic obstructive pulmonary disease) ([facility]) [J44.9]  Yes   • LIVAN (obstructive sleep apnea) [G47.33]  Yes   • ETOH abuse [F10.10]  Yes   • Alcohol withdrawal syndrome ([facility]) [F10.939]  Unknown   • Essential hypertension [I10]  Yes      Resolved Hospital Problems   No resolved problems to display.     67 y.o. male with a history of alcohol abuse, LIVAN, COPD, hypertension presented to the ED after a fall. Poor historian and currently in withdrawals. Per ED had a fall and was on the ground several hours. Daily alcohol use and drank heavily over [place]    Alcohol abuse and withdrawal  -CIWA most recent 4  -Using less Ativan  -Stop scheduled librium  -vitamins  -access following  -Thrombocytopenia likely due to acute alcohol toxicity monitor for improvement (some improvement    Rhabdomyolysis resulting in acute kidney injury  -Off IVF per nephrology  -Creatinine normal and CPK improving  -Rhabdomyolysis due to immobility  -AST>ALT elevation from muscle also improved    Troponin elevation  -likely DEEP  -EKG no change    Left patella fracture  -Ortho recommends weightbearing as tolerated in knee immobilizer  -Follow-up 2 to 3 weeks with Dr. Saleh will likely require knee replacement in the future  -Add as needed pain  medication    LIVAN, COPD  -nebs    SCDs for DVT prophylaxis    Discussed with patient and nursing staff    Discharge: Probably a couple days    Emmanuel Hair MD  [facility]  12/31/22

## 2022-12-31 NOTE — PLAN OF CARE
Goal Outcome Evaluation:  Patient CIWA 5 and 4 this shift, respectively.  He has slept much of the shift.  Administered Librium early d/t moderate tremors.  Patient scored 4 on CIWA at that time and wanted to prevent worsening symptoms.  Patient has not had worsening symptoms. No c/o pain or other discomfort this shift. Will continue to monitor.

## 2022-12-31 NOTE — NURSING NOTE
"Access Center follow-up for alcohol use.  Reviewed chart and spoke with RN.  Upon entering room, he is awake, RIB.  Noted to have tremors, he continues to report visual hallucinations-though the IV pole was a \"creature\" and his room being \"upside down\".  He reports poor sleep, no SI/HI.  He is interested in outpatient JANELL tx; resources provided.  VS reveal occasional high blood pressure, most recent CIWA score was a 9.    Access following.       "

## 2022-12-31 NOTE — PLAN OF CARE
Goal Outcome Evaluation:    Problem: Adult Inpatient Plan of Care  Goal: Absence of Hospital-Acquired Illness or Injury  Intervention: Identify and Manage Fall Risk  Recent Flowsheet Documentation  Taken 12/31/2022 0200 by Indigo Quesada RN  Safety Promotion/Fall Prevention:  • toileting scheduled  • safety round/check completed  • lighting adjusted  • fall prevention program maintained  • activity supervised  Taken 12/31/2022 0000 by Indigo Quesada RN  Safety Promotion/Fall Prevention:  • activity supervised  • toileting scheduled  • nonskid shoes/slippers when out of bed  • lighting adjusted  • clutter free environment maintained  Taken 12/30/2022 2200 by Indigo Quesada RN  Safety Promotion/Fall Prevention: safety round/check completed  Taken 12/30/2022 2000 by Indigo Quesada RN  Safety Promotion/Fall Prevention: safety round/check completed     Problem: Adult Inpatient Plan of Care  Goal: Absence of Hospital-Acquired Illness or Injury  Intervention: Prevent Skin Injury  Recent Flowsheet Documentation  Taken 12/31/2022 0200 by Indigo Quesada RN  Body Position:  • position changed independently  • supine, legs elevated  Taken 12/31/2022 0000 by Indigo Quesada RN  Body Position: position changed independently  Skin Protection:  • adhesive use limited  • tubing/devices free from skin contact  Taken 12/30/2022 2200 by Indigo Quesada RN  Body Position:  • position changed independently  • turned  • right  Taken 12/30/2022 2000 by Indigo Quesada RN  Body Position:  • position changed independently  • right  • left  Skin Protection:  • adhesive use limited  • tubing/devices free from skin contact     Problem: Adult Inpatient Plan of Care  Goal: Absence of Hospital-Acquired Illness or Injury  Intervention: Prevent and Manage VTE (Venous Thromboembolism) Risk  Recent Flowsheet Documentation  Taken 12/31/2022 0200 by Indigo Quesada RN  Activity Management: activity adjusted per tolerance  Taken  12/31/2022 0000 by Indigo Quesada, RN  Activity Management: activity adjusted per tolerance  Taken 12/30/2022 2200 by Indigo Quesada RN  Activity Management: activity adjusted per tolerance  Taken 12/30/2022 2000 by Indigo Quesada, RN  Activity Management: activity adjusted per tolerance  VTE Prevention/Management:  • bilateral  • sequential compression devices off  • patient refused intervention  Range of Motion: active ROM (range of motion) encouraged   Plan of Care Reviewed With: patient denies pain, resting vital signs stable  Notified Shen ROSAS pt alevated BP no new orders given

## 2023-01-01 PROBLEM — N17.9 AKI (ACUTE KIDNEY INJURY): Status: RESOLVED | Noted: 2022-12-28 | Resolved: 2023-01-01

## 2023-01-01 PROBLEM — F10.239 ALCOHOL DEPENDENCE WITH WITHDRAWAL: Status: ACTIVE | Noted: 2020-06-24

## 2023-01-01 PROBLEM — E87.6 HYPOKALEMIA: Status: ACTIVE | Noted: 2023-01-01

## 2023-01-01 PROBLEM — D69.6 THROMBOCYTOPENIA (HCC): Status: ACTIVE | Noted: 2023-01-01

## 2023-01-01 LAB
ALBUMIN SERPL-MCNC: 3.5 G/DL (ref 3.5–5.2)
ANION GAP SERPL CALCULATED.3IONS-SCNC: 11.3 MMOL/L (ref 5–15)
BASOPHILS # BLD AUTO: 0.02 10*3/MM3 (ref 0–0.2)
BASOPHILS NFR BLD AUTO: 0.3 % (ref 0–1.5)
BUN SERPL-MCNC: 18 MG/DL (ref 8–23)
BUN/CREAT SERPL: 19.1 (ref 7–25)
CALCIUM SPEC-SCNC: 8.9 MG/DL (ref 8.6–10.5)
CHLORIDE SERPL-SCNC: 101 MMOL/L (ref 98–107)
CO2 SERPL-SCNC: 26.7 MMOL/L (ref 22–29)
CREAT SERPL-MCNC: 0.94 MG/DL (ref 0.76–1.27)
DEPRECATED RDW RBC AUTO: 42.8 FL (ref 37–54)
EGFRCR SERPLBLD CKD-EPI 2021: 88.3 ML/MIN/1.73
EOSINOPHIL # BLD AUTO: 0.22 10*3/MM3 (ref 0–0.4)
EOSINOPHIL NFR BLD AUTO: 3.2 % (ref 0.3–6.2)
ERYTHROCYTE [DISTWIDTH] IN BLOOD BY AUTOMATED COUNT: 12.6 % (ref 12.3–15.4)
GLUCOSE SERPL-MCNC: 135 MG/DL (ref 65–99)
HCT VFR BLD AUTO: 30.8 % (ref 37.5–51)
HGB BLD-MCNC: 10.8 G/DL (ref 13–17.7)
IMM GRANULOCYTES # BLD AUTO: 0.08 10*3/MM3 (ref 0–0.05)
IMM GRANULOCYTES NFR BLD AUTO: 1.2 % (ref 0–0.5)
LYMPHOCYTES # BLD AUTO: 0.85 10*3/MM3 (ref 0.7–3.1)
LYMPHOCYTES NFR BLD AUTO: 12.4 % (ref 19.6–45.3)
MAGNESIUM SERPL-MCNC: 1.9 MG/DL (ref 1.6–2.4)
MCH RBC QN AUTO: 32.5 PG (ref 26.6–33)
MCHC RBC AUTO-ENTMCNC: 35.1 G/DL (ref 31.5–35.7)
MCV RBC AUTO: 92.8 FL (ref 79–97)
MONOCYTES # BLD AUTO: 1.08 10*3/MM3 (ref 0.1–0.9)
MONOCYTES NFR BLD AUTO: 15.8 % (ref 5–12)
NEUTROPHILS NFR BLD AUTO: 4.58 10*3/MM3 (ref 1.7–7)
NEUTROPHILS NFR BLD AUTO: 67.1 % (ref 42.7–76)
NRBC BLD AUTO-RTO: 0 /100 WBC (ref 0–0.2)
PHOSPHATE SERPL-MCNC: 3.7 MG/DL (ref 2.5–4.5)
PLATELET # BLD AUTO: 170 10*3/MM3 (ref 140–450)
PMV BLD AUTO: 10 FL (ref 6–12)
POTASSIUM SERPL-SCNC: 3.3 MMOL/L (ref 3.5–5.2)
POTASSIUM SERPL-SCNC: 3.3 MMOL/L (ref 3.5–5.2)
RBC # BLD AUTO: 3.32 10*6/MM3 (ref 4.14–5.8)
SODIUM SERPL-SCNC: 139 MMOL/L (ref 136–145)
WBC NRBC COR # BLD: 6.83 10*3/MM3 (ref 3.4–10.8)

## 2023-01-01 PROCEDURE — 94664 DEMO&/EVAL PT USE INHALER: CPT

## 2023-01-01 PROCEDURE — 94799 UNLISTED PULMONARY SVC/PX: CPT

## 2023-01-01 PROCEDURE — 25010000002 LORAZEPAM PER 2 MG: Performed by: EMERGENCY MEDICINE

## 2023-01-01 PROCEDURE — 94761 N-INVAS EAR/PLS OXIMETRY MLT: CPT

## 2023-01-01 PROCEDURE — 83735 ASSAY OF MAGNESIUM: CPT | Performed by: HOSPITALIST

## 2023-01-01 PROCEDURE — 94660 CPAP INITIATION&MGMT: CPT

## 2023-01-01 PROCEDURE — 80069 RENAL FUNCTION PANEL: CPT | Performed by: INTERNAL MEDICINE

## 2023-01-01 PROCEDURE — 85025 COMPLETE CBC W/AUTO DIFF WBC: CPT | Performed by: INTERNAL MEDICINE

## 2023-01-01 PROCEDURE — 84132 ASSAY OF SERUM POTASSIUM: CPT | Performed by: HOSPITALIST

## 2023-01-01 RX ORDER — LOSARTAN POTASSIUM 100 MG/1
100 TABLET ORAL
Status: DISCONTINUED | OUTPATIENT
Start: 2023-01-01 | End: 2023-01-01

## 2023-01-01 RX ORDER — SPIRONOLACTONE 50 MG/1
50 TABLET, FILM COATED ORAL DAILY
Status: DISCONTINUED | OUTPATIENT
Start: 2023-01-02 | End: 2023-01-01

## 2023-01-01 RX ORDER — POTASSIUM CHLORIDE 750 MG/1
20 TABLET, FILM COATED, EXTENDED RELEASE ORAL DAILY
Status: DISCONTINUED | OUTPATIENT
Start: 2023-01-01 | End: 2023-01-05 | Stop reason: HOSPADM

## 2023-01-01 RX ORDER — METOPROLOL SUCCINATE 25 MG/1
25 TABLET, EXTENDED RELEASE ORAL
Status: DISCONTINUED | OUTPATIENT
Start: 2023-01-02 | End: 2023-01-05 | Stop reason: HOSPADM

## 2023-01-01 RX ORDER — LOSARTAN POTASSIUM 50 MG/1
50 TABLET ORAL
Status: DISCONTINUED | OUTPATIENT
Start: 2023-01-01 | End: 2023-01-05 | Stop reason: HOSPADM

## 2023-01-01 RX ORDER — SPIRONOLACTONE 25 MG/1
25 TABLET ORAL DAILY
Status: DISCONTINUED | OUTPATIENT
Start: 2023-01-02 | End: 2023-01-05 | Stop reason: HOSPADM

## 2023-01-01 RX ADMIN — POTASSIUM CHLORIDE 40 MEQ: 750 TABLET, EXTENDED RELEASE ORAL at 11:00

## 2023-01-01 RX ADMIN — ZINC OXIDE 1 APPLICATION: 200 OINTMENT TOPICAL at 08:36

## 2023-01-01 RX ADMIN — ZINC OXIDE 1 APPLICATION: 200 OINTMENT TOPICAL at 17:29

## 2023-01-01 RX ADMIN — MUPIROCIN 1 APPLICATION: 20 OINTMENT TOPICAL at 08:36

## 2023-01-01 RX ADMIN — METOPROLOL TARTRATE 2.5 MG: 1 INJECTION, SOLUTION INTRAVENOUS at 05:10

## 2023-01-01 RX ADMIN — SPIRONOLACTONE 25 MG: 25 TABLET ORAL at 08:36

## 2023-01-01 RX ADMIN — Medication 10 ML: at 08:36

## 2023-01-01 RX ADMIN — FOLIC ACID 1 MG: 1 TABLET ORAL at 08:36

## 2023-01-01 RX ADMIN — ZINC OXIDE 1 APPLICATION: 200 OINTMENT TOPICAL at 20:00

## 2023-01-01 RX ADMIN — LORAZEPAM 0.5 MG: 2 INJECTION INTRAMUSCULAR; INTRAVENOUS at 05:10

## 2023-01-01 RX ADMIN — LORAZEPAM 0.5 MG: 0.5 TABLET ORAL at 15:59

## 2023-01-01 RX ADMIN — LOSARTAN POTASSIUM 50 MG: 50 TABLET, FILM COATED ORAL at 15:59

## 2023-01-01 RX ADMIN — Medication 100 MG: at 08:36

## 2023-01-01 RX ADMIN — METOPROLOL TARTRATE 2.5 MG: 1 INJECTION, SOLUTION INTRAVENOUS at 11:00

## 2023-01-01 RX ADMIN — IPRATROPIUM BROMIDE AND ALBUTEROL SULFATE 3 ML: .5; 3 SOLUTION RESPIRATORY (INHALATION) at 08:22

## 2023-01-01 RX ADMIN — MUPIROCIN 1 APPLICATION: 20 OINTMENT TOPICAL at 20:00

## 2023-01-01 RX ADMIN — LORAZEPAM 0.5 MG: 2 INJECTION INTRAMUSCULAR; INTRAVENOUS at 21:12

## 2023-01-01 NOTE — NURSING NOTE
Access center following for ETOH: Last CIWA score of 8. PRN Ativan given per MAR. Pt given JANELL resources yesterday. Will follow for support.

## 2023-01-01 NOTE — PLAN OF CARE
Goal Outcome Evaluation:    Problem: Adult Inpatient Plan of Care  Goal: Absence of Hospital-Acquired Illness or Injury  Intervention: Identify and Manage Fall Risk  Recent Flowsheet Documentation  Taken 1/1/2023 0400 by Indigo Quesada RN  Safety Promotion/Fall Prevention:   safety round/check completed   nonskid shoes/slippers when out of bed   clutter free environment maintained   activity supervised  Taken 1/1/2023 0200 by Indigo Quesada RN  Safety Promotion/Fall Prevention:   room organization consistent   safety round/check completed   nonskid shoes/slippers when out of bed   clutter free environment maintained   activity supervised   lighting adjusted  Taken 1/1/2023 0000 by Indigo Quesada RN  Safety Promotion/Fall Prevention:   safety round/check completed   toileting scheduled   nonskid shoes/slippers when out of bed   lighting adjusted   fall prevention program maintained   activity supervised   clutter free environment maintained  Taken 12/31/2022 2200 by Indigo Quesada RN  Safety Promotion/Fall Prevention: safety round/check completed  Taken 12/31/2022 2053 by Indigo Quesada RN  Safety Promotion/Fall Prevention:   safety round/check completed   toileting scheduled   nonskid shoes/slippers when out of bed   clutter free environment maintained   activity supervised   fall prevention program maintained     Problem: Adult Inpatient Plan of Care  Goal: Absence of Hospital-Acquired Illness or Injury  Intervention: Prevent Skin Injury  Recent Flowsheet Documentation  Taken 1/1/2023 0400 by Indigo Quesada RN  Body Position: position changed independently  Taken 1/1/2023 0200 by Indigo Quesada RN  Body Position:   position changed independently   weight shifting  Taken 1/1/2023 0000 by Indigo Quesada RN  Body Position: supine, legs elevated  Taken 12/31/2022 2200 by Indigo Quesada RN  Body Position:   position changed independently   turned   left  Taken 12/31/2022 2053 by Dipak  POP Sood  Body Position:   position changed independently   turned   right  Skin Protection:   adhesive use limited   tubing/devices free from skin contact   Plan of Care Reviewed With: patient alert on a CIWA protocol, room air denies pain denies pain will continue to monitor

## 2023-01-01 NOTE — PLAN OF CARE
Goal Outcome Evaluation:  Patient showing improvement today.  CIWA scores lower this shift, requiring medication only once for a CIWA of 8.  HTN today, MD ordered medication and med administered per eMAR.  He has had two BM's today.  PT ordered for eval.  Will continue to monitor CIWA and patient.

## 2023-01-01 NOTE — PROGRESS NOTES
[facility] Progress Note      Patient Name: Watson Lisa  : 1955  MRN: [id number]  Primary Care Physician:  Checo Dunham MD  Date of admission: 2022    Subjective     Interval History:   F/u DEEP     Review of Systems:   Tired o/w no new complaints    Objective     Vitals:   Temp:  [97.6 °F (36.4 °C)-98.2 °F (36.8 °C)] 97.8 °F (36.6 °C)  Heart Rate:  [] 93  Resp:  [16-20] 19  BP: (137-159)/(79-95) 150/87    Intake/Output Summary (Last 24 hours) at 2023 1323  Last data filed at 2023 0311  Gross per 24 hour   Intake 350 ml   Output 1270 ml   Net -920 ml       Physical Exam:    chlordiazePOXIDE, 25 mg, Oral, Q8H  folic acid, 1 mg, Oral, Daily  hydrocortisone-bacitracin-zinc oxide-nystatin, 1 application, Topical, TID  ipratropium-albuterol, 3 mL, Nebulization, 4x Daily - RT  metoprolol tartrate, 2.5 mg, Intravenous, Q6H  multivitamin, 1 tablet, Oral, Daily  mupirocin, 1 application, Topical, Q12H  spironolactone, 25 mg, Oral, Daily  thiamine, 100 mg, Oral, Daily    Scheduled Meds:     folic acid, 1 mg, Oral, Daily  hydrocortisone-bacitracin-zinc oxide-nystatin, 1 application, Topical, TID  ipratropium-albuterol, 3 mL, Nebulization, 4x Daily - RT  metoprolol tartrate, 2.5 mg, Intravenous, Q6H  mupirocin, 1 application, Topical, Q12H  spironolactone, 25 mg, Oral, Daily  thiamine, 100 mg, Oral, Daily      IV Meds:        Results Reviewed:   I have personally reviewed the results from the time of this admission to 2023 13:23 EST     Results from last 7 days   Lab Units 23  0814 22  0717 22  0607 22  1758   SODIUM mmol/L 139 138 137 138   POTASSIUM mmol/L 3.3*  3.3* 3.3* 3.4* 3.3*   CHLORIDE mmol/L 101 99 97* 101   CO2 mmol/L 26.7 25.4 24.8 20.2*   BUN mg/dL 18 20 22 32*   CREATININE mg/dL 0.94 0.92 0.91 1.17   CALCIUM mg/dL 8.9 8.3* 8.2* 8.2*   BILIRUBIN mg/dL  --  0.6 0.7 0.6   ALK PHOS U/L  --  74 77 74   ALT (SGPT) U/L  --  95*  115* 113*   AST (SGOT) U/L  --  168* 292* 350*   GLUCOSE mg/dL 135* 140* 93 110*     Estimated Creatinine Clearance: 109.6 mL/min (by C-G formula based on SCr of 0.94 mg/dL).  Results from last 7 days   Lab Units 01/01/23  0814 12/31/22  0717 12/30/22  0607   MAGNESIUM mg/dL 1.9 1.3* 1.4*   PHOSPHORUS mg/dL 3.7 3.3 2.2*         Results from last 7 days   Lab Units 01/01/23  0814 12/30/22  0607 12/29/22  0539 12/28/22  1746   WBC 10*3/mm3 6.83 8.55 9.90 13.53*   HEMOGLOBIN g/dL 10.8* 10.2* 9.9* 10.8*   PLATELETS 10*3/mm3 170 96* 75* 82*           Assessment / Plan     ASSESSMENT:  1. Non olig DEEP - due to rhabdo, resolved Cr 0.9 (peak 2.1).  UA with discrepant large blood but no RBCs due to myoglobinuria.  Chronic Left ankle edema but noted to have mild swelling RLE too so added aldactone 25mg on 12/30 (rather than loop diuretic due to hypokalemia)  2. Rhabdomyolysis - suspect was immobilized some period after fall; last CK down to 5K, off IVF  3. Left patella fracture - ortho eval noted, planning non-op mgmt   4. HypoMg , hypokalemia - latter persistent, K 3.3; Mg repleted; not on PPI   5. HTN - BP labile, largely elevated, getting IV lopressor still but able to take PO meds; ARB been on hold, can resume as DEEP resolved    6. Hx ETOH abuse + e/o withdrawal - on [facility] protocol  7. Anemia, TCP - hgb 10.8, PLT now normal     PLAN:  Resume losartan at lower dose 50mg daily   Change IV lopressor to toprol XL 25mg daily   Schedule KCL 20meq daily   Note plan for discharge soon       Charlie Addison MD  01/01/23  13:23 [facility]    [facility]  [phone]

## 2023-01-01 NOTE — PROGRESS NOTES
Name: Watson Lisa ADMIT: 2022   : 1955  PCP: Checo Dunham MD    MRN: [id number] LOS: 4 days   AGE/SEX: 67 y.o. male  ROOM: [facility]     Subjective   Subjective    No new complaints overnight.    Review of Systems   Objective   Objective   Vital Signs  Temp:  [97.6 °F (36.4 °C)-98.2 °F (36.8 °C)] 97.8 °F (36.6 °C)  Heart Rate:  [] 96  Resp:  [16-20] 20  BP: (137-159)/(79-95) 151/91  SpO2:  [96 %-100 %] 100 %  on   ;   Device (Oxygen Therapy): room air  Body mass index is 28.95 kg/m².    Physical Exam  Constitutional:       Appearance: He is ill-appearing. He is not toxic-appearing.   Cardiovascular:      Rate and Rhythm: Regular rhythm. Tachycardia present.   Pulmonary:      Effort: Pulmonary effort is normal.      Breath sounds: Normal breath sounds.   Abdominal:      General: Bowel sounds are normal.      Palpations: Abdomen is soft.   Musculoskeletal:      Right lower leg: No edema.      Left lower leg: No edema.   Skin:     General: Skin is warm and dry.   Neurological:      Mental Status: He is alert and oriented to person, place, and time.      Motor: Tremor present.   Psychiatric:         Behavior: Behavior is cooperative.     Results Review  I reviewed the patient's new clinical results.  Results from last 7 days   Lab Units 23  0814 22  0607 22  0539 22  1746   WBC 10*3/mm3 6.83 8.55 9.90 13.53*   HEMOGLOBIN g/dL 10.8* 10.2* 9.9* 10.8*   PLATELETS 10*3/mm3 170 96* 75* 82*     Results from last 7 days   Lab Units 23  0814 22  0717 22  0607 22  1758   SODIUM mmol/L 139 138 137 138   POTASSIUM mmol/L 3.3*  3.3* 3.3* 3.4* 3.3*   CHLORIDE mmol/L 101 99 97* 101   CO2 mmol/L 26.7 25.4 24.8 20.2*   BUN mg/dL 18 20 22 32*   CREATININE mg/dL 0.94 0.92 0.91 1.17   GLUCOSE mg/dL 135* 140* 93 110*     Results from last 7 days   Lab Units 23  0814 22  0717 22  0607 22  1758 22  0539 22  1709   CALCIUM mg/dL 8.9  8.3* 8.2* 8.2* 8.1* 8.2*   ALBUMIN g/dL 3.5 3.4* 3.7 3.4*  --  3.8   MAGNESIUM mg/dL 1.9 1.3* 1.4* 1.7  --  1.5*   PHOSPHORUS mg/dL 3.7 3.3 2.2* 2.4*  --   --      Results from last 7 days   Lab Units 01/01/23  0814 12/31/22  0717 12/30/22  0607 12/29/22 1758 12/28/22  1709   ALBUMIN g/dL 3.5 3.4* 3.7 3.4* 3.8   BILIRUBIN mg/dL  --  0.6 0.7 0.6 1.6*   ALK PHOS U/L  --  74 77 74 77   AST (SGOT) U/L  --  168* 292* 350* 385*   ALT (SGPT) U/L  --  95* 115* 113* 107*     Results from last 7 days   Lab Units 01/01/23  0814 12/31/22 0717 12/30/22 0607 12/29/22  1758   CALCIUM mg/dL 8.9 8.3* 8.2* 8.2*   ALBUMIN g/dL 3.5 3.4* 3.7 3.4*   MAGNESIUM mg/dL 1.9 1.3* 1.4* 1.7   PHOSPHORUS mg/dL 3.7 3.3 2.2* 2.4*           Results from last 7 days   Lab Units 12/31/22  0717 12/30/22  0607 12/29/22 1758 12/29/22  0539 12/28/22  1709   CK TOTAL U/L 5,568* 11,639* 15,851* 20,825* 20,763*   TROPONIN T ng/mL  --   --   --   --  0.080*       No results found for: HGBA1C, POCGLU    No radiology results for the last day    Scheduled Meds  folic acid, 1 mg, Oral, Daily  hydrocortisone-bacitracin-zinc oxide-nystatin, 1 application, Topical, TID  ipratropium-albuterol, 3 mL, Nebulization, 4x Daily - RT  metoprolol tartrate, 2.5 mg, Intravenous, Q6H  mupirocin, 1 application, Topical, Q12H  spironolactone, 25 mg, Oral, Daily  thiamine, 100 mg, Oral, Daily    Continuous Infusions   PRN Meds  •  acetaminophen  •  LORazepam **OR** LORazepam **OR** LORazepam **OR** LORazepam **OR** LORazepam **OR** LORazepam  •  melatonin  •  Morphine  •  nitroglycerin  •  ondansetron **OR** ondansetron  •  oxyCODONE  •  potassium chloride **OR** potassium chloride **OR** potassium chloride  •  [COMPLETED] Insert Peripheral IV **AND** sodium chloride     Diet  Diet: Cardiac Diets, Diabetic Diets; Healthy Heart (2-3 Na+); Consistent Carbohydrate; Texture: Regular Texture (IDDSI 7); Fluid Consistency: Thin (IDDSI 0)    I have personally reviewed:  [x]   Laboratory   []  Microbiology   []  Radiology   [x]  EKG/Telemetry  SR on tele   []  Cardiology/Vascular   []  Pathology    []  Records    CIWA (last day)     Date/Time CIWA-Ar Score    01/01/23 0800 2    01/01/23 0500 8    01/01/23 0200 5    01/01/23 0000 6    12/31/22 2200 6    12/31/22 2053 8    12/31/22 1622 10    12/31/22 1409 5    12/31/22 1200 4         Assessment/Plan     Active Hospital Problems    Diagnosis  POA   • **DEEP (acute kidney injury) ([facility]) [N17.9]  Yes   • Rhabdomyolysis [M62.82]  Unknown   • Left patella fracture [S82.002A]  Unknown   • Elevated troponin [R77.8]  Unknown   • COPD (chronic obstructive pulmonary disease) ([facility]) [J44.9]  Yes   • LIVAN (obstructive sleep apnea) [G47.33]  Yes   • ETOH abuse [F10.10]  Yes   • Alcohol withdrawal syndrome (HCC) [F10.939]  Unknown   • Essential hypertension [I10]  Yes      Resolved Hospital Problems   No resolved problems to display.       67 y.o. male with a history of alcohol abuse, LIVAN, COPD and hypertension presented to the ED after a fall. Per ED had a fall and was on the ground several hours. Daily alcohol use and drank heavily over Yanely.    Alcohol dependence and withdrawal  -CIWA improving  -Using less Ativan  -vitamins  -access following  -Thrombocytopenia likely due to acute alcohol toxicity, improved    Rhabdomyolysis resulting in acute kidney injury  -Off IVF per nephrology  -Creatinine normal and CPK improving  -Rhabdomyolysis due to immobility  -AST>ALT elevation from muscle also improved    Troponin elevation  -likely DEEP  -EKG no change    Left patella fracture  -Ortho recommends weightbearing as tolerated in knee immobilizer  -Follow-up 2 to 3 weeks with Dr. Saleh will likely require knee replacement in the future  -Add as needed pain medication    LIVAN, COPD  -nebs    SCDs for DVT prophylaxis    Discussed with patient and nursing staff    Discharge: Probably likely in a day or 2.  Needs to work with physical therapy.  Will consult CCP.   Patient lives alone.      Kane Munoz MD  [facility]  01/01/23

## 2023-01-02 PROBLEM — D69.6 THROMBOCYTOPENIA (HCC): Status: RESOLVED | Noted: 2023-01-01 | Resolved: 2023-01-02

## 2023-01-02 PROBLEM — M62.82 RHABDOMYOLYSIS: Status: RESOLVED | Noted: 2022-12-29 | Resolved: 2023-01-02

## 2023-01-02 PROBLEM — E87.6 HYPOKALEMIA: Status: RESOLVED | Noted: 2023-01-01 | Resolved: 2023-01-02

## 2023-01-02 LAB
ANION GAP SERPL CALCULATED.3IONS-SCNC: 11.6 MMOL/L (ref 5–15)
BUN SERPL-MCNC: 19 MG/DL (ref 8–23)
BUN/CREAT SERPL: 20 (ref 7–25)
CALCIUM SPEC-SCNC: 8.6 MG/DL (ref 8.6–10.5)
CHLORIDE SERPL-SCNC: 102 MMOL/L (ref 98–107)
CK SERPL-CCNC: 668 U/L (ref 20–200)
CO2 SERPL-SCNC: 24.4 MMOL/L (ref 22–29)
CREAT SERPL-MCNC: 0.95 MG/DL (ref 0.76–1.27)
DEPRECATED RDW RBC AUTO: 43.9 FL (ref 37–54)
EGFRCR SERPLBLD CKD-EPI 2021: 87.2 ML/MIN/1.73
ERYTHROCYTE [DISTWIDTH] IN BLOOD BY AUTOMATED COUNT: 12.7 % (ref 12.3–15.4)
GLUCOSE SERPL-MCNC: 104 MG/DL (ref 65–99)
HCT VFR BLD AUTO: 30.1 % (ref 37.5–51)
HGB BLD-MCNC: 10.4 G/DL (ref 13–17.7)
MAGNESIUM SERPL-MCNC: 1.7 MG/DL (ref 1.6–2.4)
MCH RBC QN AUTO: 33.3 PG (ref 26.6–33)
MCHC RBC AUTO-ENTMCNC: 34.6 G/DL (ref 31.5–35.7)
MCV RBC AUTO: 96.5 FL (ref 79–97)
PHOSPHATE SERPL-MCNC: 3.7 MG/DL (ref 2.5–4.5)
PLATELET # BLD AUTO: 221 10*3/MM3 (ref 140–450)
PMV BLD AUTO: 9.9 FL (ref 6–12)
POTASSIUM SERPL-SCNC: 3.9 MMOL/L (ref 3.5–5.2)
RBC # BLD AUTO: 3.12 10*6/MM3 (ref 4.14–5.8)
SODIUM SERPL-SCNC: 138 MMOL/L (ref 136–145)
WBC NRBC COR # BLD: 6.85 10*3/MM3 (ref 3.4–10.8)

## 2023-01-02 PROCEDURE — 82550 ASSAY OF CK (CPK): CPT | Performed by: HOSPITALIST

## 2023-01-02 PROCEDURE — 83735 ASSAY OF MAGNESIUM: CPT | Performed by: HOSPITALIST

## 2023-01-02 PROCEDURE — 85027 COMPLETE CBC AUTOMATED: CPT | Performed by: HOSPITALIST

## 2023-01-02 PROCEDURE — 84100 ASSAY OF PHOSPHORUS: CPT | Performed by: HOSPITALIST

## 2023-01-02 PROCEDURE — 97110 THERAPEUTIC EXERCISES: CPT

## 2023-01-02 PROCEDURE — 94799 UNLISTED PULMONARY SVC/PX: CPT

## 2023-01-02 PROCEDURE — 94760 N-INVAS EAR/PLS OXIMETRY 1: CPT

## 2023-01-02 PROCEDURE — 94664 DEMO&/EVAL PT USE INHALER: CPT

## 2023-01-02 PROCEDURE — 97162 PT EVAL MOD COMPLEX 30 MIN: CPT

## 2023-01-02 PROCEDURE — 80048 BASIC METABOLIC PNL TOTAL CA: CPT | Performed by: HOSPITALIST

## 2023-01-02 PROCEDURE — 94761 N-INVAS EAR/PLS OXIMETRY MLT: CPT

## 2023-01-02 PROCEDURE — 25010000002 LORAZEPAM PER 2 MG: Performed by: EMERGENCY MEDICINE

## 2023-01-02 RX ORDER — POTASSIUM CHLORIDE 750 MG/1
40 TABLET, FILM COATED, EXTENDED RELEASE ORAL ONCE
Status: COMPLETED | OUTPATIENT
Start: 2023-01-02 | End: 2023-01-02

## 2023-01-02 RX ORDER — DIPHENOXYLATE HYDROCHLORIDE AND ATROPINE SULFATE 2.5; .025 MG/1; MG/1
1 TABLET ORAL DAILY
Status: DISCONTINUED | OUTPATIENT
Start: 2023-01-02 | End: 2023-01-05 | Stop reason: HOSPADM

## 2023-01-02 RX ADMIN — IPRATROPIUM BROMIDE AND ALBUTEROL SULFATE 3 ML: .5; 3 SOLUTION RESPIRATORY (INHALATION) at 07:58

## 2023-01-02 RX ADMIN — ZINC OXIDE 1 APPLICATION: 200 OINTMENT TOPICAL at 17:30

## 2023-01-02 RX ADMIN — Medication 1 TABLET: at 17:30

## 2023-01-02 RX ADMIN — IPRATROPIUM BROMIDE AND ALBUTEROL SULFATE 3 ML: .5; 3 SOLUTION RESPIRATORY (INHALATION) at 15:48

## 2023-01-02 RX ADMIN — POTASSIUM CHLORIDE 40 MEQ: 750 TABLET, EXTENDED RELEASE ORAL at 09:13

## 2023-01-02 RX ADMIN — LORAZEPAM 1 MG: 2 INJECTION INTRAMUSCULAR; INTRAVENOUS at 09:25

## 2023-01-02 RX ADMIN — LORAZEPAM 0.5 MG: 0.5 TABLET ORAL at 17:34

## 2023-01-02 RX ADMIN — FOLIC ACID 1 MG: 1 TABLET ORAL at 09:13

## 2023-01-02 RX ADMIN — ZINC OXIDE 1 APPLICATION: 200 OINTMENT TOPICAL at 09:13

## 2023-01-02 RX ADMIN — MUPIROCIN 1 APPLICATION: 20 OINTMENT TOPICAL at 20:56

## 2023-01-02 RX ADMIN — LOSARTAN POTASSIUM 50 MG: 50 TABLET, FILM COATED ORAL at 09:13

## 2023-01-02 RX ADMIN — IPRATROPIUM BROMIDE AND ALBUTEROL SULFATE 3 ML: .5; 3 SOLUTION RESPIRATORY (INHALATION) at 11:27

## 2023-01-02 RX ADMIN — ACETAMINOPHEN 650 MG: 325 TABLET, FILM COATED ORAL at 15:14

## 2023-01-02 RX ADMIN — METOPROLOL SUCCINATE 25 MG: 25 TABLET, EXTENDED RELEASE ORAL at 09:13

## 2023-01-02 RX ADMIN — LORAZEPAM 0.5 MG: 0.5 TABLET ORAL at 22:56

## 2023-01-02 RX ADMIN — SPIRONOLACTONE 25 MG: 25 TABLET, FILM COATED ORAL at 09:13

## 2023-01-02 RX ADMIN — MUPIROCIN 1 APPLICATION: 20 OINTMENT TOPICAL at 09:14

## 2023-01-02 RX ADMIN — ZINC OXIDE 1 APPLICATION: 200 OINTMENT TOPICAL at 20:56

## 2023-01-02 RX ADMIN — Medication 100 MG: at 09:13

## 2023-01-02 NOTE — PLAN OF CARE
Goal Outcome Evaluation:  Plan of Care Reviewed With: patient           Outcome Evaluation: Patient is 66 yo male admitted to [facility] after fall at home, has nondisplaced L patella fx. PMH significant for ETOH abuse, anxiety, HTN, PE, prior R TKA. Patient lives alone, independent prior to admission. Today, patient up to EOB with SBA, attempted 2 standing trials at the bedside but patient unable to clear buttocks off bed with maxAx2 and bed surface elevated. Patient instructed in general strengthening ex to perform ad chanel while in bed. Pt demonstrates impairments consisting of generalized weakness, decreased balance, decreased activity tolerance, pain in L knee which limit functional mobility and patient would benefit from skilled PT. Recommend d/c to SNU.

## 2023-01-02 NOTE — PLAN OF CARE
Goal Outcome Evaluation:  Plan of Care Reviewed With: patient        Progress: no change  Outcome Evaluation: AOx4 GRAY 19 at start of shift, this RN new to pt but after discussion with MD CASTELLANO criteria met appears to be panic attack (see flowsheet for criteria met at time), rates anxiety at 10/10, room air, SR, tolerating diet, pills whole w water, urinal and brief, excoriated lisandra area w cream applied (see MAR), repeatedly asking for Ativan but Ativan now D/C. Would benefit from maintenance anxiety medication.        Problem: Adult Inpatient Plan of Care  Goal: Plan of Care Review  Outcome: Ongoing, Progressing  Flowsheets (Taken 1/2/2023 1353)  Progress: no change  Plan of Care Reviewed With: patient  Outcome Evaluation: AOx4 GRAY 19 at start of shift, this RN new to pt but after discussion with MD CASTELLANO criteria met appears to be panic attack (see flowsheet for criteria met at time), rates anxiety at 10/10, room air, SR, tolerating diet, pills whole w water, urinal and brief, excoriated lisandra area w cream applied (see MAR), repeatedly asking for Ativan but Ativan now D/C. Would benefit from maintenance anxiety medication.  Goal: Patient-Specific Goal (Individualized)  Outcome: Ongoing, Progressing  Goal: Absence of Hospital-Acquired Illness or Injury  Outcome: Ongoing, Progressing  Intervention: Identify and Manage Fall Risk  Recent Flowsheet Documentation  Taken 1/2/2023 1200 by Marianna Santos, RN  Safety Promotion/Fall Prevention:   safety round/check completed   fall prevention program maintained   elopement precautions  Taken 1/2/2023 1000 by Marianna Santos, RN  Safety Promotion/Fall Prevention:   safety round/check completed   fall prevention program maintained   elopement precautions  Taken 1/2/2023 0800 by Marianna Santos, RN  Safety Promotion/Fall Prevention:   safety round/check completed   fall prevention program maintained   elopement precautions  Intervention: Prevent Skin Injury  Recent Flowsheet  Documentation  Taken 1/2/2023 0800 by Marianna Santos RN  Skin Protection:   adhesive use limited   incontinence pads utilized   transparent dressing maintained   tubing/devices free from skin contact  Intervention: Prevent and Manage VTE (Venous Thromboembolism) Risk  Recent Flowsheet Documentation  Taken 1/2/2023 0800 by Marianna Santos RN  VTE Prevention/Management:   patient refused intervention   bilateral   sequential compression devices off  Range of Motion: active ROM (range of motion) encouraged  Goal: Optimal Comfort and Wellbeing  Outcome: Ongoing, Progressing  Intervention: Provide Person-Centered Care  Recent Flowsheet Documentation  Taken 1/2/2023 0800 by Marianna Santos RN  Trust Relationship/Rapport:   care explained   choices provided   emotional support provided   empathic listening provided   questions answered   questions encouraged   reassurance provided   thoughts/feelings acknowledged  Goal: Readiness for Transition of Care  Outcome: Ongoing, Progressing     Problem: Fall Injury Risk  Goal: Absence of Fall and Fall-Related Injury  Outcome: Ongoing, Progressing  Intervention: Promote Injury-Free Environment  Recent Flowsheet Documentation  Taken 1/2/2023 1200 by Marianna Santos RN  Safety Promotion/Fall Prevention:   safety round/check completed   fall prevention program maintained   elopement precautions  Taken 1/2/2023 1000 by Marianna Santos RN  Safety Promotion/Fall Prevention:   safety round/check completed   fall prevention program maintained   elopement precautions  Taken 1/2/2023 0800 by Marianna Santos RN  Safety Promotion/Fall Prevention:   safety round/check completed   fall prevention program maintained   elopement precautions     Problem: Skin Injury Risk Increased  Goal: Skin Health and Integrity  Outcome: Ongoing, Progressing  Intervention: Optimize Skin Protection  Recent Flowsheet Documentation  Taken 1/2/2023 0800 by Marianna Santos RN  Pressure Reduction Techniques:  frequent weight shift encouraged  Pressure Reduction Devices:   alternating pressure pump (ADD)   pressure-redistributing mattress utilized  Skin Protection:   adhesive use limited   incontinence pads utilized   transparent dressing maintained   tubing/devices free from skin contact

## 2023-01-02 NOTE — PROGRESS NOTES
[facility] Progress Note      Patient Name: Watson Lisa  : 1955  MRN: [id number]  Primary Care Physician:  Checo Dunham MD  Date of admission: 2022    Subjective     Interval History:   F/u DEEP     Review of Systems:     Seen and examined.  No new issues overnight.  Denies shortness of air or chest pain.    Objective     Vitals:   Temp:  [97.4 °F (36.3 °C)-97.8 °F (36.6 °C)] 97.6 °F (36.4 °C)  Heart Rate:  [] 83  Resp:  [16-20] 18  BP: (145-170)/(79-93) 145/85  No intake or output data in the 24 hours ending 23 0725    Physical Exam:    chlordiazePOXIDE, 25 mg, Oral, Q8H  folic acid, 1 mg, Oral, Daily  hydrocortisone-bacitracin-zinc oxide-nystatin, 1 application, Topical, TID  ipratropium-albuterol, 3 mL, Nebulization, 4x Daily - RT  metoprolol tartrate, 2.5 mg, Intravenous, Q6H  multivitamin, 1 tablet, Oral, Daily  mupirocin, 1 application, Topical, Q12H  spironolactone, 25 mg, Oral, Daily  thiamine, 100 mg, Oral, Daily    Scheduled Meds:     folic acid, 1 mg, Oral, Daily  hydrocortisone-bacitracin-zinc oxide-nystatin, 1 application, Topical, TID  ipratropium-albuterol, 3 mL, Nebulization, 4x Daily - RT  losartan, 50 mg, Oral, Q24H  metoprolol succinate XL, 25 mg, Oral, Q24H  mupirocin, 1 application, Topical, Q12H  potassium chloride, 20 mEq, Oral, Daily  potassium chloride, 40 mEq, Oral, Once  spironolactone, 25 mg, Oral, Daily  thiamine, 100 mg, Oral, Daily      IV Meds:        Results Reviewed:   I have personally reviewed the results from the time of this admission to 2023 07:25 EST     Results from last 7 days   Lab Units 23  0814 22  0717 22  0607 22  1758   SODIUM mmol/L 139 138 137 138   POTASSIUM mmol/L 3.3*  3.3* 3.3* 3.4* 3.3*   CHLORIDE mmol/L 101 99 97* 101   CO2 mmol/L 26.7 25.4 24.8 20.2*   BUN mg/dL 18 20 22 32*   CREATININE mg/dL 0.94 0.92 0.91 1.17   CALCIUM mg/dL 8.9 8.3* 8.2* 8.2*   BILIRUBIN mg/dL  --   0.6 0.7 0.6   ALK PHOS U/L  --  74 77 74   ALT (SGPT) U/L  --  95* 115* 113*   AST (SGOT) U/L  --  168* 292* 350*   GLUCOSE mg/dL 135* 140* 93 110*     Estimated Creatinine Clearance: 109.6 mL/min (by C-G formula based on SCr of 0.94 mg/dL).  Results from last 7 days   Lab Units 01/01/23  0814 12/31/22  0717 12/30/22  0607   MAGNESIUM mg/dL 1.9 1.3* 1.4*   PHOSPHORUS mg/dL 3.7 3.3 2.2*         Results from last 7 days   Lab Units 01/01/23  0814 12/30/22  0607 12/29/22  0539 12/28/22  1746   WBC 10*3/mm3 6.83 8.55 9.90 13.53*   HEMOGLOBIN g/dL 10.8* 10.2* 9.9* 10.8*   PLATELETS 10*3/mm3 170 96* 75* 82*           Assessment / Plan     ASSESSMENT:  1. Non olig DEEP - due to rhabdo, resolved Cr 0.9 (peak 2.1).  UA with discrepant large blood but no RBCs due to myoglobinuria.  Chronic Left ankle edema but noted to have mild swelling RLE too so added aldactone 25mg on 12/30 (rather than loop diuretic due to hypokalemia)  2. Rhabdomyolysis - suspect was immobilized some period after fall  3. Left patella fracture - ortho eval noted, planning non-op mgmt   4. HypoMg , hypokalemia   5. HTN - BP labile, largely elevated, getting IV lopressor still but able to take PO meds; ARB been on hold, can resume as DEEP resolved    6. Hx ETOH abuse + e/o withdrawal - on CIWA protocol  7. Anemia, TCP     PLAN:    1.  Kidney function is stable.  Continue current management as blood pressure is much better.  2.  Follow-up with nephrology in 2 weeks with labs prior to his appointment      Shanon Godinez MD  01/02/23  07:25 [facility]    [facility]  [phone]

## 2023-01-02 NOTE — PROGRESS NOTES
Name: Watson Lisa ADMIT: 2022   : 1955  PCP: Checo Dunham MD    MRN: [id number] LOS: 5 days   AGE/SEX: 67 y.o. male  ROOM: [facility]     Subjective   Subjective    No further hallucinations.  Still reports some anxiety but this is a longstanding issue with him.  Normally gets around with a cane.  Suffered a left patellar fracture about a week ago certainly impacting his mobility.  Has not yet worked with physical therapy.    Review of Systems   Objective   Objective   Vital Signs  Temp:  [97.4 °F (36.3 °C)-98.3 °F (36.8 °C)] 98.1 °F (36.7 °C)  Heart Rate:  [] 98  Resp:  [16-18] 18  BP: (140-170)/(79-93) 151/82  SpO2:  [97 %-100 %] 98 %  on   ;   Device (Oxygen Therapy): room air  Body mass index is 28.95 kg/m².    Physical Exam  Constitutional:       Appearance: He is ill-appearing. He is not toxic-appearing.   Cardiovascular:      Rate and Rhythm: Regular rhythm. Tachycardia present.   Pulmonary:      Effort: Pulmonary effort is normal.      Breath sounds: Normal breath sounds.   Abdominal:      General: Bowel sounds are normal.      Palpations: Abdomen is soft.   Musculoskeletal:      Right lower leg: No edema.      Left lower leg: No edema.   Skin:     General: Skin is warm and dry.   Neurological:      Mental Status: He is alert and oriented to person, place, and time.      Motor: Tremor present.   Psychiatric:         Behavior: Behavior is cooperative.     Results Review  I reviewed the patient's new clinical results.  Results from last 7 days   Lab Units 23  0715 23  0814 22  0607 22  0539   WBC 10*3/mm3 6.85 6.83 8.55 9.90   HEMOGLOBIN g/dL 10.4* 10.8* 10.2* 9.9*   PLATELETS 10*3/mm3 221 170 96* 75*     Results from last 7 days   Lab Units 23  0715 23  0814 22  0717 22  0607   SODIUM mmol/L 138 139 138 137   POTASSIUM mmol/L 3.9 3.3*  3.3* 3.3* 3.4*   CHLORIDE mmol/L 102 101 99 97*   CO2 mmol/L 24.4 26.7 25.4 24.8   BUN mg/dL 19 18 20  22   CREATININE mg/dL 0.95 0.94 0.92 0.91   GLUCOSE mg/dL 104* 135* 140* 93     Results from last 7 days   Lab Units 01/02/23  0715 01/01/23  0814 12/31/22  0717 12/30/22  0607 12/29/22  1758 12/29/22  0539 12/28/22  1709   CALCIUM mg/dL 8.6 8.9 8.3* 8.2* 8.2* 8.1* 8.2*   ALBUMIN g/dL  --  3.5 3.4* 3.7 3.4*  --  3.8   MAGNESIUM mg/dL 1.7 1.9 1.3* 1.4* 1.7  --  1.5*   PHOSPHORUS mg/dL 3.7 3.7 3.3 2.2* 2.4*  --   --      Results from last 7 days   Lab Units 01/01/23  0814 12/31/22  0717 12/30/22 0607 12/29/22 1758 12/28/22  1709   ALBUMIN g/dL 3.5 3.4* 3.7 3.4* 3.8   BILIRUBIN mg/dL  --  0.6 0.7 0.6 1.6*   ALK PHOS U/L  --  74 77 74 77   AST (SGOT) U/L  --  168* 292* 350* 385*   ALT (SGPT) U/L  --  95* 115* 113* 107*     Results from last 7 days   Lab Units 01/02/23  0715 01/01/23  0814 12/31/22  0717 12/30/22  0607 12/29/22  1758   CALCIUM mg/dL 8.6 8.9 8.3* 8.2* 8.2*   ALBUMIN g/dL  --  3.5 3.4* 3.7 3.4*   MAGNESIUM mg/dL 1.7 1.9 1.3* 1.4* 1.7   PHOSPHORUS mg/dL 3.7 3.7 3.3 2.2* 2.4*           Results from last 7 days   Lab Units 01/02/23  0715 12/31/22  0717 12/30/22  0607 12/29/22  1758 12/29/22  0539 12/28/22  1709   CK TOTAL U/L 668* 5,568* 11,639* 15,851* 20,825* 20,763*   TROPONIN T ng/mL  --   --   --   --   --  0.080*       No results found for: HGBA1C, POCGLU    No radiology results for the last day    Scheduled Meds  folic acid, 1 mg, Oral, Daily  hydrocortisone-bacitracin-zinc oxide-nystatin, 1 application, Topical, TID  ipratropium-albuterol, 3 mL, Nebulization, 4x Daily - RT  losartan, 50 mg, Oral, Q24H  metoprolol succinate XL, 25 mg, Oral, Q24H  mupirocin, 1 application, Topical, Q12H  potassium chloride, 20 mEq, Oral, Daily  spironolactone, 25 mg, Oral, Daily  thiamine, 100 mg, Oral, Daily    Continuous Infusions   PRN Meds  •  acetaminophen  •  LORazepam **OR** LORazepam **OR** LORazepam **OR** LORazepam **OR** LORazepam **OR** LORazepam  •  melatonin  •  nitroglycerin  •  ondansetron **OR**  "ondansetron  •  [COMPLETED] Insert Peripheral IV **AND** sodium chloride     Diet  Diet: Cardiac Diets, Diabetic Diets; Healthy Heart (2-3 Na+); Consistent Carbohydrate; Texture: Regular Texture (IDDSI 7); Fluid Consistency: Thin (IDDSI 0)    I have personally reviewed:  [x]  Laboratory   []  Microbiology   []  Radiology   [x]  EKG/Telemetry  SR on tele   []  Cardiology/Vascular   []  Pathology    []  Records    CIWA (last day)     Date/Time CIWA-Ar Score    01/02/23 0930 8    01/02/23 0916 19    01/02/23 0600 5    01/02/23 0400 4    01/02/23 0000 5    01/01/23 2200 6    01/01/23 2058 8    01/01/23 1558 8    01/01/23 1400 5         Assessment/Plan     Active Hospital Problems    Diagnosis  POA   • Hypokalemia [E87.6]  Yes   • Thrombocytopenia ([facility]) [D69.6]  Yes   • Rhabdomyolysis [M62.82]  Yes   • Left patella fracture [S82.002A]  Yes   • Elevated troponin [R77.8]  Yes   • COPD (chronic obstructive pulmonary disease) ([facility]) [J44.9]  Yes   • LIVAN (obstructive sleep apnea) [G47.33]  Yes   • Alcohol dependence with withdrawal ([facility]) [F10.239]  Yes   • Essential hypertension [I10]  Yes      Resolved Hospital Problems    Diagnosis Date Resolved POA   • **DEEP (acute kidney injury) ([facility]) [N17.9] 01/01/2023 Yes       67 y.o. male with a history of alcohol abuse, LIVAN, COPD and hypertension presented to the ED after a fall. Per ED had a fall and was on the ground several hours. Daily alcohol use and drank heavily over Yanely.    Alcohol withdrawal symptoms seem to be improving.  He did have elevated CIWA score last night but some of this may just be pre-existing issues with anxiety.  Will discontinue IV lorazepam but leave available oral for now.  Taper this off.  Discussed with RN about the CIWA of \"19.\"      Biggest issue present is his mobility.  Has not yet worked with physical therapy.  They have been consulted.  Will need discharge planning.  He may need to go to a skilled nursing facility at discharge.  He currently " lives alone.      Alcohol dependence and withdrawal  -CIWA improving  -Using less Ativan  -vitamins  -access following, may need psychiatry evaluation for his anxiety  -Thrombocytopenia likely due to acute alcohol toxicity, resolved    Rhabdomyolysis resulting in acute kidney injury  -Creatinine normal and CPK improving  -Rhabdomyolysis due to immobility  -AST>ALT elevation from muscle also improved    Troponin elevation  -likely DEEP  -EKG no change    Left patella fracture  -Ortho recommends weightbearing as tolerated in knee immobilizer  -Follow-up 2 to 3 weeks with Dr. Saleh will likely require knee replacement in the future  -Add as needed pain medication    LIVAN, COPD  -nebs    SCDs for DVT prophylaxis    Discussed with patient and nursing staff    Discharge: Probably in a day or 2.  Needs to work with physical therapy.  Will consult CCP.  Patient lives alone.      Kane Munoz MD  [facility]  01/02/23

## 2023-01-02 NOTE — THERAPY EVALUATION
Patient Name: Watson Lisa  : 1955    MRN: [id number]                              Today's Date: 2023       Admit Date: 2022    Visit Dx:     ICD-10-CM ICD-9-CM   1. DEEP (acute kidney injury) (Formerly [facility])  N17.9 584.9   2. Traumatic rhabdomyolysis, initial encounter (Formerly [facility])  T79.6XXA 958.6   3. Alcohol withdrawal syndrome with complication (Formerly [facility])  F10.939 291.81   4. Closed nondisplaced longitudinal fracture of left patella, initial encounter  S82.025A 822.0     Patient Active Problem List   Diagnosis   • Essential hypertension   • Tobacco abuse   • Alcohol dependence with withdrawal (Formerly [facility])   • Hiatal hernia   • Hypokalemia   • Effusion of left knee   • Osteoarthritis of left knee   • Vitamin D deficiency   • Anemia, chronic disease   • Hyponatremia   • COPD (chronic obstructive pulmonary disease) (Formerly [facility])   • LIVAN (obstructive sleep apnea)   • ETOH abuse   • Obese   • Alcoholic liver disease (Formerly [facility])   • Cigarette nicotine dependence without complication   • History of alcohol use disorder   • Rhabdomyolysis   • Left patella fracture   • Elevated troponin   • Hypokalemia   • Thrombocytopenia (HCC)     Past Medical History:   Diagnosis Date   • Alcohol abuse    • Anxiety    • Arthritis    • Bronchitis with chronic airway obstruction (Formerly [facility])     LAST FEB   • DVT (deep venous thrombosis) ([facility]ly [facility])    • Hiatal hernia    • Hypertension    • Hypertension    • Low back pain    • Neck pain    • Pulmonary embolism ([facility]ly [facility])    • Sleep apnea with use of continuous positive airway pressure (CPAP)     AT NIGHT     Past Surgical History:   Procedure Laterality Date   • COLONOSCOPY     • JOINT REPLACEMENT Right     hip/knee   • REPLACEMENT TOTAL KNEE     • TONSILLECTOMY     • TOTAL HIP ARTHROPLASTY Right       General Information     Row Name 23 1645          Physical Therapy Time and Intention    Document Type evaluation  -EM     Mode of Treatment individual therapy;physical therapy  -EM     Row Name 23 1645           General Information    Patient Profile Reviewed yes  -EM     Prior Level of Function independent:  -EM     Existing Precautions/Restrictions fall  KI on L  -EM     Row Name 01/02/23 1645          Living Environment    People in Home alone  -EM     Row Name 01/02/23 1645          Cognition    Orientation Status (Cognition) oriented x 3  -EM     Row Name 01/02/23 1645          Safety Issues, Functional Mobility    Impairments Affecting Function (Mobility) balance;endurance/activity tolerance;strength;pain  -EM           User Key  (r) = Recorded By, (t) = Taken By, (c) = Cosigned By    Initials Name Provider Type    Pooja Carrero PT Physical Therapist               Mobility     Row Name 01/02/23 1646          Bed Mobility    Bed Mobility supine-sit;sit-supine  -EM     Supine-Sit [place] (Bed Mobility) standby assist  -EM     Sit-Supine [place] (Bed Mobility) standby assist  -EM     Assistive Device (Bed Mobility) head of bed elevated;bed rails  -EM     Row Name 01/02/23 1646          Sit-Stand Transfer    Sit-Stand [place] (Transfers) maximum assist (25% patient effort);2 person assist  -EM     Assistive Device (Sit-Stand Transfers) walker, front-wheeled  -EM     Comment, (Sit-Stand Transfer) 2 attempts at standing at the bedside, pt unable to clear buttocks off bed even with bed surface significantly elevated  -EM     Row Name 01/02/23 1646          Mobility    Extremity Weight-bearing Status left lower extremity  -EM     Left Lower Extremity (Weight-bearing Status) weight-bearing as tolerated (WBAT)  -EM           User Key  (r) = Recorded By, (t) = Taken By, (c) = Cosigned By    Initials Name Provider Type    Pooja Carrero PT Physical Therapist               Obj/Interventions     Row Name 01/02/23 1647          Range of Motion Comprehensive    Comment, General Range of Motion kaiser UEs WNL, pt with edematous L ankle (states chronic issue), lacking DF on L ankle, L knee in soft KI,  able to flex around KI  -EM     Row Name 01/02/23 1647          Strength Comprehensive (MMT)    General Manual Muscle Testing (MMT) Assessment other (see comments)  -EM     Comment, General Manual Muscle Testing (MMT) Assessment generalized weakness noted  -EM     Row Name 01/02/23 1647          Motor Skills    Therapeutic Exercise other (see comments)  AP, QS, GS x 10 reps in supine. pt instructed to perform these ex ad chanel while in bed. in sitting, LAQ and marching in sitting x 10 reps on RLE only  -EM     Row Name 01/02/23 1647          Balance    Balance Assessment sitting static balance;sitting dynamic balance;standing static balance;standing dynamic balance  -EM     Static Sitting Balance supervision  -EM     Dynamic Sitting Balance supervision  -EM     Position, Sitting Balance sitting edge of bed  -EM     Row Name 01/02/23 1647          Sensory Assessment (Somatosensory)    Sensory Assessment (Somatosensory) sensation intact  -EM           User Key  (r) = Recorded By, (t) = Taken By, (c) = Cosigned By    Initials Name Provider Type    EM Pooja Carrington, PT Physical Therapist               Goals/Plan     Row Name 01/02/23 1652          Transfer Goal 1 (PT)    Activity/Assistive Device (Transfer Goal 1, PT) transfers, all;walker, rolling  -EM     [place] Level/Cues Needed (Transfer Goal 1, PT) moderate assist (50-74% patient effort)  -EM     Time Frame (Transfer Goal 1, PT) 1 week  -EM     Row Name 01/02/23 1652          Gait Training Goal 1 (PT)    Activity/Assistive Device (Gait Training Goal 1, PT) gait (walking locomotion);walker, rolling  -EM     [place]in Level (Gait Training Goal 1, PT) moderate assist (50-74% patient effort)  -EM     Distance (Gait Training Goal 1, PT) 10  -EM     Time Frame (Gait Training Goal 1, PT) 1 week  -EM     Row Name 01/02/23 1652          Therapy Assessment/Plan (PT)    Planned Therapy Interventions (PT) bed mobility training;gait training;home exercise  program;patient/family education;transfer training  -EM           User Key  (r) = Recorded By, (t) = Taken By, (c) = Cosigned By    Initials Name Provider Type    EM Pooja Carrington PT Physical Therapist               Clinical Impression     Row Name 01/02/23 1649          Pain    Pretreatment Pain Rating 7/10  -EM     Pain Location - Side/Orientation Left  -EM     Pain Location - knee  -EM     Pain Intervention(s) Medication (See MAR);Repositioned  -EM     Row Name 01/02/23 1649          Plan of Care Review    Plan of Care Reviewed With patient  -EM     Outcome Evaluation Patient is 68 yo male admitted to [facility] after fall at home, has nondisplaced L patella fx. PMH significant for ETOH abuse, anxiety, HTN, PE, prior R TKA. Patient lives alone, independent prior to admission. Today, patient up to EOB with SBA, attempted 2 standing trials at the bedside but patient unable to clear buttocks off bed with maxAx2 and bed surface elevated. Patient instructed in general strengthening ex to perform ad chanel while in bed. Pt demonstrates impairments consisting of generalized weakness, decreased balance, decreased activity tolerance, pain in L knee which limit functional mobility and patient would benefit from skilled PT. Recommend d/c to SNU.  -EM     Row Name 01/02/23 1649          Therapy Assessment/Plan (PT)    Patient/Family Therapy Goals Statement (PT) be able to walk  -EM     Rehab Potential (PT) good, to achieve stated therapy goals  -EM     Criteria for Skilled Interventions Met (PT) yes;skilled treatment is necessary  -EM     Therapy Frequency (PT) 5 times/wk  -EM     Row Name 01/02/23 1649          Positioning and Restraints    Pre-Treatment Position in bed  -EM     Post Treatment Position bed  -EM     In Bed supine;call light within reach;exit alarm on;notified nsg  -EM           User Key  (r) = Recorded By, (t) = Taken By, (c) = Cosigned By    Initials Name Provider Type    Pooja Carrero, PT Physical  Therapist               Outcome Measures     Row Name 01/02/23 1653          How much help from another person do you currently need...    Turning from your back to your side while in flat bed without using bedrails? 3  -EM     Moving from lying on back to sitting on the side of a flat bed without bedrails? 3  -EM     Moving to and from a bed to a chair (including a wheelchair)? 1  -EM     Standing up from a chair using your arms (e.g., wheelchair, bedside chair)? 1  -EM     Climbing 3-5 steps with a railing? 1  -EM     To walk in hospital room? 1  -EM     AM-PAC 6 Clicks Score (PT) 10  -EM     Highest level of mobility 4 --> Transferred to chair/commode  -EM     Row Name 01/02/23 1653          Functional Assessment    Outcome Measure Options AM-PAC 6 Clicks Basic Mobility (PT)  -EM           User Key  (r) = Recorded By, (t) = Taken By, (c) = Cosigned By    Initials Name Provider Type    EM Pooja Carrington PT Physical Therapist                             Physical Therapy Education     Title: PT OT SLP Therapies (In Progress)     Topic: Physical Therapy (In Progress)     Point: Mobility training (Done)     Learning Progress Summary           Patient Acceptance, E, VU by EM at 1/2/2023 1654                   Point: Home exercise program (Done)     Learning Progress Summary           Patient Acceptance, E, VU by  at 1/2/2023 1654                   Point: Body mechanics (Not Started)     Learner Progress:  Not documented in this visit.          Point: Precautions (Not Started)     Learner Progress:  Not documented in this visit.                      User Key     Initials Effective Dates Name Provider Type Discipline     06/16/21 -  Pooja Carrington PT Physical Therapist PT              PT Recommendation and Plan  Planned Therapy Interventions (PT): bed mobility training, gait training, home exercise program, patient/family education, transfer training  Plan of Care Reviewed With: patient  Outcome  Evaluation: Patient is 68 yo male admitted to [facility] after fall at home, has nondisplaced L patella fx. PMH significant for ETOH abuse, anxiety, HTN, PE, prior R TKA. Patient lives alone, independent prior to admission. Today, patient up to EOB with SBA, attempted 2 standing trials at the bedside but patient unable to clear buttocks off bed with maxAx2 and bed surface elevated. Patient instructed in general strengthening ex to perform ad chanel while in bed. Pt demonstrates impairments consisting of generalized weakness, decreased balance, decreased activity tolerance, pain in L knee which limit functional mobility and patient would benefit from skilled PT. Recommend d/c to SNU.     Time Calculation:    PT Charges     Row Name 01/02/23 1654             Time Calculation    Start Time 1515  -EM      Stop Time 1534  -EM      Time Calculation (min) 19 min  -EM      PT Received On 01/02/23  -EM      PT - Next Appointment 01/03/23  -EM      PT Goal Re-Cert Due Date 01/09/23  -EM         Time Calculation- PT    Total Timed Code Minutes- PT 12 minute(s)  -EM         Timed Charges    31308 - PT Therapeutic Exercise Minutes 7  -EM      55596 - PT Therapeutic Activity Minutes 5  -EM         Total Minutes    Timed Charges Total Minutes 12  -EM       Total Minutes 12  -EM            User Key  (r) = Recorded By, (t) = Taken By, (c) = Cosigned By    Initials Name Provider Type    EM Pooja Carrington PT Physical Therapist              Therapy Charges for Today     Code Description Service Date Service Provider Modifiers Qty    78552948939 HC PT THER PROC EA 15 MIN 1/2/2023 Pooja Carrington, PT GP 1    46099305114 HC PT THER SUPP EA 15 MIN 1/2/2023 Pooja Carrington, PT GP 1    86063531943 HC PT EVAL MOD COMPLEXITY 2 1/2/2023 Pooja Carrington, PT GP 1          PT G-Codes  Outcome Measure Options: AM-PAC 6 Clicks Basic Mobility (PT)  AM-PAC 6 Clicks Score (PT): 10  PT Discharge Summary  Anticipated Discharge Disposition  (PT): skilled nursing facility    Pooja Carrington, PT  1/2/2023

## 2023-01-02 NOTE — CASE MANAGEMENT/SOCIAL WORK
Discharge Planning Assessment  [facility]     Patient Name: Watson Lisa  MRN: [id number]  Today's Date: 1/2/2023    Admit Date: 12/28/2022    Plan: SNF   Discharge Needs Assessment     Row Name 01/02/23 1733       Living Environment    People in Home alone    Current Living Arrangements apartment    Primary Care Provided by self    Provides Primary Care For no one    Family Caregiver if Needed child(anneliese), adult    Quality of Family Relationships supportive       Transition Planning    Patient/Family Anticipates Transition to inpatient rehabilitation facility    Patient/Family Anticipated Services at Transition skilled nursing    Transportation Anticipated family or friend will provide       Discharge Needs Assessment    Readmission Within the Last 30 Days no previous admission in last 30 days    Equipment Currently Used at Home cpap;cane, straight;walker, rolling    Concerns to be Addressed discharge planning    Anticipated Changes Related to Illness inability to care for self    Outpatient/Agency/Support Group Needs skilled nursing facility    Discharge Facility/Level of Care Needs nursing facility, skilled;substance abuse facility    Provided Post Acute Provider List? N/A    Provided Post Acute Provider Quality & Resource List? N/A               Discharge Plan     Row Name 01/02/23 1734       Plan    Plan SNF    Plan Comments S/W pt at bedside.  Facesheet info confirmed.  Pt lives alone in a 1st floor apartment, is usually IADLs and can drive.  He has been to [facility] for SNF in the past.  Pt feels he will need physical rehab after DC before returning home.  He requests referral to [facility].  CCP discussed [facility] and [facility].  Pt is in agreement w/ referral there as well.  Evals pending at this time.  ...........Hiral CABRAL/ YANETH              Continued Care and Services - Admitted Since 12/28/2022     Destination     Service Provider Request Status Selected Services Address Phone  Fax Patient Preferred    [facility] - [facility] N[facility] [address] [facility] [address] [phone] --    [facility] - [facility] N/A [address] [facility] [address] [phone] --                 Demographic Summary     Row Name 01/02/23 1732       General Information    Admission Type inpatient    Arrived From home    Required Notices Provided Important Message from Medicare    Referral Source admission list    Reason for Consult discharge planning    Preferred Language English               Functional Status     Row Name 01/02/23 1732       Functional Status    Usual Activity Tolerance good    Current Activity Tolerance fair       Functional Status, IADL    Medications independent    Meal Preparation independent    Housekeeping independent    Laundry independent    Shopping independent                         Hiral Padilla RN

## 2023-01-02 NOTE — DISCHARGE PLACEMENT REQUEST
"Brittany Lisa (67 y.o. Male)     Date of Birth   1955    Social Security Number       Address   [address] [address]    Home Phone   [phone]    MRN   [id number]       Rastafarian   Latter-day    Marital Status                               Admission Date   12/28/22    Admission Type   Emergency    Admitting Provider   Keily Russell MD    Attending Provider   Kane Munoz MD    Department, Room/Bed   [address], S[address]/1       Discharge Date       Discharge Disposition       Discharge Destination                               Attending Provider: Kane Munoz MD    Allergies: Penicillins, Penicillins    Isolation: None   Infection: None   Code Status: CPR    Ht: 200.7 cm (79\")   Wt: 117 kg (257 lb)    Admission Cmt: None   Principal Problem: DEEP (acute kidney injury) (HCC) [N17.9]                 Active Insurance as of 12/28/2022     Primary Coverage     Payor Plan Insurance Group Employer/Plan Group    ANTHEM BLUE CROSS ANTHEM BLUE CROSS BLUE SHIELD PPO [id number]     Payor Plan Address Payor Plan Phone Number Payor Plan Fax Number Effective Dates    [address] [phone]  1/1/2022 - None Entered    [facility] [id number]       Subscriber Name Subscriber Birth Date Member ID       RBITTANY LISA 1955 [id number]           Secondary Coverage     Payor Plan Insurance Group Employer/Plan Group    MEDICARE MEDICARE A ONLY      Payor Plan Address Payor Plan Phone Number Payor Plan Fax Number Effective Dates    [address] [phone]  9/1/2019 - None Entered    [facility] [id number]       Subscriber Name Subscriber Birth Date Member ID       BRITTANY LISA 1955 [id number]                 Emergency Contacts      (Rel.) Home Phone Work Phone Mobile Phone    TEJAS LISA (Son) [phone] -- [phone]    LeonieJorge A gray (Sister) [phone] -- --              "

## 2023-01-02 NOTE — PLAN OF CARE
Goal Outcome Evaluation:    Problem: Adult Inpatient Plan of Care  Goal: Absence of Hospital-Acquired Illness or Injury  Intervention: Identify and Manage Fall Risk  Recent Flowsheet Documentation  Taken 1/2/2023 0400 by Indigo Quesada RN  Safety Promotion/Fall Prevention:   safety round/check completed   toileting scheduled   nonskid shoes/slippers when out of bed   lighting adjusted   clutter free environment maintained   activity supervised  Taken 1/2/2023 0200 by Indigo Quesada RN  Safety Promotion/Fall Prevention:   safety round/check completed   nonskid shoes/slippers when out of bed   fall prevention program maintained   clutter free environment maintained   activity supervised  Taken 1/2/2023 0000 by Indigo Quesada RN  Safety Promotion/Fall Prevention:   toileting scheduled   safety round/check completed   nonskid shoes/slippers when out of bed   lighting adjusted   clutter free environment maintained   activity supervised  Taken 1/1/2023 2200 by Indigo Queasda RN  Safety Promotion/Fall Prevention:   toileting scheduled   safety round/check completed   nonskid shoes/slippers when out of bed   lighting adjusted   clutter free environment maintained   activity supervised  Taken 1/1/2023 2058 by Indigo Quesada RN  Safety Promotion/Fall Prevention:   toileting scheduled   safety round/check completed   lighting adjusted   fall prevention program maintained   clutter free environment maintained   activity supervised     Problem: Adult Inpatient Plan of Care  Goal: Absence of Hospital-Acquired Illness or Injury  Intervention: Prevent and Manage VTE (Venous Thromboembolism) Risk  Recent Flowsheet Documentation  Taken 1/2/2023 0400 by Indigo Quesada RN  Activity Management: activity adjusted per tolerance  Taken 1/2/2023 0200 by Indigo Quesada RN  Activity Management: activity adjusted per tolerance  Taken 1/2/2023 0000 by Indigo Quesada RN  Activity Management: activity adjusted per  tolerance  VTE Prevention/Management:   sequential compression devices off   patient refused intervention  Range of Motion: active ROM (range of motion) encouraged  Taken 1/1/2023 2200 by Indigo Quesada, RN  Activity Management: activity adjusted per tolerance  Taken 1/1/2023 2058 by Indigo Quesada, RN  Activity Management: activity adjusted per tolerance  VTE Prevention/Management:   bilateral   sequential compression devices off   patient refused intervention  Range of Motion: ROM (range of motion) performed   Plan of Care Reviewed With: patient alert no signs of pain and distress will continue to monitor

## 2023-01-03 PROCEDURE — 94799 UNLISTED PULMONARY SVC/PX: CPT

## 2023-01-03 PROCEDURE — 94761 N-INVAS EAR/PLS OXIMETRY MLT: CPT

## 2023-01-03 PROCEDURE — 94664 DEMO&/EVAL PT USE INHALER: CPT

## 2023-01-03 RX ORDER — HYDROXYZINE HYDROCHLORIDE 25 MG/1
25 TABLET, FILM COATED ORAL 3 TIMES DAILY PRN
Status: DISCONTINUED | OUTPATIENT
Start: 2023-01-03 | End: 2023-01-05 | Stop reason: HOSPADM

## 2023-01-03 RX ADMIN — LORAZEPAM 0.5 MG: 0.5 TABLET ORAL at 04:49

## 2023-01-03 RX ADMIN — FOLIC ACID 1 MG: 1 TABLET ORAL at 09:29

## 2023-01-03 RX ADMIN — HYDROXYZINE HYDROCHLORIDE 25 MG: 25 TABLET ORAL at 23:34

## 2023-01-03 RX ADMIN — HYDROXYZINE HYDROCHLORIDE 25 MG: 25 TABLET ORAL at 11:59

## 2023-01-03 RX ADMIN — IPRATROPIUM BROMIDE AND ALBUTEROL SULFATE 3 ML: .5; 3 SOLUTION RESPIRATORY (INHALATION) at 12:14

## 2023-01-03 RX ADMIN — Medication 3 MG: at 23:34

## 2023-01-03 RX ADMIN — Medication 1 TABLET: at 09:29

## 2023-01-03 RX ADMIN — METOPROLOL SUCCINATE 25 MG: 25 TABLET, EXTENDED RELEASE ORAL at 09:29

## 2023-01-03 RX ADMIN — LOSARTAN POTASSIUM 50 MG: 50 TABLET, FILM COATED ORAL at 09:29

## 2023-01-03 RX ADMIN — Medication 100 MG: at 09:29

## 2023-01-03 RX ADMIN — ZINC OXIDE 1 APPLICATION: 200 OINTMENT TOPICAL at 15:33

## 2023-01-03 RX ADMIN — MUPIROCIN 1 APPLICATION: 20 OINTMENT TOPICAL at 09:30

## 2023-01-03 RX ADMIN — ZINC OXIDE 1 APPLICATION: 200 OINTMENT TOPICAL at 21:24

## 2023-01-03 RX ADMIN — SPIRONOLACTONE 25 MG: 25 TABLET, FILM COATED ORAL at 09:29

## 2023-01-03 RX ADMIN — MUPIROCIN 1 APPLICATION: 20 OINTMENT TOPICAL at 21:24

## 2023-01-03 RX ADMIN — ACETAMINOPHEN 650 MG: 325 TABLET, FILM COATED ORAL at 04:49

## 2023-01-03 RX ADMIN — ZINC OXIDE 1 APPLICATION: 200 OINTMENT TOPICAL at 09:32

## 2023-01-03 RX ADMIN — POTASSIUM CHLORIDE 20 MEQ: 750 TABLET, EXTENDED RELEASE ORAL at 09:29

## 2023-01-03 NOTE — PLAN OF CARE
Pt. AAOX4, VSS. On RA. Wound care provided. Patient requesting ativan several times the morning of this shift, although CIWA score -5. Spoke with MD Barnes on unit. CIWA protocols discontinued. Requested PRN anxiety medication. New orders for hydroxyzine given. Patient agreeable with medication change. Knee immobilizer placed to Left knee. Nephrology consulted ad signed off this shift.   Problem: Adult Inpatient Plan of Care  Goal: Plan of Care Review  Outcome: Ongoing, Progressing  Flowsheets (Taken 1/3/2023 8394)  Progress: no change  Plan of Care Reviewed With: patient

## 2023-01-03 NOTE — PROGRESS NOTES
[facility]    [phone]   LOS: 6 days     Name: Watson Lisa  Age/Sex: 67 y.o. male  :  1955        PCP: Checo Dunham MD  Chief Complaint   Patient presents with   • Alcohol Problem   • Fall   • Knee Injury      Subjective   Still complaining of a lot of anxiety today.  Otherwise denies other new issues or complaints.  Still complaining of knee pain but this is improving overall.  He still does not have a knee immobilizer.  General: No Fever or Chills, Cardiac: No Chest Pain or Palpitations, Resp: No Cough or SOA, GI: No Nausea, Vomiting, or Diarrhea and Other: No bleeding    folic acid, 1 mg, Oral, Daily  hydrocortisone-bacitracin-zinc oxide-nystatin, 1 application, Topical, TID  ipratropium-albuterol, 3 mL, Nebulization, 4x Daily - RT  losartan, 50 mg, Oral, Q24H  metoprolol succinate XL, 25 mg, Oral, Q24H  multivitamin, 1 tablet, Oral, Daily  mupirocin, 1 application, Topical, Q12H  potassium chloride, 20 mEq, Oral, Daily  spironolactone, 25 mg, Oral, Daily  thiamine, 100 mg, Oral, Daily           Objective   Vital Signs  Temp:  [98.1 °F (36.7 °C)-99.3 °F (37.4 °C)] 98.6 °F (37 °C)  Heart Rate:  [82-90] 84  Resp:  [18] 18  BP: (131-155)/(68-89) 132/68  Body mass index is 28.95 kg/m².    Intake/Output Summary (Last 24 hours) at 1/3/2023 1725  Last data filed at 1/3/2023 0937  Gross per 24 hour   Intake --   Output 1020 ml   Net -1020 ml       Physical Exam  Vitals and nursing note reviewed.   Constitutional:       Appearance: Normal appearance.   Cardiovascular:      Rate and Rhythm: Normal rate and regular rhythm.   Pulmonary:      Effort: No respiratory distress.      Breath sounds: Normal breath sounds.   Abdominal:      General: Bowel sounds are normal.      Palpations: Abdomen is soft.   Neurological:      General: No focal deficit present.      Mental Status: He is alert and oriented to person, place, and time.      Comments: Noted faint tremor           Results Review:        I reviewed the patient's new clinical results.  Results from last 7 days   Lab Units 01/02/23  0715 01/01/23  0814 12/30/22  0607 12/29/22  0539 12/28/22  1746   WBC 10*3/mm3 6.85 6.83 8.55 9.90 13.53*   HEMOGLOBIN g/dL 10.4* 10.8* 10.2* 9.9* 10.8*   PLATELETS 10*3/mm3 221 170 96* 75* 82*     Results from last 7 days   Lab Units 01/02/23  0715 01/01/23  0814 12/31/22  0717 12/30/22  0607 12/29/22  1758 12/29/22  0539 12/28/22  1709   SODIUM mmol/L 138 139 138 137 138 141 140   POTASSIUM mmol/L 3.9 3.3*  3.3* 3.3* 3.4* 3.3* 3.6 4.3   CHLORIDE mmol/L 102 101 99 97* 101 99 98   CO2 mmol/L 24.4 26.7 25.4 24.8 20.2* 22.0 20.0*   BUN mg/dL 19 18 20 22 32* 37* 37*   CREATININE mg/dL 0.95 0.94 0.92 0.91 1.17 1.55* 2.14*   CALCIUM mg/dL 8.6 8.9 8.3* 8.2* 8.2* 8.1* 8.2*   MAGNESIUM mg/dL 1.7 1.9 1.3* 1.4* 1.7  --  1.5*   PHOSPHORUS mg/dL 3.7 3.7 3.3 2.2* 2.4*  --   --    Estimated Creatinine Clearance: 108.4 mL/min (by C-G formula based on SCr of 0.95 mg/dL).      Assessment & Plan   Active Hospital Problems    Diagnosis  POA   • Left patella fracture [S82.002A]  Yes   • Elevated troponin [R77.8]  Yes   • COPD (chronic obstructive pulmonary disease) ([facility]) [J44.9]  Yes   • LIVAN (obstructive sleep apnea) [G47.33]  Yes   • Alcohol dependence with withdrawal ([facility]) [F10.239]  Yes   • Essential hypertension [I10]  Yes      Resolved Hospital Problems    Diagnosis Date Resolved POA   • **DEEP (acute kidney injury) ([facility]) [N17.9] 01/01/2023 Yes   • Hypokalemia [E87.6] 01/02/2023 Yes   • Thrombocytopenia (HCC) [D69.6] 01/02/2023 Yes   • Rhabdomyolysis [M62.82] 01/02/2023 Yes       PLAN  This is 67-year-old gentleman with a history of chronic alcohol use COPD obstructive sleep apnea and hypertension who presents to the hospital after a fall and is found to have a left patellar fracture and complicated by acute alcohol withdrawal  -Withdrawal symptoms have resolved.  Still dealing with some anxiety and depressive symptoms.  Atarax  initiated today.  -Awaiting knee immobilizer for treatment.  Physical therapy is following the patient.  -Renal function has returned to baseline.  Electrolytes are stable  -Thrombocytopenia likely related to chronic alcohol suppression however this is improved with treatment here in the hospital.  -Blood pressure stable on current medications.  -Mechanical DVT prophylaxis  -Full code    Disposition  Plan skilled nursing facility once bed available stable for discharge      Vincent Barnes MD  [facility]  01/03/23  17:25 EST

## 2023-01-03 NOTE — PLAN OF CARE
"  Problem: Adult Inpatient Plan of Care  Goal: Patient-Specific Goal (Individualized)  Outcome: Ongoing, Progressing   Goal Outcome Evaluation:  Plan of Care Reviewed With: patient        Progress: no change  Outcome Evaluation: Pt calling multiple times a shift requesting something for pain and requesting his \"ativan\" by name. Pt stated \"anxiety 7/10. headache 5/10. I'm nauseous.\" Without being prompted. Pt scored 8. Protocol followed. Incontinence care provided overnight. Encouraged pt to turn. Magic barrier cream applied to pts buttocks due to shearing observed. Will pass information on to day shift nursing staff and continue to monitor.  "

## 2023-01-03 NOTE — CASE MANAGEMENT/SOCIAL WORK
Continued Stay Note  [facility]     Patient Name: Watson Lisa  MRN: [id number]  Today's Date: 1/3/2023    Admit Date: 12/28/2022    Plan: SNF   Discharge Plan     Row Name 01/03/23 1211       Plan    Plan SNF    Plan Comments [facility]/ [facility] - they are out of network with pt's insurance.  [facility] / [facility] eval pending. ...........Hiral CABRAL/ CCP               Discharge Codes    No documentation.               Expected Discharge Date and Time     Expected Discharge Date Expected Discharge Time    Jan 4, 2023             Hiral Padilla RN

## 2023-01-03 NOTE — PROGRESS NOTES
"[facility] f/u d/t ETOH. Chart reviewed, spoke with night RN and met with pt. Night RN reports pt was taken off of ativan d/t possibly \"drug seeking\" and \"manipulating CIWA scores\". CIWA 5 at 0937. Per chart, CCP indicates SNF at [place]. Pt laying in bed upon entry, A&Ox4. Pt rates anxiety as a \"7\" and depression as a \"6\" at this time. He denies SI/HI/AVTH/WTBD currently. Pt reports wishing DTs would \"end\" but states \"I know, I did this to myself\". Pt reports planning to follow up with resources previously given.   ACCESS following.   "

## 2023-01-03 NOTE — CASE MANAGEMENT/SOCIAL WORK
Continued Stay Note  [facility]     Patient Name: Watson Lisa  MRN: [id number]  Today's Date: 1/3/2023    Admit Date: 12/28/2022    Plan: [facility] PENDING precert.   Discharge Plan     Row Name 01/03/23 1713       Plan    Plan [facility] PENDING precert.    Plan Comments [facility]/ [facility] - [facility] can accept pending precert.  She discussed w/ pt who is in agreement.  LIAM will initiate precert. ...........Hiral CABRAL/ YANETH               Discharge Codes    No documentation.               Expected Discharge Date and Time     Expected Discharge Date Expected Discharge Time    Jan 4, 2023             Hiral Padilla RN

## 2023-01-03 NOTE — PROGRESS NOTES
[facility] Progress Note      Patient Name: Watson Lisa  : 1955  MRN: [id number]  Primary Care Physician:  Checo Dunham MD  Date of admission: 2022    Subjective     Interval History:   Follow up DEEP.  Pain left knee.  Supposed to get immobilizer.  On tylenol for knee pain.  Eating. Urinating. Bowels moving. Unable to stand.     Review of Systems:   As noted above    Objective     Vitals:   Temp:  [98.1 °F (36.7 °C)-99.3 °F (37.4 °C)] 98.6 °F (37 °C)  Heart Rate:  [82-90] 84  Resp:  [18] 18  BP: (131-155)/(68-89) 132/68    Intake/Output Summary (Last 24 hours) at 1/3/2023 1607  Last data filed at 1/3/2023 0937  Gross per 24 hour   Intake --   Output 1020 ml   Net -1020 ml       Physical Exam:    General Appearance: alert, oriented x 3, no acute distress   Skin: warm and dry  HEENT: oral mucosa normal, nonicteric sclera. CPAP in place.   Neck: supple, no JVD  Lungs: Clear to auscultation.   Heart: RRR, normal S1 and S2  Abdomen: soft, nontender, non-distended. + bs  Extremities: left knee edema, tenderness.   Neuro: normal speech and mental status     Scheduled Meds:     folic acid, 1 mg, Oral, Daily  hydrocortisone-bacitracin-zinc oxide-nystatin, 1 application, Topical, TID  ipratropium-albuterol, 3 mL, Nebulization, 4x Daily - RT  losartan, 50 mg, Oral, Q24H  metoprolol succinate XL, 25 mg, Oral, Q24H  multivitamin, 1 tablet, Oral, Daily  mupirocin, 1 application, Topical, Q12H  potassium chloride, 20 mEq, Oral, Daily  spironolactone, 25 mg, Oral, Daily  thiamine, 100 mg, Oral, Daily      IV Meds:        Results Reviewed:   I have personally reviewed the results from the time of this admission to 1/3/2023 16:07 EST     Results from last 7 days   Lab Units 23  0715 23  0814 22  0717 22  0607 22  1758   SODIUM mmol/L 138 139 138 137 138   POTASSIUM mmol/L 3.9 3.3*  3.3* 3.3* 3.4* 3.3*   CHLORIDE mmol/L 102 101 99 97* 101   CO2 mmol/L 24.4  26.7 25.4 24.8 20.2*   BUN mg/dL 19 18 20 22 32*   CREATININE mg/dL 0.95 0.94 0.92 0.91 1.17   CALCIUM mg/dL 8.6 8.9 8.3* 8.2* 8.2*   BILIRUBIN mg/dL  --   --  0.6 0.7 0.6   ALK PHOS U/L  --   --  74 77 74   ALT (SGPT) U/L  --   --  95* 115* 113*   AST (SGOT) U/L  --   --  168* 292* 350*   GLUCOSE mg/dL 104* 135* 140* 93 110*       Estimated Creatinine Clearance: 108.4 mL/min (by C-G formula based on SCr of 0.95 mg/dL).    Results from last 7 days   Lab Units 01/02/23  0715 01/01/23  0814 12/31/22  0717   MAGNESIUM mg/dL 1.7 1.9 1.3*   PHOSPHORUS mg/dL 3.7 3.7 3.3             Results from last 7 days   Lab Units 01/02/23  0715 01/01/23  0814 12/30/22  0607 12/29/22  0539 12/28/22  1746   WBC 10*3/mm3 6.85 6.83 8.55 9.90 13.53*   HEMOGLOBIN g/dL 10.4* 10.8* 10.2* 9.9* 10.8*   PLATELETS 10*3/mm3 221 170 96* 75* 82*             Assessment / Plan     ASSESSMENT:  1. Zeke resolved. Rhabdomyolysis.  K and Mag replete.   2. Left patellar fracture non-operative management.  Immobilizer recommended by ortho, Asked RN to follow up on this.   3. ETOH abuse withdrawal.  4. HTN BP better. On ARB and toprol.   5. Anemia. Hg stable  6. TCP resolved.   7. Elevated transaminases.  Recheck.   PLAN:  1. Will sign off. Please call with questions.   Junie Zarate MD  01/03/23  16:07 EST    [facility]  [phone]

## 2023-01-04 LAB
ALBUMIN SERPL-MCNC: 3.3 G/DL (ref 3.5–5.2)
ALBUMIN/GLOB SERPL: 1.3 G/DL
ALP SERPL-CCNC: 72 U/L (ref 39–117)
ALT SERPL W P-5'-P-CCNC: 38 U/L (ref 1–41)
ANION GAP SERPL CALCULATED.3IONS-SCNC: 7 MMOL/L (ref 5–15)
AST SERPL-CCNC: 26 U/L (ref 1–40)
BILIRUB SERPL-MCNC: 0.3 MG/DL (ref 0–1.2)
BUN SERPL-MCNC: 17 MG/DL (ref 8–23)
BUN/CREAT SERPL: 18.5 (ref 7–25)
CALCIUM SPEC-SCNC: 8.5 MG/DL (ref 8.6–10.5)
CHLORIDE SERPL-SCNC: 105 MMOL/L (ref 98–107)
CO2 SERPL-SCNC: 26 MMOL/L (ref 22–29)
CREAT SERPL-MCNC: 0.92 MG/DL (ref 0.76–1.27)
EGFRCR SERPLBLD CKD-EPI 2021: 90.6 ML/MIN/1.73
GLOBULIN UR ELPH-MCNC: 2.6 GM/DL
GLUCOSE SERPL-MCNC: 108 MG/DL (ref 65–99)
POTASSIUM SERPL-SCNC: 4.3 MMOL/L (ref 3.5–5.2)
PROT SERPL-MCNC: 5.9 G/DL (ref 6–8.5)
SODIUM SERPL-SCNC: 138 MMOL/L (ref 136–145)
WHOLE BLOOD HOLD SPECIMEN: NORMAL

## 2023-01-04 PROCEDURE — 97110 THERAPEUTIC EXERCISES: CPT

## 2023-01-04 PROCEDURE — 80053 COMPREHEN METABOLIC PANEL: CPT | Performed by: INTERNAL MEDICINE

## 2023-01-04 RX ORDER — TRAMADOL HYDROCHLORIDE 50 MG/1
25 TABLET ORAL EVERY 8 HOURS PRN
Status: DISCONTINUED | OUTPATIENT
Start: 2023-01-04 | End: 2023-01-05 | Stop reason: HOSPADM

## 2023-01-04 RX ADMIN — HYDROXYZINE HYDROCHLORIDE 25 MG: 25 TABLET ORAL at 07:52

## 2023-01-04 RX ADMIN — Medication 1 TABLET: at 08:00

## 2023-01-04 RX ADMIN — ZINC OXIDE 1 APPLICATION: 200 OINTMENT TOPICAL at 17:40

## 2023-01-04 RX ADMIN — ZINC OXIDE 1 APPLICATION: 200 OINTMENT TOPICAL at 21:46

## 2023-01-04 RX ADMIN — HYDROXYZINE HYDROCHLORIDE 25 MG: 25 TABLET ORAL at 21:46

## 2023-01-04 RX ADMIN — MUPIROCIN 1 APPLICATION: 20 OINTMENT TOPICAL at 08:00

## 2023-01-04 RX ADMIN — POTASSIUM CHLORIDE 20 MEQ: 750 TABLET, EXTENDED RELEASE ORAL at 08:00

## 2023-01-04 RX ADMIN — Medication 3 MG: at 21:46

## 2023-01-04 RX ADMIN — TRAMADOL HYDROCHLORIDE 25 MG: 50 TABLET, COATED ORAL at 12:34

## 2023-01-04 RX ADMIN — SPIRONOLACTONE 25 MG: 25 TABLET, FILM COATED ORAL at 08:00

## 2023-01-04 RX ADMIN — LOSARTAN POTASSIUM 50 MG: 50 TABLET, FILM COATED ORAL at 08:00

## 2023-01-04 RX ADMIN — METOPROLOL SUCCINATE 25 MG: 25 TABLET, EXTENDED RELEASE ORAL at 08:00

## 2023-01-04 RX ADMIN — FOLIC ACID 1 MG: 1 TABLET ORAL at 08:00

## 2023-01-04 RX ADMIN — ZINC OXIDE 1 APPLICATION: 200 OINTMENT TOPICAL at 08:00

## 2023-01-04 RX ADMIN — ACETAMINOPHEN 650 MG: 325 TABLET, FILM COATED ORAL at 07:51

## 2023-01-04 RX ADMIN — MUPIROCIN 1 APPLICATION: 20 OINTMENT TOPICAL at 21:47

## 2023-01-04 RX ADMIN — Medication 100 MG: at 08:00

## 2023-01-04 NOTE — PROGRESS NOTES
Access follow up.   Pt accepted to [facility], pre-cert approved for rehab. Pt has left patella fracture COPD, et all in chart.   Pt educated on JANELL and co-occurring disorders.   Pt processed that this is the 4th time he has come to the hospital because of his drinking. 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 Educated pt that the more someone tries to become abstinent the more likely choi of more sustained sobriety and recovery meetings essential to develop sober network working program of recovery. . Ecaudated pt on [facility] JANELL IOP with MD psychiatrist access in IOP.  Pt given all area resources for JANELL treatment and strong recommendation pt have JANELL treatment ASAP. Left  pt information scheduling virtual/in person 12 step meetings, smart recovery and Rescue recovery.  Pt has contact numbers for [facility] center to come to JANELL treatment but stressed he go to JANELL treatment ASAP.

## 2023-01-04 NOTE — PLAN OF CARE
Goal Outcome Evaluation:  Plan of Care Reviewed With: patient           Outcome Evaluation: VSS. patient up to chair and BSC commode today x1 assist with gaitbelt and walker. prn pain medication added on to alleviate pain. still waiting on precert for rehab. BM x2. will continue to monitor        Problem: Adult Inpatient Plan of Care  Goal: Absence of Hospital-Acquired Illness or Injury  Outcome: Ongoing, Progressing  Intervention: Identify and Manage Fall Risk  Recent Flowsheet Documentation  Taken 1/4/2023 1455 by Aruna Melo RN  Safety Promotion/Fall Prevention: safety round/check completed  Taken 1/4/2023 1200 by Aruna Melo RN  Safety Promotion/Fall Prevention: safety round/check completed  Taken 1/4/2023 1020 by Aruna Melo RN  Safety Promotion/Fall Prevention: safety round/check completed  Taken 1/4/2023 0742 by Aruna Melo RN  Safety Promotion/Fall Prevention: activity supervised  Intervention: Prevent Skin Injury  Recent Flowsheet Documentation  Taken 1/4/2023 1020 by Aruna Melo RN  Body Position: position changed independently  Taken 1/4/2023 0742 by Aruna Melo RN  Body Position: supine, legs elevated  Skin Protection: adhesive use limited  Intervention: Prevent and Manage VTE (Venous Thromboembolism) Risk  Recent Flowsheet Documentation  Taken 1/4/2023 1455 by Aruna Melo RN  Activity Management: back to bed  Taken 1/4/2023 1200 by Aruna Melo RN  Activity Management: up in chair  Taken 1/4/2023 1020 by Aruna Melo RN  Activity Management: activity adjusted per tolerance  Taken 1/4/2023 0742 by Aruna Melo RN  Activity Management: activity adjusted per tolerance  VTE Prevention/Management: patient refused intervention  Range of Motion: active ROM (range of motion) encouraged

## 2023-01-04 NOTE — PROGRESS NOTES
Name: Watson Lisa ADMIT: 2022   : 1955  PCP: Checo Dunham MD    MRN: [id number] LOS: 7 days   AGE/SEX: 67 y.o. male  ROOM: [facility]     Subjective   Subjective   Complains of pain in knee after working with PT. He is a bit anxious but no ETOH withdrawal. No CP SOB fever chills.     Review of Systems     Objective   Objective   Vital Signs  Temp:  [97.8 °F (36.6 °C)-98.7 °F (37.1 °C)] 98.4 °F (36.9 °C)  Heart Rate:  [74-86] 75  Resp:  [16-18] 16  BP: (114-160)/(68-86) 125/77  SpO2:  [94 %-100 %] 98 %  on   ;   Device (Oxygen Therapy): room air  Body mass index is 28.46 kg/m².  Physical Exam  Vitals reviewed.   Constitutional:       Appearance: He is well-developed. He is obese.   HENT:      Head: Normocephalic and atraumatic.   Cardiovascular:      Rate and Rhythm: Normal rate and regular rhythm.   Pulmonary:      Effort: Pulmonary effort is normal. No respiratory distress.      Breath sounds: Normal breath sounds.   Abdominal:      General: Bowel sounds are normal. There is no distension.      Palpations: Abdomen is soft.      Tenderness: There is no abdominal tenderness.      Hernia: No hernia is present.   Musculoskeletal:      Comments: Left leg in brace neurovascular status intact.    Skin:     General: Skin is warm and dry.   Neurological:      Mental Status: He is alert and oriented to person, place, and time.   Psychiatric:         Behavior: Behavior normal.         Thought Content: Thought content normal.         Judgment: Judgment normal.       Results Review     I reviewed the patient's new clinical results.  Results from last 7 days   Lab Units 23  0715 23  0814 22  0607 22  0539   WBC 10*3/mm3 6.85 6.83 8.55 9.90   HEMOGLOBIN g/dL 10.4* 10.8* 10.2* 9.9*   PLATELETS 10*3/mm3 221 170 96* 75*     Results from last 7 days   Lab Units 23  0521 23  0715 23  0814 22  0717   SODIUM mmol/L 138 138 139 138   POTASSIUM mmol/L 4.3 3.9 3.3*  3.3*  3.3*   CHLORIDE mmol/L 105 102 101 99   CO2 mmol/L 26.0 24.4 26.7 25.4   BUN mg/dL 17 19 18 20   CREATININE mg/dL 0.92 0.95 0.94 0.92   GLUCOSE mg/dL 108* 104* 135* 140*   EGFR mL/min/1.73 90.6 87.2 88.3 90.6     Results from last 7 days   Lab Units 01/04/23  0521 01/01/23  0814 12/31/22  0717 12/30/22  0607 12/29/22  1758   ALBUMIN g/dL 3.3* 3.5 3.4* 3.7 3.4*   BILIRUBIN mg/dL 0.3  --  0.6 0.7 0.6   ALK PHOS U/L 72  --  74 77 74   AST (SGOT) U/L 26  --  168* 292* 350*   ALT (SGPT) U/L 38  --  95* 115* 113*     Results from last 7 days   Lab Units 01/04/23  0521 01/02/23  0715 01/01/23  0814 12/31/22  0717 12/30/22  0607   CALCIUM mg/dL 8.5* 8.6 8.9 8.3* 8.2*   ALBUMIN g/dL 3.3*  --  3.5 3.4* 3.7   MAGNESIUM mg/dL  --  1.7 1.9 1.3* 1.4*   PHOSPHORUS mg/dL  --  3.7 3.7 3.3 2.2*       No results found for: HGBA1C, POCGLU    No radiology results for the last day  Scheduled Medications  folic acid, 1 mg, Oral, Daily  hydrocortisone-bacitracin-zinc oxide-nystatin, 1 application, Topical, TID  ipratropium-albuterol, 3 mL, Nebulization, 4x Daily - RT  losartan, 50 mg, Oral, Q24H  metoprolol succinate XL, 25 mg, Oral, Q24H  multivitamin, 1 tablet, Oral, Daily  mupirocin, 1 application, Topical, Q12H  potassium chloride, 20 mEq, Oral, Daily  spironolactone, 25 mg, Oral, Daily  thiamine, 100 mg, Oral, Daily    Infusions   Diet  Diet: Cardiac Diets, Diabetic Diets; Healthy Heart (2-3 Na+); Consistent Carbohydrate; Texture: Regular Texture (IDDSI 7); Fluid Consistency: Thin (IDDSI 0)       Assessment/Plan     Active Hospital Problems    Diagnosis  POA   • Left patella fracture [S82.002A]  Yes   • Elevated troponin [R77.8]  Yes   • COPD (chronic obstructive pulmonary disease) ([facility]) [J44.9]  Yes   • LIVAN (obstructive sleep apnea) [G47.33]  Yes   • Alcohol dependence with withdrawal ([facility]) [F10.239]  Yes   • Essential hypertension [I10]  Yes      Resolved Hospital Problems    Diagnosis Date Resolved POA   • **DEEP (acute kidney  "injury) ([facility]) [N17.9] 01/01/2023 Yes   • Hypokalemia [E87.6] 01/02/2023 Yes   • Thrombocytopenia (HCC) [D69.6] 01/02/2023 Yes   • Rhabdomyolysis [M62.82] 01/02/2023 Yes       67 y.o. male admitted with left patellar fracture, ETOH withdrawal,  DEEP   67-year-old gentleman with a history of chronic alcohol use, LIVAN, COPD HTN who presents to the hospital after a fall and is found to have a left patellar fracture and complicated by acute alcohol withdrawal    -Withdrawal symptoms have resolved.  Still dealing with some anxiety and depressive symptoms.  Atarax initiated and DC ativan due to possible \"drug seeking behavior\" access following.   - knee immobilizer for treatment.  Physical therapy is following the patient.  -Renal function has returned to baseline.  Electrolytes are stable  -Thrombocytopenia likely related to chronic alcohol suppression however this is improved with treatment here in the hospital.  -Blood pressure stable on current medications.  -Mechanical DVT prophylaxis  -Full code     Plan for DC to SNF, CCP following. Precert pending for [facility]  [facility]  01/04/23  10:25 EST    "

## 2023-01-04 NOTE — PLAN OF CARE
Problem: Adult Inpatient Plan of Care  Goal: Plan of Care Review  1/4/2023 0401 by Vito Long RN  Outcome: Ongoing, Progressing  1/4/2023 0400 by Vito Long RN  Outcome: Ongoing, Progressing  Goal: Patient-Specific Goal (Individualized)  1/4/2023 0401 by Vito Long RN  Outcome: Ongoing, Progressing  1/4/2023 0400 by Vito Long RN  Outcome: Ongoing, Progressing  Goal: Absence of Hospital-Acquired Illness or Injury  1/4/2023 0401 by Vito Long RN  Outcome: Ongoing, Progressing  1/4/2023 0400 by Vito Long RN  Outcome: Ongoing, Progressing  Intervention: Identify and Manage Fall Risk  Recent Flowsheet Documentation  Taken 1/4/2023 0240 by Vito Long RN  Safety Promotion/Fall Prevention:   assistive device/personal items within reach   activity supervised   safety round/check completed  Taken 1/4/2023 0047 by Vito Long RN  Safety Promotion/Fall Prevention:   activity supervised   assistive device/personal items within reach   safety round/check completed  Taken 1/3/2023 2247 by Vito Long RN  Safety Promotion/Fall Prevention:   activity supervised   assistive device/personal items within reach   safety round/check completed  Intervention: Prevent Skin Injury  Recent Flowsheet Documentation  Taken 1/3/2023 2247 by Vito Long RN  Skin Protection: adhesive use limited  Intervention: Prevent and Manage VTE (Venous Thromboembolism) Risk  Recent Flowsheet Documentation  Taken 1/4/2023 0047 by Vito Long RN  VTE Prevention/Management:   bilateral   sequential compression devices off   patient refused intervention  Taken 1/3/2023 2247 by Vito Long RN  VTE Prevention/Management:   bilateral   compression stockings off   patient refused intervention  Range of Motion: active ROM (range of motion) encouraged  Intervention: Prevent Infection  Recent Flowsheet Documentation  Taken 1/4/2023 0240 by Vito Long RN  Infection Prevention:   hand  hygiene promoted   rest/sleep promoted  Taken 1/4/2023 0047 by Vito Long RN  Infection Prevention:   hand hygiene promoted   rest/sleep promoted  Taken 1/3/2023 2247 by Vito Long RN  Infection Prevention:   hand hygiene promoted   rest/sleep promoted  Goal: Optimal Comfort and Wellbeing  1/4/2023 0401 by Vito Long RN  Outcome: Ongoing, Progressing  1/4/2023 0400 by Vito Long RN  Outcome: Ongoing, Progressing  Intervention: Monitor Pain and Promote Comfort  Recent Flowsheet Documentation  Taken 1/3/2023 2247 by Vito Long RN  Pain Management Interventions:   care clustered   see MAR  Intervention: Provide Person-Centered Care  Recent Flowsheet Documentation  Taken 1/3/2023 2247 by Vito Long RN  Trust Relationship/Rapport:   care explained   choices provided   questions answered   questions encouraged  Goal: Readiness for Transition of Care  1/4/2023 0401 by Vito Long RN  Outcome: Ongoing, Progressing  1/4/2023 0400 by Vito Long RN  Outcome: Ongoing, Progressing     Problem: Fall Injury Risk  Goal: Absence of Fall and Fall-Related Injury  1/4/2023 0401 by Vito Long RN  Outcome: Ongoing, Progressing  1/4/2023 0400 by Vito Long RN  Outcome: Ongoing, Progressing  Intervention: Identify and Manage Contributors  Recent Flowsheet Documentation  Taken 1/4/2023 0240 by Vito Long RN  Medication Review/Management: medications reviewed  Taken 1/4/2023 0047 by Vito Long RN  Medication Review/Management: medications reviewed  Taken 1/3/2023 2247 by Vito Long RN  Medication Review/Management: medications reviewed  Intervention: Promote Injury-Free Environment  Recent Flowsheet Documentation  Taken 1/4/2023 0240 by Vito Long RN  Safety Promotion/Fall Prevention:   assistive device/personal items within reach   activity supervised   safety round/check completed  Taken 1/4/2023 0047 by Vito Long RN  Safety  Promotion/Fall Prevention:   activity supervised   assistive device/personal items within reach   safety round/check completed  Taken 1/3/2023 2247 by Vito Long RN  Safety Promotion/Fall Prevention:   activity supervised   assistive device/personal items within reach   safety round/check completed     Problem: Skin Injury Risk Increased  Goal: Skin Health and Integrity  1/4/2023 0401 by Vito Long RN  Outcome: Ongoing, Progressing  1/4/2023 0400 by Vito Long RN  Outcome: Ongoing, Progressing  Intervention: Optimize Skin Protection  Recent Flowsheet Documentation  Taken 1/3/2023 2247 by Vito Long RN  Pressure Reduction Techniques:   frequent weight shift encouraged   weight shift assistance provided  Pressure Reduction Devices: alternating pressure pump (ADD)  Skin Protection: adhesive use limited   Goal Outcome Evaluation:

## 2023-01-04 NOTE — NURSING NOTE
Access center follow up regarding ETOH: Last CIWA score of 2. Ativan d/c, Atarax given overnight. Will follow.

## 2023-01-04 NOTE — NURSING NOTE
Notified Pts son Aravind Lisa that he has been transferred to [facility] Rm [id number].

## 2023-01-04 NOTE — THERAPY TREATMENT NOTE
Patient Name: Watson Lisa  : 1955    MRN: [id number]                              Today's Date: 2023       Admit Date: 2022    Visit Dx:     ICD-10-CM ICD-9-CM   1. DEEP (acute kidney injury) ([facility])  N17.9 584.9   2. Traumatic rhabdomyolysis, initial encounter ([facility])  T79.6XXA 958.6   3. Alcohol withdrawal syndrome with complication ([facility])  F10.939 291.81   4. Closed nondisplaced longitudinal fracture of left patella, initial encounter  S82.025A 822.0     Patient Active Problem List   Diagnosis   • Essential hypertension   • Tobacco abuse   • Alcohol dependence with withdrawal ([facility])   • Hiatal hernia   • Hypokalemia   • Effusion of left knee   • Osteoarthritis of left knee   • Vitamin D deficiency   • Anemia, chronic disease   • Hyponatremia   • COPD (chronic obstructive pulmonary disease) ([facility])   • LIVAN (obstructive sleep apnea)   • ETOH abuse   • Obese   • Alcoholic liver disease ([facility])   • Cigarette nicotine dependence without complication   • History of alcohol use disorder   • Left patella fracture   • Elevated troponin     Past Medical History:   Diagnosis Date   • Alcohol abuse    • Anxiety    • Arthritis    • Bronchitis with chronic airway obstruction ([facility])     LAST FEB   • DVT (deep venous thrombosis) ([facility])    • Hiatal hernia    • Hypertension    • Hypertension    • Low back pain    • Neck pain    • Pulmonary embolism ([facility])    • Sleep apnea with use of continuous positive airway pressure (CPAP)     AT NIGHT     Past Surgical History:   Procedure Laterality Date   • COLONOSCOPY     • JOINT REPLACEMENT Right     hip/knee   • REPLACEMENT TOTAL KNEE     • TONSILLECTOMY     • TOTAL HIP ARTHROPLASTY Right       General Information     Row Name 23 0957          Physical Therapy Time and Intention    Document Type therapy note (daily note)  -EJ     Mode of Treatment physical therapy  -EJ     Row Name 23 0957          General Information    Existing Precautions/Restrictions fall  KI on LLE   -EJ           User Key  (r) = Recorded By, (t) = Taken By, (c) = Cosigned By    Initials Name Provider Type    Lucille Brambila, PT Physical Therapist               Mobility     Row Name 01/04/23 0957          Bed Mobility    Supine-Sit Valencia (Bed Mobility) standby assist  -EJ     Sit-Supine [place] (Bed Mobility) not tested  -EJ     Assistive Device (Bed Mobility) head of bed elevated;bed rails  -EJ     Row Name 01/04/23 0957          Sit-Stand Transfer    Sit-Stand Valencia (Transfers) verbal cues;moderate assist (50% patient effort);2 person assist  -EJ     Assistive Device (Sit-Stand Transfers) walker, front-wheeled  -EJ     Comment, (Sit-Stand Transfer) 1st attempt pt having difficulty clearing bottom, raised bed height and pt able to stand w mod A x 2  -EJ     Row Name 01/04/23 0957          Gait/Stairs (Locomotion)    Valencia Level (Gait) verbal cues;nonverbal cues (demo/gesture);contact guard;minimum assist (75% patient effort);2 person assist  -EJ     Assistive Device (Gait) walker, front-wheeled  -EJ     Distance in Feet (Gait) 15  -EJ     Deviations/Abnormal Patterns (Gait) janeth decreased;antalgic;stride length decreased  -EJ           User Key  (r) = Recorded By, (t) = Taken By, (c) = Cosigned By    Initials Name Provider Type    Lucille Brambila, PT Physical Therapist               Obj/Interventions    No documentation.                Goals/Plan    No documentation.                Clinical Impression     Row Name 01/04/23 0958          Pain    Pretreatment Pain Rating 8/10  -EJ     Posttreatment Pain Rating 8/10  -EJ     Pain Location - Side/Orientation Left  -EJ     Pain Location - knee  -EJ     Pain Intervention(s) Repositioned;Ambulation/increased activity  -EJ     Row Name 01/04/23 0958          Plan of Care Review    Plan of Care Reviewed With patient  -EJ     Progress improving  -EJ     Outcome Evaluation Pt agreeable to PT this am. He reports L knee pain at  8/10. Pt moving well in bed and able to sit EOB w SBA.  He was able to come to stand today using Rwx and mod A x 2 w elevated bed height. Pt having difficulty coming to stand from standard height. He was able to ambulate approx 15 ft in room. Knee immobilizer in place. Pt up in chair at end of session. Plans for SNU at [place].  -EJ     Row Name 01/04/23 0958          Positioning and Restraints    Pre-Treatment Position in bed  -EJ     Post Treatment Position chair  -EJ     In Chair notified nsg;reclined;call light within reach;encouraged to call for assist;exit alarm on  -EJ           User Key  (r) = Recorded By, (t) = Taken By, (c) = Cosigned By    Initials Name Provider Type    Lucille Brambila, PT Physical Therapist               Outcome Measures     Row Name 01/04/23 1001 01/04/23 0742       How much help from another person do you currently need...    Turning from your back to your side while in flat bed without using bedrails? 3  -EJ 3  -JM    Moving from lying on back to sitting on the side of a flat bed without bedrails? 3  -EJ 3  -JM    Moving to and from a bed to a chair (including a wheelchair)? 2  -EJ 1  -JM    Standing up from a chair using your arms (e.g., wheelchair, bedside chair)? 2  -EJ 1  -JM    Climbing 3-5 steps with a railing? 2  -EJ 1  -JM    To walk in hospital room? 3  -EJ 1  -JM    AM-PAC 6 Clicks Score (PT) 15  -EJ 10  -JM    Highest level of mobility 4 --> Transferred to chair/commode  -EJ 4 --> Transferred to chair/commode  -JM          User Key  (r) = Recorded By, (t) = Taken By, (c) = Cosigned By    Initials Name Provider Type    Lucille Brambila, PT Physical Therapist    Aruna Zamora, RN Registered Nurse                             Physical Therapy Education     Title: PT OT SLP Therapies (In Progress)     Topic: Physical Therapy (In Progress)     Point: Mobility training (Done)     Learning Progress Summary           Patient Acceptance, E, VU by EM at 1/2/2023 3734                    Point: Home exercise program (Done)     Learning Progress Summary           Patient Acceptance, E, VU by EM at 1/2/2023 4416                   Point: Body mechanics (Not Started)     Learner Progress:  Not documented in this visit.          Point: Precautions (Not Started)     Learner Progress:  Not documented in this visit.                      User Key     Initials Effective Dates Name Provider Type Discipline     06/16/21 -  Pooja Carrington PT Physical Therapist PT              PT Recommendation and Plan     Plan of Care Reviewed With: patient  Progress: improving  Outcome Evaluation: Pt agreeable to PT this am. He reports L knee pain at 8/10. Pt moving well in bed and able to sit EOB w SBA.  He was able to come to stand today using Rwx and mod A x 2 w elevated bed height. Pt having difficulty coming to stand from standard height. He was able to ambulate approx 15 ft in room. Knee immobilizer in place. Pt up in chair at end of session. Plans for SNU at [place].     Time Calculation:    PT Charges     Row Name 01/04/23 1002             Time Calculation    Start Time 0927  -EJ      Stop Time 0945  -EJ      Time Calculation (min) 18 min  -EJ      PT Received On 01/04/23  -EJ      PT - Next Appointment 01/05/23  -EJ            User Key  (r) = Recorded By, (t) = Taken By, (c) = Cosigned By    Initials Name Provider Type     Lucille Palomo, PT Physical Therapist              Therapy Charges for Today     Code Description Service Date Service Provider Modifiers Qty    70157546449 HC PT THER PROC EA 15 MIN 1/4/2023 Lucille Palomo, PT GP 1    37462269030 HC PT THER SUPP EA 15 MIN 1/4/2023 Lucille Palomo, PT GP 1          PT G-Codes  Outcome Measure Options: AM-PAC 6 Clicks Basic Mobility (PT)  AM-PAC 6 Clicks Score (PT): 15       Lucille Palomo PT  1/4/2023

## 2023-01-04 NOTE — PLAN OF CARE
Goal Outcome Evaluation:  Plan of Care Reviewed With: patient        Progress: improving  Outcome Evaluation: Pt agreeable to PT this am. He reports L knee pain at 8/10. Pt moving well in bed and able to sit EOB w SBA.  He was able to come to stand today using Rwx and mod A x 2 w elevated bed height. Pt having difficulty coming to stand from standard height. He was able to ambulate approx 15 ft in room. Knee immobilizer in place. Pt up in chair at end of session. Plans for SNU at [place].

## 2023-01-04 NOTE — PROGRESS NOTES
Continued Stay Note  [facility]     Patient Name: Watson Lisa  MRN: [id number]  Today's Date: 1/4/2023    Admit Date: 12/28/2022    Plan: [facility], precert approved   Discharge Plan     Row Name 01/04/23 1535       Plan    Plan [facility], precert approved    Patient/Family in Agreement with Plan yes    Plan Comments Per Katiuska/Signature precert has been approved to d/c to [facility], will inform nursing/MD.               Discharge Codes    No documentation.               Expected Discharge Date and Time     Expected Discharge Date Expected Discharge Time    Jan 4, 2023             Tiny Foley RN

## 2023-01-05 VITALS
HEIGHT: 78 IN | OXYGEN SATURATION: 97 % | RESPIRATION RATE: 16 BRPM | DIASTOLIC BLOOD PRESSURE: 66 MMHG | SYSTOLIC BLOOD PRESSURE: 107 MMHG | HEART RATE: 103 BPM | WEIGHT: 252.65 LBS | BODY MASS INDEX: 29.23 KG/M2 | TEMPERATURE: 98 F

## 2023-01-05 PROCEDURE — 97530 THERAPEUTIC ACTIVITIES: CPT

## 2023-01-05 RX ORDER — LOSARTAN POTASSIUM 50 MG/1
50 TABLET ORAL
Start: 2023-01-06

## 2023-01-05 RX ORDER — ACETAMINOPHEN 325 MG/1
650 TABLET ORAL EVERY 4 HOURS PRN
Start: 2023-01-05

## 2023-01-05 RX ORDER — LANOLIN ALCOHOL/MO/W.PET/CERES
100 CREAM (GRAM) TOPICAL DAILY
Start: 2023-01-06

## 2023-01-05 RX ORDER — TRAMADOL HYDROCHLORIDE 50 MG/1
25 TABLET ORAL EVERY 8 HOURS PRN
Qty: 9 TABLET | Refills: 0 | Status: SHIPPED | OUTPATIENT
Start: 2023-01-05

## 2023-01-05 RX ORDER — SPIRONOLACTONE 25 MG/1
25 TABLET ORAL DAILY
Start: 2023-01-06

## 2023-01-05 RX ORDER — IPRATROPIUM BROMIDE AND ALBUTEROL SULFATE 2.5; .5 MG/3ML; MG/3ML
3 SOLUTION RESPIRATORY (INHALATION) 4 TIMES DAILY PRN
Qty: 360 ML
Start: 2023-01-05

## 2023-01-05 RX ORDER — FOLIC ACID 1 MG/1
1 TABLET ORAL DAILY
Start: 2023-01-06

## 2023-01-05 RX ORDER — POTASSIUM CHLORIDE 20 MEQ/1
20 TABLET, EXTENDED RELEASE ORAL DAILY
Start: 2023-01-06

## 2023-01-05 RX ORDER — METOPROLOL SUCCINATE 25 MG/1
25 TABLET, EXTENDED RELEASE ORAL
Start: 2023-01-06

## 2023-01-05 RX ORDER — HYDROXYZINE HYDROCHLORIDE 25 MG/1
25 TABLET, FILM COATED ORAL 3 TIMES DAILY PRN
Start: 2023-01-05

## 2023-01-05 RX ADMIN — POTASSIUM CHLORIDE 20 MEQ: 750 TABLET, EXTENDED RELEASE ORAL at 08:08

## 2023-01-05 RX ADMIN — FOLIC ACID 1 MG: 1 TABLET ORAL at 08:09

## 2023-01-05 RX ADMIN — Medication 1 TABLET: at 08:09

## 2023-01-05 RX ADMIN — MUPIROCIN 1 APPLICATION: 20 OINTMENT TOPICAL at 08:12

## 2023-01-05 RX ADMIN — HYDROXYZINE HYDROCHLORIDE 25 MG: 25 TABLET ORAL at 08:12

## 2023-01-05 RX ADMIN — TRAMADOL HYDROCHLORIDE 25 MG: 50 TABLET, COATED ORAL at 10:10

## 2023-01-05 RX ADMIN — SPIRONOLACTONE 25 MG: 25 TABLET, FILM COATED ORAL at 08:09

## 2023-01-05 RX ADMIN — ZINC OXIDE 1 APPLICATION: 200 OINTMENT TOPICAL at 08:12

## 2023-01-05 RX ADMIN — Medication 100 MG: at 08:09

## 2023-01-05 RX ADMIN — LOSARTAN POTASSIUM 50 MG: 50 TABLET, FILM COATED ORAL at 08:09

## 2023-01-05 RX ADMIN — METOPROLOL SUCCINATE 25 MG: 25 TABLET, EXTENDED RELEASE ORAL at 08:09

## 2023-01-05 NOTE — PROGRESS NOTES
Continued Stay Note  [facility]     Patient Name: Watson Lisa  MRN: [id number]  Today's Date: 1/5/2023    Admit Date: 12/28/2022    Plan: pre-cert approved for [facility]; [facility] for 1:00 P.M   Discharge Plan     Row Name 01/05/23 1624       Plan    Final Discharge Disposition Code 03 - skilled nursing facility (SNF)    Final Note [facility] SNF, Z[facility]  van (voucher given)               Discharge Codes    No documentation.               Expected Discharge Date and Time     Expected Discharge Date Expected Discharge Time    Jan 5, 2023             Tiny Foley RN

## 2023-01-05 NOTE — NURSING NOTE
AC follow up regarding ETOH: Pt seen by JANELL therapist yesterday who provided patient with a multitude of resources. Last CIWA score of 1. Pt accepted to [facility]. Transfer pending. Will follow.

## 2023-01-05 NOTE — PLAN OF CARE
Problem: Adult Inpatient Plan of Care  Goal: Plan of Care Review  Outcome: Ongoing, Progressing  Goal: Patient-Specific Goal (Individualized)  Outcome: Ongoing, Progressing  Goal: Absence of Hospital-Acquired Illness or Injury  Outcome: Ongoing, Progressing  Intervention: Identify and Manage Fall Risk  Recent Flowsheet Documentation  Taken 1/5/2023 0433 by Vito Long RN  Safety Promotion/Fall Prevention:   activity supervised   assistive device/personal items within reach  Taken 1/5/2023 0028 by Vito Long RN  Safety Promotion/Fall Prevention:   activity supervised   assistive device/personal items within reach   safety round/check completed  Taken 1/4/2023 2240 by Vito Long RN  Safety Promotion/Fall Prevention:   activity supervised   assistive device/personal items within reach   safety round/check completed  Taken 1/4/2023 2040 by Vito Long RN  Safety Promotion/Fall Prevention:   activity supervised   assistive device/personal items within reach   safety round/check completed  Intervention: Prevent Skin Injury  Recent Flowsheet Documentation  Taken 1/4/2023 2040 by Vito Long RN  Skin Protection: adhesive use limited  Intervention: Prevent and Manage VTE (Venous Thromboembolism) Risk  Recent Flowsheet Documentation  Taken 1/5/2023 0433 by Vito Long RN  VTE Prevention/Management: patient refused intervention  Taken 1/5/2023 0028 by Vito Long RN  VTE Prevention/Management: patient refused intervention  Taken 1/4/2023 2040 by Vito Long RN  VTE Prevention/Management:   bilateral   patient refused intervention   sequential compression devices off  Range of Motion: active ROM (range of motion) encouraged  Intervention: Prevent Infection  Recent Flowsheet Documentation  Taken 1/5/2023 0433 by Vito Long RN  Infection Prevention:   hand hygiene promoted   rest/sleep promoted  Taken 1/5/2023 0028 by Vito Long RN  Infection Prevention:   hand  hygiene promoted   rest/sleep promoted  Taken 1/4/2023 2240 by Vito Long RN  Infection Prevention:   hand hygiene promoted   rest/sleep promoted  Taken 1/4/2023 2040 by Vito Long RN  Infection Prevention:   rest/sleep promoted   hand hygiene promoted  Goal: Optimal Comfort and Wellbeing  Outcome: Ongoing, Progressing  Intervention: Monitor Pain and Promote Comfort  Recent Flowsheet Documentation  Taken 1/4/2023 2040 by Vito Long RN  Pain Management Interventions:   see MAR   quiet environment facilitated  Intervention: Provide Person-Centered Care  Recent Flowsheet Documentation  Taken 1/4/2023 2040 by Vito Long RN  Trust Relationship/Rapport:   care explained   choices provided   questions answered   questions encouraged  Goal: Readiness for Transition of Care  Outcome: Ongoing, Progressing     Problem: Fall Injury Risk  Goal: Absence of Fall and Fall-Related Injury  Outcome: Ongoing, Progressing  Intervention: Identify and Manage Contributors  Recent Flowsheet Documentation  Taken 1/5/2023 0433 by Vito Long RN  Medication Review/Management: medications reviewed  Taken 1/5/2023 0028 by Vito Long RN  Medication Review/Management: medications reviewed  Taken 1/4/2023 2240 by Vito Long RN  Medication Review/Management: medications reviewed  Taken 1/4/2023 2040 by Vito Long RN  Medication Review/Management: medications reviewed  Intervention: Promote Injury-Free Environment  Recent Flowsheet Documentation  Taken 1/5/2023 0433 by Vito Long RN  Safety Promotion/Fall Prevention:   activity supervised   assistive device/personal items within reach  Taken 1/5/2023 0028 by Vito Long RN  Safety Promotion/Fall Prevention:   activity supervised   assistive device/personal items within reach   safety round/check completed  Taken 1/4/2023 2240 by Vito Long RN  Safety Promotion/Fall Prevention:   activity supervised   assistive device/personal  items within reach   safety round/check completed  Taken 1/4/2023 2040 by Vito Long, RN  Safety Promotion/Fall Prevention:   activity supervised   assistive device/personal items within reach   safety round/check completed     Problem: Skin Injury Risk Increased  Goal: Skin Health and Integrity  Outcome: Ongoing, Progressing  Intervention: Optimize Skin Protection  Recent Flowsheet Documentation  Taken 1/4/2023 2040 by Vito Long, RN  Pressure Reduction Techniques: frequent weight shift encouraged  Pressure Reduction Devices: alternating pressure pump (ADD)  Skin Protection: adhesive use limited   Goal Outcome Evaluation:

## 2023-01-05 NOTE — PLAN OF CARE
Goal Outcome Evaluation:  Plan of Care Reviewed With: patient           Outcome Evaluation: Pt participated with PT this AM. When standing, knee immobilizer sliding down. PT able to readjust brace in hopes it stays in place with further mobility. He ambulated further distance with less assist today with use of a walker. Continues to demonstrate mobility deficits and would benefit from d/c to snf as he is not safe to return home alone at this time.

## 2023-01-05 NOTE — PLAN OF CARE
Goal Outcome Evaluation:  Plan of Care Reviewed With: patient            Discharge to [facility] via wheel chair van.  Report Called to Kianna.

## 2023-01-05 NOTE — THERAPY TREATMENT NOTE
Patient Name: Watson Lisa  : 1955    MRN: [id number]                              Today's Date: 2023       Admit Date: 2022    Visit Dx:     ICD-10-CM ICD-9-CM   1. DEEP (acute kidney injury) ([facility])  N17.9 584.9   2. Traumatic rhabdomyolysis, initial encounter ([facility])  T79.6XXA 958.6   3. Alcohol withdrawal syndrome with complication ([facility])  F10.939 291.81   4. Closed nondisplaced longitudinal fracture of left patella, initial encounter  S82.025A 822.0     Patient Active Problem List   Diagnosis   • Essential hypertension   • Tobacco abuse   • Alcohol dependence with withdrawal ([facility])   • Hiatal hernia   • Hypokalemia   • Effusion of left knee   • Osteoarthritis of left knee   • Vitamin D deficiency   • Anemia, chronic disease   • Hyponatremia   • COPD (chronic obstructive pulmonary disease) ([facility])   • LIVAN (obstructive sleep apnea)   • ETOH abuse   • Obese   • Alcoholic liver disease ([facility])   • Cigarette nicotine dependence without complication   • History of alcohol use disorder   • Left patella fracture   • Elevated troponin     Past Medical History:   Diagnosis Date   • Alcohol abuse    • Anxiety    • Arthritis    • Bronchitis with chronic airway obstruction ([facility])     LAST FEB   • DVT (deep venous thrombosis) ([facility])    • Hiatal hernia    • Hypertension    • Hypertension    • Low back pain    • Neck pain    • Pulmonary embolism ([facility])    • Sleep apnea with use of continuous positive airway pressure (CPAP)     AT NIGHT     Past Surgical History:   Procedure Laterality Date   • COLONOSCOPY     • JOINT REPLACEMENT Right     hip/knee   • REPLACEMENT TOTAL KNEE     • TONSILLECTOMY     • TOTAL HIP ARTHROPLASTY Right       General Information     Row Name 23 1031          Physical Therapy Time and Intention    Document Type therapy note (daily note)  -CW     Mode of Treatment physical therapy;individual therapy  -CW     Row Name 23 1031          General Information    Patient Profile Reviewed yes   -CW     Existing Precautions/Restrictions fall  KI on L  -CW     Row Name 01/05/23 1031          Cognition    Orientation Status (Cognition) oriented x 3  -CW     Row Name 01/05/23 1031          Safety Issues, Functional Mobility    Impairments Affecting Function (Mobility) balance;endurance/activity tolerance;strength;pain  -CW           User Key  (r) = Recorded By, (t) = Taken By, (c) = Cosigned By    Initials Name Provider Type    CW Bethanie Rivas, PT Physical Therapist               Mobility     Row Name 01/05/23 1032          Bed Mobility    Comment, (Bed Mobility) NT up in chair at arrival and departure  -CW     Row Name 01/05/23 1032          Bed-Chair Transfer    Bed-Chair [place] (Transfers) verbal cues;nonverbal cues (demo/gesture);contact guard  -CW     Assistive Device (Bed-Chair Transfers) walker, front-wheeled  -CW     Comment, (Bed-Chair Transfer) cues to get closer to chair before sitting and to reach back with UE  -CW     Row Name 01/05/23 1032          Sit-Stand Transfer    Sit-Stand [place] (Transfers) minimum assist (75% patient effort);1 person assist;verbal cues;nonverbal cues (demo/gesture)  -CW     Assistive Device (Sit-Stand Transfers) walker, front-wheeled  -CW     Comment, (Sit-Stand Transfer) x2 trials from chair  -CW     Row Name 01/05/23 1032          Gait/Stairs (Locomotion)    [place] Level (Gait) contact guard;minimum assist (75% patient effort);verbal cues  -CW     Assistive Device (Gait) walker, front-wheeled  -CW     Distance in Feet (Gait) 40' x2 trials with standing rest break  -CW     Deviations/Abnormal Patterns (Gait) janeth decreased;antalgic;stride length decreased;gait speed decreased  -CW     Bilateral Gait Deviations forward flexed posture  -CW     Row Name 01/05/23 1032          Mobility    Extremity Weight-bearing Status left lower extremity  -CW     Left Lower Extremity (Weight-bearing Status) weight-bearing as tolerated (WBAT)  -CW           User  Key  (r) = Recorded By, (t) = Taken By, (c) = Cosigned By    Initials Name Provider Type    Bethanie Silva PT Physical Therapist               Obj/Interventions     Row Name 01/05/23 1036          Motor Skills    Therapeutic Exercise other (see comments)  encouraged AP, glute sets, quad sets throughout the day. x5 SLR with assist.  -CW           User Key  (r) = Recorded By, (t) = Taken By, (c) = Cosigned By    Initials Name Provider Type    Bethanie Silva PT Physical Therapist               Goals/Plan    No documentation.                Clinical Impression     Row Name 01/05/23 1036          Pain    Pretreatment Pain Rating 7/10  -CW     Posttreatment Pain Rating 7/10  -CW     Pain Location - Side/Orientation Left  -CW     Pain Location - ankle;knee  -CW     Pain Intervention(s) Repositioned;Rest  -CW     Row Name 01/05/23 1036          Plan of Care Review    Plan of Care Reviewed With patient  -CW     Outcome Evaluation Pt participated with PT this AM. When standing, knee immobilizer sliding down. PT able to readjust brace in hopes it stays in place with further mobility. He ambulated further distance with less assist today with use of a walker. Continues to demonstrate mobility deficits and would benefit from d/c to snf as he is not safe to return home alone at this time.  -CW     Row Name 01/05/23 1036          Vital Signs    O2 Delivery Pre Treatment room air  -CW     O2 Delivery Intra Treatment room air  -CW     O2 Delivery Post Treatment room air  -CW     Row Name 01/05/23 1036          Positioning and Restraints    Pre-Treatment Position sitting in chair/recliner  -CW     Post Treatment Position chair  -CW     In Chair reclined;call light within reach;encouraged to call for assist;exit alarm on;with brace;notified nsg;LLE elevated  -CW           User Key  (r) = Recorded By, (t) = Taken By, (c) = Cosigned By    Initials Name Provider Type    Bethanie Silva PT Physical Therapist                Outcome Measures     Row Name 01/05/23 1038 01/05/23 0812       How much help from another person do you currently need...    Turning from your back to your side while in flat bed without using bedrails? 3  -CW 3  -SH    Moving from lying on back to sitting on the side of a flat bed without bedrails? 3  -CW 3  -SH    Moving to and from a bed to a chair (including a wheelchair)? 3  -CW 2  -SH    Standing up from a chair using your arms (e.g., wheelchair, bedside chair)? 3  -CW 2  -SH    Climbing 3-5 steps with a railing? 2  -CW 2  -SH    To walk in hospital room? 3  -CW 3  -SH    AM-PAC 6 Clicks Score (PT) 17  -CW 15  -SH    Highest level of mobility 5 --> Static standing  -CW 4 --> Transferred to chair/commode  -SH    Row Name 01/05/23 1038          Functional Assessment    Outcome Measure Options AM-PAC 6 Clicks Basic Mobility (PT)  -CW           User Key  (r) = Recorded By, (t) = Taken By, (c) = Cosigned By    Initials Name Provider Type    CW Bethanie Rivas, STANLEY Physical Therapist     Autumn Ramon, RN Registered Nurse                             Physical Therapy Education     Title: PT OT SLP Therapies (Done)     Topic: Physical Therapy (Done)     Point: Mobility training (Done)     Learning Progress Summary           Patient Acceptance, E, VU,NR by CW at 1/5/2023 1039    Acceptance, E, VU by  at 1/2/2023 1654                   Point: Home exercise program (Done)     Learning Progress Summary           Patient Acceptance, E, VU,NR by CW at 1/5/2023 1039    Acceptance, E, VU by  at 1/2/2023 1654                   Point: Body mechanics (Done)     Learning Progress Summary           Patient Acceptance, E, VU,NR by CW at 1/5/2023 1039                   Point: Precautions (Done)     Learning Progress Summary           Patient Acceptance, E, VU,NR by CW at 1/5/2023 1039                               User Key     Initials Effective Dates Name Provider Type Discipline    EM 06/16/21 -  Charissa  Pooja ARANDA PT Physical Therapist PT    CW 12/13/22 -  Bethanie Rivas PT Physical Therapist PT              PT Recommendation and Plan     Plan of Care Reviewed With: patient  Outcome Evaluation: Pt participated with PT this AM. When standing, knee immobilizer sliding down. PT able to readjust brace in hopes it stays in place with further mobility. He ambulated further distance with less assist today with use of a walker. Continues to demonstrate mobility deficits and would benefit from d/c to snf as he is not safe to return home alone at this time.     Time Calculation:    PT Charges     Row Name 01/05/23 1031             Time Calculation    Start Time 1006  -CW      Stop Time 1021  -CW      Time Calculation (min) 15 min  -CW      PT Received On 01/05/23  -CW      PT - Next Appointment 01/06/23  -CW            User Key  (r) = Recorded By, (t) = Taken By, (c) = Cosigned By    Initials Name Provider Type    CW Bethanie Rivas, PT Physical Therapist              Therapy Charges for Today     Code Description Service Date Service Provider Modifiers Qty    14088618639 HC PT THERAPEUTIC ACT EA 15 MIN 1/5/2023 Bethanie Rivas, PT GP 1    15789340180 HC PT THER SUPP EA 15 MIN 1/5/2023 Bethanie Rivas, PT GP 1          PT G-Codes  Outcome Measure Options: AM-PAC 6 Clicks Basic Mobility (PT)  AM-PAC 6 Clicks Score (PT): 17  PT Discharge Summary  Anticipated Discharge Disposition (PT): skilled nursing facility    Bethanie Rivas PT  1/5/2023

## 2023-01-05 NOTE — DISCHARGE SUMMARY
Patient Name: Watson Lisa  : 1955  MRN: [id number]    Date of Admission: 2022  Date of Discharge:  2023  Primary Care Physician: Checo Dunham MD      Chief Complaint:   Alcohol Problem, Fall, and Knee Injury      Discharge Diagnoses     Active Hospital Problems    Diagnosis  POA   • Left patella fracture [S82.002A]  Yes   • Elevated troponin [R77.8]  Yes   • COPD (chronic obstructive pulmonary disease) ([facility]) [J44.9]  Yes   • LIVAN (obstructive sleep apnea) [G47.33]  Yes   • Alcohol dependence with withdrawal ([facility]) [F10.239]  Yes   • Essential hypertension [I10]  Yes      Resolved Hospital Problems    Diagnosis Date Resolved POA   • **DEEP (acute kidney injury) ([facility]) [N17.9] 2023 Yes   • Hypokalemia [E87.6] 2023 Yes   • Thrombocytopenia ([facility]) [D69.6] 2023 Yes   • Rhabdomyolysis [M62.82] 2023 Yes        Hospital Course     Mr. Lisa is a 67 y.o. male with a history of alcohol abuse, DVT/PE, HTN, sleep apnea on Cpap who presented to [facility] initially complaining of fall at home, down for several hours, lt knee pain.  Please see the admitting history and physical for further details.  He was found to have rhabdomyolysis, alcohol withdrawal,  DEEP, lt patellar fx and was admitted to the hospital for further evaluation and treatment.  Ortho was consulted recommending non operative management of lt patellar fracture. He is WBAT LLE w/knee immobilizer. He will follow up with Ortho in 2 weeks. Nephrology followed for DEEP due to rhabdomyolysis. BP meds were adjusted. Electrolytes were repleted. Renal function returned to baseline. CIWA protocol was followed for alcohol withdrawal. He was started on thiamine and folic acid. [facility]. Withdrawal symptoms have resolved. Hydroxyzine was added for anxiety. Thrombocytopenia was present early in admission likely related to chronic alcohol suppression, now resolved. PT has followed recommending SNF.  Medically he is stable for discharge.  Day of Discharge     Subjective:  Lt knee pain. +BM. Slept fair. No other complaints. Agrees with discharge to SNF    Physical Exam:  Temp:  [98 °F (36.7 °C)-98.9 °F (37.2 °C)] 98 °F (36.7 °C)  Heart Rate:  [] 103  Resp:  [16-17] 16  BP: (107-148)/(66-78) 107/66  Body mass index is 28.46 kg/m².  Physical Exam  Vitals and nursing note reviewed.   Constitutional:       General: He is not in acute distress.  HENT:      Head: Normocephalic.      Mouth/Throat:      Mouth: Mucous membranes are moist.   Eyes:      Conjunctiva/sclera: Conjunctivae normal.   Cardiovascular:      Rate and Rhythm: Normal rate and regular rhythm.   Pulmonary:      Effort: Pulmonary effort is normal. No respiratory distress.      Breath sounds: No wheezing or rales.   Abdominal:      General: Bowel sounds are normal.      Palpations: Abdomen is soft.   Musculoskeletal:      Cervical back: Neck supple.      Right lower leg: No edema.      Left lower leg: No edema.      Comments: KI lt knee  Chronic arthritic changes lt ankle   Skin:     General: Skin is warm and dry.      Comments: Toe wounds kaiser feet   Neurological:      Mental Status: He is alert and oriented to person, place, and time.   Psychiatric:         Mood and Affect: Mood normal.         Behavior: Behavior normal.         Consultants     Consult Orders (all) (From admission, onward)     Start     Ordered    01/01/23 1110  Inpatient Case Management  Consult  Once        Provider:  (Not yet assigned)    01/01/23 1116    12/28/22 2210  Inpatient Orthopedic Surgery Consult  Once        Specialty:  Orthopedic Surgery  Provider:  Channing Delong Jr., MD    12/28/22 2210 12/28/22 2028  Inpatient Nephrology Consult  Once        Specialty:  Nephrology  Provider:  Junie Zarate MD    12/28/22 2028 12/28/22 2027  Inpatient consult to [facility]  Once        Provider:  (Not yet assigned)    12/28/22 2027 12/28/22 2027   Inpatient consult to Nutrition  Once        Provider:  (Not yet assigned)    12/28/22 2027 12/28/22 1929  [facility] (on-call MD unless specified) Details  Once,   Status:  Canceled        Specialty:  Hospitalist  Provider:  (Not yet assigned)    12/28/22 1928              Procedures     * Surgery not found *      Imaging Results (All)     Procedure Component Value Units Date/Time    XR Knee 1 or 2 View Left [[id number]] Collected: 12/28/22 1844     Updated: 12/28/22 1851    Narrative:      XR KNEE 1 OR 2 VW LEFT-     INDICATIONS: Pain and swelling     TECHNIQUE: 2 views of the left knee     COMPARISON: 06/26/2020     FINDINGS:     Appearance of small vertically oriented lucency at the upper aspect of  the patella, potentially evidence of nondisplaced fracture, correlate  with location of pain. Mild knee effusion is present. No other evidence  of fracture is noted. No erosion, or dislocation is identified.  Prominent tibiofemoral joint space narrowing is noted. Moderate to  prominent degenerative spurring is seen.  Follow-up/further evaluation  can be obtained as indicated.       Impression:         As described.     This report was finalized on 12/28/2022 6:48 PM by Dr. Donte Hernandez M.D.           Results for orders placed during the hospital encounter of 06/24/20    Duplex Venous Lower Extremity - Bilateral CAR    Interpretation Summary  · Normal bilateral lower extremity venous duplex scan.  · Fluid collection seen above the left knee measuring about 5 x 2 x 8 cm. Follow-up with additional images and clinically indicated.    Results for orders placed during the hospital encounter of 06/24/20    Adult Transthoracic Echo Complete W/ Cont if Necessary Per Protocol    Interpretation Summary  · The left ventricular cavity is moderately dilated.  · Estimated EF appears to be in the range of 56 - 60%.  · Left ventricular wall thickness is consistent with mild concentric hypertrophy.  · Left ventricular diastolic  dysfunction is noted (grade I) consistent with impaired relaxation.  · Normal right ventricular systolic function noted. Right ventricular cavity is mildly dilated.  · Left atrial cavity size is mild-to-moderately dilated.  · There is no evidence of pericardial effusion.    Pertinent Labs     Results from last 7 days   Lab Units 01/02/23  0715 01/01/23  0814 12/30/22  0607   WBC 10*3/mm3 6.85 6.83 8.55   HEMOGLOBIN g/dL 10.4* 10.8* 10.2*   PLATELETS 10*3/mm3 221 170 96*     Results from last 7 days   Lab Units 01/04/23  0521 01/02/23  0715 01/01/23  0814 12/31/22  0717   SODIUM mmol/L 138 138 139 138   POTASSIUM mmol/L 4.3 3.9 3.3*  3.3* 3.3*   CHLORIDE mmol/L 105 102 101 99   CO2 mmol/L 26.0 24.4 26.7 25.4   BUN mg/dL 17 19 18 20   CREATININE mg/dL 0.92 0.95 0.94 0.92   GLUCOSE mg/dL 108* 104* 135* 140*   EGFR mL/min/1.73 90.6 87.2 88.3 90.6     Results from last 7 days   Lab Units 01/04/23  0521 01/01/23  0814 12/31/22  0717 12/30/22  0607 12/29/22  1758   ALBUMIN g/dL 3.3* 3.5 3.4* 3.7 3.4*   BILIRUBIN mg/dL 0.3  --  0.6 0.7 0.6   ALK PHOS U/L 72  --  74 77 74   AST (SGOT) U/L 26  --  168* 292* 350*   ALT (SGPT) U/L 38  --  95* 115* 113*     Results from last 7 days   Lab Units 01/04/23  0521 01/02/23  0715 01/01/23  0814 12/31/22  0717 12/30/22  0607   CALCIUM mg/dL 8.5* 8.6 8.9 8.3* 8.2*   ALBUMIN g/dL 3.3*  --  3.5 3.4* 3.7   MAGNESIUM mg/dL  --  1.7 1.9 1.3* 1.4*   PHOSPHORUS mg/dL  --  3.7 3.7 3.3 2.2*       Results from last 7 days   Lab Units 01/02/23  0715 12/31/22  0717 12/30/22  0607 12/29/22  1758   CK TOTAL U/L 668* 5,568* 11,639* 15,851*           Invalid input(s): LDLCALC          Test Results Pending at Discharge       Discharge Details        Discharge Medications      New Medications      Instructions Start Date   acetaminophen 325 MG tablet  Commonly known as: TYLENOL   650 mg, Oral, Every 4 Hours PRN      folic acid 1 MG tablet  Commonly known as: FOLVITE   1 mg, Oral, Daily   Start Date:  January 6, 2023     hydrocortisone-bacitracin-zinc oxide-nystatin  Commonly known as: MAGIC BARRIER   1 application, Topical, 3 Times Daily      hydrOXYzine 25 MG tablet  Commonly known as: ATARAX   25 mg, Oral, 3 Times Daily PRN      ipratropium-albuterol 0.5-2.5 mg/3 ml nebulizer  Commonly known as: DUO-NEB   3 mL, Nebulization, 4 Times Daily PRN      metoprolol succinate XL 25 MG 24 hr tablet  Commonly known as: TOPROL-XL   25 mg, Oral, Every 24 Hours Scheduled   Start Date: January 6, 2023     mupirocin 2 % ointment  Commonly known as: BACTROBAN   1 application, Topical, Every 12 Hours Scheduled      potassium chloride 20 MEQ CR tablet  Commonly known as: K-DUR,KLOR-CON   20 mEq, Oral, Daily   Start Date: January 6, 2023     spironolactone 25 MG tablet  Commonly known as: ALDACTONE   25 mg, Oral, Daily   Start Date: January 6, 2023     thiamine 100 MG tablet  Commonly known as: VITAMIN B1   100 mg, Oral, Daily   Start Date: January 6, 2023     traMADol 50 MG tablet  Commonly known as: ULTRAM   25 mg, Oral, Every 8 Hours PRN         Changes to Medications      Instructions Start Date   losartan 50 MG tablet  Commonly known as: COZAAR  What changed:   · medication strength  · how much to take  · when to take this   50 mg, Oral, Every 24 Hours Scheduled   Start Date: January 6, 2023        Continue These Medications      Instructions Start Date   multivitamin with minerals tablet tablet   1 tablet, Oral, Daily         Stop These Medications    amLODIPine 10 MG tablet  Commonly known as: NORVASC     buPROPion  MG 12 hr tablet  Commonly known as: Wellbutrin SR     guaiFENesin-codeine 100-10 MG/5ML liquid  Commonly known as: GUAIFENESIN AC     traZODone 100 MG tablet  Commonly known as: DESYREL            Allergies   Allergen Reactions   • Penicillins Unknown - Low Severity     Pt does not recall the reaction. Patient tolerated cephalexin 3/2020   • Penicillins Other (See Comments)     Unknown        Discharge  Disposition:  Skilled Nursing Facility (DC - External)      Discharge Diet:  Diet Order   Procedures   • Diet: Cardiac Diets, Diabetic Diets; Healthy Heart (2-3 Na+); Consistent Carbohydrate; Texture: Regular Texture (IDDSI 7); Fluid Consistency: Thin (IDDSI 0)       Discharge Activity:   Activity Instructions     Activity as Tolerated            CODE STATUS:    Code Status and Medical Interventions:   Ordered at: 12/28/22 2026     Code Status (Patient has no pulse and is not breathing):    CPR (Attempt to Resuscitate)     Medical Interventions (Patient has pulse or is breathing):    Full       No future appointments.   Contact information for follow-up providers     Checo Dunham MD Follow up in 2 week(s).    Specialty: Family Medicine  Contact information:  [address] [facility] [address]  [place] [address]  [phone]             Ranjit Saleh II, MD. Schedule an appointment as soon as possible for a visit in 2 week(s).    Specialty: Orthopedic Surgery  Why: Follow up left patellar fracture  Contact information:  [address] [facility] [address]  [place] [address]  [phone]                   Contact information for after-discharge care     Destination     [facility] .    Service: Skilled Nursing  Contact information:  [address]  [facility] [id number] [phone]                             Time Spent on Discharge:  Greater than 30 minutes      ALISON Berry  [facility]  01/05/23  11:44 EST

## 2023-09-19 RX ORDER — LOSARTAN POTASSIUM 50 MG/1
50 TABLET ORAL
Qty: 30 TABLET | Refills: 0 | Status: SHIPPED | OUTPATIENT
Start: 2023-09-19

## 2023-09-19 NOTE — TELEPHONE ENCOUNTER
Pt requesting refill of losartan 50mg until sees you will be out of medication also has been on Trazadone 100mg also wanting this until apt if possible

## 2023-09-19 NOTE — TELEPHONE ENCOUNTER
Caller: SloanWatson    Relationship: Self    Best call back number: [phone]    Requested Prescriptions:   Requested Prescriptions     Pending Prescriptions Disp Refills    losartan (COZAAR) 50 MG tablet       Sig: Take 1 tablet by mouth Daily.      TRAZODONE 100 MG - HUB CAN NOT FIND ON PATIENTS CURRENT MEDICATION LIST    Pharmacy where request should be sent: [facility] [id number] [place], [place] - [address] AND [facility] [phone] [facility] [phone]      Last office visit with prescribing clinician: Visit date not found   Last telemedicine visit with prescribing clinician: Visit date not found   Next office visit with prescribing clinician: Visit date not found     Additional details provided by patient: PATIENT STATES HE IS COMPLETLEY OUT OF THESE MEDICATIONS .     PATIENT STATES HE IS WANTING TO KNOW IF HE CAN GET A REFILL TO LAST HIM UNTIL HIS APPOINTMENT ON 10/11/2023.     Does the patient have less than a 3 day supply:  [x] Yes  [] No    Would you like a call back once the refill request has been completed: [x] Yes [] No    If the office needs to give you a call back, can they leave a voicemail: [x] Yes [] No    Yaneth Wills, PCT   09/19/23 11:02 EDT

## 2023-10-18 NOTE — PROGRESS NOTES
PROGRESS NOTE  Patient Name: Watson Lisa  Age/Sex: 66 y.o. male  : 1955  MRN: [id number]    Date of Admission: 10/7/2021  Date of Encounter Visit: 10/12/21   LOS: 5 days   Patient Care Team:  Checo Dunham MD as PCP - General (Family Medicine)  Checo Dunham MD as PCP - Family Medicine  Checo Dunham MD (Family Medicine)    Chief Complaint: Delirium tremens, respiratory failure    Hospital course: Admitted with alcohol withdrawal, was intubated on 10/8/2021 and was started on antibiotic, currently on mechanical ventilator, patient is requiring less sedation today, he is calmer and he is on minimum FiO2 at 21% and did very well on a CPAP trial and should be ready for extubation.  Antibiotic was discontinued on 10/9/2021, patient spiked more fever and even though his white count is still normal but his assessment today is consistent with aspiration pneumonia and antibiotic were resumed on 10/10/2021 with aspiration pneumonia coverage.  Improved blood pressure control on antihypertensive  His fluid overload is getting better also with the diuretics      REVIEW OF SYSTEMS:   CONSTITUTIONAL: No more fever  Arousable and responsive, calmer and tolerating spontaneous breathing  Ventilator/Non-Invasive Ventilation Settings (From admission, onward)             Start     Ordered    10/08/21 0553  Ventilator - AC/VC; (30); (75); 88%; 7.5; 500  Continuous        Question Answer Comment   Vent Mode AC/VC    Breath rate  30   FiO2  75   FiO2 titrate for Sp02% =/> 88%    PEEP 7.5    Tidal Volume 500        10/08/21 0553                  Vital Signs  Temp:  [98 °F (36.7 °C)-98.4 °F (36.9 °C)] 98 °F (36.7 °C)  Heart Rate:  [] 80  Resp:  [29-38] 29  BP: (125-178)/(62-95) 138/92  FiO2 (%):  [25 %-99 %] 99 %  SpO2:  [93 %-99 %] 99 %  on    Device (Oxygen Therapy): ventilator    Intake/Output Summary (Last 24 hours) at 10/12/2021 0941  Last data filed at 10/12/2021 0754  Gross per 24 hour   Intake 4092.28 ml   pap is normal      Dr flores "  Output 4380 ml   Net -287.72 ml     Flowsheet Rows      First Filed Value   Admission Height 198.1 cm (78\") Documented at 10/07/2021 1102   Admission Weight 118 kg (260 lb) Documented at 10/07/2021 1102        Body mass index is 31.85 kg/m².      10/09/21  0352 10/10/21  0050 10/10/21  2000   Weight: 125 kg (275 lb 9.2 oz) 125 kg (275 lb 9.2 oz) (bed screen locked up  will not weigh pt)       Physical Exam:  GEN: Sickly looking, sedated, orally intubated, on the ventilator, patient is more calm and responsive today  EYES:   Sclerae clear. No icterus. PERRL. Normal EOM  ENT:   External ears/nose normal, no oral lesions, no thrush, mucous membranes moist  NECK:  Supple, midline trachea, no JVD  LUNGS: Normal chest on inspection,  clear to auscultation, breathing in sync with the ventilator. Respirations regular, even and  nonlabored,  CV:  Irregular rhythm and normal heart rate.  Normal S1/S2. No murmurs, gallops, or rubs noted.  ABD:  Soft, nontender and nondistended. Normal bowel sounds. No guarding, truncal obesity   EXT:  Moves all extremities well. No cyanosis. No redness. No edema.   Skin: Dry, intact, no bleeding    Results Review:        Results from last 7 days   Lab Units 10/12/21  0350 10/11/21  0459 10/10/21  0425 10/09/21  0443 10/08/21  0443 10/07/21  1048   SODIUM mmol/L 139 140 140 140 139 142   POTASSIUM mmol/L 3.5 3.5 3.9 4.3 4.2 4.5   CHLORIDE mmol/L 106 109* 108* 107 102 103   CO2 mmol/L 20.6* 20.0* 19.6* 23.8 22.5 20.4*   BUN mg/dL 20 18 24* 21 25* 24*   CREATININE mg/dL 0.91 1.05 1.49* 1.39* 2.22* 1.99*   CALCIUM mg/dL 8.7 8.1* 7.9* 8.1* 8.3* 8.6   AST (SGOT) U/L 11  --  26 33 43* 51*   ALT (SGPT) U/L 16  --  23 27 30 34   ANION GAP mmol/L 12.4 11.0 12.4 9.2 14.5 18.6*   ALBUMIN g/dL 2.90*  --  3.10* 3.40* 4.30 4.40     Results from last 7 days   Lab Units 10/07/21  1048   TROPONIN T ng/mL 0.020         Results from last 7 days   Lab Units 10/07/21  1102   PROBNP pg/mL 1,106.0*     Results from " last 7 days   Lab Units 10/12/21  0350 10/11/21  0459 10/10/21  0425 10/09/21  0443 10/08/21  0443 10/07/21  1048   WBC 10*3/mm3 9.03 9.64 9.74 5.63 7.03 6.86   HEMOGLOBIN g/dL 9.9* 10.5* 11.3* 11.4* 12.7* 12.9*   HEMATOCRIT % 31.6* 30.3* 32.8* 34.9* 36.2* 38.1   PLATELETS 10*3/mm3 136* 124* 104* 124* 240 199   MCV fL 103.6* 96.2 96.5 100.3* 96.0 96.9   NEUTROPHIL % % 79.2*  --  77.4* 68.4 74.4 77.2*   LYMPHOCYTE % % 8.3*  --  10.7* 15.6* 12.5* 9.9*   MONOCYTES % % 9.2  --  11.0 13.7* 11.2 11.4   EOSINOPHIL % % 2.5  --  0.1* 1.4 0.6 0.4   BASOPHIL % % 0.2  --  0.3 0.4 0.7 0.4   IMM GRAN % % 0.6*  --  0.5 0.5 0.6* 0.7*     Results from last 7 days   Lab Units 10/07/21  1836   INR  1.02   APTT seconds 43.0*     Results from last 7 days   Lab Units 10/09/21  0443 10/08/21  0443 10/07/21  1102   MAGNESIUM mg/dL 1.7 1.4* 1.5*           Invalid input(s): LDLCALC  Results from last 7 days   Lab Units 10/08/21  0506   PH, ARTERIAL pH units 7.270*   PCO2, ARTERIAL mm Hg 56.1*   PO2 ART mm Hg 295.0*   HCO3 ART mmol/L 25.8         No results found for: POCGLU  Results from last 7 days   Lab Units 10/10/21  0425   PROCALCITONIN ng/mL 0.74*     Results from last 7 days   Lab Units 10/09/21  1526   BLOODCX  No growth at 2 days     Results from last 7 days   Lab Units 10/07/21  1245   NITRITE UA  Negative   WBC UA /HPF 0-2   BACTERIA UA /HPF None Seen   SQUAM EPITHEL UA /HPF 0-2     Results from last 7 days   Lab Units 10/07/21  1303   COVID19  Not Detected     Results from last 7 days   Lab Units 10/12/21  0350 10/09/21  0443 10/08/21  1215   SODIUM UR mmol/L  --   --  49   CREATININE UR mg/dL  --   --  197.8   CHLORIDE UR mmol/L  --   --  42   PROTEIN TOTAL URINE mg/dL  --   --  39.0   URIC ACID mg/dL 3.3*   < >  --     < > = values in this interval not displayed.           Imaging:   Imaging Results (All)     Procedure Component Value Units Date/Time       I reviewed the patient's new clinical results.  I personally viewed and  interpreted the patient's imaging results: ET tube above the [place], of the infiltrate in the right upper lobe with more infiltrate  in the right lower lobe with possible bilateral pleural effusion   Medication Review:   albuterol sulfate HFA, 6 puff, Inhalation, 4x Daily - RT  amLODIPine, 10 mg, Oral, Daily  bumetanide, 2 mg, Intravenous, Q12H  enoxaparin, 40 mg, Subcutaneous, Q24H  levoFLOXacin, 750 mg, Intravenous, Q24H  LORazepam, 2 mg, Intravenous, Q6H  metoprolol tartrate, 50 mg, Oral, Q8H  metroNIDAZOLE, 500 mg, Intravenous, Q8H  multivitamin with minerals, 1 tablet, Oral, Daily  nicotine, 1 patch, Transdermal, Q24H  pantoprazole, 40 mg, Intravenous, Q AM  potassium chloride, 40 mEq, Nasogastric, Once  senna-docusate sodium, 2 tablet, Oral, BID  sodium chloride, 10 mL, Intravenous, Q12H  thiamine (VITAMIN B1) IVPB, 100 mg, Intravenous, Q8H  vitamin D, 50,000 Units, Oral, Q7 Days        dexmedetomidine, 0.2-1.5 mcg/kg/hr, Last Rate: 1.4 mcg/kg/hr (10/12/21 0636)  propofol, 5-50 mcg/kg/min, Last Rate: 25 mcg/kg/min (10/12/21 0841)        ASSESSMENT:   1. Acute toxic encephalopathy  2. Right sided aspiration pneumonia, severe  3. Acute respiratory failure s/p mechanical ventilator 10/8  4. Acute alcohol withdrawal syndrome with delirium tremens  5. Alcohol abuse   6. DEEP on CKD  7. Hypomagnesemia  8. Tobacco abuse  9. LIVAN  10. Medical noncompliance    PLAN:  The patient did develop aspiration pneumonia and is back on the antibiotic with good clinical response, continue with the antibiotic  Patient is having minimal oxygen requirement and should be good for weaning and extubation  Patient is being diuresed by nephrology and they will continue to follow his volume status  His alcohol withdrawal is much better controlled on the current regimen and hopefully he will require less sedation once extubated  We will continue with the Levaquin and the Flagyl given his penicillin allergy  Is already on stress ulcer and on  DVT prophylaxis which will be continued      Summary of recommendations:  · Extubate today  · Continue to biotic  · Discontinue propofol and continue the CIWA protocol  · We will transfer to a stepdown unit later today if he does well off the ventilator    Discussed with the nursing staff at the bedside, will be discussing with the CCU rounding team, discussed with nephrology  Disposition: Depending on hospital course    Gene Lujan MD  10/12/21  09:41 EDT        Dictated utilizing Dragon dictation

## 2023-10-25 ENCOUNTER — OFFICE VISIT (OUTPATIENT)
Dept: FAMILY MEDICINE CLINIC | Facility: CLINIC | Age: 68
End: 2023-10-25
Payer: COMMERCIAL

## 2023-10-25 VITALS
TEMPERATURE: 97.1 F | SYSTOLIC BLOOD PRESSURE: 150 MMHG | HEIGHT: 78 IN | BODY MASS INDEX: 30.2 KG/M2 | DIASTOLIC BLOOD PRESSURE: 92 MMHG | RESPIRATION RATE: 18 BRPM | WEIGHT: 261 LBS | HEART RATE: 68 BPM | OXYGEN SATURATION: 98 %

## 2023-10-25 DIAGNOSIS — F51.01 PRIMARY INSOMNIA: Chronic | ICD-10-CM

## 2023-10-25 DIAGNOSIS — E66.3 OVERWEIGHT (BMI 25.0-29.9): ICD-10-CM

## 2023-10-25 DIAGNOSIS — M25.562 CHRONIC PAIN OF LEFT KNEE: Chronic | ICD-10-CM

## 2023-10-25 DIAGNOSIS — G89.29 CHRONIC PAIN OF LEFT ANKLE: Chronic | ICD-10-CM

## 2023-10-25 DIAGNOSIS — J44.9 CHRONIC OBSTRUCTIVE PULMONARY DISEASE, UNSPECIFIED COPD TYPE: Chronic | ICD-10-CM

## 2023-10-25 DIAGNOSIS — M17.12 PRIMARY OSTEOARTHRITIS OF LEFT KNEE: Chronic | ICD-10-CM

## 2023-10-25 DIAGNOSIS — Z72.0 TOBACCO ABUSE: Chronic | ICD-10-CM

## 2023-10-25 DIAGNOSIS — G89.29 CHRONIC PAIN OF LEFT KNEE: Chronic | ICD-10-CM

## 2023-10-25 DIAGNOSIS — Z11.59 NEED FOR HEPATITIS C SCREENING TEST: ICD-10-CM

## 2023-10-25 DIAGNOSIS — M25.572 CHRONIC PAIN OF LEFT ANKLE: Chronic | ICD-10-CM

## 2023-10-25 DIAGNOSIS — E55.9 VITAMIN D DEFICIENCY: ICD-10-CM

## 2023-10-25 DIAGNOSIS — E83.51 HYPOCALCEMIA: ICD-10-CM

## 2023-10-25 DIAGNOSIS — G47.33 OSA (OBSTRUCTIVE SLEEP APNEA): ICD-10-CM

## 2023-10-25 DIAGNOSIS — I10 ESSENTIAL HYPERTENSION: Primary | ICD-10-CM

## 2023-10-25 DIAGNOSIS — Z87.898 HISTORY OF ALCOHOL USE DISORDER: ICD-10-CM

## 2023-10-25 DIAGNOSIS — K70.9 ALCOHOLIC LIVER DISEASE: ICD-10-CM

## 2023-10-25 PROBLEM — E87.6 HYPOKALEMIA: Status: RESOLVED | Noted: 2020-06-29 | Resolved: 2023-10-25

## 2023-10-25 PROBLEM — R79.89 ELEVATED TROPONIN: Status: RESOLVED | Noted: 2022-12-29 | Resolved: 2023-10-25

## 2023-10-25 PROBLEM — E87.1 HYPONATREMIA: Status: RESOLVED | Noted: 2020-07-01 | Resolved: 2023-10-25

## 2023-10-25 PROBLEM — F10.239 ALCOHOL DEPENDENCE WITH WITHDRAWAL: Status: RESOLVED | Noted: 2020-06-24 | Resolved: 2023-10-25

## 2023-10-25 PROBLEM — F17.210 CIGARETTE NICOTINE DEPENDENCE WITHOUT COMPLICATION: Status: RESOLVED | Noted: 2022-04-17 | Resolved: 2023-10-25

## 2023-10-25 RX ORDER — TRAZODONE HYDROCHLORIDE 50 MG/1
50 TABLET ORAL NIGHTLY
COMMUNITY
End: 2023-10-25 | Stop reason: SDUPTHER

## 2023-10-25 RX ORDER — ALBUTEROL SULFATE 90 UG/1
2 AEROSOL, METERED RESPIRATORY (INHALATION) EVERY 6 HOURS PRN
Qty: 8 G | Refills: 5 | Status: SHIPPED | OUTPATIENT
Start: 2023-10-25 | End: 2024-01-23

## 2023-10-25 RX ORDER — UMECLIDINIUM BROMIDE AND VILANTEROL TRIFENATATE 62.5; 25 UG/1; UG/1
1 POWDER RESPIRATORY (INHALATION)
Qty: 1 EACH | Refills: 9 | Status: SHIPPED | OUTPATIENT
Start: 2023-10-25

## 2023-10-25 RX ORDER — METOPROLOL SUCCINATE 25 MG/1
25 TABLET, EXTENDED RELEASE ORAL
Qty: 90 TABLET | Refills: 1
Start: 2023-10-25

## 2023-10-25 RX ORDER — MULTIPLE VITAMINS W/ MINERALS TAB 9MG-400MCG
1 TAB ORAL DAILY
Qty: 90 TABLET | Refills: 3 | Status: SHIPPED | OUTPATIENT
Start: 2023-10-25

## 2023-10-25 RX ORDER — LOSARTAN POTASSIUM 50 MG/1
50 TABLET ORAL
Qty: 90 TABLET | Refills: 1 | Status: SHIPPED | OUTPATIENT
Start: 2023-10-25

## 2023-10-25 RX ORDER — TRAZODONE HYDROCHLORIDE 50 MG/1
50 TABLET ORAL NIGHTLY
Qty: 90 TABLET | Refills: 1 | Status: SHIPPED | OUTPATIENT
Start: 2023-10-25

## 2023-10-25 NOTE — ASSESSMENT & PLAN NOTE
Patient refused referral for chronic left ankle pain.  Discussed with versus benefits explained.  Also offered patient to refer to Dr. Negrete orthopedic surgeon at [facility].

## 2023-10-25 NOTE — ASSESSMENT & PLAN NOTE
Hypertension is worsening.  Continue current treatment regimen.  Dietary sodium restriction.  Weight loss.  Continue current medications.  Blood pressure will be reassessed at the next regular appointment.  Patient would like to return to clinic in 6 months.  Patient states he has been out of his hypertension medications.  Counseled patient to return to clinic prior to running out of his medications.  Patient was only taking losartan counseled patient to take both losartan and metoprolol as well.  Prescribed during hospital discharge in December 2022, patient voiced understanding.

## 2023-10-25 NOTE — ASSESSMENT & PLAN NOTE
Patient uses a cane to walk counseled patient on fall precautions.  Patient refused orthopedic surgery referral for chronic left knee and chronic left ankle pain.

## 2023-10-25 NOTE — PROGRESS NOTES
"Chief Complaint  COPD, Hypertension (Out of Losartan recent refill was 50 pt states had been on 100 also out of trazadone), and Anxiety (Has taken Lexapro in past d/c due to interaction with Losartan causing abnormal lab values would like a replacement)    Subjective        Watson Lisa presents to [facility]  History of Present Illness  67yo WM Patient was previously seen by PCP Dr Aquino at [facility], however patient is new to me and is here for establishment of care visit for chronic medical conditions including HTN, .    Pt states last drink for last December 2022.  Pt s/p hosp admission Dec 2022.  Pt states out of BP meds was only taking losartan.  Patient refused colon cancer screening both with colonoscopy and Cologuard, risks explained.  Patient complaining of chronic left knee and left ankle pain however patient refused orthopedic surgery referral for either of his joint problems.  Patient desires trazodone refill for his insomnia.    COPD    Hypertension  Associated symptoms include anxiety.   Anxiety            Objective   Vital Signs:  /92 (BP Location: Left arm, Patient Position: Sitting, Cuff Size: Large Adult) Comment: out of medication  Pulse 68   Temp 97.1 °F (36.2 °C) (Infrared)   Resp 18   Ht 200.7 cm (79\")   Wt 118 kg (261 lb)   SpO2 98%   BMI 29.40 kg/m²   Estimated body mass index is 29.4 kg/m² as calculated from the following:    Height as of this encounter: 200.7 cm (79\").    Weight as of this encounter: 118 kg (261 lb).       BMI is >= 25 and <30. (Overweight) The following options were offered after discussion;: exercise counseling/recommendations and nutrition counseling/recommendations      Physical Exam  Constitutional:       Appearance: Normal appearance.   HENT:      Head: Normocephalic and atraumatic.   Eyes:      Conjunctiva/sclera: Conjunctivae normal.   Cardiovascular:      Rate and Rhythm: Normal " rate and regular rhythm.      Heart sounds: Normal heart sounds.   Pulmonary:      Effort: Pulmonary effort is normal.      Breath sounds: Normal breath sounds.   Abdominal:      General: Bowel sounds are normal.      Palpations: Abdomen is soft.      Comments: Non-tender,    Mild epigastric hernia noted upon straining.   Skin:     General: Skin is warm.   Neurological:      General: No focal deficit present.      Mental Status: He is alert and oriented to person, place, and time.   Psychiatric:         Mood and Affect: Mood normal.         Behavior: Behavior normal.        Result Review :  The following data was reviewed by: ALISON Nix on 10/25/2023:  Common labs          1/1/2023    08:14 1/2/2023    07:15 1/4/2023    05:21   Common Labs   Glucose 135  104  108    BUN 18  19  17    Creatinine 0.94  0.95  0.92    Sodium 139  138  138    Potassium 3.3     3.3  3.9  4.3    Chloride 101  102  105    Calcium 8.9  8.6  8.5    Albumin 3.5   3.3    Total Bilirubin   0.3    Alkaline Phosphatase   72    AST (SGOT)   26    ALT (SGPT)   38    WBC 6.83  6.85     Hemoglobin 10.8  10.4     Hematocrit 30.8  30.1     Platelets 170  221       Data reviewed : Radiologic studies x-ray of the left knee done in December 2022 revealed mild knee effusion, joint space narrowing, degenerative spurring & patellar fracture, Cardiology studies patient status post echocardiogram during hospital admission in January 2023 had a ejection fraction of about 60% with mild concentric LVH and grade 1 diastolic dysfunction, Consultant notes patient status post visit with [facility] orthopedic surgery on January 19, 2023 status post knee aspiration and steroid injection in the left knee, and Recent hospitalization notes reviewed hospital discharge note from January 5, 2023 patient was admitted for alcohol withdrawal, fall status post left patellar fracture which was conservatively managed and acute kidney injury along with  rhabdomyolysis.             Assessment and Plan   Diagnoses and all orders for this visit:    1. Essential hypertension (Primary)  Assessment & Plan:  Hypertension is worsening.  Continue current treatment regimen.  Dietary sodium restriction.  Weight loss.  Continue current medications.  Blood pressure will be reassessed at the next regular appointment.  Patient would like to return to clinic in 6 months.  Patient states he has been out of his hypertension medications.  Counseled patient to return to clinic prior to running out of his medications.  Patient was only taking losartan counseled patient to take both losartan and metoprolol as well.  Prescribed during hospital discharge in December 2022, patient voiced understanding.    Orders:  -     CBC & Differential  -     Lipid Panel  -     Comprehensive Metabolic Panel  -     Urinalysis With Microscopic - Urine, Clean Catch  -     TSH Rfx On Abnormal To Free T4  -     losartan (COZAAR) 50 MG tablet; Take 1 tablet by mouth Daily.  Dispense: 90 tablet; Refill: 1  -     metoprolol succinate XL (TOPROL-XL) 25 MG 24 hr tablet; Take 1 tablet by mouth Daily.  Dispense: 90 tablet; Refill: 1    2. Hypocalcemia  -     Vitamin D 25 hydroxy  -     PTH, Intact    3. Vitamin D deficiency    4. Need for hepatitis C screening test  -     Hepatitis C Antibody    5. Chronic pain of left knee  Assessment & Plan:  Pt does not want referral at this time. Uses a cane. Pt s/p Gage and also Dr. Saleh in past.      6. Cigarette nicotine dependence without complication    7. Tobacco abuse  Assessment & Plan:  Discussed Tobacco cessation with patient. Patient counseled to gradually cut back on Tobacco use gradually, on a weekly basis. Patient to follow-up with clinic once ready to quit completely for adjunct therapy for Tobacco cessation. Also counseled patient to avoid second-hand Tobacco smoke, patient voiced understanding.        8. Chronic obstructive pulmonary disease,  unspecified COPD type  Assessment & Plan:  COPD is unchanged.  Counseled to avoid exposure to cigarette smoke.  Medication changes per orders.    Counseled patient extensively on tobacco cessation and started patient on Anoro, provided instructions on use, patient voiced understanding.        Orders:  -     albuterol sulfate  (90 Base) MCG/ACT inhaler; Inhale 2 puffs Every 6 (Six) Hours As Needed for Wheezing or Shortness of Air for up to 90 days.  Dispense: 8 g; Refill: 5  -     Umeclidinium-Vilanterol (Anoro Ellipta) 62.5-25 MCG/ACT aerosol powder  inhaler; Inhale 1 puff Daily.  Dispense: 1 each; Refill: 9    9. LIVAN (obstructive sleep apnea)  Assessment & Plan:  STABLE      10. History of alcohol use disorder  Assessment & Plan:  Pt states last ETOH drink was in Dec. 2022.      11. Primary osteoarthritis of left knee  Assessment & Plan:  Patient uses a cane to walk counseled patient on fall precautions.  Patient refused orthopedic surgery referral for chronic left knee and chronic left ankle pain.      12. Chronic pain of left ankle  Assessment & Plan:  Patient refused referral for chronic left ankle pain.  Discussed with versus benefits explained.  Also offered patient to refer to Dr. Negrete orthopedic surgeon at [facility].      13. Alcoholic liver disease  Assessment & Plan:  Avoid Acetaminophen, NSAIDs, Alcohol; and also avoid other hepatotoxic agents or OTCs without prior consultation with Provider; patient voiced  understanding.        14. Primary insomnia  -     traZODone (DESYREL) 50 MG tablet; Take 1 tablet by mouth Every Night.  Dispense: 90 tablet; Refill: 1    15. Overweight (BMI 25.0-29.9)  Assessment & Plan:  Patient's (Body mass index is 29.4 kg/m².) indicates that they are overweight with health conditions that include obstructive sleep apnea and hypertension . Weight is worsening. BMI is is above average; BMI management plan is completed. We discussed portion control and increasing  exercise.       Other orders  -     multivitamin with minerals (MULTIVITAMIN ADULT PO); Take 1 tablet by mouth Daily.  Dispense: 90 tablet; Refill: 3      Counseled patient to lose weight in order to prevent any of the abdominal hernias from getting worse, patient voiced understanding.  Patient refused flu shot and Prevnar states he will get them at work as he works for [employer].       Follow Up   Return in about 6 months (around 4/25/2024) for Annual physical, Recheck.  Patient was given instructions and counseling regarding his condition or for health maintenance advice. Please see specific information pulled into the AVS if appropriate.

## 2023-10-25 NOTE — ASSESSMENT & PLAN NOTE
COPD is unchanged.  Counseled to avoid exposure to cigarette smoke.  Medication changes per orders.    Counseled patient extensively on tobacco cessation and started patient on Anoro, provided instructions on use, patient voiced understanding.

## 2023-10-25 NOTE — ASSESSMENT & PLAN NOTE
Avoid Acetaminophen, NSAIDs, Alcohol; and also avoid other hepatotoxic agents or OTCs without prior consultation with Provider; patient voiced  understanding.

## 2023-10-25 NOTE — ASSESSMENT & PLAN NOTE
Patient's (Body mass index is 29.4 kg/m².) indicates that they are overweight with health conditions that include obstructive sleep apnea and hypertension . Weight is worsening. BMI is is above average; BMI management plan is completed. We discussed portion control and increasing exercise.

## 2023-10-25 NOTE — ASSESSMENT & PLAN NOTE
Discussed Tobacco cessation with patient. Patient counseled to gradually cut back on Tobacco use gradually, on a weekly basis. Patient to follow-up with clinic once ready to quit completely for adjunct therapy for Tobacco cessation. Also counseled patient to avoid second-hand Tobacco smoke, patient voiced understanding.

## 2023-10-26 LAB
25(OH)D3+25(OH)D2 SERPL-MCNC: 19.7 NG/ML (ref 30–100)
ALBUMIN SERPL-MCNC: 4.5 G/DL (ref 3.5–5.2)
ALBUMIN/GLOB SERPL: 1.7 G/DL
ALP SERPL-CCNC: 86 U/L (ref 39–117)
ALT SERPL-CCNC: 15 U/L (ref 1–41)
APPEARANCE UR: CLEAR
AST SERPL-CCNC: 19 U/L (ref 1–40)
BACTERIA #/AREA URNS HPF: NORMAL /HPF
BASOPHILS # BLD AUTO: 0.07 10*3/MM3 (ref 0–0.2)
BASOPHILS NFR BLD AUTO: 0.9 % (ref 0–1.5)
BILIRUB SERPL-MCNC: 0.3 MG/DL (ref 0–1.2)
BILIRUB UR QL STRIP: NEGATIVE
BUN SERPL-MCNC: 20 MG/DL (ref 8–23)
BUN/CREAT SERPL: 15.7 (ref 7–25)
CALCIUM SERPL-MCNC: 9.4 MG/DL (ref 8.6–10.5)
CASTS URNS MICRO: NORMAL
CHLORIDE SERPL-SCNC: 104 MMOL/L (ref 98–107)
CHOLEST SERPL-MCNC: 226 MG/DL (ref 0–200)
CO2 SERPL-SCNC: 23.8 MMOL/L (ref 22–29)
COLOR UR: YELLOW
CREAT SERPL-MCNC: 1.27 MG/DL (ref 0.76–1.27)
EGFRCR SERPLBLD CKD-EPI 2021: 61.2 ML/MIN/1.73
EOSINOPHIL # BLD AUTO: 0.18 10*3/MM3 (ref 0–0.4)
EOSINOPHIL NFR BLD AUTO: 2.3 % (ref 0.3–6.2)
EPI CELLS #/AREA URNS HPF: NORMAL /HPF
ERYTHROCYTE [DISTWIDTH] IN BLOOD BY AUTOMATED COUNT: 12.9 % (ref 12.3–15.4)
GLOBULIN SER CALC-MCNC: 2.6 GM/DL
GLUCOSE SERPL-MCNC: 103 MG/DL (ref 65–99)
GLUCOSE UR QL STRIP: NEGATIVE
HCT VFR BLD AUTO: 41.5 % (ref 37.5–51)
HCV IGG SERPL QL IA: NON REACTIVE
HDLC SERPL-MCNC: 96 MG/DL (ref 40–60)
HGB BLD-MCNC: 14 G/DL (ref 13–17.7)
HGB UR QL STRIP: NEGATIVE
IMM GRANULOCYTES # BLD AUTO: 0.06 10*3/MM3 (ref 0–0.05)
IMM GRANULOCYTES NFR BLD AUTO: 0.8 % (ref 0–0.5)
KETONES UR QL STRIP: NEGATIVE
LDLC SERPL CALC-MCNC: 110 MG/DL (ref 0–100)
LEUKOCYTE ESTERASE UR QL STRIP: NEGATIVE
LYMPHOCYTES # BLD AUTO: 1.45 10*3/MM3 (ref 0.7–3.1)
LYMPHOCYTES NFR BLD AUTO: 18.3 % (ref 19.6–45.3)
MCH RBC QN AUTO: 33.7 PG (ref 26.6–33)
MCHC RBC AUTO-ENTMCNC: 33.7 G/DL (ref 31.5–35.7)
MCV RBC AUTO: 99.8 FL (ref 79–97)
MONOCYTES # BLD AUTO: 0.65 10*3/MM3 (ref 0.1–0.9)
MONOCYTES NFR BLD AUTO: 8.2 % (ref 5–12)
NEUTROPHILS # BLD AUTO: 5.51 10*3/MM3 (ref 1.7–7)
NEUTROPHILS NFR BLD AUTO: 69.5 % (ref 42.7–76)
NITRITE UR QL STRIP: NEGATIVE
NRBC BLD AUTO-RTO: 0 /100 WBC (ref 0–0.2)
PH UR STRIP: 6 [PH] (ref 5–8)
PLATELET # BLD AUTO: 280 10*3/MM3 (ref 140–450)
POTASSIUM SERPL-SCNC: 4.7 MMOL/L (ref 3.5–5.2)
PROT SERPL-MCNC: 7.1 G/DL (ref 6–8.5)
PROT UR QL STRIP: ABNORMAL
PTH-INTACT SERPL-MCNC: 69 PG/ML (ref 15–65)
RBC # BLD AUTO: 4.16 10*6/MM3 (ref 4.14–5.8)
RBC #/AREA URNS HPF: NORMAL /HPF
SODIUM SERPL-SCNC: 139 MMOL/L (ref 136–145)
SP GR UR STRIP: 1.02 (ref 1–1.03)
TRIGL SERPL-MCNC: 116 MG/DL (ref 0–150)
TSH SERPL DL<=0.005 MIU/L-ACNC: 1.53 UIU/ML (ref 0.27–4.2)
UROBILINOGEN UR STRIP-MCNC: ABNORMAL MG/DL
VLDLC SERPL CALC-MCNC: 20 MG/DL (ref 5–40)
WBC # BLD AUTO: 7.92 10*3/MM3 (ref 3.4–10.8)
WBC #/AREA URNS HPF: NORMAL /HPF

## 2023-10-27 DIAGNOSIS — E55.9 VITAMIN D DEFICIENCY: Primary | ICD-10-CM

## 2023-10-27 DIAGNOSIS — E78.2 MIXED HYPERLIPIDEMIA: ICD-10-CM

## 2023-10-27 RX ORDER — ERGOCALCIFEROL 1.25 MG/1
50000 CAPSULE ORAL WEEKLY
Qty: 12 CAPSULE | Refills: 0 | Status: SHIPPED | OUTPATIENT
Start: 2023-10-27

## 2023-10-27 RX ORDER — OMEGA-3/DHA/EPA/FISH OIL 300-1000MG
1 CAPSULE ORAL DAILY
Qty: 180 CAPSULE | Refills: 3 | Status: SHIPPED | OUTPATIENT
Start: 2023-10-27

## 2023-12-07 ENCOUNTER — APPOINTMENT (OUTPATIENT)
Dept: GENERAL RADIOLOGY | Facility: HOSPITAL | Age: 68
End: 2023-12-07
Payer: COMMERCIAL

## 2023-12-07 ENCOUNTER — HOSPITAL ENCOUNTER (INPATIENT)
Facility: HOSPITAL | Age: 68
LOS: 3 days | Discharge: HOME | End: 2023-12-12
Attending: EMERGENCY MEDICINE | Admitting: INTERNAL MEDICINE
Payer: COMMERCIAL

## 2023-12-07 DIAGNOSIS — F10.920 ACUTE ALCOHOLIC INTOXICATION WITHOUT COMPLICATION: Primary | ICD-10-CM

## 2023-12-07 PROBLEM — F10.929 ACUTE ALCOHOL INTOXICATION: Status: ACTIVE | Noted: 2023-12-07

## 2023-12-07 PROBLEM — E83.42 HYPOMAGNESEMIA: Status: ACTIVE | Noted: 2023-12-07

## 2023-12-07 LAB
ALBUMIN SERPL-MCNC: 4 G/DL (ref 3.5–5.2)
ALBUMIN SERPL-MCNC: 4 G/DL (ref 3.5–5.2)
ALBUMIN/GLOB SERPL: 1.2 G/DL
ALBUMIN/GLOB SERPL: 1.3 G/DL
ALP SERPL-CCNC: 79 U/L (ref 39–117)
ALP SERPL-CCNC: 81 U/L (ref 39–117)
ALT SERPL W P-5'-P-CCNC: 81 U/L (ref 1–41)
ALT SERPL W P-5'-P-CCNC: 91 U/L (ref 1–41)
AMPHET+METHAMPHET UR QL: NEGATIVE
ANION GAP SERPL CALCULATED.3IONS-SCNC: 17.2 MMOL/L (ref 5–15)
ANION GAP SERPL CALCULATED.3IONS-SCNC: 19.8 MMOL/L (ref 5–15)
AST SERPL-CCNC: 101 U/L (ref 1–40)
AST SERPL-CCNC: 117 U/L (ref 1–40)
BARBITURATES UR QL SCN: NEGATIVE
BASOPHILS # BLD AUTO: 0.04 10*3/MM3 (ref 0–0.2)
BASOPHILS # BLD AUTO: 0.04 10*3/MM3 (ref 0–0.2)
BASOPHILS NFR BLD AUTO: 0.4 % (ref 0–1.5)
BASOPHILS NFR BLD AUTO: 0.5 % (ref 0–1.5)
BENZODIAZ UR QL SCN: NEGATIVE
BILIRUB SERPL-MCNC: 0.7 MG/DL (ref 0–1.2)
BILIRUB SERPL-MCNC: 0.8 MG/DL (ref 0–1.2)
BUN SERPL-MCNC: 10 MG/DL (ref 8–23)
BUN SERPL-MCNC: 9 MG/DL (ref 8–23)
BUN/CREAT SERPL: 10.9 (ref 7–25)
BUN/CREAT SERPL: 9.8 (ref 7–25)
CALCIUM SPEC-SCNC: 8.3 MG/DL (ref 8.6–10.5)
CALCIUM SPEC-SCNC: 8.5 MG/DL (ref 8.6–10.5)
CANNABINOIDS SERPL QL: NEGATIVE
CHLORIDE SERPL-SCNC: 96 MMOL/L (ref 98–107)
CHLORIDE SERPL-SCNC: 97 MMOL/L (ref 98–107)
CO2 SERPL-SCNC: 17.2 MMOL/L (ref 22–29)
CO2 SERPL-SCNC: 21.8 MMOL/L (ref 22–29)
COCAINE UR QL: NEGATIVE
CREAT SERPL-MCNC: 0.92 MG/DL (ref 0.76–1.27)
CREAT SERPL-MCNC: 0.92 MG/DL (ref 0.76–1.27)
DEPRECATED RDW RBC AUTO: 41.7 FL (ref 37–54)
DEPRECATED RDW RBC AUTO: 44.4 FL (ref 37–54)
EGFRCR SERPLBLD CKD-EPI 2021: 90 ML/MIN/1.73
EGFRCR SERPLBLD CKD-EPI 2021: 90 ML/MIN/1.73
EOSINOPHIL # BLD AUTO: 0.03 10*3/MM3 (ref 0–0.4)
EOSINOPHIL # BLD AUTO: 0.33 10*3/MM3 (ref 0–0.4)
EOSINOPHIL NFR BLD AUTO: 0.4 % (ref 0.3–6.2)
EOSINOPHIL NFR BLD AUTO: 3 % (ref 0.3–6.2)
ERYTHROCYTE [DISTWIDTH] IN BLOOD BY AUTOMATED COUNT: 12.1 % (ref 12.3–15.4)
ERYTHROCYTE [DISTWIDTH] IN BLOOD BY AUTOMATED COUNT: 12.3 % (ref 12.3–15.4)
ETHANOL BLD-MCNC: 328 MG/DL (ref 0–10)
ETHANOL UR QL: 0.33 %
FENTANYL UR-MCNC: NEGATIVE NG/ML
GLOBULIN UR ELPH-MCNC: 3.1 GM/DL
GLOBULIN UR ELPH-MCNC: 3.4 GM/DL
GLUCOSE SERPL-MCNC: 143 MG/DL (ref 65–99)
GLUCOSE SERPL-MCNC: 93 MG/DL (ref 65–99)
HCT VFR BLD AUTO: 39.2 % (ref 37.5–51)
HCT VFR BLD AUTO: 39.4 % (ref 37.5–51)
HGB BLD-MCNC: 13.7 G/DL (ref 13–17.7)
HGB BLD-MCNC: 13.8 G/DL (ref 13–17.7)
IMM GRANULOCYTES # BLD AUTO: 0.04 10*3/MM3 (ref 0–0.05)
IMM GRANULOCYTES # BLD AUTO: 0.05 10*3/MM3 (ref 0–0.05)
IMM GRANULOCYTES NFR BLD AUTO: 0.5 % (ref 0–0.5)
IMM GRANULOCYTES NFR BLD AUTO: 0.5 % (ref 0–0.5)
LYMPHOCYTES # BLD AUTO: 0.96 10*3/MM3 (ref 0.7–3.1)
LYMPHOCYTES # BLD AUTO: 1.2 10*3/MM3 (ref 0.7–3.1)
LYMPHOCYTES NFR BLD AUTO: 14.4 % (ref 19.6–45.3)
LYMPHOCYTES NFR BLD AUTO: 8.8 % (ref 19.6–45.3)
MAGNESIUM SERPL-MCNC: 1.4 MG/DL (ref 1.6–2.4)
MAGNESIUM SERPL-MCNC: 1.5 MG/DL (ref 1.6–2.4)
MCH RBC QN AUTO: 33.4 PG (ref 26.6–33)
MCH RBC QN AUTO: 34.5 PG (ref 26.6–33)
MCHC RBC AUTO-ENTMCNC: 34.9 G/DL (ref 31.5–35.7)
MCHC RBC AUTO-ENTMCNC: 35 G/DL (ref 31.5–35.7)
MCV RBC AUTO: 95.6 FL (ref 79–97)
MCV RBC AUTO: 98.5 FL (ref 79–97)
METHADONE UR QL SCN: NEGATIVE
MONOCYTES # BLD AUTO: 0.85 10*3/MM3 (ref 0.1–0.9)
MONOCYTES # BLD AUTO: 1.09 10*3/MM3 (ref 0.1–0.9)
MONOCYTES NFR BLD AUTO: 10 % (ref 5–12)
MONOCYTES NFR BLD AUTO: 10.2 % (ref 5–12)
NEUTROPHILS NFR BLD AUTO: 6.18 10*3/MM3 (ref 1.7–7)
NEUTROPHILS NFR BLD AUTO: 74 % (ref 42.7–76)
NEUTROPHILS NFR BLD AUTO: 77.3 % (ref 42.7–76)
NEUTROPHILS NFR BLD AUTO: 8.46 10*3/MM3 (ref 1.7–7)
NRBC BLD AUTO-RTO: 0 /100 WBC (ref 0–0.2)
NRBC BLD AUTO-RTO: 0 /100 WBC (ref 0–0.2)
OPIATES UR QL: NEGATIVE
OXYCODONE UR QL SCN: NEGATIVE
PHOSPHATE SERPL-MCNC: 2.7 MG/DL (ref 2.5–4.5)
PLATELET # BLD AUTO: 227 10*3/MM3 (ref 140–450)
PLATELET # BLD AUTO: 235 10*3/MM3 (ref 140–450)
PMV BLD AUTO: 9.1 FL (ref 6–12)
PMV BLD AUTO: 9.1 FL (ref 6–12)
POTASSIUM SERPL-SCNC: 3.8 MMOL/L (ref 3.5–5.2)
POTASSIUM SERPL-SCNC: 4 MMOL/L (ref 3.5–5.2)
PROT SERPL-MCNC: 7.1 G/DL (ref 6–8.5)
PROT SERPL-MCNC: 7.4 G/DL (ref 6–8.5)
RBC # BLD AUTO: 4 10*6/MM3 (ref 4.14–5.8)
RBC # BLD AUTO: 4.1 10*6/MM3 (ref 4.14–5.8)
SODIUM SERPL-SCNC: 133 MMOL/L (ref 136–145)
SODIUM SERPL-SCNC: 136 MMOL/L (ref 136–145)
WBC NRBC COR # BLD AUTO: 10.93 10*3/MM3 (ref 3.4–10.8)
WBC NRBC COR # BLD AUTO: 8.34 10*3/MM3 (ref 3.4–10.8)

## 2023-12-07 PROCEDURE — 80307 DRUG TEST PRSMV CHEM ANLYZR: CPT | Performed by: EMERGENCY MEDICINE

## 2023-12-07 PROCEDURE — 83735 ASSAY OF MAGNESIUM: CPT | Performed by: EMERGENCY MEDICINE

## 2023-12-07 PROCEDURE — 80053 COMPREHEN METABOLIC PANEL: CPT | Performed by: EMERGENCY MEDICINE

## 2023-12-07 PROCEDURE — 94799 UNLISTED PULMONARY SVC/PX: CPT

## 2023-12-07 PROCEDURE — 94640 AIRWAY INHALATION TREATMENT: CPT

## 2023-12-07 PROCEDURE — 84100 ASSAY OF PHOSPHORUS: CPT | Performed by: INTERNAL MEDICINE

## 2023-12-07 PROCEDURE — 82077 ASSAY SPEC XCP UR&BREATH IA: CPT | Performed by: EMERGENCY MEDICINE

## 2023-12-07 PROCEDURE — G0378 HOSPITAL OBSERVATION PER HR: HCPCS

## 2023-12-07 PROCEDURE — 83735 ASSAY OF MAGNESIUM: CPT | Performed by: INTERNAL MEDICINE

## 2023-12-07 PROCEDURE — 25010000002 MAGNESIUM SULFATE 2 GM/50ML SOLUTION: Performed by: INTERNAL MEDICINE

## 2023-12-07 PROCEDURE — 99285 EMERGENCY DEPT VISIT HI MDM: CPT

## 2023-12-07 PROCEDURE — 73560 X-RAY EXAM OF KNEE 1 OR 2: CPT

## 2023-12-07 PROCEDURE — 25010000002 THIAMINE PER 100 MG: Performed by: EMERGENCY MEDICINE

## 2023-12-07 PROCEDURE — 94761 N-INVAS EAR/PLS OXIMETRY MLT: CPT

## 2023-12-07 PROCEDURE — 25810000003 SODIUM CHLORIDE 0.9 % SOLUTION: Performed by: EMERGENCY MEDICINE

## 2023-12-07 PROCEDURE — 80053 COMPREHEN METABOLIC PANEL: CPT | Performed by: INTERNAL MEDICINE

## 2023-12-07 PROCEDURE — 85025 COMPLETE CBC W/AUTO DIFF WBC: CPT | Performed by: INTERNAL MEDICINE

## 2023-12-07 PROCEDURE — 25010000002 MIDAZOLAM PER 1 MG: Performed by: INTERNAL MEDICINE

## 2023-12-07 PROCEDURE — 85025 COMPLETE CBC W/AUTO DIFF WBC: CPT | Performed by: EMERGENCY MEDICINE

## 2023-12-07 RX ORDER — ONDANSETRON 2 MG/ML
4 INJECTION INTRAMUSCULAR; INTRAVENOUS EVERY 6 HOURS PRN
Status: DISCONTINUED | OUTPATIENT
Start: 2023-12-07 | End: 2023-12-12 | Stop reason: HOSPADM

## 2023-12-07 RX ORDER — ARFORMOTEROL TARTRATE 15 UG/2ML
15 SOLUTION RESPIRATORY (INHALATION)
Status: DISCONTINUED | OUTPATIENT
Start: 2023-12-07 | End: 2023-12-12 | Stop reason: HOSPADM

## 2023-12-07 RX ORDER — LOSARTAN POTASSIUM 50 MG/1
50 TABLET ORAL
Status: DISCONTINUED | OUTPATIENT
Start: 2023-12-07 | End: 2023-12-10

## 2023-12-07 RX ORDER — NITROGLYCERIN 0.4 MG/1
0.4 TABLET SUBLINGUAL
Status: DISCONTINUED | OUTPATIENT
Start: 2023-12-07 | End: 2023-12-12 | Stop reason: HOSPADM

## 2023-12-07 RX ORDER — SODIUM CHLORIDE 0.9 % (FLUSH) 0.9 %
10 SYRINGE (ML) INJECTION EVERY 12 HOURS SCHEDULED
Status: DISCONTINUED | OUTPATIENT
Start: 2023-12-07 | End: 2023-12-12 | Stop reason: HOSPADM

## 2023-12-07 RX ORDER — TRAZODONE HYDROCHLORIDE 50 MG/1
50 TABLET ORAL NIGHTLY
Status: DISCONTINUED | OUTPATIENT
Start: 2023-12-07 | End: 2023-12-12 | Stop reason: HOSPADM

## 2023-12-07 RX ORDER — LORAZEPAM 1 MG/1
2 TABLET ORAL
Status: DISCONTINUED | OUTPATIENT
Start: 2023-12-07 | End: 2023-12-12 | Stop reason: HOSPADM

## 2023-12-07 RX ORDER — MIDAZOLAM HYDROCHLORIDE 1 MG/ML
4 INJECTION INTRAMUSCULAR; INTRAVENOUS
Status: DISCONTINUED | OUTPATIENT
Start: 2023-12-07 | End: 2023-12-12 | Stop reason: HOSPADM

## 2023-12-07 RX ORDER — LORAZEPAM 1 MG/1
1 TABLET ORAL EVERY 6 HOURS
Status: DISPENSED | OUTPATIENT
Start: 2023-12-08 | End: 2023-12-09

## 2023-12-07 RX ORDER — AMOXICILLIN 250 MG
2 CAPSULE ORAL 2 TIMES DAILY
Status: DISCONTINUED | OUTPATIENT
Start: 2023-12-07 | End: 2023-12-12 | Stop reason: HOSPADM

## 2023-12-07 RX ORDER — LORAZEPAM 1 MG/1
1 TABLET ORAL
Status: DISCONTINUED | OUTPATIENT
Start: 2023-12-07 | End: 2023-12-12 | Stop reason: HOSPADM

## 2023-12-07 RX ORDER — IPRATROPIUM BROMIDE AND ALBUTEROL SULFATE 2.5; .5 MG/3ML; MG/3ML
3 SOLUTION RESPIRATORY (INHALATION) 4 TIMES DAILY PRN
Status: DISCONTINUED | OUTPATIENT
Start: 2023-12-07 | End: 2023-12-12 | Stop reason: HOSPADM

## 2023-12-07 RX ORDER — BISACODYL 10 MG
10 SUPPOSITORY, RECTAL RECTAL DAILY PRN
Status: DISCONTINUED | OUTPATIENT
Start: 2023-12-07 | End: 2023-12-12 | Stop reason: HOSPADM

## 2023-12-07 RX ORDER — MIDAZOLAM HYDROCHLORIDE 1 MG/ML
2 INJECTION INTRAMUSCULAR; INTRAVENOUS
Status: DISCONTINUED | OUTPATIENT
Start: 2023-12-07 | End: 2023-12-12 | Stop reason: HOSPADM

## 2023-12-07 RX ORDER — ALBUTEROL SULFATE 2.5 MG/3ML
2.5 SOLUTION RESPIRATORY (INHALATION) EVERY 6 HOURS PRN
Status: DISCONTINUED | OUTPATIENT
Start: 2023-12-07 | End: 2023-12-12 | Stop reason: HOSPADM

## 2023-12-07 RX ORDER — SODIUM CHLORIDE 9 MG/ML
40 INJECTION, SOLUTION INTRAVENOUS AS NEEDED
Status: DISCONTINUED | OUTPATIENT
Start: 2023-12-07 | End: 2023-12-12 | Stop reason: HOSPADM

## 2023-12-07 RX ORDER — SODIUM CHLORIDE 9 MG/ML
1000 INJECTION, SOLUTION INTRAVENOUS ONCE
Status: COMPLETED | OUTPATIENT
Start: 2023-12-07 | End: 2023-12-07

## 2023-12-07 RX ORDER — ERGOCALCIFEROL 1.25 MG/1
50000 CAPSULE ORAL WEEKLY
Status: DISCONTINUED | OUTPATIENT
Start: 2023-12-08 | End: 2023-12-12 | Stop reason: HOSPADM

## 2023-12-07 RX ORDER — POLYETHYLENE GLYCOL 3350 17 G/17G
17 POWDER, FOR SOLUTION ORAL DAILY PRN
Status: DISCONTINUED | OUTPATIENT
Start: 2023-12-07 | End: 2023-12-12 | Stop reason: HOSPADM

## 2023-12-07 RX ORDER — LORAZEPAM 1 MG/1
2 TABLET ORAL EVERY 6 HOURS
Status: COMPLETED | OUTPATIENT
Start: 2023-12-07 | End: 2023-12-08

## 2023-12-07 RX ORDER — THIAMINE HYDROCHLORIDE 100 MG/ML
200 INJECTION, SOLUTION INTRAMUSCULAR; INTRAVENOUS ONCE
Status: COMPLETED | OUTPATIENT
Start: 2023-12-07 | End: 2023-12-07

## 2023-12-07 RX ORDER — MULTIPLE VITAMINS W/ MINERALS TAB 9MG-400MCG
1 TAB ORAL DAILY
Status: DISCONTINUED | OUTPATIENT
Start: 2023-12-07 | End: 2023-12-12 | Stop reason: HOSPADM

## 2023-12-07 RX ORDER — MIDAZOLAM HYDROCHLORIDE 5 MG/ML
4 INJECTION INTRAMUSCULAR; INTRAVENOUS
Status: DISCONTINUED | OUTPATIENT
Start: 2023-12-07 | End: 2023-12-12 | Stop reason: HOSPADM

## 2023-12-07 RX ORDER — FOLIC ACID 1 MG/1
1 TABLET ORAL DAILY
Status: DISCONTINUED | OUTPATIENT
Start: 2023-12-07 | End: 2023-12-12 | Stop reason: HOSPADM

## 2023-12-07 RX ORDER — THIAMINE HYDROCHLORIDE 100 MG/ML
200 INJECTION, SOLUTION INTRAMUSCULAR; INTRAVENOUS EVERY 8 HOURS SCHEDULED
Status: DISCONTINUED | OUTPATIENT
Start: 2023-12-10 | End: 2023-12-12 | Stop reason: HOSPADM

## 2023-12-07 RX ORDER — SODIUM CHLORIDE 0.9 % (FLUSH) 0.9 %
10 SYRINGE (ML) INJECTION AS NEEDED
Status: DISCONTINUED | OUTPATIENT
Start: 2023-12-07 | End: 2023-12-12 | Stop reason: HOSPADM

## 2023-12-07 RX ORDER — BISACODYL 5 MG/1
5 TABLET, DELAYED RELEASE ORAL DAILY PRN
Status: DISCONTINUED | OUTPATIENT
Start: 2023-12-07 | End: 2023-12-12 | Stop reason: HOSPADM

## 2023-12-07 RX ORDER — MAGNESIUM SULFATE HEPTAHYDRATE 40 MG/ML
2 INJECTION, SOLUTION INTRAVENOUS
Status: COMPLETED | OUTPATIENT
Start: 2023-12-07 | End: 2023-12-08

## 2023-12-07 RX ORDER — FAMOTIDINE 10 MG/ML
20 INJECTION, SOLUTION INTRAVENOUS EVERY 12 HOURS SCHEDULED
Status: DISCONTINUED | OUTPATIENT
Start: 2023-12-07 | End: 2023-12-12 | Stop reason: HOSPADM

## 2023-12-07 RX ADMIN — FOLIC ACID 1 MG: 1 TABLET ORAL at 15:36

## 2023-12-07 RX ADMIN — MIDAZOLAM HYDROCHLORIDE 4 MG: 2 INJECTION, SOLUTION INTRAMUSCULAR; INTRAVENOUS at 19:32

## 2023-12-07 RX ADMIN — THIAMINE HYDROCHLORIDE 200 MG: 100 INJECTION, SOLUTION INTRAMUSCULAR; INTRAVENOUS at 14:19

## 2023-12-07 RX ADMIN — ARFORMOTEROL TARTRATE 15 MCG: 15 SOLUTION RESPIRATORY (INHALATION) at 20:53

## 2023-12-07 RX ADMIN — Medication 10 ML: at 20:16

## 2023-12-07 RX ADMIN — TRAZODONE HYDROCHLORIDE 50 MG: 50 TABLET ORAL at 20:15

## 2023-12-07 RX ADMIN — MAGNESIUM SULFATE HEPTAHYDRATE 2 G: 40 INJECTION, SOLUTION INTRAVENOUS at 19:33

## 2023-12-07 RX ADMIN — MAGNESIUM SULFATE HEPTAHYDRATE 2 G: 40 INJECTION, SOLUTION INTRAVENOUS at 23:23

## 2023-12-07 RX ADMIN — LORAZEPAM 2 MG: 1 TABLET ORAL at 15:44

## 2023-12-07 RX ADMIN — Medication 1 TABLET: at 17:45

## 2023-12-07 RX ADMIN — MIDAZOLAM HYDROCHLORIDE 4 MG: 2 INJECTION, SOLUTION INTRAMUSCULAR; INTRAVENOUS at 17:46

## 2023-12-07 RX ADMIN — LOSARTAN POTASSIUM 50 MG: 50 TABLET, FILM COATED ORAL at 17:45

## 2023-12-07 RX ADMIN — SODIUM CHLORIDE 1000 ML: 9 INJECTION, SOLUTION INTRAVENOUS at 13:29

## 2023-12-07 RX ADMIN — FAMOTIDINE 20 MG: 10 INJECTION INTRAVENOUS at 21:32

## 2023-12-07 RX ADMIN — LORAZEPAM 2 MG: 1 TABLET ORAL at 21:32

## 2023-12-07 RX ADMIN — FOLIC ACID 1 MG: 5 INJECTION, SOLUTION INTRAMUSCULAR; INTRAVENOUS; SUBCUTANEOUS at 13:43

## 2023-12-07 RX ADMIN — MAGNESIUM SULFATE HEPTAHYDRATE 2 G: 40 INJECTION, SOLUTION INTRAVENOUS at 21:32

## 2023-12-07 NOTE — ED TRIAGE NOTES
Patient to ED via PV from home. Patient to ED for alcohol withdrawal symptoms. Patient reports his last drink was at 1100 and he has had a pint of whiskey today.

## 2023-12-07 NOTE — PROGRESS NOTES
Clinical Pharmacy Services: Medication History    Watson Lisa is a 68 y.o. male presenting to [facility] for   Chief Complaint   Patient presents with    Alcohol Intoxication       He  has a past medical history of Alcohol abuse, Anxiety, Arthritis, Bronchitis with chronic airway obstruction, DVT (deep venous thrombosis), Hiatal hernia, Hypertension, Hypertension, Low back pain, Neck pain, Pulmonary embolism, and Sleep apnea with use of continuous positive airway pressure (CPAP).    Allergies as of 12/07/2023 - Reviewed 12/07/2023   Allergen Reaction Noted    Penicillins Unknown - Low Severity 04/12/2016    Penicillins Other (See Comments) 03/25/2021       Medication information was obtained from: Patient   Pharmacy and Phone Number:     Prior to Admission Medications       Prescriptions Last Dose Informant Patient Reported? Taking?    albuterol sulfate  (90 Base) MCG/ACT inhaler  Self No Yes    Inhale 2 puffs Every 6 (Six) Hours As Needed for Wheezing or Shortness of Air for up to 90 days.    losartan (COZAAR) 50 MG tablet 12/7/2023 Self No Yes    Take 1 tablet by mouth Daily.    Umeclidinium-Vilanterol (Anoro Ellipta) 62.5-25 MCG/ACT aerosol powder  inhaler  Self No Yes    Inhale 1 puff Daily.    fish oil-omega-3 fatty acids 1000 MG capsule Not Taking  No No    Take 1 capsule by mouth Daily.    Patient not taking:  Reported on 12/7/2023    ipratropium-albuterol (DUO-NEB) 0.5-2.5 mg/3 ml nebulizer   No No    Take 3 mL by nebulization 4 (Four) Times a Day As Needed for Wheezing or Shortness of Air.    Patient not taking:  Reported on 10/25/2023    multivitamin with minerals (MULTIVITAMIN ADULT PO) More than a month Self No No    Take 1 tablet by mouth Daily.    traZODone (DESYREL) 50 MG tablet Not Taking  No No    Take 1 tablet by mouth Every Night.    Patient not taking:  Reported on 12/7/2023    vitamin D (ERGOCALCIFEROL) 1.25 MG (32525 UT) capsule capsule Not Taking Self No No    Take 1  capsule by mouth 1 (One) Time Per Week.    Patient not taking:  Reported on 12/7/2023              Medication notes:     This medication list is complete to the best of my knowledge as of 12/7/2023    Please call if questions.    Ryan Brizuela  Medication History Technician  [phone]    12/7/2023 15:56 EST

## 2023-12-07 NOTE — ED NOTES
"Nursing report ED to floor  Watson Lisa  68 y.o.  male    HPI :   Chief Complaint   Patient presents with    Alcohol Intoxication       Admitting doctor:   Sean Ricketts MD    Admitting diagnosis:   The encounter diagnosis was Acute alcoholic intoxication without complication.    Code status:   Current Code Status       Date Active Code Status Order ID Comments User Context       Prior            Allergies:   Penicillins and Penicillins    Isolation:   No active isolations    Intake and Output    Intake/Output Summary (Last 24 hours) at 12/7/2023 1553  Last data filed at 12/7/2023 1421  Gross per 24 hour   Intake 50 ml   Output --   Net 50 ml       Weight:       12/07/23  1314   Weight: 118 kg (260 lb)       Most recent vitals:   Vitals:    12/07/23 1307 12/07/23 1314   BP:  (!) 151/101   BP Location:  Right arm   Patient Position:  Lying   Pulse: (!) 130    Resp: 22    Temp: 97.9 °F (36.6 °C)    SpO2: 97%    Weight:  118 kg (260 lb)   Height:  198.1 cm (78\")       Active LDAs/IV Access:   Lines, Drains & Airways       Active LDAs       Name Placement date Placement time Site Days    Peripheral IV 12/07/23 1328 Right Antecubital 12/07/23  1328  Antecubital  less than 1                    Labs (abnormal labs have a star):   Labs Reviewed   COMPREHENSIVE METABOLIC PANEL - Abnormal; Notable for the following components:       Result Value    Glucose 143 (*)     Sodium 133 (*)     Chloride 96 (*)     CO2 17.2 (*)     Calcium 8.5 (*)     ALT (SGPT) 91 (*)     AST (SGOT) 117 (*)     Anion Gap 19.8 (*)     All other components within normal limits    Narrative:     GFR Normal >60  Chronic Kidney Disease <60  Kidney Failure <15     MAGNESIUM - Abnormal; Notable for the following components:    Magnesium 1.5 (*)     All other components within normal limits   ETHANOL - Abnormal; Notable for the following components:    Ethanol 328 (*)     All other components within normal limits   CBC WITH AUTO DIFFERENTIAL - Abnormal; " Notable for the following components:    WBC 10.93 (*)     RBC 4.00 (*)     MCV 98.5 (*)     MCH 34.5 (*)     Neutrophil % 77.3 (*)     Lymphocyte % 8.8 (*)     Neutrophils, Absolute 8.46 (*)     Monocytes, Absolute 1.09 (*)     All other components within normal limits   URINE DRUG SCREEN   CBC AND DIFFERENTIAL    Narrative:     The following orders were created for panel order CBC & Differential.  Procedure                               Abnormality         Status                     ---------                               -----------         ------                     CBC Auto Differential[[id number]]        Abnormal            Final result                 Please view results for these tests on the individual orders.       EKG:   No orders to display       Meds given in ED:   Medications   sodium chloride 0.9 % flush 10 mL (has no administration in time range)   Magnesium Standard Dose Replacement - Follow Nurse / BPA Driven Protocol (has no administration in time range)   thiamine (B-1) 500 mg in sodium chloride 0.9 % 100 mL IVPB (500 mg Intravenous Not Given 12/7/23 1536)     Followed by   thiamine (B-1) injection 200 mg (has no administration in time range)     Followed by   thiamine (VITAMIN B-1) tablet 100 mg (has no administration in time range)   folic acid (FOLVITE) tablet 1 mg (1 mg Oral Given 12/7/23 1536)   LORazepam (ATIVAN) tablet 2 mg (2 mg Oral Given 12/7/23 1544)     Followed by   LORazepam (ATIVAN) tablet 1 mg (has no administration in time range)   LORazepam (ATIVAN) tablet 1 mg (has no administration in time range)     Or   midazolam (VERSED) injection 2 mg (has no administration in time range)     Or   LORazepam (ATIVAN) tablet 2 mg (has no administration in time range)     Or   midazolam (VERSED) injection 4 mg (has no administration in time range)     Or   midazolam (VERSED) injection 4 mg (has no administration in time range)     Or   midazolam (VERSED) injection 4 mg (has no administration  in time range)   sodium chloride 0.9 % infusion 1,000 mL (0 mL Intravenous Stopped 12/7/23 1518)   folic acid 1 mg in sodium chloride 0.9 % 50 mL IVPB (0 mg Intravenous Stopped 12/7/23 1421)   thiamine (B-1) injection 200 mg (200 mg Intravenous Given 12/7/23 1419)       Imaging results:  XR Knee 1 or 2 View Left    Result Date: 12/7/2023   As described.    This report was finalized on 12/7/2023 2:01 PM by Dr. Donte Hernandez M.D on Workstation: [email or web address]       Ambulatory status:   - standby    Social issues:   Social History     Socioeconomic History    Marital status:    Tobacco Use    Smoking status: Every Day     Packs/day: 2.00     Years: 40.00     Additional pack years: 0.00     Total pack years: 80.00     Types: Cigarettes    Smokeless tobacco: Never   Vaping Use    Vaping Use: Never used   Substance and Sexual Activity    Alcohol use: Yes     Alcohol/week: 10.0 standard drinks of alcohol     Types: 10 Shots of liquor per week     Comment: 1 pint vodka/ day    Drug use: Not Currently     Types: Hydrocodone    Sexual activity: Defer       NIH Stroke Scale:       Lory Trejo RN  12/07/23 15:53 EST

## 2023-12-07 NOTE — ED PROVIDER NOTES
EMERGENCY DEPARTMENT ENCOUNTER    Room Number:  30/30  PCP: Roc Carroll APRN  Patient Care Team:  Roc Carroll APRN as PCP - General (Nurse Practitioner)  Checo Dunham MD as PCP - Family Medicine  Checo Dunham MD (Family Medicine)   Independent Historians: Patient, family    HPI:  Chief Complaint: Alcohol withdrawal    A complete HPI/ROS/PMH/PSH/SH/FH are unobtainable due to: Thing    Chronic or social conditions impacting patient care (Social Determinants of Health): None  (Financial Resource Strain / Food Insecurity / Transportation Needs / Physical Activity / Stress / Social Connections / Intimate Partner Violence / Housing Stability)    Context: Watson Lisa is a 68 y.o. male who presents to the ED c/o acute alcohol withdrawal.  The family states that the patient called them today asking to go to the hospital for alcohol withdrawal because he was feeling shaky.  His last drink was at 3 PM last night at that time.  He reports that he has been drinking for years.  He states that he drinks about a pint a day.  The patient reports that between the time that he called family to bring him to the hospital and the time that they arrived he drank a pint today.  He does report a fall several days ago and injured his left knee.  He denies hitting his head.  He denies any chest pain or shortness of breath.  He denies headache.  He denies any nausea or vomiting.    Review of prior external notes (non-ED) -and- Review of prior external test results outside of this encounter: Discharge summary dated 1/5/2023 where he was admitted for patella fracture, was treated with the [facility] protocol    Prescription drug monitoring program review:         PAST MEDICAL HISTORY  Active Ambulatory Problems     Diagnosis Date Noted    Essential hypertension 06/07/2016    Tobacco abuse 06/07/2016    Hiatal hernia 06/25/2020    Effusion of left knee 06/29/2020    Osteoarthritis of left knee 06/29/2020    Vitamin D  deficiency 07/01/2020    Anemia, chronic disease 07/01/2020    COPD (chronic obstructive pulmonary disease) 03/26/2021    LIVAN (obstructive sleep apnea) 03/26/2021    ETOH abuse 03/26/2021    Obese 03/26/2021    Alcoholic liver disease 10/07/2021    History of alcohol use disorder 12/14/2022    Left patella fracture 12/29/2022    Need for hepatitis C screening test 10/25/2023    Chronic pain of left ankle 10/25/2023    Primary insomnia 10/25/2023    Overweight (BMI 25.0-29.9) 10/25/2023    Mixed hyperlipidemia 10/27/2023     Resolved Ambulatory Problems     Diagnosis Date Noted    Arthritis 04/25/2016    Alcohol dependence with withdrawal 06/24/2020    Chest pain in adult 06/25/2020    Sleep apnea 06/25/2020    Alcohol abuse 06/25/2020    Hypokalemia 06/29/2020    Hypomagnesemia 06/29/2020    Hyponatremia 07/01/2020    Pneumonia due to COVID-19 virus 01/11/2021    Alcohol dependence with withdrawal 01/12/2021    Possible SVT (supraventricular tachycardia) 01/12/2021    Alcohol withdrawal syndrome with perceptual disturbance 03/26/2021    Acute kidney failure 03/26/2021    Tobacco abuse 03/26/2021    Anemia, chronic disease 03/26/2021    Elevated LFTs 03/26/2021    Metabolic acidosis, increased anion gap 03/26/2021    Hypomagnesemia 03/26/2021    Suicidal ideations 03/26/2021    Delirium tremens 10/07/2021    Alcohol abuse 10/07/2021    Tobacco abuse 10/07/2021    Hiatal hernia 10/07/2021    COPD (chronic obstructive pulmonary disease) 10/07/2021    Anemia, chronic disease 10/07/2021    Acute respiratory failure with hypercapnia 10/08/2021    Aspiration pneumonia 10/08/2021    Cigarette nicotine dependence without complication 04/17/2022    DEEP (acute kidney injury) 12/28/2022    Rhabdomyolysis 12/29/2022    Elevated troponin 12/29/2022    Hypokalemia 01/01/2023    Thrombocytopenia 01/01/2023    Hypocalcemia 10/25/2023    Chronic pain of left knee 10/25/2023     Past Medical History:   Diagnosis Date    Anxiety      Bronchitis with chronic airway obstruction     DVT (deep venous thrombosis)     Hypertension     Hypertension     Low back pain     Neck pain     Pulmonary embolism     Sleep apnea with use of continuous positive airway pressure (CPAP)          PAST SURGICAL HISTORY  Past Surgical History:   Procedure Laterality Date    COLONOSCOPY      JOINT REPLACEMENT Right     hip/knee    REPLACEMENT TOTAL KNEE      TONSILLECTOMY      TOTAL HIP ARTHROPLASTY Right          FAMILY HISTORY  Family History   Problem Relation Age of Onset    Cancer Mother     Heart disease Father     Alcohol abuse Father     Cancer Brother          SOCIAL HISTORY  Social History     Socioeconomic History    Marital status:    Tobacco Use    Smoking status: Every Day     Packs/day: 2.00     Years: 40.00     Additional pack years: 0.00     Total pack years: 80.00     Types: Cigarettes    Smokeless tobacco: Never   Vaping Use    Vaping Use: Never used   Substance and Sexual Activity    Alcohol use: Yes     Alcohol/week: 10.0 standard drinks of alcohol     Types: 10 Shots of liquor per week     Comment: 1 pint vodka/ day    Drug use: Not Currently     Types: Hydrocodone    Sexual activity: Defer         ALLERGIES  Penicillins and Penicillins        REVIEW OF SYSTEMS  Review of Systems  Included in HPI  All systems reviewed and negative except for those discussed in HPI.      PHYSICAL EXAM    I have reviewed the triage vital signs and nursing notes.    ED Triage Vitals   Temp Heart Rate Resp BP SpO2   12/07/23 1307 12/07/23 1307 12/07/23 1307 12/07/23 1314 12/07/23 1307   97.9 °F (36.6 °C) (!) 130 22 (!) 151/101 97 %      Temp src Heart Rate Source Patient Position BP Location FiO2 (%)   -- -- 12/07/23 1314 12/07/23 1314 --     Lying Right arm        Physical Exam  GENERAL: Awake, alert, no acute distress, slurred speech  SKIN: Warm, dry  HENT: Normocephalic, atraumatic  EYES: no scleral icterus  CV: regular rhythm, minimally tachycardic  rate  RESPIRATORY: normal effort, lungs clear  ABDOMEN: soft, nontender, nondistended  MUSCULOSKELETAL: no deformity.  Mild tenderness to the left knee without deformity or open wound.  NEURO: alert, moves all extremities, follows commands, some gross mild tremors                                                               LAB RESULTS  Recent Results (from the past 24 hour(s))   Comprehensive Metabolic Panel    Collection Time: 12/07/23  1:26 PM    Specimen: Blood   Result Value Ref Range    Glucose 143 (H) 65 - 99 mg/dL    BUN 10 8 - 23 mg/dL    Creatinine 0.92 0.76 - 1.27 mg/dL    Sodium 133 (L) 136 - 145 mmol/L    Potassium 4.0 3.5 - 5.2 mmol/L    Chloride 96 (L) 98 - 107 mmol/L    CO2 17.2 (L) 22.0 - 29.0 mmol/L    Calcium 8.5 (L) 8.6 - 10.5 mg/dL    Total Protein 7.4 6.0 - 8.5 g/dL    Albumin 4.0 3.5 - 5.2 g/dL    ALT (SGPT) 91 (H) 1 - 41 U/L    AST (SGOT) 117 (H) 1 - 40 U/L    Alkaline Phosphatase 81 39 - 117 U/L    Total Bilirubin 0.7 0.0 - 1.2 mg/dL    Globulin 3.4 gm/dL    A/G Ratio 1.2 g/dL    BUN/Creatinine Ratio 10.9 7.0 - 25.0    Anion Gap 19.8 (H) 5.0 - 15.0 mmol/L    eGFR 90.0 >60.0 mL/min/1.73   Magnesium    Collection Time: 12/07/23  1:26 PM    Specimen: Blood   Result Value Ref Range    Magnesium 1.5 (L) 1.6 - 2.4 mg/dL   Ethanol    Collection Time: 12/07/23  1:26 PM    Specimen: Blood   Result Value Ref Range    Ethanol 328 (H) 0 - 10 mg/dL    Ethanol % 0.328 %   CBC Auto Differential    Collection Time: 12/07/23  1:26 PM    Specimen: Blood   Result Value Ref Range    WBC 10.93 (H) 3.40 - 10.80 10*3/mm3    RBC 4.00 (L) 4.14 - 5.80 10*6/mm3    Hemoglobin 13.8 13.0 - 17.7 g/dL    Hematocrit 39.4 37.5 - 51.0 %    MCV 98.5 (H) 79.0 - 97.0 fL    MCH 34.5 (H) 26.6 - 33.0 pg    MCHC 35.0 31.5 - 35.7 g/dL    RDW 12.3 12.3 - 15.4 %    RDW-SD 44.4 37.0 - 54.0 fl    MPV 9.1 6.0 - 12.0 fL    Platelets 235 140 - 450 10*3/mm3    Neutrophil % 77.3 (H) 42.7 - 76.0 %    Lymphocyte % 8.8 (L) 19.6 - 45.3 %     Monocyte % 10.0 5.0 - 12.0 %    Eosinophil % 3.0 0.3 - 6.2 %    Basophil % 0.4 0.0 - 1.5 %    Immature Grans % 0.5 0.0 - 0.5 %    Neutrophils, Absolute 8.46 (H) 1.70 - 7.00 10*3/mm3    Lymphocytes, Absolute 0.96 0.70 - 3.10 10*3/mm3    Monocytes, Absolute 1.09 (H) 0.10 - 0.90 10*3/mm3    Eosinophils, Absolute 0.33 0.00 - 0.40 10*3/mm3    Basophils, Absolute 0.04 0.00 - 0.20 10*3/mm3    Immature Grans, Absolute 0.05 0.00 - 0.05 10*3/mm3    nRBC 0.0 0.0 - 0.2 /100 WBC       I ordered the above labs and independently reviewed the results.        RADIOLOGY  XR Knee 1 or 2 View Left    Result Date: 12/7/2023  XR KNEE 1 OR 2 VW LEFT-  INDICATIONS: Trauma.  TECHNIQUE: 2 VIEWS OF THE LEFT KNEE  COMPARISON: 12/28/2022  FINDINGS:  Mild to moderate knee effusion is apparent. No acute fracture, erosion, or dislocation is identified. Prominent lateral tibiofemoral joint space narrowing is seen. Moderate degenerative spurring is evident. Arterial calcifications are noted. Follow-up/further evaluation can be obtained as indications persist.       As described.    This report was finalized on 12/7/2023 2:01 PM by Dr. Donte Hernandez M.D on Workstation: [id number]       I ordered the above noted radiological studies. Reviewed by me and discussed with radiologist.  See dictation for official radiology interpretation.      PROCEDURES    Procedures      MEDICATIONS GIVEN IN ER    Medications   sodium chloride 0.9 % flush 10 mL (has no administration in time range)   Magnesium Standard Dose Replacement - Follow Nurse / BPA Driven Protocol (has no administration in time range)   thiamine (B-1) 500 mg in sodium chloride 0.9 % 100 mL IVPB (has no administration in time range)     Followed by   thiamine (B-1) injection 200 mg (has no administration in time range)     Followed by   thiamine (VITAMIN B-1) tablet 100 mg (has no administration in time range)   folic acid (FOLVITE) tablet 1 mg (has no administration in time range)   LORazepam  (ATIVAN) tablet 2 mg (has no administration in time range)     Followed by   LORazepam (ATIVAN) tablet 1 mg (has no administration in time range)   LORazepam (ATIVAN) tablet 1 mg (has no administration in time range)     Or   midazolam (VERSED) injection 2 mg (has no administration in time range)     Or   LORazepam (ATIVAN) tablet 2 mg (has no administration in time range)     Or   midazolam (VERSED) injection 4 mg (has no administration in time range)     Or   midazolam (VERSED) injection 4 mg (has no administration in time range)     Or   midazolam (VERSED) injection 4 mg (has no administration in time range)   sodium chloride 0.9 % infusion 1,000 mL (0 mL Intravenous Stopped 12/7/23 1518)   folic acid 1 mg in sodium chloride 0.9 % 50 mL IVPB (0 mg Intravenous Stopped 12/7/23 1421)   thiamine (B-1) injection 200 mg (200 mg Intravenous Given 12/7/23 1419)         ORDERS PLACED DURING THIS VISIT:  Orders Placed This Encounter   Procedures    XR Knee 1 or 2 View Left    Comprehensive Metabolic Panel    Magnesium    Urine Drug Screen - Urine, Clean Catch    Ethanol    CBC Auto Differential    Monitor Blood Pressure    Vital Signs    Continuous Pulse Oximetry    Obtain Baseline Clinical [place] Withdrawal Assessment - Ar (CIWA-Ar), Sedation Scale & Vital Signs    Clinical [place] Withdrawal Assessment (CIWA-Ar)    If CIWA-Ar Score Less Than 8 For 3 Consecutive Assessments, Monitor Every 4 Hours & Discontinue Assessment When CIWA-Ar Less Than 8 for 24 Hours    Obtain Pre & Post Sedation Scores With Every Lorazepam Dose - Hold For POSS Greater Than 2 or RASS of -3 or Less    Notify Provider - Withdrawal    Notify Provider of Abnormal Lab Results    Notify Provider - Vitals    [facility] (on-call MD unless specified) Details    Insert Peripheral IV    Initiate Observation Status    Seizure Precautions    Safety Precautions    CBC & Differential         PROGRESS, DATA ANALYSIS, CONSULTS, AND MEDICAL DECISION MAKING    All  labs have been independently interpreted by me.  All radiology studies have been reviewed by me and discussed with radiologist dictating the report.   EKG's independently viewed and interpreted by me.  Discussion below represents my analysis of pertinent findings related to patient's condition, differential diagnosis, treatment plan and final disposition.    Differential diagnosis includes but is not limited to alcohol intoxication, alcohol withdrawal, seizure, substance use, hypokalemia, hypomagnesemia.    Clinical Scores:              ED Course as of 12/07/23 1531   Thu Dec 07, 2023   1319 Drinks a pint per day.  Called his son today because he was having shakes.  Last drink was 3 PM yesterday.  Drank a pint before his son arrived to pick him up.  Recent fall with left knee injury. [TR]   1403 Ethanol(!): 328 [TR]   1428 XR Knee 1 or 2 View Left  My independent interpretation of the left knee x-ray is no gross fracture [TR]   1526 I reviewed the workup and findings with the patient at the bedside.  He is alcohol level is 328 and I suspect it is rising because he drank a pint just before he came to the hospital.  He reports that he intends to quit using alcohol.  He wants help.  He is going to have a prolonged stay with alcohol intoxication and alcohol withdrawal.  I reviewed his prior discharge summaries and he has required treatment for alcohol withdrawal with prior admissions.  Plan admission to the hospital for further management.  He is agreeable. [TR]   1530 Discussing with Dr. Ricketts with [facility].  He agrees to admit. [TR]   1531 CO2(!): 17.2 [TR]   1531 Glucose(!): 143 [TR]      ED Course User Index  [TR] Flaco Barajas MD                     AS OF 15:31 EST VITALS:    BP - (!) 151/101  HR - (!) 130  TEMP - 97.9 °F (36.6 °C)  O2 SATS - 97%        DIAGNOSIS  Final diagnoses:   Acute alcoholic intoxication without complication         DISPOSITION  ED Disposition       ED Disposition   Decision to Admit     Condition   --    Comment   Level of Care: Telemetry [5]   Diagnosis: Acute alcohol intoxication [[id number]]   Admitting Physician: CELIA ZAMORANO [[id number]]   Attending Physician: CELIA ZAMORANO [[id number]]                    Note Disclaimer: At [facility], we believe that sharing information builds trust and better relationships. You are receiving this note because you recently visited [facility]. It is possible you will see health information before a provider has talked with you about it. This kind of information can be easy to misunderstand. To help you fully understand what it means for your health, we urge you to discuss this note with your provider.         Flaco Barajas MD  12/07/23 1536

## 2023-12-07 NOTE — H&P
Internal medicine history and physical  INTERNAL MEDICINE   [facility]       Patient Identification:  Name: Watson Lisa  Age: 68 y.o.  Sex: male  :  1955  MRN: [id number]                   Primary Care Physician: Roc Carroll APRN                               Date of admission:2023    Chief Complaint: Brought to the emergency room by family member for alcohol withdrawal symptoms and wanting to stop drinking alcohol and need help with this.    History of Present Illness:   Patient is a 68-year-old male who has history of hypertension, chronic back pain, sleep apnea who has been drinking heavily for quite some time.  Patient is having nausea withdrawal symptoms and not eating and drinking and stumbling around the house because of being drunk all the time he decided to call his family members that he wants to quit drinking and needs help.  Patient was brought in by family member today for further management of alcohol intoxication and withdrawal.  While he was waiting for his family members to be minimal in the hospital patient had another pint of alcohol few hours before coming to the hospital.  Patient denies any hematemesis melena abdominal pain but does have nausea and vomiting.  Patient was hospitalized in the past for similar symptoms and has had injuries due to fall and required CIWA protocol in the hospital.      Past Medical History:  Past Medical History:   Diagnosis Date    Alcohol abuse     Anxiety     Arthritis     Bronchitis with chronic airway obstruction     LAST FE    DVT (deep venous thrombosis)     Hiatal hernia     Hypertension     Hypertension     Low back pain     Neck pain     Pulmonary embolism     Sleep apnea with use of continuous positive airway pressure (CPAP)     AT NIGHT     Past Surgical History:  Past Surgical History:   Procedure Laterality Date    COLONOSCOPY      JOINT REPLACEMENT Right     hip/knee    REPLACEMENT TOTAL KNEE       TONSILLECTOMY      TOTAL HIP ARTHROPLASTY Right       Home Meds:  Medications Prior to Admission   Medication Sig Dispense Refill Last Dose    albuterol sulfate  (90 Base) MCG/ACT inhaler Inhale 2 puffs Every 6 (Six) Hours As Needed for Wheezing or Shortness of Air for up to 90 days. 8 g 5     losartan (COZAAR) 50 MG tablet Take 1 tablet by mouth Daily. 90 tablet 1 12/7/2023    Umeclidinium-Vilanterol (Anoro Ellipta) 62.5-25 MCG/ACT aerosol powder  inhaler Inhale 1 puff Daily. 1 each 9     fish oil-omega-3 fatty acids 1000 MG capsule Take 1 capsule by mouth Daily. (Patient not taking: Reported on 12/7/2023) 180 capsule 3 Not Taking    ipratropium-albuterol (DUO-NEB) 0.5-2.5 mg/3 ml nebulizer Take 3 mL by nebulization 4 (Four) Times a Day As Needed for Wheezing or Shortness of Air. (Patient not taking: Reported on 10/25/2023) 360 mL      multivitamin with minerals (MULTIVITAMIN ADULT PO) Take 1 tablet by mouth Daily. 90 tablet 3 More than a month    traZODone (DESYREL) 50 MG tablet Take 1 tablet by mouth Every Night. (Patient not taking: Reported on 12/7/2023) 90 tablet 1 Not Taking    vitamin D (ERGOCALCIFEROL) 1.25 MG (20934 UT) capsule capsule Take 1 capsule by mouth 1 (One) Time Per Week. (Patient not taking: Reported on 12/7/2023) 12 capsule 0 Not Taking     Current Meds:     Current Facility-Administered Medications:     folic acid (FOLVITE) tablet 1 mg, 1 mg, Oral, Daily, Flaco Barajas MD, 1 mg at 12/07/23 1536    LORazepam (ATIVAN) tablet 2 mg, 2 mg, Oral, Q6H, 2 mg at 12/07/23 1544 **FOLLOWED BY** [START ON 12/8/2023] LORazepam (ATIVAN) tablet 1 mg, 1 mg, Oral, Q6H, Flaco Barajas MD    LORazepam (ATIVAN) tablet 1 mg, 1 mg, Oral, Q1H PRN **OR** midazolam (VERSED) injection 2 mg, 2 mg, Intravenous, Q1H PRN **OR** LORazepam (ATIVAN) tablet 2 mg, 2 mg, Oral, Q1H PRN **OR** midazolam (VERSED) injection 4 mg, 4 mg, Intravenous, Q1H PRN **OR** midazolam (VERSED) injection 4 mg, 4 mg, Intravenous, Q15  "Min PRN **OR** midazolam (VERSED) injection 4 mg, 4 mg, Intramuscular, Q15 Min PRNKarl Tadd N, MD    Magnesium Standard Dose Replacement - Follow Nurse / BPA Driven Protocol, , Does not apply, Karl SIMPSON Tadd N, MD    [COMPLETED] Insert Peripheral IV, , , Once **AND** sodium chloride 0.9 % flush 10 mL, 10 mL, Intravenous, PRNKarl Tadd N, MD    thiamine (B-1) 500 mg in sodium chloride 0.9 % 100 mL IVPB, 500 mg, Intravenous, Q8H **FOLLOWED BY** [START ON 12/10/2023] thiamine (B-1) injection 200 mg, 200 mg, Intravenous, Q8H **FOLLOWED BY** [START ON 12/14/2023] thiamine (VITAMIN B-1) tablet 100 mg, 100 mg, Oral, Daily, Flaco Barajas MD  Allergies:  Allergies   Allergen Reactions    Penicillins Unknown - Low Severity     Pt does not recall the reaction. Patient tolerated cephalexin 3/2020    Penicillins Other (See Comments)     Unknown      Social History:   Social History     Tobacco Use    Smoking status: Every Day     Packs/day: 2.00     Years: 40.00     Additional pack years: 0.00     Total pack years: 80.00     Types: Cigarettes    Smokeless tobacco: Never   Substance Use Topics    Alcohol use: Yes     Alcohol/week: 10.0 standard drinks of alcohol     Types: 10 Shots of liquor per week     Comment: 1 pint vodka/ day      Family History:  Family History   Problem Relation Age of Onset    Cancer Mother     Heart disease Father     Alcohol abuse Father     Cancer Brother           Review of Systems  See history of present illness and past medical history.  As described in history presenting illness.      Vitals:   /94   Pulse 89   Temp 97.9 °F (36.6 °C)   Resp 22   Ht 198.1 cm (78\")   Wt 118 kg (260 lb)   SpO2 97%   BMI 30.05 kg/m²   I/O:   Intake/Output Summary (Last 24 hours) at 12/7/2023 1659  Last data filed at 12/7/2023 1421  Gross per 24 hour   Intake 50 ml   Output --   Net 50 ml     Exam:  Patient is examined using the personal protective equipment as per guidelines from " infection control for this particular patient as enacted.  Hand washing was performed before and after patient interaction.  General Appearance:  Awake intoxicated pleasant male does not appear to be in any acute distress.   Head:    Normocephalic, without obvious abnormality, atraumatic   Eyes:    PERRL, conjunctiva/corneas clear, EOM's intact, both eyes   Ears:    Normal external ear canals, both ears   Nose:   Nares normal, septum midline, mucosa normal, no drainage    or sinus tenderness   Throat: Poor dentition with multiple missing teeth.   Neck:   Supple, symmetrical, trachea midline, no adenopathy;     thyroid:  no enlargement/tenderness/nodules; no carotid    bruit or JVD   Back:     Symmetric, no curvature, ROM normal, no CVA tenderness   Lungs:     Clear to auscultation bilaterally, respirations unlabored   Chest Wall:    No tenderness or deformity    Heart:  S1-S2 regular and tachycardic    Abdomen:   Soft nontender obese   Extremities: No edema   Pulses:   Pulses palpable in all extremities; symmetric all extremities   Skin: Abrasions and small bruises noted linear abrasions on the legs noted   Neurologic: Pleasant and intoxicated but follows commands.       Data Review:      I reviewed the patient's new clinical results.  Results from last 7 days   Lab Units 12/07/23  1326   WBC 10*3/mm3 10.93*   HEMOGLOBIN g/dL 13.8   PLATELETS 10*3/mm3 235     Results from last 7 days   Lab Units 12/07/23  1326   SODIUM mmol/L 133*   POTASSIUM mmol/L 4.0   CHLORIDE mmol/L 96*   CO2 mmol/L 17.2*   BUN mg/dL 10   CREATININE mg/dL 0.92   CALCIUM mg/dL 8.5*   GLUCOSE mg/dL 143*     XR Knee 1 or 2 View Left    Result Date: 12/7/2023   As described.    This report was finalized on 12/7/2023 2:01 PM by Dr. Donte Hernandez M.D on Workstation: [id number]     SCANNED - TELEMETRY     Final Result      SCANNED - TELEMETRY     Final Result        Microbiology Results (last 10 days)       ** No results found for the last 240  hours. **          Brief Urine Lab Results  (Last result in the past 365 days)        Color   Clarity   Blood   Leuk Est   Nitrite   Protein   CREAT   Urine HCG        10/25/23 0921 Yellow  Comment: REFERENCE RANGE: Yellow, Straw   Clear   Negative   Negative   Negative   Trace                   Assessment:  Active Hospital Problems    Diagnosis  POA    **Acute alcohol intoxication [F10.929]  Yes    Hypomagnesemia [E83.42]  Yes    History of alcohol use disorder [Z87.898]  Yes    Alcoholic liver disease [K70.9]  Yes    COPD (chronic obstructive pulmonary disease) [J44.9]  Yes    ETOH abuse [F10.10]  Yes    LIVAN (obstructive sleep apnea) [G47.33]  Yes    Anemia, chronic disease [D63.8]  Yes    Essential hypertension [I10]  Yes    Tobacco abuse [Z72.0]  Yes       Medical decision making/care plan: See admitting orders  Continue with CIWA protocol  Continue with IV fluids continue with vitamin replacement  Continue with electrolyte replacement per protocol including magnesium potassium and phosphorus.  Continue with pulse ox monitoring and as needed nebulizer treatment.  Continue with his antihypertensive regimen.  Provide him with pulse ox monitoring.  Access evaluation.    Sean Ricketts MD   12/7/2023  16:59 EST    Parts of this note may be an electronic transcription/translation of spoken language to printed text using the Dragon dictation system.

## 2023-12-08 LAB
ALBUMIN SERPL-MCNC: 3.8 G/DL (ref 3.5–5.2)
ALBUMIN/GLOB SERPL: 1.3 G/DL
ALP SERPL-CCNC: 76 U/L (ref 39–117)
ALT SERPL W P-5'-P-CCNC: 66 U/L (ref 1–41)
ANION GAP SERPL CALCULATED.3IONS-SCNC: 12.4 MMOL/L (ref 5–15)
AST SERPL-CCNC: 74 U/L (ref 1–40)
BASOPHILS # BLD AUTO: 0.05 10*3/MM3 (ref 0–0.2)
BASOPHILS NFR BLD AUTO: 0.7 % (ref 0–1.5)
BILIRUB SERPL-MCNC: 1.1 MG/DL (ref 0–1.2)
BUN SERPL-MCNC: 13 MG/DL (ref 8–23)
BUN/CREAT SERPL: 10.6 (ref 7–25)
CALCIUM SPEC-SCNC: 8.6 MG/DL (ref 8.6–10.5)
CHLORIDE SERPL-SCNC: 100 MMOL/L (ref 98–107)
CO2 SERPL-SCNC: 23.6 MMOL/L (ref 22–29)
CREAT SERPL-MCNC: 1.23 MG/DL (ref 0.76–1.27)
DEPRECATED RDW RBC AUTO: 43.1 FL (ref 37–54)
EGFRCR SERPLBLD CKD-EPI 2021: 63.6 ML/MIN/1.73
EOSINOPHIL # BLD AUTO: 0.08 10*3/MM3 (ref 0–0.4)
EOSINOPHIL NFR BLD AUTO: 1.1 % (ref 0.3–6.2)
ERYTHROCYTE [DISTWIDTH] IN BLOOD BY AUTOMATED COUNT: 12.2 % (ref 12.3–15.4)
GLOBULIN UR ELPH-MCNC: 2.9 GM/DL
GLUCOSE SERPL-MCNC: 128 MG/DL (ref 65–99)
HCT VFR BLD AUTO: 36.7 % (ref 37.5–51)
HGB BLD-MCNC: 12.9 G/DL (ref 13–17.7)
IMM GRANULOCYTES # BLD AUTO: 0.03 10*3/MM3 (ref 0–0.05)
IMM GRANULOCYTES NFR BLD AUTO: 0.4 % (ref 0–0.5)
LYMPHOCYTES # BLD AUTO: 0.8 10*3/MM3 (ref 0.7–3.1)
LYMPHOCYTES NFR BLD AUTO: 11.4 % (ref 19.6–45.3)
MAGNESIUM SERPL-MCNC: 2.3 MG/DL (ref 1.6–2.4)
MCH RBC QN AUTO: 34.2 PG (ref 26.6–33)
MCHC RBC AUTO-ENTMCNC: 35.1 G/DL (ref 31.5–35.7)
MCV RBC AUTO: 97.3 FL (ref 79–97)
MONOCYTES # BLD AUTO: 0.78 10*3/MM3 (ref 0.1–0.9)
MONOCYTES NFR BLD AUTO: 11.1 % (ref 5–12)
NEUTROPHILS NFR BLD AUTO: 5.26 10*3/MM3 (ref 1.7–7)
NEUTROPHILS NFR BLD AUTO: 75.3 % (ref 42.7–76)
NRBC BLD AUTO-RTO: 0 /100 WBC (ref 0–0.2)
PHOSPHATE SERPL-MCNC: 1.9 MG/DL (ref 2.5–4.5)
PHOSPHATE SERPL-MCNC: 2 MG/DL (ref 2.5–4.5)
PLATELET # BLD AUTO: 196 10*3/MM3 (ref 140–450)
PMV BLD AUTO: 9.5 FL (ref 6–12)
POTASSIUM SERPL-SCNC: 3.7 MMOL/L (ref 3.5–5.2)
PROT SERPL-MCNC: 6.7 G/DL (ref 6–8.5)
RBC # BLD AUTO: 3.77 10*6/MM3 (ref 4.14–5.8)
SODIUM SERPL-SCNC: 136 MMOL/L (ref 136–145)
WBC NRBC COR # BLD AUTO: 7 10*3/MM3 (ref 3.4–10.8)

## 2023-12-08 PROCEDURE — 94664 DEMO&/EVAL PT USE INHALER: CPT

## 2023-12-08 PROCEDURE — 94799 UNLISTED PULMONARY SVC/PX: CPT

## 2023-12-08 PROCEDURE — 90791 PSYCH DIAGNOSTIC EVALUATION: CPT

## 2023-12-08 PROCEDURE — 25810000003 SODIUM CHLORIDE 0.9 % SOLUTION: Performed by: HOSPITALIST

## 2023-12-08 PROCEDURE — 25010000002 THIAMINE PER 100 MG: Performed by: INTERNAL MEDICINE

## 2023-12-08 PROCEDURE — 25010000002 MIDAZOLAM PER 1 MG: Performed by: INTERNAL MEDICINE

## 2023-12-08 PROCEDURE — 85025 COMPLETE CBC W/AUTO DIFF WBC: CPT | Performed by: INTERNAL MEDICINE

## 2023-12-08 PROCEDURE — 94761 N-INVAS EAR/PLS OXIMETRY MLT: CPT

## 2023-12-08 PROCEDURE — 25010000002 KETOROLAC TROMETHAMINE PER 15 MG: Performed by: HOSPITALIST

## 2023-12-08 PROCEDURE — G0378 HOSPITAL OBSERVATION PER HR: HCPCS

## 2023-12-08 PROCEDURE — 80053 COMPREHEN METABOLIC PANEL: CPT | Performed by: INTERNAL MEDICINE

## 2023-12-08 PROCEDURE — 83735 ASSAY OF MAGNESIUM: CPT | Performed by: INTERNAL MEDICINE

## 2023-12-08 PROCEDURE — 84100 ASSAY OF PHOSPHORUS: CPT | Performed by: INTERNAL MEDICINE

## 2023-12-08 PROCEDURE — 84100 ASSAY OF PHOSPHORUS: CPT | Performed by: HOSPITALIST

## 2023-12-08 RX ORDER — PETROLATUM 420 MG/G
1 OINTMENT TOPICAL EVERY 12 HOURS SCHEDULED
Status: DISCONTINUED | OUTPATIENT
Start: 2023-12-08 | End: 2023-12-12 | Stop reason: HOSPADM

## 2023-12-08 RX ORDER — KETOROLAC TROMETHAMINE 15 MG/ML
15 INJECTION, SOLUTION INTRAMUSCULAR; INTRAVENOUS EVERY 6 HOURS
Status: COMPLETED | OUTPATIENT
Start: 2023-12-08 | End: 2023-12-08

## 2023-12-08 RX ORDER — IBUPROFEN 400 MG/1
200 TABLET ORAL EVERY 6 HOURS PRN
Status: DISCONTINUED | OUTPATIENT
Start: 2023-12-08 | End: 2023-12-12 | Stop reason: HOSPADM

## 2023-12-08 RX ORDER — FENTANYL/ROPIVACAINE/NS/PF 2-625MCG/1
15 PLASTIC BAG, INJECTION (ML) EPIDURAL ONCE
Status: COMPLETED | OUTPATIENT
Start: 2023-12-08 | End: 2023-12-08

## 2023-12-08 RX ORDER — CLONIDINE HYDROCHLORIDE 0.1 MG/1
0.1 TABLET ORAL EVERY 8 HOURS PRN
Status: DISCONTINUED | OUTPATIENT
Start: 2023-12-08 | End: 2023-12-12 | Stop reason: HOSPADM

## 2023-12-08 RX ADMIN — KETOROLAC TROMETHAMINE 15 MG: 15 INJECTION, SOLUTION INTRAMUSCULAR; INTRAVENOUS at 20:24

## 2023-12-08 RX ADMIN — TRAZODONE HYDROCHLORIDE 50 MG: 50 TABLET ORAL at 20:24

## 2023-12-08 RX ADMIN — ZINC OXIDE 1 APPLICATION: 200 OINTMENT TOPICAL at 23:33

## 2023-12-08 RX ADMIN — FAMOTIDINE 20 MG: 10 INJECTION INTRAVENOUS at 10:07

## 2023-12-08 RX ADMIN — DOCUSATE SODIUM 50MG AND SENNOSIDES 8.6MG 2 TABLET: 8.6; 5 TABLET, FILM COATED ORAL at 10:07

## 2023-12-08 RX ADMIN — THIAMINE HYDROCHLORIDE 500 MG: 100 INJECTION, SOLUTION INTRAMUSCULAR; INTRAVENOUS at 01:31

## 2023-12-08 RX ADMIN — Medication 10 ML: at 10:08

## 2023-12-08 RX ADMIN — MIDAZOLAM HYDROCHLORIDE 4 MG: 2 INJECTION, SOLUTION INTRAMUSCULAR; INTRAVENOUS at 06:32

## 2023-12-08 RX ADMIN — LORAZEPAM 1 MG: 1 TABLET ORAL at 14:36

## 2023-12-08 RX ADMIN — LOSARTAN POTASSIUM 50 MG: 50 TABLET, FILM COATED ORAL at 10:07

## 2023-12-08 RX ADMIN — LORAZEPAM 2 MG: 1 TABLET ORAL at 10:07

## 2023-12-08 RX ADMIN — LORAZEPAM 2 MG: 1 TABLET ORAL at 00:22

## 2023-12-08 RX ADMIN — FOLIC ACID 1 MG: 1 TABLET ORAL at 10:07

## 2023-12-08 RX ADMIN — Medication 10 ML: at 20:34

## 2023-12-08 RX ADMIN — TIOTROPIUM BROMIDE INHALATION SPRAY 2 PUFF: 3.12 SPRAY, METERED RESPIRATORY (INHALATION) at 08:05

## 2023-12-08 RX ADMIN — PETROLATUM 1 APPLICATION: 420 OINTMENT TOPICAL at 23:33

## 2023-12-08 RX ADMIN — LORAZEPAM 1 MG: 1 TABLET ORAL at 20:25

## 2023-12-08 RX ADMIN — LORAZEPAM 2 MG: 1 TABLET ORAL at 03:51

## 2023-12-08 RX ADMIN — ARFORMOTEROL TARTRATE 15 MCG: 15 SOLUTION RESPIRATORY (INHALATION) at 08:04

## 2023-12-08 RX ADMIN — ERGOCALCIFEROL 50000 UNITS: 1.25 CAPSULE ORAL at 10:07

## 2023-12-08 RX ADMIN — THIAMINE HYDROCHLORIDE 500 MG: 100 INJECTION, SOLUTION INTRAMUSCULAR; INTRAVENOUS at 10:07

## 2023-12-08 RX ADMIN — MIDAZOLAM HYDROCHLORIDE 4 MG: 2 INJECTION, SOLUTION INTRAMUSCULAR; INTRAVENOUS at 16:29

## 2023-12-08 RX ADMIN — ARFORMOTEROL TARTRATE 15 MCG: 15 SOLUTION RESPIRATORY (INHALATION) at 19:54

## 2023-12-08 RX ADMIN — Medication 1 TABLET: at 10:07

## 2023-12-08 RX ADMIN — LORAZEPAM 1 MG: 1 TABLET ORAL at 23:33

## 2023-12-08 RX ADMIN — POTASSIUM PHOSPHATE, MONOBASIC POTASSIUM PHOSPHATE, DIBASIC 15 MMOL: 224; 236 INJECTION, SOLUTION, CONCENTRATE INTRAVENOUS at 13:20

## 2023-12-08 RX ADMIN — KETOROLAC TROMETHAMINE 15 MG: 15 INJECTION, SOLUTION INTRAMUSCULAR; INTRAVENOUS at 13:20

## 2023-12-08 RX ADMIN — THIAMINE HYDROCHLORIDE 500 MG: 100 INJECTION, SOLUTION INTRAMUSCULAR; INTRAVENOUS at 17:03

## 2023-12-08 NOTE — SIGNIFICANT NOTE
12/08/23 0920   OTHER   Discipline occupational therapist   Rehab Time/Intention   Session Not Performed other (see comments)  (RN reports pt is intoxicated and going through withdraw. She said check back at the earliest sunday for OT eval.)   Recommendation   OT - Next Appointment 12/10/23

## 2023-12-08 NOTE — PROGRESS NOTES
"Consult received.  I have seen this patient in my office previously, last was 11 months ago.  He has significant knee arthritis.  However, given his current hospitalization with acute alcohol withdrawal, there is really no need for orthopedic intervention.  He would not be a candidate for a steroid injection or  knee replacement at this time.  I recommend that he follow-up as an outpatient.    R \"Pb\" Delfino SO MD  Orthopaedic Surgery  [facility]  [phone] - [place] [facility]  [phone] - [place] Office    "

## 2023-12-08 NOTE — NURSING NOTE
Wound/Ostomy: We see a patient at the request of the floor nurse regarding skin issue on lower extremities and abdominal fold. Patient is alert, upon assessment is observed rash, red in color, satellite lesions consistent with fungus infection in lower abdomen and Coccyx/gluteal cleft area, Magic barrier ointment is ordered.  In addition is observed swollen appearance,  redness, shiny and tender skin on bilateral lower extremities, some scab were noted.   Wound care order and nursing intervention have been implemented.  Pleaser e-consult for any additional needs.

## 2023-12-08 NOTE — SIGNIFICANT NOTE
12/08/23 1623   OTHER   Discipline physical therapist   Rehab Time/Intention   Session Not Performed other (see comments)  (Wants pain medicine for L knee pain. Notified RN and will follow up tomorrow)   Recommendation   PT - Next Appointment 12/09/23

## 2023-12-08 NOTE — PROGRESS NOTES
Name: Watson Lisa ADMIT: 2023   : 1955  PCP: Roc Carroll APRN    MRN: [id number] LOS: 0 days   AGE/SEX: 68 y.o. male  ROOM: [facility]     Subjective   Subjective   Biggest complaint is left knee pain after falling.  He has a past history of knee issues and actually has had fluid drained off it a couple times by Dr. Saleh per his report.       Objective   Objective   Vital Signs  Temp:  [97.3 °F (36.3 °C)-98.4 °F (36.9 °C)] 97.3 °F (36.3 °C)  Heart Rate:  [] 84  Resp:  [18] 18  BP: (137-188)/(74-94) 185/94  SpO2:  [93 %-100 %] 93 %  on   ;   Device (Oxygen Therapy): room air  Body mass index is 28.34 kg/m².  Physical Exam  Vitals reviewed.   Constitutional:       General: He is not in acute distress.     Appearance: He is well-developed.   HENT:      Head: Normocephalic and atraumatic.   Eyes:      General: No scleral icterus.  Neck:      Vascular: No JVD.   Cardiovascular:      Rate and Rhythm: Normal rate and regular rhythm.      Heart sounds: No murmur heard.  Pulmonary:      Effort: Pulmonary effort is normal. No respiratory distress.      Breath sounds: Normal breath sounds. No wheezing.   Abdominal:      General: Bowel sounds are normal. There is no distension.      Palpations: Abdomen is soft.      Tenderness: There is no abdominal tenderness.   Musculoskeletal:      Right lower leg: No edema.      Left lower leg: No edema.      Comments: Left knee mildly tender with obvious fluid collection on the superior lateral side as well as joint effusion.   Skin:     General: Skin is warm and dry.      Findings: No rash.   Neurological:      Mental Status: He is alert and oriented to person, place, and time.      Comments: Mild tremor   Psychiatric:         Mood and Affect: Mood normal.       Results Review     I reviewed the patient's new clinical results.  Results from last 7 days   Lab Units 23  0729 23  1741 23  1326   WBC 10*3/mm3 7.00 8.34 10.93*   HEMOGLOBIN  "g/dL 12.9* 13.7 13.8   PLATELETS 10*3/mm3 196 227 235     Results from last 7 days   Lab Units 12/08/23  0729 12/07/23  1741 12/07/23  1326   SODIUM mmol/L 136 136 133*   POTASSIUM mmol/L 3.7 3.8 4.0   CHLORIDE mmol/L 100 97* 96*   CO2 mmol/L 23.6 21.8* 17.2*   BUN mg/dL 13 9 10   CREATININE mg/dL 1.23 0.92 0.92   GLUCOSE mg/dL 128* 93 143*   EGFR mL/min/1.73 63.6 90.0 90.0     Results from last 7 days   Lab Units 12/08/23  0729 12/07/23  1741 12/07/23  1326   ALBUMIN g/dL 3.8 4.0 4.0   BILIRUBIN mg/dL 1.1 0.8 0.7   ALK PHOS U/L 76 79 81   AST (SGOT) U/L 74* 101* 117*   ALT (SGPT) U/L 66* 81* 91*     Results from last 7 days   Lab Units 12/08/23  0729 12/07/23 1741 12/07/23  1326   CALCIUM mg/dL 8.6 8.3* 8.5*   ALBUMIN g/dL 3.8 4.0 4.0   MAGNESIUM mg/dL 2.3 1.4* 1.5*   PHOSPHORUS mg/dL 1.9* 2.7  --        No results found for: \"HGBA1C\", \"POCGLU\"    XR Knee 1 or 2 View Left    Result Date: 12/7/2023   As described.    This report was finalized on 12/7/2023 2:01 PM by Dr. Donte Hernandez M.D on Workstation: [id number]       I have personally reviewed all medications:  Scheduled Medications  arformoterol, 15 mcg, Nebulization, BID - RT   And  tiotropium bromide monohydrate, 2 puff, Inhalation, Daily - RT  famotidine, 20 mg, Intravenous, Q12H  folic acid, 1 mg, Oral, Daily  hydrocortisone-bacitracin-zinc oxide-nystatin, 1 application , Topical, Q12H  ketorolac, 15 mg, Intravenous, Q6H  LORazepam, 1 mg, Oral, Q6H  losartan, 50 mg, Oral, Q24H  multivitamin with minerals, 1 tablet, Oral, Daily  Petrolatum, 1 application , Topical, Q12H  potassium phosphate, 15 mmol, Intravenous, Once  senna-docusate sodium, 2 tablet, Oral, BID  sodium chloride, 10 mL, Intravenous, Q12H  thiamine (B-1) IV, 500 mg, Intravenous, Q8H   Followed by  [START ON 12/10/2023] thiamine (B-1) IV, 200 mg, Intravenous, Q8H   Followed by  [START ON 12/14/2023] thiamine, 100 mg, Oral, Daily  traZODone, 50 mg, Oral, Nightly  vitamin D, 50,000 Units, Oral, " Weekly    Infusions   Diet  Diet: Regular/House Diet; Texture: Regular Texture (IDDSI 7); Fluid Consistency: Thin (IDDSI 0)    I have personally reviewed:  [x]  Laboratory   []  Microbiology   [x]  Radiology   []  EKG/Telemetry  []  Cardiology/Vascular   []  Pathology    [x]  Records       Assessment/Plan     Active Hospital Problems    Diagnosis  POA    **Acute alcohol intoxication [F10.929]  Yes    Hypomagnesemia [E83.42]  Yes    History of alcohol use disorder [Z87.898]  Yes    Alcoholic liver disease [K70.9]  Yes    COPD (chronic obstructive pulmonary disease) [J44.9]  Yes    ETOH abuse [F10.10]  Yes    LIVAN (obstructive sleep apnea) [G47.33]  Yes    Anemia, chronic disease [D63.8]  Yes    Essential hypertension [I10]  Yes    Tobacco abuse [Z72.0]  Yes      Resolved Hospital Problems   No resolved problems to display.       68 y.o. male admitted with Acute alcohol intoxication.    EtOH abuse/intoxication: He has displayed signs of alcohol withdrawal and has required midazolam/lorazepam per CIWA protocol.  On scheduled lorazepam as well  - Continue taper/CIWA protocol  - Continue with thiamine and folate.  - Access note reviewed    Left knee pain with negative imaging other than small joint effusion.  Seems some fluid collection outside of the knee, probably soft tissue injury and suspect sprain with some underlying osteoarthritis.  Patient requested orthopedic evaluation and potential arthrocentesis but they declined and will see outpatient.  - Scheduled couple doses of Toradol for now    Hypertension uncontrolled.  Probably relates alcohol withdrawal.  I will add as needed medication.      SCDs for DVT prophylaxis.  Full code.  Disposition: Probably will need a couple days given alcohol withdrawal symptoms.  Maybe 12/10      Meño Forbes MD  [facility]  12/08/23  15:15 EST

## 2023-12-08 NOTE — PLAN OF CARE
Problem: Adult Inpatient Plan of Care  Goal: Plan of Care Review  Outcome: Ongoing, Progressing  Flowsheets (Taken 12/8/2023 0537)  Progress: no change  Plan of Care Reviewed With: patient  Goal: Patient-Specific Goal (Individualized)  Outcome: Ongoing, Progressing  Goal: Absence of Hospital-Acquired Illness or Injury  Outcome: Ongoing, Progressing  Intervention: Identify and Manage Fall Risk  Description: Perform standard risk assessment on admission using a validated tool or comprehensive approach appropriate to the patient; reassess fall risk frequently, with change in status or transfer to another level of care.  Communicate fall injury risk to interprofessional healthcare team.  Determine need for increased observation, equipment and environmental modification, such as low bed, signage and supportive, nonskid footwear.  Adjust safety measures to individual developmental age, stage and identified risk factors.  Reinforce the importance of safety and physical activity with patient and family.  Perform regular intentional rounding to assess need for position change, pain assessment and personal needs, including assistance with toileting.  Recent Flowsheet Documentation  Taken 12/8/2023 0401 by Lora Marshall, RN  Safety Promotion/Fall Prevention:   safety round/check completed   nonskid shoes/slippers when out of bed   fall prevention program maintained   clutter free environment maintained   assistive device/personal items within reach  Taken 12/8/2023 0200 by Lora Marshall, RN  Safety Promotion/Fall Prevention:   safety round/check completed   nonskid shoes/slippers when out of bed   fall prevention program maintained   clutter free environment maintained   assistive device/personal items within reach  Taken 12/8/2023 0019 by Lora Marshall, RN  Safety Promotion/Fall Prevention:   safety round/check completed   nonskid shoes/slippers when out of bed   fall prevention program maintained   clutter free  environment maintained   assistive device/personal items within reach  Taken 12/7/2023 2200 by Lora Marshall RN  Safety Promotion/Fall Prevention:   safety round/check completed   nonskid shoes/slippers when out of bed   fall prevention program maintained   clutter free environment maintained   assistive device/personal items within reach  Taken 12/7/2023 2015 by Lora Marshall RN  Safety Promotion/Fall Prevention:   safety round/check completed   nonskid shoes/slippers when out of bed   fall prevention program maintained   clutter free environment maintained   assistive device/personal items within reach  Intervention: Prevent Skin Injury  Description: Perform a screening for skin injury risk, such as pressure or moisture associated skin damage on admission and at regular intervals throughout hospital stay.  Keep all areas of skin (especially folds) clean and dry.  Maintain adequate skin hydration.  Relieve and redistribute pressure and protect bony prominences; implement measures based on patient-specific risk factors.  Match turning and repositioning schedule to clinical condition.  Encourage weight shift frequently; assist with reposition if unable to complete independently.  Float heels off bed; avoid pressure on the Achilles tendon.  Keep skin free from extended contact with medical devices.  Encourage functional activity and mobility, as early as tolerated.  Use aids (e.g., slide boards, mechanical lift) during transfer.  Recent Flowsheet Documentation  Taken 12/8/2023 0401 by Lora Marshall RN  Body Position: position changed independently  Taken 12/8/2023 0200 by Lora Marshall RN  Body Position: position changed independently  Taken 12/8/2023 0019 by Lroa Marshall RN  Body Position: position changed independently  Taken 12/7/2023 2200 by Lora Marshall RN  Body Position: position changed independently  Taken 12/7/2023 2015 by Lora Marshall RN  Body Position: position changed  independently  Skin Protection: adhesive use limited  Intervention: Prevent and Manage VTE (Venous Thromboembolism) Risk  Description: Assess for VTE (venous thromboembolism) risk.  Encourage and assist with early ambulation.  Initiate and maintain compression or other therapy, as indicated, based on identified risk in accordance with organizational protocol and provider order.  Encourage both active and passive leg exercises while in bed, if unable to ambulate.  Recent Flowsheet Documentation  Taken 12/8/2023 0401 by Lora Marshall RN  Activity Management: activity encouraged  Taken 12/8/2023 0200 by Lora Marshall RN  Activity Management: activity encouraged  Taken 12/8/2023 0019 by Lora Marshall, RN  Activity Management: activity encouraged  Taken 12/7/2023 2200 by Lora Marshall RN  Activity Management: activity encouraged  Taken 12/7/2023 2015 by Lora Marshall RN  Activity Management: activity encouraged  Range of Motion: active ROM (range of motion) encouraged  Goal: Optimal Comfort and Wellbeing  Outcome: Ongoing, Progressing  Intervention: Provide Person-Centered Care  Description: Use a family-focused approach to care.  Develop trust and rapport by proactively providing information, encouraging questions, addressing concerns and offering reassurance.  Acknowledge emotional response to hospitalization.  Recognize and utilize personal coping strategies.  Honor spiritual and cultural preferences.  Recent Flowsheet Documentation  Taken 12/7/2023 2015 by Lora Marshall RN  Trust Relationship/Rapport:   care explained   questions answered  Goal: Readiness for Transition of Care  Outcome: Ongoing, Progressing     Problem: Fall Injury Risk  Goal: Absence of Fall and Fall-Related Injury  Outcome: Ongoing, Progressing  Intervention: Promote Injury-Free Environment  Description: Provide a safe, barrier-free environment that encourages independent activity.  Keep care area uncluttered and  well-lighted.  Determine need for increased observation or monitoring.  Avoid use of devices that minimize mobility, such as restraints or indwelling urinary catheter.  Recent Flowsheet Documentation  Taken 12/8/2023 0401 by Lora Marshall RN  Safety Promotion/Fall Prevention:   safety round/check completed   nonskid shoes/slippers when out of bed   fall prevention program maintained   clutter free environment maintained   assistive device/personal items within reach  Taken 12/8/2023 0200 by Lora Marshall RN  Safety Promotion/Fall Prevention:   safety round/check completed   nonskid shoes/slippers when out of bed   fall prevention program maintained   clutter free environment maintained   assistive device/personal items within reach  Taken 12/8/2023 0019 by Lora Marshall RN  Safety Promotion/Fall Prevention:   safety round/check completed   nonskid shoes/slippers when out of bed   fall prevention program maintained   clutter free environment maintained   assistive device/personal items within reach  Taken 12/7/2023 2200 by Lora Marshall RN  Safety Promotion/Fall Prevention:   safety round/check completed   nonskid shoes/slippers when out of bed   fall prevention program maintained   clutter free environment maintained   assistive device/personal items within reach  Taken 12/7/2023 2015 by Lora Marshall RN  Safety Promotion/Fall Prevention:   safety round/check completed   nonskid shoes/slippers when out of bed   fall prevention program maintained   clutter free environment maintained   assistive device/personal items within reach     Problem: COPD (Chronic Obstructive Pulmonary Disease) Comorbidity  Goal: Maintenance of COPD Symptom Control  Outcome: Ongoing, Progressing     Problem: Hypertension Comorbidity  Goal: Blood Pressure in Desired Range  Outcome: Ongoing, Progressing     Problem: Osteoarthritis Comorbidity  Goal: Maintenance of Osteoarthritis Symptom Control  Outcome: Ongoing,  Progressing  Intervention: Maintain Osteoarthritis Symptom Control  Description: Evaluate adherence to self-management plan, such as medication, exercise and weight management.  Advocate for continuation of home regimen, such as medication, physical activity and thermal agents; monitor response.  Encourage participation in functional activities, such as mobility and ADLs (activities of daily living) to minimize decline associated with inactivity.  Facilitate use of patient-specific assistive devices, equipment or orthoses.  Evaluate effectiveness of coping skills; encourage expression of feelings, expectations and concerns related to disease management and quality of life; reinforce education to enhance management plan and wellbeing.  Recent Flowsheet Documentation  Taken 12/8/2023 0401 by Lora Marshall RN  Activity Management: activity encouraged  Taken 12/8/2023 0200 by Lora Marshall RN  Activity Management: activity encouraged  Taken 12/8/2023 0019 by Lora Marshall RN  Activity Management: activity encouraged  Taken 12/7/2023 2200 by Lora Marshall RN  Activity Management: activity encouraged  Taken 12/7/2023 2015 by Lora Marshall RN  Activity Management: activity encouraged  Assistive Device Utilized: cane     Problem: Skin Injury Risk Increased  Goal: Skin Health and Integrity  Outcome: Ongoing, Progressing  Intervention: Optimize Skin Protection  Description: Perform a full pressure injury risk assessment, as indicated by screening, upon admission to care unit.  Reassess skin (injury risk, full inspection) frequently (e.g., scheduled interval, with change in condition) to provide optimal early detection and prevention.  Maintain adequate tissue perfusion (e.g., encourage fluid balance; avoid crossing legs, constrictive clothing or devices) to promote tissue oxygenation.  Maintain head of bed at lowest degree of elevation tolerated, considering medical condition and other restrictions.  Avoid  positioning onto an area that remains reddened.  Minimize incontinence and moisture (e.g., toileting schedule; moisture-wicking pad, diaper or incontinence collection device; skin moisture barrier).  Cleanse skin promptly and gently when soiled utilizing a pH-balanced cleanser.  Relieve and redistribute pressure (e.g., scheduled position changes, weight shifts, use of support surface, medical device repositioning, protective dressing application, use of positioning device, microclimate control, use of pressure-injury-monitor  Encourage increased activity, such as sitting in a chair at the bedside or early mobilization, when able to tolerate.  Recent Flowsheet Documentation  Taken 12/8/2023 0401 by Lora Marshall RN  Head of Bed (HOB) Positioning: HOB elevated  Taken 12/8/2023 0200 by Lora Marshall RN  Head of Bed (HOB) Positioning: HOB elevated  Taken 12/8/2023 0019 by Lora Marshall RN  Head of Bed (HOB) Positioning: HOB elevated  Taken 12/7/2023 2200 by Lora Marshall RN  Head of Bed (HOB) Positioning: HOB elevated  Taken 12/7/2023 2015 by Lora Marshall RN  Pressure Reduction Techniques: frequent weight shift encouraged  Head of Bed (HOB) Positioning: HOB elevated  Skin Protection: adhesive use limited   Goal Outcome Evaluation:  Plan of Care Reviewed With: patient        Progress: no change

## 2023-12-08 NOTE — CONSULTS
"[facility] consulted regarding alcohol use.  Patient evaluated alone in room 629.  He is alert and oriented x 4.  He is pleasant and cooperative though a bit guarded.  Affect is flat/blunted, speech is of normal rate and rhythm.  He does appear anxious and restless.        He is a 69 yo D/W/M, lives alone.  He has one adult son.  He works in IT for [employer].  He cites family (son and sister) as support.  He enjoys hanging out and watching sports.  He reports being safe at home, denies history of abuse, trauma, and violence.    He reports a long history of alcohol use that started during college with beer but increased to the \"hard stuff\" twenty years ago.  He reports he drinks daily a pint of bourbon with more on the weekends.  His last drink was yesterday morning PTA.  His BAL on arrival to ED was 328.  He reports history of memory loss and black outs when drinking with falls.  He reports history of alcohol withdrawal that \"varies in intensity\".  He has history of tremors and hallucinations.  He has also required ICU admission.  He denies seizures.  He currently reports anxiety and visual hallucinations described as seeing people in his room and things on the walls.  He has been to [facility] and [facility] for treatment in the past.  Longest time of sobriety is 9 months.  He reports \"I always go back\" to alcohol related to anxiety and depression.  He denies current cravings.  VS have been somewhat tachy.  CIWA has varied from 5-18.  He also reports history of opiate abuse; uses hydrocodone \"off and on, when I can find it\".  He reports last use as this past weekend.  He denies other illicit drug use.  UDS was negative.  He stated interest in Suboxone treatment.        He denies SI and HI.  He denies history of same.  He reports appetite is good, sleep is \"not too bad\" though does toss and turn at times.  He is prescribed Trazodone.  He has been on Lexapro in the past for depression and anxiety but felt it wasn't working " "and that it interfered with another medication at that time.  He rated current anxiety at a 6 and depression at a 5.  He smokes 2 ppd.    He declined offer to see the psychiatrist regarding anxiety and depression.  He stated \"I need to go to some counseling\" and reports plan to follow-up with [facility] for same as well as [facility] tx.  He reports that he doesn't believe in AA.  He was open to receiving other [facility] resources, which were provided.  He was open to [facility] follow-up for further discussion.    [facility] will follow.        "

## 2023-12-08 NOTE — CONSULTS
Orthopaedic Surgery  Consult Note  Dr. ALFREDITO Burkett” Delfino II  [phone]    HPI:  Patient is a 68 y.o. Not  or  male who presents with complaints of left knee pain.  Patient has a history of left knee osteoarthritis.  He was admitted for alcohol withdrawal and detox.  I have seen the patient before in the office last about 11 months ago.  He has previously responded well to steroid injections.  He complains of sharp pains in the left knee worse with activity.  He has a lot of swelling in the knee.    MEDICAL HISTORY  Past Medical History:   Diagnosis Date    Alcohol abuse     Anxiety     Arthritis     Bronchitis with chronic airway obstruction     LAST FEB    DVT (deep venous thrombosis)     Hiatal hernia     Hypertension     Hypertension     Low back pain     Neck pain     Pulmonary embolism     Sleep apnea with use of continuous positive airway pressure (CPAP)     AT NIGHT     Past Surgical History:   Procedure Laterality Date    COLONOSCOPY      JOINT REPLACEMENT Right     hip/knee    REPLACEMENT TOTAL KNEE      TONSILLECTOMY      TOTAL HIP ARTHROPLASTY Right      Prior to Admission medications    Medication Sig Start Date End Date Taking? Authorizing Provider   albuterol sulfate  (90 Base) MCG/ACT inhaler Inhale 2 puffs Every 6 (Six) Hours As Needed for Wheezing or Shortness of Air for up to 90 days. 10/25/23 1/23/24 Yes Roc Carroll APRN   losartan (COZAAR) 50 MG tablet Take 1 tablet by mouth Daily. 10/25/23  Yes Roc Carroll APRN   Umeclidinium-Vilanterol (Anoro Ellipta) 62.5-25 MCG/ACT aerosol powder  inhaler Inhale 1 puff Daily. 10/25/23  Yes Roc Carroll APRN   fish oil-omega-3 fatty acids 1000 MG capsule Take 1 capsule by mouth Daily.  Patient not taking: Reported on 12/7/2023 10/27/23   Roc Carroll APRN   ipratropium-albuterol (DUO-NEB) 0.5-2.5 mg/3 ml nebulizer Take 3 mL by nebulization 4 (Four) Times a Day As Needed for Wheezing  "or Shortness of Air.  Patient not taking: Reported on 10/25/2023 1/5/23   Hiwot Rucker APRN   multivitamin with minerals (MULTIVITAMIN ADULT PO) Take 1 tablet by mouth Daily. 10/25/23   Roc Carroll APRN   traZODone (DESYREL) 50 MG tablet Take 1 tablet by mouth Every Night.  Patient not taking: Reported on 12/7/2023 10/25/23   Roc Carroll APRN   vitamin D (ERGOCALCIFEROL) 1.25 MG (70982 UT) capsule capsule Take 1 capsule by mouth 1 (One) Time Per Week.  Patient not taking: Reported on 12/7/2023 10/27/23   Roc Carroll APRN     Allergies   Allergen Reactions    Penicillins Unknown - Low Severity     Pt does not recall the reaction. Patient tolerated cephalexin 3/2020    Penicillins Other (See Comments)     Unknown      Most Recent Immunizations   Administered Date(s) Administered    COVID-19 (PFIZER) Purple Cap Monovalent 11/03/2021    Tdap 03/26/2020     Social History     Tobacco Use    Smoking status: Every Day     Packs/day: 2.00     Years: 40.00     Additional pack years: 0.00     Total pack years: 80.00     Types: Cigarettes    Smokeless tobacco: Never   Substance Use Topics    Alcohol use: Yes     Alcohol/week: 10.0 standard drinks of alcohol     Types: 10 Shots of liquor per week     Comment: 1 pint vodka/ day      Social History     Substance and Sexual Activity   Drug Use Not Currently    Types: Hydrocodone       VITALS: BP (!) 184/105   Pulse 85   Temp 97.3 °F (36.3 °C) (Oral)   Resp 18   Ht 200.7 cm (79\")   Wt 114 kg (251 lb 9.6 oz)   SpO2 93%   BMI 28.34 kg/m²  Body mass index is 28.34 kg/m².    PHYSICAL EXAM:   CONSTITUTIONAL: No acute distress  LUNGS: Equal chest rise, no shortness of air  CARDIOVASCULAR: palpable peripheral pulses  SKIN: no skin lesions in the area examined  LYMPH: no lymphadenopathy in the area examined  EXTREMITY: Left Lower Extremity  Tenderness to Palpation: Tenderness to palpation at the knee  Gross Deformity:  Obvious left " knee effusion  Pulses:  Brisk Capillary Refill  Sensation: Intact to Saphenous, Sural, Deep Peroneal, Superficial Peroneal, and Tibial Nerves and grossly throughout extremity  Motor: 5/5 EHL/FHL/TA/GS motor complexes    RADIOLOGY REVIEW:   XR Knee 1 or 2 View Left    Result Date: 12/7/2023   As described.    This report was finalized on 12/7/2023 2:01 PM by Dr. Donte Hernandez M.D on Workstation: [employer]       LABS:   Results for the past 24 hours:   Recent Results (from the past 24 hour(s))   Comprehensive Metabolic Panel    Collection Time: 12/07/23  5:41 PM    Specimen: Blood   Result Value Ref Range    Glucose 93 65 - 99 mg/dL    BUN 9 8 - 23 mg/dL    Creatinine 0.92 0.76 - 1.27 mg/dL    Sodium 136 136 - 145 mmol/L    Potassium 3.8 3.5 - 5.2 mmol/L    Chloride 97 (L) 98 - 107 mmol/L    CO2 21.8 (L) 22.0 - 29.0 mmol/L    Calcium 8.3 (L) 8.6 - 10.5 mg/dL    Total Protein 7.1 6.0 - 8.5 g/dL    Albumin 4.0 3.5 - 5.2 g/dL    ALT (SGPT) 81 (H) 1 - 41 U/L    AST (SGOT) 101 (H) 1 - 40 U/L    Alkaline Phosphatase 79 39 - 117 U/L    Total Bilirubin 0.8 0.0 - 1.2 mg/dL    Globulin 3.1 gm/dL    A/G Ratio 1.3 g/dL    BUN/Creatinine Ratio 9.8 7.0 - 25.0    Anion Gap 17.2 (H) 5.0 - 15.0 mmol/L    eGFR 90.0 >60.0 mL/min/1.73   Magnesium    Collection Time: 12/07/23  5:41 PM    Specimen: Blood   Result Value Ref Range    Magnesium 1.4 (L) 1.6 - 2.4 mg/dL   Phosphorus    Collection Time: 12/07/23  5:41 PM    Specimen: Blood   Result Value Ref Range    Phosphorus 2.7 2.5 - 4.5 mg/dL   CBC Auto Differential    Collection Time: 12/07/23  5:41 PM    Specimen: Blood   Result Value Ref Range    WBC 8.34 3.40 - 10.80 10*3/mm3    RBC 4.10 (L) 4.14 - 5.80 10*6/mm3    Hemoglobin 13.7 13.0 - 17.7 g/dL    Hematocrit 39.2 37.5 - 51.0 %    MCV 95.6 79.0 - 97.0 fL    MCH 33.4 (H) 26.6 - 33.0 pg    MCHC 34.9 31.5 - 35.7 g/dL    RDW 12.1 (L) 12.3 - 15.4 %    RDW-SD 41.7 37.0 - 54.0 fl    MPV 9.1 6.0 - 12.0 fL    Platelets 227 140 - 450  10*3/mm3    Neutrophil % 74.0 42.7 - 76.0 %    Lymphocyte % 14.4 (L) 19.6 - 45.3 %    Monocyte % 10.2 5.0 - 12.0 %    Eosinophil % 0.4 0.3 - 6.2 %    Basophil % 0.5 0.0 - 1.5 %    Immature Grans % 0.5 0.0 - 0.5 %    Neutrophils, Absolute 6.18 1.70 - 7.00 10*3/mm3    Lymphocytes, Absolute 1.20 0.70 - 3.10 10*3/mm3    Monocytes, Absolute 0.85 0.10 - 0.90 10*3/mm3    Eosinophils, Absolute 0.03 0.00 - 0.40 10*3/mm3    Basophils, Absolute 0.04 0.00 - 0.20 10*3/mm3    Immature Grans, Absolute 0.04 0.00 - 0.05 10*3/mm3    nRBC 0.0 0.0 - 0.2 /100 WBC   Comprehensive Metabolic Panel    Collection Time: 12/08/23  7:29 AM    Specimen: Blood   Result Value Ref Range    Glucose 128 (H) 65 - 99 mg/dL    BUN 13 8 - 23 mg/dL    Creatinine 1.23 0.76 - 1.27 mg/dL    Sodium 136 136 - 145 mmol/L    Potassium 3.7 3.5 - 5.2 mmol/L    Chloride 100 98 - 107 mmol/L    CO2 23.6 22.0 - 29.0 mmol/L    Calcium 8.6 8.6 - 10.5 mg/dL    Total Protein 6.7 6.0 - 8.5 g/dL    Albumin 3.8 3.5 - 5.2 g/dL    ALT (SGPT) 66 (H) 1 - 41 U/L    AST (SGOT) 74 (H) 1 - 40 U/L    Alkaline Phosphatase 76 39 - 117 U/L    Total Bilirubin 1.1 0.0 - 1.2 mg/dL    Globulin 2.9 gm/dL    A/G Ratio 1.3 g/dL    BUN/Creatinine Ratio 10.6 7.0 - 25.0    Anion Gap 12.4 5.0 - 15.0 mmol/L    eGFR 63.6 >60.0 mL/min/1.73   Magnesium    Collection Time: 12/08/23  7:29 AM    Specimen: Blood   Result Value Ref Range    Magnesium 2.3 1.6 - 2.4 mg/dL   Phosphorus    Collection Time: 12/08/23  7:29 AM    Specimen: Blood   Result Value Ref Range    Phosphorus 1.9 (C) 2.5 - 4.5 mg/dL   CBC Auto Differential    Collection Time: 12/08/23  7:29 AM    Specimen: Blood   Result Value Ref Range    WBC 7.00 3.40 - 10.80 10*3/mm3    RBC 3.77 (L) 4.14 - 5.80 10*6/mm3    Hemoglobin 12.9 (L) 13.0 - 17.7 g/dL    Hematocrit 36.7 (L) 37.5 - 51.0 %    MCV 97.3 (H) 79.0 - 97.0 fL    MCH 34.2 (H) 26.6 - 33.0 pg    MCHC 35.1 31.5 - 35.7 g/dL    RDW 12.2 (L) 12.3 - 15.4 %    RDW-SD 43.1 37.0 - 54.0 fl    MPV  "9.5 6.0 - 12.0 fL    Platelets 196 140 - 450 10*3/mm3    Neutrophil % 75.3 42.7 - 76.0 %    Lymphocyte % 11.4 (L) 19.6 - 45.3 %    Monocyte % 11.1 5.0 - 12.0 %    Eosinophil % 1.1 0.3 - 6.2 %    Basophil % 0.7 0.0 - 1.5 %    Immature Grans % 0.4 0.0 - 0.5 %    Neutrophils, Absolute 5.26 1.70 - 7.00 10*3/mm3    Lymphocytes, Absolute 0.80 0.70 - 3.10 10*3/mm3    Monocytes, Absolute 0.78 0.10 - 0.90 10*3/mm3    Eosinophils, Absolute 0.08 0.00 - 0.40 10*3/mm3    Basophils, Absolute 0.05 0.00 - 0.20 10*3/mm3    Immature Grans, Absolute 0.03 0.00 - 0.05 10*3/mm3    nRBC 0.0 0.0 - 0.2 /100 WBC       IMPRESSION:  Patient is a 68 y.o. Not  or  male with left knee effusion with advanced osteoarthritis    PLAN:   Admited to: Sean Ricketts MD  Disposition: I recommend conservative treatment with physical therapy and anti-inflammatory medication.  While he is in the hospital for acute alcohol detox I would not recommend orthopedic intervention.  He may follow-up in the office for consideration of drainage of the knee, but no such procedure to be performed while inpatient.  He can probably follow-up next week or the week after after he gets out of the hospital.    R \"Pb\" Delfino SO MD  Orthopaedic Surgery  [facility]  [phone] - [place] [facility]  [phone] - [place] Office            "

## 2023-12-08 NOTE — PROGRESS NOTES
"Nutrition Services    Patient Name:  Watson Lisa  YOB: 1955  MRN: [id number]  Admit Date:  12/7/2023    Assessment Date:  12/08/23    Summary:     Pt is a 68 y.o. male adm for acute alcohol intoxication. H/o COPD, alcoholic liver disease, tobacco and ETOH abuse. Nutrition assessment completed. Pt was asleep at time of attempted visit. Seems to have good appetite. Ate 100% of dinner yesterday. He typically drinks a pint of bourbon a day with more on weekends and has a h/o alcohol use for the past twenty years per RN note. Usually smokes 2 PPD per RN note. Wt hx reviewed, 10 lb (3.8%) wt loss x 1 mo. Will perform NFPE and interview pt at next follow up.   Labs: Glu 128, Phos 1.9, ALT 66  Meds: MVI, thiamine, vitamin D, pericolace     REC:  Will continue to encourage and monitor PO intake   Will f/up for NFPE and pt interview     RD to follow per protocol.    CLINICAL NUTRITION ASSESSMENT      Reason for Assessment Age, MST score 2+     Diagnosis/Problem   Acute alcohol intoxication    Medical/Surgical History Past Medical History:   Diagnosis Date    Alcohol abuse     Anxiety     Arthritis     Bronchitis with chronic airway obstruction     LAST FEB    DVT (deep venous thrombosis)     Hiatal hernia     Hypertension     Hypertension     Low back pain     Neck pain     Pulmonary embolism     Sleep apnea with use of continuous positive airway pressure (CPAP)     AT NIGHT       Past Surgical History:   Procedure Laterality Date    COLONOSCOPY      JOINT REPLACEMENT Right     hip/knee    REPLACEMENT TOTAL KNEE      TONSILLECTOMY      TOTAL HIP ARTHROPLASTY Right         Anthropometrics        Current Height  Current Weight  BMI kg/m2 Height: 200.7 cm (79\")  Weight: 114 kg (251 lb 9.6 oz) (12/07/23 1708)  Body mass index is 28.34 kg/m².   Adjusted BMI (if applicable)    BMI Category Overweight (25 - 29.9)   Ideal Body Weight (IBW) 93.7 kg    Usual Body Weight (UBW) 260 lbs    Weight Trend Loss, " Amount/Timeframe: 10 lb (3.8%) wt loss x 1 mo    Weight History Wt Readings from Last 30 Encounters:   12/07/23 1708 114 kg (251 lb 9.6 oz)   12/07/23 1314 118 kg (260 lb)   10/25/23 0839 118 kg (261 lb)   01/03/23 2247 115 kg (252 lb 10.4 oz)   01/01/23 0526 117 kg (257 lb)   12/31/22 0536 117 kg (257 lb)   12/30/22 0500 117 kg (257 lb 4.4 oz)   12/29/22 0535 117 kg (257 lb 15 oz)   12/28/22 2349 116 kg (255 lb 11.7 oz)   12/28/22 1906 117 kg (259 lb)   06/15/22 1110 118 kg (259 lb 9.6 oz)   04/18/22 0552 117 kg (258 lb 4.8 oz)   04/15/22 1925 122 kg (268 lb 15.4 oz)   10/17/21 1150 115 kg (254 lb 10.1 oz)   10/10/21 0050 125 kg (275 lb 9.2 oz)   10/09/21 0352 125 kg (275 lb 9.2 oz)   10/08/21 0926 118 kg (260 lb)   10/07/21 1102 118 kg (260 lb)   03/26/21 0756 113 kg (250 lb)   03/25/21 2053 113 kg (250 lb)   01/15/21 0130 106 kg (233 lb 4 oz)   01/12/21 2106 112 kg (246 lb 11.1 oz)   01/12/21 0737 118 kg (260 lb)   01/11/21 1902 118 kg (260 lb)   01/11/21 1425 118 kg (260 lb)   08/04/20 0858 112 kg (246 lb)   06/29/20 0400 109 kg (240 lb 4.8 oz)   06/27/20 0500 106 kg (233 lb 4 oz)   06/24/20 2043 104 kg (230 lb)   06/24/20 1017 114 kg (251 lb)   03/26/20 0949 115 kg (253 lb 8.5 oz)   06/24/19 1242 115 kg (254 lb)   04/03/19 0657 119 kg (262 lb)   01/29/19 0924 116 kg (255 lb)   10/29/18 1201 116 kg (256 lb)   04/25/18 1011 116 kg (255 lb)   03/23/18 0831 115 kg (253 lb)   11/14/17 1518 110 kg (243 lb)   10/13/17 0628 111 kg (245 lb)   09/13/17 1221 111 kg (245 lb)   09/05/17 1421 107 kg (236 lb)   04/11/17 0851 112 kg (246 lb)   02/06/17 1622 111 kg (244 lb)   01/09/17 1139 113 kg (250 lb)   10/21/16 1020 113 kg (250 lb)   06/29/16 1704 111 kg (245 lb)   06/20/16 1446 109 kg (241 lb)   06/07/16 1711 113 kg (250 lb)   04/25/16 1100 116 kg (255 lb)   04/22/16 0911 118 kg (260 lb)        Estimated Requirements         Weight used  93.7 kg IBW     Calories   6642-4041 (25 kcal/kg, 30 kcal/kg)    Protein   (1.0  - 1.2 gm/kg)    Fluid  1 mL/kcal     Labs       Pertinent Labs    Results from last 7 days   Lab Units 12/08/23  0729 12/07/23  1741 12/07/23  1326   SODIUM mmol/L 136 136 133*   POTASSIUM mmol/L 3.7 3.8 4.0   CHLORIDE mmol/L 100 97* 96*   CO2 mmol/L 23.6 21.8* 17.2*   BUN mg/dL 13 9 10   CREATININE mg/dL 1.23 0.92 0.92   CALCIUM mg/dL 8.6 8.3* 8.5*   BILIRUBIN mg/dL 1.1 0.8 0.7   ALK PHOS U/L 76 79 81   ALT (SGPT) U/L 66* 81* 91*   AST (SGOT) U/L 74* 101* 117*   GLUCOSE mg/dL 128* 93 143*     Results from last 7 days   Lab Units 12/08/23  0729 12/07/23 1741 12/07/23 1326 12/07/23  1326   MAGNESIUM mg/dL 2.3 1.4*  --  1.5*   PHOSPHORUS mg/dL 1.9* 2.7   < >  --    HEMOGLOBIN g/dL 12.9* 13.7  --  13.8   HEMATOCRIT % 36.7* 39.2  --  39.4   WBC 10*3/mm3 7.00 8.34  --  10.93*   ALBUMIN g/dL 3.8 4.0  --  4.0    < > = values in this interval not displayed.     Results from last 7 days   Lab Units 12/08/23  0729 12/07/23 1741 12/07/23  1326   PLATELETS 10*3/mm3 196 227 235     COVID19   Date Value Ref Range Status   04/15/2022 Not Detected Not Detected - Ref. Range Final     Lab Results   Component Value Date    HGBA1C 4.90 01/18/2021          Medications           Scheduled Medications arformoterol, 15 mcg, Nebulization, BID - RT   And  tiotropium bromide monohydrate, 2 puff, Inhalation, Daily - RT  famotidine, 20 mg, Intravenous, Q12H  folic acid, 1 mg, Oral, Daily  ketorolac, 15 mg, Intravenous, Q6H  LORazepam, 1 mg, Oral, Q6H  losartan, 50 mg, Oral, Q24H  multivitamin with minerals, 1 tablet, Oral, Daily  potassium phosphate, 15 mmol, Intravenous, Once  senna-docusate sodium, 2 tablet, Oral, BID  sodium chloride, 10 mL, Intravenous, Q12H  thiamine (B-1) IV, 500 mg, Intravenous, Q8H   Followed by  [START ON 12/10/2023] thiamine (B-1) IV, 200 mg, Intravenous, Q8H   Followed by  [START ON 12/14/2023] thiamine, 100 mg, Oral, Daily  traZODone, 50 mg, Oral, Nightly  vitamin D, 50,000 Units, Oral, Weekly       Infusions      PRN Medications   albuterol    senna-docusate sodium **AND** polyethylene glycol **AND** bisacodyl **AND** bisacodyl    Calcium Replacement - Follow Nurse / BPA Driven Protocol    influenza vaccine    ipratropium-albuterol    LORazepam **OR** midazolam **OR** LORazepam **OR** midazolam **OR** midazolam **OR** midazolam    Magnesium Standard Dose Replacement - Follow Nurse / BPA Driven Protocol    nitroglycerin    ondansetron    Phosphorus Replacement - Follow Nurse / BPA Driven Protocol    Potassium Replacement - Follow Nurse / BPA Driven Protocol    [COMPLETED] Insert Peripheral IV **AND** sodium chloride    sodium chloride    sodium chloride     Physical Findings          General Findings anxious, flat affect, hard of hearing, oriented, overweight, room air   Oral/Mouth Cavity tooth or teeth missing   Edema  no edema   Gastrointestinal last bowel movement: pending    Skin  skin tear, other: rash/scabs RUE    Tubes/Drains/Lines none   NFPE Not indicated at this time   --  Current Nutrition Orders & Evaluation of Intake       Oral Nutrition     Food Allergies NKFA   Current PO Diet Diet: Regular/House Diet; Texture: Regular Texture (IDDSI 7); Fluid Consistency: Thin (IDDSI 0)   Supplement n/a   PO Evaluation     % PO Intake 100%    Factors Affecting Intake: decreased appetite   --  PES STATEMENT / NUTRITION DIAGNOSIS      Nutrition Dx Problem  Problem: Unintentional Weight Loss  Etiology: Medical Diagnosis - acute alcohol intoxication, COPD, LIVAN, alcoholic liver disease, ETOH and tobacco abuse and Factors Affecting Nutrition - decreased appetite     Signs/Symptoms: Unintended Weight Change     NUTRITION INTERVENTION / PLAN OF CARE      Intervention Goal(s) Maintain nutrition status, Establish goals of care, Meet estimated needs, Disease management/therapy, Maintain intake, Maintain weight, and No significant weight loss         RD Intervention/Action Encourage intake, Continue to monitor, and Care plan reviewed    --      Prescription/Orders:       PO Diet       Supplements       Enteral Nutrition       Parenteral Nutrition    New Prescription Ordered? Continue same per protocol, No changes at this time   --      Monitor/Evaluation Per protocol, PO intake, Pertinent labs, Weight, Symptoms   Discharge Plan/Needs Pending clinical course   --    RD to follow per protocol.      Electronically signed by:  Ekaterina Jeffery Dietitian Intern   12/08/23 13:04 EST

## 2023-12-09 LAB
ALBUMIN SERPL-MCNC: 3.5 G/DL (ref 3.5–5.2)
ALBUMIN/GLOB SERPL: 1.2 G/DL
ALP SERPL-CCNC: 71 U/L (ref 39–117)
ALT SERPL W P-5'-P-CCNC: 52 U/L (ref 1–41)
ANION GAP SERPL CALCULATED.3IONS-SCNC: 11.7 MMOL/L (ref 5–15)
AST SERPL-CCNC: 46 U/L (ref 1–40)
BASOPHILS # BLD AUTO: 0.03 10*3/MM3 (ref 0–0.2)
BASOPHILS NFR BLD AUTO: 0.4 % (ref 0–1.5)
BILIRUB SERPL-MCNC: 1.2 MG/DL (ref 0–1.2)
BUN SERPL-MCNC: 11 MG/DL (ref 8–23)
BUN/CREAT SERPL: 9.4 (ref 7–25)
CALCIUM SPEC-SCNC: 8.9 MG/DL (ref 8.6–10.5)
CHLORIDE SERPL-SCNC: 100 MMOL/L (ref 98–107)
CO2 SERPL-SCNC: 22.3 MMOL/L (ref 22–29)
CREAT SERPL-MCNC: 1.17 MG/DL (ref 0.76–1.27)
DEPRECATED RDW RBC AUTO: 44.3 FL (ref 37–54)
EGFRCR SERPLBLD CKD-EPI 2021: 67.5 ML/MIN/1.73
EOSINOPHIL # BLD AUTO: 0.08 10*3/MM3 (ref 0–0.4)
EOSINOPHIL NFR BLD AUTO: 1 % (ref 0.3–6.2)
ERYTHROCYTE [DISTWIDTH] IN BLOOD BY AUTOMATED COUNT: 12.3 % (ref 12.3–15.4)
GLOBULIN UR ELPH-MCNC: 2.9 GM/DL
GLUCOSE SERPL-MCNC: 121 MG/DL (ref 65–99)
HCT VFR BLD AUTO: 35.3 % (ref 37.5–51)
HGB BLD-MCNC: 12.4 G/DL (ref 13–17.7)
IMM GRANULOCYTES # BLD AUTO: 0.04 10*3/MM3 (ref 0–0.05)
IMM GRANULOCYTES NFR BLD AUTO: 0.5 % (ref 0–0.5)
LYMPHOCYTES # BLD AUTO: 0.95 10*3/MM3 (ref 0.7–3.1)
LYMPHOCYTES NFR BLD AUTO: 11.4 % (ref 19.6–45.3)
MAGNESIUM SERPL-MCNC: 1.8 MG/DL (ref 1.6–2.4)
MCH RBC QN AUTO: 34.7 PG (ref 26.6–33)
MCHC RBC AUTO-ENTMCNC: 35.1 G/DL (ref 31.5–35.7)
MCV RBC AUTO: 98.9 FL (ref 79–97)
MONOCYTES # BLD AUTO: 0.94 10*3/MM3 (ref 0.1–0.9)
MONOCYTES NFR BLD AUTO: 11.3 % (ref 5–12)
NEUTROPHILS NFR BLD AUTO: 6.26 10*3/MM3 (ref 1.7–7)
NEUTROPHILS NFR BLD AUTO: 75.4 % (ref 42.7–76)
NRBC BLD AUTO-RTO: 0 /100 WBC (ref 0–0.2)
PHOSPHATE SERPL-MCNC: 2.5 MG/DL (ref 2.5–4.5)
PLATELET # BLD AUTO: 147 10*3/MM3 (ref 140–450)
PMV BLD AUTO: 9.7 FL (ref 6–12)
POTASSIUM SERPL-SCNC: 3.7 MMOL/L (ref 3.5–5.2)
PROT SERPL-MCNC: 6.4 G/DL (ref 6–8.5)
RBC # BLD AUTO: 3.57 10*6/MM3 (ref 4.14–5.8)
SODIUM SERPL-SCNC: 134 MMOL/L (ref 136–145)
URATE SERPL-MCNC: 5.5 MG/DL (ref 3.4–7)
WBC NRBC COR # BLD AUTO: 8.3 10*3/MM3 (ref 3.4–10.8)

## 2023-12-09 PROCEDURE — 84100 ASSAY OF PHOSPHORUS: CPT | Performed by: HOSPITALIST

## 2023-12-09 PROCEDURE — 83735 ASSAY OF MAGNESIUM: CPT | Performed by: INTERNAL MEDICINE

## 2023-12-09 PROCEDURE — 94799 UNLISTED PULMONARY SVC/PX: CPT

## 2023-12-09 PROCEDURE — 25010000002 PHENOBARBITAL PER 120 MG: Performed by: HOSPITALIST

## 2023-12-09 PROCEDURE — 84550 ASSAY OF BLOOD/URIC ACID: CPT | Performed by: HOSPITALIST

## 2023-12-09 PROCEDURE — 94761 N-INVAS EAR/PLS OXIMETRY MLT: CPT

## 2023-12-09 PROCEDURE — 25010000002 MIDAZOLAM PER 1 MG: Performed by: INTERNAL MEDICINE

## 2023-12-09 PROCEDURE — 25010000002 THIAMINE PER 100 MG: Performed by: INTERNAL MEDICINE

## 2023-12-09 PROCEDURE — 80053 COMPREHEN METABOLIC PANEL: CPT | Performed by: INTERNAL MEDICINE

## 2023-12-09 PROCEDURE — 94664 DEMO&/EVAL PT USE INHALER: CPT

## 2023-12-09 PROCEDURE — 85025 COMPLETE CBC W/AUTO DIFF WBC: CPT | Performed by: INTERNAL MEDICINE

## 2023-12-09 RX ORDER — PHENOBARBITAL 32.4 MG/1
32.4 TABLET ORAL ONCE
Status: COMPLETED | OUTPATIENT
Start: 2023-12-12 | End: 2023-12-12

## 2023-12-09 RX ORDER — PHENOBARBITAL 32.4 MG/1
32.4 TABLET ORAL ONCE
Status: COMPLETED | OUTPATIENT
Start: 2023-12-11 | End: 2023-12-11

## 2023-12-09 RX ORDER — PHENOBARBITAL SODIUM 65 MG/ML
65 INJECTION INTRAMUSCULAR ONCE
Status: COMPLETED | OUTPATIENT
Start: 2023-12-10 | End: 2023-12-10

## 2023-12-09 RX ADMIN — FAMOTIDINE 20 MG: 10 INJECTION INTRAVENOUS at 09:55

## 2023-12-09 RX ADMIN — DICLOFENAC SODIUM 4 G: 10 GEL TOPICAL at 01:22

## 2023-12-09 RX ADMIN — MIDAZOLAM HYDROCHLORIDE 4 MG: 2 INJECTION, SOLUTION INTRAMUSCULAR; INTRAVENOUS at 01:24

## 2023-12-09 RX ADMIN — PHENOBARBITAL SODIUM 374.4 MG: 130 INJECTION INTRAMUSCULAR at 09:55

## 2023-12-09 RX ADMIN — MIDAZOLAM HYDROCHLORIDE 4 MG: 2 INJECTION, SOLUTION INTRAMUSCULAR; INTRAVENOUS at 08:18

## 2023-12-09 RX ADMIN — MIDAZOLAM HYDROCHLORIDE 4 MG: 2 INJECTION, SOLUTION INTRAMUSCULAR; INTRAVENOUS at 02:28

## 2023-12-09 RX ADMIN — TRAZODONE HYDROCHLORIDE 50 MG: 50 TABLET ORAL at 20:28

## 2023-12-09 RX ADMIN — Medication 10 ML: at 08:19

## 2023-12-09 RX ADMIN — MIDAZOLAM HYDROCHLORIDE 4 MG: 2 INJECTION, SOLUTION INTRAMUSCULAR; INTRAVENOUS at 04:51

## 2023-12-09 RX ADMIN — PHENOBARBITAL SODIUM 280.8 MG: 65 INJECTION INTRAMUSCULAR at 11:52

## 2023-12-09 RX ADMIN — Medication 2 PACKET: at 01:58

## 2023-12-09 RX ADMIN — DICLOFENAC SODIUM 4 G: 10 GEL TOPICAL at 08:18

## 2023-12-09 RX ADMIN — MIDAZOLAM HYDROCHLORIDE 4 MG: 2 INJECTION, SOLUTION INTRAMUSCULAR; INTRAVENOUS at 17:04

## 2023-12-09 RX ADMIN — LOSARTAN POTASSIUM 50 MG: 50 TABLET, FILM COATED ORAL at 08:18

## 2023-12-09 RX ADMIN — DOCUSATE SODIUM 50MG AND SENNOSIDES 8.6MG 2 TABLET: 8.6; 5 TABLET, FILM COATED ORAL at 08:18

## 2023-12-09 RX ADMIN — THIAMINE HYDROCHLORIDE 500 MG: 100 INJECTION, SOLUTION INTRAMUSCULAR; INTRAVENOUS at 17:04

## 2023-12-09 RX ADMIN — ZINC OXIDE 1 APPLICATION: 200 OINTMENT TOPICAL at 08:19

## 2023-12-09 RX ADMIN — Medication 1 TABLET: at 08:18

## 2023-12-09 RX ADMIN — LORAZEPAM 2 MG: 1 TABLET ORAL at 22:17

## 2023-12-09 RX ADMIN — MIDAZOLAM HYDROCHLORIDE 4 MG: 2 INJECTION, SOLUTION INTRAMUSCULAR; INTRAVENOUS at 18:10

## 2023-12-09 RX ADMIN — Medication 10 ML: at 20:29

## 2023-12-09 RX ADMIN — LORAZEPAM 1 MG: 1 TABLET ORAL at 02:54

## 2023-12-09 RX ADMIN — ZINC OXIDE 1 APPLICATION: 200 OINTMENT TOPICAL at 20:30

## 2023-12-09 RX ADMIN — LORAZEPAM 2 MG: 1 TABLET ORAL at 00:38

## 2023-12-09 RX ADMIN — THIAMINE HYDROCHLORIDE 500 MG: 100 INJECTION, SOLUTION INTRAMUSCULAR; INTRAVENOUS at 01:22

## 2023-12-09 RX ADMIN — LORAZEPAM 2 MG: 1 TABLET ORAL at 20:41

## 2023-12-09 RX ADMIN — FAMOTIDINE 20 MG: 10 INJECTION INTRAVENOUS at 01:22

## 2023-12-09 RX ADMIN — DICLOFENAC SODIUM 4 G: 10 GEL TOPICAL at 16:56

## 2023-12-09 RX ADMIN — FOLIC ACID 1 MG: 1 TABLET ORAL at 08:18

## 2023-12-09 RX ADMIN — PETROLATUM 1 APPLICATION: 420 OINTMENT TOPICAL at 20:29

## 2023-12-09 RX ADMIN — TIOTROPIUM BROMIDE INHALATION SPRAY 2 PUFF: 3.12 SPRAY, METERED RESPIRATORY (INHALATION) at 07:49

## 2023-12-09 RX ADMIN — ARFORMOTEROL TARTRATE 15 MCG: 15 SOLUTION RESPIRATORY (INHALATION) at 07:44

## 2023-12-09 RX ADMIN — MIDAZOLAM HYDROCHLORIDE 4 MG: 2 INJECTION, SOLUTION INTRAMUSCULAR; INTRAVENOUS at 05:15

## 2023-12-09 RX ADMIN — PETROLATUM 1 APPLICATION: 420 OINTMENT TOPICAL at 08:19

## 2023-12-09 RX ADMIN — THIAMINE HYDROCHLORIDE 500 MG: 100 INJECTION, SOLUTION INTRAMUSCULAR; INTRAVENOUS at 10:48

## 2023-12-09 RX ADMIN — DICLOFENAC SODIUM 4 G: 10 GEL TOPICAL at 11:51

## 2023-12-09 RX ADMIN — DICLOFENAC SODIUM 4 G: 10 GEL TOPICAL at 20:29

## 2023-12-09 RX ADMIN — MIDAZOLAM HYDROCHLORIDE 4 MG: 2 INJECTION, SOLUTION INTRAMUSCULAR; INTRAVENOUS at 13:14

## 2023-12-09 RX ADMIN — FAMOTIDINE 20 MG: 10 INJECTION INTRAVENOUS at 20:28

## 2023-12-09 RX ADMIN — MIDAZOLAM HYDROCHLORIDE 4 MG: 2 INJECTION, SOLUTION INTRAMUSCULAR; INTRAVENOUS at 02:54

## 2023-12-09 RX ADMIN — MIDAZOLAM HYDROCHLORIDE 4 MG: 2 INJECTION, SOLUTION INTRAMUSCULAR; INTRAVENOUS at 01:58

## 2023-12-09 RX ADMIN — PHENOBARBITAL SODIUM 280.8 MG: 65 INJECTION INTRAMUSCULAR at 14:40

## 2023-12-09 NOTE — PROGRESS NOTES
[facility] follow up d/t alcohol use; this writer reviewed chart and spoke with RN Ilya. Per RN, patient has not been able to sleep; withdrawal symptoms noted including restlessness and hallucinations, multiple doses of Versed given overnight.  Last CIWA 15 at 5:05 AM; he/pt declined inpatient psychiatrist consult.  Patient plans to follow-up with [facility] for counseling and JANELL treatment; he has been given other JANELL resources. No further needs/concerns noted at this time per RN and/or medical team; [facility] to continue following.

## 2023-12-09 NOTE — SIGNIFICANT NOTE
12/09/23 0931   OTHER   Discipline physical therapist   Rehab Time/Intention   Session Not Performed   (Hold today secondary to withdrawal symptoms per RN)   Recommendation   PT - Next Appointment 12/10/23

## 2023-12-09 NOTE — PLAN OF CARE
Problem: Adult Inpatient Plan of Care  Goal: Plan of Care Review  Outcome: Ongoing, Progressing  Goal: Patient-Specific Goal (Individualized)  Outcome: Ongoing, Progressing  Goal: Absence of Hospital-Acquired Illness or Injury  Outcome: Ongoing, Progressing  Intervention: Identify and Manage Fall Risk  Recent Flowsheet Documentation  Taken 12/9/2023 1600 by Tanya Mary RN  Safety Promotion/Fall Prevention:   assistive device/personal items within reach   clutter free environment maintained   nonskid shoes/slippers when out of bed   fall prevention program maintained   room organization consistent   safety round/check completed  Taken 12/9/2023 1400 by Tanya Mary RN  Safety Promotion/Fall Prevention:   assistive device/personal items within reach   clutter free environment maintained   nonskid shoes/slippers when out of bed   room organization consistent   fall prevention program maintained   safety round/check completed  Intervention: Prevent Skin Injury  Recent Flowsheet Documentation  Taken 12/9/2023 1600 by Tanya Mary RN  Body Position: position changed independently  Taken 12/9/2023 1400 by Tanya Mary RN  Body Position: position changed independently  Intervention: Prevent Infection  Recent Flowsheet Documentation  Taken 12/9/2023 1600 by Tanya Mary RN  Infection Prevention: environmental surveillance performed  Taken 12/9/2023 1400 by Tanya Mary RN  Infection Prevention: environmental surveillance performed  Goal: Optimal Comfort and Wellbeing  Outcome: Ongoing, Progressing  Goal: Readiness for Transition of Care  Outcome: Ongoing, Progressing     Problem: Fall Injury Risk  Goal: Absence of Fall and Fall-Related Injury  Outcome: Ongoing, Progressing  Intervention: Promote Injury-Free Environment  Recent Flowsheet Documentation  Taken 12/9/2023 1600 by Tanya Mary RN  Safety Promotion/Fall Prevention:   assistive device/personal items within reach   clutter free environment  maintained   nonskid shoes/slippers when out of bed   fall prevention program maintained   room organization consistent   safety round/check completed  Taken 12/9/2023 1400 by Tanya Mary RN  Safety Promotion/Fall Prevention:   assistive device/personal items within reach   clutter free environment maintained   nonskid shoes/slippers when out of bed   room organization consistent   fall prevention program maintained   safety round/check completed     Problem: COPD (Chronic Obstructive Pulmonary Disease) Comorbidity  Goal: Maintenance of COPD Symptom Control  Outcome: Ongoing, Progressing     Problem: Hypertension Comorbidity  Goal: Blood Pressure in Desired Range  Outcome: Ongoing, Progressing     Problem: Osteoarthritis Comorbidity  Goal: Maintenance of Osteoarthritis Symptom Control  Outcome: Ongoing, Progressing     Problem: Skin Injury Risk Increased  Goal: Skin Health and Integrity  Outcome: Ongoing, Progressing   Goal Outcome Evaluation:            PT confused, last CIWA 15, phenobarbital protocol started, up with cane or walker to bed side commode, frequent loose stool. Plan TBD.

## 2023-12-09 NOTE — PROGRESS NOTES
Name: Watson Lisa ADMIT: 2023   : 1955  PCP: Roc Carroll APRN    MRN: [id number] LOS: 0 days   AGE/SEX: 68 y.o. male  ROOM: [facility]     Subjective   Subjective   More significant DTs overnight.  Required numerous doses of benzos with CIWA ranging from 12-24.       Objective   Objective   Vital Signs  Temp:  [97.9 °F (36.6 °C)-99.3 °F (37.4 °C)] 98.1 °F (36.7 °C)  Heart Rate:  [] 92  Resp:  [18-20] 18  BP: (153-205)/() 153/93  SpO2:  [95 %-100 %] 99 %  on   ;   Device (Oxygen Therapy): room air  Body mass index is 28.34 kg/m².  Physical Exam  Vitals reviewed.   Constitutional:       General: He is not in acute distress.     Appearance: He is well-developed.   HENT:      Head: Normocephalic and atraumatic.   Eyes:      General: No scleral icterus.  Neck:      Vascular: No JVD.   Cardiovascular:      Rate and Rhythm: Normal rate and regular rhythm.      Heart sounds: No murmur heard.  Pulmonary:      Effort: Pulmonary effort is normal. No respiratory distress.      Breath sounds: Normal breath sounds. No wheezing.   Abdominal:      General: Bowel sounds are normal. There is no distension.      Palpations: Abdomen is soft.      Tenderness: There is no abdominal tenderness.   Musculoskeletal:      Right lower leg: No edema.      Left lower leg: No edema.   Skin:     General: Skin is warm and dry.      Findings: No rash.   Neurological:      Mental Status: He is alert. He is disoriented.      Comments: More significant tremor   Psychiatric:         Attention and Perception: He perceives visual hallucinations.         Mood and Affect: Affect is labile.       Results Review     I reviewed the patient's new clinical results.  Results from last 7 days   Lab Units 23  0939 23  0729 23  1741 23  1326   WBC 10*3/mm3 8.30 7.00 8.34 10.93*   HEMOGLOBIN g/dL 12.4* 12.9* 13.7 13.8   PLATELETS 10*3/mm3 147 196 227 235     Results from last 7 days   Lab Units  "12/09/23  0939 12/08/23  0729 12/07/23  1741 12/07/23  1326   SODIUM mmol/L 134* 136 136 133*   POTASSIUM mmol/L 3.7 3.7 3.8 4.0   CHLORIDE mmol/L 100 100 97* 96*   CO2 mmol/L 22.3 23.6 21.8* 17.2*   BUN mg/dL 11 13 9 10   CREATININE mg/dL 1.17 1.23 0.92 0.92   GLUCOSE mg/dL 121* 128* 93 143*   EGFR mL/min/1.73 67.5 63.6 90.0 90.0     Results from last 7 days   Lab Units 12/09/23  0939 12/08/23 0729 12/07/23 1741 12/07/23  1326   ALBUMIN g/dL 3.5 3.8 4.0 4.0   BILIRUBIN mg/dL 1.2 1.1 0.8 0.7   ALK PHOS U/L 71 76 79 81   AST (SGOT) U/L 46* 74* 101* 117*   ALT (SGPT) U/L 52* 66* 81* 91*     Results from last 7 days   Lab Units 12/09/23  0939 12/08/23  1924 12/08/23 0729 12/07/23 1741 12/07/23  1326   CALCIUM mg/dL 8.9  --  8.6 8.3* 8.5*   ALBUMIN g/dL 3.5  --  3.8 4.0 4.0   MAGNESIUM mg/dL 1.8  --  2.3 1.4* 1.5*   PHOSPHORUS mg/dL 2.5 2.0* 1.9* 2.7  --        No results found for: \"HGBA1C\", \"POCGLU\"    No radiology results for the last day    I have personally reviewed all medications:  Scheduled Medications  arformoterol, 15 mcg, Nebulization, BID - RT   And  tiotropium bromide monohydrate, 2 puff, Inhalation, Daily - RT  Diclofenac Sodium, 4 g, Topical, 4x Daily  famotidine, 20 mg, Intravenous, Q12H  folic acid, 1 mg, Oral, Daily  hydrocortisone-bacitracin-zinc oxide-nystatin, 1 application , Topical, Q12H  LORazepam, 1 mg, Oral, Q6H  losartan, 50 mg, Oral, Q24H  multivitamin with minerals, 1 tablet, Oral, Daily  Petrolatum, 1 application , Topical, Q12H  [START ON 12/10/2023] PHENobarbital, 65 mg, Intravenous, Once   Followed by  [START ON 12/10/2023] PHENobarbital, 65 mg, Intravenous, Once   Followed by  [START ON 12/11/2023] PHENobarbital, 32.4 mg, Oral, Once   Followed by  [START ON 12/11/2023] PHENobarbital, 32.4 mg, Oral, Once   Followed by  [START ON 12/12/2023] PHENobarbital, 32.4 mg, Oral, Once  PHENobarbital, 3 mg/kg (Ideal), Intravenous, Once  senna-docusate sodium, 2 tablet, Oral, BID  sodium " chloride, 10 mL, Intravenous, Q12H  thiamine (B-1) IV, 500 mg, Intravenous, Q8H   Followed by  [START ON 12/10/2023] thiamine (B-1) IV, 200 mg, Intravenous, Q8H   Followed by  [START ON 12/14/2023] thiamine, 100 mg, Oral, Daily  traZODone, 50 mg, Oral, Nightly  vitamin D, 50,000 Units, Oral, Weekly    Infusions   Diet  Diet: Regular/House Diet; Texture: Regular Texture (IDDSI 7); Fluid Consistency: Thin (IDDSI 0)    I have personally reviewed:  [x]  Laboratory   []  Microbiology   [x]  Radiology   []  EKG/Telemetry  []  Cardiology/Vascular   []  Pathology    []  Records       Assessment/Plan     Active Hospital Problems    Diagnosis  POA    **Acute alcohol intoxication [F10.929]  Yes    Hypomagnesemia [E83.42]  Yes    History of alcohol use disorder [Z87.898]  Yes    Alcoholic liver disease [K70.9]  Yes    COPD (chronic obstructive pulmonary disease) [J44.9]  Yes    ETOH abuse [F10.10]  Yes    LIVAN (obstructive sleep apnea) [G47.33]  Yes    Anemia, chronic disease [D63.8]  Yes    Essential hypertension [I10]  Yes    Tobacco abuse [Z72.0]  Yes      Resolved Hospital Problems   No resolved problems to display.       68 y.o. male admitted with Acute alcohol intoxication.    EtOH abuse/intoxication: Now and more significant withdrawal despite scheduled lorazepam  - Adding phenobarbital protocol.  Can always use restraints if needed if behavior becomes too impulsive.  Probably benefit from moving closer to nursing station as well  - Continue benzodiazepines per [facility] protocol  - Continue with thiamine and folate.    Left knee pain with negative imaging other than small joint effusion.  Received Toradol x 2.  Orthopedics planning outpatient follow-up    Hypertension uncontrolled.  Probably relates alcohol withdrawal.  Clonidine available as needed      SCDs for DVT prophylaxis.  Full code.  Disposition: TBD, will depend on duration of alcohol withdrawal symptoms      Meño Forbes MD  [facility]  12/09/23  13:57 EST

## 2023-12-10 PROCEDURE — 25010000002 THIAMINE PER 100 MG: Performed by: INTERNAL MEDICINE

## 2023-12-10 PROCEDURE — 94761 N-INVAS EAR/PLS OXIMETRY MLT: CPT

## 2023-12-10 PROCEDURE — 25010000002 THIAMINE HCL 200 MG/2ML SOLUTION: Performed by: INTERNAL MEDICINE

## 2023-12-10 PROCEDURE — 94799 UNLISTED PULMONARY SVC/PX: CPT

## 2023-12-10 PROCEDURE — 25010000002 MIDAZOLAM PER 1 MG: Performed by: INTERNAL MEDICINE

## 2023-12-10 PROCEDURE — 25010000002 PHENOBARBITAL PER 120 MG: Performed by: HOSPITALIST

## 2023-12-10 PROCEDURE — 94664 DEMO&/EVAL PT USE INHALER: CPT

## 2023-12-10 PROCEDURE — 94760 N-INVAS EAR/PLS OXIMETRY 1: CPT

## 2023-12-10 RX ORDER — LOSARTAN POTASSIUM 100 MG/1
100 TABLET ORAL
Status: DISCONTINUED | OUTPATIENT
Start: 2023-12-11 | End: 2023-12-12 | Stop reason: HOSPADM

## 2023-12-10 RX ORDER — LOSARTAN POTASSIUM 50 MG/1
50 TABLET ORAL ONCE
Status: COMPLETED | OUTPATIENT
Start: 2023-12-10 | End: 2023-12-10

## 2023-12-10 RX ADMIN — PHENOBARBITAL SODIUM 65 MG: 65 INJECTION INTRAMUSCULAR at 14:52

## 2023-12-10 RX ADMIN — Medication 10 ML: at 08:20

## 2023-12-10 RX ADMIN — THIAMINE HYDROCHLORIDE 200 MG: 100 INJECTION, SOLUTION INTRAMUSCULAR; INTRAVENOUS at 16:37

## 2023-12-10 RX ADMIN — PETROLATUM 1 APPLICATION: 420 OINTMENT TOPICAL at 20:36

## 2023-12-10 RX ADMIN — FOLIC ACID 1 MG: 1 TABLET ORAL at 08:20

## 2023-12-10 RX ADMIN — ARFORMOTEROL TARTRATE 15 MCG: 15 SOLUTION RESPIRATORY (INHALATION) at 20:03

## 2023-12-10 RX ADMIN — Medication 10 ML: at 20:36

## 2023-12-10 RX ADMIN — FAMOTIDINE 20 MG: 10 INJECTION INTRAVENOUS at 20:36

## 2023-12-10 RX ADMIN — ZINC OXIDE 1 APPLICATION: 200 OINTMENT TOPICAL at 08:20

## 2023-12-10 RX ADMIN — TRAZODONE HYDROCHLORIDE 50 MG: 50 TABLET ORAL at 20:36

## 2023-12-10 RX ADMIN — ZINC OXIDE 1 APPLICATION: 200 OINTMENT TOPICAL at 20:37

## 2023-12-10 RX ADMIN — LOSARTAN POTASSIUM 50 MG: 50 TABLET, FILM COATED ORAL at 11:33

## 2023-12-10 RX ADMIN — THIAMINE HYDROCHLORIDE 500 MG: 100 INJECTION, SOLUTION INTRAMUSCULAR; INTRAVENOUS at 03:24

## 2023-12-10 RX ADMIN — DICLOFENAC SODIUM 4 G: 10 GEL TOPICAL at 16:37

## 2023-12-10 RX ADMIN — LOSARTAN POTASSIUM 50 MG: 50 TABLET, FILM COATED ORAL at 08:20

## 2023-12-10 RX ADMIN — ARFORMOTEROL TARTRATE 15 MCG: 15 SOLUTION RESPIRATORY (INHALATION) at 07:51

## 2023-12-10 RX ADMIN — DICLOFENAC SODIUM 4 G: 10 GEL TOPICAL at 08:19

## 2023-12-10 RX ADMIN — PETROLATUM 1 APPLICATION: 420 OINTMENT TOPICAL at 08:19

## 2023-12-10 RX ADMIN — THIAMINE HYDROCHLORIDE 500 MG: 100 INJECTION, SOLUTION INTRAMUSCULAR; INTRAVENOUS at 11:33

## 2023-12-10 RX ADMIN — TIOTROPIUM BROMIDE INHALATION SPRAY 2 PUFF: 3.12 SPRAY, METERED RESPIRATORY (INHALATION) at 07:56

## 2023-12-10 RX ADMIN — FAMOTIDINE 20 MG: 10 INJECTION INTRAVENOUS at 08:19

## 2023-12-10 RX ADMIN — Medication 1 TABLET: at 08:20

## 2023-12-10 RX ADMIN — DICLOFENAC SODIUM 4 G: 10 GEL TOPICAL at 20:36

## 2023-12-10 RX ADMIN — MIDAZOLAM HYDROCHLORIDE 4 MG: 2 INJECTION, SOLUTION INTRAMUSCULAR; INTRAVENOUS at 05:19

## 2023-12-10 RX ADMIN — PHENOBARBITAL SODIUM 65 MG: 65 INJECTION INTRAMUSCULAR at 03:24

## 2023-12-10 NOTE — PLAN OF CARE
Goal Outcome Evaluation:  Plan of Care Reviewed With: patient        Progress: improving  Outcome Evaluation: Vss, Room Air, Pt AOx2 ,confused at times, bed alarm on, Ativan & Versed PRN given. New IV placed this shift, pt had a large bowel movement. Pt awake in bed and in no distress.

## 2023-12-10 NOTE — PAYOR COMM NOTE
"Brittany Lisa (68 y.o. Male)        PLEASE SEE ATTACHED FOR INPT ACUTE MEDICAL ADMIT.     REF#[id number]      PLEASE CALL   OR  [phone]    THANK YOU    ELZA ARCE LPN CCP   Date of Birth   1955    Social Security Number       Address   [address] [address]    Home Phone   [phone]    MRN   [id number]       [employer]   [place]    Marital Status                               Admission Date   12/7/23   OBS    12/9/23  INPT Admission Type   Emergency    Admitting Provider   Sean Ricketts MD    Attending Provider   Meño Forbes MD    Department, Room/Bed   [address], [address]       Discharge Date       Discharge Disposition       Discharge Destination                                 Attending Provider: Meño Forbes MD    Allergies: Penicillins, Penicillins    Isolation: None   Infection: None   Code Status: CPR    Ht: 200.7 cm (79\")   Wt: 114 kg (251 lb 9.6 oz)    Admission Cmt: None   Principal Problem: Acute alcohol intoxication [F10.929]                   Active Insurance as of 12/7/2023       Primary Coverage       Payor Plan Insurance Group Employer/Plan Group    ANTHEM BLUE CROSS ANTHEM BLUE CROSS BLUE SHIELD PPO [id number]       Payor Plan Address Payor Plan Phone Number Payor Plan Fax Number Effective Dates    [address] [phone]  1/1/2022 - None Entered    [place] [address]         Subscriber Name Subscriber Birth Date Member ID       BRITTANY LISA 1955 [id number]                     Emergency Contacts        (Rel.) Home Phone Work Phone Mobile Phone    TEJAS LISA (Son) [phone] -- [phone]    Jorge A Nielson (Sister) [phone] -- --              Omer: NPI [id number]  Tax ID [id number]     History & Physical        Sean Ricketts MD at 12/07/23 1659          Internal medicine history and physical  INTERNAL MEDICINE   [facility]  " [place]       Patient Identification:  Name: Watson Lisa  Age: 68 y.o.  Sex: male  :  1955  MRN: [id number]                   Primary Care Physician: Roc Carroll APRN                               Date of admission:2023    Chief Complaint: Brought to the emergency room by family member for alcohol withdrawal symptoms and wanting to stop drinking alcohol and need help with this.    History of Present Illness:   Patient is a 68-year-old male who has history of hypertension, chronic back pain, sleep apnea who has been drinking heavily for quite some time.  Patient is having nausea withdrawal symptoms and not eating and drinking and stumbling around the house because of being drunk all the time he decided to call his family members that he wants to quit drinking and needs help.  Patient was brought in by family member today for further management of alcohol intoxication and withdrawal.  While he was waiting for his family members to be minimal in the hospital patient had another pint of alcohol few hours before coming to the hospital.  Patient denies any hematemesis melena abdominal pain but does have nausea and vomiting.  Patient was hospitalized in the past for similar symptoms and has had injuries due to fall and required CIWA protocol in the hospital.      Past Medical History:  Past Medical History:   Diagnosis Date    Alcohol abuse     Anxiety     Arthritis     Bronchitis with chronic airway obstruction     LAST FE    DVT (deep venous thrombosis)     Hiatal hernia     Hypertension     Hypertension     Low back pain     Neck pain     Pulmonary embolism     Sleep apnea with use of continuous positive airway pressure (CPAP)     AT NIGHT     Past Surgical History:  Past Surgical History:   Procedure Laterality Date    COLONOSCOPY      JOINT REPLACEMENT Right     hip/knee    REPLACEMENT TOTAL KNEE      TONSILLECTOMY      TOTAL HIP ARTHROPLASTY Right       Home Meds:  Medications Prior  to Admission   Medication Sig Dispense Refill Last Dose    albuterol sulfate  (90 Base) MCG/ACT inhaler Inhale 2 puffs Every 6 (Six) Hours As Needed for Wheezing or Shortness of Air for up to 90 days. 8 g 5     losartan (COZAAR) 50 MG tablet Take 1 tablet by mouth Daily. 90 tablet 1 12/7/2023    Umeclidinium-Vilanterol (Anoro Ellipta) 62.5-25 MCG/ACT aerosol powder  inhaler Inhale 1 puff Daily. 1 each 9     fish oil-omega-3 fatty acids 1000 MG capsule Take 1 capsule by mouth Daily. (Patient not taking: Reported on 12/7/2023) 180 capsule 3 Not Taking    ipratropium-albuterol (DUO-NEB) 0.5-2.5 mg/3 ml nebulizer Take 3 mL by nebulization 4 (Four) Times a Day As Needed for Wheezing or Shortness of Air. (Patient not taking: Reported on 10/25/2023) 360 mL      multivitamin with minerals (MULTIVITAMIN ADULT PO) Take 1 tablet by mouth Daily. 90 tablet 3 More than a month    traZODone (DESYREL) 50 MG tablet Take 1 tablet by mouth Every Night. (Patient not taking: Reported on 12/7/2023) 90 tablet 1 Not Taking    vitamin D (ERGOCALCIFEROL) 1.25 MG (23507 UT) capsule capsule Take 1 capsule by mouth 1 (One) Time Per Week. (Patient not taking: Reported on 12/7/2023) 12 capsule 0 Not Taking     Current Meds:     Current Facility-Administered Medications:     folic acid (FOLVITE) tablet 1 mg, 1 mg, Oral, Daily, Flaco Barajas MD, 1 mg at 12/07/23 1536    LORazepam (ATIVAN) tablet 2 mg, 2 mg, Oral, Q6H, 2 mg at 12/07/23 1544 **FOLLOWED BY** [START ON 12/8/2023] LORazepam (ATIVAN) tablet 1 mg, 1 mg, Oral, Q6H, Flaco Barajas MD    LORazepam (ATIVAN) tablet 1 mg, 1 mg, Oral, Q1H PRN **OR** midazolam (VERSED) injection 2 mg, 2 mg, Intravenous, Q1H PRN **OR** LORazepam (ATIVAN) tablet 2 mg, 2 mg, Oral, Q1H PRN **OR** midazolam (VERSED) injection 4 mg, 4 mg, Intravenous, Q1H PRN **OR** midazolam (VERSED) injection 4 mg, 4 mg, Intravenous, Q15 Min PRN **OR** midazolam (VERSED) injection 4 mg, 4 mg, Intramuscular, Q15 Min PRN,  "Flaco Barajas MD    Magnesium Standard Dose Replacement - Follow Nurse / BPA Driven Protocol, , Does not apply, PRNKarl Tadd N, MD    [COMPLETED] Insert Peripheral IV, , , Once **AND** sodium chloride 0.9 % flush 10 mL, 10 mL, Intravenous, PRKarl NIEVES Tadd N, MD    thiamine (B-1) 500 mg in sodium chloride 0.9 % 100 mL IVPB, 500 mg, Intravenous, Q8H **FOLLOWED BY** [START ON 12/10/2023] thiamine (B-1) injection 200 mg, 200 mg, Intravenous, Q8H **FOLLOWED BY** [START ON 12/14/2023] thiamine (VITAMIN B-1) tablet 100 mg, 100 mg, Oral, Daily, Flaco Barajas MD  Allergies:  Allergies   Allergen Reactions    Penicillins Unknown - Low Severity     Pt does not recall the reaction. Patient tolerated cephalexin 3/2020    Penicillins Other (See Comments)     Unknown      Social History:   Social History     Tobacco Use    Smoking status: Every Day     Packs/day: 2.00     Years: 40.00     Additional pack years: 0.00     Total pack years: 80.00     Types: Cigarettes    Smokeless tobacco: Never   Substance Use Topics    Alcohol use: Yes     Alcohol/week: 10.0 standard drinks of alcohol     Types: 10 Shots of liquor per week     Comment: 1 pint vodka/ day      Family History:  Family History   Problem Relation Age of Onset    Cancer Mother     Heart disease Father     Alcohol abuse Father     Cancer Brother           Review of Systems  See history of present illness and past medical history.  As described in history presenting illness.      Vitals:   /94   Pulse 89   Temp 97.9 °F (36.6 °C)   Resp 22   Ht 198.1 cm (78\")   Wt 118 kg (260 lb)   SpO2 97%   BMI 30.05 kg/m²   I/O:   Intake/Output Summary (Last 24 hours) at 12/7/2023 1659  Last data filed at 12/7/2023 1421  Gross per 24 hour   Intake 50 ml   Output --   Net 50 ml     Exam:  Patient is examined using the personal protective equipment as per guidelines from infection control for this particular patient as enacted.  Hand washing was performed before " and after patient interaction.  General Appearance:  Awake intoxicated pleasant male does not appear to be in any acute distress.   Head:    Normocephalic, without obvious abnormality, atraumatic   Eyes:    PERRL, conjunctiva/corneas clear, EOM's intact, both eyes   Ears:    Normal external ear canals, both ears   Nose:   Nares normal, septum midline, mucosa normal, no drainage    or sinus tenderness   Throat: Poor dentition with multiple missing teeth.   Neck:   Supple, symmetrical, trachea midline, no adenopathy;     thyroid:  no enlargement/tenderness/nodules; no carotid    bruit or JVD   Back:     Symmetric, no curvature, ROM normal, no CVA tenderness   Lungs:     Clear to auscultation bilaterally, respirations unlabored   Chest Wall:    No tenderness or deformity    Heart:  S1-S2 regular and tachycardic    Abdomen:   Soft nontender obese   Extremities: No edema   Pulses:   Pulses palpable in all extremities; symmetric all extremities   Skin: Abrasions and small bruises noted linear abrasions on the legs noted   Neurologic: Pleasant and intoxicated but follows commands.       Data Review:      I reviewed the patient's new clinical results.  Results from last 7 days   Lab Units 12/07/23  1326   WBC 10*3/mm3 10.93*   HEMOGLOBIN g/dL 13.8   PLATELETS 10*3/mm3 235     Results from last 7 days   Lab Units 12/07/23  1326   SODIUM mmol/L 133*   POTASSIUM mmol/L 4.0   CHLORIDE mmol/L 96*   CO2 mmol/L 17.2*   BUN mg/dL 10   CREATININE mg/dL 0.92   CALCIUM mg/dL 8.5*   GLUCOSE mg/dL 143*     XR Knee 1 or 2 View Left    Result Date: 12/7/2023   As described.    This report was finalized on 12/7/2023 2:01 PM by Dr. Donte Hernandez M.D on Workstation: [id number]     SCANNED - TELEMETRY     Final Result      SCANNED - TELEMETRY     Final Result        Microbiology Results (last 10 days)       ** No results found for the last 240 hours. **          Brief Urine Lab Results  (Last result in the past 365 days)        Color    Clarity   Blood   Leuk Est   Nitrite   Protein   CREAT   Urine HCG        10/25/23 0921 Yellow  Comment: REFERENCE RANGE: Yellow, Straw   Clear   Negative   Negative   Negative   Trace                   Assessment:  Active Hospital Problems    Diagnosis  POA    **Acute alcohol intoxication [F10.929]  Yes    Hypomagnesemia [E83.42]  Yes    History of alcohol use disorder [Z87.898]  Yes    Alcoholic liver disease [K70.9]  Yes    COPD (chronic obstructive pulmonary disease) [J44.9]  Yes    ETOH abuse [F10.10]  Yes    LIVAN (obstructive sleep apnea) [G47.33]  Yes    Anemia, chronic disease [D63.8]  Yes    Essential hypertension [I10]  Yes    Tobacco abuse [Z72.0]  Yes       Medical decision making/care plan: See admitting orders  Continue with CIWA protocol  Continue with IV fluids continue with vitamin replacement  Continue with electrolyte replacement per protocol including magnesium potassium and phosphorus.  Continue with pulse ox monitoring and as needed nebulizer treatment.  Continue with his antihypertensive regimen.  Provide him with pulse ox monitoring.  Access evaluation.    Sean Ricketts MD   12/7/2023  16:59 EST    Parts of this note may be an electronic transcription/translation of spoken language to printed text using the Dragon dictation system.      Electronically signed by Sean Ricketts MD at 12/08/23 0557          Emergency Department Notes        Lory Trejo, RN at 12/07/23 0129          Nursing report ED to floor  Watson Lisa  68 y.o.  male    HPI :   Chief Complaint   Patient presents with    Alcohol Intoxication       Admitting doctor:   Sean Ricketts MD    Admitting diagnosis:   The encounter diagnosis was Acute alcoholic intoxication without complication.    Code status:   Current Code Status       Date Active Code Status Order ID Comments User Context       Prior            Allergies:   Penicillins and Penicillins    Isolation:   No active isolations    Intake and Output    Intake/Output Summary  "(Last 24 hours) at 12/7/2023 1553  Last data filed at 12/7/2023 1421  Gross per 24 hour   Intake 50 ml   Output --   Net 50 ml       Weight:       12/07/23  1314   Weight: 118 kg (260 lb)       Most recent vitals:   Vitals:    12/07/23 1307 12/07/23 1314   BP:  (!) 151/101   BP Location:  Right arm   Patient Position:  Lying   Pulse: (!) 130    Resp: 22    Temp: 97.9 °F (36.6 °C)    SpO2: 97%    Weight:  118 kg (260 lb)   Height:  198.1 cm (78\")       Active LDAs/IV Access:   Lines, Drains & Airways       Active LDAs       Name Placement date Placement time Site Days    Peripheral IV 12/07/23 1328 Right Antecubital 12/07/23  1328  Antecubital  less than 1                    Labs (abnormal labs have a star):   Labs Reviewed   COMPREHENSIVE METABOLIC PANEL - Abnormal; Notable for the following components:       Result Value    Glucose 143 (*)     Sodium 133 (*)     Chloride 96 (*)     CO2 17.2 (*)     Calcium 8.5 (*)     ALT (SGPT) 91 (*)     AST (SGOT) 117 (*)     Anion Gap 19.8 (*)     All other components within normal limits    Narrative:     GFR Normal >60  Chronic Kidney Disease <60  Kidney Failure <15     MAGNESIUM - Abnormal; Notable for the following components:    Magnesium 1.5 (*)     All other components within normal limits   ETHANOL - Abnormal; Notable for the following components:    Ethanol 328 (*)     All other components within normal limits   CBC WITH AUTO DIFFERENTIAL - Abnormal; Notable for the following components:    WBC 10.93 (*)     RBC 4.00 (*)     MCV 98.5 (*)     MCH 34.5 (*)     Neutrophil % 77.3 (*)     Lymphocyte % 8.8 (*)     Neutrophils, Absolute 8.46 (*)     Monocytes, Absolute 1.09 (*)     All other components within normal limits   URINE DRUG SCREEN   CBC AND DIFFERENTIAL    Narrative:     The following orders were created for panel order CBC & Differential.  Procedure                               Abnormality         Status                     ---------                              "  -----------         ------                     CBC Auto Differential[[id number]]        Abnormal            Final result                 Please view results for these tests on the individual orders.       EKG:   No orders to display       Meds given in ED:   Medications   sodium chloride 0.9 % flush 10 mL (has no administration in time range)   Magnesium Standard Dose Replacement - Follow Nurse / BPA Driven Protocol (has no administration in time range)   thiamine (B-1) 500 mg in sodium chloride 0.9 % 100 mL IVPB (500 mg Intravenous Not Given 12/7/23 1536)     Followed by   thiamine (B-1) injection 200 mg (has no administration in time range)     Followed by   thiamine (VITAMIN B-1) tablet 100 mg (has no administration in time range)   folic acid (FOLVITE) tablet 1 mg (1 mg Oral Given 12/7/23 1536)   LORazepam (ATIVAN) tablet 2 mg (2 mg Oral Given 12/7/23 1544)     Followed by   LORazepam (ATIVAN) tablet 1 mg (has no administration in time range)   LORazepam (ATIVAN) tablet 1 mg (has no administration in time range)     Or   midazolam (VERSED) injection 2 mg (has no administration in time range)     Or   LORazepam (ATIVAN) tablet 2 mg (has no administration in time range)     Or   midazolam (VERSED) injection 4 mg (has no administration in time range)     Or   midazolam (VERSED) injection 4 mg (has no administration in time range)     Or   midazolam (VERSED) injection 4 mg (has no administration in time range)   sodium chloride 0.9 % infusion 1,000 mL (0 mL Intravenous Stopped 12/7/23 1518)   folic acid 1 mg in sodium chloride 0.9 % 50 mL IVPB (0 mg Intravenous Stopped 12/7/23 1421)   thiamine (B-1) injection 200 mg (200 mg Intravenous Given 12/7/23 1419)       Imaging results:  XR Knee 1 or 2 View Left    Result Date: 12/7/2023   As described.    This report was finalized on 12/7/2023 2:01 PM by Dr. Donte Hernandez M.D on Workstation: [id number]       Ambulatory status:   - standby    Social issues:   Social  History     Socioeconomic History    Marital status:    Tobacco Use    Smoking status: Every Day     Packs/day: 2.00     Years: 40.00     Additional pack years: 0.00     Total pack years: 80.00     Types: Cigarettes    Smokeless tobacco: Never   Vaping Use    Vaping Use: Never used   Substance and Sexual Activity    Alcohol use: Yes     Alcohol/week: 10.0 standard drinks of alcohol     Types: 10 Shots of liquor per week     Comment: 1 pint vodka/ day    Drug use: Not Currently     Types: Hydrocodone    Sexual activity: Defer       NIH Stroke Scale:       Lory Trejo RN  12/07/23 15:53 EST          Electronically signed by Lory Trejo RN at 12/07/23 1553       Flaco Barajas MD at 12/07/23 1425           EMERGENCY DEPARTMENT ENCOUNTER    Room Number:  30/30  PCP: Roc Carroll APRN  Patient Care Team:  Roc Carroll APRN as PCP - General (Nurse Practitioner)  Checo Dunham MD as PCP - Family Medicine  Checo Dunham MD (Family Medicine)   Independent Historians: Patient, family    HPI:  Chief Complaint: Alcohol withdrawal    A complete HPI/ROS/PMH/PSH/SH/FH are unobtainable due to: Thing    Chronic or social conditions impacting patient care (Social Determinants of Health): None  (Financial Resource Strain / Food Insecurity / Transportation Needs / Physical Activity / Stress / Social Connections / Intimate Partner Violence / Housing Stability)    Context: Watson Lisa is a 68 y.o. male who presents to the ED c/o acute alcohol withdrawal.  The family states that the patient called them today asking to go to the hospital for alcohol withdrawal because he was feeling shaky.  His last drink was at 3 PM last night at that time.  He reports that he has been drinking for years.  He states that he drinks about a pint a day.  The patient reports that between the time that he called family to bring him to the hospital and the time that they arrived he drank a pint today.  He does report  a fall several days ago and injured his left knee.  He denies hitting his head.  He denies any chest pain or shortness of breath.  He denies headache.  He denies any nausea or vomiting.    Review of prior external notes (non-ED) -and- Review of prior external test results outside of this encounter: Discharge summary dated 1/5/2023 where he was admitted for patella fracture, was treated with the [facility] protocol    Prescription drug monitoring program review:         PAST MEDICAL HISTORY  Active Ambulatory Problems     Diagnosis Date Noted    Essential hypertension 06/07/2016    Tobacco abuse 06/07/2016    Hiatal hernia 06/25/2020    Effusion of left knee 06/29/2020    Osteoarthritis of left knee 06/29/2020    Vitamin D deficiency 07/01/2020    Anemia, chronic disease 07/01/2020    COPD (chronic obstructive pulmonary disease) 03/26/2021    LIVAN (obstructive sleep apnea) 03/26/2021    ETOH abuse 03/26/2021    Obese 03/26/2021    Alcoholic liver disease 10/07/2021    History of alcohol use disorder 12/14/2022    Left patella fracture 12/29/2022    Need for hepatitis C screening test 10/25/2023    Chronic pain of left ankle 10/25/2023    Primary insomnia 10/25/2023    Overweight (BMI 25.0-29.9) 10/25/2023    Mixed hyperlipidemia 10/27/2023     Resolved Ambulatory Problems     Diagnosis Date Noted    Arthritis 04/25/2016    Alcohol dependence with withdrawal 06/24/2020    Chest pain in adult 06/25/2020    Sleep apnea 06/25/2020    Alcohol abuse 06/25/2020    Hypokalemia 06/29/2020    Hypomagnesemia 06/29/2020    Hyponatremia 07/01/2020    Pneumonia due to COVID-19 virus 01/11/2021    Alcohol dependence with withdrawal 01/12/2021    Possible SVT (supraventricular tachycardia) 01/12/2021    Alcohol withdrawal syndrome with perceptual disturbance 03/26/2021    Acute kidney failure 03/26/2021    Tobacco abuse 03/26/2021    Anemia, chronic disease 03/26/2021    Elevated LFTs 03/26/2021    Metabolic acidosis, increased anion gap  03/26/2021    Hypomagnesemia 03/26/2021    Suicidal ideations 03/26/2021    Delirium tremens 10/07/2021    Alcohol abuse 10/07/2021    Tobacco abuse 10/07/2021    Hiatal hernia 10/07/2021    COPD (chronic obstructive pulmonary disease) 10/07/2021    Anemia, chronic disease 10/07/2021    Acute respiratory failure with hypercapnia 10/08/2021    Aspiration pneumonia 10/08/2021    Cigarette nicotine dependence without complication 04/17/2022    DEEP (acute kidney injury) 12/28/2022    Rhabdomyolysis 12/29/2022    Elevated troponin 12/29/2022    Hypokalemia 01/01/2023    Thrombocytopenia 01/01/2023    Hypocalcemia 10/25/2023    Chronic pain of left knee 10/25/2023     Past Medical History:   Diagnosis Date    Anxiety     Bronchitis with chronic airway obstruction     DVT (deep venous thrombosis)     Hypertension     Hypertension     Low back pain     Neck pain     Pulmonary embolism     Sleep apnea with use of continuous positive airway pressure (CPAP)          PAST SURGICAL HISTORY  Past Surgical History:   Procedure Laterality Date    COLONOSCOPY      JOINT REPLACEMENT Right     hip/knee    REPLACEMENT TOTAL KNEE      TONSILLECTOMY      TOTAL HIP ARTHROPLASTY Right          FAMILY HISTORY  Family History   Problem Relation Age of Onset    Cancer Mother     Heart disease Father     Alcohol abuse Father     Cancer Brother          SOCIAL HISTORY  Social History     Socioeconomic History    Marital status:    Tobacco Use    Smoking status: Every Day     Packs/day: 2.00     Years: 40.00     Additional pack years: 0.00     Total pack years: 80.00     Types: Cigarettes    Smokeless tobacco: Never   Vaping Use    Vaping Use: Never used   Substance and Sexual Activity    Alcohol use: Yes     Alcohol/week: 10.0 standard drinks of alcohol     Types: 10 Shots of liquor per week     Comment: 1 pint vodka/ day    Drug use: Not Currently     Types: Hydrocodone    Sexual activity: Defer         ALLERGIES  Penicillins and  Penicillins        REVIEW OF SYSTEMS  Review of Systems  Included in HPI  All systems reviewed and negative except for those discussed in HPI.      PHYSICAL EXAM    I have reviewed the triage vital signs and nursing notes.    ED Triage Vitals   Temp Heart Rate Resp BP SpO2   12/07/23 1307 12/07/23 1307 12/07/23 1307 12/07/23 1314 12/07/23 1307   97.9 °F (36.6 °C) (!) 130 22 (!) 151/101 97 %      Temp src Heart Rate Source Patient Position BP Location FiO2 (%)   -- -- 12/07/23 1314 12/07/23 1314 --     Lying Right arm        Physical Exam  GENERAL: Awake, alert, no acute distress, slurred speech  SKIN: Warm, dry  HENT: Normocephalic, atraumatic  EYES: no scleral icterus  CV: regular rhythm, minimally tachycardic rate  RESPIRATORY: normal effort, lungs clear  ABDOMEN: soft, nontender, nondistended  MUSCULOSKELETAL: no deformity.  Mild tenderness to the left knee without deformity or open wound.  NEURO: alert, moves all extremities, follows commands, some gross mild tremors                                                               LAB RESULTS  Recent Results (from the past 24 hour(s))   Comprehensive Metabolic Panel    Collection Time: 12/07/23  1:26 PM    Specimen: Blood   Result Value Ref Range    Glucose 143 (H) 65 - 99 mg/dL    BUN 10 8 - 23 mg/dL    Creatinine 0.92 0.76 - 1.27 mg/dL    Sodium 133 (L) 136 - 145 mmol/L    Potassium 4.0 3.5 - 5.2 mmol/L    Chloride 96 (L) 98 - 107 mmol/L    CO2 17.2 (L) 22.0 - 29.0 mmol/L    Calcium 8.5 (L) 8.6 - 10.5 mg/dL    Total Protein 7.4 6.0 - 8.5 g/dL    Albumin 4.0 3.5 - 5.2 g/dL    ALT (SGPT) 91 (H) 1 - 41 U/L    AST (SGOT) 117 (H) 1 - 40 U/L    Alkaline Phosphatase 81 39 - 117 U/L    Total Bilirubin 0.7 0.0 - 1.2 mg/dL    Globulin 3.4 gm/dL    A/G Ratio 1.2 g/dL    BUN/Creatinine Ratio 10.9 7.0 - 25.0    Anion Gap 19.8 (H) 5.0 - 15.0 mmol/L    eGFR 90.0 >60.0 mL/min/1.73   Magnesium    Collection Time: 12/07/23  1:26 PM    Specimen: Blood   Result Value Ref Range     Magnesium 1.5 (L) 1.6 - 2.4 mg/dL   Ethanol    Collection Time: 12/07/23  1:26 PM    Specimen: Blood   Result Value Ref Range    Ethanol 328 (H) 0 - 10 mg/dL    Ethanol % 0.328 %   CBC Auto Differential    Collection Time: 12/07/23  1:26 PM    Specimen: Blood   Result Value Ref Range    WBC 10.93 (H) 3.40 - 10.80 10*3/mm3    RBC 4.00 (L) 4.14 - 5.80 10*6/mm3    Hemoglobin 13.8 13.0 - 17.7 g/dL    Hematocrit 39.4 37.5 - 51.0 %    MCV 98.5 (H) 79.0 - 97.0 fL    MCH 34.5 (H) 26.6 - 33.0 pg    MCHC 35.0 31.5 - 35.7 g/dL    RDW 12.3 12.3 - 15.4 %    RDW-SD 44.4 37.0 - 54.0 fl    MPV 9.1 6.0 - 12.0 fL    Platelets 235 140 - 450 10*3/mm3    Neutrophil % 77.3 (H) 42.7 - 76.0 %    Lymphocyte % 8.8 (L) 19.6 - 45.3 %    Monocyte % 10.0 5.0 - 12.0 %    Eosinophil % 3.0 0.3 - 6.2 %    Basophil % 0.4 0.0 - 1.5 %    Immature Grans % 0.5 0.0 - 0.5 %    Neutrophils, Absolute 8.46 (H) 1.70 - 7.00 10*3/mm3    Lymphocytes, Absolute 0.96 0.70 - 3.10 10*3/mm3    Monocytes, Absolute 1.09 (H) 0.10 - 0.90 10*3/mm3    Eosinophils, Absolute 0.33 0.00 - 0.40 10*3/mm3    Basophils, Absolute 0.04 0.00 - 0.20 10*3/mm3    Immature Grans, Absolute 0.05 0.00 - 0.05 10*3/mm3    nRBC 0.0 0.0 - 0.2 /100 WBC       I ordered the above labs and independently reviewed the results.        RADIOLOGY  XR Knee 1 or 2 View Left    Result Date: 12/7/2023  XR KNEE 1 OR 2 VW LEFT-  INDICATIONS: Trauma.  TECHNIQUE: 2 VIEWS OF THE LEFT KNEE  COMPARISON: 12/28/2022  FINDINGS:  Mild to moderate knee effusion is apparent. No acute fracture, erosion, or dislocation is identified. Prominent lateral tibiofemoral joint space narrowing is seen. Moderate degenerative spurring is evident. Arterial calcifications are noted. Follow-up/further evaluation can be obtained as indications persist.       As described.    This report was finalized on 12/7/2023 2:01 PM by Dr. Donte Hernandez M.D on Workstation: [facility]       I ordered the above noted radiological studies. Reviewed  by me and discussed with radiologist.  See dictation for official radiology interpretation.      PROCEDURES    Procedures      MEDICATIONS GIVEN IN ER    Medications   sodium chloride 0.9 % flush 10 mL (has no administration in time range)   Magnesium Standard Dose Replacement - Follow Nurse / BPA Driven Protocol (has no administration in time range)   thiamine (B-1) 500 mg in sodium chloride 0.9 % 100 mL IVPB (has no administration in time range)     Followed by   thiamine (B-1) injection 200 mg (has no administration in time range)     Followed by   thiamine (VITAMIN B-1) tablet 100 mg (has no administration in time range)   folic acid (FOLVITE) tablet 1 mg (has no administration in time range)   LORazepam (ATIVAN) tablet 2 mg (has no administration in time range)     Followed by   LORazepam (ATIVAN) tablet 1 mg (has no administration in time range)   LORazepam (ATIVAN) tablet 1 mg (has no administration in time range)     Or   midazolam (VERSED) injection 2 mg (has no administration in time range)     Or   LORazepam (ATIVAN) tablet 2 mg (has no administration in time range)     Or   midazolam (VERSED) injection 4 mg (has no administration in time range)     Or   midazolam (VERSED) injection 4 mg (has no administration in time range)     Or   midazolam (VERSED) injection 4 mg (has no administration in time range)   sodium chloride 0.9 % infusion 1,000 mL (0 mL Intravenous Stopped 12/7/23 1518)   folic acid 1 mg in sodium chloride 0.9 % 50 mL IVPB (0 mg Intravenous Stopped 12/7/23 1421)   thiamine (B-1) injection 200 mg (200 mg Intravenous Given 12/7/23 1419)         ORDERS PLACED DURING THIS VISIT:  Orders Placed This Encounter   Procedures    XR Knee 1 or 2 View Left    Comprehensive Metabolic Panel    Magnesium    Urine Drug Screen - Urine, Clean Catch    Ethanol    CBC Auto Differential    Monitor Blood Pressure    Vital Signs    Continuous Pulse Oximetry    Obtain Baseline [facility] [place] Withdrawal  Assessment - Ar (CIWA-Ar), Sedation Scale & Vital Signs    Clinical [place] Withdrawal Assessment (CIWA-Ar)    If CIWA-Ar Score Less Than 8 For 3 Consecutive Assessments, Monitor Every 4 Hours & Discontinue Assessment When CIWA-Ar Less Than 8 for 24 Hours    Obtain Pre & Post Sedation Scores With Every Lorazepam Dose - Hold For POSS Greater Than 2 or RASS of -3 or Less    Notify Provider - Withdrawal    Notify Provider of Abnormal Lab Results    Notify Provider - Vitals    [facility] (on-call MD unless specified) Details    Insert Peripheral IV    Initiate Observation Status    Seizure Precautions    Safety Precautions    CBC & Differential         PROGRESS, DATA ANALYSIS, CONSULTS, AND MEDICAL DECISION MAKING    All labs have been independently interpreted by me.  All radiology studies have been reviewed by me and discussed with radiologist dictating the report.   EKG's independently viewed and interpreted by me.  Discussion below represents my analysis of pertinent findings related to patient's condition, differential diagnosis, treatment plan and final disposition.    Differential diagnosis includes but is not limited to alcohol intoxication, alcohol withdrawal, seizure, substance use, hypokalemia, hypomagnesemia.    Clinical Scores:              ED Course as of 12/07/23 1531   Thu Dec 07, 2023   1319 Drinks a pint per day.  Called his son today because he was having shakes.  Last drink was 3 PM yesterday.  Drank a pint before his son arrived to pick him up.  Recent fall with left knee injury. [TR]   1403 Ethanol(!): 328 [TR]   1428 XR Knee 1 or 2 View Left  My independent interpretation of the left knee x-ray is no gross fracture [TR]   1526 I reviewed the workup and findings with the patient at the bedside.  He is alcohol level is 328 and I suspect it is rising because he drank a pint just before he came to the hospital.  He reports that he intends to quit using alcohol.  He wants help.  He is going to have a  prolonged stay with alcohol intoxication and alcohol withdrawal.  I reviewed his prior discharge summaries and he has required treatment for alcohol withdrawal with prior admissions.  Plan admission to the hospital for further management.  He is agreeable. [TR]   1530 Discussing with Dr. Ricketts with [facility].  He agrees to admit. [TR]   1531 CO2(!): 17.2 [TR]   1531 Glucose(!): 143 [TR]      ED Course User Index  [TR] Flaco Barajas MD                     AS OF 15:31 EST VITALS:    BP - (!) 151/101  HR - (!) 130  TEMP - 97.9 °F (36.6 °C)  O2 SATS - 97%        DIAGNOSIS  Final diagnoses:   Acute alcoholic intoxication without complication         DISPOSITION  ED Disposition       ED Disposition   Decision to Admit    Condition   --    Comment   Level of Care: Telemetry [5]   Diagnosis: Acute alcohol intoxication [088248]   Admitting Physician: CELIA RICKETTS [[id number]]   Attending Physician: CELIA RICKETTS [[id number]]                    Note Disclaimer: At [facility], we believe that sharing information builds trust and better relationships. You are receiving this note because you recently visited [facility]. It is possible you will see health information before a provider has talked with you about it. This kind of information can be easy to misunderstand. To help you fully understand what it means for your health, we urge you to discuss this note with your provider.         Flaco Barajas MD  12/07/23 1531      Electronically signed by Flaco Barajas MD at 12/07/23 1531       Mick Perez RN at 12/07/23 1306          Patient to ED via PV from home. Patient to ED for alcohol withdrawal symptoms. Patient reports his last drink was at 1100 and he has had a pint of whiskey today.     Electronically signed by Mick Perez RN at 12/07/23 1307       Oxygen Therapy (since admission)       Date/Time SpO2 Device (Oxygen Therapy) Flow (L/min) Oxygen Concentration (%) ETCO2 (mmHg)    12/10/23 0819 -- room air -- -- --     12/10/23 0815 100 room air -- -- --    12/10/23 0756 98 -- -- -- --    12/10/23 0751 99 room air -- -- --    12/10/23 0054 -- room air -- -- --    12/10/23 0022 96 room air -- -- --    12/09/23 2030 -- room air -- -- --    12/09/23 1939 96 room air -- -- --    12/09/23 1807 100 room air -- -- --    12/09/23 1315 99 -- -- -- --    12/09/23 0800 -- room air -- -- --    12/09/23 0748 100 -- -- -- --    12/09/23 0744 97 room air -- -- --    12/09/23 0740 98 -- -- -- --    12/09/23 0029 -- CPAP -- -- --    12/09/23 0020 97 CPAP -- -- --    12/08/23 2022 -- room air -- -- --    12/08/23 2002 100 -- -- -- --    12/1954 96 room air -- -- --    12/08/23 1933 95 room air -- -- --    12/08/23 1400 -- room air -- -- --    12/08/23 1300 93 room air -- -- --    12/08/23 1007 -- room air -- -- --    12/08/23 0805 99 room air -- -- --    12/08/23 0736 93 room air -- -- --    12/08/23 0429 95 CPAP -- -- --    12/08/23 0011 99 CPAP -- -- --    12/07/23 2054 100 room air -- -- --    12/07/23 2015 -- room air -- -- --    12/07/23 1928 -- room air -- -- --    12/07/23 1710 97 room air -- -- --    12/07/23 16:09:45 97 -- -- -- --    12/07/23 1307 97 room air -- -- --          Intake & Output (last 7 days)         12/03 0701 12/04 0700 12/04 0701 12/05 0700 12/05 0701 12/06 0700 12/06 0701 12/07 0700 12/07 0701  12/08 0700 12/08 0701  12/09 0700 12/09 0701  12/10 0700 12/10 0701 12/11 0700    P.O.     240 430 440     IV Piggyback     50       Total Intake(mL/kg)     290 (2.5) 430 (3.8) 440 (3.9)     Urine (mL/kg/hr)     350 870 (0.3)      Stool     0       Total Output     350 870      Net     -60 -440 +440                 Urine Unmeasured Occurrence     1 x 1 x 1 x     Stool Unmeasured Occurrence     2 x  1 x           Lines, Drains & Airways       Active LDAs       Name Placement date Placement time Site Days    Peripheral IV 12/09/23 0300 Anterior;Left Forearm 12/09/23  0300  Forearm  1                  CIWA (since  admission)       Date/Time CIWA-Ar Score    12/10/23 0819 8    12/10/23 0616 7    12/10/23 0443 14    12/10/23 0322 5    12/09/23 2322 5    12/09/23 2159 12    12/09/23 2034 13    12/09/23 1805 24    12/09/23 1704 15    12/09/23 1600 15    12/09/23 1300 21    12/09/23 1200 6    12/09/23 0955 15    12/09/23 0800 15    12/09/23 0505 15    12/09/23 0435 17    12/09/23 0244 13    12/09/23 0213 16    12/09/23 0142 21    12/09/23 0122 24    12/09/23 0029 12    12/08/23 2325 9    12/08/23 2022 5    12/08/23 1631 4    12/08/23 1228 4    12/08/23 1007 6    12/08/23 0632 8    12/08/23 0626 18    12/08/23 0019 11    12/07/23 2123 6    12/07/23 2015 9    12/07/23 1930 19    12/07/23 1839 12    12/07/23 1800 6    12/07/23 1717 --    12/07/23 1710 12    12/07/23 13:31:09 5          Medication Administration Report for Watson Lisa as of 12/10/23 1152     Legend:    Given Hold Not Given Due Canceled Entry Other Actions    Time Time (Time) Time Time-Action         Discontinued     Completed     Future     MAR Hold     Linked             Medications 12/04/23 12/05/23 12/06/23 12/07/23 12/08/23 12/09/23 12/10/23      albuterol (PROVENTIL) nebulizer solution 0.083% 2.5 mg/3mL  Dose: 2.5 mg  Freq: Every 6 Hours PRN Route: NEBULIZATION  PRN Reasons: Shortness of Air,Wheezing  Start: 12/07/23 1700              arformoterol (BROVANA) nebulizer solution 15 mcg  Dose: 15 mcg  Freq: 2 Times Daily - RT Route: NEBULIZATION  Start: 12/07/23 2130   Admin Instructions:   Keep refrigerated.       2053-Given        0804-Given       1954-Given        0744-Given       (1922)-Not Given        0751-Given       2130          And  tiotropium (SPIRIVA RESPIMAT) 2.5 mcg/act aerosol solution inhaler  Dose: 2 puff  Freq: Daily - RT Route: IN  Start: 12/07/23 1800       (1723)-Not Given        0805-Given        0749-Given        0756-Given           sennosides-docusate (PERICOLACE) 8.6-50 MG per tablet 2 tablet  Dose: 2 tablet  Freq: 2 Times Daily Route:  "PO  Start: 12/07/23 2100   Admin Instructions:   HOLD MEDICATION IF PATIENT HAS HAD BOWEL MOVEMENT. Start bowel management regimen if patient has not had a bowel movement after 12 hours.       (2015)-Not Given        1007-Given       (2034)-Not Given        0818-Given       (2029)-Not Given        (0820)-Not Given       2100          And  polyethylene glycol (MIRALAX) packet 17 g  Dose: 17 g  Freq: Daily PRN Route: PO  PRN Reason: Constipation  PRN Comment: Use if senna-docusate is ineffective  Start: 12/07/23 1700   Admin Instructions:   Use if no bowel movement after 12 hours. Mix in 6-8 ounces of water.  Use 4-8 ounces of water, tea, or juice for each 17 gram dose.             And  bisacodyl (DULCOLAX) EC tablet 5 mg  Dose: 5 mg  Freq: Daily PRN Route: PO  PRN Reason: Constipation  PRN Comment: Use if polyethylene glycol is ineffective  Start: 12/07/23 1700   Admin Instructions:   Use if no bowel movement after 12 hours.  Swallow whole. Do not crush, split, or chew tablet.             And  bisacodyl (DULCOLAX) suppository 10 mg  Dose: 10 mg  Freq: Daily PRN Route: RE  PRN Reason: Constipation  PRN Comment: Use if bisacodyl oral is ineffective  Start: 12/07/23 1700   Admin Instructions:   Use if no bowel movement after 12 hours.  Hold for diarrhea              Calcium Replacement - Follow Nurse / BPA Driven Protocol  Freq: As Needed Route: XX  PRN Reason: Other  Start: 12/07/23 1701   Admin Instructions:   Open Order & Select \"[facility] Electrolyte Replacement Protocol Algorithm\" to View Details              cloNIDine (CATAPRES) tablet 0.1 mg  Dose: 0.1 mg  Freq: Every 8 Hours PRN Route: PO  PRN Reason: High Blood Pressure  PRN Comment: SBP>180  Start: 12/08/23 1520   Admin Instructions:   Hold for SBP less than 100, DBP less than 60, or heart rate less than 50  Caution: Look alike/sound alike drug alert.              Diclofenac Sodium (VOLTAREN) 1 % gel 4 g  Dose: 4 g  Freq: 4 Times Daily Route: TOP  Start: 12/09/23 " 0015   Admin Instructions:   Apply to left knee.   Do not cover area with occlusive dressing or apply any other med to affected area. Do not wash affected area for 1 hr after applying. Use dosing card for correct dose measurement.         0122-Given       0818-Given       1151-Given       1656-Given       2029-Given        0819-Given       1200       1800       2100           famotidine (PEPCID) injection 20 mg  Dose: 20 mg  Freq: Every 12 Hours Scheduled Route: IV  Start: 12/07/23 2100   Admin Instructions:   Give IV push over 2 minutes.       2132-Given        1007-Given        0122-Given       0955-Given       2028-Given        0819-Given       2100           folic acid (FOLVITE) tablet 1 mg  Dose: 1 mg  Freq: Daily Route: PO  Start: 12/07/23 1540       1536-Given [C]        1007-Given        0818-Given        0820-Given           hydrocortisone-bacitracin-zinc oxide-nystatin (MAGIC BARRIER) ointment 1 application   Dose: 1 application   Freq: Every 12 Hours Scheduled Route: TOP  Start: 12/08/23 2100   Admin Instructions:   For topical use only        2333-Given        0819-Given       2030-Given        0820-Given       2100           ibuprofen (ADVIL,MOTRIN) tablet 200 mg  Dose: 200 mg  Freq: Every 6 Hours PRN Route: PO  PRN Reasons: Mild Pain,Moderate Pain  Start: 12/08/23 2318   Admin Instructions:   Based on patient request - if ordered for moderate or severe pain, provider allows for administration of a medication prescribed for a lower pain scale.    Mucous membrane irritant. Do not crush or chew tablet or capsule unless administered through a feeding tube.  If given for pain, use the following pain scale:  Mild Pain = Pain Score of 1-3, CPOT 1-2  Moderate Pain = Pain Score of 4-6, CPOT 3-4  Severe Pain = Pain Score of 7-10, CPOT 5-8              Influenza Vac High-Dose Quad (FLUZONE HIGH DOSE) injection 0.7 mL  Dose: 0.7 mL  Freq: During Hospitalization Route: IM  PRN Reason: Immunization  Start: 12/07/23  2023   Admin Instructions:   **Do Not Administer if Temperature Greater Than 102F & Notify Pharmacy**  Pneumococcal & Influenza Vaccines May Be Given At The Same Time in SEPARATE Injections.              ipratropium-albuterol (DUO-NEB) nebulizer solution 3 mL  Dose: 3 mL  Freq: 4 Times Daily PRN Route: NEBULIZATION  PRN Reasons: Wheezing,Shortness of Air  Start: 12/07/23 1700   Admin Instructions:   Include Respiratory Treatment Education              LORazepam (ATIVAN) tablet 1 mg  Dose: 1 mg  Freq: Every 1 Hour PRN Route: PO  PRN Reason: Withdrawal  PRN Comment: For CIWA-Ar 8-10  Start: 12/07/23 1524   End: 12/14/23 1523   Admin Instructions:   Reassess 1 Hour After Administration   Caution: Look alike/sound alike drug alert       1746-Not Given:  See Alt       1932-Not Given:  See Alt        0022-Not Given:  See Alt       0632-Not Given:  See Alt       1629-Not Given:  See Alt       2333-Given        0038-Not Given:  See Alt       0124-Not Given:  See Alt       0158-Not Given:  See Alt       0228-Not Given:  See Alt       0254-Not Given:  See Alt       0451-Not Given:  See Alt       0515-Not Given:  See Alt       0818-Not Given:  See Alt       1314-Not Given:  See Alt       1704-Not Given:  See Alt       1810-Not Given:  See Alt       2041-Not Given:  See Alt       2217-Not Given:  See Alt        0519-Not Given:  See Alt          Or  midazolam (VERSED) injection 2 mg  Dose: 2 mg  Freq: Every 1 Hour PRN Route: IV  PRN Comment: For CIWA-Ar 8-10  Start: 12/07/23 1524   End: 12/14/23 1523   Admin Instructions:   Reassess 1 Hour After Administration         1746-Not Given:  See Alt       1932-Not Given:  See Alt        0022-Not Given:  See Alt       0632-Not Given:  See Alt       1629-Not Given:  See Alt       2333-Not Given:  See Alt        0038-Not Given:  See Alt       0124-Not Given:  See Alt       0158-Not Given:  See Alt       0228-Not Given:  See Alt       0254-Not Given:  See Alt       0451-Not Given:  See  Alt       0515-Not Given:  See Alt       0818-Not Given:  See Alt       1314-Not Given:  See Alt       1704-Not Given:  See Alt       1810-Not Given:  See Alt       2041-Not Given:  See Alt       2217-Not Given:  See Alt        0519-Not Given:  See Alt          Or  LORazepam (ATIVAN) tablet 2 mg  Dose: 2 mg  Freq: Every 1 Hour PRN Route: PO  PRN Reason: Withdrawal  PRN Comment: For CIWA-Ar 11-15 or -160, DBP , -125  Start: 12/07/23 1524   End: 12/14/23 1523   Admin Instructions:   Reassess 1 Hour After Administration   Caution: Look alike/sound alike drug alert       1746-Not Given:  See Alt       1932-Not Given:  See Alt        0022-Given       0632-Not Given:  See Alt       1629-Not Given:  See Alt       2333-Not Given:  See Alt        0038-Given       0124-Not Given:  See Alt       0158-Not Given:  See Alt       0228-Not Given:  See Alt       0254-Not Given:  See Alt       0451-Not Given:  See Alt       0515-Not Given:  See Alt       0818-Not Given:  See Alt       1314-Not Given:  See Alt       1704-Not Given:  See Alt       1810-Not Given:  See Alt       2041-Given       2217-Given        0519-Not Given:  See Alt          Or  midazolam (VERSED) injection 4 mg  Dose: 4 mg  Freq: Every 1 Hour PRN Route: IV  PRN Comment: For CIWA-Ar 11-15 or -160, DBP , -125  Start: 12/07/23 1524   End: 12/14/23 1523   Admin Instructions:   Reassess 1 Hour After Administration         1746-Given       1932-Not Given:  See Alt        0022-Not Given:  See Alt       0632-Not Given:  See Alt       1629-Not Given:  See Alt       2333-Not Given:  See Alt        0038-Not Given:  See Alt       0124-Not Given:  See Alt       0158-Not Given:  See Alt       0228-Not Given:  See Alt       0254-Given       0451-Not Given:  See Alt       0515-Given       0818-Given       1314-Not Given:  See Alt       1704-Not Given:  See Alt       1810-Given       2041-Not Given:  See Alt       2217-Not Given:  See Alt         0519-Given          Or  midazolam (VERSED) injection 4 mg  Dose: 4 mg  Freq: Every 15 Minutes PRN Route: IV  PRN Comment: For CIWA-Ar Greater Than 15 or SBP greater than 160, DBP greater than 110, or HR greater than 125  Start: 12/07/23 1524   End: 12/14/23 1523   Admin Instructions:   Reassess 15 Minutes After Each Administration.  If CIWA-Ar Does Not Decrease Contact Provider To Discuss Transfer to Higher Level of Care.         1746-Not Given:  See Alt       1932-Given        0022-Not Given:  See Alt       0632-Given [C]       1629-Given [C]       2333-Not Given:  See Alt        0038-Not Given:  See Alt       0124-Given       0158-Given       0228-Given       0254-Not Given:  See Alt       0451-Given       0515-Not Given:  See Alt       0818-Not Given:  See Alt       1314-Given       1704-Given       1810-Not Given:  See Alt       2041-Not Given:  See Alt       2217-Not Given:  See Alt        0519-Not Given:  See Alt          Or  midazolam (VERSED) injection 4 mg  Dose: 4 mg  Freq: Every 15 Minutes PRN Route: IM  PRN Comment: If Unable to Administer IV - For CIWA-Ar Greater Than 15 or SBP greater than 160, DBP greater than 110, or HR greater than 125  Start: 12/07/23 1524   End: 12/14/23 1523   Admin Instructions:   Reassess 15 Minutes After Each Administration.  If CIWA-Ar Does Not Decrease Contact Provider To Discuss Transfer to Higher Level of Care.         1746-Not Given:  See Alt       1932-Not Given:  See Alt        0022-Not Given:  See Alt       0632-Not Given:  See Alt       1629-Not Given:  See Alt       2333-Not Given:  See Alt        0038-Not Given:  See Alt       0124-Not Given:  See Alt       0158-Not Given:  See Alt       0228-Not Given:  See Alt       0254-Not Given:  See Alt       0451-Not Given:  See Alt       0515-Not Given:  See Alt       0818-Not Given:  See Alt       1314-Not Given:  See Alt       1704-Not Given:  See Alt       1810-Not Given:  See Alt       2041-Not Given:  See Alt      "  2217-Not Given:  See Alt        0519-Not Given:  See Alt           Magnesium Standard Dose Replacement - Follow Nurse / BPA Driven Protocol  Freq: As Needed Route: XX  PRN Reason: Other  Start: 12/07/23 1523   Admin Instructions:   Open Order & Select \"[facility] Electrolyte Replacement Protocol Algorithm\" to View Details              multivitamin with minerals 1 tablet  Dose: 1 tablet  Freq: Daily Route: PO  Start: 12/07/23 1800   Admin Instructions:          1745-Given        1007-Given        0818-Given        0820-Given           nitroglycerin (NITROSTAT) SL tablet 0.4 mg  Dose: 0.4 mg  Freq: Every 5 Minutes PRN Route: SL  PRN Reason: Chest Pain  PRN Comment: Only if SBP Greater Than 100  Start: 12/07/23 1700   Admin Instructions:   If Pain Unrelieved After 3 Doses Notify MD  May administer up to 3 doses per episode.              ondansetron (ZOFRAN) injection 4 mg  Dose: 4 mg  Freq: Every 6 Hours PRN Route: IV  PRN Reasons: Nausea,Vomiting  Start: 12/07/23 1701   Admin Instructions:   If BOTH ondansetron (ZOFRAN) and promethazine (PHENERGAN) are ordered use ondansetron first and THEN promethazine IF ondansetron is ineffective.              Petrolatum ointment 1 application   Dose: 1 application   Freq: Every 12 Hours Scheduled Route: TOP  Start: 12/08/23 2100   Admin Instructions:   Apply to bilateral lower leg/feet        2333-Given        0819-Given       2029-Given        0819-Given       2100           Phosphorus Replacement - Follow Nurse / BPA Driven Protocol  Freq: As Needed Route: XX  PRN Reason: Other  Start: 12/07/23 1701   Admin Instructions:   Open Order & Select \"[facility] Electrolyte Replacement Protocol Algorithm\" to View Details              Potassium Replacement - Follow Nurse / BPA Driven Protocol  Freq: As Needed Route: XX  PRN Reason: Other  Start: 12/07/23 1701   Admin Instructions:   Open Order & Select \"[facility] Electrolyte Replacement Protocol Algorithm\" to View Details              sodium chloride 0.9 " % flush 10 mL  Dose: 10 mL  Freq: As Needed Route: IV  PRN Reason: Line Care  Start: 12/07/23 1700              sodium chloride 0.9 % flush 10 mL  Dose: 10 mL  Freq: Every 12 Hours Scheduled Route: IV  Start: 12/07/23 2100       2016-Given        1008-Given       2034-Given        0819-Given       2029-Given        0820-Given       2100           sodium chloride 0.9 % flush 10 mL  Dose: 10 mL  Freq: As Needed Route: IV  PRN Reason: Line Care  Start: 12/07/23 1311              sodium chloride 0.9 % infusion 40 mL  Dose: 40 mL  Freq: As Needed Route: IV  PRN Reason: Line Care  Start: 12/07/23 1700   Admin Instructions:   Following administration of an IV intermittent medication, flush line with 40mL NS at 100mL/hr.              thiamine (B-1) 500 mg in sodium chloride 0.9 % 100 mL IVPB  Dose: 500 mg  Freq: Every 8 Hours Scheduled Route: IV  Start: 12/07/23 1540   End: 12/10/23 1759   Admin Instructions:   Doses of up to 250mg, give over 1-2 minutes (IV push). Doses 250mg and above, give over 30 minutes.       (1536)-Not Given [C]        0131-New Bag       0200-Stopped       1007-New Bag       1040-Stopped       1703-New Bag       2022-Stopped        0122-New Bag       0434-Stopped       1048-New Bag       1704-New Bag        0324-New Bag       1133-New Bag          Followed by  thiamine (B-1) injection 200 mg  Dose: 200 mg  Freq: Every 8 Hours Scheduled Route: IV  Start: 12/10/23 1800   End: 12/14/23 2159   Admin Instructions:   Doses of up to 250mg, give over 1-2 minutes (IV push). Doses 250mg and above, give over 30 minutes.          1800          Followed by  thiamine (VITAMIN B-1) tablet 100 mg  Dose: 100 mg  Freq: Daily Route: PO  Start: 12/14/23 2200              traZODone (DESYREL) tablet 50 mg  Dose: 50 mg  Freq: Nightly Route: PO  Start: 12/07/23 2100   Admin Instructions:   Take with food.  Caution: Look alike/sound alike drug alert       2015-Given        2024-Given        2028-Given        2100            vitamin D (ERGOCALCIFEROL) capsule 50,000 Units  Dose: 50,000 Units  Freq: Weekly Route: PO  Start: 12/08/23 0900        1007-Given            Future Medications  Medications 12/04/23 12/05/23 12/06/23 12/07/23 12/08/23 12/09/23 12/10/23       losartan (COZAAR) tablet 100 mg  Dose: 100 mg  Freq: Every 24 Hours Scheduled Route: PO  Start: 12/11/23 0900   Admin Instructions:   Hold for SBP less than 100, DBP less than 60              PHENobarbital injection 65 mg  Dose: 65 mg  Freq: Once Route: IV  Start: 12/10/23 0215   End: 12/10/23 0324   Admin Instructions:   Give IV push over 1.5 to 2 minutes (IV push max rate 50 mg/min).  Give IVP over 1.5 - 2 minutes (IV push max rate 50 mg/min).          0324-Given          Followed by  PHENobarbital injection 65 mg  Dose: 65 mg  Freq: Once Route: IV  Start: 12/10/23 1407   Admin Instructions:   Give IV push over 1.5 to 2 minutes (IV push max rate 50 mg/min).  Give IVP over 1.5 - 2 minutes (IV push max rate 50 mg/min).          1415          Followed by  PHENobarbital (LUMINAL) tablet 32.4 mg  Dose: 32.4 mg  Freq: Once Route: PO  Start: 12/11/23 0207   Admin Instructions:                Followed by  PHENobarbital (LUMINAL) tablet 32.4 mg  Dose: 32.4 mg  Freq: Once Route: PO  Start: 12/11/23 1407   Admin Instructions:                Followed by  PHENobarbital (LUMINAL) tablet 32.4 mg  Dose: 32.4 mg  Freq: Once Route: PO  Start: 12/12/23 0407   Admin Instructions:                Completed Medications  Medications 12/04/23 12/05/23 12/06/23 12/07/23 12/08/23 12/09/23 12/10/23       folic acid 1 mg in sodium chloride 0.9 % 50 mL IVPB  Dose: 1 mg  Freq: Once Route: IV  Start: 12/07/23 1335   End: 12/07/23 1421   Admin Instructions:   Protect from light.       1343-New Bag       1421-Stopped              ketorolac (TORADOL) injection 15 mg  Dose: 15 mg  Freq: Every 6 Hours Route: IV  Start: 12/08/23 1345   End: 12/08/23 2024   Admin Instructions:   Based on patient request - if  ordered for moderate or severe pain, provider allows for administration of a medication prescribed for a lower pain scale.      If given for pain, use the following pain scale:  Mild Pain = Pain Score of 1-3, CPOT 1-2  Moderate Pain = Pain Score of 4-6, CPOT 3-4  Severe Pain = Pain Score of 7-10, CPOT 5-8        1320-Given       2024-Given             LORazepam (ATIVAN) tablet 2 mg  Dose: 2 mg  Freq: Every 6 Hours Route: PO  Start: 12/07/23 1540   End: 12/08/23 1007   Admin Instructions:    Caution: Look alike/sound alike drug alert       1544-Given       2132-Given        0351-Given       1007-Given            Followed by  LORazepam (ATIVAN) tablet 1 mg  Dose: 1 mg  Freq: Every 6 Hours Route: PO  Start: 12/08/23 1540   End: 12/09/23 1539   Admin Instructions:    Caution: Look alike/sound alike drug alert        1436-Given       1540-Canceled Entry       2025-Given       2140-Canceled Entry [C]        0254-Given       (1050)-Not Given            losartan (COZAAR) tablet 50 mg  Dose: 50 mg  Freq: Once Route: PO  Start: 12/10/23 1000   End: 12/10/23 1133   Admin Instructions:   Hold for SBP less than 100, DBP less than 60          1133-Given           magnesium sulfate 2g/50 mL (PREMIX) infusion  Dose: 2 g  Freq: Every 2 Hours Route: IV  Start: 12/07/23 1945   End: 12/08/23 0125       1933-New Bag       2132-New Bag       2323-New Bag        0125-Stopped             PHENobarbital 374.4 mg in sodium chloride 0.9 % 100 mL IVPB  Dose: 4 mg/kg  Weight Dosing Info: 93.7 kg (Ideal)  Freq: Once Route: IV  Start: 12/09/23 0900   End: 12/09/23 1005         0955-New Bag           Followed by  PHENobarbital 280.8 mg in sodium chloride 0.9 % 100 mL IVPB  Dose: 3 mg/kg  Weight Dosing Info: 93.7 kg (Ideal)  Freq: Once Route: IV  Start: 12/09/23 1152   End: 12/09/23 1202         1152-New Bag           Followed by  PHENobarbital 280.8 mg in sodium chloride 0.9 % 100 mL IVPB  Dose: 3 mg/kg  Weight Dosing Info: 93.7 kg (Ideal)  Freq:  Once Route: IV  Start: 23 1452   End: 23 1450         1440-New Bag            potassium & sodium phosphates (PHOS-NAK) 280-160-250 MG packet 2 packet  Dose: 2 packet  Freq: Once Route: PO  Start: 23 0245   End: 23 0158   Admin Instructions:   Take with full glass of water         0158-Given            potassium phosphate 15 mmol in 0.9% normal saline 250 mL IVPB  Dose: 15 mmol  Freq: Once Route: IV  Start: 23 1000   End: 23 1658        1320-New Bag       1658-Stopped             sodium chloride 0.9 % infusion 1,000 mL  Dose: 1,000 mL  Freq: Once Route: IV  Start: 23 1335   End: 23 1518       1329-New Bag       1518-Stopped              thiamine (B-1) injection 200 mg  Dose: 200 mg  Freq: Once Route: IV  Start: 23 1335   End: 23 1419   Admin Instructions:   Doses of up to 250mg, give over 1-2 minutes (IV push). Doses 250mg and above, give over 30 minutes.       1419-Given             Discontinued Medications  Medications 12/04/23 12/05/23 12/06/23 12/07/23 12/08/23 12/09/23 12/10/23       losartan (COZAAR) tablet 50 mg  Dose: 50 mg  Freq: Every 24 Hours Scheduled Route: PO  Start: 23 1800   End: 12/10/23 0903   Admin Instructions:   Hold for SBP less than 100, DBP less than 60       1745-Given        1007-Given        0818-Given        0820-Given                      Operative/Procedure Notes (all)    No notes of this type exist for this encounter.          Physician Progress Notes (all)        Meño Forbes MD at 23 2609              Name: Watson Lisa ADMIT: 2023   : 1955  PCP: Roc Carroll APRN    MRN: [id number] LOS: 0 days   AGE/SEX: 68 y.o. male  ROOM: [facility]     Subjective   Subjective   More significant DTs overnight.  Required numerous doses of benzos with CIWA ranging from 12-24.      Objective   Objective   Vital Signs  Temp:  [97.9 °F (36.6 °C)-99.3 °F (37.4 °C)] 98.1 °F (36.7 °C)  Heart Rate:   [] 92  Resp:  [18-20] 18  BP: (153-205)/() 153/93  SpO2:  [95 %-100 %] 99 %  on   ;   Device (Oxygen Therapy): room air  Body mass index is 28.34 kg/m².  Physical Exam  Vitals reviewed.   Constitutional:       General: He is not in acute distress.     Appearance: He is well-developed.   HENT:      Head: Normocephalic and atraumatic.   Eyes:      General: No scleral icterus.  Neck:      Vascular: No JVD.   Cardiovascular:      Rate and Rhythm: Normal rate and regular rhythm.      Heart sounds: No murmur heard.  Pulmonary:      Effort: Pulmonary effort is normal. No respiratory distress.      Breath sounds: Normal breath sounds. No wheezing.   Abdominal:      General: Bowel sounds are normal. There is no distension.      Palpations: Abdomen is soft.      Tenderness: There is no abdominal tenderness.   Musculoskeletal:      Right lower leg: No edema.      Left lower leg: No edema.   Skin:     General: Skin is warm and dry.      Findings: No rash.   Neurological:      Mental Status: He is alert. He is disoriented.      Comments: More significant tremor   Psychiatric:         Attention and Perception: He perceives visual hallucinations.         Mood and Affect: Affect is labile.       Results Review     I reviewed the patient's new clinical results.  Results from last 7 days   Lab Units 12/09/23  0939 12/08/23  0729 12/07/23  1741 12/07/23  1326   WBC 10*3/mm3 8.30 7.00 8.34 10.93*   HEMOGLOBIN g/dL 12.4* 12.9* 13.7 13.8   PLATELETS 10*3/mm3 147 196 227 235     Results from last 7 days   Lab Units 12/09/23  0939 12/08/23  0729 12/07/23  1741 12/07/23  1326   SODIUM mmol/L 134* 136 136 133*   POTASSIUM mmol/L 3.7 3.7 3.8 4.0   CHLORIDE mmol/L 100 100 97* 96*   CO2 mmol/L 22.3 23.6 21.8* 17.2*   BUN mg/dL 11 13 9 10   CREATININE mg/dL 1.17 1.23 0.92 0.92   GLUCOSE mg/dL 121* 128* 93 143*   EGFR mL/min/1.73 67.5 63.6 90.0 90.0     Results from last 7 days   Lab Units 12/09/23  0939 12/08/23  0729 12/07/23  1743  "12/07/23  1326   ALBUMIN g/dL 3.5 3.8 4.0 4.0   BILIRUBIN mg/dL 1.2 1.1 0.8 0.7   ALK PHOS U/L 71 76 79 81   AST (SGOT) U/L 46* 74* 101* 117*   ALT (SGPT) U/L 52* 66* 81* 91*     Results from last 7 days   Lab Units 12/09/23  0939 12/08/23  1924 12/08/23  0729 12/07/23  1741 12/07/23  1326   CALCIUM mg/dL 8.9  --  8.6 8.3* 8.5*   ALBUMIN g/dL 3.5  --  3.8 4.0 4.0   MAGNESIUM mg/dL 1.8  --  2.3 1.4* 1.5*   PHOSPHORUS mg/dL 2.5 2.0* 1.9* 2.7  --        No results found for: \"HGBA1C\", \"POCGLU\"    No radiology results for the last day    I have personally reviewed all medications:  Scheduled Medications  arformoterol, 15 mcg, Nebulization, BID - RT   And  tiotropium bromide monohydrate, 2 puff, Inhalation, Daily - RT  Diclofenac Sodium, 4 g, Topical, 4x Daily  famotidine, 20 mg, Intravenous, Q12H  folic acid, 1 mg, Oral, Daily  hydrocortisone-bacitracin-zinc oxide-nystatin, 1 application , Topical, Q12H  LORazepam, 1 mg, Oral, Q6H  losartan, 50 mg, Oral, Q24H  multivitamin with minerals, 1 tablet, Oral, Daily  Petrolatum, 1 application , Topical, Q12H  [START ON 12/10/2023] PHENobarbital, 65 mg, Intravenous, Once   Followed by  [START ON 12/10/2023] PHENobarbital, 65 mg, Intravenous, Once   Followed by  [START ON 12/11/2023] PHENobarbital, 32.4 mg, Oral, Once   Followed by  [START ON 12/11/2023] PHENobarbital, 32.4 mg, Oral, Once   Followed by  [START ON 12/12/2023] PHENobarbital, 32.4 mg, Oral, Once  PHENobarbital, 3 mg/kg (Ideal), Intravenous, Once  senna-docusate sodium, 2 tablet, Oral, BID  sodium chloride, 10 mL, Intravenous, Q12H  thiamine (B-1) IV, 500 mg, Intravenous, Q8H   Followed by  [START ON 12/10/2023] thiamine (B-1) IV, 200 mg, Intravenous, Q8H   Followed by  [START ON 12/14/2023] thiamine, 100 mg, Oral, Daily  traZODone, 50 mg, Oral, Nightly  vitamin D, 50,000 Units, Oral, Weekly    Infusions   Diet  Diet: Regular/House Diet; Texture: Regular Texture (IDDSI 7); Fluid Consistency: Thin (IDDSI 0)    I have " personally reviewed:  [x]  Laboratory   []  Microbiology   [x]  Radiology   []  EKG/Telemetry  []  Cardiology/Vascular   []  Pathology    []  Records      Assessment/Plan     Active Hospital Problems    Diagnosis  POA    **Acute alcohol intoxication [F10.929]  Yes    Hypomagnesemia [E83.42]  Yes    History of alcohol use disorder [Z87.898]  Yes    Alcoholic liver disease [K70.9]  Yes    COPD (chronic obstructive pulmonary disease) [J44.9]  Yes    ETOH abuse [F10.10]  Yes    LIVAN (obstructive sleep apnea) [G47.33]  Yes    Anemia, chronic disease [D63.8]  Yes    Essential hypertension [I10]  Yes    Tobacco abuse [Z72.0]  Yes      Resolved Hospital Problems   No resolved problems to display.       68 y.o. male admitted with Acute alcohol intoxication.    EtOH abuse/intoxication: Now and more significant withdrawal despite scheduled lorazepam  - Adding phenobarbital protocol.  Can always use restraints if needed if behavior becomes too impulsive.  Probably benefit from moving closer to nursing station as well  - Continue benzodiazepines per [facility] protocol  - Continue with thiamine and folate.    Left knee pain with negative imaging other than small joint effusion.  Received Toradol x 2.  Orthopedics planning outpatient follow-up    Hypertension uncontrolled.  Probably relates alcohol withdrawal.  Clonidine available as needed      SCDs for DVT prophylaxis.  Full code.  Disposition: TBD, will depend on duration of alcohol withdrawal symptoms      Meño Forbes MD  [facility]  23  13:57 EST      Electronically signed by Meño Forbes MD at 23 1401       Meño Forbes MD at 23 9020              Name: Watson Lisa ADMIT: 2023   : 1955  PCP: Roc Carroll APRN    MRN: [id number] LOS: 0 days   AGE/SEX: 68 y.o. male  ROOM: [facility]/     Subjective   Subjective   Biggest complaint is left knee pain after falling.  He has a past  history of knee issues and actually has had fluid drained off it a couple times by Dr. Saleh per his report.      Objective   Objective   Vital Signs  Temp:  [97.3 °F (36.3 °C)-98.4 °F (36.9 °C)] 97.3 °F (36.3 °C)  Heart Rate:  [] 84  Resp:  [18] 18  BP: (137-188)/(74-94) 185/94  SpO2:  [93 %-100 %] 93 %  on   ;   Device (Oxygen Therapy): room air  Body mass index is 28.34 kg/m².  Physical Exam  Vitals reviewed.   Constitutional:       General: He is not in acute distress.     Appearance: He is well-developed.   HENT:      Head: Normocephalic and atraumatic.   Eyes:      General: No scleral icterus.  Neck:      Vascular: No JVD.   Cardiovascular:      Rate and Rhythm: Normal rate and regular rhythm.      Heart sounds: No murmur heard.  Pulmonary:      Effort: Pulmonary effort is normal. No respiratory distress.      Breath sounds: Normal breath sounds. No wheezing.   Abdominal:      General: Bowel sounds are normal. There is no distension.      Palpations: Abdomen is soft.      Tenderness: There is no abdominal tenderness.   Musculoskeletal:      Right lower leg: No edema.      Left lower leg: No edema.      Comments: Left knee mildly tender with obvious fluid collection on the superior lateral side as well as joint effusion.   Skin:     General: Skin is warm and dry.      Findings: No rash.   Neurological:      Mental Status: He is alert and oriented to person, place, and time.      Comments: Mild tremor   Psychiatric:         Mood and Affect: Mood normal.       Results Review     I reviewed the patient's new clinical results.  Results from last 7 days   Lab Units 12/08/23  0729 12/07/23  1741 12/07/23  1326   WBC 10*3/mm3 7.00 8.34 10.93*   HEMOGLOBIN g/dL 12.9* 13.7 13.8   PLATELETS 10*3/mm3 196 227 235     Results from last 7 days   Lab Units 12/08/23  0729 12/07/23  1741 12/07/23  1326   SODIUM mmol/L 136 136 133*   POTASSIUM mmol/L 3.7 3.8 4.0   CHLORIDE mmol/L 100 97* 96*   CO2 mmol/L 23.6 21.8* 17.2*  "  BUN mg/dL 13 9 10   CREATININE mg/dL 1.23 0.92 0.92   GLUCOSE mg/dL 128* 93 143*   EGFR mL/min/1.73 63.6 90.0 90.0     Results from last 7 days   Lab Units 12/08/23  0729 12/07/23  1741 12/07/23  1326   ALBUMIN g/dL 3.8 4.0 4.0   BILIRUBIN mg/dL 1.1 0.8 0.7   ALK PHOS U/L 76 79 81   AST (SGOT) U/L 74* 101* 117*   ALT (SGPT) U/L 66* 81* 91*     Results from last 7 days   Lab Units 12/08/23  0729 12/07/23  1741 12/07/23  1326   CALCIUM mg/dL 8.6 8.3* 8.5*   ALBUMIN g/dL 3.8 4.0 4.0   MAGNESIUM mg/dL 2.3 1.4* 1.5*   PHOSPHORUS mg/dL 1.9* 2.7  --        No results found for: \"HGBA1C\", \"POCGLU\"    XR Knee 1 or 2 View Left    Result Date: 12/7/2023   As described.    This report was finalized on 12/7/2023 2:01 PM by Dr. Donte Hernandez M.D on Workstation: [id number]       I have personally reviewed all medications:  Scheduled Medications  arformoterol, 15 mcg, Nebulization, BID - RT   And  tiotropium bromide monohydrate, 2 puff, Inhalation, Daily - RT  famotidine, 20 mg, Intravenous, Q12H  folic acid, 1 mg, Oral, Daily  hydrocortisone-bacitracin-zinc oxide-nystatin, 1 application , Topical, Q12H  ketorolac, 15 mg, Intravenous, Q6H  LORazepam, 1 mg, Oral, Q6H  losartan, 50 mg, Oral, Q24H  multivitamin with minerals, 1 tablet, Oral, Daily  Petrolatum, 1 application , Topical, Q12H  potassium phosphate, 15 mmol, Intravenous, Once  senna-docusate sodium, 2 tablet, Oral, BID  sodium chloride, 10 mL, Intravenous, Q12H  thiamine (B-1) IV, 500 mg, Intravenous, Q8H   Followed by  [START ON 12/10/2023] thiamine (B-1) IV, 200 mg, Intravenous, Q8H   Followed by  [START ON 12/14/2023] thiamine, 100 mg, Oral, Daily  traZODone, 50 mg, Oral, Nightly  vitamin D, 50,000 Units, Oral, Weekly    Infusions   Diet  Diet: Regular/House Diet; Texture: Regular Texture (IDDSI 7); Fluid Consistency: Thin (IDDSI 0)    I have personally reviewed:  [x]  Laboratory   []  Microbiology   [x]  Radiology   []  EKG/Telemetry  []  Cardiology/Vascular   []  " Pathology    [x]  Records      Assessment/Plan     Active Hospital Problems    Diagnosis  POA    **Acute alcohol intoxication [F10.929]  Yes    Hypomagnesemia [E83.42]  Yes    History of alcohol use disorder [Z87.898]  Yes    Alcoholic liver disease [K70.9]  Yes    COPD (chronic obstructive pulmonary disease) [J44.9]  Yes    ETOH abuse [F10.10]  Yes    LIVAN (obstructive sleep apnea) [G47.33]  Yes    Anemia, chronic disease [D63.8]  Yes    Essential hypertension [I10]  Yes    Tobacco abuse [Z72.0]  Yes      Resolved Hospital Problems   No resolved problems to display.       68 y.o. male admitted with Acute alcohol intoxication.    EtOH abuse/intoxication: He has displayed signs of alcohol withdrawal and has required midazolam/lorazepam per CIWA protocol.  On scheduled lorazepam as well  - Continue taper/CIWA protocol  - Continue with thiamine and folate.  - Access note reviewed    Left knee pain with negative imaging other than small joint effusion.  Seems some fluid collection outside of the knee, probably soft tissue injury and suspect sprain with some underlying osteoarthritis.  Patient requested orthopedic evaluation and potential arthrocentesis but they declined and will see outpatient.  - Scheduled couple doses of Toradol for now    Hypertension uncontrolled.  Probably relates alcohol withdrawal.  I will add as needed medication.      SCDs for DVT prophylaxis.  Full code.  Disposition: Probably will need a couple days given alcohol withdrawal symptoms.  Maybe 12/10      Meño Forbes MD  [facility]  12/08/23  15:15 EST      Electronically signed by Meño Forbes MD at 12/08/23 1518       Ranjit Saleh II, MD at 12/08/23 1308          Consult received.  I have seen this patient in my office previously, last was 11 months ago.  He has significant knee arthritis.  However, given his current hospitalization with acute alcohol withdrawal, there is really no  "need for orthopedic intervention.  He would not be a candidate for a steroid injection or  knee replacement at this time.  I recommend that he follow-up as an outpatient.    R \"Pb\" Delfino SO MD  Orthopaedic Surgery  [facility]  [phone] - [place] [facility]  [phone] - [place] [facility]      Electronically signed by Ranjit Saleh II, MD at 12/08/23 1310          Consult Notes (all)        Ranjit Saleh II, MD at 12/08/23 1628        Consult Orders    1. Inpatient Orthopedic Surgery Consult [[id number]] ordered by Meño Forbes MD at 12/08/23 1208                   Orthopaedic Surgery  Consult Note  Dr. GLASER “Pb” Delfino SO  [phone]    HPI:  Patient is a 68 y.o. Not  or  male who presents with complaints of left knee pain.  Patient has a history of left knee osteoarthritis.  He was admitted for alcohol withdrawal and detox.  I have seen the patient before in the office last about 11 months ago.  He has previously responded well to steroid injections.  He complains of sharp pains in the left knee worse with activity.  He has a lot of swelling in the knee.    MEDICAL HISTORY  Past Medical History:   Diagnosis Date    Alcohol abuse     Anxiety     Arthritis     Bronchitis with chronic airway obstruction     LAST FEB    DVT (deep venous thrombosis)     Hiatal hernia     Hypertension     Hypertension     Low back pain     Neck pain     Pulmonary embolism     Sleep apnea with use of continuous positive airway pressure (CPAP)     AT NIGHT     Past Surgical History:   Procedure Laterality Date    COLONOSCOPY      JOINT REPLACEMENT Right     hip/knee    REPLACEMENT TOTAL KNEE      TONSILLECTOMY      TOTAL HIP ARTHROPLASTY Right      Prior to Admission medications    Medication Sig Start Date End Date Taking? Authorizing Provider   albuterol sulfate  (90 Base) MCG/ACT inhaler Inhale 2 puffs Every 6 (Six) Hours As Needed for Wheezing or " Shortness of Air for up to 90 days. 10/25/23 1/23/24 Yes Roc Carroll APRN   losartan (COZAAR) 50 MG tablet Take 1 tablet by mouth Daily. 10/25/23  Yes Roc Carroll APRN   Umeclidinium-Vilanterol (Anoro Ellipta) 62.5-25 MCG/ACT aerosol powder  inhaler Inhale 1 puff Daily. 10/25/23  Yes Roc Carroll APRN   fish oil-omega-3 fatty acids 1000 MG capsule Take 1 capsule by mouth Daily.  Patient not taking: Reported on 12/7/2023 10/27/23   Roc Carroll APRN   ipratropium-albuterol (DUO-NEB) 0.5-2.5 mg/3 ml nebulizer Take 3 mL by nebulization 4 (Four) Times a Day As Needed for Wheezing or Shortness of Air.  Patient not taking: Reported on 10/25/2023 1/5/23   Hiwot Rucker APRN   multivitamin with minerals (MULTIVITAMIN ADULT PO) Take 1 tablet by mouth Daily. 10/25/23   Roc Carroll APRN   traZODone (DESYREL) 50 MG tablet Take 1 tablet by mouth Every Night.  Patient not taking: Reported on 12/7/2023 10/25/23   Roc Carroll APRN   vitamin D (ERGOCALCIFEROL) 1.25 MG (12049 UT) capsule capsule Take 1 capsule by mouth 1 (One) Time Per Week.  Patient not taking: Reported on 12/7/2023 10/27/23   Roc Carroll APRN     Allergies   Allergen Reactions    Penicillins Unknown - Low Severity     Pt does not recall the reaction. Patient tolerated cephalexin 3/2020    Penicillins Other (See Comments)     Unknown      Most Recent Immunizations   Administered Date(s) Administered    COVID-19 (PFIZER) Purple Cap Monovalent 11/03/2021    Tdap 03/26/2020     Social History     Tobacco Use    Smoking status: Every Day     Packs/day: 2.00     Years: 40.00     Additional pack years: 0.00     Total pack years: 80.00     Types: Cigarettes    Smokeless tobacco: Never   Substance Use Topics    Alcohol use: Yes     Alcohol/week: 10.0 standard drinks of alcohol     Types: 10 Shots of liquor per week     Comment: 1 pint vodka/ day      Social History  "    Substance and Sexual Activity   Drug Use Not Currently    Types: Hydrocodone       VITALS: BP (!) 184/105   Pulse 85   Temp 97.3 °F (36.3 °C) (Oral)   Resp 18   Ht 200.7 cm (79\")   Wt 114 kg (251 lb 9.6 oz)   SpO2 93%   BMI 28.34 kg/m²  Body mass index is 28.34 kg/m².    PHYSICAL EXAM:   CONSTITUTIONAL: No acute distress  LUNGS: Equal chest rise, no shortness of air  CARDIOVASCULAR: palpable peripheral pulses  SKIN: no skin lesions in the area examined  LYMPH: no lymphadenopathy in the area examined  EXTREMITY: Left Lower Extremity  Tenderness to Palpation: Tenderness to palpation at the knee  Gross Deformity:  Obvious left knee effusion  Pulses:  Brisk Capillary Refill  Sensation: Intact to Saphenous, Sural, Deep Peroneal, Superficial Peroneal, and Tibial Nerves and grossly throughout extremity  Motor: 5/5 EHL/FHL/TA/GS motor complexes    RADIOLOGY REVIEW:   XR Knee 1 or 2 View Left    Result Date: 12/7/2023   As described.    This report was finalized on 12/7/2023 2:01 PM by Dr. Donte Hernandez M.D on Workstation: [id number]       LABS:   Results for the past 24 hours:   Recent Results (from the past 24 hour(s))   Comprehensive Metabolic Panel    Collection Time: 12/07/23  5:41 PM    Specimen: Blood   Result Value Ref Range    Glucose 93 65 - 99 mg/dL    BUN 9 8 - 23 mg/dL    Creatinine 0.92 0.76 - 1.27 mg/dL    Sodium 136 136 - 145 mmol/L    Potassium 3.8 3.5 - 5.2 mmol/L    Chloride 97 (L) 98 - 107 mmol/L    CO2 21.8 (L) 22.0 - 29.0 mmol/L    Calcium 8.3 (L) 8.6 - 10.5 mg/dL    Total Protein 7.1 6.0 - 8.5 g/dL    Albumin 4.0 3.5 - 5.2 g/dL    ALT (SGPT) 81 (H) 1 - 41 U/L    AST (SGOT) 101 (H) 1 - 40 U/L    Alkaline Phosphatase 79 39 - 117 U/L    Total Bilirubin 0.8 0.0 - 1.2 mg/dL    Globulin 3.1 gm/dL    A/G Ratio 1.3 g/dL    BUN/Creatinine Ratio 9.8 7.0 - 25.0    Anion Gap 17.2 (H) 5.0 - 15.0 mmol/L    eGFR 90.0 >60.0 mL/min/1.73   Magnesium    Collection Time: 12/07/23  5:41 PM    Specimen: Blood "   Result Value Ref Range    Magnesium 1.4 (L) 1.6 - 2.4 mg/dL   Phosphorus    Collection Time: 12/07/23  5:41 PM    Specimen: Blood   Result Value Ref Range    Phosphorus 2.7 2.5 - 4.5 mg/dL   CBC Auto Differential    Collection Time: 12/07/23  5:41 PM    Specimen: Blood   Result Value Ref Range    WBC 8.34 3.40 - 10.80 10*3/mm3    RBC 4.10 (L) 4.14 - 5.80 10*6/mm3    Hemoglobin 13.7 13.0 - 17.7 g/dL    Hematocrit 39.2 37.5 - 51.0 %    MCV 95.6 79.0 - 97.0 fL    MCH 33.4 (H) 26.6 - 33.0 pg    MCHC 34.9 31.5 - 35.7 g/dL    RDW 12.1 (L) 12.3 - 15.4 %    RDW-SD 41.7 37.0 - 54.0 fl    MPV 9.1 6.0 - 12.0 fL    Platelets 227 140 - 450 10*3/mm3    Neutrophil % 74.0 42.7 - 76.0 %    Lymphocyte % 14.4 (L) 19.6 - 45.3 %    Monocyte % 10.2 5.0 - 12.0 %    Eosinophil % 0.4 0.3 - 6.2 %    Basophil % 0.5 0.0 - 1.5 %    Immature Grans % 0.5 0.0 - 0.5 %    Neutrophils, Absolute 6.18 1.70 - 7.00 10*3/mm3    Lymphocytes, Absolute 1.20 0.70 - 3.10 10*3/mm3    Monocytes, Absolute 0.85 0.10 - 0.90 10*3/mm3    Eosinophils, Absolute 0.03 0.00 - 0.40 10*3/mm3    Basophils, Absolute 0.04 0.00 - 0.20 10*3/mm3    Immature Grans, Absolute 0.04 0.00 - 0.05 10*3/mm3    nRBC 0.0 0.0 - 0.2 /100 WBC   Comprehensive Metabolic Panel    Collection Time: 12/08/23  7:29 AM    Specimen: Blood   Result Value Ref Range    Glucose 128 (H) 65 - 99 mg/dL    BUN 13 8 - 23 mg/dL    Creatinine 1.23 0.76 - 1.27 mg/dL    Sodium 136 136 - 145 mmol/L    Potassium 3.7 3.5 - 5.2 mmol/L    Chloride 100 98 - 107 mmol/L    CO2 23.6 22.0 - 29.0 mmol/L    Calcium 8.6 8.6 - 10.5 mg/dL    Total Protein 6.7 6.0 - 8.5 g/dL    Albumin 3.8 3.5 - 5.2 g/dL    ALT (SGPT) 66 (H) 1 - 41 U/L    AST (SGOT) 74 (H) 1 - 40 U/L    Alkaline Phosphatase 76 39 - 117 U/L    Total Bilirubin 1.1 0.0 - 1.2 mg/dL    Globulin 2.9 gm/dL    A/G Ratio 1.3 g/dL    BUN/Creatinine Ratio 10.6 7.0 - 25.0    Anion Gap 12.4 5.0 - 15.0 mmol/L    eGFR 63.6 >60.0 mL/min/1.73   Magnesium    Collection Time:  "12/08/23  7:29 AM    Specimen: Blood   Result Value Ref Range    Magnesium 2.3 1.6 - 2.4 mg/dL   Phosphorus    Collection Time: 12/08/23  7:29 AM    Specimen: Blood   Result Value Ref Range    Phosphorus 1.9 (C) 2.5 - 4.5 mg/dL   CBC Auto Differential    Collection Time: 12/08/23  7:29 AM    Specimen: Blood   Result Value Ref Range    WBC 7.00 3.40 - 10.80 10*3/mm3    RBC 3.77 (L) 4.14 - 5.80 10*6/mm3    Hemoglobin 12.9 (L) 13.0 - 17.7 g/dL    Hematocrit 36.7 (L) 37.5 - 51.0 %    MCV 97.3 (H) 79.0 - 97.0 fL    MCH 34.2 (H) 26.6 - 33.0 pg    MCHC 35.1 31.5 - 35.7 g/dL    RDW 12.2 (L) 12.3 - 15.4 %    RDW-SD 43.1 37.0 - 54.0 fl    MPV 9.5 6.0 - 12.0 fL    Platelets 196 140 - 450 10*3/mm3    Neutrophil % 75.3 42.7 - 76.0 %    Lymphocyte % 11.4 (L) 19.6 - 45.3 %    Monocyte % 11.1 5.0 - 12.0 %    Eosinophil % 1.1 0.3 - 6.2 %    Basophil % 0.7 0.0 - 1.5 %    Immature Grans % 0.4 0.0 - 0.5 %    Neutrophils, Absolute 5.26 1.70 - 7.00 10*3/mm3    Lymphocytes, Absolute 0.80 0.70 - 3.10 10*3/mm3    Monocytes, Absolute 0.78 0.10 - 0.90 10*3/mm3    Eosinophils, Absolute 0.08 0.00 - 0.40 10*3/mm3    Basophils, Absolute 0.05 0.00 - 0.20 10*3/mm3    Immature Grans, Absolute 0.03 0.00 - 0.05 10*3/mm3    nRBC 0.0 0.0 - 0.2 /100 WBC       IMPRESSION:  Patient is a 68 y.o. Not  or  male with left knee effusion with advanced osteoarthritis    PLAN:   Admited to: Sean Ricketts MD  Disposition: I recommend conservative treatment with physical therapy and anti-inflammatory medication.  While he is in the hospital for acute alcohol detox I would not recommend orthopedic intervention.  He may follow-up in the office for consideration of drainage of the knee, but no such procedure to be performed while inpatient.  He can probably follow-up next week or the week after after he gets out of the hospital.    R \"bP\" Delfino SO MD  Orthopaedic Surgery  [facility]  [phone] - [place] [facility]  [phone] - " [place] [facility]              Electronically signed by Ranjit Saleh II, MD at 12/08/23 5185

## 2023-12-10 NOTE — NURSING NOTE
[facility] follow up.    Patient resting soundly in bed, snoring. Chart reviewed.    Per overnight RN pt. confused at times, prn ativan given. Yesterday PT held due to withdrawal symptoms. MD noted significant withdrawal, added phenobarbital protocol. Patient has received recovery resources, declined inpatient psychiatrist consult for medication management. Current medication trazodone. Access following.

## 2023-12-10 NOTE — PROGRESS NOTES
Name: Watson Lisa ADMIT: 2023   : 1955  PCP: Roc Carroll APRN    MRN: [id number] LOS: 1 days   AGE/SEX: 68 y.o. male  ROOM: [facility]     Subjective   Subjective    Required numerous doses of benzos overnight. Sleeping soundly this AM on rounds. Since then CIWA has been 4-8 all day       Objective   Objective   Vital Signs  Temp:  [97.3 °F (36.3 °C)-98.2 °F (36.8 °C)] 98.2 °F (36.8 °C)  Heart Rate:  [] 102  Resp:  [20-24] 20  BP: (130-172)/(71-96) 147/71  SpO2:  [93 %-100 %] 93 %  on   ;   Device (Oxygen Therapy): room air  Body mass index is 28.34 kg/m².  Physical Exam  Vitals reviewed.   Constitutional:       General: He is sleeping. He is not in acute distress.     Comments: Snoring loudly with CPAP on   HENT:      Head: Normocephalic and atraumatic.   Eyes:      General: No scleral icterus.  Neck:      Vascular: No JVD.   Cardiovascular:      Rate and Rhythm: Normal rate and regular rhythm.      Heart sounds: No murmur heard.  Pulmonary:      Effort: Pulmonary effort is normal. No respiratory distress.      Breath sounds: Normal breath sounds. No wheezing.   Musculoskeletal:      Right lower leg: No edema.      Left lower leg: No edema.   Skin:     General: Skin is warm and dry.      Findings: No rash.       Results Review     I reviewed the patient's new clinical results.  Results from last 7 days   Lab Units 23  0939 23  0729 23  1741 23  1326   WBC 10*3/mm3 8.30 7.00 8.34 10.93*   HEMOGLOBIN g/dL 12.4* 12.9* 13.7 13.8   PLATELETS 10*3/mm3 147 196 227 235     Results from last 7 days   Lab Units 23  0939 23  0729 23  1741 23  1326   SODIUM mmol/L 134* 136 136 133*   POTASSIUM mmol/L 3.7 3.7 3.8 4.0   CHLORIDE mmol/L 100 100 97* 96*   CO2 mmol/L 22.3 23.6 21.8* 17.2*   BUN mg/dL 11 13 9 10   CREATININE mg/dL 1.17 1.23 0.92 0.92   GLUCOSE mg/dL 121* 128* 93 143*   EGFR mL/min/1.73 67.5 63.6 90.0 90.0     Results from last 7 days  "  Lab Units 12/09/23  0939 12/08/23  0729 12/07/23  1741 12/07/23  1326   ALBUMIN g/dL 3.5 3.8 4.0 4.0   BILIRUBIN mg/dL 1.2 1.1 0.8 0.7   ALK PHOS U/L 71 76 79 81   AST (SGOT) U/L 46* 74* 101* 117*   ALT (SGPT) U/L 52* 66* 81* 91*     Results from last 7 days   Lab Units 12/09/23  0939 12/08/23  1924 12/08/23  0729 12/07/23  1741 12/07/23  1326   CALCIUM mg/dL 8.9  --  8.6 8.3* 8.5*   ALBUMIN g/dL 3.5  --  3.8 4.0 4.0   MAGNESIUM mg/dL 1.8  --  2.3 1.4* 1.5*   PHOSPHORUS mg/dL 2.5 2.0* 1.9* 2.7  --        No results found for: \"HGBA1C\", \"POCGLU\"    No radiology results for the last day    I have personally reviewed all medications:  Scheduled Medications  arformoterol, 15 mcg, Nebulization, BID - RT   And  tiotropium bromide monohydrate, 2 puff, Inhalation, Daily - RT  Diclofenac Sodium, 4 g, Topical, 4x Daily  famotidine, 20 mg, Intravenous, Q12H  folic acid, 1 mg, Oral, Daily  hydrocortisone-bacitracin-zinc oxide-nystatin, 1 application , Topical, Q12H  [START ON 12/11/2023] losartan, 100 mg, Oral, Q24H  multivitamin with minerals, 1 tablet, Oral, Daily  Petrolatum, 1 application , Topical, Q12H  [START ON 12/11/2023] PHENobarbital, 32.4 mg, Oral, Once   Followed by  [START ON 12/11/2023] PHENobarbital, 32.4 mg, Oral, Once   Followed by  [START ON 12/12/2023] PHENobarbital, 32.4 mg, Oral, Once  senna-docusate sodium, 2 tablet, Oral, BID  sodium chloride, 10 mL, Intravenous, Q12H  thiamine (B-1) IV, 500 mg, Intravenous, Q8H   Followed by  thiamine (B-1) IV, 200 mg, Intravenous, Q8H   Followed by  [START ON 12/14/2023] thiamine, 100 mg, Oral, Daily  traZODone, 50 mg, Oral, Nightly  vitamin D, 50,000 Units, Oral, Weekly    Infusions   Diet  Diet: Regular/House Diet; Texture: Regular Texture (IDDSI 7); Fluid Consistency: Thin (IDDSI 0)    I have personally reviewed:  [x]  Laboratory   []  Microbiology   [x]  Radiology   []  EKG/Telemetry  []  Cardiology/Vascular   []  Pathology    []  Records       Assessment/Plan "     Active Hospital Problems    Diagnosis  POA    **Acute alcohol intoxication [F10.929]  Yes    Hypomagnesemia [E83.42]  Yes    History of alcohol use disorder [Z87.898]  Yes    Alcoholic liver disease [K70.9]  Yes    COPD (chronic obstructive pulmonary disease) [J44.9]  Yes    ETOH abuse [F10.10]  Yes    LIVAN (obstructive sleep apnea) [G47.33]  Yes    Anemia, chronic disease [D63.8]  Yes    Essential hypertension [I10]  Yes    Tobacco abuse [Z72.0]  Yes      Resolved Hospital Problems   No resolved problems to display.       68 y.o. male admitted with Acute alcohol intoxication.    EtOH abuse/intoxication with withdrawal  - Cont phenobarbital protocol.  Can always use restraints if needed if behavior becomes too impulsive.    - Continue benzodiazepines per CIWA protocol  - Continue with thiamine and folate.    Left knee pain with negative imaging other than small joint effusion.  Received Toradol x 2 and Voltaren gel.  Orthopedics planning outpatient follow-up    Hypertension uncontrolled.  Probably relates alcohol withdrawal.    - Increase losartan today  - Clonidine available as needed      SCDs for DVT prophylaxis.  Full code.  Disposition: TBD, will depend on duration of alcohol withdrawal symptoms, maybe 1-2 days if cont progress      Meño Forbes MD  [facility]  12/10/23  17:16 EST

## 2023-12-10 NOTE — PLAN OF CARE
Problem: Adult Inpatient Plan of Care  Goal: Plan of Care Review  Outcome: Ongoing, Progressing  Goal: Patient-Specific Goal (Individualized)  Outcome: Ongoing, Progressing  Goal: Absence of Hospital-Acquired Illness or Injury  Outcome: Ongoing, Progressing  Intervention: Identify and Manage Fall Risk  Recent Flowsheet Documentation  Taken 12/10/2023 1400 by Tanya Mary RN  Safety Promotion/Fall Prevention:   assistive device/personal items within reach   clutter free environment maintained   room organization consistent   safety round/check completed   fall prevention program maintained   nonskid shoes/slippers when out of bed  Taken 12/10/2023 1200 by Tanya Mary RN  Safety Promotion/Fall Prevention:   assistive device/personal items within reach   nonskid shoes/slippers when out of bed   clutter free environment maintained   room organization consistent   fall prevention program maintained   safety round/check completed  Taken 12/10/2023 1000 by Tanya Mary RN  Safety Promotion/Fall Prevention:   assistive device/personal items within reach   nonskid shoes/slippers when out of bed   clutter free environment maintained   room organization consistent   fall prevention program maintained   safety round/check completed  Taken 12/10/2023 0819 by Tanya Mary RN  Safety Promotion/Fall Prevention:   assistive device/personal items within reach   clutter free environment maintained   fall prevention program maintained   nonskid shoes/slippers when out of bed   room organization consistent   safety round/check completed  Intervention: Prevent Skin Injury  Recent Flowsheet Documentation  Taken 12/10/2023 1400 by Tanya Mary RN  Body Position: position changed independently  Taken 12/10/2023 1200 by Tanya Mary RN  Body Position: position changed independently  Taken 12/10/2023 1000 by Tanya Mary RN  Body Position: position changed independently  Taken 12/10/2023 0819 by Tanya Mary  RN  Body Position: position changed independently  Skin Protection:   adhesive use limited   tubing/devices free from skin contact  Intervention: Prevent and Manage VTE (Venous Thromboembolism) Risk  Recent Flowsheet Documentation  Taken 12/10/2023 0819 by Tanya Mary RN  VTE Prevention/Management:   bilateral   sequential compression devices off   patient refused intervention  Range of Motion: active ROM (range of motion) encouraged  Intervention: Prevent Infection  Recent Flowsheet Documentation  Taken 12/10/2023 1200 by Tanya Mary RN  Infection Prevention: environmental surveillance performed  Taken 12/10/2023 1000 by Tanya Mary RN  Infection Prevention: environmental surveillance performed  Taken 12/10/2023 0819 by Tanya Mary RN  Infection Prevention: environmental surveillance performed  Goal: Optimal Comfort and Wellbeing  Outcome: Ongoing, Progressing  Intervention: Provide Person-Centered Care  Recent Flowsheet Documentation  Taken 12/10/2023 0819 by Tanya Mary RN  Trust Relationship/Rapport: care explained  Goal: Readiness for Transition of Care  Outcome: Ongoing, Progressing     Problem: Fall Injury Risk  Goal: Absence of Fall and Fall-Related Injury  Outcome: Ongoing, Progressing  Intervention: Identify and Manage Contributors  Recent Flowsheet Documentation  Taken 12/10/2023 0819 by Tanya Mary RN  Medication Review/Management: medications reviewed  Intervention: Promote Injury-Free Environment  Recent Flowsheet Documentation  Taken 12/10/2023 1400 by Tanya Mary RN  Safety Promotion/Fall Prevention:   assistive device/personal items within reach   clutter free environment maintained   room organization consistent   safety round/check completed   fall prevention program maintained   nonskid shoes/slippers when out of bed  Taken 12/10/2023 1200 by Tanya Mary RN  Safety Promotion/Fall Prevention:   assistive device/personal items within reach   nonskid shoes/slippers  when out of bed   clutter free environment maintained   room organization consistent   fall prevention program maintained   safety round/check completed  Taken 12/10/2023 1000 by Tanya Mary RN  Safety Promotion/Fall Prevention:   assistive device/personal items within reach   nonskid shoes/slippers when out of bed   clutter free environment maintained   room organization consistent   fall prevention program maintained   safety round/check completed  Taken 12/10/2023 0819 by Tanya Mary RN  Safety Promotion/Fall Prevention:   assistive device/personal items within reach   clutter free environment maintained   fall prevention program maintained   nonskid shoes/slippers when out of bed   room organization consistent   safety round/check completed     Problem: COPD (Chronic Obstructive Pulmonary Disease) Comorbidity  Goal: Maintenance of COPD Symptom Control  Outcome: Ongoing, Progressing  Intervention: Maintain COPD-Symptom Control  Recent Flowsheet Documentation  Taken 12/10/2023 0819 by Tanya Mary RN  Medication Review/Management: medications reviewed     Problem: Hypertension Comorbidity  Goal: Blood Pressure in Desired Range  Outcome: Ongoing, Progressing  Intervention: Maintain Blood Pressure Management  Recent Flowsheet Documentation  Taken 12/10/2023 0819 by Tanya Mary RN  Medication Review/Management: medications reviewed     Problem: Osteoarthritis Comorbidity  Goal: Maintenance of Osteoarthritis Symptom Control  Outcome: Ongoing, Progressing  Intervention: Maintain Osteoarthritis Symptom Control  Recent Flowsheet Documentation  Taken 12/10/2023 0819 by Tanya Mary RN  Medication Review/Management: medications reviewed     Problem: Skin Injury Risk Increased  Goal: Skin Health and Integrity  Outcome: Ongoing, Progressing  Intervention: Optimize Skin Protection  Recent Flowsheet Documentation  Taken 12/10/2023 0819 by Tanya Mary RN  Pressure Reduction Techniques: frequent weight  shift encouraged  Pressure Reduction Devices: positioning supports utilized  Skin Protection:   adhesive use limited   tubing/devices free from skin contact   Goal Outcome Evaluation:      AAOx2, more oriented at times and than yesterday, last CIWA 8, meds given as needed, plan TBD.

## 2023-12-11 PROCEDURE — 97530 THERAPEUTIC ACTIVITIES: CPT

## 2023-12-11 PROCEDURE — 94761 N-INVAS EAR/PLS OXIMETRY MLT: CPT

## 2023-12-11 PROCEDURE — 25010000002 MIDAZOLAM PER 1 MG: Performed by: INTERNAL MEDICINE

## 2023-12-11 PROCEDURE — 94664 DEMO&/EVAL PT USE INHALER: CPT

## 2023-12-11 PROCEDURE — 97162 PT EVAL MOD COMPLEX 30 MIN: CPT

## 2023-12-11 PROCEDURE — 94799 UNLISTED PULMONARY SVC/PX: CPT

## 2023-12-11 PROCEDURE — 97535 SELF CARE MNGMENT TRAINING: CPT

## 2023-12-11 PROCEDURE — 25010000002 THIAMINE HCL 200 MG/2ML SOLUTION: Performed by: INTERNAL MEDICINE

## 2023-12-11 PROCEDURE — 97166 OT EVAL MOD COMPLEX 45 MIN: CPT

## 2023-12-11 RX ORDER — HYDROXYZINE HYDROCHLORIDE 25 MG/1
25 TABLET, FILM COATED ORAL 3 TIMES DAILY PRN
Status: DISCONTINUED | OUTPATIENT
Start: 2023-12-11 | End: 2023-12-12 | Stop reason: HOSPADM

## 2023-12-11 RX ORDER — ACETAMINOPHEN 500 MG
1000 TABLET ORAL EVERY 8 HOURS PRN
Status: DISCONTINUED | OUTPATIENT
Start: 2023-12-11 | End: 2023-12-12 | Stop reason: HOSPADM

## 2023-12-11 RX ADMIN — ARFORMOTEROL TARTRATE 15 MCG: 15 SOLUTION RESPIRATORY (INHALATION) at 07:35

## 2023-12-11 RX ADMIN — HYDROXYZINE HYDROCHLORIDE 25 MG: 25 TABLET ORAL at 20:04

## 2023-12-11 RX ADMIN — ZINC OXIDE 1 APPLICATION: 200 OINTMENT TOPICAL at 08:21

## 2023-12-11 RX ADMIN — Medication 10 ML: at 08:22

## 2023-12-11 RX ADMIN — FAMOTIDINE 20 MG: 10 INJECTION INTRAVENOUS at 12:56

## 2023-12-11 RX ADMIN — THIAMINE HYDROCHLORIDE 200 MG: 100 INJECTION, SOLUTION INTRAMUSCULAR; INTRAVENOUS at 16:12

## 2023-12-11 RX ADMIN — PHENOBARBITAL 32.4 MG: 32.4 TABLET ORAL at 16:12

## 2023-12-11 RX ADMIN — DICLOFENAC SODIUM 4 G: 10 GEL TOPICAL at 08:21

## 2023-12-11 RX ADMIN — Medication 1 TABLET: at 08:21

## 2023-12-11 RX ADMIN — THIAMINE HYDROCHLORIDE 200 MG: 100 INJECTION, SOLUTION INTRAMUSCULAR; INTRAVENOUS at 05:35

## 2023-12-11 RX ADMIN — MIDAZOLAM HYDROCHLORIDE 4 MG: 2 INJECTION, SOLUTION INTRAMUSCULAR; INTRAVENOUS at 02:53

## 2023-12-11 RX ADMIN — PETROLATUM 1 APPLICATION: 420 OINTMENT TOPICAL at 08:21

## 2023-12-11 RX ADMIN — DICLOFENAC SODIUM 4 G: 10 GEL TOPICAL at 12:56

## 2023-12-11 RX ADMIN — DICLOFENAC SODIUM 4 G: 10 GEL TOPICAL at 18:45

## 2023-12-11 RX ADMIN — ACETAMINOPHEN 1000 MG: 500 TABLET ORAL at 14:00

## 2023-12-11 RX ADMIN — HYDROXYZINE HYDROCHLORIDE 25 MG: 25 TABLET ORAL at 12:56

## 2023-12-11 RX ADMIN — LOSARTAN POTASSIUM 100 MG: 100 TABLET, FILM COATED ORAL at 08:21

## 2023-12-11 RX ADMIN — TIOTROPIUM BROMIDE INHALATION SPRAY 2 PUFF: 3.12 SPRAY, METERED RESPIRATORY (INHALATION) at 07:36

## 2023-12-11 RX ADMIN — FOLIC ACID 1 MG: 1 TABLET ORAL at 08:21

## 2023-12-11 RX ADMIN — PHENOBARBITAL 32.4 MG: 32.4 TABLET ORAL at 02:25

## 2023-12-11 RX ADMIN — ARFORMOTEROL TARTRATE 15 MCG: 15 SOLUTION RESPIRATORY (INHALATION) at 19:01

## 2023-12-11 RX ADMIN — FAMOTIDINE 20 MG: 10 INJECTION INTRAVENOUS at 21:54

## 2023-12-11 RX ADMIN — TRAZODONE HYDROCHLORIDE 50 MG: 50 TABLET ORAL at 21:54

## 2023-12-11 RX ADMIN — ACETAMINOPHEN 1000 MG: 500 TABLET ORAL at 21:54

## 2023-12-11 RX ADMIN — THIAMINE HYDROCHLORIDE 200 MG: 100 INJECTION, SOLUTION INTRAMUSCULAR; INTRAVENOUS at 21:54

## 2023-12-11 NOTE — NURSING NOTE
[facility] follow-up d/t ETOH.     Patient RIB. The patient reported he is tired. The patient reported difficulty sleeping r/t anxiety and a cough. The patient sated he was able to work with OT and get out of bed a little bit. The patient voiced ETOH withdrawal but did not say what symptoms he is experiencing. Last CIWA 6 r/t anxiety, tremor and headache. The patient denied any ETOH craving. The patient acknowledged receiving resources from [facility] and denied any questions. The patient plans to follow-up with counseling through [facility]. The patient denied any other needs at this time. Access following.

## 2023-12-11 NOTE — PLAN OF CARE
Goal Outcome Evaluation:  Plan of Care Reviewed With: patient        Progress: improving  Outcome Evaluation: Pt on Room air, AOx3, bed alarm on, urinal within reach. HR was in 170s and pt was anxious, PRN versed given per CIWA score. Rest of CIWA have been 5. Pt Resting in bed and in no distress.

## 2023-12-11 NOTE — PLAN OF CARE
Goal Outcome Evaluation:  Plan of Care Reviewed With: patient           Outcome Evaluation: Pt is a 68 y.o male admitted from home with alcohol intoxication and withdraw, falls on 12/7. Today RN reports pt is appropriate to participate in OT. Pt has not been OOB in 4 days. He is able to transfer to the bathroom and complete ADLs CGA with rwx. Pt reports L knee and ankle pain. He has been seen by ortho who reports imaging showing significant arthritis but no plans to address this hospitlization. Pt can lack some insight and feels he is ready to get up independently but still has balance deficits/generalized weakness which would benefit from having staff present for safety. Recommend pt up to the chair and continue to get up  and mobilize/complete ADLs with nsg/OOB activity. He does have a rwx at home he can use and reports some family to assist him. Recommend continued OT to maximize safety and ADL independence in prep to dc home.      Anticipated Discharge Disposition (OT): home with assist

## 2023-12-11 NOTE — THERAPY EVALUATION
Patient Name: Watson Lisa  : 1955    MRN: [id number]                              Today's Date: 2023       Admit Date: 2023    Visit Dx:     ICD-10-CM ICD-9-CM   1. Acute alcoholic intoxication without complication  F10.920 305.00     Patient Active Problem List   Diagnosis    Essential hypertension    Tobacco abuse    Hiatal hernia    Effusion of left knee    Osteoarthritis of left knee    Vitamin D deficiency    Anemia, chronic disease    COPD (chronic obstructive pulmonary disease)    LIVAN (obstructive sleep apnea)    ETOH abuse    Obese    Alcoholic liver disease    History of alcohol use disorder    Left patella fracture    Need for hepatitis C screening test    Chronic pain of left ankle    Primary insomnia    Overweight (BMI 25.0-29.9)    Mixed hyperlipidemia    Acute alcohol intoxication    Hypomagnesemia     Past Medical History:   Diagnosis Date    Alcohol abuse     Anxiety     Arthritis     Bronchitis with chronic airway obstruction     LAST FE    DVT (deep venous thrombosis)     Hiatal hernia     Hypertension     Hypertension     Low back pain     Neck pain     Pulmonary embolism     Sleep apnea with use of continuous positive airway pressure (CPAP)     AT NIGHT     Past Surgical History:   Procedure Laterality Date    COLONOSCOPY      JOINT REPLACEMENT Right     hip/knee    REPLACEMENT TOTAL KNEE      TONSILLECTOMY      TOTAL HIP ARTHROPLASTY Right       General Information       Row Name 23 1110          OT Time and Intention    Document Type evaluation  -SM     Mode of Treatment occupational therapy;individual therapy  -SM       Row Name 23 1110          General Information    Patient Profile Reviewed yes  -SM     Prior Level of Function independent:;ADL's;community mobility;work;driving  works in IT  -SM     Existing Precautions/Restrictions fall;seizures  -SM     Barriers to Rehab ineffective coping  -SM       Row Name 23 1110          Occupational Profile     Environmental Supports and Barriers (Occupational Profile) Pt has a SPC and a rwx at home  -[facility] Name 12/11/23 1110          Living Environment    People in Home alone  -[facility] Name 12/11/23 1110          Cognition    Orientation Status (Cognition) oriented x 4  -[facility] Name 12/11/23 1110          Safety Issues, Functional Mobility    Safety Issues Affecting Function (Mobility) safety precaution awareness;insight into deficits/self-awareness  -     Impairments Affecting Function (Mobility) balance;strength;pain  -               User Key  (r) = Recorded By, (t) = Taken By, (c) = Cosigned By      Initials Name Provider Type     Dorcas Villeda OT Occupational Therapist                     Mobility/ADL's       [facility] Name 12/11/23 1112          Bed Mobility    Bed Mobility supine-sit  -     Supine-Sit [place] (Bed Mobility) contact guard;verbal cues  -     Assistive Device (Bed Mobility) head of bed elevated;bed rails  -[facility] Name 12/11/23 1112          Transfers    Transfers sit-stand transfer;toilet transfer  -[facility] Name 12/11/23 1112          Sit-Stand Transfer    Sit-Stand [place]ett (Transfers) contact guard  -     Assistive Device (Sit-Stand Transfers) walker, front-wheeled  -[facility] Name 12/11/23 1112          Toilet Transfer    Burnett Level (Toilet Transfer) contact guard  -     Assistive Device (Toilet Transfer) walker, 4-wheeled;grab bars/safety frame  -[facility] Name 12/11/23 1112          Functional Mobility    Functional Mobility- Ind. Level contact guard assist  -     Functional Mobility- Device walker, front-wheeled  -     Functional Mobility-Distance (Feet) --  20  -     Functional Mobility- Comment to/from bathroom  -       Row Name 12/11/23 1112          Activities of Daily Living    BADL Assessment/Intervention lower body dressing;toileting;grooming;feeding;upper body dressing  -[facility] Name 12/11/23 1112          Lower  Body Dressing Assessment/Training    [place] Level (Lower Body Dressing) don;socks;standby assist;pants/bottoms  -     Position (Lower Body Dressing) edge of bed sitting  -     Comment, (Lower Body Dressing) with obvious difficulty but pt is motivated to complete without help and requires extra time  -       Row Name 12/11/23 1112          Toileting Assessment/Training    [place] Level (Toileting) toileting skills;contact guard assist  -       Row Name 12/11/23 1112          Grooming Assessment/Training    [place] Level (Grooming) wash face, hands  -     Position (Grooming) edge of bed sitting  -       Row Name 12/11/23 1112          Self-Feeding Assessment/Training    Position (Self-Feeding) sitting up in bed  -     Comment, (Feeding) pt denies any difficulty with UE tremors during meals  -[facility] Name 12/11/23 1112          Upper Body Dressing Assessment/Training    [place] Level (Upper Body Dressing) don;standby assist  -     Position (Upper Body Dressing) edge of bed sitting  -               User Key  (r) = Recorded By, (t) = Taken By, (c) = Cosigned By      Initials Name Provider Type     Dorcas Villeda OT Occupational Therapist                   Obj/Interventions       Row Name 12/11/23 1113          Range of Motion Comprehensive    General Range of Motion bilateral upper extremity ROM WFL  -[facility] Name 12/11/23 1113          Strength Comprehensive (MMT)    Comment, General Manual Muscle Testing (MMT) Assessment BUE 4/5 MMT  -       Row Name 12/11/23 1113          Balance    Balance Assessment sitting static balance;standing static balance;standing dynamic balance  -     Static Sitting Balance supervision  -     Position, Sitting Balance sitting edge of bed  -     Static Standing Balance contact guard  -     Dynamic Standing Balance contact guard  -     Position/Device Used, Standing Balance supported;walker, rolling  -               User Key   (r) = Recorded By, (t) = Taken By, (c) = Cosigned By      Initials Name Provider Type    SM Dorcas Villeda OT Occupational Therapist                   Goals/Plan       Row Name 12/11/23 [facility]5          Transfer Goal 1 (OT)    Activity/Assistive Device (Transfer Goal 1, OT) toilet  -SM     [place] Level/Cues Needed (Transfer Goal 1, OT) supervision required  -SM     Time Frame (Transfer Goal 1, OT) short term goal (STG);2 weeks  -SM     Progress/Outcome (Transfer Goal 1, OT) goal ongoing  -       Row Name 12/11/23 1205          Bathing Goal 1 (OT)    Activity/Device (Bathing Goal 1, OT) bathing skills, all  -SM     [place] Level/Cues Needed (Bathing Goal 1, OT) supervision required  -SM     Time Frame (Bathing Goal 1, OT) short term goal (STG);2 weeks  -SM     Progress/Outcomes (Bathing Goal 1, OT) goal ongoing  -       Row Name 12/11/23 [facility]          Toileting Goal 1 (OT)    Activity/Device (Toileting Goal 1, OT) toileting skills, all  -SM     [place] Level/Cues Needed (Toileting Goal 1, OT) supervision required  -SM     Time Frame (Toileting Goal 1, OT) short term goal (STG);2 weeks  -SM     Progress/Outcome (Toileting Goal 1, OT) goal ongoing  -       Row Name 12/11/23 1205          Grooming Goal 1 (OT)    Activity/Device (Grooming Goal 1, OT) grooming skills, all  -SM     [place] (Grooming Goal 1, OT) supervision required  -SM     Time Frame (Grooming Goal 1, OT) short term goal (STG);2 weeks  -SM     Strategies/Barriers (Grooming Goal 1, OT) standing sink side  -SM     Progress/Outcome (Grooming Goal 1, OT) goal ongoing  -       Row Name 12/11/23 1205          Therapy Assessment/Plan (OT)    Planned Therapy Interventions (OT) activity tolerance training;functional balance retraining;occupation/activity based interventions;patient/caregiver education/training;transfer/mobility retraining;strengthening exercise  -SM               User Key  (r) = Recorded By, (t) = Taken By, (c) =  Cosigned By      Initials Name Provider Type     Dorcas Villeda, ODILIA Occupational Therapist                   Clinical Impression       Row Name 12/11/23 1114          Pain Assessment    Pretreatment Pain Rating 8/10  -     Posttreatment Pain Rating 8/10  -     Pain Location - Side/Orientation Left  -     Pain Location - knee;ankle  -     Pain Intervention(s) Ambulation/increased activity;Repositioned  -       Row Name 12/11/23 1114          Plan of Care Review    Plan of Care Reviewed With patient  -     Outcome Evaluation Pt is a 68 y.o male admitted from home with alcohol intoxication and withdraw, falls on 12/7. Today RN reports pt is appropriate to participate in OT. Pt has not been OOB in 4 days. He is able to transfer to the bathroom and complete ADLs CGA with rwx. Pt reports L knee and ankle pain. He has been seen by ortho who reports imaging showing significant arthritis but no plans to address this hospitlization. Pt can lack some insight and feels he is ready to get up independently but still has balance deficits/generalized weakness which would benefit from having staff present for safety. Recommend pt up to the chair and continue to get up  and mobilize/complete ADLs with nsg/OOB activity. He does have a rwx at home he can use and reports some family to assist him. Recommend continued OT to maximize safety and ADL independence in prep to dc home.  -       Row Name 12/11/23 1114          Therapy Assessment/Plan (OT)    Rehab Potential (OT) good, to achieve stated therapy goals  -     Criteria for Skilled Therapeutic Interventions Met (OT) yes;skilled treatment is necessary  -     Therapy Frequency (OT) 3 times/wk  -       Row Name 12/11/23 1114          Therapy Plan Review/Discharge Plan (OT)    Anticipated Discharge Disposition (OT) home with assist  -       Row Name 12/11/23 1114          Positioning and Restraints    Pre-Treatment Position in bed  -               User Key   (r) = Recorded By, (t) = Taken By, (c) = Cosigned By      Initials Name Provider Type    Dorcas Graham OT Occupational Therapist                   Outcome Measures       Row Name 12/11/23 1206          How much help from another is currently needed...    Putting on and taking off regular lower body clothing? 3  -SM     Bathing (including washing, rinsing, and drying) 3  -SM     Toileting (which includes using toilet bed pan or urinal) 3  -SM     Putting on and taking off regular upper body clothing 3  -SM     Taking care of personal grooming (such as brushing teeth) 3  -SM     Eating meals 4  -SM     AM-PAC 6 Clicks Score (OT) 19  -SM       Row Name 12/11/23 0830          How much help from another person do you currently need...    Turning from your back to your side while in flat bed without using bedrails? 4  -KH     Moving from lying on back to sitting on the side of a flat bed without bedrails? 4  -KH     Moving to and from a bed to a chair (including a wheelchair)? 3  -KH     Standing up from a chair using your arms (e.g., wheelchair, bedside chair)? 3  -KH     Climbing 3-5 steps with a railing? 2  -KH     To walk in hospital room? 3  -KH     AM-PAC 6 Clicks Score (PT) 19  -KH     Highest Level of Mobility Goal 6 --> Walk 10 steps or more  -KH       Row Name 12/11/23 1206          Functional Assessment    Outcome Measure Options AM-PAC 6 Clicks Daily Activity (OT)  -SM               User Key  (r) = Recorded By, (t) = Taken By, (c) = Cosigned By      Initials Name Provider Type    Dorcas Graham OT Occupational Therapist    Sharona Crespo RN Registered Nurse                    Occupational Therapy Education       Title: PT OT SLP Therapies (In Progress)       Topic: Occupational Therapy (In Progress)       Point: ADL training (Not Started)       Description:   Instruct learner(s) on proper safety adaptation and remediation techniques during self care or transfers.   Instruct in proper use  of assistive devices.                  Learner Progress:  Not documented in this visit.              Point: Home exercise program (Not Started)       Description:   Instruct learner(s) on appropriate technique for monitoring, assisting and/or progressing therapeutic exercises/activities.                  Learner Progress:  Not documented in this visit.              Point: Precautions (Done)       Description:   Instruct learner(s) on prescribed precautions during self-care and functional transfers.                  Learning Progress Summary             Patient Acceptance, E, VU by  at 12/11/2023 1207    Comment: maintaining falls precautions due to falls risk, OT role/POC                         Point: Body mechanics (Not Started)       Description:   Instruct learner(s) on proper positioning and spine alignment during self-care, functional mobility activities and/or exercises.                  Learner Progress:  Not documented in this visit.                              User Key       Initials Effective Dates Name Provider Type Discipline     04/02/20 -  Dorcas Villeda OT Occupational Therapist OT                  OT Recommendation and Plan  Planned Therapy Interventions (OT): activity tolerance training, functional balance retraining, occupation/activity based interventions, patient/caregiver education/training, transfer/mobility retraining, strengthening exercise  Therapy Frequency (OT): 3 times/wk  Plan of Care Review  Plan of Care Reviewed With: patient  Outcome Evaluation: Pt is a 68 y.o male admitted from home with alcohol intoxication and withdraw, falls on 12/7. Today RN reports pt is appropriate to participate in OT. Pt has not been OOB in 4 days. He is able to transfer to the bathroom and complete ADLs CGA with rwx. Pt reports L knee and ankle pain. He has been seen by ortho who reports imaging showing significant arthritis but no plans to address this hospitlization. Pt can lack some insight and  feels he is ready to get up independently but still has balance deficits/generalized weakness which would benefit from having staff present for safety. Recommend pt up to the chair and continue to get up  and mobilize/complete ADLs with nsg/OOB activity. He does have a rwx at home he can use and reports some family to assist him. Recommend continued OT to maximize safety and ADL independence in prep to dc home.     Time Calculation:   Evaluation Complexity (OT)  Review Occupational Profile/Medical/Therapy History Complexity: expanded/moderate complexity  Assessment, Occupational Performance/Identification of Deficit Complexity: 3-5 performance deficits  Clinical Decision Making Complexity (OT): detailed assessment/moderate complexity  Overall Complexity of Evaluation (OT): moderate complexity     Time Calculation- OT       Row Name 12/11/23 1207             Time Calculation- OT    OT Start Time 0906  -SM      OT Stop Time 0928  -SM      OT Time Calculation (min) 22 min  -SM      Total Timed Code Minutes- OT 15 minute(s)  -SM      OT Received On 12/11/23  -      OT - Next Appointment 12/13/23  -      OT Goal Re-Cert Due Date 12/25/23  -SM         Timed Charges    88476 - OT Self Care/Mgmt Minutes 15  -SM         Untimed Charges    OT Eval/Re-eval Minutes 7  -SM         Total Minutes    Timed Charges Total Minutes 15  -SM      Untimed Charges Total Minutes 7  -SM       Total Minutes 22  -SM                User Key  (r) = Recorded By, (t) = Taken By, (c) = Cosigned By      Initials Name Provider Type     Dorcas Villeda OT Occupational Therapist                  Therapy Charges for Today       Code Description Service Date Service Provider Modifiers Qty    [id number]  OT SELF CARE/MGMT/TRAIN EA 15 MIN 12/11/2023 Dorcas Villeda OT GO 1    [id number]  OT EVAL MOD COMPLEXITY 3 12/11/2023 Dorcas Villeda OT GO 1                 Dorcas Villeda OT  12/11/2023

## 2023-12-11 NOTE — PLAN OF CARE
Problem: Adult Inpatient Plan of Care  Goal: Plan of Care Review  Outcome: Ongoing, Progressing  Flowsheets (Taken 12/11/2023 1656)  Progress: improving  Plan of Care Reviewed With: patient  Outcome Evaluation: No signifcant changes during shift. Pt oriented x4. Pt complains of anxiety, provider notified, new orders place. Latest CIWA score-3. No further concerns.     Problem: Adult Inpatient Plan of Care  Goal: Absence of Hospital-Acquired Illness or Injury  Outcome: Ongoing, Progressing  Intervention: Identify and Manage Fall Risk  Recent Flowsheet Documentation  Taken 12/11/2023 1630 by Sharona Gutierrez, RN  Safety Promotion/Fall Prevention:   activity supervised   assistive device/personal items within reach   clutter free environment maintained   fall prevention program maintained   room organization consistent   safety round/check completed  Taken 12/11/2023 1416 by Sharona Gutierrez RN  Safety Promotion/Fall Prevention:   activity supervised   assistive device/personal items within reach   clutter free environment maintained   fall prevention program maintained   room organization consistent   safety round/check completed  Taken 12/11/2023 1232 by Sharona Gutierrez RN  Safety Promotion/Fall Prevention:   activity supervised   assistive device/personal items within reach   clutter free environment maintained   fall prevention program maintained   room organization consistent   safety round/check completed  Taken 12/11/2023 1019 by Sharona Gutierrez RN  Safety Promotion/Fall Prevention:   activity supervised   assistive device/personal items within reach   clutter free environment maintained   fall prevention program maintained   room organization consistent   safety round/check completed  Taken 12/11/2023 0830 by Sharona Gutierrez RN  Safety Promotion/Fall Prevention:   activity supervised   assistive device/personal items within reach   clutter free environment maintained   fall prevention program  maintained   room organization consistent   toileting scheduled  Intervention: Prevent Skin Injury  Recent Flowsheet Documentation  Taken 12/11/2023 1630 by Sharona Gutierrez RN  Body Position:   position changed independently   foot of bed elevated  Taken 12/11/2023 1416 by Sharona Gutierrez RN  Body Position:   position changed independently   foot of bed elevated  Taken 12/11/2023 1232 by Sharona Gutierrez RN  Body Position:   position changed independently   foot of bed elevated  Taken 12/11/2023 1019 by Sharona Gutierrez RN  Body Position:   position changed independently   foot of bed elevated  Taken 12/11/2023 0830 by Sharona Gutierrez RN  Body Position:   position changed independently   foot of bed elevated  Intervention: Prevent and Manage VTE (Venous Thromboembolism) Risk  Recent Flowsheet Documentation  Taken 12/11/2023 0830 by Sharona Gutierrez RN  Activity Management: activity encouraged  VTE Prevention/Management:   bilateral   sequential compression devices off   patient refused intervention  Range of Motion:   active ROM (range of motion) encouraged   ROM (range of motion) performed     Problem: Adult Inpatient Plan of Care  Goal: Optimal Comfort and Wellbeing  Outcome: Ongoing, Progressing  Intervention: Monitor Pain and Promote Comfort  Recent Flowsheet Documentation  Taken 12/11/2023 0830 by Sharona Gutierrez RN  Pain Management Interventions:   see MAR   position adjusted   pillow support provided   care clustered  Intervention: Provide Person-Centered Care  Recent Flowsheet Documentation  Taken 12/11/2023 0830 by Sharona Gutierrez RN  Trust Relationship/Rapport:   care explained   choices provided   questions answered   questions encouraged     Problem: Fall Injury Risk  Goal: Absence of Fall and Fall-Related Injury  Outcome: Ongoing, Progressing  Intervention: Identify and Manage Contributors  Recent Flowsheet Documentation  Taken 12/11/2023 1630 by Sharona Gutierrez RN  Medication  Review/Management: medications reviewed  Taken 12/11/2023 1416 by Sharona Gutierrez RN  Medication Review/Management: medications reviewed  Taken 12/11/2023 1232 by Sharona Gutierrez RN  Medication Review/Management: medications reviewed  Taken 12/11/2023 1019 by Sharona Gutierrez RN  Medication Review/Management: medications reviewed  Taken 12/11/2023 0830 by Sharona Gutierrez RN  Medication Review/Management: medications reviewed  Self-Care Promotion: independence encouraged  Intervention: Promote Injury-Free Environment  Recent Flowsheet Documentation  Taken 12/11/2023 1630 by Sharona Gutierrez, RN  Safety Promotion/Fall Prevention:   activity supervised   assistive device/personal items within reach   clutter free environment maintained   fall prevention program maintained   room organization consistent   safety round/check completed  Taken 12/11/2023 1416 by Sharona Gutierrez RN  Safety Promotion/Fall Prevention:   activity supervised   assistive device/personal items within reach   clutter free environment maintained   fall prevention program maintained   room organization consistent   safety round/check completed  Taken 12/11/2023 1232 by Sharona Gutierrez RN  Safety Promotion/Fall Prevention:   activity supervised   assistive device/personal items within reach   clutter free environment maintained   fall prevention program maintained   room organization consistent   safety round/check completed  Taken 12/11/2023 1019 by Sharona Gutierrez RN  Safety Promotion/Fall Prevention:   activity supervised   assistive device/personal items within reach   clutter free environment maintained   fall prevention program maintained   room organization consistent   safety round/check completed  Taken 12/11/2023 0830 by Sharona Gutierrez RN  Safety Promotion/Fall Prevention:   activity supervised   assistive device/personal items within reach   clutter free environment maintained   fall prevention program maintained   room  organization consistent   toileting scheduled     Problem: COPD (Chronic Obstructive Pulmonary Disease) Comorbidity  Goal: Maintenance of COPD Symptom Control  Outcome: Ongoing, Progressing  Intervention: Maintain COPD-Symptom Control  Recent Flowsheet Documentation  Taken 12/11/2023 1630 by Sharona Gutierrez RN  Medication Review/Management: medications reviewed  Taken 12/11/2023 1416 by Sharona Gutierrez RN  Medication Review/Management: medications reviewed  Taken 12/11/2023 1232 by Sharona Gutierrez RN  Medication Review/Management: medications reviewed  Taken 12/11/2023 1019 by Sharona Gutierrez RN  Medication Review/Management: medications reviewed  Taken 12/11/2023 0830 by Sharona Gutierrez RN  Medication Review/Management: medications reviewed     Problem: Hypertension Comorbidity  Goal: Blood Pressure in Desired Range  Outcome: Ongoing, Progressing  Intervention: Maintain Blood Pressure Management  Recent Flowsheet Documentation  Taken 12/11/2023 1630 by Sharona Gutierrez RN  Medication Review/Management: medications reviewed  Taken 12/11/2023 1416 by Sharona Gutierrez RN  Medication Review/Management: medications reviewed  Taken 12/11/2023 1232 by Sharona Gutierrez RN  Medication Review/Management: medications reviewed  Taken 12/11/2023 1019 by Sharona Gutierrez RN  Medication Review/Management: medications reviewed  Taken 12/11/2023 0830 by Sharona Gutierrez RN  Medication Review/Management: medications reviewed     Problem: Osteoarthritis Comorbidity  Goal: Maintenance of Osteoarthritis Symptom Control  Outcome: Ongoing, Progressing  Intervention: Maintain Osteoarthritis Symptom Control  Recent Flowsheet Documentation  Taken 12/11/2023 1630 by Sharona Gutierrez RN  Medication Review/Management: medications reviewed  Taken 12/11/2023 1416 by Sharona Gutierrez RN  Medication Review/Management: medications reviewed  Taken 12/11/2023 1232 by Sharona Gutierrez RN  Medication Review/Management: medications  reviewed  Taken 12/11/2023 1019 by Sharona Gutierrez RN  Medication Review/Management: medications reviewed  Taken 12/11/2023 0830 by Sharona Gutierrez RN  Activity Management: activity encouraged  Medication Review/Management: medications reviewed     Problem: Skin Injury Risk Increased  Goal: Skin Health and Integrity  Outcome: Ongoing, Progressing  Intervention: Optimize Skin Protection  Recent Flowsheet Documentation  Taken 12/11/2023 1630 by Sharona Gutierrez RN  Head of Bed (HOB) Positioning: HOB elevated  Taken 12/11/2023 1416 by Sharona Gutierrez RN  Head of Bed (HOB) Positioning: HOB elevated  Taken 12/11/2023 1232 by Sharona Gutierrez RN  Head of Bed (HOB) Positioning: HOB elevated  Taken 12/11/2023 1019 by Sharona Gutierrez RN  Head of Bed (HOB) Positioning: HOB elevated  Taken 12/11/2023 0830 by Sharona Gutierrez RN  Head of Bed (HOB) Positioning: HOB elevated   Goal Outcome Evaluation:  Plan of Care Reviewed With: patient        Progress: improving  Outcome Evaluation: No signifcant changes during shift. Pt oriented x4. Pt complains of anxiety, provider notified, new orders place. Latest CIWA score-3. No further concerns.

## 2023-12-11 NOTE — PLAN OF CARE
Goal Outcome Evaluation:  Plan of Care Reviewed With: patient      Patient is a 68 y.o. male admitted to [facility] for alcohol withdrawal symptoms on 12/7/2023. PMHx includes HTN, anxiety , alcohol abuse. Pt reports recent fall causing swelling in L knee and difficulty ambulating as a result. Patient is (I) at baseline and lives alone. Pt denies LINDA/stairs. He works full time at [employer], was driving prior to admit. He uses a SPC for mobility. Today, patient performed bed mobility with SBA, required Betsy for transfers, and ambulated with RW and Betsy. Balance, strength, and endurance deficits noted. Patient may benefit from skilled PT services acutely to address functional deficits as well as improve level of independence prior to discharge. Anticipate rehab vs home with 24/7 assist upon DC.        Anticipated Discharge Disposition (PT): sub acute care setting, home with 24/7 care, home with home health (pending progress)

## 2023-12-11 NOTE — THERAPY EVALUATION
Patient Name: Watson Lisa  : 1955    MRN: [id number]                              Today's Date: 2023       Admit Date: 2023    Visit Dx:     ICD-10-CM ICD-9-CM   1. Acute alcoholic intoxication without complication  F10.920 305.00     Patient Active Problem List   Diagnosis    Essential hypertension    Tobacco abuse    Hiatal hernia    Effusion of left knee    Osteoarthritis of left knee    Vitamin D deficiency    Anemia, chronic disease    COPD (chronic obstructive pulmonary disease)    LIVAN (obstructive sleep apnea)    ETOH abuse    Obese    Alcoholic liver disease    History of alcohol use disorder    Left patella fracture    Need for hepatitis C screening test    Chronic pain of left ankle    Primary insomnia    Overweight (BMI 25.0-29.9)    Mixed hyperlipidemia    Acute alcohol intoxication    Hypomagnesemia     Past Medical History:   Diagnosis Date    Alcohol abuse     Anxiety     Arthritis     Bronchitis with chronic airway obstruction     LAST FE    DVT (deep venous thrombosis)     Hiatal hernia     Hypertension     Hypertension     Low back pain     Neck pain     Pulmonary embolism     Sleep apnea with use of continuous positive airway pressure (CPAP)     AT NIGHT     Past Surgical History:   Procedure Laterality Date    COLONOSCOPY      JOINT REPLACEMENT Right     hip/knee    REPLACEMENT TOTAL KNEE      TONSILLECTOMY      TOTAL HIP ARTHROPLASTY Right       General Information       Row Name 23 1532          Physical Therapy Time and Intention    Document Type evaluation  -DB     Mode of Treatment individual therapy;physical therapy  -DB       Row Name 23 1532          General Information    Patient Profile Reviewed yes  -DB     Existing Precautions/Restrictions fall;seizures  -DB       Row Name 23 1532          Living Environment    People in Home alone  -DB       Row Name 23 1532          Home Main Entrance    Number of Stairs, Main Entrance none  -DB        Row Name 12/11/23 1532          Stairs Within Home, Primary    Number of Stairs, Within Home, Primary none  -DB       Row Name 12/11/23 1532          Cognition    Orientation Status (Cognition) oriented x 4  -DB       Row Name 12/11/23 1532          Safety Issues, Functional Mobility    Safety Issues Affecting Function (Mobility) at risk behavior observed;awareness of need for assistance;ability to follow commands;insight into deficits/self-awareness;judgment  -DB     Impairments Affecting Function (Mobility) strength;balance;pain;endurance/activity tolerance;range of motion (ROM)  -DB               User Key  (r) = Recorded By, (t) = Taken By, (c) = Cosigned By      Initials Name Provider Type    DB Rachael Acosta PT Physical Therapist                   Mobility       Row Name 12/11/23 1532          Bed Mobility    Bed Mobility supine-sit  -DB     Supine-Sit [place] (Bed Mobility) standby assist;verbal cues  -DB     Assistive Device (Bed Mobility) head of bed elevated;bed rails  -DB       Row Name 12/11/23 1532          Sit-Stand Transfer    Sit-Stand [place] (Transfers) minimum assist (75% patient effort);verbal cues  -DB     Assistive Device (Sit-Stand Transfers) walker, front-wheeled  -DB       Row Name 12/11/23 1532          Gait/Stairs (Locomotion)    [place] Level (Gait) minimum assist (75% patient effort);verbal cues  -DB     Assistive Device (Gait) walker, front-wheeled  -DB     Distance in Feet (Gait) 10 + 10 + 20  -DB     Deviations/Abnormal Patterns (Gait) gait speed decreased;ataxic;stride length decreased;janeth decreased  -DB     Bilateral Gait Deviations forward flexed posture;heel strike decreased  -DB     Left Sided Gait Deviations weight shift ability decreased  -DB               User Key  (r) = Recorded By, (t) = Taken By, (c) = Cosigned By      Initials Name Provider Type    DB Rachael Acosta PT Physical Therapist                   Obj/Interventions       Row Name 12/11/23  1534          Balance    Balance Assessment sitting static balance;sitting dynamic balance;standing static balance;standing dynamic balance  -DB     Static Sitting Balance standby assist  -DB     Dynamic Sitting Balance standby assist  -DB     Position, Sitting Balance unsupported;sitting edge of bed  -DB     Static Standing Balance contact guard  -DB     Dynamic Standing Balance minimal assist  -DB     Position/Device Used, Standing Balance supported;walker, front-wheeled  -DB     Balance Interventions sitting;standing;sit to stand  -DB               User Key  (r) = Recorded By, (t) = Taken By, (c) = Cosigned By      Initials Name Provider Type    DB Rachael Acosta, PT Physical Therapist                   Goals/Plan       Row Name 12/11/23 1536          Bed Mobility Goal 1 (PT)    Activity/Assistive Device (Bed Mobility Goal 1, PT) bed mobility activities, all  -DB     [place] Level/Cues Needed (Bed Mobility Goal 1, PT) supervision required  -DB     Time Frame (Bed Mobility Goal 1, PT) 1 week  -DB       Row Name 12/11/23 1536          Transfer Goal 1 (PT)    Activity/Assistive Device (Transfer Goal 1, PT) transfers, all  -DB     [place] Level/Cues Needed (Transfer Goal 1, PT) supervision required  -DB     Time Frame (Transfer Goal 1, PT) 1 week  -DB       Row Name 12/11/23 1536          Gait Training Goal 1 (PT)    Activity/Assistive Device (Gait Training Goal 1, PT) gait (walking locomotion)  -DB     [place] Level (Gait Training Goal 1, PT) supervision required  -DB     Distance (Gait Training Goal 1, PT) 150  -DB     Time Frame (Gait Training Goal 1, PT) 1 week  -DB       Row Name 12/11/23 1536          Therapy Assessment/Plan (PT)    Planned Therapy Interventions (PT) balance training;bed mobility training;gait training;home exercise program;neuromuscular re-education;patient/family education;strengthening;ROM (range of motion);stair training;postural re-education;transfer training  -DB                User Key  (r) = Recorded By, (t) = Taken By, (c) = Cosigned By      Initials Name Provider Type    DB Rachael Acosta, PT Physical Therapist                   Clinical Impression       Row Name 12/11/23 1534          Pain    Pain Intervention(s) Ambulation/increased activity;Repositioned  -DB       Row Name 12/11/23 1534          Plan of Care Review    Plan of Care Reviewed With patient  -DB     Outcome Evaluation Patient is a 68 y.o. male admitted to [facility] for alcohol withdrawal symptoms on 12/7/2023. PMHx includes HTN, anxiety , alcohol abuse. Pt reports recent fall causing swelling in L knee and difficulty ambulating as a result. Patient is (I) at baseline and lives alone. Pt denies LINDA/stairs. He works full time at [employer], was driving prior to admit. He uses a SPC for mobility. Today, patient performed bed mobility with SBA, required Betsy for transfers, and ambulated with RW and Betsy. Balance, strength, and endurance deficits noted. Patient may benefit from skilled PT services acutely to address functional deficits as well as improve level of independence prior to discharge. Anticipate rehab vs home with 24/7 assist upon DC.  -DB       Row Name 12/11/23 1534          Therapy Assessment/Plan (PT)    Rehab Potential (PT) good, to achieve stated therapy goals  -DB     Criteria for Skilled Interventions Met (PT) yes  -DB     Therapy Frequency (PT) 5 times/wk  -DB       Row Name 12/11/23 1530          Vital Signs    O2 Delivery Pre Treatment room air  -DB     O2 Delivery Intra Treatment room air  -DB     O2 Delivery Post Treatment room air  -DB     Pre Patient Position Supine  -DB     Intra Patient Position Standing  -DB     Post Patient Position Sitting  -DB       Row Name 12/11/23 153          Positioning and Restraints    Pre-Treatment Position in bed  -DB     Post Treatment Position chair  -DB     In Chair reclined;call light within reach;encouraged to call for assist;exit alarm on  -DB               User Key   (r) = Recorded By, (t) = Taken By, (c) = Cosigned By      Initials Name Provider Type    Rachael Shelton, PT Physical Therapist                   Outcome Measures       Row Name 12/11/23 1536 12/11/23 0830       How much help from another person do you currently need...    Turning from your back to your side while in flat bed without using bedrails? 4  -DB 4  -KH    Moving from lying on back to sitting on the side of a flat bed without bedrails? 3  -DB 4  -KH    Moving to and from a bed to a chair (including a wheelchair)? 3  -DB 3  -KH    Standing up from a chair using your arms (e.g., wheelchair, bedside chair)? 3  -DB 3  -KH    Climbing 3-5 steps with a railing? 2  -DB 2  -KH    To walk in hospital room? 3  -DB 3  -KH    AM-PAC 6 Clicks Score (PT) 18  -DB 19  -KH    Highest Level of Mobility Goal 6 --> Walk 10 steps or more  -DB 6 --> Walk 10 steps or more  -KH      Row Name 12/11/23 1536 12/11/23 1206       Functional Assessment    Outcome Measure Options AM-PAC 6 Clicks Basic Mobility (PT)  -DB AM-PAC 6 Clicks Daily Activity (OT)  -SM              User Key  (r) = Recorded By, (t) = Taken By, (c) = Cosigned By      Initials Name Provider Type    Rachael Shelton, PT Physical Therapist    Dorcas Graham, OT Occupational Therapist    Sharona Crespo RN Registered Nurse                                 Physical Therapy Education       Title: PT OT SLP Therapies (In Progress)       Topic: Physical Therapy (In Progress)       Point: Mobility training (In Progress)       Learning Progress Summary             Patient Acceptance, E, NR by DB at 12/11/2023 1537                         Point: Home exercise program (In Progress)       Learning Progress Summary             Patient Acceptance, E, NR by DB at 12/11/2023 1537                         Point: Body mechanics (In Progress)       Learning Progress Summary             Patient Acceptance, E, NR by DB at 12/11/2023 1537                         Point:  Precautions (In Progress)       Learning Progress Summary             Patient Acceptance, E, NR by DB at 12/11/2023 1537                                         User Key       Initials Effective Dates Name Provider Type Discipline    DB 06/16/21 -  Rachael Acosta PT Physical Therapist PT                  PT Recommendation and Plan  Planned Therapy Interventions (PT): balance training, bed mobility training, gait training, home exercise program, neuromuscular re-education, patient/family education, strengthening, ROM (range of motion), stair training, postural re-education, transfer training  Plan of Care Reviewed With: patient  Outcome Evaluation: Patient is a 68 y.o. male admitted to [facility] for alcohol withdrawal symptoms on 12/7/2023. PMHx includes HTN, anxiety , alcohol abuse. Pt reports recent fall causing swelling in L knee and difficulty ambulating as a result. Patient is (I) at baseline and lives alone. Pt denies LINDA/stairs. He works full time at [employer], was driving prior to admit. He uses a SPC for mobility. Today, patient performed bed mobility with SBA, required Betsy for transfers, and ambulated with RW and Betsy. Balance, strength, and endurance deficits noted. Patient may benefit from skilled PT services acutely to address functional deficits as well as improve level of independence prior to discharge. Anticipate rehab vs home with 24/7 assist upon DC.     Time Calculation:         PT Charges       Row Name 12/11/23 1542             Time Calculation    Start Time 1357  -DB      Stop Time 1420  -DB      Time Calculation (min) 23 min  -DB      PT Received On 12/11/23  -DB      PT - Next Appointment 12/12/23  -DB      PT Goal Re-Cert Due Date 12/18/23  -DB         Time Calculation- PT    Total Timed Code Minutes- PT 8 minute(s)  -DB                User Key  (r) = Recorded By, (t) = Taken By, (c) = Cosigned By      Initials Name Provider Type    DB Rachael Acosta PT Physical Therapist                   Therapy Charges for Today       Code Description Service Date Service Provider Modifiers Qty    [id number]  PT EVAL MOD COMPLEXITY 3 12/11/2023 Rachael Acosta, PT GP 1    [id number]  PT THERAPEUTIC ACT EA 15 MIN 12/11/2023 Rachael Acosta, PT GP 1            PT G-Codes  Outcome Measure Options: AM-PAC 6 Clicks Basic Mobility (PT)  AM-PAC 6 Clicks Score (PT): 18  AM-PAC 6 Clicks Score (OT): 19  PT Discharge Summary  Anticipated Discharge Disposition (PT): sub acute care setting, home with 24/7 care, home with home health (pending progress)    Rachael Acosta, PT  12/11/2023

## 2023-12-11 NOTE — PROGRESS NOTES
Name: Watson Lisa ADMIT: 2023   : 1955  PCP: Roc Carroll APRN    MRN: [id number] LOS: 2 days   AGE/SEX: 68 y.o. male  ROOM: [facility]     Subjective   Subjective   Patient anxious, lying in bed.  Tolerating diet.  Some low CIWA scores mostly for anxiety.  Has received 4 mg of IV Versed in the last 24 hours as well as a scheduled phenobarbital.  Still feels like he is withdrawing.    Review of Systems   Constitutional:  Negative for chills and fever.   Respiratory:  Negative for cough and shortness of breath.    Cardiovascular:  Negative for chest pain and palpitations.   Gastrointestinal:  Negative for abdominal pain, diarrhea, nausea and vomiting.     As above     Objective   Objective   Vital Signs  Temp:  [97.9 °F (36.6 °C)-98.2 °F (36.8 °C)] 98.2 °F (36.8 °C)  Heart Rate:  [] 90  Resp:  [16-20] 16  BP: (145-153)/(71-90) 145/79  SpO2:  [93 %-99 %] 98 %  on  Flow (L/min):  [2] 2;   Device (Oxygen Therapy): room air  Body mass index is 28.34 kg/m².  Physical Exam  Vitals and nursing note reviewed.   Constitutional:       General: He is not in acute distress.  Cardiovascular:      Rate and Rhythm: Normal rate and regular rhythm.   Pulmonary:      Effort: Pulmonary effort is normal. No respiratory distress.   Abdominal:      General: Abdomen is flat. There is no distension.      Tenderness: There is no abdominal tenderness.   Musculoskeletal:         General: No swelling or deformity.   Skin:     General: Skin is warm and dry.   Neurological:      General: No focal deficit present.      Mental Status: He is alert and oriented to person, place, and time. Mental status is at baseline.         Results Review     I reviewed the patient's new clinical results.  Results from last 7 days   Lab Units 23  0939 23  0729 23  1741 23  1326   WBC 10*3/mm3 8.30 7.00 8.34 10.93*   HEMOGLOBIN g/dL 12.4* 12.9* 13.7 13.8   PLATELETS 10*3/mm3 147 196 227 235     Results from  "last 7 days   Lab Units 12/09/23  0939 12/08/23  0729 12/07/23  1741 12/07/23  1326   SODIUM mmol/L 134* 136 136 133*   POTASSIUM mmol/L 3.7 3.7 3.8 4.0   CHLORIDE mmol/L 100 100 97* 96*   CO2 mmol/L 22.3 23.6 21.8* 17.2*   BUN mg/dL 11 13 9 10   CREATININE mg/dL 1.17 1.23 0.92 0.92   GLUCOSE mg/dL 121* 128* 93 143*   Estimated Creatinine Clearance: 86 mL/min (by C-G formula based on SCr of 1.17 mg/dL).  Results from last 7 days   Lab Units 12/09/23  0939 12/08/23 0729 12/07/23 1741 12/07/23  1326   ALBUMIN g/dL 3.5 3.8 4.0 4.0   BILIRUBIN mg/dL 1.2 1.1 0.8 0.7   ALK PHOS U/L 71 76 79 81   AST (SGOT) U/L 46* 74* 101* 117*   ALT (SGPT) U/L 52* 66* 81* 91*     Results from last 7 days   Lab Units 12/09/23  0939 12/08/23  1924 12/08/23 0729 12/07/23 1741 12/07/23  1326   CALCIUM mg/dL 8.9  --  8.6 8.3* 8.5*   ALBUMIN g/dL 3.5  --  3.8 4.0 4.0   MAGNESIUM mg/dL 1.8  --  2.3 1.4* 1.5*   PHOSPHORUS mg/dL 2.5 2.0* 1.9* 2.7  --        COVID19   Date Value Ref Range Status   04/15/2022 Not Detected Not Detected - Ref. Range Final   10/07/2021 Not Detected Not Detected - Ref. Range Final     No results found for: \"HGBA1C\", \"POCGLU\"    XR Knee 1 or 2 View Left  Narrative: XR KNEE 1 OR 2 VW LEFT-     INDICATIONS: Trauma.     TECHNIQUE: 2 VIEWS OF THE LEFT KNEE     COMPARISON: 12/28/2022     FINDINGS:     Mild to moderate knee effusion is apparent. No acute fracture, erosion,  or dislocation is identified. Prominent lateral tibiofemoral joint space  narrowing is seen. Moderate degenerative spurring is evident. Arterial  calcifications are noted. Follow-up/further evaluation can be obtained  as indications persist.     Impression:    As described.           This report was finalized on 12/7/2023 2:01 PM by Dr. Donte Hernandez M.D on Workstation: [id number]       I reviewed the patient's daily medications.  Scheduled Medications  arformoterol, 15 mcg, Nebulization, BID - RT   And  tiotropium bromide monohydrate, 2 puff, " Inhalation, Daily - RT  Diclofenac Sodium, 4 g, Topical, 4x Daily  famotidine, 20 mg, Intravenous, Q12H  folic acid, 1 mg, Oral, Daily  hydrocortisone-bacitracin-zinc oxide-nystatin, 1 application , Topical, Q12H  losartan, 100 mg, Oral, Q24H  multivitamin with minerals, 1 tablet, Oral, Daily  Petrolatum, 1 application , Topical, Q12H  PHENobarbital, 32.4 mg, Oral, Once   Followed by  [START ON 12/12/2023] PHENobarbital, 32.4 mg, Oral, Once  senna-docusate sodium, 2 tablet, Oral, BID  sodium chloride, 10 mL, Intravenous, Q12H  thiamine (B-1) IV, 200 mg, Intravenous, Q8H   Followed by  [START ON 12/14/2023] thiamine, 100 mg, Oral, Daily  traZODone, 50 mg, Oral, Nightly  vitamin D, 50,000 Units, Oral, Weekly    Infusions   Diet  Diet: Regular/House Diet; Texture: Regular Texture (IDDSI 7); Fluid Consistency: Thin (IDDSI 0)         I have personally reviewed:  [x]  Laboratory   []  Microbiology   [x]  Radiology   [x]  EKG/Telemetry   []  Cardiology/Vascular   []  Pathology   [x]  Records     Assessment/Plan     Active Hospital Problems    Diagnosis  POA    **Acute alcohol intoxication [F10.929]  Yes    Hypomagnesemia [E83.42]  Yes    History of alcohol use disorder [Z87.898]  Yes    Alcoholic liver disease [K70.9]  Yes    COPD (chronic obstructive pulmonary disease) [J44.9]  Yes    ETOH abuse [F10.10]  Yes    LIVAN (obstructive sleep apnea) [G47.33]  Yes    Anemia, chronic disease [D63.8]  Yes    Essential hypertension [I10]  Yes    Tobacco abuse [Z72.0]  Yes      Resolved Hospital Problems   No resolved problems to display.       68 y.o. male admitted with Acute alcohol intoxication.    Alcohol use disorder with withdrawal  -Continue phenobarb protocol, last dose tomorrow  -Continue CIWA protocol, thiamine, folate  -Start hydroxyzine as needed for anxiety    Left knee pain  -Voltaren, Tylenol.  Ortho planning outpatient follow-up.    Hypertension  -Continue losartan and as needed clonidine    Transaminitis  -Likely from  acute alcohol  -Improving    SCDs for DVT prophylaxis.  Full code.  Discussed with patient, nursing staff, CCP, and care team on multidisciplinary rounds.  Anticipate discharge home in 1-2 days.    Expected Discharge Date: 12/12/2023; Expected Discharge Time:       Tariq Biswas MD  [facility]  12/11/23  12:23 EST

## 2023-12-12 ENCOUNTER — TELEPHONE (OUTPATIENT)
Dept: FAMILY MEDICINE CLINIC | Facility: CLINIC | Age: 68
End: 2023-12-12
Payer: COMMERCIAL

## 2023-12-12 ENCOUNTER — READMISSION MANAGEMENT (OUTPATIENT)
Dept: CALL CENTER | Facility: HOSPITAL | Age: 68
End: 2023-12-12
Payer: COMMERCIAL

## 2023-12-12 VITALS
RESPIRATION RATE: 18 BRPM | SYSTOLIC BLOOD PRESSURE: 145 MMHG | BODY MASS INDEX: 29.11 KG/M2 | WEIGHT: 251.6 LBS | HEART RATE: 97 BPM | HEIGHT: 78 IN | TEMPERATURE: 97.5 F | DIASTOLIC BLOOD PRESSURE: 85 MMHG | OXYGEN SATURATION: 100 %

## 2023-12-12 LAB
ALBUMIN SERPL-MCNC: 3.3 G/DL (ref 3.5–5.2)
ALBUMIN/GLOB SERPL: 1.2 G/DL
ALP SERPL-CCNC: 65 U/L (ref 39–117)
ALT SERPL W P-5'-P-CCNC: 32 U/L (ref 1–41)
ANION GAP SERPL CALCULATED.3IONS-SCNC: 10 MMOL/L (ref 5–15)
AST SERPL-CCNC: 25 U/L (ref 1–40)
BILIRUB SERPL-MCNC: 0.3 MG/DL (ref 0–1.2)
BUN SERPL-MCNC: 15 MG/DL (ref 8–23)
BUN/CREAT SERPL: 15.6 (ref 7–25)
CALCIUM SPEC-SCNC: 8.4 MG/DL (ref 8.6–10.5)
CHLORIDE SERPL-SCNC: 103 MMOL/L (ref 98–107)
CO2 SERPL-SCNC: 23 MMOL/L (ref 22–29)
CREAT SERPL-MCNC: 0.96 MG/DL (ref 0.76–1.27)
DEPRECATED RDW RBC AUTO: 43.2 FL (ref 37–54)
EGFRCR SERPLBLD CKD-EPI 2021: 85.6 ML/MIN/1.73
ERYTHROCYTE [DISTWIDTH] IN BLOOD BY AUTOMATED COUNT: 11.8 % (ref 12.3–15.4)
GLOBULIN UR ELPH-MCNC: 2.7 GM/DL
GLUCOSE SERPL-MCNC: 107 MG/DL (ref 65–99)
HCT VFR BLD AUTO: 32.9 % (ref 37.5–51)
HGB BLD-MCNC: 11.5 G/DL (ref 13–17.7)
MCH RBC QN AUTO: 34.7 PG (ref 26.6–33)
MCHC RBC AUTO-ENTMCNC: 35 G/DL (ref 31.5–35.7)
MCV RBC AUTO: 99.4 FL (ref 79–97)
PLATELET # BLD AUTO: 191 10*3/MM3 (ref 140–450)
PMV BLD AUTO: 9.7 FL (ref 6–12)
POTASSIUM SERPL-SCNC: 3.6 MMOL/L (ref 3.5–5.2)
PROT SERPL-MCNC: 6 G/DL (ref 6–8.5)
RBC # BLD AUTO: 3.31 10*6/MM3 (ref 4.14–5.8)
SODIUM SERPL-SCNC: 136 MMOL/L (ref 136–145)
WBC NRBC COR # BLD AUTO: 6.25 10*3/MM3 (ref 3.4–10.8)

## 2023-12-12 PROCEDURE — 25010000002 THIAMINE PER 100 MG: Performed by: INTERNAL MEDICINE

## 2023-12-12 PROCEDURE — 80053 COMPREHEN METABOLIC PANEL: CPT | Performed by: STUDENT IN AN ORGANIZED HEALTH CARE EDUCATION/TRAINING PROGRAM

## 2023-12-12 PROCEDURE — 97530 THERAPEUTIC ACTIVITIES: CPT | Performed by: PHYSICAL THERAPIST

## 2023-12-12 PROCEDURE — 94799 UNLISTED PULMONARY SVC/PX: CPT

## 2023-12-12 PROCEDURE — 85027 COMPLETE CBC AUTOMATED: CPT | Performed by: STUDENT IN AN ORGANIZED HEALTH CARE EDUCATION/TRAINING PROGRAM

## 2023-12-12 RX ORDER — LOSARTAN POTASSIUM 100 MG/1
100 TABLET ORAL
Qty: 30 TABLET | Refills: 0 | Status: SHIPPED | OUTPATIENT
Start: 2023-12-13 | End: 2024-01-12

## 2023-12-12 RX ORDER — HYDROXYZINE HYDROCHLORIDE 25 MG/1
25 TABLET, FILM COATED ORAL 3 TIMES DAILY PRN
Qty: 60 TABLET | Refills: 0 | Status: SHIPPED | OUTPATIENT
Start: 2023-12-12 | End: 2024-01-11

## 2023-12-12 RX ADMIN — DOCUSATE SODIUM 50MG AND SENNOSIDES 8.6MG 2 TABLET: 8.6; 5 TABLET, FILM COATED ORAL at 09:22

## 2023-12-12 RX ADMIN — FOLIC ACID 1 MG: 1 TABLET ORAL at 09:22

## 2023-12-12 RX ADMIN — DICLOFENAC SODIUM 4 G: 10 GEL TOPICAL at 09:22

## 2023-12-12 RX ADMIN — IPRATROPIUM BROMIDE AND ALBUTEROL SULFATE 3 ML: 2.5; .5 SOLUTION RESPIRATORY (INHALATION) at 07:49

## 2023-12-12 RX ADMIN — PETROLATUM 1 APPLICATION: 420 OINTMENT TOPICAL at 09:23

## 2023-12-12 RX ADMIN — ACETAMINOPHEN 1000 MG: 500 TABLET ORAL at 16:13

## 2023-12-12 RX ADMIN — PHENOBARBITAL 32.4 MG: 32.4 TABLET ORAL at 03:56

## 2023-12-12 RX ADMIN — HYDROXYZINE HYDROCHLORIDE 25 MG: 25 TABLET ORAL at 12:29

## 2023-12-12 RX ADMIN — HYDROXYZINE HYDROCHLORIDE 25 MG: 25 TABLET ORAL at 03:50

## 2023-12-12 RX ADMIN — Medication 10 ML: at 09:23

## 2023-12-12 RX ADMIN — ZINC OXIDE 1 APPLICATION: 200 OINTMENT TOPICAL at 09:23

## 2023-12-12 RX ADMIN — FAMOTIDINE 20 MG: 10 INJECTION INTRAVENOUS at 09:22

## 2023-12-12 RX ADMIN — LOSARTAN POTASSIUM 100 MG: 100 TABLET, FILM COATED ORAL at 09:22

## 2023-12-12 RX ADMIN — DICLOFENAC SODIUM 4 G: 10 GEL TOPICAL at 12:10

## 2023-12-12 RX ADMIN — Medication 1 TABLET: at 09:22

## 2023-12-12 RX ADMIN — ARFORMOTEROL TARTRATE 15 MCG: 15 SOLUTION RESPIRATORY (INHALATION) at 07:51

## 2023-12-12 RX ADMIN — THIAMINE HYDROCHLORIDE 200 MG: 100 INJECTION, SOLUTION INTRAMUSCULAR; INTRAVENOUS at 06:05

## 2023-12-12 RX ADMIN — TIOTROPIUM BROMIDE INHALATION SPRAY 2 PUFF: 3.12 SPRAY, METERED RESPIRATORY (INHALATION) at 07:52

## 2023-12-12 NOTE — OUTREACH NOTE
Prep Survey      Flowsheet Row Responses   [facility] patient discharged from? [place]   Is LACE score < 7 ? Yes  [ETOH abuse]   Eligibility [facility]   Date of Admission 12/07/23   Date of Discharge 12/12/23   Discharge Disposition Home or Self Care   Discharge diagnosis Acute alcohol intoxication   Does the patient have one of the following disease processes/diagnoses(primary or secondary)? Other   Does the patient have Home health ordered? No   Is there a DME ordered? No   Prep survey completed? Yes            Trina MARCH - Registered Nurse

## 2023-12-12 NOTE — DISCHARGE SUMMARY
Patient Name: Watson Lisa  : 1955  MRN: [id number]    Date of Admission: 2023  Date of Discharge:  2023  Primary Care Physician: Roc Carroll APRN      Chief Complaint:   Alcohol Intoxication      Discharge Diagnoses     Active Hospital Problems    Diagnosis  POA    **Acute alcohol intoxication [F10.929]  Yes    Hypomagnesemia [E83.42]  Yes    History of alcohol use disorder [Z87.898]  Yes    Alcoholic liver disease [K70.9]  Yes    COPD (chronic obstructive pulmonary disease) [J44.9]  Yes    ETOH abuse [F10.10]  Yes    LIVAN (obstructive sleep apnea) [G47.33]  Yes    Anemia, chronic disease [D63.8]  Yes    Essential hypertension [I10]  Yes    Tobacco abuse [Z72.0]  Yes      Resolved Hospital Problems   No resolved problems to display.        Hospital Course     Mr. Lisa is a 68 y.o. male with a history of left knee pain from osteoarthritis, alcohol use disorder, hypertension presenting with acute intoxication complicated by withdrawal.  Ortho saw and plan on outpatient follow-up.  Continue Voltaren and Tylenol for left knee pain.  Patient's losartan was increased to 100 mg from 50 mg.  Will need follow-up BMP at his outpatient follow-up appointment.  Had a mild transaminitis from alcohol, resolved by discharge.  Started hydroxyzine as needed for anxiety as it worked well for patient while he was here.  Discussed importance of cessation.  Physical therapy worked with patient, he declines home health.  And only wants physical therapy as an outpatient if Ortho recommends it.    At the time of discharge patient was told to take all medications as prescribed, keep all follow-up appointments, and call their doctor or return to the hospital with any worsening or concerning symptoms.    Day of Discharge     Subjective:  Patient lying comfortably bed.  Does not like he is withdrawing anymore.  States that the hydroxyzine is working well for his anxiety.  He would like it prescription  on discharge.  States his knee pain has improved since we were able  to start Tylenol again.    Review of Systems  Constitutional:  Negative for chills and fever.   Respiratory:  Negative for cough and shortness of breath.    Cardiovascular:  Negative for chest pain and palpitations.   Gastrointestinal:  Negative for abdominal pain, diarrhea, nausea and vomiting.  Physical Exam:  Temp:  [97.2 °F (36.2 °C)-97.7 °F (36.5 °C)] 97.5 °F (36.4 °C)  Heart Rate:  [73-94] 73  Resp:  [16-18] 16  BP: (133-159)/() 159/100  Body mass index is 28.34 kg/m².  Physical Exam  Constitutional:       General: He is not in acute distress.  Cardiovascular:      Rate and Rhythm: Normal rate and regular rhythm.   Pulmonary:      Effort: Pulmonary effort is normal. No respiratory distress.   Abdominal:      General: Abdomen is flat. There is no distension.      Tenderness: There is no abdominal tenderness.   Musculoskeletal:         General: No swelling or deformity.   Skin:     General: Skin is warm and dry.   Neurological:      General: No focal deficit present.      Mental Status: He is alert and oriented to person, place, and time. Mental status is at baseline.   Consultants     Consult Orders (all) (From admission, onward)       Start     Ordered    12/08/23 1208  Inpatient Orthopedic Surgery Consult  Once        Specialty:  Orthopedic Surgery  Provider:  Alexis Tan MD    12/08/23 1208    12/08/23 0559  Inpatient Access [facility] Consult  Once        Provider:  (Not yet assigned)    12/08/23 0558    12/07/23 1659  Inpatient Case Management  Consult  Once        Provider:  (Not yet assigned)    12/07/23 1658    12/07/23 1523  [facility] (on-call MD unless specified) Details  Once        Specialty:  Hospitalist  Provider:  Sean Ricketts MD    12/07/23 1522                  Procedures     Imaging Results (All)       Procedure Component Value Units Date/Time    XR Knee 1 or 2 View Left [[id number]] Collected: 12/07/23 4457      Updated: 12/07/23 1404    Narrative:      XR KNEE 1 OR 2 VW LEFT-     INDICATIONS: Trauma.     TECHNIQUE: 2 VIEWS OF THE LEFT KNEE     COMPARISON: 12/28/2022     FINDINGS:     Mild to moderate knee effusion is apparent. No acute fracture, erosion,  or dislocation is identified. Prominent lateral tibiofemoral joint space  narrowing is seen. Moderate degenerative spurring is evident. Arterial  calcifications are noted. Follow-up/further evaluation can be obtained  as indications persist.       Impression:         As described.           This report was finalized on 12/7/2023 2:01 PM by Dr. Donte Hernandez M.D on Workstation: [id number]               Pertinent Labs     Results from last 7 days   Lab Units 12/12/23  0808 12/09/23  0939 12/08/23  0729 12/07/23  1741   WBC 10*3/mm3 6.25 8.30 7.00 8.34   HEMOGLOBIN g/dL 11.5* 12.4* 12.9* 13.7   PLATELETS 10*3/mm3 191 147 196 227     Results from last 7 days   Lab Units 12/12/23  0808 12/09/23  0939 12/08/23  0729 12/07/23  1741   SODIUM mmol/L 136 134* 136 136   POTASSIUM mmol/L 3.6 3.7 3.7 3.8   CHLORIDE mmol/L 103 100 100 97*   CO2 mmol/L 23.0 22.3 23.6 21.8*   BUN mg/dL 15 11 13 9   CREATININE mg/dL 0.96 1.17 1.23 0.92   GLUCOSE mg/dL 107* 121* 128* 93   Estimated Creatinine Clearance: 104.8 mL/min (by C-G formula based on SCr of 0.96 mg/dL).  Results from last 7 days   Lab Units 12/12/23  0808 12/09/23  0939 12/08/23  0729 12/07/23  1741   ALBUMIN g/dL 3.3* 3.5 3.8 4.0   BILIRUBIN mg/dL 0.3 1.2 1.1 0.8   ALK PHOS U/L 65 71 76 79   AST (SGOT) U/L 25 46* 74* 101*   ALT (SGPT) U/L 32 52* 66* 81*     Results from last 7 days   Lab Units 12/12/23  0808 12/09/23  0939 12/08/23  1924 12/08/23  0729 12/07/23  1741 12/07/23  1326   CALCIUM mg/dL 8.4* 8.9  --  8.6 8.3* 8.5*   ALBUMIN g/dL 3.3* 3.5  --  3.8 4.0 4.0   MAGNESIUM mg/dL  --  1.8  --  2.3 1.4* 1.5*   PHOSPHORUS mg/dL  --  2.5 2.0* 1.9* 2.7  --          Results from last 7 days   Lab Units 12/09/23  0939   URIC ACID  "mg/dL 5.5         Invalid input(s): \"LDLCALC\"        Test Results Pending at Discharge       Discharge Details        Discharge Medications        New Medications        Instructions Start Date   hydrOXYzine 25 MG tablet  Commonly known as: ATARAX   25 mg, Oral, 3 Times Daily PRN             Changes to Medications        Instructions Start Date   losartan 100 MG tablet  Commonly known as: COZAAR  What changed:   medication strength  how much to take   100 mg, Oral, Every 24 Hours Scheduled   Start Date: December 13, 2023            Continue These Medications        Instructions Start Date   albuterol sulfate  (90 Base) MCG/ACT inhaler  Commonly known as: PROVENTIL HFA;VENTOLIN HFA;PROAIR HFA   2 puffs, Inhalation, Every 6 Hours PRN      Anoro Ellipta 62.5-25 MCG/ACT aerosol powder  inhaler  Generic drug: Umeclidinium-Vilanterol   1 puff, Inhalation, Daily - RT      multivitamin with minerals tablet tablet   1 tablet, Oral, Daily             Stop These Medications      fish oil-omega-3 fatty acids 1000 MG capsule     ipratropium-albuterol 0.5-2.5 mg/3 ml nebulizer  Commonly known as: DUO-NEB     traZODone 50 MG tablet  Commonly known as: DESYREL     vitamin D 1.25 MG (29543 UT) capsule capsule  Commonly known as: ERGOCALCIFEROL              Allergies   Allergen Reactions    Penicillins Unknown - Low Severity     Pt does not recall the reaction. Patient tolerated cephalexin 3/2020    Penicillins Other (See Comments)     Unknown          Discharge Disposition:  Home or Self Care    Discharge Diet:  Diet Order   Procedures    Diet: Regular/House Diet; Texture: Regular Texture (IDDSI 7); Fluid Consistency: Thin (IDDSI 0)       Discharge Activity:   As tolerated    CODE STATUS:    Code Status and Medical Interventions:   Ordered at: 12/07/23 1702     Code Status (Patient has no pulse and is not breathing):    CPR (Attempt to Resuscitate)     Medical Interventions (Patient has pulse or is breathing):    Full Support "       Future Appointments   Date Time Provider Department Center   4/29/2024  8:00 AM Roc Carroll APRN [facility]      Follow-up Information       Roc Carroll APRN .    Specialty: Nurse Practitioner  Contact information:  [address] [facility] [address]  [place] [id number] [phone]               Checo Dunham MD .    Specialty: Family Medicine  Contact information:  [address] [facility] [address]  [place] [id number] [phone]                             Time Spent on Discharge:  Greater than 30 minutes      Tariq Biswas MD  [facility]  12/12/23  13:18 EST

## 2023-12-12 NOTE — CASE MANAGEMENT/SOCIAL WORK
Discharge Planning Assessment  [facility]     Patient Name: Watson Lisa  MRN: [id number]  Today's Date: 12/12/2023    Admit Date: 12/7/2023    Plan: Home alone   Discharge Needs Assessment       Row Name 12/12/23 1100       Living Environment    People in Home alone    Current Living Arrangements home    Potentially Unsafe Housing Conditions none    Primary Care Provided by self    Family Caregiver if Needed child(anneliese), adult    Quality of Family Relationships involved;helpful    Able to Return to Prior Arrangements yes       Resource/Environmental Concerns    Resource/Environmental Concerns none       Transition Planning    Patient/Family Anticipates Transition to home    Patient/Family Anticipated Services at Transition none    Transportation Anticipated family or friend will provide       Discharge Needs Assessment    Readmission Within the Last 30 Days no previous admission in last 30 days    Equipment Currently Used at Home cpap;cane, quad    Concerns to be Addressed patient refuses services    Concerns Comments PT recs SNF or HH, pt declined both.    Anticipated Changes Related to Illness none    Equipment Needed After Discharge none                   Discharge Plan       Row Name 12/12/23 1101       Plan    Plan Home alone    Patient/Family in Agreement with Plan yes    Plan Comments Spoke with pt at bedside, introduced self and explained CCP role, face sheet and pharmacy verified. Pt lives alone in 1 level home with no steps to enter. Normally IADL's, he uses cpap, no HH or SNF hx. Therapy recommended SNF or home with assist with HH, pt declined both. He states his son will transport. CCP will follow- Dorcas ROBLEDO                  Continued Care and Services - Admitted Since 12/7/2023    Coordination has not been started for this encounter.       Expected Discharge Date and Time       Expected Discharge Date Expected Discharge Time    Dec 12, 2023            Demographic Summary       Row Name 12/12/23 1100        General Information    Admission Type inpatient                   Functional Status       Row Name 12/12/23 1100       Functional Status    Usual Activity Tolerance excellent    Current Activity Tolerance good       Assessment of Health Literacy    Health Literacy Good       Functional Status, IADL    Medications independent    Meal Preparation independent    Housekeeping independent    Laundry independent    Shopping independent       Mental Status    General Appearance WDL WDL       Mental Status Summary    Recent Changes in Mental Status/Cognitive Functioning no changes                   Psychosocial    No documentation.                  Abuse/Neglect    No documentation.                  Legal       Row Name 12/12/23 1100       Financial/Legal    Who Manages Finances if Patient Unable son                   Substance Abuse    No documentation.                  Patient Forms    No documentation.                     Dorcas Trujillo RN

## 2023-12-12 NOTE — NURSING NOTE
"[facility] follow-up d/t ETOH.     Patient sitting up in bed having breakfast. The patient reported he was doing \"good\" but reported poor sleep r/t anxiety. The patient has Atarax ordered for anxiety. The patient stated he would like this at discharge. The patient was encouraged to talk with the doctor today regarding this. The patient denied issues with ETOH withdrawal. Last CIWA 2. The patient stated he is set for discharge today and he feels ready. The patient denied any other need from [facility]. Access following.   "

## 2023-12-12 NOTE — PLAN OF CARE
Goal Outcome Evaluation:  Plan of Care Reviewed With: patient           Outcome Evaluation: Pt in bed and planning to go home today. he was agreeabel to therapy and ambulated over 100 ft with STC and cga-SBA. Pt reports there are no stairs to enter his apartment. he ambualtes with STC at baseline, but also has a walker if needed. Pt is safe to d/c home. He doesn't want home health, but will do outpt PT for L knee if ortho recommends it after outpt followup. Discussed with Rn and CCP. PT will sign off.      Anticipated Discharge Disposition (PT): home, home with outpatient therapy services

## 2023-12-12 NOTE — THERAPY DISCHARGE NOTE
Patient Name: Watson Lisa  : 1955    MRN: [id number]                              Today's Date: 2023       Admit Date: 2023    Visit Dx:     ICD-10-CM ICD-9-CM   1. Acute alcoholic intoxication without complication  F10.920 305.00     Patient Active Problem List   Diagnosis    Essential hypertension    Tobacco abuse    Hiatal hernia    Effusion of left knee    Osteoarthritis of left knee    Vitamin D deficiency    Anemia, chronic disease    COPD (chronic obstructive pulmonary disease)    LIVAN (obstructive sleep apnea)    ETOH abuse    Obese    Alcoholic liver disease    History of alcohol use disorder    Left patella fracture    Need for hepatitis C screening test    Chronic pain of left ankle    Primary insomnia    Overweight (BMI 25.0-29.9)    Mixed hyperlipidemia    Acute alcohol intoxication    Hypomagnesemia     Past Medical History:   Diagnosis Date    Alcohol abuse     Anxiety     Arthritis     Bronchitis with chronic airway obstruction     LAST FE    DVT (deep venous thrombosis)     Hiatal hernia     Hypertension     Hypertension     Low back pain     Neck pain     Pulmonary embolism     Sleep apnea with use of continuous positive airway pressure (CPAP)     AT NIGHT     Past Surgical History:   Procedure Laterality Date    COLONOSCOPY      JOINT REPLACEMENT Right     hip/knee    REPLACEMENT TOTAL KNEE      TONSILLECTOMY      TOTAL HIP ARTHROPLASTY Right       General Information       Row Name 23 1109          Physical Therapy Time and Intention    Document Type therapy note (daily note)  -JAEL     Mode of Treatment individual therapy;physical therapy  -JAEL               User Key  (r) = Recorded By, (t) = Taken By, (c) = Cosigned By      Initials Name Provider Type    Ivet Abreu, PT Physical Therapist                   Mobility       Row Name 23 1109          Bed Mobility    Bed Mobility supine-sit;sit-supine  -JAEL     Supine-Sit [place] (Bed Mobility)  supervision  -     Sit-Supine [place] (Bed Mobility) supervision  -     Assistive Device (Bed Mobility) head of bed elevated;bed rails  -       Row Name 12/12/23 1109          Sit-Stand Transfer    Sit-Stand [place] (Transfers) standby assist  -     Assistive Device (Sit-Stand Transfers) cane, straight  -       Row Name 12/12/23 1109          Gait/Stairs (Locomotion)    [place] Level (Gait) contact guard;standby assist  -     Assistive Device (Gait) cane, straight  -     Distance in Feet (Gait) 120 ft  -     Deviations/Abnormal Patterns (Gait) antalgic;stride length decreased;gait speed decreased  -     Bilateral Gait Deviations forward flexed posture;heel strike decreased  -               User Key  (r) = Recorded By, (t) = Taken By, (c) = Cosigned By      Initials Name Provider Type    Ivet Abreu, PT Physical Therapist                   Obj/Interventions    No documentation.                  Goals/Plan    No documentation.                  Clinical Impression       Row Name 12/12/23 1110          Pain    Pretreatment Pain Rating 2/10  -     Posttreatment Pain Rating 3/10  -     Pain Location - Side/Orientation Left  -     Pain Location - knee  -     Pain Intervention(s) Repositioned;Rest  -       Row Name 12/12/23 1110          Plan of Care Review    Plan of Care Reviewed With patient  -     Outcome Evaluation Pt in bed and planning to go home today. he was agreeabel to therapy and ambulated over 100 ft with STC and cga-SBA. Pt reports there are no stairs to enter his apartment. he ambualtes with STC at baseline, but also has a walker if needed. Pt is safe to d/c home. He doesn't want home health, but will do outpt PT for L knee if ortho recommends it after outpt followup. Discussed with Rn and CCP. PT will sign off.  -       Row Name 12/12/23 1110          Positioning and Restraints    Pre-Treatment Position in bed  no alarm  -     Post Treatment  Position bed  -KH     In Bed fowlers;call light within reach;encouraged to call for assist;notified nsg  -               User Key  (r) = Recorded By, (t) = Taken By, (c) = Cosigned By      Initials Name Provider Type    Ivet Abreu, PT Physical Therapist                   Outcome Measures       Row Name 12/12/23 1113          How much help from another person do you currently need...    Turning from your back to your side while in flat bed without using bedrails? 4  -KH     Moving from lying on back to sitting on the side of a flat bed without bedrails? 4  -KH     Moving to and from a bed to a chair (including a wheelchair)? 3  -KH     Standing up from a chair using your arms (e.g., wheelchair, bedside chair)? 3  -KH     Climbing 3-5 steps with a railing? 3  -KH     To walk in hospital room? 3  -KH     AM-PAC 6 Clicks Score (PT) 20  -KH     Highest Level of Mobility Goal 6 --> Walk 10 steps or more  -JAEL       Row Name 12/12/23 1113          Functional Assessment    Outcome Measure Options AM-PAC 6 Clicks Basic Mobility (PT)  -               User Key  (r) = Recorded By, (t) = Taken By, (c) = Cosigned By      Initials Name Provider Type    Ivet Abreu, PT Physical Therapist                  Physical Therapy Education       Title: PT OT SLP Therapies (In Progress)       Topic: Physical Therapy (In Progress)       Point: Mobility training (Done)       Learning Progress Summary             Patient Acceptance, E, VU,DU by JAEL at 12/12/2023 1114    Acceptance, E, NR by DB at 12/11/2023 1537                         Point: Home exercise program (Done)       Learning Progress Summary             Patient Acceptance, E, VU,DU by JAEL at 12/12/2023 1114    Acceptance, E, NR by DB at 12/11/2023 1537                         Point: Body mechanics (In Progress)       Learning Progress Summary             Patient Acceptance, E, NR by DB at 12/11/2023 1537                         Point: Precautions  (Done)       Learning Progress Summary             Patient Acceptance, E, VU,DU by  at 12/12/2023 1114    Acceptance, E, NR by  at 12/11/2023 1537                                         User Key       Initials Effective Dates Name Provider Type Discipline     07/11/23 -  Ivet Cohen, PT Physical Therapist PT    [place] 06/16/21 -  Rachael Acosta, PT Physical Therapist PT                  PT Recommendation and Plan     Plan of Care Reviewed With: patient  Outcome Evaluation: Pt in bed and planning to go home today. he was agreeabel to therapy and ambulated over 100 ft with STC and cga-SBA. Pt reports there are no stairs to enter his apartment. he ambualtes with STC at baseline, but also has a walker if needed. Pt is safe to d/c home. He doesn't want home health, but will do outpt PT for L knee if ortho recommends it after outpt followup. Discussed with Rn and CCP. PT will sign off.     Time Calculation:         PT Charges       Row Name 12/12/23 1114             Time Calculation    Start Time 1102  -KH      Stop Time 1112  -KH      Time Calculation (min) 10 min  -KH      PT Received On 12/12/23  -         Time Calculation- PT    Total Timed Code Minutes- PT 10 minute(s)  -KH         Timed Charges    24096 - PT Therapeutic Activity Minutes 10  -KH         Total Minutes    Timed Charges Total Minutes 10  -KH       Total Minutes 10  -KH                User Key  (r) = Recorded By, (t) = Taken By, (c) = Cosigned By      Initials Name Provider Type    Ivet Abreu PT Physical Therapist                  Therapy Charges for Today       Code Description Service Date Service Provider Modifiers Qty    [id number]  PT THERAPEUTIC ACT EA 15 MIN 12/12/2023 Ivet Cohen, PT GP 1            PT G-Codes  Outcome Measure Options: AM-PAC 6 Clicks Basic Mobility (PT)  AM-PAC 6 Clicks Score (PT): 20  AM-PAC 6 Clicks Score (OT): 19    PT Discharge Summary  Anticipated Discharge Disposition  (PT): home, home with outpatient therapy services    Ivet Cohen, PT  12/12/2023        Topical Sulfur Applications Counseling: Topical Sulfur Counseling: Patient counseled that this medication may cause skin irritation or allergic reactions.  In the event of skin irritation, the patient was advised to reduce the amount of the drug applied or use it less frequently.   The patient verbalized understanding of the proper use and possible adverse effects of topical sulfur application.  All of the patient's questions and concerns were addressed.

## 2023-12-12 NOTE — TELEPHONE ENCOUNTER
Patient was informed we saw he was in hospital and discharged so we called to set up a f/u  appt.he said no and refused said didn't need now. Also suggested within 7 days best and to call office if changes mind.

## 2023-12-12 NOTE — PLAN OF CARE
Problem: Adult Inpatient Plan of Care  Goal: Plan of Care Review  Outcome: Ongoing, Progressing  Goal: Patient-Specific Goal (Individualized)  Outcome: Ongoing, Progressing  Goal: Absence of Hospital-Acquired Illness or Injury  Outcome: Ongoing, Progressing  Intervention: Prevent and Manage VTE (Venous Thromboembolism) Risk  Recent Flowsheet Documentation  Taken 12/11/2023 2000 by Charmaine Duffy RN  VTE Prevention/Management: patient refused intervention  Goal: Optimal Comfort and Wellbeing  Outcome: Ongoing, Progressing  Intervention: Monitor Pain and Promote Comfort  Recent Flowsheet Documentation  Taken 12/11/2023 2000 by Charmaine Duffy RN  Pain Management Interventions:   see MAR   quiet environment facilitated   position adjusted   pillow support provided   diversional activity provided  Intervention: Provide Person-Centered Care  Recent Flowsheet Documentation  Taken 12/11/2023 2000 by Charmaine Duffy RN  Trust Relationship/Rapport:   care explained   choices provided   questions answered   emotional support provided   empathic listening provided  Goal: Readiness for Transition of Care  Outcome: Ongoing, Progressing     Problem: Fall Injury Risk  Goal: Absence of Fall and Fall-Related Injury  Outcome: Ongoing, Progressing     Problem: COPD (Chronic Obstructive Pulmonary Disease) Comorbidity  Goal: Maintenance of COPD Symptom Control  Outcome: Ongoing, Progressing     Problem: Hypertension Comorbidity  Goal: Blood Pressure in Desired Range  Outcome: Ongoing, Progressing     Problem: Osteoarthritis Comorbidity  Goal: Maintenance of Osteoarthritis Symptom Control  Outcome: Ongoing, Progressing     Problem: Skin Injury Risk Increased  Goal: Skin Health and Integrity  Outcome: Ongoing, Progressing   Goal Outcome Evaluation:

## 2023-12-13 ENCOUNTER — TRANSITIONAL CARE MANAGEMENT TELEPHONE ENCOUNTER (OUTPATIENT)
Dept: CALL CENTER | Facility: HOSPITAL | Age: 68
End: 2023-12-13
Payer: COMMERCIAL

## 2023-12-13 NOTE — PAYOR COMM NOTE
"Brittany Lisa (68 y.o. Male)        PLEASE SEE ATTACHED DC SUMMARY    REF#[id number]     THANK YOU    ELZA ARCE LPN CCP   Date of Birth   1955    Social Security Number       Address   [address] [place] [address]    Home Phone   [phone]    MRN   [id number]       Sabianist   Presybeterian    Marital Status                               Admission Date   12/7/23    Admission Type   Emergency    Admitting Provider   Sean Ricketts MD    Attending Provider       Department, Room/Bed   [address], [address]       Discharge Date   12/12/2023    Discharge Disposition   Home or Self Care    Discharge Destination                                 Attending Provider: (none)   Allergies: Penicillins, Penicillins    Isolation: None   Infection: None   Code Status: Prior    Ht: 200.7 cm (79\")   Wt: 114 kg (251 lb 9.6 oz)    Admission Cmt: None   Principal Problem: Acute alcohol intoxication [F10.929]                   Active Insurance as of 12/7/2023       Primary Coverage       Payor Plan Insurance Group Employer/Plan Group    [facility] BLUE [facility] BLUE SHIELD PPO [id number]       Payor Plan Address Payor Plan Phone Number Payor Plan Fax Number Effective Dates    [address] [phone]  1/1/2022 - None Entered    [facility] [id number]         Subscriber Name Subscriber Birth Date Member ID       BRITTANY LISA 1955 [id number]               Secondary Coverage       Payor Plan Insurance Group Employer/Plan Group    MEDICARE MEDICARE A ONLY        Payor Plan Address Payor Plan Phone Number Payor Plan Fax Number Effective Dates    [address] [phone]  9/1/2019 - None Entered    [facility] [address]         Subscriber Name Subscriber Birth Date Member ID       BRITTANY LISA 1955 [id number]                     Emergency Contacts        (Rel.) Home Phone Work Phone Mobile Phone    TEJAS LISA (Son) [phone] -- [phone] "    Jorge A Nielson (Sister) [phone] -- --                 Discharge Summary        Tariq Biswas MD at 23 1866              Patient Name: Watson Lisa  : 1955  MRN: [id number]    Date of Admission: 2023  Date of Discharge:  2023  Primary Care Physician: Roc Carroll APRN      Chief Complaint:   Alcohol Intoxication      Discharge Diagnoses     Active Hospital Problems    Diagnosis  POA    **Acute alcohol intoxication [F10.929]  Yes    Hypomagnesemia [E83.42]  Yes    History of alcohol use disorder [Z87.898]  Yes    Alcoholic liver disease [K70.9]  Yes    COPD (chronic obstructive pulmonary disease) [J44.9]  Yes    ETOH abuse [F10.10]  Yes    LIVAN (obstructive sleep apnea) [G47.33]  Yes    Anemia, chronic disease [D63.8]  Yes    Essential hypertension [I10]  Yes    Tobacco abuse [Z72.0]  Yes      Resolved Hospital Problems   No resolved problems to display.        Hospital Course     Mr. Lisa is a 68 y.o. male with a history of left knee pain from osteoarthritis, alcohol use disorder, hypertension presenting with acute intoxication complicated by withdrawal.  Ortho saw and plan on outpatient follow-up.  Continue Voltaren and Tylenol for left knee pain.  Patient's losartan was increased to 100 mg from 50 mg.  Will need follow-up BMP at his outpatient follow-up appointment.  Had a mild transaminitis from alcohol, resolved by discharge.  Started hydroxyzine as needed for anxiety as it worked well for patient while he was here.  Discussed importance of cessation.  Physical therapy worked with patient, he declines home health.  And only wants physical therapy as an outpatient if Ortho recommends it.    At the time of discharge patient was told to take all medications as prescribed, keep all follow-up appointments, and call their doctor or return to the hospital with any worsening or concerning symptoms.    Day of Discharge     Subjective:  Patient lying comfortably bed.  Does  not like he is withdrawing anymore.  States that the hydroxyzine is working well for his anxiety.  He would like it prescription on discharge.  States his knee pain has improved since we were able  to start Tylenol again.    Review of Systems  Constitutional:  Negative for chills and fever.   Respiratory:  Negative for cough and shortness of breath.    Cardiovascular:  Negative for chest pain and palpitations.   Gastrointestinal:  Negative for abdominal pain, diarrhea, nausea and vomiting.  Physical Exam:  Temp:  [97.2 °F (36.2 °C)-97.7 °F (36.5 °C)] 97.5 °F (36.4 °C)  Heart Rate:  [73-94] 73  Resp:  [16-18] 16  BP: (133-159)/() 159/100  Body mass index is 28.34 kg/m².  Physical Exam  Constitutional:       General: He is not in acute distress.  Cardiovascular:      Rate and Rhythm: Normal rate and regular rhythm.   Pulmonary:      Effort: Pulmonary effort is normal. No respiratory distress.   Abdominal:      General: Abdomen is flat. There is no distension.      Tenderness: There is no abdominal tenderness.   Musculoskeletal:         General: No swelling or deformity.   Skin:     General: Skin is warm and dry.   Neurological:      General: No focal deficit present.      Mental Status: He is alert and oriented to person, place, and time. Mental status is at baseline.   Consultants     Consult Orders (all) (From admission, onward)       Start     Ordered    12/08/23 1208  Inpatient Orthopedic Surgery Consult  Once        Specialty:  Orthopedic Surgery  Provider:  Alexis Tan MD    12/08/23 1208    12/08/23 0559  Inpatient Access Center Consult  Once        Provider:  (Not yet assigned)    12/08/23 0558    12/07/23 1659  Inpatient Case Management  Consult  Once        Provider:  (Not yet assigned)    12/07/23 1658    12/07/23 1523  [facility] (on-call MD unless specified) Details  Once        Specialty:  Hospitalist  Provider:  Sean Ricketts MD    12/07/23 1522                  Procedures     Imaging  Results (All)       Procedure Component Value Units Date/Time    XR Knee 1 or 2 View Left [[id number]] Collected: 12/07/23 1359     Updated: 12/07/23 1404    Narrative:      XR KNEE 1 OR 2 VW LEFT-     INDICATIONS: Trauma.     TECHNIQUE: 2 VIEWS OF THE LEFT KNEE     COMPARISON: 12/28/2022     FINDINGS:     Mild to moderate knee effusion is apparent. No acute fracture, erosion,  or dislocation is identified. Prominent lateral tibiofemoral joint space  narrowing is seen. Moderate degenerative spurring is evident. Arterial  calcifications are noted. Follow-up/further evaluation can be obtained  as indications persist.       Impression:         As described.           This report was finalized on 12/7/2023 2:01 PM by Dr. Donte Hernandez M.D on Workstation: [employer]               Pertinent Labs     Results from last 7 days   Lab Units 12/12/23  0808 12/09/23  0939 12/08/23  0729 12/07/23  1741   WBC 10*3/mm3 6.25 8.30 7.00 8.34   HEMOGLOBIN g/dL 11.5* 12.4* 12.9* 13.7   PLATELETS 10*3/mm3 191 147 196 227     Results from last 7 days   Lab Units 12/12/23  0808 12/09/23  0939 12/08/23  0729 12/07/23  1741   SODIUM mmol/L 136 134* 136 136   POTASSIUM mmol/L 3.6 3.7 3.7 3.8   CHLORIDE mmol/L 103 100 100 97*   CO2 mmol/L 23.0 22.3 23.6 21.8*   BUN mg/dL 15 11 13 9   CREATININE mg/dL 0.96 1.17 1.23 0.92   GLUCOSE mg/dL 107* 121* 128* 93   Estimated Creatinine Clearance: 104.8 mL/min (by C-G formula based on SCr of 0.96 mg/dL).  Results from last 7 days   Lab Units 12/12/23  0808 12/09/23  0939 12/08/23  0729 12/07/23  1741   ALBUMIN g/dL 3.3* 3.5 3.8 4.0   BILIRUBIN mg/dL 0.3 1.2 1.1 0.8   ALK PHOS U/L 65 71 76 79   AST (SGOT) U/L 25 46* 74* 101*   ALT (SGPT) U/L 32 52* 66* 81*     Results from last 7 days   Lab Units 12/12/23  0808 12/09/23  0939 12/08/23  1924 12/08/23  0729 12/07/23  1741 12/07/23  1326   CALCIUM mg/dL 8.4* 8.9  --  8.6 8.3* 8.5*   ALBUMIN g/dL 3.3* 3.5  --  3.8 4.0 4.0   MAGNESIUM mg/dL  --  1.8  --  2.3  "1.4* 1.5*   PHOSPHORUS mg/dL  --  2.5 2.0* 1.9* 2.7  --          Results from last 7 days   Lab Units 12/09/23  0939   URIC ACID mg/dL 5.5         Invalid input(s): \"LDLCALC\"        Test Results Pending at Discharge       Discharge Details        Discharge Medications        New Medications        Instructions Start Date   hydrOXYzine 25 MG tablet  Commonly known as: ATARAX   25 mg, Oral, 3 Times Daily PRN             Changes to Medications        Instructions Start Date   losartan 100 MG tablet  Commonly known as: COZAAR  What changed:   medication strength  how much to take   100 mg, Oral, Every 24 Hours Scheduled   Start Date: December 13, 2023            Continue These Medications        Instructions Start Date   albuterol sulfate  (90 Base) MCG/ACT inhaler  Commonly known as: PROVENTIL HFA;VENTOLIN HFA;PROAIR HFA   2 puffs, Inhalation, Every 6 Hours PRN      Anoro Ellipta 62.5-25 MCG/ACT aerosol powder  inhaler  Generic drug: Umeclidinium-Vilanterol   1 puff, Inhalation, Daily - RT      multivitamin with minerals tablet tablet   1 tablet, Oral, Daily             Stop These Medications      fish oil-omega-3 fatty acids 1000 MG capsule     ipratropium-albuterol 0.5-2.5 mg/3 ml nebulizer  Commonly known as: DUO-NEB     traZODone 50 MG tablet  Commonly known as: DESYREL     vitamin D 1.25 MG (24573 UT) capsule capsule  Commonly known as: ERGOCALCIFEROL              Allergies   Allergen Reactions    Penicillins Unknown - Low Severity     Pt does not recall the reaction. Patient tolerated cephalexin 3/2020    Penicillins Other (See Comments)     Unknown          Discharge Disposition:  Home or Self Care    Discharge Diet:  Diet Order   Procedures    Diet: Regular/House Diet; Texture: Regular Texture (IDDSI 7); Fluid Consistency: Thin (IDDSI 0)       Discharge Activity:   As tolerated    CODE STATUS:    Code Status and Medical Interventions:   Ordered at: 12/07/23 1702     Code Status (Patient has no pulse and " is not breathing):    CPR (Attempt to Resuscitate)     Medical Interventions (Patient has pulse or is breathing):    Full Support       Future Appointments   Date Time Provider Department Center   4/29/2024  8:00 AM Roc Carroll APRN [facility]      Follow-up Information       Roc Carroll APRN .    Specialty: Nurse Practitioner  Contact information:  [address] [facility] [address]  [facility] [address]  [phone]               Checo Dunham MD .    Specialty: Family Medicine  Contact information:  [address] [facility] [address]  [facility] [address]  [phone]                             Time Spent on Discharge:  Greater than 30 minutes      Ravindra Callejas MD  [facility]  12/12/23  13:18 EST                Electronically signed by Ravindra Callejas MD at 12/12/23 1320       Discharge Order (From admission, onward)       Start     Ordered    12/12/23 0837  Discharge patient  Once        Expected Discharge Date: 12/12/23   Discharge Disposition: Home or Self Care   Physician of Record for Attribution - Please select from Treatment Team: RAVINDRA CALLEJAS [[id number]]   Review needed by CMO to determine Physician of Record: No      Question Answer Comment   Physician of Record for Attribution - Please select from Treatment Team RAVINDRA CALLEJAS    Review needed by CMO to determine Physician of Record No        12/12/23 0811

## 2023-12-13 NOTE — OUTREACH NOTE
Call Center TCM Note      Flowsheet Row Responses   [facility] patient discharged from? [place]   Does the patient have one of the following disease processes/diagnoses(primary or secondary)? Other   TCM attempt successful? No   Unsuccessful attempts Attempt 1  [bh verbal outdated]            Trina Mnauel RN    12/13/2023, 14:28 EST

## 2023-12-13 NOTE — OUTREACH NOTE
Call Center TCM Note      Flowsheet Row Responses   [facility] patient discharged from? [place]   Does the patient have one of the following disease processes/diagnoses(primary or secondary)? Other   TCM attempt successful? No   Unsuccessful attempts Attempt 2  [bh verbal outdated.]            Trina Manuel RN    12/13/2023, 14:42 EST

## 2023-12-13 NOTE — CASE MANAGEMENT/SOCIAL WORK
Case Management Discharge Note      Final Note: home no needs         Selected Continued Care - Discharged on 12/12/2023 Admission date: 12/7/2023 - Discharge disposition: Home or Self Care      Destination    No services have been selected for the patient.                Durable Medical Equipment    No services have been selected for the patient.                Dialysis/Infusion    No services have been selected for the patient.                Home Medical Care    No services have been selected for the patient.                Therapy    No services have been selected for the patient.                Community Resources    No services have been selected for the patient.                Community & DME    No services have been selected for the patient.                    Transportation Services  Private: Car    Final Discharge Disposition Code: 01 - home or self-care

## 2023-12-14 ENCOUNTER — TRANSITIONAL CARE MANAGEMENT TELEPHONE ENCOUNTER (OUTPATIENT)
Dept: CALL CENTER | Facility: HOSPITAL | Age: 68
End: 2023-12-14
Payer: COMMERCIAL

## 2023-12-14 NOTE — OUTREACH NOTE
Call Center TCM Note      Flowsheet Row Responses   [facility] patient discharged from? [place]   Does the patient have one of the following disease processes/diagnoses(primary or secondary)? Other   TCM attempt successful? No   Unsuccessful attempts Attempt 3            Emma Santa RN    12/14/2023, 10:58 EST

## 2023-12-14 NOTE — PROGRESS NOTES
PER NOTE IN CHART-     Saige Baker, MA 12/12/23  1:43 PM    Patient was informed we saw he was in hospital and discharged so we called to set up a f/u  appt. He said no and refused said didn't need now. Also suggested within 7 days best and to call office if changes mind.

## 2024-04-02 NOTE — NURSING NOTE
"Access Center follow-up d/t ETOH. Chart reviewed. Report received from RN.     Upon entering room patient is RIB with nurse at bedside. When asked how he was he doing the patient stated \"I'm hanging in there.\" The patient reported poor sleep d/t having several bowel movements throughout the night. The patient voiced weakness and frustration about being non-ambulatory at this time, stating \"I'm stuck here, it's horrible.\"  The patient reported good appetite. The patient stated previously he was having hallucinations but denied any current hallucinations. The patient reported \"shakes\" as his ETOH withdrawal symptom. CIWA 5 @ 0600 but up to 19 @ 0916. Rhe patient rated anxiety 7/10 and depression 5/10. Denied SI. The patient denied any need for resources for outpatient psychiatry. Access will follow.   " good balance

## 2024-04-06 ENCOUNTER — HOSPITAL ENCOUNTER (INPATIENT)
Facility: HOSPITAL | Age: 69
LOS: 18 days | Discharge: HOME HEALTH | End: 2024-04-24
Attending: EMERGENCY MEDICINE | Admitting: INTERNAL MEDICINE
Payer: COMMERCIAL

## 2024-04-06 DIAGNOSIS — J69.0 ASPIRATION PNEUMONIA OF BOTH LUNGS DUE TO VOMIT, UNSPECIFIED PART OF LUNG: ICD-10-CM

## 2024-04-06 DIAGNOSIS — F10.930 ALCOHOL WITHDRAWAL SYNDROME WITHOUT COMPLICATION: Primary | ICD-10-CM

## 2024-04-06 DIAGNOSIS — E83.42 HYPOMAGNESEMIA: ICD-10-CM

## 2024-04-06 PROBLEM — F10.939 ALCOHOL WITHDRAWAL: Status: ACTIVE | Noted: 2024-04-06

## 2024-04-06 PROBLEM — F14.10 COCAINE ABUSE: Status: ACTIVE | Noted: 2024-04-06

## 2024-04-06 PROBLEM — F19.10 SUBSTANCE ABUSE: Status: ACTIVE | Noted: 2024-04-06

## 2024-04-06 PROBLEM — R82.5 POSITIVE URINE DRUG SCREEN: Status: ACTIVE | Noted: 2024-04-06

## 2024-04-06 LAB
ALBUMIN SERPL-MCNC: 3.8 G/DL (ref 3.5–5.2)
ALBUMIN/GLOB SERPL: 1.1 G/DL
ALP SERPL-CCNC: 80 U/L (ref 39–117)
ALT SERPL W P-5'-P-CCNC: 21 U/L (ref 1–41)
AMPHET+METHAMPHET UR QL: NEGATIVE
ANION GAP SERPL CALCULATED.3IONS-SCNC: 14 MMOL/L (ref 5–15)
AST SERPL-CCNC: 22 U/L (ref 1–40)
BARBITURATES UR QL SCN: NEGATIVE
BASOPHILS # BLD AUTO: 0.03 10*3/MM3 (ref 0–0.2)
BASOPHILS NFR BLD AUTO: 0.4 % (ref 0–1.5)
BENZODIAZ UR QL SCN: NEGATIVE
BILIRUB SERPL-MCNC: 0.5 MG/DL (ref 0–1.2)
BUN SERPL-MCNC: 21 MG/DL (ref 8–23)
BUN/CREAT SERPL: 18.6 (ref 7–25)
CALCIUM SPEC-SCNC: 8.9 MG/DL (ref 8.6–10.5)
CANNABINOIDS SERPL QL: NEGATIVE
CHLORIDE SERPL-SCNC: 95 MMOL/L (ref 98–107)
CO2 SERPL-SCNC: 23 MMOL/L (ref 22–29)
COCAINE UR QL: POSITIVE
CREAT SERPL-MCNC: 1.13 MG/DL (ref 0.76–1.27)
DEPRECATED RDW RBC AUTO: 39.6 FL (ref 37–54)
EGFRCR SERPLBLD CKD-EPI 2021: 70.4 ML/MIN/1.73
EOSINOPHIL # BLD AUTO: 0.02 10*3/MM3 (ref 0–0.4)
EOSINOPHIL NFR BLD AUTO: 0.3 % (ref 0.3–6.2)
ERYTHROCYTE [DISTWIDTH] IN BLOOD BY AUTOMATED COUNT: 11.7 % (ref 12.3–15.4)
ETHANOL BLD-MCNC: <10 MG/DL (ref 0–10)
ETHANOL UR QL: <0.01 %
FENTANYL UR-MCNC: NEGATIVE NG/ML
GLOBULIN UR ELPH-MCNC: 3.6 GM/DL
GLUCOSE SERPL-MCNC: 105 MG/DL (ref 65–99)
HCT VFR BLD AUTO: 36.4 % (ref 37.5–51)
HGB BLD-MCNC: 12.5 G/DL (ref 13–17.7)
IMM GRANULOCYTES # BLD AUTO: 0.06 10*3/MM3 (ref 0–0.05)
IMM GRANULOCYTES NFR BLD AUTO: 0.8 % (ref 0–0.5)
LYMPHOCYTES # BLD AUTO: 0.58 10*3/MM3 (ref 0.7–3.1)
LYMPHOCYTES NFR BLD AUTO: 7.5 % (ref 19.6–45.3)
MAGNESIUM SERPL-MCNC: 1.4 MG/DL (ref 1.6–2.4)
MCH RBC QN AUTO: 32.3 PG (ref 26.6–33)
MCHC RBC AUTO-ENTMCNC: 34.3 G/DL (ref 31.5–35.7)
MCV RBC AUTO: 94.1 FL (ref 79–97)
METHADONE UR QL SCN: NEGATIVE
MONOCYTES # BLD AUTO: 1.45 10*3/MM3 (ref 0.1–0.9)
MONOCYTES NFR BLD AUTO: 18.7 % (ref 5–12)
NEUTROPHILS NFR BLD AUTO: 5.61 10*3/MM3 (ref 1.7–7)
NEUTROPHILS NFR BLD AUTO: 72.3 % (ref 42.7–76)
NRBC BLD AUTO-RTO: 0 /100 WBC (ref 0–0.2)
OPIATES UR QL: POSITIVE
OXYCODONE UR QL SCN: NEGATIVE
PHOSPHATE SERPL-MCNC: 3.2 MG/DL (ref 2.5–4.5)
PLATELET # BLD AUTO: 208 10*3/MM3 (ref 140–450)
PMV BLD AUTO: 9.3 FL (ref 6–12)
POTASSIUM SERPL-SCNC: 4.1 MMOL/L (ref 3.5–5.2)
PROT SERPL-MCNC: 7.4 G/DL (ref 6–8.5)
RBC # BLD AUTO: 3.87 10*6/MM3 (ref 4.14–5.8)
SODIUM SERPL-SCNC: 132 MMOL/L (ref 136–145)
WBC NRBC COR # BLD AUTO: 7.75 10*3/MM3 (ref 3.4–10.8)

## 2024-04-06 PROCEDURE — 83735 ASSAY OF MAGNESIUM: CPT | Performed by: EMERGENCY MEDICINE

## 2024-04-06 PROCEDURE — 99291 CRITICAL CARE FIRST HOUR: CPT

## 2024-04-06 PROCEDURE — 80307 DRUG TEST PRSMV CHEM ANLYZR: CPT | Performed by: EMERGENCY MEDICINE

## 2024-04-06 PROCEDURE — 84100 ASSAY OF PHOSPHORUS: CPT | Performed by: EMERGENCY MEDICINE

## 2024-04-06 PROCEDURE — 25010000002 LORAZEPAM PER 2 MG: Performed by: EMERGENCY MEDICINE

## 2024-04-06 PROCEDURE — 85025 COMPLETE CBC W/AUTO DIFF WBC: CPT | Performed by: EMERGENCY MEDICINE

## 2024-04-06 PROCEDURE — 25010000002 LORAZEPAM PER 2 MG: Performed by: INTERNAL MEDICINE

## 2024-04-06 PROCEDURE — 25010000002 THIAMINE PER 100 MG: Performed by: INTERNAL MEDICINE

## 2024-04-06 PROCEDURE — HZ2ZZZZ DETOXIFICATION SERVICES FOR SUBSTANCE ABUSE TREATMENT: ICD-10-PCS | Performed by: INTERNAL MEDICINE

## 2024-04-06 PROCEDURE — 25010000002 THIAMINE PER 100 MG: Performed by: EMERGENCY MEDICINE

## 2024-04-06 PROCEDURE — 25810000003 SODIUM CHLORIDE 0.9 % SOLUTION: Performed by: EMERGENCY MEDICINE

## 2024-04-06 PROCEDURE — 80053 COMPREHEN METABOLIC PANEL: CPT | Performed by: EMERGENCY MEDICINE

## 2024-04-06 PROCEDURE — 82077 ASSAY SPEC XCP UR&BREATH IA: CPT | Performed by: EMERGENCY MEDICINE

## 2024-04-06 PROCEDURE — 25010000002 MAGNESIUM SULFATE 2 GM/50ML SOLUTION: Performed by: EMERGENCY MEDICINE

## 2024-04-06 PROCEDURE — 25010000002 SODIUM CHLORIDE 0.9 % WITH KCL 20 MEQ 20-0.9 MEQ/L-% SOLUTION: Performed by: INTERNAL MEDICINE

## 2024-04-06 RX ORDER — BISACODYL 5 MG/1
5 TABLET, DELAYED RELEASE ORAL DAILY PRN
Status: DISCONTINUED | OUTPATIENT
Start: 2024-04-06 | End: 2024-04-11

## 2024-04-06 RX ORDER — LORAZEPAM 1 MG/1
1 TABLET ORAL
Status: DISCONTINUED | OUTPATIENT
Start: 2024-04-06 | End: 2024-04-14

## 2024-04-06 RX ORDER — PHENOBARBITAL 32.4 MG/1
32.4 TABLET ORAL ONCE
Status: DISCONTINUED | OUTPATIENT
Start: 2024-04-09 | End: 2024-04-08

## 2024-04-06 RX ORDER — LORAZEPAM 1 MG/1
2 TABLET ORAL EVERY 6 HOURS
Status: DISPENSED | OUTPATIENT
Start: 2024-04-06 | End: 2024-04-07

## 2024-04-06 RX ORDER — AMOXICILLIN 250 MG
2 CAPSULE ORAL 2 TIMES DAILY PRN
Status: DISCONTINUED | OUTPATIENT
Start: 2024-04-06 | End: 2024-04-11

## 2024-04-06 RX ORDER — LOSARTAN POTASSIUM 100 MG/1
100 TABLET ORAL
Status: DISCONTINUED | OUTPATIENT
Start: 2024-04-06 | End: 2024-04-07

## 2024-04-06 RX ORDER — SODIUM CHLORIDE 0.9 % (FLUSH) 0.9 %
10 SYRINGE (ML) INJECTION AS NEEDED
Status: DISCONTINUED | OUTPATIENT
Start: 2024-04-06 | End: 2024-04-24 | Stop reason: HOSPADM

## 2024-04-06 RX ORDER — MAGNESIUM SULFATE HEPTAHYDRATE 40 MG/ML
2 INJECTION, SOLUTION INTRAVENOUS
Status: DISPENSED | OUTPATIENT
Start: 2024-04-06 | End: 2024-04-06

## 2024-04-06 RX ORDER — THIAMINE HYDROCHLORIDE 100 MG/ML
200 INJECTION, SOLUTION INTRAMUSCULAR; INTRAVENOUS EVERY 8 HOURS SCHEDULED
Status: COMPLETED | OUTPATIENT
Start: 2024-04-06 | End: 2024-04-11

## 2024-04-06 RX ORDER — SODIUM CHLORIDE 9 MG/ML
40 INJECTION, SOLUTION INTRAVENOUS AS NEEDED
Status: DISCONTINUED | OUTPATIENT
Start: 2024-04-06 | End: 2024-04-21

## 2024-04-06 RX ORDER — POLYETHYLENE GLYCOL 3350 17 G/17G
17 POWDER, FOR SOLUTION ORAL DAILY PRN
Status: DISCONTINUED | OUTPATIENT
Start: 2024-04-06 | End: 2024-04-11

## 2024-04-06 RX ORDER — ONDANSETRON 2 MG/ML
4 INJECTION INTRAMUSCULAR; INTRAVENOUS EVERY 6 HOURS PRN
Status: DISCONTINUED | OUTPATIENT
Start: 2024-04-06 | End: 2024-04-24 | Stop reason: HOSPADM

## 2024-04-06 RX ORDER — NICOTINE 21 MG/24HR
1 PATCH, TRANSDERMAL 24 HOURS TRANSDERMAL EVERY 24 HOURS
Status: DISCONTINUED | OUTPATIENT
Start: 2024-04-06 | End: 2024-04-21

## 2024-04-06 RX ORDER — LORAZEPAM 1 MG/1
2 TABLET ORAL
Status: DISCONTINUED | OUTPATIENT
Start: 2024-04-06 | End: 2024-04-14

## 2024-04-06 RX ORDER — LORAZEPAM 2 MG/ML
2 INJECTION INTRAMUSCULAR
Status: DISCONTINUED | OUTPATIENT
Start: 2024-04-06 | End: 2024-04-14

## 2024-04-06 RX ORDER — PHENOBARBITAL SODIUM 65 MG/ML
65 INJECTION, SOLUTION INTRAMUSCULAR; INTRAVENOUS ONCE
Status: COMPLETED | OUTPATIENT
Start: 2024-04-07 | End: 2024-04-07

## 2024-04-06 RX ORDER — ARFORMOTEROL TARTRATE 15 UG/2ML
15 SOLUTION RESPIRATORY (INHALATION)
Status: DISCONTINUED | OUTPATIENT
Start: 2024-04-06 | End: 2024-04-08

## 2024-04-06 RX ORDER — LORAZEPAM 2 MG/ML
2 INJECTION INTRAMUSCULAR ONCE
Status: COMPLETED | OUTPATIENT
Start: 2024-04-06 | End: 2024-04-06

## 2024-04-06 RX ORDER — BISACODYL 10 MG
10 SUPPOSITORY, RECTAL RECTAL DAILY PRN
Status: DISCONTINUED | OUTPATIENT
Start: 2024-04-06 | End: 2024-04-11

## 2024-04-06 RX ORDER — FOLIC ACID 1 MG/1
1 TABLET ORAL DAILY
Status: DISCONTINUED | OUTPATIENT
Start: 2024-04-06 | End: 2024-04-08

## 2024-04-06 RX ORDER — PHENOBARBITAL SODIUM 65 MG/ML
65 INJECTION, SOLUTION INTRAMUSCULAR; INTRAVENOUS ONCE
Status: COMPLETED | OUTPATIENT
Start: 2024-04-08 | End: 2024-04-08

## 2024-04-06 RX ORDER — SODIUM CHLORIDE 0.9 % (FLUSH) 0.9 %
10 SYRINGE (ML) INJECTION EVERY 12 HOURS SCHEDULED
Status: DISCONTINUED | OUTPATIENT
Start: 2024-04-06 | End: 2024-04-21

## 2024-04-06 RX ORDER — LORAZEPAM 2 MG/ML
1 INJECTION INTRAMUSCULAR
Status: DISCONTINUED | OUTPATIENT
Start: 2024-04-06 | End: 2024-04-14

## 2024-04-06 RX ORDER — LORAZEPAM 1 MG/1
1 TABLET ORAL EVERY 6 HOURS
Status: DISCONTINUED | OUTPATIENT
Start: 2024-04-07 | End: 2024-04-08

## 2024-04-06 RX ORDER — SODIUM CHLORIDE AND POTASSIUM CHLORIDE 150; 900 MG/100ML; MG/100ML
150 INJECTION, SOLUTION INTRAVENOUS CONTINUOUS
Status: DISCONTINUED | OUTPATIENT
Start: 2024-04-06 | End: 2024-04-10

## 2024-04-06 RX ORDER — MULTIPLE VITAMINS W/ MINERALS TAB 9MG-400MCG
1 TAB ORAL DAILY
Status: DISCONTINUED | OUTPATIENT
Start: 2024-04-06 | End: 2024-04-08

## 2024-04-06 RX ORDER — FAMOTIDINE 10 MG/ML
20 INJECTION, SOLUTION INTRAVENOUS EVERY 12 HOURS SCHEDULED
Status: DISCONTINUED | OUTPATIENT
Start: 2024-04-06 | End: 2024-04-08

## 2024-04-06 RX ORDER — SODIUM CHLORIDE 0.9 % (FLUSH) 0.9 %
10 SYRINGE (ML) INJECTION AS NEEDED
Status: DISCONTINUED | OUTPATIENT
Start: 2024-04-06 | End: 2024-04-21

## 2024-04-06 RX ORDER — NITROGLYCERIN 0.4 MG/1
0.4 TABLET SUBLINGUAL
Status: DISCONTINUED | OUTPATIENT
Start: 2024-04-06 | End: 2024-04-24 | Stop reason: HOSPADM

## 2024-04-06 RX ORDER — PHENOBARBITAL 32.4 MG/1
32.4 TABLET ORAL ONCE
Status: DISCONTINUED | OUTPATIENT
Start: 2024-04-08 | End: 2024-04-08

## 2024-04-06 RX ADMIN — SODIUM CHLORIDE 1000 ML: 9 INJECTION, SOLUTION INTRAVENOUS at 11:46

## 2024-04-06 RX ADMIN — POTASSIUM CHLORIDE AND SODIUM CHLORIDE 125 ML/HR: 900; 150 INJECTION, SOLUTION INTRAVENOUS at 18:45

## 2024-04-06 RX ADMIN — FOLIC ACID 1 MG: 1 TABLET ORAL at 20:02

## 2024-04-06 RX ADMIN — THIAMINE HYDROCHLORIDE 200 MG: 100 INJECTION, SOLUTION INTRAMUSCULAR; INTRAVENOUS at 22:12

## 2024-04-06 RX ADMIN — MAGNESIUM SULFATE HEPTAHYDRATE 2 G: 40 INJECTION, SOLUTION INTRAVENOUS at 13:46

## 2024-04-06 RX ADMIN — LORAZEPAM 2 MG: 2 INJECTION INTRAMUSCULAR; INTRAVENOUS at 20:03

## 2024-04-06 RX ADMIN — MAGNESIUM SULFATE HEPTAHYDRATE 2 G: 40 INJECTION, SOLUTION INTRAVENOUS at 16:00

## 2024-04-06 RX ADMIN — Medication 10 ML: at 20:00

## 2024-04-06 RX ADMIN — THIAMINE HYDROCHLORIDE 500 MG: 100 INJECTION, SOLUTION INTRAMUSCULAR; INTRAVENOUS at 12:10

## 2024-04-06 RX ADMIN — Medication 1 TABLET: at 20:02

## 2024-04-06 RX ADMIN — LORAZEPAM 2 MG: 2 INJECTION INTRAMUSCULAR; INTRAVENOUS at 23:05

## 2024-04-06 RX ADMIN — Medication 1 PATCH: at 20:04

## 2024-04-06 RX ADMIN — FAMOTIDINE 20 MG: 10 INJECTION INTRAVENOUS at 20:02

## 2024-04-06 RX ADMIN — LORAZEPAM 1 MG: 2 INJECTION INTRAMUSCULAR; INTRAVENOUS at 18:45

## 2024-04-06 RX ADMIN — LORAZEPAM 2 MG: 2 INJECTION INTRAMUSCULAR; INTRAVENOUS at 23:25

## 2024-04-06 RX ADMIN — Medication 10 ML: at 20:01

## 2024-04-06 RX ADMIN — LORAZEPAM 2 MG: 1 TABLET ORAL at 20:03

## 2024-04-06 RX ADMIN — LOSARTAN POTASSIUM 100 MG: 100 TABLET, FILM COATED ORAL at 20:02

## 2024-04-06 RX ADMIN — LORAZEPAM 2 MG: 2 INJECTION INTRAMUSCULAR; INTRAVENOUS at 21:52

## 2024-04-06 RX ADMIN — LORAZEPAM 2 MG: 2 INJECTION INTRAMUSCULAR; INTRAVENOUS at 22:11

## 2024-04-06 RX ADMIN — LORAZEPAM 2 MG: 2 INJECTION INTRAMUSCULAR; INTRAVENOUS at 22:36

## 2024-04-06 RX ADMIN — LORAZEPAM 2 MG: 2 INJECTION INTRAMUSCULAR; INTRAVENOUS at 11:45

## 2024-04-06 NOTE — ED NOTES
"Nursing report ED to floor  Watson Lisa  68 y.o.  male    HPI :  HPI (Adult)  Stated Reason for Visit: alcohol withdrawal  History Obtained From: patient    Chief Complaint  Chief Complaint   Patient presents with    Alcohol Problem       Admitting doctor:   Sean Ricketts MD    Admitting diagnosis:   The primary encounter diagnosis was Alcohol withdrawal syndrome without complication. A diagnosis of Hypomagnesemia was also pertinent to this visit.    Code status:   Current Code Status       Date Active Code Status Order ID Comments User Context       Prior            Allergies:   Penicillins and Penicillins    Isolation:   No active isolations    Intake and Output    Intake/Output Summary (Last 24 hours) at 4/6/2024 1436  Last data filed at 4/6/2024 1346  Gross per 24 hour   Intake 1100 ml   Output --   Net 1100 ml       Weight:       04/06/24  1130   Weight: 113 kg (250 lb)       Most recent vitals:   Vitals:    04/06/24 1130 04/06/24 1201 04/06/24 1301 04/06/24 1431   BP: (!) 181/115 (!) 176/137 155/98 174/93   BP Location: Right arm Left arm Left arm    Patient Position: Lying Lying Lying    Pulse:  84 75 90   Resp:  18 18    Temp:       TempSrc:       SpO2:  95% 98% 96%   Weight: 113 kg (250 lb)      Height: 200.7 cm (79\")          Active LDAs/IV Access:   Lines, Drains & Airways       Active LDAs       Name Placement date Placement time Site Days    Peripheral IV 04/06/24 1144 Right Antecubital 04/06/24  1144  Antecubital  less than 1                    Labs (abnormal labs have a star):   Labs Reviewed   COMPREHENSIVE METABOLIC PANEL - Abnormal; Notable for the following components:       Result Value    Glucose 105 (*)     Sodium 132 (*)     Chloride 95 (*)     All other components within normal limits    Narrative:     GFR Normal >60  Chronic Kidney Disease <60  Kidney Failure <15     URINE DRUG SCREEN - Abnormal; Notable for the following components:    Cocaine Screen, Urine Positive (*)     Opiate Screen " Positive (*)     All other components within normal limits    Narrative:     Negative Thresholds Per Drugs Screened:    Amphetamines                 500 ng/ml  Barbiturates                 200 ng/ml  Benzodiazepines              100 ng/ml  Cocaine                      300 ng/ml  Methadone                    300 ng/ml  Opiates                      300 ng/ml  Oxycodone                    100 ng/ml  THC                           50 ng/ml  Fentanyl                       5 ng/ml      The Normal Value for all drugs tested is negative. This report includes final unconfirmed screening results to be used for medical treatment purposes only. Unconfirmed results must not be used for non-medical purposes such as employment or legal testing. Clinical consideration should be applied to any drug of abuse test, particularly when unconfirmed results are used.           CBC WITH AUTO DIFFERENTIAL - Abnormal; Notable for the following components:    RBC 3.87 (*)     Hemoglobin 12.5 (*)     Hematocrit 36.4 (*)     RDW 11.7 (*)     Lymphocyte % 7.5 (*)     Monocyte % 18.7 (*)     Immature Grans % 0.8 (*)     Lymphocytes, Absolute 0.58 (*)     Monocytes, Absolute 1.45 (*)     Immature Grans, Absolute 0.06 (*)     All other components within normal limits   MAGNESIUM - Abnormal; Notable for the following components:    Magnesium 1.4 (*)     All other components within normal limits   PHOSPHORUS - Normal   ETHANOL   CBC AND DIFFERENTIAL    Narrative:     The following orders were created for panel order CBC & Differential.  Procedure                               Abnormality         Status                     ---------                               -----------         ------                     CBC Auto Differential[[id number]]        Abnormal            Final result                 Please view results for these tests on the individual orders.       EKG:   No orders to display       Meds given in ED:   Medications   sodium chloride 0.9 %  flush 10 mL (has no administration in time range)   Magnesium Standard Dose Replacement - Follow Nurse / BPA Driven Protocol (has no administration in time range)   magnesium sulfate 2g/50 mL (PREMIX) infusion (2 g Intravenous New Bag 4/6/24 1346)   sodium chloride 0.9 % bolus 1,000 mL (0 mL Intravenous Stopped 4/6/24 1346)   thiamine (B-1) 500 mg in sodium chloride 0.9 % 100 mL IVPB (0 mg Intravenous Stopped 4/6/24 1300)   LORazepam (ATIVAN) injection 2 mg (2 mg Intravenous Given 4/6/24 1145)       Imaging results:  No radiology results for the last day    Ambulatory status:   - assist    Social issues:   Social History     Socioeconomic History    Marital status:    Tobacco Use    Smoking status: Every Day     Current packs/day: 2.00     Average packs/day: 2.0 packs/day for 40.0 years (80.0 ttl pk-yrs)     Types: Cigarettes    Smokeless tobacco: Never   Vaping Use    Vaping status: Never Used   Substance and Sexual Activity    Alcohol use: Yes     Alcohol/week: 10.0 standard drinks of alcohol     Types: 10 Shots of liquor per week     Comment: 1 pint vodka/ day    Drug use: Not Currently     Types: Hydrocodone    Sexual activity: Defer       Peripheral Neurovascular  Peripheral Neurovascular (Adult)  Peripheral Neurovascular WDL: .WDL except, pulse assessment  Pulse Assessment: radial    Neuro Cognitive  Neuro Cognitive (Adult)  Cognitive/Neuro/Behavioral WDL: WDL, orientation, mood/behavior  Orientation: oriented x 4  Mood/Behavior: anxious, calm, cooperative    Learning  Learning Assessment (Adult)  Learning Readiness and Ability: no barriers identified  Education Provided  Person Taught: patient, family member/friend    Respiratory  Respiratory WDL  Respiratory WDL: WDL, all  Rhythm/Pattern, Respiratory: no shortness of breath reported, pattern regular, unlabored, depth regular    Abdominal Pain       Pain Assessments  Pain (Adult)  (0-10) Pain Rating: Rest: 0  (0-10) Pain Rating: Activity: 0    NIH  Stroke Scale       Yane Hunter RN  04/06/24 14:36 EDT

## 2024-04-06 NOTE — ED NOTES
Pt arrives for alcohol withdrawal. Pt states his last drink was last night. Pt states he normally drinks a pint of liquor daily. Pt denies and n/v or headache. Pt states he does feel anxious. Pt denies any seizures with previous withdrawals but states last time he was in withdrawals that he was put on a ventilator. Pt initial CIWA score is 14. Pt is A&Ox4 and cooperative.

## 2024-04-06 NOTE — ED PROVIDER NOTES
EMERGENCY DEPARTMENT ENCOUNTER    Room Number:  36/36  PCP: Roc Carroll APRN    HPI:  Chief Complaint: Alcohol withdrawal  A complete HPI/ROS/PMH/PSH/SH/FH are unobtainable due to: None  Context: Watson Lisa is a 68 y.o. male who presents to the ED c/o acute alcohol withdrawal.  He has longstanding history of heavy alcohol use.  He states that he will drink about a pint of bourbon after work and 1/5 on a weekend day.  Last drink was last night.  He has had associated tremors.  He is interested in going through medically assisted detox.  He states that he will sometimes use opioid pain pills which she will require off the street to help with his pain.        PAST MEDICAL HISTORY  Active Ambulatory Problems     Diagnosis Date Noted    Essential hypertension 06/07/2016    Tobacco abuse 06/07/2016    Hiatal hernia 06/25/2020    Effusion of left knee 06/29/2020    Osteoarthritis of left knee 06/29/2020    Vitamin D deficiency 07/01/2020    Anemia, chronic disease 07/01/2020    COPD (chronic obstructive pulmonary disease) 03/26/2021    LIVAN (obstructive sleep apnea) 03/26/2021    ETOH abuse 03/26/2021    Obese 03/26/2021    Alcoholic liver disease 10/07/2021    History of alcohol use disorder 12/14/2022    Left patella fracture 12/29/2022    Need for hepatitis C screening test 10/25/2023    Chronic pain of left ankle 10/25/2023    Primary insomnia 10/25/2023    Overweight (BMI 25.0-29.9) 10/25/2023    Mixed hyperlipidemia 10/27/2023    Acute alcohol intoxication 12/07/2023    Hypomagnesemia 12/07/2023     Resolved Ambulatory Problems     Diagnosis Date Noted    Arthritis 04/25/2016    Alcohol dependence with withdrawal 06/24/2020    Chest pain in adult 06/25/2020    Sleep apnea 06/25/2020    Alcohol abuse 06/25/2020    Hypokalemia 06/29/2020    Hypomagnesemia 06/29/2020    Hyponatremia 07/01/2020    Pneumonia due to COVID-19 virus 01/11/2021    Alcohol dependence with withdrawal 01/12/2021    Possible SVT  (supraventricular tachycardia) 01/12/2021    Alcohol withdrawal syndrome with perceptual disturbance 03/26/2021    Acute kidney failure 03/26/2021    Tobacco abuse 03/26/2021    Anemia, chronic disease 03/26/2021    Elevated LFTs 03/26/2021    Metabolic acidosis, increased anion gap 03/26/2021    Hypomagnesemia 03/26/2021    Suicidal ideations 03/26/2021    Delirium tremens 10/07/2021    Alcohol abuse 10/07/2021    Tobacco abuse 10/07/2021    Hiatal hernia 10/07/2021    COPD (chronic obstructive pulmonary disease) 10/07/2021    Anemia, chronic disease 10/07/2021    Acute respiratory failure with hypercapnia 10/08/2021    Aspiration pneumonia 10/08/2021    Cigarette nicotine dependence without complication 04/17/2022    DEEP (acute kidney injury) 12/28/2022    Rhabdomyolysis 12/29/2022    Elevated troponin 12/29/2022    Hypokalemia 01/01/2023    Thrombocytopenia 01/01/2023    Hypocalcemia 10/25/2023    Chronic pain of left knee 10/25/2023     Past Medical History:   Diagnosis Date    Anxiety     Bronchitis with chronic airway obstruction     DVT (deep venous thrombosis)     Hypertension     Hypertension     Low back pain     Neck pain     Pulmonary embolism     Sleep apnea with use of continuous positive airway pressure (CPAP)          PAST SURGICAL HISTORY  Past Surgical History:   Procedure Laterality Date    COLONOSCOPY      JOINT REPLACEMENT Right     hip/knee    REPLACEMENT TOTAL KNEE      TONSILLECTOMY      TOTAL HIP ARTHROPLASTY Right          FAMILY HISTORY  Family History   Problem Relation Age of Onset    Cancer Mother     Heart disease Father     Alcohol abuse Father     Cancer Brother          SOCIAL HISTORY  Social History     Socioeconomic History    Marital status:    Tobacco Use    Smoking status: Every Day     Current packs/day: 2.00     Average packs/day: 2.0 packs/day for 40.0 years (80.0 ttl pk-yrs)     Types: Cigarettes    Smokeless tobacco: Never   Vaping Use    Vaping status: Never Used    Substance and Sexual Activity    Alcohol use: Yes     Alcohol/week: 10.0 standard drinks of alcohol     Types: 10 Shots of liquor per week     Comment: 1 pint vodka/ day    Drug use: Not Currently     Types: Hydrocodone    Sexual activity: Defer         ALLERGIES  Penicillins and Penicillins        REVIEW OF SYSTEMS  Review of Systems     Included in HPI  All systems reviewed and negative except for those discussed in HPI.       PHYSICAL EXAM  ED Triage Vitals   Temp Heart Rate Resp BP SpO2   04/06/24 1122 04/06/24 1122 04/06/24 1122 04/06/24 1130 04/06/24 1122   97.5 °F (36.4 °C) 114 16 (!) 181/115 94 %      Temp src Heart Rate Source Patient Position BP Location FiO2 (%)   04/06/24 1122 04/06/24 1122 04/06/24 1130 04/06/24 1130 --   Tympanic Monitor Lying Right arm        Physical Exam      GENERAL: no acute distress  HENT: nares patent  EYES: no scleral icterus  CV: regular rhythm, tachycardic  RESPIRATORY: normal effort, clear to auscultation bilaterally  ABDOMEN: soft, nontender  MUSCULOSKELETAL: no deformity  NEURO: alert, moves all extremities, follows commands, tremor to his bilateral hands at rest   PSYCH:  calm, cooperative  SKIN: warm, dry    Vital signs and nursing notes reviewed.          LAB RESULTS  Recent Results (from the past 24 hour(s))   Comprehensive Metabolic Panel    Collection Time: 04/06/24 11:45 AM    Specimen: Blood   Result Value Ref Range    Glucose 105 (H) 65 - 99 mg/dL    BUN 21 8 - 23 mg/dL    Creatinine 1.13 0.76 - 1.27 mg/dL    Sodium 132 (L) 136 - 145 mmol/L    Potassium 4.1 3.5 - 5.2 mmol/L    Chloride 95 (L) 98 - 107 mmol/L    CO2 23.0 22.0 - 29.0 mmol/L    Calcium 8.9 8.6 - 10.5 mg/dL    Total Protein 7.4 6.0 - 8.5 g/dL    Albumin 3.8 3.5 - 5.2 g/dL    ALT (SGPT) 21 1 - 41 U/L    AST (SGOT) 22 1 - 40 U/L    Alkaline Phosphatase 80 39 - 117 U/L    Total Bilirubin 0.5 0.0 - 1.2 mg/dL    Globulin 3.6 gm/dL    A/G Ratio 1.1 g/dL    BUN/Creatinine Ratio 18.6 7.0 - 25.0    Anion Gap  14.0 5.0 - 15.0 mmol/L    eGFR 70.4 >60.0 mL/min/1.73   Ethanol    Collection Time: 04/06/24 11:45 AM    Specimen: Blood   Result Value Ref Range    Ethanol <10 0 - 10 mg/dL    Ethanol % <0.010 %   CBC Auto Differential    Collection Time: 04/06/24 11:45 AM    Specimen: Blood   Result Value Ref Range    WBC 7.75 3.40 - 10.80 10*3/mm3    RBC 3.87 (L) 4.14 - 5.80 10*6/mm3    Hemoglobin 12.5 (L) 13.0 - 17.7 g/dL    Hematocrit 36.4 (L) 37.5 - 51.0 %    MCV 94.1 79.0 - 97.0 fL    MCH 32.3 26.6 - 33.0 pg    MCHC 34.3 31.5 - 35.7 g/dL    RDW 11.7 (L) 12.3 - 15.4 %    RDW-SD 39.6 37.0 - 54.0 fl    MPV 9.3 6.0 - 12.0 fL    Platelets 208 140 - 450 10*3/mm3    Neutrophil % 72.3 42.7 - 76.0 %    Lymphocyte % 7.5 (L) 19.6 - 45.3 %    Monocyte % 18.7 (H) 5.0 - 12.0 %    Eosinophil % 0.3 0.3 - 6.2 %    Basophil % 0.4 0.0 - 1.5 %    Immature Grans % 0.8 (H) 0.0 - 0.5 %    Neutrophils, Absolute 5.61 1.70 - 7.00 10*3/mm3    Lymphocytes, Absolute 0.58 (L) 0.70 - 3.10 10*3/mm3    Monocytes, Absolute 1.45 (H) 0.10 - 0.90 10*3/mm3    Eosinophils, Absolute 0.02 0.00 - 0.40 10*3/mm3    Basophils, Absolute 0.03 0.00 - 0.20 10*3/mm3    Immature Grans, Absolute 0.06 (H) 0.00 - 0.05 10*3/mm3    nRBC 0.0 0.0 - 0.2 /100 WBC   Magnesium    Collection Time: 04/06/24 11:45 AM    Specimen: Blood   Result Value Ref Range    Magnesium 1.4 (L) 1.6 - 2.4 mg/dL   Phosphorus    Collection Time: 04/06/24 11:45 AM    Specimen: Blood   Result Value Ref Range    Phosphorus 3.2 2.5 - 4.5 mg/dL       Ordered the above labs and reviewed the results.        RADIOLOGY  No Radiology Exams Resulted Within Past 24 Hours    Ordered the above noted radiological studies. Reviewed by me in PACS.        MEDICATIONS GIVEN IN ER  Medications   sodium chloride 0.9 % flush 10 mL (has no administration in time range)   Magnesium Standard Dose Replacement - Follow Nurse / BPA Driven Protocol (has no administration in time range)   magnesium sulfate 2g/50 mL (PREMIX) infusion (2  g Intravenous New Bag 4/6/24 1346)   sodium chloride 0.9 % bolus 1,000 mL (0 mL Intravenous Stopped 4/6/24 1346)   thiamine (B-1) 500 mg in sodium chloride 0.9 % 100 mL IVPB (0 mg Intravenous Stopped 4/6/24 1300)   LORazepam (ATIVAN) injection 2 mg (2 mg Intravenous Given 4/6/24 1145)         ORDERS PLACED DURING THIS VISIT:  Orders Placed This Encounter   Procedures    Comprehensive Metabolic Panel    Ethanol    Urine Drug Screen - Urine, Clean Catch    CBC Auto Differential    Magnesium    Phosphorus    Magnesium    [facility] (on-call MD unless specified) Details    Insert Peripheral IV    Inpatient Admission    CBC & Differential         OUTPATIENT MEDICATION MANAGEMENT:  Current Facility-Administered Medications Ordered in Epic   Medication Dose Route Frequency Provider Last Rate Last Admin    Magnesium Standard Dose Replacement - Follow Nurse / BPA Driven Protocol   Does not apply PRN Dov Dunham II, MD        magnesium sulfate 2g/50 mL (PREMIX) infusion  2 g Intravenous Q2H Dov Dunham II, MD   2 g at 04/06/24 1346    sodium chloride 0.9 % flush 10 mL  10 mL Intravenous PRN Dov Dunham II, MD         Current Outpatient Medications Ordered in Epic   Medication Sig Dispense Refill    losartan (COZAAR) 100 MG tablet Take 1 tablet by mouth Daily for 30 days. 30 tablet 0    multivitamin with minerals (MULTIVITAMIN ADULT PO) Take 1 tablet by mouth Daily. 90 tablet 3    Umeclidinium-Vilanterol (Anoro Ellipta) 62.5-25 MCG/ACT aerosol powder  inhaler Inhale 1 puff Daily. 1 each 9       PROCEDURES  Critical Care    Performed by: Dov Dunham II, MD  Authorized by: Dov Dunham II, MD    Critical care provider statement:     Critical care time (minutes):  35    Critical care was necessary to treat or prevent imminent or life-threatening deterioration of the following conditions:  Toxidrome    Critical care was time spent personally by me on the following activities:  Ordering and  performing treatments and interventions, ordering and review of laboratory studies, pulse oximetry, re-evaluation of patient's condition, review of old charts, discussions with consultants, evaluation of patient's response to treatment, examination of patient and obtaining history from patient or surrogate            MEDICAL DECISION MAKING, PROGRESS, and CONSULTS    Discussion below represents my analysis of pertinent findings related to patient's condition, differential diagnosis, treatment plan and final disposition.            Differential diagnosis:    Alcohol withdrawal, delirium tremens    Initial CIWA score is 14.             Independent interpretation of labs, radiology studies, and discussions with consultants:  ED Course as of 04/06/24 1359   Sat Apr 06, 2024   1139 On medical chart review, the patient's most recent discharge summary from 12/12/2023.  He was admitted for acute intoxication complicated by withdrawal.  I also reviewed discharge summary from 10/19/2021.  Patient has a history of severe alcohol abuse who presented with symptoms of alcohol withdrawal for 24 hours after his last drink.  He was started on as needed Ativan per CIWA protocol and had required electrolyte replacement.  Unfortunately, his withdrawal symptoms rapidly progressed to delirium tremens and he was transferred to the ICU for Precedex drip.  His course was complicated by aspiration pneumonia for which she required antibiotics and ultimately need for intubation for airway protection given the severity of his withdrawal. [TD]   1324 Magnesium(!): 1.4 [TD]   1324 Ethanol: <10 [TD]   1324 Sodium(!): 132 [TD]   1354 I discussed the case Dr. Ricketts, hospitalist.  He will admit. [TD]      ED Course User Index  [TD] Dov Dunham II, MD               DIAGNOSIS  Final diagnoses:   Alcohol withdrawal syndrome without complication   Hypomagnesemia         DISPOSITION  Admit      Latest Documented Vital Signs:  As of 13:58 EDT  BP-  155/98 HR- 75 Temp- 97.5 °F (36.4 °C) (Tympanic) O2 sat- 98%      --    Please note that portions of this were completed with a voice recognition program.       Note Disclaimer: At [facility], we believe that sharing information builds trust and better relationships. You are receiving this note because you are receiving care at [facility] or recently visited. It is possible you will see health information before a provider has talked with you about it. This kind of information can be easy to misunderstand. To help you fully understand what it means for your health, we urge you to discuss this note with your provider.         Dov Dunham II, MD  04/06/24 4181

## 2024-04-06 NOTE — ED TRIAGE NOTES
Pt to ed from home via PV    Pt c/o Sob and wanting to detox from alcohol. Pt reports he usually drinks a pint of bourbon per day. Pt last drink was last night at 1800. Pt also reports anxiety

## 2024-04-06 NOTE — H&P
Internal medicine history and physical  INTERNAL MEDICINE   [facility]       Patient Identification:  Name: Watson Lisa  Age: 68 y.o.  Sex: male  :  1955  MRN: [id number]                   Primary Care Physician: Roc Carroll APRN                               Date of admission:2024    Chief Complaint: Brought to the emergency room by friends/family members as per his request of attempting to quit drinking.    History of Present Illness:   Source of information review of records and discussion with ER physician and attempted conversation with the patient which was limited.  Patient is a 68-year-old male with history of chronic alcohol use and drink about fifth of whiskey and bourbon every night as well as uses opiates.  3 weeks ago he doubled him to start using cocaine.  In this background patient started having significant nausea tremors and not feeling very well but did not stop drinking and had last drink last night.  Today he was tremulous and shaking and wanted to quit resulting in his friend/previous family members bring him to the emergency room.  Patient lives by himself.  Patient did not go into detail about how long has been using cocaine and opiates but says that he has chronic pain in his back and does not release his discomfort.  Patient has history of anxiety hypertension and prior history of pulmonary embolism as well as sleep apnea.  Is unclear whether he is compliant with his treatment.  Patient denies any hematemesis melena increasing swelling of his legs or any nausea vomiting diarrhea.  Workup in the emergency room revealed magnesium level of 1.4 blood alcohol level less than 10 sodium 132 and urine drug screen positive for opiates and cocaine.  Patient had hospitalizations due to alcohol withdrawal in the past requiring him to be in ICU on Precedex drip and mechanical ventilation and issues with aspiration pneumonia.      Past Medical History:  Past  Medical History:   Diagnosis Date    Alcohol abuse     Anxiety     Arthritis     Bronchitis with chronic airway obstruction     LAST FEB    DVT (deep venous thrombosis)     Hiatal hernia     Hypertension     Hypertension     Low back pain     Neck pain     Pulmonary embolism     Sleep apnea with use of continuous positive airway pressure (CPAP)     AT NIGHT     Past Surgical History:  Past Surgical History:   Procedure Laterality Date    COLONOSCOPY      JOINT REPLACEMENT Right     hip/knee    REPLACEMENT TOTAL KNEE      TONSILLECTOMY      TOTAL HIP ARTHROPLASTY Right       Home Meds:  (Not in a hospital admission)    Current Meds:     Current Facility-Administered Medications:     Magnesium Standard Dose Replacement - Follow Nurse / BPA Driven Protocol, , Does not apply, Roly SIMPSON Thomas Edward II, MD    magnesium sulfate 2g/50 mL (PREMIX) infusion, 2 g, Intravenous, Q2H, Dov Dunham II, MD, Last Rate: 0 mL/hr at 04/06/24 1546, 2 g at 04/06/24 1600    [COMPLETED] Insert Peripheral IV, , , Once **AND** sodium chloride 0.9 % flush 10 mL, 10 mL, Intravenous, PRRoly NIEVES Thomas Edward II, MD    Current Outpatient Medications:     losartan (COZAAR) 100 MG tablet, Take 1 tablet by mouth Daily for 30 days., Disp: 30 tablet, Rfl: 0    multivitamin with minerals (MULTIVITAMIN ADULT PO), Take 1 tablet by mouth Daily., Disp: 90 tablet, Rfl: 3    Umeclidinium-Vilanterol (Anoro Ellipta) 62.5-25 MCG/ACT aerosol powder  inhaler, Inhale 1 puff Daily., Disp: 1 each, Rfl: 9  Allergies:  Allergies   Allergen Reactions    Penicillins Unknown - Low Severity     Pt does not recall the reaction. Patient tolerated cephalexin 3/2020    Penicillins Other (See Comments)     Unknown      Social History:   Social History     Tobacco Use    Smoking status: Every Day     Current packs/day: 2.00     Average packs/day: 2.0 packs/day for 40.0 years (80.0 ttl pk-yrs)     Types: Cigarettes    Smokeless tobacco: Never   Substance Use Topics  "   Alcohol use: Yes     Alcohol/week: 10.0 standard drinks of alcohol     Types: 10 Shots of liquor per week     Comment: 1 pint vodka/ day      Family History:  Family History   Problem Relation Age of Onset    Cancer Mother     Heart disease Father     Alcohol abuse Father     Cancer Brother           Review of Systems  See history of present illness and past medical history.    As described in history of presenting illness.      Vitals:   /94   Pulse 77   Temp 97.5 °F (36.4 °C) (Tympanic)   Resp 18   Ht 200.7 cm (79\")   Wt 113 kg (250 lb)   SpO2 97%   BMI 28.16 kg/m²   I/O:   Intake/Output Summary (Last 24 hours) at 4/6/2024 1700  Last data filed at 4/6/2024 1546  Gross per 24 hour   Intake 1150 ml   Output --   Net 1150 ml     Exam:  Patient is examined using the personal protective equipment as per guidelines from infection control for this particular patient as enacted.  Hand washing was performed before and after patient interaction.  General Appearance:  Awake interactive agitated restless and tremulous but follows command.  Does not appear to be in any acute distress.  Appears obviously disheveled.   Head:    Normocephalic, without obvious abnormality, atraumatic   Eyes:    PERRL, conjunctiva/corneas clear, EOM's intact, both eyes   Ears:    Normal external ear canals, both ears   Nose:   Nares normal, septum midline, mucosa normal, no drainage    or sinus tenderness   Throat: Dry oral mucosa   Neck: Supple and no adenopathy   Back:     Symmetric, no curvature, ROM normal, no CVA tenderness   Lungs:     Clear to auscultation bilaterally, respirations unlabored   Chest Wall:    No tenderness or deformity    Heart:  S1-S2 tachycardia   Abdomen:   Soft nontender no obvious ascites noted.   Extremities:   Extremities normal, atraumatic, no cyanosis or edema   Pulses:   Pulses palpable in all extremities; symmetric all extremities   Skin: Abrasions and ecchymosis noted.     Neurologic: Oriented to " place and time.  Tremulous and grossly nonfocal examination.       Data Review:      I reviewed the patient's new clinical results.  Results from last 7 days   Lab Units 04/06/24  1145   WBC 10*3/mm3 7.75   HEMOGLOBIN g/dL 12.5*   PLATELETS 10*3/mm3 208     Results from last 7 days   Lab Units 04/06/24  1145   SODIUM mmol/L 132*   POTASSIUM mmol/L 4.1   CHLORIDE mmol/L 95*   CO2 mmol/L 23.0   BUN mg/dL 21   CREATININE mg/dL 1.13   CALCIUM mg/dL 8.9   GLUCOSE mg/dL 105*     No radiology results for the last 30 days.  Microbiology Results (last 10 days)       ** No results found for the last 240 hours. **          No orders to display     Brief Urine Lab Results  (Last result in the past 365 days)        Color   Clarity   Blood   Leuk Est   Nitrite   Protein   CREAT   Urine HCG        10/25/23 0921 Yellow  Comment: REFERENCE RANGE: Yellow, Straw   Clear   Negative   Negative   Negative   Trace               Urine drug screen positive for cocaine and opiates.    Assessment:  Active Hospital Problems    Diagnosis  POA    **Alcohol withdrawal [F10.939]  Yes    Substance abuse [F19.10]  Unknown    Cocaine abuse [F14.10]  Unknown    Positive urine drug screen [R82.5]  Unknown    History of alcohol use disorder [Z87.898]  Yes    Alcoholic liver disease [K70.9]  Yes    COPD (chronic obstructive pulmonary disease) [J44.9]  Yes    LIVAN (obstructive sleep apnea) [G47.33]  Yes    Essential hypertension [I10]  Yes       Medical decision making/care plan: See admitting orders  Alcohol abuse with prior history of alcohol withdrawals and its complications now with alcohol withdrawal-plan is to admit the patient continue with CIWA protocol closely watch his mental status and provide him with aspiration precautions and keep him n.p.o. until his mental status clears up and then advance diet accordingly.  Continue with vitamin replacement per protocol and electrolyte replacement including magnesium and phosphorus replacement.  Continue  with IV Pepcid  Hypomagnesemia and malnutrition-continue with magnesium replacement per protocol as well as thiamine and folic acid levels supplementation.  Positive urine drug screen with history of substance abuse with patient admits-access evaluation and watch for for cocaine and opiate withdrawal.  Obstructive sleep apnea with COPD-continue with nebulizer treatment provided with continuous pulse ox monitoring and allow him to use his CPAP device if he has available.  Low threshold to consult pulmonary service given his prior hospitalization and complications with alcohol withdrawal needing ICU admission and mechanical ventilation and Precedex drip.  Hypertension-multifactorial including element of withdrawal-continue his home regimen and monitor.    Sean Ricketts MD   4/6/2024  17:00 EDT    Parts of this note may be an electronic transcription/translation of spoken language to printed text using the Dragon dictation system.

## 2024-04-07 LAB
ALBUMIN SERPL-MCNC: 3.1 G/DL (ref 3.5–5.2)
ALBUMIN/GLOB SERPL: 1 G/DL
ALP SERPL-CCNC: 65 U/L (ref 39–117)
ALT SERPL W P-5'-P-CCNC: 14 U/L (ref 1–41)
ANION GAP SERPL CALCULATED.3IONS-SCNC: 13.1 MMOL/L (ref 5–15)
AST SERPL-CCNC: 22 U/L (ref 1–40)
BASOPHILS # BLD AUTO: 0.03 10*3/MM3 (ref 0–0.2)
BASOPHILS NFR BLD AUTO: 0.4 % (ref 0–1.5)
BILIRUB SERPL-MCNC: 0.3 MG/DL (ref 0–1.2)
BUN SERPL-MCNC: 13 MG/DL (ref 8–23)
BUN/CREAT SERPL: 15.5 (ref 7–25)
CALCIUM SPEC-SCNC: 7.9 MG/DL (ref 8.6–10.5)
CHLORIDE SERPL-SCNC: 103 MMOL/L (ref 98–107)
CO2 SERPL-SCNC: 20.9 MMOL/L (ref 22–29)
CREAT SERPL-MCNC: 0.84 MG/DL (ref 0.76–1.27)
D-LACTATE SERPL-SCNC: 0.8 MMOL/L (ref 0.5–2)
DEPRECATED RDW RBC AUTO: 39.9 FL (ref 37–54)
EGFRCR SERPLBLD CKD-EPI 2021: 94.4 ML/MIN/1.73
EOSINOPHIL # BLD AUTO: 0.03 10*3/MM3 (ref 0–0.4)
EOSINOPHIL NFR BLD AUTO: 0.4 % (ref 0.3–6.2)
ERYTHROCYTE [DISTWIDTH] IN BLOOD BY AUTOMATED COUNT: 11.7 % (ref 12.3–15.4)
GLOBULIN UR ELPH-MCNC: 3.2 GM/DL
GLUCOSE SERPL-MCNC: 105 MG/DL (ref 65–99)
HCT VFR BLD AUTO: 33.2 % (ref 37.5–51)
HGB BLD-MCNC: 11.6 G/DL (ref 13–17.7)
IMM GRANULOCYTES # BLD AUTO: 0.05 10*3/MM3 (ref 0–0.05)
IMM GRANULOCYTES NFR BLD AUTO: 0.7 % (ref 0–0.5)
LIPASE SERPL-CCNC: 22 U/L (ref 13–60)
LYMPHOCYTES # BLD AUTO: 0.89 10*3/MM3 (ref 0.7–3.1)
LYMPHOCYTES NFR BLD AUTO: 11.7 % (ref 19.6–45.3)
MAGNESIUM SERPL-MCNC: 1.8 MG/DL (ref 1.6–2.4)
MCH RBC QN AUTO: 33.2 PG (ref 26.6–33)
MCHC RBC AUTO-ENTMCNC: 34.9 G/DL (ref 31.5–35.7)
MCV RBC AUTO: 95.1 FL (ref 79–97)
MONOCYTES # BLD AUTO: 1.44 10*3/MM3 (ref 0.1–0.9)
MONOCYTES NFR BLD AUTO: 19 % (ref 5–12)
NEUTROPHILS NFR BLD AUTO: 5.14 10*3/MM3 (ref 1.7–7)
NEUTROPHILS NFR BLD AUTO: 67.8 % (ref 42.7–76)
NRBC BLD AUTO-RTO: 0 /100 WBC (ref 0–0.2)
PHOSPHATE SERPL-MCNC: 3 MG/DL (ref 2.5–4.5)
PLATELET # BLD AUTO: 186 10*3/MM3 (ref 140–450)
PMV BLD AUTO: 9.3 FL (ref 6–12)
POTASSIUM SERPL-SCNC: 3.9 MMOL/L (ref 3.5–5.2)
PROT SERPL-MCNC: 6.3 G/DL (ref 6–8.5)
RBC # BLD AUTO: 3.49 10*6/MM3 (ref 4.14–5.8)
SODIUM SERPL-SCNC: 137 MMOL/L (ref 136–145)
WBC NRBC COR # BLD AUTO: 7.58 10*3/MM3 (ref 3.4–10.8)

## 2024-04-07 PROCEDURE — 94640 AIRWAY INHALATION TREATMENT: CPT

## 2024-04-07 PROCEDURE — 84100 ASSAY OF PHOSPHORUS: CPT | Performed by: INTERNAL MEDICINE

## 2024-04-07 PROCEDURE — 94664 DEMO&/EVAL PT USE INHALER: CPT

## 2024-04-07 PROCEDURE — 94761 N-INVAS EAR/PLS OXIMETRY MLT: CPT

## 2024-04-07 PROCEDURE — 83735 ASSAY OF MAGNESIUM: CPT | Performed by: INTERNAL MEDICINE

## 2024-04-07 PROCEDURE — 85025 COMPLETE CBC W/AUTO DIFF WBC: CPT | Performed by: INTERNAL MEDICINE

## 2024-04-07 PROCEDURE — 25010000002 SODIUM CHLORIDE 0.9 % WITH KCL 20 MEQ 20-0.9 MEQ/L-% SOLUTION: Performed by: INTERNAL MEDICINE

## 2024-04-07 PROCEDURE — 25010000002 PHENOBARBITAL PER 120 MG: Performed by: NURSE PRACTITIONER

## 2024-04-07 PROCEDURE — 94799 UNLISTED PULMONARY SVC/PX: CPT

## 2024-04-07 PROCEDURE — 25010000002 LORAZEPAM PER 2 MG: Performed by: INTERNAL MEDICINE

## 2024-04-07 PROCEDURE — 83605 ASSAY OF LACTIC ACID: CPT | Performed by: INTERNAL MEDICINE

## 2024-04-07 PROCEDURE — 80053 COMPREHEN METABOLIC PANEL: CPT | Performed by: INTERNAL MEDICINE

## 2024-04-07 PROCEDURE — 25010000002 THIAMINE HCL 200 MG/2ML SOLUTION: Performed by: INTERNAL MEDICINE

## 2024-04-07 PROCEDURE — 36415 COLL VENOUS BLD VENIPUNCTURE: CPT | Performed by: INTERNAL MEDICINE

## 2024-04-07 PROCEDURE — 83690 ASSAY OF LIPASE: CPT | Performed by: INTERNAL MEDICINE

## 2024-04-07 RX ORDER — ENALAPRILAT 1.25 MG/ML
1.25 INJECTION INTRAVENOUS EVERY 6 HOURS
Status: DISCONTINUED | OUTPATIENT
Start: 2024-04-07 | End: 2024-04-10

## 2024-04-07 RX ADMIN — PHENOBARBITAL SODIUM 187.2 MG: 65 INJECTION INTRAMUSCULAR at 00:12

## 2024-04-07 RX ADMIN — Medication 10 ML: at 20:00

## 2024-04-07 RX ADMIN — ENALAPRILAT 1.25 MG: 1.25 INJECTION INTRAVENOUS at 19:58

## 2024-04-07 RX ADMIN — ARFORMOTEROL TARTRATE 15 MCG: 15 SOLUTION RESPIRATORY (INHALATION) at 07:11

## 2024-04-07 RX ADMIN — LORAZEPAM 2 MG: 2 INJECTION INTRAMUSCULAR; INTRAVENOUS at 23:45

## 2024-04-07 RX ADMIN — LORAZEPAM 2 MG: 2 INJECTION INTRAMUSCULAR; INTRAVENOUS at 19:57

## 2024-04-07 RX ADMIN — LORAZEPAM 2 MG: 2 INJECTION INTRAMUSCULAR; INTRAVENOUS at 20:40

## 2024-04-07 RX ADMIN — POTASSIUM CHLORIDE AND SODIUM CHLORIDE 125 ML/HR: 900; 150 INJECTION, SOLUTION INTRAVENOUS at 03:15

## 2024-04-07 RX ADMIN — Medication 1 PATCH: at 19:57

## 2024-04-07 RX ADMIN — METOPROLOL TARTRATE 2.5 MG: 1 INJECTION, SOLUTION INTRAVENOUS at 12:24

## 2024-04-07 RX ADMIN — LORAZEPAM 2 MG: 2 INJECTION INTRAMUSCULAR; INTRAVENOUS at 05:47

## 2024-04-07 RX ADMIN — PHENOBARBITAL SODIUM 65 MG: 65 INJECTION INTRAMUSCULAR at 16:47

## 2024-04-07 RX ADMIN — THIAMINE HYDROCHLORIDE 200 MG: 100 INJECTION, SOLUTION INTRAMUSCULAR; INTRAVENOUS at 19:58

## 2024-04-07 RX ADMIN — Medication 10 ML: at 09:30

## 2024-04-07 RX ADMIN — PHENOBARBITAL SODIUM 187.2 MG: 65 INJECTION INTRAMUSCULAR at 05:48

## 2024-04-07 RX ADMIN — ENALAPRILAT 1.25 MG: 1.25 INJECTION INTRAVENOUS at 14:43

## 2024-04-07 RX ADMIN — THIAMINE HYDROCHLORIDE 200 MG: 100 INJECTION, SOLUTION INTRAMUSCULAR; INTRAVENOUS at 05:48

## 2024-04-07 RX ADMIN — LORAZEPAM 2 MG: 2 INJECTION INTRAMUSCULAR; INTRAVENOUS at 07:00

## 2024-04-07 RX ADMIN — LORAZEPAM 2 MG: 2 INJECTION INTRAMUSCULAR; INTRAVENOUS at 22:58

## 2024-04-07 RX ADMIN — LORAZEPAM 2 MG: 2 INJECTION INTRAMUSCULAR; INTRAVENOUS at 23:25

## 2024-04-07 RX ADMIN — LORAZEPAM 2 MG: 2 INJECTION INTRAMUSCULAR; INTRAVENOUS at 21:52

## 2024-04-07 RX ADMIN — LORAZEPAM 2 MG: 2 INJECTION INTRAMUSCULAR; INTRAVENOUS at 02:43

## 2024-04-07 RX ADMIN — LORAZEPAM 2 MG: 2 INJECTION INTRAMUSCULAR; INTRAVENOUS at 14:42

## 2024-04-07 RX ADMIN — LORAZEPAM 2 MG: 2 INJECTION INTRAMUSCULAR; INTRAVENOUS at 03:14

## 2024-04-07 RX ADMIN — LORAZEPAM 2 MG: 2 INJECTION INTRAMUSCULAR; INTRAVENOUS at 01:52

## 2024-04-07 RX ADMIN — LORAZEPAM 2 MG: 2 INJECTION INTRAMUSCULAR; INTRAVENOUS at 12:24

## 2024-04-07 RX ADMIN — PHENOBARBITAL SODIUM 187.2 MG: 65 INJECTION INTRAMUSCULAR at 03:15

## 2024-04-07 RX ADMIN — LORAZEPAM 2 MG: 2 INJECTION INTRAMUSCULAR; INTRAVENOUS at 06:37

## 2024-04-07 RX ADMIN — METOPROLOL TARTRATE 2.5 MG: 1 INJECTION, SOLUTION INTRAVENOUS at 21:52

## 2024-04-07 RX ADMIN — LORAZEPAM 2 MG: 2 INJECTION INTRAMUSCULAR; INTRAVENOUS at 21:24

## 2024-04-07 RX ADMIN — LORAZEPAM 2 MG: 2 INJECTION INTRAMUSCULAR; INTRAVENOUS at 09:35

## 2024-04-07 RX ADMIN — LORAZEPAM 2 MG: 2 INJECTION INTRAMUSCULAR; INTRAVENOUS at 03:46

## 2024-04-07 RX ADMIN — LORAZEPAM 2 MG: 2 INJECTION INTRAMUSCULAR; INTRAVENOUS at 08:07

## 2024-04-07 RX ADMIN — LORAZEPAM 2 MG: 2 INJECTION INTRAMUSCULAR; INTRAVENOUS at 01:04

## 2024-04-07 RX ADMIN — FAMOTIDINE 20 MG: 10 INJECTION INTRAVENOUS at 09:54

## 2024-04-07 RX ADMIN — THIAMINE HYDROCHLORIDE 200 MG: 100 INJECTION, SOLUTION INTRAMUSCULAR; INTRAVENOUS at 14:42

## 2024-04-07 RX ADMIN — LORAZEPAM 2 MG: 2 INJECTION INTRAMUSCULAR; INTRAVENOUS at 00:21

## 2024-04-07 RX ADMIN — POTASSIUM CHLORIDE AND SODIUM CHLORIDE 125 ML/HR: 900; 150 INJECTION, SOLUTION INTRAVENOUS at 23:03

## 2024-04-07 RX ADMIN — POTASSIUM CHLORIDE AND SODIUM CHLORIDE 125 ML/HR: 900; 150 INJECTION, SOLUTION INTRAVENOUS at 10:47

## 2024-04-07 RX ADMIN — LORAZEPAM 2 MG: 2 INJECTION INTRAMUSCULAR; INTRAVENOUS at 10:45

## 2024-04-07 RX ADMIN — LORAZEPAM 2 MG: 2 INJECTION INTRAMUSCULAR; INTRAVENOUS at 17:39

## 2024-04-07 RX ADMIN — LORAZEPAM 2 MG: 2 INJECTION INTRAMUSCULAR; INTRAVENOUS at 02:21

## 2024-04-07 RX ADMIN — ARFORMOTEROL TARTRATE 15 MCG: 15 SOLUTION RESPIRATORY (INHALATION) at 19:25

## 2024-04-07 NOTE — NURSING NOTE
Call out to A.        Pt CIWA scores in the 20's. Has received multiple doses of Q15 Atavan without much improvement.       Very restless, trying to get out of bed, removing equipment, hallucinating.         Request orders for Restraints and advice on when this pt may require a higher level of care. .

## 2024-04-07 NOTE — PROGRESS NOTES
"    DAILY PROGRESS NOTE  [facility]    Patient Identification:  Name: Watson Lisa  Age: 68 y.o.  Sex: male  :  1955  MRN: [id number]         Primary Care Physician: Roc Carroll APRN    Subjective:  Interval History: Currently encephalopathic and in DTs and morning restraints.  No family present at bedside.  This patient is not able to participate in any type of fruitful history and physical exam.    Objective:    Scheduled Meds:arformoterol, 15 mcg, Nebulization, BID - RT   And  tiotropium bromide monohydrate, 2 puff, Inhalation, Daily - RT  famotidine, 20 mg, Intravenous, Q12H  folic acid, 1 mg, Oral, Daily  LORazepam, 2 mg, Oral, Q6H   Followed by  LORazepam, 1 mg, Oral, Q6H  losartan, 100 mg, Oral, Q24H  multivitamin with minerals, 1 tablet, Oral, Daily  nicotine, 1 patch, Transdermal, Q24H  PHENobarbital, 65 mg, Intravenous, Once   Followed by  [START ON 2024] PHENobarbital, 65 mg, Intravenous, Once   Followed by  [START ON 2024] PHENobarbital, 32.4 mg, Oral, Once   Followed by  [START ON 2024] PHENobarbital, 32.4 mg, Oral, Once   Followed by  [START ON 2024] PHENobarbital, 32.4 mg, Oral, Once  sodium chloride, 10 mL, Intravenous, Q12H  thiamine (B-1) IV, 200 mg, Intravenous, Q8H   Followed by  [START ON 2024] thiamine, 100 mg, Oral, Daily      Continuous Infusions:sodium chloride 0.9 % with KCl 20 mEq, 125 mL/hr, Last Rate: 125 mL/hr (24 1047)        Vital signs in last 24 hours:  Temp:  [97.5 °F (36.4 °C)-98.6 °F (37 °C)] 98.2 °F (36.8 °C)  Heart Rate:  [] 90  Resp:  [16-24] 22  BP: (145-181)/() 167/106    Intake/Output:    Intake/Output Summary (Last 24 hours) at 2024 1117  Last data filed at 2024  Gross per 24 hour   Intake 1210 ml   Output 300 ml   Net 910 ml       Exam:  BP (!) 167/106   Pulse 90   Temp 98.2 °F (36.8 °C) (Oral)   Resp 22   Ht 200.7 cm (79\")   Wt 113 kg (250 lb)   SpO2 96%   BMI 28.16 kg/m² "     General Appearance:  Encephalopathic, in soft restraints, fidgety                          Head:    Normocephalic, without obvious abnormality, atraumatic                         Throat:   Oral mucosa pink and moist                           Neck:   Supple, symmetrical, trachea midline, no JVD                         Lungs:    Diminished otherwise clear to auscultation bilaterally, respirations unlabored                 Chest Wall:    No tenderness or deformity                          Heart:    Regular/tachycardic rate and rhythm, S1 and S2 normal                  Abdomen:     Soft, nontender, bowel sounds active                  Extremities: Soft restraints/moving all                        pulses:   Pulses palpable in all extremities                            Skin:   Skin is warm and dry, nonjaundiced                  neurologic: Unable to fully evaluate at this time but no obvious deficits      Data Review:  Labs in chart were reviewed.    Assessment:  Active Hospital Problems    Diagnosis  POA    **Alcohol withdrawal [F10.939]  Yes    Substance abuse [F19.10]  Unknown    Cocaine abuse [F14.10]  Unknown    Positive urine drug screen [R82.5]  Unknown    History of alcohol use disorder [Z87.898]  Yes    Alcoholic liver disease [K70.9]  Yes    COPD (chronic obstructive pulmonary disease) [J44.9]  Yes    LIVAN (obstructive sleep apnea) [G47.33]  Yes    Essential hypertension [I10]  Yes      Resolved Hospital Problems   No resolved problems to display.       Plan:    Alcohol abuse with significant DTs currently in soft restraints and encephalopathic   -Continue CIWA as currently dosed   -Wean restraints pending clinical response/behavior   -LFTs and bilirubin normal.  Anemia of chronic disease secondary to alcohol malnutrition.  Platelet count normal    Replace electrolyte deficiencies due to malnutrition from alcohol abuse including magnesium and potassium    Continue supplemental vitamins including  thiamine    UDS positive for cocaine/opiates    Tobacco/NicoDerm    Unsafe for p.o. and BP elevated and reactive -switch ARB to Vasotec with parameters and I am also initiating as needed IV Lopressor as heart rate is trending up.  A lot of this blood pressure is falsely elevated secondary due to DTs      Long-term prognosis very poor for this gentleman unless he makes significant lifestyle changes    Access to see and CCP for DC needs    Harvey Colindres MD  4/7/2024  11:17 EDT

## 2024-04-07 NOTE — SIGNIFICANT NOTE
04/07/24 1159   OTHER   Discipline physical therapist   Rehab Time/Intention   Session Not Performed other (see comments)  (Per Nurse Anirys, pt not appropriate for PT services today - disoriented and sedated. f/u 4/8 if appropriate.)   Recommendation   PT - Next Appointment 04/08/24

## 2024-04-07 NOTE — PLAN OF CARE
"Goal Outcome Evaluation:  Plan of Care Reviewed With: patient        Progress: no change  Outcome Evaluation:     pt confused with CIWA scores in the 20's most of the night.    PRN Atavan given all night, many times Q15 minutes.   ([facility] aware, spoke with Miki. Declined to move to pt higher level of care at this time)   Ordered Phenobarbital.   Spoke with  about this as well. Stated he must receive \"a lot\" of Q15 doses of Atavan before a transfer will be considered.     Bilateral soft wrist restraints in place   Pt having hallucinations, removing equipment, and attempting to get out of bed.    Has home cpap in the room but unable to use due to restraints.   2L via n/c.         DBP elevated. SR on telemetry.       NS w/ 20KCI infusing as ordered.       Scattered psorisis looking areas to the groin, abdomen, arms, and buttocks.   cleansed and applied cream.   Wound RN consult placed.         Incontinent care provided.    BMx1      Will continue to monitor.                               "

## 2024-04-07 NOTE — NURSING NOTE
Access attempted consult; however, primary RN reports pt currently confused and sedated. Will attempt again once pt's mentation improves and he can participate fully in assessment.

## 2024-04-08 ENCOUNTER — APPOINTMENT (OUTPATIENT)
Dept: GENERAL RADIOLOGY | Facility: HOSPITAL | Age: 69
End: 2024-04-08
Payer: COMMERCIAL

## 2024-04-08 ENCOUNTER — APPOINTMENT (OUTPATIENT)
Dept: CARDIOLOGY | Facility: HOSPITAL | Age: 69
End: 2024-04-08
Payer: COMMERCIAL

## 2024-04-08 LAB
ALBUMIN SERPL-MCNC: 2.9 G/DL (ref 3.5–5.2)
ALBUMIN/GLOB SERPL: 1.1 G/DL
ALP SERPL-CCNC: 57 U/L (ref 39–117)
ALT SERPL W P-5'-P-CCNC: 12 U/L (ref 1–41)
ANION GAP SERPL CALCULATED.3IONS-SCNC: 12.9 MMOL/L (ref 5–15)
ANION GAP SERPL CALCULATED.3IONS-SCNC: 17.3 MMOL/L (ref 5–15)
ARTERIAL PATENCY WRIST A: POSITIVE
AST SERPL-CCNC: 17 U/L (ref 1–40)
ATMOSPHERIC PRESS: 747.4 MMHG
BASE EXCESS BLDA CALC-SCNC: -2.7 MMOL/L (ref 0–2)
BDY SITE: ABNORMAL
BH CV LOWER VASCULAR LEFT COMMON FEMORAL AUGMENT: NORMAL
BH CV LOWER VASCULAR LEFT COMMON FEMORAL COMPETENT: NORMAL
BH CV LOWER VASCULAR LEFT COMMON FEMORAL COMPRESS: NORMAL
BH CV LOWER VASCULAR LEFT COMMON FEMORAL PHASIC: NORMAL
BH CV LOWER VASCULAR LEFT COMMON FEMORAL SPONT: NORMAL
BH CV LOWER VASCULAR LEFT DISTAL FEMORAL COMPRESS: NORMAL
BH CV LOWER VASCULAR LEFT GASTRONEMIUS COMPRESS: NORMAL
BH CV LOWER VASCULAR LEFT GREATER SAPH AK COMPRESS: NORMAL
BH CV LOWER VASCULAR LEFT GREATER SAPH BK COMPRESS: NORMAL
BH CV LOWER VASCULAR LEFT LESSER SAPH COMPRESS: NORMAL
BH CV LOWER VASCULAR LEFT MID FEMORAL AUGMENT: NORMAL
BH CV LOWER VASCULAR LEFT MID FEMORAL COMPETENT: NORMAL
BH CV LOWER VASCULAR LEFT MID FEMORAL COMPRESS: NORMAL
BH CV LOWER VASCULAR LEFT MID FEMORAL PHASIC: NORMAL
BH CV LOWER VASCULAR LEFT MID FEMORAL SPONT: NORMAL
BH CV LOWER VASCULAR LEFT PERONEAL COMPRESS: NORMAL
BH CV LOWER VASCULAR LEFT POPLITEAL AUGMENT: NORMAL
BH CV LOWER VASCULAR LEFT POPLITEAL COMPETENT: NORMAL
BH CV LOWER VASCULAR LEFT POPLITEAL COMPRESS: NORMAL
BH CV LOWER VASCULAR LEFT POPLITEAL PHASIC: NORMAL
BH CV LOWER VASCULAR LEFT POPLITEAL SPONT: NORMAL
BH CV LOWER VASCULAR LEFT POSTERIOR TIBIAL COMPRESS: NORMAL
BH CV LOWER VASCULAR LEFT PROFUNDA FEMORAL COMPRESS: NORMAL
BH CV LOWER VASCULAR LEFT PROXIMAL FEMORAL COMPRESS: NORMAL
BH CV LOWER VASCULAR LEFT SAPHENOFEMORAL JUNCTION COMPRESS: NORMAL
BH CV LOWER VASCULAR RIGHT COMMON FEMORAL AUGMENT: NORMAL
BH CV LOWER VASCULAR RIGHT COMMON FEMORAL COMPETENT: NORMAL
BH CV LOWER VASCULAR RIGHT COMMON FEMORAL COMPRESS: NORMAL
BH CV LOWER VASCULAR RIGHT COMMON FEMORAL PHASIC: NORMAL
BH CV LOWER VASCULAR RIGHT COMMON FEMORAL SPONT: NORMAL
BH CV LOWER VASCULAR RIGHT DISTAL FEMORAL COMPRESS: NORMAL
BH CV LOWER VASCULAR RIGHT GASTRONEMIUS COMPRESS: NORMAL
BH CV LOWER VASCULAR RIGHT GREATER SAPH AK COMPRESS: NORMAL
BH CV LOWER VASCULAR RIGHT GREATER SAPH BK COMPRESS: NORMAL
BH CV LOWER VASCULAR RIGHT LESSER SAPH COMPRESS: NORMAL
BH CV LOWER VASCULAR RIGHT MID FEMORAL AUGMENT: NORMAL
BH CV LOWER VASCULAR RIGHT MID FEMORAL COMPETENT: NORMAL
BH CV LOWER VASCULAR RIGHT MID FEMORAL COMPRESS: NORMAL
BH CV LOWER VASCULAR RIGHT MID FEMORAL PHASIC: NORMAL
BH CV LOWER VASCULAR RIGHT MID FEMORAL SPONT: NORMAL
BH CV LOWER VASCULAR RIGHT PERONEAL COMPRESS: NORMAL
BH CV LOWER VASCULAR RIGHT POPLITEAL AUGMENT: NORMAL
BH CV LOWER VASCULAR RIGHT POPLITEAL COMPETENT: NORMAL
BH CV LOWER VASCULAR RIGHT POPLITEAL COMPRESS: NORMAL
BH CV LOWER VASCULAR RIGHT POPLITEAL PHASIC: NORMAL
BH CV LOWER VASCULAR RIGHT POPLITEAL SPONT: NORMAL
BH CV LOWER VASCULAR RIGHT POSTERIOR TIBIAL COMPRESS: NORMAL
BH CV LOWER VASCULAR RIGHT PROFUNDA FEMORAL COMPRESS: NORMAL
BH CV LOWER VASCULAR RIGHT PROXIMAL FEMORAL COMPRESS: NORMAL
BH CV LOWER VASCULAR RIGHT SAPHENOFEMORAL JUNCTION COMPRESS: NORMAL
BILIRUB SERPL-MCNC: 0.3 MG/DL (ref 0–1.2)
BUN SERPL-MCNC: 11 MG/DL (ref 8–23)
BUN SERPL-MCNC: 9 MG/DL (ref 8–23)
BUN/CREAT SERPL: 10.2 (ref 7–25)
BUN/CREAT SERPL: 12 (ref 7–25)
CALCIUM SPEC-SCNC: 7.9 MG/DL (ref 8.6–10.5)
CALCIUM SPEC-SCNC: 8.9 MG/DL (ref 8.6–10.5)
CHLORIDE SERPL-SCNC: 106 MMOL/L (ref 98–107)
CHLORIDE SERPL-SCNC: 108 MMOL/L (ref 98–107)
CO2 BLDA-SCNC: 23.3 MMOL/L (ref 23–27)
CO2 SERPL-SCNC: 18.7 MMOL/L (ref 22–29)
CO2 SERPL-SCNC: 21.1 MMOL/L (ref 22–29)
CREAT SERPL-MCNC: 0.75 MG/DL (ref 0.76–1.27)
CREAT SERPL-MCNC: 1.08 MG/DL (ref 0.76–1.27)
D-LACTATE SERPL-SCNC: 0.9 MMOL/L (ref 0.5–2)
DEPRECATED RDW RBC AUTO: 40.8 FL (ref 37–54)
EGFRCR SERPLBLD CKD-EPI 2021: 74.3 ML/MIN/1.73
EGFRCR SERPLBLD CKD-EPI 2021: 97.7 ML/MIN/1.73
ERYTHROCYTE [DISTWIDTH] IN BLOOD BY AUTOMATED COUNT: 11.8 % (ref 12.3–15.4)
GLOBULIN UR ELPH-MCNC: 2.7 GM/DL
GLUCOSE BLDC GLUCOMTR-MCNC: 103 MG/DL (ref 70–130)
GLUCOSE BLDC GLUCOMTR-MCNC: 106 MG/DL (ref 70–130)
GLUCOSE BLDC GLUCOMTR-MCNC: 110 MG/DL (ref 70–130)
GLUCOSE BLDC GLUCOMTR-MCNC: 112 MG/DL (ref 70–130)
GLUCOSE SERPL-MCNC: 113 MG/DL (ref 65–99)
GLUCOSE SERPL-MCNC: 95 MG/DL (ref 65–99)
HCO3 BLDA-SCNC: 22.1 MMOL/L (ref 22–28)
HCT VFR BLD AUTO: 35.3 % (ref 37.5–51)
HEMODILUTION: NO
HGB BLD-MCNC: 12.2 G/DL (ref 13–17.7)
INHALED O2 CONCENTRATION: 100 %
INSPIRATORY TIME: 1
MAGNESIUM SERPL-MCNC: 1.3 MG/DL (ref 1.6–2.4)
MAGNESIUM SERPL-MCNC: 1.3 MG/DL (ref 1.6–2.4)
MAGNESIUM SERPL-MCNC: 1.8 MG/DL (ref 1.6–2.4)
MCH RBC QN AUTO: 33.1 PG (ref 26.6–33)
MCHC RBC AUTO-ENTMCNC: 34.6 G/DL (ref 31.5–35.7)
MCV RBC AUTO: 95.7 FL (ref 79–97)
MEAN AIRWAY PRESSURE: 12
MODALITY: ABNORMAL
MRSA DNA SPEC QL NAA+PROBE: ABNORMAL
O2 A-A PPRESDIFF RESPIRATORY: 0.6 MMHG
PAW @ PEAK INSP FLOW SETTING VENT: 24 CMH2O
PCO2 BLDA: 37.8 MM HG (ref 35–45)
PEEP RESPIRATORY: 5 CM[H2O]
PH BLDA: 7.37 PH UNITS (ref 7.35–7.45)
PHOSPHATE SERPL-MCNC: 3.8 MG/DL (ref 2.5–4.5)
PHOSPHATE SERPL-MCNC: 4 MG/DL (ref 2.5–4.5)
PLATELET # BLD AUTO: 238 10*3/MM3 (ref 140–450)
PMV BLD AUTO: 10.1 FL (ref 6–12)
PO2 BLDA: 406.3 MM HG (ref 80–100)
POTASSIUM SERPL-SCNC: 3.6 MMOL/L (ref 3.5–5.2)
POTASSIUM SERPL-SCNC: 3.9 MMOL/L (ref 3.5–5.2)
POTASSIUM SERPL-SCNC: 4.8 MMOL/L (ref 3.5–5.2)
PROCALCITONIN SERPL-MCNC: 0.17 NG/ML (ref 0–0.25)
PROT SERPL-MCNC: 5.6 G/DL (ref 6–8.5)
RBC # BLD AUTO: 3.69 10*6/MM3 (ref 4.14–5.8)
SAO2 % BLDCOA: 100 % (ref 92–98.5)
SET MECH RESP RATE: 12
SODIUM SERPL-SCNC: 142 MMOL/L (ref 136–145)
SODIUM SERPL-SCNC: 142 MMOL/L (ref 136–145)
TOTAL RATE: 14 BREATHS/MINUTE
VENTILATOR MODE: AC
VT ON VENT VENT: 650 ML
WBC NRBC COR # BLD AUTO: 11.62 10*3/MM3 (ref 3.4–10.8)

## 2024-04-08 PROCEDURE — 84132 ASSAY OF SERUM POTASSIUM: CPT | Performed by: INTERNAL MEDICINE

## 2024-04-08 PROCEDURE — 36600 WITHDRAWAL OF ARTERIAL BLOOD: CPT | Performed by: INTERNAL MEDICINE

## 2024-04-08 PROCEDURE — 5A1955Z RESPIRATORY VENTILATION, GREATER THAN 96 CONSECUTIVE HOURS: ICD-10-PCS

## 2024-04-08 PROCEDURE — 94760 N-INVAS EAR/PLS OXIMETRY 1: CPT

## 2024-04-08 PROCEDURE — 93970 EXTREMITY STUDY: CPT

## 2024-04-08 PROCEDURE — 93970 EXTREMITY STUDY: CPT | Performed by: SURGERY

## 2024-04-08 PROCEDURE — 0BH17EZ INSERTION OF ENDOTRACHEAL AIRWAY INTO TRACHEA, VIA NATURAL OR ARTIFICIAL OPENING: ICD-10-PCS

## 2024-04-08 PROCEDURE — 94799 UNLISTED PULMONARY SVC/PX: CPT

## 2024-04-08 PROCEDURE — 25010000002 PROPOFOL 10 MG/ML EMULSION

## 2024-04-08 PROCEDURE — 25010000002 FUROSEMIDE PER 20 MG

## 2024-04-08 PROCEDURE — 74018 RADEX ABDOMEN 1 VIEW: CPT

## 2024-04-08 PROCEDURE — 87205 SMEAR GRAM STAIN: CPT

## 2024-04-08 PROCEDURE — 82948 REAGENT STRIP/BLOOD GLUCOSE: CPT

## 2024-04-08 PROCEDURE — 71045 X-RAY EXAM CHEST 1 VIEW: CPT

## 2024-04-08 PROCEDURE — 25810000003 SEPSIS FLUID NS 0.9 % SOLUTION: Performed by: INTERNAL MEDICINE

## 2024-04-08 PROCEDURE — 94002 VENT MGMT INPAT INIT DAY: CPT

## 2024-04-08 PROCEDURE — 84100 ASSAY OF PHOSPHORUS: CPT | Performed by: STUDENT IN AN ORGANIZED HEALTH CARE EDUCATION/TRAINING PROGRAM

## 2024-04-08 PROCEDURE — 25010000002 MAGNESIUM SULFATE 2 GM/50ML SOLUTION: Performed by: INTERNAL MEDICINE

## 2024-04-08 PROCEDURE — 83735 ASSAY OF MAGNESIUM: CPT | Performed by: INTERNAL MEDICINE

## 2024-04-08 PROCEDURE — 25010000002 CEFEPIME PER 500 MG

## 2024-04-08 PROCEDURE — 3E0G76Z INTRODUCTION OF NUTRITIONAL SUBSTANCE INTO UPPER GI, VIA NATURAL OR ARTIFICIAL OPENING: ICD-10-PCS

## 2024-04-08 PROCEDURE — 87040 BLOOD CULTURE FOR BACTERIA: CPT

## 2024-04-08 PROCEDURE — 25010000002 LORAZEPAM PER 2 MG: Performed by: INTERNAL MEDICINE

## 2024-04-08 PROCEDURE — 85027 COMPLETE CBC AUTOMATED: CPT | Performed by: STUDENT IN AN ORGANIZED HEALTH CARE EDUCATION/TRAINING PROGRAM

## 2024-04-08 PROCEDURE — 87070 CULTURE OTHR SPECIMN AEROBIC: CPT

## 2024-04-08 PROCEDURE — 25810000003 SODIUM CHLORIDE 0.9 % SOLUTION: Performed by: INTERNAL MEDICINE

## 2024-04-08 PROCEDURE — 83605 ASSAY OF LACTIC ACID: CPT

## 2024-04-08 PROCEDURE — 25810000003 SODIUM CHLORIDE 0.9 % SOLUTION 1,000 ML FLEX CONT

## 2024-04-08 PROCEDURE — 82803 BLOOD GASES ANY COMBINATION: CPT | Performed by: INTERNAL MEDICINE

## 2024-04-08 PROCEDURE — 25010000002 HYDRALAZINE PER 20 MG: Performed by: INTERNAL MEDICINE

## 2024-04-08 PROCEDURE — 25010000002 FENTANYL CITRATE (PF) 2500 MCG/50ML SOLUTION

## 2024-04-08 PROCEDURE — 25010000002 THIAMINE HCL 200 MG/2ML SOLUTION: Performed by: INTERNAL MEDICINE

## 2024-04-08 PROCEDURE — 25010000002 PHENOBARBITAL PER 120 MG: Performed by: NURSE PRACTITIONER

## 2024-04-08 PROCEDURE — 87641 MR-STAPH DNA AMP PROBE: CPT | Performed by: HOSPITALIST

## 2024-04-08 PROCEDURE — 83735 ASSAY OF MAGNESIUM: CPT | Performed by: STUDENT IN AN ORGANIZED HEALTH CARE EDUCATION/TRAINING PROGRAM

## 2024-04-08 PROCEDURE — 87185 SC STD ENZYME DETCJ PER NZM: CPT

## 2024-04-08 PROCEDURE — 87077 CULTURE AEROBIC IDENTIFY: CPT

## 2024-04-08 PROCEDURE — 80053 COMPREHEN METABOLIC PANEL: CPT | Performed by: STUDENT IN AN ORGANIZED HEALTH CARE EDUCATION/TRAINING PROGRAM

## 2024-04-08 PROCEDURE — 84100 ASSAY OF PHOSPHORUS: CPT | Performed by: INTERNAL MEDICINE

## 2024-04-08 PROCEDURE — 94761 N-INVAS EAR/PLS OXIMETRY MLT: CPT

## 2024-04-08 PROCEDURE — 25010000002 VANCOMYCIN 10 G RECONSTITUTED SOLUTION: Performed by: INTERNAL MEDICINE

## 2024-04-08 PROCEDURE — 0D9670Z DRAINAGE OF STOMACH WITH DRAINAGE DEVICE, VIA NATURAL OR ARTIFICIAL OPENING: ICD-10-PCS

## 2024-04-08 PROCEDURE — 84145 PROCALCITONIN (PCT): CPT | Performed by: INTERNAL MEDICINE

## 2024-04-08 RX ORDER — PHENOBARBITAL 32.4 MG/1
32.4 TABLET ORAL ONCE
Status: COMPLETED | OUTPATIENT
Start: 2024-04-08 | End: 2024-04-08

## 2024-04-08 RX ORDER — HYDRALAZINE HYDROCHLORIDE 20 MG/ML
10 INJECTION INTRAMUSCULAR; INTRAVENOUS EVERY 6 HOURS PRN
Status: DISCONTINUED | OUTPATIENT
Start: 2024-04-08 | End: 2024-04-18

## 2024-04-08 RX ORDER — PHENOBARBITAL 32.4 MG/1
32.4 TABLET ORAL ONCE
Status: COMPLETED | OUTPATIENT
Start: 2024-04-09 | End: 2024-04-09

## 2024-04-08 RX ORDER — SODIUM CHLORIDE 0.9 % (FLUSH) 0.9 %
10 SYRINGE (ML) INJECTION EVERY 12 HOURS SCHEDULED
Status: DISCONTINUED | OUTPATIENT
Start: 2024-04-08 | End: 2024-04-24 | Stop reason: HOSPADM

## 2024-04-08 RX ORDER — SODIUM CHLORIDE 9 MG/ML
40 INJECTION, SOLUTION INTRAVENOUS AS NEEDED
Status: DISCONTINUED | OUTPATIENT
Start: 2024-04-08 | End: 2024-04-24 | Stop reason: HOSPADM

## 2024-04-08 RX ORDER — NOREPINEPHRINE BITARTRATE 0.03 MG/ML
.02-.3 INJECTION, SOLUTION INTRAVENOUS
Status: DISCONTINUED | OUTPATIENT
Start: 2024-04-08 | End: 2024-04-17

## 2024-04-08 RX ORDER — LORAZEPAM 1 MG/1
1 TABLET ORAL EVERY 6 HOURS
Status: DISCONTINUED | OUTPATIENT
Start: 2024-04-08 | End: 2024-04-08

## 2024-04-08 RX ORDER — FUROSEMIDE 10 MG/ML
80 INJECTION INTRAMUSCULAR; INTRAVENOUS ONCE
Status: COMPLETED | OUTPATIENT
Start: 2024-04-08 | End: 2024-04-08

## 2024-04-08 RX ORDER — FAMOTIDINE 20 MG/1
20 TABLET, FILM COATED ORAL
Status: DISCONTINUED | OUTPATIENT
Start: 2024-04-08 | End: 2024-04-22

## 2024-04-08 RX ORDER — IPRATROPIUM BROMIDE AND ALBUTEROL SULFATE 2.5; .5 MG/3ML; MG/3ML
3 SOLUTION RESPIRATORY (INHALATION)
Status: DISCONTINUED | OUTPATIENT
Start: 2024-04-08 | End: 2024-04-24 | Stop reason: HOSPADM

## 2024-04-08 RX ORDER — FOLIC ACID 1 MG/1
1 TABLET ORAL DAILY
Status: DISCONTINUED | OUTPATIENT
Start: 2024-04-08 | End: 2024-04-22

## 2024-04-08 RX ORDER — LORAZEPAM 2 MG/ML
2 INJECTION INTRAMUSCULAR EVERY 6 HOURS
Status: DISCONTINUED | OUTPATIENT
Start: 2024-04-08 | End: 2024-04-14

## 2024-04-08 RX ORDER — MAGNESIUM SULFATE HEPTAHYDRATE 40 MG/ML
2 INJECTION, SOLUTION INTRAVENOUS
Status: COMPLETED | OUTPATIENT
Start: 2024-04-08 | End: 2024-04-09

## 2024-04-08 RX ORDER — VANCOMYCIN/0.9 % SOD CHLORIDE 1.5G/250ML
1500 PLASTIC BAG, INJECTION (ML) INTRAVENOUS EVERY 12 HOURS
Status: DISCONTINUED | OUTPATIENT
Start: 2024-04-08 | End: 2024-04-09

## 2024-04-08 RX ORDER — MULTIVIT AND MINERALS-FERROUS GLUCONATE 9 MG IRON/15 ML ORAL LIQUID 9 MG/15 ML
15 LIQUID (ML) ORAL DAILY
Status: DISCONTINUED | OUTPATIENT
Start: 2024-04-08 | End: 2024-04-22

## 2024-04-08 RX ORDER — FENTANYL CITRATE-0.9 % NACL/PF 10 MCG/ML
50-300 PLASTIC BAG, INJECTION (ML) INTRAVENOUS
Status: DISPENSED | OUTPATIENT
Start: 2024-04-08 | End: 2024-04-15

## 2024-04-08 RX ORDER — PROPOFOL 10 MG/ML
VIAL (ML) INTRAVENOUS
Status: COMPLETED
Start: 2024-04-08 | End: 2024-04-08

## 2024-04-08 RX ORDER — CHLORHEXIDINE GLUCONATE ORAL RINSE 1.2 MG/ML
15 SOLUTION DENTAL EVERY 12 HOURS SCHEDULED
Status: DISCONTINUED | OUTPATIENT
Start: 2024-04-08 | End: 2024-04-22

## 2024-04-08 RX ORDER — SODIUM CHLORIDE 0.9 % (FLUSH) 0.9 %
10 SYRINGE (ML) INJECTION AS NEEDED
Status: DISCONTINUED | OUTPATIENT
Start: 2024-04-08 | End: 2024-04-21

## 2024-04-08 RX ADMIN — CEFEPIME 2000 MG: 2 INJECTION, POWDER, FOR SOLUTION INTRAVENOUS at 08:51

## 2024-04-08 RX ADMIN — HYDRALAZINE HYDROCHLORIDE 10 MG: 20 INJECTION INTRAMUSCULAR; INTRAVENOUS at 06:35

## 2024-04-08 RX ADMIN — LORAZEPAM 2 MG: 2 INJECTION INTRAMUSCULAR; INTRAVENOUS at 00:12

## 2024-04-08 RX ADMIN — FENTANYL CITRATE 250 MCG/HR: 0.05 INJECTION, SOLUTION INTRAMUSCULAR; INTRAVENOUS at 17:13

## 2024-04-08 RX ADMIN — CEFEPIME 2000 MG: 2 INJECTION, POWDER, FOR SOLUTION INTRAVENOUS at 17:01

## 2024-04-08 RX ADMIN — FENTANYL CITRATE 250 MCG/HR: 0.05 INJECTION, SOLUTION INTRAMUSCULAR; INTRAVENOUS at 21:59

## 2024-04-08 RX ADMIN — IPRATROPIUM BROMIDE AND ALBUTEROL SULFATE 3 ML: .5; 3 SOLUTION RESPIRATORY (INHALATION) at 15:43

## 2024-04-08 RX ADMIN — ENALAPRILAT 1.25 MG: 1.25 INJECTION INTRAVENOUS at 00:15

## 2024-04-08 RX ADMIN — PROPOFOL INJECTABLE EMULSION 50 MCG/KG/MIN: 10 INJECTION, EMULSION INTRAVENOUS at 07:25

## 2024-04-08 RX ADMIN — CHLORHEXIDINE GLUCONATE 15 ML: 1.2 RINSE ORAL at 11:08

## 2024-04-08 RX ADMIN — FOLIC ACID 1 MG: 1 TABLET ORAL at 14:46

## 2024-04-08 RX ADMIN — PHENOBARBITAL SODIUM 65 MG: 65 INJECTION INTRAMUSCULAR at 05:05

## 2024-04-08 RX ADMIN — PROPOFOL INJECTABLE EMULSION 200 MG: 10 INJECTION, EMULSION INTRAVENOUS at 07:12

## 2024-04-08 RX ADMIN — SODIUM CHLORIDE 3390 ML: 9 INJECTION, SOLUTION INTRAVENOUS at 12:28

## 2024-04-08 RX ADMIN — IPRATROPIUM BROMIDE AND ALBUTEROL SULFATE 3 ML: .5; 3 SOLUTION RESPIRATORY (INHALATION) at 11:35

## 2024-04-08 RX ADMIN — LORAZEPAM 2 MG: 2 INJECTION INTRAMUSCULAR; INTRAVENOUS at 19:01

## 2024-04-08 RX ADMIN — MAGNESIUM SULFATE HEPTAHYDRATE 2 G: 40 INJECTION, SOLUTION INTRAVENOUS at 20:16

## 2024-04-08 RX ADMIN — FENTANYL CITRATE 200 MCG/HR: 0.05 INJECTION, SOLUTION INTRAMUSCULAR; INTRAVENOUS at 12:35

## 2024-04-08 RX ADMIN — METOPROLOL TARTRATE 2.5 MG: 1 INJECTION, SOLUTION INTRAVENOUS at 05:04

## 2024-04-08 RX ADMIN — Medication 10 ML: at 11:08

## 2024-04-08 RX ADMIN — PROPOFOL INJECTABLE EMULSION 50 MCG/KG/MIN: 10 INJECTION, EMULSION INTRAVENOUS at 09:31

## 2024-04-08 RX ADMIN — FAMOTIDINE 20 MG: 10 INJECTION INTRAVENOUS at 11:03

## 2024-04-08 RX ADMIN — Medication 0.02 MCG/KG/MIN: at 12:09

## 2024-04-08 RX ADMIN — LORAZEPAM 2 MG: 2 INJECTION INTRAMUSCULAR; INTRAVENOUS at 00:57

## 2024-04-08 RX ADMIN — LORAZEPAM 2 MG: 2 INJECTION INTRAMUSCULAR; INTRAVENOUS at 12:23

## 2024-04-08 RX ADMIN — THIAMINE HYDROCHLORIDE 200 MG: 100 INJECTION, SOLUTION INTRAMUSCULAR; INTRAVENOUS at 16:07

## 2024-04-08 RX ADMIN — VANCOMYCIN HYDROCHLORIDE 2250 MG: 10 INJECTION, POWDER, LYOPHILIZED, FOR SOLUTION INTRAVENOUS at 12:15

## 2024-04-08 RX ADMIN — PROPOFOL INJECTABLE EMULSION 50 MCG/KG/MIN: 10 INJECTION, EMULSION INTRAVENOUS at 16:06

## 2024-04-08 RX ADMIN — IPRATROPIUM BROMIDE AND ALBUTEROL SULFATE 3 ML: .5; 3 SOLUTION RESPIRATORY (INHALATION) at 23:08

## 2024-04-08 RX ADMIN — CHLORHEXIDINE GLUCONATE 15 ML: 1.2 RINSE ORAL at 20:16

## 2024-04-08 RX ADMIN — Medication 1 PATCH: at 22:18

## 2024-04-08 RX ADMIN — PHENOBARBITAL 32.4 MG: 32.4 TABLET ORAL at 17:32

## 2024-04-08 RX ADMIN — PROPOFOL INJECTABLE EMULSION 50 MCG/KG/MIN: 10 INJECTION, EMULSION INTRAVENOUS at 19:09

## 2024-04-08 RX ADMIN — ZINC OXIDE 1 APPLICATION: 200 OINTMENT TOPICAL at 20:16

## 2024-04-08 RX ADMIN — PROPOFOL INJECTABLE EMULSION 50 MCG/KG/MIN: 10 INJECTION, EMULSION INTRAVENOUS at 22:00

## 2024-04-08 RX ADMIN — LORAZEPAM 2 MG: 2 INJECTION INTRAMUSCULAR; INTRAVENOUS at 08:15

## 2024-04-08 RX ADMIN — MAGNESIUM SULFATE HEPTAHYDRATE 2 G: 40 INJECTION, SOLUTION INTRAVENOUS at 22:50

## 2024-04-08 RX ADMIN — FUROSEMIDE 80 MG: 10 INJECTION, SOLUTION INTRAMUSCULAR; INTRAVENOUS at 07:18

## 2024-04-08 RX ADMIN — PROPOFOL INJECTABLE EMULSION 50 MCG/KG/MIN: 10 INJECTION, EMULSION INTRAVENOUS at 13:04

## 2024-04-08 RX ADMIN — LORAZEPAM 2 MG: 2 INJECTION INTRAMUSCULAR; INTRAVENOUS at 05:04

## 2024-04-08 RX ADMIN — THIAMINE HYDROCHLORIDE 200 MG: 100 INJECTION, SOLUTION INTRAMUSCULAR; INTRAVENOUS at 05:55

## 2024-04-08 RX ADMIN — ENALAPRILAT 1.25 MG: 1.25 INJECTION INTRAVENOUS at 05:55

## 2024-04-08 RX ADMIN — IPRATROPIUM BROMIDE AND ALBUTEROL SULFATE 3 ML: .5; 3 SOLUTION RESPIRATORY (INHALATION) at 09:06

## 2024-04-08 RX ADMIN — PROPOFOL 200 MG: 10 INJECTION, EMULSION INTRAVENOUS at 07:12

## 2024-04-08 RX ADMIN — FENTANYL CITRATE 50 MCG/HR: 0.05 INJECTION, SOLUTION INTRAMUSCULAR; INTRAVENOUS at 07:20

## 2024-04-08 RX ADMIN — FAMOTIDINE 20 MG: 20 TABLET, FILM COATED ORAL at 17:32

## 2024-04-08 RX ADMIN — MAGNESIUM SULFATE HEPTAHYDRATE 2 G: 40 INJECTION, SOLUTION INTRAVENOUS at 17:47

## 2024-04-08 RX ADMIN — Medication 15 ML: at 14:46

## 2024-04-08 RX ADMIN — ZINC OXIDE 1 APPLICATION: 200 OINTMENT TOPICAL at 12:28

## 2024-04-08 NOTE — NURSING NOTE
Pt removed x2 IV while in restraints since 1900. Pt disoriented x4, unable to follow commands, hallucinating, bucking & thrashing around in the bed.   This RN unable to obtain IV access.   CIWA <30, unable to administer any PRN.   IV team paged

## 2024-04-08 NOTE — PLAN OF CARE
Goal Outcome Evaluation: Cortrak placed, Nutrition assessment complete, TF's to begin today. RD following.          Problem: Aspiration (Enteral Nutrition)  Goal: Absence of Aspiration Signs and Symptoms  Outcome: Ongoing, Progressing     Problem: Device-Related Complication Risk (Enteral Nutrition)  Goal: Safe, Effective Therapy Delivery  Outcome: Ongoing, Progressing     Problem: Feeding Intolerance (Enteral Nutrition)  Goal: Feeding Tolerance  Outcome: Ongoing, Progressing  Intervention: Prevent and Manage Feeding Intolerance  Flowsheets (Taken 4/8/2024 5281)  Nutrition Support Management: tube feeding initiated

## 2024-04-08 NOTE — NURSING NOTE
Access center note.    Patient not appropriate at this time for consult, currently intubated. Access will re-attempt when patient able to participate in evaluation.

## 2024-04-08 NOTE — CONSULTS
"Nutrition Services    Patient Name:  Watson Lisa  YOB: 1955  MRN: [id number]  Admit Date:  4/6/2024    Assessment Date:  04/08/24    Comment: Nutrition assessment initiated due to TF consult. Pt was admitted with severe alcohol abuse, asp PNA, substance abuse, HTN, LIVAN, resp failure - on vent. RN just placed a Cortrak, KUB reviewed. Labs, meds, skin reviewed. Propofol providing about 700 lipid calories, also on fent drip. Receiving folic acid and thiamine for ETOH. No significant nutrition issues with his labs. Skin phos viewed. Plan is to begin TF's today.  Will likely need swallow eval once extubated.    Plan/Recommendations:  Initiate TF's with Peptamen Intense VHP at 20mL/hr, goal is 50mL/hr  Min free water while on drips  Monitor lytes and replace as needed.    Will follow clinical course, nutrition needs.    CLINICAL NUTRITION ASSESSMENT      Reason for Assessment Physician Consult, TF Assessment    Diagnosis/Problem severe alcohol abuse, asp PNA, substance abuse, HTN, LIVAN, resp failure - on vent.   Medical & Surgical Hx Past Medical History:   Diagnosis Date    Alcohol abuse     Anxiety     Arthritis     Bronchitis with chronic airway obstruction     LAST FEB    DVT (deep venous thrombosis)     Hiatal hernia     Hypertension     Hypertension     Low back pain     Neck pain     Pulmonary embolism     Sleep apnea with use of continuous positive airway pressure (CPAP)     AT NIGHT       Past Surgical History:   Procedure Laterality Date    COLONOSCOPY      JOINT REPLACEMENT Right     hip/knee    REPLACEMENT TOTAL KNEE      TONSILLECTOMY      TOTAL HIP ARTHROPLASTY Right         Current Problems On vent     Encounter Information        Nutrition History    Food Preferences    Supplements    Factors Affecting Intake altered mental status, altered respiratory status   Tests/Procedures X-Ray     Anthropometrics        Current Height   Current Weight  BMI kg/m2 Height: 200.7 cm (79\")  Weight: 113 " kg (250 lb) (04/06/24 1130)  Body mass index is 28.16 kg/m².     Adj BMI (if applicable)    BMI Category Overweight (25 - 29.9)       Admission Weight 113kg   Ideal Body Weight (IBW) 93.7kg     Adj IBW (if applicable)    Usual Body Weight (UBW) 250-260lb   Weight Change/Trend Stable       Weight history: Wt Readings from Last 30 Encounters:   04/06/24 1130 113 kg (250 lb)   12/07/23 1708 114 kg (251 lb 9.6 oz)   12/07/23 1314 118 kg (260 lb)   10/25/23 0839 118 kg (261 lb)   01/03/23 2247 115 kg (252 lb 10.4 oz)   01/01/23 0526 117 kg (257 lb)   12/31/22 0536 117 kg (257 lb)   12/30/22 0500 117 kg (257 lb 4.4 oz)   12/29/22 0535 117 kg (257 lb 15 oz)   12/28/22 2349 116 kg (255 lb 11.7 oz)   12/28/22 1906 117 kg (259 lb)   06/15/22 1110 118 kg (259 lb 9.6 oz)   04/18/22 0552 117 kg (258 lb 4.8 oz)   04/15/22 1925 122 kg (268 lb 15.4 oz)   10/17/21 1150 115 kg (254 lb 10.1 oz)   10/10/21 0050 125 kg (275 lb 9.2 oz)   10/09/21 0352 125 kg (275 lb 9.2 oz)   10/08/21 0926 118 kg (260 lb)   10/07/21 1102 118 kg (260 lb)   03/26/21 0756 113 kg (250 lb)   03/25/21 2053 113 kg (250 lb)   01/15/21 0130 106 kg (233 lb 4 oz)   01/12/21 2106 112 kg (246 lb 11.1 oz)   01/12/21 0737 118 kg (260 lb)   01/11/21 1902 118 kg (260 lb)   01/11/21 1425 118 kg (260 lb)   08/04/20 0858 112 kg (246 lb)   06/29/20 0400 109 kg (240 lb 4.8 oz)   06/27/20 0500 106 kg (233 lb 4 oz)   06/24/20 2043 104 kg (230 lb)   06/24/20 1017 114 kg (251 lb)   03/26/20 0949 115 kg (253 lb 8.5 oz)   06/24/19 1242 115 kg (254 lb)   04/03/19 0657 119 kg (262 lb)   01/29/19 0924 116 kg (255 lb)   10/29/18 1201 116 kg (256 lb)   04/25/18 1011 116 kg (255 lb)   03/23/18 0831 115 kg (253 lb)   11/14/17 1518 110 kg (243 lb)   10/13/17 0628 111 kg (245 lb)   09/13/17 1221 111 kg (245 lb)   09/05/17 1421 107 kg (236 lb)   04/11/17 0851 112 kg (246 lb)   02/06/17 1622 111 kg (244 lb)   01/09/17 1139 113 kg (250 lb)   10/21/16 1020 113 kg (250 lb)   06/29/16 1704 111 kg  (245 lb)   06/20/16 1446 109 kg (241 lb)   06/07/16 1711 113 kg (250 lb)   04/25/16 1100 116 kg (255 lb)        Estimated/Assessed Needs        Energy Requirements    Weight for Calculation 93.7kg   Method for Estimation  20 kcal/kg   EST Needs (kcal/day) 1874       Protein Requirements    Weight for Calculation 93.7kg   EST Protein Needs (g/kg) 1.2 gm/kg   EST Daily Needs (g/day) 112g       Fluid Requirements     Method for Estimation 1 mL/kcal    Estimated Needs (mL/day)        Fluid Deficit    Current Na Level (mEq/L)    Desired Na Level (mEq/L)    Estimated Fluid Deficit (L)       Labs        Pertinent Labs    Results from last 7 days   Lab Units 04/08/24  0602 04/07/24  0713 04/06/24  1145   SODIUM mmol/L 142 137 132*   POTASSIUM mmol/L 4.8 3.9 4.1   CHLORIDE mmol/L 106 103 95*   CO2 mmol/L 18.7* 20.9* 23.0   BUN mg/dL 9 13 21   CREATININE mg/dL 0.75* 0.84 1.13   CALCIUM mg/dL 8.9 7.9* 8.9   BILIRUBIN mg/dL  --  0.3 0.5   ALK PHOS U/L  --  65 80   ALT (SGPT) U/L  --  14 21   AST (SGOT) U/L  --  22 22   GLUCOSE mg/dL 95 105* 105*     Results from last 7 days   Lab Units 04/08/24  0602 04/07/24  0713 04/06/24  1145   MAGNESIUM mg/dL 1.8 1.8 1.4*   PHOSPHORUS mg/dL 3.8 3.0 3.2   HEMOGLOBIN g/dL 12.2* 11.6* 12.5*   HEMATOCRIT % 35.3* 33.2* 36.4*   WBC 10*3/mm3 11.62* 7.58 7.75   ALBUMIN g/dL  --  3.1* 3.8     Results from last 7 days   Lab Units 04/08/24  0602 04/07/24  0713 04/06/24  1145   PLATELETS 10*3/mm3 238 186 208     COVID19   Date Value Ref Range Status   04/15/2022 Not Detected Not Detected - Ref. Range Final     Lab Results   Component Value Date    HGBA1C 4.90 01/18/2021          Medications            Scheduled Medications cefepime, 2,000 mg, Intravenous, Q8H  chlorhexidine, 15 mL, Mouth/Throat, Q12H  enalaprilat, 1.25 mg, Intravenous, Q6H  famotidine, 20 mg, Intravenous, Q12H  folic acid, 1 mg, Oral, Daily  hydrocortisone-bacitracin-zinc oxide-nystatin, 1 Application, Topical,  BID  ipratropium-albuterol, 3 mL, Nebulization, 4x Daily - RT  LORazepam, 2 mg, Intravenous, Q6H  LORazepam, 1 mg, Oral, Q6H  multivitamin with minerals, 1 tablet, Oral, Daily  nicotine, 1 patch, Transdermal, Q24H  PHENobarbital, 32.4 mg, Oral, Once   Followed by  [START ON 4/9/2024] PHENobarbital, 32.4 mg, Oral, Once   Followed by  [START ON 4/9/2024] PHENobarbital, 32.4 mg, Oral, Once  sodium chloride, 10 mL, Intravenous, Q12H  sodium chloride, 10 mL, Intravenous, Q12H  thiamine (B-1) IV, 200 mg, Intravenous, Q8H   Followed by  [START ON 4/12/2024] thiamine, 100 mg, Oral, Daily  vancomycin, 20 mg/kg, Intravenous, Once  vancomycin, 1,500 mg, Intravenous, Q12H        Infusions fentanyl 10 mcg/mL,  mcg/hr, Last Rate: 200 mcg/hr (04/08/24 1101)  Pharmacy to dose vancomycin,   propofol, 5-50 mcg/kg/min, Last Rate: 40 mcg/kg/min (04/08/24 1102)  sodium chloride 0.9 % with KCl 20 mEq, 125 mL/hr, Last Rate: Stopped (04/08/24 0515)        PRN Medications   senna-docusate sodium **AND** polyethylene glycol **AND** bisacodyl **AND** bisacodyl    Calcium Replacement - Follow Nurse / BPA Driven Protocol    hydrALAZINE    LORazepam **OR** LORazepam **OR** LORazepam **OR** LORazepam **OR** LORazepam **OR** LORazepam    Magnesium Standard Dose Replacement - Follow Nurse / BPA Driven Protocol    Magnesium Standard Dose Replacement - Follow Nurse / BPA Driven Protocol    metoprolol tartrate    nicotine polacrilex    nitroglycerin    ondansetron    Pharmacy to dose vancomycin    Phosphorus Replacement - Follow Nurse / BPA Driven Protocol    Potassium Replacement - Follow Nurse / BPA Driven Protocol    [COMPLETED] Insert Peripheral IV **AND** sodium chloride    sodium chloride    sodium chloride    sodium chloride    sodium chloride     Physical Findings          Physical Appearance disoriented, oriented, sedate, ventilator support   Oral/Mouth Cavity tooth or teeth missing   Edema  not assessed   Gastrointestinal last bowel  movement: 4/8   Skin  pressure injury: gluteal , wound on abd   Tubes/Drains/Lines Cortrak, NG tube   NFPE No clinical signs of muscle wasting or fat loss   --  Current Nutrition Orders & Evaluation of Intake       Oral Nutrition     Food Allergies NKFA   Current PO Diet NPO Diet NPO Type: Tube Feeding   Supplement    PO Evaluation     Trending % PO Intake    --  Nutrition Diagnosis        Nutrition Dx Problem 1 Problem: Inadequate Oral Intake  Etiology: Medical Diagnosis - severe alcohol abuse, asp PNA, substance abuse, HTN, LIVAN, resp failure - on vent.  Signs/Symptoms: NPO    Comment:      INTERVENTION / PLAN OF CARE  Intervention Goal        Intervention Goal(s) Maintain nutrition status, Reduce/improve symptoms, Meet estimated needs, Disease management/therapy, Initiate TF/PN, Tolerate TF/PN at goal, Transition TF to PO, and No significant weight loss     Nutrition Intervention        RD Action Continue to monitor, Care plan reviewed, and Recommend/order: EN     Prescription         Diet     Supplement/Snack    EN/PN     Prescription Ordered       Enteral Prescription:     Enteral Route NG    TF Delivery Method Continuous    Enteral Product Peptamen Intense VHP    Modular None    Propofol Rate/Kcal 27.1 (715 kcals)    TF Start Rate 20    TF Goal Rate 50    Free Water Flush 30mL q 4 hrs    TF Provision at Goal: 1200 kcal, 1915 kcals with propofol,  110 gm protein, 1008 mL free water + 180 mL water flushes         Calories 102 % needs met         Protein  98 % needs met         Fluid (mL) 1188mL    Prescription Ordered Yes     Monitor/Evaluation        Monitor Per protocol   Discharge Plan Pending clinical course   Education Will instruct as appropriate     RD to follow per protocol.       Electronically signed by:  Queenie Dunham RD  04/08/24 11:19 EDT

## 2024-04-08 NOTE — PROGRESS NOTES
"[facility] Clinical Pharmacy Services: Vancomycin Pharmacokinetic Initial Consult Note    Watson Lisa is a 68 y.o. male who is on day 1 of pharmacy to dose vancomycin.    Indication: Pneumonia  Consulting Provider: Dr. Zuleta  Planned Duration of Therapy: 7 days  Loading Dose Ordered or Given: 20 mg/kg (2250 mg) mg on 4/8 at 1115 (ordered)  MRSA PCR performed: yes; Result: positive  Culture/Source: na  Target: -600 mg/L.hr   Pertinent Vanc Dosing History: n/a  Other Antimicrobials: cefepime    Vitals/Labs  Ht: 200.7 cm (79\"); Wt: 113 kg (250 lb)  Temp Readings from Last 1 Encounters:   04/08/24 99.5 °F (37.5 °C)    Estimated Creatinine Clearance: 132.8 mL/min (A) (by C-G formula based on SCr of 0.75 mg/dL (L)).       Results from last 7 days   Lab Units 04/08/24  0602 04/07/24  0713 04/06/24  1145   CREATININE mg/dL 0.75* 0.84 1.13   WBC 10*3/mm3 11.62* 7.58 7.75     Assessment/Plan:    Vancomycin Dose:   start 1500 mg IV every  12  hours 12 hrs after loading dose given this am.  Predictive AUC level for the dose ordered is 553 mg/L.hr, which is within the target of 400-600 mg/L.hr  Vanc Trough has been ordered for 4/9 at 1000     Pharmacy will follow patient's kidney function and will adjust doses and obtain levels as necessary. Thank you for involving pharmacy in this patient's care. Please contact pharmacy with any questions or concerns.                           Christiano Viera, [facility]  Clinical Pharmacist    "

## 2024-04-08 NOTE — PROGRESS NOTES
MD Attestation of STEFANIE Note    I, Andre Givens MD, have reviewed the history and plan as obtained by ALISON Ibarra. I have independently examined the patient and I personally performed >50% of the management in this split shared patient. My physical exam confirms her documented findings and I agree with the plan as listed, with the following additions/modifications:    Brief Summary Statement  68 y.o. male with chronic alcohol abuse and substance abuse with cocaine who presented for withdrawal on 4/6.  Worsening mental status overnight and agitation in addition to episode of aspiration with significant secretions.  Transferred to the ICU.  Intubated for respiratory failure.    Attending Physical Exam  Temp:  [97.5 °F (36.4 °C)-99 °F (37.2 °C)] 97.5 °F (36.4 °C)  Heart Rate:  [] 95  Resp:  [20-32] 30  BP: (145-199)/() 186/95  General: Intubated, sedated  HEENT: NC/AT, EOMI, MMM  Neck: Supple, trachea midline  Cardiac: RRR, no murmur, gallops, rubs  Pulmonary: Mechanical breath sounds  GI: Soft, non-tender, non-distended, normal bowel sounds  Extremities: Warm, well perfused, no LE edema  Skin: no visible rash  Neuro: CN II - XII grossly intact  Psychiatry: Agitated    Remainder of detailed HPI is as noted above and has been reviewed by me for completeness.  Assessment/Plan    Severe alcohol abuse with withdrawal  Aspiration pneumonia  Substance abuse-UDS positive for cocaine  Hypertension  Obstructive sleep apnea on CPAP  Metabolic acidosis  Leukocytosis  Anemia    -Intubated for respiratory distress and aspiration pneumonia.  Initiated on empiric antibiotics.  -Ventilator bundle  -Now on propofol, will assist with alcohol withdrawal  -Postintubation chest x-ray   -Will need social work assistance for substance abuse upon discharge    Critical care time: 38 minutes    See above section for further detailed assessment and plan developed with ALISON which I have reviewed.    Electronically signed by  Andre Givens MD, 4/8/2024, 07:21 EDT.

## 2024-04-08 NOTE — PLAN OF CARE
"Goal Outcome Evaluation:      Disoriented x4.  Continue CIWA protocol, scores 25-40, requiring frequent med admin [id number].   Pt restless, uncooperative, unable to follow directions, hallucinating, tremors, thrashing in bed, & at times combative.   Remains in restraints.     Removed multiple IV throughout shift, difficult stick. IVF paused d/t non compliance w/ arm positioning & loss of multiple IV.    HTN, PRN lopressor given x2.   NSR/ST on monitor  Remains on 2L NC. Crackles. Bronchial rhonchi, pt sounds very \"wet\" unable to cough up phlegm. Oral suctioning provided but no secretions.     Incontinent of B&B, BM tonight.     NPO    Call out to [facility] for respiratory status & unresolved HTN, CXR ordered. Waiting call back.                                         "

## 2024-04-08 NOTE — NURSING NOTE
04/08/24 0846   Wound 04/06/24 1944 Bilateral lateral groin Other (comment)   Placement Date/Time: 04/06/24 1944   Present on Original Admission: Yes  Side: Bilateral  Orientation: lateral  Location: groin  Primary Wound Type: (c) Other (comment)   Dressing Appearance open to air   Base pink   Periwound intact   Edges irregular   Drainage Amount none   Wound 04/06/24 1944 Bilateral gluteal Other (comment)   Placement Date/Time: 04/06/24 1944   Present on Original Admission: Yes  Side: Bilateral  Location: gluteal  Primary Wound Type: (c) Other (comment)   Dressing Appearance open to air   Base blanchable;red   Periwound intact;blanchable   Edges irregular   Drainage Amount none   Wound 04/06/24 1944 Bilateral medial abdomen   Placement Date/Time: 04/06/24 1944   Present on Original Admission: Yes  Side: Bilateral  Orientation: medial  Location: (c) abdomen   Dressing Appearance open to air   Base pink   Periwound intact;blanchable   Edges irregular   Drainage Amount none     WOCN: Bilateral buttock and groin area red/pink. Patient has been having loose stools, Maybe yeast related due to moisture. Abd fold pink with dry scab above fold. Recommend magic barrier cream. Patient needs assist of 3 to reposition. He is very large and tall. Recommendations and skin prevetion protocols placed in epic.

## 2024-04-08 NOTE — NURSING NOTE
Call back from Dr. Gayle  Telephone orders for pharmacy Zosyn for suspected aspiration PNA .  PRN Hydralazine     Suspect need for transfer to higher level of care, given prior need for intubation on last admission during withdraw so C/s placed for Pulmonary

## 2024-04-08 NOTE — NURSING NOTE
Attempted to call Raffy Nazario @ [phone] at 0701 to notify of transfer to ICU. Left VM to call 6N or ICU back.   Phone number given to both units.

## 2024-04-08 NOTE — PROCEDURES
Procedure: Intubation for mechanical ventilation    Indication: airway compromise    After sedation with propofol, cords were directly visualized with West 3. Thereafter endotracheal tube size 7.5 was placed through the cords. Position was confirmed with bilateral air entry and change of color on capnometer. There were no complications of procedure.    Ventilator settings given to respiratory therapist. Stat portable chest x-ray has been requested and will be reviewed.    Electronically signed by ALISON Palencia, 04/08/24, 7:21 AM EDT.

## 2024-04-08 NOTE — PROGRESS NOTES
[facility] INPATIENT PROGRESS NOTE         [facility] INTENSIVE CARE    2024      PATIENT IDENTIFICATION:  Name: Watson Lisa ADMIT: 2024   : 1955  PCP: Roc Carroll APRN    MRN: [id number] LOS: 2 days   AGE/SEX: 68 y.o. male  ROOM: [facility]                     LOS 2    Reason for visit: ICU medical management with aspiration pneumonia, respiratory failure on ventilator      SUBJECTIVE:      Events noted.  On pressor.  Discussed with nursing staff.  Continue CIWA protocol for alcohol withdrawal.  Place Cortrak and start enteral nutrition.  Not making any urine. I am seeing the patient for the first time today.  All patient problems are new to me.      Objective   OBJECTIVE:    Vital Sign Min/Max for last 24 hours  Temp  Min: 97.5 °F (36.4 °C)  Max: 99.5 °F (37.5 °C)   BP  Min: 87/51  Max: 199/98   Pulse  Min: 75  Max: 120   Resp  Min: 15  Max: 32   SpO2  Min: 91 %  Max: 99 %   No data recorded   No data recorded    Vitals:    24 0900 24 0907 24 1030 24 1135   BP: 94/78  (!) 87/51    BP Location:       Patient Position:       Pulse: 85 82 76 75   Resp:  15  16   Temp:       TempSrc:       SpO2: 96% 96% 97% 97%   Weight:       Height:                24  1130   Weight: 113 kg (250 lb)       Body mass index is 28.16 kg/m².        Mode: VC+/AC  FiO2 (%):  [50 %-100 %] 50 %  S RR:  [12] 12  S VT:  [650 mL] 650 mL  PEEP/CPAP (cm H2O):  [5 cm H20] 5 cm H20  MAP (cm H2O):  [7.8-13] 7.8           FiO2 (%): 50 %     Body mass index is 28.16 kg/m².    Intake/Output Summary (Last 24 hours) at 2024 1154  Last data filed at 2024  Gross per 24 hour   Intake 2468 ml   Output --   Net 2468 ml         Exam:  GEN:  No distress, appears stated age  EYES:   PERRL, anicteric sclerae  ENT:    External ears/nose normal, OP clear  NECK:  No adenopathy, midline trachea  LUNGS: Normal chest on inspection, palpation and auscultation  CV:  Normal S1S2, without  murmur  ABD:  Nontender, nondistended, no hepatosplenomegaly, +BS  EXT:  No edema.  No cyanosis or clubbing.  No mottling and normal cap refill.    Assessment     Scheduled meds:  cefepime, 2,000 mg, Intravenous, Q8H  chlorhexidine, 15 mL, Mouth/Throat, Q12H  enalaprilat, 1.25 mg, Intravenous, Q6H  famotidine, 20 mg, Nasogastric, BID AC  folic acid, 1 mg, Nasogastric, Daily  hydrocortisone-bacitracin-zinc oxide-nystatin, 1 Application, Topical, BID  ipratropium-albuterol, 3 mL, Nebulization, 4x Daily - RT  LORazepam, 2 mg, Intravenous, Q6H  multivitamin and minerals, 15 mL, Nasogastric, Daily  nicotine, 1 patch, Transdermal, Q24H  PHENobarbital, 32.4 mg, Nasogastric, Once   Followed by  [START ON 4/9/2024] PHENobarbital, 32.4 mg, Oral, Once   Followed by  [START ON 4/9/2024] PHENobarbital, 32.4 mg, Oral, Once  sodium chloride, 10 mL, Intravenous, Q12H  sodium chloride, 10 mL, Intravenous, Q12H  thiamine (B-1) IV, 200 mg, Intravenous, Q8H   Followed by  [START ON 4/12/2024] thiamine, 100 mg, Oral, Daily  vancomycin, 20 mg/kg, Intravenous, Once  vancomycin, 1,500 mg, Intravenous, Q12H      IV meds:                      fentanyl 10 mcg/mL,  mcg/hr, Last Rate: 150 mcg/hr (04/08/24 1144)  Pharmacy to dose vancomycin,   propofol, 5-50 mcg/kg/min, Last Rate: 30 mcg/kg/min (04/08/24 1144)  sodium chloride 0.9 % with KCl 20 mEq, 125 mL/hr, Last Rate: Stopped (04/08/24 0515)      Data Review:  Results from last 7 days   Lab Units 04/08/24  0602 04/07/24  0713 04/06/24  1145   SODIUM mmol/L 142 137 132*   POTASSIUM mmol/L 4.8 3.9 4.1   CHLORIDE mmol/L 106 103 95*   CO2 mmol/L 18.7* 20.9* 23.0   BUN mg/dL 9 13 21   CREATININE mg/dL 0.75* 0.84 1.13   GLUCOSE mg/dL 95 105* 105*   CALCIUM mg/dL 8.9 7.9* 8.9         Estimated Creatinine Clearance: 132.8 mL/min (A) (by C-G formula based on SCr of 0.75 mg/dL (L)).  Results from last 7 days   Lab Units 04/08/24  0602 04/07/24  0713 04/06/24  1145   WBC 10*3/mm3 11.62* 7.58 7.75    HEMOGLOBIN g/dL 12.2* 11.6* 12.5*   PLATELETS 10*3/mm3 238 186 208         Results from last 7 days   Lab Units 04/07/24  0713 04/06/24  1145   ALT (SGPT) U/L 14 21   AST (SGOT) U/L 22 22     Results from last 7 days   Lab Units 04/08/24  0847   PH, ARTERIAL pH units 7.374   PO2 ART mm Hg 406.3*   PCO2, ARTERIAL mm Hg 37.8   HCO3 ART mmol/L 22.1     Results from last 7 days   Lab Units 04/08/24  0830 04/07/24  0713   LACTATE mmol/L 0.9 0.8         Glucose   Date/Time Value Ref Range Status   04/08/2024 1144 110 70 - 130 mg/dL Final   04/08/2024 0719 106 70 - 130 mg/dL Final         Imaging reviewed  Chest x-ray 4/8 reviewed              Microbiology reviewed  Blood cultures no growth to date.  Respiratory culture no growth  MRSA screen positive              Active Hospital Problems    Diagnosis  POA    **Alcohol withdrawal [F10.939]  Yes    Substance abuse [F19.10]  Unknown    Cocaine abuse [F14.10]  Unknown    Positive urine drug screen [R82.5]  Unknown    History of alcohol use disorder [Z87.898]  Yes    Alcoholic liver disease [K70.9]  Yes    COPD (chronic obstructive pulmonary disease) [J44.9]  Yes    LIVAN (obstructive sleep apnea) [G47.33]  Yes    Essential hypertension [I10]  Yes      Resolved Hospital Problems   No resolved problems to display.         ASSESSMENT:  Acute respiratory failure requiring mechanical ventilation  Aspiration pneumonia  Sepsis with shock  Severe alcohol abuse with withdrawal  Polysubstance abuse: UDS positive for cocaine  LIVAN        PLAN:  Required intubation and mechanical ventilation this morning for worsening respiratory failure related to aspiration pneumonia.  Sepsis fluid bolus with worsening hypotension and add pressor as needed.  Continue antibiotics broad-spectrum and narrow based on culture results.  Alcohol withdrawal protocol.  Thiamine, folic acid and multivitamin.  Control glucose.  Control blood pressure.  Add Lovenox for DVT prophylaxis.  Pepcid for ulcer  prophylaxis.  Place Cortrak and start enteral nutrition.    Discussed with multidisciplinary ICU team on rounds this morning.        CCT: 47 min      Watson Zuleta MD  Pulmonary and Critical Care Medicine  [facility], [facility]  4/8/2024    11:54 EDT

## 2024-04-08 NOTE — CONSULTS
"Group: [facility]       CONSULT NOTE    Patient Identification:  Watson Lisa  68 y.o.  male  1955  [id number]            Requesting physician:  Dr. Mayes    Reason for Consultation:  possible ICU transfer, aspiration pneumonia     CC: alcohol withdrawal    History of Present Illness:  Watson Lisa is a 68 year old male with a past medical history of hypertension, alcohol abuse, and tobacco abuse. He also has a history of substance abuse- using unprescribed opiates for chronic pain and recently began using cocaine.    He presented to the ED on 4/6/24 expressing desire to quit drinking.  Patient has previously had multiple times to cease alcohol use with DTs-previously has also been admitted to the intensive care unit requiring mechanical ventilation for aspiration pneumonia/inability to protect his airway.  Patient admits to chronic alcohol use and drinking about 1/5 of whiskey and bourbon every night.  He also reports that he uses opiates that he \"buys off the street\" to help with his chronic pain.  Approximately 3 weeks ago his pain acutely worsened and he began using cocaine.  Last drink prior to admission was 4/5/2024.  Patient was admitted to the hospital for medical management of alcohol withdrawal.    Stat consult was placed this morning for concern for aspiration pneumonia.  Upon arrival to floor, patient is breathing 45 times a minute, is confused, diaphoretic, however is maintaining his oxygen saturation below requiring 2 L nasal cannula.  He has thick, creamy secretions with oropharyngeal suctioning, unable to clear himself.  He was emergently transferred to the intensive care unit and intubated for airway protection.    Review of Systems:  Unable to obtain, confused.    Past Medical History:  Past Medical History:   Diagnosis Date    Alcohol abuse     Anxiety     Arthritis     Bronchitis with chronic airway obstruction     LAST FEB    DVT (deep venous thrombosis)     Hiatal hernia " "    Hypertension     Hypertension     Low back pain     Neck pain     Pulmonary embolism     Sleep apnea with use of continuous positive airway pressure (CPAP)     AT NIGHT       Past Surgical History:  Past Surgical History:   Procedure Laterality Date    COLONOSCOPY      JOINT REPLACEMENT Right     hip/knee    REPLACEMENT TOTAL KNEE      TONSILLECTOMY      TOTAL HIP ARTHROPLASTY Right         Home Meds:  Medications Prior to Admission   Medication Sig Dispense Refill Last Dose    losartan (COZAAR) 100 MG tablet Take 1 tablet by mouth Daily for 30 days. 30 tablet 0 4/5/2024    multivitamin with minerals (MULTIVITAMIN ADULT PO) Take 1 tablet by mouth Daily. 90 tablet 3 4/5/2024    Umeclidinium-Vilanterol (Anoro Ellipta) 62.5-25 MCG/ACT aerosol powder  inhaler Inhale 1 puff Daily. 1 each 9 4/5/2024       Allergies:  Allergies   Allergen Reactions    Penicillins Unknown - Low Severity     Pt does not recall the reaction. Patient tolerated cephalexin 3/2020    Penicillins Other (See Comments)     Unknown        Social History:   Social History     Socioeconomic History    Marital status:    Tobacco Use    Smoking status: Every Day     Current packs/day: 2.00     Average packs/day: 2.0 packs/day for 40.0 years (80.0 ttl pk-yrs)     Types: Cigarettes    Smokeless tobacco: Never   Vaping Use    Vaping status: Never Used   Substance and Sexual Activity    Alcohol use: Yes     Alcohol/week: 10.0 standard drinks of alcohol     Types: 10 Shots of liquor per week     Comment: 1 pint vodka/ day    Drug use: Not Currently     Types: Hydrocodone    Sexual activity: Defer       Family History:  Family History   Problem Relation Age of Onset    Cancer Mother     Heart disease Father     Alcohol abuse Father     Cancer Brother        Physical Exam:  BP (!) 186/95   Pulse 95   Temp 97.5 °F (36.4 °C) (Oral)   Resp (!) 30   Ht 200.7 cm (79\")   Wt 113 kg (250 lb)   SpO2 91%   BMI 28.16 kg/m²  Body mass index is 28.16 " kg/m². 91% 113 kg (250 lb)    Constitutional: Middle aged pt in bed, acute respiratory distress, tachypneic, diaphoretic, flushed  Head: - NCAT  Eyes: No pallor, Anicteric conjunctiva, EOMI.  ENMT:  Mallampati 3, no oral thrush. Dry MM.  Poor dentition with multiple missing teeth.  NECK: Trachea midline, No thyromegaly, no palpable cervical lymphadenopathy, JVD present  Heart: Tachycardic rate, regular rhythm, no murmur. No pedal edema   Lungs: RAMYA +, diffuse coarse lung sounds  Abdomen: Soft. No tenderness, guarding or rigidity. No palpable masses  Extremities: Extremities warm and well perfused. No cyanosis/ clubbing  Neuro: Opens eyes spontaneously, intermittently will follow commands, no focal neurodeficits  Psych: Agitated    PPE recommended per [facility] infectious disease Isolation protocol for the current clinical scenario(as mentioned below) was followed.      LABS:  COVID19   Date Value Ref Range Status   04/15/2022 Not Detected Not Detected - Ref. Range Final       Lab Results   Component Value Date    CALCIUM 7.9 (L) 04/07/2024    PHOS 3.0 04/07/2024     Results from last 7 days   Lab Units 04/07/24  0713 04/06/24  1145   MAGNESIUM mg/dL 1.8 1.4*   SODIUM mmol/L 137 132*   POTASSIUM mmol/L 3.9 4.1   CHLORIDE mmol/L 103 95*   CO2 mmol/L 20.9* 23.0   BUN mg/dL 13 21   CREATININE mg/dL 0.84 1.13   GLUCOSE mg/dL 105* 105*   CALCIUM mg/dL 7.9* 8.9   WBC 10*3/mm3 7.58 7.75   HEMOGLOBIN g/dL 11.6* 12.5*   PLATELETS 10*3/mm3 186 208   ALT (SGPT) U/L 14 21   AST (SGOT) U/L 22 22     Lab Results   Component Value Date    CKTOTAL 668 (H) 01/02/2023    TROPONINT 0.080 (C) 12/28/2022             Results from last 7 days   Lab Units 04/07/24  0713   LACTATE mmol/L 0.8                     Lab Results   Component Value Date    TSH 1.530 10/25/2023     Estimated Creatinine Clearance: 118.6 mL/min (by C-G formula based on SCr of 0.84 mg/dL).         Imaging: I personally visualized the images of scans/x-rays  performed within last 3 days.      Assessment:  Respiratory failure requiring mechanical ventilation   Airway compromise  Alcohol withdrawal  Aspiration pneumonia  Substance abuse  Hypertension  Obstructive sleep apnea on CPAP  Metabolic acidosis  Leukocytosis  Anemia    Recommendations:  Emergently transferred to the intensive care unit for respiratory distress, intubated.  See separate note.  Respiratory culture ordered.  Started on empiric antibiotics for suspected aspiration pneumonia.  Patient has allergy to penicillins, initiated on cefepime.  Previously has tolerated cephalexin.  Ventilator bundle initiated.  Propofol for sedation.  Fentanyl for analgesia.  Daily chest x-rays.  Daily ABGs.  Wean sedation as tolerated to maintain RASS 0 to -1.  Continue use of benzos for alcohol withdrawal.    N.p.o.  SCDs  Full code    Patient was placed in face mask upon entering room and kept mask on throughout our encounter. I wore full protective equipment throughout this patient encounter including a face mask, gown and gloves. Hand hygiene was performed before donning protective equipment and after removal when leaving the room.    Jacqui Alvarado, APRN  4/8/2024  06:49 EDT      Much of this encounter note is an electronic transcription/translation of spoken language to printed text using Dragon Software.

## 2024-04-08 NOTE — SIGNIFICANT NOTE
04/08/24 1046   OTHER   Discipline physical therapist   Rehab Time/Intention   Session Not Performed unable to evaluate, medical status change  (Pt now intubated in ICU; not appropraite for PT services. Will sign off. Please reorder when appropriate. Nurse aware.)   Therapy Assessment/Plan (PT)   Criteria for Skilled Interventions Met (PT) no

## 2024-04-08 NOTE — PROGRESS NOTES
[facility] Clinical Pharmacy Services: Piperacillin-Tazobactam Consult    Pt Name: Watson Lisa   : 1955    Indication: Pneumonia    Relevant clinical data and objective history reviewed:    Past Medical History:   Diagnosis Date    Alcohol abuse     Anxiety     Arthritis     Bronchitis with chronic airway obstruction     LAST FEB    DVT (deep venous thrombosis)     Hiatal hernia     Hypertension     Hypertension     Low back pain     Neck pain     Pulmonary embolism     Sleep apnea with use of continuous positive airway pressure (CPAP)     AT NIGHT     Creatinine   Date Value Ref Range Status   2024 0.84 0.76 - 1.27 mg/dL Final   2024 1.13 0.76 - 1.27 mg/dL Final   2023 0.96 0.76 - 1.27 mg/dL Final   2020 0.9 0.7 - 1.5 mg/dL Final   2017 0.80 0.60 - 1.30 mg/dL Final     Comment:     Serial Number: [id number]    : [id number]     BUN   Date Value Ref Range Status   2024 13 8 - 23 mg/dL Final   2020 18 7 - 20 mg/dL Final     Estimated Creatinine Clearance: 118.6 mL/min (by C-G formula based on SCr of 0.84 mg/dL).    Lab Results   Component Value Date    WBC 7.58 2024     Temp Readings from Last 3 Encounters:   24 97.5 °F (36.4 °C) (Oral)   23 97.5 °F (36.4 °C) (Oral)   10/25/23 97.1 °F (36.2 °C) (Infrared)      Assessment/Plan  Estimated CrCl >20 mL/min at this time; BMI 28.16 kg/m2  Will start piperacillin-tazobactam 3.375 g IV every 8 hours     Pharmacy will continue to follow daily while on piperacillin-tazobactam and adjust as needed. Thank you for this consult.    Nicko Bolton [facility]  Clinical Pharmacist

## 2024-04-09 ENCOUNTER — APPOINTMENT (OUTPATIENT)
Dept: GENERAL RADIOLOGY | Facility: HOSPITAL | Age: 69
End: 2024-04-09
Payer: COMMERCIAL

## 2024-04-09 LAB
ANION GAP SERPL CALCULATED.3IONS-SCNC: 11 MMOL/L (ref 5–15)
ARTERIAL PATENCY WRIST A: POSITIVE
ATMOSPHERIC PRESS: 748.3 MMHG
BASE EXCESS BLDA CALC-SCNC: -4.1 MMOL/L (ref 0–2)
BDY SITE: ABNORMAL
BUN SERPL-MCNC: 11 MG/DL (ref 8–23)
BUN/CREAT SERPL: 11.7 (ref 7–25)
CALCIUM SPEC-SCNC: 7.7 MG/DL (ref 8.6–10.5)
CHLORIDE SERPL-SCNC: 112 MMOL/L (ref 98–107)
CO2 BLDA-SCNC: 21.5 MMOL/L (ref 23–27)
CO2 SERPL-SCNC: 21 MMOL/L (ref 22–29)
CREAT SERPL-MCNC: 0.94 MG/DL (ref 0.76–1.27)
DEPRECATED RDW RBC AUTO: 42.6 FL (ref 37–54)
DEVICE COMMENT: ABNORMAL
EGFRCR SERPLBLD CKD-EPI 2021: 87.8 ML/MIN/1.73
ERYTHROCYTE [DISTWIDTH] IN BLOOD BY AUTOMATED COUNT: 11.8 % (ref 12.3–15.4)
GLUCOSE BLDC GLUCOMTR-MCNC: 105 MG/DL (ref 70–130)
GLUCOSE BLDC GLUCOMTR-MCNC: 116 MG/DL (ref 70–130)
GLUCOSE SERPL-MCNC: 101 MG/DL (ref 65–99)
HCO3 BLDA-SCNC: 20.4 MMOL/L (ref 22–28)
HCT VFR BLD AUTO: 31.9 % (ref 37.5–51)
HEMODILUTION: NO
HGB BLD-MCNC: 10.7 G/DL (ref 13–17.7)
INHALED O2 CONCENTRATION: 30 %
MAGNESIUM SERPL-MCNC: 2.2 MG/DL (ref 1.6–2.4)
MCH RBC QN AUTO: 32.9 PG (ref 26.6–33)
MCHC RBC AUTO-ENTMCNC: 33.5 G/DL (ref 31.5–35.7)
MCV RBC AUTO: 98.2 FL (ref 79–97)
MODALITY: ABNORMAL
O2 A-A PPRESDIFF RESPIRATORY: 0.5 MMHG
PCO2 BLDA: 34.4 MM HG (ref 35–45)
PEEP RESPIRATORY: 5 CM[H2O]
PH BLDA: 7.38 PH UNITS (ref 7.35–7.45)
PHOSPHATE SERPL-MCNC: 3.6 MG/DL (ref 2.5–4.5)
PLATELET # BLD AUTO: 225 10*3/MM3 (ref 140–450)
PMV BLD AUTO: 9.8 FL (ref 6–12)
PO2 BLDA: 82.2 MM HG (ref 80–100)
POTASSIUM SERPL-SCNC: 4.1 MMOL/L (ref 3.5–5.2)
QT INTERVAL: 371 MS
QTC INTERVAL: 434 MS
RBC # BLD AUTO: 3.25 10*6/MM3 (ref 4.14–5.8)
SAO2 % BLDCOA: 96 % (ref 92–98.5)
SET MECH RESP RATE: 12
SODIUM SERPL-SCNC: 144 MMOL/L (ref 136–145)
TOTAL RATE: 14 BREATHS/MINUTE
TRIGL SERPL-MCNC: 158 MG/DL (ref 0–150)
VANCOMYCIN TROUGH SERPL-MCNC: 11 MCG/ML (ref 5–20)
VENTILATOR MODE: ABNORMAL
VT ON VENT VENT: 650 ML
WBC NRBC COR # BLD AUTO: 9.79 10*3/MM3 (ref 3.4–10.8)

## 2024-04-09 PROCEDURE — 93010 ELECTROCARDIOGRAM REPORT: CPT | Performed by: INTERNAL MEDICINE

## 2024-04-09 PROCEDURE — 94664 DEMO&/EVAL PT USE INHALER: CPT

## 2024-04-09 PROCEDURE — 25010000002 THIAMINE HCL 200 MG/2ML SOLUTION: Performed by: INTERNAL MEDICINE

## 2024-04-09 PROCEDURE — 25810000003 SODIUM CHLORIDE 0.9 % SOLUTION: Performed by: INTERNAL MEDICINE

## 2024-04-09 PROCEDURE — 25010000002 ENOXAPARIN PER 10 MG: Performed by: INTERNAL MEDICINE

## 2024-04-09 PROCEDURE — 94799 UNLISTED PULMONARY SVC/PX: CPT

## 2024-04-09 PROCEDURE — 84100 ASSAY OF PHOSPHORUS: CPT | Performed by: STUDENT IN AN ORGANIZED HEALTH CARE EDUCATION/TRAINING PROGRAM

## 2024-04-09 PROCEDURE — 25010000002 CEFEPIME PER 500 MG

## 2024-04-09 PROCEDURE — 25010000002 FENTANYL CITRATE (PF) 2500 MCG/50ML SOLUTION

## 2024-04-09 PROCEDURE — 84478 ASSAY OF TRIGLYCERIDES: CPT | Performed by: INTERNAL MEDICINE

## 2024-04-09 PROCEDURE — 93005 ELECTROCARDIOGRAM TRACING: CPT | Performed by: INTERNAL MEDICINE

## 2024-04-09 PROCEDURE — 94761 N-INVAS EAR/PLS OXIMETRY MLT: CPT

## 2024-04-09 PROCEDURE — 80048 BASIC METABOLIC PNL TOTAL CA: CPT | Performed by: STUDENT IN AN ORGANIZED HEALTH CARE EDUCATION/TRAINING PROGRAM

## 2024-04-09 PROCEDURE — 94003 VENT MGMT INPAT SUBQ DAY: CPT

## 2024-04-09 PROCEDURE — 82948 REAGENT STRIP/BLOOD GLUCOSE: CPT

## 2024-04-09 PROCEDURE — 82803 BLOOD GASES ANY COMBINATION: CPT

## 2024-04-09 PROCEDURE — 25010000002 LORAZEPAM PER 2 MG: Performed by: INTERNAL MEDICINE

## 2024-04-09 PROCEDURE — 83735 ASSAY OF MAGNESIUM: CPT | Performed by: STUDENT IN AN ORGANIZED HEALTH CARE EDUCATION/TRAINING PROGRAM

## 2024-04-09 PROCEDURE — 94760 N-INVAS EAR/PLS OXIMETRY 1: CPT

## 2024-04-09 PROCEDURE — 25010000002 VANCOMYCIN HCL 1.25 G RECONSTITUTED SOLUTION 1 EACH VIAL: Performed by: INTERNAL MEDICINE

## 2024-04-09 PROCEDURE — 25010000002 PROPOFOL 10 MG/ML EMULSION

## 2024-04-09 PROCEDURE — 85027 COMPLETE CBC AUTOMATED: CPT | Performed by: STUDENT IN AN ORGANIZED HEALTH CARE EDUCATION/TRAINING PROGRAM

## 2024-04-09 PROCEDURE — 25010000002 VANCOMYCIN 10 G RECONSTITUTED SOLUTION: Performed by: INTERNAL MEDICINE

## 2024-04-09 PROCEDURE — 80202 ASSAY OF VANCOMYCIN: CPT | Performed by: INTERNAL MEDICINE

## 2024-04-09 PROCEDURE — 25010000002 SODIUM CHLORIDE 0.9 % WITH KCL 20 MEQ 20-0.9 MEQ/L-% SOLUTION: Performed by: INTERNAL MEDICINE

## 2024-04-09 PROCEDURE — 25810000003 SODIUM CHLORIDE 0.9 % SOLUTION 250 ML FLEX CONT: Performed by: INTERNAL MEDICINE

## 2024-04-09 PROCEDURE — 71045 X-RAY EXAM CHEST 1 VIEW: CPT

## 2024-04-09 PROCEDURE — 36600 WITHDRAWAL OF ARTERIAL BLOOD: CPT

## 2024-04-09 RX ORDER — ENOXAPARIN SODIUM 100 MG/ML
40 INJECTION SUBCUTANEOUS EVERY 24 HOURS
Status: DISCONTINUED | OUTPATIENT
Start: 2024-04-09 | End: 2024-04-24 | Stop reason: HOSPADM

## 2024-04-09 RX ORDER — ACETAMINOPHEN 325 MG/1
650 TABLET ORAL EVERY 6 HOURS PRN
Status: DISCONTINUED | OUTPATIENT
Start: 2024-04-09 | End: 2024-04-24 | Stop reason: HOSPADM

## 2024-04-09 RX ADMIN — FAMOTIDINE 20 MG: 20 TABLET, FILM COATED ORAL at 06:33

## 2024-04-09 RX ADMIN — PROPOFOL INJECTABLE EMULSION 50 MCG/KG/MIN: 10 INJECTION, EMULSION INTRAVENOUS at 00:58

## 2024-04-09 RX ADMIN — CEFEPIME 2000 MG: 2 INJECTION, POWDER, FOR SOLUTION INTRAVENOUS at 00:23

## 2024-04-09 RX ADMIN — THIAMINE HYDROCHLORIDE 200 MG: 100 INJECTION, SOLUTION INTRAMUSCULAR; INTRAVENOUS at 13:19

## 2024-04-09 RX ADMIN — CEFEPIME 2000 MG: 2 INJECTION, POWDER, FOR SOLUTION INTRAVENOUS at 08:15

## 2024-04-09 RX ADMIN — THIAMINE HYDROCHLORIDE 200 MG: 100 INJECTION, SOLUTION INTRAMUSCULAR; INTRAVENOUS at 05:36

## 2024-04-09 RX ADMIN — THIAMINE HYDROCHLORIDE 200 MG: 100 INJECTION, SOLUTION INTRAMUSCULAR; INTRAVENOUS at 00:58

## 2024-04-09 RX ADMIN — PROPOFOL INJECTABLE EMULSION 45 MCG/KG/MIN: 10 INJECTION, EMULSION INTRAVENOUS at 20:28

## 2024-04-09 RX ADMIN — ZINC OXIDE 1 APPLICATION: 200 OINTMENT TOPICAL at 09:54

## 2024-04-09 RX ADMIN — FENTANYL CITRATE 300 MCG/HR: 0.05 INJECTION, SOLUTION INTRAMUSCULAR; INTRAVENOUS at 21:23

## 2024-04-09 RX ADMIN — POTASSIUM CHLORIDE AND SODIUM CHLORIDE 150 ML/HR: 900; 150 INJECTION, SOLUTION INTRAVENOUS at 08:13

## 2024-04-09 RX ADMIN — LORAZEPAM 2 MG: 2 INJECTION INTRAMUSCULAR; INTRAVENOUS at 21:33

## 2024-04-09 RX ADMIN — LORAZEPAM 2 MG: 2 INJECTION INTRAMUSCULAR; INTRAVENOUS at 00:23

## 2024-04-09 RX ADMIN — Medication 10 ML: at 20:29

## 2024-04-09 RX ADMIN — FENTANYL CITRATE 200 MCG/HR: 0.05 INJECTION, SOLUTION INTRAMUSCULAR; INTRAVENOUS at 09:17

## 2024-04-09 RX ADMIN — CHLORHEXIDINE GLUCONATE 15 ML: 1.2 RINSE ORAL at 08:09

## 2024-04-09 RX ADMIN — ENOXAPARIN SODIUM 40 MG: 100 INJECTION SUBCUTANEOUS at 12:43

## 2024-04-09 RX ADMIN — IPRATROPIUM BROMIDE AND ALBUTEROL SULFATE 3 ML: .5; 3 SOLUTION RESPIRATORY (INHALATION) at 19:32

## 2024-04-09 RX ADMIN — CHLORHEXIDINE GLUCONATE 15 ML: 1.2 RINSE ORAL at 20:33

## 2024-04-09 RX ADMIN — Medication 10 ML: at 09:50

## 2024-04-09 RX ADMIN — IPRATROPIUM BROMIDE AND ALBUTEROL SULFATE 3 ML: .5; 3 SOLUTION RESPIRATORY (INHALATION) at 10:54

## 2024-04-09 RX ADMIN — LORAZEPAM 2 MG: 2 INJECTION INTRAMUSCULAR; INTRAVENOUS at 21:50

## 2024-04-09 RX ADMIN — PROPOFOL INJECTABLE EMULSION 40 MCG/KG/MIN: 10 INJECTION, EMULSION INTRAVENOUS at 14:47

## 2024-04-09 RX ADMIN — THIAMINE HYDROCHLORIDE 200 MG: 100 INJECTION, SOLUTION INTRAMUSCULAR; INTRAVENOUS at 21:50

## 2024-04-09 RX ADMIN — VANCOMYCIN HYDROCHLORIDE 1500 MG: 10 INJECTION, POWDER, LYOPHILIZED, FOR SOLUTION INTRAVENOUS at 00:58

## 2024-04-09 RX ADMIN — ACETAMINOPHEN 325MG 650 MG: 325 TABLET ORAL at 13:14

## 2024-04-09 RX ADMIN — FENTANYL CITRATE 250 MCG/HR: 0.05 INJECTION, SOLUTION INTRAMUSCULAR; INTRAVENOUS at 01:02

## 2024-04-09 RX ADMIN — IPRATROPIUM BROMIDE AND ALBUTEROL SULFATE 3 ML: .5; 3 SOLUTION RESPIRATORY (INHALATION) at 15:13

## 2024-04-09 RX ADMIN — FENTANYL CITRATE 300 MCG/HR: 0.05 INJECTION, SOLUTION INTRAMUSCULAR; INTRAVENOUS at 05:25

## 2024-04-09 RX ADMIN — FOLIC ACID 1 MG: 1 TABLET ORAL at 09:48

## 2024-04-09 RX ADMIN — VANCOMYCIN HYDROCHLORIDE 1250 MG: 1.25 INJECTION, POWDER, LYOPHILIZED, FOR SOLUTION INTRAVENOUS at 23:29

## 2024-04-09 RX ADMIN — LORAZEPAM 2 MG: 2 INJECTION INTRAMUSCULAR; INTRAVENOUS at 20:59

## 2024-04-09 RX ADMIN — POTASSIUM CHLORIDE AND SODIUM CHLORIDE 150 ML/HR: 900; 150 INJECTION, SOLUTION INTRAVENOUS at 21:41

## 2024-04-09 RX ADMIN — LORAZEPAM 2 MG: 2 INJECTION INTRAMUSCULAR; INTRAVENOUS at 12:43

## 2024-04-09 RX ADMIN — POTASSIUM CHLORIDE AND SODIUM CHLORIDE 150 ML/HR: 900; 150 INJECTION, SOLUTION INTRAVENOUS at 01:04

## 2024-04-09 RX ADMIN — PROPOFOL INJECTABLE EMULSION 50 MCG/KG/MIN: 10 INJECTION, EMULSION INTRAVENOUS at 04:34

## 2024-04-09 RX ADMIN — PROPOFOL INJECTABLE EMULSION 45 MCG/KG/MIN: 10 INJECTION, EMULSION INTRAVENOUS at 22:44

## 2024-04-09 RX ADMIN — LORAZEPAM 2 MG: 2 INJECTION INTRAMUSCULAR; INTRAVENOUS at 18:02

## 2024-04-09 RX ADMIN — Medication 10 ML: at 20:30

## 2024-04-09 RX ADMIN — PROPOFOL INJECTABLE EMULSION 50 MCG/KG/MIN: 10 INJECTION, EMULSION INTRAVENOUS at 06:38

## 2024-04-09 RX ADMIN — FENTANYL CITRATE 250 MCG/HR: 0.05 INJECTION, SOLUTION INTRAMUSCULAR; INTRAVENOUS at 17:21

## 2024-04-09 RX ADMIN — FAMOTIDINE 20 MG: 20 TABLET, FILM COATED ORAL at 17:27

## 2024-04-09 RX ADMIN — LORAZEPAM 2 MG: 2 INJECTION INTRAMUSCULAR; INTRAVENOUS at 20:31

## 2024-04-09 RX ADMIN — LORAZEPAM 2 MG: 2 INJECTION INTRAMUSCULAR; INTRAVENOUS at 05:36

## 2024-04-09 RX ADMIN — VANCOMYCIN HYDROCHLORIDE 1250 MG: 1.25 INJECTION, POWDER, LYOPHILIZED, FOR SOLUTION INTRAVENOUS at 13:13

## 2024-04-09 RX ADMIN — POTASSIUM CHLORIDE AND SODIUM CHLORIDE 150 ML/HR: 900; 150 INJECTION, SOLUTION INTRAVENOUS at 14:47

## 2024-04-09 RX ADMIN — IPRATROPIUM BROMIDE AND ALBUTEROL SULFATE 3 ML: .5; 3 SOLUTION RESPIRATORY (INHALATION) at 06:43

## 2024-04-09 RX ADMIN — Medication 1 PATCH: at 19:43

## 2024-04-09 RX ADMIN — ZINC OXIDE 1 APPLICATION: 200 OINTMENT TOPICAL at 20:30

## 2024-04-09 RX ADMIN — Medication 15 ML: at 09:49

## 2024-04-09 RX ADMIN — PHENOBARBITAL 32.4 MG: 32.4 TABLET ORAL at 05:36

## 2024-04-09 RX ADMIN — CEFEPIME 2000 MG: 2 INJECTION, POWDER, FOR SOLUTION INTRAVENOUS at 17:26

## 2024-04-09 RX ADMIN — PROPOFOL INJECTABLE EMULSION 40 MCG/KG/MIN: 10 INJECTION, EMULSION INTRAVENOUS at 17:04

## 2024-04-09 RX ADMIN — PROPOFOL INJECTABLE EMULSION 40 MCG/KG/MIN: 10 INJECTION, EMULSION INTRAVENOUS at 10:49

## 2024-04-09 RX ADMIN — PHENOBARBITAL 32.4 MG: 32.4 TABLET ORAL at 19:43

## 2024-04-09 RX ADMIN — FENTANYL CITRATE 250 MCG/HR: 0.05 INJECTION, SOLUTION INTRAMUSCULAR; INTRAVENOUS at 13:49

## 2024-04-09 NOTE — PAYOR COMM NOTE
"Brittany Zuleta (68 y.o. Male)              ATTENTION; - INITIAL CLINICALS AUTH PENDING [id number]             REPLY TO UR DEPT  [phone] OR CALL   RODRIGUEZ IVAN LPLIZBETH           Date of Birth   1955    Social Security Number       Address   [address] [address]    Home Phone   [phone]    MRN   [id number]       Roman Catholic   Che[employer]ism    Marital Status                               Admission Date   4/6/24    Admission Type   Emergency    Admitting Provider   Sean Ricketts MD    Attending Provider   Sylvia Mayes MD    Department, Room/Bed   [facility] INTENSIVE CARE, I387/1       Discharge Date       Discharge Disposition       Discharge Destination                                 Attending Provider: Sylvia Mayes MD    Allergies: Penicillins, Penicillins    Isolation: Contact   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)   Code Status: CPR    Ht: 200.7 cm (79\")   Wt: 113 kg (250 lb)    Admission Cmt: None   Principal Problem: Alcohol withdrawal [F10.939]                   Active Insurance as of 4/6/2024       Primary Coverage       Payor Plan Insurance Group Employer/Plan Group    ANTHEM BLUE CROSS ANTHEM BLUE CROSS BLUE SHIELD PPO [id number]       Payor Plan Address Payor Plan Phone Number Payor Plan Fax Number Effective Dates    [address] [phone]  1/1/2022 - None Entered    [facility] [id number]         Subscriber Name Subscriber Birth Date Member ID       BRITTANY ZULETA 1955 [id number]               Secondary Coverage       Payor Plan Insurance Group Employer/Plan Group    MEDICARE MEDICARE A ONLY        Payor Plan Address Payor Plan Phone Number Payor Plan Fax Number Effective Dates    [address] [phone]  9/1/2019 - None Entered    [facility] [id number]         Subscriber Name Subscriber Birth Date Member ID       BRITTANY ZULETA 1955 [id number]                     Emergency Contacts        (Rel.) Home Phone " Work Phone Mobile Phone    TEJAS LISA (Son) [phone] -- [phone]    Jorge A Nielson (Sister) [phone] -- --                 History & Physical        Sean Ricketts MD at 24 1700          Internal medicine history and physical  INTERNAL MEDICINE   [facility]       Patient Identification:  Name: Watson Lisa  Age: 68 y.o.  Sex: male  :  1955  MRN: [id number]                   Primary Care Physician: Roc Carroll, ALISON                               Date of admission:2024    Chief Complaint: Brought to the emergency room by friends/family members as per his request of attempting to quit drinking.    History of Present Illness:   Source of information review of records and discussion with ER physician and attempted conversation with the patient which was limited.  Patient is a 68-year-old male with history of chronic alcohol use and drink about fifth of whiskey and bourbon every night as well as uses opiates.  3 weeks ago he doubled him to start using cocaine.  In this background patient started having significant nausea tremors and not feeling very well but did not stop drinking and had last drink last night.  Today he was tremulous and shaking and wanted to quit resulting in his friend/previous family members bring him to the emergency room.  Patient lives by himself.  Patient did not go into detail about how long has been using cocaine and opiates but says that he has chronic pain in his back and does not release his discomfort.  Patient has history of anxiety hypertension and prior history of pulmonary embolism as well as sleep apnea.  Is unclear whether he is compliant with his treatment.  Patient denies any hematemesis melena increasing swelling of his legs or any nausea vomiting diarrhea.  Workup in the emergency room revealed magnesium level of 1.4 blood alcohol level less than 10 sodium 132 and urine drug screen positive for opiates and cocaine.   Patient had hospitalizations due to alcohol withdrawal in the past requiring him to be in ICU on Precedex drip and mechanical ventilation and issues with aspiration pneumonia.      Past Medical History:  Past Medical History:   Diagnosis Date    Alcohol abuse     Anxiety     Arthritis     Bronchitis with chronic airway obstruction     LAST FEB    DVT (deep venous thrombosis)     Hiatal hernia     Hypertension     Hypertension     Low back pain     Neck pain     Pulmonary embolism     Sleep apnea with use of continuous positive airway pressure (CPAP)     AT NIGHT     Past Surgical History:  Past Surgical History:   Procedure Laterality Date    COLONOSCOPY      JOINT REPLACEMENT Right     hip/knee    REPLACEMENT TOTAL KNEE      TONSILLECTOMY      TOTAL HIP ARTHROPLASTY Right       Home Meds:  (Not in a hospital admission)    Current Meds:     Current Facility-Administered Medications:     Magnesium Standard Dose Replacement - Follow Nurse / BPA Driven Protocol, , Does not apply, PRN, Dov Dunham II, MD    magnesium sulfate 2g/50 mL (PREMIX) infusion, 2 g, Intravenous, Q2H, Dov Dunham II, MD, Last Rate: 0 mL/hr at 04/06/24 1546, 2 g at 04/06/24 1600    [COMPLETED] Insert Peripheral IV, , , Once **AND** sodium chloride 0.9 % flush 10 mL, 10 mL, Intravenous, PRN, Dov Dunham II, MD    Current Outpatient Medications:     losartan (COZAAR) 100 MG tablet, Take 1 tablet by mouth Daily for 30 days., Disp: 30 tablet, Rfl: 0    multivitamin with minerals (MULTIVITAMIN ADULT PO), Take 1 tablet by mouth Daily., Disp: 90 tablet, Rfl: 3    Umeclidinium-Vilanterol (Anoro Ellipta) 62.5-25 MCG/ACT aerosol powder  inhaler, Inhale 1 puff Daily., Disp: 1 each, Rfl: 9  Allergies:  Allergies   Allergen Reactions    Penicillins Unknown - Low Severity     Pt does not recall the reaction. Patient tolerated cephalexin 3/2020    Penicillins Other (See Comments)     Unknown      Social History:   Social History  "    Tobacco Use    Smoking status: Every Day     Current packs/day: 2.00     Average packs/day: 2.0 packs/day for 40.0 years (80.0 ttl pk-yrs)     Types: Cigarettes    Smokeless tobacco: Never   Substance Use Topics    Alcohol use: Yes     Alcohol/week: 10.0 standard drinks of alcohol     Types: 10 Shots of liquor per week     Comment: 1 pint vodka/ day      Family History:  Family History   Problem Relation Age of Onset    Cancer Mother     Heart disease Father     Alcohol abuse Father     Cancer Brother           Review of Systems  See history of present illness and past medical history.    As described in history of presenting illness.      Vitals:   /94   Pulse 77   Temp 97.5 °F (36.4 °C) (Tympanic)   Resp 18   Ht 200.7 cm (79\")   Wt 113 kg (250 lb)   SpO2 97%   BMI 28.16 kg/m²   I/O:   Intake/Output Summary (Last 24 hours) at 4/6/2024 1700  Last data filed at 4/6/2024 1546  Gross per 24 hour   Intake 1150 ml   Output --   Net 1150 ml     Exam:  Patient is examined using the personal protective equipment as per guidelines from infection control for this particular patient as enacted.  Hand washing was performed before and after patient interaction.  General Appearance:  Awake interactive agitated restless and tremulous but follows command.  Does not appear to be in any acute distress.  Appears obviously disheveled.   Head:    Normocephalic, without obvious abnormality, atraumatic   Eyes:    PERRL, conjunctiva/corneas clear, EOM's intact, both eyes   Ears:    Normal external ear canals, both ears   Nose:   Nares normal, septum midline, mucosa normal, no drainage    or sinus tenderness   Throat: Dry oral mucosa   Neck: Supple and no adenopathy   Back:     Symmetric, no curvature, ROM normal, no CVA tenderness   Lungs:     Clear to auscultation bilaterally, respirations unlabored   Chest Wall:    No tenderness or deformity    Heart:  S1-S2 tachycardia   Abdomen:   Soft nontender no obvious ascites " noted.   Extremities:   Extremities normal, atraumatic, no cyanosis or edema   Pulses:   Pulses palpable in all extremities; symmetric all extremities   Skin: Abrasions and ecchymosis noted.     Neurologic: Oriented to place and time.  Tremulous and grossly nonfocal examination.       Data Review:      I reviewed the patient's new clinical results.  Results from last 7 days   Lab Units 04/06/24  1145   WBC 10*3/mm3 7.75   HEMOGLOBIN g/dL 12.5*   PLATELETS 10*3/mm3 208     Results from last 7 days   Lab Units 04/06/24  1145   SODIUM mmol/L 132*   POTASSIUM mmol/L 4.1   CHLORIDE mmol/L 95*   CO2 mmol/L 23.0   BUN mg/dL 21   CREATININE mg/dL 1.13   CALCIUM mg/dL 8.9   GLUCOSE mg/dL 105*     No radiology results for the last 30 days.  Microbiology Results (last 10 days)       ** No results found for the last 240 hours. **          No orders to display     Brief Urine Lab Results  (Last result in the past 365 days)        Color   Clarity   Blood   Leuk Est   Nitrite   Protein   CREAT   Urine HCG        10/25/23 0921 Yellow  Comment: REFERENCE RANGE: Yellow, Straw   Clear   Negative   Negative   Negative   Trace               Urine drug screen positive for cocaine and opiates.    Assessment:  Active Hospital Problems    Diagnosis  POA    **Alcohol withdrawal [F10.939]  Yes    Substance abuse [F19.10]  Unknown    Cocaine abuse [F14.10]  Unknown    Positive urine drug screen [R82.5]  Unknown    History of alcohol use disorder [Z87.898]  Yes    Alcoholic liver disease [K70.9]  Yes    COPD (chronic obstructive pulmonary disease) [J44.9]  Yes    LIVAN (obstructive sleep apnea) [G47.33]  Yes    Essential hypertension [I10]  Yes       Medical decision making/care plan: See admitting orders  Alcohol abuse with prior history of alcohol withdrawals and its complications now with alcohol withdrawal-plan is to admit the patient continue with CIWA protocol closely watch his mental status and provide him with aspiration precautions and  keep him n.p.o. until his mental status clears up and then advance diet accordingly.  Continue with vitamin replacement per protocol and electrolyte replacement including magnesium and phosphorus replacement.  Continue with IV Pepcid  Hypomagnesemia and malnutrition-continue with magnesium replacement per protocol as well as thiamine and folic acid levels supplementation.  Positive urine drug screen with history of substance abuse with patient admits-access evaluation and watch for for cocaine and opiate withdrawal.  Obstructive sleep apnea with COPD-continue with nebulizer treatment provided with continuous pulse ox monitoring and allow him to use his CPAP device if he has available.  Low threshold to consult pulmonary service given his prior hospitalization and complications with alcohol withdrawal needing ICU admission and mechanical ventilation and Precedex drip.  Hypertension-multifactorial including element of withdrawal-continue his home regimen and monitor.    Sean Ricketts MD   4/6/2024  17:00 EDT    Parts of this note may be an electronic transcription/translation of spoken language to printed text using the Dragon dictation system.      Electronically signed by Sean Ricketts MD at 04/07/24 0619          Emergency Department Notes          Lara Liu RN at 04/06/24 1156          Pt arrives for alcohol withdrawal. Pt states his last drink was last night. Pt states he normally drinks a pint of liquor daily. Pt denies and n/v or headache. Pt states he does feel anxious. Pt denies any seizures with previous withdrawals but states last time he was in withdrawals that he was put on a ventilator. Pt initial CIWA score is 14. Pt is A&Ox4 and cooperative.     Electronically signed by Lara Liu RN at 04/06/24 1156       Dov Dunham II, MD at 04/06/24 1139        Procedure Orders    1. Critical Care [[id number]] ordered by Dov Dunham II, MD                  EMERGENCY DEPARTMENT  ENCOUNTER    Room Number:  36/36  PCP: Roc Carroll APRN    HPI:  Chief Complaint: Alcohol withdrawal  A complete HPI/ROS/PMH/PSH/SH/FH are unobtainable due to: None  Context: Watson Lisa is a 68 y.o. male who presents to the ED c/o acute alcohol withdrawal.  He has longstanding history of heavy alcohol use.  He states that he will drink about a pint of bourbon after work and 1/5 on a weekend day.  Last drink was last night.  He has had associated tremors.  He is interested in going through medically assisted detox.  He states that he will sometimes use opioid pain pills which she will require off the street to help with his pain.        PAST MEDICAL HISTORY  Active Ambulatory Problems     Diagnosis Date Noted    Essential hypertension 06/07/2016    Tobacco abuse 06/07/2016    Hiatal hernia 06/25/2020    Effusion of left knee 06/29/2020    Osteoarthritis of left knee 06/29/2020    Vitamin D deficiency 07/01/2020    Anemia, chronic disease 07/01/2020    COPD (chronic obstructive pulmonary disease) 03/26/2021    LIVAN (obstructive sleep apnea) 03/26/2021    ETOH abuse 03/26/2021    Obese 03/26/2021    Alcoholic liver disease 10/07/2021    History of alcohol use disorder 12/14/2022    Left patella fracture 12/29/2022    Need for hepatitis C screening test 10/25/2023    Chronic pain of left ankle 10/25/2023    Primary insomnia 10/25/2023    Overweight (BMI 25.0-29.9) 10/25/2023    Mixed hyperlipidemia 10/27/2023    Acute alcohol intoxication 12/07/2023    Hypomagnesemia 12/07/2023     Resolved Ambulatory Problems     Diagnosis Date Noted    Arthritis 04/25/2016    Alcohol dependence with withdrawal 06/24/2020    Chest pain in adult 06/25/2020    Sleep apnea 06/25/2020    Alcohol abuse 06/25/2020    Hypokalemia 06/29/2020    Hypomagnesemia 06/29/2020    Hyponatremia 07/01/2020    Pneumonia due to COVID-19 virus 01/11/2021    Alcohol dependence with withdrawal 01/12/2021    Possible SVT (supraventricular  tachycardia) 01/12/2021    Alcohol withdrawal syndrome with perceptual disturbance 03/26/2021    Acute kidney failure 03/26/2021    Tobacco abuse 03/26/2021    Anemia, chronic disease 03/26/2021    Elevated LFTs 03/26/2021    Metabolic acidosis, increased anion gap 03/26/2021    Hypomagnesemia 03/26/2021    Suicidal ideations 03/26/2021    Delirium tremens 10/07/2021    Alcohol abuse 10/07/2021    Tobacco abuse 10/07/2021    Hiatal hernia 10/07/2021    COPD (chronic obstructive pulmonary disease) 10/07/2021    Anemia, chronic disease 10/07/2021    Acute respiratory failure with hypercapnia 10/08/2021    Aspiration pneumonia 10/08/2021    Cigarette nicotine dependence without complication 04/17/2022    DEEP (acute kidney injury) 12/28/2022    Rhabdomyolysis 12/29/2022    Elevated troponin 12/29/2022    Hypokalemia 01/01/2023    Thrombocytopenia 01/01/2023    Hypocalcemia 10/25/2023    Chronic pain of left knee 10/25/2023     Past Medical History:   Diagnosis Date    Anxiety     Bronchitis with chronic airway obstruction     DVT (deep venous thrombosis)     Hypertension     Hypertension     Low back pain     Neck pain     Pulmonary embolism     Sleep apnea with use of continuous positive airway pressure (CPAP)          PAST SURGICAL HISTORY  Past Surgical History:   Procedure Laterality Date    COLONOSCOPY      JOINT REPLACEMENT Right     hip/knee    REPLACEMENT TOTAL KNEE      TONSILLECTOMY      TOTAL HIP ARTHROPLASTY Right          FAMILY HISTORY  Family History   Problem Relation Age of Onset    Cancer Mother     Heart disease Father     Alcohol abuse Father     Cancer Brother          SOCIAL HISTORY  Social History     Socioeconomic History    Marital status:    Tobacco Use    Smoking status: Every Day     Current packs/day: 2.00     Average packs/day: 2.0 packs/day for 40.0 years (80.0 ttl pk-yrs)     Types: Cigarettes    Smokeless tobacco: Never   Vaping Use    Vaping status: Never Used   Substance and  Sexual Activity    Alcohol use: Yes     Alcohol/week: 10.0 standard drinks of alcohol     Types: 10 Shots of liquor per week     Comment: 1 pint vodka/ day    Drug use: Not Currently     Types: Hydrocodone    Sexual activity: Defer         ALLERGIES  Penicillins and Penicillins        REVIEW OF SYSTEMS  Review of Systems     Included in HPI  All systems reviewed and negative except for those discussed in HPI.       PHYSICAL EXAM  ED Triage Vitals   Temp Heart Rate Resp BP SpO2   04/06/24 1122 04/06/24 1122 04/06/24 1122 04/06/24 1130 04/06/24 1122   97.5 °F (36.4 °C) 114 16 (!) 181/115 94 %      Temp src Heart Rate Source Patient Position BP Location FiO2 (%)   04/06/24 1122 04/06/24 1122 04/06/24 1130 04/06/24 1130 --   Tympanic Monitor Lying Right arm        Physical Exam      GENERAL: no acute distress  HENT: nares patent  EYES: no scleral icterus  CV: regular rhythm, tachycardic  RESPIRATORY: normal effort, clear to auscultation bilaterally  ABDOMEN: soft, nontender  MUSCULOSKELETAL: no deformity  NEURO: alert, moves all extremities, follows commands, tremor to his bilateral hands at rest   PSYCH:  calm, cooperative  SKIN: warm, dry    Vital signs and nursing notes reviewed.          LAB RESULTS  Recent Results (from the past 24 hour(s))   Comprehensive Metabolic Panel    Collection Time: 04/06/24 11:45 AM    Specimen: Blood   Result Value Ref Range    Glucose 105 (H) 65 - 99 mg/dL    BUN 21 8 - 23 mg/dL    Creatinine 1.13 0.76 - 1.27 mg/dL    Sodium 132 (L) 136 - 145 mmol/L    Potassium 4.1 3.5 - 5.2 mmol/L    Chloride 95 (L) 98 - 107 mmol/L    CO2 23.0 22.0 - 29.0 mmol/L    Calcium 8.9 8.6 - 10.5 mg/dL    Total Protein 7.4 6.0 - 8.5 g/dL    Albumin 3.8 3.5 - 5.2 g/dL    ALT (SGPT) 21 1 - 41 U/L    AST (SGOT) 22 1 - 40 U/L    Alkaline Phosphatase 80 39 - 117 U/L    Total Bilirubin 0.5 0.0 - 1.2 mg/dL    Globulin 3.6 gm/dL    A/G Ratio 1.1 g/dL    BUN/Creatinine Ratio 18.6 7.0 - 25.0    Anion Gap 14.0 5.0 - 15.0  mmol/L    eGFR 70.4 >60.0 mL/min/1.73   Ethanol    Collection Time: 04/06/24 11:45 AM    Specimen: Blood   Result Value Ref Range    Ethanol <10 0 - 10 mg/dL    Ethanol % <0.010 %   CBC Auto Differential    Collection Time: 04/06/24 11:45 AM    Specimen: Blood   Result Value Ref Range    WBC 7.75 3.40 - 10.80 10*3/mm3    RBC 3.87 (L) 4.14 - 5.80 10*6/mm3    Hemoglobin 12.5 (L) 13.0 - 17.7 g/dL    Hematocrit 36.4 (L) 37.5 - 51.0 %    MCV 94.1 79.0 - 97.0 fL    MCH 32.3 26.6 - 33.0 pg    MCHC 34.3 31.5 - 35.7 g/dL    RDW 11.7 (L) 12.3 - 15.4 %    RDW-SD 39.6 37.0 - 54.0 fl    MPV 9.3 6.0 - 12.0 fL    Platelets 208 140 - 450 10*3/mm3    Neutrophil % 72.3 42.7 - 76.0 %    Lymphocyte % 7.5 (L) 19.6 - 45.3 %    Monocyte % 18.7 (H) 5.0 - 12.0 %    Eosinophil % 0.3 0.3 - 6.2 %    Basophil % 0.4 0.0 - 1.5 %    Immature Grans % 0.8 (H) 0.0 - 0.5 %    Neutrophils, Absolute 5.61 1.70 - 7.00 10*3/mm3    Lymphocytes, Absolute 0.58 (L) 0.70 - 3.10 10*3/mm3    Monocytes, Absolute 1.45 (H) 0.10 - 0.90 10*3/mm3    Eosinophils, Absolute 0.02 0.00 - 0.40 10*3/mm3    Basophils, Absolute 0.03 0.00 - 0.20 10*3/mm3    Immature Grans, Absolute 0.06 (H) 0.00 - 0.05 10*3/mm3    nRBC 0.0 0.0 - 0.2 /100 WBC   Magnesium    Collection Time: 04/06/24 11:45 AM    Specimen: Blood   Result Value Ref Range    Magnesium 1.4 (L) 1.6 - 2.4 mg/dL   Phosphorus    Collection Time: 04/06/24 11:45 AM    Specimen: Blood   Result Value Ref Range    Phosphorus 3.2 2.5 - 4.5 mg/dL       Ordered the above labs and reviewed the results.        RADIOLOGY  No Radiology Exams Resulted Within Past 24 Hours    Ordered the above noted radiological studies. Reviewed by me in PACS.        MEDICATIONS GIVEN IN ER  Medications   sodium chloride 0.9 % flush 10 mL (has no administration in time range)   Magnesium Standard Dose Replacement - Follow Nurse / BPA Driven Protocol (has no administration in time range)   magnesium sulfate 2g/50 mL (PREMIX) infusion (2 g Intravenous  New Bag 4/6/24 1346)   sodium chloride 0.9 % bolus 1,000 mL (0 mL Intravenous Stopped 4/6/24 1346)   thiamine (B-1) 500 mg in sodium chloride 0.9 % 100 mL IVPB (0 mg Intravenous Stopped 4/6/24 1300)   LORazepam (ATIVAN) injection 2 mg (2 mg Intravenous Given 4/6/24 1145)         ORDERS PLACED DURING THIS VISIT:  Orders Placed This Encounter   Procedures    Comprehensive Metabolic Panel    Ethanol    Urine Drug Screen - Urine, Clean Catch    CBC Auto Differential    Magnesium    Phosphorus    Magnesium    LHA (on-call MD unless specified) Details    Insert Peripheral IV    Inpatient Admission    CBC & Differential         OUTPATIENT MEDICATION MANAGEMENT:  Current Facility-Administered Medications Ordered in Epic   Medication Dose Route Frequency Provider Last Rate Last Admin    Magnesium Standard Dose Replacement - Follow Nurse / BPA Driven Protocol   Does not apply PRN Dov Dunham II, MD        magnesium sulfate 2g/50 mL (PREMIX) infusion  2 g Intravenous Q2H Dov Dunham II, MD   2 g at 04/06/24 1346    sodium chloride 0.9 % flush 10 mL  10 mL Intravenous PRN Dov Dunham II, MD         Current Outpatient Medications Ordered in Epic   Medication Sig Dispense Refill    losartan (COZAAR) 100 MG tablet Take 1 tablet by mouth Daily for 30 days. 30 tablet 0    multivitamin with minerals (MULTIVITAMIN ADULT PO) Take 1 tablet by mouth Daily. 90 tablet 3    Umeclidinium-Vilanterol (Anoro Ellipta) 62.5-25 MCG/ACT aerosol powder  inhaler Inhale 1 puff Daily. 1 each 9       PROCEDURES  Critical Care    Performed by: Dov Dunham II, MD  Authorized by: Dov Dunham II, MD    Critical care provider statement:     Critical care time (minutes):  35    Critical care was necessary to treat or prevent imminent or life-threatening deterioration of the following conditions:  Toxidrome    Critical care was time spent personally by me on the following activities:  Ordering and performing  treatments and interventions, ordering and review of laboratory studies, pulse oximetry, re-evaluation of patient's condition, review of old charts, discussions with consultants, evaluation of patient's response to treatment, examination of patient and obtaining history from patient or surrogate            MEDICAL DECISION MAKING, PROGRESS, and CONSULTS    Discussion below represents my analysis of pertinent findings related to patient's condition, differential diagnosis, treatment plan and final disposition.            Differential diagnosis:    Alcohol withdrawal, delirium tremens    Initial CIWA score is 14.             Independent interpretation of labs, radiology studies, and discussions with consultants:  ED Course as of 04/06/24 1359   Sat Apr 06, 2024   1139 On medical chart review, the patient's most recent discharge summary from 12/12/2023.  He was admitted for acute intoxication complicated by withdrawal.  I also reviewed discharge summary from 10/19/2021.  Patient has a history of severe alcohol abuse who presented with symptoms of alcohol withdrawal for 24 hours after his last drink.  He was started on as needed Ativan per CIWA protocol and had required electrolyte replacement.  Unfortunately, his withdrawal symptoms rapidly progressed to delirium tremens and he was transferred to the ICU for Precedex drip.  His course was complicated by aspiration pneumonia for which she required antibiotics and ultimately need for intubation for airway protection given the severity of his withdrawal. [TD]   1324 Magnesium(!): 1.4 [TD]   1324 Ethanol: <10 [TD]   1324 Sodium(!): 132 [TD]   1354 I discussed the case Dr. Ricketts, hospitalist.  He will admit. [TD]      ED Course User Index  [TD] Dov Dunham II, MD               DIAGNOSIS  Final diagnoses:   Alcohol withdrawal syndrome without complication   Hypomagnesemia         DISPOSITION  Admit      Latest Documented Vital Signs:  As of 13:58 EDT  BP- 155/98 HR-  75 Temp- 97.5 °F (36.4 °C) (Tympanic) O2 sat- 98%      --    Please note that portions of this were completed with a voice recognition program.       Note Disclaimer: At [facility], we believe that sharing information builds trust and better relationships. You are receiving this note because you are receiving care at [facility] or recently visited. It is possible you will see health information before a provider has talked with you about it. This kind of information can be easy to misunderstand. To help you fully understand what it means for your health, we urge you to discuss this note with your provider.         Dov Dunham II, MD  04/06/24 1359      Electronically signed by Dov Dunham II, MD at 04/06/24 1359       Gi Luque, RN at 04/06/24 1129          Pt to ed from home via PV    Pt c/o Sob and wanting to detox from alcohol. Pt reports he usually drinks a pint of bourbon per day. Pt last drink was last night at 1800. Pt also reports anxiety    Electronically signed by Gi Luque, RN at 04/06/24 1130       Degonda, Janet, RN at 04/06/24 1121          Alcohol withdrawal - last drink last night.  He is trying to stop drinking    Electronically signed by Degonda, Janet, RN at 04/06/24 1121       Vital Signs (last 4 days)       Date/Time Temp Temp src Pulse Resp BP Patient Position SpO2    04/09/24 0737 98.7 (37.1) Oral -- -- -- -- --    04/09/24 0643 -- -- 68 -- -- -- 98    04/09/24 0642 -- -- 67 15 -- -- 98    04/09/24 0600 -- -- 69 -- 118/59 -- 98    04/09/24 0500 -- -- 71 -- 130/65 -- 96    04/09/24 0400 -- -- 70 -- 140/80 -- 97    04/09/24 0358 -- -- 78 17 -- -- 99    04/09/24 0300 -- -- 69 -- 137/67 -- 96    04/09/24 0200 -- -- 72 -- 134/68 -- 96    04/09/24 0100 -- -- 70 -- 130/64 -- 94    04/09/24 0000 -- -- 64 -- 152/79 -- 95    04/08/24 2345 97.3 (36.3) Oral 66 -- 136/75 -- 96    04/08/24 2308 -- -- 47 -- -- -- 98    04/08/24 2307 -- -- 49 12 -- -- 98    04/08/24  2300 -- -- 51 -- 117/65 -- 98    04/08/24 2200 -- -- 48 -- 105/55 -- 97    04/08/24 2100 -- -- 48 -- 104/55 -- 97    04/08/24 2034 -- -- 48 12 -- -- 96    04/08/24 2000 -- -- 49 -- 107/58 -- 96    04/08/24 1900 97.3 (36.3) Oral 51 -- 111/60 -- 96    04/08/24 1800 -- -- 53 -- 108/61 -- 97    04/08/24 1700 -- -- 55 -- 106/57 -- 97    04/08/24 1600 -- -- 55 -- 113/64 Lying 96    04/08/24 1541 -- -- 58 15 -- -- 98    04/08/24 1540 98.1 (36.7) Oral -- -- -- -- --    04/08/24 1500 -- -- 60 12 112/59 -- 98    04/08/24 1430 -- -- 62 -- 116/63 -- 98    04/08/24 1400 -- -- 65 -- 118/63 -- 98    04/08/24 1330 -- -- 73 -- 121/62 -- 98    04/08/24 1300 -- -- 88 -- 90/78 -- 99    04/08/24 1215 -- -- 81 -- 99/64 Lying 99    04/08/24 1144 98.2 (36.8) Oral -- -- -- -- --    04/08/24 1135 -- -- 75 16 -- -- 97    04/08/24 1115 -- -- 76 -- 95/57 -- 98    04/08/24 1030 -- -- 76 -- 87/51 -- 97    04/08/24 1015 -- -- 78 -- 91/50 -- 97    04/08/24 0945 -- -- 82 -- 90/53 -- 96    04/08/24 0907 -- -- 82 15 -- -- 96    04/08/24 0900 -- -- 85 -- 94/78 -- 96    04/08/24 0830 -- -- 95 -- 111/62 -- 98    04/08/24 0815 -- -- 98 -- 96/70 -- 100    04/08/24 0730 -- -- 113 -- 130/88 -- 98    04/08/24 0725 99.5 (37.5) -- 120 27 -- -- 97    04/08/24 0613 -- -- 95 30 186/95 -- --    04/08/24 0612 -- -- 104 32 187/127 -- --    04/08/24 0500 -- -- 101 30 172/90 -- --    04/08/24 0451 -- -- -- -- 199/98 Lying --    04/08/24 0443 97.5 (36.4) Oral 118 20 192/118  Lying 91    BP: rechecked at 04/08/24 0443    04/08/24 0058 -- -- -- 32 158/79 -- --    04/08/24 0043 -- -- -- -- 181/78 Lying --    04/08/24 0037 99 (37.2) Oral 81 20 178/112  Lying --    BP: recheked at 04/08/24 0037    04/07/24 2347 -- -- 86 -- 154/78 -- --    04/07/24 2154 -- -- -- -- 157/124 -- --    04/07/24 2100 -- -- -- -- 165/84 Lying --    04/07/24 2058 98.1 (36.7) Oral 78 20 188/81  Lying 99    BP: recheked at 04/07/24 2058 04/07/24 1950 -- -- 86 30 159/96 -- 95    04/07/24 1925 -- --  90 24 -- -- 96    04/07/24 1525 98.4 (36.9) Axillary 87 20 178/74 Lying 98    04/07/24 1442 -- -- 82 -- 178/80 -- --    04/07/24 1250 -- -- 89 -- -- -- 97    04/07/24 1231 -- -- 95 -- 145/98 -- --    04/07/24 1215 -- -- 87 -- 163/105 -- --    04/07/24 1120 97.8 (36.6) Tympanic 86 20 159/96 Lying 96    04/07/24 1045 -- -- -- -- 167/106 -- --    04/07/24 0930 -- -- 90 -- 145/97 -- --    04/07/24 0740 98.2 (36.8) Oral 89 22 163/91 Lying 96    04/07/24 0711 -- -- -- 24 -- -- --    04/07/24 0324 97.7 (36.5) Oral 82 22 151/107 Lying 99    04/06/24 2348 -- -- -- -- 154/101 Lying --    04/06/24 2345 97.9 (36.6) Oral 105 20 164/112 Lying 96    04/06/24 1917 98.2 (36.8) Oral 88 20 170/87 Lying 96    04/06/24 1828 98.6 (37) Oral 108 22 162/89 Lying 100    04/06/24 1531 -- -- 77 -- 167/94 -- 97    04/06/24 1431 -- -- 90 -- 174/93 -- 96    04/06/24 1301 -- -- 75 18 155/98 Lying 98    04/06/24 1201 -- -- 84 18 176/137 Lying 95    04/06/24 1130 -- -- -- -- 181/115 Lying --    04/06/24 1122 97.5 (36.4) Tympanic 114 16 -- -- 94          Oxygen Therapy (last 4 days)       Date/Time SpO2 Device (Oxygen Therapy) Flow (L/min) Oxygen Concentration (%) ETCO2 (mmHg)    04/09/24 0643 98 -- -- -- --    04/09/24 0642 98 ventilator -- 30 --    04/09/24 0600 98 -- -- -- --    04/09/24 0500 96 -- -- -- --    04/09/24 0400 97 ventilator -- -- --    04/09/24 0358 99 ventilator -- 30 --    04/09/24 0300 96 -- -- -- --    04/09/24 0200 96 -- -- -- --    04/09/24 0100 94 -- -- -- --    04/09/24 0000 95 ventilator -- -- --    04/08/24 2345 96 -- -- -- --    04/08/24 2308 98 -- -- -- --    04/08/24 2307 98 ventilator -- 30 --    04/08/24 2300 98 -- -- -- --    04/08/24 2200 97 -- -- -- --    04/08/24 2100 97 -- -- -- --    04/08/24 2034 96 ventilator -- 30 --    04/08/24 2000 96 ventilator -- -- --    04/08/24 1900 96 -- -- -- --    04/08/24 1800 97 -- -- -- --    04/08/24 1700 97 -- -- -- --    04/08/24 1600 96 ventilator -- 30 --    04/08/24 1541 98  ventilator -- 30 --    04/08/24 1500 98 -- -- -- --    04/08/24 1430 98 -- -- -- --    04/08/24 1400 98 -- -- -- --    04/08/24 1330 98 -- -- -- --    04/08/24 1300 99 -- -- -- --    04/08/24 1215 99 ventilator -- -- --    04/08/24 1200 -- ventilator -- 50 --    04/08/24 1135 97 ventilator -- -- --    04/08/24 1115 98 -- -- -- --    04/08/24 1030 97 -- -- -- --    04/08/24 1015 97 -- -- -- --    04/08/24 0945 96 -- -- -- --    04/08/24 0907 96 ventilator -- 50 --    04/08/24 0900 96 -- -- -- --    04/08/24 0830 98 -- -- -- --    04/08/24 0815 100 -- -- -- --    04/08/24 0800 -- -- -- 100 --    04/08/24 0730 98 -- -- -- --    04/08/24 0725 97 ventilator -- 100 --    04/08/24 0443 91 nasal cannula 2 -- --    04/08/24 0200 -- nasal cannula 2 -- --    04/08/24 0037 -- nasal cannula 2 -- --    04/07/24 2058 99 nasal cannula 2 -- --    04/07/24 1950 95 nasal cannula 2 -- --    04/07/24 1925 96 nasal cannula 2 -- --    04/07/24 1525 98 nasal cannula -- -- --    04/07/24 1442 -- nasal cannula 2 -- --    04/07/24 1250 97 -- -- -- --    04/07/24 1120 96 nasal cannula -- -- --    04/07/24 0800 -- nasal cannula 2 -- --    04/07/24 0740 96 nasal cannula -- -- --    04/07/24 0711 -- nasal cannula 2 -- --    04/07/24 0324 99 nasal cannula 2 -- --    04/07/24 0022 -- nasal cannula 2 -- --    04/06/24 2345 96 room air -- -- --    04/06/24 1944 -- room air -- -- --    04/06/24 1917 96 room air -- -- --    04/06/24 1828 100 room air -- -- --    04/06/24 1531 97 -- -- -- --    04/06/24 1431 96 -- -- -- --    04/06/24 1301 98 room air -- -- --    04/06/24 1201 95 room air -- -- --    04/06/24 1122 94 room air -- -- --          Lines, Drains & Airways       Active LDAs       Name Placement date Placement time Site Days    Peripheral IV 04/07/24 2135 Anterior;Distal;Left;Upper Arm 04/07/24 2135  Arm  1    Peripheral IV 04/08/24 0713 Anterior;Right Forearm 04/08/24 0713  Forearm  1    Peripheral IV 04/08/24 0710 Left;Posterior Hand  04/08/24  0710  Hand  1    Peripheral IV 04/08/24 1030 Left;Posterior Forearm 04/08/24  1030  Forearm  less than 1    NG/OG Tube Nasogastric Left nostril 04/08/24  0930  Left nostril  less than 1    Urethral Catheter Silicone 04/08/24  1100  -- less than 1    ETT  04/08/24  0726  -- 1                               CIWA (since admission)        Date/Time CIWA-Ar Score    04/08/24 2000 6     04/08/24 1600 9     04/08/24 1223 16     04/08/24 1200 9     04/08/24 0808 15     04/08/24 0800 9     04/08/24 0600 21     04/08/24 0500 26     04/08/24 0400 13     04/08/24 0200 17     04/08/24 0114 18     04/08/24 0054 23     04/08/24 0000 23     04/07/24 2343 30     04/07/24 2315 36     04/07/24 2257 41     04/07/24 2200 15     04/07/24 2148 31     04/07/24 2100 36     04/07/24 2020 30     04/07/24 1950 30     04/07/24 1739 14     04/07/24 1600 7     04/07/24 1442 15     04/07/24 1224 14     04/07/24 1045 15     04/07/24 0935 15     04/07/24 0800 15     04/07/24 0656 13     04/07/24 0631 18     04/07/24 0545 13     04/07/24 0503 13     04/07/24 0342 13     04/07/24 0307 19     04/07/24 0238 19     04/07/24 0216 18     04/07/24 0144 24     04/07/24 0058 25     04/07/24 0019 25     04/06/24 2320 23     04/06/24 2256 23     04/06/24 2232 21     04/06/24 2208 24     04/06/24 2144 18     04/06/24 1944 11     04/06/24 1828 9     04/06/24 13:42:56 6     04/06/24 11:48:13 14                   Facility-Administered Medications as of 4/9/2024   Medication Dose Route Frequency Provider Last Rate Last Admin    sennosides-docusate (PERICOLACE) 8.6-50 MG per tablet 2 tablet  2 tablet Oral BID PRN Sean Ricketts MD        And    polyethylene glycol (MIRALAX) packet 17 g  17 g Oral Daily PRN Sean Ricketts MD        And    bisacodyl (DULCOLAX) EC tablet 5 mg  5 mg Oral Daily PRN Sean Ricketts MD        And    bisacodyl (DULCOLAX) suppository 10 mg  10 mg Rectal Daily PRN Sean Ricketts MD        Calcium Replacement - Follow Nurse / BPA Driven  Protocol   Does not apply PRN Sean Ricketts MD        [COMPLETED] cefepime 2000 mg IVPB in 100 mL NS (MBP)  2,000 mg Intravenous Once Jacqui Alvarado APRN   2,000 mg at 04/08/24 0851    cefepime 2000 mg IVPB in 100 mL NS (MBP)  2,000 mg Intravenous Q8H Jacqui Alvarado APRN   2,000 mg at 04/09/24 0023    chlorhexidine (PERIDEX) 0.12 % solution 15 mL  15 mL Mouth/Throat Q12H Jacqui Alvarado APRN   15 mL at 04/08/24 2016    enalaprilat (VASOTEC) injection 1.25 mg  1.25 mg Intravenous Q6H Harvey Colindres MD   1.25 mg at 04/08/24 0555    famotidine (PEPCID) tablet 20 mg  20 mg Nasogastric BID AC Watson Zuleta MD   20 mg at 04/09/24 0633    fentaNYL 1000 mcg in 100 mL NS infusion   mcg/hr Intravenous Titrated Jacqui Alvarado APRN 30 mL/hr at 04/09/24 0525 300 mcg/hr at 04/09/24 0525    folic acid (FOLVITE) tablet 1 mg  1 mg Nasogastric Daily Watson Zuleta MD   1 mg at 04/08/24 1446    [COMPLETED] furosemide (LASIX) injection 80 mg  80 mg Intravenous Once Jacqui Alvarado APRN   80 mg at 04/08/24 0718    hydrALAZINE (APRESOLINE) injection 10 mg  10 mg Intravenous Q6H PRN Rommel Gayle MD   10 mg at 04/08/24 0635    hydrocortisone-bacitracin-zinc oxide-nystatin (MAGIC BARRIER) ointment 1 Application  1 Application Topical BID Sylvia Mayes MD   1 Application at 04/08/24 2016    ipratropium-albuterol (DUO-NEB) nebulizer solution 3 mL  3 mL Nebulization 4x Daily - RT Andre Givens MD   3 mL at 04/09/24 0643    LORazepam (ATIVAN) tablet 1 mg  1 mg Oral Q1H PRN Sean Ricketts MD        Or    LORazepam (ATIVAN) injection 1 mg  1 mg Intravenous Q1H PRN Sean Ricketts MD   1 mg at 04/06/24 1845    Or    LORazepam (ATIVAN) tablet 2 mg  2 mg Oral Q1H PRN Sean Ricketts MD        Or    LORazepam (ATIVAN) injection 2 mg  2 mg Intravenous Q1H PRN Sean Ricketts MD   2 mg at 04/08/24 0815    Or    LORazepam (ATIVAN) injection 2 mg  2 mg Intravenous Q15 Min PRN Sean Ricketts MD   2 mg at  24 0504    Or    LORazepam (ATIVAN) injection 2 mg  2 mg Intramuscular Q15 Min PRN Sean Ricketts MD   2 mg at 24 2124    [COMPLETED] LORazepam (ATIVAN) injection 2 mg  2 mg Intravenous Once Dov Dunham II, MD   2 mg at 24 1145    LORazepam (ATIVAN) injection 2 mg  2 mg Intravenous Q6H Watson Zuleta MD   2 mg at 24 0536    [] LORazepam (ATIVAN) tablet 2 mg  2 mg Oral Q6H Sean Ricketts MD   2 mg at 24    Magnesium Standard Dose Replacement - Follow Nurse / BPA Driven Protocol   Does not apply PRN Sean Ricketts MD        Magnesium Standard Dose Replacement - Follow Nurse / BPA Driven Protocol   Does not apply PRN Sean Ricketts MD        [] magnesium sulfate 2g/50 mL (PREMIX) infusion  2 g Intravenous Q2H Dov Dunham II, MD 0 mL/hr at 24 1546 2 g at 24 1600    [COMPLETED] magnesium sulfate 2g/50 mL (PREMIX) infusion  2 g Intravenous Q2H Watson Zuleta MD   2 g at 24 2250    metoprolol tartrate (LOPRESSOR) injection 2.5 mg  2.5 mg Intravenous Q6H PRN Harvey Colindres MD   2.5 mg at 24 0504    multivitamin and minerals liquid 15 mL  15 mL Nasogastric Daily Watson Zuleta MD   15 mL at 24 1446    nicotine (NICODERM CQ) 21 MG/24HR patch 1 patch  1 patch Transdermal Q24H Sean Ricketts MD   1 patch at 24 2218    nicotine polacrilex (NICORETTE) gum 4 mg  4 mg Mouth/Throat Q1H PRN Sean Ricketts MD        nitroglycerin (NITROSTAT) SL tablet 0.4 mg  0.4 mg Sublingual Q5 Min PRN Sean Ricketts MD        norepinephrine (LEVOPHED) 8 mg in 250 mL NS infusion (premix)  0.02-0.3 mcg/kg/min Intravenous Titrated Watson Zuleta MD   Stopped at 24 1230    ondansetron (ZOFRAN) injection 4 mg  4 mg Intravenous Q6H PRN Sean Ricketts MD        Pharmacy to dose vancomycin   Does not apply Continuous PRN Watson Zuleta MD        [COMPLETED] PHENobarbital 187.2 mg in sodium chloride 0.9 % 100 mL IVPB  2  mg/kg (Ideal) Intravenous Once Miki Gotti APRN 600 mL/hr at 04/07/24 0012 187.2 mg at 04/07/24 0012    Followed by    [COMPLETED] PHENobarbital 187.2 mg in sodium chloride 0.9 % 100 mL IVPB  2 mg/kg (Ideal) Intravenous Once Miki Gotti APRN 600 mL/hr at 04/07/24 0315 187.2 mg at 04/07/24 0315    Followed by    [COMPLETED] PHENobarbital 187.2 mg in sodium chloride 0.9 % 100 mL IVPB  2 mg/kg (Ideal) Intravenous Once Miki Gotti APRN 600 mL/hr at 04/07/24 0548 187.2 mg at 04/07/24 0548    [COMPLETED] PHENobarbital injection 65 mg  65 mg Intravenous Once Miki Gotti APRN   65 mg at 04/07/24 1647    Followed by    [COMPLETED] PHENobarbital injection 65 mg  65 mg Intravenous Once Miki Gotti APRN   65 mg at 04/08/24 0505    Followed by    [COMPLETED] PHENobarbital tablet 32.4 mg  32.4 mg Nasogastric Once Watson Zuleta MD   32.4 mg at 04/08/24 1732    Followed by    [COMPLETED] PHENobarbital tablet 32.4 mg  32.4 mg Oral Once Watson Zuleta MD   32.4 mg at 04/09/24 0536    Followed by    PHENobarbital tablet 32.4 mg  32.4 mg Oral Once Watson Zuleta MD        Phosphorus Replacement - Follow Nurse / BPA Driven Protocol   Does not apply PRN Sean Ricketts MD        Potassium Replacement - Follow Nurse / BPA Driven Protocol   Does not apply PRN Sean Ricketts MD        propofol (DIPRIVAN) infusion 10 mg/mL 100 mL  5-50 mcg/kg/min Intravenous Titrated Jacqui Alvarado, ALISON 33.9 mL/hr at 04/09/24 0638 50 mcg/kg/min at 04/09/24 0638    [COMPLETED] sepsis fluid NS 0.9 % bolus 3,390 mL  30 mL/kg Intravenous Once Watson Zuleta MD   3,390 mL at 04/08/24 1228    [COMPLETED] sodium chloride 0.9 % bolus 1,000 mL  1,000 mL Intravenous Once Dov Dunham II, MD   Stopped at 04/06/24 1346    sodium chloride 0.9 % flush 10 mL  10 mL Intravenous PRN Sean Ricketts MD   10 mL at 04/06/24 2000    sodium chloride 0.9 % flush 10 mL  10 mL Intravenous Q12H Sean Ricketts,  "MD   10 mL at 04/08/24 1108    sodium chloride 0.9 % flush 10 mL  10 mL Intravenous PRN Sean Ricketts MD        sodium chloride 0.9 % flush 10 mL  10 mL Intravenous Q12H Jacqui Alvarado APRN   10 mL at 04/08/24 1108    sodium chloride 0.9 % flush 10 mL  10 mL Intravenous PRN Jacqui Alvarado APRN        sodium chloride 0.9 % infusion 40 mL  40 mL Intravenous PRN Sean Ricketts MD        sodium chloride 0.9 % infusion 40 mL  40 mL Intravenous PRN Jacqui Alvarado APRN        sodium chloride 0.9 % with KCl 20 mEq/L infusion  150 mL/hr Intravenous Continuous Watson Zuleta  mL/hr at 04/09/24 0104 150 mL/hr at 04/09/24 0104    [COMPLETED] thiamine (B-1) 500 mg in sodium chloride 0.9 % 100 mL IVPB  500 mg Intravenous Once Dov Dunham II, MD   Stopped at 04/06/24 1300    thiamine (B-1) injection 200 mg  200 mg Intravenous Q8H Sean Ricketts MD   200 mg at 04/09/24 0536    Followed by    [START ON 4/12/2024] thiamine (VITAMIN B-1) tablet 100 mg  100 mg Oral Daily Sean Ricketts MD        [COMPLETED] vancomycin 2250 mg/500 mL 0.9% NS IVPB ([facility])  20 mg/kg Intravenous Once Watson Zuleta MD   2,250 mg at 04/08/24 1215    vancomycin IVPB 1500 mg in 0.9% NaCl (Premix) 500 mL  1,500 mg Intravenous Q12H Watson Zuleta .3 mL/hr at 04/09/24 0058 1,500 mg at 04/09/24 0058     Orders (all)        Start     Ordered    04/12/24 0900  thiamine (VITAMIN B-1) tablet 100 mg  Daily        Placed in \"Followed by\" Linked Group    04/06/24 1833    04/09/24 1904  PHENobarbital tablet 32.4 mg  Once,   Status:  Discontinued        Placed in \"Followed by\" Linked Group    04/06/24 2304    04/09/24 1904  PHENobarbital tablet 32.4 mg  Once        Placed in \"Followed by\" Linked Group    04/08/24 1127    04/09/24 1000  Vancomycin, Trough  Timed         04/08/24 1113    04/09/24 0600  Blood Gas, Arterial -  Daily      Comments: While On Ventilator      04/08/24 0724    04/09/24 0600  XR Chest 1 View  Daily       " "04/08/24 0724    04/09/24 0504  PHENobarbital tablet 32.4 mg  Once,   Status:  Discontinued        Placed in \"Followed by\" Linked Group    04/06/24 2304    04/09/24 0504  PHENobarbital tablet 32.4 mg  Once        Placed in \"Followed by\" Linked Group    04/08/24 1127    04/09/24 0404  Blood Gas, Arterial -  PROCEDURE ONCE         04/09/24 0400    04/09/24 0000  Magnesium  Morning Draw         04/08/24 1658    04/08/24 2347  POC Glucose Once  PROCEDURE ONCE        Comments: Complete no more than 45 minutes prior to patient eating      04/08/24 2343    04/08/24 2300  vancomycin IVPB 1500 mg in 0.9% NaCl (Premix) 500 mL  Every 12 Hours         04/08/24 1113    04/08/24 1817  Ventilator - Vent Mode: AC/VC+; Rate: 12; FiO2: Titrate Per SpO2; Titrate Oxygen for SpO2: 90 - 95%; PEEP: 5; Tidal Volume: mL; TV: 650  Continuous         04/08/24 1818    04/08/24 1811  POC Glucose Once  PROCEDURE ONCE        Comments: Complete no more than 45 minutes prior to patient eating      04/08/24 1806    04/08/24 1745  magnesium sulfate 2g/50 mL (PREMIX) infusion  Every 2 Hours         04/08/24 1658    04/08/24 1730  famotidine (PEPCID) tablet 20 mg  2 Times Daily Before Meals         04/08/24 1127    04/08/24 1704  PHENobarbital tablet 32.4 mg  Once,   Status:  Discontinued        Placed in \"Followed by\" Linked Group    04/06/24 2304    04/08/24 1704  PHENobarbital tablet 32.4 mg  Once        Placed in \"Followed by\" Linked Group    04/08/24 1127    04/08/24 1700  Potassium  Once         04/08/24 1619    04/08/24 1700  Magnesium  Once         04/08/24 1619    04/08/24 1700  Phosphorus  Once         04/08/24 1659    04/08/24 1630  cefepime 2000 mg IVPB in 100 mL NS (MBP)  Every 8 Hours         04/08/24 0738    04/08/24 1548  XR Abdomen KUB  1 Time Imaging         04/08/24 1547    04/08/24 1426  Duplex Venous Lower Extremity - Bilateral CAR  Once         04/08/24 1425    04/08/24 1345  Procalcitonin  STAT         04/08/24 1200    04/08/24 " "1345  Comprehensive Metabolic Panel  STAT         04/08/24 1200    04/08/24 1330  LORazepam (ATIVAN) tablet 1 mg  Every 6 Hours,   Status:  Discontinued        Placed in \"Followed by\" Linked Group    04/08/24 1127    04/08/24 1300  sepsis fluid NS 0.9 % bolus 3,390 mL  Once         04/08/24 1200    04/08/24 1300  norepinephrine (LEVOPHED) 8 mg in 250 mL NS infusion (premix)  Titrated         04/08/24 1203    04/08/24 1215  LORazepam (ATIVAN) injection 2 mg  Every 6 Hours         04/08/24 1116    04/08/24 1215  multivitamin and minerals liquid 15 mL  Daily         04/08/24 1127    04/08/24 1200  POC Glucose Q6H  Every 6 Hours      Comments: Complete no more than 45 minutes prior to patient eating      04/08/24 1116    04/08/24 1147  POC Glucose Once  PROCEDURE ONCE        Comments: Complete no more than 45 minutes prior to patient eating      04/08/24 1144    04/08/24 1135  Tube Feeding: Formula: Peptamen Intense VHP (Vital HP); Feeding Type: Continuous; Start at: 20 mL/hr; Then Advance By: 15 mL/hr; Every: 12 hours; To Goal Rate of: 50 mL/hr; Water Flush: 30 mL; Every: 4 hours; Water Bolus: None  Diet Effective Now         04/08/24 1135    04/08/24 1126  folic acid (FOLVITE) tablet 1 mg  Daily         04/08/24 1127    04/08/24 1117  Daily Weights  Daily       04/08/24 1116    04/08/24 1117  Tube Feeding: Formula: RD to Initiate & Manage  Diet Effective Now,   Status:  Canceled         04/08/24 1116    04/08/24 1117  NPO Diet NPO Type: Tube Feeding  Diet Effective Now         04/08/24 1116    04/08/24 1116  Elevate Head Of Bed 30-45 Degrees  Continuous         04/08/24 1116    04/08/24 1116  Tube Feeding Assessment and I/O  Every Shift       04/08/24 1116    04/08/24 1116  Write Hang Time on Enteral Feeding Container  Per Order Details        Comments: Ready-to Hang, Closed System Large Volume Bags or Containers May Hang For 24 Hours.  Open System - Small Volume Cans, Bags or Cartons May Hang for 8 Hours.    04/08/24 " 1116    04/08/24 1116  Notify Provider - Abdominal Discomfort, Pain or Distention, No Bowel Movement in 3 Days  Continuous        Comments: Open Order Report to View Parameters Requiring Provider Notification    04/08/24 1116    04/08/24 1116  Inpatient Consult to Nutrition Services  Once        Provider:  (Not yet assigned)    04/08/24 1116    04/08/24 1116  Feeding Tube Insertion - Cortrak System  Once         04/08/24 1116    04/08/24 1115  vancomycin 2250 mg/500 mL 0.9% NS IVPB ([facility])  Once         04/08/24 1029    04/08/24 1030  hydrocortisone-bacitracin-zinc oxide-nystatin (MAGIC BARRIER) ointment 1 Application  2 Times Daily         04/08/24 0909    04/08/24 1028  Pharmacy to dose vancomycin  Continuous PRN         04/08/24 1029    04/08/24 0938  XR Abdomen KUB  1 Time Imaging         04/08/24 0937    04/08/24 0937  XR Chest 1 View  1 Time Imaging         04/08/24 0937    04/08/24 0913  Feeding Tube Insertion  Once         04/08/24 0912    04/08/24 0910  Use Repositioning Wedge to Position Patient  Continuous         04/08/24 0909    04/08/24 0910  Head of Bed 30 Degrees or Less  Until Discontinued         04/08/24 0909    04/08/24 0909  Elevate Heels Off of Bed  Until Discontinued         04/08/24 0909    04/08/24 0909  Apply Heel Protector Boot Until Discontinued  Until Discontinued         04/08/24 0909    04/08/24 0900  sodium chloride 0.9 % flush 10 mL  Every 12 Hours Scheduled         04/08/24 0724    04/08/24 0900  chlorhexidine (PERIDEX) 0.12 % solution 15 mL  Every 12 Hours Scheduled         04/08/24 0724    04/08/24 0830  ipratropium-albuterol (DUO-NEB) nebulizer solution 3 mL  4 Times Daily - RT         04/08/24 0723    04/08/24 0830  cefepime 2000 mg IVPB in 100 mL NS (MBP)  Once         04/08/24 0738    04/08/24 0829  Blood Gas, Arterial -  Daily,   Status:  Canceled       04/08/24 0828    04/08/24 0815  cefepime 2000 mg IVPB in 100 mL NS (MBP)  Every 24 Hours,   Status:  Discontinued          "04/08/24 0725    04/08/24 0800  fentaNYL 1000 mcg in 100 mL NS infusion  Titrated         04/08/24 0714    04/08/24 0800  propofol (DIPRIVAN) infusion 10 mg/mL 100 mL  Titrated         04/08/24 0714    04/08/24 0800  Vital Signs Every Hour and Per Hospital Policy Based on Patient Condition  Every Hour       04/08/24 0724    04/08/24 0800  Intake & Output  Every Hour       04/08/24 0724    04/08/24 0800  Oral Care & Teeth Brushing - Intubated Patient  Every 4 Hours      Comments: [place] Teeth at Least 2x/day    04/08/24 0724    04/08/24 0745  furosemide (LASIX) injection 80 mg  Once         04/08/24 0650    04/08/24 0731  Respiratory Culture - Sputum, ET Suction  Once         04/08/24 0731    04/08/24 0726  Lactic Acid, Plasma  Once         04/08/24 0725    04/08/24 0726  Blood Culture - Blood, Arm, Left  Once        Placed in \"And\" Linked Group    04/08/24 0725    04/08/24 0726  Blood Culture - Blood, Arm, Right  Once        Placed in \"And\" Linked Group    04/08/24 0725    04/08/24 0725  Intubation  Once         04/08/24 0724    04/08/24 0725  Daily Weights  Daily       04/08/24 0724    04/08/24 0725  Restraints Non-Violent or Non-Self Destructive  Calendar Day         04/08/24 0725    04/08/24 0724  Target Arousal Level RASS -1 to -2  Continuous,   Status:  Canceled         04/08/24 0723    04/08/24 0724  Restraints Non-Violent or Non-Self Destructive  Calendar Day,   Status:  Canceled         04/08/24 0724    04/08/24 0724  Continuous Cardiac Monitoring  Continuous        Comments: Follow Standing Orders As Outlined in Process Instructions (Open Order Report to View Full Instructions)    04/08/24 0724    04/08/24 0724  Telemetry - Place Orders & Notify Provider of Results When Patient Experiences Acute Chest Pain, Dysrhythmia or Respiratory Distress  Continuous        Comments: Open Order Report to View Parameters Requiring Provider Notification    04/08/24 0724    04/08/24 0724  Continuous Pulse Oximetry  " Continuous         04/08/24 0724    04/08/24 0724  Height & Weight  Once         04/08/24 0724    04/08/24 0724  Oral Care - Patient Not on NPPV & Not Intubated  Every Shift       04/08/24 0724    04/08/24 0724  Target Arousal Level RASS 0 to -1  Continuous,   Status:  Canceled         04/08/24 0724    04/08/24 0724  Use Mobility Guidelines for Advancement of Activity  Continuous         04/08/24 0724    04/08/24 0724  Insert Peripheral IV  Once         04/08/24 0724    04/08/24 0724  Saline Lock & Maintain IV Access  Continuous,   Status:  Canceled         04/08/24 0724    04/08/24 0724  Elevate HOB  Continuous         04/08/24 0724    04/08/24 0724  Subglottic Suctioning Must Be Done Every 6 Hours  Per Order Details        Comments: At Least Every 6 Hours    04/08/24 0724    04/08/24 0724  Target Arousal Level RASS 0 to -1  Continuous         04/08/24 0724    04/08/24 0724  NPO Diet NPO Type: Strict NPO  Diet Effective Now,   Status:  Canceled         04/08/24 0724    04/08/24 0724  Blood Gas, Arterial -Obtain on: Current Settings  Once        Comments: One Hour After Intubation      04/08/24 0724    04/08/24 0724  RN May Place Order For ABG As Needed for Respiratory Distress  Continuous         04/08/24 0724    04/08/24 0724  VTE Prophylaxis Not Indicated: Other: Patient Currently Anticoagulated / Receiving Prophylaxis  Once         04/08/24 0724    04/08/24 0724  If Patient has BG Less Than 80 & is Symptomatic But Not on IV Insulin Protocol - Use Adult Hypoglycemia Treatment Orders  Continuous         04/08/24 0724    04/08/24 0724  Maintain IV Access  Continuous         04/08/24 0724    04/08/24 0724  ICU / CCU - Place Order Consult Intensivist For Critical Care Management (If Patient Not Admitted to Cardiology for Primary Cardiology Condition)  Continuous         04/08/24 0724    04/08/24 0724  ICU / CCU - Notify All Physicians When Patient is Transferred  Continuous        Comments: Open Order Report to View  "Parameters Requiring Provider Notification    04/08/24 0724    04/08/24 0724  Intubate  Once         04/08/24 0724    04/08/24 0723  sodium chloride 0.9 % flush 10 mL  As Needed         04/08/24 0724    04/08/24 0723  sodium chloride 0.9 % infusion 40 mL  As Needed         04/08/24 0724    04/08/24 0722  POC Glucose Once  PROCEDURE ONCE        Comments: Complete no more than 45 minutes prior to patient eating      04/08/24 0719    04/08/24 0656  MRSA Screen, PCR (Inpatient) - Swab, Nares  Once         04/08/24 0655    04/08/24 0648  Transfer Patient  Once         04/08/24 0647    04/08/24 0627  hydrALAZINE (APRESOLINE) injection 10 mg  Every 6 Hours PRN         04/08/24 0628    04/08/24 0627  Inpatient Pulmonology Consult  Once        Specialty:  Pulmonary Disease  Provider:  Td Hinkle MD    04/08/24 0628    04/08/24 0625  Pharmacy to Dose Zosyn  Continuous PRN,   Status:  Discontinued         04/08/24 0628    04/08/24 0613  XR Chest 1 View  1 Time Imaging         04/08/24 0613    04/08/24 0612  XR Chest 1 View  1 Time Imaging,   Status:  Canceled         04/08/24 0611    04/08/24 0600  Basic Metabolic Panel  Daily       04/07/24 1745    04/08/24 0600  Magnesium  Daily       04/07/24 1745    04/08/24 0600  Phosphorus  Daily       04/07/24 1745    04/08/24 0600  CBC (No Diff)  Daily       04/07/24 1745    04/08/24 0504  PHENobarbital injection 65 mg  Once        Placed in \"Followed by\" Linked Group    04/06/24 2304    04/07/24 1930  LORazepam (ATIVAN) tablet 1 mg  Every 6 Hours,   Status:  Discontinued        Placed in \"Followed by\" Linked Group    04/06/24 1833    04/07/24 1715  PHENobarbital injection 65 mg  Once        Placed in \"Followed by\" Linked Group    04/06/24 2304    04/07/24 1315  enalaprilat (VASOTEC) injection 1.25 mg  Every 6 Hours         04/07/24 1126    04/07/24 1227  NPO Diet NPO Type: Strict NPO  Diet Effective Now,   Status:  Canceled         04/07/24 1226    04/07/24 1127  metoprolol " "tartrate (LOPRESSOR) injection 2.5 mg  Every 6 Hours PRN         04/07/24 1128    04/07/24 1050  Restraints Non-Violent or Non-Self Destructive  Calendar Day,   Status:  Canceled         04/07/24 1049    04/07/24 0800  Oral Care  2 Times Daily       04/06/24 1833    04/07/24 0621  PT Consult: Eval & Treat Functional Mobility Below Baseline  Once         04/07/24 0620    04/07/24 0600  Comprehensive Metabolic Panel  Morning Draw         04/06/24 1833    04/07/24 0600  CBC Auto Differential  Morning Draw         04/06/24 1833    04/07/24 0600  Lactic Acid, Plasma  Morning Draw         04/06/24 1833    04/07/24 0600  Lipase  Morning Draw         04/06/24 1833    04/07/24 0600  Magnesium  Morning Draw,   Status:  Canceled         04/06/24 1833    04/07/24 0600  Phosphorus  Morning Draw         04/06/24 1833    04/07/24 0549  PHENobarbital 187.2 mg in sodium chloride 0.9 % 100 mL IVPB  Once        Placed in \"Followed by\" Linked Group    04/06/24 2304    04/07/24 0438  Wound Ostomy Eval & Treat  Once         04/07/24 0437    04/07/24 0249  PHENobarbital 187.2 mg in sodium chloride 0.9 % 100 mL IVPB  Once        Placed in \"Followed by\" Linked Group    04/06/24 2304    04/07/24 0000  Magnesium  Morning Draw         04/06/24 1340    04/07/24 0000  PHENobarbital 187.2 mg in sodium chloride 0.9 % 100 mL IVPB  Once        Placed in \"Followed by\" Linked Group    04/06/24 2304    04/06/24 2315  Restraints Non-Violent or Non-Self Destructive  Calendar Day,   Status:  Canceled         04/06/24 2314    04/06/24 2200  thiamine (B-1) injection 200 mg  Every 8 Hours Scheduled        Placed in \"Followed by\" Linked Group    04/06/24 1833    04/06/24 2130  arformoterol (BROVANA) nebulizer solution 15 mcg  2 Times Daily - RT,   Status:  Discontinued        Placed in \"And\" Linked Group    04/06/24 1833    04/06/24 2100  sodium chloride 0.9 % flush 10 mL  Every 12 Hours Scheduled         04/06/24 1833    04/06/24 2100  famotidine (PEPCID) " "injection 20 mg  Every 12 Hours Scheduled,   Status:  Discontinued         04/06/24 1833    04/06/24 1930  losartan (COZAAR) tablet 100 mg  Every 24 Hours Scheduled,   Status:  Discontinued         04/06/24 1833    04/06/24 1930  multivitamin with minerals 1 tablet  Daily,   Status:  Discontinued         04/06/24 1833    04/06/24 1930  tiotropium (SPIRIVA RESPIMAT) 2.5 mcg/act aerosol solution inhaler  Daily - RT,   Status:  Discontinued        Placed in \"And\" Linked Group    04/06/24 1833    04/06/24 1930  sodium chloride 0.9 % with KCl 20 mEq/L infusion  Continuous         04/06/24 1833 04/06/24 1930  folic acid (FOLVITE) tablet 1 mg  Daily,   Status:  Discontinued         04/06/24 1833    04/06/24 1930  nicotine (NICODERM CQ) 21 MG/24HR patch 1 patch  Every 24 Hours         04/06/24 1833    04/06/24 1834  Intake & Output  Every Shift,   Status:  Canceled       04/06/24 1833    04/06/24 1834  Weigh Patient  Once         04/06/24 1833    04/06/24 1834  Insert Peripheral IV  Once         04/06/24 1833    04/06/24 1834  Saline Lock & Maintain IV Access  Continuous,   Status:  Canceled         04/06/24 1833    04/06/24 1834  Initiate Alcohol Withdrawal Protocol  Once         04/06/24 1833    04/06/24 1834  Obtain Baseline Clinical [place] Withdrawal Assessment - Ar (CIWA-Ar), Sedation Scale & Vital Signs  Once        Comments: Follow CIWA Scoring Reference Guide    04/06/24 1833    04/06/24 1834  Clinical [place] Withdrawal Assessment (CIWA-Ar)  Per Hospital Policy         04/06/24 1833    04/06/24 1834  If CIWA-Ar Score Less Than 8 For 3 Consecutive Assessments, Monitor Every 4 Hours & Discontinue Assessment When CIWA-Ar Less Than 8 for 24 Hours  Per Hospital Policy        Comments: Contact Provider to Restart Protocol if Any Symptoms Reappear    04/06/24 1833 04/06/24 1834  Seizure Precautions  Continuous         04/06/24 1833 04/06/24 1834  Notify Provider - Withdrawal  Continuous        Comments: Open " Order Report to View Parameters Requiring Provider Notification    04/06/24 1833    04/06/24 1834  Notify Provider of Abnormal Lab Results  Continuous        Comments: Open Order Report to View Parameters Requiring Provider Notification    04/06/24 1833    04/06/24 1834  Notify Provider - Vitals  Continuous        Comments: Open Order Report to View Parameters Requiring Provider Notification    04/06/24 1833    04/06/24 1834  Safety Precautions  Continuous         04/06/24 1833    04/06/24 1834  Place Sequential Compression Device  Once         04/06/24 1833    04/06/24 1834  Maintain Sequential Compression Device  Continuous         04/06/24 1833    04/06/24 1834  Continuous Cardiac Monitoring  Continuous,   Status:  Canceled        Comments: Follow Standing Orders As Outlined in Process Instructions (Open Order Report to View Full Instructions)    04/06/24 1833    04/06/24 1834  Maintain IV Access  Continuous,   Status:  Canceled         04/06/24 1833    04/06/24 1834  Telemetry - Place Orders & Notify Provider of Results When Patient Experiences Acute Chest Pain, Dysrhythmia or Respiratory Distress  Continuous        Comments: Open Order Report to View Parameters Requiring Provider Notification    04/06/24 1833    04/06/24 1834  May Be Off Telemetry for Tests  Continuous         04/06/24 1833    04/06/24 1834  Diet: Liquid; Clear Liquid; Fluid Consistency: Thin (IDDSI 0)  Diet Effective Now,   Status:  Canceled         04/06/24 1833    04/06/24 1834  Inpatient Access Center Consult  Once        Provider:  (Not yet assigned)    04/06/24 1833    04/06/24 1833  sodium chloride 0.9 % flush 10 mL  As Needed         04/06/24 1833    04/06/24 1833  sodium chloride 0.9 % infusion 40 mL  As Needed         04/06/24 1833    04/06/24 1833  nitroglycerin (NITROSTAT) SL tablet 0.4 mg  Every 5 Minutes PRN         04/06/24 1833    04/06/24 1833  Potassium Replacement - Follow Nurse / BPA Driven Protocol  As Needed          "04/06/24 1833    04/06/24 1833  Magnesium Standard Dose Replacement - Follow Nurse / BPA Driven Protocol  As Needed         04/06/24 1833    04/06/24 1833  Phosphorus Replacement - Follow Nurse / BPA Driven Protocol  As Needed         04/06/24 1833    04/06/24 1833  Calcium Replacement - Follow Nurse / BPA Driven Protocol  As Needed         04/06/24 1833    04/06/24 1833  sennosides-docusate (PERICOLACE) 8.6-50 MG per tablet 2 tablet  2 Times Daily PRN        Placed in \"And\" Linked Group    04/06/24 1833    04/06/24 1833  polyethylene glycol (MIRALAX) packet 17 g  Daily PRN        Placed in \"And\" Linked Group    04/06/24 1833    04/06/24 1833  bisacodyl (DULCOLAX) EC tablet 5 mg  Daily PRN        Placed in \"And\" Linked Group    04/06/24 1833    04/06/24 1833  bisacodyl (DULCOLAX) suppository 10 mg  Daily PRN        Placed in \"And\" Linked Group    04/06/24 1833    04/06/24 1833  nicotine polacrilex (NICORETTE) gum 4 mg  Every 1 Hour PRN         04/06/24 1833    04/06/24 1833  LORazepam (ATIVAN) tablet 2 mg  Every 6 Hours        Placed in \"Followed by\" Linked Group    04/06/24 1833 04/06/24 1833  LORazepam (ATIVAN) tablet 1 mg  Every 1 Hour PRN        Placed in \"Or\" Linked Group    04/06/24 1833    04/06/24 1833  LORazepam (ATIVAN) injection 1 mg  Every 1 Hour PRN        Placed in \"Or\" Linked Group    04/06/24 1833    04/06/24 1833  LORazepam (ATIVAN) tablet 2 mg  Every 1 Hour PRN        Placed in \"Or\" Linked Group    04/06/24 1833    04/06/24 1833  LORazepam (ATIVAN) injection 2 mg  Every 1 Hour PRN        Placed in \"Or\" Linked Group    04/06/24 1833    04/06/24 1833  LORazepam (ATIVAN) injection 2 mg  Every 15 Minutes PRN        Placed in \"Or\" Linked Group    04/06/24 1833    04/06/24 1833  LORazepam (ATIVAN) injection 2 mg  Every 15 Minutes PRN        Placed in \"Or\" Linked Group    04/06/24 1833    04/06/24 1833  ondansetron (ZOFRAN) injection 4 mg  Every 6 Hours PRN         04/06/24 1833    04/06/24 1701  Code " "Status and Medical Interventions:  Continuous         04/06/24 1703    04/06/24 1359  Critical Care  Once        Comments: This order was created via procedure documentation    04/06/24 1358    04/06/24 1356  magnesium sulfate 2g/50 mL (PREMIX) infusion  Every 2 Hours         04/06/24 1340    04/06/24 1356  Inpatient Admission  Once         04/06/24 1355    04/06/24 1356  Cardiac Monitoring  Continuous,   Status:  Canceled        Comments: Follow Standing Orders As Outlined in Process Instructions (Open Order Report to View Full Instructions)    04/06/24 1355    04/06/24 1326  [facility] (on-call MD unless specified) Details  Once        Specialty:  Hospitalist  Provider:  Sean Ricketts MD    04/06/24 1325    04/06/24 1325  Magnesium Standard Dose Replacement - Follow Nurse / BPA Driven Protocol  As Needed         04/06/24 1325    04/06/24 1153  LORazepam (ATIVAN) injection 2 mg  Once         04/06/24 1137    04/06/24 1152  thiamine (B-1) 500 mg in sodium chloride 0.9 % 100 mL IVPB  Once         04/06/24 1136    04/06/24 1148  sodium chloride 0.9 % bolus 1,000 mL  Once         04/06/24 1132    04/06/24 1142  Magnesium  STAT         04/06/24 1141    04/06/24 1142  Phosphorus  Once         04/06/24 1141    04/06/24 1133  Insert Peripheral IV  Once        Placed in \"And\" Linked Group    04/06/24 1132    04/06/24 1133  CBC & Differential  Once         04/06/24 1132    04/06/24 1133  Comprehensive Metabolic Panel  Once         04/06/24 1132    04/06/24 1133  Ethanol  Once         04/06/24 1132    04/06/24 1133  Urine Drug Screen - Urine, Clean Catch  Once         04/06/24 1132    04/06/24 1133  CBC Auto Differential  PROCEDURE ONCE         04/06/24 1132    04/06/24 1132  sodium chloride 0.9 % flush 10 mL  As Needed        Placed in \"And\" Linked Group    04/06/24 1132    Unscheduled  Obtain Pre & Post Sedation Scores With Every Lorazepam Dose - Hold For POSS Greater Than 2 or RASS of -3 or Less  As Needed       04/06/24 1833    " Unscheduled  Spontaneous Awakening Trial  Daily - SAT       24 0724    Unscheduled  Spontaneous Breathing Trial  Daily - SBT       24 0724    Unscheduled  Wound Care  As Needed       24 0909    Unscheduled  Watson & Document Feeding Tube Depth (in cm)  As Needed       24 1116    Unscheduled  Verify Feeding Tube Placement Upon Insertion & As Needed  As Needed       24 1116    Unscheduled  Flush Feeding Tube With 30-50mL Water As Needed  As Needed       24 1116                     Physician Progress Notes (all)        Watson Zuleta MD at 24 0828              [facility] INPATIENT PROGRESS NOTE         [facility] INTENSIVE CARE    2024      PATIENT IDENTIFICATION:  Name: Watson Lisa ADMIT: 2024   : 1955  PCP: Roc Carroll APRN    MRN: [id number] LOS: 2 days   AGE/SEX: 68 y.o. male  ROOM: [facility]                     LOS 2    Reason for visit: ICU medical management with aspiration pneumonia, respiratory failure on ventilator      SUBJECTIVE:      Events noted.  On pressor.  Discussed with nursing staff.  Continue CIWA protocol for alcohol withdrawal.  Place Cortrak and start enteral nutrition.  Not making any urine. I am seeing the patient for the first time today.  All patient problems are new to me.      Objective   OBJECTIVE:    Vital Sign Min/Max for last 24 hours  Temp  Min: 97.5 °F (36.4 °C)  Max: 99.5 °F (37.5 °C)   BP  Min: 87/51  Max: 199/98   Pulse  Min: 75  Max: 120   Resp  Min: 15  Max: 32   SpO2  Min: 91 %  Max: 99 %   No data recorded   No data recorded    Vitals:    24 0900 24 0907 24 1030 24 1135   BP: 94/78  (!) 87/51    BP Location:       Patient Position:       Pulse: 85 82 76 75   Resp:  15  16   Temp:       TempSrc:       SpO2: 96% 96% 97% 97%   Weight:       Height:                24  1130   Weight: 113 kg (250 lb)       Body mass index is 28.16 kg/m².        Mode: VC+/AC  FiO2 (%):   [50 %-100 %] 50 %  S RR:  [12] 12  S VT:  [650 mL] 650 mL  PEEP/CPAP (cm H2O):  [5 cm H20] 5 cm H20  MAP (cm H2O):  [7.8-13] 7.8           FiO2 (%): 50 %     Body mass index is 28.16 kg/m².    Intake/Output Summary (Last 24 hours) at 4/8/2024 1154  Last data filed at 4/7/2024 1952  Gross per 24 hour   Intake 2468 ml   Output --   Net 2468 ml         Exam:  GEN:  No distress, appears stated age  EYES:   PERRL, anicteric sclerae  ENT:    External ears/nose normal, OP clear  NECK:  No adenopathy, midline trachea  LUNGS: Normal chest on inspection, palpation and auscultation  CV:  Normal S1S2, without murmur  ABD:  Nontender, nondistended, no hepatosplenomegaly, +BS  EXT:  No edema.  No cyanosis or clubbing.  No mottling and normal cap refill.    Assessment     Scheduled meds:  cefepime, 2,000 mg, Intravenous, Q8H  chlorhexidine, 15 mL, Mouth/Throat, Q12H  enalaprilat, 1.25 mg, Intravenous, Q6H  famotidine, 20 mg, Nasogastric, BID AC  folic acid, 1 mg, Nasogastric, Daily  hydrocortisone-bacitracin-zinc oxide-nystatin, 1 Application, Topical, BID  ipratropium-albuterol, 3 mL, Nebulization, 4x Daily - RT  LORazepam, 2 mg, Intravenous, Q6H  multivitamin and minerals, 15 mL, Nasogastric, Daily  nicotine, 1 patch, Transdermal, Q24H  PHENobarbital, 32.4 mg, Nasogastric, Once   Followed by  [START ON 4/9/2024] PHENobarbital, 32.4 mg, Oral, Once   Followed by  [START ON 4/9/2024] PHENobarbital, 32.4 mg, Oral, Once  sodium chloride, 10 mL, Intravenous, Q12H  sodium chloride, 10 mL, Intravenous, Q12H  thiamine (B-1) IV, 200 mg, Intravenous, Q8H   Followed by  [START ON 4/12/2024] thiamine, 100 mg, Oral, Daily  vancomycin, 20 mg/kg, Intravenous, Once  vancomycin, 1,500 mg, Intravenous, Q12H      IV meds:                      fentanyl 10 mcg/mL,  mcg/hr, Last Rate: 150 mcg/hr (04/08/24 1144)  Pharmacy to dose vancomycin,   propofol, 5-50 mcg/kg/min, Last Rate: 30 mcg/kg/min (04/08/24 1144)  sodium chloride 0.9 % with KCl 20  mEq, 125 mL/hr, Last Rate: Stopped (04/08/24 0515)      Data Review:  Results from last 7 days   Lab Units 04/08/24  0602 04/07/24  0713 04/06/24  1145   SODIUM mmol/L 142 137 132*   POTASSIUM mmol/L 4.8 3.9 4.1   CHLORIDE mmol/L 106 103 95*   CO2 mmol/L 18.7* 20.9* 23.0   BUN mg/dL 9 13 21   CREATININE mg/dL 0.75* 0.84 1.13   GLUCOSE mg/dL 95 105* 105*   CALCIUM mg/dL 8.9 7.9* 8.9         Estimated Creatinine Clearance: 132.8 mL/min (A) (by C-G formula based on SCr of 0.75 mg/dL (L)).  Results from last 7 days   Lab Units 04/08/24  0602 04/07/24  0713 04/06/24  1145   WBC 10*3/mm3 11.62* 7.58 7.75   HEMOGLOBIN g/dL 12.2* 11.6* 12.5*   PLATELETS 10*3/mm3 238 186 208         Results from last 7 days   Lab Units 04/07/24  0713 04/06/24  1145   ALT (SGPT) U/L 14 21   AST (SGOT) U/L 22 22     Results from last 7 days   Lab Units 04/08/24  0847   PH, ARTERIAL pH units 7.374   PO2 ART mm Hg 406.3*   PCO2, ARTERIAL mm Hg 37.8   HCO3 ART mmol/L 22.1     Results from last 7 days   Lab Units 04/08/24  0830 04/07/24  0713   LACTATE mmol/L 0.9 0.8         Glucose   Date/Time Value Ref Range Status   04/08/2024 1144 110 70 - 130 mg/dL Final   04/08/2024 0719 106 70 - 130 mg/dL Final         Imaging reviewed  Chest x-ray 4/8 reviewed              Microbiology reviewed  Blood cultures no growth to date.  Respiratory culture no growth  MRSA screen positive             Active Hospital Problems    Diagnosis  POA    **Alcohol withdrawal [F10.939]  Yes    Substance abuse [F19.10]  Unknown    Cocaine abuse [F14.10]  Unknown    Positive urine drug screen [R82.5]  Unknown    History of alcohol use disorder [Z87.898]  Yes    Alcoholic liver disease [K70.9]  Yes    COPD (chronic obstructive pulmonary disease) [J44.9]  Yes    LIVAN (obstructive sleep apnea) [G47.33]  Yes    Essential hypertension [I10]  Yes      Resolved Hospital Problems   No resolved problems to display.         ASSESSMENT:  Acute respiratory failure requiring mechanical  ventilation  Aspiration pneumonia  Sepsis with shock  Severe alcohol abuse with withdrawal  Polysubstance abuse: UDS positive for cocaine  LIVAN        PLAN:  Required intubation and mechanical ventilation this morning for worsening respiratory failure related to aspiration pneumonia.  Sepsis fluid bolus with worsening hypotension and add pressor as needed.  Continue antibiotics broad-spectrum and narrow based on culture results.  Alcohol withdrawal protocol.  Thiamine, folic acid and multivitamin.  Control glucose.  Control blood pressure.  Add Lovenox for DVT prophylaxis.  Pepcid for ulcer prophylaxis.  Place Cortrak and start enteral nutrition.    Discussed with multidisciplinary ICU team on rounds this morning.        CCT: 47 min      Watson Zuleta MD  Pulmonary and Critical Care Medicine  [facility]  4/8/2024    11:54 EDT       Electronically signed by Watson Zuleta MD at 04/08/24 1202       Andre Givens MD at 04/08/24 0720          MD Attestation of STEFANIE Note    I, Andre Givens MD, have reviewed the history and plan as obtained by ALISON Ibarra. I have independently examined the patient and I personally performed >50% of the management in this split shared patient. My physical exam confirms her documented findings and I agree with the plan as listed, with the following additions/modifications:    Brief Summary Statement  68 y.o. male with chronic alcohol abuse and substance abuse with cocaine who presented for withdrawal on 4/6.  Worsening mental status overnight and agitation in addition to episode of aspiration with significant secretions.  Transferred to the ICU.  Intubated for respiratory failure.    Attending Physical Exam  Temp:  [97.5 °F (36.4 °C)-99 °F (37.2 °C)] 97.5 °F (36.4 °C)  Heart Rate:  [] 95  Resp:  [20-32] 30  BP: (145-199)/() 186/95  General: Intubated, sedated  HEENT: NC/AT, EOMI, MMM  Neck: Supple, trachea midline  Cardiac: RRR, no murmur,  gallops, rubs  Pulmonary: Mechanical breath sounds  GI: Soft, non-tender, non-distended, normal bowel sounds  Extremities: Warm, well perfused, no LE edema  Skin: no visible rash  Neuro: CN II - XII grossly intact  Psychiatry: Agitated    Remainder of detailed HPI is as noted above and has been reviewed by me for completeness.  Assessment/Plan    Severe alcohol abuse with withdrawal  Aspiration pneumonia  Substance abuse-UDS positive for cocaine  Hypertension  Obstructive sleep apnea on CPAP  Metabolic acidosis  Leukocytosis  Anemia    -Intubated for respiratory distress and aspiration pneumonia.  Initiated on empiric antibiotics.  -Ventilator bundle  -Now on propofol, will assist with alcohol withdrawal  -Postintubation chest x-ray   -Will need social work assistance for substance abuse upon discharge    Critical care time: 38 minutes    See above section for further detailed assessment and plan developed with ALISON which I have reviewed.    Electronically signed by Andre Givens MD, 2024, 07:21 EDT.       Electronically signed by Andre Givens MD at 24 0726       Harvey Colindres MD at 24 1117              DAILY PROGRESS NOTE  [facility]    Patient Identification:  Name: Watson Lisa  Age: 68 y.o.  Sex: male  :  1955  MRN: [id number]         Primary Care Physician: Roc Carroll APRN    Subjective:  Interval History: Currently encephalopathic and in DTs and morning restraints.  No family present at bedside.  This patient is not able to participate in any type of fruitful history and physical exam.    Objective:    Scheduled Meds:arformoterol, 15 mcg, Nebulization, BID - RT   And  tiotropium bromide monohydrate, 2 puff, Inhalation, Daily - RT  famotidine, 20 mg, Intravenous, Q12H  folic acid, 1 mg, Oral, Daily  LORazepam, 2 mg, Oral, Q6H   Followed by  LORazepam, 1 mg, Oral, Q6H  losartan, 100 mg, Oral, Q24H  multivitamin with minerals, 1 tablet,  "Oral, Daily  nicotine, 1 patch, Transdermal, Q24H  PHENobarbital, 65 mg, Intravenous, Once   Followed by  [START ON 4/8/2024] PHENobarbital, 65 mg, Intravenous, Once   Followed by  [START ON 4/8/2024] PHENobarbital, 32.4 mg, Oral, Once   Followed by  [START ON 4/9/2024] PHENobarbital, 32.4 mg, Oral, Once   Followed by  [START ON 4/9/2024] PHENobarbital, 32.4 mg, Oral, Once  sodium chloride, 10 mL, Intravenous, Q12H  thiamine (B-1) IV, 200 mg, Intravenous, Q8H   Followed by  [START ON 4/12/2024] thiamine, 100 mg, Oral, Daily      Continuous Infusions:sodium chloride 0.9 % with KCl 20 mEq, 125 mL/hr, Last Rate: 125 mL/hr (04/07/24 1047)        Vital signs in last 24 hours:  Temp:  [97.5 °F (36.4 °C)-98.6 °F (37 °C)] 98.2 °F (36.8 °C)  Heart Rate:  [] 90  Resp:  [16-24] 22  BP: (145-181)/() 167/106    Intake/Output:    Intake/Output Summary (Last 24 hours) at 4/7/2024 1117  Last data filed at 4/6/2024 1944  Gross per 24 hour   Intake 1210 ml   Output 300 ml   Net 910 ml       Exam:  BP (!) 167/106   Pulse 90   Temp 98.2 °F (36.8 °C) (Oral)   Resp 22   Ht 200.7 cm (79\")   Wt 113 kg (250 lb)   SpO2 96%   BMI 28.16 kg/m²     General Appearance:  Encephalopathic, in soft restraints, fidgety                          Head:    Normocephalic, without obvious abnormality, atraumatic                         Throat:   Oral mucosa pink and moist                           Neck:   Supple, symmetrical, trachea midline, no JVD                         Lungs:    Diminished otherwise clear to auscultation bilaterally, respirations unlabored                 Chest Wall:    No tenderness or deformity                          Heart:    Regular/tachycardic rate and rhythm, S1 and S2 normal                  Abdomen:     Soft, nontender, bowel sounds active                  Extremities: Soft restraints/moving all                        pulses:   Pulses palpable in all extremities                            Skin:   Skin is " warm and dry, nonjaundiced                  neurologic: Unable to fully evaluate at this time but no obvious deficits      Data Review:  Labs in chart were reviewed.    Assessment:  Active Hospital Problems    Diagnosis  POA    **Alcohol withdrawal [F10.939]  Yes    Substance abuse [F19.10]  Unknown    Cocaine abuse [F14.10]  Unknown    Positive urine drug screen [R82.5]  Unknown    History of alcohol use disorder [Z87.898]  Yes    Alcoholic liver disease [K70.9]  Yes    COPD (chronic obstructive pulmonary disease) [J44.9]  Yes    LIVAN (obstructive sleep apnea) [G47.33]  Yes    Essential hypertension [I10]  Yes      Resolved Hospital Problems   No resolved problems to display.       Plan:    Alcohol abuse with significant DTs currently in soft restraints and encephalopathic   -Continue CIWA as currently dosed   -Wean restraints pending clinical response/behavior   -LFTs and bilirubin normal.  Anemia of chronic disease secondary to alcohol malnutrition.  Platelet count normal    Replace electrolyte deficiencies due to malnutrition from alcohol abuse including magnesium and potassium    Continue supplemental vitamins including thiamine    UDS positive for cocaine/opiates    Tobacco/NicoDerm    Unsafe for p.o. and BP elevated and reactive -switch ARB to Vasotec with parameters and I am also initiating as needed IV Lopressor as heart rate is trending up.  A lot of this blood pressure is falsely elevated secondary due to DTs      Long-term prognosis very poor for this gentleman unless he makes significant lifestyle changes    Access to see and CCP for DC needs    Harvey Colindres MD  4/7/2024  11:17 EDT      Electronically signed by Harvey Colindres MD at 04/07/24 1132          Consult Notes (all)        Jacqui Alvarado, ALISON at 04/08/24 0648        Consult Orders    1. Inpatient Pulmonology Consult [[id number]] ordered by Rommel Gayle MD at 04/08/24 0656                 Group: [place] [facility]  "CARE       CONSULT NOTE    Patient Identification:  Watson Lisa  68 y.o.  male  1955  [id number]            Requesting physician:  Dr. Mayes    Reason for Consultation:  possible ICU transfer, aspiration pneumonia     CC: alcohol withdrawal    History of Present Illness:  Watson Lisa is a 68 year old male with a past medical history of hypertension, alcohol abuse, and tobacco abuse. He also has a history of substance abuse- using unprescribed opiates for chronic pain and recently began using cocaine.    He presented to the ED on 4/6/24 expressing desire to quit drinking.  Patient has previously had multiple times to cease alcohol use with DTs-previously has also been admitted to the intensive care unit requiring mechanical ventilation for aspiration pneumonia/inability to protect his airway.  Patient admits to chronic alcohol use and drinking about 1/5 of whiskey and bourbon every night.  He also reports that he uses opiates that he \"buys off the street\" to help with his chronic pain.  Approximately 3 weeks ago his pain acutely worsened and he began using cocaine.  Last drink prior to admission was 4/5/2024.  Patient was admitted to the hospital for medical management of alcohol withdrawal.    Stat consult was placed this morning for concern for aspiration pneumonia.  Upon arrival to floor, patient is breathing 45 times a minute, is confused, diaphoretic, however is maintaining his oxygen saturation below requiring 2 L nasal cannula.  He has thick, creamy secretions with oropharyngeal suctioning, unable to clear himself.  He was emergently transferred to the intensive care unit and intubated for airway protection.    Review of Systems:  Unable to obtain, confused.    Past Medical History:  Past Medical History:   Diagnosis Date    Alcohol abuse     Anxiety     Arthritis     Bronchitis with chronic airway obstruction     LAST FEB    DVT (deep venous thrombosis)     Hiatal hernia     Hypertension     " "Hypertension     Low back pain     Neck pain     Pulmonary embolism     Sleep apnea with use of continuous positive airway pressure (CPAP)     AT NIGHT       Past Surgical History:  Past Surgical History:   Procedure Laterality Date    COLONOSCOPY      JOINT REPLACEMENT Right     hip/knee    REPLACEMENT TOTAL KNEE      TONSILLECTOMY      TOTAL HIP ARTHROPLASTY Right         Home Meds:  Medications Prior to Admission   Medication Sig Dispense Refill Last Dose    losartan (COZAAR) 100 MG tablet Take 1 tablet by mouth Daily for 30 days. 30 tablet 0 4/5/2024    multivitamin with minerals (MULTIVITAMIN ADULT PO) Take 1 tablet by mouth Daily. 90 tablet 3 4/5/2024    Umeclidinium-Vilanterol (Anoro Ellipta) 62.5-25 MCG/ACT aerosol powder  inhaler Inhale 1 puff Daily. 1 each 9 4/5/2024       Allergies:  Allergies   Allergen Reactions    Penicillins Unknown - Low Severity     Pt does not recall the reaction. Patient tolerated cephalexin 3/2020    Penicillins Other (See Comments)     Unknown        Social History:   Social History     Socioeconomic History    Marital status:    Tobacco Use    Smoking status: Every Day     Current packs/day: 2.00     Average packs/day: 2.0 packs/day for 40.0 years (80.0 ttl pk-yrs)     Types: Cigarettes    Smokeless tobacco: Never   Vaping Use    Vaping status: Never Used   Substance and Sexual Activity    Alcohol use: Yes     Alcohol/week: 10.0 standard drinks of alcohol     Types: 10 Shots of liquor per week     Comment: 1 pint vodka/ day    Drug use: Not Currently     Types: Hydrocodone    Sexual activity: Defer       Family History:  Family History   Problem Relation Age of Onset    Cancer Mother     Heart disease Father     Alcohol abuse Father     Cancer Brother        Physical Exam:  BP (!) 186/95   Pulse 95   Temp 97.5 °F (36.4 °C) (Oral)   Resp (!) 30   Ht 200.7 cm (79\")   Wt 113 kg (250 lb)   SpO2 91%   BMI 28.16 kg/m²  Body mass index is 28.16 kg/m². 91% 113 kg (250 " lb)    Constitutional: Middle aged pt in bed, acute respiratory distress, tachypneic, diaphoretic, flushed  Head: - NCAT  Eyes: No pallor, Anicteric conjunctiva, EOMI.  ENMT:  Mallampati 3, no oral thrush. Dry MM.  Poor dentition with multiple missing teeth.  NECK: Trachea midline, No thyromegaly, no palpable cervical lymphadenopathy, JVD present  Heart: Tachycardic rate, regular rhythm, no murmur. No pedal edema   Lungs: RAMYA +, diffuse coarse lung sounds  Abdomen: Soft. No tenderness, guarding or rigidity. No palpable masses  Extremities: Extremities warm and well perfused. No cyanosis/ clubbing  Neuro: Opens eyes spontaneously, intermittently will follow commands, no focal neurodeficits  Psych: Agitated    PPE recommended per [facility] infectious disease Isolation protocol for the current clinical scenario(as mentioned below) was followed.      LABS:  COVID19   Date Value Ref Range Status   04/15/2022 Not Detected Not Detected - Ref. Range Final       Lab Results   Component Value Date    CALCIUM 7.9 (L) 04/07/2024    PHOS 3.0 04/07/2024     Results from last 7 days   Lab Units 04/07/24  0713 04/06/24  1145   MAGNESIUM mg/dL 1.8 1.4*   SODIUM mmol/L 137 132*   POTASSIUM mmol/L 3.9 4.1   CHLORIDE mmol/L 103 95*   CO2 mmol/L 20.9* 23.0   BUN mg/dL 13 21   CREATININE mg/dL 0.84 1.13   GLUCOSE mg/dL 105* 105*   CALCIUM mg/dL 7.9* 8.9   WBC 10*3/mm3 7.58 7.75   HEMOGLOBIN g/dL 11.6* 12.5*   PLATELETS 10*3/mm3 186 208   ALT (SGPT) U/L 14 21   AST (SGOT) U/L 22 22     Lab Results   Component Value Date    CKTOTAL 668 (H) 01/02/2023    TROPONINT 0.080 (C) 12/28/2022             Results from last 7 days   Lab Units 04/07/24  0713   LACTATE mmol/L 0.8                     Lab Results   Component Value Date    TSH 1.530 10/25/2023     Estimated Creatinine Clearance: 118.6 mL/min (by C-G formula based on SCr of 0.84 mg/dL).         Imaging: I personally visualized the images of scans/x-rays performed within last 3  days.      Assessment:  Respiratory failure requiring mechanical ventilation   Airway compromise  Alcohol withdrawal  Aspiration pneumonia  Substance abuse  Hypertension  Obstructive sleep apnea on CPAP  Metabolic acidosis  Leukocytosis  Anemia    Recommendations:  Emergently transferred to the intensive care unit for respiratory distress, intubated.  See separate note.  Respiratory culture ordered.  Started on empiric antibiotics for suspected aspiration pneumonia.  Patient has allergy to penicillins, initiated on cefepime.  Previously has tolerated cephalexin.  Ventilator bundle initiated.  Propofol for sedation.  Fentanyl for analgesia.  Daily chest x-rays.  Daily ABGs.  Wean sedation as tolerated to maintain RASS 0 to -1.  Continue use of benzos for alcohol withdrawal.    N.p.o.  SCDs  Full code    Patient was placed in face mask upon entering room and kept mask on throughout our encounter. I wore full protective equipment throughout this patient encounter including a face mask, gown and gloves. Hand hygiene was performed before donning protective equipment and after removal when leaving the room.    ALISON Palencia  4/8/2024  06:49 EDT      Much of this encounter note is an electronic transcription/translation of spoken language to printed text using Dragon Software.     Electronically signed by Jacqui Alvarado APRN at 04/08/24 0392

## 2024-04-09 NOTE — PLAN OF CARE
Goal Outcome Evaluation:   Pt sedated on prop, fent, and ativan q6 , still anxious when woken up. Vss, tf running at 35mL/hr. No bm,

## 2024-04-09 NOTE — PROGRESS NOTES
"[facility] Clinical Pharmacy Services: Vancomycin Monitoring Note    Watson Lisa is a 68 y.o. male who is on day 2/7 of pharmacy to dose vancomycin for Pneumonia per Dr. Zuleta's request.    Previous Vancomycin Dose:   1500 mg IV every  12  hours  Updated Cultures and Sensitivities:   -4/8 sputum:  ng  -4/8 MRSA screen positive  -4/8 blood (2 sets)  ng at 24h    Results from last 7 days   Lab Units 04/09/24  1007   VANCOMYCIN TR mcg/mL 11.00     Vitals/Labs  Ht: 200.7 cm (79\"); Wt: 113 kg (250 lb)   Temp Readings from Last 1 Encounters:   04/09/24 98.7 °F (37.1 °C) (Oral)     Estimated Creatinine Clearance: 106 mL/min (by C-G formula based on SCr of 0.94 mg/dL).       Results from last 7 days   Lab Units 04/09/24  0631 04/08/24  1359 04/08/24  0602 04/07/24  0713   CREATININE mg/dL 0.94 1.08 0.75* 0.84   WBC 10*3/mm3 9.79  --  11.62* 7.58     Assessment/Plan    Current Vancomycin Dose: 1500 mg IV every  12  hours; provides a predicted  mg/L.hr . Clayton decrease dose to 1250 mg iv q12 to provide a predicted AUC of 498.  Next Level Date and Time: Vanc Trough on 4/10 at 1000  We will continue to monitor patient changes and renal function     Thank you for involving pharmacy in this patient's care. Please contact pharmacy with any questions or concerns.       Christiano Viera, LT[facility], located within [facility]  Clinical Pharmacist          "

## 2024-04-09 NOTE — PROGRESS NOTES
LPC INPATIENT PROGRESS NOTE         [facility] INTENSIVE CARE    2024      PATIENT IDENTIFICATION:  Name: Watson Lisa ADMIT: 2024   : 1955  PCP: Roc Carroll APRN    MRN: [id number] LOS: 3 days   AGE/SEX: 68 y.o. male  ROOM: [facility]                     LOS 3    Reason for visit: ICU medical management with aspiration pneumonia, respiratory failure on ventilator      SUBJECTIVE:      Sedated on ventilator.  Not requiring pressor.  Hemodynamically stable.  Plan sedation vacation and spontaneous breathing trials.      Objective   OBJECTIVE:    Vital Sign Min/Max for last 24 hours  Temp  Min: 97.3 °F (36.3 °C)  Max: 98.7 °F (37.1 °C)   BP  Min: 87/51  Max: 152/79   Pulse  Min: 47  Max: 88   Resp  Min: 12  Max: 17   SpO2  Min: 94 %  Max: 99 %   No data recorded   No data recorded    Vitals:    24 0600 24 0642 24 0643 24 0737   BP: 118/59      Pulse: 69 67 68    Resp:  15     Temp:    98.7 °F (37.1 °C)   TempSrc:    Oral   SpO2: 98% 98% 98%    Weight:       Height:                24  1130 24  0725   Weight: 113 kg (250 lb) 113 kg (250 lb)       Body mass index is 28.16 kg/m².        Mode: VC+/AC  FiO2 (%):  [29 %-50 %] 30 %  S RR:  [12] 12  S VT:  [650 mL] 650 mL  PEEP/CPAP (cm H2O):  [5 cm H20] 5 cm H20  MAP (cm H2O):  [6.4-11] 8.3           FiO2 (%): 30 %     Body mass index is 28.16 kg/m².    Intake/Output Summary (Last 24 hours) at 2024 0930  Last data filed at 2024 0800  Gross per 24 hour   Intake 6689.75 ml   Output 1325 ml   Net 5364.75 ml         Exam:  GEN:  No distress, appears stated age  EYES:   PERRL, anicteric sclerae  ENT:    External ears/nose normal, OP clear  NECK:  No adenopathy, midline trachea  LUNGS: Normal chest on inspection, palpation and auscultation  CV:  Normal S1S2, without murmur  ABD:  Nontender, nondistended, no hepatosplenomegaly, +BS  EXT:  No edema.  No cyanosis or clubbing.  No mottling and  normal cap refill.    Assessment     Scheduled meds:  cefepime, 2,000 mg, Intravenous, Q8H  chlorhexidine, 15 mL, Mouth/Throat, Q12H  enalaprilat, 1.25 mg, Intravenous, Q6H  famotidine, 20 mg, Nasogastric, BID AC  folic acid, 1 mg, Nasogastric, Daily  hydrocortisone-bacitracin-zinc oxide-nystatin, 1 Application, Topical, BID  ipratropium-albuterol, 3 mL, Nebulization, 4x Daily - RT  LORazepam, 2 mg, Intravenous, Q6H  multivitamin and minerals, 15 mL, Nasogastric, Daily  nicotine, 1 patch, Transdermal, Q24H  PHENobarbital, 32.4 mg, Oral, Once  sodium chloride, 10 mL, Intravenous, Q12H  sodium chloride, 10 mL, Intravenous, Q12H  thiamine (B-1) IV, 200 mg, Intravenous, Q8H   Followed by  [START ON 4/12/2024] thiamine, 100 mg, Oral, Daily  vancomycin, 1,500 mg, Intravenous, Q12H      IV meds:                      fentanyl 10 mcg/mL,  mcg/hr, Last Rate: 200 mcg/hr (04/09/24 0917)  norepinephrine, 0.02-0.3 mcg/kg/min, Last Rate: Stopped (04/08/24 1230)  Pharmacy to dose vancomycin,   propofol, 5-50 mcg/kg/min, Last Rate: 30 mcg/kg/min (04/09/24 0919)  sodium chloride 0.9 % with KCl 20 mEq, 150 mL/hr, Last Rate: 150 mL/hr (04/09/24 0813)      Data Review:  Results from last 7 days   Lab Units 04/09/24  0631 04/08/24  1748 04/08/24  1359 04/08/24  0602 04/07/24  0713 04/06/24  1145   SODIUM mmol/L 144  --  142 142 137 132*   POTASSIUM mmol/L 4.1 3.9 3.6 4.8 3.9 4.1   CHLORIDE mmol/L 112*  --  108* 106 103 95*   CO2 mmol/L 21.0*  --  21.1* 18.7* 20.9* 23.0   BUN mg/dL 11  --  11 9 13 21   CREATININE mg/dL 0.94  --  1.08 0.75* 0.84 1.13   GLUCOSE mg/dL 101*  --  113* 95 105* 105*   CALCIUM mg/dL 7.7*  --  7.9* 8.9 7.9* 8.9         Estimated Creatinine Clearance: 106 mL/min (by C-G formula based on SCr of 0.94 mg/dL).  Results from last 7 days   Lab Units 04/09/24  0631 04/08/24  0602 04/07/24  0713 04/06/24  1145   WBC 10*3/mm3 9.79 11.62* 7.58 7.75   HEMOGLOBIN g/dL 10.7* 12.2* 11.6* 12.5*   PLATELETS 10*3/mm3 225 238  186 208         Results from last 7 days   Lab Units 04/08/24  1359 04/07/24  0713 04/06/24  1145   ALT (SGPT) U/L 12 14 21   AST (SGOT) U/L 17 22 22     Results from last 7 days   Lab Units 04/09/24  0400 04/08/24  0847   PH, ARTERIAL pH units 7.382 7.374   PO2 ART mm Hg 82.2 406.3*   PCO2, ARTERIAL mm Hg 34.4* 37.8   HCO3 ART mmol/L 20.4* 22.1     Results from last 7 days   Lab Units 04/08/24  1359 04/08/24  0830 04/07/24  0713   PROCALCITONIN ng/mL 0.17  --   --    LACTATE mmol/L  --  0.9 0.8         Glucose   Date/Time Value Ref Range Status   04/08/2024 2343 103 70 - 130 mg/dL Final   04/08/2024 1806 112 70 - 130 mg/dL Final   04/08/2024 1144 110 70 - 130 mg/dL Final   04/08/2024 0719 106 70 - 130 mg/dL Final         Imaging reviewed  Chest x-ray 4/8 reviewed              Microbiology reviewed  Blood cultures no growth to date.  Respiratory culture no growth  MRSA screen positive              Active Hospital Problems    Diagnosis  POA    **Alcohol withdrawal [F10.939]  Yes    Substance abuse [F19.10]  Unknown    Cocaine abuse [F14.10]  Unknown    Positive urine drug screen [R82.5]  Unknown    History of alcohol use disorder [Z87.898]  Yes    Alcoholic liver disease [K70.9]  Yes    COPD (chronic obstructive pulmonary disease) [J44.9]  Yes    LIVAN (obstructive sleep apnea) [G47.33]  Yes    Essential hypertension [I10]  Yes      Resolved Hospital Problems   No resolved problems to display.         ASSESSMENT:  Acute respiratory failure requiring mechanical ventilation  Aspiration pneumonia  Sepsis with shock  Severe alcohol abuse with withdrawal  Polysubstance abuse: UDS positive for cocaine  LIVAN        PLAN:  Sedation vacation and spontaneous breathing trial.   Continue IV fluids and pressor as needed.  Continue antibiotics broad-spectrum and narrow based on culture results.  Alcohol withdrawal protocol.  Thiamine, folic acid and multivitamin.  Control glucose.  Control blood pressure.  Lovenox for DVT  prophylaxis.  Pepcid for ulcer prophylaxis.  Cortrak and start enteral nutrition.    Discussed with multidisciplinary ICU team on rounds this morning.              Watson Zuelta MD  Pulmonary and Critical Care Medicine  [facility], [facility]  4/9/2024    09:30 EDT

## 2024-04-10 ENCOUNTER — APPOINTMENT (OUTPATIENT)
Dept: GENERAL RADIOLOGY | Facility: HOSPITAL | Age: 69
End: 2024-04-10
Payer: COMMERCIAL

## 2024-04-10 LAB
ANION GAP SERPL CALCULATED.3IONS-SCNC: 10.1 MMOL/L (ref 5–15)
ARTERIAL PATENCY WRIST A: POSITIVE
ARTERIAL PATENCY WRIST A: POSITIVE
ATMOSPHERIC PRESS: 745.1 MMHG
ATMOSPHERIC PRESS: 747 MMHG
BACTERIA SPEC RESP CULT: ABNORMAL
BACTERIA SPEC RESP CULT: ABNORMAL
BASE EXCESS BLDA CALC-SCNC: -5.4 MMOL/L (ref 0–2)
BASE EXCESS BLDA CALC-SCNC: -5.7 MMOL/L (ref 0–2)
BASOPHILS # BLD AUTO: 0.03 10*3/MM3 (ref 0–0.2)
BASOPHILS NFR BLD AUTO: 0.3 % (ref 0–1.5)
BDY SITE: ABNORMAL
BDY SITE: ABNORMAL
BUN SERPL-MCNC: 9 MG/DL (ref 8–23)
BUN/CREAT SERPL: 12 (ref 7–25)
CALCIUM SPEC-SCNC: 8 MG/DL (ref 8.6–10.5)
CHLORIDE SERPL-SCNC: 114 MMOL/L (ref 98–107)
CO2 BLDA-SCNC: 20.1 MMOL/L (ref 23–27)
CO2 BLDA-SCNC: 20.2 MMOL/L (ref 23–27)
CO2 SERPL-SCNC: 18.9 MMOL/L (ref 22–29)
CREAT SERPL-MCNC: 0.75 MG/DL (ref 0.76–1.27)
DEPRECATED RDW RBC AUTO: 44 FL (ref 37–54)
DEVICE COMMENT: ABNORMAL
EGFRCR SERPLBLD CKD-EPI 2021: 97.7 ML/MIN/1.73
EOSINOPHIL # BLD AUTO: 0.27 10*3/MM3 (ref 0–0.4)
EOSINOPHIL NFR BLD AUTO: 2.8 % (ref 0.3–6.2)
ERYTHROCYTE [DISTWIDTH] IN BLOOD BY AUTOMATED COUNT: 12.2 % (ref 12.3–15.4)
GLUCOSE BLDC GLUCOMTR-MCNC: 103 MG/DL (ref 70–130)
GLUCOSE BLDC GLUCOMTR-MCNC: 115 MG/DL (ref 70–130)
GLUCOSE BLDC GLUCOMTR-MCNC: 125 MG/DL (ref 70–130)
GLUCOSE BLDC GLUCOMTR-MCNC: 98 MG/DL (ref 70–130)
GLUCOSE SERPL-MCNC: 102 MG/DL (ref 65–99)
GRAM STN SPEC: ABNORMAL
HCO3 BLDA-SCNC: 19.1 MMOL/L (ref 22–28)
HCO3 BLDA-SCNC: 19.2 MMOL/L (ref 22–28)
HCT VFR BLD AUTO: 30.2 % (ref 37.5–51)
HEMODILUTION: NO
HEMODILUTION: NO
HGB BLD-MCNC: 9.7 G/DL (ref 13–17.7)
IMM GRANULOCYTES # BLD AUTO: 0.08 10*3/MM3 (ref 0–0.05)
IMM GRANULOCYTES NFR BLD AUTO: 0.8 % (ref 0–0.5)
INHALED O2 CONCENTRATION: 30 %
INHALED O2 CONCENTRATION: 30 %
LYMPHOCYTES # BLD AUTO: 1.07 10*3/MM3 (ref 0.7–3.1)
LYMPHOCYTES NFR BLD AUTO: 11.3 % (ref 19.6–45.3)
MAGNESIUM SERPL-MCNC: 1.9 MG/DL (ref 1.6–2.4)
MCH RBC QN AUTO: 31.7 PG (ref 26.6–33)
MCHC RBC AUTO-ENTMCNC: 32.1 G/DL (ref 31.5–35.7)
MCV RBC AUTO: 98.7 FL (ref 79–97)
MODALITY: ABNORMAL
MODALITY: ABNORMAL
MONOCYTES # BLD AUTO: 1.16 10*3/MM3 (ref 0.1–0.9)
MONOCYTES NFR BLD AUTO: 12.2 % (ref 5–12)
NEUTROPHILS NFR BLD AUTO: 6.9 10*3/MM3 (ref 1.7–7)
NEUTROPHILS NFR BLD AUTO: 72.6 % (ref 42.7–76)
NRBC BLD AUTO-RTO: 0 /100 WBC (ref 0–0.2)
O2 A-A PPRESDIFF RESPIRATORY: 0.5 MMHG
O2 A-A PPRESDIFF RESPIRATORY: 0.5 MMHG
PCO2 BLDA: 33.5 MM HG (ref 35–45)
PCO2 BLDA: 33.9 MM HG (ref 35–45)
PEEP RESPIRATORY: 5 CM[H2O]
PEEP RESPIRATORY: 5 CM[H2O]
PH BLDA: 7.36 PH UNITS (ref 7.35–7.45)
PH BLDA: 7.37 PH UNITS (ref 7.35–7.45)
PHOSPHATE SERPL-MCNC: 3.2 MG/DL (ref 2.5–4.5)
PLATELET # BLD AUTO: 306 10*3/MM3 (ref 140–450)
PMV BLD AUTO: 9.5 FL (ref 6–12)
PO2 BLDA: 82.8 MM HG (ref 80–100)
PO2 BLDA: 98.1 MM HG (ref 80–100)
POTASSIUM SERPL-SCNC: 4.2 MMOL/L (ref 3.5–5.2)
PSV: 10 CMH2O
RBC # BLD AUTO: 3.06 10*6/MM3 (ref 4.14–5.8)
SAO2 % BLDCOA: 95.8 % (ref 92–98.5)
SAO2 % BLDCOA: 97.5 % (ref 92–98.5)
SET MECH RESP RATE: 12
SODIUM SERPL-SCNC: 143 MMOL/L (ref 136–145)
TOTAL RATE: 15 BREATHS/MINUTE
TOTAL RATE: 16 BREATHS/MINUTE
VANCOMYCIN TROUGH SERPL-MCNC: 14 MCG/ML (ref 5–20)
VENTILATOR MODE: ABNORMAL
VENTILATOR MODE: ABNORMAL
VT ON VENT VENT: 650 ML
WBC NRBC COR # BLD AUTO: 9.51 10*3/MM3 (ref 3.4–10.8)

## 2024-04-10 PROCEDURE — 80048 BASIC METABOLIC PNL TOTAL CA: CPT | Performed by: HOSPITALIST

## 2024-04-10 PROCEDURE — 71045 X-RAY EXAM CHEST 1 VIEW: CPT

## 2024-04-10 PROCEDURE — 94799 UNLISTED PULMONARY SVC/PX: CPT

## 2024-04-10 PROCEDURE — 94761 N-INVAS EAR/PLS OXIMETRY MLT: CPT

## 2024-04-10 PROCEDURE — 25010000002 PROPOFOL 10 MG/ML EMULSION

## 2024-04-10 PROCEDURE — 25010000002 VANCOMYCIN HCL 1.25 G RECONSTITUTED SOLUTION 1 EACH VIAL: Performed by: INTERNAL MEDICINE

## 2024-04-10 PROCEDURE — 25810000003 SODIUM CHLORIDE 0.9 % SOLUTION 250 ML FLEX CONT: Performed by: INTERNAL MEDICINE

## 2024-04-10 PROCEDURE — 82948 REAGENT STRIP/BLOOD GLUCOSE: CPT

## 2024-04-10 PROCEDURE — 25010000002 THIAMINE HCL 200 MG/2ML SOLUTION: Performed by: INTERNAL MEDICINE

## 2024-04-10 PROCEDURE — 94664 DEMO&/EVAL PT USE INHALER: CPT

## 2024-04-10 PROCEDURE — 94003 VENT MGMT INPAT SUBQ DAY: CPT

## 2024-04-10 PROCEDURE — 36600 WITHDRAWAL OF ARTERIAL BLOOD: CPT

## 2024-04-10 PROCEDURE — 83735 ASSAY OF MAGNESIUM: CPT | Performed by: HOSPITALIST

## 2024-04-10 PROCEDURE — 84100 ASSAY OF PHOSPHORUS: CPT | Performed by: STUDENT IN AN ORGANIZED HEALTH CARE EDUCATION/TRAINING PROGRAM

## 2024-04-10 PROCEDURE — 82803 BLOOD GASES ANY COMBINATION: CPT

## 2024-04-10 PROCEDURE — 25010000002 CEFEPIME PER 500 MG

## 2024-04-10 PROCEDURE — 94760 N-INVAS EAR/PLS OXIMETRY 1: CPT

## 2024-04-10 PROCEDURE — 25010000002 SODIUM CHLORIDE 0.9 % WITH KCL 20 MEQ 20-0.9 MEQ/L-% SOLUTION: Performed by: INTERNAL MEDICINE

## 2024-04-10 PROCEDURE — 85025 COMPLETE CBC W/AUTO DIFF WBC: CPT | Performed by: HOSPITALIST

## 2024-04-10 PROCEDURE — 25010000002 HYDRALAZINE PER 20 MG: Performed by: INTERNAL MEDICINE

## 2024-04-10 PROCEDURE — 25010000002 ENOXAPARIN PER 10 MG: Performed by: INTERNAL MEDICINE

## 2024-04-10 PROCEDURE — 25010000002 FENTANYL CITRATE (PF) 2500 MCG/50ML SOLUTION

## 2024-04-10 PROCEDURE — 80202 ASSAY OF VANCOMYCIN: CPT | Performed by: INTERNAL MEDICINE

## 2024-04-10 PROCEDURE — 25010000002 LORAZEPAM PER 2 MG: Performed by: INTERNAL MEDICINE

## 2024-04-10 RX ORDER — DEXMEDETOMIDINE HYDROCHLORIDE 4 UG/ML
INJECTION, SOLUTION INTRAVENOUS
Status: COMPLETED
Start: 2024-04-10 | End: 2024-04-10

## 2024-04-10 RX ORDER — DEXMEDETOMIDINE HYDROCHLORIDE 4 UG/ML
.2-2 INJECTION, SOLUTION INTRAVENOUS
Status: DISCONTINUED | OUTPATIENT
Start: 2024-04-10 | End: 2024-04-18

## 2024-04-10 RX ADMIN — Medication 10 ML: at 20:55

## 2024-04-10 RX ADMIN — THIAMINE HYDROCHLORIDE 200 MG: 100 INJECTION, SOLUTION INTRAMUSCULAR; INTRAVENOUS at 14:50

## 2024-04-10 RX ADMIN — CHLORHEXIDINE GLUCONATE 15 ML: 1.2 RINSE ORAL at 08:34

## 2024-04-10 RX ADMIN — CEFEPIME 2000 MG: 2 INJECTION, POWDER, FOR SOLUTION INTRAVENOUS at 08:27

## 2024-04-10 RX ADMIN — THIAMINE HYDROCHLORIDE 200 MG: 100 INJECTION, SOLUTION INTRAMUSCULAR; INTRAVENOUS at 21:00

## 2024-04-10 RX ADMIN — LORAZEPAM 2 MG: 2 INJECTION INTRAMUSCULAR; INTRAVENOUS at 00:20

## 2024-04-10 RX ADMIN — FOLIC ACID 1 MG: 1 TABLET ORAL at 08:28

## 2024-04-10 RX ADMIN — DEXMEDETOMIDINE HYDROCHLORIDE IN SODIUM CHLORIDE 0.2 MCG/KG/HR: 4 INJECTION INTRAVENOUS at 15:38

## 2024-04-10 RX ADMIN — IPRATROPIUM BROMIDE AND ALBUTEROL SULFATE 3 ML: .5; 3 SOLUTION RESPIRATORY (INHALATION) at 14:59

## 2024-04-10 RX ADMIN — CEFEPIME 2000 MG: 2 INJECTION, POWDER, FOR SOLUTION INTRAVENOUS at 00:41

## 2024-04-10 RX ADMIN — Medication 10 ML: at 20:56

## 2024-04-10 RX ADMIN — PROPOFOL INJECTABLE EMULSION 35 MCG/KG/MIN: 10 INJECTION, EMULSION INTRAVENOUS at 09:25

## 2024-04-10 RX ADMIN — LORAZEPAM 2 MG: 2 INJECTION INTRAMUSCULAR; INTRAVENOUS at 08:28

## 2024-04-10 RX ADMIN — LORAZEPAM 2 MG: 2 INJECTION INTRAMUSCULAR; INTRAVENOUS at 12:22

## 2024-04-10 RX ADMIN — VANCOMYCIN HYDROCHLORIDE 1250 MG: 1.25 INJECTION, POWDER, LYOPHILIZED, FOR SOLUTION INTRAVENOUS at 12:34

## 2024-04-10 RX ADMIN — IPRATROPIUM BROMIDE AND ALBUTEROL SULFATE 3 ML: .5; 3 SOLUTION RESPIRATORY (INHALATION) at 11:30

## 2024-04-10 RX ADMIN — FAMOTIDINE 20 MG: 20 TABLET, FILM COATED ORAL at 06:32

## 2024-04-10 RX ADMIN — LORAZEPAM 2 MG: 2 INJECTION INTRAMUSCULAR; INTRAVENOUS at 05:24

## 2024-04-10 RX ADMIN — CHLORHEXIDINE GLUCONATE 15 ML: 1.2 RINSE ORAL at 20:55

## 2024-04-10 RX ADMIN — PROPOFOL INJECTABLE EMULSION 40 MCG/KG/MIN: 10 INJECTION, EMULSION INTRAVENOUS at 19:01

## 2024-04-10 RX ADMIN — LORAZEPAM 2 MG: 2 INJECTION INTRAMUSCULAR; INTRAVENOUS at 16:49

## 2024-04-10 RX ADMIN — FENTANYL CITRATE 275 MCG/HR: 0.05 INJECTION, SOLUTION INTRAMUSCULAR; INTRAVENOUS at 04:26

## 2024-04-10 RX ADMIN — LORAZEPAM 2 MG: 2 INJECTION INTRAMUSCULAR; INTRAVENOUS at 23:17

## 2024-04-10 RX ADMIN — PROPOFOL INJECTABLE EMULSION 45 MCG/KG/MIN: 10 INJECTION, EMULSION INTRAVENOUS at 01:24

## 2024-04-10 RX ADMIN — METOPROLOL TARTRATE 2.5 MG: 1 INJECTION, SOLUTION INTRAVENOUS at 15:29

## 2024-04-10 RX ADMIN — FENTANYL CITRATE 250 MCG/HR: 0.05 INJECTION, SOLUTION INTRAMUSCULAR; INTRAVENOUS at 08:37

## 2024-04-10 RX ADMIN — ZINC OXIDE 1 APPLICATION: 200 OINTMENT TOPICAL at 08:34

## 2024-04-10 RX ADMIN — DEXMEDETOMIDINE HYDROCHLORIDE IN SODIUM CHLORIDE 1 MCG/KG/HR: 4 INJECTION INTRAVENOUS at 19:00

## 2024-04-10 RX ADMIN — Medication 1 PATCH: at 19:36

## 2024-04-10 RX ADMIN — FAMOTIDINE 20 MG: 20 TABLET, FILM COATED ORAL at 18:23

## 2024-04-10 RX ADMIN — ZINC OXIDE 1 APPLICATION: 200 OINTMENT TOPICAL at 20:55

## 2024-04-10 RX ADMIN — Medication 10 ML: at 08:39

## 2024-04-10 RX ADMIN — FENTANYL CITRATE 300 MCG/HR: 0.05 INJECTION, SOLUTION INTRAMUSCULAR; INTRAVENOUS at 16:43

## 2024-04-10 RX ADMIN — HYDRALAZINE HYDROCHLORIDE 10 MG: 20 INJECTION INTRAMUSCULAR; INTRAVENOUS at 14:47

## 2024-04-10 RX ADMIN — PROPOFOL INJECTABLE EMULSION 35 MCG/KG/MIN: 10 INJECTION, EMULSION INTRAVENOUS at 12:39

## 2024-04-10 RX ADMIN — FENTANYL CITRATE 200 MCG/HR: 0.05 INJECTION, SOLUTION INTRAMUSCULAR; INTRAVENOUS at 20:52

## 2024-04-10 RX ADMIN — FENTANYL CITRATE 250 MCG/HR: 0.05 INJECTION, SOLUTION INTRAMUSCULAR; INTRAVENOUS at 12:39

## 2024-04-10 RX ADMIN — IPRATROPIUM BROMIDE AND ALBUTEROL SULFATE 3 ML: .5; 3 SOLUTION RESPIRATORY (INHALATION) at 19:22

## 2024-04-10 RX ADMIN — PROPOFOL INJECTABLE EMULSION 30 MCG/KG/MIN: 10 INJECTION, EMULSION INTRAVENOUS at 22:27

## 2024-04-10 RX ADMIN — Medication 15 ML: at 08:28

## 2024-04-10 RX ADMIN — POTASSIUM CHLORIDE AND SODIUM CHLORIDE 150 ML/HR: 900; 150 INJECTION, SOLUTION INTRAVENOUS at 04:46

## 2024-04-10 RX ADMIN — VANCOMYCIN HYDROCHLORIDE 1250 MG: 1.25 INJECTION, POWDER, LYOPHILIZED, FOR SOLUTION INTRAVENOUS at 23:17

## 2024-04-10 RX ADMIN — LORAZEPAM 2 MG: 2 INJECTION INTRAMUSCULAR; INTRAVENOUS at 18:23

## 2024-04-10 RX ADMIN — FENTANYL CITRATE 300 MCG/HR: 0.05 INJECTION, SOLUTION INTRAMUSCULAR; INTRAVENOUS at 00:42

## 2024-04-10 RX ADMIN — THIAMINE HYDROCHLORIDE 200 MG: 100 INJECTION, SOLUTION INTRAMUSCULAR; INTRAVENOUS at 05:49

## 2024-04-10 RX ADMIN — LORAZEPAM 1 MG: 2 INJECTION INTRAMUSCULAR; INTRAVENOUS at 14:48

## 2024-04-10 RX ADMIN — PROPOFOL INJECTABLE EMULSION 40 MCG/KG/MIN: 10 INJECTION, EMULSION INTRAVENOUS at 05:23

## 2024-04-10 RX ADMIN — DEXMEDETOMIDINE HYDROCHLORIDE IN SODIUM CHLORIDE 1 MCG/KG/HR: 4 INJECTION INTRAVENOUS at 22:27

## 2024-04-10 RX ADMIN — ENOXAPARIN SODIUM 40 MG: 100 INJECTION SUBCUTANEOUS at 12:22

## 2024-04-10 RX ADMIN — LORAZEPAM 2 MG: 2 INJECTION INTRAMUSCULAR; INTRAVENOUS at 03:46

## 2024-04-10 RX ADMIN — PROPOFOL INJECTABLE EMULSION 40 MCG/KG/MIN: 10 INJECTION, EMULSION INTRAVENOUS at 17:00

## 2024-04-10 RX ADMIN — CEFEPIME 2000 MG: 2 INJECTION, POWDER, FOR SOLUTION INTRAVENOUS at 18:23

## 2024-04-10 RX ADMIN — IPRATROPIUM BROMIDE AND ALBUTEROL SULFATE 3 ML: .5; 3 SOLUTION RESPIRATORY (INHALATION) at 07:06

## 2024-04-10 NOTE — PROGRESS NOTES
"Nutrition Services    Patient Name:  Watson Lisa  YOB: 1955  MRN: [id number]  Admit Date:  4/6/2024    Assessment Date:  04/10/24    Comment: Remains on the vent with propofol for sedation (provides about 700 lipid calories per day). TF's at goal with Peptamen Intense VHP at 50mL/hr. Last BM 4/8. Labs, meds, skin reviewed.  Na+ 143, Hgb 9.7. Will likely need swallow eval once extubated.    Plan/Recommendations:  Continue Peptamen Intense VHP at 50mL/hr, goal   Min free water while on drips  Monitor lytes and replace as needed.    Will follow clinical course, nutrition needs.    CLINICAL NUTRITION ASSESSMENT      Reason for Assessment Follow-up Protocol   Diagnosis/Problem severe alcohol abuse, asp PNA, substance abuse, HTN, LIVAN, resp failure - on vent.   Current Problems On vent     Encounter Information        Nutrition History    Food Preferences    Supplements    Factors Affecting Intake altered mental status, altered respiratory status   Tests/Procedures X-Ray     Anthropometrics        Current Height   Current Weight  BMI kg/m2 Height: 200.7 cm (79\")  Weight: 130 kg (286 lb 13.1 oz) (04/10/24 0500)  Body mass index is 32.31 kg/m².     Adj BMI (if applicable)    BMI Category Overweight (25 - 29.9)       Admission Weight 113kg   Ideal Body Weight (IBW) 93.7kg     Adj IBW (if applicable)    Usual Body Weight (UBW) 250-260lb   Weight Change/Trend Stable         Estimated/Assessed Needs        Energy Requirements    Weight for Calculation 93.7kg   Method for Estimation  20 kcal/kg   EST Needs (kcal/day) 1874       Protein Requirements    Weight for Calculation 93.7kg   EST Protein Needs (g/kg) 1.2 gm/kg   EST Daily Needs (g/day) 112g       Fluid Requirements     Method for Estimation 1 mL/kcal    Estimated Needs (mL/day)        Fluid Deficit    Current Na Level (mEq/L)    Desired Na Level (mEq/L)    Estimated Fluid Deficit (L)       Labs        Pertinent Labs    Results from last 7 days   Lab " Units 04/10/24  0341 04/09/24  0631 04/08/24  1748 04/08/24  1359 04/08/24  0602 04/07/24  0713 04/06/24  1145   SODIUM mmol/L 143 144  --  142   < > 137 132*   POTASSIUM mmol/L 4.2 4.1 3.9 3.6   < > 3.9 4.1   CHLORIDE mmol/L 114* 112*  --  108*   < > 103 95*   CO2 mmol/L 18.9* 21.0*  --  21.1*   < > 20.9* 23.0   BUN mg/dL 9 11  --  11   < > 13 21   CREATININE mg/dL 0.75* 0.94  --  1.08   < > 0.84 1.13   CALCIUM mg/dL 8.0* 7.7*  --  7.9*   < > 7.9* 8.9   BILIRUBIN mg/dL  --   --   --  0.3  --  0.3 0.5   ALK PHOS U/L  --   --   --  57  --  65 80   ALT (SGPT) U/L  --   --   --  12  --  14 21   AST (SGOT) U/L  --   --   --  17  --  22 22   GLUCOSE mg/dL 102* 101*  --  113*   < > 105* 105*    < > = values in this interval not displayed.     Results from last 7 days   Lab Units 04/10/24  0341 04/09/24  0631 04/08/24  1748 04/08/24  1359   MAGNESIUM mg/dL 1.9 2.2 1.3* 1.3*   PHOSPHORUS mg/dL 3.2 3.6 4.0  --    HEMOGLOBIN g/dL 9.7* 10.7*  --   --    HEMATOCRIT % 30.2* 31.9*  --   --    WBC 10*3/mm3 9.51 9.79  --   --    TRIGLYCERIDES mg/dL  --  158*  --   --    ALBUMIN g/dL  --   --   --  2.9*     Results from last 7 days   Lab Units 04/10/24  0341 04/09/24  0631 04/08/24  0602 04/07/24  0713 04/06/24  1145   PLATELETS 10*3/mm3 306 225 238 186 208     COVID19   Date Value Ref Range Status   04/15/2022 Not Detected Not Detected - Ref. Range Final     Lab Results   Component Value Date    HGBA1C 4.90 01/18/2021          Medications            Scheduled Medications cefepime, 2,000 mg, Intravenous, Q8H  chlorhexidine, 15 mL, Mouth/Throat, Q12H  enalaprilat, 1.25 mg, Intravenous, Q6H  enoxaparin, 40 mg, Subcutaneous, Q24H  famotidine, 20 mg, Nasogastric, BID AC  folic acid, 1 mg, Nasogastric, Daily  hydrocortisone-bacitracin-zinc oxide-nystatin, 1 Application, Topical, BID  ipratropium-albuterol, 3 mL, Nebulization, 4x Daily - RT  LORazepam, 2 mg, Intravenous, Q6H  multivitamin and minerals, 15 mL, Nasogastric, Daily  nicotine,  1 patch, Transdermal, Q24H  sodium chloride, 10 mL, Intravenous, Q12H  sodium chloride, 10 mL, Intravenous, Q12H  thiamine (B-1) IV, 200 mg, Intravenous, Q8H   Followed by  [START ON 4/12/2024] thiamine, 100 mg, Oral, Daily  vancomycin, 1,250 mg, Intravenous, Q12H        Infusions fentanyl 10 mcg/mL,  mcg/hr, Last Rate: 250 mcg/hr (04/10/24 0837)  norepinephrine, 0.02-0.3 mcg/kg/min, Last Rate: Stopped (04/08/24 1230)  Pharmacy to dose vancomycin,   propofol, 5-50 mcg/kg/min, Last Rate: 35 mcg/kg/min (04/10/24 0925)  sodium chloride 0.9 % with KCl 20 mEq, 150 mL/hr, Last Rate: 150 mL/hr (04/10/24 0446)        PRN Medications   acetaminophen    senna-docusate sodium **AND** polyethylene glycol **AND** bisacodyl **AND** bisacodyl    Calcium Replacement - Follow Nurse / BPA Driven Protocol    hydrALAZINE    LORazepam **OR** LORazepam **OR** LORazepam **OR** LORazepam **OR** LORazepam **OR** LORazepam    Magnesium Standard Dose Replacement - Follow Nurse / BPA Driven Protocol    Magnesium Standard Dose Replacement - Follow Nurse / BPA Driven Protocol    metoprolol tartrate    nicotine polacrilex    nitroglycerin    ondansetron    Pharmacy to dose vancomycin    Phosphorus Replacement - Follow Nurse / BPA Driven Protocol    Potassium Replacement - Follow Nurse / BPA Driven Protocol    [COMPLETED] Insert Peripheral IV **AND** sodium chloride    sodium chloride    sodium chloride    sodium chloride    sodium chloride     Physical Findings          Physical Appearance disoriented, oriented, sedate, ventilator support   Oral/Mouth Cavity tooth or teeth missing   Edema  2+ (mild)   Gastrointestinal last bowel movement: 4/9   Skin  pressure injury: gluteal , wound on abd   Tubes/Drains/Lines Cortrak, NG tube, bridle in place   NFPE No clinical signs of muscle wasting or fat loss   --  Current Nutrition Orders & Evaluation of Intake       Oral Nutrition     Food Allergies NKFA   Current PO Diet NPO Diet NPO Type: Tube  Feeding   Supplement    PO Evaluation     Trending % PO Intake       Enteral Nutrition     Enteral Route NG    TF Delivery Method Continuous    Propofol Rate/Kcal 26.7 (705 kcals)    Current TF Order/Rate  Peptamen Intense VHP @ 50 mL/hr    TF Goal Rate 50 mL/hr    Current Water Flush 30 mL Q 4 hr    Modular None    TF Residual  no or minimal residual    TF Tolerance tolerating    TF Observation Verified correct TF and water flush infusing per orders     Nutrition Diagnosis        Nutrition Dx Problem 1 Problem: Inadequate Oral Intake  Etiology: Medical Diagnosis - severe alcohol abuse, asp PNA, substance abuse, HTN, LIVAN, resp failure - on vent.  Signs/Symptoms: NPO    Comment:      INTERVENTION / PLAN OF CARE  Intervention Goal        Intervention Goal(s) Maintain nutrition status, Reduce/improve symptoms, Meet estimated needs, Disease management/therapy, Initiate TF/PN, Tolerate TF/PN at goal, Transition TF to PO, and No significant weight loss     Nutrition Intervention        RD Action Continue to monitor, Care plan reviewed, and Recommend/order: EN     Prescription         Diet     Supplement/Snack    EN/PN     Prescription Ordered       Enteral Prescription:     Enteral Route NG    TF Delivery Method Continuous    Enteral Product Peptamen Intense VHP    Modular None    Propofol Rate/Kcal 26.7 (705 kcals)    TF Start Rate 20    TF Goal Rate 50    Free Water Flush 30mL q 4 hrs    TF Provision at Goal: 1200 kcal, 1905 kcals with propofol,  110 gm protein, 1008 mL free water + 180 mL water flushes         Calories 102 % needs met         Protein  98 % needs met         Fluid (mL) 1188mL    Prescription Ordered Yes     Monitor/Evaluation        Monitor Per protocol   Discharge Plan Pending clinical course   Education Will instruct as appropriate     RD to follow per protocol.       Electronically signed by:  Queenie Dunham RD  04/10/24 09:44 EDT

## 2024-04-10 NOTE — PAYOR COMM NOTE
"Brittany Zuleta (68 y.o. Male)                           ATTENTION - CONTINUED CLINICALS CASE REF [id number]                         REPLY TO UR DEPT  [phone] OR CALL    RODRIGUEZ IVAN LPLIZBETH     \      Date of Birth   1955    Social Security Number       Address   [address] [address]    Home Phone   [phone]    MRN   [id number]       Worship   Yaz[employer]    Marital Status                               Admission Date   4/6/24    Admission Type   Emergency    Admitting Provider   Sean Ricketts MD    Attending Provider   Sylvia Mayes MD    Department, Room/Bed   [facility] INTENSIVE CARE, I387/1       Discharge Date       Discharge Disposition       Discharge Destination                                 Attending Provider: Sylvia Mayes MD    Allergies: Penicillins, Penicillins    Isolation: Contact   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)   Code Status: CPR    Ht: 200.7 cm (79\")   Wt: 130 kg (286 lb 13.1 oz)    Admission Cmt: None   Principal Problem: Alcohol withdrawal [F10.939]                   Active Insurance as of 4/6/2024       Primary Coverage       Payor Plan Insurance Group Employer/Plan Group    ANTHEM BLUE CROSS ANTHEM BLUE CROSS BLUE SHIELD PPO [id number]       Payor Plan Address Payor Plan Phone Number Payor Plan Fax Number Effective Dates    [address] [phone]  1/1/2022 - None Entered    [facility] [id number]         Subscriber Name Subscriber Birth Date Member ID       BRITTANY ZULETA 1955 [id number]               Secondary Coverage       Payor Plan Insurance Group Employer/Plan Group    MEDICARE MEDICARE A ONLY        Payor Plan Address Payor Plan Phone Number Payor Plan Fax Number Effective Dates    [address] [phone]  9/1/2019 - None Entered    [facility] [id number]         Subscriber Name Subscriber Birth Date Member ID       BRITTANY ZULETA 1955 [id number]                     Emergency Contacts       "  (Rel.) Home Phone Work Phone Mobile Phone    TEJAS ZULETA (Son) [phone] -- [phone]    Jorge A Nielson (Sister) [phone] -- --              Vital Signs (last day)       Date/Time Temp Temp src Pulse Resp BP Patient Position SpO2    04/10/24 1430 -- -- 89 -- 168/89 -- 98    04/10/24 1400 -- -- 82 -- 151/99 -- 98    04/10/24 1330 -- -- 100 -- 170/100 -- 98    04/10/24 1300 -- -- 91 -- 153/91 -- 98    04/10/24 1200 -- -- 73 -- 157/74 -- 97    04/10/24 1130 98.4 (36.9) Oral 90 18 149/94 -- 99    04/10/24 1100 -- -- 75 -- 150/80 -- 98    04/10/24 1030 -- -- 71 -- 156/87 -- 98    04/10/24 1000 -- -- 73 -- 143/84 -- 96    04/10/24 0930 -- -- 81 -- 151/79 -- 97    04/10/24 0900 -- -- 72 -- 143/70 -- 97    04/10/24 0800 -- -- 75 -- 148/82 -- 95    04/10/24 0715 98.2 (36.8) Oral -- -- -- -- --    04/10/24 0700 -- -- 65 -- 125/61 -- 97    04/10/24 0600 -- -- 76 -- 125/64 -- 97    04/10/24 0500 -- -- 82 -- 146/75 -- 97    04/10/24 0400 98.7 (37.1) Axillary 71 -- 121/78 -- 99    04/10/24 0342 -- -- 79 14 -- -- 100    04/10/24 0300 -- -- 67 -- 116/68 -- 97    04/10/24 0200 -- -- 70 -- 118/66 -- 99    04/10/24 0100 -- -- 72 -- 120/65 -- 98    04/10/24 0032 97.7 (36.5) Axillary 75 -- 127/61 -- 98    04/10/24 0000 -- -- 75 -- 130/69 -- 97    04/09/24 2335 -- -- 75 15 -- -- 98    04/09/24 2300 -- -- 77 -- 114/64 -- 96    04/09/24 2200 -- -- 85 -- 135/70 -- 95    04/09/24 2100 -- -- 104 -- 156/76 -- 97    04/09/24 2000 -- -- 90 -- 159/106 -- 98    04/09/24 1948 98.4 (36.9) Axillary -- -- -- -- --    04/09/24 1932 -- -- 72 14 131/69 -- 100    04/09/24 1800 -- -- 79 -- 122/68 -- 97    04/09/24 1705 -- -- 79 -- -- -- 97    04/09/24 1700 -- -- 78 -- 130/69 -- 97    04/09/24 1655 -- -- 80 -- -- -- 96    04/09/24 1650 -- -- 81 -- -- -- 96    04/09/24 1645 -- -- 82 -- -- -- 95    04/09/24 1640 -- -- 85 -- -- -- 96    04/09/24 1635 -- -- 80 -- -- -- 96    04/09/24 1630 -- -- 82 -- 111/72 -- 96    04/09/24 1625 --  -- 101 -- -- -- 96 04/09/24 1620 -- -- 128 -- -- -- 97 04/09/24 1615 -- -- 85 -- -- -- 94    04/09/24 1610 -- -- 72 -- -- -- 95 04/09/24 1607 100 (37.8) Oral -- -- -- -- --    04/09/24 1605 -- -- 72 -- -- -- 95 04/09/24 1600 -- -- 74 -- 106/54 -- 96 04/09/24 1555 -- -- 75 -- -- -- 95 04/09/24 1550 -- -- 76 -- -- -- 95 04/09/24 1545 -- -- 75 -- -- -- 96 04/09/24 1540 -- -- 75 -- -- -- 96 04/09/24 1535 -- -- 74 -- -- -- 95 04/09/24 1530 -- -- 74 -- 116/60 -- 95 04/09/24 1525 -- -- 74 -- -- -- 96 04/09/24 1520 -- -- 73 -- -- -- 95 04/09/24 1515 -- -- 74 -- -- -- 95 04/09/24 1513 -- -- 75 -- -- -- 95 04/09/24 1510 -- -- 73 -- -- -- 95 04/09/24 1500 -- -- 75 -- 115/59 Lying 95 04/09/24 1455 -- -- 75 -- -- -- 95 04/09/24 1450 -- -- 79 -- -- -- 96 04/09/24 1445 -- -- 77 -- -- -- 95 04/09/24 1440 -- -- 78 -- -- -- 96 04/09/24 1435 -- -- 80 -- -- -- 95 04/09/24 1430 -- -- 80 -- 123/61 -- 96    04/09/24 1425 -- -- 81 -- -- -- 96 04/09/24 1420 -- -- 76 -- -- -- 95    04/09/24 1415 -- -- 76 -- -- -- 95    04/09/24 1410 -- -- 78 -- -- -- 95    04/09/24 1405 -- -- 78 -- -- -- 96    04/09/24 1400 -- -- 82 -- 119/70 -- 96    04/09/24 1345 -- -- 84 -- -- -- 96    04/09/24 1330 -- -- 105 -- 119/64 -- 95    04/09/24 1315 -- -- 82 -- -- -- 97    04/09/24 1300 -- -- 81 -- 131/79 -- 96    04/09/24 1245 -- -- 120 -- -- -- 96    04/09/24 1230 -- -- 84 -- 137/86 -- 94    04/09/24 1215 -- -- 85 -- -- -- 94    04/09/24 1200 -- -- 85 -- 144/63 -- 95    04/09/24 1147 100.7 (38.2) Oral -- -- -- -- --    04/09/24 1145 -- -- 86 -- -- -- 95    04/09/24 1130 -- -- 93 -- 141/66 -- 95    04/09/24 1115 -- -- 92 -- -- -- 96    04/09/24 1100 -- -- 117 -- 141/73 -- 98    04/09/24 1054 -- -- 112 24 -- -- 100    04/09/24 1045 -- -- 88 -- 142/75 -- 97    04/09/24 1030 -- -- 87 -- 140/83 -- 99    04/09/24 1015 -- -- 86 -- 138/81 -- 97    04/09/24 1000 -- -- 101 -- -- -- 100    04/09/24 0945  -- -- 77 -- 137/67 -- 99    04/09/24 0930 -- -- 75 -- 134/70 -- 100    04/09/24 0915 -- -- 75 -- 130/66 -- 98    04/09/24 0900 -- -- 73 -- 123/61 -- 99    04/09/24 0845 -- -- 70 -- 131/64 -- 99    04/09/24 0830 -- -- 71 -- 123/62 -- 98    04/09/24 0815 -- -- 75 -- 130/65 -- 99    04/09/24 0800 -- -- 73 -- 124/69 -- 99    04/09/24 0745 -- -- 76 -- 125/61 -- 97    04/09/24 0737 98.7 (37.1) Oral -- -- -- -- --    04/09/24 0730 -- -- 75 -- 128/64 -- 98    04/09/24 0715 -- -- 75 -- 119/63 -- 97    04/09/24 0700 -- -- 75 -- 122/63 -- 97    04/09/24 0643 -- -- 68 -- -- -- 98    04/09/24 0642 -- -- 67 15 -- -- 98    04/09/24 0600 -- -- 69 -- 118/59 -- 98    04/09/24 0500 -- -- 71 -- 130/65 -- 96    04/09/24 0400 -- -- 70 -- 140/80 -- 97    04/09/24 0358 -- -- 78 17 -- -- 99    04/09/24 0300 -- -- 69 -- 137/67 -- 96    04/09/24 0200 -- -- 72 -- 134/68 -- 96    04/09/24 0100 -- -- 70 -- 130/64 -- 94    04/09/24 0000 -- -- 64 -- 152/79 -- 95          Oxygen Therapy (last day)       Date/Time SpO2 Device (Oxygen Therapy) Flow (L/min) Oxygen Concentration (%) ETCO2 (mmHg)    04/10/24 1430 98 -- -- -- --    04/10/24 1400 98 -- -- -- --    04/10/24 1330 98 -- -- -- --    04/10/24 1300 98 -- -- -- --    04/10/24 1200 97 -- -- -- --    04/10/24 1130 99 ventilator -- 30 --    04/10/24 1100 98 -- -- -- --    04/10/24 1030 98 -- -- -- --    04/10/24 1000 96 -- -- -- --    04/10/24 0930 97 -- -- -- --    04/10/24 0900 97 -- -- -- --    04/10/24 0800 95 ventilator -- 30 --    04/10/24 0700 97 -- -- -- --    04/10/24 0600 97 -- -- -- --    04/10/24 0500 97 -- -- -- --    04/10/24 0400 99 ventilator -- 30 --    04/10/24 0342 100 ventilator -- 30 --    04/10/24 0300 97 -- -- -- --    04/10/24 0200 99 -- -- -- --    04/10/24 0100 98 -- -- -- --    04/10/24 0032 98 -- -- -- --    04/10/24 0000 97 ventilator -- 30 --    04/09/24 2335 98 ventilator -- 30 --    04/09/24 2300 96 -- -- -- --    04/09/24 2200 95 -- -- -- --    04/09/24 2100 97 --  -- -- --    04/09/24 2000 98 -- -- -- --    04/09/24 1932 100 ventilator -- 30 --    04/09/24 1800 97 -- -- -- --    04/09/24 1705 97 -- -- -- --    04/09/24 1700 97 -- -- -- --    04/09/24 1655 96 -- -- -- --    04/09/24 1650 96 -- -- -- --    04/09/24 1645 95 -- -- -- --    04/09/24 1640 96 -- -- -- --    04/09/24 1635 96 -- -- -- --    04/09/24 1630 96 -- -- -- --    04/09/24 1625 96 -- -- -- --    04/09/24 1620 97 -- -- -- --    04/09/24 1615 94 -- -- -- --    04/09/24 1610 95 -- -- -- --    04/09/24 1605 95 -- -- -- --    04/09/24 1600 96 -- -- -- --    04/09/24 1555 95 -- -- -- --    04/09/24 1550 95 -- -- -- --    04/09/24 1545 96 -- -- -- --    04/09/24 1540 96 -- -- -- --    04/09/24 1535 95 -- -- -- --    04/09/24 1530 95 -- -- -- --    04/09/24 1525 96 -- -- -- --    04/09/24 1520 95 -- -- -- --    04/09/24 1515 95 -- -- -- --    04/09/24 1513 95 ventilator -- 30 --    04/09/24 1510 95 -- -- -- --    04/09/24 1500 95 ventilator -- -- --    04/09/24 1455 95 -- -- -- --    04/09/24 1450 96 -- -- -- --    04/09/24 1445 95 -- -- -- --    04/09/24 1440 96 -- -- -- --    04/09/24 1435 95 -- -- -- --    04/09/24 1430 96 -- -- -- --    04/09/24 1425 96 -- -- -- --    04/09/24 1420 95 -- -- -- --    04/09/24 1415 95 -- -- -- --    04/09/24 1410 95 -- -- -- --    04/09/24 1405 96 -- -- -- --    04/09/24 1400 96 -- -- -- --    04/09/24 1345 96 -- -- -- --    04/09/24 1330 95 -- -- -- --    04/09/24 1315 97 -- -- -- --    04/09/24 1300 96 -- -- -- --    04/09/24 1245 96 -- -- -- --    04/09/24 1230 94 -- -- -- --    04/09/24 1215 94 -- -- -- --    04/09/24 1200 95 -- -- -- --    04/09/24 1145 95 -- -- -- --    04/09/24 1130 95 -- -- -- --    04/09/24 1115 96 -- -- -- --    04/09/24 1100 98 -- -- -- --    04/09/24 1054 100 ventilator -- 30 --    04/09/24 1045 97 -- -- -- --    04/09/24 1030 99 -- -- -- --    04/09/24 1015 97 -- -- -- --    04/09/24 1000 100 -- -- -- --    04/09/24 0945 99 -- -- -- --    04/09/24 0930  "100 -- -- -- --    04/09/24 0915 98 -- -- -- --    04/09/24 0900 99 -- -- -- --    04/09/24 0845 99 -- -- -- --    04/09/24 0830 98 -- -- -- --    04/09/24 0815 99 -- -- -- --    04/09/24 0800 99 -- -- -- --    04/09/24 0745 97 -- -- -- --    04/09/24 0730 98 -- -- -- --    04/09/24 0715 97 -- -- -- --    04/09/24 0700 97 -- -- -- --    04/09/24 0643 98 -- -- -- --    04/09/24 0642 98 ventilator -- 30 --    04/09/24 0600 98 -- -- -- --    04/09/24 0500 96 -- -- -- --    04/09/24 0400 97 ventilator -- -- --    04/09/24 0358 99 ventilator -- 30 --    04/09/24 0300 96 -- -- -- --    04/09/24 0200 96 -- -- -- --    04/09/24 0100 94 -- -- -- --    04/09/24 0000 95 ventilator -- -- --          Lines, Drains & Airways       Active LDAs       Name Placement date Placement time Site Days    Peripheral IV 04/07/24 2135 Anterior;Distal;Left;Upper Arm 04/07/24 2135  Arm  2    Peripheral IV 04/08/24 0713 Anterior;Right Forearm 04/08/24 0713  Forearm  2    Peripheral IV 04/08/24 0710 Left;Posterior Hand 04/08/24  0710  Hand  2    Peripheral IV 04/08/24 1030 Left;Posterior Forearm 04/08/24  1030  Forearm  2    NG/OG Tube Nasogastric Left nostril 04/08/24 0930  Left nostril  2    Urethral Catheter Silicone 04/08/24  1100  -- 2    ETT  04/08/24  0726  -- 2                  Orders (last 24 hrs)        Start     Ordered    04/12/24 0900  thiamine (VITAMIN B-1) tablet 100 mg  Daily        Placed in \"Followed by\" Linked Group    04/06/24 1833    04/11/24 0600  Triglycerides  Morning Draw         04/10/24 1218    04/10/24 1630  dexmedetomidine (PRECEDEX) 400 mcg in 100 mL NS infusion  Titrated         04/10/24 1536    04/10/24 1242  Blood Gas, Arterial -  PROCEDURE ONCE         04/10/24 1237    04/10/24 1115  POC Glucose Once  PROCEDURE ONCE        Comments: Complete no more than 45 minutes prior to patient eating      04/10/24 1112    04/10/24 1000  Vancomycin, Trough  Timed         04/09/24 1056    04/10/24 0956  Respiratory " "communication  Once        Comments: Wean sedation for spontaneous breathing trial.  CPAP 5 and pressure support of 10×130 min with parameters and blood gas and call with results.    04/10/24 0955    04/10/24 0956  Wean sedation for spontaneous breathing trial this am  Nursing Communication  Until Discontinued        Comments: Wean sedation for spontaneous breathing trial this am    04/10/24 0955    04/10/24 0938  Restraints Non-Violent or Non-Self Destructive  Calendar Day         04/10/24 0940    04/10/24 0614  POC Glucose Once  PROCEDURE ONCE        Comments: Complete no more than 45 minutes prior to patient eating      04/10/24 0611    04/10/24 0347  Blood Gas, Arterial -  PROCEDURE ONCE         04/10/24 0342    04/10/24 0336  Basic Metabolic Panel  STAT         04/10/24 0336    04/10/24 0336  CBC & Differential  STAT         04/10/24 0336    04/10/24 0336  Magnesium  STAT         04/10/24 0336    04/10/24 0336  Phosphorus  Once,   Status:  Canceled         04/10/24 0336    04/10/24 0336  CBC Auto Differential  PROCEDURE ONCE         04/10/24 0336    04/10/24 0036  POC Glucose Once  PROCEDURE ONCE        Comments: Complete no more than 45 minutes prior to patient eating      04/10/24 0034    04/09/24 1908  Insert Indwelling Urinary Catheter  Once        Comments: Follow Protocol As Outlined in Process Instructions (Open Order Report to View Full Instructions)   Placed in \"And\" Linked Group    04/09/24 1908    04/09/24 1908  Assess Need for Indwelling Urinary Catheter - Follow Removal Protocol  Continuous        Comments: Follow Protocol As Outlined in Process Instructions (Open Order Report to View Full Instructions)   Placed in \"And\" Linked Group    04/09/24 1908    04/09/24 1908  Urinary Catheter Care  Every Shift      Placed in \"And\" Linked Group    04/09/24 1908    04/09/24 1904  PHENobarbital tablet 32.4 mg  Once,   Status:  Discontinued        Placed in \"Followed by\" Linked Group    04/06/24 1754    " "04/09/24 1904  PHENobarbital tablet 32.4 mg  Once        Placed in \"Followed by\" Linked Group    04/08/24 1127    04/09/24 1804  POC Glucose Once  PROCEDURE ONCE        Comments: Complete no more than 45 minutes prior to patient eating      04/09/24 1800    04/09/24 1258  acetaminophen (TYLENOL) tablet 650 mg  Every 6 Hours PRN         04/09/24 1258    04/09/24 1145  Vancomycin HCl 1,250 mg in sodium chloride 0.9 % 250 mL VTB  Every 12 Hours         04/09/24 1056    04/09/24 1130  Enoxaparin Sodium (LOVENOX) syringe 40 mg  Every 24 Hours         04/09/24 1043    04/09/24 0600  Blood Gas, Arterial -  Daily      Comments: While On Ventilator      04/08/24 0724    04/09/24 0600  XR Chest 1 View  Daily       04/08/24 0724    04/08/24 1730  famotidine (PEPCID) tablet 20 mg  2 Times Daily Before Meals         04/08/24 1127    04/08/24 1630  cefepime 2000 mg IVPB in 100 mL NS (MBP)  Every 8 Hours         04/08/24 0738    04/08/24 1300  norepinephrine (LEVOPHED) 8 mg in 250 mL NS infusion (premix)  Titrated         04/08/24 1203    04/08/24 1215  LORazepam (ATIVAN) injection 2 mg  Every 6 Hours         04/08/24 1116    04/08/24 1215  multivitamin and minerals liquid 15 mL  Daily         04/08/24 1127    04/08/24 1200  POC Glucose Q6H  Every 6 Hours      Comments: Complete no more than 45 minutes prior to patient eating      04/08/24 1116    04/08/24 1126  folic acid (FOLVITE) tablet 1 mg  Daily         04/08/24 1127    04/08/24 1117  Daily Weights  Daily       04/08/24 1116    04/08/24 1116  Tube Feeding Assessment and I/O  Every Shift       04/08/24 1116    04/08/24 1030  hydrocortisone-bacitracin-zinc oxide-nystatin (MAGIC BARRIER) ointment 1 Application  2 Times Daily         04/08/24 0909    04/08/24 1028  Pharmacy to dose vancomycin  Continuous PRN         04/08/24 1029    04/08/24 0900  sodium chloride 0.9 % flush 10 mL  Every 12 Hours Scheduled         04/08/24 0724    04/08/24 0900  chlorhexidine (PERIDEX) 0.12 % " "solution 15 mL  Every 12 Hours Scheduled         04/08/24 0724    04/08/24 0830  ipratropium-albuterol (DUO-NEB) nebulizer solution 3 mL  4 Times Daily - RT         04/08/24 0723    04/08/24 0800  fentaNYL 1000 mcg in 100 mL NS infusion  Titrated         04/08/24 0714    04/08/24 0800  propofol (DIPRIVAN) infusion 10 mg/mL 100 mL  Titrated         04/08/24 0714    04/08/24 0800  Vital Signs Every Hour and Per Hospital Policy Based on Patient Condition  Every Hour       04/08/24 0724    04/08/24 0800  Intake & Output  Every Hour       04/08/24 0724    04/08/24 0800  Oral Care & Teeth Brushing - Intubated Patient  Every 4 Hours      Comments: [place] Teeth at Least 2x/day    04/08/24 0724    04/08/24 0725  Daily Weights  Daily       04/08/24 0724    04/08/24 0724  Oral Care - Patient Not on NPPV & Not Intubated  Every Shift       04/08/24 0724    04/08/24 0723  sodium chloride 0.9 % flush 10 mL  As Needed         04/08/24 0724    04/08/24 0723  sodium chloride 0.9 % infusion 40 mL  As Needed         04/08/24 0724    04/08/24 0627  hydrALAZINE (APRESOLINE) injection 10 mg  Every 6 Hours PRN         04/08/24 0628    04/08/24 0600  Basic Metabolic Panel  Daily       04/07/24 1745    04/08/24 0600  Magnesium  Daily       04/07/24 1745    04/08/24 0600  Phosphorus  Daily       04/07/24 1745    04/08/24 0600  CBC (No Diff)  Daily       04/07/24 1745    04/07/24 1315  enalaprilat (VASOTEC) injection 1.25 mg  Every 6 Hours,   Status:  Discontinued         04/07/24 1126    04/07/24 1127  metoprolol tartrate (LOPRESSOR) injection 2.5 mg  Every 6 Hours PRN         04/07/24 1128    04/07/24 0800  Oral Care  2 Times Daily       04/06/24 1833    04/06/24 2200  thiamine (B-1) injection 200 mg  Every 8 Hours Scheduled        Placed in \"Followed by\" Linked Group    04/06/24 1833    04/06/24 2100  sodium chloride 0.9 % flush 10 mL  Every 12 Hours Scheduled         04/06/24 1833 04/06/24 1930  sodium chloride 0.9 % with KCl 20 mEq/L " "infusion  Continuous,   Status:  Discontinued         04/06/24 1833    04/06/24 1930  nicotine (NICODERM CQ) 21 MG/24HR patch 1 patch  Every 24 Hours         04/06/24 1833    04/06/24 1833  sodium chloride 0.9 % flush 10 mL  As Needed         04/06/24 1833 04/06/24 1833  sodium chloride 0.9 % infusion 40 mL  As Needed         04/06/24 1833 04/06/24 1833  nitroglycerin (NITROSTAT) SL tablet 0.4 mg  Every 5 Minutes PRN         04/06/24 1833    04/06/24 1833  Potassium Replacement - Follow Nurse / BPA Driven Protocol  As Needed         04/06/24 1833    04/06/24 1833  Magnesium Standard Dose Replacement - Follow Nurse / BPA Driven Protocol  As Needed         04/06/24 1833    04/06/24 1833  Phosphorus Replacement - Follow Nurse / BPA Driven Protocol  As Needed         04/06/24 1833    04/06/24 1833  Calcium Replacement - Follow Nurse / BPA Driven Protocol  As Needed         04/06/24 1833    04/06/24 1833  sennosides-docusate (PERICOLACE) 8.6-50 MG per tablet 2 tablet  2 Times Daily PRN        Placed in \"And\" Linked Group    04/06/24 1833 04/06/24 1833  polyethylene glycol (MIRALAX) packet 17 g  Daily PRN        Placed in \"And\" Linked Group    04/06/24 1833    04/06/24 1833  bisacodyl (DULCOLAX) EC tablet 5 mg  Daily PRN        Placed in \"And\" Linked Group    04/06/24 1833    04/06/24 1833  bisacodyl (DULCOLAX) suppository 10 mg  Daily PRN        Placed in \"And\" Linked Group    04/06/24 1833    04/06/24 1833  nicotine polacrilex (NICORETTE) gum 4 mg  Every 1 Hour PRN         04/06/24 1833 04/06/24 1833  LORazepam (ATIVAN) tablet 1 mg  Every 1 Hour PRN        Placed in \"Or\" Linked Group    04/06/24 1833    04/06/24 1833  LORazepam (ATIVAN) injection 1 mg  Every 1 Hour PRN        Placed in \"Or\" Linked Group    04/06/24 1833 04/06/24 1833  LORazepam (ATIVAN) tablet 2 mg  Every 1 Hour PRN        Placed in \"Or\" Linked Group    04/06/24 1833 04/06/24 1833  LORazepam (ATIVAN) injection 2 mg  Every 1 Hour PRN    " "    Placed in \"Or\" Linked Group    24 1833    24 1833  LORazepam (ATIVAN) injection 2 mg  Every 15 Minutes PRN        Placed in \"Or\" Linked Group    24 1833    24 1833  LORazepam (ATIVAN) injection 2 mg  Every 15 Minutes PRN        Placed in \"Or\" Linked Group    24 1833    24 1833  ondansetron (ZOFRAN) injection 4 mg  Every 6 Hours PRN         24 1833    24 1325  Magnesium Standard Dose Replacement - Follow Nurse / BPA Driven Protocol  As Needed         24 1325    24 1132  sodium chloride 0.9 % flush 10 mL  As Needed        Placed in \"And\" Linked Group    24 1132    Unscheduled  Obtain Pre & Post Sedation Scores With Every Lorazepam Dose - Hold For POSS Greater Than 2 or RASS of -3 or Less  As Needed       24 1833    Unscheduled  Spontaneous Awakening Trial  Daily - SAT       24 0724    Unscheduled  Spontaneous Breathing Trial  Daily - SBT       24 0724    Unscheduled  Wound Care  As Needed       24 0909    Unscheduled  Watson & Document Feeding Tube Depth (in cm)  As Needed       24 1116    Unscheduled  Verify Feeding Tube Placement Upon Insertion & As Needed  As Needed       24 1116    Unscheduled  Flush Feeding Tube With 30-50mL Water As Needed  As Needed       24 1116                     Physician Progress Notes (last 24 hours)        Watson Zuleta MD at 04/10/24 0812              [facility] INPATIENT PROGRESS NOTE         [facility] INTENSIVE CARE    4/10/2024      PATIENT IDENTIFICATION:  Name: Watson Lisa ADMIT: 2024   : 1955  PCP: Roc Carroll APRN    MRN: [id number] LOS: 4 days   AGE/SEX: 68 y.o. male  ROOM: [facility]                     LOS 4    Reason for visit: ICU medical management with aspiration pneumonia, respiratory failure on ventilator      SUBJECTIVE:      Sedated on ventilator.  On propofol and fentanyl.  Not requiring pressor.  On 30% FiO2 and 5 of " PEEP.  Plan repeat sedation vacation spontaneous breathing trial again today.      Objective   OBJECTIVE:    Vital Sign Min/Max for last 24 hours  Temp  Min: 97.7 °F (36.5 °C)  Max: 100.7 °F (38.2 °C)   BP  Min: 106/54  Max: 159/106   Pulse  Min: 65  Max: 128   Resp  Min: 14  Max: 24   SpO2  Min: 94 %  Max: 100 %   No data recorded   Weight  Min: 130 kg (286 lb 13.1 oz)  Max: 130 kg (286 lb 13.1 oz)    Vitals:    04/10/24 0700 04/10/24 0715 04/10/24 0800 04/10/24 0900   BP: 125/61  148/82 143/70   Pulse: 65  75 72   Resp:       Temp:  98.2 °F (36.8 °C)     TempSrc:  Oral     SpO2: 97%  95% 97%   Weight:       Height:                04/06/24  1130 04/08/24  0725 04/10/24  0500   Weight: 113 kg (250 lb) 113 kg (250 lb) 130 kg (286 lb 13.1 oz)       Body mass index is 32.31 kg/m².        Mode: VC+/AC  FiO2 (%):  [30 %-31 %] 30 %  S RR:  [12] 12  S VT:  [650 mL] 650 mL  PEEP/CPAP (cm H2O):  [5 cm H20] 5 cm H20  MAP (cm H2O):  [7.1-14] 7.1           FiO2 (%): 30 %     Body mass index is 32.31 kg/m².    Intake/Output Summary (Last 24 hours) at 4/10/2024 0953  Last data filed at 4/10/2024 0700  Gross per 24 hour   Intake 21679.37 ml   Output 2065 ml   Net 76384.37 ml         Exam:  GEN:  No distress, appears stated age  EYES:   PERRL, anicteric sclerae  ENT:    External ears/nose normal, OP clear  NECK:  No adenopathy, midline trachea  LUNGS: Normal chest on inspection, palpation and auscultation  CV:  Normal S1S2, without murmur  ABD:  Nontender, nondistended, no hepatosplenomegaly, +BS  EXT:  No edema.  No cyanosis or clubbing.  No mottling and normal cap refill.    Assessment     Scheduled meds:  cefepime, 2,000 mg, Intravenous, Q8H  chlorhexidine, 15 mL, Mouth/Throat, Q12H  enalaprilat, 1.25 mg, Intravenous, Q6H  enoxaparin, 40 mg, Subcutaneous, Q24H  famotidine, 20 mg, Nasogastric, BID AC  folic acid, 1 mg, Nasogastric, Daily  hydrocortisone-bacitracin-zinc oxide-nystatin, 1 Application, Topical,  BID  ipratropium-albuterol, 3 mL, Nebulization, 4x Daily - RT  LORazepam, 2 mg, Intravenous, Q6H  multivitamin and minerals, 15 mL, Nasogastric, Daily  nicotine, 1 patch, Transdermal, Q24H  sodium chloride, 10 mL, Intravenous, Q12H  sodium chloride, 10 mL, Intravenous, Q12H  thiamine (B-1) IV, 200 mg, Intravenous, Q8H   Followed by  [START ON 4/12/2024] thiamine, 100 mg, Oral, Daily  vancomycin, 1,250 mg, Intravenous, Q12H      IV meds:                      fentanyl 10 mcg/mL,  mcg/hr, Last Rate: 250 mcg/hr (04/10/24 0837)  norepinephrine, 0.02-0.3 mcg/kg/min, Last Rate: Stopped (04/08/24 1230)  Pharmacy to dose vancomycin,   propofol, 5-50 mcg/kg/min, Last Rate: 35 mcg/kg/min (04/10/24 0925)  sodium chloride 0.9 % with KCl 20 mEq, 150 mL/hr, Last Rate: 150 mL/hr (04/10/24 [id number])      Data Review:  Results from last 7 days   Lab Units 04/10/24  0341 04/09/24  0631 04/08/24  1748 04/08/24  1359 04/08/24  0602 04/07/24  0713   SODIUM mmol/L 143 144  --  142 142 137   POTASSIUM mmol/L 4.2 4.1 3.9 3.6 4.8 3.9   CHLORIDE mmol/L 114* 112*  --  108* 106 103   CO2 mmol/L 18.9* 21.0*  --  21.1* 18.7* 20.9*   BUN mg/dL 9 11  --  11 9 13   CREATININE mg/dL 0.75* 0.94  --  1.08 0.75* 0.84   GLUCOSE mg/dL 102* 101*  --  113* 95 105*   CALCIUM mg/dL 8.0* 7.7*  --  7.9* 8.9 7.9*         Estimated Creatinine Clearance: 142 mL/min (A) (by C-G formula based on SCr of 0.75 mg/dL (L)).  Results from last 7 days   Lab Units 04/10/24  0341 04/09/24  0631 04/08/24  0602 04/07/24  0713 04/06/24  1145   WBC 10*3/mm3 9.51 9.79 11.62* 7.58 7.75   HEMOGLOBIN g/dL 9.7* 10.7* 12.2* 11.6* 12.5*   PLATELETS 10*3/mm3 306 225 238 186 208         Results from last 7 days   Lab Units 04/08/24  1359 04/07/24  0713 04/06/24  1145   ALT (SGPT) U/L 12 14 21   AST (SGOT) U/L 17 22 22     Results from last 7 days   Lab Units 04/10/24  0342 04/09/24  0400 04/08/24  0847   PH, ARTERIAL pH units 7.366 7.382 7.374   PO2 ART mm Hg 98.1 82.2 406.3*   PCO2,  ARTERIAL mm Hg 33.5* 34.4* 37.8   HCO3 ART mmol/L 19.2* 20.4* 22.1     Results from last 7 days   Lab Units 04/08/24  1359 04/08/24  0830 04/07/24  0713   PROCALCITONIN ng/mL 0.17  --   --    LACTATE mmol/L  --  0.9 0.8         Glucose   Date/Time Value Ref Range Status   04/10/2024 0611 98 70 - 130 mg/dL Final   04/10/2024 0034 115 70 - 130 mg/dL Final   04/09/2024 1800 105 70 - 130 mg/dL Final   04/09/2024 1149 116 70 - 130 mg/dL Final   04/08/2024 2343 103 70 - 130 mg/dL Final   04/08/2024 1806 112 70 - 130 mg/dL Final   04/08/2024 1144 110 70 - 130 mg/dL Final         Imaging reviewed  Chest x-ray 4/8 reviewed              Microbiology reviewed  Blood cultures no growth to date.  Respiratory culture no growth  MRSA screen positive             Active Hospital Problems    Diagnosis  POA    **Alcohol withdrawal [F10.939]  Yes    Substance abuse [F19.10]  Unknown    Cocaine abuse [F14.10]  Unknown    Positive urine drug screen [R82.5]  Unknown    History of alcohol use disorder [Z87.898]  Yes    Alcoholic liver disease [K70.9]  Yes    COPD (chronic obstructive pulmonary disease) [J44.9]  Yes    LIVAN (obstructive sleep apnea) [G47.33]  Yes    Essential hypertension [I10]  Yes      Resolved Hospital Problems   No resolved problems to display.         ASSESSMENT:  Acute respiratory failure requiring mechanical ventilation  Aspiration pneumonia  Sepsis with shock  Severe alcohol abuse with withdrawal  Polysubstance abuse: UDS positive for cocaine  LIVAN        PLAN:  Sedation vacation and spontaneous breathing trial.  Failed trial yesterday.  Continue IV fluids and pressor as needed.  Continue antibiotics broad-spectrum and narrow based on culture results.  Alcohol withdrawal protocol.  Thiamine, folic acid and multivitamin.  Control glucose.  Control blood pressure.  Lovenox for DVT prophylaxis.  Pepcid for ulcer prophylaxis.      Discussed with multidisciplinary ICU team on rounds this morning.          CCT: 32  norberto Zuleta MD  Pulmonary and Critical Care Medicine  [facility], [facility]  4/10/2024    09:53 EDT       Electronically signed by Watson Zuleta MD at 04/10/24 0966

## 2024-04-10 NOTE — PROGRESS NOTES
[facility] INPATIENT PROGRESS NOTE         [facility] INTENSIVE CARE    4/10/2024      PATIENT IDENTIFICATION:  Name: Watson Lisa ADMIT: 2024   : 1955  PCP: Roc Carroll APRN    MRN: [id number] LOS: 4 days   AGE/SEX: 68 y.o. male  ROOM: [facility]                     LOS 4    Reason for visit: ICU medical management with aspiration pneumonia, respiratory failure on ventilator      SUBJECTIVE:      Sedated on ventilator.  On propofol and fentanyl.  Not requiring pressor.  On 30% FiO2 and 5 of PEEP.  Plan repeat sedation vacation spontaneous breathing trial again today.      Objective   OBJECTIVE:    Vital Sign Min/Max for last 24 hours  Temp  Min: 97.7 °F (36.5 °C)  Max: 100.7 °F (38.2 °C)   BP  Min: 106/54  Max: 159/106   Pulse  Min: 65  Max: 128   Resp  Min: 14  Max: 24   SpO2  Min: 94 %  Max: 100 %   No data recorded   Weight  Min: 130 kg (286 lb 13.1 oz)  Max: 130 kg (286 lb 13.1 oz)    Vitals:    04/10/24 0700 04/10/24 0715 04/10/24 0800 04/10/24 0900   BP: 125/61  148/82 143/70   Pulse: 65  75 72   Resp:       Temp:  98.2 °F (36.8 °C)     TempSrc:  Oral     SpO2: 97%  95% 97%   Weight:       Height:                24  1130 24  0725 04/10/24  0500   Weight: 113 kg (250 lb) 113 kg (250 lb) 130 kg (286 lb 13.1 oz)       Body mass index is 32.31 kg/m².        Mode: VC+/AC  FiO2 (%):  [30 %-31 %] 30 %  S RR:  [12] 12  S VT:  [650 mL] 650 mL  PEEP/CPAP (cm H2O):  [5 cm H20] 5 cm H20  MAP (cm H2O):  [7.1-14] 7.1           FiO2 (%): 30 %     Body mass index is 32.31 kg/m².    Intake/Output Summary (Last 24 hours) at 4/10/2024 0953  Last data filed at 4/10/2024 0700  Gross per 24 hour   Intake 26302.37 ml   Output 2065 ml   Net 90549.37 ml         Exam:  GEN:  No distress, appears stated age  EYES:   PERRL, anicteric sclerae  ENT:    External ears/nose normal, OP clear  NECK:  No adenopathy, midline trachea  LUNGS: Normal chest on inspection, palpation and  auscultation  CV:  Normal S1S2, without murmur  ABD:  Nontender, nondistended, no hepatosplenomegaly, +BS  EXT:  No edema.  No cyanosis or clubbing.  No mottling and normal cap refill.    Assessment     Scheduled meds:  cefepime, 2,000 mg, Intravenous, Q8H  chlorhexidine, 15 mL, Mouth/Throat, Q12H  enalaprilat, 1.25 mg, Intravenous, Q6H  enoxaparin, 40 mg, Subcutaneous, Q24H  famotidine, 20 mg, Nasogastric, BID AC  folic acid, 1 mg, Nasogastric, Daily  hydrocortisone-bacitracin-zinc oxide-nystatin, 1 Application, Topical, BID  ipratropium-albuterol, 3 mL, Nebulization, 4x Daily - RT  LORazepam, 2 mg, Intravenous, Q6H  multivitamin and minerals, 15 mL, Nasogastric, Daily  nicotine, 1 patch, Transdermal, Q24H  sodium chloride, 10 mL, Intravenous, Q12H  sodium chloride, 10 mL, Intravenous, Q12H  thiamine (B-1) IV, 200 mg, Intravenous, Q8H   Followed by  [START ON 4/12/2024] thiamine, 100 mg, Oral, Daily  vancomycin, 1,250 mg, Intravenous, Q12H      IV meds:                      fentanyl 10 mcg/mL,  mcg/hr, Last Rate: 250 mcg/hr (04/10/24 0837)  norepinephrine, 0.02-0.3 mcg/kg/min, Last Rate: Stopped (04/08/24 1230)  Pharmacy to dose vancomycin,   propofol, 5-50 mcg/kg/min, Last Rate: 35 mcg/kg/min (04/10/24 0925)  sodium chloride 0.9 % with KCl 20 mEq, 150 mL/hr, Last Rate: 150 mL/hr (04/10/24 8816)      Data Review:  Results from last 7 days   Lab Units 04/10/24  0341 04/09/24  0631 04/08/24  1748 04/08/24  1359 04/08/24  0602 04/07/24  0713   SODIUM mmol/L 143 144  --  142 142 137   POTASSIUM mmol/L 4.2 4.1 3.9 3.6 4.8 3.9   CHLORIDE mmol/L 114* 112*  --  108* 106 103   CO2 mmol/L 18.9* 21.0*  --  21.1* 18.7* 20.9*   BUN mg/dL 9 11  --  11 9 13   CREATININE mg/dL 0.75* 0.94  --  1.08 0.75* 0.84   GLUCOSE mg/dL 102* 101*  --  113* 95 105*   CALCIUM mg/dL 8.0* 7.7*  --  7.9* 8.9 7.9*         Estimated Creatinine Clearance: 142 mL/min (A) (by C-G formula based on SCr of 0.75 mg/dL (L)).  Results from last 7 days    Lab Units 04/10/24  0341 04/09/24  0631 04/08/24  0602 04/07/24  0713 04/06/24  1145   WBC 10*3/mm3 9.51 9.79 11.62* 7.58 7.75   HEMOGLOBIN g/dL 9.7* 10.7* 12.2* 11.6* 12.5*   PLATELETS 10*3/mm3 306 225 238 186 208         Results from last 7 days   Lab Units 04/08/24  1359 04/07/24  0713 04/06/24  1145   ALT (SGPT) U/L 12 14 21   AST (SGOT) U/L 17 22 22     Results from last 7 days   Lab Units 04/10/24  0342 04/09/24  0400 04/08/24  0847   PH, ARTERIAL pH units 7.366 7.382 7.374   PO2 ART mm Hg 98.1 82.2 406.3*   PCO2, ARTERIAL mm Hg 33.5* 34.4* 37.8   HCO3 ART mmol/L 19.2* 20.4* 22.1     Results from last 7 days   Lab Units 04/08/24  1359 04/08/24  0830 04/07/24  0713   PROCALCITONIN ng/mL 0.17  --   --    LACTATE mmol/L  --  0.9 0.8         Glucose   Date/Time Value Ref Range Status   04/10/2024 0611 98 70 - 130 mg/dL Final   04/10/2024 0034 115 70 - 130 mg/dL Final   04/09/2024 1800 105 70 - 130 mg/dL Final   04/09/2024 1149 116 70 - 130 mg/dL Final   04/08/2024 2343 103 70 - 130 mg/dL Final   04/08/2024 1806 112 70 - 130 mg/dL Final   04/08/2024 1144 110 70 - 130 mg/dL Final         Imaging reviewed  Chest x-ray 4/8 reviewed              Microbiology reviewed  Blood cultures no growth to date.  Respiratory culture no growth  MRSA screen positive              Active Hospital Problems    Diagnosis  POA    **Alcohol withdrawal [F10.939]  Yes    Substance abuse [F19.10]  Unknown    Cocaine abuse [F14.10]  Unknown    Positive urine drug screen [R82.5]  Unknown    History of alcohol use disorder [Z87.898]  Yes    Alcoholic liver disease [K70.9]  Yes    COPD (chronic obstructive pulmonary disease) [J44.9]  Yes    LIVAN (obstructive sleep apnea) [G47.33]  Yes    Essential hypertension [I10]  Yes      Resolved Hospital Problems   No resolved problems to display.         ASSESSMENT:  Acute respiratory failure requiring mechanical ventilation  Aspiration pneumonia  Sepsis with shock  Severe alcohol abuse with  withdrawal  Polysubstance abuse: UDS positive for cocaine  LIVAN        PLAN:  Sedation vacation and spontaneous breathing trial.  Failed trial yesterday.  Continue IV fluids and pressor as needed.  Continue antibiotics broad-spectrum and narrow based on culture results.  Alcohol withdrawal protocol.  Thiamine, folic acid and multivitamin.  Control glucose.  Control blood pressure.  Lovenox for DVT prophylaxis.  Pepcid for ulcer prophylaxis.      Discussed with multidisciplinary ICU team on rounds this morning.          CCT: 32 min    Watson Zuleta MD  Pulmonary and Critical Care Medicine  [facility], [facility]  4/10/2024    09:53 EDT

## 2024-04-10 NOTE — NURSING NOTE
Access spoke to primary RN who relayed pt remains intubated, unable to participate in an Access consult. Will keep pt on our list and await pt appropriateness.

## 2024-04-10 NOTE — PLAN OF CARE
Pt adequately sedated throughout the morning. Became increasing agitated around 1600 requiring additional medications: precedex added, metoprolol, hydralazine, and ativan. Pt switched back to assisted vent settings from spontaneous to help stabilize VS.

## 2024-04-10 NOTE — PLAN OF CARE
Goal Outcome Evaluation:  Plan of Care Reviewed With: patient        Progress: no change  Outcome Evaluation: Patient still remains intubated, on propofol and fentanyl. Easily agitated, CIWAs in the 20s at beginning of the shift. Got 12 mg IV ativan this shift, HR down to 60s/70s. Moves all extremities. Tube feeds at goal. Coarse lung sounds.

## 2024-04-10 NOTE — PROGRESS NOTES
"[facility] Clinical Pharmacy Services: Vancomycin Monitoring Note    Watson Lisa is a 68 y.o. male who is on day 3/7 of pharmacy to dose vancomycin for Pneumonia per Dr. Zuleta's request.    Previous Vancomycin Dose:   1250 mg IV every  12  hours  Updated Cultures and Sensitivities:   -4/8 sputum:  ng  -4/8 MRSA screen positive  -4/8 blood (2 sets)  ng at 2 days    Results from last 7 days   Lab Units 04/10/24  1127 04/09/24  1007   VANCOMYCIN TR mcg/mL 14.00 11.00     Vitals/Labs  Ht: 200.7 cm (79\"); Wt: 130 kg (286 lb 13.1 oz)   Temp Readings from Last 1 Encounters:   04/10/24 98.4 °F (36.9 °C) (Oral)     Estimated Creatinine Clearance: 142 mL/min (A) (by C-G formula based on SCr of 0.75 mg/dL (L)).       Results from last 7 days   Lab Units 04/10/24  0341 04/09/24  0631 04/08/24  1359 04/08/24  0602   CREATININE mg/dL 0.75* 0.94 1.08 0.75*   WBC 10*3/mm3 9.51 9.79  --  11.62*     Assessment/Plan    Current Vancomycin Dose: 1250 mg IV every  12  hours; provides a predicted  mg/L.hr .   Next Level Date and Time: will reassess tomorrow for timing of next trough  We will continue to monitor patient changes and renal function     Thank you for involving pharmacy in this patient's care. Please contact pharmacy with any questions or concerns.       Christiano Viera [facility]  Clinical Pharmacist            "

## 2024-04-11 ENCOUNTER — APPOINTMENT (OUTPATIENT)
Dept: GENERAL RADIOLOGY | Facility: HOSPITAL | Age: 69
End: 2024-04-11
Payer: COMMERCIAL

## 2024-04-11 LAB
ANION GAP SERPL CALCULATED.3IONS-SCNC: 9 MMOL/L (ref 5–15)
ARTERIAL PATENCY WRIST A: POSITIVE
ATMOSPHERIC PRESS: 737.4 MMHG
BASE EXCESS BLDA CALC-SCNC: -5.3 MMOL/L (ref 0–2)
BDY SITE: ABNORMAL
BUN SERPL-MCNC: 10 MG/DL (ref 8–23)
BUN/CREAT SERPL: 13.5 (ref 7–25)
CALCIUM SPEC-SCNC: 8.5 MG/DL (ref 8.6–10.5)
CHLORIDE SERPL-SCNC: 112 MMOL/L (ref 98–107)
CO2 BLDA-SCNC: 21.1 MMOL/L (ref 23–27)
CO2 SERPL-SCNC: 18 MMOL/L (ref 22–29)
CREAT SERPL-MCNC: 0.74 MG/DL (ref 0.76–1.27)
DEPRECATED RDW RBC AUTO: 43.5 FL (ref 37–54)
EGFRCR SERPLBLD CKD-EPI 2021: 98.1 ML/MIN/1.73
ERYTHROCYTE [DISTWIDTH] IN BLOOD BY AUTOMATED COUNT: 12 % (ref 12.3–15.4)
GLUCOSE BLDC GLUCOMTR-MCNC: 132 MG/DL (ref 70–130)
GLUCOSE BLDC GLUCOMTR-MCNC: 133 MG/DL (ref 70–130)
GLUCOSE BLDC GLUCOMTR-MCNC: 137 MG/DL (ref 70–130)
GLUCOSE BLDC GLUCOMTR-MCNC: 152 MG/DL (ref 70–130)
GLUCOSE SERPL-MCNC: 130 MG/DL (ref 65–99)
HCO3 BLDA-SCNC: 20 MMOL/L (ref 22–28)
HCT VFR BLD AUTO: 32.7 % (ref 37.5–51)
HEMODILUTION: NO
HGB BLD-MCNC: 10.9 G/DL (ref 13–17.7)
INHALED O2 CONCENTRATION: 60 %
MAGNESIUM SERPL-MCNC: 1.4 MG/DL (ref 1.6–2.4)
MCH RBC QN AUTO: 32.5 PG (ref 26.6–33)
MCHC RBC AUTO-ENTMCNC: 33.3 G/DL (ref 31.5–35.7)
MCV RBC AUTO: 97.6 FL (ref 79–97)
MODALITY: ABNORMAL
O2 A-A PPRESDIFF RESPIRATORY: 0.6 MMHG
PCO2 BLDA: 37.1 MM HG (ref 35–45)
PEEP RESPIRATORY: 5 CM[H2O]
PH BLDA: 7.34 PH UNITS (ref 7.35–7.45)
PHOSPHATE SERPL-MCNC: 4 MG/DL (ref 2.5–4.5)
PLATELET # BLD AUTO: 353 10*3/MM3 (ref 140–450)
PMV BLD AUTO: 10.5 FL (ref 6–12)
PO2 BLDA: 222.2 MM HG (ref 80–100)
POTASSIUM SERPL-SCNC: 4.4 MMOL/L (ref 3.5–5.2)
POTASSIUM SERPL-SCNC: 5.5 MMOL/L (ref 3.5–5.2)
RBC # BLD AUTO: 3.35 10*6/MM3 (ref 4.14–5.8)
SAO2 % BLDCOA: 99.8 % (ref 92–98.5)
SET MECH RESP RATE: 12
SODIUM SERPL-SCNC: 139 MMOL/L (ref 136–145)
TOTAL RATE: 19 BREATHS/MINUTE
TRIGL SERPL-MCNC: 271 MG/DL (ref 0–150)
VENTILATOR MODE: AC
VT ON VENT VENT: 650 ML
WBC NRBC COR # BLD AUTO: 10.52 10*3/MM3 (ref 3.4–10.8)

## 2024-04-11 PROCEDURE — 25010000002 CEFEPIME PER 500 MG

## 2024-04-11 PROCEDURE — 94799 UNLISTED PULMONARY SVC/PX: CPT

## 2024-04-11 PROCEDURE — 84478 ASSAY OF TRIGLYCERIDES: CPT | Performed by: INTERNAL MEDICINE

## 2024-04-11 PROCEDURE — 85027 COMPLETE CBC AUTOMATED: CPT | Performed by: STUDENT IN AN ORGANIZED HEALTH CARE EDUCATION/TRAINING PROGRAM

## 2024-04-11 PROCEDURE — 25010000002 ENOXAPARIN PER 10 MG: Performed by: INTERNAL MEDICINE

## 2024-04-11 PROCEDURE — 25010000002 VANCOMYCIN HCL 1.25 G RECONSTITUTED SOLUTION 1 EACH VIAL: Performed by: INTERNAL MEDICINE

## 2024-04-11 PROCEDURE — 25010000002 LORAZEPAM PER 2 MG: Performed by: INTERNAL MEDICINE

## 2024-04-11 PROCEDURE — 94760 N-INVAS EAR/PLS OXIMETRY 1: CPT

## 2024-04-11 PROCEDURE — 82948 REAGENT STRIP/BLOOD GLUCOSE: CPT

## 2024-04-11 PROCEDURE — 84100 ASSAY OF PHOSPHORUS: CPT | Performed by: STUDENT IN AN ORGANIZED HEALTH CARE EDUCATION/TRAINING PROGRAM

## 2024-04-11 PROCEDURE — 94003 VENT MGMT INPAT SUBQ DAY: CPT

## 2024-04-11 PROCEDURE — 25010000002 MAGNESIUM SULFATE 2 GM/50ML SOLUTION: Performed by: INTERNAL MEDICINE

## 2024-04-11 PROCEDURE — 83735 ASSAY OF MAGNESIUM: CPT | Performed by: STUDENT IN AN ORGANIZED HEALTH CARE EDUCATION/TRAINING PROGRAM

## 2024-04-11 PROCEDURE — 25010000002 THIAMINE HCL 200 MG/2ML SOLUTION: Performed by: INTERNAL MEDICINE

## 2024-04-11 PROCEDURE — 84132 ASSAY OF SERUM POTASSIUM: CPT | Performed by: INTERNAL MEDICINE

## 2024-04-11 PROCEDURE — 25010000002 HYDRALAZINE PER 20 MG: Performed by: INTERNAL MEDICINE

## 2024-04-11 PROCEDURE — 82803 BLOOD GASES ANY COMBINATION: CPT

## 2024-04-11 PROCEDURE — 36600 WITHDRAWAL OF ARTERIAL BLOOD: CPT

## 2024-04-11 PROCEDURE — 94664 DEMO&/EVAL PT USE INHALER: CPT

## 2024-04-11 PROCEDURE — 25010000002 FENTANYL CITRATE (PF) 2500 MCG/50ML SOLUTION

## 2024-04-11 PROCEDURE — 25810000003 SODIUM CHLORIDE 0.9 % SOLUTION 250 ML FLEX CONT: Performed by: INTERNAL MEDICINE

## 2024-04-11 PROCEDURE — 80048 BASIC METABOLIC PNL TOTAL CA: CPT | Performed by: STUDENT IN AN ORGANIZED HEALTH CARE EDUCATION/TRAINING PROGRAM

## 2024-04-11 PROCEDURE — 94761 N-INVAS EAR/PLS OXIMETRY MLT: CPT

## 2024-04-11 PROCEDURE — 71045 X-RAY EXAM CHEST 1 VIEW: CPT

## 2024-04-11 PROCEDURE — 25010000002 PROPOFOL 10 MG/ML EMULSION

## 2024-04-11 RX ORDER — MAGNESIUM SULFATE HEPTAHYDRATE 40 MG/ML
2 INJECTION, SOLUTION INTRAVENOUS
Status: COMPLETED | OUTPATIENT
Start: 2024-04-11 | End: 2024-04-11

## 2024-04-11 RX ORDER — BISACODYL 10 MG
10 SUPPOSITORY, RECTAL RECTAL DAILY PRN
Status: DISCONTINUED | OUTPATIENT
Start: 2024-04-11 | End: 2024-04-15

## 2024-04-11 RX ORDER — BISACODYL 5 MG/1
5 TABLET, DELAYED RELEASE ORAL DAILY PRN
Status: DISCONTINUED | OUTPATIENT
Start: 2024-04-11 | End: 2024-04-15

## 2024-04-11 RX ORDER — AMOXICILLIN 250 MG
2 CAPSULE ORAL 2 TIMES DAILY
Status: DISCONTINUED | OUTPATIENT
Start: 2024-04-11 | End: 2024-04-15

## 2024-04-11 RX ORDER — POLYETHYLENE GLYCOL 3350 17 G/17G
17 POWDER, FOR SOLUTION ORAL DAILY PRN
Status: DISCONTINUED | OUTPATIENT
Start: 2024-04-11 | End: 2024-04-15

## 2024-04-11 RX ADMIN — FAMOTIDINE 20 MG: 20 TABLET, FILM COATED ORAL at 07:59

## 2024-04-11 RX ADMIN — LORAZEPAM 2 MG: 2 INJECTION INTRAMUSCULAR; INTRAVENOUS at 01:34

## 2024-04-11 RX ADMIN — Medication 10 ML: at 20:45

## 2024-04-11 RX ADMIN — DOCUSATE SODIUM 50MG AND SENNOSIDES 8.6MG 2 TABLET: 8.6; 5 TABLET, FILM COATED ORAL at 20:45

## 2024-04-11 RX ADMIN — DEXMEDETOMIDINE HYDROCHLORIDE IN SODIUM CHLORIDE 0.8 MCG/KG/HR: 4 INJECTION INTRAVENOUS at 02:12

## 2024-04-11 RX ADMIN — DEXMEDETOMIDINE HYDROCHLORIDE IN SODIUM CHLORIDE 1 MCG/KG/HR: 4 INJECTION INTRAVENOUS at 16:59

## 2024-04-11 RX ADMIN — ZINC OXIDE 1 APPLICATION: 200 OINTMENT TOPICAL at 20:42

## 2024-04-11 RX ADMIN — FOLIC ACID 1 MG: 1 TABLET ORAL at 09:46

## 2024-04-11 RX ADMIN — LORAZEPAM 2 MG: 2 INJECTION INTRAMUSCULAR; INTRAVENOUS at 11:34

## 2024-04-11 RX ADMIN — LORAZEPAM 2 MG: 1 TABLET ORAL at 22:47

## 2024-04-11 RX ADMIN — Medication 15 ML: at 08:05

## 2024-04-11 RX ADMIN — Medication 10 ML: at 08:04

## 2024-04-11 RX ADMIN — PROPOFOL INJECTABLE EMULSION 30 MCG/KG/MIN: 10 INJECTION, EMULSION INTRAVENOUS at 07:59

## 2024-04-11 RX ADMIN — DOCUSATE SODIUM 50MG AND SENNOSIDES 8.6MG 2 TABLET: 8.6; 5 TABLET, FILM COATED ORAL at 12:07

## 2024-04-11 RX ADMIN — DEXMEDETOMIDINE HYDROCHLORIDE IN SODIUM CHLORIDE 1 MCG/KG/HR: 4 INJECTION INTRAVENOUS at 11:27

## 2024-04-11 RX ADMIN — FENTANYL CITRATE 100 MCG/HR: 0.05 INJECTION, SOLUTION INTRAMUSCULAR; INTRAVENOUS at 14:15

## 2024-04-11 RX ADMIN — IPRATROPIUM BROMIDE AND ALBUTEROL SULFATE 3 ML: .5; 3 SOLUTION RESPIRATORY (INHALATION) at 20:13

## 2024-04-11 RX ADMIN — LORAZEPAM 2 MG: 2 INJECTION INTRAMUSCULAR; INTRAVENOUS at 10:44

## 2024-04-11 RX ADMIN — ENOXAPARIN SODIUM 40 MG: 100 INJECTION SUBCUTANEOUS at 11:28

## 2024-04-11 RX ADMIN — LORAZEPAM 2 MG: 2 INJECTION INTRAMUSCULAR; INTRAVENOUS at 05:26

## 2024-04-11 RX ADMIN — MAGNESIUM SULFATE HEPTAHYDRATE 2 G: 40 INJECTION, SOLUTION INTRAVENOUS at 16:59

## 2024-04-11 RX ADMIN — PROPOFOL INJECTABLE EMULSION 30 MCG/KG/MIN: 10 INJECTION, EMULSION INTRAVENOUS at 15:26

## 2024-04-11 RX ADMIN — FENTANYL CITRATE 150 MCG/HR: 0.05 INJECTION, SOLUTION INTRAMUSCULAR; INTRAVENOUS at 02:59

## 2024-04-11 RX ADMIN — FAMOTIDINE 20 MG: 20 TABLET, FILM COATED ORAL at 16:54

## 2024-04-11 RX ADMIN — PROPOFOL INJECTABLE EMULSION 40 MCG/KG/MIN: 10 INJECTION, EMULSION INTRAVENOUS at 12:59

## 2024-04-11 RX ADMIN — Medication 10 ML: at 10:48

## 2024-04-11 RX ADMIN — DEXMEDETOMIDINE HYDROCHLORIDE IN SODIUM CHLORIDE 1.4 MCG/KG/HR: 4 INJECTION INTRAVENOUS at 22:28

## 2024-04-11 RX ADMIN — THIAMINE HYDROCHLORIDE 200 MG: 100 INJECTION, SOLUTION INTRAMUSCULAR; INTRAVENOUS at 05:26

## 2024-04-11 RX ADMIN — Medication 1 PATCH: at 18:21

## 2024-04-11 RX ADMIN — LORAZEPAM 2 MG: 2 INJECTION INTRAMUSCULAR; INTRAVENOUS at 18:24

## 2024-04-11 RX ADMIN — PROPOFOL INJECTABLE EMULSION 35 MCG/KG/MIN: 10 INJECTION, EMULSION INTRAVENOUS at 01:33

## 2024-04-11 RX ADMIN — DEXMEDETOMIDINE HYDROCHLORIDE IN SODIUM CHLORIDE 0.6 MCG/KG/HR: 4 INJECTION INTRAVENOUS at 06:35

## 2024-04-11 RX ADMIN — PROPOFOL INJECTABLE EMULSION 15 MCG/KG/MIN: 10 INJECTION, EMULSION INTRAVENOUS at 20:06

## 2024-04-11 RX ADMIN — CHLORHEXIDINE GLUCONATE 15 ML: 1.2 RINSE ORAL at 08:14

## 2024-04-11 RX ADMIN — CEFEPIME 2000 MG: 2 INJECTION, POWDER, FOR SOLUTION INTRAVENOUS at 00:42

## 2024-04-11 RX ADMIN — DEXMEDETOMIDINE HYDROCHLORIDE IN SODIUM CHLORIDE 1.2 MCG/KG/HR: 4 INJECTION INTRAVENOUS at 20:06

## 2024-04-11 RX ADMIN — IPRATROPIUM BROMIDE AND ALBUTEROL SULFATE 3 ML: .5; 3 SOLUTION RESPIRATORY (INHALATION) at 10:35

## 2024-04-11 RX ADMIN — HYDRALAZINE HYDROCHLORIDE 10 MG: 20 INJECTION INTRAMUSCULAR; INTRAVENOUS at 13:06

## 2024-04-11 RX ADMIN — VANCOMYCIN HYDROCHLORIDE 1250 MG: 1.25 INJECTION, POWDER, LYOPHILIZED, FOR SOLUTION INTRAVENOUS at 11:55

## 2024-04-11 RX ADMIN — MAGNESIUM SULFATE HEPTAHYDRATE 2 G: 40 INJECTION, SOLUTION INTRAVENOUS at 12:05

## 2024-04-11 RX ADMIN — DEXMEDETOMIDINE HYDROCHLORIDE IN SODIUM CHLORIDE 1.2 MCG/KG/HR: 4 INJECTION INTRAVENOUS at 14:15

## 2024-04-11 RX ADMIN — MAGNESIUM SULFATE HEPTAHYDRATE 2 G: 40 INJECTION, SOLUTION INTRAVENOUS at 14:05

## 2024-04-11 RX ADMIN — THIAMINE HYDROCHLORIDE 200 MG: 100 INJECTION, SOLUTION INTRAMUSCULAR; INTRAVENOUS at 15:08

## 2024-04-11 RX ADMIN — CHLORHEXIDINE GLUCONATE 15 ML: 1.2 RINSE ORAL at 20:42

## 2024-04-11 RX ADMIN — IPRATROPIUM BROMIDE AND ALBUTEROL SULFATE 3 ML: .5; 3 SOLUTION RESPIRATORY (INHALATION) at 07:11

## 2024-04-11 RX ADMIN — CEFEPIME 2000 MG: 2 INJECTION, POWDER, FOR SOLUTION INTRAVENOUS at 16:51

## 2024-04-11 RX ADMIN — IPRATROPIUM BROMIDE AND ALBUTEROL SULFATE 3 ML: .5; 3 SOLUTION RESPIRATORY (INHALATION) at 14:44

## 2024-04-11 RX ADMIN — PROPOFOL INJECTABLE EMULSION 40 MCG/KG/MIN: 10 INJECTION, EMULSION INTRAVENOUS at 04:36

## 2024-04-11 RX ADMIN — CEFEPIME 2000 MG: 2 INJECTION, POWDER, FOR SOLUTION INTRAVENOUS at 08:08

## 2024-04-11 RX ADMIN — ZINC OXIDE 1 APPLICATION: 200 OINTMENT TOPICAL at 08:13

## 2024-04-11 NOTE — PLAN OF CARE
Goal Outcome Evaluation:  Plan of Care Reviewed With: patient        Progress: no change  Outcome Evaluation: Patient remains intubated, on propofol, fentanyl, and precedex gtt also receiving scheduled and PRN ativan. TF at goal. Weaning down fentanyl, patient pupils pinpoint through most of the shift. Seema ROSAS notified. Pupils currently approx 1 mm, reactive. TF remain at goal. Awaiting AM labs.

## 2024-04-11 NOTE — PROGRESS NOTES
"Nutrition Services    Patient Name:  Watson Lisa  YOB: 1955  MRN: [id number]  Admit Date:  4/6/2024    Assessment Date:  04/11/24    Comment: Remains on the vent with propofol for sedation (provides about 550-700 lipid calories per day). TF's at goal with Peptamen Intense VHP at 50mL/hr. Last BM 4/8. Labs, meds, skin reviewed.  K+ 5.5, Na+ 139, Mg 1.4, Trig 271, Hgb 10.9. Will likely need swallow eval once extubated.  RN plans to recheck K+ levels later - hopeful to not have to change TF's as it will cause overfeeding while on the vent. Discussed with RN    Plan/Recommendations:  Continue Peptamen Intense VHP at 50mL/hr, goal   ? Repeat K+ level later in day  Min free water while on drips  Add a bowel regimen  Monitor lytes and replace as needed.    Will follow clinical course, nutrition needs.    CLINICAL NUTRITION ASSESSMENT      Reason for Assessment Follow-up Protocol   Diagnosis/Problem severe alcohol abuse, asp PNA, substance abuse, HTN, LIVAN, resp failure - on vent.   Current Problems On vent     Encounter Information        Nutrition History    Food Preferences    Supplements    Factors Affecting Intake altered mental status, altered respiratory status   Tests/Procedures X-Ray     Anthropometrics        Current Height   Current Weight  BMI kg/m2 Height: 200.7 cm (79\")  Weight: 129 kg (284 lb 13.4 oz) (04/11/24 0529)  Body mass index is 32.09 kg/m².     Adj BMI (if applicable)    BMI Category Overweight (25 - 29.9)       Admission Weight 113kg   Ideal Body Weight (IBW) 93.7kg     Adj IBW (if applicable)    Usual Body Weight (UBW) 250-260lb   Weight Change/Trend Stable         Estimated/Assessed Needs        Energy Requirements    Weight for Calculation 93.7kg   Method for Estimation  20 kcal/kg   EST Needs (kcal/day) 1874       Protein Requirements    Weight for Calculation 93.7kg   EST Protein Needs (g/kg) 1.2 gm/kg   EST Daily Needs (g/day) 112g       Fluid Requirements     Method for " Estimation 1 mL/kcal    Estimated Needs (mL/day)        Fluid Deficit    Current Na Level (mEq/L)    Desired Na Level (mEq/L)    Estimated Fluid Deficit (L)       Labs        Pertinent Labs    Results from last 7 days   Lab Units 04/11/24  0701 04/10/24  0341 04/09/24  0631 04/08/24  1748 04/08/24  1359 04/08/24  0602 04/07/24  0713 04/06/24  1145   SODIUM mmol/L 139 143 144  --  142   < > 137 132*   POTASSIUM mmol/L 5.5* 4.2 4.1   < > 3.6   < > 3.9 4.1   CHLORIDE mmol/L 112* 114* 112*  --  108*   < > 103 95*   CO2 mmol/L 18.0* 18.9* 21.0*  --  21.1*   < > 20.9* 23.0   BUN mg/dL 10 9 11  --  11   < > 13 21   CREATININE mg/dL 0.74* 0.75* 0.94  --  1.08   < > 0.84 1.13   CALCIUM mg/dL 8.5* 8.0* 7.7*  --  7.9*   < > 7.9* 8.9   BILIRUBIN mg/dL  --   --   --   --  0.3  --  0.3 0.5   ALK PHOS U/L  --   --   --   --  57  --  65 80   ALT (SGPT) U/L  --   --   --   --  12  --  14 21   AST (SGOT) U/L  --   --   --   --  17  --  22 22   GLUCOSE mg/dL 130* 102* 101*  --  113*   < > 105* 105*    < > = values in this interval not displayed.     Results from last 7 days   Lab Units 04/11/24  0701 04/10/24  0341 04/09/24  0631 04/08/24  1748 04/08/24  1359   MAGNESIUM mg/dL 1.4* 1.9 2.2   < > 1.3*   PHOSPHORUS mg/dL 4.0 3.2 3.6   < >  --    HEMOGLOBIN g/dL 10.9* 9.7* 10.7*  --   --    HEMATOCRIT % 32.7* 30.2* 31.9*  --   --    WBC 10*3/mm3 10.52 9.51 9.79  --   --    TRIGLYCERIDES mg/dL 271*  --  158*   < >  --    ALBUMIN g/dL  --   --   --   --  2.9*    < > = values in this interval not displayed.     Results from last 7 days   Lab Units 04/11/24  0701 04/10/24  0341 04/09/24  0631 04/08/24  0602 04/07/24  0713   PLATELETS 10*3/mm3 353 306 225 238 186     COVID19   Date Value Ref Range Status   04/15/2022 Not Detected Not Detected - Ref. Range Final     Lab Results   Component Value Date    HGBA1C 4.90 01/18/2021          Medications            Scheduled Medications cefepime, 2,000 mg, Intravenous, Q8H  chlorhexidine, 15 mL,  Mouth/Throat, Q12H  enoxaparin, 40 mg, Subcutaneous, Q24H  famotidine, 20 mg, Nasogastric, BID AC  folic acid, 1 mg, Nasogastric, Daily  hydrocortisone-bacitracin-zinc oxide-nystatin, 1 Application, Topical, BID  ipratropium-albuterol, 3 mL, Nebulization, 4x Daily - RT  LORazepam, 2 mg, Intravenous, Q6H  multivitamin and minerals, 15 mL, Nasogastric, Daily  nicotine, 1 patch, Transdermal, Q24H  sodium chloride, 10 mL, Intravenous, Q12H  sodium chloride, 10 mL, Intravenous, Q12H  thiamine (B-1) IV, 200 mg, Intravenous, Q8H   Followed by  [START ON 4/12/2024] thiamine, 100 mg, Oral, Daily  vancomycin, 1,250 mg, Intravenous, Q12H        Infusions dexmedetomidine, 0.2-1.5 mcg/kg/hr, Last Rate: 1 mcg/kg/hr (04/11/24 1046)  fentanyl 10 mcg/mL,  mcg/hr, Last Rate: 50 mcg/hr (04/11/24 1047)  norepinephrine, 0.02-0.3 mcg/kg/min, Last Rate: Stopped (04/08/24 1230)  Pharmacy to dose vancomycin,   propofol, 5-50 mcg/kg/min, Last Rate: 30 mcg/kg/min (04/11/24 1047)        PRN Medications   acetaminophen    senna-docusate sodium **AND** polyethylene glycol **AND** bisacodyl **AND** bisacodyl    Calcium Replacement - Follow Nurse / BPA Driven Protocol    hydrALAZINE    LORazepam **OR** LORazepam **OR** LORazepam **OR** LORazepam **OR** LORazepam **OR** LORazepam    Magnesium Standard Dose Replacement - Follow Nurse / BPA Driven Protocol    Magnesium Standard Dose Replacement - Follow Nurse / BPA Driven Protocol    metoprolol tartrate    nicotine polacrilex    nitroglycerin    ondansetron    Pharmacy to dose vancomycin    Phosphorus Replacement - Follow Nurse / BPA Driven Protocol    Potassium Replacement - Follow Nurse / BPA Driven Protocol    [COMPLETED] Insert Peripheral IV **AND** sodium chloride    sodium chloride    sodium chloride    sodium chloride    sodium chloride     Physical Findings          Physical Appearance disoriented, sedate, ventilator support   Oral/Mouth Cavity tooth or teeth missing   Edema  2+ (mild)    Gastrointestinal last bowel movement: 4/8   Skin  pressure injury: gluteal , wound on abd   Tubes/Drains/Lines Cortrak, NG tube, bridle in place   NFPE No clinical signs of muscle wasting or fat loss   --  Current Nutrition Orders & Evaluation of Intake       Oral Nutrition     Food Allergies NKFA   Current PO Diet NPO Diet NPO Type: Tube Feeding   Supplement    PO Evaluation     Trending % PO Intake       Enteral Nutrition     Enteral Route NG    TF Delivery Method Continuous    Propofol Rate/Kcal 20-26.7 (525-705 kcals)    Current TF Order/Rate  Peptamen Intense VHP @ 50 mL/hr    TF Goal Rate 50 mL/hr    Current Water Flush 30 mL Q 4 hr    Modular None    TF Residual  no or minimal residual    TF Tolerance tolerating    TF Observation Verified correct TF and water flush infusing per orders     Nutrition Diagnosis        Nutrition Dx Problem 1 Problem: Inadequate Oral Intake  Etiology: Medical Diagnosis - severe alcohol abuse, asp PNA, substance abuse, HTN, LIVAN, resp failure - on vent.  Signs/Symptoms: NPO    Comment:      INTERVENTION / PLAN OF CARE  Intervention Goal        Intervention Goal(s) Maintain nutrition status, Reduce/improve symptoms, Meet estimated needs, Disease management/therapy, Initiate TF/PN, Tolerate TF/PN at goal, Transition TF to PO, and No significant weight loss     Nutrition Intervention        RD Action Continue to monitor, Care plan reviewed, and Recommend/order: EN     Prescription         Diet     Supplement/Snack    EN/PN     Prescription Ordered       Enteral Prescription:     Enteral Route NG    TF Delivery Method Continuous    Enteral Product Peptamen Intense VHP    Modular None    Propofol Rate/Kcal 26.7 (705 kcals)    TF Start Rate 20    TF Goal Rate 50    Free Water Flush 30mL q 4 hrs    TF Provision at Goal: 1200 kcal, 1905 kcals with propofol,  110 gm protein, 1008 mL free water + 180 mL water flushes         Calories 102 % needs met         Protein  98 % needs met          Fluid (mL) 1188mL    Prescription Ordered Yes     Monitor/Evaluation        Monitor Per protocol   Discharge Plan Pending clinical course   Education Will instruct as appropriate     RD to follow per protocol.       Electronically signed by:  Queenie Dunham RD  04/11/24 11:00 EDT

## 2024-04-11 NOTE — PROGRESS NOTES
[facility] INPATIENT PROGRESS NOTE         [facility] INTENSIVE CARE    2024      PATIENT IDENTIFICATION:  Name: Watson Lisa ADMIT: 2024   : 1955  PCP: Roc Carroll APRN    MRN: [id number] LOS: 5 days   AGE/SEX: 68 y.o. male  ROOM: [facility]                     LOS 5    Reason for visit: ICU medical management with aspiration pneumonia, respiratory failure on ventilator      SUBJECTIVE:      Sedated on ventilator.  On 60% FiO2 with saturations 100%.  Tolerating tube feeds at 50 cc an hour.  On Precedex, fentanyl and propofol for sedation.  His agitation and alcohol withdrawal prevented consideration of extubation yesterday.  Continue daily sedation vacation spontaneous breathing trials.        Objective   OBJECTIVE:    Vital Sign Min/Max for last 24 hours  Temp  Min: 98 °F (36.7 °C)  Max: 99 °F (37.2 °C)   BP  Min: 126/64  Max: 212/110   Pulse  Min: 50  Max: 144   Resp  Min: 18  Max: 24   SpO2  Min: 90 %  Max: 100 %   No data recorded   Weight  Min: 129 kg (284 lb 13.4 oz)  Max: 129 kg (284 lb 13.4 oz)    Vitals:    24 1000 24 1030 24 1039 24 1100   BP: 140/94 152/79     Pulse: 70 68 84    Resp:   22    Temp:    98.4 °F (36.9 °C)   TempSrc:    Oral   SpO2: 96% 98% 95%    Weight:       Height:                24  0725 04/10/24  0500 24  0529   Weight: 113 kg (250 lb) 130 kg (286 lb 13.1 oz) 129 kg (284 lb 13.4 oz)       Body mass index is 32.09 kg/m².        Mode: PS  FiO2 (%):  [30 %-41 %] 41 %  S RR:  [12] 12  S VT:  [650 mL] 650 mL  PEEP/CPAP (cm H2O):  [5 cm H20] 5 cm H20  MT SUP:  [8 cm H20-10 cm H20] 8 cm H20  MAP (cm H2O):  [8.1-9.8] 8.8           FiO2 (%): 41 %     Body mass index is 32.09 kg/m².    Intake/Output Summary (Last 24 hours) at 2024 1229  Last data filed at 2024 1048  Gross per 24 hour   Intake 3770.27 ml   Output 4740 ml   Net -969.73 ml         Exam:  GEN:  No distress, appears stated age  EYES:   PERRL,  anicteric sclerae  ENT:    External ears/nose normal, OP clear  NECK:  No adenopathy, midline trachea  LUNGS: Normal chest on inspection, palpation and auscultation  CV:  Normal S1S2, without murmur  ABD:  Nontender, nondistended, no hepatosplenomegaly, +BS  EXT:  No edema.  No cyanosis or clubbing.  No mottling and normal cap refill.    Assessment     Scheduled meds:  cefepime, 2,000 mg, Intravenous, Q8H  chlorhexidine, 15 mL, Mouth/Throat, Q12H  enoxaparin, 40 mg, Subcutaneous, Q24H  famotidine, 20 mg, Nasogastric, BID AC  folic acid, 1 mg, Nasogastric, Daily  hydrocortisone-bacitracin-zinc oxide-nystatin, 1 Application, Topical, BID  ipratropium-albuterol, 3 mL, Nebulization, 4x Daily - RT  LORazepam, 2 mg, Intravenous, Q6H  magnesium sulfate, 2 g, Intravenous, Q2H  multivitamin and minerals, 15 mL, Nasogastric, Daily  nicotine, 1 patch, Transdermal, Q24H  senna-docusate sodium, 2 tablet, Oral, BID  sodium chloride, 10 mL, Intravenous, Q12H  sodium chloride, 10 mL, Intravenous, Q12H  thiamine (B-1) IV, 200 mg, Intravenous, Q8H   Followed by  [START ON 4/12/2024] thiamine, 100 mg, Oral, Daily  vancomycin, 1,250 mg, Intravenous, Q12H      IV meds:                      dexmedetomidine, 0.2-1.5 mcg/kg/hr, Last Rate: 1 mcg/kg/hr (04/11/24 1127)  fentanyl 10 mcg/mL,  mcg/hr, Last Rate: 50 mcg/hr (04/11/24 1047)  norepinephrine, 0.02-0.3 mcg/kg/min, Last Rate: Stopped (04/08/24 1230)  Pharmacy to dose vancomycin,   propofol, 5-50 mcg/kg/min, Last Rate: 30 mcg/kg/min (04/11/24 1047)      Data Review:  Results from last 7 days   Lab Units 04/11/24  0701 04/10/24  0341 04/09/24  0631 04/08/24  1748 04/08/24  1359 04/08/24  0602   SODIUM mmol/L 139 143 144  --  142 142   POTASSIUM mmol/L 5.5* 4.2 4.1 3.9 3.6 4.8   CHLORIDE mmol/L 112* 114* 112*  --  108* 106   CO2 mmol/L 18.0* 18.9* 21.0*  --  21.1* 18.7*   BUN mg/dL 10 9 11  --  11 9   CREATININE mg/dL 0.74* 0.75* 0.94  --  1.08 0.75*   GLUCOSE mg/dL 130* 102* 101*   --  113* 95   CALCIUM mg/dL 8.5* 8.0* 7.7*  --  7.9* 8.9         Estimated Creatinine Clearance: 143.9 mL/min (A) (by C-G formula based on SCr of 0.74 mg/dL (L)).  Results from last 7 days   Lab Units 04/11/24  0701 04/10/24  0341 04/09/24  0631 04/08/24  0602 04/07/24  0713   WBC 10*3/mm3 10.52 9.51 9.79 11.62* 7.58   HEMOGLOBIN g/dL 10.9* 9.7* 10.7* 12.2* 11.6*   PLATELETS 10*3/mm3 353 306 225 238 186         Results from last 7 days   Lab Units 04/08/24  1359 04/07/24  0713 04/06/24  1145   ALT (SGPT) U/L 12 14 21   AST (SGOT) U/L 17 22 22     Results from last 7 days   Lab Units 04/11/24  0334 04/10/24  1237 04/10/24  0342 04/09/24  0400 04/08/24  0847   PH, ARTERIAL pH units 7.339* 7.359 7.366 7.382 7.374   PO2 ART mm Hg 222.2* 82.8 98.1 82.2 406.3*   PCO2, ARTERIAL mm Hg 37.1 33.9* 33.5* 34.4* 37.8   HCO3 ART mmol/L 20.0* 19.1* 19.2* 20.4* 22.1     Results from last 7 days   Lab Units 04/08/24  1359 04/08/24  0830 04/07/24  0713   PROCALCITONIN ng/mL 0.17  --   --    LACTATE mmol/L  --  0.9 0.8         Glucose   Date/Time Value Ref Range Status   04/11/2024 1148 152 (H) 70 - 130 mg/dL Final   04/11/2024 0521 133 (H) 70 - 130 mg/dL Final   04/11/2024 0018 137 (H) 70 - 130 mg/dL Final   04/10/2024 1801 125 70 - 130 mg/dL Final   04/10/2024 1112 103 70 - 130 mg/dL Final   04/10/2024 0611 98 70 - 130 mg/dL Final   04/10/2024 0034 115 70 - 130 mg/dL Final         Imaging reviewed  Chest x-ray 4/11 reviewed              Microbiology reviewed  Blood cultures no growth to date.  Respiratory culture no growth  MRSA screen positive              Active Hospital Problems    Diagnosis  POA    **Alcohol withdrawal [F10.939]  Yes    Substance abuse [F19.10]  Unknown    Cocaine abuse [F14.10]  Unknown    Positive urine drug screen [R82.5]  Unknown    History of alcohol use disorder [Z87.898]  Yes    Alcoholic liver disease [K70.9]  Yes    COPD (chronic obstructive pulmonary disease) [J44.9]  Yes    LIVAN (obstructive sleep  apnea) [G47.33]  Yes    Essential hypertension [I10]  Yes      Resolved Hospital Problems   No resolved problems to display.         ASSESSMENT:  Acute respiratory failure requiring mechanical ventilation  Aspiration pneumonia with haemophilus growing in culture  Sepsis with shock  Severe alcohol abuse with withdrawal  Polysubstance abuse: UDS positive for cocaine  LIVAN        PLAN:  Sedation vacation and spontaneous breathing trial.  Failed trial yesterday again due to persistent agitation and alcohol withdrawal symptoms.  Continue IV fluids and pressor as needed.  Off pressor currently.  Continue antibiotics and narrow based on culture results.  MRSA screen is positive but no growth of MRSA and suspect that this is colonization.  Can discontinue vancomycin.  Alcohol withdrawal protocol.  Thiamine, folic acid and multivitamin.  Control glucose.  Control blood pressure.  Lovenox for DVT prophylaxis.  Pepcid for ulcer prophylaxis.      Discussed with multidisciplinary ICU team on rounds this morning.            Watson Zuleta MD  Pulmonary and Critical Care Medicine  [facility], [facility]  4/11/2024    12:29 EDT

## 2024-04-12 ENCOUNTER — APPOINTMENT (OUTPATIENT)
Dept: GENERAL RADIOLOGY | Facility: HOSPITAL | Age: 69
End: 2024-04-12
Payer: COMMERCIAL

## 2024-04-12 LAB
ANION GAP SERPL CALCULATED.3IONS-SCNC: 11.2 MMOL/L (ref 5–15)
BASOPHILS # BLD AUTO: 0.03 10*3/MM3 (ref 0–0.2)
BASOPHILS NFR BLD AUTO: 0.3 % (ref 0–1.5)
BUN SERPL-MCNC: 13 MG/DL (ref 8–23)
BUN/CREAT SERPL: 20.3 (ref 7–25)
CALCIUM SPEC-SCNC: 8.8 MG/DL (ref 8.6–10.5)
CHLORIDE SERPL-SCNC: 104 MMOL/L (ref 98–107)
CO2 SERPL-SCNC: 18.8 MMOL/L (ref 22–29)
CREAT SERPL-MCNC: 0.64 MG/DL (ref 0.76–1.27)
DEPRECATED RDW RBC AUTO: 39.7 FL (ref 37–54)
EGFRCR SERPLBLD CKD-EPI 2021: 102.5 ML/MIN/1.73
EOSINOPHIL # BLD AUTO: 0.31 10*3/MM3 (ref 0–0.4)
EOSINOPHIL NFR BLD AUTO: 3 % (ref 0.3–6.2)
ERYTHROCYTE [DISTWIDTH] IN BLOOD BY AUTOMATED COUNT: 11.7 % (ref 12.3–15.4)
GLUCOSE BLDC GLUCOMTR-MCNC: 133 MG/DL (ref 70–130)
GLUCOSE BLDC GLUCOMTR-MCNC: 136 MG/DL (ref 70–130)
GLUCOSE BLDC GLUCOMTR-MCNC: 139 MG/DL (ref 70–130)
GLUCOSE BLDC GLUCOMTR-MCNC: 146 MG/DL (ref 70–130)
GLUCOSE SERPL-MCNC: 138 MG/DL (ref 65–99)
HCT VFR BLD AUTO: 31.7 % (ref 37.5–51)
HGB BLD-MCNC: 10.5 G/DL (ref 13–17.7)
LYMPHOCYTES # BLD AUTO: 0.71 10*3/MM3 (ref 0.7–3.1)
LYMPHOCYTES NFR BLD AUTO: 6.9 % (ref 19.6–45.3)
MAGNESIUM SERPL-MCNC: 2.1 MG/DL (ref 1.6–2.4)
MCH RBC QN AUTO: 31.3 PG (ref 26.6–33)
MCHC RBC AUTO-ENTMCNC: 33.1 G/DL (ref 31.5–35.7)
MCV RBC AUTO: 94.6 FL (ref 79–97)
MONOCYTES # BLD AUTO: 0.8 10*3/MM3 (ref 0.1–0.9)
MONOCYTES NFR BLD AUTO: 7.8 % (ref 5–12)
NEUTROPHILS NFR BLD AUTO: 79.9 % (ref 42.7–76)
NEUTROPHILS NFR BLD AUTO: 8.25 10*3/MM3 (ref 1.7–7)
PHOSPHATE SERPL-MCNC: 4.2 MG/DL (ref 2.5–4.5)
PLATELET # BLD AUTO: 444 10*3/MM3 (ref 140–450)
PMV BLD AUTO: 10.3 FL (ref 6–12)
POTASSIUM SERPL-SCNC: 4.7 MMOL/L (ref 3.5–5.2)
RBC # BLD AUTO: 3.35 10*6/MM3 (ref 4.14–5.8)
SODIUM SERPL-SCNC: 134 MMOL/L (ref 136–145)
WBC NRBC COR # BLD AUTO: 10.32 10*3/MM3 (ref 3.4–10.8)

## 2024-04-12 PROCEDURE — 94003 VENT MGMT INPAT SUBQ DAY: CPT

## 2024-04-12 PROCEDURE — 25010000002 PROPOFOL 10 MG/ML EMULSION

## 2024-04-12 PROCEDURE — 94799 UNLISTED PULMONARY SVC/PX: CPT

## 2024-04-12 PROCEDURE — 94664 DEMO&/EVAL PT USE INHALER: CPT

## 2024-04-12 PROCEDURE — 94761 N-INVAS EAR/PLS OXIMETRY MLT: CPT

## 2024-04-12 PROCEDURE — 25010000002 FUROSEMIDE PER 20 MG: Performed by: INTERNAL MEDICINE

## 2024-04-12 PROCEDURE — 25010000002 LORAZEPAM PER 2 MG: Performed by: INTERNAL MEDICINE

## 2024-04-12 PROCEDURE — 25010000002 MIDAZOLAM PER 1 MG

## 2024-04-12 PROCEDURE — 25010000002 FUROSEMIDE PER 20 MG

## 2024-04-12 PROCEDURE — 85007 BL SMEAR W/DIFF WBC COUNT: CPT

## 2024-04-12 PROCEDURE — 84100 ASSAY OF PHOSPHORUS: CPT | Performed by: STUDENT IN AN ORGANIZED HEALTH CARE EDUCATION/TRAINING PROGRAM

## 2024-04-12 PROCEDURE — 80048 BASIC METABOLIC PNL TOTAL CA: CPT | Performed by: STUDENT IN AN ORGANIZED HEALTH CARE EDUCATION/TRAINING PROGRAM

## 2024-04-12 PROCEDURE — 85025 COMPLETE CBC W/AUTO DIFF WBC: CPT

## 2024-04-12 PROCEDURE — 25010000002 MIDAZOLAM 50 MG/10ML SOLUTION

## 2024-04-12 PROCEDURE — 83735 ASSAY OF MAGNESIUM: CPT | Performed by: STUDENT IN AN ORGANIZED HEALTH CARE EDUCATION/TRAINING PROGRAM

## 2024-04-12 PROCEDURE — 25010000002 FENTANYL CITRATE (PF) 2500 MCG/50ML SOLUTION

## 2024-04-12 PROCEDURE — 25010000002 HYDRALAZINE PER 20 MG: Performed by: INTERNAL MEDICINE

## 2024-04-12 PROCEDURE — 25010000002 ENOXAPARIN PER 10 MG: Performed by: INTERNAL MEDICINE

## 2024-04-12 PROCEDURE — 82948 REAGENT STRIP/BLOOD GLUCOSE: CPT

## 2024-04-12 PROCEDURE — 94760 N-INVAS EAR/PLS OXIMETRY 1: CPT

## 2024-04-12 PROCEDURE — 71045 X-RAY EXAM CHEST 1 VIEW: CPT

## 2024-04-12 PROCEDURE — 25010000002 CEFEPIME PER 500 MG

## 2024-04-12 RX ORDER — FUROSEMIDE 10 MG/ML
40 INJECTION INTRAMUSCULAR; INTRAVENOUS EVERY 12 HOURS
Status: COMPLETED | OUTPATIENT
Start: 2024-04-12 | End: 2024-04-12

## 2024-04-12 RX ORDER — MIDAZOLAM HYDROCHLORIDE 1 MG/ML
1-10 INJECTION, SOLUTION INTRAVENOUS
Status: DISCONTINUED | OUTPATIENT
Start: 2024-04-12 | End: 2024-04-18

## 2024-04-12 RX ORDER — MIDAZOLAM HYDROCHLORIDE 5 MG/ML
INJECTION INTRAMUSCULAR; INTRAVENOUS
Status: COMPLETED
Start: 2024-04-12 | End: 2024-04-12

## 2024-04-12 RX ORDER — MIDAZOLAM HYDROCHLORIDE 1 MG/ML
4 INJECTION INTRAMUSCULAR; INTRAVENOUS ONCE
Status: DISCONTINUED | OUTPATIENT
Start: 2024-04-12 | End: 2024-04-17

## 2024-04-12 RX ORDER — FUROSEMIDE 10 MG/ML
40 INJECTION INTRAMUSCULAR; INTRAVENOUS ONCE
Status: COMPLETED | OUTPATIENT
Start: 2024-04-12 | End: 2024-04-12

## 2024-04-12 RX ADMIN — LORAZEPAM 2 MG: 2 INJECTION INTRAMUSCULAR; INTRAVENOUS at 16:09

## 2024-04-12 RX ADMIN — DEXMEDETOMIDINE HYDROCHLORIDE IN SODIUM CHLORIDE 1.5 MCG/KG/HR: 4 INJECTION INTRAVENOUS at 21:51

## 2024-04-12 RX ADMIN — DOCUSATE SODIUM 50MG AND SENNOSIDES 8.6MG 2 TABLET: 8.6; 5 TABLET, FILM COATED ORAL at 20:24

## 2024-04-12 RX ADMIN — CEFEPIME 2000 MG: 2 INJECTION, POWDER, FOR SOLUTION INTRAVENOUS at 08:20

## 2024-04-12 RX ADMIN — FOLIC ACID 1 MG: 1 TABLET ORAL at 08:15

## 2024-04-12 RX ADMIN — DEXMEDETOMIDINE HYDROCHLORIDE IN SODIUM CHLORIDE 1.5 MCG/KG/HR: 4 INJECTION INTRAVENOUS at 00:30

## 2024-04-12 RX ADMIN — CHLORHEXIDINE GLUCONATE 15 ML: 1.2 RINSE ORAL at 20:24

## 2024-04-12 RX ADMIN — Medication 10 ML: at 20:23

## 2024-04-12 RX ADMIN — Medication 1 PATCH: at 19:35

## 2024-04-12 RX ADMIN — DEXMEDETOMIDINE HYDROCHLORIDE IN SODIUM CHLORIDE 1.5 MCG/KG/HR: 4 INJECTION INTRAVENOUS at 06:59

## 2024-04-12 RX ADMIN — LORAZEPAM 2 MG: 2 INJECTION INTRAMUSCULAR; INTRAVENOUS at 21:48

## 2024-04-12 RX ADMIN — FAMOTIDINE 20 MG: 20 TABLET, FILM COATED ORAL at 17:32

## 2024-04-12 RX ADMIN — ZINC OXIDE 1 APPLICATION: 200 OINTMENT TOPICAL at 20:23

## 2024-04-12 RX ADMIN — IPRATROPIUM BROMIDE AND ALBUTEROL SULFATE 3 ML: .5; 3 SOLUTION RESPIRATORY (INHALATION) at 19:36

## 2024-04-12 RX ADMIN — DEXMEDETOMIDINE HYDROCHLORIDE IN SODIUM CHLORIDE 1.5 MCG/KG/HR: 4 INJECTION INTRAVENOUS at 12:53

## 2024-04-12 RX ADMIN — ENOXAPARIN SODIUM 40 MG: 100 INJECTION SUBCUTANEOUS at 11:29

## 2024-04-12 RX ADMIN — Medication 10 ML: at 20:24

## 2024-04-12 RX ADMIN — DEXMEDETOMIDINE HYDROCHLORIDE IN SODIUM CHLORIDE 1.5 MCG/KG/HR: 4 INJECTION INTRAVENOUS at 01:56

## 2024-04-12 RX ADMIN — LORAZEPAM 2 MG: 2 INJECTION INTRAMUSCULAR; INTRAVENOUS at 02:30

## 2024-04-12 RX ADMIN — LORAZEPAM 2 MG: 2 INJECTION INTRAMUSCULAR; INTRAVENOUS at 17:44

## 2024-04-12 RX ADMIN — PROPOFOL INJECTABLE EMULSION 5 MCG/KG/MIN: 10 INJECTION, EMULSION INTRAVENOUS at 04:36

## 2024-04-12 RX ADMIN — DEXMEDETOMIDINE HYDROCHLORIDE IN SODIUM CHLORIDE 1.5 MCG/KG/HR: 4 INJECTION INTRAVENOUS at 17:33

## 2024-04-12 RX ADMIN — LORAZEPAM 2 MG: 2 INJECTION INTRAMUSCULAR; INTRAVENOUS at 20:59

## 2024-04-12 RX ADMIN — MIDAZOLAM 4 MG: 5 INJECTION INTRAMUSCULAR; INTRAVENOUS at 05:04

## 2024-04-12 RX ADMIN — CEFEPIME 2000 MG: 2 INJECTION, POWDER, FOR SOLUTION INTRAVENOUS at 17:19

## 2024-04-12 RX ADMIN — IPRATROPIUM BROMIDE AND ALBUTEROL SULFATE 3 ML: .5; 3 SOLUTION RESPIRATORY (INHALATION) at 15:17

## 2024-04-12 RX ADMIN — DOCUSATE SODIUM 50MG AND SENNOSIDES 8.6MG 2 TABLET: 8.6; 5 TABLET, FILM COATED ORAL at 08:15

## 2024-04-12 RX ADMIN — DEXMEDETOMIDINE HYDROCHLORIDE IN SODIUM CHLORIDE 1.5 MCG/KG/HR: 4 INJECTION INTRAVENOUS at 19:35

## 2024-04-12 RX ADMIN — Medication 10 ML: at 08:20

## 2024-04-12 RX ADMIN — IPRATROPIUM BROMIDE AND ALBUTEROL SULFATE 3 ML: .5; 3 SOLUTION RESPIRATORY (INHALATION) at 07:17

## 2024-04-12 RX ADMIN — DEXMEDETOMIDINE HYDROCHLORIDE IN SODIUM CHLORIDE 1.5 MCG/KG/HR: 4 INJECTION INTRAVENOUS at 05:08

## 2024-04-12 RX ADMIN — FENTANYL CITRATE 200 MCG/HR: 0.05 INJECTION, SOLUTION INTRAMUSCULAR; INTRAVENOUS at 17:07

## 2024-04-12 RX ADMIN — DEXMEDETOMIDINE HYDROCHLORIDE IN SODIUM CHLORIDE 1.5 MCG/KG/HR: 4 INJECTION INTRAVENOUS at 11:03

## 2024-04-12 RX ADMIN — HYDRALAZINE HYDROCHLORIDE 10 MG: 20 INJECTION INTRAMUSCULAR; INTRAVENOUS at 17:28

## 2024-04-12 RX ADMIN — IPRATROPIUM BROMIDE AND ALBUTEROL SULFATE 3 ML: .5; 3 SOLUTION RESPIRATORY (INHALATION) at 10:38

## 2024-04-12 RX ADMIN — FUROSEMIDE 40 MG: 10 INJECTION, SOLUTION INTRAMUSCULAR; INTRAVENOUS at 20:37

## 2024-04-12 RX ADMIN — MIDAZOLAM 1 MG/HR: 5 INJECTION INTRAMUSCULAR; INTRAVENOUS at 05:02

## 2024-04-12 RX ADMIN — ZINC OXIDE 1 APPLICATION: 200 OINTMENT TOPICAL at 12:52

## 2024-04-12 RX ADMIN — Medication 15 ML: at 08:20

## 2024-04-12 RX ADMIN — FENTANYL CITRATE 250 MCG/HR: 0.05 INJECTION, SOLUTION INTRAMUSCULAR; INTRAVENOUS at 21:06

## 2024-04-12 RX ADMIN — LORAZEPAM 2 MG: 2 INJECTION INTRAMUSCULAR; INTRAVENOUS at 06:23

## 2024-04-12 RX ADMIN — FAMOTIDINE 20 MG: 20 TABLET, FILM COATED ORAL at 06:35

## 2024-04-12 RX ADMIN — LORAZEPAM 2 MG: 2 INJECTION INTRAMUSCULAR; INTRAVENOUS at 22:41

## 2024-04-12 RX ADMIN — FENTANYL CITRATE 100 MCG/HR: 0.05 INJECTION, SOLUTION INTRAMUSCULAR; INTRAVENOUS at 01:55

## 2024-04-12 RX ADMIN — FENTANYL CITRATE 150 MCG/HR: 0.05 INJECTION, SOLUTION INTRAMUSCULAR; INTRAVENOUS at 09:36

## 2024-04-12 RX ADMIN — CHLORHEXIDINE GLUCONATE 15 ML: 1.2 RINSE ORAL at 08:37

## 2024-04-12 RX ADMIN — DEXMEDETOMIDINE HYDROCHLORIDE IN SODIUM CHLORIDE 1.5 MCG/KG/HR: 4 INJECTION INTRAVENOUS at 09:03

## 2024-04-12 RX ADMIN — LORAZEPAM 2 MG: 2 INJECTION INTRAMUSCULAR; INTRAVENOUS at 00:22

## 2024-04-12 RX ADMIN — LORAZEPAM 2 MG: 2 INJECTION INTRAMUSCULAR; INTRAVENOUS at 04:53

## 2024-04-12 RX ADMIN — CEFEPIME 2000 MG: 2 INJECTION, POWDER, FOR SOLUTION INTRAVENOUS at 00:22

## 2024-04-12 RX ADMIN — LORAZEPAM 2 MG: 2 INJECTION INTRAMUSCULAR; INTRAVENOUS at 04:10

## 2024-04-12 RX ADMIN — MIDAZOLAM 4 MG/HR: 5 INJECTION INTRAMUSCULAR; INTRAVENOUS at 13:31

## 2024-04-12 RX ADMIN — FUROSEMIDE 40 MG: 10 INJECTION, SOLUTION INTRAMUSCULAR; INTRAVENOUS at 09:56

## 2024-04-12 RX ADMIN — DEXMEDETOMIDINE HYDROCHLORIDE IN SODIUM CHLORIDE 1.5 MCG/KG/HR: 4 INJECTION INTRAVENOUS at 15:23

## 2024-04-12 RX ADMIN — LORAZEPAM 2 MG: 2 INJECTION INTRAMUSCULAR; INTRAVENOUS at 11:35

## 2024-04-12 RX ADMIN — FUROSEMIDE 40 MG: 10 INJECTION, SOLUTION INTRAMUSCULAR; INTRAVENOUS at 05:08

## 2024-04-12 RX ADMIN — Medication 100 MG: at 08:15

## 2024-04-12 NOTE — PROGRESS NOTES
[facility]    [phone]   LOS: 6 days     Name: Watson Lisa  Age/Sex: 68 y.o. male  :  1955        PCP: Roc Carroll APRN  Chief Complaint   Patient presents with    Alcohol Problem      Subjective   Remains on the vent discussed with the nursing staff today.  They were able to wean off the propofol overnight but he did get increasingly agitated and was placed back on Versed this morning.    cefepime, 2,000 mg, Intravenous, Q8H  chlorhexidine, 15 mL, Mouth/Throat, Q12H  enoxaparin, 40 mg, Subcutaneous, Q24H  famotidine, 20 mg, Nasogastric, BID AC  folic acid, 1 mg, Nasogastric, Daily  furosemide, 40 mg, Intravenous, Q12H  hydrocortisone-bacitracin-zinc oxide-nystatin, 1 Application, Topical, BID  ipratropium-albuterol, 3 mL, Nebulization, 4x Daily - RT  LORazepam, 2 mg, Intravenous, Q6H  midazolam, 4 mg, Intravenous, Once  multivitamin and minerals, 15 mL, Nasogastric, Daily  nicotine, 1 patch, Transdermal, Q24H  senna-docusate sodium, 2 tablet, Oral, BID  sodium chloride, 10 mL, Intravenous, Q12H  sodium chloride, 10 mL, Intravenous, Q12H  thiamine, 100 mg, Oral, Daily      dexmedetomidine, 0.2-1.5 mcg/kg/hr, Last Rate: 1.5 mcg/kg/hr (24 1253)  fentanyl 10 mcg/mL,  mcg/hr, Last Rate: 150 mcg/hr (24 0936)  midazolam, 1-10 mg/hr, Last Rate: 4 mg/hr (24 0530)  norepinephrine, 0.02-0.3 mcg/kg/min, Last Rate: Stopped (24 1230)        Objective   Vital Signs  Temp:  [98.1 °F (36.7 °C)-99.8 °F (37.7 °C)] 98.9 °F (37.2 °C)  Heart Rate:  [] 81  Resp:  [18-28] 18  BP: (133-208)/() 149/78  FiO2 (%):  [30 %-40 %] 35 %  Body mass index is 31.67 kg/m².    Intake/Output Summary (Last 24 hours) at 2024 1332  Last data filed at 2024 1200  Gross per 24 hour   Intake 3648.57 ml   Output 6545 ml   Net -2896.43 ml       Physical Exam  Vitals and nursing note reviewed.   Constitutional:       General: He is not in acute distress.      Appearance: He is ill-appearing.   Cardiovascular:      Rate and Rhythm: Normal rate and regular rhythm.   Pulmonary:      Effort: Pulmonary effort is normal. No respiratory distress.   Neurological:      Comments: Presently intubated and sedated           Results Review:       I reviewed the patient's new clinical results.  Results from last 7 days   Lab Units 04/12/24  0810 04/11/24  0701 04/10/24  0341 04/09/24  0631 04/08/24  0602 04/07/24  0713 04/06/24  1145   WBC 10*3/mm3 10.32 10.52 9.51 9.79 11.62* 7.58 7.75   HEMOGLOBIN g/dL 10.5* 10.9* 9.7* 10.7* 12.2* 11.6* 12.5*   PLATELETS 10*3/mm3 444 353 306 225 238 186 208     Results from last 7 days   Lab Units 04/12/24  0600 04/11/24  1205 04/11/24  0701 04/10/24  0341 04/09/24  0631 04/08/24  1748 04/08/24  1359 04/08/24  0602 04/07/24  0713   SODIUM mmol/L 134*  --  139 143 144  --  142 142 137   POTASSIUM mmol/L 4.7 4.4 5.5* 4.2 4.1 3.9 3.6 4.8 3.9   CHLORIDE mmol/L 104  --  112* 114* 112*  --  108* 106 103   CO2 mmol/L 18.8*  --  18.0* 18.9* 21.0*  --  21.1* 18.7* 20.9*   BUN mg/dL 13  --  10 9 11  --  11 9 13   CREATININE mg/dL 0.64*  --  0.74* 0.75* 0.94  --  1.08 0.75* 0.84   CALCIUM mg/dL 8.8  --  8.5* 8.0* 7.7*  --  7.9* 8.9 7.9*   MAGNESIUM mg/dL 2.1  --  1.4* 1.9 2.2 1.3* 1.3* 1.8 1.8   PHOSPHORUS mg/dL 4.2  --  4.0 3.2 3.6 4.0  --  3.8 3.0   Estimated Creatinine Clearance: 164.9 mL/min (A) (by C-G formula based on SCr of 0.64 mg/dL (L)).  Results from last 7 days   Lab Units 04/08/24  1359 04/07/24  0713 04/06/24  1145   ALK PHOS U/L 57 65 80   BILIRUBIN mg/dL 0.3 0.3 0.5   ALT (SGPT) U/L 12 14 21   AST (SGOT) U/L 17 22 22         Assessment & Plan   Active Hospital Problems    Diagnosis  POA    **Alcohol withdrawal [F10.939]  Yes    Substance abuse [F19.10]  Unknown    Cocaine abuse [F14.10]  Unknown    Positive urine drug screen [R82.5]  Unknown    History of alcohol use disorder [Z87.898]  Yes    Alcoholic liver disease [K70.9]  Yes    COPD  (chronic obstructive pulmonary disease) [J44.9]  Yes    LIVAN (obstructive sleep apnea) [G47.33]  Yes    Essential hypertension [I10]  Yes      Resolved Hospital Problems   No resolved problems to display.       PLAN  This is a 68-year-old gentleman with a history of polysubstance use COPD, obstructive sleep apnea alcoholic liver disease and hypertension who presented to the hospital after being brought by family for wanting to quit drinking and using drugs.  He developed significant withdrawal and delirium and was transferred to the ICU where he remains intubated and sedated  -Appreciate pulmonary critical care's assistance with management of his agitation and respiratory failure  -Noted ongoing metabolic acidosis otherwise renal function electrolytes for the most part stable  -Remains on IV cefepime for aspiration pneumonia and positive cultures with haemophilus  -Lovenox for DVT prophylaxis  -Full code    Disposition  Expected discharge date/ time has not been documented.       Vincent Barnes MD  [facility]  04/12/24  13:32 EDT

## 2024-04-12 NOTE — PROGRESS NOTES
Pulm/ CC Note    Patient weaned off of propofol- became hypoxic, tachypneic, significant amount of frothy secretions from ET tube.  Called to bedside for hypoxia- despite increasing FiO2 to 100% on ventilator.     CXR performed showing haziness in bilateral lung bases.   I/O reviewed- patient positive 20L (if count is accurate).  40mg IV Lasix ordered now.  PEEP increased to 7.5 and FiO2 increased from 30 to 40%.    Triglycerides elevated- switched propofol for low dose versed. On max dose of Precdex and receiving fentanyl continuously and PRN ativan.

## 2024-04-12 NOTE — PLAN OF CARE
Goal Outcome Evaluation:    Propofol gtt was stopped at 0032.Patient was following commands. Around 0430 patient very agitated. HR elevated into 130's, RR in high 30's and BP increased. Oxygen saturation in the 70's. Propofol gtt started back and Trina ROSAS called to bedside. Versed gtt started and propofol gtt discontinued.

## 2024-04-12 NOTE — PROGRESS NOTES
"Nutrition Services    Patient Name:  Watson Lisa  YOB: 1955  MRN: [id number]  Admit Date:  4/6/2024    Assessment Date:  04/12/24    Comment: Remains on the vent, now off propofol (elevating trig) and getting precedex, versed and fent for sedation/comfort. TF's at goal with Peptamen Intense VHP at 50mL/hr. Last BM 4/8, pericolace started yesterday. Labs, meds, skin reviewed. More edematous today, Getting lasix. Glu 138/146,  K+ 4.7 (slightly hemolyzed), Na+ 134, Mg 2.1, Hgb 10.5. Will likely need swallow eval once extubated.   Discussed with RN    Plan/Recommendations:  Change TF's to Peptamen AF, goal is 65mL/hr   Min free water   Monitor lytes and replace as needed.    Will follow clinical course, nutrition needs.    CLINICAL NUTRITION ASSESSMENT      Reason for Assessment Follow-up Protocol   Diagnosis/Problem severe alcohol abuse, asp PNA, substance abuse, HTN, LIVAN, resp failure - on vent.   Current Problems On vent     Encounter Information        Nutrition History    Food Preferences    Supplements    Factors Affecting Intake altered mental status, altered respiratory status   Tests/Procedures X-Ray     Anthropometrics        Current Height   Current Weight  BMI kg/m2 Height: 200.7 cm (79\")  Weight: 127 kg (281 lb 1.4 oz) (04/12/24 0600)  Body mass index is 31.67 kg/m².     Adj BMI (if applicable)    BMI Category Overweight (25 - 29.9)       Admission Weight 113kg   Ideal Body Weight (IBW) 93.7kg     Adj IBW (if applicable)    Usual Body Weight (UBW) 250-260lb   Weight Change/Trend Stable         Estimated/Assessed Needs        Energy Requirements    Weight for Calculation 93.7kg   Method for Estimation  20 kcal/kg   EST Needs (kcal/day) 1874       Protein Requirements    Weight for Calculation 93.7kg   EST Protein Needs (g/kg) 1.2 gm/kg   EST Daily Needs (g/day) 112g       Fluid Requirements     Method for Estimation 1 mL/kcal    Estimated Needs (mL/day)        Fluid Deficit    Current Na " Level (mEq/L)    Desired Na Level (mEq/L)    Estimated Fluid Deficit (L)       Labs        Pertinent Labs    Results from last 7 days   Lab Units 04/12/24  0600 04/11/24  1205 04/11/24  0701 04/10/24  0341 04/08/24  1748 04/08/24  1359 04/08/24  0602 04/07/24  0713 04/06/24  1145   SODIUM mmol/L 134*  --  139 143   < > 142   < > 137 132*   POTASSIUM mmol/L 4.7 4.4 5.5* 4.2   < > 3.6   < > 3.9 4.1   CHLORIDE mmol/L 104  --  112* 114*   < > 108*   < > 103 95*   CO2 mmol/L 18.8*  --  18.0* 18.9*   < > 21.1*   < > 20.9* 23.0   BUN mg/dL 13  --  10 9   < > 11   < > 13 21   CREATININE mg/dL 0.64*  --  0.74* 0.75*   < > 1.08   < > 0.84 1.13   CALCIUM mg/dL 8.8  --  8.5* 8.0*   < > 7.9*   < > 7.9* 8.9   BILIRUBIN mg/dL  --   --   --   --   --  0.3  --  0.3 0.5   ALK PHOS U/L  --   --   --   --   --  57  --  65 80   ALT (SGPT) U/L  --   --   --   --   --  12  --  14 21   AST (SGOT) U/L  --   --   --   --   --  17  --  22 22   GLUCOSE mg/dL 138*  --  130* 102*   < > 113*   < > 105* 105*    < > = values in this interval not displayed.     Results from last 7 days   Lab Units 04/12/24  0810 04/12/24  0600 04/11/24  0701 04/10/24  0341 04/08/24  1748 04/08/24  1359   MAGNESIUM mg/dL  --  2.1 1.4* 1.9   < > 1.3*   PHOSPHORUS mg/dL  --  4.2 4.0 3.2   < >  --    HEMOGLOBIN g/dL 10.5*  --  10.9* 9.7*   < >  --    HEMATOCRIT % 31.7*  --  32.7* 30.2*   < >  --    WBC 10*3/mm3 10.32  --  10.52 9.51   < >  --    TRIGLYCERIDES mg/dL  --   --  271*  --    < >  --    ALBUMIN g/dL  --   --   --   --   --  2.9*    < > = values in this interval not displayed.     Results from last 7 days   Lab Units 04/12/24  0810 04/11/24  0701 04/10/24  0341 04/09/24  0631 04/08/24  0602   PLATELETS 10*3/mm3 444 353 306 225 238     COVID19   Date Value Ref Range Status   04/15/2022 Not Detected Not Detected - Ref. Range Final     Lab Results   Component Value Date    HGBA1C 4.90 01/18/2021          Medications            Scheduled Medications cefepime,  2,000 mg, Intravenous, Q8H  chlorhexidine, 15 mL, Mouth/Throat, Q12H  enoxaparin, 40 mg, Subcutaneous, Q24H  famotidine, 20 mg, Nasogastric, BID AC  folic acid, 1 mg, Nasogastric, Daily  furosemide, 40 mg, Intravenous, Q12H  hydrocortisone-bacitracin-zinc oxide-nystatin, 1 Application, Topical, BID  ipratropium-albuterol, 3 mL, Nebulization, 4x Daily - RT  LORazepam, 2 mg, Intravenous, Q6H  midazolam, 4 mg, Intravenous, Once  multivitamin and minerals, 15 mL, Nasogastric, Daily  nicotine, 1 patch, Transdermal, Q24H  senna-docusate sodium, 2 tablet, Oral, BID  sodium chloride, 10 mL, Intravenous, Q12H  sodium chloride, 10 mL, Intravenous, Q12H  thiamine, 100 mg, Oral, Daily        Infusions dexmedetomidine, 0.2-1.5 mcg/kg/hr, Last Rate: 1.5 mcg/kg/hr (04/12/24 1103)  fentanyl 10 mcg/mL,  mcg/hr, Last Rate: 150 mcg/hr (04/12/24 0936)  midazolam, 1-10 mg/hr, Last Rate: 4 mg/hr (04/12/24 0530)  norepinephrine, 0.02-0.3 mcg/kg/min, Last Rate: Stopped (04/08/24 1230)        PRN Medications   acetaminophen    senna-docusate sodium **AND** polyethylene glycol **AND** bisacodyl **AND** bisacodyl    Calcium Replacement - Follow Nurse / BPA Driven Protocol    hydrALAZINE    LORazepam **OR** LORazepam **OR** LORazepam **OR** LORazepam **OR** LORazepam **OR** LORazepam    Magnesium Standard Dose Replacement - Follow Nurse / BPA Driven Protocol    Magnesium Standard Dose Replacement - Follow Nurse / BPA Driven Protocol    metoprolol tartrate    nicotine polacrilex    nitroglycerin    ondansetron    Phosphorus Replacement - Follow Nurse / BPA Driven Protocol    Potassium Replacement - Follow Nurse / BPA Driven Protocol    [COMPLETED] Insert Peripheral IV **AND** sodium chloride    sodium chloride    sodium chloride    sodium chloride    sodium chloride     Physical Findings          Physical Appearance disoriented, sedate, ventilator support   Oral/Mouth Cavity tooth or teeth missing   Edema  3+ (moderate)   Gastrointestinal  last bowel movement: 4/8   Skin  excoriation, pressure injury: gluteal , wound on abd   Tubes/Drains/Lines Cortrak, NG tube, bridle in place   NFPE No clinical signs of muscle wasting or fat loss   --  Current Nutrition Orders & Evaluation of Intake       Oral Nutrition     Food Allergies NKFA   Current PO Diet NPO Diet NPO Type: Tube Feeding   Supplement    PO Evaluation     Trending % PO Intake       Enteral Nutrition     Enteral Route NG    TF Delivery Method Continuous    Propofol Rate/Kcal off    Current TF Order/Rate  Peptamen Intense VHP @ 50 mL/hr    TF Goal Rate 50 mL/hr    Current Water Flush 30 mL Q 4 hr    Modular None    TF Residual  no or minimal residual    TF Tolerance tolerating    TF Observation Verified correct TF and water flush infusing per orders     Nutrition Diagnosis        Nutrition Dx Problem 1 Problem: Inadequate Oral Intake  Etiology: Medical Diagnosis - severe alcohol abuse, asp PNA, substance abuse, HTN, LIVAN, resp failure - on vent.  Signs/Symptoms: NPO    Comment:      INTERVENTION / PLAN OF CARE  Intervention Goal        Intervention Goal(s) Maintain nutrition status, Reduce/improve symptoms, Meet estimated needs, Disease management/therapy, Initiate TF/PN, Tolerate TF/PN at goal, Transition TF to PO, and No significant weight loss     Nutrition Intervention        RD Action Adjust EN/PN regimen, Continue to monitor, Care plan reviewed, and Recommend/order: EN     Prescription         Diet     Supplement/Snack    EN/PN     Prescription Ordered       Enteral Prescription:     Enteral Route NG    TF Delivery Method Continuous    Enteral Product Peptamen AF    Modular None    Propofol Rate/Kcal off    TF Start Rate 50    TF Goal Rate 65    Free Water Flush 30mL q 4 hrs    TF Provision at Goal: 1872 kcal, 118 gm protein, 1263 mL free water + 180 mL water flushes         Calories 100 % needs met         Protein  105 % needs met         Fluid (mL) 1443mL    Prescription Ordered Yes      Monitor/Evaluation        Monitor Per protocol   Discharge Plan Pending clinical course   Education Will instruct as appropriate     RD to follow per protocol.       Electronically signed by:  Queenie Dunham RD  04/12/24 11:30 EDT

## 2024-04-12 NOTE — PROGRESS NOTES
[facility] INPATIENT PROGRESS NOTE         [facility] INTENSIVE CARE    2024      PATIENT IDENTIFICATION:  Name: Watson Lisa ADMIT: 2024   : 1955  PCP: Roc Carroll APRN    MRN: [id number] LOS: 6 days   AGE/SEX: 68 y.o. male  ROOM: [facility]                     LOS 6    Reason for visit: ICU medical management with aspiration pneumonia, respiratory failure on ventilator      SUBJECTIVE:      Noted issues overnight.  Had done fairly well on spontaneous through the day until in the evening and desaturated.  On full support.  Discussed with respiratory therapy.  Still agitation and confusion prevents consideration of extubation.  Propofol off due to elevated triglycerides and using Versed and fentanyl.  Getting edematous and will give low-dose diuretic.  Tolerating tube feeds.  Discussed with nursing staff.      Objective   OBJECTIVE:    Vital Sign Min/Max for last 24 hours  Temp  Min: 98.1 °F (36.7 °C)  Max: 99.8 °F (37.7 °C)   BP  Min: 133/61  Max: 208/126   Pulse  Min: 56  Max: 125   Resp  Min: 18  Max: 28   SpO2  Min: 91 %  Max: 99 %   No data recorded   Weight  Min: 127 kg (281 lb 1.4 oz)  Max: 127 kg (281 lb 1.4 oz)    Vitals:    24 0700 24 0720 24 0800 24 0900   BP: 143/80  134/64 139/65   Patient Position:   Lying    Pulse: 58 62 71 66   Resp:  18     Temp:   98.9 °F (37.2 °C)    TempSrc:   Oral    SpO2: 97% 96% 95% 97%   Weight:       Height:                04/10/24  0500 24  0529 24  0600   Weight: 130 kg (286 lb 13.1 oz) 129 kg (284 lb 13.4 oz) 127 kg (281 lb 1.4 oz)       Body mass index is 31.67 kg/m².        Mode: PC/AC  FiO2 (%):  [30 %-41 %] 40 %  S RR:  [12] 12  PEEP/CPAP (cm H2O):  [5 cm H20-7.5 cm H20] 7.5 cm H20  IL SUP:  [8 cm H20] 8 cm H20  MAP (cm H2O):  [7.5-16] 11           FiO2 (%): 40 %     Body mass index is 31.67 kg/m².    Intake/Output Summary (Last 24 hours) at 2024 1033  Last data filed at 2024  0936  Gross per 24 hour   Intake 3618.57 ml   Output 4885 ml   Net -1266.43 ml         Exam:  GEN:  No distress, appears stated age  EYES:   PERRL, anicteric sclerae  ENT:    External ears/nose normal, OP clear  NECK:  No adenopathy, midline trachea  LUNGS: Normal chest on inspection, palpation and auscultation  CV:  Normal S1S2, without murmur  ABD:  Nontender, nondistended, no hepatosplenomegaly, +BS  EXT:  No edema.  No cyanosis or clubbing.  No mottling and normal cap refill.    Assessment     Scheduled meds:  cefepime, 2,000 mg, Intravenous, Q8H  chlorhexidine, 15 mL, Mouth/Throat, Q12H  enoxaparin, 40 mg, Subcutaneous, Q24H  famotidine, 20 mg, Nasogastric, BID AC  folic acid, 1 mg, Nasogastric, Daily  furosemide, 40 mg, Intravenous, Q12H  hydrocortisone-bacitracin-zinc oxide-nystatin, 1 Application, Topical, BID  ipratropium-albuterol, 3 mL, Nebulization, 4x Daily - RT  LORazepam, 2 mg, Intravenous, Q6H  midazolam, 4 mg, Intravenous, Once  multivitamin and minerals, 15 mL, Nasogastric, Daily  nicotine, 1 patch, Transdermal, Q24H  senna-docusate sodium, 2 tablet, Oral, BID  sodium chloride, 10 mL, Intravenous, Q12H  sodium chloride, 10 mL, Intravenous, Q12H  thiamine, 100 mg, Oral, Daily      IV meds:                      dexmedetomidine, 0.2-1.5 mcg/kg/hr, Last Rate: 1.5 mcg/kg/hr (04/12/24 0903)  fentanyl 10 mcg/mL,  mcg/hr, Last Rate: 150 mcg/hr (04/12/24 0936)  midazolam, 1-10 mg/hr, Last Rate: 4 mg/hr (04/12/24 0530)  norepinephrine, 0.02-0.3 mcg/kg/min, Last Rate: Stopped (04/08/24 1230)      Data Review:  Results from last 7 days   Lab Units 04/12/24  0600 04/11/24  1205 04/11/24  0701 04/10/24  0341 04/09/24  0631 04/08/24  1748 04/08/24  1359   SODIUM mmol/L 134*  --  139 143 144  --  142   POTASSIUM mmol/L 4.7 4.4 5.5* 4.2 4.1   < > 3.6   CHLORIDE mmol/L 104  --  112* 114* 112*  --  108*   CO2 mmol/L 18.8*  --  18.0* 18.9* 21.0*  --  21.1*   BUN mg/dL 13  --  10 9 11  --  11   CREATININE mg/dL  0.64*  --  0.74* 0.75* 0.94  --  1.08   GLUCOSE mg/dL 138*  --  130* 102* 101*  --  113*   CALCIUM mg/dL 8.8  --  8.5* 8.0* 7.7*  --  7.9*    < > = values in this interval not displayed.         Estimated Creatinine Clearance: 164.9 mL/min (A) (by C-G formula based on SCr of 0.64 mg/dL (L)).  Results from last 7 days   Lab Units 04/12/24  0810 04/11/24  0701 04/10/24  0341 04/09/24  0631 04/08/24  0602   WBC 10*3/mm3 10.32 10.52 9.51 9.79 11.62*   HEMOGLOBIN g/dL 10.5* 10.9* 9.7* 10.7* 12.2*   PLATELETS 10*3/mm3 444 353 306 225 238         Results from last 7 days   Lab Units 04/08/24  1359 04/07/24  0713 04/06/24  1145   ALT (SGPT) U/L 12 14 21   AST (SGOT) U/L 17 22 22     Results from last 7 days   Lab Units 04/11/24  0334 04/10/24  1237 04/10/24  0342 04/09/24  0400 04/08/24  0847   PH, ARTERIAL pH units 7.339* 7.359 7.366 7.382 7.374   PO2 ART mm Hg 222.2* 82.8 98.1 82.2 406.3*   PCO2, ARTERIAL mm Hg 37.1 33.9* 33.5* 34.4* 37.8   HCO3 ART mmol/L 20.0* 19.1* 19.2* 20.4* 22.1     Results from last 7 days   Lab Units 04/08/24  1359 04/08/24  0830 04/07/24  0713   PROCALCITONIN ng/mL 0.17  --   --    LACTATE mmol/L  --  0.9 0.8         Glucose   Date/Time Value Ref Range Status   04/12/2024 0612 146 (H) 70 - 130 mg/dL Final   04/12/2024 0040 139 (H) 70 - 130 mg/dL Final   04/11/2024 1750 132 (H) 70 - 130 mg/dL Final   04/11/2024 1148 152 (H) 70 - 130 mg/dL Final   04/11/2024 0521 133 (H) 70 - 130 mg/dL Final   04/11/2024 0018 137 (H) 70 - 130 mg/dL Final   04/10/2024 1801 125 70 - 130 mg/dL Final         Imaging reviewed  Chest x-ray 4/12 reviewed              Microbiology reviewed  Blood cultures no growth to date.  Respiratory culture no growth  MRSA screen positive              Active Hospital Problems    Diagnosis  POA    **Alcohol withdrawal [F10.939]  Yes    Substance abuse [F19.10]  Unknown    Cocaine abuse [F14.10]  Unknown    Positive urine drug screen [R82.5]  Unknown    History of alcohol use disorder  [Z87.898]  Yes    Alcoholic liver disease [K70.9]  Yes    COPD (chronic obstructive pulmonary disease) [J44.9]  Yes    LIVAN (obstructive sleep apnea) [G47.33]  Yes    Essential hypertension [I10]  Yes      Resolved Hospital Problems   No resolved problems to display.         ASSESSMENT:  Acute respiratory failure requiring mechanical ventilation  Aspiration pneumonia with haemophilus growing in culture  Sepsis with shock  Severe alcohol abuse with withdrawal  Polysubstance abuse: UDS positive for cocaine  LIVAN        PLAN:  Sedation vacation and spontaneous breathing trial.  Failed trial again due to persistent agitation and alcohol withdrawal symptoms.  Continue IV fluids and pressor as needed.  Off pressor.  Continue antibiotics and narrow based on culture results.    Alcohol withdrawal protocol.  Thiamine, folic acid and multivitamin.  Control glucose.  Control blood pressure.  Lovenox for DVT prophylaxis.  Pepcid for ulcer prophylaxis.      Discussed with multidisciplinary ICU team on rounds this morning.            Watson Zuleta MD  Pulmonary and Critical Care Medicine  [facility]  4/12/2024    10:33 EDT

## 2024-04-12 NOTE — CASE MANAGEMENT/SOCIAL WORK
Continued Stay Note  [facility]     Patient Name: Watson Lisa  MRN: [id number]  Today's Date: 4/12/2024    Admit Date: 4/6/2024    Plan: Pending   Discharge Plan       Row Name 04/12/24 1623       Plan    Plan Pending    Plan Comments CCP attempted to screen patient by calling his son. Left voicemail. CCP following.                   Discharge Codes    No documentation.

## 2024-04-13 LAB
ANION GAP SERPL CALCULATED.3IONS-SCNC: 10.9 MMOL/L (ref 5–15)
BACTERIA SPEC AEROBE CULT: NORMAL
BACTERIA SPEC AEROBE CULT: NORMAL
BUN SERPL-MCNC: 21 MG/DL (ref 8–23)
BUN/CREAT SERPL: 28 (ref 7–25)
CALCIUM SPEC-SCNC: 8.9 MG/DL (ref 8.6–10.5)
CHLORIDE SERPL-SCNC: 103 MMOL/L (ref 98–107)
CO2 SERPL-SCNC: 23.1 MMOL/L (ref 22–29)
CREAT SERPL-MCNC: 0.75 MG/DL (ref 0.76–1.27)
DEPRECATED RDW RBC AUTO: 42.1 FL (ref 37–54)
EGFRCR SERPLBLD CKD-EPI 2021: 97.7 ML/MIN/1.73
ERYTHROCYTE [DISTWIDTH] IN BLOOD BY AUTOMATED COUNT: 11.9 % (ref 12.3–15.4)
GLUCOSE BLDC GLUCOMTR-MCNC: 128 MG/DL (ref 70–130)
GLUCOSE BLDC GLUCOMTR-MCNC: 139 MG/DL (ref 70–130)
GLUCOSE BLDC GLUCOMTR-MCNC: 143 MG/DL (ref 70–130)
GLUCOSE BLDC GLUCOMTR-MCNC: 147 MG/DL (ref 70–130)
GLUCOSE SERPL-MCNC: 132 MG/DL (ref 65–99)
HCT VFR BLD AUTO: 34.8 % (ref 37.5–51)
HGB BLD-MCNC: 11.8 G/DL (ref 13–17.7)
MAGNESIUM SERPL-MCNC: 1.8 MG/DL (ref 1.6–2.4)
MCH RBC QN AUTO: 32.6 PG (ref 26.6–33)
MCHC RBC AUTO-ENTMCNC: 33.9 G/DL (ref 31.5–35.7)
MCV RBC AUTO: 96.1 FL (ref 79–97)
PHOSPHATE SERPL-MCNC: 4.4 MG/DL (ref 2.5–4.5)
PLATELET # BLD AUTO: 340 10*3/MM3 (ref 140–450)
PMV BLD AUTO: 10.1 FL (ref 6–12)
POTASSIUM SERPL-SCNC: 4.8 MMOL/L (ref 3.5–5.2)
RBC # BLD AUTO: 3.62 10*6/MM3 (ref 4.14–5.8)
SODIUM SERPL-SCNC: 137 MMOL/L (ref 136–145)
WBC NRBC COR # BLD AUTO: 10.8 10*3/MM3 (ref 3.4–10.8)

## 2024-04-13 PROCEDURE — 94799 UNLISTED PULMONARY SVC/PX: CPT

## 2024-04-13 PROCEDURE — 25010000002 ENOXAPARIN PER 10 MG: Performed by: INTERNAL MEDICINE

## 2024-04-13 PROCEDURE — 25010000002 LORAZEPAM PER 2 MG: Performed by: INTERNAL MEDICINE

## 2024-04-13 PROCEDURE — 85027 COMPLETE CBC AUTOMATED: CPT | Performed by: STUDENT IN AN ORGANIZED HEALTH CARE EDUCATION/TRAINING PROGRAM

## 2024-04-13 PROCEDURE — 25010000002 CEFTRIAXONE PER 250 MG: Performed by: INTERNAL MEDICINE

## 2024-04-13 PROCEDURE — 94003 VENT MGMT INPAT SUBQ DAY: CPT

## 2024-04-13 PROCEDURE — 94664 DEMO&/EVAL PT USE INHALER: CPT

## 2024-04-13 PROCEDURE — 25010000002 MIDAZOLAM 50 MG/10ML SOLUTION

## 2024-04-13 PROCEDURE — 25010000002 FENTANYL CITRATE (PF) 2500 MCG/50ML SOLUTION

## 2024-04-13 PROCEDURE — 94761 N-INVAS EAR/PLS OXIMETRY MLT: CPT

## 2024-04-13 PROCEDURE — 80048 BASIC METABOLIC PNL TOTAL CA: CPT | Performed by: STUDENT IN AN ORGANIZED HEALTH CARE EDUCATION/TRAINING PROGRAM

## 2024-04-13 PROCEDURE — 82948 REAGENT STRIP/BLOOD GLUCOSE: CPT

## 2024-04-13 PROCEDURE — 25010000002 CEFEPIME PER 500 MG

## 2024-04-13 PROCEDURE — 83735 ASSAY OF MAGNESIUM: CPT | Performed by: STUDENT IN AN ORGANIZED HEALTH CARE EDUCATION/TRAINING PROGRAM

## 2024-04-13 PROCEDURE — 84100 ASSAY OF PHOSPHORUS: CPT | Performed by: STUDENT IN AN ORGANIZED HEALTH CARE EDUCATION/TRAINING PROGRAM

## 2024-04-13 RX ADMIN — ZINC OXIDE 1 APPLICATION: 200 OINTMENT TOPICAL at 08:37

## 2024-04-13 RX ADMIN — CHLORHEXIDINE GLUCONATE 15 ML: 1.2 RINSE ORAL at 20:42

## 2024-04-13 RX ADMIN — Medication 10 ML: at 08:37

## 2024-04-13 RX ADMIN — CEFTRIAXONE SODIUM 1000 MG: 1 INJECTION, POWDER, FOR SOLUTION INTRAMUSCULAR; INTRAVENOUS at 14:05

## 2024-04-13 RX ADMIN — DEXMEDETOMIDINE HYDROCHLORIDE IN SODIUM CHLORIDE 1.5 MCG/KG/HR: 4 INJECTION INTRAVENOUS at 18:17

## 2024-04-13 RX ADMIN — DEXMEDETOMIDINE HYDROCHLORIDE IN SODIUM CHLORIDE 1.5 MCG/KG/HR: 4 INJECTION INTRAVENOUS at 16:21

## 2024-04-13 RX ADMIN — DEXMEDETOMIDINE HYDROCHLORIDE IN SODIUM CHLORIDE 1.5 MCG/KG/HR: 4 INJECTION INTRAVENOUS at 00:10

## 2024-04-13 RX ADMIN — IPRATROPIUM BROMIDE AND ALBUTEROL SULFATE 3 ML: .5; 3 SOLUTION RESPIRATORY (INHALATION) at 20:17

## 2024-04-13 RX ADMIN — DEXMEDETOMIDINE HYDROCHLORIDE IN SODIUM CHLORIDE 1.5 MCG/KG/HR: 4 INJECTION INTRAVENOUS at 12:16

## 2024-04-13 RX ADMIN — FENTANYL CITRATE 300 MCG/HR: 0.05 INJECTION, SOLUTION INTRAMUSCULAR; INTRAVENOUS at 10:43

## 2024-04-13 RX ADMIN — ENOXAPARIN SODIUM 40 MG: 100 INJECTION SUBCUTANEOUS at 11:53

## 2024-04-13 RX ADMIN — MIDAZOLAM 4 MG/HR: 5 INJECTION INTRAMUSCULAR; INTRAVENOUS at 02:04

## 2024-04-13 RX ADMIN — FENTANYL CITRATE 300 MCG/HR: 0.05 INJECTION, SOLUTION INTRAMUSCULAR; INTRAVENOUS at 18:07

## 2024-04-13 RX ADMIN — CEFEPIME 2000 MG: 2 INJECTION, POWDER, FOR SOLUTION INTRAVENOUS at 08:36

## 2024-04-13 RX ADMIN — MIDAZOLAM 5 MG/HR: 5 INJECTION INTRAMUSCULAR; INTRAVENOUS at 12:12

## 2024-04-13 RX ADMIN — LORAZEPAM 2 MG: 2 INJECTION INTRAMUSCULAR; INTRAVENOUS at 13:18

## 2024-04-13 RX ADMIN — MIDAZOLAM 5 MG/HR: 5 INJECTION INTRAMUSCULAR; INTRAVENOUS at 21:17

## 2024-04-13 RX ADMIN — DEXMEDETOMIDINE HYDROCHLORIDE IN SODIUM CHLORIDE 1.5 MCG/KG/HR: 4 INJECTION INTRAVENOUS at 01:30

## 2024-04-13 RX ADMIN — DEXMEDETOMIDINE HYDROCHLORIDE IN SODIUM CHLORIDE 1.5 MCG/KG/HR: 4 INJECTION INTRAVENOUS at 09:53

## 2024-04-13 RX ADMIN — LORAZEPAM 2 MG: 2 INJECTION INTRAMUSCULAR; INTRAVENOUS at 23:59

## 2024-04-13 RX ADMIN — IPRATROPIUM BROMIDE AND ALBUTEROL SULFATE 3 ML: .5; 3 SOLUTION RESPIRATORY (INHALATION) at 15:48

## 2024-04-13 RX ADMIN — LORAZEPAM 2 MG: 2 INJECTION INTRAMUSCULAR; INTRAVENOUS at 07:19

## 2024-04-13 RX ADMIN — DEXMEDETOMIDINE HYDROCHLORIDE IN SODIUM CHLORIDE 1.5 MCG/KG/HR: 4 INJECTION INTRAVENOUS at 20:41

## 2024-04-13 RX ADMIN — DEXMEDETOMIDINE HYDROCHLORIDE IN SODIUM CHLORIDE 1.5 MCG/KG/HR: 4 INJECTION INTRAVENOUS at 05:55

## 2024-04-13 RX ADMIN — Medication 10 ML: at 20:42

## 2024-04-13 RX ADMIN — DEXMEDETOMIDINE HYDROCHLORIDE IN SODIUM CHLORIDE 1.5 MCG/KG/HR: 4 INJECTION INTRAVENOUS at 03:51

## 2024-04-13 RX ADMIN — LORAZEPAM 2 MG: 2 INJECTION INTRAMUSCULAR; INTRAVENOUS at 17:15

## 2024-04-13 RX ADMIN — FENTANYL CITRATE 250 MCG/HR: 0.05 INJECTION, SOLUTION INTRAMUSCULAR; INTRAVENOUS at 01:51

## 2024-04-13 RX ADMIN — CEFEPIME 2000 MG: 2 INJECTION, POWDER, FOR SOLUTION INTRAVENOUS at 00:10

## 2024-04-13 RX ADMIN — DEXMEDETOMIDINE HYDROCHLORIDE IN SODIUM CHLORIDE 1.5 MCG/KG/HR: 4 INJECTION INTRAVENOUS at 23:10

## 2024-04-13 RX ADMIN — FENTANYL CITRATE 300 MCG/HR: 0.05 INJECTION, SOLUTION INTRAMUSCULAR; INTRAVENOUS at 07:16

## 2024-04-13 RX ADMIN — DEXMEDETOMIDINE HYDROCHLORIDE IN SODIUM CHLORIDE 1.5 MCG/KG/HR: 4 INJECTION INTRAVENOUS at 08:02

## 2024-04-13 RX ADMIN — DOCUSATE SODIUM 50MG AND SENNOSIDES 8.6MG 2 TABLET: 8.6; 5 TABLET, FILM COATED ORAL at 08:36

## 2024-04-13 RX ADMIN — IPRATROPIUM BROMIDE AND ALBUTEROL SULFATE 3 ML: .5; 3 SOLUTION RESPIRATORY (INHALATION) at 10:51

## 2024-04-13 RX ADMIN — FENTANYL CITRATE 300 MCG/HR: 0.05 INJECTION, SOLUTION INTRAMUSCULAR; INTRAVENOUS at 21:25

## 2024-04-13 RX ADMIN — LORAZEPAM 2 MG: 2 INJECTION INTRAMUSCULAR; INTRAVENOUS at 06:08

## 2024-04-13 RX ADMIN — LORAZEPAM 2 MG: 2 INJECTION INTRAMUSCULAR; INTRAVENOUS at 18:57

## 2024-04-13 RX ADMIN — FOLIC ACID 1 MG: 1 TABLET ORAL at 08:36

## 2024-04-13 RX ADMIN — FAMOTIDINE 20 MG: 20 TABLET, FILM COATED ORAL at 08:36

## 2024-04-13 RX ADMIN — DOCUSATE SODIUM 50MG AND SENNOSIDES 8.6MG 2 TABLET: 8.6; 5 TABLET, FILM COATED ORAL at 20:41

## 2024-04-13 RX ADMIN — LORAZEPAM 2 MG: 2 INJECTION INTRAMUSCULAR; INTRAVENOUS at 11:52

## 2024-04-13 RX ADMIN — LORAZEPAM 2 MG: 2 INJECTION INTRAMUSCULAR; INTRAVENOUS at 00:10

## 2024-04-13 RX ADMIN — LORAZEPAM 2 MG: 2 INJECTION INTRAMUSCULAR; INTRAVENOUS at 20:55

## 2024-04-13 RX ADMIN — DEXMEDETOMIDINE HYDROCHLORIDE IN SODIUM CHLORIDE 1.5 MCG/KG/HR: 4 INJECTION INTRAVENOUS at 14:15

## 2024-04-13 RX ADMIN — FENTANYL CITRATE 300 MCG/HR: 0.05 INJECTION, SOLUTION INTRAMUSCULAR; INTRAVENOUS at 14:14

## 2024-04-13 RX ADMIN — IPRATROPIUM BROMIDE AND ALBUTEROL SULFATE 3 ML: .5; 3 SOLUTION RESPIRATORY (INHALATION) at 07:51

## 2024-04-13 RX ADMIN — CHLORHEXIDINE GLUCONATE 15 ML: 1.2 RINSE ORAL at 08:36

## 2024-04-13 RX ADMIN — Medication 15 ML: at 08:36

## 2024-04-13 RX ADMIN — Medication 100 MG: at 08:36

## 2024-04-13 RX ADMIN — ZINC OXIDE 1 APPLICATION: 200 OINTMENT TOPICAL at 20:41

## 2024-04-13 RX ADMIN — FAMOTIDINE 20 MG: 20 TABLET, FILM COATED ORAL at 17:22

## 2024-04-13 NOTE — PLAN OF CARE
Goal Outcome Evaluation:   Patient sedated on ventilator, eyes open spontaneously, localizes pain, pupils equal and reactive, can move extremities equally. CIWA labile. Requiring significant sedation.  Normal sinus to sinus bradycardia, normotensive, pulses palpable, edematous extremities, SCDs on, Lovenox for VTE, afebrile.  Mechanically ventilated, large amount of oral and endotracheal secretions frequently clogging HME requiring frequent suctioning, diminished breath sounds, SPO2 in the high 90s.  TFs at goal with flushes, streeter, adequate UOP.  Rash/discoloration on lower abdomen, hip and back, magic barrier ointment applied.  New IV placed by IV team.

## 2024-04-13 NOTE — PROGRESS NOTES
.  [facility]    [phone]   LOS: 7 days     Name: Watson Lisa  Age/Sex: 68 y.o. male  :  1955        PCP: Roc Carroll APRN  Chief Complaint   Patient presents with    Alcohol Problem      Subjective   Remains on the vent    cefepime, 2,000 mg, Intravenous, Q8H  chlorhexidine, 15 mL, Mouth/Throat, Q12H  enoxaparin, 40 mg, Subcutaneous, Q24H  famotidine, 20 mg, Nasogastric, BID AC  folic acid, 1 mg, Nasogastric, Daily  hydrocortisone-bacitracin-zinc oxide-nystatin, 1 Application, Topical, BID  ipratropium-albuterol, 3 mL, Nebulization, 4x Daily - RT  LORazepam, 2 mg, Intravenous, Q6H  midazolam, 4 mg, Intravenous, Once  multivitamin and minerals, 15 mL, Nasogastric, Daily  nicotine, 1 patch, Transdermal, Q24H  senna-docusate sodium, 2 tablet, Oral, BID  sodium chloride, 10 mL, Intravenous, Q12H  sodium chloride, 10 mL, Intravenous, Q12H  thiamine, 100 mg, Oral, Daily      dexmedetomidine, 0.2-1.5 mcg/kg/hr, Last Rate: 1.5 mcg/kg/hr (24 1216)  fentanyl 10 mcg/mL,  mcg/hr, Last Rate: 300 mcg/hr (24 1043)  midazolam, 1-10 mg/hr, Last Rate: 5 mg/hr (24 1212)  norepinephrine, 0.02-0.3 mcg/kg/min, Last Rate: Stopped (24 1230)        Objective   Vital Signs  Temp:  [98.1 °F (36.7 °C)-98.8 °F (37.1 °C)] 98.1 °F (36.7 °C)  Heart Rate:  [53-86] 55  Resp:  [12-26] 14  BP: ()/(47-87) 136/71  FiO2 (%):  [34 %-100 %] 35 %  Body mass index is 31.67 kg/m².    Intake/Output Summary (Last 24 hours) at 2024 1332  Last data filed at 2024 1200  Gross per 24 hour   Intake 4527.54 ml   Output 4035 ml   Net 492.54 ml       Physical Exam  Vitals and nursing note reviewed.   Constitutional:       General: He is not in acute distress.     Appearance: He is ill-appearing.   Cardiovascular:      Rate and Rhythm: Normal rate and regular rhythm.   Pulmonary:      Effort: Pulmonary effort is normal. No respiratory distress.   Neurological:      Comments:  Presently intubated and sedated           Results Review:       I reviewed the patient's new clinical results.  Results from last 7 days   Lab Units 04/13/24  0600 04/12/24  0810 04/11/24  0701 04/10/24  0341 04/09/24  0631 04/08/24  0602 04/07/24  0713   WBC 10*3/mm3 10.80 10.32 10.52 9.51 9.79 11.62* 7.58   HEMOGLOBIN g/dL 11.8* 10.5* 10.9* 9.7* 10.7* 12.2* 11.6*   PLATELETS 10*3/mm3 340 444 353 306 225 238 186     Results from last 7 days   Lab Units 04/13/24  0600 04/12/24  0600 04/11/24  1205 04/11/24  0701 04/10/24  0341 04/09/24  0631 04/08/24  1748 04/08/24  1359 04/08/24  0602   SODIUM mmol/L 137 134*  --  139 143 144  --  142 142   POTASSIUM mmol/L 4.8 4.7 4.4 5.5* 4.2 4.1 3.9 3.6 4.8   CHLORIDE mmol/L 103 104  --  112* 114* 112*  --  108* 106   CO2 mmol/L 23.1 18.8*  --  18.0* 18.9* 21.0*  --  21.1* 18.7*   BUN mg/dL 21 13  --  10 9 11  --  11 9   CREATININE mg/dL 0.75* 0.64*  --  0.74* 0.75* 0.94  --  1.08 0.75*   CALCIUM mg/dL 8.9 8.8  --  8.5* 8.0* 7.7*  --  7.9* 8.9   MAGNESIUM mg/dL 1.8 2.1  --  1.4* 1.9 2.2 1.3* 1.3* 1.8   PHOSPHORUS mg/dL 4.4 4.2  --  4.0 3.2 3.6 4.0  --  3.8   Estimated Creatinine Clearance: 140.7 mL/min (A) (by C-G formula based on SCr of 0.75 mg/dL (L)).  Results from last 7 days   Lab Units 04/08/24  1359 04/07/24  0713   ALK PHOS U/L 57 65   BILIRUBIN mg/dL 0.3 0.3   ALT (SGPT) U/L 12 14   AST (SGOT) U/L 17 22         Assessment & Plan   Active Hospital Problems    Diagnosis  POA    **Alcohol withdrawal [F10.939]  Yes    Substance abuse [F19.10]  Unknown    Cocaine abuse [F14.10]  Unknown    Positive urine drug screen [R82.5]  Unknown    History of alcohol use disorder [Z87.898]  Yes    Alcoholic liver disease [K70.9]  Yes    COPD (chronic obstructive pulmonary disease) [J44.9]  Yes    LIVAN (obstructive sleep apnea) [G47.33]  Yes    Essential hypertension [I10]  Yes      Resolved Hospital Problems   No resolved problems to display.       PLAN  This is a 68-year-old gentleman  with a history of polysubstance use COPD, obstructive sleep apnea alcoholic liver disease and hypertension who presented to the hospital after being brought by family for wanting to quit drinking and using drugs.  He developed significant withdrawal and delirium and was transferred to the ICU where he remains intubated and sedated  -Appreciate pulmonary critical care's assistance with management of his agitation and respiratory failure  -Noted ongoing metabolic acidosis otherwise renal function electrolytes for the most part stable  -Remains on IV cefepime for aspiration pneumonia and positive cultures with haemophilus  -Lovenox for DVT prophylaxis  -Full code, overall prognosis guarded    Disposition  Expected discharge date/ time has not been documented.       Vincent Barnes MD  [facility]  04/13/24  13:32 EDT

## 2024-04-13 NOTE — PROGRESS NOTES
[facility]     Mar/chart reviewed  Follow up alcohol withdrawal, mechanical ventilation  Patient sedated on vent unable to provide subjective    Vital Sign Min/Max for last 24 hours  Temp  Min: 98.1 °F (36.7 °C)  Max: 98.8 °F (37.1 °C)   BP  Min: 83/47  Max: 168/87   Pulse  Min: 53  Max: 86   Resp  Min: 12  Max: 26   SpO2  Min: 89 %  Max: 99 %   No data recorded   No data recorded   3454/6385  Appears ill, sedated on vent  perrl, normal sclera  mmm, no jvd, trachea midline, neck supple,  chest cta bilaterally, no crackles, no wheezes,   rrr,   soft, nt, nd +bs,  no c/c/ 1+ edema  Skin warm, dry no rashes    Labs: 4/13: reviewed:  Glucose 132  Bun 21  Cr 0.75  Bicarb 23  Wbc 10.8  Hgb 11.8  Plts 340    A/P:  1. Acute respiratory failure requiring  mechanical ventilation -- has been failing wean attempts due to mental status  2. Aspiration pneumonia with H flu -- can finish antibiotic course with rocephin given culture results and we wouldn't want any mental status side effects from cefepime.   3. Severe alcohol abuse with dependence and withdrawal -- remains on benzos  4. Polysubstance abuse -- likely making improvement in his mental status and sedation needs all the more difficult  5. LIVAN  6. Sepsis with shock - resolved    Patient is new to me today  He is tolerating PS right now, still on a lot of sedation, doesn't follow commands, does arouse some to voice.      CC 35 mins.

## 2024-04-14 LAB
ANION GAP SERPL CALCULATED.3IONS-SCNC: 10.5 MMOL/L (ref 5–15)
ARTERIAL PATENCY WRIST A: POSITIVE
ATMOSPHERIC PRESS: 746.1 MMHG
BASE EXCESS BLDA CALC-SCNC: 1.7 MMOL/L (ref 0–2)
BDY SITE: ABNORMAL
BUN SERPL-MCNC: 22 MG/DL (ref 8–23)
BUN/CREAT SERPL: 32.8 (ref 7–25)
CALCIUM SPEC-SCNC: 8.8 MG/DL (ref 8.6–10.5)
CHLORIDE SERPL-SCNC: 104 MMOL/L (ref 98–107)
CO2 BLDA-SCNC: 27.1 MMOL/L (ref 23–27)
CO2 SERPL-SCNC: 22.5 MMOL/L (ref 22–29)
CREAT SERPL-MCNC: 0.67 MG/DL (ref 0.76–1.27)
DEPRECATED RDW RBC AUTO: 39.4 FL (ref 37–54)
DEVICE COMMENT 2: ABNORMAL
DEVICE COMMENT: ABNORMAL
EGFRCR SERPLBLD CKD-EPI 2021: 101.1 ML/MIN/1.73
ERYTHROCYTE [DISTWIDTH] IN BLOOD BY AUTOMATED COUNT: 11.5 % (ref 12.3–15.4)
GLUCOSE BLDC GLUCOMTR-MCNC: 124 MG/DL (ref 70–130)
GLUCOSE BLDC GLUCOMTR-MCNC: 127 MG/DL (ref 70–130)
GLUCOSE BLDC GLUCOMTR-MCNC: 139 MG/DL (ref 70–130)
GLUCOSE BLDC GLUCOMTR-MCNC: 145 MG/DL (ref 70–130)
GLUCOSE BLDC GLUCOMTR-MCNC: 284 MG/DL (ref 70–130)
GLUCOSE SERPL-MCNC: 116 MG/DL (ref 65–99)
HCO3 BLDA-SCNC: 25.9 MMOL/L (ref 22–28)
HCT VFR BLD AUTO: 31.1 % (ref 37.5–51)
HEMODILUTION: NO
HGB BLD-MCNC: 10.4 G/DL (ref 13–17.7)
INHALED O2 CONCENTRATION: 30 %
MAGNESIUM SERPL-MCNC: 1.9 MG/DL (ref 1.6–2.4)
MCH RBC QN AUTO: 31.7 PG (ref 26.6–33)
MCHC RBC AUTO-ENTMCNC: 33.4 G/DL (ref 31.5–35.7)
MCV RBC AUTO: 94.8 FL (ref 79–97)
MODALITY: ABNORMAL
O2 A-A PPRESDIFF RESPIRATORY: 0.5 MMHG
PCO2 BLDA: 38.4 MM HG (ref 35–45)
PEEP RESPIRATORY: 8 CM[H2O]
PH BLDA: 7.44 PH UNITS (ref 7.35–7.45)
PHOSPHATE SERPL-MCNC: 3.4 MG/DL (ref 2.5–4.5)
PLATELET # BLD AUTO: 509 10*3/MM3 (ref 140–450)
PMV BLD AUTO: 9.6 FL (ref 6–12)
PO2 BLDA: 83 MM HG (ref 80–100)
POTASSIUM SERPL-SCNC: 4.8 MMOL/L (ref 3.5–5.2)
RBC # BLD AUTO: 3.28 10*6/MM3 (ref 4.14–5.8)
SAO2 % BLDCOA: 96.5 % (ref 92–98.5)
SET MECH RESP RATE: 12
SODIUM SERPL-SCNC: 137 MMOL/L (ref 136–145)
TOTAL RATE: 12 BREATHS/MINUTE
VENTILATOR MODE: ABNORMAL
WBC NRBC COR # BLD AUTO: 10.76 10*3/MM3 (ref 3.4–10.8)

## 2024-04-14 PROCEDURE — 94799 UNLISTED PULMONARY SVC/PX: CPT

## 2024-04-14 PROCEDURE — 25010000002 PHENOBARBITAL PER 120 MG: Performed by: INTERNAL MEDICINE

## 2024-04-14 PROCEDURE — 25010000002 LORAZEPAM PER 2 MG: Performed by: INTERNAL MEDICINE

## 2024-04-14 PROCEDURE — 36600 WITHDRAWAL OF ARTERIAL BLOOD: CPT

## 2024-04-14 PROCEDURE — 93010 ELECTROCARDIOGRAM REPORT: CPT | Performed by: INTERNAL MEDICINE

## 2024-04-14 PROCEDURE — 25010000002 MIDAZOLAM 50 MG/10ML SOLUTION

## 2024-04-14 PROCEDURE — 85027 COMPLETE CBC AUTOMATED: CPT | Performed by: STUDENT IN AN ORGANIZED HEALTH CARE EDUCATION/TRAINING PROGRAM

## 2024-04-14 PROCEDURE — 82948 REAGENT STRIP/BLOOD GLUCOSE: CPT

## 2024-04-14 PROCEDURE — 25010000002 FENTANYL CITRATE (PF) 2500 MCG/50ML SOLUTION

## 2024-04-14 PROCEDURE — 25010000002 HYDRALAZINE PER 20 MG: Performed by: INTERNAL MEDICINE

## 2024-04-14 PROCEDURE — 84100 ASSAY OF PHOSPHORUS: CPT | Performed by: STUDENT IN AN ORGANIZED HEALTH CARE EDUCATION/TRAINING PROGRAM

## 2024-04-14 PROCEDURE — 82803 BLOOD GASES ANY COMBINATION: CPT

## 2024-04-14 PROCEDURE — 25010000002 FENTANYL CITRATE (PF) 1000 MCG/20ML SOLUTION

## 2024-04-14 PROCEDURE — 25010000002 CEFTRIAXONE PER 250 MG: Performed by: INTERNAL MEDICINE

## 2024-04-14 PROCEDURE — 94664 DEMO&/EVAL PT USE INHALER: CPT

## 2024-04-14 PROCEDURE — 80048 BASIC METABOLIC PNL TOTAL CA: CPT | Performed by: STUDENT IN AN ORGANIZED HEALTH CARE EDUCATION/TRAINING PROGRAM

## 2024-04-14 PROCEDURE — 94003 VENT MGMT INPAT SUBQ DAY: CPT

## 2024-04-14 PROCEDURE — 25010000002 ENOXAPARIN PER 10 MG: Performed by: INTERNAL MEDICINE

## 2024-04-14 PROCEDURE — 83735 ASSAY OF MAGNESIUM: CPT | Performed by: STUDENT IN AN ORGANIZED HEALTH CARE EDUCATION/TRAINING PROGRAM

## 2024-04-14 PROCEDURE — 94761 N-INVAS EAR/PLS OXIMETRY MLT: CPT

## 2024-04-14 PROCEDURE — 93005 ELECTROCARDIOGRAM TRACING: CPT

## 2024-04-14 RX ORDER — QUETIAPINE FUMARATE 100 MG/1
100 TABLET, FILM COATED ORAL EVERY 12 HOURS SCHEDULED
Status: DISCONTINUED | OUTPATIENT
Start: 2024-04-14 | End: 2024-04-14

## 2024-04-14 RX ORDER — NICOTINE POLACRILEX 4 MG
15 LOZENGE BUCCAL
Status: DISCONTINUED | OUTPATIENT
Start: 2024-04-14 | End: 2024-04-24 | Stop reason: HOSPADM

## 2024-04-14 RX ORDER — CLONIDINE HYDROCHLORIDE 0.1 MG/1
0.3 TABLET ORAL EVERY 6 HOURS SCHEDULED
Status: DISCONTINUED | OUTPATIENT
Start: 2024-04-14 | End: 2024-04-19

## 2024-04-14 RX ORDER — PHENOBARBITAL 32.4 MG/1
32.4 TABLET ORAL ONCE
Status: DISCONTINUED | OUTPATIENT
Start: 2024-04-17 | End: 2024-04-17

## 2024-04-14 RX ORDER — LORAZEPAM 2 MG/ML
2 CONCENTRATE ORAL ONCE
Status: COMPLETED | OUTPATIENT
Start: 2024-04-14 | End: 2024-04-14

## 2024-04-14 RX ORDER — IBUPROFEN 600 MG/1
1 TABLET ORAL
Status: DISCONTINUED | OUTPATIENT
Start: 2024-04-14 | End: 2024-04-24 | Stop reason: HOSPADM

## 2024-04-14 RX ORDER — PHENOBARBITAL 32.4 MG/1
32.4 TABLET ORAL ONCE
Status: COMPLETED | OUTPATIENT
Start: 2024-04-16 | End: 2024-04-16

## 2024-04-14 RX ORDER — PHENOBARBITAL SODIUM 65 MG/ML
65 INJECTION, SOLUTION INTRAMUSCULAR; INTRAVENOUS ONCE
Status: COMPLETED | OUTPATIENT
Start: 2024-04-15 | End: 2024-04-15

## 2024-04-14 RX ORDER — DEXTROSE MONOHYDRATE 25 G/50ML
25 INJECTION, SOLUTION INTRAVENOUS
Status: DISCONTINUED | OUTPATIENT
Start: 2024-04-14 | End: 2024-04-24 | Stop reason: HOSPADM

## 2024-04-14 RX ADMIN — DEXMEDETOMIDINE HYDROCHLORIDE IN SODIUM CHLORIDE 1.5 MCG/KG/HR: 4 INJECTION INTRAVENOUS at 02:30

## 2024-04-14 RX ADMIN — DEXMEDETOMIDINE HYDROCHLORIDE IN SODIUM CHLORIDE 0.8 MCG/KG/HR: 4 INJECTION INTRAVENOUS at 17:49

## 2024-04-14 RX ADMIN — LORAZEPAM 2 MG: 2 INJECTION INTRAMUSCULAR; INTRAVENOUS at 04:18

## 2024-04-14 RX ADMIN — DEXMEDETOMIDINE HYDROCHLORIDE IN SODIUM CHLORIDE 1.5 MCG/KG/HR: 4 INJECTION INTRAVENOUS at 04:20

## 2024-04-14 RX ADMIN — LORAZEPAM 2 MG: 2 INJECTION INTRAMUSCULAR; INTRAVENOUS at 04:52

## 2024-04-14 RX ADMIN — CLONIDINE HYDROCHLORIDE 0.3 MG: 0.1 TABLET ORAL at 17:09

## 2024-04-14 RX ADMIN — DEXMEDETOMIDINE HYDROCHLORIDE IN SODIUM CHLORIDE 1.5 MCG/KG/HR: 4 INJECTION INTRAVENOUS at 11:05

## 2024-04-14 RX ADMIN — FAMOTIDINE 20 MG: 20 TABLET, FILM COATED ORAL at 17:09

## 2024-04-14 RX ADMIN — DEXMEDETOMIDINE HYDROCHLORIDE IN SODIUM CHLORIDE 2 MCG/KG/HR: 4 INJECTION INTRAVENOUS at 23:32

## 2024-04-14 RX ADMIN — CEFTRIAXONE 2000 MG: 2 INJECTION, POWDER, FOR SOLUTION INTRAMUSCULAR; INTRAVENOUS at 13:58

## 2024-04-14 RX ADMIN — LORAZEPAM 2 MG: 2 INJECTION INTRAMUSCULAR; INTRAVENOUS at 00:43

## 2024-04-14 RX ADMIN — FENTANYL CITRATE 300 MCG/HR: 0.05 INJECTION, SOLUTION INTRAMUSCULAR; INTRAVENOUS at 00:54

## 2024-04-14 RX ADMIN — FAMOTIDINE 20 MG: 20 TABLET, FILM COATED ORAL at 06:45

## 2024-04-14 RX ADMIN — DEXMEDETOMIDINE HYDROCHLORIDE IN SODIUM CHLORIDE 1.6 MCG/KG/HR: 4 INJECTION INTRAVENOUS at 13:04

## 2024-04-14 RX ADMIN — IPRATROPIUM BROMIDE AND ALBUTEROL SULFATE 3 ML: .5; 3 SOLUTION RESPIRATORY (INHALATION) at 14:53

## 2024-04-14 RX ADMIN — Medication 10 ML: at 08:16

## 2024-04-14 RX ADMIN — MIDAZOLAM 10 MG/HR: 5 INJECTION INTRAMUSCULAR; INTRAVENOUS at 22:02

## 2024-04-14 RX ADMIN — CHLORHEXIDINE GLUCONATE 15 ML: 1.2 RINSE ORAL at 08:17

## 2024-04-14 RX ADMIN — Medication 1 PATCH: at 18:49

## 2024-04-14 RX ADMIN — IPRATROPIUM BROMIDE AND ALBUTEROL SULFATE 3 ML: .5; 3 SOLUTION RESPIRATORY (INHALATION) at 07:35

## 2024-04-14 RX ADMIN — DEXMEDETOMIDINE HYDROCHLORIDE IN SODIUM CHLORIDE 1.5 MCG/KG/HR: 4 INJECTION INTRAVENOUS at 09:01

## 2024-04-14 RX ADMIN — FENTANYL CITRATE 300 MCG/HR: 0.05 INJECTION, SOLUTION INTRAMUSCULAR; INTRAVENOUS at 08:58

## 2024-04-14 RX ADMIN — FOLIC ACID 1 MG: 1 TABLET ORAL at 08:31

## 2024-04-14 RX ADMIN — ENOXAPARIN SODIUM 40 MG: 100 INJECTION SUBCUTANEOUS at 11:09

## 2024-04-14 RX ADMIN — IPRATROPIUM BROMIDE AND ALBUTEROL SULFATE 3 ML: .5; 3 SOLUTION RESPIRATORY (INHALATION) at 20:18

## 2024-04-14 RX ADMIN — PHENOBARBITAL SODIUM 375.1 MG: 65 INJECTION INTRAMUSCULAR at 15:20

## 2024-04-14 RX ADMIN — DEXMEDETOMIDINE HYDROCHLORIDE IN SODIUM CHLORIDE 1.5 MCG/KG/HR: 4 INJECTION INTRAVENOUS at 14:48

## 2024-04-14 RX ADMIN — IPRATROPIUM BROMIDE AND ALBUTEROL SULFATE 3 ML: .5; 3 SOLUTION RESPIRATORY (INHALATION) at 11:22

## 2024-04-14 RX ADMIN — MIDAZOLAM 6 MG/HR: 5 INJECTION INTRAMUSCULAR; INTRAVENOUS at 04:26

## 2024-04-14 RX ADMIN — DEXMEDETOMIDINE HYDROCHLORIDE IN SODIUM CHLORIDE 1.5 MCG/KG/HR: 4 INJECTION INTRAVENOUS at 00:41

## 2024-04-14 RX ADMIN — ZINC OXIDE 1 APPLICATION: 200 OINTMENT TOPICAL at 20:03

## 2024-04-14 RX ADMIN — DEXMEDETOMIDINE HYDROCHLORIDE IN SODIUM CHLORIDE 1.8 MCG/KG/HR: 4 INJECTION INTRAVENOUS at 22:02

## 2024-04-14 RX ADMIN — ZINC OXIDE 1 APPLICATION: 200 OINTMENT TOPICAL at 08:16

## 2024-04-14 RX ADMIN — DOCUSATE SODIUM 50MG AND SENNOSIDES 8.6MG 2 TABLET: 8.6; 5 TABLET, FILM COATED ORAL at 08:17

## 2024-04-14 RX ADMIN — DEXMEDETOMIDINE HYDROCHLORIDE IN SODIUM CHLORIDE 1.5 MCG/KG/HR: 4 INJECTION INTRAVENOUS at 06:58

## 2024-04-14 RX ADMIN — LORAZEPAM 2 MG: 2 INJECTION INTRAMUSCULAR; INTRAVENOUS at 06:45

## 2024-04-14 RX ADMIN — FENTANYL CITRATE 250 MCG/HR: 0.05 INJECTION, SOLUTION INTRAMUSCULAR; INTRAVENOUS at 16:15

## 2024-04-14 RX ADMIN — CLONIDINE HYDROCHLORIDE 0.3 MG: 0.1 TABLET ORAL at 13:57

## 2024-04-14 RX ADMIN — LORAZEPAM 2 MG: 2 INJECTION INTRAMUSCULAR; INTRAVENOUS at 12:03

## 2024-04-14 RX ADMIN — ACETAMINOPHEN 325MG 650 MG: 325 TABLET ORAL at 12:57

## 2024-04-14 RX ADMIN — LORAZEPAM 2 MG: 2 INJECTION INTRAMUSCULAR; INTRAVENOUS at 09:40

## 2024-04-14 RX ADMIN — HYDRALAZINE HYDROCHLORIDE 10 MG: 20 INJECTION INTRAMUSCULAR; INTRAVENOUS at 11:05

## 2024-04-14 RX ADMIN — LORAZEPAM 2 MG: 2 INJECTION INTRAMUSCULAR; INTRAVENOUS at 10:48

## 2024-04-14 RX ADMIN — FENTANYL CITRATE 300 MCG/HR: 0.05 INJECTION, SOLUTION INTRAMUSCULAR; INTRAVENOUS at 12:27

## 2024-04-14 RX ADMIN — CHLORHEXIDINE GLUCONATE 15 ML: 1.2 RINSE ORAL at 20:02

## 2024-04-14 RX ADMIN — QUETIAPINE FUMARATE 100 MG: 100 TABLET ORAL at 13:11

## 2024-04-14 RX ADMIN — ACETAMINOPHEN 325MG 650 MG: 325 TABLET ORAL at 18:25

## 2024-04-14 RX ADMIN — FENTANYL CITRATE 300 MCG/HR: 0.05 INJECTION, SOLUTION INTRAMUSCULAR; INTRAVENOUS at 21:05

## 2024-04-14 RX ADMIN — DEXMEDETOMIDINE HYDROCHLORIDE IN SODIUM CHLORIDE 2 MCG/KG/HR: 4 INJECTION INTRAVENOUS at 20:09

## 2024-04-14 RX ADMIN — CLONIDINE HYDROCHLORIDE 0.3 MG: 0.1 TABLET ORAL at 23:35

## 2024-04-14 RX ADMIN — Medication 15 ML: at 08:17

## 2024-04-14 RX ADMIN — MIDAZOLAM 10 MG/HR: 5 INJECTION INTRAMUSCULAR; INTRAVENOUS at 14:29

## 2024-04-14 RX ADMIN — Medication 100 MG: at 08:31

## 2024-04-14 RX ADMIN — PHENOBARBITAL SODIUM 280.8 MG: 65 INJECTION INTRAMUSCULAR at 18:10

## 2024-04-14 RX ADMIN — PHENOBARBITAL SODIUM 280.8 MG: 65 INJECTION INTRAMUSCULAR; INTRAVENOUS at 19:51

## 2024-04-14 RX ADMIN — LORAZEPAM 2 MG: 2 LIQUID ORAL at 20:15

## 2024-04-14 RX ADMIN — FENTANYL CITRATE 300 MCG/HR: 0.05 INJECTION, SOLUTION INTRAMUSCULAR; INTRAVENOUS at 04:19

## 2024-04-14 RX ADMIN — HYDRALAZINE HYDROCHLORIDE 10 MG: 20 INJECTION INTRAMUSCULAR; INTRAVENOUS at 19:54

## 2024-04-14 NOTE — NURSING NOTE
After 0800 assessment, pt started to get more and more agitated. RN had to give prn ativan at 0940 and 1048. During the AM shift pt was agitated rt frequent bowel movements. Rn gave third dose of scheduled ativan at 1203. POP Wilcox    At about 1245, Rn notified MD HEATHER Penaloza that pt blood glucose was 284 on 1200 check. MD gave verbal order to start pt on moderate sliding scale correction insulin dose. RN put in order. At this time, MD also said to avoid prn ativan if at all possible. MD said to increase max dose of precedex to 2 mcg/kg/hr. Rn changed order. MD also put in order for seroquel and said he would talk to pharmacy about clonidine protocol. Will continue to monitor. POP Wilcox    Rn retook blood glucose level shortly after original reading of 284. PT BG came down to 127. Rn did not give insulin. POP Wilcox    At about 1315, pt continued to be agitated (ciwa 20) regardless of being maxed on sedation. RN notified MD HEATHER Penaloza and he put in phenobarbitol protocol. At this point in time RN gave scheduled seroquel and clonidine. POP Wilcox    At about 1400, pt agitation decreased. RN gave scheduled phenobarbitol at about 1500. Pt resting comfortable and vitals stable. POP Wilcox    Rn had to titrate back up on sedation at about 1800. See emar for titration changes. Pt was starting to get agitated again despite second dose of phenobarbitol. At 1900, this Rn handed off care to PM Rn. POP Wilcox

## 2024-04-14 NOTE — PROGRESS NOTES
[facility]      Mar/chart reviewed  Follow up alcohol withdrawal, mechanical ventilation  Patient sedated on vent unable to provide subjective  Note RN report patient became agitated with weaning sedation    Vital Sign Min/Max for last 24 hours  Temp  Min: 98.7 °F (37.1 °C)  Max: 100.5 °F (38.1 °C)   BP  Min: 111/58  Max: 174/90   Pulse  Min: 53  Max: 101   Resp  Min: 12  Max: 26   SpO2  Min: 90 %  Max: 99 %   No data recorded   Weight  Min: 130 kg (286 lb 9.6 oz)  Max: 130 kg (286 lb 9.6 oz)   3994/2300    Appears ill, sedated on vent but agitated  perrl, normal sclera  mmm, no jvd, trachea midline, neck supple,  chest cta bilaterally, no crackles, no wheezes,   rrr,   soft, nt, nd +bs,  no c/c/ 1+ edema  Skin warm, dry no rashes    Labs: 4/14: reviewed:  Glucose 116  Bun 22  Cr 0.67  Bicarb 22  Wbc 10  Hgb 10.4  Plts 509  7.43/38/83    A/P:  1. Acute respiratory failure requiring  mechanical ventilation -- has been failing wean attempts due to mental status  2. Aspiration pneumonia with H flu -- can finish antibiotic course with rocephin given culture results and we wouldn't want any mental status side effects from cefepime.   3. Severe alcohol abuse with dependence and withdrawal -- remains on benzos  4. Polysubstance abuse -- likely making improvement in his mental status and sedation needs all the more difficult  5. LIVAN  6. Sepsis with shock - resolved    Will try and d/c prn benzos in favor of versed gtt and increase precedex if needed. Will try adding seroquel. Try clonodine protocol      CC 35 mins.

## 2024-04-14 NOTE — PROGRESS NOTES
"[facility] Clinical Pharmacy Services: Clonidine for sedation    Pharmacy has been consulted to start oral/enteral clonidine on Watson Lisa to assist in sedation and managing symptoms of withdrawal per Dr. Penaloza's request.      Relevant clinical data and objective history reviewed:  68 y.o. male 200.7 cm (79\") 130 kg (286 lb 9.6 oz)    Creatinine   Date Value Ref Range Status   04/14/2024 0.67 (L) 0.76 - 1.27 mg/dL Final   04/13/2024 0.75 (L) 0.76 - 1.27 mg/dL Final   04/12/2024 0.64 (L) 0.76 - 1.27 mg/dL Final   07/31/2020 0.9 0.7 - 1.5 mg/dL Final   02/14/2017 0.80 0.60 - 1.30 mg/dL Final     Comment:     Serial Number: [id number]    : [id number]     BUN   Date Value Ref Range Status   04/14/2024 22 8 - 23 mg/dL Final   07/31/2020 18 7 - 20 mg/dL Final     Estimated Creatinine Clearance: 159 mL/min (A) (by C-G formula based on SCr of 0.67 mg/dL (L)).    Assessment    Dexmedetomidine currently infusing at 1.6 mcg/kg/hr    CPOT/NPS:  0  RASS:    1     Other sedating medications (scheduled/PRN) used in last 24 hrs:   Fentanyl infusion at 300 mcg/hr  Midazolam infusion at 9 mg/hr  Lorazepam:  10 mg from etoh protocol so far today     Other medications that could cause hypotension:   -prn metoprolol--0 recent uses    Plan    -S/w Dr. Penaloza and goal for now is to keep patient compliant with ventilator and to manage agitation. Once ready to wean dexmedetomidine, we can reassess dosing and start to titrate as tolerated.     Initiate clonidine 0.3 mg PO q6. Hold for MAP <65 or HR<50. Please notify pharmacy if any doses are held.   Pharmacy will reassess patient 4/15 for dose adjustment if needed.    Thank you for allowing me to participate in your patient's care. Please call pharmacy with any questions or concerns.    Christiano Viera [facility]  Clinical Pharmacist    "

## 2024-04-14 NOTE — PLAN OF CARE
Problem: Restraint, Nonviolent  Goal: Absence of Harm or Injury  Outcome: Ongoing, Not Progressing  Intervention: Implement Least Restrictive Safety Strategies  Recent Flowsheet Documentation  Taken 4/14/2024 1000 by Xena Gamez RN  Medical Device Protection: IV pole/bag removed from visual field  Taken 4/14/2024 0900 by Xena Gamez RN  Medical Device Protection: IV pole/bag removed from visual field  Taken 4/14/2024 0800 by Xena Gamez RN  Medical Device Protection: IV pole/bag removed from visual field  Less Restrictive Alternative: appropriate expression promoted  De-Escalation Techniques:   medication administered   increased round frequency  Diversional Activities: television  Taken 4/14/2024 0700 by Xena Gamez RN  Medical Device Protection: IV pole/bag removed from visual field  Intervention: Protect Dignity, Rights, and Personal Wellbeing  Recent Flowsheet Documentation  Taken 4/14/2024 0800 by Xena Gamez RN  Trust Relationship/Rapport:   choices provided   care explained  Intervention: Protect Skin and Joint Integrity  Recent Flowsheet Documentation  Taken 4/14/2024 0800 by Xena Gamez RN  Body Position:   right   30 degrees   turned  Range of Motion: ROM (range of motion) performed   Goal Outcome Evaluation:

## 2024-04-14 NOTE — PROGRESS NOTES
Name: Watson Lisa ADMIT: 2024   : 1955  PCP: Roc Carroll APRN    MRN: [id number] LOS: 8 days   AGE/SEX: 68 y.o. male  ROOM: [facility]     Subjective   Subjective   Patient seen at bedside.       Objective   Objective   Vital Signs  Temp:  [98.7 °F (37.1 °C)-100.5 °F (38.1 °C)] 100.5 °F (38.1 °C)  Heart Rate:  [] 71  Resp:  [12-21] 12  BP: (111-183)/(51-98) 123/59  FiO2 (%):  [29 %-100 %] 30 %  SpO2:  [90 %-99 %] 99 %  on   ;   Device (Oxygen Therapy): ventilator  Body mass index is 32.29 kg/m².  Physical Exam  General, intubated and sedated  Head and ENT, normocephalic and atraumatic.  Lungs, symmetric expansion, equal air entry bilaterally.  Heart, regular rate and rhythm.  Abdomen, soft and nontender.  Extremities, no clubbing or cyanosis.  Generalized edema on bilateral upper and lower extremities  Neuro, unable to evaluate  Skin: Warm and no rash.  Psych, unable to fully evaluate  Musculoskeletal, joint examination is grossly normal.      Results Review     I reviewed the patient's new clinical results.  Results from last 7 days   Lab Units 24  0408 24  0600 24  0810 24  0701   WBC 10*3/mm3 10.76 10.80 10.32 10.52   HEMOGLOBIN g/dL 10.4* 11.8* 10.5* 10.9*   PLATELETS 10*3/mm3 509* 340 444 353     Results from last 7 days   Lab Units 24  0408 24  0600 24  0600 24  1205 24  0701   SODIUM mmol/L 137 137 134*  --  139   POTASSIUM mmol/L 4.8 4.8 4.7 4.4 5.5*   CHLORIDE mmol/L 104 103 104  --  112*   CO2 mmol/L 22.5 23.1 18.8*  --  18.0*   BUN mg/dL 22 21 13  --  10   CREATININE mg/dL 0.67* 0.75* 0.64*  --  0.74*   GLUCOSE mg/dL 116* 132* 138*  --  130*   EGFR mL/min/1.73 101.1 97.7 102.5  --  98.1     Results from last 7 days   Lab Units 24  1359   ALBUMIN g/dL 2.9*   BILIRUBIN mg/dL 0.3   ALK PHOS U/L 57   AST (SGOT) U/L 17   ALT (SGPT) U/L 12     Results from last 7 days   Lab Units 24  0408 24  0600  04/12/24  0600 04/11/24  0701 04/08/24  1748 04/08/24  1359   CALCIUM mg/dL 8.8 8.9 8.8 8.5*   < > 7.9*   ALBUMIN g/dL  --   --   --   --   --  2.9*   MAGNESIUM mg/dL 1.9 1.8 2.1 1.4*   < > 1.3*   PHOSPHORUS mg/dL 3.4 4.4 4.2 4.0   < >  --     < > = values in this interval not displayed.     Results from last 7 days   Lab Units 04/08/24  1359 04/08/24  0830   PROCALCITONIN ng/mL 0.17  --    LACTATE mmol/L  --  0.9     Glucose   Date/Time Value Ref Range Status   04/14/2024 1303 127 70 - 130 mg/dL Final   04/14/2024 1228 284 (H) 70 - 130 mg/dL Final   04/14/2024 0636 145 (H) 70 - 130 mg/dL Final   04/14/2024 0034 124 70 - 130 mg/dL Final   04/13/2024 1816 128 70 - 130 mg/dL Final   04/13/2024 1228 143 (H) 70 - 130 mg/dL Final   04/13/2024 0643 147 (H) 70 - 130 mg/dL Final       No radiology results for the last day    I have personally reviewed all medications:  Scheduled Medications  chlorhexidine, 15 mL, Mouth/Throat, Q12H  cloNIDine, 0.3 mg, Nasogastric, Q6H  enoxaparin, 40 mg, Subcutaneous, Q24H  famotidine, 20 mg, Nasogastric, BID AC  folic acid, 1 mg, Nasogastric, Daily  hydrocortisone-bacitracin-zinc oxide-nystatin, 1 Application, Topical, BID  insulin regular, 2-9 Units, Subcutaneous, Q6H  ipratropium-albuterol, 3 mL, Nebulization, 4x Daily - RT  midazolam, 4 mg, Intravenous, Once  multivitamin and minerals, 15 mL, Nasogastric, Daily  nicotine, 1 patch, Transdermal, Q24H  PHENobarbital, 3 mg/kg (Ideal), Intravenous, Once   Followed by  PHENobarbital, 3 mg/kg (Ideal), Intravenous, Once  [START ON 4/15/2024] PHENobarbital, 65 mg, Intravenous, Once   Followed by  [START ON 4/15/2024] PHENobarbital, 65 mg, Intravenous, Once   Followed by  [START ON 4/16/2024] PHENobarbital, 32.4 mg, Oral, Once   Followed by  [START ON 4/16/2024] PHENobarbital, 32.4 mg, Oral, Once   Followed by  [START ON 4/17/2024] PHENobarbital, 32.4 mg, Oral, Once  senna-docusate sodium, 2 tablet, Oral, BID  sodium chloride, 10 mL, Intravenous,  Q12H  sodium chloride, 10 mL, Intravenous, Q12H  thiamine, 100 mg, Oral, Daily    Infusions  dexmedetomidine, 0.2-2 mcg/kg/hr, Last Rate: 1.2 mcg/kg/hr (04/14/24 1550)  fentanyl 10 mcg/mL,  mcg/hr, Last Rate: 300 mcg/hr (04/14/24 1227)  midazolam, 1-10 mg/hr, Last Rate: 5 mg/hr (04/14/24 1518)  norepinephrine, 0.02-0.3 mcg/kg/min, Last Rate: Stopped (04/08/24 1230)  Pharmacy Consult,     Diet  NPO Diet NPO Type: Tube Feeding    I have personally reviewed:  [x]  Laboratory   [x]  Microbiology   [x]  Radiology   [x]  EKG/Telemetry  [x]  Cardiology/Vascular   []  Pathology    []  Records       Assessment/Plan     Active Hospital Problems    Diagnosis  POA    **Alcohol withdrawal [F10.939]  Yes    Substance abuse [F19.10]  Unknown    Cocaine abuse [F14.10]  Unknown    Positive urine drug screen [R82.5]  Unknown    History of alcohol use disorder [Z87.898]  Yes    Alcoholic liver disease [K70.9]  Yes    COPD (chronic obstructive pulmonary disease) [J44.9]  Yes    LIVAN (obstructive sleep apnea) [G47.33]  Yes    Essential hypertension [I10]  Yes      Resolved Hospital Problems   No resolved problems to display.       68 y.o. male admitted with Alcohol withdrawal.    Assessment and plan  1.  Acute respiratory failure, requiring mechanical ventilation, ongoing management per pulmonology.  Weaning of sedation and vent management per pulmonary.    2.  Aspiration pneumonia, received antibiotics.    3.  Severe alcohol withdrawal with dependence, remains on sedation protocol in the ICU.    4.  Polysubstance abuse, complicating clinical aspects.    5.  Sepsis with shock, resolved.    6.  CODE STATUS is full code.      Suman Verdin MD  [facility]  04/14/24  16:00 EDT

## 2024-04-14 NOTE — PROGRESS NOTES
Access Ctr Note.    Chart reviewed.     Access monitoring Pt's condition for appropriateness for full consult. Per chart, Pt is being placed on max Precedex dose for withdrawal sxs.    Access will return for full consult when Pt can participate.

## 2024-04-15 LAB
ANION GAP SERPL CALCULATED.3IONS-SCNC: 10.2 MMOL/L (ref 5–15)
BUN SERPL-MCNC: 26 MG/DL (ref 8–23)
BUN/CREAT SERPL: 31.3 (ref 7–25)
CALCIUM SPEC-SCNC: 8.6 MG/DL (ref 8.6–10.5)
CHLORIDE SERPL-SCNC: 103 MMOL/L (ref 98–107)
CO2 SERPL-SCNC: 22.8 MMOL/L (ref 22–29)
CREAT SERPL-MCNC: 0.83 MG/DL (ref 0.76–1.27)
DEPRECATED RDW RBC AUTO: 42 FL (ref 37–54)
EGFRCR SERPLBLD CKD-EPI 2021: 94.7 ML/MIN/1.73
ERYTHROCYTE [DISTWIDTH] IN BLOOD BY AUTOMATED COUNT: 11.9 % (ref 12.3–15.4)
GLUCOSE BLDC GLUCOMTR-MCNC: 122 MG/DL (ref 70–130)
GLUCOSE BLDC GLUCOMTR-MCNC: 148 MG/DL (ref 70–130)
GLUCOSE BLDC GLUCOMTR-MCNC: 149 MG/DL (ref 70–130)
GLUCOSE BLDC GLUCOMTR-MCNC: 152 MG/DL (ref 70–130)
GLUCOSE SERPL-MCNC: 133 MG/DL (ref 65–99)
HCT VFR BLD AUTO: 29.4 % (ref 37.5–51)
HGB BLD-MCNC: 9.9 G/DL (ref 13–17.7)
MAGNESIUM SERPL-MCNC: 1.9 MG/DL (ref 1.6–2.4)
MCH RBC QN AUTO: 32.4 PG (ref 26.6–33)
MCHC RBC AUTO-ENTMCNC: 33.7 G/DL (ref 31.5–35.7)
MCV RBC AUTO: 96.1 FL (ref 79–97)
PHOSPHATE SERPL-MCNC: 4.2 MG/DL (ref 2.5–4.5)
PLATELET # BLD AUTO: 429 10*3/MM3 (ref 140–450)
PMV BLD AUTO: 9.4 FL (ref 6–12)
POTASSIUM SERPL-SCNC: 4.2 MMOL/L (ref 3.5–5.2)
QT INTERVAL: 391 MS
QT INTERVAL: 439 MS
QTC INTERVAL: 437 MS
QTC INTERVAL: 450 MS
RBC # BLD AUTO: 3.06 10*6/MM3 (ref 4.14–5.8)
SODIUM SERPL-SCNC: 136 MMOL/L (ref 136–145)
WBC NRBC COR # BLD AUTO: 10.85 10*3/MM3 (ref 3.4–10.8)

## 2024-04-15 PROCEDURE — 94003 VENT MGMT INPAT SUBQ DAY: CPT

## 2024-04-15 PROCEDURE — 94761 N-INVAS EAR/PLS OXIMETRY MLT: CPT

## 2024-04-15 PROCEDURE — 25010000002 FENTANYL CITRATE (PF) 2500 MCG/50ML SOLUTION: Performed by: INTERNAL MEDICINE

## 2024-04-15 PROCEDURE — 85027 COMPLETE CBC AUTOMATED: CPT | Performed by: STUDENT IN AN ORGANIZED HEALTH CARE EDUCATION/TRAINING PROGRAM

## 2024-04-15 PROCEDURE — 94664 DEMO&/EVAL PT USE INHALER: CPT

## 2024-04-15 PROCEDURE — 25010000002 LORAZEPAM PER 2 MG

## 2024-04-15 PROCEDURE — 25010000002 PHENOBARBITAL PER 120 MG: Performed by: INTERNAL MEDICINE

## 2024-04-15 PROCEDURE — 94760 N-INVAS EAR/PLS OXIMETRY 1: CPT

## 2024-04-15 PROCEDURE — 25010000002 FENTANYL CITRATE (PF) 1000 MCG/20ML SOLUTION: Performed by: INTERNAL MEDICINE

## 2024-04-15 PROCEDURE — 93005 ELECTROCARDIOGRAM TRACING: CPT | Performed by: INTERNAL MEDICINE

## 2024-04-15 PROCEDURE — 94799 UNLISTED PULMONARY SVC/PX: CPT

## 2024-04-15 PROCEDURE — 84100 ASSAY OF PHOSPHORUS: CPT | Performed by: STUDENT IN AN ORGANIZED HEALTH CARE EDUCATION/TRAINING PROGRAM

## 2024-04-15 PROCEDURE — 80048 BASIC METABOLIC PNL TOTAL CA: CPT | Performed by: STUDENT IN AN ORGANIZED HEALTH CARE EDUCATION/TRAINING PROGRAM

## 2024-04-15 PROCEDURE — 93010 ELECTROCARDIOGRAM REPORT: CPT | Performed by: INTERNAL MEDICINE

## 2024-04-15 PROCEDURE — 25010000002 ENOXAPARIN PER 10 MG: Performed by: INTERNAL MEDICINE

## 2024-04-15 PROCEDURE — 82948 REAGENT STRIP/BLOOD GLUCOSE: CPT

## 2024-04-15 PROCEDURE — 83735 ASSAY OF MAGNESIUM: CPT | Performed by: STUDENT IN AN ORGANIZED HEALTH CARE EDUCATION/TRAINING PROGRAM

## 2024-04-15 PROCEDURE — 25010000002 MIDAZOLAM 50 MG/10ML SOLUTION

## 2024-04-15 PROCEDURE — 25010000002 FENTANYL CITRATE (PF) 1000 MCG/20ML SOLUTION

## 2024-04-15 RX ORDER — FENTANYL CITRATE-0.9 % NACL/PF 10 MCG/ML
50-300 PLASTIC BAG, INJECTION (ML) INTRAVENOUS
Status: DISCONTINUED | OUTPATIENT
Start: 2024-04-15 | End: 2024-04-18

## 2024-04-15 RX ORDER — AMOXICILLIN 250 MG
2 CAPSULE ORAL 2 TIMES DAILY
Status: DISCONTINUED | OUTPATIENT
Start: 2024-04-15 | End: 2024-04-24 | Stop reason: HOSPADM

## 2024-04-15 RX ORDER — BISACODYL 10 MG
10 SUPPOSITORY, RECTAL RECTAL DAILY PRN
Status: DISCONTINUED | OUTPATIENT
Start: 2024-04-15 | End: 2024-04-24 | Stop reason: HOSPADM

## 2024-04-15 RX ORDER — POLYETHYLENE GLYCOL 3350 17 G/17G
17 POWDER, FOR SOLUTION ORAL DAILY PRN
Status: DISCONTINUED | OUTPATIENT
Start: 2024-04-15 | End: 2024-04-24 | Stop reason: HOSPADM

## 2024-04-15 RX ORDER — LORAZEPAM 2 MG/ML
INJECTION INTRAMUSCULAR
Status: COMPLETED
Start: 2024-04-15 | End: 2024-04-15

## 2024-04-15 RX ORDER — LORAZEPAM 2 MG/ML
2 INJECTION INTRAMUSCULAR EVERY 6 HOURS PRN
Status: DISPENSED | OUTPATIENT
Start: 2024-04-15 | End: 2024-04-20

## 2024-04-15 RX ADMIN — Medication 10 ML: at 09:31

## 2024-04-15 RX ADMIN — PHENOBARBITAL SODIUM 65 MG: 65 INJECTION INTRAMUSCULAR; INTRAVENOUS at 06:21

## 2024-04-15 RX ADMIN — DEXMEDETOMIDINE HYDROCHLORIDE IN SODIUM CHLORIDE 1.8 MCG/KG/HR: 4 INJECTION INTRAVENOUS at 17:39

## 2024-04-15 RX ADMIN — CHLORHEXIDINE GLUCONATE 15 ML: 1.2 RINSE ORAL at 19:54

## 2024-04-15 RX ADMIN — MIDAZOLAM 10 MG/HR: 5 INJECTION INTRAMUSCULAR; INTRAVENOUS at 03:57

## 2024-04-15 RX ADMIN — PHENOBARBITAL SODIUM 65 MG: 65 INJECTION INTRAMUSCULAR at 19:54

## 2024-04-15 RX ADMIN — FENTANYL CITRATE 300 MCG/HR: 0.05 INJECTION, SOLUTION INTRAMUSCULAR; INTRAVENOUS at 22:44

## 2024-04-15 RX ADMIN — FOLIC ACID 1 MG: 1 TABLET ORAL at 09:35

## 2024-04-15 RX ADMIN — DEXMEDETOMIDINE HYDROCHLORIDE IN SODIUM CHLORIDE 1.8 MCG/KG/HR: 4 INJECTION INTRAVENOUS at 15:43

## 2024-04-15 RX ADMIN — MIDAZOLAM 8 MG/HR: 5 INJECTION INTRAMUSCULAR; INTRAVENOUS at 09:56

## 2024-04-15 RX ADMIN — MIDAZOLAM 8 MG/HR: 5 INJECTION INTRAMUSCULAR; INTRAVENOUS at 17:38

## 2024-04-15 RX ADMIN — IPRATROPIUM BROMIDE AND ALBUTEROL SULFATE 3 ML: .5; 3 SOLUTION RESPIRATORY (INHALATION) at 20:19

## 2024-04-15 RX ADMIN — ZINC OXIDE 1 APPLICATION: 200 OINTMENT TOPICAL at 12:45

## 2024-04-15 RX ADMIN — FENTANYL CITRATE 300 MCG/HR: 0.05 INJECTION, SOLUTION INTRAMUSCULAR; INTRAVENOUS at 07:25

## 2024-04-15 RX ADMIN — Medication 1 PATCH: at 19:52

## 2024-04-15 RX ADMIN — Medication 100 MG: at 09:35

## 2024-04-15 RX ADMIN — IPRATROPIUM BROMIDE AND ALBUTEROL SULFATE 3 ML: .5; 3 SOLUTION RESPIRATORY (INHALATION) at 14:56

## 2024-04-15 RX ADMIN — LORAZEPAM 2 MG: 2 INJECTION INTRAMUSCULAR; INTRAVENOUS at 23:44

## 2024-04-15 RX ADMIN — FENTANYL CITRATE 300 MCG/HR: 0.05 INJECTION, SOLUTION INTRAMUSCULAR; INTRAVENOUS at 15:43

## 2024-04-15 RX ADMIN — DEXMEDETOMIDINE HYDROCHLORIDE IN SODIUM CHLORIDE 2 MCG/KG/HR: 4 INJECTION INTRAVENOUS at 22:44

## 2024-04-15 RX ADMIN — DEXMEDETOMIDINE HYDROCHLORIDE IN SODIUM CHLORIDE 1.5 MCG/KG/HR: 4 INJECTION INTRAVENOUS at 01:05

## 2024-04-15 RX ADMIN — FENTANYL CITRATE 300 MCG/HR: 0.05 INJECTION, SOLUTION INTRAMUSCULAR; INTRAVENOUS at 03:57

## 2024-04-15 RX ADMIN — DEXMEDETOMIDINE HYDROCHLORIDE IN SODIUM CHLORIDE 1.5 MCG/KG/HR: 4 INJECTION INTRAVENOUS at 07:25

## 2024-04-15 RX ADMIN — DEXMEDETOMIDINE HYDROCHLORIDE IN SODIUM CHLORIDE 1.8 MCG/KG/HR: 4 INJECTION INTRAVENOUS at 19:28

## 2024-04-15 RX ADMIN — DEXMEDETOMIDINE HYDROCHLORIDE IN SODIUM CHLORIDE 1.5 MCG/KG/HR: 4 INJECTION INTRAVENOUS at 05:49

## 2024-04-15 RX ADMIN — DEXMEDETOMIDINE HYDROCHLORIDE IN SODIUM CHLORIDE 1.5 MCG/KG/HR: 4 INJECTION INTRAVENOUS at 03:30

## 2024-04-15 RX ADMIN — IPRATROPIUM BROMIDE AND ALBUTEROL SULFATE 3 ML: .5; 3 SOLUTION RESPIRATORY (INHALATION) at 11:22

## 2024-04-15 RX ADMIN — ENOXAPARIN SODIUM 40 MG: 100 INJECTION SUBCUTANEOUS at 11:12

## 2024-04-15 RX ADMIN — FENTANYL CITRATE 300 MCG/HR: 0.05 INJECTION, SOLUTION INTRAMUSCULAR; INTRAVENOUS at 00:17

## 2024-04-15 RX ADMIN — DEXMEDETOMIDINE HYDROCHLORIDE IN SODIUM CHLORIDE 1.8 MCG/KG/HR: 4 INJECTION INTRAVENOUS at 21:15

## 2024-04-15 RX ADMIN — DEXMEDETOMIDINE HYDROCHLORIDE IN SODIUM CHLORIDE 1.8 MCG/KG/HR: 4 INJECTION INTRAVENOUS at 13:52

## 2024-04-15 RX ADMIN — DEXMEDETOMIDINE HYDROCHLORIDE IN SODIUM CHLORIDE 1.3 MCG/KG/HR: 4 INJECTION INTRAVENOUS at 09:28

## 2024-04-15 RX ADMIN — FENTANYL CITRATE 300 MCG/HR: 0.05 INJECTION, SOLUTION INTRAMUSCULAR; INTRAVENOUS at 19:27

## 2024-04-15 RX ADMIN — FAMOTIDINE 20 MG: 20 TABLET, FILM COATED ORAL at 06:22

## 2024-04-15 RX ADMIN — Medication 15 ML: at 09:35

## 2024-04-15 RX ADMIN — IPRATROPIUM BROMIDE AND ALBUTEROL SULFATE 3 ML: .5; 3 SOLUTION RESPIRATORY (INHALATION) at 07:18

## 2024-04-15 RX ADMIN — MIDAZOLAM 10 MG/HR: 5 INJECTION INTRAMUSCULAR; INTRAVENOUS at 23:46

## 2024-04-15 RX ADMIN — FAMOTIDINE 20 MG: 20 TABLET, FILM COATED ORAL at 18:39

## 2024-04-15 RX ADMIN — FENTANYL CITRATE 250 MCG/HR: 0.05 INJECTION, SOLUTION INTRAMUSCULAR; INTRAVENOUS at 11:26

## 2024-04-15 RX ADMIN — ZINC OXIDE 1 APPLICATION: 200 OINTMENT TOPICAL at 20:23

## 2024-04-15 RX ADMIN — Medication 10 ML: at 19:55

## 2024-04-15 RX ADMIN — CLONIDINE HYDROCHLORIDE 0.3 MG: 0.1 TABLET ORAL at 23:04

## 2024-04-15 RX ADMIN — CHLORHEXIDINE GLUCONATE 15 ML: 1.2 RINSE ORAL at 09:35

## 2024-04-15 RX ADMIN — DEXMEDETOMIDINE HYDROCHLORIDE IN SODIUM CHLORIDE 1.5 MCG/KG/HR: 4 INJECTION INTRAVENOUS at 11:28

## 2024-04-15 RX ADMIN — CLONIDINE HYDROCHLORIDE 0.3 MG: 0.1 TABLET ORAL at 11:12

## 2024-04-15 RX ADMIN — CLONIDINE HYDROCHLORIDE 0.3 MG: 0.1 TABLET ORAL at 18:39

## 2024-04-15 NOTE — PROGRESS NOTES
Name: Watson Lisa ADMIT: 2024   : 1955  PCP: Roc Carroll APRN    MRN: [id number] LOS: 9 days   AGE/SEX: 68 y.o. male  ROOM: [facility]     Subjective   Subjective   Patient is seen at bedside.       Objective   Objective   Vital Signs  Temp:  [97.5 °F (36.4 °C)-100.4 °F (38 °C)] 98.5 °F (36.9 °C)  Heart Rate:  [44-86] 61  Resp:  [12-14] 12  BP: ()/(49-83) 148/68  FiO2 (%):  [29 %-30 %] 29 %  SpO2:  [90 %-98 %] 90 %  on   ;   Device (Oxygen Therapy): ventilator  Body mass index is 32.29 kg/m².  Physical Exam  General, intubated and sedated  Head and ENT, normocephalic and atraumatic.  Lungs, symmetric expansion, equal air entry bilaterally.  Heart, regular rate and rhythm.  Abdomen, soft and nontender.  Extremities, no clubbing or cyanosis.  Generalized edema on bilateral upper and lower extremities  Neuro, unable to evaluate  Skin: Warm and no rash.  Psych, unable to fully evaluate  Musculoskeletal, joint examination is grossly normal.    Copied text material from yesterday's note has been reviewed for appropriate changes and remains accurate as of 4/15/24.      Results Review     I reviewed the patient's new clinical results.  Results from last 7 days   Lab Units 04/15/24  0824  0624  0810   WBC 10*3/mm3 10.85* 10.76 10.80 10.32   HEMOGLOBIN g/dL 9.9* 10.4* 11.8* 10.5*   PLATELETS 10*3/mm3 429 509* 340 444     Results from last 7 days   Lab Units 04/15/24  0824  04024  0624  0600   SODIUM mmol/L 136 137 137 134*   POTASSIUM mmol/L 4.2 4.8 4.8 4.7   CHLORIDE mmol/L 103 104 103 104   CO2 mmol/L 22.8 22.5 23.1 18.8*   BUN mg/dL 26* 22 21 13   CREATININE mg/dL 0.83 0.67* 0.75* 0.64*   GLUCOSE mg/dL 133* 116* 132* 138*   EGFR mL/min/1.73 94.7 101.1 97.7 102.5       Results from last 7 days   Lab Units 04/15/24  0805 24  0408 24  0600 24  0600   CALCIUM mg/dL 8.6 8.8 8.9 8.8   MAGNESIUM mg/dL 1.9 1.9 1.8 2.1    PHOSPHORUS mg/dL 4.2 3.4 4.4 4.2       Glucose   Date/Time Value Ref Range Status   04/15/2024 1201 148 (H) 70 - 130 mg/dL Final   04/15/2024 0620 149 (H) 70 - 130 mg/dL Final   04/15/2024 0111 152 (H) 70 - 130 mg/dL Final   04/14/2024 1812 139 (H) 70 - 130 mg/dL Final   04/14/2024 1303 127 70 - 130 mg/dL Final   04/14/2024 1228 284 (H) 70 - 130 mg/dL Final   04/14/2024 0636 145 (H) 70 - 130 mg/dL Final       No radiology results for the last day    I have personally reviewed all medications:  Scheduled Medications  chlorhexidine, 15 mL, Mouth/Throat, Q12H  cloNIDine, 0.3 mg, Nasogastric, Q6H  enoxaparin, 40 mg, Subcutaneous, Q24H  famotidine, 20 mg, Nasogastric, BID AC  folic acid, 1 mg, Nasogastric, Daily  hydrocortisone-bacitracin-zinc oxide-nystatin, 1 Application, Topical, BID  insulin regular, 2-9 Units, Subcutaneous, Q6H  ipratropium-albuterol, 3 mL, Nebulization, 4x Daily - RT  midazolam, 4 mg, Intravenous, Once  multivitamin and minerals, 15 mL, Nasogastric, Daily  nicotine, 1 patch, Transdermal, Q24H  PHENobarbital, 65 mg, Intravenous, Once   Followed by  [START ON 4/16/2024] PHENobarbital, 32.4 mg, Oral, Once   Followed by  [START ON 4/16/2024] PHENobarbital, 32.4 mg, Oral, Once   Followed by  [START ON 4/17/2024] PHENobarbital, 32.4 mg, Oral, Once  senna-docusate sodium, 2 tablet, Nasogastric, BID  sodium chloride, 10 mL, Intravenous, Q12H  sodium chloride, 10 mL, Intravenous, Q12H  thiamine, 100 mg, Nasogastric, Daily    Infusions  dexmedetomidine, 0.2-2 mcg/kg/hr, Last Rate: 1.8 mcg/kg/hr (04/15/24 1543)  fentanyl 10 mcg/mL,  mcg/hr, Last Rate: 300 mcg/hr (04/15/24 1543)  midazolam, 1-10 mg/hr, Last Rate: 7 mg/hr (04/15/24 1119)  norepinephrine, 0.02-0.3 mcg/kg/min, Last Rate: Stopped (04/08/24 1230)  Pharmacy Consult,     Diet  NPO Diet NPO Type: Tube Feeding    I have personally reviewed:  [x]  Laboratory   [x]  Microbiology   [x]  Radiology   [x]  EKG/Telemetry  [x]  Cardiology/Vascular   []   Pathology    []  Records       Assessment/Plan     Active Hospital Problems    Diagnosis  POA    **Alcohol withdrawal [F10.939]  Yes    Substance abuse [F19.10]  Unknown    Cocaine abuse [F14.10]  Unknown    Positive urine drug screen [R82.5]  Unknown    History of alcohol use disorder [Z87.898]  Yes    Alcoholic liver disease [K70.9]  Yes    COPD (chronic obstructive pulmonary disease) [J44.9]  Yes    LIVAN (obstructive sleep apnea) [G47.33]  Yes    Essential hypertension [I10]  Yes      Resolved Hospital Problems   No resolved problems to display.       68 y.o. male admitted with Alcohol withdrawal.    Assessment and plan  1.  Acute respiratory failure, requiring mechanical ventilation, ongoing management per pulmonology.  Weaning of sedation and vent management per pulmonary.     2.  Aspiration pneumonia, received antibiotics.     3.  Severe alcohol withdrawal with dependence, remains on sedation protocol in the ICU.     4.  Polysubstance abuse, complicating clinical aspects.     5.  Sepsis with shock, resolved.     6.  CODE STATUS is full code.  He may benefit from temporary LTAC placement.      Suman Verdin MD  [facility]  04/15/24  15:47 EDT

## 2024-04-15 NOTE — CASE MANAGEMENT/SOCIAL WORK
Discharge Planning Assessment  [facility]     Patient Name: Watson Lias  MRN: [id number]  Today's Date: 4/15/2024    Admit Date: 4/6/2024    Plan: Pending clinical progress   Discharge Needs Assessment       Row Name 04/15/24 1413       Living Environment    People in Home alone    Current Living Arrangements apartment    Potentially Unsafe Housing Conditions none    Primary Care Provided by self    Provides Primary Care For no one    Family Caregiver if Needed child(anneliese), adult;sibling(s)    Quality of Family Relationships helpful;involved;supportive    Able to Return to Prior Arrangements no       Resource/Environmental Concerns    Resource/Environmental Concerns none    Transportation Concerns none       Transition Planning    Patient/Family Anticipates Transition to inpatient rehabilitation facility    Patient/Family Anticipated Services at Transition rehabilitation services    Transportation Anticipated family or friend will provide       Discharge Needs Assessment    Readmission Within the Last 30 Days no previous admission in last 30 days    Equipment Currently Used at Home cane, straight    Concerns to be Addressed discharge planning;care coordination/care conferences    Anticipated Changes Related to Illness none    Equipment Needed After Discharge none    Provided Post Acute Provider List? N/A    Provided Post Acute Provider Quality & Resource List? N/A                   Discharge Plan       Row Name 04/15/24 1414       Plan    Plan Pending clinical progress    Patient/Family in Agreement with Plan yes    Plan Comments IMM noted. CCP spoke to the patient’s son Aravind via telephone due to the patient being sedated and on the vent. Aravind confirmed the information on the patient's face sheet was accurate. He said he lives in a 1st floor 1 level apartment alone. He confirmed his PCP is Roc Carroll. He said that he normally ambulates with a cane and works full-time for the [employer]  [place]. 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 He said he has been to [facility] in the past for some short-term rehab. He denied any steps to enter the apartment or within the apartment. CCP explained that Dr. Verdin recommends the patient be discharged to [facility] for inpatient rehab at discharge per Junie with [facility] and said he is unsure if his dad will agree to that at discharge. He said family can transport at discharge. CCP to follow. CD, CSW.                  Continued Care and Services - Admitted Since 4/6/2024       Destination       Service Provider Request Status Selected Services Address Phone Fax Patient Preferred    [facility] Accepted N/A [address] [address] [phone] -- --                     Demographic Summary       Row Name 04/15/24 1412       General Information    Admission Type inpatient    Arrived From emergency department    Required Notices Provided Important Message from Medicare    Referral Source admission list    Reason for Consult discharge planning    Preferred Language English                   Functional Status       Row Name 04/15/24 1412       Functional Status    Usual Activity Tolerance good    Current Activity Tolerance poor       Functional Status, IADL    Medications assistive equipment    Meal Preparation assistive equipment    Housekeeping assistive equipment    Laundry assistive equipment    Shopping assistive equipment       Mental Status    General Appearance WDL WDL       Mental Status Summary    Recent Changes in Mental Status/Cognitive Functioning no changes       Employment/    Employment Status employed full-time    Current or Previous Occupation education                   Psychosocial    No documentation.                  Abuse/Neglect    No documentation.                  Legal    No documentation.                  Substance Abuse     No documentation.                  Patient Forms    No documentation.

## 2024-04-15 NOTE — PLAN OF CARE
Goal Outcome Evaluation: titrated sedation for a rass of -2, x1 ativan oral, VSS

## 2024-04-15 NOTE — PLAN OF CARE
Goal Outcome Evaluation:  Plan of Care Reviewed With: patient        Progress: no change     Pt remains sedated and intubated. Bilateral soft wrist restraints in place to prevent pulling out ETT tube, feeding tube, Wood Catheter, and Ivs. Start of shift pt maxed on Versed and Fentanyl. Precedex lowered as HR lowering to low 50s. Per Dr. MEENA Penaloza, goal is to try to wean off IV Versed & ok with max doses of Fentanyl and Precedex. This RN only able to wean down from 10 mg/hr to 8 mg/hr of Versed gtt as anything lower pt is asynchronized with ventilator and restless. Pt does not follows commands, but will open eyes and move all extremities. Pupils pinpoint and round (maxed on sedation, especially Fentanyl gtt). Pt continues to have copious secretions orally and with in-line suction. TF infusing at goal rate. 2 loose Bms this shift. Voiding in Wood Catheter. No family at bedside. Ongoing care provided.

## 2024-04-15 NOTE — PROGRESS NOTES
[facility]      Mar/chart reviewed  Follow up alcohol withdrawal, mechanical ventilation  Patient sedated on vent unable to provide subjective  Continued adjustments being made to sedation    Vital Sign Min/Max for last 24 hours  Temp  Min: 97.5 °F (36.4 °C)  Max: 100.4 °F (38 °C)   BP  Min: 98/49  Max: 183/87   Pulse  Min: 44  Max: 108   Resp  Min: 12  Max: 14   SpO2  Min: 92 %  Max: 99 %   No data recorded   No data recorded   2502/2640    Appears ill, sedated on vent but agitated  perrl, normal sclera  mmm, no jvd, trachea midline, neck supple,  chest cta bilaterally, no crackles, no wheezes,   rrr,   soft, nt, nd +bs,  no c/c/ 1+ edema  Skin warm, dry no rashes     Labs: 4/15: reviewed:  Glucose 133  Bun 26  Cr 0.83  Bicarb 22.8  Wbc 10  Hgb 9.9  Plts 429    A/P:  1. Acute respiratory failure requiring  mechanical ventilation -- has been failing wean attempts due to mental status  2. Aspiration pneumonia with H flu -- now finished antibiotic course    3. Severe alcohol abuse with dependence and withdrawal -- using phenobarbital protocol and trying to work versed off.   4. Polysubstance abuse -- likely making improvement in his mental status and sedation needs all the more difficult  5. LIVAN  6. Sepsis with shock - resolved     Trying to work on sedatives to facilitate extubation    CC 35 mins.

## 2024-04-15 NOTE — DISCHARGE PLACEMENT REQUEST
"Brittany Lisa (68 y.o. Male)       Date of Birth   1955    Social Security Number       Address   [address] [place] [address]    Home Phone   [phone]    MRN   [id number]       Lutheran   Hinduism    Marital Status                               Admission Date   4/6/24    Admission Type   Emergency    Admitting Provider   Sean Ricketts MD    Attending Provider   Suman Verdin MD    Department, Room/Bed   [facility] INTENSIVE [facility]ARE, I387/1       Discharge Date       Discharge Disposition       Discharge Destination                                 Attending Provider: Suman Verdin MD    Allergies: Penicillins, Penicillins    Isolation: Contact   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)   Code Status: CPR    Ht: 200.7 cm (79\")   Wt: 130 kg (286 lb 9.6 oz)    Admission Cmt: None   Principal Problem: Alcohol withdrawal [F10.939]                   Active Insurance as of 4/6/2024       Primary Coverage       Payor Plan Insurance Group Employer/Plan Group    [employer]HE[employer] BLUE CROSS AN[employer]M BLUE CROSS BLUE SHIELD PPO [id number]       Payor Plan Address Payor Plan Phone Number Payor Plan Fax Number Effective Dates    [address] [phone]  1/1/2022 - None Entered    [facility] [id number]         Subscriber Name Subscriber Birth Date Member ID       BRITTANY LISA 1955 [id number]               Secondary Coverage       Payor Plan Insurance Group Employer/Plan Group    MEDICARE MEDICARE A ONLY        Payor Plan Address Payor Plan Phone Number Payor Plan Fax Number Effective Dates    [address] [phone]  9/1/2019 - None Entered    [facility] [id number]         Subscriber Name Subscriber Birth Date Member ID       BRITTANY LISA 1955 [id number]                     Emergency Contacts        (Rel.) Home Phone Work Phone Mobile Phone    TEJAS LISA (Son) [phone] -- [phone]    LeonieJorge A gray (Sister) [phone] -- --                "

## 2024-04-16 ENCOUNTER — APPOINTMENT (OUTPATIENT)
Dept: GENERAL RADIOLOGY | Facility: HOSPITAL | Age: 69
End: 2024-04-16
Payer: COMMERCIAL

## 2024-04-16 LAB
ANION GAP SERPL CALCULATED.3IONS-SCNC: 9.8 MMOL/L (ref 5–15)
BUN SERPL-MCNC: 22 MG/DL (ref 8–23)
BUN/CREAT SERPL: 34.9 (ref 7–25)
CALCIUM SPEC-SCNC: 8.6 MG/DL (ref 8.6–10.5)
CHLORIDE SERPL-SCNC: 104 MMOL/L (ref 98–107)
CO2 SERPL-SCNC: 22.2 MMOL/L (ref 22–29)
CREAT SERPL-MCNC: 0.63 MG/DL (ref 0.76–1.27)
DEPRECATED RDW RBC AUTO: 40.3 FL (ref 37–54)
EGFRCR SERPLBLD CKD-EPI 2021: 103 ML/MIN/1.73
ERYTHROCYTE [DISTWIDTH] IN BLOOD BY AUTOMATED COUNT: 11.6 % (ref 12.3–15.4)
GLUCOSE BLDC GLUCOMTR-MCNC: 119 MG/DL (ref 70–130)
GLUCOSE BLDC GLUCOMTR-MCNC: 126 MG/DL (ref 70–130)
GLUCOSE BLDC GLUCOMTR-MCNC: 137 MG/DL (ref 70–130)
GLUCOSE BLDC GLUCOMTR-MCNC: 140 MG/DL (ref 70–130)
GLUCOSE SERPL-MCNC: 131 MG/DL (ref 65–99)
HCT VFR BLD AUTO: 27.5 % (ref 37.5–51)
HGB BLD-MCNC: 9.3 G/DL (ref 13–17.7)
MAGNESIUM SERPL-MCNC: 1.8 MG/DL (ref 1.6–2.4)
MCH RBC QN AUTO: 32.1 PG (ref 26.6–33)
MCHC RBC AUTO-ENTMCNC: 33.8 G/DL (ref 31.5–35.7)
MCV RBC AUTO: 94.8 FL (ref 79–97)
PHOSPHATE SERPL-MCNC: 4 MG/DL (ref 2.5–4.5)
PLATELET # BLD AUTO: 413 10*3/MM3 (ref 140–450)
PMV BLD AUTO: 9.7 FL (ref 6–12)
POTASSIUM SERPL-SCNC: 4.5 MMOL/L (ref 3.5–5.2)
RBC # BLD AUTO: 2.9 10*6/MM3 (ref 4.14–5.8)
SODIUM SERPL-SCNC: 136 MMOL/L (ref 136–145)
WBC NRBC COR # BLD AUTO: 11.66 10*3/MM3 (ref 3.4–10.8)

## 2024-04-16 PROCEDURE — 84100 ASSAY OF PHOSPHORUS: CPT | Performed by: STUDENT IN AN ORGANIZED HEALTH CARE EDUCATION/TRAINING PROGRAM

## 2024-04-16 PROCEDURE — 94003 VENT MGMT INPAT SUBQ DAY: CPT

## 2024-04-16 PROCEDURE — 80048 BASIC METABOLIC PNL TOTAL CA: CPT | Performed by: STUDENT IN AN ORGANIZED HEALTH CARE EDUCATION/TRAINING PROGRAM

## 2024-04-16 PROCEDURE — 25010000002 ENOXAPARIN PER 10 MG: Performed by: INTERNAL MEDICINE

## 2024-04-16 PROCEDURE — 94799 UNLISTED PULMONARY SVC/PX: CPT

## 2024-04-16 PROCEDURE — 94760 N-INVAS EAR/PLS OXIMETRY 1: CPT

## 2024-04-16 PROCEDURE — 94761 N-INVAS EAR/PLS OXIMETRY MLT: CPT

## 2024-04-16 PROCEDURE — 71045 X-RAY EXAM CHEST 1 VIEW: CPT

## 2024-04-16 PROCEDURE — 25010000002 MIDAZOLAM 50 MG/10ML SOLUTION

## 2024-04-16 PROCEDURE — 82948 REAGENT STRIP/BLOOD GLUCOSE: CPT

## 2024-04-16 PROCEDURE — 25010000002 LORAZEPAM PER 2 MG: Performed by: INTERNAL MEDICINE

## 2024-04-16 PROCEDURE — 25010000002 FENTANYL CITRATE (PF) 2500 MCG/50ML SOLUTION: Performed by: INTERNAL MEDICINE

## 2024-04-16 PROCEDURE — 85027 COMPLETE CBC AUTOMATED: CPT | Performed by: STUDENT IN AN ORGANIZED HEALTH CARE EDUCATION/TRAINING PROGRAM

## 2024-04-16 PROCEDURE — 83735 ASSAY OF MAGNESIUM: CPT | Performed by: STUDENT IN AN ORGANIZED HEALTH CARE EDUCATION/TRAINING PROGRAM

## 2024-04-16 RX ORDER — QUETIAPINE FUMARATE 100 MG/1
100 TABLET, FILM COATED ORAL EVERY 12 HOURS SCHEDULED
Status: DISCONTINUED | OUTPATIENT
Start: 2024-04-16 | End: 2024-04-18

## 2024-04-16 RX ADMIN — DEXMEDETOMIDINE HYDROCHLORIDE IN SODIUM CHLORIDE 2 MCG/KG/HR: 4 INJECTION INTRAVENOUS at 07:13

## 2024-04-16 RX ADMIN — DEXMEDETOMIDINE HYDROCHLORIDE IN SODIUM CHLORIDE 2 MCG/KG/HR: 4 INJECTION INTRAVENOUS at 03:53

## 2024-04-16 RX ADMIN — FENTANYL CITRATE 300 MCG/HR: 0.05 INJECTION, SOLUTION INTRAMUSCULAR; INTRAVENOUS at 02:24

## 2024-04-16 RX ADMIN — DEXMEDETOMIDINE HYDROCHLORIDE IN SODIUM CHLORIDE 1.8 MCG/KG/HR: 4 INJECTION INTRAVENOUS at 05:31

## 2024-04-16 RX ADMIN — Medication 10 ML: at 20:16

## 2024-04-16 RX ADMIN — LORAZEPAM 2 MG: 2 INJECTION INTRAMUSCULAR; INTRAVENOUS at 21:32

## 2024-04-16 RX ADMIN — DEXMEDETOMIDINE HYDROCHLORIDE IN SODIUM CHLORIDE 1.5 MCG/KG/HR: 4 INJECTION INTRAVENOUS at 14:16

## 2024-04-16 RX ADMIN — FAMOTIDINE 20 MG: 20 TABLET, FILM COATED ORAL at 06:01

## 2024-04-16 RX ADMIN — CLONIDINE HYDROCHLORIDE 0.3 MG: 0.1 TABLET ORAL at 11:26

## 2024-04-16 RX ADMIN — Medication 15 ML: at 08:21

## 2024-04-16 RX ADMIN — DEXMEDETOMIDINE HYDROCHLORIDE IN SODIUM CHLORIDE 1.5 MCG/KG/HR: 4 INJECTION INTRAVENOUS at 11:15

## 2024-04-16 RX ADMIN — DEXMEDETOMIDINE HYDROCHLORIDE IN SODIUM CHLORIDE 1.5 MCG/KG/HR: 4 INJECTION INTRAVENOUS at 13:14

## 2024-04-16 RX ADMIN — Medication 100 MG: at 08:21

## 2024-04-16 RX ADMIN — MIDAZOLAM 6 MG/HR: 5 INJECTION INTRAMUSCULAR; INTRAVENOUS at 18:18

## 2024-04-16 RX ADMIN — ZINC OXIDE 1 APPLICATION: 200 OINTMENT TOPICAL at 13:14

## 2024-04-16 RX ADMIN — CLONIDINE HYDROCHLORIDE 0.3 MG: 0.1 TABLET ORAL at 23:42

## 2024-04-16 RX ADMIN — FOLIC ACID 1 MG: 1 TABLET ORAL at 08:21

## 2024-04-16 RX ADMIN — MIDAZOLAM 6 MG/HR: 5 INJECTION INTRAMUSCULAR; INTRAVENOUS at 11:47

## 2024-04-16 RX ADMIN — CLONIDINE HYDROCHLORIDE 0.3 MG: 0.1 TABLET ORAL at 17:03

## 2024-04-16 RX ADMIN — IPRATROPIUM BROMIDE AND ALBUTEROL SULFATE 3 ML: .5; 3 SOLUTION RESPIRATORY (INHALATION) at 07:25

## 2024-04-16 RX ADMIN — FENTANYL CITRATE 250 MCG/HR: 0.05 INJECTION, SOLUTION INTRAMUSCULAR; INTRAVENOUS at 10:08

## 2024-04-16 RX ADMIN — DEXMEDETOMIDINE HYDROCHLORIDE IN SODIUM CHLORIDE 1.5 MCG/KG/HR: 4 INJECTION INTRAVENOUS at 09:00

## 2024-04-16 RX ADMIN — IPRATROPIUM BROMIDE AND ALBUTEROL SULFATE 3 ML: .5; 3 SOLUTION RESPIRATORY (INHALATION) at 19:59

## 2024-04-16 RX ADMIN — DEXMEDETOMIDINE HYDROCHLORIDE IN SODIUM CHLORIDE 2 MCG/KG/HR: 4 INJECTION INTRAVENOUS at 02:23

## 2024-04-16 RX ADMIN — Medication 10 ML: at 08:22

## 2024-04-16 RX ADMIN — ACETAMINOPHEN 325MG 650 MG: 325 TABLET ORAL at 21:32

## 2024-04-16 RX ADMIN — DEXMEDETOMIDINE HYDROCHLORIDE IN SODIUM CHLORIDE 1.7 MCG/KG/HR: 4 INJECTION INTRAVENOUS at 22:55

## 2024-04-16 RX ADMIN — ZINC OXIDE 1 APPLICATION: 200 OINTMENT TOPICAL at 20:17

## 2024-04-16 RX ADMIN — PHENOBARBITAL 32.4 MG: 32.4 TABLET ORAL at 20:03

## 2024-04-16 RX ADMIN — DEXMEDETOMIDINE HYDROCHLORIDE IN SODIUM CHLORIDE 1.5 MCG/KG/HR: 4 INJECTION INTRAVENOUS at 21:01

## 2024-04-16 RX ADMIN — CHLORHEXIDINE GLUCONATE 15 ML: 1.2 RINSE ORAL at 20:14

## 2024-04-16 RX ADMIN — IPRATROPIUM BROMIDE AND ALBUTEROL SULFATE 3 ML: .5; 3 SOLUTION RESPIRATORY (INHALATION) at 15:32

## 2024-04-16 RX ADMIN — CLONIDINE HYDROCHLORIDE 0.3 MG: 0.1 TABLET ORAL at 06:01

## 2024-04-16 RX ADMIN — FENTANYL CITRATE 250 MCG/HR: 0.05 INJECTION, SOLUTION INTRAMUSCULAR; INTRAVENOUS at 14:16

## 2024-04-16 RX ADMIN — ENOXAPARIN SODIUM 40 MG: 100 INJECTION SUBCUTANEOUS at 11:47

## 2024-04-16 RX ADMIN — MIDAZOLAM 10 MG/HR: 5 INJECTION INTRAMUSCULAR; INTRAVENOUS at 05:28

## 2024-04-16 RX ADMIN — IPRATROPIUM BROMIDE AND ALBUTEROL SULFATE 3 ML: .5; 3 SOLUTION RESPIRATORY (INHALATION) at 11:37

## 2024-04-16 RX ADMIN — FENTANYL CITRATE 250 MCG/HR: 0.05 INJECTION, SOLUTION INTRAMUSCULAR; INTRAVENOUS at 17:31

## 2024-04-16 RX ADMIN — PHENOBARBITAL 32.4 MG: 32.4 TABLET ORAL at 06:04

## 2024-04-16 RX ADMIN — DEXMEDETOMIDINE HYDROCHLORIDE IN SODIUM CHLORIDE 1.5 MCG/KG/HR: 4 INJECTION INTRAVENOUS at 19:23

## 2024-04-16 RX ADMIN — FENTANYL CITRATE 300 MCG/HR: 0.05 INJECTION, SOLUTION INTRAMUSCULAR; INTRAVENOUS at 21:07

## 2024-04-16 RX ADMIN — QUETIAPINE FUMARATE 100 MG: 100 TABLET ORAL at 11:27

## 2024-04-16 RX ADMIN — Medication 1 PATCH: at 20:16

## 2024-04-16 RX ADMIN — DEXMEDETOMIDINE HYDROCHLORIDE IN SODIUM CHLORIDE 2 MCG/KG/HR: 4 INJECTION INTRAVENOUS at 00:59

## 2024-04-16 RX ADMIN — QUETIAPINE FUMARATE 100 MG: 100 TABLET ORAL at 20:13

## 2024-04-16 RX ADMIN — FAMOTIDINE 20 MG: 20 TABLET, FILM COATED ORAL at 17:04

## 2024-04-16 RX ADMIN — FENTANYL CITRATE 300 MCG/HR: 0.05 INJECTION, SOLUTION INTRAMUSCULAR; INTRAVENOUS at 06:51

## 2024-04-16 RX ADMIN — CHLORHEXIDINE GLUCONATE 15 ML: 1.2 RINSE ORAL at 08:22

## 2024-04-16 RX ADMIN — DEXMEDETOMIDINE HYDROCHLORIDE IN SODIUM CHLORIDE 1.5 MCG/KG/HR: 4 INJECTION INTRAVENOUS at 16:49

## 2024-04-16 NOTE — PROGRESS NOTES
"Nutrition Services    Patient Name:  Watson Lisa  YOB: 1955  MRN: [id number]  Admit Date:  4/6/2024    Assessment Date:  04/16/24    Comment: Remains on the vent, getting precedex, versed and fent for sedation/comfort. RN trying to wean sedation. TF's at goal with Peptamen AF at 65mL/hr. Last BM 4/16, pericolace last given 4/14. Stools are loose liquidy.- Labs, meds, skin reviewed. Glu 126/137/131.  Will likely need swallow eval once extubated. Discussed with RN    Plan/Recommendations:  Continue Peptamen AF at 65mL/hr   Min free water while on multiple drips  Monitor lytes and replace as needed.    Will follow clinical course, nutrition needs.    CLINICAL NUTRITION ASSESSMENT      Reason for Assessment Follow-up Protocol   Diagnosis/Problem severe alcohol abuse, asp PNA, substance abuse, HTN, LIVAN, resp failure - on vent.   Current Problems On vent     Encounter Information        Nutrition History    Food Preferences    Supplements    Factors Affecting Intake altered mental status, altered respiratory status   Tests/Procedures X-Ray     Anthropometrics        Current Height   Current Weight  BMI kg/m2 Height: 200.7 cm (79\")  Weight: 130 kg (286 lb 9.6 oz) (04/14/24 0500)  Body mass index is 32.29 kg/m².     Adj BMI (if applicable)    BMI Category Overweight (25 - 29.9)       Admission Weight 113kg   Ideal Body Weight (IBW) 93.7kg     Adj IBW (if applicable)    Usual Body Weight (UBW) 250-260lb   Weight Change/Trend Stable         Estimated/Assessed Needs        Energy Requirements    Weight for Calculation 93.7kg   Method for Estimation  20 kcal/kg   EST Needs (kcal/day) 1874       Protein Requirements    Weight for Calculation 93.7kg   EST Protein Needs (g/kg) 1.2 gm/kg   EST Daily Needs (g/day) 112g       Fluid Requirements     Method for Estimation 1 mL/kcal    Estimated Needs (mL/day)        Fluid Deficit    Current Na Level (mEq/L)    Desired Na Level (mEq/L)    Estimated Fluid Deficit (L)   "     Labs        Pertinent Labs    Results from last 7 days   Lab Units 04/16/24  0614 04/15/24  0805 04/14/24  0408   SODIUM mmol/L 136 136 137   POTASSIUM mmol/L 4.5 4.2 4.8   CHLORIDE mmol/L 104 103 104   CO2 mmol/L 22.2 22.8 22.5   BUN mg/dL 22 26* 22   CREATININE mg/dL 0.63* 0.83 0.67*   CALCIUM mg/dL 8.6 8.6 8.8   GLUCOSE mg/dL 131* 133* 116*     Results from last 7 days   Lab Units 04/16/24  0614 04/15/24  0805 04/14/24  0408 04/12/24  0600 04/11/24  0701   MAGNESIUM mg/dL 1.8 1.9 1.9   < > 1.4*   PHOSPHORUS mg/dL 4.0 4.2 3.4   < > 4.0   HEMOGLOBIN g/dL 9.3* 9.9* 10.4*   < > 10.9*   HEMATOCRIT % 27.5* 29.4* 31.1*   < > 32.7*   WBC 10*3/mm3 11.66* 10.85* 10.76   < > 10.52   TRIGLYCERIDES mg/dL  --   --   --   --  271*    < > = values in this interval not displayed.     Results from last 7 days   Lab Units 04/16/24  0614 04/15/24  0805 04/14/24  0408 04/13/24  0600 04/12/24  0810   PLATELETS 10*3/mm3 413 429 509* 340 444     COVID19   Date Value Ref Range Status   04/15/2022 Not Detected Not Detected - Ref. Range Final     Lab Results   Component Value Date    HGBA1C 4.90 01/18/2021          Medications            Scheduled Medications chlorhexidine, 15 mL, Mouth/Throat, Q12H  cloNIDine, 0.3 mg, Nasogastric, Q6H  enoxaparin, 40 mg, Subcutaneous, Q24H  famotidine, 20 mg, Nasogastric, BID AC  folic acid, 1 mg, Nasogastric, Daily  hydrocortisone-bacitracin-zinc oxide-nystatin, 1 Application, Topical, BID  insulin regular, 2-9 Units, Subcutaneous, Q6H  ipratropium-albuterol, 3 mL, Nebulization, 4x Daily - RT  midazolam, 4 mg, Intravenous, Once  multivitamin and minerals, 15 mL, Nasogastric, Daily  nicotine, 1 patch, Transdermal, Q24H  PHENobarbital, 32.4 mg, Oral, Once   Followed by  [START ON 4/17/2024] PHENobarbital, 32.4 mg, Oral, Once  QUEtiapine, 100 mg, Oral, Q12H  senna-docusate sodium, 2 tablet, Nasogastric, BID  sodium chloride, 10 mL, Intravenous, Q12H  sodium chloride, 10 mL, Intravenous, Q12H  thiamine,  100 mg, Nasogastric, Daily        Infusions dexmedetomidine, 0.2-2 mcg/kg/hr, Last Rate: 1.5 mcg/kg/hr (04/16/24 0842)  fentanyl 10 mcg/mL,  mcg/hr, Last Rate: 287.5 mcg/hr (04/16/24 0819)  midazolam, 1-10 mg/hr, Last Rate: 8 mg/hr (04/16/24 0842)  norepinephrine, 0.02-0.3 mcg/kg/min, Last Rate: Stopped (04/08/24 1230)  Pharmacy Consult,         PRN Medications   acetaminophen    senna-docusate sodium **AND** polyethylene glycol **AND** [DISCONTINUED] bisacodyl **AND** bisacodyl    Calcium Replacement - Follow Nurse / BPA Driven Protocol    dextrose    dextrose    glucagon (human recombinant)    hydrALAZINE    LORazepam    Magnesium Standard Dose Replacement - Follow Nurse / BPA Driven Protocol    Magnesium Standard Dose Replacement - Follow Nurse / BPA Driven Protocol    metoprolol tartrate    nicotine polacrilex    nitroglycerin    ondansetron    Pharmacy Consult    Phosphorus Replacement - Follow Nurse / BPA Driven Protocol    Potassium Replacement - Follow Nurse / BPA Driven Protocol    [COMPLETED] Insert Peripheral IV **AND** sodium chloride    sodium chloride    sodium chloride    sodium chloride    sodium chloride     Physical Findings          Physical Appearance disoriented, sedate, ventilator support   Oral/Mouth Cavity tooth or teeth missing   Edema  2+ (mild)   Gastrointestinal last bowel movement: 4/16   Skin  excoriation, pressure injury: gluteal , wound on abd, bilat groin rash   Tubes/Drains/Lines Cortrak, NG tube, bridle in place   NFPE No clinical signs of muscle wasting or fat loss   --  Current Nutrition Orders & Evaluation of Intake       Oral Nutrition     Food Allergies NKFA   Current PO Diet NPO Diet NPO Type: Tube Feeding   Supplement    PO Evaluation     Trending % PO Intake       Enteral Nutrition     Enteral Route NG    TF Delivery Method Continuous    Propofol Rate/Kcal off    Current TF Order/Rate  Peptamen AF @ 65 mL/hr    TF Goal Rate 65 mL/hr    Current Water Flush 30 mL Q 4 hr     Modular None    TF Residual  no or minimal residual    TF Tolerance tolerating    TF Observation Verified correct TF and water flush infusing per orders     Nutrition Diagnosis        Nutrition Dx Problem 1 Problem: Inadequate Oral Intake  Etiology: Medical Diagnosis - severe alcohol abuse, asp PNA, substance abuse, HTN, LIVAN, resp failure - on vent.  Signs/Symptoms: NPO    Comment:      INTERVENTION / PLAN OF CARE  Intervention Goal        Intervention Goal(s) Maintain nutrition status, Reduce/improve symptoms, Meet estimated needs, Disease management/therapy, Initiate TF/PN, Tolerate TF/PN at goal, Transition TF to PO, and No significant weight loss     Nutrition Intervention        RD Action Continue to monitor and Care plan reviewed     Prescription         Diet     Supplement/Snack    EN/PN     Prescription Ordered       Enteral Prescription:     Enteral Route NG    TF Delivery Method Continuous    Enteral Product Peptamen AF    Modular None    Propofol Rate/Kcal off    TF Start Rate 50    TF Goal Rate 65    Free Water Flush 30mL q 4 hrs    TF Provision at Goal: 1872 kcal, 118 gm protein, 1263 mL free water + 180 mL water flushes         Calories 100 % needs met         Protein  105 % needs met         Fluid (mL) 1443mL    Prescription Ordered Continue same per protocol, No changes at this time     Monitor/Evaluation        Monitor Per protocol   Discharge Plan Pending clinical course   Education Will instruct as appropriate     RD to follow per protocol.       Electronically signed by:  Queenie Dunham RD  04/16/24 08:51 EDT

## 2024-04-16 NOTE — PAYOR COMM NOTE
"Brittany Lisa (68 y.o. Male)                        ATTENTION; CONTINUED CLINICALS CASE REF #[id number]                        REPLY TO [facility] DEPT  [phone] OR CALL                                             Date of Birth   1955    Social Security Number       Address   [address] [place] [address]    Home Phone   [phone]    MRN   [id number]       Rastafari   Voodoo    Marital Status                               Admission Date   4/6/24    Admission Type   Emergency    Admitting Provider   Sean Ricketts MD    Attending Provider   Suman Verdin MD    Department, Room/Bed   [facility] INTENSIVE CARE, I387/1       Discharge Date       Discharge Disposition       Discharge Destination                                 Attending Provider: Suman Verdin MD    Allergies: Penicillins, Penicillins    Isolation: Contact   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)   Code Status: CPR    Ht: 200.7 cm (79\")   Wt: 130 kg (286 lb 9.6 oz)    Admission Cmt: None   Principal Problem: Alcohol withdrawal [F10.939]                   Active Insurance as of 4/6/2024       Primary Coverage       Payor Plan Insurance Group Employer/Plan Group    ANTHEM BLUE CROSS ANTHEM BLUE CROSS BLUE SHIELD PPO [id number]       Payor Plan Address Payor Plan Phone Number Payor Plan Fax Number Effective Dates    [id number] [phone]  1/1/2022 - None Entered    [facility] [id number]         Subscriber Name Subscriber Birth Date Member ID       BRITTANY LISA 1955 [id number]               Secondary Coverage       Payor Plan Insurance Group Employer/Plan Group    MEDICARE MEDICARE A ONLY        Payor Plan Address Payor Plan Phone Number Payor Plan Fax Number Effective Dates    [address] [phone]  9/1/2019 - None Entered    [facility] [id number]         Subscriber Name Subscriber Birth Date Member ID       BRITTANY LISA 1955 [id number]                     Emergency Contacts  "       (Rel.) Home Phone Work Phone Mobile Phone    TEJAS ZULETA (Son) [phone] -- [phone]    Jorge A Nielson (Sister) [phone] -- --              Vital Signs (last day)       Date/Time Temp Temp src Pulse Resp BP Patient Position SpO2    04/16/24 1703 -- -- 72 -- 125/86 -- --    04/16/24 1700 -- -- 68 -- 125/86 -- 100    04/16/24 1600 -- -- 77 -- 137/73 Lying 96    04/16/24 1556 98.6 (37) Oral -- -- -- -- --    04/16/24 1532 -- -- 66 12 139/86 -- 97    04/16/24 1500 -- -- 56 -- 128/91 -- 97    04/16/24 1400 -- -- 54 -- 119/61 -- 96    04/16/24 1300 -- -- 58 -- 128/68 -- 94    04/16/24 1211 -- -- -- -- -- -- 90    04/16/24 1210 -- -- -- -- -- -- 86    04/16/24 1200 -- -- 66 -- 134/63 -- 99    04/16/24 1137 -- -- 67 12 -- -- 100    04/16/24 1126 -- -- 66 -- 131/72 -- --    04/16/24 1117 98.3 (36.8) Oral -- -- -- -- --    04/16/24 1100 -- -- 62 -- 131/72 -- 95    04/16/24 1000 -- -- 61 -- 124/59 -- 95    04/16/24 0900 -- -- 61 -- 128/63 -- 93    04/16/24 0830 -- -- 59 -- 116/60 -- 92    04/16/24 0800 -- -- 63 -- 106/51 -- 91    04/16/24 0744 98.4 (36.9) Oral -- -- -- -- --    04/16/24 0730 -- -- 62 -- 112/61 -- 93    04/16/24 0725 -- -- 59 15 -- -- 94    04/16/24 0600 -- -- 57 -- 110/64 -- 96    04/16/24 0500 -- -- 60 -- 122/65 -- 96    04/16/24 0412 -- -- 65 13 -- -- 95    04/16/24 0400 -- -- 65 -- 115/52 -- 94    04/16/24 0340 98.3 (36.8) Oral -- -- -- -- --    04/16/24 0300 -- -- 66 -- 129/63 -- 96    04/16/24 0200 -- -- 61 -- 119/68 -- 93    04/16/24 0100 -- -- 72 -- 132/61 -- 100    04/16/24 0010 98.4 (36.9) Oral 78 -- -- -- 97    04/16/24 0000 -- -- 75 -- 155/80 -- 97    04/15/24 2310 -- -- 69 -- -- -- 97    04/15/24 2300 -- -- 72 -- 146/81 -- 98    04/15/24 2200 -- -- 60 -- 134/73 -- 94    04/15/24 2100 -- -- 64 -- 115/49 -- 94    04/15/24 2053 97.7 (36.5) Oral 65 -- -- -- 94    04/15/24 2019 -- -- 53 12 -- -- 95    04/15/24 2000 -- -- 50 -- 117/70 -- 95    04/15/24 1900 -- -- 57 --  134/73 -- 96    04/15/24 1839 -- -- 59 -- 132/74 -- --    04/15/24 1830 -- -- 58 -- 132/74 -- 95    04/15/24 1800 -- -- 57 -- 135/68 -- 94    04/15/24 1700 -- -- 60 -- 141/66 -- 94    04/15/24 1600 -- -- 68 -- 116/58 Lying 92    04/15/24 1515 99.1 (37.3) Oral -- -- -- -- --    04/15/24 1500 -- -- 59 -- 133/62 -- 90    04/15/24 1456 -- -- 61 12 -- -- 90    04/15/24 1430 -- -- 67 -- 148/68 -- 90    04/15/24 1300 -- -- 73 -- 109/68 -- 93    04/15/24 1200 -- -- 68 -- 126/66 Lying 92    04/15/24 1150 98.5 (36.9) Oral -- -- -- -- --    04/15/24 1122 -- -- 63 12 -- -- 97    04/15/24 1112 -- -- 71 -- 151/80 -- --    04/15/24 1100 -- -- 71 -- 141/83 -- 96    04/15/24 1000 -- -- 62 -- 117/58 -- 95    04/15/24 0900 -- -- 52 -- 116/62 -- 94    04/15/24 0800 -- -- 57 -- 107/53 Lying 92    04/15/24 0725 98.2 (36.8) Oral -- -- -- -- --    04/15/24 0723 -- -- 54 14 -- -- 98    04/15/24 0700 -- -- 54 -- 110/50 -- 97    04/15/24 0500 -- -- 58 -- 112/65 -- 95    04/15/24 0452 97.5 (36.4) Oral -- -- -- -- --    04/15/24 0420 -- -- 44 -- -- -- 98    04/15/24 0400 -- -- 60 -- 111/72 -- 97    04/15/24 0300 -- -- 60 -- 105/53 -- 95    04/15/24 0200 -- -- 52 -- 113/50 -- 97    04/15/24 0109 98 (36.7) Oral 61 -- -- -- 97    04/15/24 0100 -- -- 51 -- 132/55 -- 97    04/15/24 0028 -- -- 62 -- -- -- 97    04/15/24 0000 -- -- 64 -- 124/57 -- 97          Oxygen Therapy (last day)       Date/Time SpO2 Device (Oxygen Therapy) Flow (L/min) Oxygen Concentration (%) ETCO2 (mmHg)    04/16/24 1700 100 ventilator -- 30 --    04/16/24 1600 96 ventilator -- 30 --    04/16/24 1532 97 ventilator -- 30 --    04/16/24 1500 97 ventilator -- 30 --    04/16/24 1400 96 room air -- 30 --    04/16/24 1300 94 ventilator -- 30 --    04/16/24 1211 90 ventilator -- 30 --    04/16/24 1210 86 ventilator -- 21 --    04/16/24 1200 99 ventilator -- 21 --    04/16/24 1137 100 ventilator -- 21 26    04/16/24 1100 95 ventilator -- 30 --    04/16/24 1000 95 ventilator -- 30 --     04/16/24 0900 93 ventilator -- 30 --    04/16/24 0830 92 -- -- -- --    04/16/24 0800 91 ventilator -- 30 --    04/16/24 0742 -- ventilator -- 30 --    04/16/24 0730 93 ventilator -- 30 --    04/16/24 0725 94 ventilator -- -- 29    04/16/24 0600 96 -- -- -- --    04/16/24 0500 96 -- -- -- --    04/16/24 0412 95 ventilator -- -- 31    04/16/24 0400 94 ventilator -- -- --    04/16/24 0300 96 -- -- -- --    04/16/24 0200 93 -- -- -- --    04/16/24 0100 100 -- -- -- --    04/16/24 0010 97 -- -- -- --    04/16/24 0000 97 ventilator -- -- --    04/15/24 2310 97 ventilator -- 30 30    04/15/24 2300 98 -- -- -- --    04/15/24 2200 94 -- -- -- --    04/15/24 2100 94 -- -- -- --    04/15/24 2053 94 -- -- -- --    04/15/24 2019 95 ventilator -- 30 29    04/15/24 2000 95 ventilator -- -- --    04/15/24 1900 96 ventilator -- 30 --    04/15/24 1830 95 -- -- -- --    04/15/24 1800 94 ventilator -- 30 --    04/15/24 1700 94 ventilator -- 30 --    04/15/24 1645 -- ventilator -- 30 --    04/15/24 1600 92 ventilator -- 30 --    04/15/24 1500 90 ventilator -- 30 --    04/15/24 1456 90 ventilator -- 30 --    04/15/24 1430 90 ventilator -- 30 --    04/15/24 1300 93 ventilator -- 30 --    04/15/24 1245 -- ventilator -- 30 --    04/15/24 1200 92 ventilator -- 30 --    04/15/24 1122 97 ventilator -- 30 27    04/15/24 1100 96 ventilator -- 30 --    04/15/24 1000 95 ventilator -- 30 --    04/15/24 0900 94 ventilator -- 30 --    04/15/24 0845 -- ventilator -- 30 --    04/15/24 0800 92 ventilator -- 30 --    04/15/24 0723 98 ventilator -- 30 --    04/15/24 0722 -- -- -- -- 28    04/15/24 0700 97 -- -- -- --    04/15/24 0500 95 -- -- -- --    04/15/24 0420 98 ventilator -- -- 32    04/15/24 0400 97 ventilator -- -- --    04/15/24 0300 95 -- -- -- --    04/15/24 0200 97 -- -- -- --    04/15/24 0109 97 -- -- -- --    04/15/24 0100 97 -- -- -- --    04/15/24 0028 97 ventilator -- -- 29    04/15/24 0000 97 ventilator -- -- --          Lines,  "[employer]       Active LDAs       Name Placement date Placement time Site Days    Peripheral IV 04/08/24 0713 Anterior;Right Forearm 04/08/24  0713  Forearm  8    Peripheral IV 04/13/24 0820 Anterior;Right Forearm 04/13/24  0820  Forearm  3    NG/OG Tube Nasogastric Left nostril 04/08/24  0930  Left nostril  8    Urethral Catheter Silicone 04/08/24  1100  -- 8    ETT  04/08/24  0726  -- 8                  CIWA (last day)        None                  Orders (last 24 hrs)        Start     Ordered    04/17/24 0918  PHENobarbital tablet 32.4 mg  Once        Placed in \"Followed by\" Linked Group    04/14/24 1319    04/17/24 0600  Blood Gas, Arterial -  Morning Draw         04/16/24 0822    04/16/24 1918  PHENobarbital tablet 32.4 mg  Once        Placed in \"Followed by\" Linked Group    04/14/24 1319    04/16/24 1745  POC Glucose Once  PROCEDURE ONCE        Comments: Complete no more than 45 minutes prior to patient eating      04/16/24 1737    04/16/24 1135  POC Glucose Once  PROCEDURE ONCE        Comments: Complete no more than 45 minutes prior to patient eating      04/16/24 1118    04/16/24 0915  QUEtiapine (SEROquel) tablet 100 mg  Every 12 Hours Scheduled         04/16/24 0820    04/16/24 0822  XR Chest 1 View  1 Time Imaging         04/16/24 0822    04/16/24 0742  Restraints Non-Violent or Non-Self Destructive  Calendar Day         04/16/24 0741    04/16/24 0718  PHENobarbital tablet 32.4 mg  Once        Placed in \"Followed by\" Linked Group    04/14/24 1319    04/16/24 0546  POC Glucose Once  PROCEDURE ONCE        Comments: Complete no more than 45 minutes prior to patient eating      04/16/24 0543    04/16/24 0015  POC Glucose Once  PROCEDURE ONCE        Comments: Complete no more than 45 minutes prior to patient eating      04/16/24 0013    04/15/24 2339  LORazepam (ATIVAN) injection 2 mg  Every 6 Hours PRN         04/15/24 2340    04/15/24 2100  sennosides-docusate (PERICOLACE) 8.6-50 MG per tablet 2 tablet " " 2 Times Daily        Placed in \"And\" Linked Group    04/15/24 0933    04/15/24 1918  PHENobarbital injection 65 mg  Once        Placed in \"Followed by\" Linked Group    04/14/24 1319    04/15/24 1848  POC Glucose Once  PROCEDURE ONCE        Comments: Complete no more than 45 minutes prior to patient eating      04/15/24 1845    04/15/24 1030  thiamine (VITAMIN B-1) tablet 100 mg  Daily        Placed in \"Followed by\" Linked Group    04/15/24 0933    04/15/24 0932  bisacodyl (DULCOLAX) suppository 10 mg  Daily PRN        Placed in \"And\" Linked Group    04/15/24 0933    04/15/24 0932  polyethylene glycol (MIRALAX) packet 17 g  Daily PRN        Placed in \"And\" Linked Group    04/15/24 0933    04/15/24 0900  fentaNYL 1000 mcg in 100 mL NS infusion  Titrated         04/15/24 0808    04/14/24 1800  POC Glucose Q6H  Every 6 Hours      Comments: Complete no more than 45 minutes prior to patient eating      04/14/24 1243    04/14/24 1400  cloNIDine (CATAPRES) tablet 0.3 mg  Every 6 Hours Scheduled         04/14/24 1307    04/14/24 1330  insulin regular (humuLIN R,novoLIN R) injection 2-9 Units  Every 6 Hours Scheduled         04/14/24 1243    04/14/24 1301  Pharmacy Consult  Continuous PRN         04/14/24 1307    04/14/24 1242  dextrose (GLUTOSE) oral gel 15 g  Every 15 Minutes PRN         04/14/24 1243    04/14/24 1242  dextrose (D50W) (25 g/50 mL) IV injection 25 g  Every 15 Minutes PRN         04/14/24 1243    04/14/24 1242  glucagon (GLUCAGEN) injection 1 mg  Every 15 Minutes PRN         04/14/24 1243    04/12/24 0545  Midazolam (VERSED) 50 mg in 50mL NS infusion  Titrated         04/12/24 0448    04/12/24 0545  midazolam (VERSED) injection 4 mg  Once         04/12/24 0454    04/10/24 1630  dexmedetomidine (PRECEDEX) 400 mcg in 100 mL NS infusion  Titrated         04/10/24 1536    04/09/24 1908  Urinary Catheter Care  Every Shift      Placed in \"And\" Linked Group    04/09/24 1908    04/09/24 1258  acetaminophen (TYLENOL) " tablet 650 mg  Every 6 Hours PRN         04/09/24 1258    04/09/24 1130  Enoxaparin Sodium (LOVENOX) syringe 40 mg  Every 24 Hours         04/09/24 1043    04/08/24 1730  famotidine (PEPCID) tablet 20 mg  2 Times Daily Before Meals         04/08/24 1127    04/08/24 1300  norepinephrine (LEVOPHED) 8 mg in 250 mL NS infusion (premix)  Titrated         04/08/24 1203    04/08/24 1215  multivitamin and minerals liquid 15 mL  Daily         04/08/24 1127    04/08/24 1126  folic acid (FOLVITE) tablet 1 mg  Daily         04/08/24 1127    04/08/24 1117  Daily Weights  Daily       04/08/24 1116    04/08/24 1116  Tube Feeding Assessment and I/O  Every Shift       04/08/24 1116    04/08/24 1030  hydrocortisone-bacitracin-zinc oxide-nystatin (MAGIC BARRIER) ointment 1 Application  2 Times Daily         04/08/24 0909    04/08/24 0900  sodium chloride 0.9 % flush 10 mL  Every 12 Hours Scheduled         04/08/24 0724    04/08/24 0900  chlorhexidine (PERIDEX) 0.12 % solution 15 mL  Every 12 Hours Scheduled         04/08/24 0724    04/08/24 0830  ipratropium-albuterol (DUO-NEB) nebulizer solution 3 mL  4 Times Daily - RT         04/08/24 0723    04/08/24 0800  Vital Signs Every Hour and Per Hospital Policy Based on Patient Condition  Every Hour       04/08/24 0724    04/08/24 0800  Intake & Output  Every Hour       04/08/24 0724    04/08/24 0800  Oral Care & Teeth Brushing - Intubated Patient  Every 4 Hours      Comments: [employer] Teeth at Least 2x/day    04/08/24 0724    04/08/24 0725  Daily Weights  Daily       04/08/24 0724    04/08/24 0724  Oral Care - Patient Not on NPPV & Not Intubated  Every Shift       04/08/24 0724    04/08/24 0723  sodium chloride 0.9 % flush 10 mL  As Needed         04/08/24 0724    04/08/24 0723  sodium chloride 0.9 % infusion 40 mL  As Needed         04/08/24 0724    04/08/24 0627  hydrALAZINE (APRESOLINE) injection 10 mg  Every 6 Hours PRN         04/08/24 0628    04/08/24 0600  Basic Metabolic Panel  Daily  "      04/07/24 1745    04/08/24 0600  Magnesium  Daily       04/07/24 1745    04/08/24 0600  Phosphorus  Daily       04/07/24 1745    04/08/24 0600  CBC (No Diff)  Daily       04/07/24 1745    04/07/24 1127  metoprolol tartrate (LOPRESSOR) injection 2.5 mg  Every 6 Hours PRN         04/07/24 1128    04/07/24 0800  Oral Care  2 Times Daily       04/06/24 1833    04/06/24 2100  sodium chloride 0.9 % flush 10 mL  Every 12 Hours Scheduled         04/06/24 1833    04/06/24 1930  nicotine (NICODERM CQ) 21 MG/24HR patch 1 patch  Every 24 Hours         04/06/24 1833    04/06/24 1833  sodium chloride 0.9 % flush 10 mL  As Needed         04/06/24 1833    04/06/24 1833  sodium chloride 0.9 % infusion 40 mL  As Needed         04/06/24 1833    04/06/24 1833  nitroglycerin (NITROSTAT) SL tablet 0.4 mg  Every 5 Minutes PRN         04/06/24 1833    04/06/24 1833  Potassium Replacement - Follow Nurse / BPA Driven Protocol  As Needed         04/06/24 1833    04/06/24 1833  Magnesium Standard Dose Replacement - Follow Nurse / BPA Driven Protocol  As Needed         04/06/24 1833    04/06/24 1833  Phosphorus Replacement - Follow Nurse / BPA Driven Protocol  As Needed         04/06/24 1833    04/06/24 1833  Calcium Replacement - Follow Nurse / BPA Driven Protocol  As Needed         04/06/24 1833    04/06/24 1833  nicotine polacrilex (NICORETTE) gum 4 mg  Every 1 Hour PRN         04/06/24 1833    04/06/24 1833  ondansetron (ZOFRAN) injection 4 mg  Every 6 Hours PRN         04/06/24 1833    04/06/24 1325  Magnesium Standard Dose Replacement - Follow Nurse / BPA Driven Protocol  As Needed         04/06/24 1325    04/06/24 1132  sodium chloride 0.9 % flush 10 mL  As Needed        Placed in \"And\" Linked Group    04/06/24 1132    Unscheduled  Obtain Pre & Post Sedation Scores With Every Lorazepam Dose - Hold For POSS Greater Than 2 or RASS of -3 or Less  As Needed       04/06/24 1833    Unscheduled  Spontaneous Awakening Trial  Daily - SAT    "    24 0724    Unscheduled  Spontaneous Breathing Trial  Daily - SBT       24 0724    Unscheduled  Wound Care  As Needed       24 0909    Unscheduled  Watson & Document Feeding Tube Depth (in cm)  As Needed       24 1116    Unscheduled  Verify Feeding Tube Placement Upon Insertion & As Needed  As Needed       24 1116    Unscheduled  Flush Feeding Tube With 30-50mL Water As Needed  As Needed       24 1116    Unscheduled  Follow Hypoglycemia Standing Orders For Blood Glucose <70 & Notify Provider of Treatment  As Needed      Comments: Follow Hypoglycemia Orders As Outlined in Process Instructions (Open Order Report to View Full Instructions)  Notify Provider Any Time Hypoglycemia Treatment is Administered    24 1243    Unscheduled  Obtain Pre & Post Sedation Scores With Every Lorazepam Dose - Hold For POSS Greater Than 2 or RASS of -3 or Less  As Needed       24 1319                     Physician Progress Notes (last 24 hours)        Suman Verdin MD at 24 1435              Name: Watson Lisa ADMIT: 2024   : 1955  PCP: Roc Carroll APRN    MRN: [id number] LOS: 10 days   AGE/SEX: 68 y.o. male  ROOM: [facility]     Subjective   Subjective   Patient seen at bedside.      Objective   Objective   Vital Signs  Temp:  [97.7 °F (36.5 °C)-99.1 °F (37.3 °C)] 98.3 °F (36.8 °C)  Heart Rate:  [50-78] 54  Resp:  [12-15] 12  BP: (106-155)/(49-81) 119/61  FiO2 (%):  [21 %-30 %] 21 %  SpO2:  [86 %-100 %] 96 %  on   ;   Device (Oxygen Therapy): room air  Body mass index is 32.29 kg/m².  Physical Exam  General, intubated and sedated  Head and ENT, normocephalic and atraumatic.  Lungs, symmetric expansion, equal air entry bilaterally.  Heart, regular rate and rhythm.  Abdomen, soft and nontender.  Extremities, no clubbing or cyanosis.  Generalized edema on bilateral upper and lower extremities  Neuro, unable to evaluate  Skin: Warm and no rash.  Psych, unable  to fully evaluate  Musculoskeletal, joint examination is grossly normal.     Copied text material from yesterday's note has been reviewed for appropriate changes and remains accurate as of 4/16/24.      Results Review     I reviewed the patient's new clinical results.  Results from last 7 days   Lab Units 04/16/24  0614 04/15/24  0805 04/14/24 0408 04/13/24  0600   WBC 10*3/mm3 11.66* 10.85* 10.76 10.80   HEMOGLOBIN g/dL 9.3* 9.9* 10.4* 11.8*   PLATELETS 10*3/mm3 413 429 509* 340     Results from last 7 days   Lab Units 04/16/24  0614 04/15/24  0805 04/14/24 0408 04/13/24  0600   SODIUM mmol/L 136 136 137 137   POTASSIUM mmol/L 4.5 4.2 4.8 4.8   CHLORIDE mmol/L 104 103 104 103   CO2 mmol/L 22.2 22.8 22.5 23.1   BUN mg/dL 22 26* 22 21   CREATININE mg/dL 0.63* 0.83 0.67* 0.75*   GLUCOSE mg/dL 131* 133* 116* 132*   EGFR mL/min/1.73 103.0 94.7 101.1 97.7       Results from last 7 days   Lab Units 04/16/24  0614 04/15/24  0805 04/14/24 0408 04/13/24  0600   CALCIUM mg/dL 8.6 8.6 8.8 8.9   MAGNESIUM mg/dL 1.8 1.9 1.9 1.8   PHOSPHORUS mg/dL 4.0 4.2 3.4 4.4       Glucose   Date/Time Value Ref Range Status   04/16/2024 1118 140 (H) 70 - 130 mg/dL Final   04/16/2024 0543 137 (H) 70 - 130 mg/dL Final   04/16/2024 0013 126 70 - 130 mg/dL Final   04/15/2024 1845 122 70 - 130 mg/dL Final   04/15/2024 1201 148 (H) 70 - 130 mg/dL Final   04/15/2024 0620 149 (H) 70 - 130 mg/dL Final   04/15/2024 0111 152 (H) 70 - 130 mg/dL Final       XR Chest 1 View    Result Date: 4/16/2024  No significant change.    This report was finalized on 4/16/2024 12:15 PM by Dr. Donte Hernandez M.D on Workstation: [id number]       I have personally reviewed all medications:  Scheduled Medications  chlorhexidine, 15 mL, Mouth/Throat, Q12H  cloNIDine, 0.3 mg, Nasogastric, Q6H  enoxaparin, 40 mg, Subcutaneous, Q24H  famotidine, 20 mg, Nasogastric, BID AC  folic acid, 1 mg, Nasogastric, Daily  hydrocortisone-bacitracin-zinc oxide-nystatin, 1 Application,  Topical, BID  insulin regular, 2-9 Units, Subcutaneous, Q6H  ipratropium-albuterol, 3 mL, Nebulization, 4x Daily - RT  midazolam, 4 mg, Intravenous, Once  multivitamin and minerals, 15 mL, Nasogastric, Daily  nicotine, 1 patch, Transdermal, Q24H  PHENobarbital, 32.4 mg, Oral, Once   Followed by  [START ON 4/17/2024] PHENobarbital, 32.4 mg, Oral, Once  QUEtiapine, 100 mg, Oral, Q12H  senna-docusate sodium, 2 tablet, Nasogastric, BID  sodium chloride, 10 mL, Intravenous, Q12H  sodium chloride, 10 mL, Intravenous, Q12H  thiamine, 100 mg, Nasogastric, Daily    Infusions  dexmedetomidine, 0.2-2 mcg/kg/hr, Last Rate: 1.5 mcg/kg/hr (04/16/24 1416)  fentanyl 10 mcg/mL,  mcg/hr, Last Rate: 250 mcg/hr (04/16/24 1416)  midazolam, 1-10 mg/hr, Last Rate: 4 mg/hr (04/16/24 1424)  norepinephrine, 0.02-0.3 mcg/kg/min, Last Rate: Stopped (04/08/24 1230)  Pharmacy Consult,     Diet  NPO Diet NPO Type: Tube Feeding    I have personally reviewed:  [x]  Laboratory   [x]  Microbiology   [x]  Radiology   [x]  EKG/Telemetry  [x]  Cardiology/Vascular   []  Pathology    []  Records      Assessment/Plan     Active Hospital Problems    Diagnosis  POA    **Alcohol withdrawal [F10.939]  Yes    Substance abuse [F19.10]  Unknown    Cocaine abuse [F14.10]  Unknown    Positive urine drug screen [R82.5]  Unknown    History of alcohol use disorder [Z87.898]  Yes    Alcoholic liver disease [K70.9]  Yes    COPD (chronic obstructive pulmonary disease) [J44.9]  Yes    LIVAN (obstructive sleep apnea) [G47.33]  Yes    Essential hypertension [I10]  Yes      Resolved Hospital Problems   No resolved problems to display.       68 y.o. male admitted with Alcohol withdrawal.    Assessment and plan  1.  Acute respiratory failure, requiring mechanical ventilation, ongoing management per pulmonology.  Weaning of sedation and vent management per pulmonary.     2.  Aspiration pneumonia, received antibiotics.     3.  Severe alcohol withdrawal with dependence,  remains on sedation protocol in the ICU.     4.  Polysubstance abuse, complicating clinical aspects.     5.  Sepsis with shock, resolved.     6.  CODE STATUS is full code.  He may benefit from temporary LTAC placement.      Suman Verdin MD  [facility]  04/16/24  14:35 EDT      Electronically signed by Suman Verdin MD at 04/16/24 1436       Neto Penaloza MD at 04/16/24 0820          [facility]      Mar/chart reviewed  Follow up alcohol withdrawal, mechanical ventilation  Patient sedated on vent unable to provide subjective  Actually on more sedation now than yesterday, no progress     Vital Sign Min/Max for last 24 hours  Temp  Min: 97.7 °F (36.5 °C)  Max: 99.1 °F (37.3 °C)   BP  Min: 106/51  Max: 155/80   Pulse  Min: 50  Max: 78   Resp  Min: 12  Max: 15   SpO2  Min: 90 %  Max: 100 %   No data recorded   No data recorded   3516/2230    Appears ill, sedated on vent but agitated  perrl, normal sclera  mmm, no jvd, trachea midline, neck supple,  chest cta bilaterally, no crackles, no wheezes,   rrr,   soft, nt, nd +bs,  no c/c/ 1+ edema  Skin warm, dry no rashes    Labs: 4/16: reviewed:  Glucose 131  Bun 22  Cr 0.63  Bicarb 22  Wbc 11.6  Hgb 9.3  Plts 413    A/P:  1. Acute respiratory failure requiring  mechanical ventilation -- has been failing wean attempts due to mental status  2. Aspiration pneumonia with H flu -- now finished antibiotic course    3. Severe alcohol abuse with dependence and withdrawal -- using phenobarbital protocol and trying to work versed off.   4. Polysubstance abuse -- likely making improvement in his mental status and sedation needs all the more difficult  5. LIVAN  6. Sepsis with shock - resolved    Mental status remains very problematic unable to make much progress despite multiple interventions. Will try adding seroquel, if still no improvement may try propofol again with lower max dose to hopefully avoid hyperlipidemia    CC 36 mins.      Electronically signed by Neto Penaloza MD at 04/16/24 0841

## 2024-04-16 NOTE — PROGRESS NOTES
Name: Watson Lisa ADMIT: 2024   : 1955  PCP: Roc Carroll APRN    MRN: [id number] LOS: 10 days   AGE/SEX: 68 y.o. male  ROOM: [facility]     Subjective   Subjective   Patient seen at bedside.       Objective   Objective   Vital Signs  Temp:  [97.7 °F (36.5 °C)-99.1 °F (37.3 °C)] 98.3 °F (36.8 °C)  Heart Rate:  [50-78] 54  Resp:  [12-15] 12  BP: (106-155)/(49-81) 119/61  FiO2 (%):  [21 %-30 %] 21 %  SpO2:  [86 %-100 %] 96 %  on   ;   Device (Oxygen Therapy): room air  Body mass index is 32.29 kg/m².  Physical Exam  General, intubated and sedated  Head and ENT, normocephalic and atraumatic.  Lungs, symmetric expansion, equal air entry bilaterally.  Heart, regular rate and rhythm.  Abdomen, soft and nontender.  Extremities, no clubbing or cyanosis.  Generalized edema on bilateral upper and lower extremities  Neuro, unable to evaluate  Skin: Warm and no rash.  Psych, unable to fully evaluate  Musculoskeletal, joint examination is grossly normal.     Copied text material from yesterday's note has been reviewed for appropriate changes and remains accurate as of 24.      Results Review     I reviewed the patient's new clinical results.  Results from last 7 days   Lab Units 04/16/24  0614 04/15/24  0805 04/14/24  0408 04/13/24  0600   WBC 10*3/mm3 11.66* 10.85* 10.76 10.80   HEMOGLOBIN g/dL 9.3* 9.9* 10.4* 11.8*   PLATELETS 10*3/mm3 413 429 509* 340     Results from last 7 days   Lab Units 04/16/24  0614 04/15/24  0805 04/14/24  0408 04/13/24  0600   SODIUM mmol/L 136 136 137 137   POTASSIUM mmol/L 4.5 4.2 4.8 4.8   CHLORIDE mmol/L 104 103 104 103   CO2 mmol/L 22.2 22.8 22.5 23.1   BUN mg/dL 22 26* 22 21   CREATININE mg/dL 0.63* 0.83 0.67* 0.75*   GLUCOSE mg/dL 131* 133* 116* 132*   EGFR mL/min/1.73 103.0 94.7 101.1 97.7       Results from last 7 days   Lab Units 24  0614 04/15/24  0805 24  0408 24  0600   CALCIUM mg/dL 8.6 8.6 8.8 8.9   MAGNESIUM mg/dL 1.8 1.9 1.9 1.8    PHOSPHORUS mg/dL 4.0 4.2 3.4 4.4       Glucose   Date/Time Value Ref Range Status   04/16/2024 1118 140 (H) 70 - 130 mg/dL Final   04/16/2024 0543 137 (H) 70 - 130 mg/dL Final   04/16/2024 0013 126 70 - 130 mg/dL Final   04/15/2024 1845 122 70 - 130 mg/dL Final   04/15/2024 1201 148 (H) 70 - 130 mg/dL Final   04/15/2024 0620 149 (H) 70 - 130 mg/dL Final   04/15/2024 0111 152 (H) 70 - 130 mg/dL Final       XR Chest 1 View    Result Date: 4/16/2024  No significant change.    This report was finalized on 4/16/2024 12:15 PM by Dr. Donte Hernandez M.D on Workstation: [id number]       I have personally reviewed all medications:  Scheduled Medications  chlorhexidine, 15 mL, Mouth/Throat, Q12H  cloNIDine, 0.3 mg, Nasogastric, Q6H  enoxaparin, 40 mg, Subcutaneous, Q24H  famotidine, 20 mg, Nasogastric, BID AC  folic acid, 1 mg, Nasogastric, Daily  hydrocortisone-bacitracin-zinc oxide-nystatin, 1 Application, Topical, BID  insulin regular, 2-9 Units, Subcutaneous, Q6H  ipratropium-albuterol, 3 mL, Nebulization, 4x Daily - RT  midazolam, 4 mg, Intravenous, Once  multivitamin and minerals, 15 mL, Nasogastric, Daily  nicotine, 1 patch, Transdermal, Q24H  PHENobarbital, 32.4 mg, Oral, Once   Followed by  [START ON 4/17/2024] PHENobarbital, 32.4 mg, Oral, Once  QUEtiapine, 100 mg, Oral, Q12H  senna-docusate sodium, 2 tablet, Nasogastric, BID  sodium chloride, 10 mL, Intravenous, Q12H  sodium chloride, 10 mL, Intravenous, Q12H  thiamine, 100 mg, Nasogastric, Daily    Infusions  dexmedetomidine, 0.2-2 mcg/kg/hr, Last Rate: 1.5 mcg/kg/hr (04/16/24 1416)  fentanyl 10 mcg/mL,  mcg/hr, Last Rate: 250 mcg/hr (04/16/24 1416)  midazolam, 1-10 mg/hr, Last Rate: 4 mg/hr (04/16/24 1424)  norepinephrine, 0.02-0.3 mcg/kg/min, Last Rate: Stopped (04/08/24 1230)  Pharmacy Consult,     Diet  NPO Diet NPO Type: Tube Feeding    I have personally reviewed:  [x]  Laboratory   [x]  Microbiology   [x]  Radiology   [x]  EKG/Telemetry  [x]   Cardiology/Vascular   []  Pathology    []  Records       Assessment/Plan     Active Hospital Problems    Diagnosis  POA    **Alcohol withdrawal [F10.939]  Yes    Substance abuse [F19.10]  Unknown    Cocaine abuse [F14.10]  Unknown    Positive urine drug screen [R82.5]  Unknown    History of alcohol use disorder [Z87.898]  Yes    Alcoholic liver disease [K70.9]  Yes    COPD (chronic obstructive pulmonary disease) [J44.9]  Yes    LIVAN (obstructive sleep apnea) [G47.33]  Yes    Essential hypertension [I10]  Yes      Resolved Hospital Problems   No resolved problems to display.       68 y.o. male admitted with Alcohol withdrawal.    Assessment and plan  1.  Acute respiratory failure, requiring mechanical ventilation, ongoing management per pulmonology.  Weaning of sedation and vent management per pulmonary.     2.  Aspiration pneumonia, received antibiotics.     3.  Severe alcohol withdrawal with dependence, remains on sedation protocol in the ICU.     4.  Polysubstance abuse, complicating clinical aspects.     5.  Sepsis with shock, resolved.     6.  CODE STATUS is full code.  He may benefit from temporary LTAC placement.      Suman Verdin MD  [facility]  04/16/24  14:35 EDT

## 2024-04-16 NOTE — PLAN OF CARE
Goal Outcome Evaluation:   Pt has anxiety that causes max sedation, got an order of ativan q6prn to help prevent breath stacking, tachycardia etc

## 2024-04-16 NOTE — PROGRESS NOTES
[facility]      Mar/chart reviewed  Follow up alcohol withdrawal, mechanical ventilation  Patient sedated on vent unable to provide subjective  Actually on more sedation now than yesterday, no progress     Vital Sign Min/Max for last 24 hours  Temp  Min: 97.7 °F (36.5 °C)  Max: 99.1 °F (37.3 °C)   BP  Min: 106/51  Max: 155/80   Pulse  Min: 50  Max: 78   Resp  Min: 12  Max: 15   SpO2  Min: 90 %  Max: 100 %   No data recorded   No data recorded   3516/2230    Appears ill, sedated on vent but agitated  perrl, normal sclera  mmm, no jvd, trachea midline, neck supple,  chest cta bilaterally, no crackles, no wheezes,   rrr,   soft, nt, nd +bs,  no c/c/ 1+ edema  Skin warm, dry no rashes    Labs: 4/16: reviewed:  Glucose 131  Bun 22  Cr 0.63  Bicarb 22  Wbc 11.6  Hgb 9.3  Plts 413    A/P:  1. Acute respiratory failure requiring  mechanical ventilation -- has been failing wean attempts due to mental status  2. Aspiration pneumonia with H flu -- now finished antibiotic course    3. Severe alcohol abuse with dependence and withdrawal -- using phenobarbital protocol and trying to work versed off.   4. Polysubstance abuse -- likely making improvement in his mental status and sedation needs all the more difficult  5. LIVAN  6. Sepsis with shock - resolved    Mental status remains very problematic unable to make much progress despite multiple interventions. Will try adding seroquel, if still no improvement may try propofol again with lower max dose to hopefully avoid hyperlipidemia    CC 36 mins.

## 2024-04-16 NOTE — PROGRESS NOTES
"[facility] Clinical Pharmacy Services: Clonidine for sedation    Pharmacy has been consulted to start oral/enteral clonidine on Watson Lisa to assist in sedation and managing symptoms of withdrawal per Dr. Penaloza's request.      Relevant clinical data and objective history reviewed:  68 y.o. male 200.7 cm (79\") 130 kg (286 lb 9.6 oz)    Creatinine   Date Value Ref Range Status   04/16/2024 0.63 (L) 0.76 - 1.27 mg/dL Final   04/15/2024 0.83 0.76 - 1.27 mg/dL Final   04/14/2024 0.67 (L) 0.76 - 1.27 mg/dL Final   07/31/2020 0.9 0.7 - 1.5 mg/dL Final   02/14/2017 0.80 0.60 - 1.30 mg/dL Final     Comment:     Serial Number: [id number]    : [id number]     BUN   Date Value Ref Range Status   04/16/2024 22 8 - 23 mg/dL Final   07/31/2020 18 7 - 20 mg/dL Final     Estimated Creatinine Clearance: 169 mL/min (A) (by C-G formula based on SCr of 0.63 mg/dL (L)).    Assessment    Dexmedetomidine currently infusing at 1.5 mcg/kg/hr but mostly at 2 mcg/kg/hr over the past 24 hours.    CPOT/NPS: 1  RASS: <0     Other sedating medications (scheduled/PRN) used in last 24 hrs:   Fentanyl infusion at 250 to 300 mcg/hr  Midazolam infusion at 4 to 10 mg/hr  Phenobarbital withdrawal protocol scheduled through 4/17   Lorazepam PRN x 1 dose 4/15 at 2344     Other medications that could cause hypotension:   Metoprolol PRN - none used  Hydralazine PRN - none used    Plan  No missed or held clonidine doses so far today. HR 50s and continues to have issues with sedation/agitation. Continue clonidine 0.3 mg q6h. Hold for MAP <65 or HR<50. Please notify pharmacy if any doses are held.   Pharmacy will reassess patient 4/17 for dose adjustment if needed.    Thank you for allowing me to participate in your patient's care. Please call pharmacy with any questions or concerns.    Mukesh Neff III, PharmD  Clinical Pharmacist    "

## 2024-04-16 NOTE — PLAN OF CARE
Goal Outcome Evaluation:  Plan of Care Reviewed With: patient        Progress: no change    Pt remains sedated and intubated. Bilateral soft wrist restraints in place to prevent pulling out ETT tube, feeding tube, Wood Catheter, and Ivs. Start of shift pt maxed on Versed and Fentanyl. Pt started on Seroquel and has been helpful with restlessness. This RN has been able to wean down from 10 mg/hr to 4 mg/hr of Versed gtt, but towards end of shift having to increase back up to 6mg/hr as pt is asynchronized with ventilator and restless. Pt has been able to nodd head when asked if in pain. Pt has been intermittently able to follow commands (raise legs and give thumbs up). Pupils pinpoint and round (maxed on Fentanyl gtt). Pt continues to have copious secretions orally and with in-line suction. TF infusing at goal rate. Voiding in Wood Catheter. Son updated via phone. Ongoing care provided.

## 2024-04-17 LAB
ANION GAP SERPL CALCULATED.3IONS-SCNC: 10.1 MMOL/L (ref 5–15)
ARTERIAL PATENCY WRIST A: POSITIVE
ATMOSPHERIC PRESS: 744.7 MMHG
BASE EXCESS BLDA CALC-SCNC: 0.6 MMOL/L (ref 0–2)
BDY SITE: ABNORMAL
BUN SERPL-MCNC: 19 MG/DL (ref 8–23)
BUN/CREAT SERPL: 30.2 (ref 7–25)
CALCIUM SPEC-SCNC: 8.6 MG/DL (ref 8.6–10.5)
CHLORIDE SERPL-SCNC: 102 MMOL/L (ref 98–107)
CO2 BLDA-SCNC: 25.9 MMOL/L (ref 23–27)
CO2 SERPL-SCNC: 21.9 MMOL/L (ref 22–29)
CREAT SERPL-MCNC: 0.63 MG/DL (ref 0.76–1.27)
DEPRECATED RDW RBC AUTO: 41 FL (ref 37–54)
DEVICE COMMENT 2: ABNORMAL
DEVICE COMMENT: ABNORMAL
EGFRCR SERPLBLD CKD-EPI 2021: 103 ML/MIN/1.73
ERYTHROCYTE [DISTWIDTH] IN BLOOD BY AUTOMATED COUNT: 11.7 % (ref 12.3–15.4)
GLUCOSE BLDC GLUCOMTR-MCNC: 122 MG/DL (ref 70–130)
GLUCOSE BLDC GLUCOMTR-MCNC: 135 MG/DL (ref 70–130)
GLUCOSE BLDC GLUCOMTR-MCNC: 137 MG/DL (ref 70–130)
GLUCOSE BLDC GLUCOMTR-MCNC: 143 MG/DL (ref 70–130)
GLUCOSE SERPL-MCNC: 130 MG/DL (ref 65–99)
HCO3 BLDA-SCNC: 24.7 MMOL/L (ref 22–28)
HCT VFR BLD AUTO: 29.6 % (ref 37.5–51)
HEMODILUTION: NO
HGB BLD-MCNC: 9.8 G/DL (ref 13–17.7)
INHALED O2 CONCENTRATION: 30 %
MAGNESIUM SERPL-MCNC: 1.8 MG/DL (ref 1.6–2.4)
MCH RBC QN AUTO: 31.7 PG (ref 26.6–33)
MCHC RBC AUTO-ENTMCNC: 33.1 G/DL (ref 31.5–35.7)
MCV RBC AUTO: 95.8 FL (ref 79–97)
MODALITY: ABNORMAL
O2 A-A PPRESDIFF RESPIRATORY: 0.4 MMHG
PCO2 BLDA: 36.6 MM HG (ref 35–45)
PEEP RESPIRATORY: 8 CM[H2O]
PH BLDA: 7.44 PH UNITS (ref 7.35–7.45)
PHOSPHATE SERPL-MCNC: 4.3 MG/DL (ref 2.5–4.5)
PLATELET # BLD AUTO: 371 10*3/MM3 (ref 140–450)
PMV BLD AUTO: 9.6 FL (ref 6–12)
PO2 BLDA: 71.2 MM HG (ref 80–100)
POTASSIUM SERPL-SCNC: 4.6 MMOL/L (ref 3.5–5.2)
QT INTERVAL: 415 MS
QTC INTERVAL: 461 MS
RBC # BLD AUTO: 3.09 10*6/MM3 (ref 4.14–5.8)
SAO2 % BLDCOA: 94.7 % (ref 92–98.5)
SET MECH RESP RATE: 12
SODIUM SERPL-SCNC: 134 MMOL/L (ref 136–145)
TOTAL RATE: 12 BREATHS/MINUTE
VENTILATOR MODE: ABNORMAL
WBC NRBC COR # BLD AUTO: 9.65 10*3/MM3 (ref 3.4–10.8)

## 2024-04-17 PROCEDURE — 94799 UNLISTED PULMONARY SVC/PX: CPT

## 2024-04-17 PROCEDURE — 84100 ASSAY OF PHOSPHORUS: CPT | Performed by: STUDENT IN AN ORGANIZED HEALTH CARE EDUCATION/TRAINING PROGRAM

## 2024-04-17 PROCEDURE — 25010000002 FENTANYL CITRATE (PF) 2500 MCG/50ML SOLUTION: Performed by: INTERNAL MEDICINE

## 2024-04-17 PROCEDURE — 82948 REAGENT STRIP/BLOOD GLUCOSE: CPT

## 2024-04-17 PROCEDURE — 94664 DEMO&/EVAL PT USE INHALER: CPT

## 2024-04-17 PROCEDURE — 25010000002 MIDAZOLAM 50 MG/10ML SOLUTION

## 2024-04-17 PROCEDURE — 94760 N-INVAS EAR/PLS OXIMETRY 1: CPT

## 2024-04-17 PROCEDURE — 94761 N-INVAS EAR/PLS OXIMETRY MLT: CPT

## 2024-04-17 PROCEDURE — 93005 ELECTROCARDIOGRAM TRACING: CPT

## 2024-04-17 PROCEDURE — 82803 BLOOD GASES ANY COMBINATION: CPT

## 2024-04-17 PROCEDURE — 94003 VENT MGMT INPAT SUBQ DAY: CPT

## 2024-04-17 PROCEDURE — 93010 ELECTROCARDIOGRAM REPORT: CPT | Performed by: INTERNAL MEDICINE

## 2024-04-17 PROCEDURE — 83735 ASSAY OF MAGNESIUM: CPT | Performed by: STUDENT IN AN ORGANIZED HEALTH CARE EDUCATION/TRAINING PROGRAM

## 2024-04-17 PROCEDURE — 36600 WITHDRAWAL OF ARTERIAL BLOOD: CPT

## 2024-04-17 PROCEDURE — 80048 BASIC METABOLIC PNL TOTAL CA: CPT | Performed by: STUDENT IN AN ORGANIZED HEALTH CARE EDUCATION/TRAINING PROGRAM

## 2024-04-17 PROCEDURE — 85027 COMPLETE CBC AUTOMATED: CPT | Performed by: STUDENT IN AN ORGANIZED HEALTH CARE EDUCATION/TRAINING PROGRAM

## 2024-04-17 PROCEDURE — 25010000002 ENOXAPARIN PER 10 MG: Performed by: INTERNAL MEDICINE

## 2024-04-17 RX ORDER — PHENOBARBITAL 32.4 MG/1
32.4 TABLET ORAL ONCE
Status: COMPLETED | OUTPATIENT
Start: 2024-04-17 | End: 2024-04-17

## 2024-04-17 RX ORDER — MORPHINE SULFATE 2 MG/ML
4 INJECTION, SOLUTION INTRAMUSCULAR; INTRAVENOUS EVERY 4 HOURS PRN
Status: DISCONTINUED | OUTPATIENT
Start: 2024-04-17 | End: 2024-04-21

## 2024-04-17 RX ORDER — HYDROCODONE BITARTRATE AND ACETAMINOPHEN 10; 325 MG/1; MG/1
1 TABLET ORAL EVERY 4 HOURS PRN
Status: DISCONTINUED | OUTPATIENT
Start: 2024-04-17 | End: 2024-04-17

## 2024-04-17 RX ORDER — HYDROCODONE BITARTRATE AND ACETAMINOPHEN 10; 325 MG/1; MG/1
1 TABLET ORAL EVERY 4 HOURS PRN
Status: DISCONTINUED | OUTPATIENT
Start: 2024-04-17 | End: 2024-04-22

## 2024-04-17 RX ADMIN — Medication 10 ML: at 08:02

## 2024-04-17 RX ADMIN — DEXMEDETOMIDINE HYDROCHLORIDE IN SODIUM CHLORIDE 1.5 MCG/KG/HR: 4 INJECTION INTRAVENOUS at 15:06

## 2024-04-17 RX ADMIN — Medication 1 PATCH: at 20:12

## 2024-04-17 RX ADMIN — DEXMEDETOMIDINE HYDROCHLORIDE IN SODIUM CHLORIDE 1.5 MCG/KG/HR: 4 INJECTION INTRAVENOUS at 08:40

## 2024-04-17 RX ADMIN — FENTANYL CITRATE 87.5 MCG/HR: 0.05 INJECTION, SOLUTION INTRAMUSCULAR; INTRAVENOUS at 08:39

## 2024-04-17 RX ADMIN — DEXMEDETOMIDINE HYDROCHLORIDE IN SODIUM CHLORIDE 1.7 MCG/KG/HR: 4 INJECTION INTRAVENOUS at 02:38

## 2024-04-17 RX ADMIN — SENNOSIDES AND DOCUSATE SODIUM 2 TABLET: 50; 8.6 TABLET ORAL at 20:10

## 2024-04-17 RX ADMIN — DEXMEDETOMIDINE HYDROCHLORIDE IN SODIUM CHLORIDE 1.5 MCG/KG/HR: 4 INJECTION INTRAVENOUS at 13:01

## 2024-04-17 RX ADMIN — ZINC OXIDE 1 APPLICATION: 200 OINTMENT TOPICAL at 11:41

## 2024-04-17 RX ADMIN — QUETIAPINE FUMARATE 100 MG: 100 TABLET ORAL at 08:01

## 2024-04-17 RX ADMIN — Medication 10 ML: at 20:11

## 2024-04-17 RX ADMIN — IPRATROPIUM BROMIDE AND ALBUTEROL SULFATE 3 ML: .5; 3 SOLUTION RESPIRATORY (INHALATION) at 19:19

## 2024-04-17 RX ADMIN — FAMOTIDINE 20 MG: 20 TABLET, FILM COATED ORAL at 17:23

## 2024-04-17 RX ADMIN — HYDROCODONE BITARTRATE AND ACETAMINOPHEN 1 TABLET: 10; 325 TABLET ORAL at 20:10

## 2024-04-17 RX ADMIN — CHLORHEXIDINE GLUCONATE 15 ML: 1.2 RINSE ORAL at 20:10

## 2024-04-17 RX ADMIN — IPRATROPIUM BROMIDE AND ALBUTEROL SULFATE 3 ML: .5; 3 SOLUTION RESPIRATORY (INHALATION) at 15:13

## 2024-04-17 RX ADMIN — QUETIAPINE FUMARATE 100 MG: 100 TABLET ORAL at 20:10

## 2024-04-17 RX ADMIN — FENTANYL CITRATE 300 MCG/HR: 0.05 INJECTION, SOLUTION INTRAMUSCULAR; INTRAVENOUS at 04:30

## 2024-04-17 RX ADMIN — DEXMEDETOMIDINE HYDROCHLORIDE IN SODIUM CHLORIDE 1.5 MCG/KG/HR: 4 INJECTION INTRAVENOUS at 10:46

## 2024-04-17 RX ADMIN — DEXMEDETOMIDINE HYDROCHLORIDE IN SODIUM CHLORIDE 1.3 MCG/KG/HR: 4 INJECTION INTRAVENOUS at 22:01

## 2024-04-17 RX ADMIN — MIDAZOLAM 8 MG/HR: 5 INJECTION INTRAMUSCULAR; INTRAVENOUS at 01:35

## 2024-04-17 RX ADMIN — FENTANYL CITRATE 300 MCG/HR: 0.05 INJECTION, SOLUTION INTRAMUSCULAR; INTRAVENOUS at 00:48

## 2024-04-17 RX ADMIN — DEXMEDETOMIDINE HYDROCHLORIDE IN SODIUM CHLORIDE 1.4 MCG/KG/HR: 4 INJECTION INTRAVENOUS at 19:38

## 2024-04-17 RX ADMIN — CLONIDINE HYDROCHLORIDE 0.3 MG: 0.1 TABLET ORAL at 11:40

## 2024-04-17 RX ADMIN — HYDROCODONE BITARTRATE AND ACETAMINOPHEN 1 TABLET: 10; 325 TABLET ORAL at 15:40

## 2024-04-17 RX ADMIN — CLONIDINE HYDROCHLORIDE 0.3 MG: 0.1 TABLET ORAL at 05:57

## 2024-04-17 RX ADMIN — PHENOBARBITAL 32.4 MG: 32.4 TABLET ORAL at 08:32

## 2024-04-17 RX ADMIN — HYDROCODONE BITARTRATE AND ACETAMINOPHEN 1 TABLET: 10; 325 TABLET ORAL at 08:05

## 2024-04-17 RX ADMIN — CHLORHEXIDINE GLUCONATE 15 ML: 1.2 RINSE ORAL at 08:02

## 2024-04-17 RX ADMIN — Medication 100 MG: at 08:01

## 2024-04-17 RX ADMIN — FAMOTIDINE 20 MG: 20 TABLET, FILM COATED ORAL at 06:30

## 2024-04-17 RX ADMIN — IPRATROPIUM BROMIDE AND ALBUTEROL SULFATE 3 ML: .5; 3 SOLUTION RESPIRATORY (INHALATION) at 07:11

## 2024-04-17 RX ADMIN — FOLIC ACID 1 MG: 1 TABLET ORAL at 08:01

## 2024-04-17 RX ADMIN — DEXMEDETOMIDINE HYDROCHLORIDE IN SODIUM CHLORIDE 1.7 MCG/KG/HR: 4 INJECTION INTRAVENOUS at 04:31

## 2024-04-17 RX ADMIN — Medication 15 ML: at 08:01

## 2024-04-17 RX ADMIN — CLONIDINE HYDROCHLORIDE 0.3 MG: 0.1 TABLET ORAL at 23:52

## 2024-04-17 RX ADMIN — HYDROCODONE BITARTRATE AND ACETAMINOPHEN 1 TABLET: 10; 325 TABLET ORAL at 11:40

## 2024-04-17 RX ADMIN — SENNOSIDES AND DOCUSATE SODIUM 2 TABLET: 50; 8.6 TABLET ORAL at 08:01

## 2024-04-17 RX ADMIN — CLONIDINE HYDROCHLORIDE 0.3 MG: 0.1 TABLET ORAL at 17:23

## 2024-04-17 RX ADMIN — IPRATROPIUM BROMIDE AND ALBUTEROL SULFATE 3 ML: .5; 3 SOLUTION RESPIRATORY (INHALATION) at 11:18

## 2024-04-17 RX ADMIN — DEXMEDETOMIDINE HYDROCHLORIDE IN SODIUM CHLORIDE 1.5 MCG/KG/HR: 4 INJECTION INTRAVENOUS at 06:25

## 2024-04-17 RX ADMIN — ZINC OXIDE 1 APPLICATION: 200 OINTMENT TOPICAL at 20:11

## 2024-04-17 RX ADMIN — DEXMEDETOMIDINE HYDROCHLORIDE IN SODIUM CHLORIDE 1.7 MCG/KG/HR: 4 INJECTION INTRAVENOUS at 00:46

## 2024-04-17 RX ADMIN — DEXMEDETOMIDINE HYDROCHLORIDE IN SODIUM CHLORIDE 1.5 MCG/KG/HR: 4 INJECTION INTRAVENOUS at 17:22

## 2024-04-17 RX ADMIN — ENOXAPARIN SODIUM 40 MG: 100 INJECTION SUBCUTANEOUS at 11:42

## 2024-04-17 NOTE — CASE MANAGEMENT/SOCIAL WORK
Continued Stay Note  [facility]     Patient Name: Watson Lisa  MRN: [id number]  Today's Date: 4/17/2024    Admit Date: 4/6/2024    Plan: Undetermined   Discharge Plan       Row Name 04/17/24 1325       Plan    Plan Undetermined    Plan Comments Following for DC needs  Pt extubated on 4 Litres nasal cannula                   Discharge Codes    No documentation.                 Expected Discharge Date and Time       Expected Discharge Date Expected Discharge Time    Apr 17, 2024               Daxa Watkins RN

## 2024-04-17 NOTE — PLAN OF CARE
Goal Outcome Evaluation:  Plan of Care Reviewed With: patient        Progress: improving    Pt extubated 0730. Pt A+Ox 3, disoriented to situation, but easily redirectable. Bilateral soft wrist restraints in place to prevent pulling out Cortrak, IV, and catheter. CIWA 2. Pt on 1.5 mcg/kg/hr of Precedex and IV Fentanyl weaned down from max rate to 75 mcg/hr. Pt having 7/10 back pain that is chronic for him. Pt has been sleeping throughout the shift, but easily wakes up. O2 to 3L NC O2. Wood Catheter discontinued and Purewick applied with good urine output. Pt's son updated (with pt's permission) and plans to visit pt later this shift. Tolerates tube feeds. Ongoing care provided.

## 2024-04-17 NOTE — PROGRESS NOTES
"[facility] Clinical Pharmacy Services: Clonidine for sedation    Pharmacy has been consulted to start oral/enteral clonidine on Watson Lisa to assist in sedation and managing symptoms of withdrawal per Dr. Penaloza's request.      Relevant clinical data and objective history reviewed:  68 y.o. male 200.7 cm (79\") 125 kg (275 lb 2.2 oz)    Creatinine   Date Value Ref Range Status   04/17/2024 0.63 (L) 0.76 - 1.27 mg/dL Final   04/16/2024 0.63 (L) 0.76 - 1.27 mg/dL Final   04/15/2024 0.83 0.76 - 1.27 mg/dL Final   07/31/2020 0.9 0.7 - 1.5 mg/dL Final   02/14/2017 0.80 0.60 - 1.30 mg/dL Final     Comment:     Serial Number: [id number]    : [id number]     BUN   Date Value Ref Range Status   04/17/2024 19 8 - 23 mg/dL Final   07/31/2020 18 7 - 20 mg/dL Final     Estimated Creatinine Clearance: 165.9 mL/min (A) (by C-G formula based on SCr of 0.63 mg/dL (L)).    Assessment    Dexmedetomidine currently infusing at 1.5 mcg/kg/hr     CPOT/NPS: 0/7  RASS: -1     Other sedating medications (scheduled/PRN) used in last 24 hrs:   Fentanyl infusion 87 mcg/ml (rn weaning off as tolerated)  Midazolam infusion stopped  0720 today  Phenobarbital withdrawal protocol completed 4/17   Lorazepam PRN x 1 dose 4/15 at 2344     Other medications that could cause hypotension:   Metoprolol PRN - none used  Hydralazine PRN - none used    Plan  No missed or held clonidine doses so far today. Bradycardia improved as sedation is being weaned. Continue clonidine 0.3 mg q6h. Hold for MAP <65 or HR<50. Please notify pharmacy if any doses are held.   Pharmacy will reassess patient 4/18 for dose adjustment if needed.    Thank you for allowing me to participate in your patient's care. Please call pharmacy with any questions or concerns.    Christiano Viera [facility]  Clinical Pharmacist      "

## 2024-04-17 NOTE — PROGRESS NOTES
[facility]      Mar/chart reviewed  Follow up alcohol withdrawal, mechanical ventilation  Patient sedated on vent unable to provide subjective  He is awake this morning and follows commands, tolerating SBT and  has a strong cough    Vital Sign Min/Max for last 24 hours  Temp  Min: 98.3 °F (36.8 °C)  Max: 100.5 °F (38.1 °C)   BP  Min: 99/57  Max: 163/78   Pulse  Min: 51  Max: 96   Resp  Min: 12  Max: 18   SpO2  Min: 86 %  Max: 100 %   No data recorded   Weight  Min: 125 kg (275 lb 2.2 oz)  Max: 125 kg (275 lb 2.2 oz)   6457/3350    Appears ill, sedated on vent but awake and follows commands  perrl, normal sclera  mmm, no jvd, trachea midline, neck supple,  chest cta bilaterally, no crackles, no wheezes,   rrr,   soft, nt, nd +bs,  no c/c/ 1+ edema  Skin warm, dry no rashes    Labs: 4/17: reviewed:  7.44/36/71  Morning labs ordered, not done  4/16  Glucose 131  Bun 22  Cr 0.63  Bicarb 22    A/P:  1. Acute respiratory failure requiring  mechanical ventilation -- extubation today and hopefully we can continue to titrate sedation down with the agitation of the vent removed.   2. Aspiration pneumonia with H flu -- now finished antibiotic course    3. Severe alcohol abuse with dependence and withdrawal -- using phenobarbital protocol and trying to work versed off. --improving now probably more withdrawal from the benzos that he has been on while here and icu delerium etc.   4. Polysubstance abuse -- likely making improvement in his mental status and sedation needs all the more difficult  5. LIVAN  6. Sepsis with shock - resolved    D/w RT and RN    CC 37 mins excluding any future billable procedures.

## 2024-04-17 NOTE — CONSULTS
[facility] has been watching peripherally for 10 days while pt has not been appropriate for consult. Pt remains not appropriate and on vent. Please re-consult when pt is able to participate in full evaluation.   [facility] will sign-off, please reconsult when appropriate.

## 2024-04-17 NOTE — PROGRESS NOTES
"Nutrition Services    Patient Name:  Watson Lisa  YOB: 1955  MRN: [id number]  Admit Date:  4/6/2024    Assessment Date:  04/17/24    Comment: Extubated this am. TF's continue at goal with Peptamen AF at 65mL/hr. Last BM 4/16.  Stools are still loose liquidy.- Labs, meds, skin reviewed. Glu 143/137/130.  Na+ 134.  Hopefully can pass a swallow eval soon. Discussed with RN.    Plan/Recommendations:  Continue Peptamen AF at 65mL/hr   Min free water   Diet per SLP when ready  Monitor lytes and replace as needed.    Will follow clinical course, nutrition needs.    CLINICAL NUTRITION ASSESSMENT      Reason for Assessment Follow-up Protocol   Diagnosis/Problem severe alcohol abuse, asp PNA, substance abuse, HTN, LIVAN, resp failure - on vent.   Current Problems Not safe for po yet     Encounter Information        Nutrition History    Food Preferences    Supplements    Factors Affecting Intake altered mental status   Tests/Procedures X-Ray     Anthropometrics        Current Height   Current Weight  BMI kg/m2 Height: 200.7 cm (79\")  Weight: 125 kg (275 lb 2.2 oz) (04/17/24 0541)  Body mass index is 31 kg/m².     Adj BMI (if applicable)    BMI Category Overweight (25 - 29.9)       Admission Weight 113kg   Ideal Body Weight (IBW) 93.7kg     Adj IBW (if applicable)    Usual Body Weight (UBW) 250-260lb   Weight Change/Trend Stable         Estimated/Assessed Needs        Energy Requirements    Weight for Calculation 93.7kg   Method for Estimation  20 kcal/kg   EST Needs (kcal/day) 1874       Protein Requirements    Weight for Calculation 93.7kg   EST Protein Needs (g/kg) 1.2 gm/kg   EST Daily Needs (g/day) 112g       Fluid Requirements     Method for Estimation 1 mL/kcal    Estimated Needs (mL/day)        Fluid Deficit    Current Na Level (mEq/L)    Desired Na Level (mEq/L)    Estimated Fluid Deficit (L)       Labs        Pertinent Labs    Results from last 7 days   Lab Units 04/17/24  0722 04/16/24  0614 " 04/15/24  0805   SODIUM mmol/L 134* 136 136   POTASSIUM mmol/L 4.6 4.5 4.2   CHLORIDE mmol/L 102 104 103   CO2 mmol/L 21.9* 22.2 22.8   BUN mg/dL 19 22 26*   CREATININE mg/dL 0.63* 0.63* 0.83   CALCIUM mg/dL 8.6 8.6 8.6   GLUCOSE mg/dL 130* 131* 133*     Results from last 7 days   Lab Units 04/17/24  0722 04/16/24  0614 04/15/24  0805 04/12/24  0600 04/11/24  0701   MAGNESIUM mg/dL 1.8 1.8 1.9   < > 1.4*   PHOSPHORUS mg/dL 4.3 4.0 4.2   < > 4.0   HEMOGLOBIN g/dL 9.8* 9.3* 9.9*   < > 10.9*   HEMATOCRIT % 29.6* 27.5* 29.4*   < > 32.7*   WBC 10*3/mm3 9.65 11.66* 10.85*   < > 10.52   TRIGLYCERIDES mg/dL  --   --   --   --  271*    < > = values in this interval not displayed.     Results from last 7 days   Lab Units 04/17/24  0722 04/16/24  0614 04/15/24  0805 04/14/24  0408 04/13/24  0600   PLATELETS 10*3/mm3 371 413 429 509* 340     COVID19   Date Value Ref Range Status   04/15/2022 Not Detected Not Detected - Ref. Range Final     Lab Results   Component Value Date    HGBA1C 4.90 01/18/2021          Medications            Scheduled Medications chlorhexidine, 15 mL, Mouth/Throat, Q12H  cloNIDine, 0.3 mg, Nasogastric, Q6H  enoxaparin, 40 mg, Subcutaneous, Q24H  famotidine, 20 mg, Nasogastric, BID AC  folic acid, 1 mg, Nasogastric, Daily  hydrocortisone-bacitracin-zinc oxide-nystatin, 1 Application, Topical, BID  insulin regular, 2-9 Units, Subcutaneous, Q6H  ipratropium-albuterol, 3 mL, Nebulization, 4x Daily - RT  multivitamin and minerals, 15 mL, Nasogastric, Daily  nicotine, 1 patch, Transdermal, Q24H  QUEtiapine, 100 mg, Oral, Q12H  senna-docusate sodium, 2 tablet, Nasogastric, BID  sodium chloride, 10 mL, Intravenous, Q12H  sodium chloride, 10 mL, Intravenous, Q12H  thiamine, 100 mg, Nasogastric, Daily        Infusions dexmedetomidine, 0.2-2 mcg/kg/hr, Last Rate: 1.5 mcg/kg/hr (04/17/24 0840)  fentanyl 10 mcg/mL,  mcg/hr, Last Rate: 87.5 mcg/hr (04/17/24 0839)  midazolam, 1-10 mg/hr, Last Rate: Stopped  (04/17/24 5295)  Pharmacy Consult,         PRN Medications   acetaminophen    senna-docusate sodium **AND** polyethylene glycol **AND** [DISCONTINUED] bisacodyl **AND** bisacodyl    Calcium Replacement - Follow Nurse / BPA Driven Protocol    dextrose    dextrose    glucagon (human recombinant)    hydrALAZINE    HYDROcodone-acetaminophen    LORazepam    Magnesium Standard Dose Replacement - Follow Nurse / BPA Driven Protocol    Magnesium Standard Dose Replacement - Follow Nurse / BPA Driven Protocol    metoprolol tartrate    Morphine    nicotine polacrilex    nitroglycerin    ondansetron    Pharmacy Consult    Phosphorus Replacement - Follow Nurse / BPA Driven Protocol    Potassium Replacement - Follow Nurse / BPA Driven Protocol    [COMPLETED] Insert Peripheral IV **AND** sodium chloride    sodium chloride    sodium chloride    sodium chloride    sodium chloride     Physical Findings          Physical Appearance disoriented, on oxygen therapy, sedate   Oral/Mouth Cavity tooth or teeth missing   Edema  2+ (mild), 3+ (moderate)   Gastrointestinal last bowel movement: 4/16   Skin  excoriation, pressure injury: gluteal , wound on abd, bilat groin rash   Tubes/Drains/Lines Cortrak, NG tube, bridle in place   NFPE No clinical signs of muscle wasting or fat loss   --  Current Nutrition Orders & Evaluation of Intake       Oral Nutrition     Food Allergies NKFA   Current PO Diet NPO Diet NPO Type: Tube Feeding   Supplement    PO Evaluation     Trending % PO Intake       Enteral Nutrition     Enteral Route NG    TF Delivery Method Continuous    Propofol Rate/Kcal off    Current TF Order/Rate  Peptamen AF @ 65 mL/hr    TF Goal Rate 65 mL/hr    Current Water Flush 30 mL Q 4 hr    Modular None    TF Residual  no or minimal residual    TF Tolerance tolerating    TF Observation Verified correct TF and water flush infusing per orders     Nutrition Diagnosis        Nutrition Dx Problem 1 Problem: Inadequate Oral Intake  Etiology:  Medical Diagnosis - severe alcohol abuse, asp PNA, substance abuse, HTN, LIVAN, resp failure - on vent.  Signs/Symptoms: NPO    Comment:      INTERVENTION / PLAN OF CARE  Intervention Goal        Intervention Goal(s) Maintain nutrition status, Reduce/improve symptoms, Meet estimated needs, Disease management/therapy, Initiate TF/PN, Tolerate TF/PN at goal, Transition TF to PO, and No significant weight loss     Nutrition Intervention        RD Action Continue to monitor and Care plan reviewed     Prescription         Diet  Diet per SLP   Supplement/Snack    EN/PN     Prescription Ordered       Enteral Prescription:     Enteral Route NG    TF Delivery Method Continuous    Enteral Product Peptamen AF    Modular None    Propofol Rate/Kcal off    TF Start Rate 50    TF Goal Rate 65    Free Water Flush 30mL q 4 hrs    TF Provision at Goal: 1872 kcal, 118 gm protein, 1263 mL free water + 180 mL water flushes         Calories 100 % needs met         Protein  105 % needs met         Fluid (mL) 1443mL    Prescription Ordered Continue same per protocol, No changes at this time     Monitor/Evaluation        Monitor Per protocol   Discharge Plan Pending clinical course   Education Will instruct as appropriate     RD to follow per protocol.       Electronically signed by:  Queenie Dunham RD  04/17/24 10:43 EDT

## 2024-04-18 LAB
ANION GAP SERPL CALCULATED.3IONS-SCNC: 10.9 MMOL/L (ref 5–15)
BACTERIA UR QL AUTO: NORMAL /HPF
BILIRUB UR QL STRIP: NEGATIVE
BUN SERPL-MCNC: 16 MG/DL (ref 8–23)
BUN/CREAT SERPL: 22.5 (ref 7–25)
CALCIUM SPEC-SCNC: 9.3 MG/DL (ref 8.6–10.5)
CHLORIDE SERPL-SCNC: 102 MMOL/L (ref 98–107)
CLARITY UR: CLEAR
CO2 SERPL-SCNC: 24.1 MMOL/L (ref 22–29)
COLOR UR: YELLOW
CREAT SERPL-MCNC: 0.71 MG/DL (ref 0.76–1.27)
D-LACTATE SERPL-SCNC: 0.9 MMOL/L (ref 0.5–2)
DEPRECATED RDW RBC AUTO: 41.1 FL (ref 37–54)
EGFRCR SERPLBLD CKD-EPI 2021: 99.3 ML/MIN/1.73
ERYTHROCYTE [DISTWIDTH] IN BLOOD BY AUTOMATED COUNT: 11.8 % (ref 12.3–15.4)
GLUCOSE BLDC GLUCOMTR-MCNC: 115 MG/DL (ref 70–130)
GLUCOSE BLDC GLUCOMTR-MCNC: 115 MG/DL (ref 70–130)
GLUCOSE BLDC GLUCOMTR-MCNC: 117 MG/DL (ref 70–130)
GLUCOSE BLDC GLUCOMTR-MCNC: 118 MG/DL (ref 70–130)
GLUCOSE BLDC GLUCOMTR-MCNC: 131 MG/DL (ref 70–130)
GLUCOSE SERPL-MCNC: 119 MG/DL (ref 65–99)
GLUCOSE UR STRIP-MCNC: NEGATIVE MG/DL
HCT VFR BLD AUTO: 28.5 % (ref 37.5–51)
HGB BLD-MCNC: 9.7 G/DL (ref 13–17.7)
HGB UR QL STRIP.AUTO: NEGATIVE
HYALINE CASTS UR QL AUTO: NORMAL /LPF
KETONES UR QL STRIP: NEGATIVE
LEUKOCYTE ESTERASE UR QL STRIP.AUTO: NEGATIVE
MAGNESIUM SERPL-MCNC: 1.8 MG/DL (ref 1.6–2.4)
MCH RBC QN AUTO: 32.6 PG (ref 26.6–33)
MCHC RBC AUTO-ENTMCNC: 34 G/DL (ref 31.5–35.7)
MCV RBC AUTO: 95.6 FL (ref 79–97)
NITRITE UR QL STRIP: NEGATIVE
PH UR STRIP.AUTO: 8.5 [PH] (ref 5–8)
PHOSPHATE SERPL-MCNC: 4.2 MG/DL (ref 2.5–4.5)
PLATELET # BLD AUTO: 401 10*3/MM3 (ref 140–450)
PMV BLD AUTO: 9.8 FL (ref 6–12)
POTASSIUM SERPL-SCNC: 4.4 MMOL/L (ref 3.5–5.2)
PROCALCITONIN SERPL-MCNC: 0.09 NG/ML (ref 0–0.25)
PROT UR QL STRIP: ABNORMAL
RBC # BLD AUTO: 2.98 10*6/MM3 (ref 4.14–5.8)
RBC # UR STRIP: NORMAL /HPF
REF LAB TEST METHOD: NORMAL
SODIUM SERPL-SCNC: 137 MMOL/L (ref 136–145)
SP GR UR STRIP: 1.01 (ref 1–1.03)
SQUAMOUS #/AREA URNS HPF: NORMAL /HPF
UROBILINOGEN UR QL STRIP: ABNORMAL
WBC # UR STRIP: NORMAL /HPF
WBC NRBC COR # BLD AUTO: 12.01 10*3/MM3 (ref 3.4–10.8)

## 2024-04-18 PROCEDURE — 94799 UNLISTED PULMONARY SVC/PX: CPT

## 2024-04-18 PROCEDURE — 84100 ASSAY OF PHOSPHORUS: CPT | Performed by: STUDENT IN AN ORGANIZED HEALTH CARE EDUCATION/TRAINING PROGRAM

## 2024-04-18 PROCEDURE — 25010000002 DIAZEPAM PER 5 MG: Performed by: INTERNAL MEDICINE

## 2024-04-18 PROCEDURE — 25810000003 SODIUM CHLORIDE 0.9 % SOLUTION

## 2024-04-18 PROCEDURE — 97162 PT EVAL MOD COMPLEX 30 MIN: CPT

## 2024-04-18 PROCEDURE — 97166 OT EVAL MOD COMPLEX 45 MIN: CPT

## 2024-04-18 PROCEDURE — 25010000002 NICARDIPINE 2.5 MG/ML SOLUTION

## 2024-04-18 PROCEDURE — 97530 THERAPEUTIC ACTIVITIES: CPT

## 2024-04-18 PROCEDURE — 25010000002 HYDRALAZINE PER 20 MG: Performed by: INTERNAL MEDICINE

## 2024-04-18 PROCEDURE — 94761 N-INVAS EAR/PLS OXIMETRY MLT: CPT

## 2024-04-18 PROCEDURE — 82948 REAGENT STRIP/BLOOD GLUCOSE: CPT

## 2024-04-18 PROCEDURE — 83605 ASSAY OF LACTIC ACID: CPT | Performed by: INTERNAL MEDICINE

## 2024-04-18 PROCEDURE — 94760 N-INVAS EAR/PLS OXIMETRY 1: CPT

## 2024-04-18 PROCEDURE — 85027 COMPLETE CBC AUTOMATED: CPT | Performed by: STUDENT IN AN ORGANIZED HEALTH CARE EDUCATION/TRAINING PROGRAM

## 2024-04-18 PROCEDURE — 87040 BLOOD CULTURE FOR BACTERIA: CPT | Performed by: INTERNAL MEDICINE

## 2024-04-18 PROCEDURE — 92610 EVALUATE SWALLOWING FUNCTION: CPT

## 2024-04-18 PROCEDURE — 94664 DEMO&/EVAL PT USE INHALER: CPT

## 2024-04-18 PROCEDURE — 25010000002 ENOXAPARIN PER 10 MG: Performed by: INTERNAL MEDICINE

## 2024-04-18 PROCEDURE — 25010000002 LORAZEPAM PER 2 MG: Performed by: INTERNAL MEDICINE

## 2024-04-18 PROCEDURE — 83735 ASSAY OF MAGNESIUM: CPT | Performed by: STUDENT IN AN ORGANIZED HEALTH CARE EDUCATION/TRAINING PROGRAM

## 2024-04-18 PROCEDURE — 25010000002 MORPHINE PER 10 MG: Performed by: INTERNAL MEDICINE

## 2024-04-18 PROCEDURE — 80048 BASIC METABOLIC PNL TOTAL CA: CPT | Performed by: STUDENT IN AN ORGANIZED HEALTH CARE EDUCATION/TRAINING PROGRAM

## 2024-04-18 PROCEDURE — 81001 URINALYSIS AUTO W/SCOPE: CPT | Performed by: INTERNAL MEDICINE

## 2024-04-18 PROCEDURE — 84145 PROCALCITONIN (PCT): CPT | Performed by: INTERNAL MEDICINE

## 2024-04-18 PROCEDURE — 25010000002 CEFTRIAXONE PER 250 MG: Performed by: INTERNAL MEDICINE

## 2024-04-18 RX ORDER — QUETIAPINE FUMARATE 50 MG/1
50 TABLET, FILM COATED ORAL EVERY 12 HOURS SCHEDULED
Status: DISCONTINUED | OUTPATIENT
Start: 2024-04-18 | End: 2024-04-19

## 2024-04-18 RX ORDER — HYDRALAZINE HYDROCHLORIDE 20 MG/ML
20 INJECTION INTRAMUSCULAR; INTRAVENOUS EVERY 6 HOURS PRN
Status: DISCONTINUED | OUTPATIENT
Start: 2024-04-18 | End: 2024-04-24 | Stop reason: HOSPADM

## 2024-04-18 RX ORDER — DIAZEPAM 5 MG/ML
10 INJECTION, SOLUTION INTRAMUSCULAR; INTRAVENOUS ONCE
Status: COMPLETED | OUTPATIENT
Start: 2024-04-18 | End: 2024-04-18

## 2024-04-18 RX ORDER — DEXMEDETOMIDINE HYDROCHLORIDE 4 UG/ML
.2-1.5 INJECTION, SOLUTION INTRAVENOUS
Status: DISCONTINUED | OUTPATIENT
Start: 2024-04-18 | End: 2024-04-21

## 2024-04-18 RX ORDER — HYDRALAZINE HYDROCHLORIDE 20 MG/ML
10 INJECTION INTRAMUSCULAR; INTRAVENOUS ONCE
Status: COMPLETED | OUTPATIENT
Start: 2024-04-18 | End: 2024-04-18

## 2024-04-18 RX ORDER — DEXMEDETOMIDINE HYDROCHLORIDE 4 UG/ML
INJECTION, SOLUTION INTRAVENOUS
Status: COMPLETED
Start: 2024-04-18 | End: 2024-04-18

## 2024-04-18 RX ADMIN — DEXMEDETOMIDINE HYDROCHLORIDE IN SODIUM CHLORIDE 0.2 MCG/KG/HR: 4 INJECTION INTRAVENOUS at 17:42

## 2024-04-18 RX ADMIN — QUETIAPINE FUMARATE 50 MG: 50 TABLET, FILM COATED ORAL at 20:29

## 2024-04-18 RX ADMIN — HYDRALAZINE HYDROCHLORIDE 20 MG: 20 INJECTION INTRAMUSCULAR; INTRAVENOUS at 22:34

## 2024-04-18 RX ADMIN — HYDROCODONE BITARTRATE AND ACETAMINOPHEN 1 TABLET: 10; 325 TABLET ORAL at 08:48

## 2024-04-18 RX ADMIN — DEXMEDETOMIDINE HYDROCHLORIDE IN SODIUM CHLORIDE 1.3 MCG/KG/HR: 4 INJECTION INTRAVENOUS at 00:25

## 2024-04-18 RX ADMIN — LORAZEPAM 2 MG: 2 INJECTION INTRAMUSCULAR; INTRAVENOUS at 15:51

## 2024-04-18 RX ADMIN — IPRATROPIUM BROMIDE AND ALBUTEROL SULFATE 3 ML: .5; 3 SOLUTION RESPIRATORY (INHALATION) at 12:14

## 2024-04-18 RX ADMIN — ZINC OXIDE 1 APPLICATION: 200 OINTMENT TOPICAL at 22:05

## 2024-04-18 RX ADMIN — MORPHINE SULFATE 4 MG: 2 INJECTION, SOLUTION INTRAMUSCULAR; INTRAVENOUS at 04:59

## 2024-04-18 RX ADMIN — Medication 10 ML: at 08:59

## 2024-04-18 RX ADMIN — DIAZEPAM 10 MG: 5 INJECTION, SOLUTION INTRAMUSCULAR; INTRAVENOUS at 18:04

## 2024-04-18 RX ADMIN — HYDRALAZINE HYDROCHLORIDE 10 MG: 20 INJECTION, SOLUTION INTRAMUSCULAR; INTRAVENOUS at 21:04

## 2024-04-18 RX ADMIN — CEFTRIAXONE 2000 MG: 2 INJECTION, POWDER, FOR SOLUTION INTRAMUSCULAR; INTRAVENOUS at 19:52

## 2024-04-18 RX ADMIN — LORAZEPAM 2 MG: 2 INJECTION INTRAMUSCULAR; INTRAVENOUS at 22:05

## 2024-04-18 RX ADMIN — NICARDIPINE HYDROCHLORIDE 5 MG/HR: 25 INJECTION, SOLUTION INTRAVENOUS at 22:59

## 2024-04-18 RX ADMIN — MORPHINE SULFATE 4 MG: 2 INJECTION, SOLUTION INTRAMUSCULAR; INTRAVENOUS at 21:20

## 2024-04-18 RX ADMIN — ACETAMINOPHEN 325MG 650 MG: 325 TABLET ORAL at 16:39

## 2024-04-18 RX ADMIN — Medication 15 ML: at 08:58

## 2024-04-18 RX ADMIN — Medication 10 ML: at 20:30

## 2024-04-18 RX ADMIN — HYDROCODONE BITARTRATE AND ACETAMINOPHEN 1 TABLET: 10; 325 TABLET ORAL at 02:09

## 2024-04-18 RX ADMIN — METOPROLOL TARTRATE 2.5 MG: 1 INJECTION, SOLUTION INTRAVENOUS at 15:48

## 2024-04-18 RX ADMIN — FAMOTIDINE 20 MG: 20 TABLET, FILM COATED ORAL at 16:39

## 2024-04-18 RX ADMIN — HYDROCODONE BITARTRATE AND ACETAMINOPHEN 1 TABLET: 10; 325 TABLET ORAL at 23:46

## 2024-04-18 RX ADMIN — ENOXAPARIN SODIUM 40 MG: 100 INJECTION SUBCUTANEOUS at 12:03

## 2024-04-18 RX ADMIN — Medication 100 MG: at 08:48

## 2024-04-18 RX ADMIN — FAMOTIDINE 20 MG: 20 TABLET, FILM COATED ORAL at 06:33

## 2024-04-18 RX ADMIN — DEXMEDETOMIDINE HYDROCHLORIDE IN SODIUM CHLORIDE 1.2 MCG/KG/HR: 4 INJECTION INTRAVENOUS at 02:53

## 2024-04-18 RX ADMIN — CLONIDINE HYDROCHLORIDE 0.3 MG: 0.1 TABLET ORAL at 23:01

## 2024-04-18 RX ADMIN — SENNOSIDES AND DOCUSATE SODIUM 2 TABLET: 50; 8.6 TABLET ORAL at 20:27

## 2024-04-18 RX ADMIN — SENNOSIDES AND DOCUSATE SODIUM 2 TABLET: 50; 8.6 TABLET ORAL at 08:48

## 2024-04-18 RX ADMIN — HYDRALAZINE HYDROCHLORIDE 10 MG: 20 INJECTION INTRAMUSCULAR; INTRAVENOUS at 15:18

## 2024-04-18 RX ADMIN — DEXMEDETOMIDINE HYDROCHLORIDE IN SODIUM CHLORIDE 1.1 MCG/KG/HR: 4 INJECTION INTRAVENOUS at 05:34

## 2024-04-18 RX ADMIN — ZINC OXIDE 1 APPLICATION: 200 OINTMENT TOPICAL at 10:20

## 2024-04-18 RX ADMIN — IPRATROPIUM BROMIDE AND ALBUTEROL SULFATE 3 ML: .5; 3 SOLUTION RESPIRATORY (INHALATION) at 07:56

## 2024-04-18 RX ADMIN — CLONIDINE HYDROCHLORIDE 0.3 MG: 0.1 TABLET ORAL at 18:04

## 2024-04-18 RX ADMIN — QUETIAPINE FUMARATE 100 MG: 100 TABLET ORAL at 08:48

## 2024-04-18 RX ADMIN — CLONIDINE HYDROCHLORIDE 0.3 MG: 0.1 TABLET ORAL at 12:03

## 2024-04-18 RX ADMIN — MORPHINE SULFATE 4 MG: 2 INJECTION, SOLUTION INTRAMUSCULAR; INTRAVENOUS at 00:54

## 2024-04-18 RX ADMIN — HYDROCODONE BITARTRATE AND ACETAMINOPHEN 1 TABLET: 10; 325 TABLET ORAL at 19:44

## 2024-04-18 RX ADMIN — Medication 1 PATCH: at 20:31

## 2024-04-18 RX ADMIN — METOPROLOL TARTRATE 2.5 MG: 1 INJECTION, SOLUTION INTRAVENOUS at 17:41

## 2024-04-18 RX ADMIN — DEXMEDETOMIDINE HYDROCHLORIDE IN SODIUM CHLORIDE 1 MCG/KG/HR: 4 INJECTION INTRAVENOUS at 23:39

## 2024-04-18 RX ADMIN — DEXMEDETOMIDINE HYDROCHLORIDE IN SODIUM CHLORIDE 0.8 MCG/KG/HR: 4 INJECTION INTRAVENOUS at 08:48

## 2024-04-18 RX ADMIN — CLONIDINE HYDROCHLORIDE 0.3 MG: 0.1 TABLET ORAL at 06:33

## 2024-04-18 RX ADMIN — FOLIC ACID 1 MG: 1 TABLET ORAL at 08:48

## 2024-04-18 NOTE — THERAPY EVALUATION
Patient Name: Watson Lisa  : 1955    MRN: [id number]                              Today's Date: 2024       Admit Date: 2024    Visit Dx:     ICD-10-CM ICD-9-CM   1. Alcohol withdrawal syndrome without complication  F10.930 291.81   2. Hypomagnesemia  E83.42 275.2     Patient Active Problem List   Diagnosis    Essential hypertension    Tobacco abuse    Hiatal hernia    Effusion of left knee    Osteoarthritis of left knee    Vitamin D deficiency    Anemia, chronic disease    COPD (chronic obstructive pulmonary disease)    LIVAN (obstructive sleep apnea)    ETOH abuse    Obese    Alcoholic liver disease    History of alcohol use disorder    Left patella fracture    Need for hepatitis C screening test    Chronic pain of left ankle    Primary insomnia    Overweight (BMI 25.0-29.9)    Mixed hyperlipidemia    Acute alcohol intoxication    Hypomagnesemia    Alcohol withdrawal    Substance abuse    Cocaine abuse    Positive urine drug screen     Past Medical History:   Diagnosis Date    Alcohol abuse     Anxiety     Arthritis     Bronchitis with chronic airway obstruction     LAST FEB    DVT (deep venous thrombosis)     Hiatal hernia     Hypertension     Hypertension     Low back pain     Neck pain     Pulmonary embolism     Sleep apnea with use of continuous positive airway pressure (CPAP)     AT NIGHT     Past Surgical History:   Procedure Laterality Date    COLONOSCOPY      JOINT REPLACEMENT Right     hip/knee    REPLACEMENT TOTAL KNEE      TONSILLECTOMY      TOTAL HIP ARTHROPLASTY Right       General Information       Row Name 24 1057          Physical Therapy Time and Intention    Document Type evaluation  -EM     Mode of Treatment co-treatment;physical therapy;occupational therapy;other (see comments)  -EM       Row Name 24 1058          General Information    Patient Profile Reviewed yes  -EM     Prior Level of Function independent:  pt reports he uses a cane for mobility at baseline  -EM      Existing Precautions/Restrictions fall;NPO  -EM       Row Name 04/18/24 1057          Living Environment    People in Home alone  -EM       Row Name 04/18/24 1057          Home Main Entrance    Number of Stairs, Main Entrance none  -EM       Row Name 04/18/24 1057          Stairs Within Home, Primary    Number of Stairs, Within Home, Primary none  -EM       Row Name 04/18/24 1057          Cognition    Orientation Status (Cognition) oriented x 4  -EM       Row Name 04/18/24 1057          Safety Issues, Functional Mobility    Safety Issues Affecting Function (Mobility) insight into deficits/self-awareness;judgment  -EM     Impairments Affecting Function (Mobility) balance;endurance/activity tolerance;strength;coordination;pain  -EM     Comment, Safety Issues/Impairments (Mobility) Co treatment medically appropriate and necessary due to patient acuity level, activity tolerance and safety of patient and staff. Evaluation established to achieve all goals in POC.  -EM               User Key  (r) = Recorded By, (t) = Taken By, (c) = Cosigned By      Initials Name Provider Type    EM Pooja Carrington, PT Physical Therapist                   Mobility       Row Name 04/18/24 1058          Bed Mobility    Bed Mobility supine-sit;sit-supine  -EM     Supine-Sit [place] (Bed Mobility) minimum assist (75% patient effort)  -EM     Sit-Supine [place] (Bed Mobility) minimum assist (75% patient effort);2 person assist  -EM     Assistive Device (Bed Mobility) head of bed elevated  -EM       Row Name 04/18/24 1058          Sit-Stand Transfer    Sit-Stand [place] (Transfers) moderate assist (50% patient effort);2 person assist;verbal cues  -EM     Assistive Device (Sit-Stand Transfers) walker, front-wheeled  -EM     Comment, (Sit-Stand Transfer) took patient extra time to achieve full upright standing  -EM       Row Name 04/18/24 1058          Gait/Stairs (Locomotion)    [place] Level (Gait) moderate assist  "(50% patient effort);2 person assist  -EM     Assistive Device (Gait) walker, front-wheeled  -EM     Comment, (Gait/Stairs) pt took 2 sidesteps up to head of bed, difficulty moving LLE, decreased weight shifting, c/o dizziness limiting  -EM               User Key  (r) = Recorded By, (t) = Taken By, (c) = Cosigned By      Initials Name Provider Type    EM Pooja Carrington PT Physical Therapist                   Obj/Interventions       Row Name 04/18/24 1059          Range of Motion Comprehensive    General Range of Motion no range of motion deficits identified  -EM       Row Name 04/18/24 1059          Strength Comprehensive (MMT)    General Manual Muscle Testing (MMT) Assessment other (see comments)  -EM     Comment, General Manual Muscle Testing (MMT) Assessment no focal deficits identified, generalized weakness noted  -EM       Row Name 04/18/24 1059          Motor Skills    Therapeutic Exercise other (see comments)  AP, LAQ, marching in sitting x 5 reps  -EM       Row Name 04/18/24 1059          Balance    Balance Assessment sitting static balance;sitting dynamic balance;standing static balance;standing dynamic balance  -EM     Static Sitting Balance standby assist  -EM     Dynamic Sitting Balance contact guard  -EM     Position, Sitting Balance sitting edge of bed  -EM     Static Standing Balance minimal assist;2-person assist  -EM     Dynamic Standing Balance moderate assist;2-person assist  -EM     Position/Device Used, Standing Balance walker, rolling  -EM       Row Name 04/18/24 1059          Sensory Assessment (Somatosensory)    Sensory Assessment (Somatosensory) other (see comments)  pt reports he has \"terrible arthritis\" in L ankle when questioned about numbness or tingling  -EM               User Key  (r) = Recorded By, (t) = Taken By, (c) = Cosigned By      Initials Name Provider Type    EM Pooja Carrington PT Physical Therapist                   Goals/Plan       Row Name 04/18/24 1108       "    Bed Mobility Goal 1 (PT)    Activity/Assistive Device (Bed Mobility Goal 1, PT) bed mobility activities, all  -EM     [place] Level/Cues Needed (Bed Mobility Goal 1, PT) minimum assist (75% or more patient effort)  -EM     Time Frame (Bed Mobility Goal 1, PT) 2 weeks  -EM       Row Name 04/18/24 1108          Transfer Goal 1 (PT)    Activity/Assistive Device (Transfer Goal 1, PT) transfers, all;walker, rolling  -EM     [place] Level/Cues Needed (Transfer Goal 1, PT) minimum assist (75% or more patient effort)  -EM     Time Frame (Transfer Goal 1, PT) 2 weeks  -EM       Row Name 04/18/24 1108          Gait Training Goal 1 (PT)    Activity/Assistive Device (Gait Training Goal 1, PT) gait (walking locomotion);walker, rolling  -EM     [place] Level (Gait Training Goal 1, PT) minimum assist (75% or more patient effort)  -EM     Distance (Gait Training Goal 1, PT) 25  -EM     Time Frame (Gait Training Goal 1, PT) 2 weeks  -EM       Row Name 04/18/24 1108          Therapy Assessment/Plan (PT)    Planned Therapy Interventions (PT) bed mobility training;gait training;home exercise program;patient/family education;transfer training;strengthening  -EM               User Key  (r) = Recorded By, (t) = Taken By, (c) = Cosigned By      Initials Name Provider Type    EM Pooja Carrington, PT Physical Therapist                   Clinical Impression       Row Name 04/18/24 1101          Pain    Pretreatment Pain Rating 7/10  -EM     Pain Location - back  -EM     Pre/Posttreatment Pain Comment pt c/o generalized pain at 7/10 level at rest  -EM     Pain Intervention(s) Medication (See MAR);Repositioned  -EM       Row Name 04/18/24 1101          Plan of Care Review    Plan of Care Reviewed With patient  -EM     Outcome Evaluation Pt is 69 yo admitted to [facility] for ETOH and substance abuse withdrawal, hospital course includes aspiration pna and respiratory failure requiring intubation from 4/8/24-4/17/24. PMH  significnat for HTN, COPD, LIVAN, chronic pain. Patient lives alone, independent with mobility with use of cane at baseline, no steps to enter home. Patient awake and alert, oriented today, agreeable to therapy, c/o generalized pain at 7/10 level. Patient performed bed mobility with minAx2, sit to stand with modAx2, took a couple of side steps up to head of bed with rwx and modAx2. Pt demonstrates impairments consisting of generalized weakness, decreased balance, decreased insight into impairments, decreased activity tolerance and would benefit from skilled PT. Anticipate patient may need inpt rehab at d/c.  -EM       Row Name 04/18/24 1101          Therapy Assessment/Plan (PT)    Patient/Family Therapy Goals Statement (PT) go home  -EM     Rehab Potential (PT) good, to achieve stated therapy goals  -EM     Criteria for Skilled Interventions Met (PT) yes;skilled treatment is necessary  -EM     Therapy Frequency (PT) 6 times/wk  -EM       Row Name 04/18/24 1101          Vital Signs    Pre Systolic BP Rehab 128  -EM     Pre Treatment Diastolic BP 80  -EM     Intra Systolic BP Rehab 155  -EM     Intra Treatment Diastolic BP 73  -EM     Pretreatment Heart Rate (beats/min) 70  -EM     Posttreatment Heart Rate (beats/min) 81  -EM     Pre SpO2 (%) 93  -EM     Post SpO2 (%) 96  -EM     O2 Delivery Post Treatment supplemental O2  -EM       Row Name 04/18/24 1101          Positioning and Restraints    Pre-Treatment Position in bed  -EM     Post Treatment Position bed  -EM     In Bed supine;call light within reach;exit alarm on;notified nsg  -EM               User Key  (r) = Recorded By, (t) = Taken By, (c) = Cosigned By      Initials Name Provider Type    EM Pooja Carrington, PT Physical Therapist                   Outcome Measures       Row Name 04/18/24 1108 04/18/24 0800       How much help from another person do you currently need...    Turning from your back to your side while in flat bed without using bedrails? 3  -EM  3  -RB    Moving from lying on back to sitting on the side of a flat bed without bedrails? 2  -EM 3  -RB    Moving to and from a bed to a chair (including a wheelchair)? 2  -EM 2  -RB    Standing up from a chair using your arms (e.g., wheelchair, bedside chair)? 2  -EM 2  -RB    Climbing 3-5 steps with a railing? 1  -EM 2  -RB    To walk in hospital room? 2  -EM 2  -RB    AM-PAC 6 Clicks Score (PT) 12  -EM 14  -RB    Highest Level of Mobility Goal 4 --> Transfer to chair/commode  -EM 4 --> Transfer to chair/commode  -RB              User Key  (r) = Recorded By, (t) = Taken By, (c) = Cosigned By      Initials Name Provider Type    EM Pooja Carrington, PT Physical Therapist    Sharlene Jones RN Registered Nurse                                 Physical Therapy Education       Title: PT OT SLP Therapies (In Progress)       Topic: Physical Therapy (In Progress)       Point: Mobility training (In Progress)       Learning Progress Summary             Patient Acceptance, E, NR by EM at 4/18/2024 1109                         Point: Home exercise program (Not Started)       Learner Progress:  Not documented in this visit.              Point: Body mechanics (Not Started)       Learner Progress:  Not documented in this visit.              Point: Precautions (Not Started)       Learner Progress:  Not documented in this visit.                              User Key       Initials Effective Dates Name Provider Type Discipline     06/16/21 -  Pooja Carrington, PT Physical Therapist PT                  PT Recommendation and Plan  Planned Therapy Interventions (PT): bed mobility training, gait training, home exercise program, patient/family education, transfer training, strengthening  Plan of Care Reviewed With: patient  Outcome Evaluation: Pt is 67 yo admitted to [facility] for ETOH and substance abuse withdrawal, hospital course includes aspiration pna and respiratory failure requiring intubation from 4/8/24-4/17/24. PM  significnat for HTN, COPD, LIVAN, chronic pain. Patient lives alone, independent with mobility with use of cane at baseline, no steps to enter home. Patient awake and alert, oriented today, agreeable to therapy, c/o generalized pain at 7/10 level. Patient performed bed mobility with minAx2, sit to stand with modAx2, took a couple of side steps up to head of bed with rwx and modAx2. Pt demonstrates impairments consisting of generalized weakness, decreased balance, decreased insight into impairments, decreased activity tolerance and would benefit from skilled PT. Anticipate patient may need inpt rehab at d/c.     Time Calculation:         PT Charges       Row Name 04/18/24 1109             Time Calculation    Start Time 1013  -EM      Stop Time 1032  -EM      Time Calculation (min) 19 min  -EM      PT Received On 04/18/24  -EM      PT - Next Appointment 04/19/24  -EM      PT Goal Re-Cert Due Date 05/02/24  -EM         Time Calculation- PT    Total Timed Code Minutes- PT 15 minute(s)  -EM         Timed Charges    38390 - PT Therapeutic Exercise Minutes 5  -EM      08329 - PT Therapeutic Activity Minutes 10  -EM         Total Minutes    Timed Charges Total Minutes 15  -EM       Total Minutes 15  -EM                User Key  (r) = Recorded By, (t) = Taken By, (c) = Cosigned By      Initials Name Provider Type    EM Pooja Carrington PT Physical Therapist                  Therapy Charges for Today       Code Description Service Date Service Provider Modifiers Qty    61183980946  PT THERAPEUTIC ACT EA 15 MIN 4/18/2024 Pooja Carrington, PT GP 1    [id number] HC PT EVAL MOD COMPLEXITY 2 4/18/2024 Pooja Carrington PT GP 1            PT G-Codes  AM-PAC 6 Clicks Score (PT): 12  PT Discharge Summary  Anticipated Discharge Disposition (PT): inpatient rehabilitation facility    Pooja Carrington PT  4/18/2024

## 2024-04-18 NOTE — THERAPY EVALUATION
Acute Care - Speech Language Pathology   Swallow Initial Evaluation [facility]     Patient Name: Watson Lisa  : 1955  MRN: [id number]  Today's Date: 2024               Admit Date: 2024    Visit Dx:     ICD-10-CM ICD-9-CM   1. Alcohol withdrawal syndrome without complication  F10.930 291.81   2. Hypomagnesemia  E83.42 275.2     Patient Active Problem List   Diagnosis    Essential hypertension    Tobacco abuse    Hiatal hernia    Effusion of left knee    Osteoarthritis of left knee    Vitamin D deficiency    Anemia, chronic disease    COPD (chronic obstructive pulmonary disease)    LIVAN (obstructive sleep apnea)    ETOH abuse    Obese    Alcoholic liver disease    History of alcohol use disorder    Left patella fracture    Need for hepatitis C screening test    Chronic pain of left ankle    Primary insomnia    Overweight (BMI 25.0-29.9)    Mixed hyperlipidemia    Acute alcohol intoxication    Hypomagnesemia    Alcohol withdrawal    Substance abuse    Cocaine abuse    Positive urine drug screen     Past Medical History:   Diagnosis Date    Alcohol abuse     Anxiety     Arthritis     Bronchitis with chronic airway obstruction     LAST FEB    DVT (deep venous thrombosis)     Hiatal hernia     Hypertension     Hypertension     Low back pain     Neck pain     Pulmonary embolism     Sleep apnea with use of continuous positive airway pressure (CPAP)     AT NIGHT     Past Surgical History:   Procedure Laterality Date    COLONOSCOPY      JOINT REPLACEMENT Right     hip/knee    REPLACEMENT TOTAL KNEE      TONSILLECTOMY      TOTAL HIP ARTHROPLASTY Right        SLP Recommendation and Plan  SLP Swallowing Diagnosis: suspected pharyngeal dysphagia (24 1300)  SLP Diet Recommendation: ice chips between meals after oral care, with supervision (24 1300)     SLP Rec. for Method of Medication Administration: meds via alternate route (24 1300)     Monitor for Signs of Aspiration: yes, notify SLP if  "any concerns (04/18/24 1300)  Recommended Diagnostics: reassess via [facility] ([facility] [place] (04/18/24 1300)           Therapy Frequency (Swallow): PRN (04/18/24 1300)  Predicted Duration Therapy Intervention (Days): until discharge (04/18/24 1300)  Oral Care Recommendations: Oral Care BID/PRN, Before ice/water (04/18/24 1300)                                        Plan of Care Reviewed With: patient  Outcome Evaluation: Patient seen for clinical swallow assessment. Eager for PO. Voice strong, but hoarse. Oral mech exam otherwise unremarkable. Pt extubated last date, but was intubated for 11 days. Immediate cough with ice 1/2 trials. No overt s/s of aspiration with thins or nectar via spoon. Immediate cough with thins and nectar via cup and honey via straw. Throat clearing with puree. Unable to determine safe diet at the bedside d/t s/s of aspiration with all PO. SLP recs ice chips after oral care, will further assess with VFSS next date.      SWALLOW EVALUATION (Last 72 Hours)       SLP Adult Swallow Evaluation       Row Name 04/18/24 1300                   Rehab Evaluation    Document Type evaluation  -SH        Patient Effort adequate  -           General Information    Patient Profile Reviewed yes  -SH        Pertinent History Of Current Problem \"1. Acute respiratory failure requiring  mechanical ventilation -- doing well post extubation  2. Aspiration pneumonia with H flu -- now finished antibiotic course    3. Severe alcohol abuse with dependence and withdrawal -- --improving now probably more withdrawal from the benzos/prececex that he has been on while here and icu delerium etc. --making some progress, continue current efforts  4. Polysubstance abuse -- likely making improvement in his mental status and sedation needs all the more difficult\". Sepsis.  -        Current Method of Nutrition NPO;nasogastric feedings  -        Precautions/Limitations, Vision WFL;for purposes of eval  -        Precautions/Limitations, " Hearing WFL;for purposes of eval  -        Prior Level of Function-Communication unknown  -        Prior Level of Function-Swallowing no diet consistency restrictions  -        Plans/Goals Discussed with patient;agreed upon  [facility]        Barriers to Rehab medically complex  -           Pain    Additional Documentation Pain Scale: FACES Pre/Post-Treatment (Group)  -           Pain Scale: FACES Pre/Post-Treatment    Pain: FACES Scale, Pretreatment 2-->hurts little bit  -           Oral Motor Structure and Function    Dentition Assessment natural, present and adequate  -           Oral Musculature and Cranial Nerve Assessment    Oral Motor General Assessment WFL;vocal impairment;other (see comments)  hoarse, strong voice  -           Clinical Swallow Eval    Clinical Swallow Evaluation Summary Patient seen for clinical swallow assessment. Eager for PO. Voice strong, but hoarse. Oral mech exam otherwise unremarkable. Pt extubated last date, but was intubated for 11 days. Immediate cough with ice 1/2 trials. No overt s/s of aspiration with thins or nectar via spoon. Immediate cough with thins and nectar via cup and honey via straw. Throat clearing with puree. Unable to determine safe diet at the bedside d/t s/s of aspiration with all PO. SLP recs ice chips after oral care, will further assess with VFSS next date.  -           SLP Evaluation Clinical Impression    SLP Swallowing Diagnosis suspected pharyngeal dysphagia  -           Recommendations    Therapy Frequency (Swallow) PRN  -        Predicted Duration Therapy Intervention (Days) until discharge  -        SLP Diet Recommendation ice chips between meals after oral care, with supervision  -        [place] [place]  -        Oral Care Recommendations Oral Care BID/PRN;Before ice/water  -        SLP Rec. for Method of Medication Administration meds via alternate route  [facility]        Monitor for Signs of  Aspiration yes;notify SLP if any concerns  -           Swallow Goals (SLP)    Swallow LTGs Patient will demonstrate functional swallow for  -SH           (LTG) Patient will demonstrate functional swallow for    Diet Texture (Demonstrate functional swallow) regular textures  -        Liquid viscosity (Demonstrate functional swallow) thin liquids  -        [place] (Demonstrate functional swallow) independently (over 90% accuracy)  -        Time Frame (Demonstrate functional swallow) by discharge  -                  User Key  (r) = Recorded By, (t) = Taken By, (c) = Cosigned By      Initials Name Effective Dates    Dorcas Conde SLP 01/05/24 -                     EDUCATION  The patient has been educated in the following areas:   Dysphagia (Swallowing Impairment).        SLP GOALS       Row Name 04/18/24 1300             (LTG) Patient will demonstrate functional swallow for    Diet Texture (Demonstrate functional swallow) regular textures  -      Liquid viscosity (Demonstrate functional swallow) thin liquids  -      [place] (Demonstrate functional swallow) independently (over 90% accuracy)  -      Time Frame (Demonstrate functional swallow) by discharge  -                User Key  (r) = Recorded By, (t) = Taken By, (c) = Cosigned By      Initials Name Provider Type     Dorcas Alas SLP Speech and Language Pathologist                       Time Calculation:    Time Calculation- SLP       Row Name 04/18/24 1354             Time Calculation- [facility]    SLP Start Time 1300  -      SLP Received On 04/18/24  -                User Key  (r) = Recorded By, (t) = Taken By, (c) = Cosigned By      Initials Name Provider Type     Dorcas Alas SLP Speech and Language Pathologist                    Therapy Charges for Today       Code Description Service Date Service Provider Modifiers Qty    [id number]  ST EVAL ORAL PHARYNG SWALLOW 4 4/18/2024 Dorcas Alas SLP GN 1                 Dorcas  Pooja Alas, SLP  4/18/2024

## 2024-04-18 NOTE — PAYOR COMM NOTE
"Brittany Lisa (68 y.o. Male)                                    ATTENTION; CONTINUED CLINICALS CASE [id number]                                REPLY TO [facility] DEPT  [phone] OR CALL          Date of Birth   1955    Social Security Number       Address   [address] [place] [address]    Home Phone   [phone]    MRN   [id number]       Zoroastrian   Episcopalian    Marital Status                               Admission Date   4/6/24    Admission Type   Emergency    Admitting Provider   Sean Ricketts MD    Attending Provider   Dorcas Tate MD    Department, Room/Bed   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)   Code Status: CPR    Ht: 200.7 cm (79\")   Wt: 127 kg (281 lb 1.4 oz)    Admission Cmt: None   Principal Problem: Alcohol withdrawal [F10.939]                   Active Insurance as of 4/6/2024       Primary Coverage       Payor Plan Insurance Group Employer/Plan Group    [facility] BLUE CROSS [facility] BLUE CROSS BLUE SHIELD PPO [id number]       Payor Plan Address Payor Plan Phone Number Payor Plan Fax Number Effective Dates    [address] [phone]  1/1/2022 - None Entered    [facility] [id number]         Subscriber Name Subscriber Birth Date Member ID       BRITTANY LISA 1955 [id number]               Secondary Coverage       Payor Plan Insurance Group Employer/Plan Group    MEDICARE MEDICARE A ONLY        Payor Plan Address Payor Plan Phone Number Payor Plan Fax Number Effective Dates    [address] [phone]  9/1/2019 - None Entered    [facility] [id number]         Subscriber Name Subscriber Birth Date Member ID       BRITTANY LISA 1955 [id number]                     Emergency Contacts        (Rel.) " Home Phone Work Phone Mobile Phone    TEJAS ZULETA (Son) [phone] -- [phone]    Jorge A Nielson (Sister) [phone] -- --              Vital Signs (last day)       Date/Time Temp Temp src Pulse Resp BP Patient Position SpO2    04/18/24 0633 -- -- 72 -- 147/66 -- 95    04/18/24 0600 -- -- 73 -- 124/73 -- 92    04/18/24 0557 -- -- 73 -- -- -- 93    04/18/24 0528 99.2 (37.3) Oral -- -- -- -- --    04/18/24 0522 -- -- 74 -- -- -- 91    04/18/24 0500 -- -- 73 -- 142/77 -- 95    04/18/24 0400 -- -- 72 -- 145/83 Lying 92    04/18/24 0300 -- -- 68 -- 143/72 -- 94    04/18/24 0200 -- -- 76 -- 135/75 -- 93    04/18/24 0130 -- -- 68 -- 148/71 -- 94    04/18/24 0119 97.5 (36.4) Oral -- -- -- -- --    04/18/24 0100 -- -- 70 -- 160/83 -- 94    04/18/24 0030 -- -- 66 -- 154/88 -- 95    04/18/24 0000 -- -- 77 -- 127/89 Lying --    04/17/24 2330 -- -- 66 -- 134/68 -- 93    04/17/24 2300 -- -- 60 -- 142/83 -- 96    04/17/24 2230 -- -- 60 -- 139/76 -- 97    04/17/24 2200 -- -- 62 -- 138/75 -- 96    04/17/24 2145 96.5 (35.8) Oral 56 -- -- -- 98    04/17/24 2130 -- -- 62 -- 140/71 -- 96    04/17/24 2100 -- -- 59 -- 125/67 -- 95    04/17/24 2030 -- -- 62 -- 131/61 -- 95    04/17/24 2000 -- -- 67 -- 137/73 Lying 94    04/17/24 1931 -- -- 63 -- -- -- 100    04/17/24 1919 -- -- 63 16 -- -- 94    04/17/24 1900 -- -- 64 -- 145/87 -- 95    04/17/24 1800 -- -- 64 -- 143/84 -- 96    04/17/24 1722 -- -- 60 -- 124/62 -- --    04/17/24 1700 -- -- 64 -- 124/62 -- 95    04/17/24 1600 98.1 (36.7) Oral 61 20 141/63 Lying 95    04/17/24 1513 -- -- 64 20 -- -- 95    04/17/24 1500 -- -- 63 -- 122/73 -- 95    04/17/24 1400 -- -- 54 -- 142/71 -- 97    04/17/24 1300 -- -- 60 -- 139/63 -- 95    04/17/24 1230 -- -- 64 -- 140/56 -- 94    04/17/24 1200 -- -- 64 22 158/79 Lying 98    04/17/24 1140 -- -- 63 -- 148/74 -- --    04/17/24 1129 -- -- 64 18 -- -- 98    04/17/24 1118 -- -- 67 18 -- -- 97    04/17/24 1100 97.8 (36.6) Oral 64 18 132/65 Lying  96    04/17/24 1000 -- -- 68 -- 124/71 -- 94    04/17/24 0900 -- -- 65 -- 118/68 -- 95    04/17/24 0805 -- -- -- -- -- -- 94    04/17/24 0800 -- -- 73 20 115/57 Lying 94    04/17/24 0730 -- -- 78 18 134/65 -- 94    04/17/24 0715 -- -- 67 18 -- -- 97    04/17/24 0700 -- -- 61 -- 125/59 -- 94    04/17/24 0630 -- -- 65 -- 129/65 -- 97    04/17/24 0600 -- -- 59 -- 123/66 -- 100    04/17/24 0557 -- -- 64 -- 126/65 -- 97    04/17/24 0530 -- -- 52 -- 108/54 -- 96    04/17/24 0500 -- -- 58 -- 113/66 -- 94    04/17/24 0430 -- -- 63 -- 99/57 -- 93    04/17/24 0400 98.5 (36.9) Oral 67 -- 103/53 Lying 91    04/17/24 0336 -- -- 62 -- 121/71 -- 99    04/17/24 0330 -- -- 51 -- 107/50 -- 95    04/17/24 0323 -- -- 55 12 -- -- 96    04/17/24 0300 -- -- 62 -- 116/59 -- 96    04/17/24 0230 -- -- 61 -- 110/67 -- 96    04/17/24 0200 -- -- 62 -- 102/52 -- 96    04/17/24 0130 -- -- 61 -- 103/53 -- 96    04/17/24 0100 -- -- 60 -- 117/58 -- 97    04/17/24 0030 -- -- 62 -- 126/69 -- 96    04/17/24 0000 100.5 (38.1) Oral 63 -- 118/56 -- 96          Oxygen Therapy (last day)       Date/Time SpO2 Device (Oxygen Therapy) Flow (L/min) Oxygen Concentration (%) ETCO2 (mmHg)    04/18/24 0633 95 -- -- -- --    04/18/24 0600 92 -- -- -- --    04/18/24 0557 93 nasal cannula 2 -- --    04/18/24 0522 91 room air -- -- --    04/18/24 0500 95 -- -- -- --    04/18/24 0400 92 nasal cannula 2 -- --    04/18/24 0300 94 -- -- -- --    04/18/24 0200 93 -- -- -- --    04/18/24 0130 94 -- -- -- --    04/18/24 0100 94 -- -- -- --    04/18/24 0030 95 -- -- -- --    04/17/24 2330 93 -- -- -- --    04/17/24 2300 96 -- -- -- --    04/17/24 2230 97 -- -- -- --    04/17/24 2200 96 -- -- -- --    04/17/24 2145 98 -- -- -- --    04/17/24 2130 96 -- -- -- --    04/17/24 2100 95 -- -- -- --    04/17/24 2030 95 -- -- -- --    04/17/24 2000 94 nasal cannula 3 -- --    04/17/24 1931 100 -- -- -- --    04/17/24 1919 94 nasal cannula 3 -- --    04/17/24 1900 95 nasal cannula 3 --  --    04/17/24 1800 96 nasal cannula 3 -- --    04/17/24 1700 95 nasal cannula 3 -- --    04/17/24 1600 95 nasal cannula 3 -- --    04/17/24 1513 95 nasal cannula 3 -- --    04/17/24 1500 95 nasal cannula 4 -- --    04/17/24 1400 97 nasal cannula 4 -- --    04/17/24 1300 95 nasal cannula 4 -- --    04/17/24 1230 94 -- -- -- --    04/17/24 1200 98 nasal cannula 4 -- --    04/17/24 1129 98 -- -- -- --    04/17/24 1118 97 nasal cannula 4 -- --    04/17/24 1100 96 -- -- -- --    04/17/24 1000 94 nasal cannula 4 -- --    04/17/24 0900 95 nasal cannula 4 -- --    04/17/24 0805 94 nasal cannula 4 -- --    04/17/24 0800 94 nasal cannula 4 -- --    04/17/24 0730 94 nasal cannula 4 -- --    04/17/24 0725 -- ventilator -- 30 --    04/17/24 0715 97 ventilator -- 30 29    04/17/24 0700 94 -- -- -- --    04/17/24 0630 97 -- -- -- --    04/17/24 0600 100 -- -- -- --    04/17/24 0557 97 -- -- -- --    04/17/24 0530 96 -- -- -- --    04/17/24 0500 94 -- -- -- --    04/17/24 0430 93 -- -- -- --    04/17/24 0400 91 ventilator -- -- --    04/17/24 0336 99 -- -- -- --    04/17/24 0330 95 -- -- -- --    04/17/24 0323 96 ventilator -- 30 28    04/17/24 0300 96 -- -- -- --    04/17/24 0230 96 -- -- -- --    04/17/24 0200 96 -- -- -- --    04/17/24 0130 96 -- -- -- --    04/17/24 0100 97 -- -- -- --    04/17/24 0030 96 -- -- -- --    04/17/24 0000 96 -- -- -- --          Lines, Drains & Airways       Active LDAs       Name Placement date Placement time Site Days    Peripheral IV 04/13/24 0820 Anterior;Right Forearm 04/13/24  0820  Forearm  4    NG/OG Tube Nasogastric Left nostril 04/08/24  0930  Left nostril  9    External Urinary Catheter 04/17/24  1135  --  less than 1              Inactive LDAs       Name Placement date Placement time Removal date Removal time Site Days    [REMOVED] Peripheral IV 04/06/24 1144 Right Antecubital 04/06/24  1144  04/07/24  1841  Antecubital  1    [REMOVED] Peripheral IV 04/07/24 0057 Anterior;Distal;Left  Forearm 04/07/24  0057  04/07/24  2030  Forearm  less than 1    [REMOVED] Peripheral IV 04/07/24 1939 Posterior;Right Hand 04/07/24  1939  04/07/24  2030  Hand  less than 1    [REMOVED] Peripheral IV 04/07/24 2135 Anterior;Distal;Left;Upper Arm 04/07/24  2135  04/12/24  0000  Arm  4    [REMOVED] Peripheral IV 04/08/24 0713 Anterior;Right Forearm 04/08/24  0713  04/18/24  0151  Forearm  9    [REMOVED] Peripheral IV 04/08/24 0710 Left;Posterior Hand 04/08/24  0710  04/11/24  1647  Hand  3    [REMOVED] Peripheral IV 04/08/24 1030 Left;Posterior Forearm 04/08/24  1030  04/11/24  0930  Forearm  2    [REMOVED] Peripheral IV 04/11/24 1648 Posterior;Right Hand 04/11/24  1648  04/14/24  1107  Hand  2    [REMOVED] Urethral Catheter Silicone 04/08/24  1100  04/17/24  1135  -- 9    [REMOVED] ETT  04/08/24  0726  04/17/24  0732  -- 9                  CIWA (last 2 days)        Date/Time CIWA-Ar Score    04/18/24 0400 3     04/18/24 0000 3     04/17/24 2000 5     04/17/24 1600 2     04/17/24 1200 2     04/17/24 0805 2                   Orders (last 24 hrs)        Start     Ordered    04/18/24 0640  POC Glucose Once  PROCEDURE ONCE        Comments: Complete no more than 45 minutes prior to patient eating      04/18/24 0639    04/18/24 0121  POC Glucose Once  PROCEDURE ONCE        Comments: Complete no more than 45 minutes prior to patient eating      04/18/24 0119    04/17/24 2101  SLP Consult: Eval & Treat RN Dysphagia Screen Failed  Once         04/17/24 2100    04/17/24 1559  POC Glucose Once  PROCEDURE ONCE        Comments: Complete no more than 45 minutes prior to patient eating      04/17/24 1552    04/17/24 1241  Discontinue Indwelling Urinary Catheter  Once         04/17/24 1240    04/17/24 1241  Notify Provider if Bladder Distention Continues  Continuous        Comments: Open Order Report to View Parameters Requiring Provider Notification    04/17/24 1240    04/17/24 1241  Consult Pharmacist For Review of Medications That  "May Cause Urinary Retention - RN To Place Order for Consult it Needed  Continuous         04/17/24 1240    04/17/24 1108  POC Glucose Once  PROCEDURE ONCE        Comments: Complete no more than 45 minutes prior to patient eating      04/17/24 1103    04/17/24 0918  PHENobarbital tablet 32.4 mg  Once,   Status:  Discontinued        Placed in \"Followed by\" Linked Group    04/14/24 1319    04/17/24 0918  PHENobarbital tablet 32.4 mg  Once        Placed in \"Followed by\" Linked Group    04/17/24 0716    04/17/24 0800  HYDROcodone-acetaminophen (NORCO)  MG per tablet 1 tablet  Every 4 Hours PRN         04/17/24 0801    04/17/24 0734  Extubation  Once         04/17/24 0733    04/17/24 0733  morphine injection 4 mg  Every 4 Hours PRN         04/17/24 0733    04/17/24 0733  HYDROcodone-acetaminophen (NORCO)  MG per tablet 1 tablet  Every 4 Hours PRN,   Status:  Discontinued         04/17/24 0733    04/17/24 0733  Restraints Non-Violent or Non-Self Destructive  Calendar Day,   Status:  Canceled         04/17/24 0732    04/16/24 0915  QUEtiapine (SEROquel) tablet 100 mg  Every 12 Hours Scheduled         04/16/24 0820    04/15/24 2339  LORazepam (ATIVAN) injection 2 mg  Every 6 Hours PRN         04/15/24 2340    04/15/24 2100  sennosides-docusate (PERICOLACE) 8.6-50 MG per tablet 2 tablet  2 Times Daily        Placed in \"And\" Linked Group    04/15/24 0933    04/15/24 1030  thiamine (VITAMIN B-1) tablet 100 mg  Daily        Placed in \"Followed by\" Linked Group    04/15/24 0933    04/15/24 0932  bisacodyl (DULCOLAX) suppository 10 mg  Daily PRN        Placed in \"And\" Linked Group    04/15/24 0933    04/15/24 0932  polyethylene glycol (MIRALAX) packet 17 g  Daily PRN        Placed in \"And\" Linked Group    04/15/24 0933    04/15/24 0900  fentaNYL 1000 mcg in 100 mL NS infusion  Titrated         04/15/24 0808    04/14/24 1800  POC Glucose Q6H  Every 6 Hours      Comments: Complete no more than 45 minutes prior to patient " "eating      04/14/24 1243    04/14/24 1400  cloNIDine (CATAPRES) tablet 0.3 mg  Every 6 Hours Scheduled         04/14/24 1307    04/14/24 1330  insulin regular (humuLIN R,novoLIN R) injection 2-9 Units  Every 6 Hours Scheduled         04/14/24 1243    04/14/24 1301  Pharmacy Consult  Continuous PRN         04/14/24 1307    04/14/24 1242  dextrose (GLUTOSE) oral gel 15 g  Every 15 Minutes PRN         04/14/24 1243    04/14/24 1242  dextrose (D50W) (25 g/50 mL) IV injection 25 g  Every 15 Minutes PRN         04/14/24 1243    04/14/24 1242  glucagon (GLUCAGEN) injection 1 mg  Every 15 Minutes PRN         04/14/24 1243    04/12/24 0545  Midazolam (VERSED) 50 mg in 50mL NS infusion  Titrated         04/12/24 0448    04/12/24 0545  midazolam (VERSED) injection 4 mg  Once,   Status:  Discontinued         04/12/24 0454    04/10/24 1630  dexmedetomidine (PRECEDEX) 400 mcg in 100 mL NS infusion  Titrated         04/10/24 1536    04/09/24 1908  Urinary Catheter Care  Every Shift,   Status:  Canceled      Placed in \"And\" Linked Group    04/09/24 1908    04/09/24 1258  acetaminophen (TYLENOL) tablet 650 mg  Every 6 Hours PRN         04/09/24 1258    04/09/24 1130  Enoxaparin Sodium (LOVENOX) syringe 40 mg  Every 24 Hours         04/09/24 1043    04/09/24 0200  Spontaneous Breathing Trial  Daily - SBT,   Status:  Canceled       04/08/24 0724    04/08/24 1730  famotidine (PEPCID) tablet 20 mg  2 Times Daily Before Meals         04/08/24 1127    04/08/24 1300  norepinephrine (LEVOPHED) 8 mg in 250 mL NS infusion (premix)  Titrated,   Status:  Discontinued         04/08/24 1203    04/08/24 1215  multivitamin and minerals liquid 15 mL  Daily         04/08/24 1127    04/08/24 1126  folic acid (FOLVITE) tablet 1 mg  Daily         04/08/24 1127    04/08/24 1117  Daily Weights  Daily       04/08/24 1116    04/08/24 1116  Tube Feeding Assessment and I/O  Every Shift       04/08/24 1116    04/08/24 1030  hydrocortisone-bacitracin-zinc " oxide-nystatin (MAGIC BARRIER) ointment 1 Application  2 Times Daily         04/08/24 0909    04/08/24 0900  sodium chloride 0.9 % flush 10 mL  Every 12 Hours Scheduled         04/08/24 0724    04/08/24 0900  chlorhexidine (PERIDEX) 0.12 % solution 15 mL  Every 12 Hours Scheduled         04/08/24 0724    04/08/24 0830  ipratropium-albuterol (DUO-NEB) nebulizer solution 3 mL  4 Times Daily - RT         04/08/24 0723 04/08/24 0800  Vital Signs Every Hour and Per Hospital Policy Based on Patient Condition  Every Hour       04/08/24 0724    04/08/24 0800  Intake & Output  Every Hour       04/08/24 0724    04/08/24 0800  Oral Care & Teeth Brushing - Intubated Patient  Every 4 Hours      Comments: Burna Teeth at Least 2x/day    04/08/24 0724    04/08/24 0725  Daily Weights  Daily       04/08/24 0724    04/08/24 0724  Oral Care - Patient Not on NPPV & Not Intubated  Every Shift       04/08/24 0724    04/08/24 0723  sodium chloride 0.9 % flush 10 mL  As Needed         04/08/24 0724    04/08/24 0723  sodium chloride 0.9 % infusion 40 mL  As Needed         04/08/24 0724    04/08/24 0627  hydrALAZINE (APRESOLINE) injection 10 mg  Every 6 Hours PRN         04/08/24 0628    04/08/24 0600  Basic Metabolic Panel  Daily       04/07/24 1745    04/08/24 0600  Magnesium  Daily       04/07/24 1745    04/08/24 0600  Phosphorus  Daily       04/07/24 1745    04/08/24 0600  CBC (No Diff)  Daily       04/07/24 1745    04/07/24 1127  metoprolol tartrate (LOPRESSOR) injection 2.5 mg  Every 6 Hours PRN         04/07/24 1128    04/07/24 0800  Oral Care  2 Times Daily       04/06/24 1833    04/06/24 2100  sodium chloride 0.9 % flush 10 mL  Every 12 Hours Scheduled         04/06/24 1833    04/06/24 1930  nicotine (NICODERM CQ) 21 MG/24HR patch 1 patch  Every 24 Hours         04/06/24 1833    04/06/24 1833  sodium chloride 0.9 % flush 10 mL  As Needed         04/06/24 1833    04/06/24 1833  sodium chloride 0.9 % infusion 40 mL  As Needed       "   04/06/24 1833    04/06/24 1833  nitroglycerin (NITROSTAT) SL tablet 0.4 mg  Every 5 Minutes PRN         04/06/24 1833    04/06/24 1833  Potassium Replacement - Follow Nurse / BPA Driven Protocol  As Needed         04/06/24 1833    04/06/24 1833  Magnesium Standard Dose Replacement - Follow Nurse / BPA Driven Protocol  As Needed         04/06/24 1833    04/06/24 1833  Phosphorus Replacement - Follow Nurse / BPA Driven Protocol  As Needed         04/06/24 1833    04/06/24 1833  Calcium Replacement - Follow Nurse / BPA Driven Protocol  As Needed         04/06/24 1833    04/06/24 1833  nicotine polacrilex (NICORETTE) gum 4 mg  Every 1 Hour PRN         04/06/24 1833    04/06/24 1833  ondansetron (ZOFRAN) injection 4 mg  Every 6 Hours PRN         04/06/24 1833    04/06/24 1325  Magnesium Standard Dose Replacement - Follow Nurse / BPA Driven Protocol  As Needed         04/06/24 1325    04/06/24 1132  sodium chloride 0.9 % flush 10 mL  As Needed        Placed in \"And\" Linked Group    04/06/24 1132    Unscheduled  Obtain Pre & Post Sedation Scores With Every Lorazepam Dose - Hold For POSS Greater Than 2 or RASS of -3 or Less  As Needed       04/06/24 1833    Unscheduled  Spontaneous Awakening Trial  Daily - SAT       04/08/24 0724    Unscheduled  Wound Care  As Needed       04/08/24 0909    Unscheduled  Watson & Document Feeding Tube Depth (in cm)  As Needed       04/08/24 1116    Unscheduled  Verify Feeding Tube Placement Upon Insertion & As Needed  As Needed       04/08/24 1116    Unscheduled  Flush Feeding Tube With 30-50mL Water As Needed  As Needed       04/08/24 1116    Unscheduled  Follow Hypoglycemia Standing Orders For Blood Glucose <70 & Notify Provider of Treatment  As Needed      Comments: Follow Hypoglycemia Orders As Outlined in Process Instructions (Open Order Report to View Full Instructions)  Notify Provider Any Time Hypoglycemia Treatment is Administered    04/14/24 1243    Unscheduled  Obtain Pre & Post " Sedation Scores With Every Lorazepam Dose - Hold For POSS Greater Than 2 or RASS of -3 or Less  As Needed       04/14/24 1319    Unscheduled  Bladder Scan if Patient Unable to Void 4-6 Hours After Catheter Removal  As Needed         04/17/24 1240    Unscheduled  If Bladder Scan Volume is Less Than 500mL & Patient is Without Symptoms of Bladder Discomfort / Distention Monitor Every 1-2 Hours for Spontaneous Void  As Needed       04/17/24 1240    Unscheduled  Straight Cath Every 4-6 Hours As Needed If Patient is Unable to Void After 4-6 Hours, Bladder Scan Volume is Greater Than 500mL & Patient Has Symptoms of Bladder Discomfort / Distention  As Needed       04/17/24 1240    Unscheduled  Schedule / Prompt Voiding For Patients With Urinary Incontinence  As Needed       04/17/24 1240    Unscheduled  Oxygen Therapy- Nasal Cannula; Titrate 1-6 LPM Per SpO2; 90 - 95%  Continuous PRN       04/17/24 1752                     Physician Progress Notes (last 24 hours)        Neto Penaloza MD at 04/17/24 0730          [facility]      Mar/chart reviewed  Follow up alcohol withdrawal, mechanical ventilation  Patient sedated on vent unable to provide subjective  He is awake this morning and follows commands, tolerating SBT and  has a strong cough    Vital Sign Min/Max for last 24 hours  Temp  Min: 98.3 °F (36.8 °C)  Max: 100.5 °F (38.1 °C)   BP  Min: 99/57  Max: 163/78   Pulse  Min: 51  Max: 96   Resp  Min: 12  Max: 18   SpO2  Min: 86 %  Max: 100 %   No data recorded   Weight  Min: 125 kg (275 lb 2.2 oz)  Max: 125 kg (275 lb 2.2 oz)   6457/3350    Appears ill, sedated on vent but awake and follows commands  perrl, normal sclera  mmm, no jvd, trachea midline, neck supple,  chest cta bilaterally, no crackles, no wheezes,   rrr,   soft, nt, nd +bs,  no c/c/ 1+ edema  Skin warm, dry no rashes    Labs: 4/17: reviewed:  7.44/36/71  Morning labs ordered, not done  4/16  Glucose 131  Bun 22  Cr 0.63  Bicarb 22    A/P:  1.  Acute respiratory failure requiring  mechanical ventilation -- extubation today and hopefully we can continue to titrate sedation down with the agitation of the vent removed.   2. Aspiration pneumonia with H flu -- now finished antibiotic course    3. Severe alcohol abuse with dependence and withdrawal -- using phenobarbital protocol and trying to work versed off. --improving now probably more withdrawal from the benzos that he has been on while here and icu delerium etc.   4. Polysubstance abuse -- likely making improvement in his mental status and sedation needs all the more difficult  5. LIVAN  6. Sepsis with shock - resolved    D/w RT and RN    CC 37 mins excluding any future billable procedures.     Electronically signed by Neto Penaloza MD at 04/17/24 0706          Consult Notes (last 24 hours)        Geraldine Leo LPCC at 04/17/24 0709        Consult Orders    1. [facility] Consult [[id number]] ordered by Sean Ricketts MD at 04/06/24 1703                 [facility] has been watching peripherally for 10 days while pt has not been appropriate for consult. Pt remains not appropriate and on vent. Please re-consult when pt is able to participate in full evaluation.   [facility] will sign-off, please reconsult when appropriate.    Electronically signed by Geraldine Leo LPCC at 04/17/24 0765

## 2024-04-18 NOTE — PLAN OF CARE
Goal Outcome Evaluation:  Plan of Care Reviewed With: patient           Outcome Evaluation: Pt is 67 yo admitted to [facility] for ETOH and substance abuse withdrawal, hospital course includes aspiration pna and respiratory failure requiring intubation from 4/8/24-4/17/24. PMH significnat for HTN, COPD, LIVAN, chronic pain. Patient lives alone, independent with mobility with use of cane at baseline, no steps to enter home. Patient awake and alert, oriented today, agreeable to therapy, c/o generalized pain at 7/10 level. Patient performed bed mobility with minAx2, sit to stand with modAx2, took a couple of side steps up to head of bed with rwx and modAx2. Pt demonstrates impairments consisting of generalized weakness, decreased balance, decreased insight into impairments, decreased activity tolerance and would benefit from skilled PT. Anticipate patient may need inpt rehab at d/c.      Anticipated Discharge Disposition (PT): inpatient rehabilitation facility

## 2024-04-18 NOTE — PLAN OF CARE
Goal Outcome Evaluation:  Plan of Care Reviewed With: patient           Outcome Evaluation: Pt is a 68 y.o male admitted on 4/8 with alcohol withdraw, poly substance abuse. Pt with complex hospitalization requiring mechanical ventilation 4/8-4/17 for respiratory failure, aspiration PNA, sepsis with shock. Pt is oriented X4 today, he is overall insight into deficits and self awareness is very poor, however he is motivated to participate. He is able to sit EOB and stand and take a few side steps but is so weak he requires X2 person assist and max-total A for all LB ADLs/toileting. Pt reports he is disappointed in himself due to not being able to walk today but therapists discussed it would take time to regain strength due to prolonged inactivity. Recommend continued OT to increase independence with ADLs, transfers. At this time would need ongoing rehab at [facility].      Anticipated Discharge Disposition (OT): skilled nursing facility

## 2024-04-18 NOTE — PLAN OF CARE
Goal Outcome Evaluation:  Plan of Care Reviewed With: patient           Outcome Evaluation: Patient seen for clinical swallow assessment. Eager for PO. Voice strong, but hoarse. Oral mech exam otherwise unremarkable. Pt extubated last date, but was intubated for 11 days. Immediate cough with ice 1/2 trials. No overt s/s of aspiration with thins or nectar via spoon. Immediate cough with thins and nectar via cup and honey via straw. Throat clearing with puree. Unable to determine safe diet at the bedside d/t s/s of aspiration with all PO. SLP recs ice chips after oral care, will further assess with VFSS next date.

## 2024-04-18 NOTE — THERAPY EVALUATION
Patient Name: Watson Lisa  : 1955    MRN: [id number]                              Today's Date: 2024       Admit Date: 2024    Visit Dx:     ICD-10-CM ICD-9-CM   1. Alcohol withdrawal syndrome without complication  F10.930 291.81   2. Hypomagnesemia  E83.42 275.2     Patient Active Problem List   Diagnosis    Essential hypertension    Tobacco abuse    Hiatal hernia    Effusion of left knee    Osteoarthritis of left knee    Vitamin D deficiency    Anemia, chronic disease    COPD (chronic obstructive pulmonary disease)    LIVAN (obstructive sleep apnea)    ETOH abuse    Obese    Alcoholic liver disease    History of alcohol use disorder    Left patella fracture    Need for hepatitis C screening test    Chronic pain of left ankle    Primary insomnia    Overweight (BMI 25.0-29.9)    Mixed hyperlipidemia    Acute alcohol intoxication    Hypomagnesemia    Alcohol withdrawal    Substance abuse    Cocaine abuse    Positive urine drug screen     Past Medical History:   Diagnosis Date    Alcohol abuse     Anxiety     Arthritis     Bronchitis with chronic airway obstruction     LAST FE    DVT (deep venous thrombosis)     Hiatal hernia     Hypertension     Hypertension     Low back pain     Neck pain     Pulmonary embolism     Sleep apnea with use of continuous positive airway pressure (CPAP)     AT NIGHT     Past Surgical History:   Procedure Laterality Date    COLONOSCOPY      JOINT REPLACEMENT Right     hip/knee    REPLACEMENT TOTAL KNEE      TONSILLECTOMY      TOTAL HIP ARTHROPLASTY Right       General Information       Row Name 24 1511          OT Time and Intention    Document Type evaluation  -SM     Mode of Treatment occupational therapy;co-treatment;other (see comments)  2/2 acuity of illness, extubated yesterday after prolonged mechanical ventilation, anticipated weakness and poor activity tolerance.  -SM       Row Name 24 1511          General Information    Patient Profile Reviewed yes   -     Prior Level of Function independent:;ADL's;all household mobility  pt uses a cane  -     Existing Precautions/Restrictions fall;NPO;oxygen therapy device and L/min  -SM       Row Name 04/18/24 1511          Living Environment    People in Home alone  -SM       Row Name 04/18/24 1511          Home Main Entrance    Number of Stairs, Main Entrance none  -SM       Row Name 04/18/24 1511          Stairs Within Home, Primary    Stairs, Within Home, Primary 1st floor apartment  -     Number of Stairs, Within Home, Primary none  -SM       Row Name 04/18/24 1511          Cognition    Orientation Status (Cognition) oriented x 4  -SM       Row Name 04/18/24 1511          Safety Issues, Functional Mobility    Safety Issues Affecting Function (Mobility) insight into deficits/self-awareness;judgment;safety precaution awareness  -     Impairments Affecting Function (Mobility) balance;endurance/activity tolerance;strength;coordination;pain;cognition;range of motion (ROM)  -               User Key  (r) = Recorded By, (t) = Taken By, (c) = Cosigned By      Initials Name Provider Type     Dorcas Villeda OT Occupational Therapist                     Mobility/ADL's       Row Name 04/18/24 1513          Bed Mobility    Supine-Sit [place] (Bed Mobility) minimum assist (75% patient effort)  -     Sit-Supine [place] (Bed Mobility) minimum assist (75% patient effort);2 person assist  -SM       Row Name 04/18/24 1513          Sit-Stand Transfer    Sit-Stand [place] (Transfers) moderate assist (50% patient effort);2 person assist;verbal cues  -     Assistive Device (Sit-Stand Transfers) walker, front-wheeled  -SM       Row Name 04/18/24 1513          Functional Mobility    Functional Mobility- Ind. Level moderate assist (50% patient effort);2 person assist required  -     Functional Mobility- Comment side steps (2-3), very unsteady.  -[facility] Name 04/18/24 1513          Activities of Daily  Living    BADL Assessment/Intervention lower body dressing;toileting  -[facility] Name 04/18/24 1513          Lower Body Dressing Assessment/Training    [place] Level (Lower Body Dressing) don;socks;maximum assist (25% patient effort)  -     Position (Lower Body Dressing) edge of bed sitting  -[facility] Name 04/18/24 1513          Toileting Assessment/Training    [place] Level (Toileting) dependent (less than 25% patient effort)  -     Position (Toileting) supine  -     Comment, (Toileting) pt reports need to have BM, unsafe to transfer to BSC or bathroom today, recommend use of bedpan.  -               User Key  (r) = Recorded By, (t) = Taken By, (c) = Cosigned By      Initials Name Provider Type    Dorcas Graham OT Occupational Therapist                   Obj/Interventions       [facility] Name 04/18/24 1514          Range of Motion Comprehensive    Comment, General Range of Motion R shoulder flex impaired 50%, L shoulder WFL. Pt denies any previous shoulder injuries, cervical spine appears very stiff and head rotated to the R. Possible due to prolonged bedrest positioning? AAROM R shoulder WFL.  -[facility] Name 04/18/24 1514          Strength Comprehensive (MMT)    Comment, General Manual Muscle Testing (MMT) Assessment R shoulder 2/5, L shoulder 3/5, distal UE 3+/5  -[facility] Name 04/18/24 1514          Shoulder (Therapeutic Exercise)    Shoulder (Therapeutic Exercise) AROM (active range of motion)  -     Shoulder AROM (Therapeutic Exercise) bilateral;scapular protraction;scapular retraction;scapular elevation;10 repetitions  forward reach  -[facility] Name 04/18/24 1514          Motor Skills    Motor Skills functional endurance  -     Functional Endurance poor +  -     Therapeutic Exercise shoulder  -SM       Row Name 04/18/24 1514          Balance    Static Sitting Balance standby assist  -     Dynamic Sitting Balance contact guard  -     Position, Sitting Balance  sitting edge of bed  -SM     Static Standing Balance minimal assist;2-person assist  -SM     Dynamic Standing Balance moderate assist;2-person assist  -SM     Position/Device Used, Standing Balance supported;walker, rolling  -SM               User Key  (r) = Recorded By, (t) = Taken By, (c) = Cosigned By      Initials Name Provider Type    SM Dorcas Villeda, OT Occupational Therapist                   Goals/Plan       Row Name 04/18/24 1521          Transfer Goal 1 (OT)    Activity/Assistive Device (Transfer Goal 1, OT) toilet;shower chair  -SM     [place] Level/Cues Needed (Transfer Goal 1, OT) minimum assist (75% or more patient effort)  -SM     Time Frame (Transfer Goal 1, OT) short term goal (STG);2 weeks  -SM     Progress/Outcome (Transfer Goal 1, OT) goal ongoing  -SM       Row Name 04/18/24 1521          Bathing Goal 1 (OT)    Activity/Device (Bathing Goal 1, OT) bathing skills, all  -SM     [place] Level/Cues Needed (Bathing Goal 1, OT) minimum assist (75% or more patient effort)  -SM     Time Frame (Bathing Goal 1, OT) short term goal (STG);2 weeks  -SM     Progress/Outcomes (Bathing Goal 1, OT) goal ongoing  -[facility] Name 04/18/24 1521          Dressing Goal 1 (OT)    Activity/Device (Dressing Goal 1, OT) dressing skills, all  -SM     [place]/Cues Needed (Dressing Goal 1, OT) minimum assist (75% or more patient effort)  -SM     Time Frame (Dressing Goal 1, OT) short term goal (STG);2 weeks  -SM     Progress/Outcome (Dressing Goal 1, OT) goal ongoing  -[facility] Name 04/18/24 1521          Toileting Goal 1 (OT)    Activity/Device (Toileting Goal 1, OT) toileting skills, all  -SM     [place] Level/Cues Needed (Toileting Goal 1, OT) minimum assist (75% or more patient effort)  -SM     Time Frame (Toileting Goal 1, OT) short term goal (STG);2 weeks  -SM     Progress/Outcome (Toileting Goal 1, OT) goal ongoing  -SM       Row Name 04/18/24 1521          Grooming Goal 1 (OT)     "Activity/Device (Grooming Goal 1, OT) grooming skills, all  -SM     [place] (Grooming Goal 1, OT) contact guard required  -SM     Time Frame (Grooming Goal 1, OT) short term goal (STG);2 weeks  -SM     Strategies/Barriers (Grooming Goal 1, OT) in standing position  -SM     Progress/Outcome (Grooming Goal 1, OT) goal ongoing  -SM       Row Name 04/18/24 1521          Therapy Assessment/Plan (OT)    Planned Therapy Interventions (OT) activity tolerance training;adaptive equipment training;functional balance retraining;occupation/activity based interventions;neuromuscular control/coordination retraining;patient/caregiver education/training;transfer/mobility retraining;strengthening exercise;ROM/therapeutic exercise  -SM               User Key  (r) = Recorded By, (t) = Taken By, (c) = Cosigned By      Initials Name Provider Type    SM Dorcas Villeda, OT Occupational Therapist                   Clinical Impression       Row Name 04/18/24 1517          Pain Assessment    Pretreatment Pain Rating 7/10  -SM     Posttreatment Pain Rating 7/10  -SM     Pain Location generalized  -SM     Pre/Posttreatment Pain Comment \"everywhere\" mostly my back  -SM     Pain Intervention(s) Repositioned;Ambulation/increased activity  -SM       Row Name 04/18/24 1517          Plan of Care Review    Plan of Care Reviewed With patient  -SM     Outcome Evaluation Pt is a 68 y.o male admitted on 4/8 with alcohol withdraw, poly substance abuse. Pt with complex hospitalization requiring mechanical ventilation 4/8-4/17 for respiratory failure, aspiration PNA, sepsis with shock. Pt is oriented X4 today, he is overall insight into deficits and self awareness is very poor, however he is motivated to participate. He is able to sit EOB and stand and take a few side steps but is so weak he requires X2 person assist and max-total A for all LB ADLs/toileting. Pt reports he is disappointed in himself due to not being able to walk today but therapists " discussed it would take time to regain strength due to prolonged inactivity. Recommend continued OT to increase independence with ADLs, transfers. At this time would need ongoing rehab at [place].  -       Row Name 04/18/24 1517          Therapy Assessment/Plan (OT)    Rehab Potential (OT) good, to achieve stated therapy goals  -     Criteria for Skilled Therapeutic Interventions Met (OT) yes;skilled treatment is necessary  -     Therapy Frequency (OT) 5 times/wk  -       Row Name 04/18/24 1517          Therapy Plan Review/Discharge Plan (OT)    Anticipated Discharge Disposition (OT) [facility] nursing facility  -       Row Name 04/18/24 1517          Vital Signs    Pre Systolic BP Rehab 128  -SM     Pre Treatment Diastolic BP 80  -SM     Intra Systolic BP Rehab 155  -SM     Intra Treatment Diastolic BP 73  -SM     Post Systolic BP Rehab 163  -SM     Post Treatment Diastolic BP 83  -SM     Pretreatment Heart Rate (beats/min) 70  -SM     Posttreatment Heart Rate (beats/min) 81  -SM     Pre SpO2 (%) 93  NC on face but not in nose  -SM     Post SpO2 (%) 96  -SM     O2 Delivery Post Treatment supplemental O2  -       Row Name 04/18/24 1517          Positioning and Restraints    Pre-Treatment Position in bed  -SM     Post Treatment Position bed  -SM     In Bed fowlers;call light within reach;encouraged to call for assist;exit alarm on;notified ns  -               User Key  (r) = Recorded By, (t) = Taken By, (c) = Cosigned By      Initials Name Provider Type    Dorcas Graham, OT Occupational Therapist                   Outcome Measures       Row Name 04/18/24 1522          How much help from another is currently needed...    Putting on and taking off regular lower body clothing? 2  -SM     Bathing (including washing, rinsing, and drying) 2  -SM     Toileting (which includes using toilet bed pan or urinal) 1  -SM     Putting on and taking off regular upper body clothing 2  -SM     Taking care of personal  grooming (such as brushing teeth) 3  -SM     Eating meals 1  NPO  -SM     AM-PAC 6 Clicks Score (OT) 11  -SM       Row Name 04/18/24 1108 04/18/24 0800       How much help from another person do you currently need...    Turning from your back to your side while in flat bed without using bedrails? 3  -EM 3  -RB    Moving from lying on back to sitting on the side of a flat bed without bedrails? 2  -EM 3  -RB    Moving to and from a bed to a chair (including a wheelchair)? 2  -EM 2  -RB    Standing up from a chair using your arms (e.g., wheelchair, bedside chair)? 2  -EM 2  -RB    Climbing 3-5 steps with a railing? 1  -EM 2  -RB    To walk in hospital room? 2  -EM 2  -RB    AM-PAC 6 Clicks Score (PT) 12  -EM 14  -RB    Highest Level of Mobility Goal 4 --> Transfer to chair/commode  -EM 4 --> Transfer to chair/commode  -RB      Row Name 04/18/24 1522          Functional Assessment    Outcome Measure Options AM-PAC 6 Clicks Daily Activity (OT)  -               User Key  (r) = Recorded By, (t) = Taken By, (c) = Cosigned By      Initials Name Provider Type    EM Pooja Carrington, PT Physical Therapist    Dorcas Graham, OT Occupational Therapist    Sharlene Jones, RN Registered Nurse                    Occupational Therapy Education       Title: PT OT SLP Therapies (In Progress)       Topic: Occupational Therapy (In Progress)       Point: ADL training (Done)       Description:   Instruct learner(s) on proper safety adaptation and remediation techniques during self care or transfers.   Instruct in proper use of assistive devices.                  Learning Progress Summary             Patient Acceptance, E, VU by  at 4/18/2024 1522    Comment: OT goals, POC, recovery/rehab                         Point: Home exercise program (Not Started)       Description:   Instruct learner(s) on appropriate technique for monitoring, assisting and/or progressing therapeutic exercises/activities.                  Learner  Progress:  Not documented in this visit.              Point: Precautions (Not Started)       Description:   Instruct learner(s) on prescribed precautions during self-care and functional transfers.                  Learner Progress:  Not documented in this visit.              Point: Body mechanics (Not Started)       Description:   Instruct learner(s) on proper positioning and spine alignment during self-care, functional mobility activities and/or exercises.                  Learner Progress:  Not documented in this visit.                              User Key       Initials Effective Dates Name Provider Type Discipline     04/02/20 -  Dorcas Villeda OT Occupational Therapist OT                  OT Recommendation and Plan  Planned Therapy Interventions (OT): activity tolerance training, adaptive equipment training, functional balance retraining, occupation/activity based interventions, neuromuscular control/coordination retraining, patient/caregiver education/training, transfer/mobility retraining, strengthening exercise, ROM/therapeutic exercise  Therapy Frequency (OT): 5 times/wk  Plan of Care Review  Plan of Care Reviewed With: patient  Outcome Evaluation: Pt is a 68 y.o male admitted on 4/8 with alcohol withdraw, poly substance abuse. Pt with complex hospitalization requiring mechanical ventilation 4/8-4/17 for respiratory failure, aspiration PNA, sepsis with shock. Pt is oriented X4 today, he is overall insight into deficits and self awareness is very poor, however he is motivated to participate. He is able to sit EOB and stand and take a few side steps but is so weak he requires X2 person assist and max-total A for all LB ADLs/toileting. Pt reports he is disappointed in himself due to not being able to walk today but therapists discussed it would take time to regain strength due to prolonged inactivity. Recommend continued OT to increase independence with ADLs, transfers. At this time would need ongoing  rehab at [place].     Time Calculation:   Evaluation Complexity (OT)  Review Occupational Profile/Medical/Therapy History Complexity: expanded/moderate complexity  Assessment, Occupational Performance/Identification of Deficit Complexity: 3-5 performance deficits  Clinical Decision Making Complexity (OT): detailed assessment/moderate complexity  Overall Complexity of Evaluation (OT): moderate complexity     Time Calculation- OT       Row Name 04/18/24 1523             Time Calculation- OT    OT Start Time 1011  -SM      OT Stop Time 1032  -SM      OT Time Calculation (min) 21 min  -SM      Total Timed Code Minutes- OT 15 minute(s)  -SM      OT Received On 04/18/24  -      OT - Next Appointment 04/19/24  -      OT Goal Re-Cert Due Date 05/02/24  -         Timed Charges    10661 - OT Therapeutic Activity Minutes 15  -SM         Untimed Charges    OT Eval/Re-eval Minutes 7  -SM         Total Minutes    Timed Charges Total Minutes 15  -SM      Untimed Charges Total Minutes 7  -SM       Total Minutes 22  -SM                User Key  (r) = Recorded By, (t) = Taken By, (c) = Cosigned By      Initials Name Provider Type     Dorcas Villeda OT Occupational Therapist                  Therapy Charges for Today       Code Description Service Date Service Provider Modifiers Qty    90373453754  OT THERAPEUTIC ACT EA 15 MIN 4/18/2024 Dorcas Villeda OT GO 1    [id number] HC OT EVAL MOD COMPLEXITY 2 4/18/2024 Dorcas Villeda OT GO 1                 Dorcas Villeda OT  4/18/2024

## 2024-04-18 NOTE — PROGRESS NOTES
[facility]      Mar/chart reviewed  Follow up alcohol withdrawal, mechanical ventilation  S/p extubation 4/17  No chest pain no shortness of breath    Vital Sign Min/Max for last 24 hours  Temp  Min: 96.5 °F (35.8 °C)  Max: 99.2 °F (37.3 °C)   BP  Min: 122/73  Max: 160/83   Pulse  Min: 54  Max: 77   Resp  Min: 16  Max: 22   SpO2  Min: 91 %  Max: 100 %   Flow (L/min)  Min: 2  Max: 4   Weight  Min: 127 kg (281 lb 1.4 oz)  Max: 127 kg (281 lb 1.4 oz)   3314/4850  Appears ill, alert, mostly appropriate  perrl, eomi, normal sclera  mmm, no jvd, trachea midline, neck supple,  chest cta bilaterally, no crackles, no wheezes,   rrr,   soft, nt, nd +bs,  no c/c/ e  Skin warm, dry no rashes    Labs: 4/18: reviewed:  Glucose 119  Bun 16  Cr 0.71  Bicarb 24  Wbc 12  Hgb 9.7  Plts 401    A/P:  1. Acute respiratory failure requiring  mechanical ventilation -- doing well post extubation  2. Aspiration pneumonia with H flu -- now finished antibiotic course    3. Severe alcohol abuse with dependence and withdrawal -- --improving now probably more withdrawal from the benzos/prececex that he has been on while here and icu delerium etc. --making some progress, continue current efforts  4. Polysubstance abuse -- likely making improvement in his mental status and sedation needs all the more difficult  5. LIVAN  6. Sepsis with shock - resolved    Continue efforts at weaning sedation, needs pt/ot/speech evals

## 2024-04-18 NOTE — PROGRESS NOTES
"[facility]: Clonidine for sedation    Pharmacy has been consulted to start oral/enteral clonidine on Watson Lisa to assist in sedation and managing symptoms of withdrawal per Dr. Penaloza's request.      Relevant clinical data and objective history reviewed:  68 y.o. male 200.7 cm (79\") 127 kg (281 lb 1.4 oz)    Creatinine   Date Value Ref Range Status   04/18/2024 0.71 (L) 0.76 - 1.27 mg/dL Final   04/17/2024 0.63 (L) 0.76 - 1.27 mg/dL Final   04/16/2024 0.63 (L) 0.76 - 1.27 mg/dL Final   07/31/2020 0.9 0.7 - 1.5 mg/dL Final   02/14/2017 0.80 0.60 - 1.30 mg/dL Final     Comment:     Serial Number: [id number]    : [id number]     BUN   Date Value Ref Range Status   04/18/2024 16 8 - 23 mg/dL Final   07/31/2020 18 7 - 20 mg/dL Final     Estimated Creatinine Clearance: 148.6 mL/min (A) (by C-G formula based on SCr of 0.71 mg/dL (L)).    Assessment    Dexmedetomidine currently infusing at 1 mcg/kg/hr     RASS: 0     Other sedating medications (scheduled/PRN) used in last 24 hrs:   Fentanyl infusion off 4/18 0100  Midazolam infusion stopped 4/17 and remains off   Phenobarbital withdrawal protocol completed 4/17   Lorazepam PRN x 1 dose 4/15 at 2344     Other medications that could cause hypotension:   Metoprolol PRN - no use in the past 48 hours   Hydralazine PRN - no use in the past 48 hours    Plan  No missed or held clonidine doses, no episodes of HTn or bradycardia. Continue clonidine 0.3 mg q6h. Hold for MAP <65 or HR<50. Please notify pharmacy if any doses are held.   Pharmacy will reassess patient 4/19 for dose adjustment if needed.    Thank you for allowing me to participate in your patient's care. Please call pharmacy with any questions or concerns.    Miriam Swann, PharmD  Clinical Pharmacist        "

## 2024-04-19 ENCOUNTER — APPOINTMENT (OUTPATIENT)
Dept: CARDIOLOGY | Facility: HOSPITAL | Age: 69
End: 2024-04-19
Payer: COMMERCIAL

## 2024-04-19 ENCOUNTER — APPOINTMENT (OUTPATIENT)
Dept: GENERAL RADIOLOGY | Facility: HOSPITAL | Age: 69
End: 2024-04-19
Payer: COMMERCIAL

## 2024-04-19 LAB
ANION GAP SERPL CALCULATED.3IONS-SCNC: 12 MMOL/L (ref 5–15)
BH CV LOWER VASCULAR LEFT COMMON FEMORAL AUGMENT: NORMAL
BH CV LOWER VASCULAR LEFT COMMON FEMORAL COMPETENT: NORMAL
BH CV LOWER VASCULAR LEFT COMMON FEMORAL COMPRESS: NORMAL
BH CV LOWER VASCULAR LEFT COMMON FEMORAL PHASIC: NORMAL
BH CV LOWER VASCULAR LEFT COMMON FEMORAL SPONT: NORMAL
BH CV LOWER VASCULAR LEFT DISTAL FEMORAL COMPRESS: NORMAL
BH CV LOWER VASCULAR LEFT GASTRONEMIUS COMPRESS: NORMAL
BH CV LOWER VASCULAR LEFT GREATER SAPH AK COMPRESS: NORMAL
BH CV LOWER VASCULAR LEFT GREATER SAPH BK COMPRESS: NORMAL
BH CV LOWER VASCULAR LEFT LESSER SAPH COMPRESS: NORMAL
BH CV LOWER VASCULAR LEFT MID FEMORAL AUGMENT: NORMAL
BH CV LOWER VASCULAR LEFT MID FEMORAL COMPETENT: NORMAL
BH CV LOWER VASCULAR LEFT MID FEMORAL COMPRESS: NORMAL
BH CV LOWER VASCULAR LEFT MID FEMORAL PHASIC: NORMAL
BH CV LOWER VASCULAR LEFT MID FEMORAL SPONT: NORMAL
BH CV LOWER VASCULAR LEFT PERONEAL COMPRESS: NORMAL
BH CV LOWER VASCULAR LEFT POPLITEAL AUGMENT: NORMAL
BH CV LOWER VASCULAR LEFT POPLITEAL COMPETENT: NORMAL
BH CV LOWER VASCULAR LEFT POPLITEAL COMPRESS: NORMAL
BH CV LOWER VASCULAR LEFT POPLITEAL PHASIC: NORMAL
BH CV LOWER VASCULAR LEFT POPLITEAL SPONT: NORMAL
BH CV LOWER VASCULAR LEFT POSTERIOR TIBIAL COMPRESS: NORMAL
BH CV LOWER VASCULAR LEFT PROFUNDA FEMORAL COMPRESS: NORMAL
BH CV LOWER VASCULAR LEFT PROXIMAL FEMORAL COMPRESS: NORMAL
BH CV LOWER VASCULAR LEFT SAPHENOFEMORAL JUNCTION COMPRESS: NORMAL
BH CV LOWER VASCULAR RIGHT COMMON FEMORAL AUGMENT: NORMAL
BH CV LOWER VASCULAR RIGHT COMMON FEMORAL COMPETENT: NORMAL
BH CV LOWER VASCULAR RIGHT COMMON FEMORAL COMPRESS: NORMAL
BH CV LOWER VASCULAR RIGHT COMMON FEMORAL PHASIC: NORMAL
BH CV LOWER VASCULAR RIGHT COMMON FEMORAL SPONT: NORMAL
BH CV LOWER VASCULAR RIGHT DISTAL FEMORAL COMPRESS: NORMAL
BH CV LOWER VASCULAR RIGHT GASTRONEMIUS COMPRESS: NORMAL
BH CV LOWER VASCULAR RIGHT GREATER SAPH AK COMPRESS: NORMAL
BH CV LOWER VASCULAR RIGHT GREATER SAPH BK COMPRESS: NORMAL
BH CV LOWER VASCULAR RIGHT LESSER SAPH COMPRESS: NORMAL
BH CV LOWER VASCULAR RIGHT MID FEMORAL AUGMENT: NORMAL
BH CV LOWER VASCULAR RIGHT MID FEMORAL COMPETENT: NORMAL
BH CV LOWER VASCULAR RIGHT MID FEMORAL COMPRESS: NORMAL
BH CV LOWER VASCULAR RIGHT MID FEMORAL PHASIC: NORMAL
BH CV LOWER VASCULAR RIGHT MID FEMORAL SPONT: NORMAL
BH CV LOWER VASCULAR RIGHT PERONEAL COMPRESS: NORMAL
BH CV LOWER VASCULAR RIGHT POPLITEAL AUGMENT: NORMAL
BH CV LOWER VASCULAR RIGHT POPLITEAL COMPETENT: NORMAL
BH CV LOWER VASCULAR RIGHT POPLITEAL COMPRESS: NORMAL
BH CV LOWER VASCULAR RIGHT POPLITEAL PHASIC: NORMAL
BH CV LOWER VASCULAR RIGHT POPLITEAL SPONT: NORMAL
BH CV LOWER VASCULAR RIGHT POSTERIOR TIBIAL COMPRESS: NORMAL
BH CV LOWER VASCULAR RIGHT PROFUNDA FEMORAL COMPRESS: NORMAL
BH CV LOWER VASCULAR RIGHT PROXIMAL FEMORAL COMPRESS: NORMAL
BH CV LOWER VASCULAR RIGHT SAPHENOFEMORAL JUNCTION COMPRESS: NORMAL
BH CV UPPER VENOUS LEFT INTERNAL JUGULAR AUGMENT: NORMAL
BH CV UPPER VENOUS LEFT INTERNAL JUGULAR COMPRESS: NORMAL
BH CV UPPER VENOUS LEFT INTERNAL JUGULAR PHASIC: NORMAL
BH CV UPPER VENOUS LEFT INTERNAL JUGULAR SPONT: NORMAL
BH CV UPPER VENOUS LEFT SUBCLAVIAN AUGMENT: NORMAL
BH CV UPPER VENOUS LEFT SUBCLAVIAN COMPRESS: NORMAL
BH CV UPPER VENOUS LEFT SUBCLAVIAN PHASIC: NORMAL
BH CV UPPER VENOUS LEFT SUBCLAVIAN SPONT: NORMAL
BH CV UPPER VENOUS RIGHT AXILLARY AUGMENT: NORMAL
BH CV UPPER VENOUS RIGHT AXILLARY COMPRESS: NORMAL
BH CV UPPER VENOUS RIGHT AXILLARY PHASIC: NORMAL
BH CV UPPER VENOUS RIGHT AXILLARY SPONT: NORMAL
BH CV UPPER VENOUS RIGHT BASILIC FOREARM COLOR: 1
BH CV UPPER VENOUS RIGHT BASILIC FOREARM COMPRESS: NORMAL
BH CV UPPER VENOUS RIGHT BASILIC FOREARM THROMBUS: NORMAL
BH CV UPPER VENOUS RIGHT BASILIC UPPER COMPRESS: NORMAL
BH CV UPPER VENOUS RIGHT BRACHIAL COMPRESS: NORMAL
BH CV UPPER VENOUS RIGHT CEPHALIC FOREARM COLOR: 1
BH CV UPPER VENOUS RIGHT CEPHALIC FOREARM COMPRESS: NORMAL
BH CV UPPER VENOUS RIGHT CEPHALIC FOREARM THROMBUS: NORMAL
BH CV UPPER VENOUS RIGHT CEPHALIC UPPER COLOR: 1
BH CV UPPER VENOUS RIGHT CEPHALIC UPPER COMPRESS: NORMAL
BH CV UPPER VENOUS RIGHT CEPHALIC UPPER THROMBUS: NORMAL
BH CV UPPER VENOUS RIGHT INTERNAL JUGULAR AUGMENT: NORMAL
BH CV UPPER VENOUS RIGHT INTERNAL JUGULAR COMPRESS: NORMAL
BH CV UPPER VENOUS RIGHT INTERNAL JUGULAR PHASIC: NORMAL
BH CV UPPER VENOUS RIGHT INTERNAL JUGULAR SPONT: NORMAL
BH CV UPPER VENOUS RIGHT MED CUBITAL COLOR: 1
BH CV UPPER VENOUS RIGHT MED CUBITAL COMPRESS: NORMAL
BH CV UPPER VENOUS RIGHT MED CUBITAL THROMBUS: NORMAL
BH CV UPPER VENOUS RIGHT RADIAL COMPRESS: NORMAL
BH CV UPPER VENOUS RIGHT SUBCLAVIAN AUGMENT: NORMAL
BH CV UPPER VENOUS RIGHT SUBCLAVIAN COMPRESS: NORMAL
BH CV UPPER VENOUS RIGHT SUBCLAVIAN PHASIC: NORMAL
BH CV UPPER VENOUS RIGHT SUBCLAVIAN SPONT: NORMAL
BH CV UPPER VENOUS RIGHT ULNAR COMPRESS: NORMAL
BH CV VAS PRELIMINARY FINDINGS SCRIPTING: 1
BUN SERPL-MCNC: 13 MG/DL (ref 8–23)
BUN/CREAT SERPL: 18.1 (ref 7–25)
CALCIUM SPEC-SCNC: 8.8 MG/DL (ref 8.6–10.5)
CHLORIDE SERPL-SCNC: 97 MMOL/L (ref 98–107)
CO2 SERPL-SCNC: 23 MMOL/L (ref 22–29)
CREAT SERPL-MCNC: 0.72 MG/DL (ref 0.76–1.27)
DEPRECATED RDW RBC AUTO: 40.2 FL (ref 37–54)
EGFRCR SERPLBLD CKD-EPI 2021: 98.9 ML/MIN/1.73
ERYTHROCYTE [DISTWIDTH] IN BLOOD BY AUTOMATED COUNT: 11.7 % (ref 12.3–15.4)
GLUCOSE BLDC GLUCOMTR-MCNC: 132 MG/DL (ref 70–130)
GLUCOSE BLDC GLUCOMTR-MCNC: 134 MG/DL (ref 70–130)
GLUCOSE BLDC GLUCOMTR-MCNC: 148 MG/DL (ref 70–130)
GLUCOSE SERPL-MCNC: 126 MG/DL (ref 65–99)
HCT VFR BLD AUTO: 29.9 % (ref 37.5–51)
HGB BLD-MCNC: 10.4 G/DL (ref 13–17.7)
MAGNESIUM SERPL-MCNC: 2.1 MG/DL (ref 1.6–2.4)
MCH RBC QN AUTO: 32.6 PG (ref 26.6–33)
MCHC RBC AUTO-ENTMCNC: 34.8 G/DL (ref 31.5–35.7)
MCV RBC AUTO: 93.7 FL (ref 79–97)
PHOSPHATE SERPL-MCNC: 3.5 MG/DL (ref 2.5–4.5)
PLATELET # BLD AUTO: 385 10*3/MM3 (ref 140–450)
PMV BLD AUTO: 9.6 FL (ref 6–12)
POTASSIUM SERPL-SCNC: 4 MMOL/L (ref 3.5–5.2)
PROCALCITONIN SERPL-MCNC: 0.14 NG/ML (ref 0–0.25)
RBC # BLD AUTO: 3.19 10*6/MM3 (ref 4.14–5.8)
SODIUM SERPL-SCNC: 132 MMOL/L (ref 136–145)
WBC NRBC COR # BLD AUTO: 9.38 10*3/MM3 (ref 3.4–10.8)

## 2024-04-19 PROCEDURE — 93971 EXTREMITY STUDY: CPT

## 2024-04-19 PROCEDURE — 25010000002 NICARDIPINE 2.5 MG/ML SOLUTION

## 2024-04-19 PROCEDURE — 84100 ASSAY OF PHOSPHORUS: CPT | Performed by: INTERNAL MEDICINE

## 2024-04-19 PROCEDURE — 25010000002 ENOXAPARIN PER 10 MG: Performed by: INTERNAL MEDICINE

## 2024-04-19 PROCEDURE — 93970 EXTREMITY STUDY: CPT | Performed by: SURGERY

## 2024-04-19 PROCEDURE — 25810000003 SODIUM CHLORIDE 0.9 % SOLUTION

## 2024-04-19 PROCEDURE — 84145 PROCALCITONIN (PCT): CPT | Performed by: INTERNAL MEDICINE

## 2024-04-19 PROCEDURE — 82948 REAGENT STRIP/BLOOD GLUCOSE: CPT

## 2024-04-19 PROCEDURE — 25810000003 SODIUM CHLORIDE 0.9 % SOLUTION 250 ML FLEX CONT: Performed by: INTERNAL MEDICINE

## 2024-04-19 PROCEDURE — 71045 X-RAY EXAM CHEST 1 VIEW: CPT

## 2024-04-19 PROCEDURE — 94761 N-INVAS EAR/PLS OXIMETRY MLT: CPT

## 2024-04-19 PROCEDURE — 85027 COMPLETE CBC AUTOMATED: CPT | Performed by: INTERNAL MEDICINE

## 2024-04-19 PROCEDURE — 83735 ASSAY OF MAGNESIUM: CPT | Performed by: INTERNAL MEDICINE

## 2024-04-19 PROCEDURE — 25010000002 VANCOMYCIN 10 G RECONSTITUTED SOLUTION: Performed by: INTERNAL MEDICINE

## 2024-04-19 PROCEDURE — 25010000002 VANCOMYCIN HCL 1.25 G RECONSTITUTED SOLUTION 1 EACH VIAL: Performed by: INTERNAL MEDICINE

## 2024-04-19 PROCEDURE — 93971 EXTREMITY STUDY: CPT | Performed by: SURGERY

## 2024-04-19 PROCEDURE — 94664 DEMO&/EVAL PT USE INHALER: CPT

## 2024-04-19 PROCEDURE — 25010000002 CEFTRIAXONE PER 250 MG: Performed by: INTERNAL MEDICINE

## 2024-04-19 PROCEDURE — 25810000003 SODIUM CHLORIDE 0.9 % SOLUTION: Performed by: INTERNAL MEDICINE

## 2024-04-19 PROCEDURE — 92526 ORAL FUNCTION THERAPY: CPT

## 2024-04-19 PROCEDURE — 25010000002 MORPHINE PER 10 MG: Performed by: INTERNAL MEDICINE

## 2024-04-19 PROCEDURE — 25010000002 LORAZEPAM PER 2 MG: Performed by: INTERNAL MEDICINE

## 2024-04-19 PROCEDURE — 94799 UNLISTED PULMONARY SVC/PX: CPT

## 2024-04-19 PROCEDURE — 80048 BASIC METABOLIC PNL TOTAL CA: CPT | Performed by: INTERNAL MEDICINE

## 2024-04-19 PROCEDURE — 94760 N-INVAS EAR/PLS OXIMETRY 1: CPT

## 2024-04-19 PROCEDURE — 93970 EXTREMITY STUDY: CPT

## 2024-04-19 RX ORDER — PHENOBARBITAL 32.4 MG/1
32.4 TABLET ORAL ONCE
Status: COMPLETED | OUTPATIENT
Start: 2024-04-19 | End: 2024-04-19

## 2024-04-19 RX ORDER — QUETIAPINE FUMARATE 100 MG/1
100 TABLET, FILM COATED ORAL EVERY 12 HOURS SCHEDULED
Status: DISCONTINUED | OUTPATIENT
Start: 2024-04-19 | End: 2024-04-24 | Stop reason: HOSPADM

## 2024-04-19 RX ORDER — CLONIDINE HYDROCHLORIDE 0.1 MG/1
0.4 TABLET ORAL EVERY 6 HOURS SCHEDULED
Status: DISCONTINUED | OUTPATIENT
Start: 2024-04-19 | End: 2024-04-21

## 2024-04-19 RX ADMIN — NICARDIPINE HYDROCHLORIDE 7.5 MG/HR: 25 INJECTION, SOLUTION INTRAVENOUS at 02:16

## 2024-04-19 RX ADMIN — CEFTRIAXONE 2000 MG: 2 INJECTION, POWDER, FOR SOLUTION INTRAMUSCULAR; INTRAVENOUS at 17:44

## 2024-04-19 RX ADMIN — CLONIDINE HYDROCHLORIDE 0.4 MG: 0.1 TABLET ORAL at 11:24

## 2024-04-19 RX ADMIN — MORPHINE SULFATE 4 MG: 2 INJECTION, SOLUTION INTRAMUSCULAR; INTRAVENOUS at 06:58

## 2024-04-19 RX ADMIN — Medication 1 PATCH: at 18:41

## 2024-04-19 RX ADMIN — IPRATROPIUM BROMIDE AND ALBUTEROL SULFATE 3 ML: .5; 3 SOLUTION RESPIRATORY (INHALATION) at 20:27

## 2024-04-19 RX ADMIN — Medication 10 ML: at 15:45

## 2024-04-19 RX ADMIN — QUETIAPINE FUMARATE 100 MG: 100 TABLET ORAL at 21:46

## 2024-04-19 RX ADMIN — METOPROLOL TARTRATE 2.5 MG: 1 INJECTION, SOLUTION INTRAVENOUS at 03:22

## 2024-04-19 RX ADMIN — FAMOTIDINE 20 MG: 20 TABLET, FILM COATED ORAL at 17:32

## 2024-04-19 RX ADMIN — DEXMEDETOMIDINE HYDROCHLORIDE IN SODIUM CHLORIDE 1 MCG/KG/HR: 4 INJECTION INTRAVENOUS at 14:57

## 2024-04-19 RX ADMIN — LORAZEPAM 2 MG: 2 INJECTION INTRAMUSCULAR; INTRAVENOUS at 09:32

## 2024-04-19 RX ADMIN — ZINC OXIDE 1 APPLICATION: 200 OINTMENT TOPICAL at 21:46

## 2024-04-19 RX ADMIN — DEXMEDETOMIDINE HYDROCHLORIDE IN SODIUM CHLORIDE 1 MCG/KG/HR: 4 INJECTION INTRAVENOUS at 02:54

## 2024-04-19 RX ADMIN — QUETIAPINE FUMARATE 50 MG: 50 TABLET, FILM COATED ORAL at 08:43

## 2024-04-19 RX ADMIN — FAMOTIDINE 20 MG: 20 TABLET, FILM COATED ORAL at 06:46

## 2024-04-19 RX ADMIN — DEXMEDETOMIDINE HYDROCHLORIDE IN SODIUM CHLORIDE 1.5 MCG/KG/HR: 4 INJECTION INTRAVENOUS at 07:58

## 2024-04-19 RX ADMIN — DEXMEDETOMIDINE HYDROCHLORIDE IN SODIUM CHLORIDE 0.8 MCG/KG/HR: 4 INJECTION INTRAVENOUS at 18:36

## 2024-04-19 RX ADMIN — LORAZEPAM 2 MG: 2 INJECTION INTRAMUSCULAR; INTRAVENOUS at 03:55

## 2024-04-19 RX ADMIN — DEXMEDETOMIDINE HYDROCHLORIDE IN SODIUM CHLORIDE 1.5 MCG/KG/HR: 4 INJECTION INTRAVENOUS at 05:47

## 2024-04-19 RX ADMIN — PHENOBARBITAL 32.4 MG: 32.4 TABLET ORAL at 05:17

## 2024-04-19 RX ADMIN — HYDROCODONE BITARTRATE AND ACETAMINOPHEN 1 TABLET: 10; 325 TABLET ORAL at 22:14

## 2024-04-19 RX ADMIN — CLONIDINE HYDROCHLORIDE 0.4 MG: 0.1 TABLET ORAL at 17:32

## 2024-04-19 RX ADMIN — ENOXAPARIN SODIUM 40 MG: 100 INJECTION SUBCUTANEOUS at 11:34

## 2024-04-19 RX ADMIN — IPRATROPIUM BROMIDE AND ALBUTEROL SULFATE 3 ML: .5; 3 SOLUTION RESPIRATORY (INHALATION) at 07:28

## 2024-04-19 RX ADMIN — DEXMEDETOMIDINE HYDROCHLORIDE IN SODIUM CHLORIDE 0.8 MCG/KG/HR: 4 INJECTION INTRAVENOUS at 22:16

## 2024-04-19 RX ADMIN — Medication 10 ML: at 08:44

## 2024-04-19 RX ADMIN — CLONIDINE HYDROCHLORIDE 0.3 MG: 0.1 TABLET ORAL at 05:03

## 2024-04-19 RX ADMIN — SENNOSIDES AND DOCUSATE SODIUM 2 TABLET: 50; 8.6 TABLET ORAL at 08:43

## 2024-04-19 RX ADMIN — Medication 10 ML: at 21:48

## 2024-04-19 RX ADMIN — DEXMEDETOMIDINE HYDROCHLORIDE IN SODIUM CHLORIDE 1.5 MCG/KG/HR: 4 INJECTION INTRAVENOUS at 12:19

## 2024-04-19 RX ADMIN — FOLIC ACID 1 MG: 1 TABLET ORAL at 08:43

## 2024-04-19 RX ADMIN — HYDROCODONE BITARTRATE AND ACETAMINOPHEN 1 TABLET: 10; 325 TABLET ORAL at 11:24

## 2024-04-19 RX ADMIN — ZINC OXIDE 1 APPLICATION: 200 OINTMENT TOPICAL at 08:45

## 2024-04-19 RX ADMIN — Medication 10 ML: at 21:49

## 2024-04-19 RX ADMIN — VANCOMYCIN HYDROCHLORIDE 1250 MG: 1.25 INJECTION, POWDER, LYOPHILIZED, FOR SOLUTION INTRAVENOUS at 22:36

## 2024-04-19 RX ADMIN — VANCOMYCIN HYDROCHLORIDE 2500 MG: 10 INJECTION, POWDER, LYOPHILIZED, FOR SOLUTION INTRAVENOUS at 10:16

## 2024-04-19 RX ADMIN — SENNOSIDES AND DOCUSATE SODIUM 2 TABLET: 50; 8.6 TABLET ORAL at 21:48

## 2024-04-19 RX ADMIN — HYDROCODONE BITARTRATE AND ACETAMINOPHEN 1 TABLET: 10; 325 TABLET ORAL at 05:04

## 2024-04-19 RX ADMIN — DEXMEDETOMIDINE HYDROCHLORIDE IN SODIUM CHLORIDE 1.3 MCG/KG/HR: 4 INJECTION INTRAVENOUS at 10:16

## 2024-04-19 RX ADMIN — Medication 15 ML: at 08:44

## 2024-04-19 RX ADMIN — MORPHINE SULFATE 4 MG: 2 INJECTION, SOLUTION INTRAMUSCULAR; INTRAVENOUS at 02:05

## 2024-04-19 RX ADMIN — MORPHINE SULFATE 4 MG: 2 INJECTION, SOLUTION INTRAMUSCULAR; INTRAVENOUS at 15:45

## 2024-04-19 RX ADMIN — NICARDIPINE HYDROCHLORIDE 7.5 MG/HR: 25 INJECTION, SOLUTION INTRAVENOUS at 05:26

## 2024-04-19 RX ADMIN — Medication 100 MG: at 08:43

## 2024-04-19 NOTE — PROGRESS NOTES
[facility]      Mar/chart reviewed  Follow up alcohol withdrawal, mechanical ventilation  S/p extubation 4/17  Worsened mental status/agitation overnight, precedex restarted  febrile    Vital Sign Min/Max for last 24 hours  Temp  Min: 98.1 °F (36.7 °C)  Max: 101.3 °F (38.5 °C)   BP  Min: 118/65  Max: 193/84   Pulse  Min: 51  Max: 118   Resp  Min: 18  Max: 18   SpO2  Min: 84 %  Max: 100 %   Flow (L/min)  Min: 1  Max: 2   Weight  Min: 124 kg (272 lb 4.3 oz)  Max: 124 kg (272 lb 4.3 oz)   1576/3350    Appears ill, sedated, sleeping soundly  perrl, eomi, normal sclera  mmm, no jvd, trachea midline, neck supple,  chest cta bilaterally, no crackles, no wheezes,   rrr,   soft, nt, nd +bs,  no c/c/  1+edema  Skin warm, dry no rashes    Labs: 4/19: reviewed:  Glucose 126  Bun 13  Cr 0.72  Na 132  Bicarb 23  Wbc 9.4  Hgb 10.4  Plts 385  4/18: procal 0.09  Micro: reviewed    A/P:  1. Acute respiratory failure requiring  mechanical ventilation -- doing well post extubation  2. Aspiration pneumonia with H flu -- now finished antibiotic course    3. Severe alcohol abuse with dependence and withdrawal -- --improving now probably more withdrawal from the benzos/prececex that he has been on while here and icu delerium etc. --making some progress, continue current efforts  4. Polysubstance abuse -- likely making improvement in his mental status and sedation needs all the more difficult  5. LIVAN  6. Sepsis with shock - resolved  7. Fever -- procal is low, repeat, check venous dopplers, started on rocephin yesterday by cross cover, repeat cxr as well. Start vanco given prior +mrsa nares    Back on precedex; may still take some time to wean off this, continue as able, on clonidine, seroquel, will up the seroquel dose a little   CC 36 mins

## 2024-04-19 NOTE — NURSING NOTE
Patient hypertensive requiring initiation of nicardipine gtt. Increasingly agitated/restless despite precedex @1.5 mcg/kg/hr and prn medications given. Had to be restrained due to attempting to get out of bed.

## 2024-04-19 NOTE — SIGNIFICANT NOTE
04/19/24 1318   OTHER   Discipline occupational therapist   Rehab Time/Intention   Session Not Performed unable to treat, medical status change  (checked on pt twice today, recieved ativan, sleeping and not appropriate for OT per RN)   Recommendation   OT - Next Appointment 04/22/24

## 2024-04-19 NOTE — PROGRESS NOTES
"[facility] Clinical Pharmacy Services: Clonidine for sedation    Pharmacy has been consulted to manage oral/enteral clonidine on Watson Lisa to assist in sedation and managing symptoms of withdrawal per Dr. Penaloza's request.      Relevant clinical data and objective history reviewed:  68 y.o. male 200.7 cm (79\") 124 kg (272 lb 4.3 oz)    /54   Pulse 67   Temp 100 °F (37.8 °C) (Oral)   Resp 18   Ht 200.7 cm (79\")   Wt 124 kg (272 lb 4.3 oz)   SpO2 91%   BMI 30.67 kg/m²       Creatinine   Date Value Ref Range Status   04/19/2024 0.72 (L) 0.76 - 1.27 mg/dL Final   04/18/2024 0.71 (L) 0.76 - 1.27 mg/dL Final   04/17/2024 0.63 (L) 0.76 - 1.27 mg/dL Final   07/31/2020 0.9 0.7 - 1.5 mg/dL Final   02/14/2017 0.80 0.60 - 1.30 mg/dL Final     Comment:     Serial Number: [id number]    : [id number]     BUN   Date Value Ref Range Status   04/19/2024 13 8 - 23 mg/dL Final   07/31/2020 18 7 - 20 mg/dL Final     Estimated Creatinine Clearance: 145.2 mL/min (A) (by C-G formula based on SCr of 0.72 mg/dL (L)).    Assessment    Dexmedetomidine currently infusing at 1.3 mcg/kg/hr     CPOT/NPS:  0/0 this am  RASS: -1     Other sedating medications (scheduled/PRN) used in last 24 hrs:   Fentanyl infusion off 4/18 0100  Midazolam infusion stopped 4/17 and remains off   Phenobarbital withdrawal protocol completed 4/17   Lorazepam PRN x 1 dose 4/15 at 2344  Hydrocodone/apap (10/325 mg):  4/18 4 doses, 4/19 1 so far     Other medications that could cause hypotension:   Metoprolol PRN - no use in the past 48 hours   Hydralazine PRN - no use in the past 48 hours    Plan    -Dexmedetomidine was weaned off 4/18, probably too quickly, and had to be restarted at 1742. Continues on this am at 103 mcg/kg/hr.  -Dr. Penaloza has increased quetiapine to 100 mg q12 today. Discussed in rounds today. Will try higher clonidine dose today, as long as bp tolerates, and 4/20 can revisit tapering dex off.    No missed or held clonidine doses, " no episodes of HTn or bradycardia. Increase clonidine 0.4 mg q6h. Hold for MAP <65 or HR<50. Please notify pharmacy if any doses are held. Plan will be to wean Precedex more today.   Pharmacy will reassess patient 4/20 for dose adjustment if needed.    Thank you for allowing me to participate in your patient's care. Please call pharmacy with any questions or concerns.    Christiano Viera [facility]  Clinical Pharmacist

## 2024-04-19 NOTE — PROGRESS NOTES
"[facility] Clinical Pharmacy Services: Vancomycin Pharmacokinetic Initial Consult Note    Watson Lisa is a 68 y.o. male who is on day 1 of pharmacy to dose vancomycin.    Indication:  fever  Consulting Provider: Dr. Penaloza  Planned Duration of Therapy: 5 days  Loading Dose Ordered or Given: 2500 mg ordered on 4/19 at 0915 (now)  MRSA PCR performed: 4/8 ; Result: positive  Culture/Source:   -4/18 blood (2 sets) pend    Target: -600 mg/L.hr   Pertinent Vanc Dosing History: recently on vanc w/ dc on 4/11: AUC was 475 on 1250 mg q12  Other Antimicrobials: ceftriaxone    Vitals/Labs  Ht: 200.7 cm (79\"); Wt: 124 kg (272 lb 4.3 oz)  Temp Readings from Last 1 Encounters:   04/19/24 100 °F (37.8 °C) (Oral)    Estimated Creatinine Clearance: 145.2 mL/min (A) (by C-G formula based on SCr of 0.72 mg/dL (L)).       Results from last 7 days   Lab Units 04/19/24  0637 04/18/24  0613 04/17/24  0722   CREATININE mg/dL 0.72* 0.71* 0.63*   WBC 10*3/mm3 9.38 12.01* 9.65     Assessment/Plan:    Vancomycin Dose:   1250 mg IV every  12  hours start tonight about 12 hours after today's loading dose of 2500 mg  Predictive AUC level for the dose ordered is 450 mg/L.hr, which is within the target of 400-600 mg/L.hr  Vanc Trough has been ordered for 4/20 at 1000     Pharmacy will follow patient's kidney function and will adjust doses and obtain levels as necessary. Thank you for involving pharmacy in this patient's care. Please contact pharmacy with any questions or concerns.                           Christiano Viera, [facility]  Clinical Pharmacist    "

## 2024-04-19 NOTE — CASE MANAGEMENT/SOCIAL WORK
Continued Stay Note  [facility]     Patient Name: Watson Lisa  MRN: [id number]  Today's Date: 4/19/2024    Admit Date: 4/6/2024    Plan: pending clinical course   Discharge Plan       Row Name 04/19/24 0959       Plan    Plan pending clinical course    Patient/Family in Agreement with Plan yes    Plan Comments CCP reviewed clinicals. Patient restarted on precedex. ST following for PO diet once appropriate- remains with NG. Fouzia MCADAMS RN CCP                   Discharge Codes    No documentation.                 Expected Discharge Date and Time       Expected Discharge Date Expected Discharge Time    Apr 17, 2024               Fouzia Gill RN

## 2024-04-19 NOTE — SIGNIFICANT NOTE
Defer AM video swallow study, per RN. Pt currently restrained and sedated. Will ck bk as able. Please contact SLP when pt becomes appropriate for eval. Pt currently with NGT.

## 2024-04-19 NOTE — PLAN OF CARE
Goal Outcome Evaluation:              Outcome Evaluation: Clinical swallow re-eval completed. Pt currently with reduced wakefulness/level of alertness due to sedating medications. Pt currently confused and in wrist restraints. Pt back in ICU due to alcohol withdrawal. Pt was intubated 11 days. Suggest continue NPO with NGT for now. Suggest ice chips and sips of water with RN staff only when awake/alert. Oral care t.i.d. with toothbrush, toothpaste, and rinse/spit. SLP following.                 SLP Swallowing Diagnosis: suspected pharyngeal dysphagia, R/O pharyngeal dysphagia, other (see comments) (feeding difficulties) (04/19/24 8412)

## 2024-04-19 NOTE — THERAPY RE-EVALUATION
Acute Care - Speech Language Pathology   Swallow Re-Evaluation [facility]     Patient Name: Watson Lisa  : 1955  MRN: [id number]  Today's Date: 2024               Admit Date: 2024    Visit Dx:     ICD-10-CM ICD-9-CM   1. Alcohol withdrawal syndrome without complication  F10.930 291.81   2. Hypomagnesemia  E83.42 275.2     Patient Active Problem List   Diagnosis    Essential hypertension    Tobacco abuse    Hiatal hernia    Effusion of left knee    Osteoarthritis of left knee    Vitamin D deficiency    Anemia, chronic disease    COPD (chronic obstructive pulmonary disease)    LIVAN (obstructive sleep apnea)    ETOH abuse    Obese    Alcoholic liver disease    History of alcohol use disorder    Left patella fracture    Need for hepatitis C screening test    Chronic pain of left ankle    Primary insomnia    Overweight (BMI 25.0-29.9)    Mixed hyperlipidemia    Acute alcohol intoxication    Hypomagnesemia    Alcohol withdrawal    Substance abuse    Cocaine abuse    Positive urine drug screen     Past Medical History:   Diagnosis Date    Alcohol abuse     Anxiety     Arthritis     Bronchitis with chronic airway obstruction     LAST FE    DVT (deep venous thrombosis)     Hiatal hernia     Hypertension     Hypertension     Low back pain     Neck pain     Pulmonary embolism     Sleep apnea with use of continuous positive airway pressure (CPAP)     AT NIGHT     Past Surgical History:   Procedure Laterality Date    COLONOSCOPY      JOINT REPLACEMENT Right     hip/knee    REPLACEMENT TOTAL KNEE      TONSILLECTOMY      TOTAL HIP ARTHROPLASTY Right        SLP Recommendation and Plan  SLP Swallowing Diagnosis: suspected pharyngeal dysphagia, R/O pharyngeal dysphagia, other (see comments) (feeding difficulties) (24 1334)  SLP Diet Recommendation: ice chips between meals after oral care, with supervision, other (see comments) (ice chips and sips of water w RN staff when awake; in tandem w oral care)  "(04/19/24 1334)  Recommended Precautions and Strategies: upright posture during/after eating, 1:1 supervision (04/19/24 1334)  SLP Rec. for Method of Medication Administration: meds via alternate route (04/19/24 1334)     Monitor for Signs of Aspiration: yes, notify SLP if any concerns (04/19/24 1334)  Recommended Diagnostics: reassess via clinical swallow evaluation (04/19/24 1334)  Swallow Criteria for Skilled Therapeutic Interventions Met: demonstrates skilled criteria (04/19/24 1334)     Rehab Potential/Prognosis, Swallowing: good, to achieve stated therapy goals (04/19/24 1334)  Therapy Frequency (Swallow): PRN (04/19/24 1334)  Predicted Duration Therapy Intervention (Days): until discharge (04/19/24 1334)  Oral Care Recommendations: Other (see comments), Toothbrush (oral care t.i.d) (04/19/24 1334)                                        Outcome Evaluation: Clinical swallow re-eval completed. Pt currently with reduced wakefulness/level of alertness due to sedating medications. Pt currently confused and in wrist restraints. Pt back in ICU due to alcohol withdrawal. Pt was intubated 11 days. Suggest continue NPO with NGT for now. Suggest ice chips and sips of water with RN staff only when awake/alert. Oral care t.i.d. with toothbrush, toothpaste, and rinse/spit. SLP following.      SWALLOW EVALUATION (Last 72 Hours)       SLP Adult Swallow Evaluation       Row Name 04/19/24 1334       Rehab Evaluation    Document Type re-evaluation  -BB    Subjective Information no complaints  -BB    Patient Observations agree to therapy  -BB    Patient Effort fair  -BB       General Information    Patient Profile Reviewed yes  -BB    Pertinent History Of Current Problem \"1. Acute respiratory failure requiring  mechanical ventilation -- doing well post extubation  2. Aspiration pneumonia with H flu -- now finished antibiotic course    3. Severe alcohol abuse with dependence and withdrawal -- --improving now probably more " "withdrawal from the benzos/prececex that he has been on while here and icu delerium etc. --making some progress, continue current efforts  4. Polysubstance abuse -- likely making improvement in his mental status and sedation needs all the more difficult\". Sepsis.  -BB    Current Method of Nutrition NPO;nasogastric feedings  -BB    Precautions/Limitations, Vision WFL;for purposes of eval  -BB    Precautions/Limitations, Hearing WFL;for purposes of eval  -BB    Prior Level of Function-Communication unknown  -BB    Prior Level of Function-Swallowing no diet consistency restrictions  -BB    Plans/Goals Discussed with patient;agreed upon  -BB    Barriers to Rehab medically complex  -BB       Pain    Additional Documentation Pain Scale: FACES Pre/Post-Treatment (Group)  -BB       Pain Scale: FACES Pre/Post-Treatment    Pain: FACES Scale, Pretreatment 0-->no hurt  -BB    Posttreatment Pain Rating 0-->no hurt  -BB       Oral Motor Structure and Function    Dentition Assessment natural, present and adequate  -BB       Oral Musculature and Cranial Nerve Assessment    Oral Motor General Assessment WFL  -BB       General Eating/Swallowing Observations    Eating/Swallowing Skills fed by SLP  -BB    Positioning During Eating upright 90 degree  -BB    Utensils Used spoon;straw  -BB    Consistencies Trialed pureed;ice chips;thin liquids;nectar/syrup-thick liquids  -BB       Clinical Swallow Eval    Clinical Swallow Evaluation Summary Clinical swallow re-eval completed. No family present.   Cognition: A/Oxperson, place, month. Pt able to follow basic commands. Some perseveration observed. Pt requires verbal/tactile cues to maintain wakefulness.   Expressive communication: occasional incongruencies.   Voice: Vocal quality and loudness appear good.   Cough: Not elicited.   Affect: Confused.   Speech: Intelligible.   Bulbar mech exam: Integrity of cranial nerves V, VII, IX/X and XII appear grossly intact.   [place] swallow protocol: pt " did not appear to comprehend task.   Dysphagia symptoms: pt denies symptoms of dysphagia at baseline.   RN reports pt tolerated ice chips without observable issue.   Patient agreeable to PO trials.   Dysphagia signs: Delayed cough in 1/10 trials of thin liquids. Occasional eructation observed.   Active problems related to dysphagia management: Severe alcohol abuse with dependence and withdrawal. Aspiration pna and H flu. S/p extubation (intubated 11 days).   Chronic problems related to dysphagia management: Polysubstance abuse, HTN.      Assessment / Plan: Reduced wakefulness/level of alertness due to sedating medications. Pt currently confused and in wrist restraints. Pt back in ICU due to alcohol withdrawal. Pt was intubated 11 days. Suggest continue NPO with NGT for now. Suggest ice chips and sips of water with RN staff only when awake/alert. Oral care t.i.d. with toothbrush, toothpaste, and rinse/spit. SLP following.  -BB       SLP Evaluation Clinical Impression    SLP Swallowing Diagnosis suspected pharyngeal dysphagia;R/O pharyngeal dysphagia;other (see comments)  feeding difficulties  -BB    Functional Impact risk of aspiration/pneumonia;risk of malnutrition;risk of dehydration  -BB    Rehab Potential/Prognosis, Swallowing good, to achieve stated therapy goals  -BB    Swallow Criteria for Skilled Therapeutic Interventions Met demonstrates skilled criteria  -BB       Recommendations    Therapy Frequency (Swallow) PRN  -BB    Predicted Duration Therapy Intervention (Days) until discharge  -BB    SLP Diet Recommendation ice chips between meals after oral care, with supervision;other (see comments)  ice chips and sips of water w RN staff when awake; in tandem w oral care  -BB    Recommended Diagnostics reassess via clinical swallow evaluation  -BB    Recommended Precautions and Strategies upright posture during/after eating;1:1 supervision  -BB    Oral Care Recommendations Other (see comments);Toothbrush  oral  care t.i.d  -BB    SLP Rec. for Method of Medication Administration meds via alternate route  -BB    Monitor for Signs of Aspiration yes;notify SLP if any concerns  -BB       Swallow Goals (SLP)    Swallow LTGs --       (LTG) Patient will demonstrate functional swallow for    Diet Texture (Demonstrate functional swallow) --    Liquid viscosity (Demonstrate functional swallow) --    [place] (Demonstrate functional swallow) --    Time Frame (Demonstrate functional swallow) --              User Key  (r) = Recorded By, (t) = Taken By, (c) = Cosigned By      Initials Name Effective Dates    Dorcas Conde SLP 01/05/24 -     BB Braydon Al SLP 02/19/23 -                     EDUCATION  The patient has been educated in the following areas:   Dysphagia (Swallowing Impairment).        SLP GOALS       Row Name 04/18/24 1300             (LTG) Patient will demonstrate functional swallow for    Diet Texture (Demonstrate functional swallow) regular textures  -SH      Liquid viscosity (Demonstrate functional swallow) thin liquids  -      [place] (Demonstrate functional swallow) independently (over 90% accuracy)  -      Time Frame (Demonstrate functional swallow) by discharge  -                User Key  (r) = Recorded By, (t) = Taken By, (c) = Cosigned By      Initials Name Provider Type    Dorcas Conde SLP Speech and Language Pathologist                       Time Calculation:    Time Calculation- SLP       Row Name 04/19/24 1349             Time Calculation- SLP    SLP Start Time 1250  -BB      SLP Stop Time 1350  -BB      SLP Time Calculation (min) 60 min  -BB      SLP Received On 04/19/24  -BB         Untimed Charges    [id number] Treatment Swallow Minutes 60  -BB         Total Minutes    Untimed Charges Total Minutes 60  -BB       Total Minutes 60  -BB                User Key  (r) = Recorded By, (t) = Taken By, (c) = Cosigned By      Initials Name Provider Type    Braydon Ingram, SLP Speech and Language  Pathologist                    Therapy Charges for Today       Code Description Service Date Service Provider Modifiers Qty    [id number]  ST TREATMENT SWALLOW 4 4/19/2024 Braydon Al, SLP GN 1                 Braydon Al SLP  4/19/2024

## 2024-04-20 PROBLEM — J69.0 ASPIRATION PNEUMONIA: Status: ACTIVE | Noted: 2024-04-20

## 2024-04-20 LAB
ANION GAP SERPL CALCULATED.3IONS-SCNC: 12.2 MMOL/L (ref 5–15)
BUN SERPL-MCNC: 19 MG/DL (ref 8–23)
BUN/CREAT SERPL: 27.9 (ref 7–25)
CALCIUM SPEC-SCNC: 9.1 MG/DL (ref 8.6–10.5)
CHLORIDE SERPL-SCNC: 100 MMOL/L (ref 98–107)
CO2 SERPL-SCNC: 20.8 MMOL/L (ref 22–29)
CREAT SERPL-MCNC: 0.68 MG/DL (ref 0.76–1.27)
DEPRECATED RDW RBC AUTO: 39.8 FL (ref 37–54)
EGFRCR SERPLBLD CKD-EPI 2021: 100 ML/MIN/1.73
ERYTHROCYTE [DISTWIDTH] IN BLOOD BY AUTOMATED COUNT: 11.7 % (ref 12.3–15.4)
FERRITIN SERPL-MCNC: 471 NG/ML (ref 30–400)
FOLATE SERPL-MCNC: 18.5 NG/ML (ref 4.78–24.2)
GLUCOSE BLDC GLUCOMTR-MCNC: 118 MG/DL (ref 70–130)
GLUCOSE BLDC GLUCOMTR-MCNC: 119 MG/DL (ref 70–130)
GLUCOSE BLDC GLUCOMTR-MCNC: 126 MG/DL (ref 70–130)
GLUCOSE BLDC GLUCOMTR-MCNC: 131 MG/DL (ref 70–130)
GLUCOSE BLDC GLUCOMTR-MCNC: 141 MG/DL (ref 70–130)
GLUCOSE SERPL-MCNC: 106 MG/DL (ref 65–99)
HAPTOGLOB SERPL-MCNC: 355 MG/DL (ref 30–200)
HBA1C MFR BLD: 5.8 % (ref 4.8–5.6)
HCT VFR BLD AUTO: 28.9 % (ref 37.5–51)
HGB BLD-MCNC: 9.9 G/DL (ref 13–17.7)
IRON 24H UR-MRATE: 23 MCG/DL (ref 59–158)
IRON SATN MFR SERPL: 10 % (ref 20–50)
LDH SERPL-CCNC: 153 U/L (ref 135–225)
MAGNESIUM SERPL-MCNC: 2.1 MG/DL (ref 1.6–2.4)
MCH RBC QN AUTO: 32.4 PG (ref 26.6–33)
MCHC RBC AUTO-ENTMCNC: 34.3 G/DL (ref 31.5–35.7)
MCV RBC AUTO: 94.4 FL (ref 79–97)
PHOSPHATE SERPL-MCNC: 4.4 MG/DL (ref 2.5–4.5)
PLATELET # BLD AUTO: 344 10*3/MM3 (ref 140–450)
PMV BLD AUTO: 10.5 FL (ref 6–12)
POTASSIUM SERPL-SCNC: 4.6 MMOL/L (ref 3.5–5.2)
RBC # BLD AUTO: 3.06 10*6/MM3 (ref 4.14–5.8)
RETICS # AUTO: 0.04 10*6/MM3 (ref 0.02–0.13)
RETICS/RBC NFR AUTO: 1.52 % (ref 0.7–1.9)
SODIUM SERPL-SCNC: 133 MMOL/L (ref 136–145)
TIBC SERPL-MCNC: 232 MCG/DL (ref 298–536)
TRANSFERRIN SERPL-MCNC: 156 MG/DL (ref 200–360)
VANCOMYCIN TROUGH SERPL-MCNC: 11.2 MCG/ML (ref 5–20)
VIT B12 BLD-MCNC: 610 PG/ML (ref 211–946)
WBC NRBC COR # BLD AUTO: 7.8 10*3/MM3 (ref 3.4–10.8)

## 2024-04-20 PROCEDURE — 25010000002 VANCOMYCIN HCL 1.25 G RECONSTITUTED SOLUTION 1 EACH VIAL: Performed by: INTERNAL MEDICINE

## 2024-04-20 PROCEDURE — 83036 HEMOGLOBIN GLYCOSYLATED A1C: CPT | Performed by: INTERNAL MEDICINE

## 2024-04-20 PROCEDURE — 94664 DEMO&/EVAL PT USE INHALER: CPT

## 2024-04-20 PROCEDURE — 25010000002 MORPHINE PER 10 MG: Performed by: INTERNAL MEDICINE

## 2024-04-20 PROCEDURE — 82948 REAGENT STRIP/BLOOD GLUCOSE: CPT

## 2024-04-20 PROCEDURE — 82272 OCCULT BLD FECES 1-3 TESTS: CPT | Performed by: INTERNAL MEDICINE

## 2024-04-20 PROCEDURE — 83540 ASSAY OF IRON: CPT | Performed by: INTERNAL MEDICINE

## 2024-04-20 PROCEDURE — 84100 ASSAY OF PHOSPHORUS: CPT | Performed by: INTERNAL MEDICINE

## 2024-04-20 PROCEDURE — 25010000002 ENOXAPARIN PER 10 MG: Performed by: INTERNAL MEDICINE

## 2024-04-20 PROCEDURE — 94760 N-INVAS EAR/PLS OXIMETRY 1: CPT

## 2024-04-20 PROCEDURE — 80048 BASIC METABOLIC PNL TOTAL CA: CPT | Performed by: INTERNAL MEDICINE

## 2024-04-20 PROCEDURE — 25010000002 CEFTRIAXONE PER 250 MG: Performed by: INTERNAL MEDICINE

## 2024-04-20 PROCEDURE — 94799 UNLISTED PULMONARY SVC/PX: CPT

## 2024-04-20 PROCEDURE — 25010000002 HYDRALAZINE PER 20 MG: Performed by: INTERNAL MEDICINE

## 2024-04-20 PROCEDURE — 83735 ASSAY OF MAGNESIUM: CPT | Performed by: INTERNAL MEDICINE

## 2024-04-20 PROCEDURE — 82607 VITAMIN B-12: CPT | Performed by: INTERNAL MEDICINE

## 2024-04-20 PROCEDURE — 85045 AUTOMATED RETICULOCYTE COUNT: CPT | Performed by: INTERNAL MEDICINE

## 2024-04-20 PROCEDURE — 92526 ORAL FUNCTION THERAPY: CPT

## 2024-04-20 PROCEDURE — 25810000003 SODIUM CHLORIDE 0.9 % SOLUTION 250 ML FLEX CONT: Performed by: INTERNAL MEDICINE

## 2024-04-20 PROCEDURE — 94761 N-INVAS EAR/PLS OXIMETRY MLT: CPT

## 2024-04-20 PROCEDURE — 80202 ASSAY OF VANCOMYCIN: CPT | Performed by: INTERNAL MEDICINE

## 2024-04-20 PROCEDURE — 85027 COMPLETE CBC AUTOMATED: CPT | Performed by: INTERNAL MEDICINE

## 2024-04-20 PROCEDURE — 82728 ASSAY OF FERRITIN: CPT | Performed by: INTERNAL MEDICINE

## 2024-04-20 PROCEDURE — 84466 ASSAY OF TRANSFERRIN: CPT | Performed by: INTERNAL MEDICINE

## 2024-04-20 PROCEDURE — 83615 LACTATE (LD) (LDH) ENZYME: CPT | Performed by: INTERNAL MEDICINE

## 2024-04-20 PROCEDURE — 82746 ASSAY OF FOLIC ACID SERUM: CPT | Performed by: INTERNAL MEDICINE

## 2024-04-20 PROCEDURE — 83010 ASSAY OF HAPTOGLOBIN QUANT: CPT | Performed by: INTERNAL MEDICINE

## 2024-04-20 RX ADMIN — QUETIAPINE FUMARATE 100 MG: 100 TABLET ORAL at 21:27

## 2024-04-20 RX ADMIN — HYDROCODONE BITARTRATE AND ACETAMINOPHEN 1 TABLET: 10; 325 TABLET ORAL at 10:24

## 2024-04-20 RX ADMIN — HYDRALAZINE HYDROCHLORIDE 20 MG: 20 INJECTION INTRAMUSCULAR; INTRAVENOUS at 04:13

## 2024-04-20 RX ADMIN — MORPHINE SULFATE 4 MG: 2 INJECTION, SOLUTION INTRAMUSCULAR; INTRAVENOUS at 04:13

## 2024-04-20 RX ADMIN — IPRATROPIUM BROMIDE AND ALBUTEROL SULFATE 3 ML: .5; 3 SOLUTION RESPIRATORY (INHALATION) at 15:05

## 2024-04-20 RX ADMIN — FOLIC ACID 1 MG: 1 TABLET ORAL at 10:01

## 2024-04-20 RX ADMIN — Medication 10 ML: at 21:28

## 2024-04-20 RX ADMIN — IPRATROPIUM BROMIDE AND ALBUTEROL SULFATE 3 ML: .5; 3 SOLUTION RESPIRATORY (INHALATION) at 19:59

## 2024-04-20 RX ADMIN — DEXMEDETOMIDINE HYDROCHLORIDE IN SODIUM CHLORIDE 0.6 MCG/KG/HR: 4 INJECTION INTRAVENOUS at 04:12

## 2024-04-20 RX ADMIN — HYDROCODONE BITARTRATE AND ACETAMINOPHEN 1 TABLET: 10; 325 TABLET ORAL at 21:27

## 2024-04-20 RX ADMIN — HYDROCODONE BITARTRATE AND ACETAMINOPHEN 1 TABLET: 10; 325 TABLET ORAL at 17:30

## 2024-04-20 RX ADMIN — Medication 10 ML: at 11:35

## 2024-04-20 RX ADMIN — IPRATROPIUM BROMIDE AND ALBUTEROL SULFATE 3 ML: .5; 3 SOLUTION RESPIRATORY (INHALATION) at 10:54

## 2024-04-20 RX ADMIN — Medication 15 ML: at 10:03

## 2024-04-20 RX ADMIN — CLONIDINE HYDROCHLORIDE 0.4 MG: 0.1 TABLET ORAL at 17:26

## 2024-04-20 RX ADMIN — Medication 1 PATCH: at 21:27

## 2024-04-20 RX ADMIN — ZINC OXIDE 1 APPLICATION: 200 OINTMENT TOPICAL at 21:27

## 2024-04-20 RX ADMIN — CLONIDINE HYDROCHLORIDE 0.4 MG: 0.1 TABLET ORAL at 00:25

## 2024-04-20 RX ADMIN — Medication 10 ML: at 11:36

## 2024-04-20 RX ADMIN — SENNOSIDES AND DOCUSATE SODIUM 2 TABLET: 50; 8.6 TABLET ORAL at 21:27

## 2024-04-20 RX ADMIN — VANCOMYCIN HYDROCHLORIDE 1250 MG: 1.25 INJECTION, POWDER, LYOPHILIZED, FOR SOLUTION INTRAVENOUS at 13:03

## 2024-04-20 RX ADMIN — QUETIAPINE FUMARATE 100 MG: 100 TABLET ORAL at 10:01

## 2024-04-20 RX ADMIN — Medication 100 MG: at 10:01

## 2024-04-20 RX ADMIN — CLONIDINE HYDROCHLORIDE 0.4 MG: 0.1 TABLET ORAL at 07:01

## 2024-04-20 RX ADMIN — IPRATROPIUM BROMIDE AND ALBUTEROL SULFATE 3 ML: .5; 3 SOLUTION RESPIRATORY (INHALATION) at 07:31

## 2024-04-20 RX ADMIN — CHLORHEXIDINE GLUCONATE 15 ML: 1.2 RINSE ORAL at 11:35

## 2024-04-20 RX ADMIN — FAMOTIDINE 20 MG: 20 TABLET, FILM COATED ORAL at 17:24

## 2024-04-20 RX ADMIN — CEFTRIAXONE 2000 MG: 2 INJECTION, POWDER, FOR SOLUTION INTRAMUSCULAR; INTRAVENOUS at 17:25

## 2024-04-20 RX ADMIN — CLONIDINE HYDROCHLORIDE 0.4 MG: 0.1 TABLET ORAL at 13:03

## 2024-04-20 RX ADMIN — ENOXAPARIN SODIUM 40 MG: 100 INJECTION SUBCUTANEOUS at 13:03

## 2024-04-20 RX ADMIN — DEXMEDETOMIDINE HYDROCHLORIDE IN SODIUM CHLORIDE 0.4 MCG/KG/HR: 4 INJECTION INTRAVENOUS at 09:56

## 2024-04-20 RX ADMIN — FAMOTIDINE 20 MG: 20 TABLET, FILM COATED ORAL at 07:02

## 2024-04-20 NOTE — THERAPY RE-EVALUATION
Acute Care - Speech Language Pathology   Swallow Re-Evaluation [facility]     Patient Name: Watson Lisa  : 1955  MRN: [id number]  Today's Date: 2024               Admit Date: 2024    Visit Dx:     ICD-10-CM ICD-9-CM   1. Alcohol withdrawal syndrome without complication  F10.930 291.81   2. Hypomagnesemia  E83.42 275.2     Patient Active Problem List   Diagnosis    Essential hypertension    Tobacco abuse    Hiatal hernia    Effusion of left knee    Osteoarthritis of left knee    Vitamin D deficiency    Anemia, chronic disease    COPD (chronic obstructive pulmonary disease)    LIVAN (obstructive sleep apnea)    ETOH abuse    Obese    Alcoholic liver disease    History of alcohol use disorder    Left patella fracture    Need for hepatitis C screening test    Chronic pain of left ankle    Primary insomnia    Overweight (BMI 25.0-29.9)    Mixed hyperlipidemia    Acute alcohol intoxication    Hypomagnesemia    Alcohol withdrawal    Substance abuse    Cocaine abuse    Positive urine drug screen     Past Medical History:   Diagnosis Date    Alcohol abuse     Anxiety     Arthritis     Bronchitis with chronic airway obstruction     LAST FEB    DVT (deep venous thrombosis)     Hiatal hernia     Hypertension     Hypertension     Low back pain     Neck pain     Pulmonary embolism     Sleep apnea with use of continuous positive airway pressure (CPAP)     AT NIGHT     Past Surgical History:   Procedure Laterality Date    COLONOSCOPY      JOINT REPLACEMENT Right     hip/knee    REPLACEMENT TOTAL KNEE      TONSILLECTOMY      TOTAL HIP ARTHROPLASTY Right        SLP Recommendation and Plan  SLP Swallowing Diagnosis: suspected pharyngeal dysphagia (24 1030)  SLP Diet Recommendation: temporary alternate methods of nutrition/hydration, water between meals after oral care, with supervision, nectar thick liquids (24 1030)  Recommended Precautions and Strategies: upright posture during/after eating, general  aspiration precautions, 1:1 supervision (04/20/24 1030)  SLP Rec. for Method of Medication Administration: meds via alternate route (04/20/24 1030)     Monitor for Signs of Aspiration: yes, notify SLP if any concerns (04/20/24 1030)  Recommended Diagnostics: VFSS (MBS) (04/20/24 1030)  Swallow Criteria for Skilled Therapeutic Interventions Met: demonstrates skilled criteria (04/20/24 1030)  Anticipated Discharge Disposition (SLP): anticipate therapy at next level of care (04/20/24 1030)  Rehab Potential/Prognosis, Swallowing: good, to achieve stated therapy goals (04/20/24 1030)  Therapy Frequency (Swallow): PRN (04/20/24 1030)  Predicted Duration Therapy Intervention (Days): until discharge (04/20/24 1030)  Oral Care Recommendations: Oral Care BID/PRN (04/20/24 1030)  Demonstrates Need for Referral to Another Service: speech therapy (04/20/24 1030)                     Outcome Evaluation: Swallow re-eval. Pt alert and oriented to place, situation, date and aware that today is his birthday. OME completed with good command following. [place] water protocol administered with cough following with all sips of thin liquid. Also trialed NTL, puree, and mech soft solids. NTL via straw, Puree and mech soft solids completed with cough following all trials. No oral residue post swallow. NTL via cup completed without overt clinical s/s of aspiration. Recommend continue NPO status with free water protocol (sips with no straw) or NTL via cup with oral care BID/PRN. Recommend VFSS to fully assess swallow function. Educated pt on results of re-eval and recommendation. Pt disappointed and distressed with recommendations but verbalized understanding.      SWALLOW EVALUATION (Last 72 Hours)       SLP Adult Swallow Evaluation       Row Name 04/20/24 1030 04/19/24 1334 04/18/24 1300       Rehab Evaluation    Document Type -- re-evaluation  -BB evaluation  -SH    Subjective Information -- no complaints  -BB --    Patient Observations --  "agree to therapy  -BB --    Patient Effort -- fair  -BB adequate  -SH       General Information    Patient Profile Reviewed -- yes  -BB yes  -SH    Pertinent History Of Current Problem -- \"1. Acute respiratory failure requiring  mechanical ventilation -- doing well post extubation  2. Aspiration pneumonia with H flu -- now finished antibiotic course    3. Severe alcohol abuse with dependence and withdrawal -- --improving now probably more withdrawal from the benzos/prececex that he has been on while here and icu delerium etc. --making some progress, continue current efforts  4. Polysubstance abuse -- likely making improvement in his mental status and sedation needs all the more difficult\". Sepsis.  -BB \"1. Acute respiratory failure requiring  mechanical ventilation -- doing well post extubation  2. Aspiration pneumonia with H flu -- now finished antibiotic course    3. Severe alcohol abuse with dependence and withdrawal -- --improving now probably more withdrawal from the benzos/prececex that he has been on while here and icu delerium etc. --making some progress, continue current efforts  4. Polysubstance abuse -- likely making improvement in his mental status and sedation needs all the more difficult\". Sepsis.  -SH    Current Method of Nutrition -- NPO;nasogastric feedings  -BB NPO;nasogastric feedings  -    Precautions/Limitations, Vision -- WFL;for purposes of eval  -BB WFL;for purposes of eval  -SH    Precautions/Limitations, Hearing -- WFL;for purposes of eval  -BB WFL;for purposes of eval  -SH    Prior Level of Function-Communication -- unknown  - unknown  -    Prior Level of Function-Swallowing -- no diet consistency restrictions  - no diet consistency restrictions  -    Plans/Goals Discussed with -- patient;agreed upon  -BB patient;agreed upon  -    Barriers to Rehab -- medically complex  -BB medically complex  -       Pain    Additional Documentation -- Pain Scale: FACES Pre/Post-Treatment " (Group)  -BB Pain Scale: FACES Pre/Post-Treatment (Group)  -       Pain Scale: FACES Pre/Post-Treatment    Pain: FACES Scale, Pretreatment -- 0-->no hurt  -BB 2-->hurts little bit  -    Posttreatment Pain Rating -- 0-->no hurt  -BB --       Oral Motor Structure and Function    Dentition Assessment missing teeth;poor oral hygiene  -HT natural, present and adequate  -BB natural, present and adequate  -    Secretion Management WNL/WFL  -HT -- --    Mucosal Quality dry  -HT -- --    Volitional Swallow WFL  -HT -- --    Volitional Cough WFL  -HT -- --       Oral Musculature and Cranial Nerve Assessment    Oral Motor General Assessment WFL  -HT WFL  -BB WFL;vocal impairment;other (see comments)  hoarse, strong voice  -       General Eating/Swallowing Observations    Eating/Swallowing Skills -- fed by SLP  -BB --    Positioning During Eating -- upright 90 degree  -BB --    Utensils Used -- spoon;straw  -BB --    Consistencies Trialed -- pureed;ice chips;thin liquids;nectar/syrup-thick liquids  -BB --       Clinical Swallow Eval    Clinical Swallow Evaluation Summary Clinical swallow re-eval. Pt alert and oriented to place, situation, date and aware that today is his birthday. OME completed with good command following. [place] water protocol administered with cough following with all sips of thin liquid. Also trialed NTL, puree, and mech soft solids. NTL via straw, Puree and mech soft solids completed with cough following all trials. No oral residue post swallow. NTL via cup completed without overt clinical s/s of aspiration. Recommend continue NPO status with free water protocol (sips with no straw) or NTL via cup with oral care BID/PRN. Recommend VFSS to fully assess swallow function. Educated pt on results of re-eval and recommendation. Pt disappointed and distressed with recommendations but verbalized understanding.  - Clinical swallow re-eval completed. No family present. Cognition: A/Oxperson, place, month. Pt  able to follow basic commands. Some perseveration observed. Pt requires verbal/tactile cues to maintain wakefulness. Expressive communication: occasional incongruencies. Voice: Vocal quality and loudness appear good. Cough: Not elicited. Affect: Confused. Speech: Intelligible. Bulbar mech exam: Integrity of cranial nerves V, VII, IX/X and XII appear grossly intact. [place] swallow protocol: pt did not appear to comprehend task. Dysphagia symptoms: pt denies symptoms of dysphagia at baseline. RN reports pt tolerated ice chips without observable issue. Patient agreeable to PO trials. Dysphagia signs: Delayed cough in 1/10 trials of thin liquids. Occasional eructation observed. Active problems related to dysphagia management: Severe alcohol abuse with dependence and withdrawal. Aspiration pna and H flu. S/p extubation (intubated 11 days). Chronic problems related to dysphagia management: Polysubstance abuse, HTN.  Assessment / Plan: Reduced wakefulness/level of alertness due to sedating medications. Pt currently confused and in wrist restraints. Pt back in ICU due to alcohol withdrawal. Pt was intubated 11 days. Suggest continue NPO with NGT for now. Suggest ice chips and sips of water with RN staff only when awake/alert. Oral care t.i.d. with toothbrush, toothpaste, and rinse/spit. SLP following.  -BB Patient seen for clinical swallow assessment. Eager for PO. Voice strong, but hoarse. Oral mech exam otherwise unremarkable. Pt extubated last date, but was intubated for 11 days. Immediate cough with ice 1/2 trials. No overt s/s of aspiration with thins or nectar via spoon. Immediate cough with thins and nectar via cup and honey via straw. Throat clearing with puree. Unable to determine safe diet at the bedside d/t s/s of aspiration with all PO. SLP recs ice chips after oral care, will further assess with VFSS next date.  -       Swallowing Quality of Life Assessment    Psychosocial Eating History certain food types very  important to daily life;high motivation to achievement/increase oral intake  -HT -- --    Palatable food preferences Likes orange juice, does not like apple  -HT -- --    Education and counseling provided Signs of aspiration;Risks of aspiration;Safest diet options;Alternate nutrition/hydration options, risks, and benefits  -HT -- --       SLP Evaluation Clinical Impression    SLP Swallowing Diagnosis suspected pharyngeal dysphagia  -HT suspected pharyngeal dysphagia;R/O pharyngeal dysphagia;other (see comments)  feeding difficulties  -BB suspected pharyngeal dysphagia  -    Functional Impact risk of aspiration/pneumonia;risk of malnutrition;risk of dehydration  -HT risk of aspiration/pneumonia;risk of malnutrition;risk of dehydration  -BB --    Rehab Potential/Prognosis, Swallowing good, to achieve stated therapy goals  -HT good, to achieve stated therapy goals  -BB --    Swallow Criteria for Skilled Therapeutic Interventions Met demonstrates skilled criteria  - demonstrates skilled criteria  -BB --       Recommendations    Therapy Frequency (Swallow) PRN  -HT PRN  -BB PRN  -    Predicted Duration Therapy Intervention (Days) until discharge  -HT until discharge  -BB until discharge  -    SLP Diet Recommendation temporary alternate methods of nutrition/hydration;water between meals after oral care, with supervision;nectar thick liquids  - ice chips between meals after oral care, with supervision;other (see comments)  ice chips and sips of water w RN staff when awake; in tandem w oral care  -BB ice chips between meals after oral care, with supervision  -    Recommended Diagnostics VFSS ([facility] [place], [place])  - reassess via clinical swallow evaluation  -BB reassess via VFSS ([facility] [place] [place])  -    Recommended Precautions and Strategies upright posture during/after eating;general aspiration precautions;1:1 supervision  - upright posture during/after eating;1:1 supervision  -BB --    Oral Care Recommendations Oral Care BID/PRN   -HT Other (see comments);Toothbrush  oral care t.i.d  -BB Oral Care BID/PRN;Before ice/water  -    SLP Rec. for Method of Medication Administration meds via alternate route  -HT meds via alternate route  -BB meds via alternate route  -    Monitor for Signs of Aspiration yes;notify SLP if any concerns  -HT yes;notify SLP if any concerns  -BB yes;notify SLP if any concerns  -    Anticipated Discharge Disposition (SLP) anticipate therapy at next level of care  - -- --    Demonstrates Need for Referral to Another Service speech therapy  - -- --       Swallow Goals (SLP)    Swallow LTGs -- -- Patient will demonstrate functional swallow for  -       (LTG) Patient will demonstrate functional swallow for    Diet Texture (Demonstrate functional swallow) regular textures  - -- regular textures  -    Liquid viscosity (Demonstrate functional swallow) thin liquids  - -- thin liquids  -    [place] (Demonstrate functional swallow) independently (over 90% accuracy)  - -- independently (over 90% accuracy)  -    Time Frame (Demonstrate functional swallow) by discharge  - -- by discharge  -    Progress/Outcomes (Demonstrate functional swallow) continuing progress toward goal  - -- --              User Key  (r) = Recorded By, (t) = Taken By, (c) = Cosigned By      Initials Name Effective Dates     Dorcas Alas, BRYANT 01/05/24 -     Braydon Ingram, BRYANT 02/19/23 -      Clare Shelton SLP 09/14/23 -                     EDUCATION  The patient has been educated in the following areas:   Dysphagia (Swallowing Impairment).        SLP GOALS       Row Name 04/20/24 1030 04/18/24 1300          (LTG) Patient will demonstrate functional swallow for    Diet Texture (Demonstrate functional swallow) regular textures  - regular textures  -     Liquid viscosity (Demonstrate functional swallow) thin liquids  - thin liquids  -     [place] (Demonstrate functional swallow) independently (over 90% accuracy)   -HT independently (over 90% accuracy)  -     Time Frame (Demonstrate functional swallow) by discharge  -HT by discharge  -     Progress/Outcomes (Demonstrate functional swallow) continuing progress toward goal  -HT --               User Key  (r) = Recorded By, (t) = Taken By, (c) = Cosigned By      Initials Name Provider Type     Dorcas Alas SLP Speech and Language Pathologist     Clare Shelton SLP Speech and Language Pathologist                       Time Calculation:    Time Calculation- SLP       Row Name 04/20/24 1137             Time Calculation- SLP    SLP Start Time 0935  -HT      SLP Received On 04/20/24  -         Untimed Charges    02681-WS Treatment Swallow Minutes 75  -HT         Total Minutes    Untimed Charges Total Minutes 75  -HT       Total Minutes 75  -HT                User Key  (r) = Recorded By, (t) = Taken By, (c) = Cosigned By      Initials Name Provider Type     Clare Shelton SLP Speech and Language Pathologist                    Therapy Charges for Today       Code Description Service Date Service Provider Modifiers Qty    [id number]  ST TREATMENT SWALLOW 5 4/20/2024 Clare Shelton SLP GN 1                 BRYANT Matias  4/20/2024

## 2024-04-20 NOTE — SIGNIFICANT NOTE
04/20/24 1433   OTHER   Discipline physical therapist   Rehab Time/Intention   Session Not Performed patient/family declined treatment  (Pt declines PT treatment this PM, expressing frustration following failed swallow study this morning, declining any mobility this date but agreeable to work tomorrow. Will attempt and f/u tomorrow.)   Therapy Assessment/Plan (PT)   Criteria for Skilled Interventions Met (PT) yes;skilled treatment is necessary   Recommendation   PT - Next Appointment 04/21/24

## 2024-04-20 NOTE — PLAN OF CARE
Goal Outcome Evaluation:            Pt assessment per flowsheet. Medications per MAR. VSS. Pt calm for most of shift, sleeping on/off. No acute events. Labs reviewed. Plan of care ongoing.

## 2024-04-20 NOTE — PROGRESS NOTES
"      [facility]         Dr Acosta Sayied   LOS: 14 days   Patient Care Team:  Roc Carroll APRN as PCP - General (Nurse Practitioner)  Checo Dunham MD as PCP - Family Medicine  Checo Dunham MD (Family Medicine)    Chief Complaint: Acute respiratory failure aspiration pneumonia alcohol abuse multiple issues as listed below    Interval History: Patient back on Precedex drip currently attempting to wean down Precedex drip.    REVIEW OF SYSTEMS:   No chest pain or shortness of    Ventilator/Non-Invasive Ventilation Settings (From admission, onward)     Nasal cannula oxygen    Vital Signs  Temp:  [97.6 °F (36.4 °C)-98.8 °F (37.1 °C)] 98 °F (36.7 °C)  Heart Rate:  [54-96] 84  Resp:  [16-18] 18  BP: (111-160)/(58-86) 130/62    Intake/Output Summary (Last 24 hours) at 4/20/2024 1137  Last data filed at 4/20/2024 0700  Gross per 24 hour   Intake 2207.22 ml   Output 3475 ml   Net -1267.78 ml     Flowsheet Rows      Flowsheet Row First Filed Value   Admission Height 200.7 cm (79\") Documented at 04/06/2024 1130   Admission Weight 113 kg (250 lb) Documented at 04/06/2024 1130            Physical Exam:  Patient is examined using the personal protective equipment as per guidelines from infection control for this particular patient as enacted.  Hand hygiene was performed before and after patient interaction.   General Appearance:    Alert, cooperative, in no acute distress.  Following simple commands  ENT normocephalic atraumatic  Neck midline trachea, no thyromegaly   Lungs:   Diminished breath sounds bilaterally    Heart:    Regular rhythm and normal rate, normal S1 and S2, no            murmur, no gallop, no rub, no click   Chest Wall:    No abnormalities observed   Abdomen:     Normal bowel sounds, no masses, no organomegaly, soft        nontender, nondistended, no guarding, no rebound                tenderness   Extremities:   Moves all extremities well, no edema, no cyanosis, no             " redness  CNS no focal neurological deficits normal sensory exam  Skin no rashes no nodules  Musculoskeletal no cyanosis no clubbing normal range of motion     Results Review:        Results from last 7 days   Lab Units 04/20/24  0453 04/19/24  0637 04/18/24  0613   SODIUM mmol/L 133* 132* 137   POTASSIUM mmol/L 4.6 4.0 4.4   CHLORIDE mmol/L 100 97* 102   CO2 mmol/L 20.8* 23.0 24.1   BUN mg/dL 19 13 16   CREATININE mg/dL 0.68* 0.72* 0.71*   GLUCOSE mg/dL 106* 126* 119*   CALCIUM mg/dL 9.1 8.8 9.3         Results from last 7 days   Lab Units 04/20/24  0453 04/19/24  0637 04/18/24  0613   WBC 10*3/mm3 7.80 9.38 12.01*   HEMOGLOBIN g/dL 9.9* 10.4* 9.7*   HEMATOCRIT % 28.9* 29.9* 28.5*   PLATELETS 10*3/mm3 344 385 401             Results from last 7 days   Lab Units 04/20/24  0453   MAGNESIUM mg/dL 2.1         Results from last 7 days   Lab Units 04/17/24  0321   PH, ARTERIAL pH units 7.438   PO2 ART mm Hg 71.2*   PCO2, ARTERIAL mm Hg 36.6   HCO3 ART mmol/L 24.7       I reviewed the patient's new clinical results.  I personally viewed and interpreted the patient's chest x-ray.        Medication Review:   cefTRIAXone, 2,000 mg, Intravenous, Q24H  chlorhexidine, 15 mL, Mouth/Throat, Q12H  cloNIDine, 0.4 mg, Nasogastric, Q6H  enoxaparin, 40 mg, Subcutaneous, Q24H  famotidine, 20 mg, Nasogastric, BID AC  folic acid, 1 mg, Nasogastric, Daily  hydrocortisone-bacitracin-zinc oxide-nystatin, 1 Application, Topical, BID  insulin regular, 2-9 Units, Subcutaneous, Q6H  ipratropium-albuterol, 3 mL, Nebulization, 4x Daily - RT  multivitamin and minerals, 15 mL, Nasogastric, Daily  nicotine, 1 patch, Transdermal, Q24H  QUEtiapine, 100 mg, Oral, Q12H  senna-docusate sodium, 2 tablet, Nasogastric, BID  sodium chloride, 10 mL, Intravenous, Q12H  sodium chloride, 10 mL, Intravenous, Q12H  thiamine, 100 mg, Nasogastric, Daily  vancomycin, 1,250 mg, Intravenous, Q12H        dexmedetomidine, 0.2-1.5 mcg/kg/hr, Last Rate: 0.4 mcg/kg/hr  (04/20/24 0956)  niCARdipine, 5-15 mg/hr, Last Rate: Stopped (04/19/24 0642)  Pharmacy Consult,   Pharmacy to dose vancomycin,         ASSESSMENT:   Acute hypoxemic respiratory failure  Aspiration pneumonia with H. influenzae  Alcohol abuse and dependence and withdrawal  Polysubstance abuse  LIVAN  Sepsis with shock  Fever      PLAN:  Events noted chart reviewed  Will attempt to wean off Precedex drip as tolerated.  Currently on clonidine and Seroquel.  Atelectasis versus pneumonia restarted on Rocephin to continue  Thiamine and folate to continue  Mobilize ambulate postextubation  ICU core measures      Karla Ybarra MD  04/20/24  11:37 EDT

## 2024-04-20 NOTE — PLAN OF CARE
Goal Outcome Evaluation:              Outcome Evaluation: Swallow re-eval. Pt alert and oriented to place, situation, date and aware that today is his birthday. OME completed with good command following. [place] water protocol administered with cough following with all sips of thin liquid. Also trialed NTL, puree, and mech soft solids. NTL via straw, Puree and mech soft solids completed with cough following all trials. No oral residue post swallow. NTL via cup completed without overt clinical s/s of aspiration.     Recommend continue NPO status with free water protocol (sips with no straw) or NTL via cup with oral care BID/PRN. Recommend VFSS to fully assess swallow function. Educated pt on results of re-eval and recommendation. Pt disappointed and distressed with recommendations but verbalized understanding.      Anticipated Discharge Disposition (SLP): anticipate therapy at next level of care          SLP Swallowing Diagnosis: suspected pharyngeal dysphagia (04/20/24 1030)

## 2024-04-20 NOTE — PLAN OF CARE
Goal Outcome Evaluation:      Pt extubated for 3 days on room air. TF tolerating but failed swallow study by ST today. Precedex weaned down to 0.2 CIWA no longer needed with scores 0-2  Watson Quijano RN BSN

## 2024-04-20 NOTE — NURSING NOTE
"Unhappy with speech eval and wanting a Coke today since it's my birthday. Unable to tolerate liquids without coughing. \"I thought I was supposed to cough\" ST reassured pt that this was not normal and they would wait until Monday for Swallow study in Xray. Since then pt has calmed down and less cursing after norco given for back pain. Reposition and turned. VSS. Watson Quijano RN BSN  "

## 2024-04-20 NOTE — PROGRESS NOTES
Name: Watson Lisa ADMIT: 2024   : 1955  PCP: Roc Carroll APRN    MRN: [id number] LOS: 14 days   AGE/SEX: 69 y.o. male  ROOM: [facility]     Subjective   Subjective   Patient feels well.  Complaining of anxiety and occasional visual hallucinations.  No chest pain.  No shortness of breath.  Positive occasional cough productive of clear sputum.  No hemoptysis.  No palpitation.  No ankle edema.  No fever.  Positive chills.    Review of Systems  GI.  Still NPO.  On NG tube feeds.  No abdominal pain.  No nausea or vomiting.  No bowel movement times few days.  Positive flatus.  .  No dysuria or hematuria.     Objective   Objective   Vital Signs  Temp:  [97.6 °F (36.4 °C)-98.1 °F (36.7 °C)] 98.1 °F (36.7 °C)  Heart Rate:  [54-96] 70  Resp:  [18] 18  BP: (118-160)/(58-86) 146/70  SpO2:  [93 %-98 %] 95 %  on  Flow (L/min):  [3] 3;   Device (Oxygen Therapy): room air    Intake/Output Summary (Last 24 hours) at 2024 1826  Last data filed at 2024 1730  Gross per 24 hour   Intake 1136.75 ml   Output 3925 ml   Net -2788.25 ml     Body mass index is 30.55 kg/m².      24  0557 24  0600 24  0413   Weight: 127 kg (281 lb 1.4 oz) 124 kg (272 lb 4.3 oz) 123 kg (271 lb 2.7 oz)     Physical Exam  General.  Elderly gentleman.  Overweight.  Alert and oriented x 4.  No apparent pain/distress/diaphoresis.  Normal mood and affect.  Eyes.  Pupils equal round and reactive.  Intact extraocular musculature.  No pallor or jaundice.  Oral cavity.  Moist mucous membrane.  Neck.  Supple.  No JVD.  No lymphadenopathy or thyromegaly.  Cardio vascular.  Regular rate and rhythm with no gallops or murmurs.  Chest.  Clear to auscultation bilaterally with no added sounds.  Abdomen.  Soft lax.  No tenderness.  No organomegaly.  No guarding or rebound.  Extremities..  No clubbing/cyanosis/edema.  Sequential compression devices on both lower extremity.  No upper extremity tremors.  CNS.  No acute focal  "neurological deficits.    Results Review:      Results from last 7 days   Lab Units 04/20/24  0453 04/19/24  0637 04/18/24  0613 04/17/24  0722 04/16/24  0614 04/15/24  0805 04/14/24  0408   SODIUM mmol/L 133* 132* 137 134* 136 136 137   POTASSIUM mmol/L 4.6 4.0 4.4 4.6 4.5 4.2 4.8   CHLORIDE mmol/L 100 97* 102 102 104 103 104   CO2 mmol/L 20.8* 23.0 24.1 21.9* 22.2 22.8 22.5   BUN mg/dL 19 13 16 19 22 26* 22   CREATININE mg/dL 0.68* 0.72* 0.71* 0.63* 0.63* 0.83 0.67*   GLUCOSE mg/dL 106* 126* 119* 130* 131* 133* 116*   CALCIUM mg/dL 9.1 8.8 9.3 8.6 8.6 8.6 8.8     Estimated Creatinine Clearance: 150.1 mL/min (A) (by C-G formula based on SCr of 0.68 mg/dL (L)).      Results from last 7 days   Lab Units 04/20/24  1757 04/20/24  1152 04/20/24  0635 04/20/24  0039 04/19/24  1803 04/19/24  1206 04/19/24  0038 04/18/24  1826   GLUCOSE mg/dL 131* 141* 118 126 134* 148* 132* 118                 Results from last 7 days   Lab Units 04/20/24  0453 04/19/24  0637 04/18/24  0613 04/17/24  0722 04/16/24  0614 04/15/24  0805 04/14/24  0408   MAGNESIUM mg/dL 2.1 2.1 1.8 1.8 1.8 1.9 1.9   PHOSPHORUS mg/dL 4.4 3.5 4.2 4.3 4.0 4.2 3.4           Invalid input(s): \"LDLCALC\"  Results from last 7 days   Lab Units 04/20/24  0453 04/19/24  0637 04/18/24  0613 04/17/24  0722 04/16/24  0614 04/15/24  0805 04/14/24  0408   WBC 10*3/mm3 7.80 9.38 12.01* 9.65 11.66* 10.85* 10.76   HEMOGLOBIN g/dL 9.9* 10.4* 9.7* 9.8* 9.3* 9.9* 10.4*   HEMATOCRIT % 28.9* 29.9* 28.5* 29.6* 27.5* 29.4* 31.1*   PLATELETS 10*3/mm3 344 385 401 371 413 429 509*   MCV fL 94.4 93.7 95.6 95.8 94.8 96.1 94.8   MCH pg 32.4 32.6 32.6 31.7 32.1 32.4 31.7   MCHC g/dL 34.3 34.8 34.0 33.1 33.8 33.7 33.4   RDW % 11.7* 11.7* 11.8* 11.7* 11.6* 11.9* 11.5*   RDW-SD fl 39.8 40.2 41.1 41.0 40.3 42.0 39.4   MPV fL 10.5 9.6 9.8 9.6 9.7 9.4 9.6         Results from last 7 days   Lab Units 04/17/24  0321 04/14/24  0317   PH, ARTERIAL pH units 7.438 7.437   PO2 ART mm Hg 71.2* 83.0 "   PCO2, ARTERIAL mm Hg 36.6 38.4   HCO3 ART mmol/L 24.7 25.9     Results from last 7 days   Lab Units 04/19/24  0637 04/18/24 1946   PROCALCITONIN ng/mL 0.14 0.09   LACTATE mmol/L  --  0.9             Results from last 7 days   Lab Units 04/18/24  1946   BLOODCX  No growth at 24 hours  No growth at 24 hours         Results from last 7 days   Lab Units 04/18/24  1936   NITRITE UA  Negative   WBC UA /HPF 0-2   BACTERIA UA /HPF None Seen   SQUAM EPITHEL UA /HPF 0-2           Imaging:  Imaging Results (Last 24 Hours)       ** No results found for the last 24 hours. **               I reviewed the patient's new clinical results / labs / tests / procedures      Assessment/Plan     Active Hospital Problems    Diagnosis  POA    **Alcohol withdrawal [F10.939]  Yes    Substance abuse [F19.10]  Unknown    Cocaine abuse [F14.10]  Unknown    Positive urine drug screen [R82.5]  Unknown    History of alcohol use disorder [Z87.898]  Yes    Alcoholic liver disease [K70.9]  Yes    COPD (chronic obstructive pulmonary disease) [J44.9]  Yes    LIVAN (obstructive sleep apnea) [G47.33]  Yes    Essential hypertension [I10]  Yes      Resolved Hospital Problems   No resolved problems to display.           Acute hypoxemic respiratory failure secondary to aspiration H. influenzae pneumonia patient with a history of COPD and was.  Status post endotracheal intubation and mechanical ventilation and subsequent extubation.  Still n.p.o. and on NG tube feeds.  Speech planning video swallow study.  S/p extubation.  Weaned off oxygen.  Continue Rocephin, DuoNebs..  Nicotine patch  Sepsis.  Blood cultures are negative.  MRSA screen is positive.  Currently on Rocephin and vancomycin.  Will complete a total of 5 days of the above antibiotics.  Sepsis is mostly secondary to pneumonia.  Alcohol abuse/polysubstance abuse/alcoholic withdrawal.  Being weaned off Precedex drip.  No maryann withdrawal at this time.  Will continue detoxification with  folate/thiamine/multivitamin.  Pepcid.  Hyperglycemia.  Continue sliding scale and check A1c.  Anemia.  Hemoglobin is stable.  Will workup anemia  Pretension.  Off Cardene drip.  Continue clonidine.  Will eventually wean off clonidine and institute beta-blockers.  VTE prophylaxis.  On Lovenox.  Superficial venous thrombosis of the right upper extremity.  Will initiate aspirin.      Discussed my findings and plan of treatment with the patient and ICU nurse.  Disposition.  To be determined based on clinical course.        Britney Triplett MD  [facility]  04/20/24  18:26 EDT

## 2024-04-20 NOTE — PROGRESS NOTES
[facility] Clinical Pharmacy Services: Vancomycin Level Monitoring Note    Watson Lisa is a 69 y.o. male who is on day 2 of pharmacy to dose vancomycin for  Fever .    Estimated Creatinine Clearance: 150.1 mL/min (A) (by C-G formula based on SCr of 0.68 mg/dL (L)).    Current Vanc Dose: 1250 mg IV every  12  hours  Results from last 7 days   Lab Units 04/20/24  1234   VANCOMYCIN TR mcg/mL 11.20   Predicted AUC at current dose:~400-500 mg/L.hr  Next Level Date and Time: no level scheduled unless vanc duration extended     No changes at this time. Pharmacy is continuing to monitor and will adjust as needed.    aRymon Foley [facility]  Clinical Pharmacist

## 2024-04-21 LAB
ALBUMIN SERPL-MCNC: 3.3 G/DL (ref 3.5–5.2)
ALP SERPL-CCNC: 141 U/L (ref 39–117)
ALT SERPL W P-5'-P-CCNC: 28 U/L (ref 1–41)
ANION GAP SERPL CALCULATED.3IONS-SCNC: 14 MMOL/L (ref 5–15)
AST SERPL-CCNC: 20 U/L (ref 1–40)
BASOPHILS # BLD AUTO: 0.09 10*3/MM3 (ref 0–0.2)
BASOPHILS NFR BLD AUTO: 1.2 % (ref 0–1.5)
BILIRUB CONJ SERPL-MCNC: <0.2 MG/DL (ref 0–0.3)
BILIRUB INDIRECT SERPL-MCNC: ABNORMAL MG/DL
BILIRUB SERPL-MCNC: 0.2 MG/DL (ref 0–1.2)
BUN SERPL-MCNC: 17 MG/DL (ref 8–23)
BUN/CREAT SERPL: 24.3 (ref 7–25)
CALCIUM SPEC-SCNC: 8.9 MG/DL (ref 8.6–10.5)
CHLORIDE SERPL-SCNC: 101 MMOL/L (ref 98–107)
CO2 SERPL-SCNC: 21 MMOL/L (ref 22–29)
CREAT SERPL-MCNC: 0.7 MG/DL (ref 0.76–1.27)
DEPRECATED RDW RBC AUTO: 39.8 FL (ref 37–54)
EGFRCR SERPLBLD CKD-EPI 2021: 99.1 ML/MIN/1.73
EOSINOPHIL # BLD AUTO: 0.31 10*3/MM3 (ref 0–0.4)
EOSINOPHIL NFR BLD AUTO: 4.2 % (ref 0.3–6.2)
ERYTHROCYTE [DISTWIDTH] IN BLOOD BY AUTOMATED COUNT: 11.7 % (ref 12.3–15.4)
GLUCOSE BLDC GLUCOMTR-MCNC: 105 MG/DL (ref 70–130)
GLUCOSE BLDC GLUCOMTR-MCNC: 119 MG/DL (ref 70–130)
GLUCOSE BLDC GLUCOMTR-MCNC: 119 MG/DL (ref 70–130)
GLUCOSE SERPL-MCNC: 113 MG/DL (ref 65–99)
HCT VFR BLD AUTO: 29.6 % (ref 37.5–51)
HEMOCCULT STL QL: NEGATIVE
HGB BLD-MCNC: 10.1 G/DL (ref 13–17.7)
IMM GRANULOCYTES # BLD AUTO: 0.09 10*3/MM3 (ref 0–0.05)
IMM GRANULOCYTES NFR BLD AUTO: 1.2 % (ref 0–0.5)
LYMPHOCYTES # BLD AUTO: 1.14 10*3/MM3 (ref 0.7–3.1)
LYMPHOCYTES NFR BLD AUTO: 15.4 % (ref 19.6–45.3)
MAGNESIUM SERPL-MCNC: 2.1 MG/DL (ref 1.6–2.4)
MCH RBC QN AUTO: 31.8 PG (ref 26.6–33)
MCHC RBC AUTO-ENTMCNC: 34.1 G/DL (ref 31.5–35.7)
MCV RBC AUTO: 93.1 FL (ref 79–97)
MONOCYTES # BLD AUTO: 1 10*3/MM3 (ref 0.1–0.9)
MONOCYTES NFR BLD AUTO: 13.5 % (ref 5–12)
NEUTROPHILS NFR BLD AUTO: 4.79 10*3/MM3 (ref 1.7–7)
NEUTROPHILS NFR BLD AUTO: 64.5 % (ref 42.7–76)
NRBC BLD AUTO-RTO: 0 /100 WBC (ref 0–0.2)
PHOSPHATE SERPL-MCNC: 4.1 MG/DL (ref 2.5–4.5)
PLATELET # BLD AUTO: 519 10*3/MM3 (ref 140–450)
PMV BLD AUTO: 9.5 FL (ref 6–12)
POTASSIUM SERPL-SCNC: 4.6 MMOL/L (ref 3.5–5.2)
PROT SERPL-MCNC: 6.9 G/DL (ref 6–8.5)
RBC # BLD AUTO: 3.18 10*6/MM3 (ref 4.14–5.8)
SODIUM SERPL-SCNC: 136 MMOL/L (ref 136–145)
WBC NRBC COR # BLD AUTO: 7.42 10*3/MM3 (ref 3.4–10.8)

## 2024-04-21 PROCEDURE — 80048 BASIC METABOLIC PNL TOTAL CA: CPT | Performed by: INTERNAL MEDICINE

## 2024-04-21 PROCEDURE — 97530 THERAPEUTIC ACTIVITIES: CPT

## 2024-04-21 PROCEDURE — 94664 DEMO&/EVAL PT USE INHALER: CPT

## 2024-04-21 PROCEDURE — 25010000002 ENOXAPARIN PER 10 MG: Performed by: INTERNAL MEDICINE

## 2024-04-21 PROCEDURE — 84100 ASSAY OF PHOSPHORUS: CPT | Performed by: INTERNAL MEDICINE

## 2024-04-21 PROCEDURE — 94761 N-INVAS EAR/PLS OXIMETRY MLT: CPT

## 2024-04-21 PROCEDURE — 25010000002 HYDRALAZINE PER 20 MG: Performed by: INTERNAL MEDICINE

## 2024-04-21 PROCEDURE — 97110 THERAPEUTIC EXERCISES: CPT

## 2024-04-21 PROCEDURE — 83735 ASSAY OF MAGNESIUM: CPT | Performed by: INTERNAL MEDICINE

## 2024-04-21 PROCEDURE — 25810000003 SODIUM CHLORIDE 0.9 % SOLUTION 250 ML FLEX CONT: Performed by: INTERNAL MEDICINE

## 2024-04-21 PROCEDURE — 25010000002 MORPHINE PER 10 MG: Performed by: INTERNAL MEDICINE

## 2024-04-21 PROCEDURE — 25010000002 CEFTRIAXONE PER 250 MG: Performed by: INTERNAL MEDICINE

## 2024-04-21 PROCEDURE — 82948 REAGENT STRIP/BLOOD GLUCOSE: CPT

## 2024-04-21 PROCEDURE — 94799 UNLISTED PULMONARY SVC/PX: CPT

## 2024-04-21 PROCEDURE — 25010000002 VANCOMYCIN HCL 1.25 G RECONSTITUTED SOLUTION 1 EACH VIAL: Performed by: INTERNAL MEDICINE

## 2024-04-21 PROCEDURE — 80076 HEPATIC FUNCTION PANEL: CPT | Performed by: INTERNAL MEDICINE

## 2024-04-21 PROCEDURE — 85025 COMPLETE CBC W/AUTO DIFF WBC: CPT | Performed by: INTERNAL MEDICINE

## 2024-04-21 RX ORDER — ASPIRIN 81 MG/1
81 TABLET ORAL DAILY
Status: DISCONTINUED | OUTPATIENT
Start: 2024-04-21 | End: 2024-04-24 | Stop reason: HOSPADM

## 2024-04-21 RX ORDER — METOPROLOL TARTRATE 50 MG/1
50 TABLET, FILM COATED ORAL EVERY 12 HOURS SCHEDULED
Status: DISCONTINUED | OUTPATIENT
Start: 2024-04-21 | End: 2024-04-22

## 2024-04-21 RX ORDER — METOPROLOL TARTRATE 50 MG/1
50 TABLET, FILM COATED ORAL EVERY 12 HOURS SCHEDULED
Status: DISCONTINUED | OUTPATIENT
Start: 2024-04-21 | End: 2024-04-21

## 2024-04-21 RX ORDER — LORAZEPAM 1 MG/1
1 TABLET ORAL EVERY 4 HOURS PRN
Status: DISCONTINUED | OUTPATIENT
Start: 2024-04-21 | End: 2024-04-23

## 2024-04-21 RX ADMIN — HYDRALAZINE HYDROCHLORIDE 20 MG: 20 INJECTION INTRAMUSCULAR; INTRAVENOUS at 00:09

## 2024-04-21 RX ADMIN — Medication 10 ML: at 21:14

## 2024-04-21 RX ADMIN — METOPROLOL TARTRATE 50 MG: 50 TABLET, FILM COATED ORAL at 21:13

## 2024-04-21 RX ADMIN — ASPIRIN 81 MG: 81 TABLET, COATED ORAL at 16:08

## 2024-04-21 RX ADMIN — VANCOMYCIN HYDROCHLORIDE 1250 MG: 1.25 INJECTION, POWDER, LYOPHILIZED, FOR SOLUTION INTRAVENOUS at 12:00

## 2024-04-21 RX ADMIN — IPRATROPIUM BROMIDE AND ALBUTEROL SULFATE 3 ML: .5; 3 SOLUTION RESPIRATORY (INHALATION) at 15:09

## 2024-04-21 RX ADMIN — LORAZEPAM 1 MG: 1 TABLET ORAL at 16:08

## 2024-04-21 RX ADMIN — FAMOTIDINE 20 MG: 20 TABLET, FILM COATED ORAL at 07:06

## 2024-04-21 RX ADMIN — FOLIC ACID 1 MG: 1 TABLET ORAL at 08:30

## 2024-04-21 RX ADMIN — METOPROLOL TARTRATE 2.5 MG: 1 INJECTION, SOLUTION INTRAVENOUS at 04:52

## 2024-04-21 RX ADMIN — ZINC OXIDE 1 APPLICATION: 200 OINTMENT TOPICAL at 08:33

## 2024-04-21 RX ADMIN — Medication 15 ML: at 08:30

## 2024-04-21 RX ADMIN — Medication 100 MG: at 08:30

## 2024-04-21 RX ADMIN — Medication 10 ML: at 08:30

## 2024-04-21 RX ADMIN — ENOXAPARIN SODIUM 40 MG: 100 INJECTION SUBCUTANEOUS at 11:59

## 2024-04-21 RX ADMIN — CEFTRIAXONE 2000 MG: 2 INJECTION, POWDER, FOR SOLUTION INTRAMUSCULAR; INTRAVENOUS at 17:53

## 2024-04-21 RX ADMIN — ZINC OXIDE 1 APPLICATION: 200 OINTMENT TOPICAL at 21:14

## 2024-04-21 RX ADMIN — QUETIAPINE FUMARATE 100 MG: 100 TABLET ORAL at 21:13

## 2024-04-21 RX ADMIN — IPRATROPIUM BROMIDE AND ALBUTEROL SULFATE 3 ML: .5; 3 SOLUTION RESPIRATORY (INHALATION) at 07:33

## 2024-04-21 RX ADMIN — CLONIDINE HYDROCHLORIDE 0.4 MG: 0.1 TABLET ORAL at 00:00

## 2024-04-21 RX ADMIN — HYDROCODONE BITARTRATE AND ACETAMINOPHEN 1 TABLET: 10; 325 TABLET ORAL at 10:05

## 2024-04-21 RX ADMIN — FAMOTIDINE 20 MG: 20 TABLET, FILM COATED ORAL at 16:09

## 2024-04-21 RX ADMIN — IPRATROPIUM BROMIDE AND ALBUTEROL SULFATE 3 ML: .5; 3 SOLUTION RESPIRATORY (INHALATION) at 11:26

## 2024-04-21 RX ADMIN — VANCOMYCIN HYDROCHLORIDE 1250 MG: 1.25 INJECTION, POWDER, LYOPHILIZED, FOR SOLUTION INTRAVENOUS at 00:00

## 2024-04-21 RX ADMIN — IPRATROPIUM BROMIDE AND ALBUTEROL SULFATE 3 ML: .5; 3 SOLUTION RESPIRATORY (INHALATION) at 19:30

## 2024-04-21 RX ADMIN — CLONIDINE HYDROCHLORIDE 0.4 MG: 0.1 TABLET ORAL at 12:00

## 2024-04-21 RX ADMIN — MORPHINE SULFATE 4 MG: 2 INJECTION, SOLUTION INTRAMUSCULAR; INTRAVENOUS at 04:52

## 2024-04-21 RX ADMIN — QUETIAPINE FUMARATE 100 MG: 100 TABLET ORAL at 08:30

## 2024-04-21 RX ADMIN — CLONIDINE HYDROCHLORIDE 0.4 MG: 0.1 TABLET ORAL at 07:06

## 2024-04-21 NOTE — PLAN OF CARE
Goal Outcome Evaluation:  Plan of Care Reviewed With: patient        Progress: improving  Outcome Evaluation: Pt is very pleasant this am and very eager to work with PT. He makes overall great improvement this date compared to his last several days. He performs all bed mobility with SBA/CGA, is able to stand with initial min Ax1/rwx for stability secondary to balance impairment however improves within several seconds, and amb 120' with rwx/close CGA. He has no overt LOB however is very shaky throughout, requires frequent cueing for walker safety and staying inside walker, forward flexed, does c/o ankle pain, would likely do better with tennis shoes donned at next treatment session. He then completes seated and supine ther ex and is educated on appropriate frequency of exercise completion independently throughout the day. He is mildly impulsive throughout session, coming to sit with little control to hard chair and bed, and has quick movements in<>out of bed. He is adament that he wants to go home at d/c, he reports his son is able to assist, will continue to progress and watch progress. May be appropriate for HHPT services as I expect continued improvement once out of unit.      Anticipated Discharge Disposition (PT): inpatient rehabilitation facility, home with home health, home with assist

## 2024-04-21 NOTE — PLAN OF CARE
Goal Outcome Evaluation:           Pt assessment per flowsheet. Medications per MAR. VSS, PRN metoprolol and hydralazine given for SBP>150. No acute events through shift. Labs reviewed, stable. Report to Ryan LORD, plan of care ongoing.

## 2024-04-21 NOTE — PROGRESS NOTES
Name: Watson Lisa ADMIT: 2024   : 1955  PCP: Roc Carroll APRN    MRN: [id number] LOS: 15 days   AGE/SEX: 69 y.o. male  ROOM: [facility]     Subjective   Subjective   Visual hallucination has been improving.  Anxiety has been improving.  No tremors.  No chest pain.  No shortness of breath.  Positive occasional cough productive of clear sputum.  No hemoptysis.  No palpitation.  No ankle edema.  No fever.     Review of Systems  GI.  Still NPO.  On NG tube feeds.  No abdominal pain.  No nausea or vomiting.    .  No dysuria or hematuria.     Objective   Objective   Vital Signs  Temp:  [97.6 °F (36.4 °C)-98.1 °F (36.7 °C)] 98 °F (36.7 °C)  Heart Rate:  [] 67  Resp:  [20-23] 20  BP: (121-180)/(60-92) 135/92  SpO2:  [90 %-97 %] 93 %  on  Flow (L/min):  [3] 3;   Device (Oxygen Therapy): room air    Intake/Output Summary (Last 24 hours) at 2024 1516  Last data filed at 2024 1200  Gross per 24 hour   Intake 1771 ml   Output 2525 ml   Net -754 ml     Body mass index is 29.73 kg/m².      24  0600 24  0413 24  0500   Weight: 124 kg (272 lb 4.3 oz) 123 kg (271 lb 2.7 oz) 120 kg (263 lb 14.3 oz)     Physical Exam  General.  Elderly gentleman.  Overweight.  Alert and oriented x 4.  No apparent pain/distress/diaphoresis.  Normal mood and affect.  Eyes.  Pupils equal round and reactive.  Intact extraocular musculature.  No pallor or jaundice.  Oral cavity.  Moist mucous membrane.  Neck.  Supple.  No JVD.  No lymphadenopathy or thyromegaly.  Cardio vascular.  Regular rate and rhythm with no gallops or murmurs.  Chest.  Clear to auscultation bilaterally with no added sounds.  Abdomen.  Soft lax.  No tenderness.  No organomegaly.  No guarding or rebound.  Extremities..  No clubbing/cyanosis/edema.  Sequential compression devices on both lower extremity.  No upper extremity tremors.  CNS.  No acute focal neurological deficits.    Results Review:      Results from last 7 days  "  Lab Units 04/21/24  0554 04/20/24  0453 04/19/24  0637 04/18/24  0613 04/17/24  0722 04/16/24  0614 04/15/24  0805   SODIUM mmol/L 136 133* 132* 137 134* 136 136   POTASSIUM mmol/L 4.6 4.6 4.0 4.4 4.6 4.5 4.2   CHLORIDE mmol/L 101 100 97* 102 102 104 103   CO2 mmol/L 21.0* 20.8* 23.0 24.1 21.9* 22.2 22.8   BUN mg/dL 17 19 13 16 19 22 26*   CREATININE mg/dL 0.70* 0.68* 0.72* 0.71* 0.63* 0.63* 0.83   GLUCOSE mg/dL 113* 106* 126* 119* 130* 131* 133*   CALCIUM mg/dL 8.9 9.1 8.8 9.3 8.6 8.6 8.6   AST (SGOT) U/L 20  --   --   --   --   --   --    ALT (SGPT) U/L 28  --   --   --   --   --   --      Estimated Creatinine Clearance: 144.4 mL/min (A) (by C-G formula based on SCr of 0.7 mg/dL (L)).  Results from last 7 days   Lab Units 04/20/24  1911   HEMOGLOBIN A1C % 5.80*     Results from last 7 days   Lab Units 04/21/24  1129 04/21/24  0553 04/20/24  2335 04/20/24  1757 04/20/24  1152 04/20/24  0635 04/20/24  0039 04/19/24  1803   GLUCOSE mg/dL 105 119 119 131* 141* 118 126 134*                 Results from last 7 days   Lab Units 04/21/24  0554 04/20/24  0453 04/19/24  0637 04/18/24  0613 04/17/24  0722 04/16/24  0614 04/15/24  0805   MAGNESIUM mg/dL 2.1 2.1 2.1 1.8 1.8 1.8 1.9   PHOSPHORUS mg/dL 4.1 4.4 3.5 4.2 4.3 4.0 4.2           Invalid input(s): \"LDLCALC\"  Results from last 7 days   Lab Units 04/21/24  0554 04/20/24  0453 04/19/24  0637 04/18/24  0613 04/17/24  0722 04/16/24  0614 04/15/24  0805   WBC 10*3/mm3 7.42 7.80 9.38 12.01* 9.65 11.66* 10.85*   HEMOGLOBIN g/dL 10.1* 9.9* 10.4* 9.7* 9.8* 9.3* 9.9*   HEMATOCRIT % 29.6* 28.9* 29.9* 28.5* 29.6* 27.5* 29.4*   PLATELETS 10*3/mm3 519* 344 385 401 371 413 429   MCV fL 93.1 94.4 93.7 95.6 95.8 94.8 96.1   MCH pg 31.8 32.4 32.6 32.6 31.7 32.1 32.4   MCHC g/dL 34.1 34.3 34.8 34.0 33.1 33.8 33.7   RDW % 11.7* 11.7* 11.7* 11.8* 11.7* 11.6* 11.9*   RDW-SD fl 39.8 39.8 40.2 41.1 41.0 40.3 42.0   MPV fL 9.5 10.5 9.6 9.8 9.6 9.7 9.4   NEUTROPHIL % % 64.5  --   --   --   --  "  --   --    LYMPHOCYTE % % 15.4*  --   --   --   --   --   --    MONOCYTES % % 13.5*  --   --   --   --   --   --    EOSINOPHIL % % 4.2  --   --   --   --   --   --    BASOPHIL % % 1.2  --   --   --   --   --   --    IMM GRAN % % 1.2*  --   --   --   --   --   --    NEUTROS ABS 10*3/mm3 4.79  --   --   --   --   --   --    LYMPHS ABS 10*3/mm3 1.14  --   --   --   --   --   --    MONOS ABS 10*3/mm3 1.00*  --   --   --   --   --   --    EOS ABS 10*3/mm3 0.31  --   --   --   --   --   --    BASOS ABS 10*3/mm3 0.09  --   --   --   --   --   --    IMMATURE GRANS (ABS) 10*3/mm3 0.09*  --   --   --   --   --   --    NRBC /100 WBC 0.0  --   --   --   --   --   --          Results from last 7 days   Lab Units 04/17/24  0321   PH, ARTERIAL pH units 7.438   PO2 ART mm Hg 71.2*   PCO2, ARTERIAL mm Hg 36.6   HCO3 ART mmol/L 24.7     Results from last 7 days   Lab Units 04/19/24  0637 04/18/24 1946   PROCALCITONIN ng/mL 0.14 0.09   LACTATE mmol/L  --  0.9             Results from last 7 days   Lab Units 04/18/24  1946   BLOODCX  No growth at 2 days  No growth at 2 days         Results from last 7 days   Lab Units 04/18/24  1936   NITRITE UA  Negative   WBC UA /HPF 0-2   BACTERIA UA /HPF None Seen   SQUAM EPITHEL UA /HPF 0-2           Imaging:  Imaging Results (Last 24 Hours)       ** No results found for the last 24 hours. **               I reviewed the patient's new clinical results / labs / tests / procedures      Assessment/Plan     Active Hospital Problems    Diagnosis  POA    **Alcohol withdrawal [F10.939]  Yes    Aspiration pneumonia [J69.0]  Yes    Substance abuse [F19.10]  Yes    Cocaine abuse [F14.10]  Yes    Positive urine drug screen [R82.5]  Yes    History of alcohol use disorder [Z87.898]  Yes    Alcoholic liver disease [K70.9]  Yes    COPD (chronic obstructive pulmonary disease) [J44.9]  Yes    LIVAN (obstructive sleep apnea) [G47.33]  Yes    Essential hypertension [I10]  Yes      Resolved Hospital Problems   No  resolved problems to display.           Acute hypoxemic respiratory failure secondary to aspiration H. influenzae pneumonia patient with a history of COPD   Status post endotracheal intubation and mechanical ventilation and subsequent extubation.  Still n.p.o. and on NG tube feeds.  Speech planning video swallow study.  .  Weaned off oxygen.  Continue Rocephin, DuoNebs..    Sepsis.  Blood cultures are negative.  MRSA screen is positive.  Currently on Rocephin and vancomycin.  Will complete a total of 5 days of the above antibiotics.  Sepsis is mostly secondary to pneumonia.  Sepsis indices are resolved  Alcohol abuse/polysubstance abuse/alcoholic withdrawal.  off Precedex drip.  No maryann withdrawal at this time.  Will continue detoxification with folate/thiamine/multivitamin/Ativan.  Continue Pepcid.  Prediabetes.  A1c 5.8.  Diet at discharge.  Continue sliding scale.   Chronic disease anemia.  Hemoccult stool negative.  Anemia workup noted.  Hemoglobin stable.    Hypertension..  Off Cardene drip.  Blood pressure control with no evidence of angina or congestive heart failure.  Will stop clonidine and initiate Lopressor.    VTE prophylaxis.  On Lovenox.  Superficial venous thrombosis of the right upper extremity.  Aspirin and prophylactic Lovenox.    Discussed my findings and plan of treatment with the patient.  Disposition.  To be determined based on clinical course.  PT evaluation recommends home health versus acute rehab.        Britney Triplett MD  [facility]  04/21/24  15:16 EDT

## 2024-04-21 NOTE — THERAPY TREATMENT NOTE
Patient Name: Watson Lisa  : 1955    MRN: [id number]                              Today's Date: 2024       Admit Date: 2024    Visit Dx:     ICD-10-CM ICD-9-CM   1. Alcohol withdrawal syndrome without complication  F10.930 291.81   2. Hypomagnesemia  E83.42 275.2     Patient Active Problem List   Diagnosis    Essential hypertension    Tobacco abuse    Hiatal hernia    Effusion of left knee    Osteoarthritis of left knee    Vitamin D deficiency    Anemia, chronic disease    COPD (chronic obstructive pulmonary disease)    LIVAN (obstructive sleep apnea)    ETOH abuse    Obese    Alcoholic liver disease    History of alcohol use disorder    Left patella fracture    Need for hepatitis C screening test    Chronic pain of left ankle    Primary insomnia    Overweight (BMI 25.0-29.9)    Mixed hyperlipidemia    Acute alcohol intoxication    Hypomagnesemia    Alcohol withdrawal    Substance abuse    Cocaine abuse    Positive urine drug screen    Aspiration pneumonia     Past Medical History:   Diagnosis Date    Alcohol abuse     Anxiety     Arthritis     Bronchitis with chronic airway obstruction     LAST FEB    DVT (deep venous thrombosis)     Hiatal hernia     Hypertension     Hypertension     Low back pain     Neck pain     Pulmonary embolism     Sleep apnea with use of continuous positive airway pressure (CPAP)     AT NIGHT     Past Surgical History:   Procedure Laterality Date    COLONOSCOPY      JOINT REPLACEMENT Right     hip/knee    REPLACEMENT TOTAL KNEE      TONSILLECTOMY      TOTAL HIP ARTHROPLASTY Right       General Information       Row Name 24 1300          Physical Therapy Time and Intention    Document Type therapy note (daily note)  -MO     Mode of Treatment physical therapy  -MO       Row Name 24 1300          General Information    Patient Profile Reviewed yes  -MO     Existing Precautions/Restrictions fall  -MO       Row Name 24 1300          Cognition    Orientation  Status (Cognition) oriented x 4  -MO       Row Name 04/21/24 1300          Safety Issues, Functional Mobility    Impairments Affecting Function (Mobility) balance;endurance/activity tolerance;pain;strength  -MO     Comment, Safety Issues/Impairments (Mobility) Pt impulsive throughout session, frequent cueing for safety with movements  -MO               User Key  (r) = Recorded By, (t) = Taken By, (c) = Cosigned By      Initials Name Provider Type    Keturah Manrique PT Physical Therapist                   Mobility       Row Name 04/21/24 1301          Bed Mobility    Supine-Sit [place] (Bed Mobility) contact guard;standby assist;1 person assist  -MO     Sit-Supine [place] (Bed Mobility) contact guard;standby assist;1 person assist  -MO     Assistive Device (Bed Mobility) bed rails  -MO       Row Name 04/21/24 1301          Sit-Stand Transfer    Sit-Stand [place] (Transfers) contact guard;minimum assist (75% patient effort);1 person assist;verbal cues  -MO     Assistive Device (Sit-Stand Transfers) walker, front-wheeled  -MO     Comment, (Sit-Stand Transfer) Elevated bed secondary to pt height, cueing for proper set up, min A initially in upright standing seconary to balance  -MO       Row Name 04/21/24 1301          Gait/Stairs (Locomotion)    [place] Level (Gait) contact guard;1 person assist  -MO     Assistive Device (Gait) walker, front-wheeled  -MO     Patient was able to Ambulate yes  -MO     Distance in Feet (Gait) 120  -MO     Deviations/Abnormal Patterns (Gait) gait speed decreased;stride length decreased;base of support, wide  -MO     Bilateral Gait Deviations forward flexed posture  -MO     Comment, (Gait/Stairs) Pt amb 120' with rwx, frequent cueing to stay within walker for safety, very shaky throughout, however no overt LOB  -MO               User Key  (r) = Recorded By, (t) = Taken By, (c) = Cosigned By      Initials Name Provider Type    Keturah Manrique PT Physical Therapist                    Obj/Interventions       Row Name 04/21/24 1303          Motor Skills    Therapeutic Exercise --  1x10 BL- seated march, resisted LAQ with resisted hamstring curl. 1x5 BL supine SLR, supine march, supine straight leg hip abduction  -MO       Row Name 04/21/24 1303          Balance    Static Sitting Balance standby assist  -MO     Dynamic Sitting Balance standby assist;contact guard  -MO     Position, Sitting Balance sitting edge of bed  -MO     Static Standing Balance contact guard;1-person assist  -MO     Dynamic Standing Balance contact guard;1-person assist  -MO     Position/Device Used, Standing Balance walker, front-wheeled  -MO     Comment, Balance No overt LOB, shaky throughout session, impuslive movements  -MO               User Key  (r) = Recorded By, (t) = Taken By, (c) = Cosigned By      Initials Name Provider Type    Keturah Manrique, PT Physical Therapist                   Goals/Plan    No documentation.                  Clinical Impression       Row Name 04/21/24 1301          Pain    Additional Documentation Pain Scale: FACES Pre/Post-Treatment (Group)  -MO       Row Name 04/21/24 1303          Pain Scale: FACES Pre/Post-Treatment    Pain: FACES Scale, Pretreatment 0-->no hurt  -MO     Posttreatment Pain Rating 2-->hurts little bit  -MO     Pre/Posttreatment Pain Comment Pt reports pain in L ankle with amb  -MO       Row Name 04/21/24 1302          Plan of Care Review    Plan of Care Reviewed With patient  -MO     Progress improving  -MO     Outcome Evaluation Pt is very pleasant this am and very eager to work with PT. He makes overall great improvement this date compared to his last several days. He performs all bed mobility with SBA/CGA, is able to stand with initial min Ax1/rwx for stability secondary to balance impairment however improves within several seconds, and amb 120' with rwx/close CGA. He has no overt LOB however is very shaky throughout, requires frequent cueing for walker  safety and staying inside walker, forward flexed, does c/o ankle pain, would likely do better with tennis shoes donned at next treatment session. He then completes seated and supine ther ex and is educated on appropriate frequency of exercise completion independently throughout the day. He is mildly impulsive throughout session, coming to sit with little control to hard chair and bed, and has quick movements in<>out of bed. He is adament that he wants to go home at d/c, he reports his son is able to assist, will continue to progress and watch progress. May be appropriate for HHPT services as I expect continued improvement once out of unit.  -MO       Row Name 04/21/24 1305          Therapy Assessment/Plan (PT)    Rehab Potential (PT) good, to achieve stated therapy goals  -MO     Criteria for Skilled Interventions Met (PT) yes;skilled treatment is necessary  -MO     Therapy Frequency (PT) 6 times/wk  -MO       Row Name 04/21/24 1305          Positioning and Restraints    Pre-Treatment Position in bed  -MO     Post Treatment Position bed  -MO     In Bed notified nsg;supine;call light within reach;encouraged to call for assist;exit alarm on  -MO               User Key  (r) = Recorded By, (t) = Taken By, (c) = Cosigned By      Initials Name Provider Type    Keturah Manrique, PT Physical Therapist                   Outcome Measures       Row Name 04/21/24 1311          How much help from another person do you currently need...    Turning from your back to your side while in flat bed without using bedrails? 4  -MO     Moving from lying on back to sitting on the side of a flat bed without bedrails? 4  -MO     Moving to and from a bed to a chair (including a wheelchair)? 3  -MO     Standing up from a chair using your arms (e.g., wheelchair, bedside chair)? 3  -MO     Climbing 3-5 steps with a railing? 3  -MO     To walk in hospital room? 3  -MO     AM-PAC 6 Clicks Score (PT) 20  -MO     Highest Level of Mobility Goal 6 -->  Walk 10 steps or more  -MO       Row Name 04/21/24 1311          Functional Assessment    Outcome Measure Options AM-PAC 6 Clicks Basic Mobility (PT)  -MO               User Key  (r) = Recorded By, (t) = Taken By, (c) = Cosigned By      Initials Name Provider Type    Keturah Manrique, PT Physical Therapist                                 Physical Therapy Education       Title: PT OT SLP Therapies (In Progress)       Topic: Physical Therapy (Done)       Point: Mobility training (Done)       Learning Progress Summary             Patient Acceptance, E, VU by MO at 4/21/2024 1311    Acceptance, E, NR by EM at 4/18/2024 1109                         Point: Home exercise program (Done)       Learning Progress Summary             Patient Acceptance, E, VU by MO at 4/21/2024 1311                         Point: Body mechanics (Done)       Learning Progress Summary             Patient Acceptance, E, VU by MO at 4/21/2024 1311                         Point: Precautions (Done)       Learning Progress Summary             Patient Acceptance, E, VU by MO at 4/21/2024 1311                                         User Key       Initials Effective Dates Name Provider Type Discipline    EM 06/16/21 -  Pooja Carrington PT Physical Therapist PT    MO 05/26/23 -  Keturah Funes PT Physical Therapist PT                  PT Recommendation and Plan     Plan of Care Reviewed With: patient  Progress: improving  Outcome Evaluation: Pt is very pleasant this am and very eager to work with PT. He makes overall great improvement this date compared to his last several days. He performs all bed mobility with SBA/CGA, is able to stand with initial min Ax1/rwx for stability secondary to balance impairment however improves within several seconds, and amb 120' with rwx/close CGA. He has no overt LOB however is very shaky throughout, requires frequent cueing for walker safety and staying inside walker, forward flexed, does c/o ankle pain, would likely  do better with tennis shoes donned at next treatment session. He then completes seated and supine ther ex and is educated on appropriate frequency of exercise completion independently throughout the day. He is mildly impulsive throughout session, coming to sit with little control to hard chair and bed, and has quick movements in<>out of bed. He is adament that he wants to go home at d/c, he reports his son is able to assist, will continue to progress and watch progress. May be appropriate for HHPT services as I expect continued improvement once out of unit.     Time Calculation:         PT Charges       Row Name 04/21/24 1312             Time Calculation    Start Time 1017  -MO      Stop Time 1052  -MO      Time Calculation (min) 35 min  -MO      PT Received On 04/21/24  -MO      PT - Next Appointment 04/22/24  -MO         Time Calculation- PT    Total Timed Code Minutes- PT 35 minute(s)  -MO         Timed Charges    58400 - PT Therapeutic Exercise Minutes 10  -MO      33166 - PT Therapeutic Activity Minutes 25  -MO         Total Minutes    Timed Charges Total Minutes 35  -MO       Total Minutes 35  -MO                User Key  (r) = Recorded By, (t) = Taken By, (c) = Cosigned By      Initials Name Provider Type    Keturah Manrique PT Physical Therapist                  Therapy Charges for Today       Code Description Service Date Service Provider Modifiers Qty    22474104428 HC PT THER PROC EA 15 MIN 4/21/2024 Keturah Funes, PT GP 1    22924960730 HC PT THERAPEUTIC ACT EA 15 MIN 4/21/2024 Keturah Funes PT GP 1            PT G-Codes  Outcome Measure Options: AM-PAC 6 Clicks Basic Mobility (PT)  AM-PAC 6 Clicks Score (PT): 20  AM-PAC 6 Clicks Score (OT): 11  PT Discharge Summary  Anticipated Discharge Disposition (PT): inpatient rehabilitation facility, home with home health, home with assist    Keturah Funes PT  4/21/2024

## 2024-04-21 NOTE — PROGRESS NOTES
"      [facility]         Dr Acosta Sayied   LOS: 15 days   Patient Care Team:  Roc Carroll APRN as PCP - General (Nurse Practitioner)  Checo Dunham MD as PCP - Family Medicine  Checo Dunham MD (Family Medicine)    Chief Complaint: Acute respiratory failure aspiration pneumonia alcohol abuse multiple issues as listed below    Interval History: Resting comfortably no overnight issues reported.  Patient currently off Precedex drip    REVIEW OF SYSTEMS:   No chest pain or shortness of    Ventilator/Non-Invasive Ventilation Settings (From admission, onward)     Nasal cannula oxygen    Vital Signs  Temp:  [97.6 °F (36.4 °C)-98.1 °F (36.7 °C)] 97.7 °F (36.5 °C)  Heart Rate:  [] 98  Resp:  [18-23] 22  BP: (118-180)/(58-89) 138/73    Intake/Output Summary (Last 24 hours) at 4/21/2024 1025  Last data filed at 4/21/2024 0800  Gross per 24 hour   Intake 1661 ml   Output 3575 ml   Net -1914 ml     Flowsheet Rows      Flowsheet Row First Filed Value   Admission Height 200.7 cm (79\") Documented at 04/06/2024 1130   Admission Weight 113 kg (250 lb) Documented at 04/06/2024 1130            Physical Exam:  Patient is examined using the personal protective equipment as per guidelines from infection control for this particular patient as enacted.  Hand hygiene was performed before and after patient interaction.   General Appearance:    Alert, cooperative, in no acute distress.  Following simple commands  ENT normocephalic atraumatic  Neck midline trachea, no thyromegaly   Lungs:   Diminished breath sounds bilaterally    Heart:    Regular rhythm and normal rate, normal S1 and S2, no            murmur, no gallop, no rub, no click   Chest Wall:    No abnormalities observed   Abdomen:     Normal bowel sounds, no masses, no organomegaly, soft        nontender, nondistended, no guarding, no rebound                tenderness   Extremities:   Moves all extremities well, no edema, no cyanosis, no           "   redness  CNS no focal neurological deficits normal sensory exam  Skin no rashes no nodules  Musculoskeletal no cyanosis no clubbing normal range of motion     Results Review:        Results from last 7 days   Lab Units 04/21/24  0554 04/20/24  0453 04/19/24  0637   SODIUM mmol/L 136 133* 132*   POTASSIUM mmol/L 4.6 4.6 4.0   CHLORIDE mmol/L 101 100 97*   CO2 mmol/L 21.0* 20.8* 23.0   BUN mg/dL 17 19 13   CREATININE mg/dL 0.70* 0.68* 0.72*   GLUCOSE mg/dL 113* 106* 126*   CALCIUM mg/dL 8.9 9.1 8.8         Results from last 7 days   Lab Units 04/21/24  0554 04/20/24  0453 04/19/24  0637   WBC 10*3/mm3 7.42 7.80 9.38   HEMOGLOBIN g/dL 10.1* 9.9* 10.4*   HEMATOCRIT % 29.6* 28.9* 29.9*   PLATELETS 10*3/mm3 519* 344 385             Results from last 7 days   Lab Units 04/21/24  0554   MAGNESIUM mg/dL 2.1         Results from last 7 days   Lab Units 04/17/24  0321   PH, ARTERIAL pH units 7.438   PO2 ART mm Hg 71.2*   PCO2, ARTERIAL mm Hg 36.6   HCO3 ART mmol/L 24.7       I reviewed the patient's new clinical results.  I personally viewed and interpreted the patient's chest x-ray.        Medication Review:   cefTRIAXone, 2,000 mg, Intravenous, Q24H  chlorhexidine, 15 mL, Mouth/Throat, Q12H  cloNIDine, 0.4 mg, Nasogastric, Q6H  enoxaparin, 40 mg, Subcutaneous, Q24H  famotidine, 20 mg, Nasogastric, BID AC  folic acid, 1 mg, Nasogastric, Daily  hydrocortisone-bacitracin-zinc oxide-nystatin, 1 Application, Topical, BID  insulin regular, 2-9 Units, Subcutaneous, Q6H  ipratropium-albuterol, 3 mL, Nebulization, 4x Daily - RT  multivitamin and minerals, 15 mL, Nasogastric, Daily  nicotine, 1 patch, Transdermal, Q24H  QUEtiapine, 100 mg, Oral, Q12H  senna-docusate sodium, 2 tablet, Nasogastric, BID  sodium chloride, 10 mL, Intravenous, Q12H  thiamine, 100 mg, Nasogastric, Daily  vancomycin, 1,250 mg, Intravenous, Q12H        dexmedetomidine, 0.2-1.5 mcg/kg/hr, Last Rate: Stopped (04/20/24 7705)  Pharmacy Consult,   Pharmacy to  dose vancomycin,         ASSESSMENT:   Acute hypoxemic respiratory failure  Aspiration pneumonia with H. influenzae  Alcohol abuse and dependence and withdrawal  Polysubstance abuse  LIVAN  Sepsis with shock  Fever      PLAN:  Clinically stable and improving.  Off Precedex drip and tolerating well.  Currently on clonidine and Seroquel.  Atelectasis versus pneumonia restarted on Rocephin to continue  Thiamine and folate to continue  Mobilize ambulate  Hospitalist following on the floor  Transfer patient out of the ICU      Karla Ybarra MD  04/21/24  10:25 EDT

## 2024-04-22 ENCOUNTER — APPOINTMENT (OUTPATIENT)
Dept: GENERAL RADIOLOGY | Facility: HOSPITAL | Age: 69
End: 2024-04-22
Payer: COMMERCIAL

## 2024-04-22 PROBLEM — D75.839 THROMBOCYTOSIS: Status: ACTIVE | Noted: 2024-04-22

## 2024-04-22 LAB
ANION GAP SERPL CALCULATED.3IONS-SCNC: 13.7 MMOL/L (ref 5–15)
BASOPHILS # BLD AUTO: 0.12 10*3/MM3 (ref 0–0.2)
BASOPHILS NFR BLD AUTO: 1.4 % (ref 0–1.5)
BUN SERPL-MCNC: 16 MG/DL (ref 8–23)
BUN/CREAT SERPL: 24.6 (ref 7–25)
CALCIUM SPEC-SCNC: 9.6 MG/DL (ref 8.6–10.5)
CHLORIDE SERPL-SCNC: 102 MMOL/L (ref 98–107)
CO2 SERPL-SCNC: 21.3 MMOL/L (ref 22–29)
CREAT SERPL-MCNC: 0.65 MG/DL (ref 0.76–1.27)
DEPRECATED RDW RBC AUTO: 40.6 FL (ref 37–54)
EGFRCR SERPLBLD CKD-EPI 2021: 101.4 ML/MIN/1.73
EOSINOPHIL # BLD AUTO: 0.45 10*3/MM3 (ref 0–0.4)
EOSINOPHIL NFR BLD AUTO: 5.2 % (ref 0.3–6.2)
ERYTHROCYTE [DISTWIDTH] IN BLOOD BY AUTOMATED COUNT: 11.8 % (ref 12.3–15.4)
GLUCOSE BLDC GLUCOMTR-MCNC: 116 MG/DL (ref 70–130)
GLUCOSE BLDC GLUCOMTR-MCNC: 116 MG/DL (ref 70–130)
GLUCOSE BLDC GLUCOMTR-MCNC: 119 MG/DL (ref 70–130)
GLUCOSE BLDC GLUCOMTR-MCNC: 123 MG/DL (ref 70–130)
GLUCOSE SERPL-MCNC: 112 MG/DL (ref 65–99)
HCT VFR BLD AUTO: 32.4 % (ref 37.5–51)
HGB BLD-MCNC: 11.2 G/DL (ref 13–17.7)
IMM GRANULOCYTES # BLD AUTO: 0.09 10*3/MM3 (ref 0–0.05)
IMM GRANULOCYTES NFR BLD AUTO: 1 % (ref 0–0.5)
LYMPHOCYTES # BLD AUTO: 1.53 10*3/MM3 (ref 0.7–3.1)
LYMPHOCYTES NFR BLD AUTO: 17.6 % (ref 19.6–45.3)
MAGNESIUM SERPL-MCNC: 2 MG/DL (ref 1.6–2.4)
MCH RBC QN AUTO: 32.6 PG (ref 26.6–33)
MCHC RBC AUTO-ENTMCNC: 34.6 G/DL (ref 31.5–35.7)
MCV RBC AUTO: 94.2 FL (ref 79–97)
MONOCYTES # BLD AUTO: 0.98 10*3/MM3 (ref 0.1–0.9)
MONOCYTES NFR BLD AUTO: 11.3 % (ref 5–12)
NEUTROPHILS NFR BLD AUTO: 5.51 10*3/MM3 (ref 1.7–7)
NEUTROPHILS NFR BLD AUTO: 63.5 % (ref 42.7–76)
NRBC BLD AUTO-RTO: 0 /100 WBC (ref 0–0.2)
PHOSPHATE SERPL-MCNC: 4 MG/DL (ref 2.5–4.5)
PLATELET # BLD AUTO: 563 10*3/MM3 (ref 140–450)
PMV BLD AUTO: 9.4 FL (ref 6–12)
POTASSIUM SERPL-SCNC: 4 MMOL/L (ref 3.5–5.2)
RBC # BLD AUTO: 3.44 10*6/MM3 (ref 4.14–5.8)
SODIUM SERPL-SCNC: 137 MMOL/L (ref 136–145)
WBC NRBC COR # BLD AUTO: 8.68 10*3/MM3 (ref 3.4–10.8)

## 2024-04-22 PROCEDURE — 94799 UNLISTED PULMONARY SVC/PX: CPT

## 2024-04-22 PROCEDURE — 74230 X-RAY XM SWLNG FUNCJ C+: CPT

## 2024-04-22 PROCEDURE — 25010000002 HYDRALAZINE PER 20 MG: Performed by: INTERNAL MEDICINE

## 2024-04-22 PROCEDURE — 25010000002 CEFTRIAXONE PER 250 MG: Performed by: INTERNAL MEDICINE

## 2024-04-22 PROCEDURE — 84100 ASSAY OF PHOSPHORUS: CPT | Performed by: INTERNAL MEDICINE

## 2024-04-22 PROCEDURE — 97110 THERAPEUTIC EXERCISES: CPT

## 2024-04-22 PROCEDURE — 25810000003 SODIUM CHLORIDE 0.9 % SOLUTION 250 ML FLEX CONT: Performed by: INTERNAL MEDICINE

## 2024-04-22 PROCEDURE — 92611 MOTION FLUOROSCOPY/SWALLOW: CPT

## 2024-04-22 PROCEDURE — 85025 COMPLETE CBC W/AUTO DIFF WBC: CPT | Performed by: INTERNAL MEDICINE

## 2024-04-22 PROCEDURE — 80048 BASIC METABOLIC PNL TOTAL CA: CPT | Performed by: INTERNAL MEDICINE

## 2024-04-22 PROCEDURE — 25010000002 VANCOMYCIN HCL 1.25 G RECONSTITUTED SOLUTION 1 EACH VIAL: Performed by: INTERNAL MEDICINE

## 2024-04-22 PROCEDURE — 94664 DEMO&/EVAL PT USE INHALER: CPT

## 2024-04-22 PROCEDURE — 97535 SELF CARE MNGMENT TRAINING: CPT

## 2024-04-22 PROCEDURE — 83735 ASSAY OF MAGNESIUM: CPT | Performed by: INTERNAL MEDICINE

## 2024-04-22 PROCEDURE — 82948 REAGENT STRIP/BLOOD GLUCOSE: CPT

## 2024-04-22 PROCEDURE — 25010000002 ENOXAPARIN PER 10 MG: Performed by: INTERNAL MEDICINE

## 2024-04-22 PROCEDURE — 94761 N-INVAS EAR/PLS OXIMETRY MLT: CPT

## 2024-04-22 RX ORDER — MULTIPLE VITAMINS W/ MINERALS TAB 9MG-400MCG
1 TAB ORAL DAILY
Status: DISCONTINUED | OUTPATIENT
Start: 2024-04-23 | End: 2024-04-24 | Stop reason: HOSPADM

## 2024-04-22 RX ORDER — METOPROLOL TARTRATE 50 MG/1
50 TABLET, FILM COATED ORAL EVERY 12 HOURS SCHEDULED
Status: DISCONTINUED | OUTPATIENT
Start: 2024-04-22 | End: 2024-04-22

## 2024-04-22 RX ORDER — FOLIC ACID 1 MG/1
1 TABLET ORAL DAILY
Status: DISCONTINUED | OUTPATIENT
Start: 2024-04-23 | End: 2024-04-24 | Stop reason: HOSPADM

## 2024-04-22 RX ORDER — FAMOTIDINE 20 MG/1
20 TABLET, FILM COATED ORAL
Status: DISCONTINUED | OUTPATIENT
Start: 2024-04-22 | End: 2024-04-24 | Stop reason: HOSPADM

## 2024-04-22 RX ADMIN — QUETIAPINE FUMARATE 100 MG: 100 TABLET ORAL at 09:23

## 2024-04-22 RX ADMIN — IPRATROPIUM BROMIDE AND ALBUTEROL SULFATE 3 ML: .5; 3 SOLUTION RESPIRATORY (INHALATION) at 06:33

## 2024-04-22 RX ADMIN — CEFTRIAXONE 2000 MG: 2 INJECTION, POWDER, FOR SOLUTION INTRAMUSCULAR; INTRAVENOUS at 17:40

## 2024-04-22 RX ADMIN — FAMOTIDINE 20 MG: 20 TABLET, FILM COATED ORAL at 06:30

## 2024-04-22 RX ADMIN — METOPROLOL TARTRATE 2.5 MG: 1 INJECTION, SOLUTION INTRAVENOUS at 12:10

## 2024-04-22 RX ADMIN — IPRATROPIUM BROMIDE AND ALBUTEROL SULFATE 3 ML: .5; 3 SOLUTION RESPIRATORY (INHALATION) at 10:56

## 2024-04-22 RX ADMIN — ZINC OXIDE 1 APPLICATION: 200 OINTMENT TOPICAL at 20:48

## 2024-04-22 RX ADMIN — QUETIAPINE FUMARATE 100 MG: 100 TABLET ORAL at 22:38

## 2024-04-22 RX ADMIN — FOLIC ACID 1 MG: 1 TABLET ORAL at 09:23

## 2024-04-22 RX ADMIN — METOPROLOL TARTRATE 2.5 MG: 1 INJECTION, SOLUTION INTRAVENOUS at 01:11

## 2024-04-22 RX ADMIN — BARIUM SULFATE 1 TEASPOON(S): 0.6 CREAM ORAL at 08:35

## 2024-04-22 RX ADMIN — VANCOMYCIN HYDROCHLORIDE 1250 MG: 1.25 INJECTION, POWDER, LYOPHILIZED, FOR SOLUTION INTRAVENOUS at 22:38

## 2024-04-22 RX ADMIN — ZINC OXIDE 1 APPLICATION: 200 OINTMENT TOPICAL at 09:23

## 2024-04-22 RX ADMIN — Medication 100 MG: at 09:23

## 2024-04-22 RX ADMIN — BARIUM SULFATE 4 ML: 980 POWDER, FOR SUSPENSION ORAL at 08:35

## 2024-04-22 RX ADMIN — FAMOTIDINE 20 MG: 20 TABLET, FILM COATED ORAL at 17:40

## 2024-04-22 RX ADMIN — IPRATROPIUM BROMIDE AND ALBUTEROL SULFATE 3 ML: .5; 3 SOLUTION RESPIRATORY (INHALATION) at 22:29

## 2024-04-22 RX ADMIN — METOPROLOL TARTRATE 50 MG: 50 TABLET, FILM COATED ORAL at 09:23

## 2024-04-22 RX ADMIN — VANCOMYCIN HYDROCHLORIDE 1250 MG: 1.25 INJECTION, POWDER, LYOPHILIZED, FOR SOLUTION INTRAVENOUS at 00:21

## 2024-04-22 RX ADMIN — VANCOMYCIN HYDROCHLORIDE 1250 MG: 1.25 INJECTION, POWDER, LYOPHILIZED, FOR SOLUTION INTRAVENOUS at 10:53

## 2024-04-22 RX ADMIN — HYDRALAZINE HYDROCHLORIDE 20 MG: 20 INJECTION INTRAMUSCULAR; INTRAVENOUS at 10:05

## 2024-04-22 RX ADMIN — LORAZEPAM 1 MG: 1 TABLET ORAL at 01:15

## 2024-04-22 RX ADMIN — HYDRALAZINE HYDROCHLORIDE 20 MG: 20 INJECTION INTRAMUSCULAR; INTRAVENOUS at 18:12

## 2024-04-22 RX ADMIN — METOPROLOL TARTRATE 75 MG: 25 TABLET, FILM COATED ORAL at 20:43

## 2024-04-22 RX ADMIN — Medication 10 ML: at 20:48

## 2024-04-22 RX ADMIN — LORAZEPAM 1 MG: 1 TABLET ORAL at 10:05

## 2024-04-22 RX ADMIN — BARIUM SULFATE 50 ML: 400 SUSPENSION ORAL at 08:35

## 2024-04-22 RX ADMIN — LORAZEPAM 1 MG: 1 TABLET ORAL at 13:52

## 2024-04-22 RX ADMIN — BARIUM SULFATE 55 ML: 0.81 POWDER, FOR SUSPENSION ORAL at 08:35

## 2024-04-22 RX ADMIN — LORAZEPAM 1 MG: 1 TABLET ORAL at 22:38

## 2024-04-22 RX ADMIN — Medication 10 ML: at 09:24

## 2024-04-22 RX ADMIN — ASPIRIN 81 MG: 81 TABLET, COATED ORAL at 09:23

## 2024-04-22 RX ADMIN — ENOXAPARIN SODIUM 40 MG: 100 INJECTION SUBCUTANEOUS at 10:45

## 2024-04-22 RX ADMIN — LORAZEPAM 1 MG: 1 TABLET ORAL at 18:12

## 2024-04-22 RX ADMIN — Medication 15 ML: at 09:24

## 2024-04-22 RX ADMIN — CHLORHEXIDINE GLUCONATE 15 ML: 1.2 RINSE ORAL at 09:23

## 2024-04-22 NOTE — PLAN OF CARE
Goal Outcome Evaluation:              Outcome Evaluation: VFSS completed. Kellie ROSAS, present during the study. Recommend regular solid diet with nectar thick liquids, NO mixed consistencies. Meds whole in puree and nectar wash. Free water protocol in between meals and after oral care. Sitting upright, slow rate, small bites/sips, double swallows, alternate solids/thickened liquids.

## 2024-04-22 NOTE — PLAN OF CARE
Goal Outcome Evaluation:   Pt has been a/o x4, currently npo with plans for an video swallow.

## 2024-04-22 NOTE — PLAN OF CARE
"Goal Outcome Evaluation:  Plan of Care Reviewed With: patient        Progress: improving  Outcome Evaluation: Pt is progressing with OOB ADL engagement but does have moments of unsteadiness. He is generally weak and c/o of UE weakness. OT educated pt in exercises to improve strength at this time. His endurance is fair. He continues to be a falls risk but is improving signifcantly from eval. Pt asking questions regarding \"Do you think I'll be back to work after [place]?\". 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 He currently needs assist with bADLs. He may be safest at rehab stay at dc to increase strength to return to PLOF. If dc home recommend family support needed for safety and unsure if that is available as pt lives alone.      Anticipated Discharge Disposition (OT): skilled nursing facility, home with 24/7 care                        "

## 2024-04-22 NOTE — THERAPY TREATMENT NOTE
Patient Name: Watson Lisa  : 1955    MRN: [id number]                              Today's Date: 2024       Admit Date: 2024    Visit Dx:     ICD-10-CM ICD-9-CM   1. Alcohol withdrawal syndrome without complication  F10.930 291.81   2. Hypomagnesemia  E83.42 275.2     Patient Active Problem List   Diagnosis    Essential hypertension    Tobacco abuse    Hiatal hernia    Effusion of left knee    Osteoarthritis of left knee    Vitamin D deficiency    Anemia, chronic disease    COPD (chronic obstructive pulmonary disease)    LIVAN (obstructive sleep apnea)    ETOH abuse    Obese    Alcoholic liver disease    History of alcohol use disorder    Left patella fracture    Need for hepatitis C screening test    Chronic pain of left ankle    Primary insomnia    Overweight (BMI 25.0-29.9)    Mixed hyperlipidemia    Acute alcohol intoxication    Hypomagnesemia    Alcohol withdrawal    Substance abuse    Cocaine abuse    Positive urine drug screen    Aspiration pneumonia    Thrombocytosis     Past Medical History:   Diagnosis Date    Alcohol abuse     Anxiety     Arthritis     Bronchitis with chronic airway obstruction     LAST FE    DVT (deep venous thrombosis)     Hiatal hernia     Hypertension     Hypertension     Low back pain     Neck pain     Pulmonary embolism     Sleep apnea with use of continuous positive airway pressure (CPAP)     AT NIGHT     Past Surgical History:   Procedure Laterality Date    COLONOSCOPY      JOINT REPLACEMENT Right     hip/knee    REPLACEMENT TOTAL KNEE      TONSILLECTOMY      TOTAL HIP ARTHROPLASTY Right       General Information       Row Name 24 1538          OT Time and Intention    Document Type therapy note (daily note)  -SM     Mode of Treatment occupational therapy;individual therapy  -       Row Name 24 1538          General Information    Patient Profile Reviewed yes  -SM     Existing Precautions/Restrictions fall  -       Row Name 24 1534           Cognition    Orientation Status (Cognition) oriented x 4  -[facility] Name 04/22/24 1538          Safety Issues, Functional Mobility    Safety Issues Affecting Function (Mobility) insight into deficits/self-awareness;judgment  -     Impairments Affecting Function (Mobility) balance;endurance/activity tolerance;pain;strength  -               User Key  (r) = Recorded By, (t) = Taken By, (c) = Cosigned By      Initials Name Provider Type     Dorcas Villeda OT Occupational Therapist                     Mobility/ADL's       Row Name 04/22/24 1539          Bed Mobility    Supine-Sit [place] (Bed Mobility) standby assist  -     Assistive Device (Bed Mobility) head of bed elevated;bed rails  -[facility] Name 04/22/24 1539          Sit-Stand Transfer    Sit-Stand Loving (Transfers) minimum assist (75% patient effort)  -     Assistive Device (Sit-Stand Transfers) walker, front-wheeled  -SM       Row Name 04/22/24 1539          Functional Mobility    Functional Mobility- Ind. Level minimum assist (75% patient effort);contact guard assist  -     Functional Mobility- Device walker, front-wheeled  -     Functional Mobility-Distance (Feet) --  10  -     Functional Mobility- Comment mobility to sink, difficulty with turns and navigating tight corners, knees appear shaky. Needs cues for safe technique  -[facility] Name 04/22/24 1539          Activities of Daily Living    BADL Assessment/Intervention toileting;feeding  -SM       Row Name 04/22/24 1539          Lower Body Dressing Assessment/Training    [place] Level (Lower Body Dressing) doff;don;shoes/slippers;socks;minimum assist (75% patient effort)  -     Position (Lower Body Dressing) edge of bed sitting  -     Comment, (Lower Body Dressing) extra time to complete, rest breaks required during, ties shoes  -[facility] Name 04/22/24 1539          Toileting Assessment/Training    [place] Level (Toileting) dependent (less than 25%  patient effort);change pad/brief;perform perineal hygiene  -     Position (Toileting) supine  -     Comment, (Toileting) had a BM in bed just prior to session and RN leaving room after cleaning pt up  -[facility] Name 04/22/24 1539          Self-Feeding Assessment/Training    [place] Level (Feeding) set up  -     Position (Self-Feeding) supported sitting  -               User Key  (r) = Recorded By, (t) = Taken By, (c) = Cosigned By      Initials Name Provider Type    Dorcas Graham OT Occupational Therapist                   Obj/Interventions       [facility] Name 04/22/24 1541          Shoulder (Therapeutic Exercise)    Shoulder AROM (Therapeutic Exercise) bilateral;flexion;extension;horizontal aBduction/aDduction;external rotation;internal rotation;scapular elevation;scapular protraction;scapular retraction;10 repetitions;sitting  -[facility] Name 04/22/24 1541          Elbow/Forearm (Therapeutic Exercise)    Elbow/Forearm (Therapeutic Exercise) AROM (active range of motion)  -     Elbow/Forearm AROM (Therapeutic Exercise) bilateral;flexion;extension;10 repetitions  -SM       Row Name 04/22/24 1541          Hand (Therapeutic Exercise)    Hand (Therapeutic Exercise) AROM (active range of motion)  -     Hand AROM/AAROM (Therapeutic Exercise) bilateral;AROM (active range of motion);finger flexion;finger extension;10 repetitions  -[facility] Name 04/22/24 1541          Motor Skills    Therapeutic Exercise shoulder;elbow/forearm;hand  -SM       Row Name 04/22/24 1541          Balance    Static Sitting Balance supervision  -     Position, Sitting Balance sitting edge of bed  -     Static Standing Balance contact guard  -     Dynamic Standing Balance contact guard;minimal assist  -     Position/Device Used, Standing Balance supported;walker, rolling  -               User Key  (r) = Recorded By, (t) = Taken By, (c) = Cosigned By      Initials Name Provider Type    Dorcas Graham OT  "Occupational Therapist                   Goals/Plan    No documentation.                  Clinical Impression       Row Name 04/22/24 1542          Pain Assessment    Pretreatment Pain Rating 0/10 - no pain  -     Posttreatment Pain Rating 0/10 - no pain  -SM     Pre/Posttreatment Pain Comment c/o of L ankle hx of OA but reports feels better to walk with shoes on today  -       Row Name 04/22/24 1542          Plan of Care Review    Plan of Care Reviewed With patient  -     Progress improving  -     Outcome Evaluation Pt is progressing with OOB ADL engagement but does have moments of unsteadiness. He is generally weak and c/o of UE weakness. OT educated pt in exercises to improve strength at this time. His endurance is fair. He continues to be a falls risk but is improving signifcantly from eval. Pt asking questions regarding \"Do you think I'll be back to work after [place]?\". 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 He currently needs assist with bADLs. He may be safest at rehab stay at dc to increase strength to return to PLOF. If dc home recommend family support needed for safety and unsure if that is available as pt lives alone.  -       Row Name 04/22/24 1549          Therapy Plan Review/Discharge Plan (OT)    Anticipated Discharge Disposition (OT) skilled nursing facility;home with 24/7 care  -       Row Name 04/22/24 1542          Vital Signs    O2 Delivery Pre Treatment room air  -     Intra SpO2 (%) --  SOB with activity, on RA, 93%  -     O2 Delivery Intra Treatment room air  -     O2 Delivery Post Treatment room air  -       Row Name 04/22/24 154          Positioning and Restraints    Pre-Treatment Position in bed  -SM     Post Treatment Position other  -SM     In Chair with PT  -               User Key  (r) = Recorded By, (t) = Taken By, (c) = Cosigned By      Initials Name Provider Type    Dorcas Graham, OT Occupational Therapist         "           Outcome Measures       Row Name 04/22/24 1545          How much help from another is currently needed...    Putting on and taking off regular lower body clothing? 3  -SM     Bathing (including washing, rinsing, and drying) 3  -SM     Toileting (which includes using toilet bed pan or urinal) 2  -SM     Putting on and taking off regular upper body clothing 3  -SM     Taking care of personal grooming (such as brushing teeth) 3  -SM     Eating meals 4  -SM     AM-PAC 6 Clicks Score (OT) 18  -SM       Row Name 04/22/24 1511 04/22/24 0800       How much help from another person do you currently need...    Turning from your back to your side while in flat bed without using bedrails? 3  -PC 4  -LR    Moving from lying on back to sitting on the side of a flat bed without bedrails? 3  -PC 4  -LR    Moving to and from a bed to a chair (including a wheelchair)? 3  -PC 3  -LR    Standing up from a chair using your arms (e.g., wheelchair, bedside chair)? 3  -PC 3  -LR    Climbing 3-5 steps with a railing? 2  -PC 3  -LR    To walk in hospital room? 3  -PC 3  -LR    AM-PAC 6 Clicks Score (PT) 17  -PC 20  -LR    Highest Level of Mobility Goal 5 --> Static standing  -PC 6 --> Walk 10 steps or more  -LR      Row Name 04/22/24 1545          Functional Assessment    Outcome Measure Options AM-PAC 6 Clicks Daily Activity (OT)  -SM               User Key  (r) = Recorded By, (t) = Taken By, (c) = Cosigned By      Initials Name Provider Type    PC Domitila Renee, PT Physical Therapist    SM Dorcas Villeda, OT Occupational Therapist    LR Xena Gamez, RN Registered Nurse                    Occupational Therapy Education       Title: PT OT SLP Therapies (In Progress)       Topic: Occupational Therapy (In Progress)       Point: ADL training (Done)       Description:   Instruct learner(s) on proper safety adaptation and remediation techniques during self care or transfers.   Instruct in proper use of assistive devices.           "        Learning Progress Summary             Patient Acceptance, E, VU by  at 4/18/2024 1522    Comment: OT goals, POC, recovery/rehab                         Point: Home exercise program (Not Started)       Description:   Instruct learner(s) on appropriate technique for monitoring, assisting and/or progressing therapeutic exercises/activities.                  Learner Progress:  Not documented in this visit.              Point: Precautions (Not Started)       Description:   Instruct learner(s) on prescribed precautions during self-care and functional transfers.                  Learner Progress:  Not documented in this visit.              Point: Body mechanics (Not Started)       Description:   Instruct learner(s) on proper positioning and spine alignment during self-care, functional mobility activities and/or exercises.                  Learner Progress:  Not documented in this visit.                              User Key       Initials Effective Dates Name Provider Type Discipline     04/02/20 -  Dorcas Villeda OT Occupational Therapist OT                  OT Recommendation and Plan  Planned Therapy Interventions (OT): activity tolerance training, adaptive equipment training, functional balance retraining, occupation/activity based interventions, neuromuscular control/coordination retraining, patient/caregiver education/training, transfer/mobility retraining, strengthening exercise, ROM/therapeutic exercise  Therapy Frequency (OT): 5 times/wk  Plan of Care Review  Plan of Care Reviewed With: patient  Progress: improving  Outcome Evaluation: Pt is progressing with OOB ADL engagement but does have moments of unsteadiness. He is generally weak and c/o of UE weakness. OT educated pt in exercises to improve strength at this time. His endurance is fair. He continues to be a falls risk but is improving signifcantly from eval. Pt asking questions regarding \"Do you think I'll be back to work after [place]?\". 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 He currently needs assist with bADLs. He may be safest at rehab stay at dc to increase strength to return to PLOF. If dc home recommend family support needed for safety and unsure if that is available as pt lives alone.     Time Calculation:   Evaluation Complexity (OT)  Review Occupational Profile/Medical/Therapy History Complexity: expanded/moderate complexity  Assessment, Occupational Performance/Identification of Deficit Complexity: 3-5 performance deficits  Clinical Decision Making Complexity (OT): detailed assessment/moderate complexity  Overall Complexity of Evaluation (OT): moderate complexity     Time Calculation- OT       Row Name 04/22/24 1547             Time Calculation- OT    OT Start Time 1424  -SM      OT Stop Time 1447  -SM      OT Time Calculation (min) 23 min  -SM      Total Timed Code Minutes- OT 23 minute(s)  -SM      OT Received On 04/22/24  -SM      OT - Next Appointment 04/23/24  -         Timed Charges    02011 - OT Therapeutic Exercise Minutes 8  -SM      06983 - OT Self Care/Mgmt Minutes 15  -SM         Total Minutes    Timed Charges Total Minutes 23  -SM       Total Minutes 23  -SM                User Key  (r) = Recorded By, (t) = Taken By, (c) = Cosigned By      Initials Name Provider Type     Dorcas Villeda OT Occupational Therapist                  Therapy Charges for Today       Code Description Service Date Service Provider Modifiers Qty    98093515137  OT THER PROC EA 15 MIN 4/22/2024 Dorcas Villeda OT GO 1    35237316309 HC OT SELF CARE/MGMT/TRAIN EA 15 MIN 4/22/2024 Dorcas Villeda OT GO 1                 Dorcas Villeda OT  4/22/2024

## 2024-04-22 NOTE — PROGRESS NOTES
"Nutrition Services    Patient Name:  Watson Lisa  YOB: 1955  MRN: [id number]  Admit Date:  4/6/2024    Assessment Date:  04/22/24    Comment: Pt passed his VFSS this am and started on a regular diet with nectar thick liquids, ate 100 % per pt. RN removed his Cortrak. Labs, meds, skin reviewed.     Plan/Recommendations:  Diet per SLP  Encourage po intake.     Will follow clinical course, nutrition needs.    CLINICAL NUTRITION ASSESSMENT      Reason for Assessment Follow-up Protocol   Diagnosis/Problem severe alcohol abuse, asp PNA, substance abuse, HTN, LIVAN, resp failure - on vent.   Current Problems Not safe for po yet     Encounter Information        Nutrition History    Food Preferences    Supplements    Factors Affecting Intake swallow impairment   Tests/Procedures VFSS     Anthropometrics        Current Height   Current Weight  BMI kg/m2 Height: 200.7 cm (79\")  Weight: 120 kg (263 lb 14.3 oz) (04/21/24 0500)  Body mass index is 29.73 kg/m².     Adj BMI (if applicable)    BMI Category Overweight (25 - 29.9)       Admission Weight 113kg   Ideal Body Weight (IBW) 93.7kg     Adj IBW (if applicable)    Usual Body Weight (UBW) 250-260lb   Weight Change/Trend Stable         Estimated/Assessed Needs        Energy Requirements    Weight for Calculation 93.7kg   Method for Estimation  20 kcal/kg   EST Needs (kcal/day) 1874       Protein Requirements    Weight for Calculation 93.7kg   EST Protein Needs (g/kg) 1.2 gm/kg   EST Daily Needs (g/day) 112g       Fluid Requirements     Method for Estimation 1 mL/kcal    Estimated Needs (mL/day)        Fluid Deficit    Current Na Level (mEq/L)    Desired Na Level (mEq/L)    Estimated Fluid Deficit (L)       Labs        Pertinent Labs    Results from last 7 days   Lab Units 04/22/24  0746 04/21/24  0554 04/20/24  0453   SODIUM mmol/L 137 136 133*   POTASSIUM mmol/L 4.0 4.6 4.6   CHLORIDE mmol/L 102 101 100   CO2 mmol/L 21.3* 21.0* 20.8*   BUN mg/dL 16 17 19 "   CREATININE mg/dL 0.65* 0.70* 0.68*   CALCIUM mg/dL 9.6 8.9 9.1   BILIRUBIN mg/dL  --  0.2  --    ALK PHOS U/L  --  141*  --    ALT (SGPT) U/L  --  28  --    AST (SGOT) U/L  --  20  --    GLUCOSE mg/dL 112* 113* 106*     Results from last 7 days   Lab Units 04/22/24  0746 04/21/24  0554 04/20/24  0453   MAGNESIUM mg/dL 2.0 2.1 2.1   PHOSPHORUS mg/dL 4.0 4.1 4.4   HEMOGLOBIN g/dL 11.2* 10.1* 9.9*   HEMATOCRIT % 32.4* 29.6* 28.9*   WBC 10*3/mm3 8.68 7.42 7.80   ALBUMIN g/dL  --  3.3*  --      Results from last 7 days   Lab Units 04/22/24  0746 04/21/24  0554 04/20/24  0453 04/19/24  0637 04/18/24  0613   PLATELETS 10*3/mm3 563* 519* 344 385 401     COVID19   Date Value Ref Range Status   04/15/2022 Not Detected Not Detected - Ref. Range Final     Lab Results   Component Value Date    HGBA1C 5.80 (H) 04/20/2024          Medications            Scheduled Medications aspirin, 81 mg, Oral, Daily  cefTRIAXone, 2,000 mg, Intravenous, Q24H  enoxaparin, 40 mg, Subcutaneous, Q24H  famotidine, 20 mg, Oral, BID AC  [START ON 4/23/2024] folic acid, 1 mg, Oral, Daily  hydrocortisone-bacitracin-zinc oxide-nystatin, 1 Application, Topical, BID  insulin regular, 2-9 Units, Subcutaneous, Q6H  ipratropium-albuterol, 3 mL, Nebulization, 4x Daily - RT  metoprolol tartrate, 50 mg, Oral, Q12H  [START ON 4/23/2024] multivitamin with minerals, 1 tablet, Oral, Daily  QUEtiapine, 100 mg, Oral, Q12H  senna-docusate sodium, 2 tablet, Nasogastric, BID  sodium chloride, 10 mL, Intravenous, Q12H  [START ON 4/23/2024] thiamine, 100 mg, Oral, Daily  vancomycin, 1,250 mg, Intravenous, Q12H        Infusions Pharmacy to dose vancomycin,         PRN Medications   acetaminophen    senna-docusate sodium **AND** polyethylene glycol **AND** [DISCONTINUED] bisacodyl **AND** bisacodyl    Calcium Replacement - Follow Nurse / BPA Driven Protocol    dextrose    dextrose    glucagon (human recombinant)    hydrALAZINE    LORazepam    Magnesium Standard Dose  Replacement - Follow Nurse / BPA Driven Protocol    Magnesium Standard Dose Replacement - Follow Nurse / BPA Driven Protocol    metoprolol tartrate    nitroglycerin    ondansetron    Pharmacy to dose vancomycin    Phosphorus Replacement - Follow Nurse / BPA Driven Protocol    Potassium Replacement - Follow Nurse / BPA Driven Protocol    [COMPLETED] Insert Peripheral IV **AND** sodium chloride    sodium chloride     Physical Findings          Physical Appearance alert, oriented, overweight   Oral/Mouth Cavity tooth or teeth missing   Edema  2+ (mild)   Gastrointestinal last bowel movement: 4/22   Skin  excoriation, pressure injury: gluteal , wound on abd, bilat groin rash   Tubes/Drains/Lines none   NFPE No clinical signs of muscle wasting or fat loss   --  Current Nutrition Orders & Evaluation of Intake       Oral Nutrition     Food Allergies NKFA   Current PO Diet Diet: Regular/House; No Mixed Consistencies; Fluid Consistency: Nectar Thick   Supplement    PO Evaluation     Trending % PO Intake 100% lunch     Nutrition Diagnosis        Nutrition Dx Problem 1 Problem: Inadequate Oral Intake  Etiology: Medical Diagnosis - severe alcohol abuse, asp PNA, substance abuse, HTN, LIVAN, resp failure - on vent.  Signs/Symptoms: NPO    Comment:      INTERVENTION / PLAN OF CARE  Intervention Goal        Intervention Goal(s) Maintain nutrition status, Reduce/improve symptoms, Meet estimated needs, Disease management/therapy, Tolerate PO , Maintain intake, and No significant weight loss     Nutrition Intervention        RD Action Continue to monitor and Care plan reviewed     Prescription         Diet  Diet per SLP   Supplement/Snack    EN/PN        Prescription Ordered Continue same per protocol, No changes at this time     Monitor/Evaluation        Monitor Per protocol   Discharge Plan Pending clinical course   Education Will instruct as appropriate     RD to follow per protocol.       Electronically signed by:  Queenie Dunham  RD  04/22/24 14:18 EDT

## 2024-04-22 NOTE — PLAN OF CARE
Goal Outcome Evaluation:  Plan of Care Reviewed With: patient           Outcome Evaluation: Pt required more assist today, very shaky and unsteady, frequent verbal cues needed and assist needed for walker management, difficulty turning and backing up, at current level he is a high fall risk and would need 24/7 assist at home or may need to consider rehab at discharge

## 2024-04-22 NOTE — THERAPY TREATMENT NOTE
Patient Name: Watson Lisa  : 1955    MRN: [id number]                              Today's Date: 2024       Admit Date: 2024    Visit Dx:     ICD-10-CM ICD-9-CM   1. Alcohol withdrawal syndrome without complication  F10.930 291.81   2. Hypomagnesemia  E83.42 275.2     Patient Active Problem List   Diagnosis    Essential hypertension    Tobacco abuse    Hiatal hernia    Effusion of left knee    Osteoarthritis of left knee    Vitamin D deficiency    Anemia, chronic disease    COPD (chronic obstructive pulmonary disease)    LIVAN (obstructive sleep apnea)    ETOH abuse    Obese    Alcoholic liver disease    History of alcohol use disorder    Left patella fracture    Need for hepatitis C screening test    Chronic pain of left ankle    Primary insomnia    Overweight (BMI 25.0-29.9)    Mixed hyperlipidemia    Acute alcohol intoxication    Hypomagnesemia    Alcohol withdrawal    Substance abuse    Cocaine abuse    Positive urine drug screen    Aspiration pneumonia     Past Medical History:   Diagnosis Date    Alcohol abuse     Anxiety     Arthritis     Bronchitis with chronic airway obstruction     LAST FEB    DVT (deep venous thrombosis)     Hiatal hernia     Hypertension     Hypertension     Low back pain     Neck pain     Pulmonary embolism     Sleep apnea with use of continuous positive airway pressure (CPAP)     AT NIGHT     Past Surgical History:   Procedure Laterality Date    COLONOSCOPY      JOINT REPLACEMENT Right     hip/knee    REPLACEMENT TOTAL KNEE      TONSILLECTOMY      TOTAL HIP ARTHROPLASTY Right       General Information       Row Name 24 1504          Physical Therapy Time and Intention    Document Type therapy note (daily note)  -PC     Mode of Treatment physical therapy  -PC       Row Name 24 1504          General Information    Existing Precautions/Restrictions fall  -PC               User Key  (r) = Recorded By, (t) = Taken By, (c) = Cosigned By      Initials Name  Provider Type    PC Domitila Renee, PT Physical Therapist                   Mobility       Row Name 04/22/24 1505          Bed Mobility    Comment, (Bed Mobility) standing at sink with OT  -PC       Row Name 04/22/24 1505          Sit-Stand Transfer    Sit-Stand [place] (Transfers) minimum assist (75% patient effort)  -PC     Assistive Device (Sit-Stand Transfers) walker, front-wheeled  -PC     Comment, (Sit-Stand Transfer) poor eccentric control when sitting  -PC       Row Name 04/22/24 1505          Gait/Stairs (Locomotion)    [place] Level (Gait) minimum assist (75% patient effort);1 person assist;1 person to manage equipment  -PC     Assistive Device (Gait) walker, front-wheeled  -PC     Distance in Feet (Gait) 100  -PC     Deviations/Abnormal Patterns (Gait) base of support, wide;stride length decreased;janeth decreased;gait speed decreased  -PC     Bilateral Gait Deviations forward flexed posture  -PC     Comment, (Gait/Stairs) waks on outer aspect of L foot with L ankle deformity, very shaky and unsteady today, min assist x 1 to walk, assist of another person for safety and IV pole  -PC               User Key  (r) = Recorded By, (t) = Taken By, (c) = Cosigned By      Initials Name Provider Type    PC Domitila Renee, PT Physical Therapist                   Obj/Interventions    No documentation.                  Goals/Plan    No documentation.                  Clinical Impression       Row Name 04/22/24 1501          Pain    Pretreatment Pain Rating 0/10 - no pain  -PC       Row Name 04/22/24 1507          Plan of Care Review    Plan of Care Reviewed With patient  -PC     Outcome Evaluation Pt required more assist today, very shaky and unsteady, frequent verbal cues needed and assist needed for walker management, difficulty turning and backing up, at current level he is a high fall risk and would need 24/7 assist at home or may need to consider rehab at discharge  -PC       Row Name 04/22/24 1508           Positioning and Restraints    Pre-Treatment Position standing in room  -PC     Post Treatment Position chair  -PC     In Chair reclined;call light within reach;encouraged to call for assist;exit alarm on  -PC               User Key  (r) = Recorded By, (t) = Taken By, (c) = Cosigned By      Initials Name Provider Type    PC Domitila Renee, PT Physical Therapist                   Outcome Measures       Row Name 04/22/24 1511 04/22/24 0800       How much help from another person do you currently need...    Turning from your back to your side while in flat bed without using bedrails? 3  -PC 4  -LR    Moving from lying on back to sitting on the side of a flat bed without bedrails? 3  -PC 4  -LR    Moving to and from a bed to a chair (including a wheelchair)? 3  -PC 3  -LR    Standing up from a chair using your arms (e.g., wheelchair, bedside chair)? 3  -PC 3  -LR    Climbing 3-5 steps with a railing? 2  -PC 3  -LR    To walk in hospital room? 3  -PC 3  -LR    AM-PAC 6 Clicks Score (PT) 17  -PC 20  -LR    Highest Level of Mobility Goal 5 --> Static standing  -PC 6 --> Walk 10 steps or more  -LR              User Key  (r) = Recorded By, (t) = Taken By, (c) = Cosigned By      Initials Name Provider Type    Domitila Chandra, PT Physical Therapist    LR Xena Gamez RN Registered Nurse                                 Physical Therapy Education       Title: PT OT SLP Therapies (In Progress)       Topic: Physical Therapy (Done)       Point: Mobility training (Done)       Learning Progress Summary             Patient Acceptance, E,D, DU by PC at 4/22/2024 1512    Acceptance, E, VU by MO at 4/21/2024 1311    Acceptance, E, NR by EM at 4/18/2024 1109                         Point: Home exercise program (Done)       Learning Progress Summary             Patient Acceptance, E, VU by MO at 4/21/2024 1311                         Point: Body mechanics (Done)       Learning Progress Summary             Patient Acceptance,  E,D, DU by PC at 4/22/2024 1512    Acceptance, E, VU by MO at 4/21/2024 1311                         Point: Precautions (Done)       Learning Progress Summary             Patient Acceptance, E,D, DU by PC at 4/22/2024 1512    Acceptance, E, VU by MO at 4/21/2024 1311                                         User Key       Initials Effective Dates Name Provider Type Discipline    PC 06/16/21 -  Domitila Renee, PT Physical Therapist PT    EM 06/16/21 -  Pooja Carrington, PT Physical Therapist PT    MO 05/26/23 -  Keturah Funes PT Physical Therapist PT                  PT Recommendation and Plan     Plan of Care Reviewed With: patient  Outcome Evaluation: Pt required more assist today, very shaky and unsteady, frequent verbal cues needed and assist needed for walker management, difficulty turning and backing up, at current level he is a high fall risk and would need 24/7 assist at home or may need to consider rehab at discharge     Time Calculation:         PT Charges       Row Name 04/22/24 1513             Time Calculation    Start Time 1444  -PC      Stop Time 1455  -PC      Time Calculation (min) 11 min  -PC      PT Received On 04/22/24  -PC      PT - Next Appointment 04/23/24  -PC                User Key  (r) = Recorded By, (t) = Taken By, (c) = Cosigned By      Initials Name Provider Type    PC Domitila Renee, PT Physical Therapist                  Therapy Charges for Today       Code Description Service Date Service Provider Modifiers Qty    69675433038 HC PT THER PROC EA 15 MIN 4/22/2024 Domitila Renee, PT GP 1            PT G-Codes  Outcome Measure Options: AM-PAC 6 Clicks Basic Mobility (PT)  AM-PAC 6 Clicks Score (PT): 17  AM-PAC 6 Clicks Score (OT): 11       Domitilajacque Renee PT  4/22/2024

## 2024-04-22 NOTE — PROGRESS NOTES
Name: Watson Lisa ADMIT: 2024   : 1955  PCP: Roc Carroll APRN    MRN: [id number] LOS: 16 days   AGE/SEX: 69 y.o. male  ROOM: [facility]     Subjective   Subjective   Patient SAYS she has been improving but still present.  Positive insomnia.  No tremors.  No chest pain.  No shortness of breath.  Positive occasional cough productive of clear sputum.  No hemoptysis.  No palpitation.  No ankle edema.  No fever..  Positive weakness    Review of Systems  GI.  Tolerating modified diet well with no abdominal pain or nausea or vomiting.    .  No dysuria or hematuria.     Objective   Objective   Vital Signs  Temp:  [98 °F (36.7 °C)-98.5 °F (36.9 °C)] 98 °F (36.7 °C)  Heart Rate:  [66-99] 92  Resp:  [16-20] 16  BP: (128-183)/() 130/75  SpO2:  [92 %-98 %] 95 %  on   ;   Device (Oxygen Therapy): room air    Intake/Output Summary (Last 24 hours) at 2024 1538  Last data filed at 2024 1400  Gross per 24 hour   Intake 540 ml   Output 2350 ml   Net -1810 ml     Body mass index is 29.73 kg/m².      24  0600 24  0413 24  0500   Weight: 124 kg (272 lb 4.3 oz) 123 kg (271 lb 2.7 oz) 120 kg (263 lb 14.3 oz)     Physical Exam  General.  Elderly gentleman.  Overweight.  Alert and oriented x 4.  No apparent pain/distress/diaphoresis.  Mildly anxious.  Eyes.  Pupils equal round and reactive.  Intact extraocular musculature.  No pallor or jaundice.  Oral cavity.  Moist mucous membrane.  Neck.  Supple.  No JVD.  No lymphadenopathy or thyromegaly.  Cardio vascular.  Regular rate and rhythm with no gallops or murmurs.  Chest.  Clear to auscultation bilaterally with no added sounds.  Abdomen.  Soft lax.  No tenderness.  No organomegaly.  No guarding or rebound.  Extremities..  No clubbing/cyanosis/edema.  Sequential compression devices on both lower extremity.  No upper extremity tremors.  CNS.  No acute focal neurological deficits.    Results Review:      Results from last 7 days  "  Lab Units 04/22/24  0746 04/21/24  0554 04/20/24  0453 04/19/24  0637 04/18/24  0613 04/17/24  0722 04/16/24  0614   SODIUM mmol/L 137 136 133* 132* 137 134* 136   POTASSIUM mmol/L 4.0 4.6 4.6 4.0 4.4 4.6 4.5   CHLORIDE mmol/L 102 101 100 97* 102 102 104   CO2 mmol/L 21.3* 21.0* 20.8* 23.0 24.1 21.9* 22.2   BUN mg/dL 16 17 19 13 16 19 22   CREATININE mg/dL 0.65* 0.70* 0.68* 0.72* 0.71* 0.63* 0.63*   GLUCOSE mg/dL 112* 113* 106* 126* 119* 130* 131*   CALCIUM mg/dL 9.6 8.9 9.1 8.8 9.3 8.6 8.6   AST (SGOT) U/L  --  20  --   --   --   --   --    ALT (SGPT) U/L  --  28  --   --   --   --   --      Estimated Creatinine Clearance: 155.6 mL/min (A) (by C-G formula based on SCr of 0.65 mg/dL (L)).  Results from last 7 days   Lab Units 04/20/24  1911   HEMOGLOBIN A1C % 5.80*     Results from last 7 days   Lab Units 04/22/24  1114 04/22/24  0647 04/22/24  0030 04/21/24  1802 04/21/24  1129 04/21/24  0553 04/20/24  2335 04/20/24  1757   GLUCOSE mg/dL 123 116 119 119 105 119 119 131*                 Results from last 7 days   Lab Units 04/22/24  0746 04/21/24  0554 04/20/24  0453 04/19/24  0637 04/18/24  0613 04/17/24  0722 04/16/24  0614   MAGNESIUM mg/dL 2.0 2.1 2.1 2.1 1.8 1.8 1.8   PHOSPHORUS mg/dL 4.0 4.1 4.4 3.5 4.2 4.3 4.0           Invalid input(s): \"LDLCALC\"  Results from last 7 days   Lab Units 04/22/24  0746 04/21/24  0554 04/20/24  0453 04/20/24  0453 04/19/24  0637 04/18/24  0613 04/17/24  0722 04/16/24  0614   WBC 10*3/mm3 8.68 7.42  --  7.80 9.38 12.01* 9.65 11.66*   HEMOGLOBIN g/dL 11.2* 10.1*  --  9.9* 10.4* 9.7* 9.8* 9.3*   HEMATOCRIT % 32.4* 29.6*  --  28.9* 29.9* 28.5* 29.6* 27.5*   PLATELETS 10*3/mm3 563* 519*  --  344 385 401 371 413   MCV fL 94.2 93.1  --  94.4 93.7 95.6 95.8 94.8   MCH pg 32.6 31.8  --  32.4 32.6 32.6 31.7 32.1   MCHC g/dL 34.6 34.1  --  34.3 34.8 34.0 33.1 33.8   RDW % 11.8* 11.7*  --  11.7* 11.7* 11.8* 11.7* 11.6*   RDW-SD fl 40.6 39.8  --  39.8 40.2 41.1 41.0 40.3   MPV fL 9.4 9.5  " --  10.5 9.6 9.8 9.6 9.7   NEUTROPHIL % % 63.5 64.5   < >  --   --   --   --   --    LYMPHOCYTE % % 17.6* 15.4*   < >  --   --   --   --   --    MONOCYTES % % 11.3 13.5*   < >  --   --   --   --   --    EOSINOPHIL % % 5.2 4.2   < >  --   --   --   --   --    BASOPHIL % % 1.4 1.2   < >  --   --   --   --   --    IMM GRAN % % 1.0* 1.2*   < >  --   --   --   --   --    NEUTROS ABS 10*3/mm3 5.51 4.79   < >  --   --   --   --   --    LYMPHS ABS 10*3/mm3 1.53 1.14   < >  --   --   --   --   --    MONOS ABS 10*3/mm3 0.98* 1.00*   < >  --   --   --   --   --    EOS ABS 10*3/mm3 0.45* 0.31   < >  --   --   --   --   --    BASOS ABS 10*3/mm3 0.12 0.09   < >  --   --   --   --   --    IMMATURE GRANS (ABS) 10*3/mm3 0.09* 0.09*   < >  --   --   --   --   --    NRBC /100 WBC 0.0 0.0   < >  --   --   --   --   --     < > = values in this interval not displayed.         Results from last 7 days   Lab Units 04/17/24  0321   PH, ARTERIAL pH units 7.438   PO2 ART mm Hg 71.2*   PCO2, ARTERIAL mm Hg 36.6   HCO3 ART mmol/L 24.7     Results from last 7 days   Lab Units 04/19/24  0637 04/18/24 1946   PROCALCITONIN ng/mL 0.14 0.09   LACTATE mmol/L  --  0.9             Results from last 7 days   Lab Units 04/18/24  1946   BLOODCX  No growth at 3 days  No growth at 3 days         Results from last 7 days   Lab Units 04/18/24  1936   NITRITE UA  Negative   WBC UA /HPF 0-2   BACTERIA UA /HPF None Seen   SQUAM EPITHEL UA /HPF 0-2           Imaging:  Imaging Results (Last 24 Hours)       Procedure Component Value Units Date/Time    FL Video Swallow Single Contrast [[id number]] Collected: 04/22/24 1317     Updated: 04/22/24 1317    Narrative:      VIDEO SWALLOWING EXAMINATION BY SPEECH PATHOLOGY     Clinical: Dysphasia     Reference air kerma: 7.40 mGy     Video swallowing examination performed under the direction of speech  pathology. Imaging reviewed by radiologist who concurs with the  findings.     Speech pathology summary: VFSS complete.  Kellie ROSAS, present  during the study. Patient presents with mild-moderate oropharyngeal  dysphagia. Trace posterior penetration with thin liquid via spoon. Deep  silent nontransient penetration with thin liquid via cup/straw. Suspect  trace aspiration. No penetration/aspiration with nectar thick via  cup/straw, puree, soft/chopped solid, or regular solid trials. Cued  double swallow and nectar wash successful in clearing mild pharyngeal  residue.                       I reviewed the patient's new clinical results / labs / tests / procedures      Assessment/Plan     Active Hospital Problems    Diagnosis  POA    **Alcohol withdrawal [F10.939]  Yes    Thrombocytosis [D75.839]  No    Aspiration pneumonia [J69.0]  Yes    Substance abuse [F19.10]  Yes    Cocaine abuse [F14.10]  Yes    Positive urine drug screen [R82.5]  Yes    History of alcohol use disorder [Z87.898]  Yes    Alcoholic liver disease [K70.9]  Yes    COPD (chronic obstructive pulmonary disease) [J44.9]  Yes    LIVAN (obstructive sleep apnea) [G47.33]  Yes    Essential hypertension [I10]  Yes      Resolved Hospital Problems   No resolved problems to display.           Acute hypoxemic respiratory failure secondary to aspiration H. influenzae pneumonia patient with a history of COPD   Status post endotracheal intubation and mechanical ventilation and subsequent extubation.  Video swallow study noted and the patient is now on nectar thickened fluid and regular solid diet.  NG tube has been removed.  .  Weaned off oxygen.  Continue Rocephin (related today), DuoNebs..    Sepsis.  Blood cultures are negative.  MRSA screen is positive.  Currently on Rocephin and vancomycin.  Will complete a total of 5 days of the above antibiotics (antibiotic clinic today)..  Sepsis is mostly secondary to pneumonia.  Sepsis indices are resolved.  No fever.  Alcohol abuse/polysubstance abuse/alcoholic withdrawal.  off Precedex drip.  No maraynn withdrawal at this time.  Will  continue detoxification with folate/thiamine/multivitamin/Ativan.  Continue Pepcid.  Prediabetes.  A1c 5.8.  Diet at discharge.  Continue sliding scale.   Chronic disease anemia/thrombocytosis.  Hemoccult stool negative.  Anemia workup noted.  Hemoglobin stable.  Thrombocytosis mostly reactive.  Hypertension..  Off Cardene drip.  no evidence of angina or congestive heart failure.  CAD and Lopressor initiated.  Will increase Lopressor.  Continue to monitor blood pressure and pulse.    VTE prophylaxis.  On Lovenox.  Superficial venous thrombosis of the right upper extremity.  Aspirin and prophylactic Lovenox.    Discussed my findings and plan of treatment with the patient.  Disposition.  Evaluated the patient and most likely the patient will require rehab.  Will ask CCP to evaluate.      Britney Triplett MD  [facility]  04/22/24  15:38 EDT

## 2024-04-22 NOTE — PROGRESS NOTES
"      [facility]         Dr Acosta Sayied   LOS: 16 days   Patient Care Team:  Roc Carroll APRN as PCP - General (Nurse Practitioner)  Checo Dunham MD as PCP - Family Medicine  Checo Dunham MD (Family Medicine)    Chief Complaint: Acute respiratory failure aspiration pneumonia alcohol abuse multiple issues as listed below    Interval History: Resting comfortably no overnight issues reported.  Plans for video swallow this morning.    REVIEW OF SYSTEMS:   No chest pain or shortness of    Ventilator/Non-Invasive Ventilation Settings (From admission, onward)     Nasal cannula oxygen    Vital Signs  Temp:  [98 °F (36.7 °C)-98.5 °F (36.9 °C)] 98.2 °F (36.8 °C)  Heart Rate:  [66-90] 77  Resp:  [20] 20  BP: (127-183)/() 136/121    Intake/Output Summary (Last 24 hours) at 4/22/2024 0923  Last data filed at 4/22/2024 0000  Gross per 24 hour   Intake 735 ml   Output 1600 ml   Net -865 ml     Flowsheet Rows      Flowsheet Row First Filed Value   Admission Height 200.7 cm (79\") Documented at 04/06/2024 1130   Admission Weight 113 kg (250 lb) Documented at 04/06/2024 1130            Physical Exam:  Patient is examined using the personal protective equipment as per guidelines from infection control for this particular patient as enacted.  Hand hygiene was performed before and after patient interaction.   General Appearance:    Alert, cooperative, in no acute distress.  Following simple commands  ENT normocephalic atraumatic  Neck midline trachea, no thyromegaly   Lungs:   Diminished breath sounds bilaterally    Heart:    Regular rhythm and normal rate, normal S1 and S2, no            murmur, no gallop, no rub, no click   Chest Wall:    No abnormalities observed   Abdomen:     Normal bowel sounds, no masses, no organomegaly, soft        nontender, nondistended, no guarding, no rebound                tenderness   Extremities:   Moves all extremities well, no edema, no cyanosis, no             " redness  CNS no focal neurological deficits normal sensory exam  Skin no rashes no nodules  Musculoskeletal no cyanosis no clubbing normal range of motion     Results Review:        Results from last 7 days   Lab Units 04/22/24  0746 04/21/24  0554 04/20/24  0453   SODIUM mmol/L 137 136 133*   POTASSIUM mmol/L 4.0 4.6 4.6   CHLORIDE mmol/L 102 101 100   CO2 mmol/L 21.3* 21.0* 20.8*   BUN mg/dL 16 17 19   CREATININE mg/dL 0.65* 0.70* 0.68*   GLUCOSE mg/dL 112* 113* 106*   CALCIUM mg/dL 9.6 8.9 9.1         Results from last 7 days   Lab Units 04/22/24  0746 04/21/24  0554 04/20/24  0453   WBC 10*3/mm3 8.68 7.42 7.80   HEMOGLOBIN g/dL 11.2* 10.1* 9.9*   HEMATOCRIT % 32.4* 29.6* 28.9*   PLATELETS 10*3/mm3 563* 519* 344             Results from last 7 days   Lab Units 04/22/24  0746   MAGNESIUM mg/dL 2.0         Results from last 7 days   Lab Units 04/17/24  0321   PH, ARTERIAL pH units 7.438   PO2 ART mm Hg 71.2*   PCO2, ARTERIAL mm Hg 36.6   HCO3 ART mmol/L 24.7       I reviewed the patient's new clinical results.  I personally viewed and interpreted the patient's chest x-ray.        Medication Review:   aspirin, 81 mg, Oral, Daily  cefTRIAXone, 2,000 mg, Intravenous, Q24H  chlorhexidine, 15 mL, Mouth/Throat, Q12H  enoxaparin, 40 mg, Subcutaneous, Q24H  famotidine, 20 mg, Nasogastric, BID AC  folic acid, 1 mg, Nasogastric, Daily  hydrocortisone-bacitracin-zinc oxide-nystatin, 1 Application, Topical, BID  insulin regular, 2-9 Units, Subcutaneous, Q6H  ipratropium-albuterol, 3 mL, Nebulization, 4x Daily - RT  metoprolol tartrate, 50 mg, Nasogastric, Q12H  multivitamin and minerals, 15 mL, Nasogastric, Daily  QUEtiapine, 100 mg, Oral, Q12H  senna-docusate sodium, 2 tablet, Nasogastric, BID  sodium chloride, 10 mL, Intravenous, Q12H  thiamine, 100 mg, Nasogastric, Daily  vancomycin, 1,250 mg, Intravenous, Q12H        Pharmacy Consult,   Pharmacy to dose vancomycin,         ASSESSMENT:   Acute hypoxemic respiratory  failure  Aspiration pneumonia with H. influenzae  Alcohol abuse and dependence and withdrawal  Polysubstance abuse  LIVAN  Sepsis with shock  Fever      PLAN:  Clinically stable and improving.  Diet per speech  Atelectasis versus pneumonia restarted on Rocephin to continue.  Thiamine and folate to continue  Mobilize ambulate  Hospitalist managing medical issues  We will only address pulmonary issues  Awaiting bed to transfer out of the ICU  Patient is on telemetry      Karla Ybarra MD  04/22/24  09:23 EDT

## 2024-04-22 NOTE — MBS/VFSS/FEES
Acute Care - Speech Language Pathology   Swallow Initial Evaluation [facility]     Patient Name: Watson Lisa  : 1955  MRN: [id number]  Today's Date: 2024               Admit Date: 2024    Visit Dx:     ICD-10-CM ICD-9-CM   1. Alcohol withdrawal syndrome without complication  F10.930 291.81   2. Hypomagnesemia  E83.42 275.2     Patient Active Problem List   Diagnosis    Essential hypertension    Tobacco abuse    Hiatal hernia    Effusion of left knee    Osteoarthritis of left knee    Vitamin D deficiency    Anemia, chronic disease    COPD (chronic obstructive pulmonary disease)    LIVAN (obstructive sleep apnea)    ETOH abuse    Obese    Alcoholic liver disease    History of alcohol use disorder    Left patella fracture    Need for hepatitis C screening test    Chronic pain of left ankle    Primary insomnia    Overweight (BMI 25.0-29.9)    Mixed hyperlipidemia    Acute alcohol intoxication    Hypomagnesemia    Alcohol withdrawal    Substance abuse    Cocaine abuse    Positive urine drug screen    Aspiration pneumonia     Past Medical History:   Diagnosis Date    Alcohol abuse     Anxiety     Arthritis     Bronchitis with chronic airway obstruction     LAST FEB    DVT (deep venous thrombosis)     Hiatal hernia     Hypertension     Hypertension     Low back pain     Neck pain     Pulmonary embolism     Sleep apnea with use of continuous positive airway pressure (CPAP)     AT NIGHT     Past Surgical History:   Procedure Laterality Date    COLONOSCOPY      JOINT REPLACEMENT Right     hip/knee    REPLACEMENT TOTAL KNEE      TONSILLECTOMY      TOTAL HIP ARTHROPLASTY Right        SLP Recommendation and Plan  SLP Swallowing Diagnosis: mild-moderate, oral dysphagia, pharyngeal dysphagia (24 0800)  SLP Diet Recommendation: regular textures, no mixed consistencies, nectar thick liquids, water between meals after oral care, with supervision, ice chips between meals after oral care, with supervision  (04/22/24 0800)  Recommended Precautions and Strategies: upright posture during/after eating, small bites of food and sips of liquid, multiple swallows per bite of food, multiple swallows per sip of liquid, alternate between small bites of food and sips of liquid, 1:1 supervision (04/22/24 0800)  SLP Rec. for Method of Medication Administration: meds whole, with puree (04/22/24 0800)     Monitor for Signs of Aspiration: yes, notify SLP if any concerns (04/22/24 0800)  Recommended Diagnostics: reassess via clinical swallow evaluation (04/22/24 0800)  Swallow Criteria for Skilled Therapeutic Interventions Met: demonstrates skilled criteria (04/22/24 0800)  Anticipated Discharge Disposition (SLP): anticipate therapy at next level of care (04/22/24 0800)  Rehab Potential/Prognosis, Swallowing: good, to achieve stated therapy goals (04/22/24 0800)  Therapy Frequency (Swallow): PRN (04/22/24 0800)  Predicted Duration Therapy Intervention (Days): until discharge (04/22/24 0800)  Oral Care Recommendations: Oral Care BID/PRN (04/22/24 0800)                                        Outcome Evaluation: VFSS completed. Kellie ROSAS, present during the study. Full report pending. Recommend regular solid diet with nectar thick liquids, NO mixed consistencies. Free water protocol in between meals and after oral care. Sitting upright, slow rate, small bites/sips, double swallows, alternate solids/thickened liquids.      SWALLOW EVALUATION (Last 72 Hours)       SLP Adult Swallow Evaluation       Row Name 04/22/24 0800 04/20/24 1030 04/19/24 1333             Rehab Evaluation    Document Type evaluation  -CR -- re-evaluation  -BB      Subjective Information no complaints  -CR -- no complaints  -BB      Patient Observations alert;cooperative  -CR -- agree to therapy  -BB      Patient Effort good  -CR -- fair  -BB      Symptoms Noted During/After Treatment none  -CR -- --         General Information    Patient Profile Reviewed yes   "-CR -- yes  -BB      Pertinent History Of Current Problem 70 y/o admitted with acute hypoxemic respiratory failure secondary to aspiration H. influenzae pneumonia, sepsis. Intubated 4/8-17. Hx includes COPD, alcohol and polysubstance abuse.  -CR -- \"1. Acute respiratory failure requiring  mechanical ventilation -- doing well post extubation  2. Aspiration pneumonia with H flu -- now finished antibiotic course    3. Severe alcohol abuse with dependence and withdrawal -- --improving now probably more withdrawal from the benzos/prececex that he has been on while here and icu delerium etc. --making some progress, continue current efforts  4. Polysubstance abuse -- likely making improvement in his mental status and sedation needs all the more difficult\". Sepsis.  -BB      Current Method of Nutrition NPO;nasogastric feedings  -CR -- NPO;nasogastric feedings  -BB      Precautions/Limitations, Vision WFL;for purposes of eval  -CR -- WFL;for purposes of eval  -BB      Precautions/Limitations, Hearing WFL;for purposes of eval  -CR -- WFL;for purposes of eval  -BB      Prior Level of Function-Communication -- -- unknown  -BB      Prior Level of Function-Swallowing no diet consistency restrictions  -CR -- no diet consistency restrictions  -BB      Plans/Goals Discussed with patient;agreed upon  -CR -- patient;agreed upon  -BB      Barriers to Rehab none identified  -CR -- medically complex  -BB         Pain    Additional Documentation Pain Scale: Numbers Pre/Post-Treatment (Group)  -CR -- Pain Scale: FACES Pre/Post-Treatment (Group)  -BB         Pain Scale: Numbers Pre/Post-Treatment    Pre/Posttreatment Pain Comment Reports chronic pain secondary to arthritis; no new pain.  -CR -- --         Pain Scale: FACES Pre/Post-Treatment    Pain: FACES Scale, Pretreatment -- -- 0-->no hurt  -BB      Posttreatment Pain Rating -- -- 0-->no hurt  -BB         Oral Motor Structure and Function    Dentition Assessment missing teeth  -CR " missing teeth;poor oral hygiene  -HT natural, present and adequate  -BB      Secretion Management WNL/WFL  -CR WNL/WFL  -HT --      Mucosal Quality moist, healthy  -CR dry  -HT --      Volitional Swallow -- WFL  -HT --      Volitional Cough -- WFL  -HT --         Oral Musculature and Cranial Nerve Assessment    Oral Motor General Assessment WFL  -CR WFL  -HT WFL  -BB         General Eating/Swallowing Observations    Eating/Swallowing Skills -- -- fed by SLP  -BB      Positioning During Eating -- -- upright 90 degree  -BB      Utensils Used -- -- spoon;straw  -BB      Consistencies Trialed -- -- pureed;ice chips;thin liquids;nectar/syrup-thick liquids  -BB         Clinical Swallow Eval    Clinical Swallow Evaluation Summary -- Swallow re-eval. Pt alert and oriented to place, situation, date and aware that today is his birthday. OME completed with good command following. Tsering water protocol administered with cough following with all sips of thin liquid. Also trialed NTL, puree, and mech soft solids. NTL via straw, Puree and mech soft solids completed with cough following all trials. No oral residue post swallow. NTL via cup completed without overt clinical s/s of aspiration. Recommend continue NPO status with free water protocol (sips with no straw) or NTL via cup with oral care BID/PRN. Recommend VFSS to fully assess swallow function. Educated pt on results of re-eval and recommendation. Pt disappointed and distressed with recommendations but verbalized understanding.  - Clinical swallow re-eval completed. No family present. Cognition: A/Oxperson, place, month. Pt able to follow basic commands. Some perseveration observed. Pt requires verbal/tactile cues to maintain wakefulness. Expressive communication: occasional incongruencies. Voice: Vocal quality and loudness appear good. Cough: Not elicited. Affect: Confused. Speech: Intelligible. Bulbar mech exam: Integrity of cranial nerves V, VII, IX/X and XII appear  grossly intact. [place] swallow protocol: pt did not appear to comprehend task. Dysphagia symptoms: pt denies symptoms of dysphagia at baseline. RN reports pt tolerated ice chips without observable issue. Patient agreeable to PO trials. Dysphagia signs: Delayed cough in 1/10 trials of thin liquids. Occasional eructation observed. Active problems related to dysphagia management: Severe alcohol abuse with dependence and withdrawal. Aspiration pna and H flu. S/p extubation (intubated 11 days). Chronic problems related to dysphagia management: Polysubstance abuse, HTN.  Assessment / Plan: Reduced wakefulness/level of alertness due to sedating medications. Pt currently confused and in wrist restraints. Pt back in ICU due to alcohol withdrawal. Pt was intubated 11 days. Suggest continue NPO with NGT for now. Suggest ice chips and sips of water with RN staff only when awake/alert. Oral care t.i.d. with toothbrush, toothpaste, and rinse/spit. SLP following.  -BB         MBS/VFSS Interpretation    VFSS Summary VFSS complete. Kellie ROSAS, present during the study. Patient presents with mild-moderate oropharyngeal dysphagia characterized by delayed swallow, reduced hyolaryngeal elevation/excursion, and reduced upper esophageal sphincter opening. Spill to the pyriforms with thin liquids; trace posterior penetration via spoon, deep silent nontransient penetration via cup/straw. Suspect trace aspiration. Cued swallow successful in reducing mild pharyngeal residue. Cued cough did not appear to clear all penetrated material. More timely swallow with nectar thick via cup/straw and puree with no penetration/aspiration via cup/straw. Swallow triggered at the level of the pyriforms with soft/chopped solid and at the level of the valleculae with regular solid trials. No penetration/aspiration observed. Adequate munching mastication. Nectar wash and double swallow successful in reducing mild valleculae residue. At conclusion of study,  pt reported holding back coughs due to fear he may not pass swallow study. Education provided regarding protective aspiration response, pt verbalized understanding. Recommend regular solid diet with nectar thick liquids, NO mixed consistencies. Meds whole in puree and nectar wash. Free water protocol in between meals and after oral care. Sitting upright, slow rate, small bites/sips, double swallows, alternate solids/thickened liquids.  -CR -- --         SLP Communication to Radiology    Summary Statement VFSS complete. Kellie ROSAS, present during the study. Patient presents with mild-moderate oropharyngeal dysphagia. Trace posterior penetration with thin liquid via spoon. Deep silent nontransient penetration with thin liquid via cup/straw. Suspect trace aspiration. No penetration/aspiration with nectar thick via cup/straw, puree, soft/chopped solid, or regular solid trials. Cued double swallow and nectar wash successful in clearing mild pharyngeal residue.  -CR -- --         Swallowing Quality of Life Assessment    Psychosocial Eating History -- certain food types very important to daily life;high motivation to achievement/increase oral intake  -HT --      Palatable food preferences -- Likes orange juice, does not like apple  -HT --      Education and counseling provided -- Signs of aspiration;Risks of aspiration;Safest diet options;Alternate nutrition/hydration options, risks, and benefits  -HT --         SLP Evaluation Clinical Impression    SLP Swallowing Diagnosis mild-moderate;oral dysphagia;pharyngeal dysphagia  -CR suspected pharyngeal dysphagia  -HT suspected pharyngeal dysphagia;R/O pharyngeal dysphagia;other (see comments)  feeding difficulties  -BB      Functional Impact risk of aspiration/pneumonia  -CR risk of aspiration/pneumonia;risk of malnutrition;risk of dehydration  -HT risk of aspiration/pneumonia;risk of malnutrition;risk of dehydration  -BB      Rehab Potential/Prognosis, Swallowing good,  to achieve stated therapy goals  -CR good, to achieve stated therapy goals  -HT good, to achieve stated therapy goals  -BB      Swallow Criteria for Skilled Therapeutic Interventions Met demonstrates skilled criteria  -CR demonstrates skilled criteria  -HT demonstrates skilled criteria  -BB         Recommendations    Therapy Frequency (Swallow) PRN  -CR PRN  -HT PRN  -BB      Predicted Duration Therapy Intervention (Days) until discharge  -CR until discharge  -HT until discharge  -BB      SLP Diet Recommendation regular textures;no mixed consistencies;nectar thick liquids;water between meals after oral care, with supervision;ice chips between meals after oral care, with supervision  -CR temporary alternate methods of nutrition/hydration;water between meals after oral care, with supervision;nectar thick liquids  -HT ice chips between meals after oral care, with supervision;other (see comments)  ice chips and sips of water w RN staff when awake; in tandem w oral care  -BB      Recommended Diagnostics reassess via clinical swallow evaluation; reassess via VFSS -CR VFSS (MBS)  -HT reassess via clinical swallow evaluation  -BB      Recommended Precautions and Strategies upright posture during/after eating;small bites of food and sips of liquid;multiple swallows per bite of food;multiple swallows per sip of liquid;alternate between small bites of food and sips of liquid;1:1 supervision  -CR upright posture during/after eating;general aspiration precautions;1:1 supervision  -HT upright posture during/after eating;1:1 supervision  -BB      Oral Care Recommendations Oral Care BID/PRN  -CR Oral Care BID/PRN  -HT Other (see comments);Toothbrush  oral care t.i.d  -BB      SLP Rec. for Method of Medication Administration meds whole;with puree  -CR meds via alternate route  -HT meds via alternate route  -BB      Monitor for Signs of Aspiration yes;notify SLP if any concerns  -CR yes;notify SLP if any concerns  -HT yes;notify SLP  if any concerns  -BB      Anticipated Discharge Disposition (SLP) anticipate therapy at next level of care  -CR anticipate therapy at next level of care  -HT --      Demonstrates Need for Referral to Another Service -- speech therapy  -HT --         Swallow Goals (SLP)    Swallow STGs pharyngeal strengthening exercise goal selection (SLP)  -CR -- --      Pharyngeal Strengthening Exercise Goal Selection (SLP) pharyngeal strengthening exercise, SLP goal 1  -CR -- --         (LTG) Patient will demonstrate functional swallow for    Diet Texture (Demonstrate functional swallow) regular textures  -CR regular textures  -HT --      Liquid viscosity (Demonstrate functional swallow) thin liquids  -CR thin liquids  -HT --      [place] (Demonstrate functional swallow) independently (over 90% accuracy)  -CR independently (over 90% accuracy)  -HT --      Time Frame (Demonstrate functional swallow) by discharge  -CR by discharge  -HT --      Progress/Outcomes (Demonstrate functional swallow) continuing progress toward goal  -CR continuing progress toward goal  -HT --         (STG) Pharyngeal Strengthening Exercise Goal 1 (SLP)    Activity (Pharyngeal Strengthening Goal 1, SLP) increase timing  -CR -- --      Increase Timing hard effortful swallow;EMST  -CR -- --      [place]/Accuracy (Pharyngeal Strengthening Goal 1, SLP) with minimal cues (75-90% accuracy)  -CR -- --      Time Frame (Pharyngeal Strengthening Goal 1, SLP) by discharge  -CR -- --      Progress/Outcomes (Pharyngeal Strengthening Goal 1, SLP) new goal  -CR -- --                User Key  (r) = Recorded By, (t) = Taken By, (c) = Cosigned By      Initials Name Effective Dates    CR Daja Flannery, BRYANT 08/28/23 -     BB Braydon Al, BRYANT 02/19/23 -     HT Clare Shelton, BRYANT 09/14/23 -                     EDUCATION  The patient has been educated in the following areas:   Dysphagia (Swallowing Impairment).        SLP GOALS       Row Name 04/22/24 0800 04/20/24  1030          (LTG) Patient will demonstrate functional swallow for    Diet Texture (Demonstrate functional swallow) regular textures  -CR regular textures  -HT     Liquid viscosity (Demonstrate functional swallow) thin liquids  -CR thin liquids  -HT     [place] (Demonstrate functional swallow) independently (over 90% accuracy)  -CR independently (over 90% accuracy)  -HT     Time Frame (Demonstrate functional swallow) by discharge  -CR by discharge  -HT     Progress/Outcomes (Demonstrate functional swallow) continuing progress toward goal  -CR continuing progress toward goal  -HT        (STG) Pharyngeal Strengthening Exercise Goal 1 (SLP)    Activity (Pharyngeal Strengthening Goal 1, SLP) increase timing  -CR --     Increase Timing hard effortful swallow;EMST  -CR --     [place]/Accuracy (Pharyngeal Strengthening Goal 1, SLP) with minimal cues (75-90% accuracy)  -CR --     Time Frame (Pharyngeal Strengthening Goal 1, SLP) by discharge  -CR --     Progress/Outcomes (Pharyngeal Strengthening Goal 1, SLP) new goal  -CR --               User Key  (r) = Recorded By, (t) = Taken By, (c) = Cosigned By      Initials Name Provider Type    CR Daja Flannery SLP Speech and Language Pathologist    HT Clare Shelton SLP Speech and Language Pathologist                       Time Calculation:    Time Calculation- SLP       Row Name 04/22/24 0916             Time Calculation- SLP    SLP Start Time 0750  -CR      SLP Received On 04/22/24  -CR         Untimed Charges    21172-PW Motion Fluoro Eval Swallow Minutes 75  -CR         Total Minutes    Untimed Charges Total Minutes 75  -CR       Total Minutes 75  -CR                User Key  (r) = Recorded By, (t) = Taken By, (c) = Cosigned By      Initials Name Provider Type    CR Daja Flannery SLP Speech and Language Pathologist                    Therapy Charges for Today       Code Description Service Date Service Provider Modifiers Qty    [id number]  ST MOTION  FLUORO EVAL SWALLOW 5 4/22/2024 Daja Flannery, SLP GN 1                 Daja Flannery, SLP  4/22/2024

## 2024-04-22 NOTE — NURSING NOTE
At about 0800 pt went to video swallow study. At about 0850, speech therapist called Rn with recommendation for solid diet, nectar thick liquids, and no mixed consistencies. Will continue to monitor. POP Wilcox    Rn removed ng cortrack at about 1030 per MD Sayied verbal order. POP Wilcox    At about 1400, RN notified MD Sayied that pt was hypertensive (SBP 160s) after receiving all prn antihypertensives (hydralazine and metoprolol). MD gave order to change metoprolol from 2.5 mg to 5 mg IV q6. RN changed order. POP Wilcox

## 2024-04-23 LAB
ANION GAP SERPL CALCULATED.3IONS-SCNC: 14.6 MMOL/L (ref 5–15)
BACTERIA SPEC AEROBE CULT: NORMAL
BACTERIA SPEC AEROBE CULT: NORMAL
BASOPHILS # BLD AUTO: 0.14 10*3/MM3 (ref 0–0.2)
BASOPHILS NFR BLD AUTO: 1.5 % (ref 0–1.5)
BUN SERPL-MCNC: 16 MG/DL (ref 8–23)
BUN/CREAT SERPL: 22.9 (ref 7–25)
CALCIUM SPEC-SCNC: 9.1 MG/DL (ref 8.6–10.5)
CHLORIDE SERPL-SCNC: 103 MMOL/L (ref 98–107)
CO2 SERPL-SCNC: 20.4 MMOL/L (ref 22–29)
CREAT SERPL-MCNC: 0.7 MG/DL (ref 0.76–1.27)
DEPRECATED RDW RBC AUTO: 39.4 FL (ref 37–54)
EGFRCR SERPLBLD CKD-EPI 2021: 99.1 ML/MIN/1.73
EOSINOPHIL # BLD AUTO: 0.55 10*3/MM3 (ref 0–0.4)
EOSINOPHIL NFR BLD AUTO: 5.7 % (ref 0.3–6.2)
ERYTHROCYTE [DISTWIDTH] IN BLOOD BY AUTOMATED COUNT: 11.9 % (ref 12.3–15.4)
GLUCOSE SERPL-MCNC: 120 MG/DL (ref 65–99)
HCT VFR BLD AUTO: 31.7 % (ref 37.5–51)
HGB BLD-MCNC: 11.1 G/DL (ref 13–17.7)
IMM GRANULOCYTES # BLD AUTO: 0.13 10*3/MM3 (ref 0–0.05)
IMM GRANULOCYTES NFR BLD AUTO: 1.3 % (ref 0–0.5)
LYMPHOCYTES # BLD AUTO: 1.6 10*3/MM3 (ref 0.7–3.1)
LYMPHOCYTES NFR BLD AUTO: 16.6 % (ref 19.6–45.3)
MAGNESIUM SERPL-MCNC: 2 MG/DL (ref 1.6–2.4)
MCH RBC QN AUTO: 32.5 PG (ref 26.6–33)
MCHC RBC AUTO-ENTMCNC: 35 G/DL (ref 31.5–35.7)
MCV RBC AUTO: 92.7 FL (ref 79–97)
MONOCYTES # BLD AUTO: 0.88 10*3/MM3 (ref 0.1–0.9)
MONOCYTES NFR BLD AUTO: 9.1 % (ref 5–12)
NEUTROPHILS NFR BLD AUTO: 6.34 10*3/MM3 (ref 1.7–7)
NEUTROPHILS NFR BLD AUTO: 65.8 % (ref 42.7–76)
NRBC BLD AUTO-RTO: 0 /100 WBC (ref 0–0.2)
PHOSPHATE SERPL-MCNC: 4.3 MG/DL (ref 2.5–4.5)
PLATELET # BLD AUTO: 634 10*3/MM3 (ref 140–450)
PMV BLD AUTO: 9.2 FL (ref 6–12)
POTASSIUM SERPL-SCNC: 3.9 MMOL/L (ref 3.5–5.2)
RBC # BLD AUTO: 3.42 10*6/MM3 (ref 4.14–5.8)
SODIUM SERPL-SCNC: 138 MMOL/L (ref 136–145)
WBC NRBC COR # BLD AUTO: 9.64 10*3/MM3 (ref 3.4–10.8)

## 2024-04-23 PROCEDURE — 94799 UNLISTED PULMONARY SVC/PX: CPT

## 2024-04-23 PROCEDURE — 97530 THERAPEUTIC ACTIVITIES: CPT

## 2024-04-23 PROCEDURE — 94664 DEMO&/EVAL PT USE INHALER: CPT

## 2024-04-23 PROCEDURE — 97535 SELF CARE MNGMENT TRAINING: CPT

## 2024-04-23 PROCEDURE — 25010000002 ENOXAPARIN PER 10 MG: Performed by: INTERNAL MEDICINE

## 2024-04-23 PROCEDURE — 25810000003 SODIUM CHLORIDE 0.9 % SOLUTION 250 ML FLEX CONT: Performed by: INTERNAL MEDICINE

## 2024-04-23 PROCEDURE — 84100 ASSAY OF PHOSPHORUS: CPT | Performed by: INTERNAL MEDICINE

## 2024-04-23 PROCEDURE — 85025 COMPLETE CBC W/AUTO DIFF WBC: CPT | Performed by: INTERNAL MEDICINE

## 2024-04-23 PROCEDURE — 94761 N-INVAS EAR/PLS OXIMETRY MLT: CPT

## 2024-04-23 PROCEDURE — 25010000002 VANCOMYCIN HCL 1.25 G RECONSTITUTED SOLUTION 1 EACH VIAL: Performed by: INTERNAL MEDICINE

## 2024-04-23 PROCEDURE — 97116 GAIT TRAINING THERAPY: CPT

## 2024-04-23 PROCEDURE — 83735 ASSAY OF MAGNESIUM: CPT | Performed by: INTERNAL MEDICINE

## 2024-04-23 PROCEDURE — 80048 BASIC METABOLIC PNL TOTAL CA: CPT | Performed by: INTERNAL MEDICINE

## 2024-04-23 RX ORDER — LORAZEPAM 0.5 MG/1
0.5 TABLET ORAL EVERY 6 HOURS PRN
Status: DISCONTINUED | OUTPATIENT
Start: 2024-04-23 | End: 2024-04-24 | Stop reason: HOSPADM

## 2024-04-23 RX ORDER — DOXYCYCLINE 100 MG/1
100 CAPSULE ORAL EVERY 12 HOURS SCHEDULED
Status: DISCONTINUED | OUTPATIENT
Start: 2024-04-23 | End: 2024-04-24 | Stop reason: HOSPADM

## 2024-04-23 RX ORDER — METOPROLOL TARTRATE 50 MG/1
100 TABLET, FILM COATED ORAL EVERY 12 HOURS SCHEDULED
Status: DISCONTINUED | OUTPATIENT
Start: 2024-04-23 | End: 2024-04-24 | Stop reason: HOSPADM

## 2024-04-23 RX ADMIN — Medication 100 MG: at 07:30

## 2024-04-23 RX ADMIN — ZINC OXIDE 1 APPLICATION: 200 OINTMENT TOPICAL at 21:02

## 2024-04-23 RX ADMIN — IPRATROPIUM BROMIDE AND ALBUTEROL SULFATE 3 ML: .5; 3 SOLUTION RESPIRATORY (INHALATION) at 06:37

## 2024-04-23 RX ADMIN — FAMOTIDINE 20 MG: 20 TABLET, FILM COATED ORAL at 18:05

## 2024-04-23 RX ADMIN — ASPIRIN 81 MG: 81 TABLET, COATED ORAL at 07:31

## 2024-04-23 RX ADMIN — IPRATROPIUM BROMIDE AND ALBUTEROL SULFATE 3 ML: .5; 3 SOLUTION RESPIRATORY (INHALATION) at 14:29

## 2024-04-23 RX ADMIN — DOXYCYCLINE 100 MG: 100 CAPSULE ORAL at 18:05

## 2024-04-23 RX ADMIN — LORAZEPAM 0.5 MG: 0.5 TABLET ORAL at 18:05

## 2024-04-23 RX ADMIN — Medication 10 ML: at 21:02

## 2024-04-23 RX ADMIN — FOLIC ACID 1 MG: 1 TABLET ORAL at 07:31

## 2024-04-23 RX ADMIN — LORAZEPAM 1 MG: 1 TABLET ORAL at 12:05

## 2024-04-23 RX ADMIN — LORAZEPAM 1 MG: 1 TABLET ORAL at 02:52

## 2024-04-23 RX ADMIN — METOPROLOL TARTRATE 75 MG: 25 TABLET, FILM COATED ORAL at 07:30

## 2024-04-23 RX ADMIN — VANCOMYCIN HYDROCHLORIDE 1250 MG: 1.25 INJECTION, POWDER, LYOPHILIZED, FOR SOLUTION INTRAVENOUS at 12:05

## 2024-04-23 RX ADMIN — QUETIAPINE FUMARATE 100 MG: 100 TABLET ORAL at 21:02

## 2024-04-23 RX ADMIN — QUETIAPINE FUMARATE 100 MG: 100 TABLET ORAL at 07:31

## 2024-04-23 RX ADMIN — LORAZEPAM 1 MG: 1 TABLET ORAL at 07:30

## 2024-04-23 RX ADMIN — Medication 1 TABLET: at 07:30

## 2024-04-23 RX ADMIN — ENOXAPARIN SODIUM 40 MG: 100 INJECTION SUBCUTANEOUS at 12:05

## 2024-04-23 RX ADMIN — METOPROLOL TARTRATE 100 MG: 50 TABLET, FILM COATED ORAL at 21:02

## 2024-04-23 RX ADMIN — FAMOTIDINE 20 MG: 20 TABLET, FILM COATED ORAL at 05:53

## 2024-04-23 NOTE — PROGRESS NOTES
"[facility] PULMONARY CARE         Dr Acosta Sayied   LOS: 17 days   Patient Care Team:  Roc Carroll APRN as PCP - General (Nurse Practitioner)  Checo Dunham MD as PCP - Family Medicine  Checo Dunham MD (Family Medicine)    Chief Complaint: Acute respiratory failure aspiration pneumonia alcohol abuse multiple issues as listed below    Interval History: No overnight issues.  Currently on room air.     REVIEW OF SYSTEMS:   No chest pain or shortness of    Ventilator/Non-Invasive Ventilation Settings (From admission, onward)     Room air    Vital Signs  Temp:  [97.3 °F (36.3 °C)-98.1 °F (36.7 °C)] 98.1 °F (36.7 °C)  Heart Rate:  [] 79  Resp:  [16-18] 18  BP: (134-201)/() 184/74    Intake/Output Summary (Last 24 hours) at 4/23/2024 1241  Last data filed at 4/23/2024 0547  Gross per 24 hour   Intake --   Output 1085 ml   Net -1085 ml     Flowsheet Rows      Flowsheet Row First Filed Value   Admission Height 200.7 cm (79\") Documented at 04/06/2024 1130   Admission Weight 113 kg (250 lb) Documented at 04/06/2024 1130            Physical Exam:  Patient is examined using the personal protective equipment as per guidelines from infection control for this particular patient as enacted.  Hand hygiene was performed before and after patient interaction.   General Appearance:    Alert, cooperative, in no acute distress.  Following simple commands  ENT normocephalic atraumatic  Neck midline trachea, no thyromegaly   Lungs:   Diminished breath sounds bilaterally    Heart:    Regular rhythm and normal rate, normal S1 and S2, no            murmur, no gallop, no rub, no click   Chest Wall:    No abnormalities observed   Abdomen:     Normal bowel sounds, no masses, no organomegaly, soft        nontender, nondistended, no guarding, no rebound                tenderness   Extremities:   Moves all extremities well, no edema, no cyanosis, no             redness  CNS no focal neurological deficits normal " sensory exam  Skin no rashes no nodules  Musculoskeletal no cyanosis no clubbing normal range of motion     Results Review:        Results from last 7 days   Lab Units 04/23/24  0556 04/22/24  0746 04/21/24  0554   SODIUM mmol/L 138 137 136   POTASSIUM mmol/L 3.9 4.0 4.6   CHLORIDE mmol/L 103 102 101   CO2 mmol/L 20.4* 21.3* 21.0*   BUN mg/dL 16 16 17   CREATININE mg/dL 0.70* 0.65* 0.70*   GLUCOSE mg/dL 120* 112* 113*   CALCIUM mg/dL 9.1 9.6 8.9         Results from last 7 days   Lab Units 04/23/24  0556 04/22/24  0746 04/21/24  0554   WBC 10*3/mm3 9.64 8.68 7.42   HEMOGLOBIN g/dL 11.1* 11.2* 10.1*   HEMATOCRIT % 31.7* 32.4* 29.6*   PLATELETS 10*3/mm3 634* 563* 519*             Results from last 7 days   Lab Units 04/23/24  0556   MAGNESIUM mg/dL 2.0         Results from last 7 days   Lab Units 04/17/24  0321   PH, ARTERIAL pH units 7.438   PO2 ART mm Hg 71.2*   PCO2, ARTERIAL mm Hg 36.6   HCO3 ART mmol/L 24.7       I reviewed the patient's new clinical results.  I personally viewed and interpreted the patient's chest x-ray.        Medication Review:   aspirin, 81 mg, Oral, Daily  enoxaparin, 40 mg, Subcutaneous, Q24H  famotidine, 20 mg, Oral, BID AC  folic acid, 1 mg, Oral, Daily  hydrocortisone-bacitracin-zinc oxide-nystatin, 1 Application, Topical, BID  ipratropium-albuterol, 3 mL, Nebulization, 4x Daily - RT  metoprolol tartrate, 75 mg, Oral, Q12H  multivitamin with minerals, 1 tablet, Oral, Daily  QUEtiapine, 100 mg, Oral, Q12H  senna-docusate sodium, 2 tablet, Nasogastric, BID  sodium chloride, 10 mL, Intravenous, Q12H  thiamine, 100 mg, Oral, Daily  vancomycin, 1,250 mg, Intravenous, Q12H        Pharmacy to dose vancomycin,         ASSESSMENT:   Acute hypoxemic respiratory failure  Aspiration pneumonia with H. influenzae MRSA positive  Alcohol abuse and dependence and withdrawal  Polysubstance abuse  LIVAN  Sepsis with shock  Fever      PLAN:  Clinically stable and improving.  Diet per speech  Atelectasis  versus pneumonia.  On vancomycin.  Discontinue and switch to oral doxycycline.  Thiamine and folate   Mobilize ambulate  Hospitalist following  No objection to discharge  I have arranged for a follow-up with me in the office in 6 to 8 weeks with PFTs  Nothing further to add we will sign      Karla Ybarra MD  04/23/24  12:41 EDT

## 2024-04-23 NOTE — THERAPY TREATMENT NOTE
Patient Name: Watson Lisa  : 1955    MRN: [id number]                              Today's Date: 2024       Admit Date: 2024    Visit Dx:     ICD-10-CM ICD-9-CM   1. Alcohol withdrawal syndrome without complication  F10.930 291.81   2. Hypomagnesemia  E83.42 275.2     Patient Active Problem List   Diagnosis    Essential hypertension    Tobacco abuse    Hiatal hernia    Effusion of left knee    Osteoarthritis of left knee    Vitamin D deficiency    Anemia, chronic disease    COPD (chronic obstructive pulmonary disease)    LIVAN (obstructive sleep apnea)    ETOH abuse    Obese    Alcoholic liver disease    History of alcohol use disorder    Left patella fracture    Need for hepatitis C screening test    Chronic pain of left ankle    Primary insomnia    Overweight (BMI 25.0-29.9)    Mixed hyperlipidemia    Acute alcohol intoxication    Hypomagnesemia    Alcohol withdrawal    Substance abuse    Cocaine abuse    Positive urine drug screen    Aspiration pneumonia    Thrombocytosis     Past Medical History:   Diagnosis Date    Alcohol abuse     Anxiety     Arthritis     Bronchitis with chronic airway obstruction     LAST FE    DVT (deep venous thrombosis)     Hiatal hernia     Hypertension     Hypertension     Low back pain     Neck pain     Pulmonary embolism     Sleep apnea with use of continuous positive airway pressure (CPAP)     AT NIGHT     Past Surgical History:   Procedure Laterality Date    COLONOSCOPY      JOINT REPLACEMENT Right     hip/knee    REPLACEMENT TOTAL KNEE      TONSILLECTOMY      TOTAL HIP ARTHROPLASTY Right       General Information       Row Name 24 1214          OT Time and Intention    Document Type therapy note (daily note)  -AG     Mode of Treatment co-treatment;physical therapy;occupational therapy  cotx for safe pt handling and mobility progression  -AG       Row Name 24 1214          General Information    Patient Profile Reviewed yes  -AG     Existing  Precautions/Restrictions fall  -AG       Row Name 04/23/24 1214          Cognition    Orientation Status (Cognition) oriented x 4  -AG       Row Name 04/23/24 1214          Safety Issues, Functional Mobility    Safety Issues Affecting Function (Mobility) awareness of need for assistance;impulsivity;insight into deficits/self-awareness;judgment;positioning of assistive device;problem-solving;safety precaution awareness  -AG     Impairments Affecting Function (Mobility) balance;endurance/activity tolerance;pain;strength  -AG     Cognitive Impairments, Mobility Safety/Performance awareness, need for assistance;impulsivity;insight into deficits/self-awareness;judgment;problem-solving/reasoning;safety precaution awareness  -AG     Comment, Safety Issues/Impairments (Mobility) Pt. impulsive and with lack of insight into deficits requiring frequent cues for safety  -AG               User Key  (r) = Recorded By, (t) = Taken By, (c) = Cosigned By      Initials Name Provider Type    AG Pasquale Medina OT Occupational Therapist                     Mobility/ADL's       Row Name 04/23/24 1215          Bed Mobility    Bed Mobility supine-sit  -AG     Supine-Sit [place] (Bed Mobility) standby assist  -AG     Assistive Device (Bed Mobility) head of bed elevated  -AG     Comment, (Bed Mobility) UIC at end of session  -AG       Row Name 04/23/24 1215          Transfers    Transfers bed-chair transfer;sit-stand transfer;toilet transfer  -AG     Comment, (Transfers) bed > BSC w RW and min A  -AG       Row Name 04/23/24 1215          Bed-Chair Transfer    Bed-Chair [place] (Transfers) minimum assist (75% patient effort);1 person assist  -AG     Assistive Device (Bed-Chair Transfers) walker, front-wheeled  -AG     Comment, (Bed-Chair Transfer) cues for sequencing and safety when transitioning from standing to sitting  -AG       Row Name 04/23/24 1215          Sit-Stand Transfer    Sit-Stand [place] (Transfers) minimum  assist (75% patient effort)  -AG     Assistive Device (Sit-Stand Transfers) walker, front-wheeled  -AG       Row Name 04/23/24 1215          Toilet Transfer    Type (Toilet Transfer) sit-stand  -AG     [place] Level (Toilet Transfer) minimum assist (75% patient effort)  -AG     Assistive Device (Toilet Transfer) commode, bedside without drop arms  -AG     Comment, (Toilet Transfer) pt. originally requesting to complete toileting however once seated deferred. Pt. required min A for transfer  -AG       Row Name 04/23/24 1215          Functional Mobility    Functional Mobility- Comment bed > BSC > hallway > recliner chair w RW and cues for safety and pacing throughout  -AG       Row Name 04/23/24 1215          Activities of Daily Living    BADL Assessment/Intervention lower body dressing;grooming;toileting  -AG       Row Name 04/23/24 1215          Lower Body Dressing Assessment/Training    [place] Level (Lower Body Dressing) doff;don;shoes/slippers;socks;minimum assist (75% patient effort)  -AG     Position (Lower Body Dressing) edge of bed sitting  -AG     Comment, (Lower Body Dressing) excess time and frustration noted, assist to tie shoes required  -AG       Row Name 04/23/24 1215          Toileting Assessment/Training    [place] Level (Toileting) maximum assist (25% patient effort)  -AG     Position (Toileting) supported sitting  -AG     Comment, (Toileting) simulated on BSC  -AG       Row Name 04/23/24 1215          Grooming Assessment/Training    [place] Level (Grooming) grooming skills;independent;wash face, hands  -AG     Position (Grooming) supported sitting  -AG               User Key  (r) = Recorded By, (t) = Taken By, (c) = Cosigned By      Initials Name Provider Type    AG Pasquale Medina OT Occupational Therapist                   Obj/Interventions       Row Name 04/23/24 1219          Shoulder (Therapeutic Exercise)    Shoulder (Therapeutic Exercise) AROM (active range of motion)   -AG     Shoulder AROM (Therapeutic Exercise) bilateral;flexion;scapular protraction;scapular retraction;sitting;10 repetitions  -AG       Row Name 04/23/24 1219          Elbow/Forearm (Therapeutic Exercise)    Elbow/Forearm (Therapeutic Exercise) AROM (active range of motion)  -     Elbow/Forearm AROM (Therapeutic Exercise) bilateral;flexion;extension;15 repititions;sitting  -AG       Row Name 04/23/24 1219          Motor Skills    Functional Endurance improving  -     Therapeutic Exercise shoulder;elbow/forearm  -       Row Name 04/23/24 1219          Balance    Balance Assessment sitting static balance;sitting dynamic balance;sit to stand dynamic balance;standing static balance;standing dynamic balance  -AG     Static Sitting Balance supervision  -     Dynamic Sitting Balance supervision  -AG     Position, Sitting Balance sitting edge of bed  -     Sit to Stand Dynamic Balance minimal assist  -AG     Static Standing Balance contact guard  -     Dynamic Standing Balance moderate assist  -AG     Position/Device Used, Standing Balance supported;walker, front-wheeled  -     Balance Interventions sitting;standing;sit to stand;supported;static;dynamic;moderate challenge;occupation based/functional task  -     Comment, Balance 1 LOB during trial with cane, otherwise heavy UE support through RW  -               User Key  (r) = Recorded By, (t) = Taken By, (c) = Cosigned By      Initials Name Provider Type    AG Pasquale Medina, OT Occupational Therapist                   Goals/Plan    No documentation.                  Clinical Impression       Row Name 04/23/24 1220          Pain Assessment    Pretreatment Pain Rating 0/10 - no pain  -AG     Posttreatment Pain Rating 0/10 - no pain  -AG       Row Name 04/23/24 1220          Plan of Care Review    Plan of Care Reviewed With patient  -AG     Progress improving  -     Outcome Evaluation RN consent, pt. received supine in bed agreeable to OT/PT session.  Pt. ivone's contiued deficits with strength, balance and act mendez impacting his independence with ADLS. Pt. was able to transition sup > sit SBA, min A for LBD, min A for STS w RW and pivot to BSC. Pt. was able to complete func mob with heavy BUE support and max cues required for safety considerations. Pt. presents very impulsive and has a poor sense of insight into deficits and was provided with education on safety considerations as pt. would like to dc home with no support. OT highlty recommending dc to rehab to maximize rehab potential.  -AG       Row Name 04/23/24 1220          Therapy Assessment/Plan (OT)    Rehab Potential (OT) good, to achieve stated therapy goals  -AG     Criteria for Skilled Therapeutic Interventions Met (OT) yes;skilled treatment is necessary  -AG     Therapy Frequency (OT) 5 times/wk  -AG       Row Name 04/23/24 1220          Therapy Plan Review/Discharge Plan (OT)    Anticipated Discharge Disposition (OT) skilled nursing facility;home with 24/7 care  -AG       Row Name 04/23/24 1220          Vital Signs    O2 Delivery Pre Treatment room air  -AG     O2 Delivery Post Treatment room air  -AG     Pre Patient Position Supine  -AG     Intra Patient Position Standing  -AG     Post Patient Position Sitting  -AG       Row Name 04/23/24 1220          Positioning and Restraints    Pre-Treatment Position in bed  -AG     Post Treatment Position chair  -AG     In Chair notified nsg;reclined;call light within reach;encouraged to call for assist;exit alarm on  -AG               User Key  (r) = Recorded By, (t) = Taken By, (c) = Cosigned By      Initials Name Provider Type    AG Pasquale Medina, OT Occupational Therapist                   Outcome Measures       Row Name 04/23/24 1223          How much help from another is currently needed...    Putting on and taking off regular lower body clothing? 3  -AG     Bathing (including washing, rinsing, and drying) 3  -AG     Toileting (which includes using  toilet bed pan or urinal) 2  -AG     Putting on and taking off regular upper body clothing 3  -AG     Taking care of personal grooming (such as brushing teeth) 3  -AG     Eating meals 4  -AG     AM-PAC 6 Clicks Score (OT) 18  -AG       Row Name 04/23/24 1140 04/23/24 0732       How much help from another person do you currently need...    Turning from your back to your side while in flat bed without using bedrails? 3  -CW 3  -RW    Moving from lying on back to sitting on the side of a flat bed without bedrails? 3  -CW 3  -RW    Moving to and from a bed to a chair (including a wheelchair)? 3  -CW 3  -RW    Standing up from a chair using your arms (e.g., wheelchair, bedside chair)? 3  -CW 3  -RW    Climbing 3-5 steps with a railing? 2  -CW 2  -RW    To walk in hospital room? 3  -CW 3  -RW    AM-PAC 6 Clicks Score (PT) 17  -CW 17  -RW    Highest Level of Mobility Goal 5 --> Static standing  -CW 5 --> Static standing  -RW      Row Name 04/23/24 1223 04/23/24 1140       Functional Assessment    Outcome Measure Options AM-PAC 6 Clicks Daily Activity (OT)  -AG AM-PAC 6 Clicks Basic Mobility (PT)  -CW              User Key  (r) = Recorded By, (t) = Taken By, (c) = Cosigned By      Initials Name Provider Type    Ming Fontana, RN Registered Nurse    Bethanie Silva, PT Physical Therapist    AG Pasquale Medina, OT Occupational Therapist                    Occupational Therapy Education       Title: PT OT SLP Therapies (In Progress)       Topic: Occupational Therapy (In Progress)       Point: ADL training (Done)       Description:   Instruct learner(s) on proper safety adaptation and remediation techniques during self care or transfers.   Instruct in proper use of assistive devices.                  Learning Progress Summary             Patient Acceptance, E, VU by  at 4/18/2024 2920    Comment: OT goals, POC, recovery/rehab                         Point: Home exercise program (Not Started)       Description:    Instruct learner(s) on appropriate technique for monitoring, assisting and/or progressing therapeutic exercises/activities.                  Learner Progress:  Not documented in this visit.              Point: Precautions (Not Started)       Description:   Instruct learner(s) on prescribed precautions during self-care and functional transfers.                  Learner Progress:  Not documented in this visit.              Point: Body mechanics (Not Started)       Description:   Instruct learner(s) on proper positioning and spine alignment during self-care, functional mobility activities and/or exercises.                  Learner Progress:  Not documented in this visit.                              User Key       Initials Effective Dates Name Provider Type Discipline     04/02/20 -  Dorcas Villeda OT Occupational Therapist OT                  OT Recommendation and Plan  Therapy Frequency (OT): 5 times/wk  Plan of Care Review  Plan of Care Reviewed With: patient  Progress: improving  Outcome Evaluation: RN consent, pt. received supine in bed agreeable to OT/PT session. Pt. demo's contiued deficits with strength, balance and act mendez impacting his independence with ADLS. Pt. was able to transition sup > sit SBA, min A for LBD, min A for STS w RW and pivot to BSC. Pt. was able to complete func mob with heavy BUE support and max cues required for safety considerations. Pt. presents very impulsive and has a poor sense of insight into deficits and was provided with education on safety considerations as pt. would like to dc home with no support. OT jose recommending dc to rehab to maximize rehab potential.     Time Calculation:         Time Calculation- OT       Row Name 04/23/24 1224             Time Calculation- OT    OT Start Time 0944  -AG      OT Stop Time 1011  -AG      OT Time Calculation (min) 27 min  -AG      Total Timed Code Minutes- OT 27 minute(s)  -AG      OT Received On 04/23/24  -AG      OT - Next  Appointment 04/24/24  -AG      OT Goal Re-Cert Due Date 05/02/24  -AG         Timed Charges    96702 - OT Therapeutic Activity Minutes 9  -AG      33793 - OT Self Care/Mgmt Minutes 18  -AG         Total Minutes    Timed Charges Total Minutes 27  -AG       Total Minutes 27  -AG                User Key  (r) = Recorded By, (t) = Taken By, (c) = Cosigned By      Initials Name Provider Type     Pasquale Medina OT Occupational Therapist                  Therapy Charges for Today       Code Description Service Date Service Provider Modifiers Qty    17988065799 HC OT THERAPEUTIC ACT EA 15 MIN 4/23/2024 Pasquale Medina OT GO 1    34498674071 HC OT SELF CARE/MGMT/TRAIN EA 15 MIN 4/23/2024 Pasquale Medina OT GO 1                 Pasquale Medina OT  4/23/2024

## 2024-04-23 NOTE — THERAPY TREATMENT NOTE
Patient Name: Watson Lisa  : 1955    MRN: [id number]                              Today's Date: 2024       Admit Date: 2024    Visit Dx:     ICD-10-CM ICD-9-CM   1. Alcohol withdrawal syndrome without complication  F10.930 291.81   2. Hypomagnesemia  E83.42 275.2     Patient Active Problem List   Diagnosis    Essential hypertension    Tobacco abuse    Hiatal hernia    Effusion of left knee    Osteoarthritis of left knee    Vitamin D deficiency    Anemia, chronic disease    COPD (chronic obstructive pulmonary disease)    LIVAN (obstructive sleep apnea)    ETOH abuse    Obese    Alcoholic liver disease    History of alcohol use disorder    Left patella fracture    Need for hepatitis C screening test    Chronic pain of left ankle    Primary insomnia    Overweight (BMI 25.0-29.9)    Mixed hyperlipidemia    Acute alcohol intoxication    Hypomagnesemia    Alcohol withdrawal    Substance abuse    Cocaine abuse    Positive urine drug screen    Aspiration pneumonia    Thrombocytosis     Past Medical History:   Diagnosis Date    Alcohol abuse     Anxiety     Arthritis     Bronchitis with chronic airway obstruction     LAST FE    DVT (deep venous thrombosis)     Hiatal hernia     Hypertension     Hypertension     Low back pain     Neck pain     Pulmonary embolism     Sleep apnea with use of continuous positive airway pressure (CPAP)     AT NIGHT     Past Surgical History:   Procedure Laterality Date    COLONOSCOPY      JOINT REPLACEMENT Right     hip/knee    REPLACEMENT TOTAL KNEE      TONSILLECTOMY      TOTAL HIP ARTHROPLASTY Right       General Information       Row Name 24 1134          Physical Therapy Time and Intention    Document Type therapy note (daily note)  -CW     Mode of Treatment physical therapy  -CW       Row Name 24 1134          General Information    Patient Profile Reviewed yes  -CW     Existing Precautions/Restrictions fall  -CW       Row Name 24 1134          Cognition     Orientation Status (Cognition) oriented x 4  -CW       Row Name 04/23/24 1134          Safety Issues, Functional Mobility    Safety Issues Affecting Function (Mobility) insight into deficits/self-awareness;awareness of need for assistance;judgment;safety precautions follow-through/compliance  -CW     Impairments Affecting Function (Mobility) balance;endurance/activity tolerance;pain;strength  -CW               User Key  (r) = Recorded By, (t) = Taken By, (c) = Cosigned By      Initials Name Provider Type    Bethanie Silva PT Physical Therapist                   Mobility       Row Name 04/23/24 1134          Bed Mobility    Bed Mobility supine-sit  -CW     Supine-Sit [place] (Bed Mobility) standby assist  -CW     Assistive Device (Bed Mobility) head of bed elevated  -CW       Row Name 04/23/24 1134          Gait/Stairs (Locomotion)    [place] Level (Gait) minimum assist (75% patient effort);verbal cues;contact guard  -CW     Assistive Device (Gait) walker, front-wheeled;cane, straight  -CW     Distance in Feet (Gait) 100  85' with walker, 15' with cane.  -CW     Deviations/Abnormal Patterns (Gait) janeth decreased;gait speed decreased;stride length decreased;base of support, wide  -CW     Bilateral Gait Deviations forward flexed posture  -CW     Comment, (Gait/Stairs) Heavy Wbing through UE on walker, amb on later aspect of L foot. Decreased safety awareness. Increased instability with cane and had LOB requiring mod A to correct  -CW               User Key  (r) = Recorded By, (t) = Taken By, (c) = Cosigned By      Initials Name Provider Type    Bethanie Silva PT Physical Therapist                   Obj/Interventions       Row Name 04/23/24 1136          Balance    Static Standing Balance contact guard  -CW     Dynamic Standing Balance moderate assist  -CW     Position/Device Used, Standing Balance supported;cane, quad;walker, front-wheeled  -CW     Comment, Balance mod A required with cane  due to LOB  -CW               User Key  (r) = Recorded By, (t) = Taken By, (c) = Cosigned By      Initials Name Provider Type    Bethanie Silva PT Physical Therapist                   Goals/Plan    No documentation.                  Clinical Impression       Row Name 04/23/24 1136          Pain    Pretreatment Pain Rating 0/10 - no pain  -CW     Posttreatment Pain Rating 0/10 - no pain  -CW       Row Name 04/23/24 1136          Plan of Care Review    Plan of Care Reviewed With patient  -CW     Outcome Evaluation Pt participated with PT this morning. Pt sleeping upon arrival but agreeable to mobilize. Pt completed supine>sit with sba. He stood with min A and ambulated 100' total. Ambulated approx 85' with walker, PT observes very heavy weightbearing through UE on walker for balance support. Pt reports he prefers his cane as he typically mobilizes with a cane in the home. Trialed cane for 15' with worsening balance noted. With use of the cane, pt had 1 loss of balance requiring mod A from PT to correct to prevent a fall. Pt with limited safety awareness and is a high falls risk. Would benefit from rehab at d/c to progress strength and balance before returning home alone.  -CW       Row Name 04/23/24 1136          Vital Signs    O2 Delivery Pre Treatment room air  -CW     O2 Delivery Intra Treatment room air  -CW     O2 Delivery Post Treatment room air  -CW       Row Name 04/23/24 1136          Positioning and Restraints    Pre-Treatment Position in bed  -CW     Post Treatment Position chair  -CW     In Chair reclined;call light within reach;exit alarm on;encouraged to call for assist;notified nsg;legs elevated  -CW               User Key  (r) = Recorded By, (t) = Taken By, (c) = Cosigned By      Initials Name Provider Type    Bethanie Silva PT Physical Therapist                   Outcome Measures       Row Name 04/23/24 1140 04/23/24 0732       How much help from another person do you currently need...     Turning from your back to your side while in flat bed without using bedrails? 3  -CW 3  -RW    Moving from lying on back to sitting on the side of a flat bed without bedrails? 3  -CW 3  -RW    Moving to and from a bed to a chair (including a wheelchair)? 3  -CW 3  -RW    Standing up from a chair using your arms (e.g., wheelchair, bedside chair)? 3  -CW 3  -RW    Climbing 3-5 steps with a railing? 2  -CW 2  -RW    To walk in hospital room? 3  -CW 3  -RW    AM-PAC 6 Clicks Score (PT) 17  -CW 17  -RW    Highest Level of Mobility Goal 5 --> Static standing  -CW 5 --> Static standing  -RW      Row Name 04/23/24 1140          Functional Assessment    Outcome Measure Options AM-PAC 6 Clicks Basic Mobility (PT)  -CW               User Key  (r) = Recorded By, (t) = Taken By, (c) = Cosigned By      Initials Name Provider Type    RW Ming Todd, RN Registered Nurse    Bethanie Silva, STANLEY Physical Therapist                                 Physical Therapy Education       Title: PT OT SLP Therapies (In Progress)       Topic: Physical Therapy (Done)       Point: Mobility training (Done)       Learning Progress Summary             Patient Acceptance, E, VU,NR by CW at 4/23/2024 1141    Acceptance, E,D, DU by PC at 4/22/2024 1512    Acceptance, E, VU by MO at 4/21/2024 1311    Acceptance, E, NR by EM at 4/18/2024 1109                         Point: Home exercise program (Done)       Learning Progress Summary             Patient Acceptance, E, VU by MO at 4/21/2024 1311                         Point: Body mechanics (Done)       Learning Progress Summary             Patient Acceptance, E,D, DU by PC at 4/22/2024 1512    Acceptance, E, VU by MO at 4/21/2024 1311                         Point: Precautions (Done)       Learning Progress Summary             Patient Acceptance, E,D, DU by PC at 4/22/2024 1512    Acceptance, E, VU by MO at 4/21/2024 1311                                         User Key       Initials Effective  Dates Name Provider Type Discipline    PC 06/16/21 -  Domitila Renee, PT Physical Therapist PT    EM 06/16/21 -  Pooja Carrington PT Physical Therapist PT    CW 12/13/22 -  Bethanie Rivas, STANLEY Physical Therapist PT    MO 05/26/23 -  Keturah Funes, STANLEY Physical Therapist PT                  PT Recommendation and Plan     Plan of Care Reviewed With: patient  Outcome Evaluation: Pt participated with PT this morning. Pt sleeping upon arrival but agreeable to mobilize. Pt completed supine>sit with sba. He stood with min A and ambulated 100' total. Ambulated approx 85' with walker, PT observes very heavy weightbearing through UE on walker for balance support. Pt reports he prefers his cane as he typically mobilizes with a cane in the home. Trialed cane for 15' with worsening balance noted. With use of the cane, pt had 1 loss of balance requiring mod A from PT to correct to prevent a fall. Pt with limited safety awareness and is a high falls risk. Would benefit from rehab at d/c to progress strength and balance before returning home alone.     Time Calculation:         PT Charges       Row Name 04/23/24 1141             Time Calculation    Start Time 0944  -CW      Stop Time 1008  -CW      Time Calculation (min) 24 min  -CW      PT Received On 04/23/24  -CW      PT - Next Appointment 04/24/24  -CW                User Key  (r) = Recorded By, (t) = Taken By, (c) = Cosigned By      Initials Name Provider Type    CW Bethanie Rivas, PT Physical Therapist                  Therapy Charges for Today       Code Description Service Date Service Provider Modifiers Qty    20551165918 HC GAIT TRAINING EA 15 MIN 4/23/2024 Bethanie Rivas, PT GP 1    68443950617 HC PT THERAPEUTIC ACT EA 15 MIN 4/23/2024 Bethanie Rivas, PT GP 1            PT G-Codes  Outcome Measure Options: AM-PAC 6 Clicks Basic Mobility (PT)  AM-PAC 6 Clicks Score (PT): 17  AM-PAC 6 Clicks Score (OT): 18  PT Discharge Summary  Anticipated Discharge  Disposition (PT): inpatient rehabilitation facility    Bethanie Rivas, PT  4/23/2024

## 2024-04-23 NOTE — PLAN OF CARE
Goal Outcome Evaluation:  Plan of Care Reviewed With: patient, family           Outcome Evaluation: Pt resting in room.  Vanc IV was given and has been DC'd.  Oral Doxcy to replace.  Ativan changed to .5mg Q 6 hours PRN.  Pt is aware.  VSS, RA, NSR.

## 2024-04-23 NOTE — PAYOR COMM NOTE
"Brittany Lisa (69 y.o. Male)        PLEASE SEE ATTACHED FOR INPT CONTINUED STAY AUTH.     REF#[id number]    PLEASE CALL  OR  [phone]    THANK YOU    ELZA ARCE LPN [facility]   Date of Birth   1955    Social Security Number       Address   [address] [address]    Home Phone   [phone]    MRN   [id number]       [place]   Adventism    Marital Status                               Admission Date   4/6/24    Admission Type   Emergency    Admitting Provider   Britney Triplett MD    Attending Provider   Britney Triplett MD    Department, Room/Bed   [address], E656/1       Discharge Date       Discharge Disposition       Discharge Destination                                 Attending Provider: Britney Triplett MD    Allergies: Penicillins    Isolation: Contact   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)   Code Status: CPR    Ht: 198.1 cm (78\")   Wt: 115 kg (253 lb 4.9 oz)    Admission Cmt: None   Principal Problem: Alcohol withdrawal [F10.939]                   Active Insurance as of 4/6/2024       Primary Coverage       Payor Plan Insurance Group Employer/Plan Group    ANTHEM BLUE CROSS ANTHEM BLUE CROSS BLUE SHIELD PPO [id number]       Payor Plan Address Payor Plan Phone Number Payor Plan Fax Number Effective Dates    [address] [phone]  1/1/2022 - None Entered    [facility] [id number]         Subscriber Name Subscriber Birth Date Member ID       BRITTANY LISA 1955 [id number]               Secondary Coverage       Payor Plan Insurance Group Employer/Plan Group    MEDICARE MEDICARE A ONLY        Payor Plan Address Payor Plan Phone Number Payor Plan Fax Number Effective Dates    [address] [phone]  9/1/2019 - None Entered    [facility] [id number]         Subscriber Name Subscriber Birth Date Member ID       BRITTANY LISA 1955 [id number]                     Emergency Contacts        (Rel.) Home Phone Work Phone " Mobile Phone    TEJAS ZULETA (Son) [phone] -- [phone]    Jorge A Nielson (Sister) [phone] -- --              Oxygen Therapy (last 5 days)       Date/Time SpO2 Device (Oxygen Therapy) Flow (L/min) Oxygen Concentration (%) ETCO2 (mmHg)    04/23/24 0717 95 room air -- -- --    04/23/24 0637 96 room air -- -- --    04/23/24 0030 -- room air -- -- --    04/22/24 2310 96 room air -- -- --    04/22/24 2229 97 room air -- -- --    04/22/24 2000 -- room air -- -- --    04/22/24 1955 98 room air -- -- --    04/22/24 1800 96 -- -- -- --    04/22/24 1730 94 -- -- -- --    04/22/24 1630 98 -- -- -- --    04/22/24 1530 97 -- -- -- --    04/22/24 1500 95 -- -- -- --    04/22/24 1430 94 -- -- -- --    04/22/24 1400 94 -- -- -- --    04/22/24 1330 95 -- -- -- --    04/22/24 1300 95 -- -- -- --    04/22/24 1230 95 -- -- -- --    04/22/24 1200 95 room air -- -- --    04/22/24 1130 95 -- -- -- --    04/22/24 1100 98 -- -- -- --    04/22/24 1057 95 room air -- -- --    04/22/24 1050 92 -- -- -- --    04/22/24 1000 94 -- -- -- --    04/22/24 0946 96 -- -- -- --    04/22/24 0930 96 -- -- -- --    04/22/24 0915 97 -- -- -- --    04/22/24 0800 95 -- -- -- --    04/22/24 0633 95 room air -- -- --    04/22/24 0200 96 -- -- -- --    04/22/24 0100 96 -- -- -- --    04/22/24 0030 95 -- -- -- --    04/22/24 0000 97 room air -- -- --    04/21/24 2330 97 -- -- -- --    04/21/24 2300 95 -- -- -- --    04/21/24 2230 95 -- -- -- --    04/21/24 2200 96 -- -- -- --    04/21/24 2130 95 -- -- -- --    04/21/24 2113 97 -- -- -- --    04/21/24 2100 97 -- -- -- --    04/21/24 2000 -- room air -- -- --    04/21/24 1930 98 -- -- -- --    04/21/24 1505 93 room air -- -- --    04/21/24 1126 92 room air -- -- --    04/21/24 0900 93 -- -- -- --    04/21/24 0800 91 room air -- -- --    04/21/24 0733 95 room air -- -- --    04/21/24 0700 95 -- -- -- --    04/21/24 0600 93 -- -- -- --    04/21/24 0552 96 -- -- -- --    04/21/24 0500 96 -- -- -- --     04/21/24 0400 96 -- -- -- --    04/21/24 0300 96 -- -- -- --    04/21/24 0200 96 -- -- -- --    04/21/24 0105 96 -- -- -- --    04/21/24 0100 96 -- -- -- --    04/21/24 0000 96 -- -- -- --    04/20/24 2300 95 -- -- -- --    04/20/24 2200 90 -- -- -- --    04/20/24 2100 96 room air -- -- --    04/20/24 2024 95 -- -- -- --    04/20/24 2000 97 room air -- -- --    04/20/24 1900 93 nasal cannula 3 -- --    04/20/24 1800 -- nasal cannula 3 -- --    04/20/24 1700 95 nasal cannula 3 -- --    04/20/24 1600 97 -- -- -- --    04/20/24 1505 96 room air -- -- --    04/20/24 1500 97 -- -- -- --    04/20/24 1400 94 -- -- -- --    04/20/24 1300 96 room air -- -- --    04/20/24 1201 94 -- -- -- --    04/20/24 1200 94 -- -- -- --    04/20/24 1100 96 -- -- -- --    04/20/24 1054 94 room air -- -- --    04/20/24 1000 96 nasal cannula 3 -- --    04/20/24 0900 94 -- -- -- --    04/20/24 0800 94 room air -- -- --    04/20/24 0731 94 room air -- -- --    04/20/24 0700 93 -- -- -- --    04/20/24 0600 93 -- -- -- --    04/20/24 0500 97 -- -- -- --    04/20/24 0413 98 -- -- -- --    04/20/24 0400 96 room air -- -- --    04/20/24 0300 95 -- -- -- --    04/20/24 0200 97 -- -- -- --    04/20/24 0100 93 -- -- -- --    04/20/24 0053 96 -- -- -- --    04/20/24 0000 94 -- -- -- --    04/19/24 2300 93 -- -- -- --    04/19/24 2200 97 -- -- -- --    04/19/24 2100 93 -- -- -- --    04/19/24 2027 96 room air -- -- --    04/19/24 2000 95 room air -- -- --    04/19/24 1900 95 -- -- -- --    04/19/24 1836 95 -- -- -- --    04/19/24 1800 93 nasal cannula 1 -- --    04/19/24 1732 93 -- -- -- --    04/19/24 1700 90 -- -- -- --    04/19/24 1600 96 nasal cannula 1 -- --    04/19/24 1500 89 -- -- -- --    04/19/24 1430 92 -- -- -- --    04/19/24 1400 92 nasal cannula 1 -- --    04/19/24 1330 91 -- -- -- --    04/19/24 1300 92 -- -- -- --    04/19/24 1230 94 -- -- -- --    04/19/24 1200 94 room air 1 -- --    04/19/24 1146 94 -- -- -- --    04/19/24 1131 93 -- --  -- --    04/19/24 1116 93 -- -- -- --    04/19/24 1100 93 -- -- -- --    04/19/24 1046 94 -- -- -- --    04/19/24 1000 92 nasal cannula 1 -- --    04/19/24 0946 91 -- -- -- --    04/19/24 0931 93 -- -- -- --    04/19/24 0916 92 -- -- -- --    04/19/24 0900 95 -- -- -- --    04/19/24 0800 92 room air 1 -- --    04/19/24 0746 93 -- -- -- --    04/19/24 0733 -- room air -- 21 --    04/19/24 0715 93 -- -- -- --    04/19/24 0700 94 -- -- -- --    04/19/24 0645 92 -- -- -- --    04/19/24 0615 94 -- -- -- --    04/19/24 0600 93 -- -- -- --    04/19/24 0545 93 -- -- -- --    04/19/24 0530 96 -- -- -- --    04/19/24 0515 95 -- -- -- --    04/19/24 0503 96 -- -- -- --    04/19/24 0500 93 -- -- -- --    04/19/24 0445 95 -- -- -- --    04/19/24 0430 89 -- -- -- --    04/19/24 0415 96 -- -- -- --    04/19/24 0400 93 -- -- -- --    04/19/24 0345 95 -- -- -- --    04/19/24 0330 95 -- -- -- --    04/19/24 0315 95 -- -- -- --    04/19/24 0300 84 -- -- -- --    04/19/24 0245 96 -- -- -- --    04/19/24 0230 96 -- -- -- --    04/19/24 0215 97 -- -- -- --    04/19/24 0200 97 -- -- -- --    04/19/24 0145 94 -- -- -- --    04/19/24 0130 94 -- -- -- --    04/19/24 0115 93 -- -- -- --    04/19/24 0100 92 -- -- -- --    04/19/24 0045 92 -- -- -- --    04/19/24 0030 93 -- -- -- --    04/19/24 0015 92 -- -- -- --    04/19/24 0000 94 -- -- -- --    04/18/24 2345 95 -- -- -- --    04/18/24 2330 94 -- -- -- --    04/18/24 2315 96 -- -- -- --    04/18/24 2300 98 -- -- -- --    04/18/24 2245 95 -- -- -- --    04/18/24 2234 96 -- -- -- --    04/18/24 2230 97 -- -- -- --    04/18/24 2215 98 -- -- -- --    04/18/24 2200 97 -- -- -- --    04/18/24 2145 96 -- -- -- --    04/18/24 2130 92 -- -- -- --    04/18/24 2117 94 -- -- -- --    04/18/24 2100 93 -- -- -- --    04/18/24 2000 99 room air -- -- --    04/18/24 1900 98 -- -- -- --    04/18/24 1830 94 -- -- -- --    04/18/24 1815 95 -- -- -- --    04/18/24 1800 95 -- -- -- --    04/18/24 1745 95 -- -- --  --    04/18/24 1620 100 nasal cannula 2 -- --    04/18/24 1530 95 -- -- -- --    04/18/24 1500 96 -- -- -- --    04/18/24 1415 96 -- 2 -- --    04/18/24 1330 96 -- -- -- --    04/18/24 1300 95 -- -- -- --    04/18/24 1230 95 -- -- -- --    04/18/24 1214 -- nasal cannula 2 28 --    04/18/24 1200 93 nasal cannula 2 -- --    04/18/24 1130 92 -- -- -- --    04/18/24 1100 94 -- -- -- --    04/18/24 1030 93 -- -- -- --    04/18/24 1000 93 -- -- -- --    04/18/24 0930 90 -- -- -- --    04/18/24 0900 94 -- -- -- --    04/18/24 0830 91 -- -- -- --    04/18/24 0800 99 nasal cannula 2 -- --    04/18/24 0756 -- nasal cannula 2 28 --    04/18/24 0730 94 -- -- -- --    04/18/24 0700 93 -- -- -- --    04/18/24 0633 95 -- -- -- --    04/18/24 0600 92 -- -- -- --    04/18/24 0557 93 nasal cannula 2 -- --    04/18/24 0522 91 room air -- -- --    04/18/24 0500 95 -- -- -- --    04/18/24 0400 92 nasal cannula 2 -- --    04/18/24 0300 94 -- -- -- --    04/18/24 0200 93 -- -- -- --    04/18/24 0130 94 -- -- -- --    04/18/24 0100 94 -- -- -- --    04/18/24 0030 95 -- -- -- --          Intake & Output (last 5 days)         04/18 0701 04/19 0700 04/19 0701  04/20 0700 04/20 0701  04/21 0700 04/21 0701  04/22 0700 04/22 0701 04/23 0700 04/23 0701  04/24 0700    P.O.  135 190       I.V. (mL/kg) 885 (7.2) 1232.2 (10) 367 (3.1)       Other 45 270 60 270      NG/ 776 2808 715      IV Piggyback  100        Total Intake(mL/kg) 1576 (12.8) 2397.2 (19.5) 1641 (13.7) 985 (8.2)      Urine (mL/kg/hr) 3350 (1.1) 3475 (1.2) 3575 (1.2) 1600 (0.6) 1585 (0.6)     Stool   0 0 0     Total Output 3350 3475 3575 1600 1585     Net -1774 -1077.8 -1934 -615 -1585               Urine Unmeasured Occurrence     2 x     Stool Unmeasured Occurrence   2 x 1 x 3 x           Operative/Procedure Notes (last 5 days)  Notes from 04/18/24 0828 through 04/23/24 0828   No notes of this type exist for this encounter.          Physician Progress Notes (last 5 days)         Britney Triplett MD at 24 1538              Name: Watson Lisa ADMIT: 2024   : 1955  PCP: Roc Carroll APRN    MRN: [id number] LOS: 16 days   AGE/SEX: 69 y.o. male  ROOM: [facility]     Subjective   Subjective   Patient SAYS she has been improving but still present.  Positive insomnia.  No tremors.  No chest pain.  No shortness of breath.  Positive occasional cough productive of clear sputum.  No hemoptysis.  No palpitation.  No ankle edema.  No fever..  Positive weakness    Review of Systems  GI.  Tolerating modified diet well with no abdominal pain or nausea or vomiting.    .  No dysuria or hematuria.    Objective   Objective   Vital Signs  Temp:  [98 °F (36.7 °C)-98.5 °F (36.9 °C)] 98 °F (36.7 °C)  Heart Rate:  [66-99] 92  Resp:  [16-20] 16  BP: (128-183)/() 130/75  SpO2:  [92 %-98 %] 95 %  on   ;   Device (Oxygen Therapy): room air    Intake/Output Summary (Last 24 hours) at 2024 1538  Last data filed at 2024 1400  Gross per 24 hour   Intake 540 ml   Output 2350 ml   Net -1810 ml     Body mass index is 29.73 kg/m².      24  0600 24  0413 24  0500   Weight: 124 kg (272 lb 4.3 oz) 123 kg (271 lb 2.7 oz) 120 kg (263 lb 14.3 oz)     Physical Exam  General.  Elderly gentleman.  Overweight.  Alert and oriented x 4.  No apparent pain/distress/diaphoresis.  Mildly anxious.  Eyes.  Pupils equal round and reactive.  Intact extraocular musculature.  No pallor or jaundice.  Oral cavity.  Moist mucous membrane.  Neck.  Supple.  No JVD.  No lymphadenopathy or thyromegaly.  Cardio vascular.  Regular rate and rhythm with no gallops or murmurs.  Chest.  Clear to auscultation bilaterally with no added sounds.  Abdomen.  Soft lax.  No tenderness.  No organomegaly.  No guarding or rebound.  Extremities..  No clubbing/cyanosis/edema.  Sequential compression devices on both lower extremity.  No upper extremity tremors.  CNS.  No acute focal neurological  "deficits.    Results Review:      Results from last 7 days   Lab Units 04/22/24  0746 04/21/24  0554 04/20/24  0453 04/19/24  0637 04/18/24  0613 04/17/24  0722 04/16/24  0614   SODIUM mmol/L 137 136 133* 132* 137 134* 136   POTASSIUM mmol/L 4.0 4.6 4.6 4.0 4.4 4.6 4.5   CHLORIDE mmol/L 102 101 100 97* 102 102 104   CO2 mmol/L 21.3* 21.0* 20.8* 23.0 24.1 21.9* 22.2   BUN mg/dL 16 17 19 13 16 19 22   CREATININE mg/dL 0.65* 0.70* 0.68* 0.72* 0.71* 0.63* 0.63*   GLUCOSE mg/dL 112* 113* 106* 126* 119* 130* 131*   CALCIUM mg/dL 9.6 8.9 9.1 8.8 9.3 8.6 8.6   AST (SGOT) U/L  --  20  --   --   --   --   --    ALT (SGPT) U/L  --  28  --   --   --   --   --      Estimated Creatinine Clearance: 155.6 mL/min (A) (by C-G formula based on SCr of 0.65 mg/dL (L)).  Results from last 7 days   Lab Units 04/20/24  1911   HEMOGLOBIN A1C % 5.80*     Results from last 7 days   Lab Units 04/22/24  1114 04/22/24  0647 04/22/24  0030 04/21/24  1802 04/21/24  1129 04/21/24  0553 04/20/24  2335 04/20/24  1757   GLUCOSE mg/dL 123 116 119 119 105 119 119 131*                 Results from last 7 days   Lab Units 04/22/24  0746 04/21/24  0554 04/20/24  0453 04/19/24  0637 04/18/24  0613 04/17/24  0722 04/16/24  0614   MAGNESIUM mg/dL 2.0 2.1 2.1 2.1 1.8 1.8 1.8   PHOSPHORUS mg/dL 4.0 4.1 4.4 3.5 4.2 4.3 4.0           Invalid input(s): \"LDLCALC\"  Results from last 7 days   Lab Units 04/22/24  0746 04/21/24  0554 04/20/24  0453 04/20/24  0453 04/19/24  0637 04/18/24  0613 04/17/24  0722 04/16/24  0614   WBC 10*3/mm3 8.68 7.42  --  7.80 9.38 12.01* 9.65 11.66*   HEMOGLOBIN g/dL 11.2* 10.1*  --  9.9* 10.4* 9.7* 9.8* 9.3*   HEMATOCRIT % 32.4* 29.6*  --  28.9* 29.9* 28.5* 29.6* 27.5*   PLATELETS 10*3/mm3 563* 519*  --  344 385 401 371 413   MCV fL 94.2 93.1  --  94.4 93.7 95.6 95.8 94.8   MCH pg 32.6 31.8  --  32.4 32.6 32.6 31.7 32.1   MCHC g/dL 34.6 34.1  --  34.3 34.8 34.0 33.1 33.8   RDW % 11.8* 11.7*  --  11.7* 11.7* 11.8* 11.7* 11.6*   RDW-SD fl " 40.6 39.8  --  39.8 40.2 41.1 41.0 40.3   MPV fL 9.4 9.5  --  10.5 9.6 9.8 9.6 9.7   NEUTROPHIL % % 63.5 64.5   < >  --   --   --   --   --    LYMPHOCYTE % % 17.6* 15.4*   < >  --   --   --   --   --    MONOCYTES % % 11.3 13.5*   < >  --   --   --   --   --    EOSINOPHIL % % 5.2 4.2   < >  --   --   --   --   --    BASOPHIL % % 1.4 1.2   < >  --   --   --   --   --    IMM GRAN % % 1.0* 1.2*   < >  --   --   --   --   --    NEUTROS ABS 10*3/mm3 5.51 4.79   < >  --   --   --   --   --    LYMPHS ABS 10*3/mm3 1.53 1.14   < >  --   --   --   --   --    MONOS ABS 10*3/mm3 0.98* 1.00*   < >  --   --   --   --   --    EOS ABS 10*3/mm3 0.45* 0.31   < >  --   --   --   --   --    BASOS ABS 10*3/mm3 0.12 0.09   < >  --   --   --   --   --    IMMATURE GRANS (ABS) 10*3/mm3 0.09* 0.09*   < >  --   --   --   --   --    NRBC /100 WBC 0.0 0.0   < >  --   --   --   --   --     < > = values in this interval not displayed.         Results from last 7 days   Lab Units 04/17/24  0321   PH, ARTERIAL pH units 7.438   PO2 ART mm Hg 71.2*   PCO2, ARTERIAL mm Hg 36.6   HCO3 ART mmol/L 24.7     Results from last 7 days   Lab Units 04/19/24  0637 04/18/24 1946   PROCALCITONIN ng/mL 0.14 0.09   LACTATE mmol/L  --  0.9             Results from last 7 days   Lab Units 04/18/24  1946   BLOODCX  No growth at 3 days  No growth at 3 days         Results from last 7 days   Lab Units 04/18/24  1936   NITRITE UA  Negative   WBC UA /HPF 0-2   BACTERIA UA /HPF None Seen   SQUAM EPITHEL UA /HPF 0-2           Imaging:  Imaging Results (Last 24 Hours)       Procedure Component Value Units Date/Time    FL Video Swallow Single Contrast [[id number]] Collected: 04/22/24 1317     Updated: 04/22/24 1317    Narrative:      VIDEO SWALLOWING EXAMINATION BY SPEECH PATHOLOGY     Clinical: Dysphasia     Reference air kerma: 7.40 mGy     Video swallowing examination performed under the direction of speech  pathology. Imaging reviewed by radiologist who concurs with  the  findings.     Speech pathology summary: VFSS complete. Kellie ROSAS, present  during the study. Patient presents with mild-moderate oropharyngeal  dysphagia. Trace posterior penetration with thin liquid via spoon. Deep  silent nontransient penetration with thin liquid via cup/straw. Suspect  trace aspiration. No penetration/aspiration with nectar thick via  cup/straw, puree, soft/chopped solid, or regular solid trials. Cued  double swallow and nectar wash successful in clearing mild pharyngeal  residue.                       I reviewed the patient's new clinical results / labs / tests / procedures      Assessment/Plan     Active Hospital Problems    Diagnosis  POA    **Alcohol withdrawal [F10.939]  Yes    Thrombocytosis [D75.839]  No    Aspiration pneumonia [J69.0]  Yes    Substance abuse [F19.10]  Yes    Cocaine abuse [F14.10]  Yes    Positive urine drug screen [R82.5]  Yes    History of alcohol use disorder [Z87.898]  Yes    Alcoholic liver disease [K70.9]  Yes    COPD (chronic obstructive pulmonary disease) [J44.9]  Yes    LIVAN (obstructive sleep apnea) [G47.33]  Yes    Essential hypertension [I10]  Yes      Resolved Hospital Problems   No resolved problems to display.           Acute hypoxemic respiratory failure secondary to aspiration H. influenzae pneumonia patient with a history of COPD   Status post endotracheal intubation and mechanical ventilation and subsequent extubation.  Video swallow study noted and the patient is now on nectar thickened fluid and regular solid diet.  NG tube has been removed.  .  Weaned off oxygen.  Continue Rocephin (related today), DuoNebs..    Sepsis.  Blood cultures are negative.  MRSA screen is positive.  Currently on Rocephin and vancomycin.  Will complete a total of 5 days of the above antibiotics (antibiotic clinic today)..  Sepsis is mostly secondary to pneumonia.  Sepsis indices are resolved.  No fever.  Alcohol abuse/polysubstance abuse/alcoholic withdrawal.  off  Precedex drip.  No maryann withdrawal at this time.  Will continue detoxification with folate/thiamine/multivitamin/Ativan.  Continue Pepcid.  Prediabetes.  A1c 5.8.  Diet at discharge.  Continue sliding scale.   Chronic disease anemia/thrombocytosis.  Hemoccult stool negative.  Anemia workup noted.  Hemoglobin stable.  Thrombocytosis mostly reactive.  Hypertension..  Off Cardene drip.  no evidence of angina or congestive heart failure.  CAD and Lopressor initiated.  Will increase Lopressor.  Continue to monitor blood pressure and pulse.    VTE prophylaxis.  On Lovenox.  Superficial venous thrombosis of the right upper extremity.  Aspirin and prophylactic Lovenox.    Discussed my findings and plan of treatment with the patient.  Disposition.  Evaluated the patient and most likely the patient will require rehab.  Will ask CCP to evaluate.      Britney Triplett MD  [facility]  04/22/24  15:38 EDT           Electronically signed by Britney Triplett MD at 04/22/24 1543       Karla Ybarra MD at 04/22/24 0923                [facility]         Dr Karla Ybarra   LOS: 16 days   Patient Care Team:  Roc Carroll APRN as PCP - General (Nurse Practitioner)  Checo Dunham MD as PCP - Family Medicine  Checo Dunham MD (Family Medicine)    Chief Complaint: Acute respiratory failure aspiration pneumonia alcohol abuse multiple issues as listed below    Interval History: Resting comfortably no overnight issues reported.  Plans for video swallow this morning.    REVIEW OF SYSTEMS:   No chest pain or shortness of    Ventilator/Non-Invasive Ventilation Settings (From admission, onward)     Nasal cannula oxygen    Vital Signs  Temp:  [98 °F (36.7 °C)-98.5 °F (36.9 °C)] 98.2 °F (36.8 °C)  Heart Rate:  [66-90] 77  Resp:  [20] 20  BP: (127-183)/() 136/121    Intake/Output Summary (Last 24 hours) at 4/22/2024 0923  Last data filed at 4/22/2024 0000  Gross per 24 hour   Intake 735  "ml   Output 1600 ml   Net -865 ml     Flowsheet Rows      Flowsheet Row First Filed Value   Admission Height 200.7 cm (79\") Documented at 04/06/2024 1130   Admission Weight 113 kg (250 lb) Documented at 04/06/2024 1130            Physical Exam:  Patient is examined using the personal protective equipment as per guidelines from infection control for this particular patient as enacted.  Hand hygiene was performed before and after patient interaction.   General Appearance:    Alert, cooperative, in no acute distress.  Following simple commands  ENT normocephalic atraumatic  Neck midline trachea, no thyromegaly   Lungs:   Diminished breath sounds bilaterally    Heart:    Regular rhythm and normal rate, normal S1 and S2, no            murmur, no gallop, no rub, no click   Chest Wall:    No abnormalities observed   Abdomen:     Normal bowel sounds, no masses, no organomegaly, soft        nontender, nondistended, no guarding, no rebound                tenderness   Extremities:   Moves all extremities well, no edema, no cyanosis, no             redness  CNS no focal neurological deficits normal sensory exam  Skin no rashes no nodules  Musculoskeletal no cyanosis no clubbing normal range of motion     Results Review:        Results from last 7 days   Lab Units 04/22/24  0746 04/21/24  0554 04/20/24  0453   SODIUM mmol/L 137 136 133*   POTASSIUM mmol/L 4.0 4.6 4.6   CHLORIDE mmol/L 102 101 100   CO2 mmol/L 21.3* 21.0* 20.8*   BUN mg/dL 16 17 19   CREATININE mg/dL 0.65* 0.70* 0.68*   GLUCOSE mg/dL 112* 113* 106*   CALCIUM mg/dL 9.6 8.9 9.1         Results from last 7 days   Lab Units 04/22/24  0746 04/21/24  0554 04/20/24  0453   WBC 10*3/mm3 8.68 7.42 7.80   HEMOGLOBIN g/dL 11.2* 10.1* 9.9*   HEMATOCRIT % 32.4* 29.6* 28.9*   PLATELETS 10*3/mm3 563* 519* 344             Results from last 7 days   Lab Units 04/22/24  0746   MAGNESIUM mg/dL 2.0         Results from last 7 days   Lab Units 04/17/24  0321   PH, ARTERIAL pH units " 7.438   PO2 ART mm Hg 71.2*   PCO2, ARTERIAL mm Hg 36.6   HCO3 ART mmol/L 24.7       I reviewed the patient's new clinical results.  I personally viewed and interpreted the patient's chest x-ray.        Medication Review:   aspirin, 81 mg, Oral, Daily  cefTRIAXone, 2,000 mg, Intravenous, Q24H  chlorhexidine, 15 mL, Mouth/Throat, Q12H  enoxaparin, 40 mg, Subcutaneous, Q24H  famotidine, 20 mg, Nasogastric, BID AC  folic acid, 1 mg, Nasogastric, Daily  hydrocortisone-bacitracin-zinc oxide-nystatin, 1 Application, Topical, BID  insulin regular, 2-9 Units, Subcutaneous, Q6H  ipratropium-albuterol, 3 mL, Nebulization, 4x Daily - RT  metoprolol tartrate, 50 mg, Nasogastric, Q12H  multivitamin and minerals, 15 mL, Nasogastric, Daily  QUEtiapine, 100 mg, Oral, Q12H  senna-docusate sodium, 2 tablet, Nasogastric, BID  sodium chloride, 10 mL, Intravenous, Q12H  thiamine, 100 mg, Nasogastric, Daily  vancomycin, 1,250 mg, Intravenous, Q12H        Pharmacy Consult,   Pharmacy to dose vancomycin,         ASSESSMENT:   Acute hypoxemic respiratory failure  Aspiration pneumonia with H. influenzae  Alcohol abuse and dependence and withdrawal  Polysubstance abuse  LIVAN  Sepsis with shock  Fever      PLAN:  Clinically stable and improving.  Diet per speech  Atelectasis versus pneumonia restarted on Rocephin to continue.  Thiamine and folate to continue  Mobilize ambulate  Hospitalist managing medical issues  We will only address pulmonary issues  Awaiting bed to transfer out of the ICU  Patient is on telemetry      Karla Ybarra MD  24  09:23 EDT            Electronically signed by Karla Ybarra MD at 24 0992       Britney Triplett MD at 24 1982              Name: Watson Lisa ADMIT: 2024   : 1955  PCP: Roc Carroll APRN    MRN: [id number] LOS: 15 days   AGE/SEX: 69 y.o. male  ROOM: [facility]     Subjective   Subjective   Visual hallucination has been improving.  Anxiety has been improving.   No tremors.  No chest pain.  No shortness of breath.  Positive occasional cough productive of clear sputum.  No hemoptysis.  No palpitation.  No ankle edema.  No fever.     Review of Systems  GI.  Still NPO.  On NG tube feeds.  No abdominal pain.  No nausea or vomiting.    .  No dysuria or hematuria.    Objective   Objective   Vital Signs  Temp:  [97.6 °F (36.4 °C)-98.1 °F (36.7 °C)] 98 °F (36.7 °C)  Heart Rate:  [] 67  Resp:  [20-23] 20  BP: (121-180)/(60-92) 135/92  SpO2:  [90 %-97 %] 93 %  on  Flow (L/min):  [3] 3;   Device (Oxygen Therapy): room air    Intake/Output Summary (Last 24 hours) at 4/21/2024 1516  Last data filed at 4/21/2024 1200  Gross per 24 hour   Intake 1771 ml   Output 2525 ml   Net -754 ml     Body mass index is 29.73 kg/m².      04/19/24  0600 04/20/24  0413 04/21/24  0500   Weight: 124 kg (272 lb 4.3 oz) 123 kg (271 lb 2.7 oz) 120 kg (263 lb 14.3 oz)     Physical Exam  General.  Elderly gentleman.  Overweight.  Alert and oriented x 4.  No apparent pain/distress/diaphoresis.  Normal mood and affect.  Eyes.  Pupils equal round and reactive.  Intact extraocular musculature.  No pallor or jaundice.  Oral cavity.  Moist mucous membrane.  Neck.  Supple.  No JVD.  No lymphadenopathy or thyromegaly.  Cardio vascular.  Regular rate and rhythm with no gallops or murmurs.  Chest.  Clear to auscultation bilaterally with no added sounds.  Abdomen.  Soft lax.  No tenderness.  No organomegaly.  No guarding or rebound.  Extremities..  No clubbing/cyanosis/edema.  Sequential compression devices on both lower extremity.  No upper extremity tremors.  CNS.  No acute focal neurological deficits.    Results Review:      Results from last 7 days   Lab Units 04/21/24  0554 04/20/24  0453 04/19/24  0637 04/18/24  0613 04/17/24  0722 04/16/24  0614 04/15/24  0805   SODIUM mmol/L 136 133* 132* 137 134* 136 136   POTASSIUM mmol/L 4.6 4.6 4.0 4.4 4.6 4.5 4.2   CHLORIDE mmol/L 101 100 97* 102 102 104 103   CO2  "mmol/L 21.0* 20.8* 23.0 24.1 21.9* 22.2 22.8   BUN mg/dL 17 19 13 16 19 22 26*   CREATININE mg/dL 0.70* 0.68* 0.72* 0.71* 0.63* 0.63* 0.83   GLUCOSE mg/dL 113* 106* 126* 119* 130* 131* 133*   CALCIUM mg/dL 8.9 9.1 8.8 9.3 8.6 8.6 8.6   AST (SGOT) U/L 20  --   --   --   --   --   --    ALT (SGPT) U/L 28  --   --   --   --   --   --      Estimated Creatinine Clearance: 144.4 mL/min (A) (by C-G formula based on SCr of 0.7 mg/dL (L)).  Results from last 7 days   Lab Units 04/20/24  1911   HEMOGLOBIN A1C % 5.80*     Results from last 7 days   Lab Units 04/21/24  1129 04/21/24  0553 04/20/24  2335 04/20/24  1757 04/20/24  1152 04/20/24  0635 04/20/24  0039 04/19/24  1803   GLUCOSE mg/dL 105 119 119 131* 141* 118 126 134*                 Results from last 7 days   Lab Units 04/21/24  0554 04/20/24  0453 04/19/24  0637 04/18/24  0613 04/17/24  0722 04/16/24  0614 04/15/24  0805   MAGNESIUM mg/dL 2.1 2.1 2.1 1.8 1.8 1.8 1.9   PHOSPHORUS mg/dL 4.1 4.4 3.5 4.2 4.3 4.0 4.2           Invalid input(s): \"LDLCALC\"  Results from last 7 days   Lab Units 04/21/24  0554 04/20/24  0453 04/19/24  0637 04/18/24  0613 04/17/24  0722 04/16/24  0614 04/15/24  0805   WBC 10*3/mm3 7.42 7.80 9.38 12.01* 9.65 11.66* 10.85*   HEMOGLOBIN g/dL 10.1* 9.9* 10.4* 9.7* 9.8* 9.3* 9.9*   HEMATOCRIT % 29.6* 28.9* 29.9* 28.5* 29.6* 27.5* 29.4*   PLATELETS 10*3/mm3 519* 344 385 401 371 413 429   MCV fL 93.1 94.4 93.7 95.6 95.8 94.8 96.1   MCH pg 31.8 32.4 32.6 32.6 31.7 32.1 32.4   MCHC g/dL 34.1 34.3 34.8 34.0 33.1 33.8 33.7   RDW % 11.7* 11.7* 11.7* 11.8* 11.7* 11.6* 11.9*   RDW-SD fl 39.8 39.8 40.2 41.1 41.0 40.3 42.0   MPV fL 9.5 10.5 9.6 9.8 9.6 9.7 9.4   NEUTROPHIL % % 64.5  --   --   --   --   --   --    LYMPHOCYTE % % 15.4*  --   --   --   --   --   --    MONOCYTES % % 13.5*  --   --   --   --   --   --    EOSINOPHIL % % 4.2  --   --   --   --   --   --    BASOPHIL % % 1.2  --   --   --   --   --   --    IMM GRAN % % 1.2*  --   --   --   --   --   " --    NEUTROS ABS 10*3/mm3 4.79  --   --   --   --   --   --    LYMPHS ABS 10*3/mm3 1.14  --   --   --   --   --   --    MONOS ABS 10*3/mm3 1.00*  --   --   --   --   --   --    EOS ABS 10*3/mm3 0.31  --   --   --   --   --   --    BASOS ABS 10*3/mm3 0.09  --   --   --   --   --   --    IMMATURE GRANS (ABS) 10*3/mm3 0.09*  --   --   --   --   --   --    NRBC /100 WBC 0.0  --   --   --   --   --   --          Results from last 7 days   Lab Units 04/17/24  0321   PH, ARTERIAL pH units 7.438   PO2 ART mm Hg 71.2*   PCO2, ARTERIAL mm Hg 36.6   HCO3 ART mmol/L 24.7     Results from last 7 days   Lab Units 04/19/24  0637 04/18/24  1946   PROCALCITONIN ng/mL 0.14 0.09   LACTATE mmol/L  --  0.9             Results from last 7 days   Lab Units 04/18/24  1946   BLOODCX  No growth at 2 days  No growth at 2 days         Results from last 7 days   Lab Units 04/18/24  1936   NITRITE UA  Negative   WBC UA /HPF 0-2   BACTERIA UA /HPF None Seen   SQUAM EPITHEL UA /HPF 0-2           Imaging:  Imaging Results (Last 24 Hours)       ** No results found for the last 24 hours. **               I reviewed the patient's new clinical results / labs / tests / procedures      Assessment/Plan     Active Hospital Problems    Diagnosis  POA    **Alcohol withdrawal [F10.939]  Yes    Aspiration pneumonia [J69.0]  Yes    Substance abuse [F19.10]  Yes    Cocaine abuse [F14.10]  Yes    Positive urine drug screen [R82.5]  Yes    History of alcohol use disorder [Z87.898]  Yes    Alcoholic liver disease [K70.9]  Yes    COPD (chronic obstructive pulmonary disease) [J44.9]  Yes    LIVAN (obstructive sleep apnea) [G47.33]  Yes    Essential hypertension [I10]  Yes      Resolved Hospital Problems   No resolved problems to display.           Acute hypoxemic respiratory failure secondary to aspiration H. influenzae pneumonia patient with a history of COPD   Status post endotracheal intubation and mechanical ventilation and subsequent extubation.  Still n.p.o.  and on NG tube feeds.  Speech planning video swallow study.  .  Weaned off oxygen.  Continue Rocephin, DuoNebs..    Sepsis.  Blood cultures are negative.  MRSA screen is positive.  Currently on Rocephin and vancomycin.  Will complete a total of 5 days of the above antibiotics.  Sepsis is mostly secondary to pneumonia.  Sepsis indices are resolved  Alcohol abuse/polysubstance abuse/alcoholic withdrawal.  off Precedex drip.  No maryann withdrawal at this time.  Will continue detoxification with folate/thiamine/multivitamin/Ativan.  Continue Pepcid.  Prediabetes.  A1c 5.8.  Diet at discharge.  Continue sliding scale.   Chronic disease anemia.  Hemoccult stool negative.  Anemia workup noted.  Hemoglobin stable.    Hypertension..  Off Cardene drip.  Blood pressure control with no evidence of angina or congestive heart failure.  Will stop clonidine and initiate Lopressor.    VTE prophylaxis.  On Lovenox.  Superficial venous thrombosis of the right upper extremity.  Aspirin and prophylactic Lovenox.    Discussed my findings and plan of treatment with the patient.  Disposition.  To be determined based on clinical course.  PT evaluation recommends home health versus acute rehab.        Britney Triplett MD  [facility]  04/21/24  15:16 EDT           Electronically signed by Britney Triplett MD at 04/21/24 1523       Karla Ybarra MD at 04/21/24 1025                [facility]         Dr Karla Ybarra   LOS: 15 days   Patient Care Team:  Roc Carroll APRN as PCP - General (Nurse Practitioner)  Checo Dunham MD as PCP - Family Medicine  Checo Dunham MD (Family Medicine)    Chief Complaint: Acute respiratory failure aspiration pneumonia alcohol abuse multiple issues as listed below    Interval History: Resting comfortably no overnight issues reported.  Patient currently off Precedex drip    REVIEW OF SYSTEMS:   No chest pain or shortness of    Ventilator/Non-Invasive  "Ventilation Settings (From admission, onward)     Nasal cannula oxygen    Vital Signs  Temp:  [97.6 °F (36.4 °C)-98.1 °F (36.7 °C)] 97.7 °F (36.5 °C)  Heart Rate:  [] 98  Resp:  [18-23] 22  BP: (118-180)/(58-89) 138/73    Intake/Output Summary (Last 24 hours) at 4/21/2024 1025  Last data filed at 4/21/2024 0800  Gross per 24 hour   Intake 1661 ml   Output 3575 ml   Net -1914 ml     Flowsheet Rows      Flowsheet Row First Filed Value   Admission Height 200.7 cm (79\") Documented at 04/06/2024 1130   Admission Weight 113 kg (250 lb) Documented at 04/06/2024 1130            Physical Exam:  Patient is examined using the personal protective equipment as per guidelines from infection control for this particular patient as enacted.  Hand hygiene was performed before and after patient interaction.   General Appearance:    Alert, cooperative, in no acute distress.  Following simple commands  ENT normocephalic atraumatic  Neck midline trachea, no thyromegaly   Lungs:   Diminished breath sounds bilaterally    Heart:    Regular rhythm and normal rate, normal S1 and S2, no            murmur, no gallop, no rub, no click   Chest Wall:    No abnormalities observed   Abdomen:     Normal bowel sounds, no masses, no organomegaly, soft        nontender, nondistended, no guarding, no rebound                tenderness   Extremities:   Moves all extremities well, no edema, no cyanosis, no             redness  CNS no focal neurological deficits normal sensory exam  Skin no rashes no nodules  Musculoskeletal no cyanosis no clubbing normal range of motion     Results Review:        Results from last 7 days   Lab Units 04/21/24  0554 04/20/24  0453 04/19/24  0637   SODIUM mmol/L 136 133* 132*   POTASSIUM mmol/L 4.6 4.6 4.0   CHLORIDE mmol/L 101 100 97*   CO2 mmol/L 21.0* 20.8* 23.0   BUN mg/dL 17 19 13   CREATININE mg/dL 0.70* 0.68* 0.72*   GLUCOSE mg/dL 113* 106* 126*   CALCIUM mg/dL 8.9 9.1 8.8         Results from last 7 days   Lab " Units 04/21/24  0554 04/20/24  0453 04/19/24  0637   WBC 10*3/mm3 7.42 7.80 9.38   HEMOGLOBIN g/dL 10.1* 9.9* 10.4*   HEMATOCRIT % 29.6* 28.9* 29.9*   PLATELETS 10*3/mm3 519* 344 385             Results from last 7 days   Lab Units 04/21/24  0554   MAGNESIUM mg/dL 2.1         Results from last 7 days   Lab Units 04/17/24  0321   PH, ARTERIAL pH units 7.438   PO2 ART mm Hg 71.2*   PCO2, ARTERIAL mm Hg 36.6   HCO3 ART mmol/L 24.7       I reviewed the patient's new clinical results.  I personally viewed and interpreted the patient's chest x-ray.        Medication Review:   cefTRIAXone, 2,000 mg, Intravenous, Q24H  chlorhexidine, 15 mL, Mouth/Throat, Q12H  cloNIDine, 0.4 mg, Nasogastric, Q6H  enoxaparin, 40 mg, Subcutaneous, Q24H  famotidine, 20 mg, Nasogastric, BID AC  folic acid, 1 mg, Nasogastric, Daily  hydrocortisone-bacitracin-zinc oxide-nystatin, 1 Application, Topical, BID  insulin regular, 2-9 Units, Subcutaneous, Q6H  ipratropium-albuterol, 3 mL, Nebulization, 4x Daily - RT  multivitamin and minerals, 15 mL, Nasogastric, Daily  nicotine, 1 patch, Transdermal, Q24H  QUEtiapine, 100 mg, Oral, Q12H  senna-docusate sodium, 2 tablet, Nasogastric, BID  sodium chloride, 10 mL, Intravenous, Q12H  thiamine, 100 mg, Nasogastric, Daily  vancomycin, 1,250 mg, Intravenous, Q12H        dexmedetomidine, 0.2-1.5 mcg/kg/hr, Last Rate: Stopped (04/20/24 1838)  Pharmacy Consult,   Pharmacy to dose vancomycin,         ASSESSMENT:   Acute hypoxemic respiratory failure  Aspiration pneumonia with H. influenzae  Alcohol abuse and dependence and withdrawal  Polysubstance abuse  LIVAN  Sepsis with shock  Fever      PLAN:  Clinically stable and improving.  Off Precedex drip and tolerating well.  Currently on clonidine and Seroquel.  Atelectasis versus pneumonia restarted on Rocephin to continue  Thiamine and folate to continue  Mobilize ambulate  Hospitalist following on the floor  Transfer patient out of the ICU      Karla Pickardied,  MD  24  10:25 EDT            Electronically signed by Karla Ybarra MD at 24 1026       Britney Triplett MD at 24 1826              Name: Watson Lisa ADMIT: 2024   : 1955  PCP: Roc Carroll APRN    MRN: [id number] LOS: 14 days   AGE/SEX: 69 y.o. male  ROOM: [facility]     Subjective   Subjective   Patient feels well.  Complaining of anxiety and occasional visual hallucinations.  No chest pain.  No shortness of breath.  Positive occasional cough productive of clear sputum.  No hemoptysis.  No palpitation.  No ankle edema.  No fever.  Positive chills.    Review of Systems  GI.  Still NPO.  On NG tube feeds.  No abdominal pain.  No nausea or vomiting.  No bowel movement times few days.  Positive flatus.  .  No dysuria or hematuria.    Objective   Objective   Vital Signs  Temp:  [97.6 °F (36.4 °C)-98.1 °F (36.7 °C)] 98.1 °F (36.7 °C)  Heart Rate:  [54-96] 70  Resp:  [18] 18  BP: (118-160)/(58-86) 146/70  SpO2:  [93 %-98 %] 95 %  on  Flow (L/min):  [3] 3;   Device (Oxygen Therapy): room air    Intake/Output Summary (Last 24 hours) at 2024 182  Last data filed at 2024 1730  Gross per 24 hour   Intake 1136.75 ml   Output 3925 ml   Net -2788.25 ml     Body mass index is 30.55 kg/m².      24  0557 24  0600 24  0413   Weight: 127 kg (281 lb 1.4 oz) 124 kg (272 lb 4.3 oz) 123 kg (271 lb 2.7 oz)     Physical Exam  General.  Elderly gentleman.  Overweight.  Alert and oriented x 4.  No apparent pain/distress/diaphoresis.  Normal mood and affect.  Eyes.  Pupils equal round and reactive.  Intact extraocular musculature.  No pallor or jaundice.  Oral cavity.  Moist mucous membrane.  Neck.  Supple.  No JVD.  No lymphadenopathy or thyromegaly.  Cardio vascular.  Regular rate and rhythm with no gallops or murmurs.  Chest.  Clear to auscultation bilaterally with no added sounds.  Abdomen.  Soft lax.  No tenderness.  No organomegaly.  No guarding or  "rebound.  Extremities..  No clubbing/cyanosis/edema.  Sequential compression devices on both lower extremity.  No upper extremity tremors.  CNS.  No acute focal neurological deficits.    Results Review:      Results from last 7 days   Lab Units 04/20/24  0453 04/19/24  0637 04/18/24  0613 04/17/24  0722 04/16/24  0614 04/15/24  0805 04/14/24  0408   SODIUM mmol/L 133* 132* 137 134* 136 136 137   POTASSIUM mmol/L 4.6 4.0 4.4 4.6 4.5 4.2 4.8   CHLORIDE mmol/L 100 97* 102 102 104 103 104   CO2 mmol/L 20.8* 23.0 24.1 21.9* 22.2 22.8 22.5   BUN mg/dL 19 13 16 19 22 26* 22   CREATININE mg/dL 0.68* 0.72* 0.71* 0.63* 0.63* 0.83 0.67*   GLUCOSE mg/dL 106* 126* 119* 130* 131* 133* 116*   CALCIUM mg/dL 9.1 8.8 9.3 8.6 8.6 8.6 8.8     Estimated Creatinine Clearance: 150.1 mL/min (A) (by C-G formula based on SCr of 0.68 mg/dL (L)).      Results from last 7 days   Lab Units 04/20/24  1757 04/20/24  1152 04/20/24  0635 04/20/24  0039 04/19/24  1803 04/19/24  1206 04/19/24  0038 04/18/24  1826   GLUCOSE mg/dL 131* 141* 118 126 134* 148* 132* 118                 Results from last 7 days   Lab Units 04/20/24  0453 04/19/24  0637 04/18/24  0613 04/17/24  0722 04/16/24  0614 04/15/24  0805 04/14/24  0408   MAGNESIUM mg/dL 2.1 2.1 1.8 1.8 1.8 1.9 1.9   PHOSPHORUS mg/dL 4.4 3.5 4.2 4.3 4.0 4.2 3.4           Invalid input(s): \"LDLCALC\"  Results from last 7 days   Lab Units 04/20/24  0453 04/19/24  0637 04/18/24  0613 04/17/24  0722 04/16/24  0614 04/15/24  0805 04/14/24  0408   WBC 10*3/mm3 7.80 9.38 12.01* 9.65 11.66* 10.85* 10.76   HEMOGLOBIN g/dL 9.9* 10.4* 9.7* 9.8* 9.3* 9.9* 10.4*   HEMATOCRIT % 28.9* 29.9* 28.5* 29.6* 27.5* 29.4* 31.1*   PLATELETS 10*3/mm3 344 385 401 371 413 429 509*   MCV fL 94.4 93.7 95.6 95.8 94.8 96.1 94.8   MCH pg 32.4 32.6 32.6 31.7 32.1 32.4 31.7   MCHC g/dL 34.3 34.8 34.0 33.1 33.8 33.7 33.4   RDW % 11.7* 11.7* 11.8* 11.7* 11.6* 11.9* 11.5*   RDW-SD fl 39.8 40.2 41.1 41.0 40.3 42.0 39.4   MPV fL 10.5 9.6 " 9.8 9.6 9.7 9.4 9.6         Results from last 7 days   Lab Units 04/17/24  0321 04/14/24  0317   PH, ARTERIAL pH units 7.438 7.437   PO2 ART mm Hg 71.2* 83.0   PCO2, ARTERIAL mm Hg 36.6 38.4   HCO3 ART mmol/L 24.7 25.9     Results from last 7 days   Lab Units 04/19/24  0637 04/18/24  1946   PROCALCITONIN ng/mL 0.14 0.09   LACTATE mmol/L  --  0.9             Results from last 7 days   Lab Units 04/18/24  1946   BLOODCX  No growth at 24 hours  No growth at 24 hours         Results from last 7 days   Lab Units 04/18/24  1936   NITRITE UA  Negative   WBC UA /HPF 0-2   BACTERIA UA /HPF None Seen   SQUAM EPITHEL UA /HPF 0-2           Imaging:  Imaging Results (Last 24 Hours)       ** No results found for the last 24 hours. **               I reviewed the patient's new clinical results / labs / tests / procedures      Assessment/Plan     Active Hospital Problems    Diagnosis  POA    **Alcohol withdrawal [F10.939]  Yes    Substance abuse [F19.10]  Unknown    Cocaine abuse [F14.10]  Unknown    Positive urine drug screen [R82.5]  Unknown    History of alcohol use disorder [Z87.898]  Yes    Alcoholic liver disease [K70.9]  Yes    COPD (chronic obstructive pulmonary disease) [J44.9]  Yes    LIVAN (obstructive sleep apnea) [G47.33]  Yes    Essential hypertension [I10]  Yes      Resolved Hospital Problems   No resolved problems to display.           Acute hypoxemic respiratory failure secondary to aspiration H. influenzae pneumonia patient with a history of COPD and was.  Status post endotracheal intubation and mechanical ventilation and subsequent extubation.  Still n.p.o. and on NG tube feeds.  Speech planning video swallow study.  S/p extubation.  Weaned off oxygen.  Continue Rocephin, DuoNebs..  Nicotine patch  Sepsis.  Blood cultures are negative.  MRSA screen is positive.  Currently on Rocephin and vancomycin.  Will complete a total of 5 days of the above antibiotics.  Sepsis is mostly secondary to pneumonia.  Alcohol  "abuse/polysubstance abuse/alcoholic withdrawal.  Being weaned off Precedex drip.  No maryann withdrawal at this time.  Will continue detoxification with folate/thiamine/multivitamin.  Pepcid.  Hyperglycemia.  Continue sliding scale and check A1c.  Anemia.  Hemoglobin is stable.  Will workup anemia  Pretension.  Off Cardene drip.  Continue clonidine.  Will eventually wean off clonidine and institute beta-blockers.  VTE prophylaxis.  On Lovenox.  Superficial venous thrombosis of the right upper extremity.  Will initiate aspirin.      Discussed my findings and plan of treatment with the patient and ICU nurse.  Disposition.  To be determined based on clinical course.        Britney Triplett MD  [facility]  04/20/24  18:26 EDT           Electronically signed by Britney Triplett MD at 04/20/24 4715       Karla Ybarra MD at 04/20/24 1137                [facility]         Dr Karla Ybarra   LOS: 14 days   Patient Care Team:  Roc Carroll APRN as PCP - General (Nurse Practitioner)  Checo Dunham MD as PCP - Family Medicine  Checo Dunham MD (Family Medicine)    Chief Complaint: Acute respiratory failure aspiration pneumonia alcohol abuse multiple issues as listed below    Interval History: Patient back on Precedex drip currently attempting to wean down Precedex drip.    REVIEW OF SYSTEMS:   No chest pain or shortness of    Ventilator/Non-Invasive Ventilation Settings (From admission, onward)     Nasal cannula oxygen    Vital Signs  Temp:  [97.6 °F (36.4 °C)-98.8 °F (37.1 °C)] 98 °F (36.7 °C)  Heart Rate:  [54-96] 84  Resp:  [16-18] 18  BP: (111-160)/(58-86) 130/62    Intake/Output Summary (Last 24 hours) at 4/20/2024 1137  Last data filed at 4/20/2024 0700  Gross per 24 hour   Intake 2207.22 ml   Output 3475 ml   Net -1267.78 ml     Flowsheet Rows      Flowsheet Row First Filed Value   Admission Height 200.7 cm (79\") Documented at 04/06/2024 1130   Admission Weight 113 " kg (250 lb) Documented at 04/06/2024 1130            Physical Exam:  Patient is examined using the personal protective equipment as per guidelines from infection control for this particular patient as enacted.  Hand hygiene was performed before and after patient interaction.   General Appearance:    Alert, cooperative, in no acute distress.  Following simple commands  ENT normocephalic atraumatic  Neck midline trachea, no thyromegaly   Lungs:   Diminished breath sounds bilaterally    Heart:    Regular rhythm and normal rate, normal S1 and S2, no            murmur, no gallop, no rub, no click   Chest Wall:    No abnormalities observed   Abdomen:     Normal bowel sounds, no masses, no organomegaly, soft        nontender, nondistended, no guarding, no rebound                tenderness   Extremities:   Moves all extremities well, no edema, no cyanosis, no             redness  CNS no focal neurological deficits normal sensory exam  Skin no rashes no nodules  Musculoskeletal no cyanosis no clubbing normal range of motion     Results Review:        Results from last 7 days   Lab Units 04/20/24  0453 04/19/24  0637 04/18/24  0613   SODIUM mmol/L 133* 132* 137   POTASSIUM mmol/L 4.6 4.0 4.4   CHLORIDE mmol/L 100 97* 102   CO2 mmol/L 20.8* 23.0 24.1   BUN mg/dL 19 13 16   CREATININE mg/dL 0.68* 0.72* 0.71*   GLUCOSE mg/dL 106* 126* 119*   CALCIUM mg/dL 9.1 8.8 9.3         Results from last 7 days   Lab Units 04/20/24  0453 04/19/24  0637 04/18/24  0613   WBC 10*3/mm3 7.80 9.38 12.01*   HEMOGLOBIN g/dL 9.9* 10.4* 9.7*   HEMATOCRIT % 28.9* 29.9* 28.5*   PLATELETS 10*3/mm3 344 385 401             Results from last 7 days   Lab Units 04/20/24  0453   MAGNESIUM mg/dL 2.1         Results from last 7 days   Lab Units 04/17/24  0321   PH, ARTERIAL pH units 7.438   PO2 ART mm Hg 71.2*   PCO2, ARTERIAL mm Hg 36.6   HCO3 ART mmol/L 24.7       I reviewed the patient's new clinical results.  I personally viewed and interpreted the  patient's chest x-ray.        Medication Review:   cefTRIAXone, 2,000 mg, Intravenous, Q24H  chlorhexidine, 15 mL, Mouth/Throat, Q12H  cloNIDine, 0.4 mg, Nasogastric, Q6H  enoxaparin, 40 mg, Subcutaneous, Q24H  famotidine, 20 mg, Nasogastric, BID AC  folic acid, 1 mg, Nasogastric, Daily  hydrocortisone-bacitracin-zinc oxide-nystatin, 1 Application, Topical, BID  insulin regular, 2-9 Units, Subcutaneous, Q6H  ipratropium-albuterol, 3 mL, Nebulization, 4x Daily - RT  multivitamin and minerals, 15 mL, Nasogastric, Daily  nicotine, 1 patch, Transdermal, Q24H  QUEtiapine, 100 mg, Oral, Q12H  senna-docusate sodium, 2 tablet, Nasogastric, BID  sodium chloride, 10 mL, Intravenous, Q12H  sodium chloride, 10 mL, Intravenous, Q12H  thiamine, 100 mg, Nasogastric, Daily  vancomycin, 1,250 mg, Intravenous, Q12H        dexmedetomidine, 0.2-1.5 mcg/kg/hr, Last Rate: 0.4 mcg/kg/hr (04/20/24 0956)  niCARdipine, 5-15 mg/hr, Last Rate: Stopped (04/19/24 0642)  Pharmacy Consult,   Pharmacy to dose vancomycin,         ASSESSMENT:   Acute hypoxemic respiratory failure  Aspiration pneumonia with H. influenzae  Alcohol abuse and dependence and withdrawal  Polysubstance abuse  LIVAN  Sepsis with shock  Fever      PLAN:  Events noted chart reviewed  Will attempt to wean off Precedex drip as tolerated.  Currently on clonidine and Seroquel.  Atelectasis versus pneumonia restarted on Rocephin to continue  Thiamine and folate to continue  Mobilize ambulate postextubation  ICU core measures      Karla Ybarra MD  04/20/24  11:37 EDT            Electronically signed by Karla Ybarra MD at 04/20/24 1144       Neto Penaloza MD at 04/19/24 0859          [facility]      Mar/chart reviewed  Follow up alcohol withdrawal, mechanical ventilation  S/p extubation 4/17  Worsened mental status/agitation overnight, precedex restarted  febrile    Vital Sign Min/Max for last 24 hours  Temp  Min: 98.1 °F (36.7 °C)  Max: 101.3 °F (38.5 °C)   BP   Min: 118/65  Max: 193/84   Pulse  Min: 51  Max: 118   Resp  Min: 18  Max: 18   SpO2  Min: 84 %  Max: 100 %   Flow (L/min)  Min: 1  Max: 2   Weight  Min: 124 kg (272 lb 4.3 oz)  Max: 124 kg (272 lb 4.3 oz)   1576/3350    Appears ill, sedated, sleeping soundly  perrl, eomi, normal sclera  mmm, no jvd, trachea midline, neck supple,  chest cta bilaterally, no crackles, no wheezes,   rrr,   soft, nt, nd +bs,  no c/c/  1+edema  Skin warm, dry no rashes    Labs: 4/19: reviewed:  Glucose 126  Bun 13  Cr 0.72  Na 132  Bicarb 23  Wbc 9.4  Hgb 10.4  Plts 385  4/18: procal 0.09  Micro: reviewed    A/P:  1. Acute respiratory failure requiring  mechanical ventilation -- doing well post extubation  2. Aspiration pneumonia with H flu -- now finished antibiotic course    3. Severe alcohol abuse with dependence and withdrawal -- --improving now probably more withdrawal from the benzos/prececex that he has been on while here and icu delerium etc. --making some progress, continue current efforts  4. Polysubstance abuse -- likely making improvement in his mental status and sedation needs all the more difficult  5. LIVAN  6. Sepsis with shock - resolved  7. Fever -- procal is low, repeat, check venous dopplers, started on rocephin yesterday by cross cover, repeat cxr as well. Start vanco given prior +mrsa nares    Back on precedex; may still take some time to wean off this, continue as able, on clonidine, seroquel, will up the seroquel dose a little   CC 36 mins        Electronically signed by Neto Penaloza MD at 04/19/24 0904       Neto Penaloza MD at 04/18/24 0905          [facility]      Mar/chart reviewed  Follow up alcohol withdrawal, mechanical ventilation  S/p extubation 4/17  No chest pain no shortness of breath    Vital Sign Min/Max for last 24 hours  Temp  Min: 96.5 °F (35.8 °C)  Max: 99.2 °F (37.3 °C)   BP  Min: 122/73  Max: 160/83   Pulse  Min: 54  Max: 77   Resp  Min: 16  Max: 22   SpO2  Min: 91 %  Max:  100 %   Flow (L/min)  Min: 2  Max: 4   Weight  Min: 127 kg (281 lb 1.4 oz)  Max: 127 kg (281 lb 1.4 oz)   3314/4850  Appears ill, alert, mostly appropriate  perrl, eomi, normal sclera  mmm, no jvd, trachea midline, neck supple,  chest cta bilaterally, no crackles, no wheezes,   rrr,   soft, nt, nd +bs,  no c/c/ e  Skin warm, dry no rashes    Labs: 4/18: reviewed:  Glucose 119  Bun 16  Cr 0.71  Bicarb 24  Wbc 12  Hgb 9.7  Plts 401    A/P:  1. Acute respiratory failure requiring  mechanical ventilation -- doing well post extubation  2. Aspiration pneumonia with H flu -- now finished antibiotic course    3. Severe alcohol abuse with dependence and withdrawal -- --improving now probably more withdrawal from the benzos/prececex that he has been on while here and icu delerium etc. --making some progress, continue current efforts  4. Polysubstance abuse -- likely making improvement in his mental status and sedation needs all the more difficult  5. LIVAN  6. Sepsis with shock - resolved    Continue efforts at weaning sedation, needs pt/ot/speech evals      Electronically signed by Neto Penaloza MD at 04/18/24 1014       Consult Notes (last 5 days)  Notes from 04/18/24 0828 through 04/23/24 0828   No notes of this type exist for this encounter.

## 2024-04-23 NOTE — PLAN OF CARE
Goal Outcome Evaluation:      Pt admitted to the unit from ICU. AOx4. Anxious. PRN ativan x2. VSS. On RA. NSR w/1st AVB/bbb on tele. Plan of care ongoing.

## 2024-04-23 NOTE — PLAN OF CARE
Goal Outcome Evaluation:  Plan of Care Reviewed With: patient        Progress: improving  Outcome Evaluation: RN consent, pt. received supine in bed agreeable to OT/PT session. Pt. demo's contiued deficits with strength, balance and act mendez impacting his independence with ADLS. Pt. was able to transition sup > sit SBA, min A for LBD, min A for STS w RW and pivot to BSC. Pt. was able to complete func mob with heavy BUE support and max cues required for safety considerations. Pt. presents very impulsive and has a poor sense of insight into deficits and was provided with education on safety considerations as pt. would like to dc home with no support. OT jose recommending dc to rehab to maximize rehab potential.      Anticipated Discharge Disposition (OT): skilled nursing facility, home with 24/7 care

## 2024-04-23 NOTE — PROGRESS NOTES
Name: Watson Lisa ADMIT: 2024   : 1955  PCP: Roc Carroll APRN    MRN: [id number] LOS: 17 days   AGE/SEX: 69 y.o. male  ROOM: [facility]     Subjective   Subjective   Nursing staff reports that the patient is requesting his Ativan every 4 hours around-the-clock.  Patient himself reports resolution of the anxiety and insomnia.  No tremors.  No chest pain.  No shortness of breath.  Improved occasional cough productive of clear sputum.  No hemoptysis.  No palpitation.  No ankle edema.  No fever..  Positive weakness    Review of Systems  GI.  Tolerating modified diet well with no abdominal pain or nausea or vomiting.    .  No dysuria or hematuria.  CNS.  No seizures.  No focal neurological symptoms.     Objective   Objective   Vital Signs  Temp:  [97.3 °F (36.3 °C)-98.1 °F (36.7 °C)] 98.1 °F (36.7 °C)  Heart Rate:  [] 79  Resp:  [16-18] 18  BP: (134-201)/() 184/74  SpO2:  [94 %-98 %] 95 %  on   ;   Device (Oxygen Therapy): room air    Intake/Output Summary (Last 24 hours) at 2024 1351  Last data filed at 2024 0547  Gross per 24 hour   Intake --   Output 1085 ml   Net -1085 ml     Body mass index is 29.27 kg/m².      24  0500 24  0547   Weight: 120 kg (263 lb 14.3 oz) 115 kg (253 lb 1.4 oz) 115 kg (253 lb 4.9 oz)     Physical Exam  General.  Elderly gentleman.  Overweight.  Somnolent but arousable. oriented x 4.  No apparent pain/distress/diaphoresis.  Eyes.  Pupils equal round and reactive.  Intact extraocular musculature.  No pallor or jaundice.  Oral cavity.  Moist mucous membrane.  Neck.  Supple.  No JVD.  No lymphadenopathy or thyromegaly.  Cardio vascular.  Regular rate and rhythm with no gallops or murmurs.  Chest.  Clear to auscultation bilaterally with no added sounds.  Abdomen.  Soft lax.  No tenderness.  No organomegaly.  No guarding or rebound.  Extremities..  No clubbing/cyanosis/edema.  Sequential compression devices on both  "lower extremity.  No upper extremity tremors.  CNS.  No acute focal neurological deficits.    Results Review:      Results from last 7 days   Lab Units 04/23/24  0556 04/22/24  0746 04/21/24  0554 04/20/24  0453 04/19/24  0637 04/18/24  0613 04/17/24  0722   SODIUM mmol/L 138 137 136 133* 132* 137 134*   POTASSIUM mmol/L 3.9 4.0 4.6 4.6 4.0 4.4 4.6   CHLORIDE mmol/L 103 102 101 100 97* 102 102   CO2 mmol/L 20.4* 21.3* 21.0* 20.8* 23.0 24.1 21.9*   BUN mg/dL 16 16 17 19 13 16 19   CREATININE mg/dL 0.70* 0.65* 0.70* 0.68* 0.72* 0.71* 0.63*   GLUCOSE mg/dL 120* 112* 113* 106* 126* 119* 130*   CALCIUM mg/dL 9.1 9.6 8.9 9.1 8.8 9.3 8.6   AST (SGOT) U/L  --   --  20  --   --   --   --    ALT (SGPT) U/L  --   --  28  --   --   --   --      Estimated Creatinine Clearance: 140.3 mL/min (A) (by C-G formula based on SCr of 0.7 mg/dL (L)).  Results from last 7 days   Lab Units 04/20/24  1911   HEMOGLOBIN A1C % 5.80*     Results from last 7 days   Lab Units 04/22/24  1655 04/22/24  1114 04/22/24  0647 04/22/24  0030 04/21/24  1802 04/21/24  1129 04/21/24  0553 04/20/24  2335   GLUCOSE mg/dL 116 123 116 119 119 105 119 119                 Results from last 7 days   Lab Units 04/23/24  0556 04/22/24  0746 04/21/24  0554 04/20/24  0453 04/19/24  0637 04/18/24  0613 04/17/24  0722   MAGNESIUM mg/dL 2.0 2.0 2.1 2.1 2.1 1.8 1.8   PHOSPHORUS mg/dL 4.3 4.0 4.1 4.4 3.5 4.2 4.3           Invalid input(s): \"LDLCALC\"  Results from last 7 days   Lab Units 04/23/24  0556 04/22/24  0746 04/21/24  0554 04/20/24  0453 04/20/24  0453 04/19/24  0637 04/18/24  0613 04/17/24  0722   WBC 10*3/mm3 9.64 8.68 7.42  --  7.80 9.38 12.01* 9.65   HEMOGLOBIN g/dL 11.1* 11.2* 10.1*  --  9.9* 10.4* 9.7* 9.8*   HEMATOCRIT % 31.7* 32.4* 29.6*  --  28.9* 29.9* 28.5* 29.6*   PLATELETS 10*3/mm3 634* 563* 519*  --  344 385 401 371   MCV fL 92.7 94.2 93.1  --  94.4 93.7 95.6 95.8   MCH pg 32.5 32.6 31.8  --  32.4 32.6 32.6 31.7   MCHC g/dL 35.0 34.6 34.1  --  34.3 " 34.8 34.0 33.1   RDW % 11.9* 11.8* 11.7*  --  11.7* 11.7* 11.8* 11.7*   RDW-SD fl 39.4 40.6 39.8  --  39.8 40.2 41.1 41.0   MPV fL 9.2 9.4 9.5  --  10.5 9.6 9.8 9.6   NEUTROPHIL % % 65.8 63.5 64.5   < >  --   --   --   --    LYMPHOCYTE % % 16.6* 17.6* 15.4*   < >  --   --   --   --    MONOCYTES % % 9.1 11.3 13.5*   < >  --   --   --   --    EOSINOPHIL % % 5.7 5.2 4.2   < >  --   --   --   --    BASOPHIL % % 1.5 1.4 1.2   < >  --   --   --   --    IMM GRAN % % 1.3* 1.0* 1.2*   < >  --   --   --   --    NEUTROS ABS 10*3/mm3 6.34 5.51 4.79   < >  --   --   --   --    LYMPHS ABS 10*3/mm3 1.60 1.53 1.14   < >  --   --   --   --    MONOS ABS 10*3/mm3 0.88 0.98* 1.00*   < >  --   --   --   --    EOS ABS 10*3/mm3 0.55* 0.45* 0.31   < >  --   --   --   --    BASOS ABS 10*3/mm3 0.14 0.12 0.09   < >  --   --   --   --    IMMATURE GRANS (ABS) 10*3/mm3 0.13* 0.09* 0.09*   < >  --   --   --   --    NRBC /100 WBC 0.0 0.0 0.0   < >  --   --   --   --     < > = values in this interval not displayed.         Results from last 7 days   Lab Units 04/17/24  0321   PH, ARTERIAL pH units 7.438   PO2 ART mm Hg 71.2*   PCO2, ARTERIAL mm Hg 36.6   HCO3 ART mmol/L 24.7     Results from last 7 days   Lab Units 04/19/24  0637 04/18/24  1946   PROCALCITONIN ng/mL 0.14 0.09   LACTATE mmol/L  --  0.9             Results from last 7 days   Lab Units 04/18/24 1946   BLOODCX  No growth at 4 days  No growth at 4 days         Results from last 7 days   Lab Units 04/18/24  1936   NITRITE UA  Negative   WBC UA /HPF 0-2   BACTERIA UA /HPF None Seen   SQUAM EPITHEL UA /HPF 0-2           Imaging:  Imaging Results (Last 24 Hours)       Procedure Component Value Units Date/Time    FL Video Swallow Single Contrast [[id number]] Collected: 04/22/24 1317     Updated: 04/22/24 1721    Narrative:      VIDEO SWALLOWING EXAMINATION BY SPEECH PATHOLOGY     Clinical: Dysphasia     Reference air kerma: 7.40 mGy     Video swallowing examination performed under the  direction of speech  pathology. Imaging reviewed by radiologist who concurs with the  findings.     Speech pathology summary: VFSS complete. Kellie ROSAS, present  during the study. Patient presents with mild-moderate oropharyngeal  dysphagia. Trace posterior penetration with thin liquid via spoon. Deep  silent nontransient penetration with thin liquid via cup/straw. Suspect  trace aspiration. No penetration/aspiration with nectar thick via  cup/straw, puree, soft/chopped solid, or regular solid trials. Cued  double swallow and nectar wash successful in clearing mild pharyngeal  residue.          This report was finalized on 4/22/2024 5:18 PM by Dr. Meño Espinal M.D on Workstation: [id number]                  I reviewed the patient's new clinical results / labs / tests / procedures      Assessment/Plan     Active Hospital Problems    Diagnosis  POA    **Alcohol withdrawal [F10.939]  Yes    Thrombocytosis [D75.839]  No    Aspiration pneumonia [J69.0]  Yes    Substance abuse [F19.10]  Yes    Cocaine abuse [F14.10]  Yes    Positive urine drug screen [R82.5]  Yes    History of alcohol use disorder [Z87.898]  Yes    Alcoholic liver disease [K70.9]  Yes    COPD (chronic obstructive pulmonary disease) [J44.9]  Yes    LIVAN (obstructive sleep apnea) [G47.33]  Yes    Essential hypertension [I10]  Yes      Resolved Hospital Problems   No resolved problems to display.           Acute hypoxemic respiratory failure secondary to aspiration H. influenzae pneumonia patient with a history of COPD   Status post endotracheal intubation and mechanical ventilation and subsequent extubation.  Video swallow study noted and the patient is now on nectar thickened fluid and regular solid diet.  NG tube has been removed.  Tolerating modified diet well..  Weaned off oxygen.  Rocephin DC'd and switched to p.o. doxycycline.  Continue DuoNebs..    Sepsis.  Blood cultures are negative.  MRSA screen is positive.  Currently on Rocephin and  vancomycin.  Rocephin DC'd and started on p.o. doxycycline.  Sepsis is mostly secondary to pneumonia.  Sepsis indices are resolved.  No fever.  Alcohol abuse/polysubstance abuse/alcoholic withdrawal.  off Precedex drip.  No maryann withdrawal at this time.  Will continue detoxification with folate/thiamine/multivitamin/Ativan.  Will decrease Ativan and wean off slowly.  Continue Pepcid.  Prediabetes.  A1c 5.8.  Diet at discharge.  Continue sliding scale.   Chronic disease anemia/thrombocytosis.  Hemoccult stool negative.  Anemia workup noted.  Hemoglobin stable.  Thrombocytosis mostly reactive.  Hypertension..  Off Cardene drip.  no evidence of angina or congestive heart failure.  Blood pressure with periods of elevation and will further increase Lopressor. Continue to monitor blood pressure and pulse.    VTE prophylaxis.  On Lovenox.  Superficial venous thrombosis of the right upper extremity.  Aspirin and prophylactic Lovenox.    Discussed my findings and plan of treatment with the patient/nurse..  Disposition.  Physical therapy and Occupational Therapy evaluated the patient and most likely the patient will require rehab.  Awaiting CCP feedback.      Britney Triplett MD  [facility]  04/23/24  13:51 EDT

## 2024-04-23 NOTE — PLAN OF CARE
Goal Outcome Evaluation:  Plan of Care Reviewed With: patient           Outcome Evaluation: Pt participated with PT this morning. Pt sleeping upon arrival but agreeable to mobilize. Pt completed supine>sit with sba. He stood with min A and ambulated 100' total. Ambulated approx 85' with walker, PT observes very heavy weightbearing through UE on walker for balance support. Pt reports he prefers his cane as he typically mobilizes with a cane in the home. Trialed cane for 15' with worsening balance noted. With use of the cane, pt had 1 loss of balance requiring mod A from PT to correct to prevent a fall. Pt with limited safety awareness and is a high falls risk. Would benefit from rehab at d/c to progress strength and balance before returning home alone.      Anticipated Discharge Disposition (PT): inpatient rehabilitation facility

## 2024-04-24 ENCOUNTER — READMISSION MANAGEMENT (OUTPATIENT)
Dept: CALL CENTER | Facility: HOSPITAL | Age: 69
End: 2024-04-24
Payer: COMMERCIAL

## 2024-04-24 VITALS
OXYGEN SATURATION: 97 % | HEART RATE: 72 BPM | DIASTOLIC BLOOD PRESSURE: 65 MMHG | WEIGHT: 247 LBS | HEIGHT: 78 IN | TEMPERATURE: 98.4 F | SYSTOLIC BLOOD PRESSURE: 157 MMHG | BODY MASS INDEX: 28.58 KG/M2 | RESPIRATION RATE: 18 BRPM

## 2024-04-24 PROBLEM — F10.939 ALCOHOL WITHDRAWAL: Status: RESOLVED | Noted: 2024-04-06 | Resolved: 2024-04-24

## 2024-04-24 PROBLEM — J69.0 ASPIRATION PNEUMONIA: Status: RESOLVED | Noted: 2024-04-20 | Resolved: 2024-04-24

## 2024-04-24 PROBLEM — K70.9 ALCOHOLIC LIVER DISEASE: Status: RESOLVED | Noted: 2021-10-07 | Resolved: 2024-04-24

## 2024-04-24 LAB
ANION GAP SERPL CALCULATED.3IONS-SCNC: 14 MMOL/L (ref 5–15)
BASOPHILS # BLD AUTO: 0.14 10*3/MM3 (ref 0–0.2)
BASOPHILS NFR BLD AUTO: 1.3 % (ref 0–1.5)
BUN SERPL-MCNC: 19 MG/DL (ref 8–23)
BUN/CREAT SERPL: 24.4 (ref 7–25)
CALCIUM SPEC-SCNC: 8.9 MG/DL (ref 8.6–10.5)
CHLORIDE SERPL-SCNC: 104 MMOL/L (ref 98–107)
CO2 SERPL-SCNC: 20 MMOL/L (ref 22–29)
CREAT SERPL-MCNC: 0.78 MG/DL (ref 0.76–1.27)
DEPRECATED RDW RBC AUTO: 41.4 FL (ref 37–54)
EGFRCR SERPLBLD CKD-EPI 2021: 95.9 ML/MIN/1.73
EOSINOPHIL # BLD AUTO: 0.7 10*3/MM3 (ref 0–0.4)
EOSINOPHIL NFR BLD AUTO: 6.6 % (ref 0.3–6.2)
ERYTHROCYTE [DISTWIDTH] IN BLOOD BY AUTOMATED COUNT: 11.9 % (ref 12.3–15.4)
GLUCOSE SERPL-MCNC: 95 MG/DL (ref 65–99)
HCT VFR BLD AUTO: 32.7 % (ref 37.5–51)
HGB BLD-MCNC: 11.2 G/DL (ref 13–17.7)
IMM GRANULOCYTES # BLD AUTO: 0.23 10*3/MM3 (ref 0–0.05)
IMM GRANULOCYTES NFR BLD AUTO: 2.2 % (ref 0–0.5)
LYMPHOCYTES # BLD AUTO: 2 10*3/MM3 (ref 0.7–3.1)
LYMPHOCYTES NFR BLD AUTO: 19 % (ref 19.6–45.3)
MAGNESIUM SERPL-MCNC: 2 MG/DL (ref 1.6–2.4)
MCH RBC QN AUTO: 32.5 PG (ref 26.6–33)
MCHC RBC AUTO-ENTMCNC: 34.3 G/DL (ref 31.5–35.7)
MCV RBC AUTO: 94.8 FL (ref 79–97)
MONOCYTES # BLD AUTO: 0.98 10*3/MM3 (ref 0.1–0.9)
MONOCYTES NFR BLD AUTO: 9.3 % (ref 5–12)
NEUTROPHILS NFR BLD AUTO: 6.5 10*3/MM3 (ref 1.7–7)
NEUTROPHILS NFR BLD AUTO: 61.6 % (ref 42.7–76)
NRBC BLD AUTO-RTO: 0 /100 WBC (ref 0–0.2)
PHOSPHATE SERPL-MCNC: 3.5 MG/DL (ref 2.5–4.5)
PLATELET # BLD AUTO: 679 10*3/MM3 (ref 140–450)
PMV BLD AUTO: 9.6 FL (ref 6–12)
POTASSIUM SERPL-SCNC: 3.8 MMOL/L (ref 3.5–5.2)
RBC # BLD AUTO: 3.45 10*6/MM3 (ref 4.14–5.8)
SODIUM SERPL-SCNC: 138 MMOL/L (ref 136–145)
WBC NRBC COR # BLD AUTO: 10.55 10*3/MM3 (ref 3.4–10.8)

## 2024-04-24 PROCEDURE — 83735 ASSAY OF MAGNESIUM: CPT | Performed by: INTERNAL MEDICINE

## 2024-04-24 PROCEDURE — 84100 ASSAY OF PHOSPHORUS: CPT | Performed by: INTERNAL MEDICINE

## 2024-04-24 PROCEDURE — 94799 UNLISTED PULMONARY SVC/PX: CPT

## 2024-04-24 PROCEDURE — 85025 COMPLETE CBC W/AUTO DIFF WBC: CPT | Performed by: INTERNAL MEDICINE

## 2024-04-24 PROCEDURE — 94664 DEMO&/EVAL PT USE INHALER: CPT

## 2024-04-24 PROCEDURE — 97530 THERAPEUTIC ACTIVITIES: CPT

## 2024-04-24 PROCEDURE — 25010000002 HYDRALAZINE PER 20 MG: Performed by: INTERNAL MEDICINE

## 2024-04-24 PROCEDURE — 80048 BASIC METABOLIC PNL TOTAL CA: CPT | Performed by: INTERNAL MEDICINE

## 2024-04-24 PROCEDURE — 94761 N-INVAS EAR/PLS OXIMETRY MLT: CPT

## 2024-04-24 PROCEDURE — 97535 SELF CARE MNGMENT TRAINING: CPT

## 2024-04-24 PROCEDURE — 25010000002 ENOXAPARIN PER 10 MG: Performed by: INTERNAL MEDICINE

## 2024-04-24 RX ORDER — DOXYCYCLINE 100 MG/1
100 CAPSULE ORAL EVERY 12 HOURS SCHEDULED
Qty: 6 CAPSULE | Refills: 0 | Status: SHIPPED | OUTPATIENT
Start: 2024-04-24 | End: 2024-04-27

## 2024-04-24 RX ORDER — FOLIC ACID 1 MG/1
1 TABLET ORAL DAILY
Qty: 30 TABLET | Refills: 3 | Status: SHIPPED | OUTPATIENT
Start: 2024-04-24

## 2024-04-24 RX ORDER — QUETIAPINE FUMARATE 100 MG/1
100 TABLET, FILM COATED ORAL EVERY 12 HOURS SCHEDULED
Qty: 60 TABLET | Refills: 3 | Status: SHIPPED | OUTPATIENT
Start: 2024-04-24

## 2024-04-24 RX ORDER — METOPROLOL TARTRATE 100 MG/1
100 TABLET ORAL EVERY 12 HOURS SCHEDULED
Qty: 60 TABLET | Refills: 3 | Status: SHIPPED | OUTPATIENT
Start: 2024-04-24

## 2024-04-24 RX ORDER — ASPIRIN 81 MG/1
81 TABLET ORAL DAILY
Qty: 30 TABLET | Refills: 3 | Status: SHIPPED | OUTPATIENT
Start: 2024-04-24

## 2024-04-24 RX ORDER — FAMOTIDINE 20 MG/1
20 TABLET, FILM COATED ORAL
Qty: 60 TABLET | Refills: 3 | Status: SHIPPED | OUTPATIENT
Start: 2024-04-24

## 2024-04-24 RX ORDER — LANOLIN ALCOHOL/MO/W.PET/CERES
100 CREAM (GRAM) TOPICAL DAILY
Qty: 30 TABLET | Refills: 3 | Status: SHIPPED | OUTPATIENT
Start: 2024-04-24

## 2024-04-24 RX ADMIN — IPRATROPIUM BROMIDE AND ALBUTEROL SULFATE 3 ML: .5; 3 SOLUTION RESPIRATORY (INHALATION) at 11:21

## 2024-04-24 RX ADMIN — LORAZEPAM 0.5 MG: 0.5 TABLET ORAL at 07:33

## 2024-04-24 RX ADMIN — LORAZEPAM 0.5 MG: 0.5 TABLET ORAL at 00:18

## 2024-04-24 RX ADMIN — IPRATROPIUM BROMIDE AND ALBUTEROL SULFATE 3 ML: .5; 3 SOLUTION RESPIRATORY (INHALATION) at 07:05

## 2024-04-24 RX ADMIN — QUETIAPINE FUMARATE 100 MG: 100 TABLET ORAL at 07:33

## 2024-04-24 RX ADMIN — FAMOTIDINE 20 MG: 20 TABLET, FILM COATED ORAL at 06:11

## 2024-04-24 RX ADMIN — Medication 100 MG: at 07:34

## 2024-04-24 RX ADMIN — DOXYCYCLINE 100 MG: 100 CAPSULE ORAL at 06:11

## 2024-04-24 RX ADMIN — ASPIRIN 81 MG: 81 TABLET, COATED ORAL at 07:33

## 2024-04-24 RX ADMIN — METOPROLOL TARTRATE 100 MG: 50 TABLET, FILM COATED ORAL at 07:33

## 2024-04-24 RX ADMIN — FOLIC ACID 1 MG: 1 TABLET ORAL at 07:33

## 2024-04-24 RX ADMIN — Medication 1 TABLET: at 07:33

## 2024-04-24 RX ADMIN — ENOXAPARIN SODIUM 40 MG: 100 INJECTION SUBCUTANEOUS at 12:35

## 2024-04-24 RX ADMIN — HYDRALAZINE HYDROCHLORIDE 20 MG: 20 INJECTION INTRAMUSCULAR; INTRAVENOUS at 00:18

## 2024-04-24 RX ADMIN — ZINC OXIDE 1 APPLICATION: 200 OINTMENT TOPICAL at 07:34

## 2024-04-24 NOTE — PLAN OF CARE
Goal Outcome Evaluation:  Plan of Care Reviewed With: patient           Outcome Evaluation: Pt to be DC'd home today with friend as transport.  Letter for return to work on Monday 4/29/24 has been created for pt with MD's permission.  VSS, RA, NSR.  IV's removed.  Meds to Beds for ABX oral.and other medications.

## 2024-04-24 NOTE — THERAPY TREATMENT NOTE
Patient Name: Watson Lisa  : 1955    MRN: [id number]                              Today's Date: 2024       Admit Date: 2024    Visit Dx:     ICD-10-CM ICD-9-CM   1. Alcohol withdrawal syndrome without complication  F10.930 291.81   2. Hypomagnesemia  E83.42 275.2     Patient Active Problem List   Diagnosis    Essential hypertension    Tobacco abuse    Hiatal hernia    Effusion of left knee    Osteoarthritis of left knee    Vitamin D deficiency    Anemia, chronic disease    COPD (chronic obstructive pulmonary disease)    LIVAN (obstructive sleep apnea)    ETOH abuse    Obese    Alcoholic liver disease    History of alcohol use disorder    Left patella fracture    Need for hepatitis C screening test    Chronic pain of left ankle    Primary insomnia    Overweight (BMI 25.0-29.9)    Mixed hyperlipidemia    Acute alcohol intoxication    Hypomagnesemia    Alcohol withdrawal    Substance abuse    Cocaine abuse    Positive urine drug screen    Aspiration pneumonia    Thrombocytosis     Past Medical History:   Diagnosis Date    Alcohol abuse     Anxiety     Arthritis     Bronchitis with chronic airway obstruction     LAST FE    DVT (deep venous thrombosis)     Hiatal hernia     Hypertension     Hypertension     Low back pain     Neck pain     Pulmonary embolism     Sleep apnea with use of continuous positive airway pressure (CPAP)     AT NIGHT     Past Surgical History:   Procedure Laterality Date    COLONOSCOPY      JOINT REPLACEMENT Right     hip/knee    REPLACEMENT TOTAL KNEE      TONSILLECTOMY      TOTAL HIP ARTHROPLASTY Right       General Information       Row Name 24 1146          Physical Therapy Time and Intention    Document Type therapy note (daily note)  -EE     Mode of Treatment physical therapy;individual therapy  -EE       Row Name 24 1146          General Information    Existing Precautions/Restrictions fall  -EE     Barriers to Rehab none identified  -EE       Row Name 24  1146          Cognition    Orientation Status (Cognition) oriented x 4  -EE       Row Name 04/24/24 1146          Safety Issues, Functional Mobility    Impairments Affecting Function (Mobility) balance;strength  -EE               User Key  (r) = Recorded By, (t) = Taken By, (c) = Cosigned By      Initials Name Provider Type    EE Mily Moran, STANLEY Physical Therapist                   Mobility       Row Name 04/24/24 1146          Bed Mobility    Supine-Sit Ashley (Bed Mobility) standby assist  -EE     Assistive Device (Bed Mobility) head of bed elevated  -EE       Row Name 04/24/24 1146          Sit-Stand Transfer    Sit-Stand Ashley (Transfers) contact guard  -EE     Assistive Device (Sit-Stand Transfers) cane, straight  -EE       Row Name 04/24/24 1146          Gait/Stairs (Locomotion)    Ashley Level (Gait) contact guard;minimum assist (75% patient effort);verbal cues  -EE     Assistive Device (Gait) cane, straight  -EE     Distance in Feet (Gait) 240  -EE     Deviations/Abnormal Patterns (Gait) janeth decreased;stride length decreased;base of support, wide  -EE     Bilateral Gait Deviations forward flexed posture  -EE     Left Sided Gait Deviations heel strike decreased  L foot eversion during stance phase  -EE     Comment, (Gait/Stairs) Slightly unsteady throughout. 1 small LOB requiring min A to recover.  -EE               User Key  (r) = Recorded By, (t) = Taken By, (c) = Cosigned By      Initials Name Provider Type    EE Mily Moran, STANLEY Physical Therapist                   Obj/Interventions    No documentation.                  Goals/Plan    No documentation.                  Clinical Impression       Row Name 04/24/24 1147          Pain    Pretreatment Pain Rating 0/10 - no pain  -EE     Posttreatment Pain Rating 0/10 - no pain  -EE       Row Name 04/24/24 1147          Plan of Care Review    Plan of Care Reviewed With patient  -EE     Progress improving  -EE     Outcome Evaluation Pt  demos steady progress with balance and endurance, as evidenced by progression to cane and tolerance of increased ambulation distance. Pt continues to require assist x1 due to impaired strength and dynamic balance. Pt stood with CGA and ambulated 240' with cane and CGA/min A. Pt demonstrated 1 LOB requiring min A to recover balance. No new PT concerns; pt will continue to benefit from PT to address strength and balance in order to assist with return to PLOF.  -EE       Row Name 04/24/24 1147          Positioning and Restraints    Pre-Treatment Position in bed  -EE     Post Treatment Position chair  -EE     In Chair sitting;call light within reach;encouraged to call for assist;exit alarm on;notified nsg  -EE               User Key  (r) = Recorded By, (t) = Taken By, (c) = Cosigned By      Initials Name Provider Type    Mily Sanchez PT Physical Therapist                   Outcome Measures       Row Name 04/24/24 1149 04/24/24 0735       How much help from another person do you currently need...    Turning from your back to your side while in flat bed without using bedrails? 4  -EE 3  -RW    Moving from lying on back to sitting on the side of a flat bed without bedrails? 3  -EE 3  -RW    Moving to and from a bed to a chair (including a wheelchair)? 3  -EE 3  -RW    Standing up from a chair using your arms (e.g., wheelchair, bedside chair)? 3  -EE 3  -RW    Climbing 3-5 steps with a railing? 3  -EE 2  -RW    To walk in hospital room? 3  -EE 3  -RW    AM-PAC 6 Clicks Score (PT) 19  -EE 17  -RW    Highest Level of Mobility Goal 6 --> Walk 10 steps or more  -EE 5 --> Static standing  -RW              User Key  (r) = Recorded By, (t) = Taken By, (c) = Cosigned By      Initials Name Provider Type    Mily Sanchez PT Physical Therapist    Ming Fontana, RN Registered Nurse                                 Physical Therapy Education       Title: PT OT SLP Therapies (In Progress)       Topic: Physical Therapy (Done)        Point: Mobility training (Done)       Learning Progress Summary             Patient Acceptance, E, VU,NR by EE at 4/24/2024 1149    Acceptance, E, VU,NR by CW at 4/23/2024 1141    Acceptance, E,D, DU by PC at 4/22/2024 1512    Acceptance, E, VU by MO at 4/21/2024 1311    Acceptance, E, NR by EM at 4/18/2024 1109                         Point: Home exercise program (Done)       Learning Progress Summary             Patient Acceptance, E, VU by MO at 4/21/2024 1311                         Point: Body mechanics (Done)       Learning Progress Summary             Patient Acceptance, E, VU,NR by EE at 4/24/2024 1149    Acceptance, E,D, DU by PC at 4/22/2024 1512    Acceptance, E, VU by MO at 4/21/2024 1311                         Point: Precautions (Done)       Learning Progress Summary             Patient Acceptance, E, VU,NR by EE at 4/24/2024 1149    Acceptance, E,D, DU by PC at 4/22/2024 1512    Acceptance, E, VU by MO at 4/21/2024 1311                                         User Key       Initials Effective Dates Name Provider Type Discipline    PC 06/16/21 -  Domitila Renee, PT Physical Therapist PT    EE 06/16/21 -  Mily Moran, PT Physical Therapist PT    EM 06/16/21 -  Pooja Carrington, PT Physical Therapist PT    CW 12/13/22 -  Bethanie Rivas, PT Physical Therapist PT    MO 05/26/23 -  Keturah Funes, PT Physical Therapist PT                  PT Recommendation and Plan     Plan of Care Reviewed With: patient  Progress: improving  Outcome Evaluation: Pt demos steady progress with balance and endurance, as evidenced by progression to cane and tolerance of increased ambulation distance. Pt continues to require assist x1 due to impaired strength and dynamic balance. Pt stood with CGA and ambulated 240' with cane and CGA/min A. Pt demonstrated 1 LOB requiring min A to recover balance. No new PT concerns; pt will continue to benefit from PT to address strength and balance in order to assist with return to  PLOF.     Time Calculation:         PT Charges       Row Name 04/24/24 1150             Time Calculation    Start Time 1022  -EE      Stop Time 1040  -EE      Time Calculation (min) 18 min  -EE      PT Received On 04/24/24  -EE      PT - Next Appointment 04/25/24  -EE         Time Calculation- PT    Total Timed Code Minutes- PT 18 minute(s)  -EE         Timed Charges    84147 - PT Therapeutic Activity Minutes 18  -EE         Total Minutes    Timed Charges Total Minutes 18  -EE       Total Minutes 18  -EE                User Key  (r) = Recorded By, (t) = Taken By, (c) = Cosigned By      Initials Name Provider Type    EE Mily Moran, PT Physical Therapist                  Therapy Charges for Today       Code Description Service Date Service Provider Modifiers Qty    11417782065 HC PT THERAPEUTIC ACT EA 15 MIN 4/24/2024 Mily Moran, PT GP 1            PT G-Codes  Outcome Measure Options: AM-PAC 6 Clicks Daily Activity (OT)  AM-PAC 6 Clicks Score (PT): 19  AM-PAC 6 Clicks Score (OT): 18  PT Discharge Summary  Anticipated Discharge Disposition (PT): home with home health    Mily Moran PT  4/24/2024

## 2024-04-24 NOTE — CASE MANAGEMENT/SOCIAL WORK
Case Management Discharge Note      Final Note: Pt discharged home.  BHAVANA Mccullough RN    Provided Post Acute Provider List?: N/A  Provided Post Acute Provider Quality & Resource List?: N/A    Selected Continued Care - Admitted Since 4/6/2024       Destination    No services have been selected for the patient.                Durable Medical Equipment    No services have been selected for the patient.                Dialysis/Infusion    No services have been selected for the patient.                Home Medical Care    No services have been selected for the patient.                Therapy    No services have been selected for the patient.                Community Resources    No services have been selected for the patient.                Community & DME    No services have been selected for the patient.                    Transportation Services  Private: Car    Final Discharge Disposition Code: 01 - home or self-care

## 2024-04-24 NOTE — CASE MANAGEMENT/SOCIAL WORK
Continued Stay Note  [facility]     Patient Name: Watson Lisa  MRN: [id number]  Today's Date: 4/24/2024    Admit Date: 4/6/2024    Plan: Plan home.   BHAVANA Mccullough RN   Discharge Plan       Row Name 04/24/24 1043       Plan    Plan Plan home.   BHAVANA Mccullough RN    Patient/Family in Agreement with Plan yes    Plan Comments Pt transferred out of critical care. Pt denies any discharge needs. Pt states his son will transport him home and assist him at home if needed. Plan home.  BHAVANA Mccullough RN                   Discharge Codes    No documentation.                 Expected Discharge Date and Time       Expected Discharge Date Expected Discharge Time    Apr 26, 2024               Brandy Mccullough RN

## 2024-04-24 NOTE — PLAN OF CARE
Goal Outcome Evaluation:      Pt up to the bathroom w/cane and sba. PRN ativan and hydralazine x1. SR w/1st AVB/BBB on tele. No acute distress noted.

## 2024-04-24 NOTE — PLAN OF CARE
Goal Outcome Evaluation:  Plan of Care Reviewed With: patient           Outcome Evaluation: Pt is requesting to be taken off of bed alarms, but pt is still too wobly when walking with PT around unit with cane.  Pt said that he has been told that he is being DC'd today, no one has said that to him.  Access was re-consulted since he is not vented any longer.  Read Access notes but pt said that as soon as he leaves, he intends to not stop his ETOH or drug use.  Family is very concerned when they called.  Cont with Ativan as ordered.  Lovenox given.  Urinal provided and bed is on alarms.  VSS, RA, NSR. Cont with Doxy oral ABX.

## 2024-04-24 NOTE — PLAN OF CARE
Goal Outcome Evaluation:  Plan of Care Reviewed With: patient        Progress: improving  Outcome Evaluation: Pt demos steady progress with balance and endurance, as evidenced by progression to cane and tolerance of increased ambulation distance. Pt continues to require assist x1 due to impaired strength and dynamic balance. Pt stood with CGA and ambulated 240' with cane and CGA/min A. Pt demonstrated 1 LOB requiring min A to recover balance. No new PT concerns; pt will continue to benefit from PT to address strength and balance in order to assist with return to PLOF.      Anticipated Discharge Disposition (PT): home with home health

## 2024-04-24 NOTE — THERAPY TREATMENT NOTE
Patient Name: Watson Lisa  : 1955    MRN: [id number]                              Today's Date: 2024       Admit Date: 2024    Visit Dx:     ICD-10-CM ICD-9-CM   1. Alcohol withdrawal syndrome without complication  F10.930 291.81   2. Hypomagnesemia  E83.42 275.2   3. Aspiration pneumonia of both lungs due to vomit, unspecified part of lung  J69.0 507.0     Patient Active Problem List   Diagnosis    Essential hypertension    Tobacco abuse    Hiatal hernia    Effusion of left knee    Osteoarthritis of left knee    Vitamin D deficiency    Anemia, chronic disease    COPD (chronic obstructive pulmonary disease)    LIVAN (obstructive sleep apnea)    ETOH abuse    Obese    History of alcohol use disorder    Left patella fracture    Need for hepatitis C screening test    Chronic pain of left ankle    Primary insomnia    Overweight (BMI 25.0-29.9)    Mixed hyperlipidemia    Acute alcohol intoxication    Hypomagnesemia    Substance abuse    Cocaine abuse    Positive urine drug screen    Aspiration pneumonia    Thrombocytosis     Past Medical History:   Diagnosis Date    Alcohol abuse     Anxiety     Arthritis     Bronchitis with chronic airway obstruction     LAST FEB    DVT (deep venous thrombosis)     Hiatal hernia     Hypertension     Hypertension     Low back pain     Neck pain     Pulmonary embolism     Sleep apnea with use of continuous positive airway pressure (CPAP)     AT NIGHT     Past Surgical History:   Procedure Laterality Date    COLONOSCOPY      JOINT REPLACEMENT Right     hip/knee    REPLACEMENT TOTAL KNEE      TONSILLECTOMY      TOTAL HIP ARTHROPLASTY Right       General Information       Row Name 24 1426          OT Time and Intention    Document Type therapy note (daily note)  -CARRINGTON     Mode of Treatment occupational therapy;individual therapy  -CARRINGTON       Row Name 24 1426          General Information    Patient Profile Reviewed yes  -CARRINGTON     Existing Precautions/Restrictions  fall;other (see comments)  contact px's  -CARRINGTON       Row Name 04/24/24 1426          Living Environment    People in Home alone  -CARRINGTON       Row Name 04/24/24 1426          Home Main Entrance    Number of Stairs, Main Entrance none  -CARRINGTON       Row Name 04/24/24 1426          Stairs Within Home, Primary    Number of Stairs, Within Home, Primary none  -CARRINGTON       Row Name 04/24/24 1426          Cognition    Orientation Status (Cognition) oriented x 4  -CARRINGTON       Row Name 04/24/24 1426          Safety Issues, Functional Mobility    Safety Issues Affecting Function (Mobility) judgment;safety precautions follow-through/compliance;safety precaution awareness;awareness of need for assistance;impulsivity;insight into deficits/self-awareness  -CARRINGTON     Impairments Affecting Function (Mobility) pain;balance;strength;endurance/activity tolerance  -CARRINGTON     Cognitive Impairments, Mobility Safety/Performance awareness, need for assistance;impulsivity;insight into deficits/self-awareness;judgment;safety precaution awareness;safety precaution follow-through  -CARRINGTON     Comment, Safety Issues/Impairments (Mobility) Gait belt and non skid socks worn.  -CARRINGTON               User Key  (r) = Recorded By, (t) = Taken By, (c) = Cosigned By      Initials Name Provider Type    Lila Ellison OT Occupational Therapist                     Mobility/ADL's       Row Name 04/24/24 1428          Bed Mobility    Bed Mobility supine-sit;sit-supine  -CARRINGTON     Supine-Sit [place] (Bed Mobility) verbal cues;standby assist  -CARRINGTON     Sit-Supine [place] (Bed Mobility) verbal cues;standby assist  -CARRINGTON     Assistive Device (Bed Mobility) head of bed elevated;bed rails  -CARRINGTON     Comment, (Bed Mobility) declined sitting UIC despite encouragement d/t fatigue and pain per pt.  -CARRINGTON       Row Name 04/24/24 1428          Bed-Chair Transfer    Bed-Chair [place] (Transfers) not tested  -CARRINGTON       Row Name 04/24/24 1428          Sit-Stand Transfer    Sit-Stand [place]  (Transfers) verbal cues;contact guard;nonverbal cues (demo/gesture)  -CARRINGTON     Assistive Device (Sit-Stand Transfers) cane, straight  -CARRINGTON     Comment, (Sit-Stand Transfer) x1 STS from EOB; unsteady with cues for upright posture and posterior lean on intial stand but self corrected without cues  -CARRINGTON       Row Name 04/24/24 1428          Toilet Transfer    [place] Level (Toilet Transfer) not tested  -CARRINGTON       Row Name 04/24/24 1428          Functional Mobility    Functional Mobility- Ind. Level minimum assist (75% patient effort);contact guard assist;verbal cues required  -CARRINGTON     Functional Mobility- Device cane, straight  -CARRINGTON     Functional Mobility- Comment EOB<>sink side<>EOB in room; unsteady gait and pt reaching out in front of him to try and grab objects/furniture in room during fxl mobility to sink and req cues for safety and upright posture.  -CARRINGTON       Row Name 04/24/24 1428          Activities of Daily Living    BADL Assessment/Intervention lower body dressing;grooming  -CARRINGTON       Row Name 04/24/24 1428          Lower Body Dressing Assessment/Training    [place] (Lower Body Dressing) lower body dressing skills;doff;shoes/slippers;don;socks;set up;standby assist;verbal cues  -CARRINGTON     Position (Lower Body Dressing) edge of bed sitting;supine  -CARRINGTON     Comment, (Lower Body Dressing) pt doffed shoes and socks supine in bed and donned non skid socks in Figure 4 posture sitting EOB  -CARRINGTON       Row Name 04/24/24 1428          Toileting Assessment/Training    [place] (Toileting) not tested  -CARRINGTON       Row Name 04/24/24 1428          Self-Feeding Assessment/Training    [place] Level (Feeding) not tested  -CARRINGTON       Row Name 04/24/24 1428          Grooming Assessment/Training    [place] Level (Grooming) grooming skills;wash face, hands;set up;verbal cues;contact guard assist  -CARRINGTON     Position (Grooming) supported standing;sink side  -CARRINGTON     Comment, (Grooming) Pt stood sink side to wash  face this PM with CGA/MIN A for standing balance with forward flexed posture noted; cues for upright posture  -CARRINGTON               User Key  (r) = Recorded By, (t) = Taken By, (c) = Cosigned By      Initials Name Provider Type    Lila Ellison OT Occupational Therapist                   Obj/Interventions       Row Name 04/24/24 1437          Vision Assessment/Intervention    Visual Impairment/Limitations WFL  -CARRINGTON       Row Name 04/24/24 1437          Motor Skills    Motor Skills functional endurance  -CARRINGTON     Functional Endurance Poor+; fatigues quickly and easily agitated and req encouragement to participate; pt reports that he did not sleep last night and is tired this PM  -CARRINGTON       Row Name 04/24/24 1437          Balance    Balance Interventions occupation based/functional task;sitting;standing;static;dynamic;supported  -CARRINGTON     Comment, Balance Unsteadiness with fxl mobility and pt reaching out for furniture/objects in room in attempt to steady and req freq cues for safety and redirection; CGA/MIN A for balance with cane standing sink side for ADLs this PM.  -CARRINGTON               User Key  (r) = Recorded By, (t) = Taken By, (c) = Cosigned By      Initials Name Provider Type    Lila Ellison OT Occupational Therapist                   Goals/Plan    No documentation.                  Clinical Impression       Row Name 04/24/24 1441          Pain Assessment    Pretreatment Pain Rating 7/10  -CARRINGTON     Posttreatment Pain Rating 7/10  -CARRINGTON     Pain Location - Side/Orientation Left  -CARRINGTON     Pain Location - ankle;knee;back  -CARRINGTON     Pre/Posttreatment Pain Comment c/o L ankle, knee, and LBP this PM  -CARRINGTON     Pain Intervention(s) Repositioned;Ambulation/increased activity;Rest  -CARRINGTON       Row Name 04/24/24 1441          Plan of Care Review    Plan of Care Reviewed With patient  -CARRINGTON     Outcome Evaluation Pt fowlers in bed on OT arrival this PM and agreeable to tx with encouragement. Pt SBA for bed mobility to sit EOB and don non  skid socks. Pt noted to be impulsive at times and attempted to stand before cane and gait belt in place but easily redirected with safety cues. Pt ambulated to sink and stood sink side for ADLs this PM with cues for upright posture and safety with pt attempting to reach out for furniture and objects in room to steady himself during fxl mobility from EOB to sink. Pt declined sitting UIC this PM at end of session and fowlers in bed with exit alarm on. OT will cont to f/u and progress as tolerated.  -CARRINGTON       Row Name 04/24/24 1441          Therapy Assessment/Plan (OT)    Criteria for Skilled Therapeutic Interventions Met (OT) yes;skilled treatment is necessary  -CARRINGTON     Therapy Frequency (OT) 5 times/wk  -CARRINGTON       Row Name 04/24/24 1441          Therapy Plan Review/Discharge Plan (OT)    Anticipated Discharge Disposition (OT) home with 24/7 care;home with assist;sub acute care setting  -CARRINGTON       Row Name 04/24/24 1441          Vital Signs    O2 Delivery Pre Treatment room air  -CARRINGTON     O2 Delivery Intra Treatment room air  -CARRINGTON     O2 Delivery Post Treatment room air  -CARRINGTON       Row Name 04/24/24 1441          Positioning and Restraints    Pre-Treatment Position in bed  -CARRINGTON     Post Treatment Position bed  -CARRINGTON     In Bed notified nsg;fowlers;call light within reach;encouraged to call for assist;exit alarm on;side rails up x2;legs elevated  -CARRINGTON               User Key  (r) = Recorded By, (t) = Taken By, (c) = Cosigned By      Initials Name Provider Type    Lila Ellison, ODILIA Occupational Therapist                   Outcome Measures       Row Name 04/24/24 1448          How much help from another is currently needed...    Putting on and taking off regular lower body clothing? 3  -CARRINGTON     Bathing (including washing, rinsing, and drying) 3  -CARRINGTON     Toileting (which includes using toilet bed pan or urinal) 3  -CARRINGTON     Putting on and taking off regular upper body clothing 3  -CARRINGTON     Taking care of personal grooming (such as  brushing teeth) 3  -CARRINGTON     Eating meals 4  -CARRINGTON     AM-PAC 6 Clicks Score (OT) 19  -CARRINGTON       Row Name 04/24/24 1149 04/24/24 0735       How much help from another person do you currently need...    Turning from your back to your side while in flat bed without using bedrails? 4  -EE 3  -RW    Moving from lying on back to sitting on the side of a flat bed without bedrails? 3  -EE 3  -RW    Moving to and from a bed to a chair (including a wheelchair)? 3  -EE 3  -RW    Standing up from a chair using your arms (e.g., wheelchair, bedside chair)? 3  -EE 3  -RW    Climbing 3-5 steps with a railing? 3  -EE 2  -RW    To walk in hospital room? 3  -EE 3  -RW    AM-PAC 6 Clicks Score (PT) 19  -EE 17  -RW    Highest Level of Mobility Goal 6 --> Walk 10 steps or more  -EE 5 --> Static standing  -RW      Row Name 04/24/24 1448          Functional Assessment    Outcome Measure Options AM-PAC 6 Clicks Daily Activity (OT)  -CARRINGTON               User Key  (r) = Recorded By, (t) = Taken By, (c) = Cosigned By      Initials Name Provider Type    EE Mily Moran, PT Physical Therapist    Ming Fontana, RN Registered Nurse    Lila Ellison, OT Occupational Therapist                    Occupational Therapy Education       Title: PT OT SLP Therapies (Done)       Topic: Occupational Therapy (Done)       Point: ADL training (Done)       Description:   Instruct learner(s) on proper safety adaptation and remediation techniques during self care or transfers.   Instruct in proper use of assistive devices.                  Learning Progress Summary             Patient Acceptance, E,TB, VU by RW at 4/24/2024 1352    Acceptance, E, VU by  at 4/18/2024 1522    Comment: OT goals, POC, recovery/rehab   Family Acceptance, E,TB, VU by  at 4/24/2024 1352                         Point: Home exercise program (Done)       Description:   Instruct learner(s) on appropriate technique for monitoring, assisting and/or progressing therapeutic  exercises/activities.                  Learning Progress Summary             Patient Acceptance, E,TB, VU by RW at 4/24/2024 1352   Family Acceptance, E,TB, VU by RW at 4/24/2024 1352                         Point: Precautions (Done)       Description:   Instruct learner(s) on prescribed precautions during self-care and functional transfers.                  Learning Progress Summary             Patient Acceptance, E,TB, VU by RW at 4/24/2024 1352   Family Acceptance, E,TB, VU by RW at 4/24/2024 1352                         Point: Body mechanics (Done)       Description:   Instruct learner(s) on proper positioning and spine alignment during self-care, functional mobility activities and/or exercises.                  Learning Progress Summary             Patient Acceptance, E,TB, VU by RW at 4/24/2024 1352   Family Acceptance, E,TB, VU by  at 4/24/2024 1352                                         User Key       Initials Effective Dates Name Provider Type Discipline     06/16/21 -  Ming Todd RN Registered Nurse Nurse     04/02/20 -  Dorcas Villeda OT Occupational Therapist OT                  OT Recommendation and Plan  Therapy Frequency (OT): 5 times/wk  Plan of Care Review  Plan of Care Reviewed With: patient  Outcome Evaluation: Pt fowlers in bed on OT arrival this PM and agreeable to tx with encouragement. Pt SBA for bed mobility to sit EOB and don non skid socks. Pt noted to be impulsive at times and attempted to stand before cane and gait belt in place but easily redirected with safety cues. Pt ambulated to sink and stood sink side for ADLs this PM with cues for upright posture and safety with pt attempting to reach out for furniture and objects in room to steady himself during fxl mobility from EOB to sink. Pt declined sitting UIC this PM at end of session and fowlers in bed with exit alarm on. OT will cont to f/u and progress as tolerated.     Time Calculation:         Time Calculation- OT        Row Name 04/24/24 1448             Time Calculation- OT    OT Start Time 1404  -CARRINGTON      OT Stop Time 1420  -CARRINGTON      OT Time Calculation (min) 16 min  -CARRINGTON      Total Timed Code Minutes- OT 16 minute(s)  -CARRINGTON      OT Received On 04/24/24  -CARRINGTON      OT - Next Appointment 04/25/24  -CARRINGTON         Timed Charges    78225 - OT Self Care/Mgmt Minutes 16  -CARRINGTON         Total Minutes    Timed Charges Total Minutes 16  -CARRINGTON       Total Minutes 16  -CARRINGTON                User Key  (r) = Recorded By, (t) = Taken By, (c) = Cosigned By      Initials Name Provider Type    CARRINGTONLila Christian OT Occupational Therapist                  Therapy Charges for Today       Code Description Service Date Service Provider Modifiers Qty    73151768158 HC OT SELF CARE/MGMT/TRAIN EA 15 MIN 4/24/2024 Lila Gibbons OT GO 1                 Lila Gibbons OT  4/24/2024

## 2024-04-24 NOTE — PLAN OF CARE
Goal Outcome Evaluation:  Plan of Care Reviewed With: patient           Outcome Evaluation: Pt fowlers in bed on OT arrival this PM and agreeable to tx with encouragement. Pt SBA for bed mobility to sit EOB and don non skid socks. Pt noted to be impulsive at times and attempted to stand before cane and gait belt in place but easily redirected with safety cues. Pt ambulated to sink and stood sink side for ADLs this PM with cues for upright posture and safety with pt attempting to reach out for furniture and objects in room to steady himself during fxl mobility from EOB to sink. Pt declined sitting UIC this PM at end of session and fowlers in bed with exit alarm on. OT will cont to f/u and progress as tolerated.      Anticipated Discharge Disposition (OT): home with 24/7 care, home with assist, sub acute care setting

## 2024-04-24 NOTE — OUTREACH NOTE
Prep Survey      Flowsheet Row Responses   [facility] patient discharged from? [facility]   Is LACE score < 7 ? No   Eligibility Psychiatric   Date of Admission 04/06/24   Date of Discharge 04/24/24   Discharge Disposition Home-Health Care Sv   Discharge diagnosis Alcohol withdrawal syndrome without complication  Hypomagnesemia   Does the patient have one of the following disease processes/diagnoses(primary or secondary)? Other   Does the patient have Home health ordered? Yes   What is the Home health agency?  [facility]   Is there a DME ordered? No   Prep survey completed? Yes            MARIPOSA MARCH - Registered Nurse           <<----- Click to add NO significant Past Surgical History

## 2024-04-24 NOTE — CONSULTS
"[facility] consult for ETOH. Reviewed chart, spoke w/ RN and met w/ pt. RN reports pt frustrated still on falls precautions. Pt A&Ox4. Pt states he doesn't want to complete assessment stated having a plan to go to [facility] counseling. This writer confronted pt r/t this being his plan multiple times in the past without pt follow through. Pt states his son is going to \"make sure I do it\". Discussed switch of roles and likelihood of pt not being successful if he has to have his son force him to go to counseling. Pt states \"well if we're all being truthful then, I don't plan to quit\". Pt states again he doesn't want to complete evaluation or be given resources. Pt v/u of how to request [facility] to return. Gave update to RN.   [facility] will sign off at this time, please re-consult if needed.    "

## 2024-04-25 ENCOUNTER — TRANSITIONAL CARE MANAGEMENT TELEPHONE ENCOUNTER (OUTPATIENT)
Dept: CALL CENTER | Facility: HOSPITAL | Age: 69
End: 2024-04-25
Payer: COMMERCIAL

## 2024-04-25 NOTE — PAYOR COMM NOTE
"Brittany Lisa (69 y.o. Male)        PLEASE SEE ATTACHED FOR CONTINUED STAY AUTH AND DC SUMMARY    REF#[id number]     PLEASE CALL  OR  [phone]    THANK YOU    ELZA ARCE LPN CCP   Date of Birth   1955    Social Security Number       Address   [address] [address]    Home Phone   [phone]    MRN   [id number]       Jainism   Worship    Marital Status                               Admission Date   4/6/24    Admission Type   Emergency    Admitting Provider   Britney Triplett MD    Attending Provider       Department, Room/Bed   [address], E656/1       Discharge Date   4/24/2024    Discharge Disposition   Home-Health Care [facility] [place]    Discharge Destination                                 Attending Provider: (none)   Allergies: Penicillins    Isolation: None   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)   Code Status: Prior    Ht: 198.1 cm (78\")   Wt: 112 kg (247 lb)    Admission Cmt: None   Principal Problem: Alcohol withdrawal [F10.939]                   Active Insurance as of 4/6/2024       Primary Coverage       Payor Plan Insurance Group Employer/Plan Group    ANTHEM BLUE CROSS ANTHEM BLUE CROSS BLUE SHIELD PPO [id number]       Payor Plan Address Payor Plan Phone Number Payor Plan Fax Number Effective Dates    [address] [phone]  1/1/2022 - None Entered    [facility] [id number]         Subscriber Name Subscriber Birth Date Member ID       BRITTANY LISA 1955 [id number]               Secondary Coverage       Payor Plan Insurance Group Employer/Plan Group    MEDICARE MEDICARE A ONLY        Payor Plan Address Payor Plan Phone Number Payor Plan Fax Number Effective Dates    [address] [phone]  9/1/2019 - None Entered    [facility] [address]         Subscriber Name Subscriber Birth Date Member ID       BRITTANY LISA 1955 [id number]                     Emergency Contacts        (Rel.) Home Phone Work Phone " Mobile Phone    TEJAS LISA (Son) [phone] -- [phone]    Jorge A Nielson (Sister) [phone] -- --              Oxygen Therapy (last day) before discharge       Date/Time SpO2 Device (Oxygen Therapy) Flow (L/min) Oxygen Concentration (%) ETCO2 (mmHg)    24 1121 -- room air -- -- --    24 0735 -- room air -- -- --    24 0705 97 room air -- -- --    24 0018 -- room air -- -- --    24 2055 -- room air -- -- --    24 0732 -- room air -- -- --    24 0717 95 room air -- -- --    24 0637 96 room air -- -- --    24 0030 -- room air -- -- --          Intake & Output (last day)          0701   0700  0701   0700    Urine (mL/kg/hr) 400 (0.1)     Stool      Total Output 400     Net -400                 Lines, Drains & Airways       Active LDAs       None                     Britney Triplett MD   Physician  Hospitalist     Progress Notes      Signed     Date of Service: 24  Creation Time: 24     Signed       Expand All Collapse All[]Expand All by Default         Name: Watson Lisa ADMIT: 2024   : 1955  PCP: Roc Carroll APRN    MRN: [id number] LOS: 17 days   AGE/SEX: 69 y.o. male  ROOM: [facility]      Subjective      Subjective  Nursing staff reports that the patient is requesting his Ativan every 4 hours around-the-clock.  Patient himself reports resolution of the anxiety and insomnia.  No tremors.  No chest pain.  No shortness of breath.  Improved occasional cough productive of clear sputum.  No hemoptysis.  No palpitation.  No ankle edema.  No fever..  Positive weakness     Review of Systems  GI.  Tolerating modified diet well with no abdominal pain or nausea or vomiting.    .  No dysuria or hematuria.  CNS.  No seizures.  No focal neurological symptoms.        Objective      Objective  Vital Signs  Temp:  [97.3 °F (36.3 °C)-98.1 °F (36.7 °C)] 98.1 °F (36.7 °C)  Heart Rate:  []  79  Resp:  [16-18] 18  BP: (134-201)/() 184/74  SpO2:  [94 %-98 %] 95 %  on   ;   Device (Oxygen Therapy): room air     Intake/Output Summary (Last 24 hours) at 4/23/2024 1351  Last data filed at 4/23/2024 0547      Gross per 24 hour   Intake --   Output 1085 ml   Net -1085 ml      Body mass index is 29.27 kg/m².  Vitals               04/21/24  0500 04/22/24  1955/24  0547   Weight: 120 kg (263 lb 14.3 oz) 115 kg (253 lb 1.4 oz) 115 kg (253 lb 4.9 oz)         Physical Exam  General.  Elderly gentleman.  Overweight.  Somnolent but arousable. oriented x 4.  No apparent pain/distress/diaphoresis.  Eyes.  Pupils equal round and reactive.  Intact extraocular musculature.  No pallor or jaundice.  Oral cavity.  Moist mucous membrane.  Neck.  Supple.  No JVD.  No lymphadenopathy or thyromegaly.  Cardio vascular.  Regular rate and rhythm with no gallops or murmurs.  Chest.  Clear to auscultation bilaterally with no added sounds.  Abdomen.  Soft lax.  No tenderness.  No organomegaly.  No guarding or rebound.  Extremities..  No clubbing/cyanosis/edema.  Sequential compression devices on both lower extremity.  No upper extremity tremors.  CNS.  No acute focal neurological deficits.     Results Review:                  Results from last 7 days   Lab Units 04/23/24  0556 04/22/24  0746 04/21/24  0554 04/20/24  0453 04/19/24  0637 04/18/24  0613 04/17/24  0722   SODIUM mmol/L 138 137 136 133* 132* 137 134*   POTASSIUM mmol/L 3.9 4.0 4.6 4.6 4.0 4.4 4.6   CHLORIDE mmol/L 103 102 101 100 97* 102 102   CO2 mmol/L 20.4* 21.3* 21.0* 20.8* 23.0 24.1 21.9*   BUN mg/dL 16 16 17 19 13 16 19   CREATININE mg/dL 0.70* 0.65* 0.70* 0.68* 0.72* 0.71* 0.63*   GLUCOSE mg/dL 120* 112* 113* 106* 126* 119* 130*   CALCIUM mg/dL 9.1 9.6 8.9 9.1 8.8 9.3 8.6   AST (SGOT) U/L  --   --  20  --   --   --   --    ALT (SGPT) U/L  --   --  28  --   --   --   --       Estimated Creatinine Clearance: 140.3 mL/min (A) (by C-G formula based on SCr of  "0.7 mg/dL (L)).       Results from last 7 days   Lab Units 04/20/24  1911   HEMOGLOBIN A1C % 5.80*                  Results from last 7 days   Lab Units 04/22/24  1655 04/22/24  1114 04/22/24  0647 04/22/24  0030 04/21/24  1802 04/21/24  1129 04/21/24  0553 04/20/24  2335   GLUCOSE mg/dL 116 123 116 119 119 105 119 119                             Results from last 7 days   Lab Units 04/23/24  0556 04/22/24  0746 04/21/24  0554 04/20/24  0453 04/19/24  0637 04/18/24  0613 04/17/24  0722   MAGNESIUM mg/dL 2.0 2.0 2.1 2.1 2.1 1.8 1.8   PHOSPHORUS mg/dL 4.3 4.0 4.1 4.4 3.5 4.2 4.3             Invalid input(s): \"LDLCALC\"              Results from last 7 days   Lab Units 04/23/24  0556 04/22/24  0746 04/21/24  0554 04/20/24  0453 04/20/24  0453 04/19/24  0637 04/18/24  0613 04/17/24  0722   WBC 10*3/mm3 9.64 8.68 7.42  --  7.80 9.38 12.01* 9.65   HEMOGLOBIN g/dL 11.1* 11.2* 10.1*  --  9.9* 10.4* 9.7* 9.8*   HEMATOCRIT % 31.7* 32.4* 29.6*  --  28.9* 29.9* 28.5* 29.6*   PLATELETS 10*3/mm3 634* 563* 519*  --  344 385 401 371   MCV fL 92.7 94.2 93.1  --  94.4 93.7 95.6 95.8   MCH pg 32.5 32.6 31.8  --  32.4 32.6 32.6 31.7   MCHC g/dL 35.0 34.6 34.1  --  34.3 34.8 34.0 33.1   RDW % 11.9* 11.8* 11.7*  --  11.7* 11.7* 11.8* 11.7*   RDW-SD fl 39.4 40.6 39.8  --  39.8 40.2 41.1 41.0   MPV fL 9.2 9.4 9.5  --  10.5 9.6 9.8 9.6   NEUTROPHIL % % 65.8 63.5 64.5   < >  --   --   --   --    LYMPHOCYTE % % 16.6* 17.6* 15.4*   < >  --   --   --   --    MONOCYTES % % 9.1 11.3 13.5*   < >  --   --   --   --    EOSINOPHIL % % 5.7 5.2 4.2   < >  --   --   --   --    BASOPHIL % % 1.5 1.4 1.2   < >  --   --   --   --    IMM GRAN % % 1.3* 1.0* 1.2*   < >  --   --   --   --    NEUTROS ABS 10*3/mm3 6.34 5.51 4.79   < >  --   --   --   --    LYMPHS ABS 10*3/mm3 1.60 1.53 1.14   < >  --   --   --   --    MONOS ABS 10*3/mm3 0.88 0.98* 1.00*   < >  --   --   --   --    EOS ABS 10*3/mm3 0.55* 0.45* 0.31   < >  --   --   --   --    BASOS ABS 10*3/mm3 " 0.14 0.12 0.09   < >  --   --   --   --    IMMATURE GRANS (ABS) 10*3/mm3 0.13* 0.09* 0.09*   < >  --   --   --   --    NRBC /100 WBC 0.0 0.0 0.0   < >  --   --   --   --     < > = values in this interval not displayed.               Results from last 7 days   Lab Units 04/17/24  0321   PH, ARTERIAL pH units 7.438   PO2 ART mm Hg 71.2*   PCO2, ARTERIAL mm Hg 36.6   HCO3 ART mmol/L 24.7            Results from last 7 days   Lab Units 04/19/24  0637 04/18/24  1946   PROCALCITONIN ng/mL 0.14 0.09   LACTATE mmol/L  --  0.9                   Results from last 7 days   Lab Units 04/18/24  1946   BLOODCX   No growth at 4 days  No growth at 4 days               Results from last 7 days   Lab Units 04/18/24  1936   NITRITE UA   Negative   WBC UA /HPF 0-2   BACTERIA UA /HPF None Seen   SQUAM EPITHEL UA /HPF 0-2             Imaging:  Imaging Results (Last 24 Hours)         Procedure Component Value Units Date/Time     FL Video Swallow Single Contrast [[id number]] Collected: 04/22/24 1317       Updated: 04/22/24 1721     Narrative:       VIDEO SWALLOWING EXAMINATION BY SPEECH PATHOLOGY     Clinical: Dysphasia     Reference air kerma: 7.40 mGy     Video swallowing examination performed under the direction of speech  pathology. Imaging reviewed by radiologist who concurs with the  findings.     Speech pathology summary: VFSS complete. Kellie ROSAS, present  during the study. Patient presents with mild-moderate oropharyngeal  dysphagia. Trace posterior penetration with thin liquid via spoon. Deep  silent nontransient penetration with thin liquid via cup/straw. Suspect  trace aspiration. No penetration/aspiration with nectar thick via  cup/straw, puree, soft/chopped solid, or regular solid trials. Cued  double swallow and nectar wash successful in clearing mild pharyngeal  residue.          This report was finalized on 4/22/2024 5:18 PM by Dr. Meño Espinal M.D on Workstation: [id number]                      I reviewed the  patient's new clinical results / labs / tests / procedures        Assessment/Plan            Active Hospital Problems     Diagnosis   POA    **Alcohol withdrawal [F10.939]   Yes    Thrombocytosis [D75.839]   No    Aspiration pneumonia [J69.0]   Yes    Substance abuse [F19.10]   Yes    Cocaine abuse [F14.10]   Yes    Positive urine drug screen [R82.5]   Yes    History of alcohol use disorder [Z87.898]   Yes    Alcoholic liver disease [K70.9]   Yes    COPD (chronic obstructive pulmonary disease) [J44.9]   Yes    LIVAN (obstructive sleep apnea) [G47.33]   Yes    Essential hypertension [I10]   Yes       Resolved Hospital Problems   No resolved problems to display.               Acute hypoxemic respiratory failure secondary to aspiration H. influenzae pneumonia patient with a history of COPD   Status post endotracheal intubation and mechanical ventilation and subsequent extubation.  Video swallow study noted and the patient is now on nectar thickened fluid and regular solid diet.  NG tube has been removed.  Tolerating modified diet well..  Weaned off oxygen.  Rocephin DC'd and switched to p.o. doxycycline.  Continue DuoNebs..    Sepsis.  Blood cultures are negative.  MRSA screen is positive.  Currently on Rocephin and vancomycin.  Rocephin DC'd and started on p.o. doxycycline.  Sepsis is mostly secondary to pneumonia.  Sepsis indices are resolved.  No fever.  Alcohol abuse/polysubstance abuse/alcoholic withdrawal.  off Precedex drip.  No maryann withdrawal at this time.  Will continue detoxification with folate/thiamine/multivitamin/Ativan.  Will decrease Ativan and wean off slowly.  Continue Pepcid.  Prediabetes.  A1c 5.8.  Diet at discharge.  Continue sliding scale.   Chronic disease anemia/thrombocytosis.  Hemoccult stool negative.  Anemia workup noted.  Hemoglobin stable.  Thrombocytosis mostly reactive.  Hypertension..  Off Cardene drip.  no evidence of angina or congestive heart failure.  Blood pressure with periods of  "elevation and will further increase Lopressor. Continue to monitor blood pressure and pulse.    VTE prophylaxis.  On Lovenox.  Superficial venous thrombosis of the right upper extremity.  Aspirin and prophylactic Lovenox.     Discussed my findings and plan of treatment with the patient/nurse..  Disposition.  Physical therapy and Occupational Therapy evaluated the patient and most likely the patient will require rehab.  Awaiting CCP feedback.        Britney Triplett MD  [facility]  04/23/24  13:51 EDT                                 Karla Ybarra MD   Physician  Pulmonology     Progress Notes      Signed     Date of Service: 04/23/24 1241  Creation Time: 04/23/24 1241     Signed       Expand All Collapse All[]Expand All by Default                                                                            [facility]                                                                                      Dr Karla Ybarra   LOS: 17 days   Patient Care Team:  Roc Carroll APRN as PCP - General (Nurse Practitioner)  Checo Dunham MD as PCP - Family Medicine  Checo Dunham MD (Family Medicine)     Chief Complaint: Acute respiratory failure aspiration pneumonia alcohol abuse multiple issues as listed below     Interval History: No overnight issues.  Currently on room air.      REVIEW OF SYSTEMS:   No chest pain or shortness of     Ventilator/Non-Invasive Ventilation Settings (From admission, onward)      Room air     Vital Signs  Temp:  [97.3 °F (36.3 °C)-98.1 °F (36.7 °C)] 98.1 °F (36.7 °C)  Heart Rate:  [] 79  Resp:  [16-18] 18  BP: (134-201)/() 184/74     Intake/Output Summary (Last 24 hours) at 4/23/2024 1241  Last data filed at 4/23/2024 0547      Gross per 24 hour   Intake --   Output 1085 ml   Net -1085 ml      Flowsheet Rows       Flowsheet Row First Filed Value   Admission Height 200.7 cm (79\") Documented at 04/06/2024 1130   Admission Weight 113 kg " (250 lb) Documented at 04/06/2024 1130                Physical Exam:  Patient is examined using the personal protective equipment as per guidelines from infection control for this particular patient as enacted.  Hand hygiene was performed before and after patient interaction.            General Appearance:    Alert, cooperative, in no acute distress.  Following simple commands  ENT normocephalic atraumatic  Neck midline trachea, no thyromegaly   Lungs:   Diminished breath sounds bilaterally    Heart:    Regular rhythm and normal rate, normal S1 and S2, no            murmur, no gallop, no rub, no click   Chest Wall:    No abnormalities observed   Abdomen:     Normal bowel sounds, no masses, no organomegaly, soft        nontender, nondistended, no guarding, no rebound                tenderness   Extremities:   Moves all extremities well, no edema, no cyanosis, no             redness  CNS no focal neurological deficits normal sensory exam  Skin no rashes no nodules  Musculoskeletal no cyanosis no clubbing normal range of motion      Results Review:                 Results from last 7 days   Lab Units 04/23/24  0556 04/22/24  0746 04/21/24  0554   SODIUM mmol/L 138 137 136   POTASSIUM mmol/L 3.9 4.0 4.6   CHLORIDE mmol/L 103 102 101   CO2 mmol/L 20.4* 21.3* 21.0*   BUN mg/dL 16 16 17   CREATININE mg/dL 0.70* 0.65* 0.70*   GLUCOSE mg/dL 120* 112* 113*   CALCIUM mg/dL 9.1 9.6 8.9                 Results from last 7 days   Lab Units 04/23/24  0556 04/22/24  0746 04/21/24  0554   WBC 10*3/mm3 9.64 8.68 7.42   HEMOGLOBIN g/dL 11.1* 11.2* 10.1*   HEMATOCRIT % 31.7* 32.4* 29.6*   PLATELETS 10*3/mm3 634* 563* 519*                   Results from last 7 days   Lab Units 04/23/24  0556   MAGNESIUM mg/dL 2.0               Results from last 7 days   Lab Units 04/17/24  0321   PH, ARTERIAL pH units 7.438   PO2 ART mm Hg 71.2*   PCO2, ARTERIAL mm Hg 36.6   HCO3 ART mmol/L 24.7         I reviewed the patient's new clinical results.  I  personally viewed and interpreted the patient's chest x-ray.        Medication Review:     Scheduled Medication   aspirin, 81 mg, Oral, Daily  enoxaparin, 40 mg, Subcutaneous, Q24H  famotidine, 20 mg, Oral, BID AC  folic acid, 1 mg, Oral, Daily  hydrocortisone-bacitracin-zinc oxide-nystatin, 1 Application, Topical, BID  ipratropium-albuterol, 3 mL, Nebulization, 4x Daily - RT  metoprolol tartrate, 75 mg, Oral, Q12H  multivitamin with minerals, 1 tablet, Oral, Daily  QUEtiapine, 100 mg, Oral, Q12H  senna-docusate sodium, 2 tablet, Nasogastric, BID  sodium chloride, 10 mL, Intravenous, Q12H  thiamine, 100 mg, Oral, Daily  vancomycin, 1,250 mg, Intravenous, Q12H              Infusion Medications   Pharmacy to dose vancomycin,             ASSESSMENT:   Acute hypoxemic respiratory failure  Aspiration pneumonia with H. influenzae MRSA positive  Alcohol abuse and dependence and withdrawal  Polysubstance abuse  LIVAN  Sepsis with shock  Fever        PLAN:  Clinically stable and improving.  Diet per speech  Atelectasis versus pneumonia.  On vancomycin.  Discontinue and switch to oral doxycycline.  Thiamine and folate   Mobilize ambulate  Hospitalist following  No objection to discharge  I have arranged for a follow-up with me in the office in 6 to 8 weeks with PFTs  Nothing further to add we will sign        Karla Ybarra MD  04/23/24  12:41 EDT                                  Consult Notes (last 48 hours)        Geraldine Leo [facility] at 04/24/24 1203        Consult Orders    1. Inpatient [facility] Consult [[id number]] ordered by Britney Triplett MD at 04/24/24 1038                 [facility] consult for ETOH. Reviewed chart, spoke w/ RN and met w/ pt. RN reports pt frustrated still on falls precautions. Pt A&Ox4. Pt states he doesn't want to complete assessment stated having a plan to go to [facility] counseling. This writer confronted pt r/t this being his plan multiple times in the past without pt follow through. Pt  "states his son is going to \"make sure I do it\". Discussed switch of roles and likelihood of pt not being successful if he has to have his son force him to go to counseling. Pt states \"well if we're all being truthful then, I don't plan to quit\". Pt states again he doesn't want to complete evaluation or be given resources. Pt v/u of how to request ACCESS to return. Gave update to RN.   [facility] will sign off at this time, please re-consult if needed.      Electronically signed by Geraldine Leo LPCC at 04/24/24 1208       Discharge Summary    No notes of this type exist for this encounter.       Discharge Order (From admission, onward)       Start     Ordered    04/24/24 1431  Discharge patient  Once        Expected Discharge Date: 04/24/24   Expected Discharge Time: Afternoon   Discharge Disposition: Home-Health Care [facility] [place]   Physician of Record for Attribution - Please select from Treatment Team: ALVINO LOMAS [[id number]]   Review needed by CMO to determine Physician of Record: No      Question Answer Comment   Physician of Record for Attribution - Please select from Treatment Team ALVINO LOMAS    Review needed by CMO to determine Physician of Record No        04/24/24 1448                  "

## 2024-04-25 NOTE — DISCHARGE SUMMARY
Patient Name: Watson Lisa  : 1955  MRN: [id number]    Date of Admission: 2024  Date of Discharge: 2024  Primary Care Physician: Roc Carroll, ALISON      Discharge Diagnoses     Active Hospital Problems    Diagnosis  POA    Thrombocytosis [D75.839]  No    Substance abuse [F19.10]  Yes    Cocaine abuse [F14.10]  Yes    Positive urine drug screen [R82.5]  Yes    History of alcohol use disorder [Z87.898]  Yes    COPD (chronic obstructive pulmonary disease) [J44.9]  Yes    LIVAN (obstructive sleep apnea) [G47.33]  Yes    Essential hypertension [I10]  Yes      Resolved Hospital Problems    Diagnosis Date Resolved POA    **Alcohol withdrawal [F10.939] 2024 Yes    Aspiration pneumonia [J69.0] 2024 Yes    Alcoholic liver disease [K70.9] 2024 Yes        Hospital Course     Brief admission history and physical.  Please refer to the H&P for full details.  A 68 years old gentleman with a past history of chronic and recurrent alcohol abuse and substance abuse, past history of hypertension/mood disorder/PE/obstructive sleep apnea who was brought to the emergency department on account of nausea and weakness associated with tremor.  His last drink was the night before presentation.  His physical examination admission included a blood pressure of 167/94 temperature 97.4 respiratory rate of 18.  Rest of the examination is remarkable for anxiety/ecchymosis of the skin.  Hospital course.  Initial ER evaluation included a phosphorus that was normal.  Magnesium was low at 1.4.  CBC was normal except a hemoglobin of 12.5.  Ethanol level was less than 10.  CMP normal except random blood sugar of 105, sodium 132, chloride 95.  Urine tox screen was positive for cocaine and opioids.  Lipase was normal.  Lactic acid was normal.  Chest x-ray with a right and left infiltrate representing pneumonia or atelectasis.  Problem-oriented manner hospital stay is as below  1.  Acute hypoxemic respiratory  failure secondary to aspiration pneumonia in a patient with a history of COPD.  Patient was noted to be hypoxemic and he required endotracheal intubation mechanical ventilation as he could not protect his airway.  He was successfully extubated subsequently.  A video swallow study noted the patient to have aspiration on thin liquid and he was started on nectar thickened liquid and regular diet and tolerated that well.  During his stay in the hospital he required NG tube feeds that has been removed after he tolerated modified diet well.  He was weaned off oxygen eventually.  He was treated with Rocephin that was switched to p.o. doxycycline at the time of discharge.  He was also treated with bronchodilators.  Respiratory status has normalized by the time of discharge.  2.sepsis.  Patient ruled in as a septic patient in this hospital stay.  Blood cultures were negative.  MRSA screen was positive and vancomycin was added to Rocephin.  The sepsis was secondary to aspiration pneumonia and sepsis and this is has resolved with treatment of IV fluid and IV antibiotic during his hospital stay.  At the time of discharge he remained on doxycycline to complete an adequate course of antibiotics.  3.  Alcohol abuse and polysubstance abuse.  Patient developed acute alcohol withdrawal during his hospital stay and Precedex drip was initiated while in the ICU.  He was treated with detoxification during this admission and his withdrawal has resolved.  At the time of discharge he was treated with folic acid/thiamine/multivitamin.  4.  Prediabetes.  Patient was noted to be hyperglycemic during this hospital stay.  A1c was 5.8 indicating prediabetes and he was counseled about no concentrated sweet diet at the time of discharge.  He was placed on sliding scale insulin while in the hospital.  5.  Anemia and thrombocytosis.  Hemoccult stool was negative.  Anemia workup indicated anemia of chronic disease.  Hemoglobin remained stable.   Thrombocytosis was thought to be reactive.  6.  Hypertension.  Patient required Cardene drip to control his blood pressure while in the ICU.  Subsequently the blood pressure was controlled with clonidine and Lopressor.  He was weaned off clonidine at the time of discharge.  Beta-blockers were maintained.  There was no evidence of angina or congestive heart failure during this admission.  7.  Superficial venous thrombosis of the right upper extremity.  This was diagnosed with venous ultrasound.  Aspirin was initiated.  He remained on VTE prophylaxis with Lovenox during the hospital stay.  At the time of discharge patient has no withdrawal symptoms and he was hemodynamically stable on room air.  He was strongly counseled against alcohol and substance abuse.  Physical therapy evaluated the patient and recommended home health physical therapy and speech therapy..  Consultants     Consult Orders (all) (From admission, onward)       Start     Ordered    04/24/24 1037  Inpatient Access Center Consult  Once        Provider:  (Not yet assigned)    04/24/24 1038    04/22/24 1528  Inpatient Case Management  Consult  Once        Provider:  (Not yet assigned)    04/22/24 1527    04/08/24 1116  Inpatient Consult to Nutrition Services  Once        Provider:  (Not yet assigned)    04/08/24 1116    04/08/24 0627  Inpatient Pulmonology Consult  Once        Specialty:  Pulmonary Disease  Provider:  Td Hinkle MD    04/08/24 0628    04/06/24 1834  Inpatient Access Center Consult  Once        Provider:  (Not yet assigned)    04/06/24 1833    04/06/24 1326  [facility] (on-call MD unless specified) Details  Once,   Status:  Canceled        Specialty:  Hospitalist  Provider:  Sean Ricketts MD    04/06/24 1325                  Procedures     Imaging Results (All)       Procedure Component Value Units Date/Time    FL Video Swallow Single Contrast [[id number]] Collected: 04/22/24 1317     Updated: 04/22/24 1721    Narrative:       VIDEO SWALLOWING EXAMINATION BY SPEECH PATHOLOGY     Clinical: Dysphasia     Reference air kerma: 7.40 mGy     Video swallowing examination performed under the direction of speech  pathology. Imaging reviewed by radiologist who concurs with the  findings.     Speech pathology summary: VFSS complete. Kellie ROSAS, present  during the study. Patient presents with mild-moderate oropharyngeal  dysphagia. Trace posterior penetration with thin liquid via spoon. Deep  silent nontransient penetration with thin liquid via cup/straw. Suspect  trace aspiration. No penetration/aspiration with nectar thick via  cup/straw, puree, soft/chopped solid, or regular solid trials. Cued  double swallow and nectar wash successful in clearing mild pharyngeal  residue.          This report was finalized on 4/22/2024 5:18 PM by Dr. Meño Espinal M.D on Workstation: [id number]       XR Chest 1 View [[id number]] Collected: 04/19/24 1157     Updated: 04/19/24 1204    Narrative:      XR CHEST 1 VW-     Clinical: Fever     COMPARISON examination 4/16/2024     FINDINGS: There has been interval removal of the endotracheal tube,  feeding tube remains in position. The cardiomediastinal silhouette is  stable. Diminished vascular congestion within the interim. Right lung  base infiltrate has diminished. No gross pleural effusion identified,  costophrenic angles are clear. No other interval change has occurred.     This report was finalized on 4/19/2024 12:01 PM by Dr. Alan Miller M.D on Workstation: [id number]       XR Chest 1 View [[id number]] Collected: 04/16/24 1207     Updated: 04/16/24 1218    Narrative:      XR CHEST 1 VW-     HISTORY: Male who is 68 years-old, on ventilator     TECHNIQUE: Frontal views of the chest     COMPARISON: 4/12/2024     FINDINGS: Stable appearing endotracheal tube, NG tube. Heart size is  borderline. Mild prominence of vascular markings is seen. Previous  bilateral pulmonary opacities appear similar to prior  exam. There may be  be a small pleural effusions. No pneumothorax. Otherwise stable.       Impression:      No significant change.           This report was finalized on 4/16/2024 12:15 PM by Dr. Donte Hernandez M.D on Workstation: [id number]       XR Chest 1 View [[id number]] Collected: 04/12/24 0758     Updated: 04/12/24 0803    Narrative:      XR CHEST 1 VW-     HISTORY: Male who is 68 years-old, dyspnea     TECHNIQUE: Frontal views of the chest     COMPARISON: 4/11/2024     FINDINGS: Endotracheal tube, NG tube appear stable. The heart size is  borderline with mild prominence of vascular and interstitial markings.  Persistent bilateral pulmonary opacities show interval increase on the  right. There may be small pleural effusions. No pneumothorax. Otherwise  stable.       Impression:      As described.           This report was finalized on 4/12/2024 8:00 AM by Dr. Donte Hernandez M.D on Workstation: [id number]       XR Chest 1 View [[id number]] Collected: 04/11/24 0932     Updated: 04/11/24 0936    Narrative:      XR CHEST 1 VW-4/11/2024     HISTORY: Intubated patient.     Heart size is within normal limits. There is haziness in both lung bases  likely from bilateral pleural effusions with some mild bibasilar  atelectasis and/or infiltrates. The right base appears to have cleared  slightly since yesterday's study but some of this may be technical in  nature.     ET tube and NG tube are seen in good position. Soft tissue fold overlies  the left upper hemithorax. No pneumothorax is seen.        This report was finalized on 4/11/2024 9:33 AM by Dr. Todd Willis M.D on Workstation: [id number]       XR Chest 1 View [[id number]] Collected: 04/10/24 0743     Updated: 04/10/24 0756    Narrative:      XR CHEST 1 VW-4/10/2024     HISTORY: Intubated patient.     Heart size is at the upper limits of normal. The inferior aspect of the  left hemithorax is incompletely included in the field-of-view. There is  haziness  of both lung bases likely related to pleural effusions with  some bibasilar atelectasis and/or pneumonia. This finding appears  slightly more severe on the right than on the left and may have  progressed slightly on the right since yesterday's study.     No pneumothorax is seen. ET tube and NG tube are seen in good position.       Impression:      1. Increased density in both lung bases right greater than left  consistent with bilateral pleural effusions with atelectasis and/or  pneumonia. This finding has progressed somewhat on the right since  yesterday.  2. No significant pneumothorax is seen.  3. ET tube and NG tube are seen in good position.        This report was finalized on 4/10/2024 7:53 AM by Dr. Todd Willis M.D on Workstation: [id number]       XR Chest 1 View [[id number]] Collected: 04/09/24 0928     Updated: 04/09/24 1515    Narrative:      XR CHEST 1 VW-     HISTORY: 68-year-old male with pneumonia. Follow-up.     FINDINGS: There is an airspace consolidation at the right lower lobe  which is significantly more prominent than on yesterday's chest  radiograph and likely represents pneumonia. There is also a new small  airspace opacity at the dependent left lung base which may represent  atelectasis or pneumonia. There is no CHF. Endotracheal tube is in good  position. The distal aspect of the feeding tube is not included on the  radiograph.     This report was finalized on 4/9/2024 3:12 PM by Dr. Juliann Martin M.D on  Workstation: [id number]       XR Abdomen KUB [[id number]] Collected: 04/08/24 1616     Updated: 04/08/24 1621    Narrative:      XR ABDOMEN KUB-     INDICATIONS: NG tube placement     TECHNIQUE: FRONTAL VIEW OF THE ABDOMEN     COMPARISON: 4/8/2024 at 1008 hours     FINDINGS:     At the partly included thorax, the NG tube projects over the spine, then  extends into the left upper quadrant, then crosses over to the right  upper quadrant, tip at the expected location of the  gastroduodenal  junction region. The distal end of the tube loops back on itself, with  the tip directed back towards the stomach.     The bowel gas pattern is nonobstructive.     Follow-up as clinically indicated.          Impression:         As described.        This report was finalized on 4/8/2024 4:18 PM by Dr. Donte Hernandez M.D on Workstation: [id number]       XR Abdomen KUB [[id number]] Collected: 04/08/24 1225     Updated: 04/08/24 1229    Narrative:      XR ABDOMEN KUB-4/8/2024     HISTORY: Core tract placement.     Cortright catheter courses into the stomach. It curls in the stomach  with its tip overlying the expected position of the distal stomach.     A few segments of small bowel are at the upper limits of normal for  diameter.       Impression:      1. Core track catheter curls in the stomach with its tip near the distal  aspect of the stomach.        This report was finalized on 4/8/2024 12:26 PM by Dr. Todd Willis M.D on Workstation: [id number]       XR Chest 1 View [[id number]] Collected: 04/08/24 1210     Updated: 04/08/24 1227    Narrative:      XR CHEST 1 VW-4/8/2024     HISTORY: Intubation.        Heart size is at the upper limits of normal. There is mild to moderate  patchy increased density in the left infrahilar region consistent with  mild atelectasis or pneumonia. There may be some minimal atelectasis or  infiltrate in the right infrahilar region. Right lung otherwise appears  relatively clear. ET tube is seen with its tip overlying the trachea at  the level of the aortic arch.     NG tube is seen in good position. No pneumothorax is seen.       Impression:      1. ET tube position as described  2. Other findings discussed above.        This report was finalized on 4/8/2024 12:24 PM by Dr. Todd Willis M.D on Workstation: [id number]       XR Chest 1 View [[id number]] Collected: 04/08/24 0808     Updated: 04/08/24 0819    Narrative:      XR CHEST 1 VW-4/8/2024     HISTORY:  "Possible aspiration.     Heart size is within normal limits. There is some mild patchy increased  density in the right infrahilar region and left perihilar region which  may represent some mild pneumonia. Lungs otherwise clear. No  pneumothorax is seen.       Impression:      1. Mild patchy increased density in the right infrahilar region and left  perihilar region may represent some mild pneumonia and/or atelectasis.  2. Left hilum is slightly prominent in size. Follow-up films  recommended.        This report was finalized on 4/8/2024 8:16 AM by Dr. Todd Willis M.D on Workstation: [id number]               Pertinent Labs     Results from last 7 days   Lab Units 04/24/24  0339 04/23/24  0556 04/22/24  0746 04/21/24  0554   WBC 10*3/mm3 10.55 9.64 8.68 7.42   HEMOGLOBIN g/dL 11.2* 11.1* 11.2* 10.1*   PLATELETS 10*3/mm3 679* 634* 563* 519*     Results from last 7 days   Lab Units 04/24/24  0339 04/23/24  0556 04/22/24  0746 04/21/24  0554   SODIUM mmol/L 138 138 137 136   POTASSIUM mmol/L 3.8 3.9 4.0 4.6   CHLORIDE mmol/L 104 103 102 101   CO2 mmol/L 20.0* 20.4* 21.3* 21.0*   BUN mg/dL 19 16 16 17   CREATININE mg/dL 0.78 0.70* 0.65* 0.70*   GLUCOSE mg/dL 95 120* 112* 113*   Estimated Creatinine Clearance: 124.1 mL/min (by C-G formula based on SCr of 0.78 mg/dL).  Results from last 7 days   Lab Units 04/21/24  0554   ALBUMIN g/dL 3.3*   BILIRUBIN mg/dL 0.2   ALK PHOS U/L 141*   AST (SGOT) U/L 20   ALT (SGPT) U/L 28     Results from last 7 days   Lab Units 04/24/24  0339 04/23/24  0556 04/22/24  0746 04/21/24  0554   CALCIUM mg/dL 8.9 9.1 9.6 8.9   ALBUMIN g/dL  --   --   --  3.3*   MAGNESIUM mg/dL 2.0 2.0 2.0 2.1   PHOSPHORUS mg/dL 3.5 4.3 4.0 4.1               Invalid input(s): \"LDLCALC\"  Results from last 7 days   Lab Units 04/18/24 1946   BLOODCX  No growth at 5 days  No growth at 5 days     Imaging Results (Last 24 Hours)       ** No results found for the last 24 hours. **            Test Results Pending at " Discharge         Discharge Exam   Physical Exam  Vitals.  Temperature 98.4 a pulse of 72 respirate rate of 18 blood pressure 157/65 and O2 sats of 97% on room  General.  Elderly gentleman.  Overweight.  Alert.. oriented x 4.  No apparent pain/distress/diaphoresis.  Eyes.  Pupils equal round and reactive.  Intact extraocular musculature.  No pallor or jaundice.  Oral cavity.  Moist mucous membrane.  Neck.  Supple.  No JVD.  No lymphadenopathy or thyromegaly.  Cardio vascular.  Regular rate and rhythm with no gallops or murmurs.  Chest.  Clear to auscultation bilaterally with no added sounds.  Abdomen.  Soft lax.  No tenderness.  No organomegaly.  No guarding or rebound.  Extremities..  No clubbing/cyanosis/edema.  Sequential compression devices on both lower extremity.  No upper extremity tremors.  CNS.  No acute focal neurological deficits.     Discharge Details        Discharge Medications        New Medications        Instructions Start Date   Aspirin Low Dose 81 MG EC tablet  Generic drug: aspirin   81 mg, Oral, Daily      doxycycline 100 MG capsule  Commonly known as: MONODOX   100 mg, Oral, Every 12 Hours Scheduled      famotidine 20 MG tablet  Commonly known as: PEPCID   20 mg, Oral, 2 Times Daily Before Meals      folic acid 1 MG tablet  Commonly known as: FOLVITE   1 mg, Oral, Daily      metoprolol tartrate 100 MG tablet  Commonly known as: LOPRESSOR   100 mg, Oral, Every 12 Hours Scheduled      QUEtiapine 100 MG tablet  Commonly known as: SEROquel   100 mg, Oral, Every 12 Hours Scheduled      thiamine 100 MG tablet  Commonly known as: VITAMIN B1   100 mg, Oral, Daily             Continue These Medications        Instructions Start Date   Anoro Ellipta 62.5-25 MCG/ACT aerosol powder  inhaler  Generic drug: Umeclidinium-Vilanterol   1 puff, Inhalation, Daily - RT      multivitamin with minerals tablet tablet   1 tablet, Oral, Daily             Stop These Medications      losartan 100 MG tablet  Commonly  known as: COZAAR              Allergies   Allergen Reactions    Penicillins Unknown - Low Severity     Pt does not recall the reaction. Patient tolerated cephalexin 3/2020         Discharge Disposition:  Condition: Stable    Diet: Healthy cardiac    Activity:   Activity Instructions       Activity as Tolerated      Driving Restrictions      Type of Restriction: Driving    Driving Restrictions: No Driving    Other Activity Instructions      Activity Instructions: Ambulate with walker.  PT to evaluate and treat by home health    Up WIth Assist              Counseling : Abstain from alcohol and substance abuse    CODE STATUS:    Code Status and Medical Interventions:   Ordered at: 04/06/24 1703     Code Status (Patient has no pulse and is not breathing):    CPR (Attempt to Resuscitate)     Medical Interventions (Patient has pulse or is breathing):    Full Support       Future Appointments   Date Time Provider Department Center   4/29/2024  8:00 AM Roc Purvis APRN [facility]     Additional Instructions for the Follow-ups that You Need to Schedule       Ambulatory Referral to Home Health   As directed      Face to Face Visit Date: 4/24/2024   Follow-up provider for Plan of Care?: I treated the patient in an acute care facility and will not continue treatment after discharge.   Follow-up provider: ROC PURVIS [[id number]]   Reason/Clinical Findings: Alcohol/alcohol with normal//aspiration   Describe mobility limitations that make leaving home difficult: As above   Nursing/Therapeutic Services Requested: Physical Therapy Speech Therapy   PT orders: Therapeutic exercise Gait Training Transfer training Strengthening   Weight Bearing Status: As Tolerated   Frequency: 1 Week 1        Call MD With Problems / Concerns   As directed      Instructions: Call MD or return to the withdrawal symptoms/chest pain/palpitations/shortness of breath/seizures    Order Comments: Instructions: Call MD or return to  the withdrawal symptoms/chest pain/palpitations/shortness of breath/seizures         Discharge Follow-up with PCP   As directed       Currently Documented PCP:    Roc Carroll APRN    PCP Phone Number:    [phone]     Follow Up Details: Primary MD.  1 week.  Alcohol use/substance abuse/alcohol withdrawal/COPD/aspiration pneumonia/hypertension/thrombocytosis               Follow-up Information       Karla Ybarra MD Follow up in 6 week(s).    Specialties: Pulmonary Disease, Sleep Medicine, Intensive Care  Contact information:  [address] [facility] [address]  [facility] [address]  [phone]               Roc Carroll APRN .    Specialty: Nurse Practitioner  Why: Primary MD.  1 week.  Alcohol use/substance abuse/alcohol withdrawal/COPD/aspiration pneumonia/hypertension/thrombocytosis  Contact information:  [address] [facility] [address]  [place] [address]  [phone]               Checo Dunham MD .    Specialty: Family Medicine  Why: Primary MD.  1 week.  Alcohol use/substance abuse/alcohol withdrawal/COPD/aspiration pneumonia/hypertension/thrombocytosis  Contact information:  [address] [facility] [address]  [facility] [address]  [phone]               Roc Carroll APRN .    Specialty: Nurse Practitioner  Contact information:  [address] [facility] [address]  [place] [id number]  [phone]                               Time Spent on Discharge:  Greater than 30 minutes      Britney Triplett MD  [facility]  04/25/24  19:22 EDT

## 2024-04-25 NOTE — OUTREACH NOTE
Call Center TCM Note      Flowsheet Row Responses   [facility] patient discharged from? [place]   Does the patient have one of the following disease processes/diagnoses(primary or secondary)? Other   TCM attempt successful? Yes   Call start time 1352   Call end time 1356   Discharge diagnosis Alcohol withdrawal syndrome without complication  Hypomagnesemia   Is patient permission given to speak with other caregiver? Yes   List who call center can speak with Son   Person spoke with today (if not patient) and relationship Son   Meds reviewed with patient/caregiver? Yes   Is the patient having any side effects they believe may be caused by any medication additions or changes? No   Does the patient have all medications ordered at discharge? Yes   Is the patient taking all medications as directed (includes completed medication regime)? Yes   Comments PCP follow up 4/29/24   Does the patient have an appointment with their PCP within 7-14 days of discharge? Yes   Has home health visited the patient within 72 hours of discharge? N/A   Psychosocial issues? No   Did the patient receive a copy of their discharge instructions? Yes   Nursing interventions Reviewed instructions with patient   What is the patient's perception of their health status since discharge? Improving   Is the patient/caregiver able to teach back signs and symptoms related to disease process for when to call PCP? Yes   Is the patient/caregiver able to teach back signs and symptoms related to disease process for when to call 911? Yes   Is the patient/caregiver able to teach back the hierarchy of who to call/visit for symptoms/problems? PCP, Specialist, Home health nurse, Urgent Care, ED, 911 Yes   TCM call completed? Yes   Wrap up additional comments Son reports patient at home resting, believes he left CPAP machine at [facility]optal. They have made call to hospital.   Call end time 1356   Would this patient benefit from a Referral to [facility] Social Work? No    Is the patient interested in additional calls from an ambulatory ? No            Soraida Monae RN    4/25/2024, 13:58 EDT

## 2024-05-23 NOTE — PAYOR COMM NOTE
Problem: Adult Inpatient Plan of Care  Goal: Plan of Care Review  5/23/2024 1637 by Raulito Rubin RN  Outcome: Met  5/23/2024 1350 by Raulito Rubin RN  Outcome: Ongoing, Progressing  Flowsheets (Taken 5/23/2024 1350)  Progress: improving  Plan of Care Reviewed With: patient  Goal: Patient-Specific Goal (Individualized)  5/23/2024 1637 by Raulito Rubin RN  Outcome: Met  5/23/2024 1350 by Raulito Rubin RN  Outcome: Ongoing, Progressing  Goal: Absence of Hospital-Acquired Illness or Injury  5/23/2024 1637 by Raulito Rubin RN  Outcome: Met  5/23/2024 1350 by Raulito Rubin RN  Outcome: Ongoing, Progressing  Intervention: Identify and Manage Fall Risk  Recent Flowsheet Documentation  Taken 5/23/2024 0800 by Raulito Rubin RN  Safety Promotion/Fall Prevention:   activity supervised   nonskid shoes/slippers when out of bed   room organization consistent   safety round/check completed  Intervention: Prevent Skin Injury  Recent Flowsheet Documentation  Taken 5/23/2024 0800 by Raulito Rubin RN  Body Position:   position changed independently   weight shifting  Skin Protection:   adhesive use limited   tubing/devices free from skin contact  Intervention: Prevent and Manage VTE (Venous Thromboembolism) Risk  Recent Flowsheet Documentation  Taken 5/23/2024 0800 by Rauilto Rubin RN  Activity Management: activity encouraged  VTE Prevention/Management:   bilateral   sequential compression devices off   patient refused intervention  Intervention: Prevent Infection  Recent Flowsheet Documentation  Taken 5/23/2024 0800 by Raulito Rubin RN  Infection Prevention:   environmental surveillance performed   equipment surfaces disinfected   hand hygiene promoted   personal protective equipment utilized   rest/sleep promoted   single patient room provided  Goal: Optimal Comfort and Wellbeing  5/23/2024 1637 by Raulito Rubin RN  Outcome: Met  5/23/2024 1350 by Raulito Rubin RN  Outcome: Ongoing,  "Brittany Lisa (67 y.o. Male)     PLEASE SEE ATTACHED FOR INPT AUTH       PLEASE CALL VEDA PRASAD RN/ DEPT @ [phone]  OR FAX  DEPARTMENT @  [phone]    THANK YOU   VEDA PRASAD RN  [facility]         Date of Birth   1955    Social Security Number       Address   [address] [address]    Home Phone   [phone]    MRN   [id number]       Christianity   Confucianism    Marital Status                               Admission Date   12/28/22    Admission Type   Emergency    Admitting Provider   Keily Russell MD    Attending Provider   Emmanuel Hair MD    Department, Room/Bed   [address], S519/1       Discharge Date       Discharge Disposition       Discharge Destination                               Attending Provider: Emmanuel Hair MD    Allergies: Penicillins, Penicillins    Isolation: None   Infection: None   Code Status: CPR    Ht: 200.7 cm (79\")   Wt: 117 kg (257 lb 15 oz)    Admission Cmt: None   Principal Problem: DEEP (acute kidney injury) (HCC) [N17.9]                 Active Insurance as of 12/28/2022     Primary Coverage     Payor Plan Insurance Group Employer/Plan Group    ANTHEM BLUE CROSS ANTHEM BLUE CROSS BLUE SHIELD PPO [id number]     Payor Plan Address Payor Plan Phone Number Payor Plan Fax Number Effective Dates    [address] [phone]  1/1/2022 - None Entered    [facility] [id number]       Subscriber Name Subscriber Birth Date Member ID       BRITTANY LISA 1955 [id number]           Secondary Coverage     Payor Plan Insurance Group Employer/Plan Group    MEDICARE MEDICARE A ONLY      Payor Plan Address Payor Plan Phone Number Payor Plan Fax Number Effective Dates    [address] [phone]  9/1/2019 - None Entered    formerly [facility] [id number]       Subscriber Name Subscriber Birth Date Member ID       BRITTANY LISA 1955 [id number]                 Emergency Contacts      " (Rel.) Home Phone Work Phone Mobile Phone    TEJAS LISA (Son) [phone] -- [phone]    Jorge A Nielson (Sister) [phone] -- --            [place]: [facility] [id number]  Tax ID [id number]     History & Physical      StinglKeily MD at 21          HISTORY AND PHYSICAL   [facility]        Date of Admission: 2022  Patient Identification:  Name: Watson Lisa  Age: 67 y.o.  Sex: male  :  1955  MRN: [id number]                     Primary Care Physician: Checo Dunham MD    Chief Complaint:  67 year old gentleman who presented to the emergency room after a fall at home; he was on the floor for several hours; he is a heavy daily drinker; he injured his knee when he fell; he has been tremulous and anxious; he has a history of complicated alcohol withdrawal;     History of Present Illness:   As above    Past Medical History:  Past Medical History:   Diagnosis Date   • Alcohol abuse    • Anxiety    • Arthritis    • Bronchitis with chronic airway obstruction (HCC)     LAST FEB   • DVT (deep venous thrombosis) ([facility])    • Hiatal hernia    • Hypertension    • Hypertension    • Low back pain    • Neck pain    • Pulmonary embolism (HCC)    • Sleep apnea with use of continuous positive airway pressure (CPAP)     AT NIGHT     Past Surgical History:  Past Surgical History:   Procedure Laterality Date   • COLONOSCOPY     • JOINT REPLACEMENT Right     hip/knee   • REPLACEMENT TOTAL KNEE     • TONSILLECTOMY     • TOTAL HIP ARTHROPLASTY Right       Home Meds:  (Not in a hospital admission)      Allergies:  Allergies   Allergen Reactions   • Penicillins Unknown - Low Severity     Pt does not recall the reaction. Patient tolerated cephalexin 3/2020   • Penicillins Other (See Comments)     Unknown      Immunizations:  Immunization History   Administered Date(s) Administered   • COVID-19 (PFIZER) PURPLE CAP 2021, 2021, 2021   • Tdap 2020     Social History:  Progressing  Intervention: Provide Person-Centered Care  Recent Flowsheet Documentation  Taken 5/23/2024 0800 by Raulito Rubin RN  Trust Relationship/Rapport:   care explained   choices provided   emotional support provided   empathic listening provided   questions answered   questions encouraged   reassurance provided   thoughts/feelings acknowledged  Goal: Readiness for Transition of Care  5/23/2024 1637 by Raulito Rubin RN  Outcome: Met  5/23/2024 1350 by Raulito Rubin RN  Outcome: Ongoing, Progressing     Problem: Skin Injury Risk Increased  Goal: Skin Health and Integrity  5/23/2024 1637 by Raulito Rubin RN  Outcome: Met  5/23/2024 1350 by Raulito Rubin RN  Outcome: Ongoing, Progressing  Intervention: Optimize Skin Protection  Recent Flowsheet Documentation  Taken 5/23/2024 0800 by Raulito Rubin RN  Pressure Reduction Techniques:   frequent weight shift encouraged   weight shift assistance provided  Head of Bed (HOB) Positioning: HOB elevated  Pressure Reduction Devices:   positioning supports utilized   pressure-redistributing mattress utilized   specialty bed utilized  Skin Protection:   adhesive use limited   tubing/devices free from skin contact     Problem: Asthma Comorbidity  Goal: Maintenance of Asthma Control  5/23/2024 1637 by Raulito Rubin RN  Outcome: Met  5/23/2024 1350 by Raulito Rubin RN  Outcome: Ongoing, Progressing  Intervention: Maintain Asthma Symptom Control  Recent Flowsheet Documentation  Taken 5/23/2024 0800 by Raulito Rubin RN  Medication Review/Management: medications reviewed     Problem: Behavioral Health Comorbidity  Goal: Maintenance of Behavioral Health Symptom Control  5/23/2024 1637 by Raulito Rubin RN  Outcome: Met  5/23/2024 1350 by Raulito Rubin RN  Outcome: Ongoing, Progressing  Intervention: Maintain Behavioral Health Symptom Control  Recent Flowsheet Documentation  Taken 5/23/2024 0800 by Raulito Rubin RN  Medication Review/Management: medications  "  Social History     Social History Narrative    ** Merged History Encounter **          Social History     Socioeconomic History   • Marital status:    Tobacco Use   • Smoking status: Every Day     Packs/day: 2.00     Years: 40.00     Pack years: 80.00     Types: Cigarettes   • Smokeless tobacco: Never   Vaping Use   • Vaping Use: Never used   Substance and Sexual Activity   • Alcohol use: Yes     Alcohol/week: 10.0 standard drinks     Types: 10 Shots of liquor per week     Comment: 1 pint vodka/ day   • Drug use: Not Currently     Types: Hydrocodone   • Sexual activity: Defer       Family History:  Family History   Problem Relation Age of Onset   • Cancer Mother    • Heart disease Father    • Alcohol abuse Father    • Cancer Brother         Review of Systems  See history of present illness and past medical history.  Patient denies headache, dizziness, syncope, falls, trauma, change in vision, change in hearing, change in taste, changes in weight, changes in appetite, focal weakness, numbness, or paresthesia.  Patient denies chest pain, palpitations, dyspnea, orthopnea, PND, cough, sinus pressure, rhinorrhea, epistaxis, hemoptysis, nausea, vomiting,hematemesis, diarrhea, constipation or hematchezia.  Denies cold or heat intolerance, polydipsia, polyuria, polyphagia. Denies hematuria, pyuria, dysuria, hesitancy, frequency or urgency. Denies consumption of raw and under cooked meats foods or change in water source.  Denies fever, chills, sweats, night sweats.  Denies missing any routine medications. Remainder of ROS is negative.    Objective:  T Max 24 hrs: Temp (24hrs), Av.1 °F (37.3 °C), Min:99.1 °F (37.3 °C), Max:99.1 °F (37.3 °C)    Vitals Ranges:   Temp:  [99.1 °F (37.3 °C)] 99.1 °F (37.3 °C)  Heart Rate:  [] 89  Resp:  [22] 22  BP: (147-180)/(78-97) 180/97      Exam:  /97   Pulse 89   Temp 99.1 °F (37.3 °C) (Tympanic)   Resp 22   Ht 200.7 cm (79\")   Wt 117 kg (259 lb)   SpO2 100%  " reviewed     Problem: COPD (Chronic Obstructive Pulmonary Disease) Comorbidity  Goal: Maintenance of COPD Symptom Control  5/23/2024 1637 by Raulito Rubin RN  Outcome: Met  5/23/2024 1350 by Raulito Rubin RN  Outcome: Ongoing, Progressing  Intervention: Maintain COPD-Symptom Control  Recent Flowsheet Documentation  Taken 5/23/2024 0800 by Raulito Rubin RN  Supportive Measures:   active listening utilized   problem-solving facilitated   self-responsibility promoted   verbalization of feelings encouraged  Medication Review/Management: medications reviewed     Problem: Diabetes Comorbidity  Goal: Blood Glucose Level Within Targeted Range  5/23/2024 1637 by Raulito Rubin RN  Outcome: Met  5/23/2024 1350 by Raulito Rubin RN  Outcome: Ongoing, Progressing     Problem: Heart Failure Comorbidity  Goal: Maintenance of Heart Failure Symptom Control  5/23/2024 1637 by Raulito Rubin RN  Outcome: Met  5/23/2024 1350 by Raulito Rubin RN  Outcome: Ongoing, Progressing  Intervention: Maintain Heart Failure-Management  Recent Flowsheet Documentation  Taken 5/23/2024 0800 by Raulito Rubin RN  Medication Review/Management: medications reviewed     Problem: Hypertension Comorbidity  Goal: Blood Pressure in Desired Range  5/23/2024 1637 by Raulito Rubin RN  Outcome: Met  5/23/2024 1350 by Raluito Rubin RN  Outcome: Ongoing, Progressing  Intervention: Maintain Blood Pressure Management  Recent Flowsheet Documentation  Taken 5/23/2024 0800 by Raulito Rubin RN  Medication Review/Management: medications reviewed     Problem: Obstructive Sleep Apnea Risk or Actual Comorbidity Management  Goal: Unobstructed Breathing During Sleep  5/23/2024 1637 by Raulito Rubin RN  Outcome: Met  5/23/2024 1350 by Raulito Rubin RN  Outcome: Ongoing, Progressing     Problem: Osteoarthritis Comorbidity  Goal: Maintenance of Osteoarthritis Symptom Control  5/23/2024 1637 by Raulito Rubin RN  Outcome: Met  5/23/2024 1350 by Scottie   BMI 29.18 kg/m²     General Appearance:    Alert, cooperative, no distress, appears stated age; tremulous   Head:    Normocephalic, without obvious abnormality, atraumatic   Eyes:    PERRL, conjunctivae/corneas clear, EOM's intact, both eyes   Ears:    Normal external ear canals, both ears   Nose:   Nares normal, septum midline, mucosa normal, no drainage    or sinus tenderness   Throat:   Lips, mucosa, and tongue normal   Neck:   Supple, symmetrical, trachea midline, no adenopathy;     thyroid:  no enlargement/tenderness/nodules; no carotid    bruit or JVD   Back:     Symmetric, no curvature, ROM normal, no CVA tenderness   Lungs:     Decreased breath sounds bilaterally, respirations unlabored   Chest Wall:    No tenderness or deformity    Heart:    Regular rate and rhythm, S1 and S2 normal, no murmur, rub   or gallop   Abdomen:     Soft, nontender, bowel sounds active all four quadrants,     no masses, no hepatomegaly, no splenomegaly   Extremities:   Extremities normal, atraumatic, no cyanosis or edema   Pulses:   2+ and symmetric all extremities   Skin:   Skin color, texture, turgor normal, no rashes or lesions               .    Data Review:  Labs in chart were reviewed.  WBC   Date Value Ref Range Status   12/28/2022 13.53 (H) 3.40 - 10.80 10*3/mm3 Final     Hemoglobin   Date Value Ref Range Status   12/28/2022 10.8 (L) 13.0 - 17.7 g/dL Final     Hematocrit   Date Value Ref Range Status   12/28/2022 30.5 (L) 37.5 - 51.0 % Final     Platelets   Date Value Ref Range Status   12/28/2022 82 (L) 140 - 450 10*3/mm3 Final     Sodium   Date Value Ref Range Status   12/28/2022 140 136 - 145 mmol/L Final     Potassium   Date Value Ref Range Status   12/28/2022 4.3 3.5 - 5.2 mmol/L Final     Comment:     Slight hemolysis detected by analyzer. Results may be affected.     Chloride   Date Value Ref Range Status   12/28/2022 98 98 - 107 mmol/L Final     CO2   Date Value Ref Range Status   12/28/2022 20.0 (L) 22.0 - 29.0  HAWA Cabezas  Outcome: Ongoing, Progressing  Intervention: Maintain Osteoarthritis Symptom Control  Recent Flowsheet Documentation  Taken 5/23/2024 0800 by Raulito Rubin RN  Activity Management: activity encouraged  Medication Review/Management: medications reviewed     Problem: Pain Chronic (Persistent) (Comorbidity Management)  Goal: Acceptable Pain Control and Functional Ability  5/23/2024 1637 by Raulito Rubin RN  Outcome: Met  5/23/2024 1350 by Raulito Rubin RN  Outcome: Ongoing, Progressing  Intervention: Manage Persistent Pain  Recent Flowsheet Documentation  Taken 5/23/2024 0800 by Raulito Rubin RN  Sleep/Rest Enhancement:   awakenings minimized   natural light exposure provided   relaxation techniques promoted  Medication Review/Management: medications reviewed  Intervention: Optimize Psychosocial Wellbeing  Recent Flowsheet Documentation  Taken 5/23/2024 0800 by Raulito Rubin RN  Supportive Measures:   active listening utilized   problem-solving facilitated   self-responsibility promoted   verbalization of feelings encouraged  Diversional Activities:   smartphone   television  Spiritual Activities Assistance: affirmation provided  Family/Support System Care:   family care conference arranged   self-care encouraged   support provided     Problem: Seizure Disorder Comorbidity  Goal: Maintenance of Seizure Control  5/23/2024 1637 by Raulito Rubin RN  Outcome: Met  5/23/2024 1350 by Raulito Rubin RN  Outcome: Ongoing, Progressing     Problem: Fall Injury Risk  Goal: Absence of Fall and Fall-Related Injury  5/23/2024 1637 by Raulito Rubin RN  Outcome: Met  5/23/2024 1350 by Raulito Rubin RN  Outcome: Ongoing, Progressing  Intervention: Identify and Manage Contributors  Recent Flowsheet Documentation  Taken 5/23/2024 0800 by Raulito Rubin RN  Medication Review/Management: medications reviewed  Self-Care Promotion:   BADL personal objects within reach   BADL personal routines maintained   safe  mmol/L Final     BUN   Date Value Ref Range Status   12/28/2022 37 (H) 8 - 23 mg/dL Final     Creatinine   Date Value Ref Range Status   12/28/2022 2.14 (H) 0.76 - 1.27 mg/dL Final     Glucose   Date Value Ref Range Status   12/28/2022 89 65 - 99 mg/dL Final     Calcium   Date Value Ref Range Status   12/28/2022 8.2 (L) 8.6 - 10.5 mg/dL Final     Magnesium   Date Value Ref Range Status   12/28/2022 1.5 (L) 1.6 - 2.4 mg/dL Final     AST (SGOT)   Date Value Ref Range Status   12/28/2022 385 (H) 1 - 40 U/L Final     ALT (SGPT)   Date Value Ref Range Status   12/28/2022 107 (H) 1 - 41 U/L Final     Alkaline Phosphatase   Date Value Ref Range Status   12/28/2022 77 39 - 117 U/L Final                Imaging Results (All)     Procedure Component Value Units Date/Time    XR Knee 1 or 2 View Left [[id number]] Collected: 12/28/22 1844     Updated: 12/28/22 1851    Narrative:      XR KNEE 1 OR 2 VW LEFT-     INDICATIONS: Pain and swelling     TECHNIQUE: 2 views of the left knee     COMPARISON: 06/26/2020     FINDINGS:     Appearance of small vertically oriented lucency at the upper aspect of  the patella, potentially evidence of nondisplaced fracture, correlate  with location of pain. Mild knee effusion is present. No other evidence  of fracture is noted. No erosion, or dislocation is identified.  Prominent tibiofemoral joint space narrowing is noted. Moderate to  prominent degenerative spurring is seen.  Follow-up/further evaluation  can be obtained as indicated.       Impression:         As described.     This report was finalized on 12/28/2022 6:48 PM by Dr. Donte Hernandez M.D.               Assessment:  Active Hospital Problems    Diagnosis  POA   • **DEEP (acute kidney injury) ([facility]) [N17.9]  Yes      Resolved Hospital Problems   No resolved problems to display.   rhabdomyolysis  Alcohol withdrawal  Alcohol dependence  Sleep apnea  Hypertension  Left patellar fracture  Thrombocytopenia  anemia    Plan:  Will start ciwa  use of adaptive equipment encouraged  Intervention: Promote Injury-Free Environment  Recent Flowsheet Documentation  Taken 5/23/2024 0800 by Raulito Rubin, RN  Safety Promotion/Fall Prevention:   activity supervised   nonskid shoes/slippers when out of bed   room organization consistent   safety round/check completed   Goal Outcome Evaluation:  Plan of Care Reviewed With: patient        Progress: improving                                   "protocol  Ortho to see  Nephrology to see   Iv fluids  LIMAw patent and ED Provider  Monitor on telemetry  Trend ck    Keily Russell MD  12/28/2022  22:03 EST      Electronically signed by Keily Russell MD at 12/28/22 2209          Emergency Department Notes      Antonio Martinez, RN at 12/28/22 1653        Patient to er from home per ems with c/o patient fell on ice on Monday because of heavy drinking over christmas and Monday. Patient was found on the floor by  His brother this afternoon. Reported patient fell in floor at home and has been in the floor since 5 am. Patient has stool all over him. Patient has mask on in triage along with staff. Patient reported as heavy drinker and would like to get help for this. Patient stated he needs help for his shaking.\"     Electronically signed by Antonio Martinez RN at 12/28/22 1655     Evaristo Kamara PA at 12/28/22 1742      Procedure Orders    1. Critical Care [[id number]] ordered by Evaristo Kamara PA          Attestation signed by Thomas Pruitt MD at 12/28/22 9179    I agree with the midlevel provider's findings and plan.  I reviewed the midlevel providers note.                  EMERGENCY DEPARTMENT ENCOUNTER    Room Number:  S519/1  Date of encounter:  12/28/2022  PCP: Checo Dunham MD  Historian: Patient  Chronic or social conditions impacting care: Patient is chronic alcoholic and lives by himself      I used full protective equipment while examining this patient.  This includes face mask, gloves and protective eyewear.  I washed my hands before entering the room and immediately upon leaving the room      HPI:  Chief Complaint: Fall, alcoholism  A complete HPI/ROS/PMH/PSH/SH/FH are unobtainable due to: Patient is a poor historian    Context: Watson Lisa is a 67 y.o. male who presents to the ED c/o falling on the ground early this morning.  Patient is a daily drinker.  He states over Christmas he drank very heavily.  His last drink was on " 12/26/2022.  Since that time he states he has had bad diarrhea.  He fell this morning while trying to ambulate to the bathroom.  He states his legs have been weak and he simply fell.  He denies any syncope.  He did injure his left knee.  He denies any head, neck, trunk injuries.  Patient states he has been on the ground since 5 AM in the morning.  He does admit to severe tremor.  He has had some visual hallucinations yesterday.  He does have a history of alcohol withdrawal.  He denies any history of seizures.    Review of Medical Records  Reviewed admission from 4/15/2022.  Patient admitted for alcohol withdrawal.    PAST MEDICAL HISTORY  Active Ambulatory Problems     Diagnosis Date Noted   • Essential hypertension 06/07/2016   • Tobacco abuse 06/07/2016   • Alcohol withdrawal syndrome without complication ([facility]) 06/24/2020   • Hiatal hernia 06/25/2020   • Hypokalemia 06/29/2020   • Effusion of left knee 06/29/2020   • Osteoarthritis of left knee 06/29/2020   • Vitamin D deficiency 07/01/2020   • Anemia, chronic disease 07/01/2020   • Hyponatremia 07/01/2020   • COPD (chronic obstructive pulmonary disease) ([facility]) 03/26/2021   • LIVAN (obstructive sleep apnea) 03/26/2021   • ETOH abuse 03/26/2021   • Obese 03/26/2021   • Alcoholic liver disease ([facility]) 10/07/2021   • Cigarette nicotine dependence without complication 04/17/2022   • History of alcohol use disorder 12/14/2022     Resolved Ambulatory Problems     Diagnosis Date Noted   • Arthritis 04/25/2016   • Chest pain in adult 06/25/2020   • Sleep apnea 06/25/2020   • Alcohol abuse 06/25/2020   • Hypomagnesemia 06/29/2020   • Pneumonia due to COVID-19 virus 01/11/2021   • Alcohol dependence with withdrawal ([facility]) 01/12/2021   • Possible SVT (supraventricular tachycardia) ([facility]) 01/12/2021   • Alcohol withdrawal syndrome with perceptual disturbance ([facility]) 03/26/2021   • Acute kidney failure ([facility]) 03/26/2021   • Tobacco abuse 03/26/2021   • Anemia, chronic disease  03/26/2021   • Elevated LFTs 03/26/2021   • Metabolic acidosis, increased anion gap 03/26/2021   • Hypomagnesemia 03/26/2021   • Suicidal ideations 03/26/2021   • Delirium tremens ([facility]) 10/07/2021   • Alcohol abuse 10/07/2021   • Tobacco abuse 10/07/2021   • Hiatal hernia 10/07/2021   • COPD (chronic obstructive pulmonary disease) ([facility]) 10/07/2021   • Anemia, chronic disease 10/07/2021   • Acute respiratory failure with hypercapnia ([facility]) 10/08/2021   • Aspiration pneumonia ([facility]) 10/08/2021     Past Medical History:   Diagnosis Date   • Anxiety    • Bronchitis with chronic airway obstruction ([facility])    • DVT (deep venous thrombosis) ([facility])    • Hypertension    • Hypertension    • Low back pain    • Neck pain    • Pulmonary embolism ([facility])    • Sleep apnea with use of continuous positive airway pressure (CPAP)          PAST SURGICAL HISTORY  Past Surgical History:   Procedure Laterality Date   • COLONOSCOPY     • JOINT REPLACEMENT Right     hip/knee   • REPLACEMENT TOTAL KNEE     • TONSILLECTOMY     • TOTAL HIP ARTHROPLASTY Right          FAMILY HISTORY  Family History   Problem Relation Age of Onset   • Cancer Mother    • Heart disease Father    • Alcohol abuse Father    • Cancer Brother          SOCIAL HISTORY  Social History     Socioeconomic History   • Marital status:    Tobacco Use   • Smoking status: Every Day     Packs/day: 2.00     Years: 40.00     Pack years: 80.00     Types: Cigarettes   • Smokeless tobacco: Never   Vaping Use   • Vaping Use: Never used   Substance and Sexual Activity   • Alcohol use: Yes     Alcohol/week: 10.0 standard drinks     Types: 10 Shots of liquor per week     Comment: 1 pint vodka/ day   • Drug use: Not Currently     Types: Hydrocodone   • Sexual activity: Defer         ALLERGIES  Penicillins and Penicillins        REVIEW OF SYSTEMS  All systems reviewed and negative except for those discussed in HPI.       PHYSICAL EXAM    I have reviewed the triage vital signs and nursing  notes.    ED Triage Vitals [12/28/22 1651]   Temp Heart Rate Resp BP SpO2   99.1 °F (37.3 °C) 87 22 178/91 100 %      Temp src Heart Rate Source Patient Position BP Location FiO2 (%)   Tympanic -- -- -- --       Physical Exam  GENERAL: Alert, oriented to self, disheveled, mild distress  HENT: head atraumatic, no nuchal rigidity  EYES: no scleral icterus, EOMI  CV: regular rhythm, regular rate, no murmur  RESPIRATORY: normal effort, CTA  ABDOMEN: soft, nontender  MUSCULOSKELETAL: no deformity, FROM, no calf swelling or tenderness  NEURO: alert, moderate tremor at rest, no unilateral focal weakness  SKIN: warm, dry        LAB RESULTS  Recent Results (from the past 24 hour(s))   Comprehensive Metabolic Panel    Collection Time: 12/28/22  5:09 PM    Specimen: Blood   Result Value Ref Range    Glucose 89 65 - 99 mg/dL    BUN 37 (H) 8 - 23 mg/dL    Creatinine 2.14 (H) 0.76 - 1.27 mg/dL    Sodium 140 136 - 145 mmol/L    Potassium 4.3 3.5 - 5.2 mmol/L    Chloride 98 98 - 107 mmol/L    CO2 20.0 (L) 22.0 - 29.0 mmol/L    Calcium 8.2 (L) 8.6 - 10.5 mg/dL    Total Protein 6.9 6.0 - 8.5 g/dL    Albumin 3.8 3.5 - 5.2 g/dL    ALT (SGPT) 107 (H) 1 - 41 U/L    AST (SGOT) 385 (H) 1 - 40 U/L    Alkaline Phosphatase 77 39 - 117 U/L    Total Bilirubin 1.6 (H) 0.0 - 1.2 mg/dL    Globulin 3.1 gm/dL    A/G Ratio 1.2 g/dL    BUN/Creatinine Ratio 17.3 7.0 - 25.0    Anion Gap 22.0 (H) 5.0 - 15.0 mmol/L    eGFR 32.9 (L) >60.0 mL/min/1.73   Troponin    Collection Time: 12/28/22  5:09 PM    Specimen: Blood   Result Value Ref Range    Troponin T 0.080 (C) 0.000 - 0.030 ng/mL   Ethanol    Collection Time: 12/28/22  5:09 PM    Specimen: Blood   Result Value Ref Range    Ethanol <10 0 - 10 mg/dL    Ethanol % <0.010 %   Magnesium    Collection Time: 12/28/22  5:09 PM    Specimen: Blood   Result Value Ref Range    Magnesium 1.5 (L) 1.6 - 2.4 mg/dL   CK    Collection Time: 12/28/22  5:09 PM    Specimen: Blood   Result Value Ref Range    Creatine Kinase  20,763 (H) 20 - 200 U/L   ECG 12 Lead Syncope    Collection Time: 12/28/22  5:14 PM   Result Value Ref Range    QT Interval 394 ms   CBC Auto Differential    Collection Time: 12/28/22  5:46 PM    Specimen: Blood   Result Value Ref Range    WBC 13.53 (H) 3.40 - 10.80 10*3/mm3    RBC 3.33 (L) 4.14 - 5.80 10*6/mm3    Hemoglobin 10.8 (L) 13.0 - 17.7 g/dL    Hematocrit 30.5 (L) 37.5 - 51.0 %    MCV 91.6 79.0 - 97.0 fL    MCH 32.4 26.6 - 33.0 pg    MCHC 35.4 31.5 - 35.7 g/dL    RDW 12.8 12.3 - 15.4 %    RDW-SD 42.1 37.0 - 54.0 fl    MPV 10.7 6.0 - 12.0 fL    Platelets 82 (L) 140 - 450 10*3/mm3    Neutrophil % 91.0 (H) 42.7 - 76.0 %    Lymphocyte % 3.0 (L) 19.6 - 45.3 %    Monocyte % 5.2 5.0 - 12.0 %    Eosinophil % 0.0 (L) 0.3 - 6.2 %    Basophil % 0.1 0.0 - 1.5 %    Immature Grans % 0.7 (H) 0.0 - 0.5 %    Neutrophils, Absolute 12.32 (H) 1.70 - 7.00 10*3/mm3    Lymphocytes, Absolute 0.40 (L) 0.70 - 3.10 10*3/mm3    Monocytes, Absolute 0.70 0.10 - 0.90 10*3/mm3    Eosinophils, Absolute 0.00 0.00 - 0.40 10*3/mm3    Basophils, Absolute 0.02 0.00 - 0.20 10*3/mm3    Immature Grans, Absolute 0.09 (H) 0.00 - 0.05 10*3/mm3    nRBC 0.0 0.0 - 0.2 /100 WBC   Scan Slide    Collection Time: 12/28/22  5:46 PM    Specimen: Blood   Result Value Ref Range    RBC Morphology Normal Normal    WBC Morphology Normal Normal    Platelet Morphology Normal Normal       Ordered the above labs and independently reviewed the results.        RADIOLOGY  XR Knee 1 or 2 View Left    Result Date: 12/28/2022  XR KNEE 1 OR 2 VW LEFT-  INDICATIONS: Pain and swelling  TECHNIQUE: 2 views of the left knee  COMPARISON: 06/26/2020  FINDINGS:  Appearance of small vertically oriented lucency at the upper aspect of the patella, potentially evidence of nondisplaced fracture, correlate with location of pain. Mild knee effusion is present. No other evidence of fracture is noted. No erosion, or dislocation is identified. Prominent tibiofemoral joint space narrowing is  noted. Moderate to prominent degenerative spurring is seen.  Follow-up/further evaluation can be obtained as indicated.       As described.  This report was finalized on 12/28/2022 6:48 PM by Dr. Donte Hernandez M.D.        I ordered the above noted radiological studies. Reviewed by me and discussed with radiologist.  See dictation for official radiology interpretation.      MEDICATIONS GIVEN IN ER    Medications   sodium chloride 0.9 % flush 10 mL (has no administration in time range)   thiamine (B-1) 100 mg in sodium chloride 0.9 % 100 mL IVPB (0 mg Intravenous Stopped 12/28/22 1830)   folic acid 1 mg in sodium chloride 0.9 % 50 mL IVPB (0 mg Intravenous Stopped 12/28/22 1830)   LORazepam (ATIVAN) injection 0.5 mg (0.5 mg Intravenous Given 12/28/22 1845)     Followed by   LORazepam (ATIVAN) injection 0.5 mg (has no administration in time range)   LORazepam (ATIVAN) tablet 0.5 mg ( Oral Not Given:  See Alt 12/28/22 2111)     Or   LORazepam (ATIVAN) injection 0.5 mg ( Intravenous Not Given:  See Alt 12/28/22 2111)     Or   LORazepam (ATIVAN) tablet 1 mg ( Oral Not Given:  See Alt 12/28/22 2111)     Or   LORazepam (ATIVAN) injection 1 mg ( Intravenous Not Given:  See Alt 12/28/22 2111)     Or   LORazepam (ATIVAN) injection 1 mg (1 mg Intravenous Given 12/28/22 2111)     Or   LORazepam (ATIVAN) injection 1 mg ( Intramuscular Not Given:  See Alt 12/28/22 2111)   sodium bicarbonate 150 mEq/1000 mL dextrose infusion ( Intravenous Currently Infusing 12/28/22 2247)   nitroglycerin (NITROSTAT) SL tablet 0.4 mg (has no administration in time range)   acetaminophen (TYLENOL) tablet 650 mg (has no administration in time range)   ondansetron (ZOFRAN) tablet 4 mg (has no administration in time range)     Or   ondansetron (ZOFRAN) injection 4 mg (has no administration in time range)   melatonin tablet 3 mg (has no administration in time range)   thiamine (VITAMIN B-1) tablet 100 mg (has no administration in time range)     And    multivitamin (THERAGRAN) tablet 1 tablet (has no administration in time range)     And   folic acid (FOLVITE) tablet 1 mg (has no administration in time range)   sodium chloride 0.9 % infusion (75 mL/hr Intravenous New Bag 12/28/22 2211)   LORazepam (ATIVAN) injection 1 mg (1 mg Intravenous Given 12/28/22 1707)   sodium chloride 0.9 % bolus 1,000 mL (0 mL Intravenous Stopped 12/28/22 2007)   thiamine (B-1) injection 100 mg (100 mg Intravenous Given 12/28/22 2208)     Or   thiamine (VITAMIN B-1) tablet 100 mg ( Oral Not Given:  See Alt 12/28/22 2208)         ADDITIONAL ORDERS CONSIDERED BUT NOT ORDERED:  Nothing    Critical Care  Performed by: Evaristo Kamara PA  Authorized by: Thomas Pruitt MD     Critical care provider statement:     Critical care time (minutes):  34    Critical care time was exclusive of:  Separately billable procedures and treating other patients    Critical care was necessary to treat or prevent imminent or life-threatening deterioration of the following conditions:  CNS failure or compromise, metabolic crisis and renal failure    Critical care was time spent personally by me on the following activities:  Blood draw for specimens, development of treatment plan with patient or surrogate, evaluation of patient's response to treatment, interpretation of cardiac output measurements, obtaining history from patient or surrogate, ordering and performing treatments and interventions, ordering and review of laboratory studies, ordering and review of radiographic studies, pulse oximetry and re-evaluation of patient's condition        PROGRESS, DATA ANALYSIS, CONSULTS, AND MEDICAL DECISION MAKING    All labs have been independently reviewed by me.  All radiology studies have been reviewed by me and discussed with radiologist dictating the report.   EKG's independently viewed and interpreted by me.  Discussion below represents my analysis of pertinent findings related to patient's condition,  differential diagnosis, treatment plan and final disposition.    I have discussed case with Dr. Pruitt, emergency room physician.  He has performed his own bedside examination and agrees with treatment plan.    ED Course as of 12/28/22 2329   Wed Dec 28, 2022   1740 Patient presents after falling this morning.  Patient is currently withdrawing from alcohol and weak.  She has been on the ground since 5 AM.  Differential diagnoses include but not limited to rhabdomyolysis, alcohol withdrawal syndrome, electrolyte abnormality, renal failure. [EE]   1819 Creatine Kinase(!): 20,763 [EE]   1819 Creatinine(!): 2.14  This was 0.84, 3 months ago. [EE]   1857 Patient with rhabdomyolysis, DEEP and at risk of alcohol withdrawal.  Alcohol withdrawal protocol ordered.  Sodium bicarb ordered.  We will admit. [EE]   1928 Left knee films interpreted myself shows a patellar fracture. [EE]   2004 I discussed the case with Dr. Russell, [facility].  She agrees to admit. [EE]   2230 Recheck of patient.  He is resting quietly.  Stable vitals. [EE]   2327 EKG interpreted myself.  Time 1714.  Sinus rhythm, 88 bpm.  Normal P/BETSEY.  QRS normal normal axis.  Nonspecific T wave changes.  EKG similar comparison to prior EKG from 4/15/2022. [EE]      ED Course User Index  [EE] Evaristo Kamara PA       AS OF 23:27 EST VITALS:    BP - 180/97  HR - 89  TEMP - 99.1 °F (37.3 °C) (Tympanic)  O2 SATS - 100%        DIAGNOSIS  Final diagnoses:   DEEP (acute kidney injury) (HCC)   Traumatic rhabdomyolysis, initial encounter (HCC)   Alcohol withdrawal syndrome with complication (HCC)   Closed nondisplaced longitudinal fracture of left patella, initial encounter         DISPOSITION  Admitted      Dictated utilizing Dragon dictation        Evaristo Kamara PA  12/28/22 2329      Electronically signed by Thomas Pruitt MD at 12/28/22 9909     Heidi Argueta, RN at 12/28/22 2126          Nursing report ED to floor  Watson Lisa  67 y.o.  male    HPI :   Chief Complaint    Patient presents with   • Alcohol Problem   • Fall   • Knee Injury       Admitting doctor:   Keily Russell MD    Admitting diagnosis:   The primary encounter diagnosis was DEEP (acute kidney injury) ([facility]). Diagnoses of Traumatic rhabdomyolysis, initial encounter ([facility]), Alcohol withdrawal syndrome with complication ([facility]), and Closed nondisplaced longitudinal fracture of left patella, initial encounter were also pertinent to this visit.    Code status:   Current Code Status       Date Active Code Status Order ID Comments User Context       12/28/2022 2026 CPR (Attempt to Resuscitate) [id number]  Keily Russell MD ED        Question Answer    Code Status (Patient has no pulse and is not breathing) CPR (Attempt to Resuscitate)    Medical Interventions (Patient has pulse or is breathing) Full                    Allergies:   Penicillins and Penicillins    Isolation:   No active isolations    Intake and Output    Intake/Output Summary (Last 24 hours) at 12/28/2022 2126  Last data filed at 12/28/2022 2007  Gross per 24 hour   Intake 1650 ml   Output --   Net 1650 ml       Weight:       12/28/22  1906   Weight: 117 kg (259 lb)       Most recent vitals:   Vitals:    12/28/22 1936 12/28/22 2006 12/28/22 2036 12/28/22 2059   BP: 169/88 177/78 180/97    Pulse: 86  88 89   Resp:       Temp:       TempSrc:       SpO2: 97%  98% 100%   Weight:       Height:           Active LDAs/IV Access:   Lines, Drains & Airways       Active LDAs       Name Placement date Placement time Site Days    Peripheral IV 10/16/21 0215 Anterior; Right Forearm 10/16/21  0215  Forearm  438    Peripheral IV 12/28/22 Left;Upper Arm 12/28/22  --  Arm  less than 1                    Labs (abnormal labs have a star):   Labs Reviewed   COMPREHENSIVE METABOLIC PANEL - Abnormal; Notable for the following components:       Result Value    BUN 37 (*)     Creatinine 2.14 (*)     CO2 20.0 (*)     Calcium 8.2 (*)     ALT (SGPT) 107 (*)     AST (SGOT) 385  (*)     Total Bilirubin 1.6 (*)     Anion Gap 22.0 (*)     eGFR 32.9 (*)     All other components within normal limits    Narrative:     GFR Normal >60  Chronic Kidney Disease <60  Kidney Failure <15     TROPONIN (IN-HOUSE) - Abnormal; Notable for the following components:    Troponin T 0.080 (*)     All other components within normal limits    Narrative:     Troponin T Reference Range:  <= 0.03 ng/mL-   Negative for AMI  >0.03 ng/mL-     Abnormal for myocardial necrosis.  Clinicians would have to utilize clinical acumen, EKG, Troponin and serial changes to determine if it is an Acute Myocardial Infarction or myocardial injury due to an underlying chronic condition.       Results may be falsely decreased if patient taking Biotin.     MAGNESIUM - Abnormal; Notable for the following components:    Magnesium 1.5 (*)     All other components within normal limits   CK - Abnormal; Notable for the following components:    Creatine Kinase 20,763 (*)     All other components within normal limits   CBC WITH AUTO DIFFERENTIAL - Abnormal; Notable for the following components:    WBC 13.53 (*)     RBC 3.33 (*)     Hemoglobin 10.8 (*)     Hematocrit 30.5 (*)     Platelets 82 (*)     Neutrophil % 91.0 (*)     Lymphocyte % 3.0 (*)     Eosinophil % 0.0 (*)     Immature Grans % 0.7 (*)     Neutrophils, Absolute 12.32 (*)     Lymphocytes, Absolute 0.40 (*)     Immature Grans, Absolute 0.09 (*)     All other components within normal limits   SCAN SLIDE - Normal    Narrative:     No clot in tube. No Platelet clumping observed on slide Platelet count was repeated with new specimen   ETHANOL   CBC AND DIFFERENTIAL    Narrative:     The following orders were created for panel order CBC & Differential.  Procedure                               Abnormality         Status                     ---------                               -----------         ------                     CBC Auto Differential[[id number]]        Abnormal            Final  result               Scan Slide[[id number]]                   Normal              Final result                 Please view results for these tests on the individual orders.       EKG:   ECG 12 Lead Syncope   Final Result   HEART RATE= 88  bpm   RR Interval= 682  ms   CO Interval= 147  ms   P Horizontal Axis= 39  deg   P Front Axis= 22  deg   QRSD Interval= 92  ms   QT Interval= 394  ms   QRS Axis= 33  deg   T Wave Axis= -16  deg   - ABNORMAL ECG -   Sinus rhythm   Borderline low voltage, extremity leads   Probable anteroseptal infarct, old   Non-Specific STT wave changes   No change from previous tracing   Electronically Signed By: Silvio Hawthorne ([facility]) 28-Dec-2022 19:19:43   Date and Time of Study: 2022-12-28 17:14:05          Meds given in ED:   Medications   sodium chloride 0.9 % flush 10 mL (has no administration in time range)   thiamine (B-1) 100 mg in sodium chloride 0.9 % 100 mL IVPB (0 mg Intravenous Stopped 12/28/22 1830)   folic acid 1 mg in sodium chloride 0.9 % 50 mL IVPB (0 mg Intravenous Stopped 12/28/22 1830)   LORazepam (ATIVAN) injection 0.5 mg (0.5 mg Intravenous Given 12/28/22 1845)     Followed by   LORazepam (ATIVAN) injection 0.5 mg (has no administration in time range)   LORazepam (ATIVAN) tablet 0.5 mg ( Oral Not Given:  See Alt 12/28/22 2111)     Or   LORazepam (ATIVAN) injection 0.5 mg ( Intravenous Not Given:  See Alt 12/28/22 2111)     Or   LORazepam (ATIVAN) tablet 1 mg ( Oral Not Given:  See Alt 12/28/22 2111)     Or   LORazepam (ATIVAN) injection 1 mg ( Intravenous Not Given:  See Alt 12/28/22 2111)     Or   LORazepam (ATIVAN) injection 1 mg (1 mg Intravenous Given 12/28/22 2111)     Or   LORazepam (ATIVAN) injection 1 mg ( Intramuscular Not Given:  See Alt 12/28/22 2111)   sodium bicarbonate 150 mEq/1000 mL dextrose infusion (150 mEq Intravenous New Bag 12/28/22 2026)   nitroglycerin (NITROSTAT) SL tablet 0.4 mg (has no administration in time range)   acetaminophen (TYLENOL) tablet 650  mg (has no administration in time range)   ondansetron (ZOFRAN) tablet 4 mg (has no administration in time range)     Or   ondansetron (ZOFRAN) injection 4 mg (has no administration in time range)   melatonin tablet 3 mg (has no administration in time range)   thiamine (B-1) injection 100 mg (has no administration in time range)     Or   thiamine (VITAMIN B-1) tablet 100 mg (has no administration in time range)   thiamine (VITAMIN B-1) tablet 100 mg (has no administration in time range)     And   multivitamin (THERAGRAN) tablet 1 tablet (has no administration in time range)     And   folic acid (FOLVITE) tablet 1 mg (has no administration in time range)   sodium chloride 0.9 % infusion (has no administration in time range)   LORazepam (ATIVAN) injection 1 mg (1 mg Intravenous Given 12/28/22 1707)   sodium chloride 0.9 % bolus 1,000 mL (0 mL Intravenous Stopped 12/28/22 2007)       Imaging results:  XR Knee 1 or 2 View Left    Result Date: 12/28/2022   As described.  This report was finalized on 12/28/2022 6:48 PM by Dr. Donte Hernandez M.D.       Ambulatory status:   - bedrest    Social issues:   Social History     Socioeconomic History   • Marital status:    Tobacco Use   • Smoking status: Every Day     Packs/day: 2.00     Years: 40.00     Pack years: 80.00     Types: Cigarettes   • Smokeless tobacco: Never   Vaping Use   • Vaping Use: Never used   Substance and Sexual Activity   • Alcohol use: Yes     Alcohol/week: 10.0 standard drinks     Types: 10 Shots of liquor per week     Comment: 1 pint vodka/ day   • Drug use: Not Currently     Types: Hydrocodone   • Sexual activity: Defer       NIH Stroke Scale:         Heidi Argueta RN  12/28/22 21:26 EST         Electronically signed by Heidi Argueta RN at 12/28/22 9846     Heidi Argueta RN at 12/28/22 2300        Pt incontinent of stool, very small amount noted. Buttocks very red and excoriated. Pt also noted to have patches of dried skin  similar in appearance to eczema on pt's lower abd and both hips.    Electronically signed by Heidi Argueta, RN at 22 2305                Consult Notes (last 48 hours)      Charlie Addison MD at 22 0717      Consult Orders    1. Inpatient Nephrology Consult [[id number]] ordered by Keily Russell MD at 22                 [facility] Consult Note      Patient Name: Watson Lisa  : 1955  MRN: [id number]  Primary Care Physician:  Checo Dunham MD  Referring Physician: Keily Russell MD  Date of admission: 2022    Subjective     Reason for Consult:  DEEP     HPI:   Watson Lisa is a 67 y.o. male with hx HTN, ETOH abuse, OA s/p right TKR who presented after fall on MON, found to have left patellar fracture.  He disputes report of immobilization after fall, but he's also confused and showing some signs of ETOH withdrawal already.  Found to have DEEP & rhabdomyolysis, with Cr 2.1, CK 20,763.  Renal fcn normal at baseline.  With IVF, Cr improved 1.5 though CK ~ same.  Getting both NS and bicarb drip currently.  Denies n/v but has had some loose stool.  Reports some nsaid use.  No dyspnea or dysuria.  Home meds notable for ARB.      Review of Systems:   14 point review of systems is otherwise negative except for mentioned above on HPI    Personal History     Past Medical History:   Diagnosis Date   • Alcohol abuse    • Anxiety    • Arthritis    • Bronchitis with chronic airway obstruction (HCC)     LAST FEB   • DVT (deep venous thrombosis) ([facility])    • Hiatal hernia    • Hypertension    • Hypertension    • Low back pain    • Neck pain    • Pulmonary embolism (HCC)    • Sleep apnea with use of continuous positive airway pressure (CPAP)     AT NIGHT       Past Surgical History:   Procedure Laterality Date   • COLONOSCOPY     • JOINT REPLACEMENT Right     hip/knee   • REPLACEMENT TOTAL KNEE     • TONSILLECTOMY     • TOTAL HIP ARTHROPLASTY  Right        Family History: family history includes Alcohol abuse in his father; Cancer in his brother and mother; Heart disease in his father.    Social History:  reports that he has been smoking cigarettes. He has a 80.00 pack-year smoking history. He has never used smokeless tobacco. He reports current alcohol use of about 10.0 standard drinks per week. He reports that he does not currently use drugs after having used the following drugs: Hydrocodone.    Home Medications:  Prior to Admission medications    Medication Sig Start Date End Date Taking? Authorizing Provider   losartan (COZAAR) 100 MG tablet TAKE 1 TABLET DAILY 8/2/22  Yes Checo Dunham MD   amLODIPine (NORVASC) 10 MG tablet TAKE ONE TABLET BY MOUTH DAILY 4/21/22   Checo Dunham MD   buPROPion SR (Wellbutrin SR) 150 MG 12 hr tablet Take 1 tablet by mouth 2 (Two) Times a Day. 8/11/21   Checo Dunham MD   guaiFENesin-codeine (GUAIFENESIN AC) 100-10 MG/5ML liquid Take 5 mL by mouth 3 (Three) Times a Day As Needed for Cough. 6/16/22   Mick Aquino MD   levoFLOXacin (Levaquin) 500 MG tablet Take 1 tablet by mouth Daily. 6/15/22   Mick Aquino MD   Multiple Vitamins-Minerals (MULTIVITAMIN ADULT PO) Take 1 tablet by mouth Daily.    Provider, MD Iraida   traZODone (DESYREL) 100 MG tablet Take 1 tablet by mouth Every Night. 4/18/22   Marques Santiago DO       Allergies:  Allergies   Allergen Reactions   • Penicillins Unknown - Low Severity     Pt does not recall the reaction. Patient tolerated cephalexin 3/2020   • Penicillins Other (See Comments)     Unknown        Objective     Vitals:   Temp:  [97.8 °F (36.6 °C)-99.1 °F (37.3 °C)] 98.2 °F (36.8 °C)  Heart Rate:  [] 84  Resp:  [18-22] 20  BP: (142-180)/(76-97) 151/76    Intake/Output Summary (Last 24 hours) at 12/29/2022 0737  Last data filed at 12/28/2022 2007  Gross per 24 hour   Intake 1650 ml   Output --   Net 1650 ml       Physical Exam:    General Appearance: tremulous  confused WF in no distress, converses appropriately    Skin: warm and dry  HEENT: oral mucosa normal, nonicteric sclera  Neck: supple, no JVD  Lungs: CTA bilat no rales  Heart: RRR, normal S1 and S2  Abdomen: soft, nontender, nondistended  : no palpable bladder  Extremities: trace BLE edema, left knee in brace   Neuro: tremulous but speech intact & follows commands     Scheduled Meds:     thiamine, 100 mg, Oral, Daily   And  multivitamin, 1 tablet, Oral, Daily   And  folic acid, 1 mg, Oral, Daily  folic acid (FOLVITE) IVPB, 1 mg, Intravenous, Daily  LORazepam, 0.5 mg, Intravenous, Q6H   Followed by  LORazepam, 0.5 mg, Intravenous, Q8H  thiamine (VITAMIN B1) IVPB, 100 mg, Intravenous, Daily      IV Meds:   sodium bicarbonate, 150 mEq, Last Rate: 125 mL/hr at 12/28/22 2247  sodium chloride, 75 mL/hr, Last Rate: 75 mL/hr (12/28/22 2211)        Results Reviewed:   I have personally reviewed the results from the time of this admission to 12/29/2022 07:37 EST     Lab Results   Component Value Date    GLUCOSE 92 12/29/2022    CALCIUM 8.1 (L) 12/29/2022     12/29/2022    K 3.6 12/29/2022    CO2 22.0 12/29/2022    CL 99 12/29/2022    BUN 37 (H) 12/29/2022    CREATININE 1.55 (H) 12/29/2022    EGFRIFNONA 85 10/16/2021    BCR 23.9 12/29/2022    ANIONGAP 20.0 (H) 12/29/2022      Lab Results   Component Value Date    MG 1.5 (L) 12/28/2022    PHOS 3.1 10/15/2021    ALBUMIN 3.8 12/28/2022           Assessment / Plan     ASSESSMENT:  1. Non olig DEEP - due to rhabdo, Cr improved 2.1 to 1.5 with IVF.  Will simplify IVF as getting both bicarb & NS, will continue latter alone at 150 cc/hr.  K/HCo3 normal.  Check UA  2. Rhabdomyolysis - suspect was immobilized some period after fall; CK ~ same 20K; elevated LFTs too   3. Left patella fracture - ortho eval noted, planning non-op mgmt   4. HypoMg   5. HTN - BP bit high with ARB on hold for DEEP; HR 80s   6. Hx ETOH abuse + e/o withdrawal - on CIWA protocol  7. Anemia, TCP - hgb  "10.8, PLT 75K    PLAN:  Stop bicarb drip, give  cc/hr  Trend CK & LFTs  Send urinalysis  Subs b blocker for ARB   Check iron stores  Replace Mg  D/W RN    Thank you Dr Russell for involving us in the care of Watson Lisa.  Please feel free to call with any questions.    Charlie Addison MD  22  07:37 EST    [facility]  [phone]    Electronically signed by Charlie Addison MD at 22 0744     Channing Delong Jr., MD at 22 0659      Consult Orders    1. Inpatient Orthopedic Surgery Consult [[id number]] ordered by Keily Russell MD               [facility]   Consult Note    Patient Name: Watson Lisa  : 1955  MRN: [id number]  Primary Care Physician:  Checo Dunham MD  Referring Physician: Keily Russell MD  Date of admission: 2022    Inpatient Orthopedic Surgery Consult  Consult performed by: Channing Delong Jr., MD  Consult ordered by: Keily Russell MD        Subjective   Subjective     Reason for Consult/ Chief Complaint: Acute on chronic left knee pain    History of Present Illness  Watson Lisa is a 67 y.o. male with history of DVT, ethanol abuse, CPAP, prior right knee replacement who came to the emergency department yesterday after a fall at home.  He reports he fell on his knee on Monday.  Radiographs in the emergency department showed a possible nondisplaced patellar fracture.  He was admitted to the hospitalist service for ethanol withdrawal/CIWA protocol.  Orthopedics was consulted for evaluation and management of the left knee.    The patient reports he has been able to ambulate on the left knee.  He reports he \"gets along okay with it\".  He reports he had his right knee replaced by Dr. Morrow.        Review of Systems    Review of Systems:  Constitutional: Negative  HENT: Negative  Eyes: Negative  Respiratory: Negative; no shortness of breath  Cardiovascular: Negative; no current chest " pain  Gastrointestinal: Negative  : Negative  Skin: Negative except as listed in HPI  Neurological: Negative, no numbness or deficits  Hematological: Negative  Muscoloskeletal: Per HPI                   Personal History     Past Medical History:   Diagnosis Date   • Alcohol abuse    • Anxiety    • Arthritis    • Bronchitis with chronic airway obstruction (HCC)     LAST FEB   • DVT (deep venous thrombosis) (HCC)    • Hiatal hernia    • Hypertension    • Hypertension    • Low back pain    • Neck pain    • Pulmonary embolism (HCC)    • Sleep apnea with use of continuous positive airway pressure (CPAP)     AT NIGHT       Past Surgical History:   Procedure Laterality Date   • COLONOSCOPY     • JOINT REPLACEMENT Right     hip/knee   • REPLACEMENT TOTAL KNEE     • TONSILLECTOMY     • TOTAL HIP ARTHROPLASTY Right        Family History: family history includes Alcohol abuse in his father; Cancer in his brother and mother; Heart disease in his father. Otherwise pertinent FHx was reviewed and not pertinent to current issue.    Social History:  reports that he has been smoking cigarettes. He has a 80.00 pack-year smoking history. He has never used smokeless tobacco. He reports current alcohol use of about 10.0 standard drinks per week. He reports that he does not currently use drugs after having used the following drugs: Hydrocodone.    Home Medications:   amLODIPine, buPROPion SR, guaiFENesin-codeine, levoFLOXacin, losartan, multivitamin with minerals, and traZODone    Allergies:  Allergies   Allergen Reactions   • Penicillins Unknown - Low Severity     Pt does not recall the reaction. Patient tolerated cephalexin 3/2020   • Penicillins Other (See Comments)     Unknown        Objective    Objective     Vitals:  Temp:  [97.8 °F (36.6 °C)-99.1 °F (37.3 °C)] 97.8 °F (36.6 °C)  Heart Rate:  [] 89  Resp:  [18-22] 18  BP: (142-180)/(78-97) 142/79    Physical Exam  Physical Exam:  Vital signs reviewed.  Constitutional:   Appears well-developed, well nourished.  Neurological: No gross deficits, oriented to person, place, and situation  Skin: Warm and dry  Psychiatric: Normal mood and affect  Musculoskeletal:    Examination of his left knee shows perhaps trace effusion.  No ecchymosis.  He is mildly tender diffusely around the entire knee joint.  He is mildly tender over the patella.  His quadriceps and patellar tendons appear to be intact.  He can perform straight leg raise.  He can demonstrate range of motion 0 to 90 degrees with mild discomfort.       Active ankle dorsiflexion and plantarflexion.  Sensation is intact to light touch in sural, saphenous, deep and superficial peroneal, tibial nerve distribution.  Toes are warm and well perfused with brisk capillary refill.             Result Review      Radiographs of the left knee are independently reviewed.  These show fairly severe chronic arthritic change at the knee joint with significant tricompartmental joint space narrowing.  He has perhaps an oblique lucency at the superior aspect of the patella with perhaps mild sclerosis.    No other acute fracture identified.  Study Result    Narrative & Impression   XR KNEE 1 OR 2 VW LEFT-     INDICATIONS: Pain and swelling     TECHNIQUE: 2 views of the left knee     COMPARISON: 06/26/2020     FINDINGS:     Appearance of small vertically oriented lucency at the upper aspect of  the patella, potentially evidence of nondisplaced fracture, correlate  with location of pain. Mild knee effusion is present. No other evidence  of fracture is noted. No erosion, or dislocation is identified.  Prominent tibiofemoral joint space narrowing is noted. Moderate to  prominent degenerative spurring is seen.  Follow-up/further evaluation  can be obtained as indicated.     IMPRESSION:     As described.           Assessment & Plan   Assessment / Plan     Brief Patient Summary:  Watson Lisa is a 67 y.o. male with history of ethanol abuse, withdrawal  presenting with acute on chronic left knee pain in the setting of severe tricompartmental knee arthritis and a possible acute/subacute nondisplaced fracture at the left superior patella.    Active Hospital Problems:  Active Hospital Problems    Diagnosis    • **DEEP (acute kidney injury) ([facility])      Plan:   I had a long discussion with the patient this morning.  He has a fair amount of arthritic change in the knee.  He may have an acute nondisplaced fracture of the patella although there is some potential sclerosis at the suspected fracture line.  In any case, his extensor mechanism is intact, and barring any further displacement or complication I would not think this will require surgery.      -Recommend patient weight-bear as tolerated in a knee immobilizer.  -Patient can follow-up in 2 to 3 weeks at the [facility] with Dr. Pb Saleh.  He will likely require a knee replacement at some point in the future once she has recovered from this acute injury.  -Pain control  -DVT: Tera/SCDs, chemoprophylaxis per primary  -Disposition: Okay for discharge from orthopedic standpoint    Thank you for this consultation, please call with questions.    Channing Delong Jr, MD    Electronically signed by Channing Delong Jr., MD at 12/29/22 4517

## 2024-06-06 NOTE — PAYOR COMM NOTE
"Brittany Lisa (67 y.o. Male)     ATTN: DISCHARGE SUMMARY FOR REVIEW: [id number]     DEPT: [phone],  [phone]    [facility]: NPI [id number]      Date of Birth   1955    Social Security Number       Address   [address] [address]    Home Phone   [phone]    MRN   [id number]       Latter day   Anabaptist    Marital Status                               Admission Date   12/28/22    Admission Type   Emergency    Admitting Provider   Keily Russell MD    Attending Provider       Department, Room/Bed   [facility] [address] [place], P786/1       Discharge Date   1/5/2023    Discharge Disposition   Skilled Nursing Facility (DC - External)    Discharge Destination                               Attending Provider: (none)   Allergies: Penicillins, Penicillins    Isolation: None   Infection: None   Code Status: Prior    Ht: 200.7 cm (79\")   Wt: 115 kg (252 lb 10.4 oz)    Admission Cmt: None   Principal Problem: DEEP (acute kidney injury) (HCC) [N17.9]                 Active Insurance as of 12/28/2022     Primary Coverage     Payor Plan Insurance Group Employer/Plan Group    ANTHEM BLUE CROSS ANTHEM BLUE CROSS BLUE SHIELD PPO [id number]     Payor Plan Address Payor Plan Phone Number Payor Plan Fax Number Effective Dates    [address] [phone]  1/1/2022 - None Entered    [facility] [id number]       Subscriber Name Subscriber Birth Date Member ID       BRITTANY LISA 1955 [id number]           Secondary Coverage     Payor Plan Insurance Group Employer/Plan Group    MEDICARE MEDICARE A ONLY      Payor Plan Address Payor Plan Phone Number Payor Plan Fax Number Effective Dates    [address] [phone]  9/1/2019 - None Entered    [facility] [address]       Subscriber Name Subscriber Birth Date Member ID       BRITTANY LISA 1955 [id number]                 Emergency Contacts      (Rel.) Home Phone Work Phone Mobile Phone "    TEJAS LISA (Son) [phone] -- [phone]    Jorge A Nielson (Sister) [phone] -- --               Discharge Summary      Hiwot Rucker APRLIZBETH at 23 1144              Patient Name: Watson Lisa  : 1955  MRN: [id number]    Date of Admission: 2022  Date of Discharge:  2023  Primary Care Physician: Checo Dunham MD      Chief Complaint:   Alcohol Problem, Fall, and Knee Injury      Discharge Diagnoses     Active Hospital Problems    Diagnosis  POA   • Left patella fracture [S82.002A]  Yes   • Elevated troponin [R77.8]  Yes   • COPD (chronic obstructive pulmonary disease) ([facility]) [J44.9]  Yes   • LIVAN (obstructive sleep apnea) [G47.33]  Yes   • Alcohol dependence with withdrawal ([facility]) [F10.239]  Yes   • Essential hypertension [I10]  Yes      Resolved Hospital Problems    Diagnosis Date Resolved POA   • **DEEP (acute kidney injury) ([facility]) [N17.9] 2023 Yes   • Hypokalemia [E87.6] 2023 Yes   • Thrombocytopenia ([facility]) [D69.6] 2023 Yes   • Rhabdomyolysis [M62.82] 2023 Yes        Hospital Course     Mr. Lisa is a 67 y.o. male with a history of alcohol abuse, DVT/PE, HTN, sleep apnea on Cpap who presented to [facility] initially complaining of fall at home, down for several hours, lt knee pain.  Please see the admitting history and physical for further details.  He was found to have rhabdomyolysis, alcohol withdrawal,  DEEP, lt patellar fx and was admitted to the hospital for further evaluation and treatment.  Ortho was consulted recommending non operative management of lt patellar fracture. He is WBAT LLE w/knee immobilizer. He will follow up with Ortho in 2 weeks. Nephrology followed for DEEP due to rhabdomyolysis. BP meds were adjusted. Electrolytes were repleted. Renal function returned to baseline. CIWA protocol was followed for alcohol withdrawal. He was started on thiamine and folic acid. [facility]. Withdrawal symptoms  have resolved. Hydroxyzine was added for anxiety. Thrombocytopenia was present early in admission likely related to chronic alcohol suppression, now resolved. PT has followed recommending SNF. Medically he is stable for discharge.  Day of Discharge     Subjective:  Lt knee pain. +BM. Slept fair. No other complaints. Agrees with discharge to SNF    Physical Exam:  Temp:  [98 °F (36.7 °C)-98.9 °F (37.2 °C)] 98 °F (36.7 °C)  Heart Rate:  [] 103  Resp:  [16-17] 16  BP: (107-148)/(66-78) 107/66  Body mass index is 28.46 kg/m².  Physical Exam  Vitals and nursing note reviewed.   Constitutional:       General: He is not in acute distress.  HENT:      Head: Normocephalic.      Mouth/Throat:      Mouth: Mucous membranes are moist.   Eyes:      Conjunctiva/sclera: Conjunctivae normal.   Cardiovascular:      Rate and Rhythm: Normal rate and regular rhythm.   Pulmonary:      Effort: Pulmonary effort is normal. No respiratory distress.      Breath sounds: No wheezing or rales.   Abdominal:      General: Bowel sounds are normal.      Palpations: Abdomen is soft.   Musculoskeletal:      Cervical back: Neck supple.      Right lower leg: No edema.      Left lower leg: No edema.      Comments: KI lt knee  Chronic arthritic changes lt ankle   Skin:     General: Skin is warm and dry.      Comments: Toe wounds kaiser feet   Neurological:      Mental Status: He is alert and oriented to person, place, and time.   Psychiatric:         Mood and Affect: Mood normal.         Behavior: Behavior normal.         Consultants     Consult Orders (all) (From admission, onward)     Start     Ordered    01/01/23 1110  Inpatient Case Management  Consult  Once        Provider:  (Not yet assigned)    01/01/23 1116    12/28/22 2210  Inpatient Orthopedic Surgery Consult  Once        Specialty:  Orthopedic Surgery  Provider:  Channing Delong Jr., MD    12/28/22 2210 12/28/22 2028  Inpatient Nephrology Consult  Once        Specialty:   Nephrology  Provider:  Junie Zarate MD    12/28/22 2028 12/28/22 2027  Inpatient consult to [facility]  Once        Provider:  (Not yet assigned)    12/28/22 2027 12/28/22 2027  Inpatient consult to Nutrition  Once        Provider:  (Not yet assigned)    12/28/22 2027 12/28/22 1929  [facility] (on-call MD unless specified) Details  Once,   Status:  Canceled        Specialty:  Hospitalist  Provider:  (Not yet assigned)    12/28/22 1928              Procedures     * Surgery not found *      Imaging Results (All)     Procedure Component Value Units Date/Time    XR Knee 1 or 2 View Left [[id number]] Collected: 12/28/22 1844     Updated: 12/28/22 1851    Narrative:      XR KNEE 1 OR 2 VW LEFT-     INDICATIONS: Pain and swelling     TECHNIQUE: 2 views of the left knee     COMPARISON: 06/26/2020     FINDINGS:     Appearance of small vertically oriented lucency at the upper aspect of  the patella, potentially evidence of nondisplaced fracture, correlate  with location of pain. Mild knee effusion is present. No other evidence  of fracture is noted. No erosion, or dislocation is identified.  Prominent tibiofemoral joint space narrowing is noted. Moderate to  prominent degenerative spurring is seen.  Follow-up/further evaluation  can be obtained as indicated.       Impression:         As described.     This report was finalized on 12/28/2022 6:48 PM by Dr. Donte Hernandez M.D.           Results for orders placed during the hospital encounter of 06/24/20    Duplex Venous Lower Extremity - Bilateral CAR    Interpretation Summary  · Normal bilateral lower extremity venous duplex scan.  · Fluid collection seen above the left knee measuring about 5 x 2 x 8 cm. Follow-up with additional images and clinically indicated.    Results for orders placed during the hospital encounter of 06/24/20    Adult Transthoracic Echo Complete W/ Cont if Necessary Per Protocol    Interpretation Summary  · The left ventricular cavity is  moderately dilated.  · Estimated EF appears to be in the range of 56 - 60%.  · Left ventricular wall thickness is consistent with mild concentric hypertrophy.  · Left ventricular diastolic dysfunction is noted (grade I) consistent with impaired relaxation.  · Normal right ventricular systolic function noted. Right ventricular cavity is mildly dilated.  · Left atrial cavity size is mild-to-moderately dilated.  · There is no evidence of pericardial effusion.    Pertinent Labs     Results from last 7 days   Lab Units 01/02/23  0715 01/01/23  0814 12/30/22  0607   WBC 10*3/mm3 6.85 6.83 8.55   HEMOGLOBIN g/dL 10.4* 10.8* 10.2*   PLATELETS 10*3/mm3 221 170 96*     Results from last 7 days   Lab Units 01/04/23  0521 01/02/23  0715 01/01/23  0814 12/31/22  0717   SODIUM mmol/L 138 138 139 138   POTASSIUM mmol/L 4.3 3.9 3.3*  3.3* 3.3*   CHLORIDE mmol/L 105 102 101 99   CO2 mmol/L 26.0 24.4 26.7 25.4   BUN mg/dL 17 19 18 20   CREATININE mg/dL 0.92 0.95 0.94 0.92   GLUCOSE mg/dL 108* 104* 135* 140*   EGFR mL/min/1.73 90.6 87.2 88.3 90.6     Results from last 7 days   Lab Units 01/04/23  0521 01/01/23  0814 12/31/22  0717 12/30/22  0607 12/29/22  1758   ALBUMIN g/dL 3.3* 3.5 3.4* 3.7 3.4*   BILIRUBIN mg/dL 0.3  --  0.6 0.7 0.6   ALK PHOS U/L 72  --  74 77 74   AST (SGOT) U/L 26  --  168* 292* 350*   ALT (SGPT) U/L 38  --  95* 115* 113*     Results from last 7 days   Lab Units 01/04/23  0521 01/02/23  0715 01/01/23  0814 12/31/22  0717 12/30/22  0607   CALCIUM mg/dL 8.5* 8.6 8.9 8.3* 8.2*   ALBUMIN g/dL 3.3*  --  3.5 3.4* 3.7   MAGNESIUM mg/dL  --  1.7 1.9 1.3* 1.4*   PHOSPHORUS mg/dL  --  3.7 3.7 3.3 2.2*       Results from last 7 days   Lab Units 01/02/23  0715 12/31/22  0717 12/30/22  0607 12/29/22  1758   CK TOTAL U/L 668* 5,568* 11,639* 15,851*           Invalid input(s): LDLCALC          Test Results Pending at Discharge       Discharge Details        Discharge Medications      New Medications      Instructions Start  Date   acetaminophen 325 MG tablet  Commonly known as: TYLENOL   650 mg, Oral, Every 4 Hours PRN      folic acid 1 MG tablet  Commonly known as: FOLVITE   1 mg, Oral, Daily   Start Date: January 6, 2023     hydrocortisone-bacitracin-zinc oxide-nystatin  Commonly known as: MAGIC BARRIER   1 application, Topical, 3 Times Daily      hydrOXYzine 25 MG tablet  Commonly known as: ATARAX   25 mg, Oral, 3 Times Daily PRN      ipratropium-albuterol 0.5-2.5 mg/3 ml nebulizer  Commonly known as: DUO-NEB   3 mL, Nebulization, 4 Times Daily PRN      metoprolol succinate XL 25 MG 24 hr tablet  Commonly known as: TOPROL-XL   25 mg, Oral, Every 24 Hours Scheduled   Start Date: January 6, 2023     mupirocin 2 % ointment  Commonly known as: BACTROBAN   1 application, Topical, Every 12 Hours Scheduled      potassium chloride 20 MEQ CR tablet  Commonly known as: K-DUR,KLOR-CON   20 mEq, Oral, Daily   Start Date: January 6, 2023     spironolactone 25 MG tablet  Commonly known as: ALDACTONE   25 mg, Oral, Daily   Start Date: January 6, 2023     thiamine 100 MG tablet  Commonly known as: VITAMIN B1   100 mg, Oral, Daily   Start Date: January 6, 2023     traMADol 50 MG tablet  Commonly known as: ULTRAM   25 mg, Oral, Every 8 Hours PRN         Changes to Medications      Instructions Start Date   losartan 50 MG tablet  Commonly known as: COZAAR  What changed:   · medication strength  · how much to take  · when to take this   50 mg, Oral, Every 24 Hours Scheduled   Start Date: January 6, 2023        Continue These Medications      Instructions Start Date   multivitamin with minerals tablet tablet   1 tablet, Oral, Daily         Stop These Medications    amLODIPine 10 MG tablet  Commonly known as: NORVASC     buPROPion  MG 12 hr tablet  Commonly known as: Wellbutrin SR     guaiFENesin-codeine 100-10 MG/5ML liquid  Commonly known as: GUAIFENESIN AC     traZODone 100 MG tablet  Commonly known as: DESYREL            Allergies   Allergen  Reactions   • Penicillins Unknown - Low Severity     Pt does not recall the reaction. Patient tolerated cephalexin 3/2020   • Penicillins Other (See Comments)     Unknown        Discharge Disposition:  Skilled Nursing Facility (DC - External)      Discharge Diet:  Diet Order   Procedures   • Diet: Cardiac Diets, Diabetic Diets; Healthy Heart (2-3 Na+); Consistent Carbohydrate; Texture: Regular Texture (IDDSI 7); Fluid Consistency: Thin (IDDSI 0)       Discharge Activity:   Activity Instructions     Activity as Tolerated            CODE STATUS:    Code Status and Medical Interventions:   Ordered at: 12/28/22 2026     Code Status (Patient has no pulse and is not breathing):    CPR (Attempt to Resuscitate)     Medical Interventions (Patient has pulse or is breathing):    Full       No future appointments.   Contact information for follow-up providers     Checo Dunham MD Follow up in 2 week(s).    Specialty: Family Medicine  Contact information:  [address] [facility] [address]  [facility] [address]  [phone]             Ranjit Saleh II, MD. Schedule an appointment as soon as possible for a visit in 2 week(s).    Specialty: Orthopedic Surgery  Why: Follow up left patellar fracture  Contact information:  [address] [facility] [address]  [facility] [address]  [phone]                   Contact information for after-discharge care     Destination     [facility] .    Service: Skilled Nursing  Contact information:  [address] [id number] [phone]                             Time Spent on Discharge:  Greater than 30 minutes      ALISON Berry  [facility]  01/05/23  11:44 EST                Electronically signed by Hiwot Rucker APRN at 01/05/23 1157       Discharge Order (From admission, onward)     Start     Ordered    01/05/23 0904  Discharge patient  Once        Expected Discharge Date: 01/05/23    Expected Discharge Time:  Afternoon    Discharge Disposition: Skilled Nursing Facility (DC - External)    Physician of Record for Attribution - Please select from Treatment Team: ALIN FRYE [[id number]]    Review needed by CMO to determine Physician of Record: No       Question Answer Comment   Physician of Record for Attribution - Please select from Treatment Team ALIN FRYE    Review needed by CMO to determine Physician of Record No        01/05/23 0904                 What Type Of Note Output Would You Prefer (Optional)?: Standard Output On A Scale Of 0 To 10 How Would You Rate Your Itching?: 5 How Did Your Itching Occur?: sudden in onset (over a period of weeks to a few months) How Severe Is Your Itching?: moderate

## 2024-07-25 NOTE — PLAN OF CARE
Chief Complaint     Chief Complaint   Patient presents with   • Follow-up       Subjective    HPI (History of Presenting Illness)     Follow up.  His health history is significant for cirrhosis, afib, malignant GIST, monoclonal gammopathy of undetermined significance, low-grade B-cell lymphoproliferative disorder, CKD, anemia of chronic disease.    Overall doing okay.    He is on systemic oral chemotherapy.    He has some mild leg swelling, he takes lasix half tablet daily.   Review safety at home. He can get room to room with cane, no problems using the bathroom with appetite. He tries to watch what he eats, eat less sodium.  Living with pain daily neck to feet. Norco helps some.     I have personally reviewed and updated the following EMR sections: Past Medical History, Past Surgical History, Current medications, and Allergies    REVIEW OF SYSTEMS   Remainder of comprehensive review of systems is negative with pertinent positives and negatives stated in HPI (History of Presenting Illness).    Objective   PHYSCIAL EXAM    Visit Vitals  /60   Pulse (!) 60   Resp 18   Ht 5' 11\" (1.803 m)   Wt 123.8 kg (273 lb)   SpO2 98%   BMI 38.08 kg/m²       Constitutional:  No distress. Appropriate Hygiene.  HEENT:   Head: Normocephalic and atraumatic.   Eyes: Conjunctivae normal. EOM grossly normal.  Nose: Nose normal.  Mouth: Normal oral mucosa and oropharyngeal exam.  Neck: Normal range of motion.   Cardiovascular: Normal rate, regular rhythm, distal pulses intact. No murmur heard. +1 tense edema pedal and tibial bilaterally with no open wounds, small skin blisters anterior left leg.  Pulmonary/Chest: Effort normal and breath sounds normal.   Abdomen: Non tender. No fluid wave appreciated.  Musculoskeletal: Normal range of motion grossly visualized. Ambulates with cane.  Neurological: Alert and oriented to person, place, and time  Skin: Warm and dry.   Psychiatric: Normal mood and affect. Thought content normal.  Goal Outcome Evaluation:  Plan of Care Reviewed With: patient           Outcome Summary: Pt. is a 66 year old Male admitted to the hospital with an Acute kidney injury and ETOH withdrawal.  Pt. reports that he was independent with functional mobility just prior to admission and that he used a cane during ambulation. Pt. currently presents with decreased strength, decreased balance, and decreased tolerance to functional activity. Pt. will benefit from skilled inpt. P.T. to address his functional deficits and to assist pt. in regaining his maximum level of independence with functional mobility.    Patient was not wearing a face mask during this therapy encounter. Therapist used appropriate personal protective equipment including eye protection, mask, and gloves.  Mask used was standard procedure mask. Appropriate PPE was worn during the entire therapy session. Hand hygiene was completed before and after therapy session. Patient is not in enhanced droplet precautions.          LABORATORY  I have reviewed and analyzed pertinent laboratory tests. These are the pertinent findings:  WBC (K/mcL)   Date Value   07/01/2024 4.7     RBC (mil/mcL)   Date Value   07/01/2024 2.93 (L)     HCT (%)   Date Value   07/01/2024 28.5 (L)     HGB (g/dL)   Date Value   07/01/2024 9.5 (L)     PLT (K/mcL)   Date Value   07/01/2024 107 (L)     Lab Results   Component Value Date    SODIUM 141 07/01/2024    POTASSIUM 4.4 07/01/2024    CHLORIDE 106 07/01/2024    CO2 27 07/01/2024    BUN 18 07/01/2024    CREATININE 0.99 07/01/2024    GLUCOSE 113 (H) 07/01/2024     Hemoglobin A1C (%)   Date Value   11/20/2023 3.8 (L)     TSH (mcUnits/mL)   Date Value   02/26/2024 3.799     Lab Results   Component Value Date    CHOLESTEROL 90 11/21/2023    HDL 36 (L) 11/21/2023    CALCLDL 43 11/21/2023    TRIGLYCERIDE 57 11/21/2023     Lab Results   Component Value Date    AST 17 07/01/2024    GPT 17 07/01/2024    ALKPT 122 (H) 07/01/2024    BILIRUBIN 1.2 (H) 07/01/2024     Prostate Specific Antigen (ng/mL)   Date Value   02/04/2021 0.68         Assessment & Plan    ASSESSMENT AND PLAN     Anemia of chronic disease  (primary encounter diagnosis)  Chronic neck pain  Dependent edema  Patient on antineoplastic chemotherapy regimen     Overall stable chronic health conditions with improvement in edema.    -discuss POA-HC activation, it was activated when he was encephalopathic, he states his wife makes the decisions now anyway and if he gets confused he wants her to be able to make decisions.  -norco 5/325 tablet BID for chronic pain  -lasix 20mg daily to BID depending on his level of swelling, much improved at this time, continue with potassium 20meq daily for edema secondary to dependent edema, cirrhosis, venous insufficiency  -recommend skin check on chemotherapy    Follow up 3 to 4 months.    We discuss the risks, benefits, and alternatives to the plan of care and the patient is agreeable to the above plan.    Jorge Huddleston MD

## 2024-08-19 ENCOUNTER — APPOINTMENT (OUTPATIENT)
Dept: CT IMAGING | Facility: HOSPITAL | Age: 69
End: 2024-08-19
Payer: COMMERCIAL

## 2024-08-19 ENCOUNTER — APPOINTMENT (OUTPATIENT)
Dept: GENERAL RADIOLOGY | Facility: HOSPITAL | Age: 69
End: 2024-08-19
Payer: COMMERCIAL

## 2024-08-19 ENCOUNTER — HOSPITAL ENCOUNTER (EMERGENCY)
Facility: HOSPITAL | Age: 69
Discharge: HOME | End: 2024-08-20
Attending: EMERGENCY MEDICINE
Payer: COMMERCIAL

## 2024-08-19 DIAGNOSIS — F19.10 POLYSUBSTANCE ABUSE: Primary | ICD-10-CM

## 2024-08-19 DIAGNOSIS — N28.9 RENAL INSUFFICIENCY: ICD-10-CM

## 2024-08-19 LAB
ALBUMIN SERPL-MCNC: 4.3 G/DL (ref 3.5–5.2)
ALBUMIN/GLOB SERPL: 1.2 G/DL
ALP SERPL-CCNC: 105 U/L (ref 39–117)
ALT SERPL W P-5'-P-CCNC: 11 U/L (ref 1–41)
AMPHET+METHAMPHET UR QL: NEGATIVE
ANION GAP SERPL CALCULATED.3IONS-SCNC: 11.2 MMOL/L (ref 5–15)
APAP SERPL-MCNC: <5 MCG/ML (ref 0–30)
AST SERPL-CCNC: 14 U/L (ref 1–40)
BARBITURATES UR QL SCN: NEGATIVE
BASOPHILS # BLD AUTO: 0.04 10*3/MM3 (ref 0–0.2)
BASOPHILS NFR BLD AUTO: 0.3 % (ref 0–1.5)
BENZODIAZ UR QL SCN: NEGATIVE
BILIRUB SERPL-MCNC: 0.4 MG/DL (ref 0–1.2)
BUN SERPL-MCNC: 25 MG/DL (ref 8–23)
BUN/CREAT SERPL: 13.8 (ref 7–25)
CALCIUM SPEC-SCNC: 9.2 MG/DL (ref 8.6–10.5)
CANNABINOIDS SERPL QL: NEGATIVE
CHLORIDE SERPL-SCNC: 105 MMOL/L (ref 98–107)
CO2 SERPL-SCNC: 20.8 MMOL/L (ref 22–29)
COCAINE UR QL: POSITIVE
CREAT SERPL-MCNC: 1.81 MG/DL (ref 0.76–1.27)
DEPRECATED RDW RBC AUTO: 44.9 FL (ref 37–54)
EGFRCR SERPLBLD CKD-EPI 2021: 39.7 ML/MIN/1.73
EOSINOPHIL # BLD AUTO: 0.2 10*3/MM3 (ref 0–0.4)
EOSINOPHIL NFR BLD AUTO: 1.5 % (ref 0.3–6.2)
ERYTHROCYTE [DISTWIDTH] IN BLOOD BY AUTOMATED COUNT: 12.8 % (ref 12.3–15.4)
ETHANOL BLD-MCNC: <10 MG/DL (ref 0–10)
ETHANOL UR QL: <0.01 %
FENTANYL UR-MCNC: POSITIVE NG/ML
GLOBULIN UR ELPH-MCNC: 3.7 GM/DL
GLUCOSE SERPL-MCNC: 118 MG/DL (ref 65–99)
HCT VFR BLD AUTO: 39.5 % (ref 37.5–51)
HGB BLD-MCNC: 13.1 G/DL (ref 13–17.7)
HOLD SPECIMEN: NORMAL
IMM GRANULOCYTES # BLD AUTO: 0.09 10*3/MM3 (ref 0–0.05)
IMM GRANULOCYTES NFR BLD AUTO: 0.7 % (ref 0–0.5)
LYMPHOCYTES # BLD AUTO: 0.9 10*3/MM3 (ref 0.7–3.1)
LYMPHOCYTES NFR BLD AUTO: 6.7 % (ref 19.6–45.3)
MCH RBC QN AUTO: 32.2 PG (ref 26.6–33)
MCHC RBC AUTO-ENTMCNC: 33.2 G/DL (ref 31.5–35.7)
MCV RBC AUTO: 97.1 FL (ref 79–97)
METHADONE UR QL SCN: NEGATIVE
MONOCYTES # BLD AUTO: 0.83 10*3/MM3 (ref 0.1–0.9)
MONOCYTES NFR BLD AUTO: 6.2 % (ref 5–12)
NEUTROPHILS NFR BLD AUTO: 11.34 10*3/MM3 (ref 1.7–7)
NEUTROPHILS NFR BLD AUTO: 84.6 % (ref 42.7–76)
NRBC BLD AUTO-RTO: 0 /100 WBC (ref 0–0.2)
OPIATES UR QL: POSITIVE
OXYCODONE UR QL SCN: NEGATIVE
PLATELET # BLD AUTO: 258 10*3/MM3 (ref 140–450)
PMV BLD AUTO: 9.4 FL (ref 6–12)
POTASSIUM SERPL-SCNC: 4.7 MMOL/L (ref 3.5–5.2)
PROT SERPL-MCNC: 8 G/DL (ref 6–8.5)
RBC # BLD AUTO: 4.07 10*6/MM3 (ref 4.14–5.8)
SALICYLATES SERPL-MCNC: <0.3 MG/DL
SODIUM SERPL-SCNC: 137 MMOL/L (ref 136–145)
WBC NRBC COR # BLD AUTO: 13.4 10*3/MM3 (ref 3.4–10.8)
WHOLE BLOOD HOLD COAG: NORMAL

## 2024-08-19 PROCEDURE — 80053 COMPREHEN METABOLIC PANEL: CPT | Performed by: EMERGENCY MEDICINE

## 2024-08-19 PROCEDURE — 99284 EMERGENCY DEPT VISIT MOD MDM: CPT

## 2024-08-19 PROCEDURE — 80307 DRUG TEST PRSMV CHEM ANLYZR: CPT | Performed by: EMERGENCY MEDICINE

## 2024-08-19 PROCEDURE — 80143 DRUG ASSAY ACETAMINOPHEN: CPT | Performed by: EMERGENCY MEDICINE

## 2024-08-19 PROCEDURE — 80179 DRUG ASSAY SALICYLATE: CPT | Performed by: EMERGENCY MEDICINE

## 2024-08-19 PROCEDURE — 71045 X-RAY EXAM CHEST 1 VIEW: CPT

## 2024-08-19 PROCEDURE — 70450 CT HEAD/BRAIN W/O DYE: CPT

## 2024-08-19 PROCEDURE — 85025 COMPLETE CBC W/AUTO DIFF WBC: CPT | Performed by: EMERGENCY MEDICINE

## 2024-08-19 PROCEDURE — 25810000003 SODIUM CHLORIDE 0.9 % SOLUTION: Performed by: EMERGENCY MEDICINE

## 2024-08-19 PROCEDURE — 82077 ASSAY SPEC XCP UR&BREATH IA: CPT | Performed by: EMERGENCY MEDICINE

## 2024-08-19 RX ADMIN — SODIUM CHLORIDE 1000 ML: 9 INJECTION, SOLUTION INTRAVENOUS at 22:28

## 2024-08-20 VITALS
DIASTOLIC BLOOD PRESSURE: 74 MMHG | HEART RATE: 70 BPM | TEMPERATURE: 98.7 F | OXYGEN SATURATION: 91 % | SYSTOLIC BLOOD PRESSURE: 135 MMHG | RESPIRATION RATE: 16 BRPM

## 2024-08-20 NOTE — ED NOTES
Attempted to call to Aravind, son, via number on file. Pt son did not answer, RN left voicemail directing Aravind to return call whenever he was available

## 2024-08-20 NOTE — ED NOTES
Call returned from Pt son, Aravind. Son unaware if he will be able to  father at this time d/t concerns with work. Pt son reports that he will call this RN back momentarily.

## 2024-08-20 NOTE — ED NOTES
Pt to [facility] via [facility].    EMS reports they were called by a neighbor who found pt hanging out of his vehicle in the parking lot. Pt had reported to drinking approx 1/2 pint of whiskey to EMS.     Pt responds to noxious stimuli in triage, unable to provide information.

## 2024-08-20 NOTE — ED PROVIDER NOTES
EMERGENCY DEPARTMENT ENCOUNTER    Room number:  04/04  Date Seen:  8/20/2024  PCP:  Roc Carroll APRN    Laboratory Results:  Recent Results (from the past 24 hour(s))   Comprehensive Metabolic Panel    Collection Time: 08/19/24  9:24 PM    Specimen: Blood   Result Value Ref Range    Glucose 118 (H) 65 - 99 mg/dL    BUN 25 (H) 8 - 23 mg/dL    Creatinine 1.81 (H) 0.76 - 1.27 mg/dL    Sodium 137 136 - 145 mmol/L    Potassium 4.7 3.5 - 5.2 mmol/L    Chloride 105 98 - 107 mmol/L    CO2 20.8 (L) 22.0 - 29.0 mmol/L    Calcium 9.2 8.6 - 10.5 mg/dL    Total Protein 8.0 6.0 - 8.5 g/dL    Albumin 4.3 3.5 - 5.2 g/dL    ALT (SGPT) 11 1 - 41 U/L    AST (SGOT) 14 1 - 40 U/L    Alkaline Phosphatase 105 39 - 117 U/L    Total Bilirubin 0.4 0.0 - 1.2 mg/dL    Globulin 3.7 gm/dL    A/G Ratio 1.2 g/dL    BUN/Creatinine Ratio 13.8 7.0 - 25.0    Anion Gap 11.2 5.0 - 15.0 mmol/L    eGFR 39.7 (L) >60.0 mL/min/1.73   Urine Drug Screen - Urine, Clean Catch    Collection Time: 08/19/24  9:24 PM    Specimen: Urine, Clean Catch   Result Value Ref Range    Amphet/Methamphet, Screen Negative Negative    Barbiturates Screen, Urine Negative Negative    Benzodiazepine Screen, Urine Negative Negative    Cocaine Screen, Urine Positive (A) Negative    Opiate Screen Positive (A) Negative    THC, Screen, Urine Negative Negative    Methadone Screen, Urine Negative Negative    Oxycodone Screen, Urine Negative Negative    Fentanyl, Urine Positive (A) Negative   Ethanol    Collection Time: 08/19/24  9:24 PM    Specimen: Blood   Result Value Ref Range    Ethanol <10 0 - 10 mg/dL    Ethanol % <0.010 %   CBC Auto Differential    Collection Time: 08/19/24  9:24 PM    Specimen: Blood   Result Value Ref Range    WBC 13.40 (H) 3.40 - 10.80 10*3/mm3    RBC 4.07 (L) 4.14 - 5.80 10*6/mm3    Hemoglobin 13.1 13.0 - 17.7 g/dL    Hematocrit 39.5 37.5 - 51.0 %    MCV 97.1 (H) 79.0 - 97.0 fL    MCH 32.2 26.6 - 33.0 pg    MCHC 33.2 31.5 - 35.7 g/dL    RDW 12.8  12.3 - 15.4 %    RDW-SD 44.9 37.0 - 54.0 fl    MPV 9.4 6.0 - 12.0 fL    Platelets 258 140 - 450 10*3/mm3    Neutrophil % 84.6 (H) 42.7 - 76.0 %    Lymphocyte % 6.7 (L) 19.6 - 45.3 %    Monocyte % 6.2 5.0 - 12.0 %    Eosinophil % 1.5 0.3 - 6.2 %    Basophil % 0.3 0.0 - 1.5 %    Immature Grans % 0.7 (H) 0.0 - 0.5 %    Neutrophils, Absolute 11.34 (H) 1.70 - 7.00 10*3/mm3    Lymphocytes, Absolute 0.90 0.70 - 3.10 10*3/mm3    Monocytes, Absolute 0.83 0.10 - 0.90 10*3/mm3    Eosinophils, Absolute 0.20 0.00 - 0.40 10*3/mm3    Basophils, Absolute 0.04 0.00 - 0.20 10*3/mm3    Immature Grans, Absolute 0.09 (H) 0.00 - 0.05 10*3/mm3    nRBC 0.0 0.0 - 0.2 /100 WBC   Gray Top    Collection Time: 08/19/24  9:24 PM   Result Value Ref Range    Extra Tube Hold for add-ons.    Light Blue Top    Collection Time: 08/19/24  9:24 PM   Result Value Ref Range    Extra Tube Hold for add-ons.    Salicylate Level    Collection Time: 08/19/24  9:24 PM    Specimen: Blood   Result Value Ref Range    Salicylate <0.3 <=30.0 mg/dL   Acetaminophen Level    Collection Time: 08/19/24  9:24 PM    Specimen: Blood   Result Value Ref Range    Acetaminophen <5.0 0.0 - 30.0 mcg/mL     I reviewed the above results.    Radiology:  CT Head Without Contrast   Final Result      XR Chest 1 View   Final Result        I reviewed the above results    Medications ordered in ED:  Medications   sodium chloride 0.9 % bolus 1,000 mL (0 mL Intravenous Stopped 8/19/24 4307)       ED Course as of 08/20/24 2014   Mon Aug 19, 2024   5103 22:42 EDT  Patient still sleepy but arousable will wake up.  I asked him if he took something and he states he takes pills that he buys off the street. [SL]   Tue Aug 20, 2024   0101 01:01 EDT  Patient is more weak.  Patient states he knows where he is at.  Patient states he does not have anyone that can come get him at this point.  Will be observed until sober. [SL]   0117 Patient was turned over to me by Dr. Gillespie.  Pending: sobriety and  dispo   [CC]   0607 Patient reassessed.  He is awake and alert.  He has been ambulating without difficulty.  I explained to him that his drug screen was positive for fentanyl.  He states that he does not know how that could be.  I explained to him that other medications could be contaminated with fentanyl.  I discussed plan to order him a Narcan nasal spray and I urged him to avoid taking narcotics that are not prescribed to him. [JR]      ED Course User Index  [CC] Sona Dee PA-C  [JR] Rigo Christine MD  [SL] Srinivasan Gillespie MD       Diagnosis:  Final diagnoses:   Polysubstance abuse   Renal insufficiency       Follow Up:  Roc Carroll, APRN  [address] [address]  [phone]    Schedule an appointment as soon as possible for a visit   For recheck of your creatinine      RX:     Medication List        New Prescriptions      naloxone 4 MG/0.1ML nasal spray  Commonly known as: NARCAN  1 spray into the nostril(s) as directed by provider As Needed for Opioid Reversal. Call 911. Don't prime. [place] in 1 nostril for overdose. Repeat in 2-3 minutes in other nostril if no or minimal breathing/responsiveness.               Where to Get Your Medications        These medications were sent to [facility] [place] [id number]      Hours: Monday to Friday 7 AM to 6 PM, Saturday & Sunday 8 AM to 4:30 PM (Closed 12 PM to 12:30 PM) Phone: [phone]   naloxone 4 MG/0.1ML nasal spray         Provider attestation:  I personally reviewed the past medical history, past surgical history, social history, family history, current medications, and allergies as they appear in the chart.    The patient was seen and examined by myself and Dr. Gillespie, who agree with plan.      Sona Dee PA-C  08/20/24 2014

## 2024-08-20 NOTE — ED PROVIDER NOTES
EMERGENCY DEPARTMENT ENCOUNTER    Room Number:  04/04  PCP: No primary care provider on file.  Historian: EMS      HPI:  Chief Complaint: Altered mental status  A complete HPI/ROS/PMH/PSH/SH/FH are unobtainable due to: Altered mental status  Context: Watson Lisa is a 69 y.o. male who presents to the ED c/o altered mental status.  Patient was found by his landlord lying in his car with his leg hanging out in the door open.  Patient had open bottle of alcohol in there.  Patient does admit to drinking.  EMS states patient unable to ambulate.  Patient denies trauma.  No other substance use            PAST MEDICAL HISTORY  Active Ambulatory Problems     Diagnosis Date Noted   • No Active Ambulatory Problems     Resolved Ambulatory Problems     Diagnosis Date Noted   • No Resolved Ambulatory Problems     No Additional Past Medical History         PAST SURGICAL HISTORY  No past surgical history on file.      FAMILY HISTORY  No family history on file.      SOCIAL HISTORY         ALLERGIES  Patient has no allergy information on record.        REVIEW OF SYSTEMS  Review of Systems   Alcohol intoxication      PHYSICAL EXAM  ED Triage Vitals [08/19/24 2044]   Temp Heart Rate Resp BP SpO2   98.7 °F (37.1 °C) 82 16 (!) 189/93 95 %      Temp src Heart Rate Source Patient Position BP Location FiO2 (%)   -- -- -- -- --       Physical Exam      GENERAL: Patient sleepy but arousable can answer some questions.  Does appear intoxicated  HENT: nares patent  EYES: no scleral icterus  CV: regular rhythm, normal rate  RESPIRATORY: normal effort  ABDOMEN: soft  MUSCULOSKELETAL: no deformity  NEURO: alert, moves all extremities, follows commands  PSYCH:  calm, cooperative  SKIN: warm, dry    Vital signs and nursing notes reviewed.          LAB RESULTS  Recent Results (from the past 24 hour(s))   Comprehensive Metabolic Panel    Collection Time: 08/19/24  9:24 PM    Specimen: Blood   Result Value Ref Range    Glucose 118 (H) 65 - 99 mg/dL     BUN 25 (H) 8 - 23 mg/dL    Creatinine 1.81 (H) 0.76 - 1.27 mg/dL    Sodium 137 136 - 145 mmol/L    Potassium 4.7 3.5 - 5.2 mmol/L    Chloride 105 98 - 107 mmol/L    CO2 20.8 (L) 22.0 - 29.0 mmol/L    Calcium 9.2 8.6 - 10.5 mg/dL    Total Protein 8.0 6.0 - 8.5 g/dL    Albumin 4.3 3.5 - 5.2 g/dL    ALT (SGPT) 11 1 - 41 U/L    AST (SGOT) 14 1 - 40 U/L    Alkaline Phosphatase 105 39 - 117 U/L    Total Bilirubin 0.4 0.0 - 1.2 mg/dL    Globulin 3.7 gm/dL    A/G Ratio 1.2 g/dL    BUN/Creatinine Ratio 13.8 7.0 - 25.0    Anion Gap 11.2 5.0 - 15.0 mmol/L    eGFR 39.7 (L) >60.0 mL/min/1.73   Urine Drug Screen - Urine, Clean Catch    Collection Time: 08/19/24  9:24 PM    Specimen: Urine, Clean Catch   Result Value Ref Range    Amphet/Methamphet, Screen Negative Negative    Barbiturates Screen, Urine Negative Negative    Benzodiazepine Screen, Urine Negative Negative    Cocaine Screen, Urine Positive (A) Negative    Opiate Screen Positive (A) Negative    THC, Screen, Urine Negative Negative    Methadone Screen, Urine Negative Negative    Oxycodone Screen, Urine Negative Negative    Fentanyl, Urine Positive (A) Negative   Ethanol    Collection Time: 08/19/24  9:24 PM    Specimen: Blood   Result Value Ref Range    Ethanol <10 0 - 10 mg/dL    Ethanol % <0.010 %   CBC Auto Differential    Collection Time: 08/19/24  9:24 PM    Specimen: Blood   Result Value Ref Range    WBC 13.40 (H) 3.40 - 10.80 10*3/mm3    RBC 4.07 (L) 4.14 - 5.80 10*6/mm3    Hemoglobin 13.1 13.0 - 17.7 g/dL    Hematocrit 39.5 37.5 - 51.0 %    MCV 97.1 (H) 79.0 - 97.0 fL    MCH 32.2 26.6 - 33.0 pg    MCHC 33.2 31.5 - 35.7 g/dL    RDW 12.8 12.3 - 15.4 %    RDW-SD 44.9 37.0 - 54.0 fl    MPV 9.4 6.0 - 12.0 fL    Platelets 258 140 - 450 10*3/mm3    Neutrophil % 84.6 (H) 42.7 - 76.0 %    Lymphocyte % 6.7 (L) 19.6 - 45.3 %    Monocyte % 6.2 5.0 - 12.0 %    Eosinophil % 1.5 0.3 - 6.2 %    Basophil % 0.3 0.0 - 1.5 %    Immature Grans % 0.7 (H) 0.0 - 0.5 %    Neutrophils,  Absolute 11.34 (H) 1.70 - 7.00 10*3/mm3    Lymphocytes, Absolute 0.90 0.70 - 3.10 10*3/mm3    Monocytes, Absolute 0.83 0.10 - 0.90 10*3/mm3    Eosinophils, Absolute 0.20 0.00 - 0.40 10*3/mm3    Basophils, Absolute 0.04 0.00 - 0.20 10*3/mm3    Immature Grans, Absolute 0.09 (H) 0.00 - 0.05 10*3/mm3    nRBC 0.0 0.0 - 0.2 /100 WBC   Gray Top    Collection Time: 08/19/24  9:24 PM   Result Value Ref Range    Extra Tube Hold for add-ons.    Light Blue Top    Collection Time: 08/19/24  9:24 PM   Result Value Ref Range    Extra Tube Hold for add-ons.    Salicylate Level    Collection Time: 08/19/24  9:24 PM    Specimen: Blood   Result Value Ref Range    Salicylate <0.3 <=30.0 mg/dL   Acetaminophen Level    Collection Time: 08/19/24  9:24 PM    Specimen: Blood   Result Value Ref Range    Acetaminophen <5.0 0.0 - 30.0 mcg/mL       Ordered the above labs and reviewed the results.        RADIOLOGY  CT Head Without Contrast    Result Date: 8/19/2024  CT HEAD WO CONTRAST-  HISTORY:  ams  COMPARISON: None  FINDINGS: The patient's head is canted in the scanner. Moderate vascular calcification is noted. There is complete opacification of left maxillary sinus. There is no evidence of hemorrhage or of acute infarction. Further evaluation could be performed with a MRI examination of the brain as indicated.      Radiation dose reduction techniques were utilized, including automated exposure modulation based on body size.  This report was finalized on 8/19/2024 9:47 PM by Dr. Vincent Beck M.D on Workstation: [id number]      XR Chest 1 View    Result Date: 8/19/2024  XR CHEST 1 VW-  CLINICAL HISTORY:  cough  COMPARISON:  None  FINDINGS: A single portable view of the chest was obtained. The study is hampered somewhat by the patient's body habitus and technique. The heart is within normal limits in size. There is no evidence of a focal infiltrate, effusion or of congestive failure.    This report was finalized on 8/19/2024 9:22 PM by   Vincent Beck M.D on Workstation: [id number]       Ordered the above noted radiological studies.  CT head independent interpreted by me and shows no evidence of bleeding          PROCEDURES  Procedures            MEDICATIONS GIVEN IN ER  Medications   sodium chloride 0.9 % bolus 1,000 mL (1,000 mL Intravenous New Bag 8/19/24 2228)                   MEDICAL DECISION MAKING, PROGRESS, and CONSULTS    All labs have been independently reviewed by me.  All radiology studies have been reviewed by me and I have also reviewed the radiology report.   EKG's independently viewed and interpreted by me.  Discussion below represents my analysis of pertinent findings related to patient's condition, differential diagnosis, treatment plan and final disposition.      Additional sources:  - Discussed/ obtained information from independent historians: None    - External (non-ED) record review: Epic reviewed and patient has no records in epic    - Chronic or social conditions impacting care: None none    - Shared decision making: None      Orders placed during this visit:  Orders Placed This Encounter   Procedures   • XR Chest 1 View   • CT Head Without Contrast   • Comprehensive Metabolic Panel   • Urine Drug Screen - Urine, Clean Catch   • Ethanol   • CBC Auto Differential   • [place] Draw   • Salicylate Level   • Acetaminophen Level   • CBC & Differential   • Gray Top   • Light Blue Top         Additional orders considered but not ordered:  None        Differential diagnosis includes but is not limited to:    Polysubstance abuse versus alcohol abuse      Independent interpretation of labs, radiology studies, and discussions with consultants:  ED Course as of 08/20/24 0917   Mon Aug 19, 2024   2242 22:42 EDT  Patient still sleepy but arousable will wake up.  I asked him if he took something and he states he takes pills that he buys off the street. [SL]   Tue Aug 20, 2024   0101 01:01 EDT  Patient is more weak.  Patient states he  knows where he is at.  Patient states he does not have anyone that can come get him at this point.  Will be observed until sober. [SL]   0117 Patient was turned over to me by Dr. Gillespie.  Pending: sobriety and dispo   [CC]   0607 Patient reassessed.  He is awake and alert.  He has been ambulating without difficulty.  I explained to him that his drug screen was positive for fentanyl.  He states that he does not know how that could be.  I explained to him that other medications could be contaminated with fentanyl.  I discussed plan to order him a Narcan nasal spray and I urged him to avoid taking narcotics that are not prescribed to him. [JR]      ED Course User Index  [CC] Sona Dee PA-C  [JR] Rigo Christine MD  [SL] Srinivasan Gillespie MD                 DIAGNOSIS  Final diagnoses:   Polysubstance abuse         DISPOSITION  Awaiting sobriety            Latest Documented Vital Signs:  As of 01:02 EDT  BP- 141/66 HR- 86 Temp- 98.7 °F (37.1 °C) O2 sat- 94%              --    Please note that portions of this were completed with a voice recognition program.       Note Disclaimer: At [facility], we believe that sharing information builds trust and better relationships. You are receiving this note because you are receiving care at [facility] or recently visited. It is possible you will see health information before a provider has talked with you about it. This kind of information can be easy to misunderstand. To help you fully understand what it means for your health, we urge you to discuss this note with your provider.            Srinivasan Gillespie MD  08/20/24 0103       Srinivasan Gillespie MD  08/20/24 6914

## 2024-08-20 NOTE — DISCHARGE INSTRUCTIONS
Your kidney function was elevated today.  Please follow-up closely with your primary care provider to have this rechecked.    Please refrain from using recreational drugs.